# Patient Record
Sex: FEMALE | Race: WHITE | Employment: OTHER | URBAN - METROPOLITAN AREA
[De-identification: names, ages, dates, MRNs, and addresses within clinical notes are randomized per-mention and may not be internally consistent; named-entity substitution may affect disease eponyms.]

---

## 2017-01-06 ENCOUNTER — TELEPHONE (OUTPATIENT)
Dept: CARDIOTHORACIC SURGERY | Age: 82
End: 2017-01-06

## 2017-01-06 NOTE — TELEPHONE ENCOUNTER
[address] NP:  Spoke w/ daughter Felisha Malhotra this morning re: her mom's SOB. Discussed her current regimen and the changes we had recommended and my concerns that despite medication adjustments her BP/HR did not appear to respond and concerns that her mom was getting confused with the increasingly complex regimen and not actually taking the medications as prescribed and consequently not obtaining any sx relief. Felisha Malhotra confirmed regimen with pt and called my back with BP/HR readings. They are as follows:  HR 75 158/57  HR 82 162/65  HR 70 154/62    Confirmed current meds as follows:  Lx 80/40  Amlodipine 5 qday  Hydralazine 10 BID  Metoprolol 25 BID    Decision to increase Metop to 50 BID and call next Tuesday with HR/BP readings and sx. Discussed at great length with Felisha Malhotra as well as pt. All in agreement and expressed understanding.

## 2017-01-13 ENCOUNTER — TELEPHONE (OUTPATIENT)
Dept: CARDIOTHORACIC SURGERY | Age: 82
End: 2017-01-13

## 2017-01-13 NOTE — TELEPHONE ENCOUNTER
[address] NP:  Spoke w/ pt re: BP/HR o n the following regimen:  Lx 80mg/40mg  Amlodipine 5mg qday  Hydralazine 10 BID  Metoprolol 50 BID    150-140's / 56-63 with HR 64 - 70. Instructed to increase hydralazine to TID and call back in one week. Will call daughter Deonna Davey with updated plan as well.

## 2017-01-19 ENCOUNTER — TELEPHONE (OUTPATIENT)
Dept: INTERNAL MEDICINE CLINIC | Age: 82
End: 2017-01-19

## 2017-01-19 ENCOUNTER — APPOINTMENT (OUTPATIENT)
Dept: GENERAL RADIOLOGY | Age: 82
DRG: 286 | End: 2017-01-19
Attending: EMERGENCY MEDICINE
Payer: MEDICARE

## 2017-01-19 ENCOUNTER — HOSPITAL ENCOUNTER (INPATIENT)
Age: 82
LOS: 3 days | Discharge: HOME | DRG: 286 | End: 2017-01-23
Attending: EMERGENCY MEDICINE | Admitting: INTERNAL MEDICINE
Payer: MEDICARE

## 2017-01-19 DIAGNOSIS — I50.9 ACUTE CONGESTIVE HEART FAILURE, UNSPECIFIED CONGESTIVE HEART FAILURE TYPE: Primary | ICD-10-CM

## 2017-01-19 DIAGNOSIS — D64.9 ANEMIA, UNSPECIFIED TYPE: ICD-10-CM

## 2017-01-19 DIAGNOSIS — I05.0 MITRAL VALVE STENOSIS, UNSPECIFIED ETIOLOGY: ICD-10-CM

## 2017-01-19 DIAGNOSIS — N18.9 CKD (CHRONIC KIDNEY DISEASE), UNSPECIFIED STAGE: ICD-10-CM

## 2017-01-19 LAB
ALBUMIN SERPL BCP-MCNC: 3.4 G/DL (ref 3.5–5)
ALBUMIN/GLOB SERPL: 1 {RATIO} (ref 1.1–2.2)
ALP SERPL-CCNC: 84 U/L (ref 45–117)
ALT SERPL-CCNC: 19 U/L (ref 12–78)
ANION GAP BLD CALC-SCNC: 10 MMOL/L (ref 5–15)
AST SERPL W P-5'-P-CCNC: 14 U/L (ref 15–37)
ATRIAL RATE: 101 BPM
BASOPHILS # BLD AUTO: 0 K/UL (ref 0–0.1)
BASOPHILS # BLD: 0 % (ref 0–1)
BILIRUB SERPL-MCNC: 0.6 MG/DL (ref 0.2–1)
BNP SERPL-MCNC: 199 PG/ML (ref 0–100)
BUN SERPL-MCNC: 20 MG/DL (ref 6–20)
BUN/CREAT SERPL: 14 (ref 12–20)
CALCIUM SERPL-MCNC: 9.3 MG/DL (ref 8.5–10.1)
CALCULATED P AXIS, ECG09: 82 DEGREES
CALCULATED R AXIS, ECG10: -18 DEGREES
CALCULATED T AXIS, ECG11: 88 DEGREES
CHLORIDE SERPL-SCNC: 105 MMOL/L (ref 97–108)
CO2 SERPL-SCNC: 28 MMOL/L (ref 21–32)
CREAT SERPL-MCNC: 1.42 MG/DL (ref 0.55–1.02)
DIAGNOSIS, 93000: NORMAL
EOSINOPHIL # BLD: 0.1 K/UL (ref 0–0.4)
EOSINOPHIL NFR BLD: 1 % (ref 0–7)
ERYTHROCYTE [DISTWIDTH] IN BLOOD BY AUTOMATED COUNT: 15.6 % (ref 11.5–14.5)
GLOBULIN SER CALC-MCNC: 3.4 G/DL (ref 2–4)
GLUCOSE BLD STRIP.AUTO-MCNC: 182 MG/DL (ref 65–100)
GLUCOSE SERPL-MCNC: 222 MG/DL (ref 65–100)
HCT VFR BLD AUTO: 32.3 % (ref 35–47)
HGB BLD-MCNC: 9.7 G/DL (ref 11.5–16)
INR PPP: 2.5 (ref 0.9–1.1)
LYMPHOCYTES # BLD AUTO: 8 % (ref 12–49)
LYMPHOCYTES # BLD: 0.8 K/UL (ref 0.8–3.5)
MAGNESIUM SERPL-MCNC: 2 MG/DL (ref 1.6–2.4)
MCH RBC QN AUTO: 25.9 PG (ref 26–34)
MCHC RBC AUTO-ENTMCNC: 30 G/DL (ref 30–36.5)
MCV RBC AUTO: 86.4 FL (ref 80–99)
MONOCYTES # BLD: 0.5 K/UL (ref 0–1)
MONOCYTES NFR BLD AUTO: 5 % (ref 5–13)
NEUTS SEG # BLD: 8.6 K/UL (ref 1.8–8)
NEUTS SEG NFR BLD AUTO: 86 % (ref 32–75)
P-R INTERVAL, ECG05: 194 MS
PLATELET # BLD AUTO: 169 K/UL (ref 150–400)
POTASSIUM SERPL-SCNC: 3.4 MMOL/L (ref 3.5–5.1)
PROT SERPL-MCNC: 6.8 G/DL (ref 6.4–8.2)
PROTHROMBIN TIME: 25 SEC (ref 9–11.1)
Q-T INTERVAL, ECG07: 376 MS
QRS DURATION, ECG06: 92 MS
QTC CALCULATION (BEZET), ECG08: 487 MS
RBC # BLD AUTO: 3.74 M/UL (ref 3.8–5.2)
SERVICE CMNT-IMP: ABNORMAL
SODIUM SERPL-SCNC: 143 MMOL/L (ref 136–145)
TROPONIN I SERPL-MCNC: <0.04 NG/ML
TSH SERPL DL<=0.05 MIU/L-ACNC: 1.59 UIU/ML (ref 0.36–3.74)
VENTRICULAR RATE, ECG03: 101 BPM
WBC # BLD AUTO: 10 K/UL (ref 3.6–11)

## 2017-01-19 PROCEDURE — 80053 COMPREHEN METABOLIC PANEL: CPT | Performed by: EMERGENCY MEDICINE

## 2017-01-19 PROCEDURE — 99285 EMERGENCY DEPT VISIT HI MDM: CPT

## 2017-01-19 PROCEDURE — 83880 ASSAY OF NATRIURETIC PEPTIDE: CPT | Performed by: EMERGENCY MEDICINE

## 2017-01-19 PROCEDURE — 84443 ASSAY THYROID STIM HORMONE: CPT | Performed by: INTERNAL MEDICINE

## 2017-01-19 PROCEDURE — 71010 XR CHEST PORT: CPT

## 2017-01-19 PROCEDURE — 96374 THER/PROPH/DIAG INJ IV PUSH: CPT

## 2017-01-19 PROCEDURE — 84484 ASSAY OF TROPONIN QUANT: CPT | Performed by: EMERGENCY MEDICINE

## 2017-01-19 PROCEDURE — 74011636637 HC RX REV CODE- 636/637: Performed by: INTERNAL MEDICINE

## 2017-01-19 PROCEDURE — 85610 PROTHROMBIN TIME: CPT | Performed by: EMERGENCY MEDICINE

## 2017-01-19 PROCEDURE — A9270 NON-COVERED ITEM OR SERVICE: HCPCS | Performed by: INTERNAL MEDICINE

## 2017-01-19 PROCEDURE — 83735 ASSAY OF MAGNESIUM: CPT | Performed by: EMERGENCY MEDICINE

## 2017-01-19 PROCEDURE — 82962 GLUCOSE BLOOD TEST: CPT

## 2017-01-19 PROCEDURE — 99218 HC RM OBSERVATION: CPT

## 2017-01-19 PROCEDURE — 74011250636 HC RX REV CODE- 250/636: Performed by: INTERNAL MEDICINE

## 2017-01-19 PROCEDURE — 74011250637 HC RX REV CODE- 250/637: Performed by: INTERNAL MEDICINE

## 2017-01-19 PROCEDURE — 93005 ELECTROCARDIOGRAM TRACING: CPT

## 2017-01-19 PROCEDURE — 85025 COMPLETE CBC W/AUTO DIFF WBC: CPT | Performed by: EMERGENCY MEDICINE

## 2017-01-19 PROCEDURE — 36415 COLL VENOUS BLD VENIPUNCTURE: CPT | Performed by: EMERGENCY MEDICINE

## 2017-01-19 PROCEDURE — 74011250636 HC RX REV CODE- 250/636: Performed by: EMERGENCY MEDICINE

## 2017-01-19 RX ORDER — FUROSEMIDE 10 MG/ML
40 INJECTION INTRAMUSCULAR; INTRAVENOUS
Status: COMPLETED | OUTPATIENT
Start: 2017-01-19 | End: 2017-01-19

## 2017-01-19 RX ORDER — OMEPRAZOLE 20 MG/1
20 CAPSULE, DELAYED RELEASE ORAL DAILY
COMMUNITY
End: 2019-05-20 | Stop reason: ALTCHOICE

## 2017-01-19 RX ORDER — SODIUM CHLORIDE 0.9 % (FLUSH) 0.9 %
5-10 SYRINGE (ML) INJECTION AS NEEDED
Status: DISCONTINUED | OUTPATIENT
Start: 2017-01-19 | End: 2017-01-23 | Stop reason: HOSPADM

## 2017-01-19 RX ORDER — WARFARIN 3 MG/1
4.5 TABLET ORAL
Status: ON HOLD | COMMUNITY
End: 2017-01-23

## 2017-01-19 RX ORDER — POTASSIUM CHLORIDE 750 MG/1
20 TABLET, FILM COATED, EXTENDED RELEASE ORAL DAILY
Status: DISCONTINUED | OUTPATIENT
Start: 2017-01-19 | End: 2017-01-20

## 2017-01-19 RX ORDER — METFORMIN HYDROCHLORIDE 500 MG/1
1000 TABLET, EXTENDED RELEASE ORAL
Status: DISCONTINUED | OUTPATIENT
Start: 2017-01-19 | End: 2017-01-19

## 2017-01-19 RX ORDER — WARFARIN 3 MG/1
6 TABLET ORAL
COMMUNITY
End: 2017-01-23

## 2017-01-19 RX ORDER — HYDRALAZINE HYDROCHLORIDE 10 MG/1
20 TABLET, FILM COATED ORAL 3 TIMES DAILY
Status: DISCONTINUED | OUTPATIENT
Start: 2017-01-19 | End: 2017-01-23 | Stop reason: HOSPADM

## 2017-01-19 RX ORDER — ALLOPURINOL 100 MG/1
200 TABLET ORAL DAILY
Status: DISCONTINUED | OUTPATIENT
Start: 2017-01-19 | End: 2017-01-23 | Stop reason: HOSPADM

## 2017-01-19 RX ORDER — LEVOTHYROXINE SODIUM 125 UG/1
125 TABLET ORAL
COMMUNITY
End: 2017-05-27 | Stop reason: SDUPTHER

## 2017-01-19 RX ORDER — FAMOTIDINE 20 MG/1
20 TABLET, FILM COATED ORAL DAILY
Status: DISCONTINUED | OUTPATIENT
Start: 2017-01-19 | End: 2017-01-23

## 2017-01-19 RX ORDER — LISINOPRIL 10 MG/1
10 TABLET ORAL DAILY
COMMUNITY
End: 2017-01-19

## 2017-01-19 RX ORDER — AMLODIPINE BESYLATE 5 MG/1
5 TABLET ORAL DAILY
Status: DISCONTINUED | OUTPATIENT
Start: 2017-01-19 | End: 2017-01-21

## 2017-01-19 RX ORDER — ATORVASTATIN CALCIUM 40 MG/1
80 TABLET, FILM COATED ORAL
Status: DISCONTINUED | OUTPATIENT
Start: 2017-01-19 | End: 2017-01-23 | Stop reason: HOSPADM

## 2017-01-19 RX ORDER — LEVOTHYROXINE SODIUM 125 UG/1
125 TABLET ORAL
Status: DISCONTINUED | OUTPATIENT
Start: 2017-01-20 | End: 2017-01-23 | Stop reason: HOSPADM

## 2017-01-19 RX ORDER — DOFETILIDE 0.12 MG/1
125 CAPSULE ORAL 2 TIMES DAILY
Status: DISCONTINUED | OUTPATIENT
Start: 2017-01-19 | End: 2017-01-23 | Stop reason: HOSPADM

## 2017-01-19 RX ORDER — TRAMADOL HYDROCHLORIDE 50 MG/1
50 TABLET ORAL
Status: DISCONTINUED | OUTPATIENT
Start: 2017-01-19 | End: 2017-01-23 | Stop reason: HOSPADM

## 2017-01-19 RX ORDER — CARVEDILOL 12.5 MG/1
25 TABLET ORAL 2 TIMES DAILY WITH MEALS
Status: DISCONTINUED | OUTPATIENT
Start: 2017-01-19 | End: 2017-01-21

## 2017-01-19 RX ORDER — FUROSEMIDE 40 MG/1
80 TABLET ORAL
COMMUNITY
End: 2017-03-24 | Stop reason: SDUPTHER

## 2017-01-19 RX ORDER — ENOXAPARIN SODIUM 100 MG/ML
30 INJECTION SUBCUTANEOUS EVERY 24 HOURS
Status: DISCONTINUED | OUTPATIENT
Start: 2017-01-19 | End: 2017-01-23

## 2017-01-19 RX ORDER — FUROSEMIDE 10 MG/ML
40 INJECTION INTRAMUSCULAR; INTRAVENOUS 2 TIMES DAILY
Status: DISCONTINUED | OUTPATIENT
Start: 2017-01-19 | End: 2017-01-20

## 2017-01-19 RX ORDER — SODIUM CHLORIDE 0.9 % (FLUSH) 0.9 %
5-10 SYRINGE (ML) INJECTION EVERY 8 HOURS
Status: DISCONTINUED | OUTPATIENT
Start: 2017-01-19 | End: 2017-01-23 | Stop reason: HOSPADM

## 2017-01-19 RX ORDER — FUROSEMIDE 40 MG/1
40 TABLET ORAL EVERY EVENING
Status: ON HOLD | COMMUNITY
End: 2017-01-23

## 2017-01-19 RX ORDER — SPIRONOLACTONE 25 MG/1
25 TABLET ORAL DAILY
COMMUNITY
End: 2017-01-19

## 2017-01-19 RX ADMIN — DOFETILIDE 125 MCG: 0.12 CAPSULE ORAL at 11:09

## 2017-01-19 RX ADMIN — CARVEDILOL 25 MG: 12.5 TABLET, FILM COATED ORAL at 11:09

## 2017-01-19 RX ADMIN — HUMAN INSULIN 30 UNITS: 100 INJECTION, SUSPENSION SUBCUTANEOUS at 18:01

## 2017-01-19 RX ADMIN — Medication 10 ML: at 13:28

## 2017-01-19 RX ADMIN — ALLOPURINOL 200 MG: 100 TABLET ORAL at 11:09

## 2017-01-19 RX ADMIN — ENOXAPARIN SODIUM 30 MG: 30 INJECTION SUBCUTANEOUS at 11:09

## 2017-01-19 RX ADMIN — FUROSEMIDE 40 MG: 10 INJECTION, SOLUTION INTRAMUSCULAR; INTRAVENOUS at 08:44

## 2017-01-19 RX ADMIN — POTASSIUM CHLORIDE 20 MEQ: 750 TABLET, FILM COATED, EXTENDED RELEASE ORAL at 17:35

## 2017-01-19 RX ADMIN — AMLODIPINE BESYLATE 5 MG: 5 TABLET ORAL at 11:09

## 2017-01-19 RX ADMIN — HYDRALAZINE HYDROCHLORIDE 20 MG: 10 TABLET, FILM COATED ORAL at 17:36

## 2017-01-19 RX ADMIN — HYDRALAZINE HYDROCHLORIDE 20 MG: 10 TABLET, FILM COATED ORAL at 21:53

## 2017-01-19 RX ADMIN — ATORVASTATIN CALCIUM 80 MG: 40 TABLET, FILM COATED ORAL at 21:53

## 2017-01-19 RX ADMIN — Medication 10 ML: at 22:02

## 2017-01-19 RX ADMIN — DOFETILIDE 125 MCG: 0.12 CAPSULE ORAL at 21:53

## 2017-01-19 RX ADMIN — FUROSEMIDE 40 MG: 10 INJECTION, SOLUTION INTRAMUSCULAR; INTRAVENOUS at 17:35

## 2017-01-19 RX ADMIN — FAMOTIDINE 20 MG: 20 TABLET ORAL at 11:09

## 2017-01-19 RX ADMIN — CARVEDILOL 25 MG: 12.5 TABLET, FILM COATED ORAL at 17:36

## 2017-01-19 NOTE — ED NOTES
0845: Bedside commode at bedside. Patient instructed to call when need to urinate. Call bell within reach. 2004: Cardiology in ER to see patient.

## 2017-01-19 NOTE — ED PROVIDER NOTES
HPI Comments: 80 y.o. female with past medical history significant for diabetes, HTN, hypercholesterolemia, s/p ablation, a fib who presents from home via EMS with chief complaint of shortness of breath. Patient complains of shortness of breath and some weakness that began suddenly this morning around 0400. Patient states she noticed some SOB when she got up to use the restroom that worsened when she laid back down. Patient also complains of some increased leg swelling and states she had a 5 minute episode of some chest pressure this morning that has since subsided. Patient states she has not yet taken her regular medications. Patient's BG was 278 en route. Patient denies any new weight gain, fever, vomiting, or diaphoresis. Patient denies h/o CHF. There are no other acute medical concerns at this time. Social hx: former smoker, alcohol drinker  PCP: Naty Cabrales MD  Cardiologist: Jannie Coleman MD, [facility]    Note written by Alona Scott, as dictated by Matt Joiner MD 8:00 AM     The history is provided by the patient. No  was used. 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 I.A. 4/23/2009    TIA (transient ischemic attack)        Past Surgical History:   Procedure Laterality Date    Hx appendectomy      Hx cholecystectomy      Hx hysterectomy      Hx orthopaedic      Hx knee arthroscopy  2085,3973     right knee    Hx urological       RENAL STENT, tur-b    Vascular surgery procedure unlist  11/4     removed vein in right leg    Pr cardiac surg procedure unlist       ablation         Family History:   Problem Relation Age of Onset    Stroke Other     Arthritis-osteo Sister      spinal stenosis    Gout Son     Hypertension Son     Hypertension Mother     Heart Disease Mother      CAD    Heart Disease Father      CAD    Alcohol abuse Neg Hx     Asthma Neg Hx     Bleeding Prob Neg Hx     Cancer Neg Hx     Diabetes Neg Hx     Elevated Lipids Neg Hx     Headache Neg Hx     Lung Disease Neg Hx     Migraines Neg Hx     Psychiatric Disorder Neg Hx     Mental Retardation Neg Hx        Social History     Social History    Marital status:      Spouse name: N/A    Number of children: N/A    Years of education: N/A     Occupational History    Not on file. Social History Main Topics    Smoking status: Former Smoker     Packs/day: 0.50     Years: 10.00     Types: Cigarettes     Quit date: 1/1/1967    Smokeless tobacco: Never Used    Alcohol use 0.0 oz/week     0 Standard drinks or equivalent per week      Comment: 2-3qmo    Drug use: No    Sexual activity: Not on file     Other Topics Concern    Not on file     Social History Narrative         ALLERGIES: Actos [pioglitazone]; Codeine; Doxycycline; and Hydrocodone    Review of Systems   Constitutional: Negative for appetite change, diaphoresis, fever and unexpected weight change. HENT: Negative for congestion and rhinorrhea. Respiratory: Positive for shortness of breath. Negative for cough. Cardiovascular: Negative for chest pain and leg swelling. Gastrointestinal: Negative for abdominal pain, nausea and vomiting. Genitourinary: Negative for difficulty urinating and dysuria. Musculoskeletal: Negative for back pain and neck pain. Skin: Negative for rash and wound. Neurological: Positive for weakness. Negative for headaches. All other systems reviewed and are negative.       Vitals: 01/19/17 0718 01/19/17 0800   BP: 157/60 156/57   Pulse: (!) 103 94   Resp: 16 23   Temp: 98.1 °F (36.7 °C)    SpO2: 93% 97%   Weight: 101.2 kg (223 lb)    Height: 5' 9\" (1.753 m)             Physical Exam   Constitutional: She is oriented to person, place, and time. She appears well-developed and well-nourished. HENT:   Head: Normocephalic and atraumatic. Mouth/Throat: Oropharynx is clear and moist. No oropharyngeal exudate. Eyes: Conjunctivae are normal. Right eye exhibits no discharge. Left eye exhibits no discharge. No scleral icterus. Neck: Normal range of motion. Neck supple. Cardiovascular: Normal rate, regular rhythm and normal heart sounds. Exam reveals no gallop and no friction rub. No murmur heard. Pulmonary/Chest: Effort normal. No respiratory distress. She has no wheezes. She has rales (lung bases). Abdominal: Soft. Bowel sounds are normal. She exhibits no distension. There is no tenderness. There is no guarding. Musculoskeletal: Normal range of motion. She exhibits edema (2+). She exhibits no tenderness. Lymphadenopathy:     She has no cervical adenopathy. Neurological: She is alert and oriented to person, place, and time. No cranial nerve deficit. She exhibits normal muscle tone. Coordination normal.   Skin: Skin is warm and dry. No rash noted. No pallor. Nursing note and vitals reviewed. MDM  Number of Diagnoses or Management Options  Diagnosis management comments: 57-year-old female with a history of multiple medical problems including diabetes, hypertension, hypercholesterolemia, atrial flutter ablation, atrial fib and others here with acute shortness of breath onset this morning. He is to see with some dull chest pain which is currently not present. She has some swelling of the ankles worsen usual. Positive orthopnea. Crackles on lung exam at bases. Strong clinical suspicion for congestive heart failure.  Differential diagnosis includes the congestive heart failure, pneumonia, MI and others. We'll check labs, chest x-ray. ED Course       Procedures    ED EKG interpretation:  Rhythm: sinus tachycardia. Rate (approx.): 101; Axis: left axis deviation; ST/T wave: normal; Mildly prolonged QTC. Note written by Alona Campos, as dictated by Cornell Reagan MD 7:31 AM     8:42 AM  cxr with CHF - will give lasix. Consult cardiology. PROGRESS NOTE:  9:40 AM  Dr. Ai Rodriguez, cardiology, will admit the patient.

## 2017-01-19 NOTE — PROGRESS NOTES
CM went to discuss discharge planning with patient. Patient lives alone in a one story home with 4 steps to inside. She does have someone that comes in to clean her home weekly. She is able to cook for herself. Patient came to the ER by squad but, intends to go home with transportation from her daughter. Patient last saw her PCP Dr. Eber Garcia in June. I called the office and spoke with Leticia Neil just to make physician aware that patient was being admitted under Observation and what room number patient was going to. Prescriptions are filled at [facility] on [address]. Patient has an advanced directive but it is not on the chart. Her daughter has a copy and she is mom's medical POA. Kim Berkowitz [phone]. Patient would like to have a visit from pastoral care even though her  will probably come to see her. Garima Pineda RN CRM  Care Management Interventions  PCP Verified by CM: Yes (Dr. Eber Garcia)  Last Visit to PCP: 06/16/16  Mode of Transport at Discharge:  Other (see comment) (Car)  Transition of Care Consult (CM Consult): Discharge Planning  Kanet Signup: No  Discharge Durable Medical Equipment: No  Physical Therapy Consult: No  Occupational Therapy Consult: No  Speech Therapy Consult: No  Current Support Network: Lives Alone  Confirm Follow Up Transport: Family (Patients daughter)  Plan discussed with Pt/Family/Caregiver: Yes (Patient)  Discharge Location  Discharge Placement: Home

## 2017-01-19 NOTE — TELEPHONE ENCOUNTER
LEROY    Pt is being admitted to [facility] [place] [facility] today for heart failure

## 2017-01-19 NOTE — IP AVS SNAPSHOT
Current Discharge Medication List  
  
Take these medications at their scheduled times Dose & Instructions Dispensing Information Comments Morning Noon Evening Bedtime CENTRUM SILVER Tab tablet Generic drug:  multivitamins-minerals-lutein Your next dose is: Today, Tomorrow Other:  ____________ Dose:  1 Tab Take 1 Tab by mouth daily. Refills:  0  
     
   
   
   
  
 dofetilide 125 mcg capsule Commonly known as:  Olivet Pa Your next dose is: Today, Tomorrow Other:  ____________ Dose:  125 mcg Take 1 Cap by mouth two (2) times a day. Quantity:  60 Cap Refills:  3  
     
   
   
   
  
 * furosemide 40 mg tablet Commonly known as:  LASIX Your next dose is: Today, Tomorrow Other:  ____________ Dose:  80 mg Take 80 mg by mouth every morning. Refills:  0  
     
   
   
   
  
 * furosemide 40 mg tablet Commonly known as:  LASIX Your next dose is: Today, Tomorrow Other:  ____________ Dose:  80 mg Take 2 Tabs by mouth every evening. Quantity:  120 Tab Refills:  12  
     
   
   
   
  
 * HumuLIN N 100 unit/mL injection Generic drug:  insulin NPH Your next dose is: Today, Tomorrow Other:  ____________ Dose:  50 Units 50 Units by SubCUTAneous route every morning. Refills:  0  
     
   
   
   
  
 * HumuLIN N 100 unit/mL injection Generic drug:  insulin NPH Your next dose is: Today, Tomorrow Other:  ____________ Dose:  30 Units 30 Units by SubCUTAneous route daily (with dinner). Refills:  0  
     
   
   
   
  
 hydrALAZINE 10 mg tablet Commonly known as:  APRESOLINE Your next dose is: Today, Tomorrow Other:  ____________ Dose:  20 mg Take 2 Tabs by mouth three (3) times daily. Quantity:  180 Tab Refills:  12  
     
   
   
   
  
 metoprolol tartrate 50 mg tablet Commonly known as:  LOPRESSOR Your next dose is: Today, Tomorrow Other:  ____________ Dose:  50 mg Take 1 Tab by mouth two (2) times a day. Quantity:  60 Tab Refills:  12  
     
   
   
   
  
 potassium chloride SR 10 mEq tablet Commonly known as:  KLOR-CON 10 Your next dose is: Today, Tomorrow Other:  ____________ Dose:  10 mEq Take 1 Tab by mouth daily. Quantity:  30 Tab Refills:  12 PriLOSEC 20 mg capsule Generic drug:  omeprazole Your next dose is: Today, Tomorrow Other:  ____________ Dose:  20 mg Take 20 mg by mouth daily. Refills:  0  
     
   
   
   
  
 SYNTHROID 125 mcg tablet Generic drug:  levothyroxine Your next dose is: Today, Tomorrow Other:  ____________ Dose:  125 mcg Take 125 mcg by mouth Daily (before breakfast). Refills:  0  
     
   
   
   
  
 * warfarin 6 mg tablet Commonly known as:  COUMADIN Start taking on:  1/25/2017 Your next dose is: Today, Tomorrow Other:  ____________ Dose:  6 mg Take 1 Tab by mouth every Wednesday. Quantity:  30 Tab Refills:  12 * Notice: This list has 5 medication(s) that are the same as other medications prescribed for you. Read the directions carefully, and ask your doctor or other care provider to review them with you. Take these medications as needed Dose & Instructions Dispensing Information Comments Morning Noon Evening Bedtime TYLENOL EXTRA STRENGTH 500 mg tablet Generic drug:  acetaminophen Your next dose is: Today, Tomorrow Other:  ____________ Dose:  500 mg Take 500 mg by mouth every six (6) hours as needed for Pain. Refills:  0 Take these medications as directed Dose & Instructions Dispensing Information Comments Morning Noon Evening Bedtime allopurinol 100 mg tablet Commonly known as:  Nelson Mchugh Your next dose is: Today, Tomorrow Other:  ____________ TAKE TWO TABLETS BY MOUTH DAILY Quantity:  180 Tab Refills:  2  
     
   
   
   
  
 amLODIPine 5 mg tablet Commonly known as:  Wandra Sherrie Your next dose is: Today, Tomorrow Other:  ____________ TAKE ONE TABLET BY MOUTH DAILY Quantity:  30 Tab Refills:  2  
     
   
   
   
  
 atorvastatin 80 mg tablet Commonly known as:  LIPITOR Your next dose is: Today, Tomorrow Other:  ____________ TAKE ONE TABLET BY MOUTH EVERY EVENING Quantity:  90 Tab Refills:  3  
     
   
   
   
  
 * warfarin 3 mg tablet Commonly known as:  COUMADIN Your next dose is: Today, Tomorrow Other:  ____________ Take 7.5mg on Monday (2.5 tabs); Take 4.5mg on Tuesday: Take 6 mg on Wednesday; Take 4.5mg on Thursday Get INR checked on Thursday Quantity:  100 Tab Refills:  12 * Notice: This list has 1 medication(s) that are the same as other medications prescribed for you. Read the directions carefully, and ask your doctor or other care provider to review them with you. Where to Get Your Medications These medications were sent to [facility] [address] [place] [address], [address] [place] [address], [address] [facility] [address] Phone:  [phone]  
  warfarin 6 mg tablet Information about where to get these medications is not yet available ! Ask your nurse or doctor about these medications  
  furosemide 40 mg tablet  
 hydrALAZINE 10 mg tablet  
 metoprolol tartrate 50 mg tablet  
 potassium chloride SR 10 mEq tablet  
 warfarin 3 mg tablet

## 2017-01-19 NOTE — PROGRESS NOTES
Admission Medication Reconciliation:    Information obtained from: Patient (had list) and Rx Query    Significant PMH/Disease States:   Past Medical History   Diagnosis Date    Atrial [facility] ([facility] 10/29/2009    Bladder cancer ([facility]     Colon polyps     Diabetes ([facility]     GERD (gastroesophageal reflux disease)     Heart valve problem      leaking heart valve    Hypercholesterolemia     Hypertension     Hypothyroidism     Hypothyroidism 4/23/2009    Hypothyroidism, acquired, 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 I.A. 4/23/2009    TIA (transient ischemic attack)        Chief Complaint for this Admission:    Chief Complaint   Patient presents with    Shortness of Breath         Allergies:  Actos [pioglitazone]; Codeine; Doxycycline; and Hydrocodone    Prior to Admission Medications:   Prior to Admission Medications   Prescriptions Last Dose Informant Patient Reported? Taking?   acetaminophen (TYLENOL EXTRA STRENGTH) 500 mg tablet   Yes Yes   Sig: Take 500 mg by mouth every six (6) hours as needed for Pain. allopurinol (ZYLOPRIM) 100 mg tablet 1/18/2017 at Unknown time  No Yes   Sig: TAKE TWO TABLETS BY MOUTH DAILY   amLODIPine (NORVASC) 5 mg tablet 1/18/2017 at Unknown time  No Yes   Sig: TAKE ONE TABLET BY MOUTH DAILY   atorvastatin (LIPITOR) 80 mg tablet 1/18/2017 at Unknown time  No Yes   Sig: TAKE ONE TABLET BY MOUTH EVERY EVENING   dofetilide (TIKOSYN) 125 mcg capsule 1/18/2017 at Unknown time  No Yes   Sig: Take 1 Cap by mouth two (2) times a day. furosemide (LASIX) 40 mg tablet 1/18/2017 at Unknown time  Yes Yes   Sig: Take 80 mg by mouth every morning. furosemide (LASIX) 40 mg tablet 1/18/2017 at Unknown time  Yes Yes   Sig: Take 40 mg by mouth every evening.    hydrALAZINE (APRESOLINE) 10 mg tablet 1/18/2017 at Unknown time  No Yes   Sig: Take 1 Tab by mouth two (2) times a day. insulin NPH (HUMULIN N) 100 unit/mL injection 2017 at Unknown time  Yes Yes   Si Units by SubCUTAneous route every morning. insulin NPH (HUMULIN N) 100 unit/mL injection 2017 at Unknown time  Yes Yes   Si Units by SubCUTAneous route daily (with dinner). levothyroxine (SYNTHROID) 125 mcg tablet 2017 at Unknown time  Yes Yes   Sig: Take 125 mcg by mouth Daily (before breakfast). metoprolol tartrate (LOPRESSOR) 25 mg tablet 2017 at Unknown time  No Yes   Sig: Take 1 Tab by mouth two (2) times a day. multivitamins-minerals-lutein (CENTRUM SILVER) Tab   Yes Yes   Sig: Take 1 Tab by mouth daily. omeprazole (PRILOSEC) 20 mg capsule 2017 at Unknown time  Yes Yes   Sig: Take 20 mg by mouth daily. warfarin (COUMADIN) 3 mg tablet 2017  Yes Yes   Sig: Take 4.5 mg by mouth six (6) days a week. All days except Wednesday   warfarin (COUMADIN) 3 mg tablet 2017  Yes Yes   Sig: Take 6 mg by mouth every Wednesday. Facility-Administered Medications: None         Comments/Recommendations: Updated PTA meds and confirmed allergies. 1. Removed amoxicillin, vit D3, and Folgard  2. Updated instructions for furosemide, insulin NPH, and warfarin.

## 2017-01-19 NOTE — H&P
[address] [place] [facility] [address], [address]   HISTORY AND PHYSICAL       Name:  Bentley Kirkpatrick   MR#:  [id number]   :  1935   Account #:  [de-identified]        Date of Adm:  2017       HISTORY OF PRESENT ILLNESS: The patient is a 79-year-old   female who presented to the emergency room today after becoming   more acutely short of breath without resolution. She notes that for the   last few months, she has had increased dyspnea on exertion and   according to the notes and discussion with Dr. Brayden Wallace nurse practitioner,   there has been an attempt to reduce her blood pressure and control   her heart rate to see if this improves her shortness of breath. She had   undergone mitral clip back in 2016. Following that, she felt   improved for some time and then became more short of breath. Most   recently an echo demonstrated that the mean gradient across her   mitral valve was now up to 9 mmHg without significant regurgitation. Her last echo done 2016 also demonstrated normal left   ventricular function. PA pressure at the time of that echo was   estimated at 49 mmHg. The patient notes that this morning after   ambulating, she just became more acutely short of breath and was   unable to recover with her usual rest and she summoned 911. Upon   arrival, she was not in acute distress. She was treated with oxygen and   intravenous Lasix. She denies any recent chest discomfort or   palpitations. She does note that she has had chronic lower extremity   edema, which has resulted in ulcers on both lower extremities that are   being cared for through the wound clinic at home. She denies cough,   sputum production, fever or chills. She has had no other recent illness   and she also denies orthopnea, but she often sleeps in a recliner. PAST MEDICAL HISTORY: Notable for the mitral valve clip for severe   mitral regurgitation.  She is status post ablation of atrial flutter and she   has had atrial fibrillation documented subsequent to that with control   now on Tikosyn. She is also anticoagulated. She has had diastolic   heart failure. She has not had any significant coronary artery disease   by catheterization. She has hypertension, diabetes, dyslipidemia, a   previous stroke in . She is also treated for hypothyroidism and   reflux. ALLERGIES   1. ACTOS. 2. CODEINE. CURRENT MEDICATIONS PRIOR TO ADMISSION INCLUDE THE   FOLLOWIN. Prilosec 20 mg a day. 2. Coumadin 4.5 mg 6 days a week and 6 mg on Wednesday. 3. Humulin N 50 units in the morning and 30 units with dinner. 4. Lasix 80 mg in the morning, 40 in the evening. 5. Synthroid 125 mcg a day. 6. Norvasc 5 mg a day. 7. Metoprolol 25 mg twice daily. 8. Tylenol as needed. 9. Lipitor 80 mg a day. 10. Hydralazine 10 mg twice daily. 11. Allopurinol 200 mg/day. 12. Tikosyn 125 mcg b.i.d.   13. Multivitamin. FAMILY HISTORY: Notable for coronary artery disease in her father   and sister. SOCIAL HISTORY: She is retired and currently  and lives   alone. No alcohol. No tobacco.     REVIEW OF SYSTEMS   She notes no significant change in weight, fevers or chills. She denies   any GI complaints or dysuria. She has had no rashes and has not had   any significant bleeding or bruising. PHYSICAL EXAMINATION   GENERAL: She is in no acute distress, wearing oxygen. VITAL SIGNS: Blood pressure 186/64 with a pulse of 85. HEENT: Unremarkable. NECK: There is no thyromegaly. Jugular venous distention is not   appreciated. LUNGS: Reveal clear breath sounds without any significant wheezing   and just a few crackles at the left base. CARDIAC: Reveals a regular rate and rhythm, and I hear no murmur   or gallop  present. ABDOMEN: Soft and nontender with positive bowel sounds. Her lower   extremities are wrapped due to the venous stasis ulcers. NEUROLOGIC: She was alert and oriented x3. Nonfocal, able to   ambulate. EXTREMITIES:  Show 1-2+ edema. Her distal pulses are intact. LABORATORY DATA: CBC includes moderate anemia with a   hemoglobin of 9.7. Chemistry reveals a potassium of 3.4 and her   creatinine is 1.42. Troponin is negative and BNP is 199. Her chest x-  ray demonstrates interstitial edema. Heart size upper normal. Her EKG   documents normal sinus rhythm. There is  leftward axis, LVH with   secondary ST changes and mild QT prolongation noted. ASSESSMENT/PLAN:   Acute on chronic diastolic heart failure,   perhaps as a result of increased gradient across the mitral valve. After   discussion with the patient, it was felt reasonable to admit her   overnight to observation for further adjustment of her blood pressure   regimen and adequate diuresis. I can then discuss with Dr. Flores Luna,   the possibility of reassessing her pressures and mitral valve to see if this   seems to be the culprit in her worsening dyspnea over the last few   months. She appears to be maintaining sinus rhythm and does not   appear to have any primary respiratory problems to explain it. I will   admit her to observation bed on telemetry. We are going to switch her   metoprolol to carvedilol and increase the dose of hydralazine. Of note,   she was taken off lisinopril due to worsening of her renal function and I   am not sure whether there was any hyperkalemia involved either. I will   continue the Tikosyn. Her INR has not been yet   checked, so I will check that, but hold her Coumadin today with plans   for a possible right heart catheterization tomorrow. I will continue her   thyroid supplementation and diabetic management per her home   regimen.              Jarred Bartlett MD CB / DV   D:  01/19/2017   16:36   T:  01/19/2017   17:34   Job #:  [id number]

## 2017-01-19 NOTE — PROGRESS NOTES
TRANSFER - IN REPORT:    Verbal report received from RODGER Dowell(name) on Anastacia Jansen  being received from ED (unit) for routine progression of care      Report consisted of patients Situation, Background, Assessment and   Recommendations(SBAR). Information from the following report(s) SBAR, ED Summary, Procedure Summary, MAR, Recent Results and Med Rec Status was reviewed with the receiving nurse. Opportunity for questions and clarification was provided. Assessment completed upon patients arrival to unit and care assumed.      SKIN ASSESSMENT    Primary Nurse Junaid Melgar and Magy Gan RN performed a dual skin assessment on this patient Impairment noted- see wound doc flow sheet  Shane score is 18

## 2017-01-19 NOTE — IP AVS SNAPSHOT
[address] [facility] [address] [place] [address] 
[phone] Patient: Tushar Kumar MRN: [id number] WNW:[id number]/34/[id number] You are allergic to the following Allergen Reactions Actos (Pioglitazone) Swelling Swelling of feet and legs Codeine Itching Doxycycline Nausea Only Hydrocodone Rash Other (comments)  
 hallucinations Recent Documentation Height Weight BMI OB Status Smoking Status 1.753 m 99.9 kg 32.52 kg/m2 Hysterectomy Former Smoker Emergency Contacts Name Discharge Info Relation Home Work Mobile Vijay June CAREGIVER [3] Daughter [21] [phone] Benson Saver  Child [2] [phone] Jame Miller  Son [22] [phone] About your hospitalization You were admitted on:  January 19, 2017 You last received care in the:  [facility] 4 [facility] 2 You were discharged on:  January 23, 2017 Unit phone number:  [phone] Why you were hospitalized Your primary diagnosis was:  Not on File Your diagnoses also included:  Heart Failure (Hcc) Providers Seen During Your Hospitalizations Provider Role Specialty Primary office phone Pema Eason MD Attending Provider Emergency Medicine [phone] Myron Hutton MD Attending Provider Cardiology [phone] Your Primary Care Physician (PCP) Primary Care Physician Office Phone Office Fax Fabienne Romeo [phone] Follow-up Information Follow up With Details Comments Contact Info Bashir Barlow MD   [address] [place] [address] [address] [place] [address] 
[phone] Myron Hutton MD On 1/25/2017 Hospital discharge follow up appointment at 10:00 AM.  [address] [place] [address] 
[phone]  [address] will come to your home for physical therapy in addition to wound care. [facility], [address] [facility] [address] Current Discharge Medication List  
  
START taking these medications Dose & Instructions Dispensing Information Comments Morning Noon Evening Bedtime  
 potassium chloride SR 10 mEq tablet Commonly known as:  KLOR-CON 10 Your next dose is: Today, Tomorrow Other:  _________ Dose:  10 mEq Take 1 Tab by mouth daily. Quantity:  30 Tab Refills:  12 CONTINUE these medications which have CHANGED Dose & Instructions Dispensing Information Comments Morning Noon Evening Bedtime * furosemide 40 mg tablet Commonly known as:  LASIX What changed:  Another medication with the same name was changed. Make sure you understand how and when to take each. Your next dose is: Today, Tomorrow Other:  _________ Dose:  80 mg Take 80 mg by mouth every morning. Refills:  0  
     
   
   
   
  
 * furosemide 40 mg tablet Commonly known as:  LASIX What changed:  how much to take Your next dose is: Today, Tomorrow Other:  _________ Dose:  80 mg Take 2 Tabs by mouth every evening. Quantity:  120 Tab Refills:  12  
     
   
   
   
  
 hydrALAZINE 10 mg tablet Commonly known as:  APRESOLINE What changed:   
- how much to take - when to take this Your next dose is: Today, Tomorrow Other:  _________ Dose:  20 mg Take 2 Tabs by mouth three (3) times daily. Quantity:  180 Tab Refills:  12  
     
   
   
   
  
 metoprolol tartrate 50 mg tablet Commonly known as:  LOPRESSOR What changed:   
- medication strength 
- how much to take Your next dose is: Today, Tomorrow Other:  _________ Dose:  50 mg Take 1 Tab by mouth two (2) times a day. Quantity:  60 Tab Refills:  12 * warfarin 3 mg tablet Commonly known as:  COUMADIN What changed:   
- how much to take 
- how to take this - when to take this 
- additional instructions Your next dose is: Today, Tomorrow Other:  _________ Take 7.5mg on Monday (2.5 tabs); Take 4.5mg on Tuesday: Take 6 mg on Wednesday; Take 4.5mg on Thursday Get INR checked on Thursday Quantity:  100 Tab Refills:  12  
     
   
   
   
  
 * warfarin 6 mg tablet Commonly known as:  COUMADIN Start taking on:  1/25/2017 What changed:  medication strength Your next dose is: Today, Tomorrow Other:  _________ Dose:  6 mg Take 1 Tab by mouth every Wednesday. Quantity:  30 Tab Refills:  12 * Notice: This list has 4 medication(s) that are the same as other medications prescribed for you. Read the directions carefully, and ask your doctor or other care provider to review them with you. CONTINUE these medications which have NOT CHANGED Dose & Instructions Dispensing Information Comments Morning Noon Evening Bedtime  
 allopurinol 100 mg tablet Commonly known as:  Ade Saunders Your next dose is: Today, Tomorrow Other:  _________ TAKE TWO TABLETS BY MOUTH DAILY Quantity:  180 Tab Refills:  2  
     
   
   
   
  
 amLODIPine 5 mg tablet Commonly known as:  Angel Trinidad Your next dose is: Today, Tomorrow Other:  _________ TAKE ONE TABLET BY MOUTH DAILY Quantity:  30 Tab Refills:  2  
     
   
   
   
  
 atorvastatin 80 mg tablet Commonly known as:  LIPITOR Your next dose is: Today, Tomorrow Other:  _________ TAKE ONE TABLET BY MOUTH EVERY EVENING Quantity:  90 Tab Refills:  3 CENTRUM SILVER Tab tablet Generic drug:  multivitamins-minerals-lutein Your next dose is: Today, Tomorrow Other:  _________ Dose:  1 Tab Take 1 Tab by mouth daily. Refills:  0  
     
   
   
   
  
 dofetilide 125 mcg capsule Commonly known as:  Luke Olimpia Your next dose is: Today, Tomorrow Other:  _________ Dose:  125 mcg Take 1 Cap by mouth two (2) times a day. Quantity:  60 Cap Refills:  3  
     
   
   
   
  
 * HumuLIN N 100 unit/mL injection Generic drug:  insulin NPH Your next dose is: Today, Tomorrow Other:  _________ Dose:  50 Units 50 Units by SubCUTAneous route every morning. Refills:  0  
     
   
   
   
  
 * HumuLIN N 100 unit/mL injection Generic drug:  insulin NPH Your next dose is: Today, Tomorrow Other:  _________ Dose:  30 Units 30 Units by SubCUTAneous route daily (with dinner). Refills:  0 PriLOSEC 20 mg capsule Generic drug:  omeprazole Your next dose is: Today, Tomorrow Other:  _________ Dose:  20 mg Take 20 mg by mouth daily. Refills:  0  
     
   
   
   
  
 SYNTHROID 125 mcg tablet Generic drug:  levothyroxine Your next dose is: Today, Tomorrow Other:  _________ Dose:  125 mcg Take 125 mcg by mouth Daily (before breakfast). Refills:  0  
     
   
   
   
  
 TYLENOL EXTRA STRENGTH 500 mg tablet Generic drug:  acetaminophen Your next dose is: Today, Tomorrow Other:  _________ Dose:  500 mg Take 500 mg by mouth every six (6) hours as needed for Pain. Refills:  0  
     
   
   
   
  
 * Notice: This list has 2 medication(s) that are the same as other medications prescribed for you. Read the directions carefully, and ask your doctor or other care provider to review them with you. Where to Get Your Medications These medications were sent to [facility] [address] [place] [address], [address] [place] [address] 20, [address] [place] [address] Phone:  [phone]  
  warfarin 6 mg tablet Information on where to get these meds will be given to you by the nurse or doctor. ! Ask your nurse or doctor about these medications  
  furosemide 40 mg tablet  
 hydrALAZINE 10 mg tablet  
 metoprolol tartrate 50 mg tablet  
 potassium chloride SR 10 mEq tablet  
 warfarin 3 mg tablet Discharge Instructions None Discharge Instructions Attachments/References HEART FAILURE: AVOIDING TRIGGERS (ENGLISH) Discharge Orders Procedure Order Date Status Priority Quantity Spec Type Associated Dx PROTHROMBIN TIME + INR 01/23/17 1449 Future Routine 1 Blood [employer] Announcement We are excited to announce that we are making your provider's discharge notes available to you in [employer]. You will see these notes when they are completed and signed by the physician that discharged you from your recent hospital stay. If you have any questions or concerns about any information you see in [employer], please call the Health Information Department where you were seen or reach out to your Primary Care Provider for more information about your plan of care. Introducing [facility]! Terrance Berger introduces [employer] patient portal. Now you can access parts of your medical record, email your doctor's office, and request medication refills online. 1. In your internet browser, go to [email or web address] 2. Click on the First Time User? Click Here link in the Sign In box. You will see the New Member Sign Up page. 3. Enter your [employer] Access Code exactly as it appears below. You will not need to use this code after youve completed the sign-up process. If you do not sign up before the expiration date, you must request a new code. · [employer] Access Code: [email or web address] Expires: 2/6/2017  4:43 PM 
 
4. Enter the last four digits of your Social Security Number (xxxx) and Date of Birth (mm/dd/yyyy) as indicated and click Submit.  You will be taken to the next sign-up page. 5. Create a [employer] ID. This will be your [employer] login ID and cannot be changed, so think of one that is secure and easy to remember. 6. Create a [employer] password. You can change your password at any time. 7. Enter your Password Reset Question and Answer. This can be used at a later time if you forget your password. 8. Enter your e-mail address. You will receive e-mail notification when new information is available in [address] 9. Click Sign Up. You can now view and download portions of your medical record. 10. Click the Download Summary menu link to download a portable copy of your medical information. If you have questions, please visit the Frequently Asked Questions section of the [employer] website. Remember, [employer] is NOT to be used for urgent needs. For medical emergencies, dial 911. Now available from your iPhone and Android! General Information Please provide this summary of care documentation to your next provider. Patient Signature:  ____________________________________________________________ Date:  ____________________________________________________________  
  
Salena Spauldinger Provider Signature:  ____________________________________________________________ Date:  ____________________________________________________________ More Information Avoiding Triggers With Heart Failure: Care Instructions Your Care Instructions Triggers are anything that make your heart failure flare up. A flare-up is also called \"sudden heart failure\" or \"acute heart failure. \" When you have a flare-up, fluid builds up in your lungs, and you have problems breathing. You might need to go to the hospital. By watching for changes in your condition and avoiding triggers, you can prevent heart failure flare-ups. Follow-up care is a key part of your treatment and safety.  Be sure to make and go to all appointments, and call your doctor if you are having problems. It's also a good idea to know your test results and keep a list of the medicines you take. How can you care for yourself at home? Watch for changes in your weight and condition · Weigh yourself without clothing at the same time each day. Record your weight. Call your doctor if you gain 3 pounds or more in 2 to 3 days. A sudden weight gain may mean that your heart failure is getting worse. · Keep a daily record of your symptoms. Write down any changes in how you feel, such as new shortness of breath, cough, or problems eating. Also record if your ankles are more swollen than usual and if you have to urinate in the night more often. Note anything that you ate or did that could have triggered these changes. Limit sodium Sodium causes your body to hold on to water, making it harder for your heart to pump. People get most of their sodium from processed foods. Fast food and restaurant meals also tend to be very high in sodium. · Your doctor may suggest that you limit sodium to 2,000 milligrams (mg) a day or less. That is less than 1 teaspoon of salt a day, including all the salt you eat in cooking or in packaged foods. · Read food labels on cans and food packages. They tell you how much sodium you get in one serving. Check the serving size. If you eat more than one serving, you are getting more sodium. · Be aware that sodium can come in forms other than salt, including monosodium glutamate (MSG), sodium citrate, and sodium bicarbonate (baking soda). MSG is often added to Asian food. You can sometimes ask for food without MSG or salt. · Slowly reducing salt will help you adjust to the taste. Take the salt shaker off the table. · Flavor your food with garlic, lemon juice, onion, vinegar, herbs, and spices instead of salt.  Do not use soy sauce, steak sauce, onion salt, garlic salt, mustard, or ketchup on your food, unless it is labeled \"low-sodium\" or \"low-salt. \" 
· Make your own salad dressings, sauces, and ketchup without adding salt. · Use fresh or frozen ingredients, instead of canned ones, whenever you can. Choose low-sodium canned goods. · Eat less processed food and food from restaurants, including fast food. Exercise as directed Moderate, regular exercise is very good for your heart. It improves your blood flow and helps control your weight. But too much exercise can stress your heart and cause a heart failure flare-up. · Check with your doctor before you start an exercise program. 
· Walking is an easy way to get exercise. Start out slowly. Gradually increase the length and pace of your walk. Swimming, riding a bike, and using a treadmill are also good forms of exercise. · When you exercise, watch for signs that your heart is working too hard. You are pushing yourself too hard if you cannot talk while you are exercising. If you become short of breath or dizzy or have chest pain, stop, sit down, and rest. 
· Do not exercise when you do not feel well. Take medicines correctly · Take your medicines exactly as prescribed. Call your doctor if you think you are having a problem with your medicine. · Make a list of all the medicines you take. Include those prescribed to you by other doctors and any over-the-counter medicines, vitamins, or supplements you take. Take this list with you when you go to any doctor. · Take your medicines at the same time every day. It may help you to post a list of all the medicines you take every day and what time of day you take them. · Make taking your medicine as simple as you can. Plan times to take your medicines when you are doing other things, such as eating a meal or getting ready for bed. This will make it easier to remember to take your medicines. · Get organized. Use helpful tools, such as daily or weekly pill containers. When should you call for help? Call 911 if you have symptoms of sudden heart failure such as: 
· You have severe trouble breathing. · You cough up pink, foamy mucus. · You have a new irregular or rapid heartbeat. Call your doctor now or seek immediate medical care if: 
· You have new or increased shortness of breath. · You are dizzy or lightheaded, or you feel like you may faint. · You have sudden weight gain, such as 3 pounds or more in 2 to 3 days. · You have increased swelling in your legs, ankles, or feet. · You are suddenly so tired or weak that you cannot do your usual activities. Watch closely for changes in your health, and be sure to contact your doctor if you develop new symptoms. Where can you learn more? Go to http://gilson-michael.info/. Enter M182 in the search box to learn more about \"Avoiding Triggers With Heart Failure: Care Instructions. \" Current as of: April 27, 2016 Content Version: 11.1 © 4258-1318 [employer]. Care instructions adapted under license by [employer] (which disclaims liability or warranty for this information). If you have questions about a medical condition or this instruction, always ask your healthcare professional. [place] [address] any warranty or liability for your use of this information.

## 2017-01-19 NOTE — ROUTINE PROCESS
TRANSFER - OUT REPORT:    Verbal report given to [facility] RN(name) on Jose Antonio Baldwin  being transferred to 423(unit) for routine progression of care       Report consisted of patients Situation, Background, Assessment and   Recommendations(SBAR). Information from the following report(s) SBAR and ED Summary was reviewed with the receiving nurse. Lines:   Peripheral IV 01/19/17 Left Antecubital (Active)   Site Assessment Clean, dry, & intact 1/19/2017  7:22 AM   Phlebitis Assessment 0 1/19/2017  7:22 AM   Infiltration Assessment 0 1/19/2017  7:22 AM   Dressing Status Clean, dry, & intact 1/19/2017  7:22 AM   Dressing Type Transparent 1/19/2017  7:22 AM   Hub Color/Line Status Pink 1/19/2017  7:22 AM   Action Taken Catheter retaped 1/19/2017  7:22 AM        Opportunity for questions and clarification was provided.       Patient transported with:   Monitor  Tech

## 2017-01-19 NOTE — CONSULTS
Patient: Tillman Blizzard   Age: 80 y.o. Patient Care Team:  Atha Oppenheim, MD as PCP - General  Seth Soriano RN as Nurse Navigator (Internal Medicine)  Sherri Quiros MD as Physician (Urology)  Nishi Gallo MD as Physician (Cardiology)  Kofi Crump MD as Physician (General Surgery)  Doyle Ma MD as Surgeon (Cardiothoracic Surgery)      PCP: Atha Oppenheim, MD    Cardiologist: Don Brown    Diagnosis/Reason for Consultation: The primary encounter diagnosis was Acute congestive heart failure, unspecified congestive heart failure type [facility]). Diagnoses of Anemia, unspecified type and CKD (chronic kidney disease), unspecified stage were also pertinent to this visit. Problem List:   Patient Active Problem List   Diagnosis Code    CHUCKY Krishnamurthy PVD (peripheral vascular disease) ([facility]) I73.9    CHARITY (renal artery stenosis) ([facility]) I70.1    Reflux esophagitis K21.0    Benign neoplasm of colon D12.6    Iron deficiency anemia D50.9    Hypomagnesemia E83.42    Long term (current) use of anticoagulants Z79.01    Essential hypertension, benign I10    Bladder cancer (HCC) C67.9    Gout M10.9    Encounter for long-term (current) use of other medications Z79.899    Plantar fasciitis M72.2    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   female s/pTMVR (1 MitraClip(s) on the A2/P2 mitral leaflet scallops with reduction of the mitral regurgitation from severe to none) on 6/10/2016 for severe and symptomatic MR. She has been followed in our clinic since her clip and initially did very well but then developed significant SOB/LEIJA. We have been working aggressively to achieve GDMT for her MR/MS (HR 60 & -130) but have had some difficulty in doing so (concerns with pt understanding/medication compliance and have gotten her daughter involved with medication administration). We are consulted today by Dr. Kaylan Doe for MR/MS management s/p MitraClip. Ms. Nikole Son was apparently in her regular state of health, which was admittedly functionally poor with NYHA III (see December office note) when she experienced acute SOB with chest pressure when she awoke to use the bathroom early this morning. The SOB/Chest pressure did not quickly subside and Ms. Miller called 911 and was brought to the ED via EMS. She has been admitted to the floor for observation and CHF management by Dr. Kaylan Doe.    [facility]    Past Medical History   Diagnosis Date    Atrial fibrillation ([address] 10/29/2009    Bladder cancer ([address]     Colon polyps     Diabetes ([address]     GERD (gastroesophageal reflux disease)     Heart valve problem      leaking heart valve    Hypercholesterolemia     Hypertension     Hypothyroidism     Hypothyroidism 4/23/2009    Hypothyroidism, acquired, autoimmune 11/23/2015    Overweight and obesity     PUD (peptic ulcer disease)     S/P ablation of atrial flutter[V45.89HM] 2009     @ [facility] >> atrial fibrillation    T. I.A. 4/23/2009    TIA (transient ischemic attack)        Past Surgical History   Procedure Laterality Date    Hx appendectomy      Hx cholecystectomy      Hx hysterectomy      Hx orthopaedic      Hx knee arthroscopy  [id number]     right knee    Hx urological       RENAL STENT, tur-b    Vascular surgery procedure unlist  11/4     removed vein in right leg    Pr cardiac surg procedure unlist       ablation      Social History Substance Use Topics    Smoking status: Former Smoker     Packs/day: 0.50     Years: 10.00     Types: Cigarettes     Quit date: 1/1/1967    Smokeless tobacco: Never Used    Alcohol use 0.0 oz/week     0 Standard drinks or equivalent per week      Comment: 2-3qmo      Family History   Problem Relation Age of Onset    Stroke Other    [facility] Arthritis-osteo Sister      spinal stenosis    Gout Son     Hypertension Son     Hypertension Mother     Heart Disease Mother      CAD    Heart Disease Father      CAD    Alcohol abuse Neg Hx     Asthma Neg Hx     Bleeding Prob Neg Hx     Cancer Neg Hx     Diabetes Neg Hx     Elevated Lipids Neg Hx     Headache Neg Hx     Lung Disease Neg Hx     Migraines Neg Hx     Psychiatric Disorder Neg Hx     Mental Retardation Neg Hx      Prior to Admission medications    Medication Sig Start Date End Date Taking? Authorizing Provider   omeprazole (PRILOSEC) 20 mg capsule Take 20 mg by mouth daily. Yes Historical Provider   warfarin (COUMADIN) 3 mg tablet Take 4.5 mg by mouth six (6) days a week. All days except Wednesday   Yes Historical Provider   warfarin (COUMADIN) 3 mg tablet Take 6 mg by mouth every Wednesday. Yes Historical Provider   insulin NPH (HUMULIN N) 100 unit/mL injection 50 Units by SubCUTAneous route every morning. Yes Historical Provider   insulin NPH (HUMULIN N) 100 unit/mL injection 30 Units by SubCUTAneous route daily (with dinner). Yes Historical Provider   furosemide (LASIX) 40 mg tablet Take 80 mg by mouth every morning. Yes Historical Provider   furosemide (LASIX) 40 mg tablet Take 40 mg by mouth every evening. Yes Historical Provider   levothyroxine (SYNTHROID) 125 mcg tablet Take 125 mcg by mouth Daily (before breakfast). Yes Historical Provider   amLODIPine (NORVASC) 5 mg tablet TAKE ONE TABLET BY MOUTH DAILY 12/23/16  Yes Bashir Barlow MD   metoprolol tartrate (LOPRESSOR) 25 mg tablet Take 1 Tab by mouth two (2) times a day. 12/12/16  Yes Lyndon Monroe NP   acetaminophen (TYLENOL EXTRA STRENGTH) 500 mg tablet Take 500 mg by mouth every six (6) hours as needed for Pain. Yes Historical Provider   atorvastatin (LIPITOR) 80 mg tablet TAKE ONE TABLET BY MOUTH EVERY EVENING 10/6/16  Yes Stephanie Kong MD   hydrALAZINE (APRESOLINE) 10 mg tablet Take 1 Tab by mouth two (2) times a day. 8/22/16  Yes Stephanie Kong MD   allopurinol (ZYLOPRIM) 100 mg tablet TAKE TWO TABLETS BY MOUTH DAILY 5/2/16  Yes Stephanie Kong MD   dofetilide [facility]) 125 mcg capsule Take 1 Cap by mouth two (2) times a day. 4/29/11  Yes Stephanie Kong MD   multivitamins-minerals-lutein (CENTRUM SILVER) Tab Take 1 Tab by mouth daily.    Yes Historical Provider       Allergies   Allergen Reactions    Actos [Pioglitazone] Swelling     Swelling of feet and legs    Codeine Itching    Doxycycline Nausea Only    Hydrocodone Rash and Other (comments)     hallucinations       Current Medications:   Current Facility-Administered Medications   Medication Dose Route Frequency Provider Last Rate Last Dose    sodium chloride (NS) flush 5-10 mL  5-10 mL IntraVENous Merari Katz MD   10 mL at 01/19/17 1328    sodium chloride (NS) flush 5-10 mL  5-10 mL IntraVENous PRN Wily López MD        carvedilol (COREG) tablet 25 mg  25 mg Oral BID WITH MEALS Wily López MD   25 mg at 01/19/17 1109    enoxaparin (LOVENOX) injection 30 mg  30 mg SubCUTAneous Q24H Wily López MD   30 mg at 01/19/17 1109    allopurinol (ZYLOPRIM) tablet 200 mg  200 mg Oral DAILY Wily López MD   200 mg at 01/19/17 1109    amLODIPine (NORVASC) tablet 5 mg  5 mg Oral DAILY Wily López MD   5 mg at 01/19/17 1109    atorvastatin (LIPITOR) tablet 80 mg  80 mg Oral QHS Wily López MD        furosemide (LASIX) injection 40 mg  40 mg IntraVENous BID Wily López MD        hydrALAZINE (APRESOLINE) tablet 20 mg  20 mg Oral TID MD Darío Navarro ON 1/20/2017] levothyroxine (SYNTHROID) tablet 125 mcg  125 mcg Oral ACB Sonja Lemus MD        famotidine (PEPCID) tablet 20 mg  20 mg Oral DAILY Sonja Lemus MD   20 mg at 01/19/17 1109    dofetilide (TIKOSYN) capsule 125 mcg  125 mcg Oral BID Sonja Lemus MD   125 mcg at 01/19/17 1109    traMADol (ULTRAM) tablet 50 mg  50 mg Oral Q6H PRN Sonja Lemus MD        [START ON 1/20/2017] insulin NPH (NOVOLIN N, HUMULIN N) injection 50 Units  50 Units SubCUTAneous ACB Sonja Lemus MD        insulin NPH (NOVOLIN N, HUMULIN N) injection 30 Units  30 Units SubCUTAneous DAILY WITH DINNER Sonja Lemus MD        potassium chloride SR (KLOR-CON 10) tablet 20 mEq  20 mEq Oral DAILY Sonja Lemus MD           Vitals: Blood pressure 146/86, pulse 69, temperature 97.8 °F (36.6 °C), resp. rate 20, height 5' 9\" (1.753 m), weight 223 lb (101.2 kg), SpO2 99 %. Allergies: is allergic to actos [pioglitazone]; codeine; doxycycline; and hydrocodone. Review of Systems: Pertinent Positives per HPI   [x]Total of 13 systems reviewed as follows:  Constitutional: Negative fever, negative chills  Eyes:   Negative for amauroses fugax  ENT:   Negative sore throat,oral absecess  Endocrine Negative for thyroid replacement Rx; goiter  Respiratory:  Negative chronic cough,sputum production  Cards:   Negative for  varicosities, claudication  GI:   Negative for dysphagia, bleeding, nausea, vomiting, diarrhea, and abdominal pain  Genitourinary: Negative for frequency, dysuria  Integument:  Negative for rash and pruritus  Hematologic:  Negative for easy bruising; bleeding dyscarsia  Musculoskel: Negative for muscle weakness inhibiting ambulation  Neurological:  Negative for stroke, TIA, syncope, dizziness  Behavl/Psych: Negative for feelings of anxiety, depression     Cardiovascular Testing:  TTE 12/1/2016:  LEFT VENTRICLE: Size was normal. Systolic function was normal. Ejection fraction was estimated in the range of 55 % to 60 %. No obvious wall motion abnormalities identified in the views obtained. Wall thickness was  moderately increased. RIGHT VENTRICLE: The size was normal. Systolic function was normal. Wall thickness was normal.  LEFT ATRIUM: The atrium was dilated. RIGHT ATRIUM: Size was normal. MITRAL VALVE: There was  markedly reduced leaflet separation following clip procedure. Fannie Magaly DOPPLER: There was trivial regurgitation. AORTIC VALVE: Leaflets exhibited normal cuspal separation and sclerosis. TRICUSPID VALVE: Normal valve structure. There was normal leaflet separation. DOPPLER: The transtricuspid velocity was within the normal range. There was no evidence for tricuspid stenosis. There was mild regurgitation. Pulmonary artery systolic pressure: 40 mmHg. PULMONIC VALVE: Leaflets exhibited normal thickness, no calcification, and normal cuspal separation. DOPPLER:  The transpulmonic velocity was within the normal range. There was no regurgitation. AORTA: The root exhibited normal size. PERICARDIUM: There was no pericardial effusion. The pericardium was normal in appearance. Peak gradient   27 mmHg   (--)  Mean gradient   9.07 mmHg   (--)    PCXR today: Pulmonary vascular congestion with Kerley b signs. No effusion/PTX. Cardiomegaly.     BMP:   Lab Results   Component Value Date/Time     01/19/2017 07:29 AM    K 3.4 (L) 01/19/2017 07:29 AM     01/19/2017 07:29 AM    CO2 28 01/19/2017 07:29 AM    AGAP 10 01/19/2017 07:29 AM     (H) 01/19/2017 07:29 AM    BUN 20 01/19/2017 07:29 AM    CREA 1.42 (H) 01/19/2017 07:29 AM    GFRAA 43 (L) 01/19/2017 07:29 AM    GFRNA 36 (L) 01/19/2017 07:29 AM     All Cardiac Markers in the last 24 hours:   Lab Results   Component Value Date/Time    TROIQ <0.04 01/19/2017 07:29 AM    BNPP 199 (H) 01/19/2017 07:29 AM     CBC:   Lab Results   Component Value Date/Time    WBC 10.0 01/19/2017 07:29 AM    HGB 9.7 (L) 01/19/2017 07:29 AM    HCT 32.3 (L) 01/19/2017 07:29 AM     01/19/2017 07:29 AM     Physical Exam:  General: Well nourished well groomed elderly appearing stated age resting comfortably in hospital bed. No acute stress  Neuro: A&OX3. FRANKLIN. PERRL. Gait not assessed  Head:Normocephalic. Atraumatic. Symmetrical  Neck: Trachea Midline  Resp: CTA B. Diminished bases. No Adv BS/cough/sputum/tachypnea with seated conversation  CV: S1S2 RRR. NELSY III/VI. No JVD/carotid bruits. Pink/warm/dry extremities. +3 LE peripheral edema with wraps  GI:Benign ab. Soft. NT/ND. Active BS  : Voids  Integ: No obvious s/s of infection or breakdown  Musculo/Skeletal: FROM in all major joints. Modest muscle tone    Assessment/Plan:   1.  MR s/p TMVR - trivial MR and mod MS on last TTE. Cont BB/CCB/ Hydral for goal HR 60 and -130. Will discuss need/timing of R heart cath +/- transeptal crossing to eval MV tomorrow with Dr. Ollie Sutton. Have reserved cath lab time tomorrow just in case. 2. Afib - INR 2.5 day on warfarin. Warfarin to hold tonight. Re-check in am. BB/CCB/Tikosyn as previous  3. CHF - diuresis per cards  4. CKD - Cr below baseline of 1.5. Monitor with diuretics and avoid other nephrotoxics  5.  Further plan/care by Dr. Ollie Sutton

## 2017-01-19 NOTE — ED TRIAGE NOTES
Pt arrives via EMS from home after waking at 0400 this AM with dull chest pain and SOB. BG en route was 278, sinus tachy. Denies chest pain but continues to feel like \"I can't catch my breath\". Pt presents with swelling of ankles that is much worse than usual; denies PMH of CHF.

## 2017-01-20 PROBLEM — I50.9 HEART FAILURE (HCC): Status: ACTIVE | Noted: 2017-01-20

## 2017-01-20 LAB
ACT BLD: 214 SECS (ref 79–138)
ANION GAP BLD CALC-SCNC: 10 MMOL/L (ref 5–15)
BUN SERPL-MCNC: 20 MG/DL (ref 6–20)
BUN/CREAT SERPL: 14 (ref 12–20)
CALCIUM SERPL-MCNC: 8.7 MG/DL (ref 8.5–10.1)
CHLORIDE SERPL-SCNC: 107 MMOL/L (ref 97–108)
CO2 SERPL-SCNC: 27 MMOL/L (ref 21–32)
CREAT SERPL-MCNC: 1.39 MG/DL (ref 0.55–1.02)
GLUCOSE BLD STRIP.AUTO-MCNC: 101 MG/DL (ref 65–100)
GLUCOSE BLD STRIP.AUTO-MCNC: 115 MG/DL (ref 65–100)
GLUCOSE BLD STRIP.AUTO-MCNC: 144 MG/DL (ref 65–100)
GLUCOSE BLD STRIP.AUTO-MCNC: 92 MG/DL (ref 65–100)
GLUCOSE SERPL-MCNC: 101 MG/DL (ref 65–100)
INR PPP: 2.7 (ref 0.9–1.1)
POTASSIUM SERPL-SCNC: 3.3 MMOL/L (ref 3.5–5.1)
PROTHROMBIN TIME: 27 SEC (ref 9–11.1)
SERVICE CMNT-IMP: ABNORMAL
SERVICE CMNT-IMP: NORMAL
SODIUM SERPL-SCNC: 144 MMOL/L (ref 136–145)

## 2017-01-20 PROCEDURE — 74011250637 HC RX REV CODE- 250/637: Performed by: INTERNAL MEDICINE

## 2017-01-20 PROCEDURE — 74011250636 HC RX REV CODE- 250/636: Performed by: INTERNAL MEDICINE

## 2017-01-20 PROCEDURE — C1894 INTRO/SHEATH, NON-LASER: HCPCS

## 2017-01-20 PROCEDURE — 80048 BASIC METABOLIC PNL TOTAL CA: CPT | Performed by: INTERNAL MEDICINE

## 2017-01-20 PROCEDURE — 4A023N8 MEASUREMENT OF CARDIAC SAMPLING AND PRESSURE, BILATERAL, PERCUTANEOUS APPROACH: ICD-10-PCS | Performed by: INTERNAL MEDICINE

## 2017-01-20 PROCEDURE — 77030013406 HC CATH CTRL EDWD -B

## 2017-01-20 PROCEDURE — 74011250636 HC RX REV CODE- 250/636: Performed by: NURSE PRACTITIONER

## 2017-01-20 PROCEDURE — 99218 HC RM OBSERVATION: CPT

## 2017-01-20 PROCEDURE — C1892 INTRO/SHEATH,FIXED,PEEL-AWAY: HCPCS

## 2017-01-20 PROCEDURE — 85610 PROTHROMBIN TIME: CPT | Performed by: INTERNAL MEDICINE

## 2017-01-20 PROCEDURE — C1769 GUIDE WIRE: HCPCS

## 2017-01-20 PROCEDURE — 74011000250 HC RX REV CODE- 250

## 2017-01-20 PROCEDURE — 77030011256 HC DRSG MEPILEX <16IN NO BORD MOLN -A

## 2017-01-20 PROCEDURE — 65660000000 HC RM CCU STEPDOWN

## 2017-01-20 PROCEDURE — 36415 COLL VENOUS BLD VENIPUNCTURE: CPT | Performed by: INTERNAL MEDICINE

## 2017-01-20 PROCEDURE — 77030013744

## 2017-01-20 PROCEDURE — 82962 GLUCOSE BLOOD TEST: CPT

## 2017-01-20 PROCEDURE — 74011000250 HC RX REV CODE- 250: Performed by: INTERNAL MEDICINE

## 2017-01-20 PROCEDURE — 85347 COAGULATION TIME ACTIVATED: CPT

## 2017-01-20 PROCEDURE — 74011636637 HC RX REV CODE- 636/637: Performed by: INTERNAL MEDICINE

## 2017-01-20 PROCEDURE — C1887 CATHETER, GUIDING: HCPCS

## 2017-01-20 PROCEDURE — A9270 NON-COVERED ITEM OR SERVICE: HCPCS | Performed by: INTERNAL MEDICINE

## 2017-01-20 PROCEDURE — 77030013797 HC KT TRNSDUC PRSSR EDWD -A

## 2017-01-20 PROCEDURE — C1893 INTRO/SHEATH, FIXED,NON-PEEL: HCPCS

## 2017-01-20 PROCEDURE — C1751 CATH, INF, PER/CENT/MIDLINE: HCPCS

## 2017-01-20 PROCEDURE — 77030003698 HC NDL TSEPTL DIAP -C

## 2017-01-20 PROCEDURE — 99152 MOD SED SAME PHYS/QHP 5/>YRS: CPT

## 2017-01-20 PROCEDURE — 77010033678 HC OXYGEN DAILY

## 2017-01-20 RX ORDER — METOPROLOL TARTRATE 5 MG/5ML
INJECTION INTRAVENOUS
Status: COMPLETED
Start: 2017-01-20 | End: 2017-01-20

## 2017-01-20 RX ORDER — SODIUM CHLORIDE 0.9 % (FLUSH) 0.9 %
10 SYRINGE (ML) INJECTION AS NEEDED
Status: DISCONTINUED | OUTPATIENT
Start: 2017-01-20 | End: 2017-01-20

## 2017-01-20 RX ORDER — MIDAZOLAM HYDROCHLORIDE 1 MG/ML
1-10 INJECTION, SOLUTION INTRAMUSCULAR; INTRAVENOUS
Status: DISCONTINUED | OUTPATIENT
Start: 2017-01-20 | End: 2017-01-20

## 2017-01-20 RX ORDER — SODIUM CHLORIDE 9 MG/ML
3 INJECTION, SOLUTION INTRAVENOUS CONTINUOUS
Status: DISCONTINUED | OUTPATIENT
Start: 2017-01-20 | End: 2017-01-20

## 2017-01-20 RX ORDER — ATROPINE SULFATE 0.1 MG/ML
1 INJECTION INTRAVENOUS AS NEEDED
Status: DISCONTINUED | OUTPATIENT
Start: 2017-01-20 | End: 2017-01-20

## 2017-01-20 RX ORDER — POTASSIUM CHLORIDE 750 MG/1
20 TABLET, FILM COATED, EXTENDED RELEASE ORAL 2 TIMES DAILY
Status: DISCONTINUED | OUTPATIENT
Start: 2017-01-20 | End: 2017-01-23 | Stop reason: HOSPADM

## 2017-01-20 RX ORDER — LIDOCAINE HYDROCHLORIDE 10 MG/ML
4-30 INJECTION INFILTRATION; PERINEURAL
Status: DISCONTINUED | OUTPATIENT
Start: 2017-01-20 | End: 2017-01-20

## 2017-01-20 RX ORDER — METOPROLOL TARTRATE 5 MG/5ML
5 INJECTION INTRAVENOUS
Status: DISCONTINUED | OUTPATIENT
Start: 2017-01-20 | End: 2017-01-20

## 2017-01-20 RX ORDER — FUROSEMIDE 40 MG/1
80 TABLET ORAL DAILY
Status: DISCONTINUED | OUTPATIENT
Start: 2017-01-21 | End: 2017-01-23

## 2017-01-20 RX ORDER — HEPARIN SODIUM 1000 [USP'U]/ML
5000 INJECTION, SOLUTION INTRAVENOUS; SUBCUTANEOUS
Status: DISCONTINUED | OUTPATIENT
Start: 2017-01-20 | End: 2017-01-20

## 2017-01-20 RX ORDER — SODIUM CHLORIDE 9 MG/ML
25 INJECTION, SOLUTION INTRAVENOUS CONTINUOUS
Status: DISCONTINUED | OUTPATIENT
Start: 2017-01-20 | End: 2017-01-20 | Stop reason: HOSPADM

## 2017-01-20 RX ORDER — HEPARIN SODIUM 200 [USP'U]/100ML
1000 INJECTION, SOLUTION INTRAVENOUS
Status: DISCONTINUED | OUTPATIENT
Start: 2017-01-20 | End: 2017-01-20

## 2017-01-20 RX ORDER — SODIUM CHLORIDE 9 MG/ML
1.5 INJECTION, SOLUTION INTRAVENOUS CONTINUOUS
Status: DISCONTINUED | OUTPATIENT
Start: 2017-01-20 | End: 2017-01-20

## 2017-01-20 RX ORDER — FENTANYL CITRATE 50 UG/ML
25-200 INJECTION, SOLUTION INTRAMUSCULAR; INTRAVENOUS
Status: DISCONTINUED | OUTPATIENT
Start: 2017-01-20 | End: 2017-01-20

## 2017-01-20 RX ADMIN — HYDRALAZINE HYDROCHLORIDE 20 MG: 10 TABLET, FILM COATED ORAL at 09:55

## 2017-01-20 RX ADMIN — AMLODIPINE BESYLATE 5 MG: 5 TABLET ORAL at 09:54

## 2017-01-20 RX ADMIN — HYDRALAZINE HYDROCHLORIDE 20 MG: 10 TABLET, FILM COATED ORAL at 17:06

## 2017-01-20 RX ADMIN — CARVEDILOL 25 MG: 12.5 TABLET, FILM COATED ORAL at 17:06

## 2017-01-20 RX ADMIN — ALLOPURINOL 200 MG: 100 TABLET ORAL at 09:54

## 2017-01-20 RX ADMIN — SODIUM CHLORIDE 3 ML/KG/HR: 900 INJECTION, SOLUTION INTRAVENOUS at 11:25

## 2017-01-20 RX ADMIN — MIDAZOLAM HYDROCHLORIDE 1 MG: 1 INJECTION, SOLUTION INTRAMUSCULAR; INTRAVENOUS at 12:30

## 2017-01-20 RX ADMIN — TRAMADOL HYDROCHLORIDE 50 MG: 50 TABLET, FILM COATED ORAL at 14:51

## 2017-01-20 RX ADMIN — METOPROLOL TARTRATE 5 MG: 5 INJECTION INTRAVENOUS at 13:06

## 2017-01-20 RX ADMIN — FENTANYL CITRATE 25 MCG: 50 INJECTION, SOLUTION INTRAMUSCULAR; INTRAVENOUS at 12:37

## 2017-01-20 RX ADMIN — HYDRALAZINE HYDROCHLORIDE 20 MG: 10 TABLET, FILM COATED ORAL at 20:52

## 2017-01-20 RX ADMIN — DOFETILIDE 125 MCG: 0.12 CAPSULE ORAL at 09:54

## 2017-01-20 RX ADMIN — ATORVASTATIN CALCIUM 80 MG: 40 TABLET, FILM COATED ORAL at 20:51

## 2017-01-20 RX ADMIN — HEPARIN SODIUM 2000 UNITS: 200 INJECTION, SOLUTION INTRAVENOUS at 12:30

## 2017-01-20 RX ADMIN — METOPROLOL TARTRATE 5 MG: 5 INJECTION INTRAVENOUS at 13:15

## 2017-01-20 RX ADMIN — SODIUM CHLORIDE 25 ML/HR: 900 INJECTION, SOLUTION INTRAVENOUS at 12:41

## 2017-01-20 RX ADMIN — Medication 10 ML: at 20:55

## 2017-01-20 RX ADMIN — FUROSEMIDE 40 MG: 10 INJECTION, SOLUTION INTRAMUSCULAR; INTRAVENOUS at 18:18

## 2017-01-20 RX ADMIN — HUMAN INSULIN 30 UNITS: 100 INJECTION, SUSPENSION SUBCUTANEOUS at 18:18

## 2017-01-20 RX ADMIN — POTASSIUM CHLORIDE 20 MEQ: 750 TABLET, FILM COATED, EXTENDED RELEASE ORAL at 18:18

## 2017-01-20 RX ADMIN — Medication 10 ML: at 07:11

## 2017-01-20 RX ADMIN — MIDAZOLAM HYDROCHLORIDE 1 MG: 1 INJECTION, SOLUTION INTRAMUSCULAR; INTRAVENOUS at 12:37

## 2017-01-20 RX ADMIN — FAMOTIDINE 20 MG: 20 TABLET ORAL at 09:55

## 2017-01-20 RX ADMIN — FENTANYL CITRATE 25 MCG: 50 INJECTION, SOLUTION INTRAMUSCULAR; INTRAVENOUS at 12:30

## 2017-01-20 RX ADMIN — WARFARIN SODIUM 3 MG: 2 TABLET ORAL at 20:51

## 2017-01-20 RX ADMIN — DOFETILIDE 125 MCG: 0.12 CAPSULE ORAL at 18:18

## 2017-01-20 RX ADMIN — POTASSIUM CHLORIDE 20 MEQ: 750 TABLET, FILM COATED, EXTENDED RELEASE ORAL at 09:54

## 2017-01-20 RX ADMIN — HUMAN INSULIN 25 UNITS: 100 INJECTION, SUSPENSION SUBCUTANEOUS at 07:11

## 2017-01-20 RX ADMIN — LIDOCAINE HYDROCHLORIDE 10 ML: 10 INJECTION, SOLUTION INFILTRATION; PERINEURAL at 12:37

## 2017-01-20 RX ADMIN — HEPARIN SODIUM 4000 UNITS: 1000 INJECTION, SOLUTION INTRAVENOUS; SUBCUTANEOUS at 12:55

## 2017-01-20 RX ADMIN — CARVEDILOL 25 MG: 12.5 TABLET, FILM COATED ORAL at 09:54

## 2017-01-20 RX ADMIN — LEVOTHYROXINE SODIUM 125 MCG: 125 TABLET ORAL at 06:31

## 2017-01-20 RX ADMIN — Medication 10 ML: at 17:07

## 2017-01-20 NOTE — PROGRESS NOTES
Problem: Falls - Risk of  Goal: *Absence of falls  Outcome: Progressing Towards Goal  Pt A&Ox4, up ad cecilio, bed in low position with wheels locked. Call bell and personal belongings within reach. Bedside shift change report given to Melodie Colon RN (oncoming nurse) by Dora Sow RN (offgoing nurse). Report included the following information SBAR, ED Summary, Procedure Summary, Intake/Output, MAR, Recent Results and Med Rec Status. Opportunity for questions given. q1h rounds completed during shift.

## 2017-01-20 NOTE — PROGRESS NOTES
Cardiac Cath Lab Recovery Arrival Note:      Brad Rivera arrived to Cardiac Cath Lab, Recovery Area. Staff introduced to patient. Patient identifiers verified with NAME and DATE OF BIRTH. Procedure verified with patient. Consent forms reviewed and signed by patient or authorized representative and verified. Allergies verified. Patient and family oriented to department. Patient and family informed of procedure and plan of care. Questions answered with review. Patient prepped for procedure, per orders from physician, prior to arrival.    Patient on cardiac monitor, non-invasive blood pressure, SPO2 monitor. On RA. Patient is A&Ox 3. Patient reports no c/o's. Patient in stretcher, in low position, with side rails up, call bell within reach, patient instructed to call if assistance as needed. Patient prep in: [address], [place].    Patient family has pager # [phone]/a  Family in: hospital.   Prep by: Apolinar Miranda RN

## 2017-01-20 NOTE — PROCEDURES
Diagnostic Right and Transseptal Left Heart Catheterization Procedure  Indication:  Patient preivously underwent transcatheter mitral valve repair with a mitraclip as she was prohibitive risk for surgery, with improvement initially. More recently, she has had recurrence of her dyspnea on exertion and echo is concerning for some degree of mitral stenosis, so she was referred for a diagnostic hemodynamic evaluation. After informed consent was obtained from the patient, the patient was brought in the fasting state to the catheterization laboratory. The patient was prepped and draped in the usual sterile fashion. The patient was sedated with fentanyl and versed. Prophylactic antibiotic administration was not done. Access  A 8 Fr sheath was placed in the right femoral vein for diagnostic right heart catheterization, obtaining hemodynamic evaluation of the pulmonary artery, wedge pressures. A transseptal puncture was performed using fluoroscopic guidance, and then systemic heparinization was given. Procedure  Following the right heart catheterization and transseptal puncture, a 4F glide catheter was advanced across the mitral valve, obtaining left atrial and ventricular pressures simultaneously. Metoprolol was given to slow the patient's HR to 60's and repeat hemodynamic measurements performed. Hemodynamics    Baseline: RA 20/18/15, RV 60/20, PA 60/25/37, PW 25, LA 28/55/28, /25, MV mean pressure 4mmHg, HR 72  Post metoprolol: LA 28/55/28, /25; HR 64     Conclusion  1. Mitral valve clip  2. Mitral regurgitation, residual  3. Mitral stenosis, mild     At the completion of the case, catheters were removed. Hemostasis was obtained by manual pressure. Mitral repair was successful with no complications. Estimated blood loss: 10 mL. ?  I was present for the entire procedure. I have edited the procedure note as appropriate.

## 2017-01-20 NOTE — CONSULTS
Justification for change of Observation status to Inpatient status    This patient was originally admitted as observation status. This patient now meets for Inpatient Admission in accordance with CMS regulation Section 43 .3. Specifically, patient's stay will be over two midnights. This stay is medically necessary because acute on chronic heart failure as a result of increased gradient across mitral valve. On second hospital day remains on telemetry with continued treatment with diuretics and antihypertensives. Cardiac surgeon consult for mitral valve procedure. Dionne Burr, [facility].  Care Management

## 2017-01-20 NOTE — PROGRESS NOTES
CARDIOLOGY PROGRESS NOTE    Pt reports she is breathing easier. BP and HR are improved. UO net -500cc  Blood pressure (!) 136/34, pulse 73, temperature 98 °F (36.7 °C), resp. rate 17, height 5' 9\" (1.753 m), weight 99.8 kg (220 lb), SpO2 95 %. Lungs clear  Cor reg with soft NELSY at LSB  Abd soft  Legs wrapped    Cr 1.39  INR 2.7 (coumadin held)    Imp:  Acute on chronic diastolic heart failure  S/P karolyn clip with elevated gradient  Hx afib  HTN  DM  Hypothyroidism  Hx TIA    Plan:  Await review by Dr. Haylee Cruz with possible RHC today  Con't diuresis and current antihypertensive regimen-numbers coming down nicely. Wound care for her legs.      Sonja Lemus MD

## 2017-01-20 NOTE — PROGRESS NOTES
Verbal report given to Margarita(name) on Edwina Patel  being transferred to [facility]) for routine progression of care       Report consisted of patients Situation, Background, Assessment and   Recommendations(SBAR). Information from the following report(s) Procedure Summary, MAR and Recent Results was reviewed with the receiving nurse. Lines:       Opportunity for questions and clarification was provided.       Patient transported with:   Monitor  Registered Nurse

## 2017-01-20 NOTE — PROGRESS NOTES
Transfer to [facility] from Procedure Area    Verbal report given to Ashu Sahni being transferred to Cardiac Cath Lab  for routine progression of care   Patient is post right heart cath procedure. Patient stable upon transfer to . Report consisted of patients Situation, Background, Assessment and   Recommendations(SBAR). Information from the following report(s) SBAR, Kardex, Procedure Summary, Intake/Output, MAR and Recent Results was reviewed with the receiving nurse. Opportunity for questions and clarification was provided. Patient medicated during procedure with orders obtained and verified by Dr. Ling Beltran. Refer to patient PROCEDURE REPORT for vital signs, assessment, status, and response during procedure.

## 2017-01-20 NOTE — PROGRESS NOTES
Cardiac Cath Lab Procedure Area Arrival Note:    Jed Callahan arrived to Cardiac Cath Lab, Procedure Area. Patient identifiers verified with NAME and DATE OF BIRTH. Procedure verified with patient. Consent forms verified. Allergies verified. Patient informed of procedure and plan of care. Questions answered with review. Patient voiced understanding of procedure and plan of care. Patient on cardiac monitor, non-invasive blood pressure, SPO2 monitor. On room air or O2 @ 2 lpm via nasal cannula. IV of normal saline on pump at 25 ml/hr. Patient status doing well without problems. Patient is A&Ox 4. Patient reports no pain. Patient medicated during procedure with orders obtained and verified by Dr. Ollie Sutton. Refer to patients Cardiac Cath Lab PROCEDURE REPORT for vital signs, assessment, status, and response during procedure, printed at end of case. Printed report on chart or scanned into chart.

## 2017-01-20 NOTE — PROGRESS NOTES
TRANSFER - IN REPORT:    Verbal report received from Richardson Dover on [address]  being received from procedural area for routine progression of care. Report consisted of patients Situation, Background, Assessment and Recommendations(SBAR). Information from the following report(s) Procedure Summary, MAR and Recent Results was reviewed with the receiving clinician. Opportunity for questions and clarification was provided. Assessment completed upon patients arrival to [address] [place] [address] and care assumed. Cardiac Cath Lab Recovery Arrival Note:    [address] arrived to [facility] recovery area. Patient procedure= RHC. Patient on cardiac monitor, non-invasive blood pressure, SPO2 monitor. On RA . IV  of NS on pump at 25 ml/hr. Patient status doing well without problems. Patient is A&Ox 3. Patient reports no c/o's. PROCEDURE SITE CHECK:    Procedure site:without any bleeding and hematoma, no pain/discomfort reported at procedure site. No change in patient status. Continue to monitor patient and status.

## 2017-01-20 NOTE — PROGRESS NOTES
Problem: Falls - Risk of  Goal: *Absence of falls  Outcome: Progressing Towards Goal  Pt is absent of falls. Call bell within reach. Bed in lowest and locked position        Bedside shift change report given to 7 Nadine Goodman (oncoming nurse) by Julio Burris (offgoing nurse). Report included the following information SBAR, Kardex, Accordion and Recent Results.

## 2017-01-20 NOTE — WOUND CARE
WOCN Note:     New consult placed by RN for leg ulcers. Chart shows:  Admitted for heart failure and SOB; history of PVD, bladder cancer, benign neoplasm of colon, gout, DM, mitral valve clip 6/2016  Admitted from home and is followed at [facility] wound clinic. Assessment:   Patient is A&O x 4, continent and mobile. - sitting up in chair. Patient reports no pain. All areas are present on admission. Bilateral heel skin intact and without erythema. Slight generalized edema to lower legs with light hemosiderin staining. One small partial thickness area on right lower anterior leg = 0.5 x 0.5 x 0.1 cm 100% drying pink. Covered with Mepilex border. Wearing single layer tubigrip. Recommendations:    Elevate legs while in hospital.  Keep open areas on lower legs covered with Mepilex border. Discussed above plan with patient & RN.      Transition of Care: Plan to follow weekly and as needed while admitted to hospital.    BONNIE ChristineN, RN, [employer]  Certified Wound, Ostomy, Continence Nurse  office [phone]  pager [phone] or call  to page

## 2017-01-21 LAB
ANION GAP BLD CALC-SCNC: 7 MMOL/L (ref 5–15)
BUN SERPL-MCNC: 23 MG/DL (ref 6–20)
BUN/CREAT SERPL: 16 (ref 12–20)
CALCIUM SERPL-MCNC: 8.5 MG/DL (ref 8.5–10.1)
CHLORIDE SERPL-SCNC: 108 MMOL/L (ref 97–108)
CO2 SERPL-SCNC: 28 MMOL/L (ref 21–32)
CREAT SERPL-MCNC: 1.42 MG/DL (ref 0.55–1.02)
GLUCOSE BLD STRIP.AUTO-MCNC: 168 MG/DL (ref 65–100)
GLUCOSE BLD STRIP.AUTO-MCNC: 186 MG/DL (ref 65–100)
GLUCOSE BLD STRIP.AUTO-MCNC: 73 MG/DL (ref 65–100)
GLUCOSE BLD STRIP.AUTO-MCNC: 96 MG/DL (ref 65–100)
GLUCOSE SERPL-MCNC: 67 MG/DL (ref 65–100)
INR PPP: 2.1 (ref 0.9–1.1)
POTASSIUM SERPL-SCNC: 3.7 MMOL/L (ref 3.5–5.1)
PROTHROMBIN TIME: 21.2 SEC (ref 9–11.1)
SERVICE CMNT-IMP: ABNORMAL
SERVICE CMNT-IMP: ABNORMAL
SERVICE CMNT-IMP: NORMAL
SERVICE CMNT-IMP: NORMAL
SODIUM SERPL-SCNC: 143 MMOL/L (ref 136–145)

## 2017-01-21 PROCEDURE — 74011250637 HC RX REV CODE- 250/637: Performed by: INTERNAL MEDICINE

## 2017-01-21 PROCEDURE — 74011250636 HC RX REV CODE- 250/636: Performed by: INTERNAL MEDICINE

## 2017-01-21 PROCEDURE — 85610 PROTHROMBIN TIME: CPT | Performed by: INTERNAL MEDICINE

## 2017-01-21 PROCEDURE — 65660000000 HC RM CCU STEPDOWN

## 2017-01-21 PROCEDURE — 82962 GLUCOSE BLOOD TEST: CPT

## 2017-01-21 PROCEDURE — 36415 COLL VENOUS BLD VENIPUNCTURE: CPT | Performed by: INTERNAL MEDICINE

## 2017-01-21 PROCEDURE — 74011636637 HC RX REV CODE- 636/637: Performed by: INTERNAL MEDICINE

## 2017-01-21 PROCEDURE — 80048 BASIC METABOLIC PNL TOTAL CA: CPT | Performed by: INTERNAL MEDICINE

## 2017-01-21 RX ORDER — METOPROLOL TARTRATE 50 MG/1
50 TABLET ORAL EVERY 12 HOURS
Status: DISCONTINUED | OUTPATIENT
Start: 2017-01-21 | End: 2017-01-23 | Stop reason: HOSPADM

## 2017-01-21 RX ADMIN — POTASSIUM CHLORIDE 20 MEQ: 750 TABLET, FILM COATED, EXTENDED RELEASE ORAL at 18:23

## 2017-01-21 RX ADMIN — ALLOPURINOL 200 MG: 100 TABLET ORAL at 09:53

## 2017-01-21 RX ADMIN — Medication 10 ML: at 20:37

## 2017-01-21 RX ADMIN — HUMAN INSULIN 30 UNITS: 100 INJECTION, SUSPENSION SUBCUTANEOUS at 17:11

## 2017-01-21 RX ADMIN — HYDRALAZINE HYDROCHLORIDE 20 MG: 10 TABLET, FILM COATED ORAL at 16:57

## 2017-01-21 RX ADMIN — DOFETILIDE 125 MCG: 0.12 CAPSULE ORAL at 10:00

## 2017-01-21 RX ADMIN — FAMOTIDINE 20 MG: 20 TABLET ORAL at 09:53

## 2017-01-21 RX ADMIN — Medication 10 ML: at 06:13

## 2017-01-21 RX ADMIN — POTASSIUM CHLORIDE 20 MEQ: 750 TABLET, FILM COATED, EXTENDED RELEASE ORAL at 09:54

## 2017-01-21 RX ADMIN — HYDRALAZINE HYDROCHLORIDE 20 MG: 10 TABLET, FILM COATED ORAL at 20:35

## 2017-01-21 RX ADMIN — CARVEDILOL 25 MG: 12.5 TABLET, FILM COATED ORAL at 09:54

## 2017-01-21 RX ADMIN — Medication 10 ML: at 16:23

## 2017-01-21 RX ADMIN — LEVOTHYROXINE SODIUM 125 MCG: 125 TABLET ORAL at 06:14

## 2017-01-21 RX ADMIN — FUROSEMIDE 80 MG: 40 TABLET ORAL at 09:53

## 2017-01-21 RX ADMIN — ATORVASTATIN CALCIUM 80 MG: 40 TABLET, FILM COATED ORAL at 20:35

## 2017-01-21 RX ADMIN — AMLODIPINE BESYLATE 5 MG: 5 TABLET ORAL at 09:54

## 2017-01-21 RX ADMIN — DOFETILIDE 125 MCG: 0.12 CAPSULE ORAL at 18:25

## 2017-01-21 RX ADMIN — ENOXAPARIN SODIUM 30 MG: 30 INJECTION SUBCUTANEOUS at 09:53

## 2017-01-21 RX ADMIN — HYDRALAZINE HYDROCHLORIDE 20 MG: 10 TABLET, FILM COATED ORAL at 09:54

## 2017-01-21 RX ADMIN — METOPROLOL TARTRATE 50 MG: 50 TABLET ORAL at 20:33

## 2017-01-21 NOTE — PROGRESS NOTES
Bedside and Verbal shift change report given to  [facility] Louise   (oncoming nurse) by Maria C rice rn (offgoing nurse). Report included the following information SBAR, Kardex, Intake/Output, MAR, Accordion and Recent Results.

## 2017-01-21 NOTE — PROGRESS NOTES
01/21/17 1124 01/21/17 1125 01/21/17 1126   RT Walking Oximetry   Stage Resting (Room Air) During Walk (Room Air) After Walk   SpO2 99 % 91 % 99 %   HR 70 bpm 78 bpm 72 bpm   Rate of Dyspnea 0 2 1   Symptoms --  Fatigue; Shortness of Breath Shortness of Breath   O2 Device None (Room air) None (Room air) None (Room air)   Distance Walked in Feet (ft) --  236 ft --

## 2017-01-21 NOTE — PROGRESS NOTES
Problem: Falls - Risk of  Goal: *Absence of falls  Outcome: Progressing Towards Goal  Pt A&Ox4, up ad cecilio, bed in low position with wheels locked. Call bell and personal belongings within reach. Bedside shift change report given to Amalia Dallas (oncoming nurse) by Ashwin Andrew RN (offgoing nurse). Report included the following information SBAR, ED Summary, Procedure Summary, Intake/Output, MAR, Recent Results and Med Rec Status.      Opportunity for questions given. q1h rounds completed during shift.

## 2017-01-21 NOTE — PROGRESS NOTES
Cardiology Progress Note  2017     Admit Date: 2017  Admit Diagnosis: heart failure  Heart failure (HCC)  CC: none currently    Assessment:   Active Problems:    Heart failure ([address] (2017)      Plan:   Events noted and meds adjusted. Still feels SOB and HR in 70's. Keep in hospital and adjust regimen. Volume status:euvolemic  Renal function: stable    For other plans, see orders. Subjective: Irby Blizzard reports   Chest Pain:  [x]   none,  consistent with  []   non-cardiac   []   atypical   []   angina             [x]   none now    []      on-going  Dyspnea: [x]   none  []   at rest  []   with exertion     []   improved   []   unchanged   []   worsening  PND:       [x]   none  []   overnight    Orthopnea: [x]   none  []   improved  []   unchanged  []   worsening  Presyncope: [x]   none   []   improved    []   unchanged    []   worsening  Ambulated in hallway without symptoms  []   Yes  Ambulated in room without symptoms  []   Yes    Objective:    Physical Exam:  Overall VSSAF;    Visit Vitals    /43    Pulse 71    Temp 97.9 °F (36.6 °C)    Resp 16    Ht 5' 9\" (1.753 m)    Wt 102 kg (224 lb 13.9 oz)    SpO2 96%    BMI 33.21 kg/m2     Temp (24hrs), Av.6 °F (36.4 °C), Min:97 °F (36.1 °C), Max:98 °F (36.7 °C)    Patient Vitals for the past 8 hrs:   Pulse   17 0957 71   17 0823 69   17 0342 65    Patient Vitals for the past 8 hrs:   Resp   17 0823 16   17 0342 15    Patient Vitals for the past 8 hrs:   BP   17 0957 132/43   17 0823 115/59   17 0342 113/45        Intake/Output Summary (Last 24 hours) at 17 1013  Last data filed at 17 0024   Gross per 24 hour   Intake              150 ml   Output              350 ml   Net             -200 ml       General Appearance: Well developed, well nourished, no acute distress. Ears/Nose/Mouth/Throat:   Normal MM; anicteric.      JVP: WNL   Resp:   Lungs clear to auscultation bilaterally. Nl resp effort. Cardiovascular:  RRR, S1, S2 normal, no new murmur. No gallop or rub. Abdomen:   Soft, non-tender, bowel sounds are present. Extremities: No edema bilaterally. Skin:  Neuro: Warm and dry. A/O x3, grossly nonfocal    []      cath site intact w/o hematoma or bruit; distal pulse unchanged. Data Review:     Telemetry independently reviewed : [x]   sinus  []   chronic afib   []   par afib  []      NSVT    ECG independently reviewed:  []   NSR   []   no significant changes  []   no new ECG provided for review  Lab results reviewed as noted below. Current medications reviewed as noted below. No results for input(s): PH, PCO2, PO2 in the last 72 hours. Recent Labs      01/19/17   0729   TROIQ  <0.04     Recent Labs      01/21/17   0030  01/20/17   0440  01/19/17   0729   NA  143  144  143   K  3.7  3.3*  3.4*   CL  108  107  105   CO2  28  27  28   BUN  23*  20  20   CREA  1.42*  1.39*  1.42*   GLU  67  101*  222*   CA  8.5  8.7  9.3   ALB   --    --   3.4*   WBC   --    --   10.0   HGB   --    --   9.7*   HCT   --    --   32.3*   PLT   --    --   169     Recent Labs      01/19/17   0729   SGOT  14*   ALT  19   AP  84   TBILI  0.6   TP  6.8   ALB  3.4*   GLOB  3.4     Recent Labs      01/21/17   0030  01/20/17   0525  01/19/17   0729   INR  2.1*  2.7*  2.5*   PTP  21.2*  27.0*  25.0*      No results for input(s): FE, TIBC, PSAT, FERR in the last 72 hours.    Lab Results   Component Value Date/Time    Glucose (POC) 96 01/21/2017 07:49 AM    Glucose (POC) 73 01/21/2017 06:17 AM    Glucose (POC) 115 01/20/2017 04:57 PM    Glucose (POC) 92 01/20/2017 02:55 PM    Glucose (POC) 144 01/20/2017 09:46 AM    Glucose (POC) 192 07/06/2010 10:11 PM    Glucose POC 90 06/27/2011 10:46 AM       Current Facility-Administered Medications   Medication Dose Route Frequency    influenza vaccine 2016-17 (36mos+)(PF) (FLUZONE/FLUARIX/FLULAVAL QUAD) injection 0.5 mL  0.5 mL IntraMUSCular PRIOR TO DISCHARGE    potassium chloride SR (KLOR-CON 10) tablet 20 mEq  20 mEq Oral BID    furosemide (LASIX) tablet 80 mg  80 mg Oral DAILY    Warfarin - MD/NP to dose   Other Rx Dosing/Monitoring    sodium chloride (NS) flush 5-10 mL  5-10 mL IntraVENous Q8H    sodium chloride (NS) flush 5-10 mL  5-10 mL IntraVENous PRN    carvedilol (COREG) tablet 25 mg  25 mg Oral BID WITH MEALS    enoxaparin (LOVENOX) injection 30 mg  30 mg SubCUTAneous Q24H    allopurinol (ZYLOPRIM) tablet 200 mg  200 mg Oral DAILY    amLODIPine (NORVASC) tablet 5 mg  5 mg Oral DAILY    atorvastatin (LIPITOR) tablet 80 mg  80 mg Oral QHS    hydrALAZINE (APRESOLINE) tablet 20 mg  20 mg Oral TID    levothyroxine (SYNTHROID) tablet 125 mcg  125 mcg Oral ACB    famotidine (PEPCID) tablet 20 mg  20 mg Oral DAILY    dofetilide (TIKOSYN) capsule 125 mcg  125 mcg Oral BID    traMADol (ULTRAM) tablet 50 mg  50 mg Oral Q6H PRN    insulin NPH (NOVOLIN N, HUMULIN N) injection 50 Units  50 Units SubCUTAneous ACB    insulin NPH (NOVOLIN N, HUMULIN N) injection 30 Units  30 Units SubCUTAneous DAILY WITH DINNER        Emilee Alexis MD

## 2017-01-21 NOTE — PROGRESS NOTES
Cardiology Progress Note    Discussed results of RHC with Dr. Jon Montelongo. Filling pressure elevated with mod pulmonary hypertension. MV mean gradient was 4mmHg when HR in 60's and BP controlled. Plan is to treat medically at present. She is improved.   Hopefully home in am.    Crystal Jacobs MD

## 2017-01-22 LAB
ANION GAP BLD CALC-SCNC: 7 MMOL/L (ref 5–15)
ATRIAL RATE: 69 BPM
BASOPHILS # BLD AUTO: 0 K/UL (ref 0–0.1)
BASOPHILS # BLD: 0 % (ref 0–1)
BUN SERPL-MCNC: 22 MG/DL (ref 6–20)
BUN/CREAT SERPL: 15 (ref 12–20)
CALCIUM SERPL-MCNC: 9.1 MG/DL (ref 8.5–10.1)
CALCULATED P AXIS, ECG09: 60 DEGREES
CALCULATED R AXIS, ECG10: -20 DEGREES
CALCULATED T AXIS, ECG11: 54 DEGREES
CHLORIDE SERPL-SCNC: 106 MMOL/L (ref 97–108)
CO2 SERPL-SCNC: 29 MMOL/L (ref 21–32)
CREAT SERPL-MCNC: 1.47 MG/DL (ref 0.55–1.02)
DIAGNOSIS, 93000: NORMAL
EOSINOPHIL # BLD: 0.2 K/UL (ref 0–0.4)
EOSINOPHIL NFR BLD: 5 % (ref 0–7)
ERYTHROCYTE [DISTWIDTH] IN BLOOD BY AUTOMATED COUNT: 15.3 % (ref 11.5–14.5)
FOLATE SERPL-MCNC: 72.3 NG/ML (ref 5–21)
GLUCOSE BLD STRIP.AUTO-MCNC: 101 MG/DL (ref 65–100)
GLUCOSE BLD STRIP.AUTO-MCNC: 130 MG/DL (ref 65–100)
GLUCOSE BLD STRIP.AUTO-MCNC: 187 MG/DL (ref 65–100)
GLUCOSE BLD STRIP.AUTO-MCNC: 250 MG/DL (ref 65–100)
GLUCOSE BLD STRIP.AUTO-MCNC: 72 MG/DL (ref 65–100)
GLUCOSE BLD STRIP.AUTO-MCNC: 94 MG/DL (ref 65–100)
GLUCOSE SERPL-MCNC: 71 MG/DL (ref 65–100)
HCT VFR BLD AUTO: 31.1 % (ref 35–47)
HGB BLD-MCNC: 9.5 G/DL (ref 11.5–16)
INR PPP: 1.9 (ref 0.9–1.1)
IRON SATN MFR SERPL: 9 % (ref 20–50)
IRON SERPL-MCNC: 32 UG/DL (ref 35–150)
LYMPHOCYTES # BLD AUTO: 17 % (ref 12–49)
LYMPHOCYTES # BLD: 0.9 K/UL (ref 0.8–3.5)
MCH RBC QN AUTO: 26.2 PG (ref 26–34)
MCHC RBC AUTO-ENTMCNC: 30.5 G/DL (ref 30–36.5)
MCV RBC AUTO: 85.9 FL (ref 80–99)
MONOCYTES # BLD: 0.3 K/UL (ref 0–1)
MONOCYTES NFR BLD AUTO: 6 % (ref 5–13)
NEUTS SEG # BLD: 3.9 K/UL (ref 1.8–8)
NEUTS SEG NFR BLD AUTO: 72 % (ref 32–75)
P-R INTERVAL, ECG05: 198 MS
PLATELET # BLD AUTO: 162 K/UL (ref 150–400)
POTASSIUM SERPL-SCNC: 4 MMOL/L (ref 3.5–5.1)
PROTHROMBIN TIME: 19 SEC (ref 9–11.1)
Q-T INTERVAL, ECG07: 454 MS
QRS DURATION, ECG06: 96 MS
QTC CALCULATION (BEZET), ECG08: 486 MS
RBC # BLD AUTO: 3.62 M/UL (ref 3.8–5.2)
SERVICE CMNT-IMP: ABNORMAL
SERVICE CMNT-IMP: NORMAL
SERVICE CMNT-IMP: NORMAL
SODIUM SERPL-SCNC: 142 MMOL/L (ref 136–145)
TIBC SERPL-MCNC: 358 UG/DL (ref 250–450)
VENTRICULAR RATE, ECG03: 69 BPM
VIT B12 SERPL-MCNC: 958 PG/ML (ref 211–911)
WBC # BLD AUTO: 5.3 K/UL (ref 3.6–11)

## 2017-01-22 PROCEDURE — 85025 COMPLETE CBC W/AUTO DIFF WBC: CPT | Performed by: INTERNAL MEDICINE

## 2017-01-22 PROCEDURE — 82962 GLUCOSE BLOOD TEST: CPT

## 2017-01-22 PROCEDURE — 93005 ELECTROCARDIOGRAM TRACING: CPT

## 2017-01-22 PROCEDURE — 74011636637 HC RX REV CODE- 636/637: Performed by: INTERNAL MEDICINE

## 2017-01-22 PROCEDURE — 74011250636 HC RX REV CODE- 250/636

## 2017-01-22 PROCEDURE — 82607 VITAMIN B-12: CPT | Performed by: INTERNAL MEDICINE

## 2017-01-22 PROCEDURE — 65660000000 HC RM CCU STEPDOWN

## 2017-01-22 PROCEDURE — 83540 ASSAY OF IRON: CPT | Performed by: INTERNAL MEDICINE

## 2017-01-22 PROCEDURE — 74011250636 HC RX REV CODE- 250/636: Performed by: INTERNAL MEDICINE

## 2017-01-22 PROCEDURE — 36415 COLL VENOUS BLD VENIPUNCTURE: CPT | Performed by: INTERNAL MEDICINE

## 2017-01-22 PROCEDURE — 85610 PROTHROMBIN TIME: CPT | Performed by: INTERNAL MEDICINE

## 2017-01-22 PROCEDURE — 80048 BASIC METABOLIC PNL TOTAL CA: CPT | Performed by: INTERNAL MEDICINE

## 2017-01-22 PROCEDURE — 74011250637 HC RX REV CODE- 250/637: Performed by: INTERNAL MEDICINE

## 2017-01-22 PROCEDURE — 82746 ASSAY OF FOLIC ACID SERUM: CPT | Performed by: INTERNAL MEDICINE

## 2017-01-22 RX ORDER — FUROSEMIDE 10 MG/ML
80 INJECTION INTRAMUSCULAR; INTRAVENOUS ONCE
Status: COMPLETED | OUTPATIENT
Start: 2017-01-22 | End: 2017-01-22

## 2017-01-22 RX ORDER — FUROSEMIDE 10 MG/ML
INJECTION INTRAMUSCULAR; INTRAVENOUS
Status: COMPLETED
Start: 2017-01-22 | End: 2017-01-22

## 2017-01-22 RX ADMIN — FUROSEMIDE 40 MG: 10 INJECTION, SOLUTION INTRAMUSCULAR; INTRAVENOUS at 13:00

## 2017-01-22 RX ADMIN — POTASSIUM CHLORIDE 20 MEQ: 750 TABLET, FILM COATED, EXTENDED RELEASE ORAL at 09:00

## 2017-01-22 RX ADMIN — HYDRALAZINE HYDROCHLORIDE 20 MG: 10 TABLET, FILM COATED ORAL at 20:42

## 2017-01-22 RX ADMIN — FUROSEMIDE 40 MG: 10 INJECTION INTRAVENOUS at 15:00

## 2017-01-22 RX ADMIN — ENOXAPARIN SODIUM 30 MG: 30 INJECTION SUBCUTANEOUS at 09:09

## 2017-01-22 RX ADMIN — Medication 10 ML: at 20:49

## 2017-01-22 RX ADMIN — ALLOPURINOL 200 MG: 100 TABLET ORAL at 09:00

## 2017-01-22 RX ADMIN — METOPROLOL TARTRATE 50 MG: 50 TABLET ORAL at 20:41

## 2017-01-22 RX ADMIN — DOFETILIDE 125 MCG: 0.12 CAPSULE ORAL at 09:09

## 2017-01-22 RX ADMIN — WARFARIN SODIUM 4.5 MG: 2.5 TABLET ORAL at 18:00

## 2017-01-22 RX ADMIN — HUMAN INSULIN 50 UNITS: 100 INJECTION, SUSPENSION SUBCUTANEOUS at 09:09

## 2017-01-22 RX ADMIN — METOPROLOL TARTRATE 50 MG: 50 TABLET ORAL at 09:00

## 2017-01-22 RX ADMIN — HYDRALAZINE HYDROCHLORIDE 20 MG: 10 TABLET, FILM COATED ORAL at 17:00

## 2017-01-22 RX ADMIN — HYDRALAZINE HYDROCHLORIDE 20 MG: 10 TABLET, FILM COATED ORAL at 09:00

## 2017-01-22 RX ADMIN — FUROSEMIDE 80 MG: 40 TABLET ORAL at 09:00

## 2017-01-22 RX ADMIN — Medication 10 ML: at 13:16

## 2017-01-22 RX ADMIN — DOFETILIDE 125 MCG: 0.12 CAPSULE ORAL at 17:34

## 2017-01-22 RX ADMIN — ATORVASTATIN CALCIUM 80 MG: 40 TABLET, FILM COATED ORAL at 20:43

## 2017-01-22 RX ADMIN — FAMOTIDINE 20 MG: 20 TABLET ORAL at 09:00

## 2017-01-22 RX ADMIN — LEVOTHYROXINE SODIUM 125 MCG: 125 TABLET ORAL at 05:05

## 2017-01-22 RX ADMIN — Medication 10 ML: at 05:05

## 2017-01-22 RX ADMIN — POTASSIUM CHLORIDE 20 MEQ: 750 TABLET, FILM COATED, EXTENDED RELEASE ORAL at 17:34

## 2017-01-22 NOTE — PROGRESS NOTES
Bedside and Verbal shift change report given to Stephie Garland (oncoming nurse) by Roman Kline (offgoing nurse). Report included the following information SBAR, Kardex, Procedure Summary, Intake/Output, MAR, Recent Results, Med Rec Status and Cardiac Rhythm Sinus Rhythm.

## 2017-01-22 NOTE — PROGRESS NOTES
[facility] called to inquire about pt resuming coumadin. Dr. Gardenia Gandara paged to confirm. MARICEL Lucio 23 Dr. Gardenia Gandara informed, Lencho Argue to resume home regimen of coumadin. Pharmacist notified.      1702 BS= 72, 2 orange juices given to drink  1717 BS rechecked= 94

## 2017-01-22 NOTE — PROGRESS NOTES
Cardiology Progress Note  2017     Admit Date: 2017  Admit Diagnosis: heart failure  Heart failure (HCC)  CC: none currently    Assessment:   Active Problems:    Heart failure ([address] (2017)      Plan:   Still complains of SOB, but  breathing appears more comfortable than yesterday. Weights and I and O's not reliable. HR slower. Trial of IV lasix today. Volume status:euvolemic  Renal function: stable    For other plans, see orders. Subjective: Naida Blanca reports   Chest Pain:  [x]   none,  consistent with  []   non-cardiac   []   atypical   []   angina             [x]   none now    []      on-going  Dyspnea: []   none  []   at rest  []   with exertion     [x]   improved   []   unchanged   []   worsening  PND:       [x]   none  []   overnight    Orthopnea: [x]   none  []   improved  []   unchanged  []   worsening  Presyncope: [x]   none   []   improved    []   unchanged    []   worsening  Ambulated in hallway without symptoms  []   Yes  Ambulated in room without symptoms  []   Yes    Objective:    Physical Exam:  Overall VSSAF;    Visit Vitals    /84 (BP 1 Location: Right arm, BP Patient Position: Sitting)    Pulse 69    Temp 98 °F (36.7 °C)    Resp 16    Ht 5' 9\" (1.753 m)    Wt 103.9 kg (229 lb 0.9 oz)    SpO2 95%    BMI 33.83 kg/m2     Temp (24hrs), Av.9 °F (36.6 °C), Min:97.5 °F (36.4 °C), Max:98.3 °F (36.8 °C)    Patient Vitals for the past 8 hrs:   Pulse   17 1125 69   17 0731 69    Patient Vitals for the past 8 hrs:   Resp   17 0731 16    Patient Vitals for the past 8 hrs:   BP   17 1125 118/84   17 0731 (!) 153/37        Intake/Output Summary (Last 24 hours) at 17 1145  Last data filed at 17 0805   Gross per 24 hour   Intake              655 ml   Output              100 ml   Net              555 ml       General Appearance: Well developed, well nourished, no acute distress.    Ears/Nose/Mouth/Throat:   Normal MM; anicteric. JVP: WNL   Resp:   Lungs clear to auscultation bilaterally. Nl resp effort. Cardiovascular:  irreg, S1, S2 normal, no new murmur. No gallop or rub. Abdomen:   Soft, non-tender, bowel sounds are present. Extremities: 2+ edema bilaterally. Skin:  Neuro: Warm and dry. A/O x3, grossly nonfocal    []      cath site intact w/o hematoma or bruit; distal pulse unchanged. Data Review:     Telemetry independently reviewed : []   sinus  [x]   chronic afib   []   par afib  []      NSVT    ECG independently reviewed:  []   NSR   []   no significant changes  []   no new ECG provided for review  Lab results reviewed as noted below. Current medications reviewed as noted below. No results for input(s): PH, PCO2, PO2 in the last 72 hours. No results for input(s): CPK, CKMB, TROIQ in the last 72 hours. Recent Labs      01/22/17 0313 01/21/17   0030  01/20/17   0440   NA  142  143  144   K  4.0  3.7  3.3*   CL  106  108  107   CO2  29  28  27   BUN  22*  23*  20   CREA  1.47*  1.42*  1.39*   GLU  71  67  101*   CA  9.1  8.5  8.7   WBC  5.3   --    --    HGB  9.5*   --    --    HCT  31.1*   --    --    PLT  162   --    --      No results for input(s): SGOT, GPT, ALT, AP, TBIL, TBILI, TP, ALB, GLOB, GGT, AML, LPSE in the last 72 hours.     No lab exists for component: AMYP, HLPSE  Recent Labs      01/22/17   0519  01/21/17   0030  01/20/17   0525   INR  1.9*  2.1*  2.7*   PTP  19.0*  21.2*  27.0*      Recent Labs      01/22/17   0313   FE  32*   TIBC  358   PSAT  9*      Lab Results   Component Value Date/Time    Glucose (POC) 187 01/22/2017 08:59 AM    Glucose (POC) 101 01/22/2017 05:25 AM    Glucose (POC) 168 01/21/2017 04:34 PM    Glucose (POC) 186 01/21/2017 11:21 AM    Glucose (POC) 96 01/21/2017 07:49 AM    Glucose (POC) 192 07/06/2010 10:11 PM    Glucose POC 90 06/27/2011 10:46 AM       Current Facility-Administered Medications   Medication Dose Route Frequency    influenza vaccine 2016-17 (36mos+)(PF) (FLUZONE/FLUARIX/FLULAVAL QUAD) injection 0.5 mL  0.5 mL IntraMUSCular PRIOR TO DISCHARGE    metoprolol tartrate (LOPRESSOR) tablet 50 mg  50 mg Oral Q12H    potassium chloride SR (KLOR-CON 10) tablet 20 mEq  20 mEq Oral BID    furosemide (LASIX) tablet 80 mg  80 mg Oral DAILY    Warfarin - MD/NP to dose   Other Rx Dosing/Monitoring    sodium chloride (NS) flush 5-10 mL  5-10 mL IntraVENous Q8H    sodium chloride (NS) flush 5-10 mL  5-10 mL IntraVENous PRN    enoxaparin (LOVENOX) injection 30 mg  30 mg SubCUTAneous Q24H    allopurinol (ZYLOPRIM) tablet 200 mg  200 mg Oral DAILY    atorvastatin (LIPITOR) tablet 80 mg  80 mg Oral QHS    hydrALAZINE (APRESOLINE) tablet 20 mg  20 mg Oral TID    levothyroxine (SYNTHROID) tablet 125 mcg  125 mcg Oral ACB    famotidine (PEPCID) tablet 20 mg  20 mg Oral DAILY    dofetilide (TIKOSYN) capsule 125 mcg  125 mcg Oral BID    traMADol (ULTRAM) tablet 50 mg  50 mg Oral Q6H PRN    insulin NPH (NOVOLIN N, HUMULIN N) injection 50 Units  50 Units SubCUTAneous ACB    insulin NPH (NOVOLIN N, HUMULIN N) injection 30 Units  30 Units SubCUTAneous DAILY WITH DINNER        Will Peters MD

## 2017-01-22 NOTE — PROGRESS NOTES
Bedside and Verbal shift change report given to  Glen Elder   (oncoming nurse) by Liz Pro rn  (offgoing nurse). Report included the following information SBAR, Kardex, Intake/Output, MAR, Accordion, Recent Results and Med Rec Status.

## 2017-01-23 VITALS
SYSTOLIC BLOOD PRESSURE: 133 MMHG | RESPIRATION RATE: 16 BRPM | WEIGHT: 220.24 LBS | HEART RATE: 65 BPM | BODY MASS INDEX: 32.62 KG/M2 | OXYGEN SATURATION: 99 % | TEMPERATURE: 98.4 F | HEIGHT: 69 IN | DIASTOLIC BLOOD PRESSURE: 47 MMHG

## 2017-01-23 LAB
ANION GAP BLD CALC-SCNC: 9 MMOL/L (ref 5–15)
BUN SERPL-MCNC: 23 MG/DL (ref 6–20)
BUN/CREAT SERPL: 14 (ref 12–20)
CALCIUM SERPL-MCNC: 9.4 MG/DL (ref 8.5–10.1)
CHLORIDE SERPL-SCNC: 104 MMOL/L (ref 97–108)
CO2 SERPL-SCNC: 28 MMOL/L (ref 21–32)
CREAT SERPL-MCNC: 1.64 MG/DL (ref 0.55–1.02)
GLUCOSE BLD STRIP.AUTO-MCNC: 175 MG/DL (ref 65–100)
GLUCOSE BLD STRIP.AUTO-MCNC: 199 MG/DL (ref 65–100)
GLUCOSE BLD STRIP.AUTO-MCNC: 206 MG/DL (ref 65–100)
GLUCOSE SERPL-MCNC: 120 MG/DL (ref 65–100)
INR PPP: 1.4 (ref 0.9–1.1)
POTASSIUM SERPL-SCNC: 3.9 MMOL/L (ref 3.5–5.1)
PROTHROMBIN TIME: 14.1 SEC (ref 9–11.1)
SERVICE CMNT-IMP: ABNORMAL
SODIUM SERPL-SCNC: 141 MMOL/L (ref 136–145)

## 2017-01-23 PROCEDURE — 80048 BASIC METABOLIC PNL TOTAL CA: CPT | Performed by: INTERNAL MEDICINE

## 2017-01-23 PROCEDURE — 36415 COLL VENOUS BLD VENIPUNCTURE: CPT | Performed by: INTERNAL MEDICINE

## 2017-01-23 PROCEDURE — 74011250637 HC RX REV CODE- 250/637: Performed by: INTERNAL MEDICINE

## 2017-01-23 PROCEDURE — 74011250636 HC RX REV CODE- 250/636: Performed by: INTERNAL MEDICINE

## 2017-01-23 PROCEDURE — 74011636637 HC RX REV CODE- 636/637: Performed by: INTERNAL MEDICINE

## 2017-01-23 PROCEDURE — 85610 PROTHROMBIN TIME: CPT | Performed by: INTERNAL MEDICINE

## 2017-01-23 PROCEDURE — 82962 GLUCOSE BLOOD TEST: CPT

## 2017-01-23 RX ORDER — FUROSEMIDE 40 MG/1
80 TABLET ORAL 2 TIMES DAILY
Status: DISCONTINUED | OUTPATIENT
Start: 2017-01-23 | End: 2017-01-23 | Stop reason: HOSPADM

## 2017-01-23 RX ORDER — HYDRALAZINE HYDROCHLORIDE 10 MG/1
20 TABLET, FILM COATED ORAL 3 TIMES DAILY
Qty: 180 TAB | Refills: 12 | Status: SHIPPED | OUTPATIENT
Start: 2017-01-23 | End: 2019-04-27

## 2017-01-23 RX ORDER — METOPROLOL TARTRATE 50 MG/1
50 TABLET ORAL 2 TIMES DAILY
Qty: 60 TAB | Refills: 12 | Status: SHIPPED | OUTPATIENT
Start: 2017-01-23 | End: 2018-02-26 | Stop reason: SDUPTHER

## 2017-01-23 RX ORDER — POTASSIUM CHLORIDE 750 MG/1
10 TABLET, FILM COATED, EXTENDED RELEASE ORAL DAILY
Qty: 30 TAB | Refills: 12 | Status: SHIPPED | OUTPATIENT
Start: 2017-01-23 | End: 2020-01-01 | Stop reason: SDUPTHER

## 2017-01-23 RX ORDER — FUROSEMIDE 40 MG/1
80 TABLET ORAL EVERY EVENING
Qty: 120 TAB | Refills: 12 | Status: SHIPPED | OUTPATIENT
Start: 2017-01-23 | End: 2018-01-03 | Stop reason: SDUPTHER

## 2017-01-23 RX ORDER — WARFARIN 6 MG/1
6 TABLET ORAL
Qty: 30 TAB | Refills: 12 | Status: SHIPPED | OUTPATIENT
Start: 2017-01-25 | End: 2017-02-07

## 2017-01-23 RX ORDER — WARFARIN 3 MG/1
TABLET ORAL
Qty: 100 TAB | Refills: 12 | Status: SHIPPED | OUTPATIENT
Start: 2017-01-23 | End: 2017-11-27 | Stop reason: DRUGHIGH

## 2017-01-23 RX ORDER — METOPROLOL TARTRATE 50 MG/1
50 TABLET ORAL 2 TIMES DAILY
Qty: 60 TAB | Refills: 12 | Status: SHIPPED | OUTPATIENT
Start: 2017-01-23 | End: 2017-01-23

## 2017-01-23 RX ADMIN — Medication 10 ML: at 05:46

## 2017-01-23 RX ADMIN — FAMOTIDINE 20 MG: 20 TABLET ORAL at 08:37

## 2017-01-23 RX ADMIN — FUROSEMIDE 80 MG: 40 TABLET ORAL at 08:37

## 2017-01-23 RX ADMIN — HYDRALAZINE HYDROCHLORIDE 20 MG: 10 TABLET, FILM COATED ORAL at 17:08

## 2017-01-23 RX ADMIN — DOFETILIDE 125 MCG: 0.12 CAPSULE ORAL at 08:38

## 2017-01-23 RX ADMIN — LEVOTHYROXINE SODIUM 125 MCG: 125 TABLET ORAL at 05:46

## 2017-01-23 RX ADMIN — ENOXAPARIN SODIUM 30 MG: 30 INJECTION SUBCUTANEOUS at 10:24

## 2017-01-23 RX ADMIN — POTASSIUM CHLORIDE 20 MEQ: 750 TABLET, FILM COATED, EXTENDED RELEASE ORAL at 08:37

## 2017-01-23 RX ADMIN — HYDRALAZINE HYDROCHLORIDE 20 MG: 10 TABLET, FILM COATED ORAL at 08:37

## 2017-01-23 RX ADMIN — HUMAN INSULIN 50 UNITS: 100 INJECTION, SUSPENSION SUBCUTANEOUS at 08:38

## 2017-01-23 RX ADMIN — METOPROLOL TARTRATE 50 MG: 50 TABLET ORAL at 08:37

## 2017-01-23 RX ADMIN — ALLOPURINOL 200 MG: 100 TABLET ORAL at 08:37

## 2017-01-23 NOTE — CDMP QUERY
Patient is noted to have a BMI of  32.52 kg/m 2. Please clarify if this patient is: The [address] [place] [address] has issued a statement indicating that, \"Individuals who are overweight, obese, or morbidly obese are at an increased risk for certain medical conditions when compared to persons of normal weight.  Therefore, these conditions are always clinically significant and reportable when documented by the provider. \"    =>Obesity (BMI of 30-39.9)  => Morbid Obesity  (BMI 40 or greater)  =>Overweight (BMI 25-29.9)  => Other weight status (specify  status)  => Unable to determine        Presentation:   Ht: 5' 9\" (1.753 m)  Wt: 99.9 kg (220 lb 3.8 oz)      Please clarify and document your clinical opinion in the progress notes and discharge summary, including the definitive and or presumptive diagnosis, (suspected or probable), related to the above clinical findings. Please include clinical findings supporting your diagnosis.      Thank you,         Coco Lentz [facility]

## 2017-01-23 NOTE — PROGRESS NOTES
Bedside and Verbal shift change report given to [place] (oncoming nurse) by Francoise Paz RN (offgoing nurse). Report included the following information SBAR, Kardex, Intake/Output, MAR, Accordion, Recent Results and Med Rec Status.

## 2017-01-23 NOTE — PROGRESS NOTES
[id number] I have reviewed discharge instructions with the patient and daughter. The patient and daughter verbalized understanding. Follow up apt made for HF. Home health wound care and PT to see pt tmrw. Pt transported home by daughter.

## 2017-01-23 NOTE — CDMP QUERY
Please clarify if this patient is being treated/managed for:    =>Mild Hypokalemia in the setting of diuretic use requiring supplemental potassium   =>Other Explanation of clinical findings  =>Unable to Determine (no explanation of clinical findings)    The medical record reflects the following clinical findings, treatment, and risk factors:    Risk Factors: lasix  Clinical Indicators: KCl 3.4--3.3  Treatment: supplemental potassium     Please clarify and document your clinical opinion in the progress notes and discharge summary including the definitive and/or presumptive diagnosis, (suspected or probable), related to the above clinical findings. Please include clinical findings supporting your diagnosis.       Thank you,         Laura Sorenson [address] [place] [address]

## 2017-01-23 NOTE — CDMP QUERY
Please clarify if this patient is being treated/managed for:    =>Chronic Kidney Disease Stage 3 (GFR 30-59) in the setting of known history requiring monitoring   =>Other Explanation of clinical findings  =>Unable to Determine (no explanation of clinical findings)    The medical record reflects the following clinical findings, treatment, and risk factors:    Risk Factors: HX of CKD   Clinical Indicators: GFR 36   BUN: 20   Creatinine: 1.42 ----consistent with baseline   Treatment: lab monitoring     Please clarify and document your clinical opinion in the progress notes and discharge summary including the definitive and/or presumptive diagnosis, (suspected or probable), related to the above clinical findings. Please include clinical findings supporting your diagnosis.     Thank you,         Marbin Click [address] [place] [address]

## 2017-01-23 NOTE — DIABETES MGMT
DTC Progress Note    Recommendations/ Comments: Chart reviewed d/t fluctuating BG's, she's had some lows on 1/21 and 1/22, possibly d/t lower po intake, but then also some highs likely d/t NPH insulin being held or pt refusing. If appropriate, please consider  1. Changing pt to a \"Carb Controlled Cardiac Diet\"  2. PO intake seems to be improved, but could still give a reduced dose of NPH to reduce risk of lows; suggest 35 units AM and 20 units PM (~1/3 reduction in dose)    Chart reviewed on Lea Miller. Patient is a 80 y.o. female with known Type 2 Diabetes on insulin injections: NPH: 50 units AM and 30 units PM at home. A1c:   Lab Results   Component Value Date/Time    Hemoglobin A1c 7.0 11/10/2016 10:32 AM    Hemoglobin A1c 6.9 08/05/2016 07:08 AM       Recent Glucose Results: Lab Results   Component Value Date/Time     (H) 01/23/2017 04:10 AM    GLUCPOC 199 (H) 01/23/2017 11:32 AM    GLUCPOC 175 (H) 01/23/2017 06:50 AM    GLUCPOC 206 (H) 01/23/2017 05:44 AM        Lab Results   Component Value Date/Time    Creatinine 1.64 01/23/2017 04:10 AM       Active Orders   Diet    DIET CARDIAC Regular        PO intake: Patient Vitals for the past 72 hrs:   % Diet Eaten   01/23/17 1212 100 %   01/23/17 0838 100 %   01/22/17 1721 100 %   01/22/17 1315 50 %   01/22/17 0805 75 %   01/21/17 1331 50 %       Current hospital DM medication: NPH 50 units AM and 30 units PM    Will continue to follow as needed. Thank you  Jennifer Ford, RD, CDE

## 2017-01-23 NOTE — CARDIO/PULMONARY
Cardiac Wellness: Heart Failure education folder given to Iván Carmona, along with Maia Avoiding Hidden Sodium in Foods education. Her daughter is present and involved in the education. Educated using teach back method. Discussed diagnosis definition and assessed patient understanding. Reviewed importance of daily weight monitoring and low sodium diet (less than 1500 mg. Daily). Encouraged the use of a food log at home to track her daily sodium intake. Weights since admission were documented on her chf calender during the visit. Encouraged activity and rest periods within symptom limitations and as ordered by physician. Reviewed lasix and metoprolol, purpose of medication, potential side effects and what to do if dose is missed. Discussed importance of reporting signs and symptoms of exacerbation  and when to report them to the doctor to prevent re-hospitalization. Iván Carmona was encouraged to keep all appointments with doctor. Smoking history assessed. Pt is a non smoker. HF teachback questions not answered by patient. Discussed the Cardiac Wellness Program and format and encouraged enrollment, when eligible.  Patient was enrolled briefly after her mitral clip last year but according to her daughter it was too much for her  Mary Delong RN

## 2017-01-24 ENCOUNTER — PATIENT OUTREACH (OUTPATIENT)
Dept: INTERNAL MEDICINE CLINIC | Age: 82
End: 2017-01-24

## 2017-01-24 DIAGNOSIS — I48.20 CHRONIC ATRIAL FIBRILLATION (HCC): Primary | ICD-10-CM

## 2017-01-24 NOTE — PROGRESS NOTES
8 [facility] Follow Up for [facility] Admission from 1/19/17 - 1/24/17 for CHF. Pt d/c home with continued care from [facility]. She will need BMP in 1 week. RRAT score: 34 High    Advance Medical Directive on file in EMR? 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 [place] 4/23/2009    TIA (transient ischemic attack)      This represents Transitions of Care b/c NN spoke with patient and/or caregiver within 1 business days of discharge. Pt's TCM follow up appt is scheduled with Dr. Donovan Goodson on 1/31/17, which is within 8 days of discharge. Called patient on 1/24/17 and verified her with 2 identifiers. A nurse from [address] [place] [address] was currently there. Called patient. Busy. Called patient. No answer. Fall Risk Assessment:    Fall Risk Assessment, last 12 mths 11/8/2016   Able to walk? Yes   Fall in past 12 months? No     Patient presenting symptoms (referenced by Jasen December):   Pt arrives via EMS from home after waking at 0400 this AM with dull chest pain and SOB. BG en route was 278, sinus tachy. Denies chest pain but continues to feel like \"I can't catch my breath\". Pt presents with swelling of ankles that is much worse than usual.    Course of current Hospitalization (referenced by Maria De Jesus Valencia MD note dated 1/23/17):    The patient was initially put in under observation with an attempt at significant diuresis. It was felt that it may be prudent to attempt to control her blood pressure and heart rate while here, her   hydralazine was increased, metoprolol switched to carvedilol and then switched back. There was discussion with Dr. Fernando Contreras regarding her presentation and the decision was made to go ahead and proceed with a right heart catheterization and transseptal puncture to assess her pressures more fully. Dr. Fernando Contreras performed this on 01/20/2017 and it revealed RV pressure of 60/20, PA pressure of 60/25 with a mean of 37. Pulmonary capillary wedge pressure was 25. Direct measured LA pressure was 28. LV pressure was 135/25. The mean pressure gradient across the mitral valve was 4 mmHg. At the time this was done, her heart rate was 72. She was also given an additional dose of metoprolol and her LA pressure remained elevated at 28 with a mean gradient of 4. It was felt that she had no significant residual regurgitation notable across her mitral valve, but she did have functional mild mitral stenosis. It was felt that with tight control of her blood pressure and heart rate, we may reach a state where she is able to ambulate without significant symptoms. The remainder of her hospital stay was just to adjust her medications and get her ambulating. She did miss a couple of doses of Coumadin while she was hospitalized, thus, on the day of discharge, her INR was down to 1.4 with plans to boost her next 2 days' doses appropriately. She was anemic and remained stable throughout her hospitalization. Diagnostic Right and Transseptal Left Heart Catheterization Procedure   Buster Loco MD  Patient preivously underwent transcatheter mitral valve repair with a mitraclip as she was prohibitive risk for surgery, with improvement initially.  More recently, she has had recurrence of her dyspnea on exertion and echo is concerning for some degree of mitral stenosis, so she was referred for a diagnostic hemodynamic evaluation.     Conclusion  1. Mitral valve clip  2. Mitral regurgitation, residual  3. Mitral stenosis, mild      At the completion of the case, catheters were removed. Hemostasis was obtained by manual pressure. Mitral repair was successful with no complications. Wound Care (Bhavani Manrique RN):  Slight generalized edema to lower legs with light hemosiderin staining. One small partial thickness area on right lower anterior leg = 0.5 x 0.5 x 0.1 cm 100% drying pink. Covered with Mepilex border. Wearing single layer tubigrip.     Lab/Diagnostics at time of Discharge:   Lab Results  Component Value Date/Time   WBC 5.3 2017 03:13 AM   Hemoglobin (POC) 10.5 2010 10:11 PM   HGB 9.5 2017 03:13 AM   Hematocrit (POC) 31 2010 10:11 PM   HCT 31.1 2017 03:13 AM   PLATELET 092  03:13 AM   MCV 85.9 2017 03:13 AM       Lab Results   Component Value Date/Time     2017 07:29 AM     2009 05:00 AM     2009 06:30 PM     2009 08:56 AM      Lab Results   Component Value Date/Time    Sodium 141 2017 04:10 AM    Potassium 3.9 2017 04:10 AM    Chloride 104 2017 04:10 AM    CO2 28 2017 04:10 AM    Anion gap 9 2017 04:10 AM    Glucose 120 2017 04:10 AM    BUN 23 2017 04:10 AM    Creatinine 1.64 2017 04:10 AM    BUN/Creatinine ratio 14 2017 04:10 AM    GFR est AA 37 2017 04:10 AM    GFR est non-AA 30 2017 04:10 AM    Calcium 9.4 2017 04:10 AM      Medication Reconciliation completed:     DISCHARGE MEDICATIONS   Include the followin. Omeprazole 20 mg a day. 2. Coumadin 7.5 mg on the day of discharge, 4.5 mg on Tuesday, 6 mg on Wednesday, 4.5 mg on Thursday and INR to be done on Thursday. 3. Insulin  units/mL, 50 units in the morning, 30 units in the evening. 4. Lasix 80 mg twice a day. 5. Synthroid 125 mcg daily. 6. Amlodipine 5 mg a day.    7. Tylenol Extra Strength as needed. 8. Lipitor 80 mg at bedtime. 9. Allopurinol 200 mg/day. 10. Dofetilide 125 mcg twice a day. 11. Metoprolol tartrate 50 mg twice a day. 12. Hydralazine 20 mg 3 times a day. Prescription Medication total: 11 (pharmacy consult for polypharm needed?) no     If multiple admissions, ED visits, check and reviewed :  no    Barriers to care? Lives alone     Support System consists of: daughter    [address], if so what agency? Yes, [facility]    Future Plan for Patient to include communication plan:      Followup will be with Dr. Denise Shah in 1 week along with BMP  Dr. Galen Villareal in 2-3 months    INR on Thursday, 01/26/2017    Adherence to previous treatment and likelihood for f/u: good    Past Hospitalizations and/or ED visits last 12 months?  1

## 2017-01-24 NOTE — DISCHARGE SUMMARY
[address], [address] [place] [address] SUMMARY       Name:  Kiana Sam   MR#:  [id number]   :  1935   Account #:  [de-identified]        Date of Adm:  2017       DISCHARGE DIAGNOSES   Include:     1. Acute on chronic diastolic heart failure. 2. Pulmonary hypertension. 3. Mitral valve disease, status post mitral clip with increased   transvalvular gradients. 4. Hypertension. 5. Atrial fibrillation on anticoagulation. 6. Previous transient ischemic attack. 7. Hypothyroidism. 8. Dyslipidemia. 9. Gastroesophageal reflux disease. 10. Lower extremity ulcers. PROCEDURES DURING THE HOSPITALIZATION: Include a right   heart catheterization with transseptal puncture done by Dr. Karma Sanchez   2017. DISCHARGE MEDICATIONS   Include the followin. Omeprazole 20 mg a day. 2. Coumadin 7.5 mg on the day of discharge, 4.5 mg on Tuesday, 6   mg on Wednesday, 4.5 mg on Thursday and INR to be done on   Thursday. 3. Insulin  units/mL, 50 units in the morning, 30 units in the   evening. 4. Lasix 80 mg twice a day. 5. Synthroid 125 mcg daily. 6. Amlodipine 5 mg a day. 7. Tylenol Extra Strength as needed. 8. Lipitor 80 mg at bedtime. 9. Allopurinol 200 mg/day. 10. Dofetilide 125 mcg twice a day. 11. Metoprolol tartrate 50 mg twice a day. 12. Hydralazine 20 mg 3 times a day. 13. Potassium chloride 10 Meq daily    HISTORY OF PRESENT ILLNESS: The patient was admitted from the   emergency room on 2017. Please refer to the complete history   and physical from that time. Briefly, she had become increasingly short   of breath without resolution upon rest prior to admission. She had been   followed closely by Dr. Radha Kaplan nurse practitioner with an attempt to   control her blood pressure and heart rate, to see if this would improve   her breathing and reduced the transvalvular gradient across the mitral   valve.  She had an elevated gradient of 9 mmHg during her   echocardiogram 1 month prior. She did not have any other intervening   illnesses, just that she became progressively more dyspneic with   exertion. HOSPITAL COURSE: The patient was initially put in under observation   with an attempt at significant diuresis. It was felt that it may be prudent   to attempt to control her blood pressure and heart rate while here, thus her   hydralazine was increased, metoprolol switched to carvedilol and then   switched back and increased. There was discussion with Dr. Galen Villareal regarding her   presentation and the decision was made to go ahead and proceed with   a right heart catheterization and transseptal puncture to assess her   pressures more fully. Dr. Galen Villareal performed this on 01/20/2017 and it   revealed RV pressure of 60/20, PA pressure of 60/25 with a mean of   37. Pulmonary capillary wedge pressure was 25. Direct measured LA   pressure was 28. LV pressure was 135/25. The mean pressure   gradient across the mitral valve was 4 mmHg. At the time this was   done, her heart rate was 72. She was also given an additional dose of   metoprolol and her LA pressure remained elevated at 28 with a mean   gradient of 4. It was felt that she had no significant residual   regurgitation notable across her mitral valve, but she did have   functional mild mitral stenosis. It was felt that with tight control of her   blood pressure and heart rate, we may reach a state where she is able   to ambulate without significant symptoms. The remainder of her   hospital stay was just to adjust her medications and get her   ambulating. She did miss a couple of doses of Coumadin while she   was hospitalized, thus, on the day of discharge, her INR was down to   1.4 with plans to boost her next 2 days' doses  appropriately. She was   anemic and remained stable throughout her hospitalization. Chemistries demonstrated that her iron saturation was low.  Her   creatinine on admission was 1.42 and on discharge 1.64 and her   potassium was 3.9. She was ambulating in the halls prior to discharge,   though, would still get winded with extensive ambulation. TSH was   within normal limits upon admission. PHYSICAL EXAMINATION   VITAL SIGNS: On admission, blood pressure was 124/36,  pulse 64,   O2 saturation 99% on room air. Afebrile. LUNGS: Clear. CARDIAC: Regular rate and rhythm with no gallop and a soft systolic   ejection murmur at her left sternal border. Her legs are wrapped with   her wound care. FOLLOWUP: Followup will be with Dr. Denise Shah in 1 week, with Dr. Galen Villareal in 2-3 months and an INR on Thursday, 01/26/2017 and a BMP at   the time of her clinic visit with Dr. Denise Shah.         Pradeep Asif MD CB / PARKER   D:  01/23/2017   15:03   T:  01/24/2017   10:28   Job #:  [id number]

## 2017-01-25 ENCOUNTER — TELEPHONE (OUTPATIENT)
Dept: INTERNAL MEDICINE CLINIC | Age: 82
End: 2017-01-25

## 2017-01-25 NOTE — PROGRESS NOTES
Issac Muniz, pt's daughter. Pt and other daughter, Mili Dickerson were currently on way back from Dr Denise Shah (Cards) office. Octavio  indicated that her mom gained ~15 lbs of fluid. States she and her sister and going to start taking a more active role in caring for mom. Mili Dickerson will be the one going to most of her doctor's appts. Octavio Galindo mentioned she would like to discuss the NPH her mom is one. Pt occasionally wakes up in the middle of the night or morning with hypoglycemia. May need to decrease evening dose of 30 units. Will call Mili Dickerson back to schedule a f/u appt for Sd.

## 2017-01-25 NOTE — TELEPHONE ENCOUNTER
Ronny Rose, daughter [phone]     Calling to schedule a hospital f/u for her mother. Also needs to talk about her coumadin.

## 2017-01-26 LAB
INR, EXTERNAL: 1.5
PT, EXTERNAL: 15.3

## 2017-01-26 NOTE — PROGRESS NOTES
Reached Drea Diaz, daughter. She needs to be added to pt's HIPPA. Antoine Abebe will now be the main point of contact for patient. Sugey Mitchell went to see Dr Ananya Green yesterday. BMP was drawn. Unfortunately, order for INR was never called over to [employer], so this wasn't drawn. NN ordered a stat INR to be done today. Faxed lab slip over to the [employer].  Pt's Mini Garcia will be able to take her later today. Told Antoine Abebe that if she doesn't hear back from [facility] prior to tonight's dose, then give her 4.5mg, which is her normal dose for today. Will call tomorrow with results. Her last INR 1.9 on 1/22/17. HH should be coming out today. PT services were added on to current SN services. Pt has 4 children and duties have been divided up among them. Antoine Abebe lives about 10 min from patient in the same neighborhood. She is assisting with medications, grocery shopping and transportation to appts. Antoine Abebe will be emailing NN a spread sheet of pt's medications. A f/u appt was scheduled with Dr Jasen Baker.     Future Appointments  Date Time Provider Dafne Jenkins   1/31/2017 2:20 [place] [id number] [facility]

## 2017-01-27 ENCOUNTER — TELEPHONE (OUTPATIENT)
Dept: INTERNAL MEDICINE CLINIC | Age: 82
End: 2017-01-27

## 2017-01-27 NOTE — TELEPHONE ENCOUNTER
INR 1.5. Will take 6mg Fri, 4.5mg Sat, 3mg Sun, 4.5mg Mon. Pt will go to [facility] on 1/30/17 in afternoon. Will call Chino Galindo on 1/31/17. A full discussion of the nature of anticoagulants has been carried out. A benefit risk analysis has been presented to the patient, so that they understand the justification for choosing anticoagulation at this time. The need for frequent and regular monitoring, precise dosage adjustment and compliance is stressed. Side effects of potential bleeding are discussed. The patient should avoid any OTC items containing aspirin or ibuprofen, and should avoid great swings in general diet. Avoid alcohol consumption. Call if any signs of abnormal bleeding.      Future Appointments  Date Time Provider Dafne Jenkins   1/31/2017 2:20 PM Xochilt Timmons [place] [address] [facility]

## 2017-01-27 NOTE — TELEPHONE ENCOUNTER
Requesting a call back about her mother's coumadin, wants to know what they should do for today. She says that she has a meeting at 2:00 and 3:00, but will try to answer phone. You can also leave a mess.

## 2017-01-30 LAB — INR, EXTERNAL: 1.3

## 2017-01-30 NOTE — TELEPHONE ENCOUNTER
Advised pt's daughter Juvencio Isbell that MD advised - she may use voltaren gel sparingly but not oral voltaren or any nsaid due to her problems as well as being on warfarin.

## 2017-01-31 ENCOUNTER — OFFICE VISIT (OUTPATIENT)
Dept: INTERNAL MEDICINE CLINIC | Age: 82
End: 2017-01-31

## 2017-01-31 VITALS
BODY MASS INDEX: 32.76 KG/M2 | SYSTOLIC BLOOD PRESSURE: 140 MMHG | DIASTOLIC BLOOD PRESSURE: 60 MMHG | TEMPERATURE: 97.8 F | HEART RATE: 73 BPM | WEIGHT: 221.2 LBS | OXYGEN SATURATION: 96 % | RESPIRATION RATE: 16 BRPM | HEIGHT: 69 IN

## 2017-01-31 DIAGNOSIS — N18.30 CONTROLLED TYPE 2 DIABETES MELLITUS WITH STAGE 3 CHRONIC KIDNEY DISEASE, WITH LONG-TERM CURRENT USE OF INSULIN (HCC): ICD-10-CM

## 2017-01-31 DIAGNOSIS — I10 ESSENTIAL HYPERTENSION, BENIGN: ICD-10-CM

## 2017-01-31 DIAGNOSIS — I50.22 CHRONIC SYSTOLIC HEART FAILURE (HCC): Primary | ICD-10-CM

## 2017-01-31 DIAGNOSIS — E11.22 CONTROLLED TYPE 2 DIABETES MELLITUS WITH STAGE 3 CHRONIC KIDNEY DISEASE, WITH LONG-TERM CURRENT USE OF INSULIN (HCC): ICD-10-CM

## 2017-01-31 DIAGNOSIS — Z79.4 CONTROLLED TYPE 2 DIABETES MELLITUS WITH STAGE 3 CHRONIC KIDNEY DISEASE, WITH LONG-TERM CURRENT USE OF INSULIN (HCC): ICD-10-CM

## 2017-01-31 DIAGNOSIS — I48.20 CHRONIC ATRIAL FIBRILLATION (HCC): ICD-10-CM

## 2017-01-31 LAB
INR BLD: 1.4
PT POC: 16.3 SEC
VALID INTERNAL CONTROL?: YES

## 2017-01-31 NOTE — LETTER
January 31, 2017 Miladis Miller  
[address] [facility] [address] Dear Connie Diallo: 
Thank you for requesting access to [facility] [employer]. Please follow the instructions below to securely access and download your online medical record. [facility] allows you to send messages to your doctor, view your test results, renew your prescriptions, schedule appointments, and more. How Do I Sign Up? 1. In your internet browser, go to [email or web address]. 2. Click on the First Time User? Click Here link in the Sign In box. You will see the New Member Sign Up page. 3. Enter your [facility] Access Code exactly as it appears below. You will not need to use this code after youve completed the sign-up process. If you do not sign up before the expiration date, you must request a new code. [facility] [employer] Access Code: [email or web address] Expires: 2/6/2017  4:43 PM  
 
4. Enter the last four digits of your Social Security Number (xxxx) and Date of Birth (mm/dd/yyyy) as indicated and click Submit. You will be taken to the next sign-up page. 5. Create a [facility] / [facility] ID. This will be your [facility] [employer] login ID and cannot be changed, so think of one that is secure and easy to remember. 6. Create a [facility] password. You can change your password at any time. 7. Enter your Password Reset Question and Answer. This can be used at a later time if you forget your password. 8. Enter your e-mail address. You will receive e-mail notification when new information is available in [address] 9. Click Sign Up. You can now view and download portions of your medical record. 10. Click the Download Summary menu link to download a portable copy of your medical information. Additional Information If you have questions, please visit the Frequently Asked Questions section of the [facility] website at [email or web address] Remember, [facility] is NOT to be used for urgent needs. For medical emergencies, dial 911. Now available from your iPhone and Android! Sincerely, [employer]

## 2017-01-31 NOTE — PROGRESS NOTES
INR 1.4 today. Patient instructions: Discussed with Dr. Bhavesh Damon  Old dose: 4.5mg daily except 3mg Sun and Thurs. (patient did take 6mg once last week after low INR). New dose: Coumadin 6mg daily except 3mg on M/W/F (15.8% increase)  Recheck in 1 week   Patient has increased leafy greens (salads) since hospital discharge. Discussed keeping diet consistent with Vit K intake especially with dose increase. A full discussion of the nature of anticoagulants has been carried out. A benefit risk analysis has been presented to the patient, so that they understand the justification for choosing anticoagulation at this time. The need for frequent and regular monitoring, precise dosage adjustment and compliance is stressed. Side effects of potential bleeding are discussed. The patient should avoid any OTC items containing aspirin or ibuprofen, and should avoid great swings in general diet. Avoid alcohol consumption. Call if any signs of abnormal bleeding. Patient verbalized understanding.

## 2017-01-31 NOTE — LETTER
[address] Proxy Access Authorization Form Name: Erin Davila Patient Email: [email or web address] Patient YOB: 1935 Patient MRN: [id number] Name of Proxy: Carolina Morgan Proxy Email: [email or web address] Proxy Address: [address] [place], [address] [place] [address] Proxy : 10/18/1952 Proxy SSN:  By signing this [employer] Proxy Access Authorization Form (this Authorization), I understand that I am giving permission to the [address] [place] [address], and its controlled affiliates that operate one or more hospitals or physician practices located in [place], [place], [place], [place], [address] [place] [address] or [facility]) to disclose confidential health information contained about me through [employer] to the person whose name is designated above (my Proxy). I understand that [employer] is a web-based service through which some (but not all) of the information contained in my [employer] record [facility]) (to the extent that I have an EMR) may be accessed, and that [employer] sometimes shows a summary or description and not the actual entries in my EMR. I understand that by signing this Authorization, my Proxy will be given electronic access through [employer] to all confidential health information about me that is available through [facility], including confidential health information about me that under most circumstances my Proxy would not be able to access without my permission. I understand that I am not required to name a Proxy or sign this Authorization. I further understand that [facility] [employer] may not condition treatment or payment on my willingness to sign this Authorization unless the specific circumstances under which such conditioning is permitted by law are applicable and are set forth in this Authorization.  
 
I understand that this Authorization is valid unless and until I revoke it.  I understand that I have the right to revoke this Authorization at any time, but that my revocation will not be effective until delivered in writing to Bridget Neal at the following address:  
 
[address] If I choose to revoke this Authorization, I understand that my revocation will not be effective as to any MyChart information already disclosed to my Proxy pursuant to this Authorization. I understand that MyChart access is a privilege, not a right, and that my Proxy must agree to comply with the MyChart Terms and Conditions of Patient Use (the Terms and Conditions). Bridget Neal will provide my Proxy a special activation code and instructions for accessing confidential health information about me in [address] The first time my Proxy uses the special activation code, my Proxy must review and accept the Terms and Conditions and the Proxy Disclaimer. If my Proxy does not accept and at all times comply with the Terms and Conditions or does not accept the Proxy Disclaimer each time my Proxy accesses MyChart, I understand that Bridget Neal may deny my Proxy access or revoke my Proxys to access confidential health information about me in [address] I also understand that Bridget Neal may deny my Proxy access or revoke my Proxys access for any reason and at any time in Bridget Linger sole discretion. I understand that my Proxy must sign the Acknowledgement set forth below if my Proxy is in the office with me at the time I complete this request.  If my Proxy is not in the office with me, I understand that my Proxy will be mailed a Proxy Identification Verification for Access to [employer] form at the address I have designated above, and that my Proxy must complete and return the form to Bridget Neal before Bridget Neal will take any additional steps to give my Proxy access information about me in [address] A copy of this Authorization and a notation concerning my Proxy shall be included in my original health records. I understand that confidential health information about me disclosed in MyChart to my Proxy pursuant to this Authorization might be redisclosed by my Proxy and may, as a result of such disclosure, no longer be protected to the same extent as such confidential health information was protected by law while solely in the possession of Rufus [place]. Signature of Patient or Legal Guardian Date (MM/DD/YYYY) Printed Name of Patient or Legal Guardian Relationship (if not self) ACKNOWLEDGEMENT TO BE COMPLETED BY PROXY IF IN OFFICE: 
I acknowledge and agree that the above information, including my name, e-mail address, date of birth, Social Security Number, and mailing address are true and correct. I further agree to comply with the Terms and Conditions and Proxy Disclaimer. Proxy Signature Date (MM/DD/YYYY) Printed Name of Proxy Identification Document:   
 
__ s License/Government Issued ID   
__ Passport   
__ Picture ID & Social Security Card Identification Document Number _______________________________ Expiration Date ______________

## 2017-01-31 NOTE — PROGRESS NOTES
HISTORY OF PRESENT ILLNESS  Alec Sacks is a 80 y.o. female. HPI Daya Nephew is seen today accompanied by her daughter for follow up of recent hospitalization with congestive heart failure. She was admitted on 1/19/17 and discharged on 1/24/17. Nurse navigator contact was on 1/24/17. Her visit is today 1/31/17 making this a transitional care visit. 1. CHF systolic, chronic, with valvular heart disease. She is status post the MitraClip procedure. She was followed closely by cardiology. Labs have been checked on discharge by her cardiologist.  I have reviewed the available records from the hospitalization. She lost a significant amount of weight with diuresis. 2. Diabetes, type 2 controlled with renal complications, chronic kidney disease stage 3 with long term insulin usage. Fingerstick BG's have demonstrated being tentatively low at night. I have adjusted her insulin regimen and they will keep me posted on her sugars. 3. Essential hypertension. Stable on current regimen. Review of systems notable for some knee pain that sounds like arthritis. MedDATA/gwo     Current Outpatient Prescriptions   Medication Sig    insulin NPH (HUMULIN N) 100 unit/mL injection 24 Units by SubCUTAneous route daily (with dinner).  furosemide (LASIX) 40 mg tablet Take 2 Tabs by mouth every evening.  hydrALAZINE (APRESOLINE) 10 mg tablet Take 2 Tabs by mouth three (3) times daily.  warfarin (COUMADIN) 3 mg tablet Take 7.5mg on Monday (2.5 tabs); Take 4.5mg on Tuesday: Take 6 mg on Wednesday; Take 4.5mg on Thursday  Get INR checked on Thursday    metoprolol tartrate (LOPRESSOR) 50 mg tablet Take 1 Tab by mouth two (2) times a day.  potassium chloride SR (KLOR-CON 10) 10 mEq tablet Take 1 Tab by mouth daily.  warfarin (COUMADIN) 6 mg tablet Take 1 Tab by mouth every Wednesday.  omeprazole (PRILOSEC) 20 mg capsule Take 20 mg by mouth daily.     insulin NPH (HUMULIN N) 100 unit/mL injection 50 Units by SubCUTAneous route every morning.  furosemide (LASIX) 40 mg tablet Take 80 mg by mouth every morning.  levothyroxine (SYNTHROID) 125 mcg tablet Take 125 mcg by mouth Daily (before breakfast).  amLODIPine (NORVASC) 5 mg tablet TAKE ONE TABLET BY MOUTH DAILY    atorvastatin (LIPITOR) 80 mg tablet TAKE ONE TABLET BY MOUTH EVERY EVENING    allopurinol (ZYLOPRIM) 100 mg tablet TAKE TWO TABLETS BY MOUTH DAILY    dofetilide (TIKOSYN) 125 mcg capsule Take 1 Cap by mouth two (2) times a day.  multivitamins-minerals-lutein (CENTRUM SILVER) Tab Take 1 Tab by mouth daily. No current facility-administered medications for this visit. Review of Systems   Constitutional: Negative for diaphoresis. Respiratory: Positive for shortness of breath. Cardiovascular: Positive for leg swelling. Physical Exam   Constitutional: No distress. Cardiovascular: Normal rate and regular rhythm. Exam reveals no gallop and no friction rub. No murmur heard. Pulmonary/Chest: Effort normal and breath sounds normal.   Musculoskeletal: She exhibits edema. Moderate bilateral lower extremity edema -= improved   Nursing note and vitals reviewed. ASSESSMENT and PLAN  Kenna Hall was seen today for hospital follow up. Diagnoses and all orders for this visit:    Chronic systolic heart failure [facility])- See cardiologist as directed. Chronic atrial fibrillation [facility])- See cardiologist as directed. -     AMB POC PT/INR, Please follow inr protocol     Controlled type 2 diabetes mellitus with stage 3 chronic kidney disease, with long-term current use of insulin (HCC)  -     insulin NPH (HUMULIN N) 100 unit/mL injection; 24 Units by SubCUTAneous route daily (with dinner).     Essential hypertension, benign- Continue current regimen of prescription and / or OTC medications     Other orders  -     Cancel: CBC WITH AUTOMATED DIFF  -     Cancel: METABOLIC PANEL, BASIC    Plan was reviewed with patient and family, understanding expressed

## 2017-01-31 NOTE — PATIENT INSTRUCTIONS
New dose: Coumadin 6mg daily except 3mg on M/W/F  Recheck in 1 week in Coumadin clinic 2/7/17 at 2:00 pm  Keep diet consistent

## 2017-01-31 NOTE — MR AVS SNAPSHOT
Visit Information Date & Time Provider Department Dept. Phone Encounter #  
 1/31/2017  2:20 PM Rome Anaya, [employer] [facility] [phone] [id number] Follow-up Instructions Return in about 2 months (around 3/31/2017). Your Appointments 2/7/2017  2:00 PM  
ANTICOAG NURSE with [facility] Internal Medicine ([address]) Appt Note: [address] [facility] [address] [facility] [address]  
[address]  [facility] Upcoming Health Maintenance Date Due ZOSTER VACCINE AGE 60> 4/17/1997 Pneumococcal 65+ High/Highest Risk (2 of 2 - PPSV23) 10/1/2007 DTaP/Tdap/Td series (1 - Tdap) 12/9/2010 EYE EXAM RETINAL OR DILATED Q1 8/13/2015 GLAUCOMA SCREENING Q2Y 8/13/2016 MEDICARE YEARLY EXAM 11/23/2016 MICROALBUMIN Q1 2/22/2017 HEMOGLOBIN A1C Q6M 5/10/2017 FOOT EXAM Q1 8/4/2017 LIPID PANEL Q1 8/5/2017 Allergies as of 1/31/2017  Review Complete On: 1/31/2017 By: Rome Anaya MD  
  
 Severity Noted Reaction Type Reactions Actos [Pioglitazone]  04/23/2009    Swelling Swelling of feet and legs Codeine  04/23/2009    Itching Doxycycline  04/23/2009    Nausea Only Hydrocodone  04/17/2012    Rash, Other (comments)  
 hallucinations Current Immunizations  Reviewed on 7/11/2014 Name Date Influenza Vaccine 12/2/2013 Influenza Vaccine Split 10/17/2012 Pneumococcal Vaccine (Unspecified Type) 10/1/2002 TD Vaccine 12/8/2010 Not reviewed this visit You Were Diagnosed With   
  
 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 
ICD-9-CM: 401.1 Vitals BP Pulse Temp Resp Height(growth percentile) Weight(growth percentile) 140/60 (BP 1 Location: Right arm, BP Patient Position: Sitting) 73 97.8 °F (36.6 °C) (Oral) 16 5' 9\" (1.753 m) 221 lb 3.2 oz (100.3 kg) SpO2 BMI OB Status Smoking Status 96% 32.67 kg/m2 Hysterectomy Former Smoker BMI and BSA Data Body Mass Index Body Surface Area  
 32.67 kg/m 2 2.21 m 2 [facility], [address] [place] [address] [phone] Your Updated Medication List  
  
   
This list is accurate as of: 1/31/17  3:18 PM.  Always use your most recent med list.  
  
  
  
  
 allopurinol 100 mg tablet Commonly known as:  ZYLOPRIM  
TAKE TWO TABLETS BY MOUTH DAILY  
  
 amLODIPine 5 mg tablet Commonly known as:  Shani Gables TAKE ONE TABLET BY MOUTH DAILY  
  
 atorvastatin 80 mg tablet Commonly known as:  LIPITOR  
TAKE ONE TABLET BY MOUTH EVERY EVENING  
  
 CENTRUM SILVER Tab tablet Generic drug:  multivitamins-minerals-lutein Take 1 Tab by mouth daily. dofetilide 125 mcg capsule Commonly known as:  Maryhelen Dilling Take 1 Cap by mouth two (2) times a day. * furosemide 40 mg tablet Commonly known as:  LASIX Take 80 mg by mouth every morning. * furosemide 40 mg tablet Commonly known as:  LASIX Take 2 Tabs by mouth every evening. * HumuLIN N 100 unit/mL injection Generic drug:  insulin NPH  
50 Units by SubCUTAneous route every morning. * insulin  unit/mL injection Commonly known as:  HumuLIN N  
24 Units by SubCUTAneous route daily (with dinner). hydrALAZINE 10 mg tablet Commonly known as:  APRESOLINE Take 2 Tabs by mouth three (3) times daily. metoprolol tartrate 50 mg tablet Commonly known as:  LOPRESSOR Take 1 Tab by mouth two (2) times a day. potassium chloride SR 10 mEq tablet Commonly known as:  KLOR-CON 10 Take 1 Tab by mouth daily. PriLOSEC 20 mg capsule Generic drug:  omeprazole Take 20 mg by mouth daily. SYNTHROID 125 mcg tablet Generic drug:  levothyroxine Take 125 mcg by mouth Daily (before breakfast). * warfarin 3 mg tablet Commonly known as:  COUMADIN Take 7.5mg on Monday (2.5 tabs); Take 4.5mg on Tuesday: Take 6 mg on Wednesday; Take 4.5mg on Thursday Get INR checked on Thursday * warfarin 6 mg tablet Commonly known as:  COUMADIN Take 1 Tab by mouth every Wednesday. * Notice: This list has 6 medication(s) that are the same as other medications prescribed for you. Read the directions carefully, and ask your doctor or other care provider to review them with you. Description Old dose: 4.5mg daily except 3mg Sun and Thurs. (patient did take 6mg once last week after low INR). New dose: Coumadin 6mg daily except 3mg on M/W/F (15.8% increase) Recheck in 1 week January 2017 Details Sun Mon Tue Wed Thu Fri Sat  
  1  
  
  
  
   2  
  
  
  
   3  
  
  
  
   4  
  
  
  
   5  
  
  
  
   6  
  
  
  
   7  
  
  
  
  
  8  
  
  
  
   9  
  
  
  
   10  
  
  
  
   11  
  
  
  
   12  
  
  
  
   13  
  
  
  
   14  
  
  
  
  
  15  
  
  
  
   16  
  
  
  
   17  
  
  
  
   18  
  
  
  
   19  
  
  
  
   20  
  
  
  
   21  
  
  
  
  
  22  
  
  
  
   23  
  
  
  
   24  
  
  
  
   25  
  
  
  
   26  
  
  
  
   27  
  
  
  
   28  
  
  
  
  
  29  
  
  
  
   30  
  
  
  
   31 1.4  
6 mg See details Date Details 01/31 This INR check INR: 1.4 How to take your warfarin dose To take:  6 mg Take 2 of the 3 mg tablets. February 2017 Details [facility] Tue Wed Thu Fri Sat 1  
  
3 mg 2  
  
6 mg  
  
   3  
  
3 mg  
  
   4  
  
6 mg  
  
  
  5  
  
6 mg  
  
   6  
  
3 mg 7  
  
6 mg  
  
   8  
  
  
  
   9  
  
  
  
   10  
  
  
  
   11  
  
  
  
  
  12  
  
  
  
   13  
  
  
  
   14  
  
  
  
   15  
  
  
  
   16  
  
  
  
   17  
  
  
  
   18  
  
  
  
  
  19  
  
  
  
   20  
  
  
  
   21  
  
  
  
   22  
  
  
  
   23  
  
  
  
   24  
  
  
  
   25  
  
  
  
  
  26  
  
  
  
   27  
  
  
  
   28  
  
  
  
      
 Date Details No additional details Date of next INR:  2/7/2017 How to take your warfarin dose To take:  3 mg Take 1 of the 3 mg tablets. To take:  6 mg Take 2 of the 3 mg tablets. We Performed the Following AMB POC PT/INR [69379 CPT(R)] Follow-up Instructions Return in about 2 months (around 3/31/2017). Patient Instructions New dose: Coumadin 6mg daily except 3mg on M/W/F Recheck in 1 week in Coumadin clinic 2/7/17 at 2:00 pm 
Keep diet consistent Introducing hospitals & HEALTH SERVICES! [facility] introduces [employer] patient portal. Now you can access parts of your medical record, email your doctor's office, and request medication refills online. 1. In your internet browser, go to [email or web address] 2. Click on the First Time User? Click Here link in the Sign In box. You will see the New Member Sign Up page. 3. Enter your [employer] Access Code exactly as it appears below. You will not need to use this code after youve completed the sign-up process. If you do not sign up before the expiration date, you must request a new code. · [employer] Access Code: [email or web address] Expires: 2/6/2017  4:43 PM 
 
4. Enter the last four digits of your Social Security Number (xxxx) and Date of Birth (mm/dd/yyyy) as indicated and click Submit. You will be taken to the next sign-up page. 5. Create a [employer] ID. This will be your [employer] login ID and cannot be changed, so think of one that is secure and easy to remember. 6. Create a [employer] password. You can change your password at any time. 7. Enter your Password Reset Question and Answer. This can be used at a later time if you forget your password. 8. Enter your e-mail address. You will receive e-mail notification when new information is available in [address] 9. Click Sign Up. You can now view and download portions of your medical record. 10. Click the Download Summary menu link to download a portable copy of your medical information. If you have questions, please visit the Frequently Asked Questions section of the [employer] website. Remember, [employer] is NOT to be used for urgent needs. For medical emergencies, dial 911. Now available from your iPhone and Android! Please provide this summary of care documentation to your next provider. Your primary care clinician is listed as ANDRES BURDEN. If you have any questions after today's visit, please call [phone].

## 2017-02-07 ENCOUNTER — ANTI-COAG VISIT (OUTPATIENT)
Dept: INTERNAL MEDICINE CLINIC | Age: 82
End: 2017-02-07

## 2017-02-07 DIAGNOSIS — I48.20 CHRONIC ATRIAL FIBRILLATION (HCC): Primary | ICD-10-CM

## 2017-02-07 LAB
INR BLD: 1.5
PT POC: 17.5 SEC
VALID INTERNAL CONTROL?: YES

## 2017-02-07 NOTE — MR AVS SNAPSHOT
Visit Information Date & Time Provider Department Dept. Phone Encounter #  
 2/7/2017  2:00 PM Shane Mcconnell [facility] Internal Medicine [phone] [id number] Your Appointments 4/4/2017  2:20 PM  
ROUTINE CARE with Jose Alfredo Reynolds MD  
[facility] Internal Medicine Claudia Elaine) Appt Note: f/u 2m  
 [address] [facility] [address]  
[address] [facility] 57 Upcoming Health Maintenance Date Due ZOSTER VACCINE AGE 60> 4/17/1997 Pneumococcal 65+ High/Highest Risk (2 of 2 - PPSV23) 10/1/2007 DTaP/Tdap/Td series (1 - Tdap) 12/9/2010 EYE EXAM RETINAL OR DILATED Q1 8/13/2015 GLAUCOMA SCREENING Q2Y 8/13/2016 MEDICARE YEARLY EXAM 11/23/2016 MICROALBUMIN Q1 2/22/2017 HEMOGLOBIN A1C Q6M 5/10/2017 FOOT EXAM Q1 8/4/2017 LIPID PANEL Q1 8/5/2017 Allergies as of 2/7/2017  Review Complete On: 1/31/2017 By: Jose Alfredo Reynolds MD  
  
 Severity Noted Reaction Type Reactions Actos [Pioglitazone]  04/23/2009    Swelling Swelling of feet and legs Codeine  04/23/2009    Itching Doxycycline  04/23/2009    Nausea Only Hydrocodone  04/17/2012    Rash, Other (comments)  
 hallucinations Current Immunizations  Reviewed on 7/11/2014 Name Date Influenza Vaccine 12/2/2013 Influenza Vaccine Split 10/17/2012 Pneumococcal Vaccine (Unspecified Type) 10/1/2002 TD Vaccine 12/8/2010 Not reviewed this visit You Were Diagnosed With   
  
 Codes Comments Chronic atrial fibrillation (HCC)    -  Primary ICD-10-CM: E45.9 ICD-9-CM: 427.31 Vitals OB Status Smoking Status Hysterectomy Former Smoker Preferred Pharmacy Pharmacy Name Phone  [facility] [address] , [address] [place] [address] [phone] Your Updated Medication List  
  
   
This list is accurate as of: 2/7/17  2:30 PM.  Always use your most recent med list.  
  
  
  
  
 allopurinol 100 mg tablet Commonly known as:  ZYLOPRIM  
TAKE TWO TABLETS BY MOUTH DAILY  
  
 amLODIPine 5 mg tablet Commonly known as:  Rodriguez Juliocesar TAKE ONE TABLET BY MOUTH DAILY  
  
 atorvastatin 80 mg tablet Commonly known as:  LIPITOR  
TAKE ONE TABLET BY MOUTH EVERY EVENING  
  
 CENTRUM SILVER Tab tablet Generic drug:  multivitamins-minerals-lutein Take 1 Tab by mouth daily. dofetilide 125 mcg capsule Commonly known as:  Ericin Emma Take 1 Cap by mouth two (2) times a day. * furosemide 40 mg tablet Commonly known as:  LASIX Take 80 mg by mouth every morning. * furosemide 40 mg tablet Commonly known as:  LASIX Take 2 Tabs by mouth every evening. * HumuLIN N 100 unit/mL injection Generic drug:  insulin NPH  
50 Units by SubCUTAneous route every morning. * insulin  unit/mL injection Commonly known as:  HumuLIN N  
24 Units by SubCUTAneous route daily (with dinner). hydrALAZINE 10 mg tablet Commonly known as:  APRESOLINE Take 2 Tabs by mouth three (3) times daily. metoprolol tartrate 50 mg tablet Commonly known as:  LOPRESSOR Take 1 Tab by mouth two (2) times a day. potassium chloride SR 10 mEq tablet Commonly known as:  KLOR-CON 10 Take 1 Tab by mouth daily. PriLOSEC 20 mg capsule Generic drug:  omeprazole Take 20 mg by mouth daily. SYNTHROID 125 mcg tablet Generic drug:  levothyroxine Take 125 mcg by mouth Daily (before breakfast). * warfarin 3 mg tablet Commonly known as:  COUMADIN Take 7.5mg on Monday (2.5 tabs); Take 4.5mg on Tuesday: Take 6 mg on Wednesday; Take 4.5mg on Thursday Get INR checked on Thursday * warfarin 6 mg tablet Commonly known as:  COUMADIN Take 1 Tab by mouth every Wednesday. * Notice: This list has 6 medication(s) that are the same as other medications prescribed for you.  Read the directions carefully, and ask your doctor or other care provider to review them with you. Description New dose: Coumadin 6mg daily except 3mg M/F (9.1% increase) Old dose: Coumadin 6mg daily except 3mg on M/W/F Recheck in 1 week February 2017 Details Sun Mon Tue Wed Thu Fri Sat  
     1  
  
  
  
   2  
  
  
  
   3  
  
  
  
   4  
  
  
  
  
  5  
  
  
  
   6  
  
  
  
   7  
1.5  
6 mg See details 8  
  
6 mg  
  
   9  
  
6 mg  
  
   10  
  
3 mg  
  
   11  
  
6 mg  
  
  
  12  
  
6 mg  
  
   13  
  
3 mg  
  
   14  
  
6 mg  
  
   15  
  
  
  
   16  
  
  
  
   17  
  
  
  
   18  
  
  
  
  
  19  
  
  
  
   20  
  
  
  
   21  
  
  
  
   22  
  
  
  
   23  
  
  
  
   24  
  
  
  
   25  
  
  
  
  
  26  
  
  
  
   27  
  
  
  
   28  
  
  
  
      
 Date Details 02/07 This INR check INR: 1.5 Date of next INR:  2/14/2017 How to take your warfarin dose To take:  3 mg Take 1 of the 3 mg tablets. To take:  6 mg Take 2 of the 3 mg tablets. We Performed the Following AMB POC PT/INR [01523 CPT(R)] Introducing [facility] & HEALTH SERVICES! [facility] introduces [employer] patient portal. Now you can access parts of your medical record, email your doctor's office, and request medication refills online. 1. In your internet browser, go to [email or web address] 2. Click on the First Time User? Click Here link in the Sign In box. You will see the New Member Sign Up page. 3. Enter your [employer] Access Code exactly as it appears below. You will not need to use this code after youve completed the sign-up process. If you do not sign up before the expiration date, you must request a new code. · [employer] Access Code: [email or web address] Expires: 5/8/2017  2:28 PM 
 
4. Enter the last four digits of your Social Security Number (xxxx) and Date of Birth (mm/dd/yyyy) as indicated and click Submit.  You will be taken to the next sign-up page. 5. Create a [employer] ID. This will be your [employer] login ID and cannot be changed, so think of one that is secure and easy to remember. 6. Create a [employer] password. You can change your password at any time. 7. Enter your Password Reset Question and Answer. This can be used at a later time if you forget your password. 8. Enter your e-mail address. You will receive e-mail notification when new information is available in [address] 9. Click Sign Up. You can now view and download portions of your medical record. 10. Click the Download Summary menu link to download a portable copy of your medical information. If you have questions, please visit the Frequently Asked Questions section of the [employer] website. Remember, [employer] is NOT to be used for urgent needs. For medical emergencies, dial 911. Now available from your iPhone and Android! Please provide this summary of care documentation to your next provider. Your primary care clinician is listed as ANDRES BURDEN. If you have any questions after today's visit, please call [phone].

## 2017-02-07 NOTE — PROGRESS NOTES
INR 1.5 today. Patient instructions: Discussed with Dr. Chidi David dose: Coumadin 6mg daily except 3mg M/F (9.1% increase)  Old dose: Coumadin 6mg daily except 3mg on M/W/F   Recheck in 1 week     A full discussion of the nature of anticoagulants has been carried out. A benefit risk analysis has been presented to the patient, so that they understand the justification for choosing anticoagulation at this time. The need for frequent and regular monitoring, precise dosage adjustment and compliance is stressed. Side effects of potential bleeding are discussed. The patient should avoid any OTC items containing aspirin or ibuprofen, and should avoid great swings in general diet. Avoid alcohol consumption. Call if any signs of abnormal bleeding. Patient verbalized understanding.

## 2017-02-14 ENCOUNTER — ANTI-COAG VISIT (OUTPATIENT)
Dept: INTERNAL MEDICINE CLINIC | Age: 82
End: 2017-02-14

## 2017-02-14 DIAGNOSIS — I48.20 CHRONIC ATRIAL FIBRILLATION (HCC): Primary | ICD-10-CM

## 2017-02-14 DIAGNOSIS — Z23 ENCOUNTER FOR IMMUNIZATION: ICD-10-CM

## 2017-02-14 DIAGNOSIS — Z23 NEED FOR DIPHTHERIA-TETANUS-PERTUSSIS (TDAP) VACCINE: ICD-10-CM

## 2017-02-14 DIAGNOSIS — Z00.00 MEDICARE ANNUAL WELLNESS VISIT, SUBSEQUENT: ICD-10-CM

## 2017-02-14 DIAGNOSIS — Z13.5 SCREENING FOR GLAUCOMA: ICD-10-CM

## 2017-02-14 DIAGNOSIS — Z13.31 SCREENING FOR DEPRESSION: ICD-10-CM

## 2017-02-14 DIAGNOSIS — Z13.39 SCREENING FOR ALCOHOLISM: ICD-10-CM

## 2017-02-14 DIAGNOSIS — Z12.31 VISIT FOR SCREENING MAMMOGRAM: ICD-10-CM

## 2017-02-14 LAB
INR BLD: 1.7
PT POC: 20.1 SEC
VALID INTERNAL CONTROL?: YES

## 2017-02-14 NOTE — MR AVS SNAPSHOT
Visit Information Date & Time Provider Department Dept. Phone Encounter #  
 2/14/2017 10:15 AM Andres Hein [facility] Internal Medicine [phone] [id number] Your Appointments 3/22/2017 11:00 AM  
Follow Up with Mandi Wilde MD  
[facility] ([facility]) Appt Note: F/u [facility] TMVR-2 mos. medication f/u  
 [address] [facility] [address] [phone]  
[address] [place] [id number]  
  
    
 4/4/2017  2:20 PM  
ROUTINE CARE with Laurence Garcia MD  
[facility] Internal Medicine [facility]) Appt Note: f/u 2m  
 [address] [facility] [address] [facility] [address]  
[address] [address] [address] [place] [address] Upcoming Health Maintenance Date Due ZOSTER VACCINE AGE 60> 4/17/1997 Pneumococcal 65+ High/Highest Risk (2 of 2 - PPSV23) 10/1/2007 DTaP/Tdap/Td series (1 - Tdap) 12/9/2010 EYE EXAM RETINAL OR DILATED Q1 8/13/2015 GLAUCOMA SCREENING Q2Y 8/13/2016 MICROALBUMIN Q1 2/22/2017 HEMOGLOBIN A1C Q6M 5/10/2017 FOOT EXAM Q1 8/4/2017 LIPID PANEL Q1 8/5/2017 MEDICARE YEARLY EXAM 2/15/2018 Allergies as of 2/14/2017  Review Complete On: 2/14/2017 By: Cristino Andrea RN Severity Noted Reaction Type Reactions Actos [Pioglitazone]  04/23/2009    Swelling Swelling of feet and legs Codeine  04/23/2009    Itching Doxycycline  04/23/2009    Nausea Only Hydrocodone  04/17/2012    Rash, Other (comments)  
 hallucinations Current Immunizations  Reviewed on 7/11/2014 Name Date Influenza Vaccine 12/2/2013 Influenza Vaccine Split 10/17/2012 Pneumococcal Vaccine (Unspecified Type) 10/1/2002 TD Vaccine 12/8/2010 Not reviewed this visit You Were Diagnosed With   
  
 Codes Comments Chronic atrial fibrillation (HCC)    -  Primary ICD-10-CM: A31.4 ICD-9-CM: 427.31   
 Screening for glaucoma     ICD-10-CM: Z13.5 ICD-9-CM: V80.1 Visit for screening mammogram     ICD-10-CM: Z12.31 
ICD-9-CM: V76.12 Encounter for immunization     ICD-10-CM: N56 ICD-9-CM: V03.89 Medicare annual wellness visit, subsequent     ICD-10-CM: Z00.00 ICD-9-CM: V70.0 Screening for alcoholism     ICD-10-CM: Z13.89 ICD-9-CM: V79.1 Screening for depression     ICD-10-CM: Z13.89 ICD-9-CM: V79.0 Need for diphtheria-tetanus-pertussis (Tdap) vaccine     ICD-10-CM: A19 ICD-9-CM: V06.1 Vitals OB Status Smoking Status Hysterectomy Former Smoker Preferred Pharmacy Pharmacy Name Phone [facility] [phone] [place] [phone] Your Updated Medication List  
  
   
This list is accurate as of: 2/14/17 10:50 AM.  Always use your most recent med list.  
  
  
  
  
 allopurinol 100 mg tablet Commonly known as:  ZYLOPRIM  
TAKE TWO TABLETS BY MOUTH DAILY  
  
 amLODIPine 5 mg tablet Commonly known as:  Letty Fraction TAKE ONE TABLET BY MOUTH DAILY  
  
 atorvastatin 80 mg tablet Commonly known as:  LIPITOR  
TAKE ONE TABLET BY MOUTH EVERY EVENING  
  
 CENTRUM SILVER Tab tablet Generic drug:  multivitamins-minerals-lutein Take 1 Tab by mouth daily. Diphth, Pertus(Acell), Tetanus 2.5-8-5 Lf-mcg-Lf/0.5mL Susp susp Commonly known as:  BOOSTRIX TDAP  
0.5 mL by IntraMUSCular route once for 1 dose. dofetilide 125 mcg capsule Commonly known as:  Josiephine Kayser Take 1 Cap by mouth two (2) times a day. * furosemide 40 mg tablet Commonly known as:  LASIX Take 80 mg by mouth every morning. * furosemide 40 mg tablet Commonly known as:  LASIX Take 2 Tabs by mouth every evening. * HumuLIN N 100 unit/mL injection Generic drug:  insulin NPH  
50 Units by SubCUTAneous route every morning. * insulin  unit/mL injection Commonly known as:  HumuLIN N  
 24 Units by SubCUTAneous route daily (with dinner). hydrALAZINE 10 mg tablet Commonly known as:  APRESOLINE Take 2 Tabs by mouth three (3) times daily. metoprolol tartrate 50 mg tablet Commonly known as:  LOPRESSOR Take 1 Tab by mouth two (2) times a day. pneumococcal 13 jessica conj dip 0.5 mL Syrg injection Commonly known as:  PREVNAR-13  
0.5 mL by IntraMUSCular route once for 1 dose. potassium chloride SR 10 mEq tablet Commonly known as:  KLOR-CON 10 Take 1 Tab by mouth daily. PriLOSEC 20 mg capsule Generic drug:  omeprazole Take 20 mg by mouth daily. SYNTHROID 125 mcg tablet Generic drug:  levothyroxine Take 125 mcg by mouth Daily (before breakfast). warfarin 3 mg tablet Commonly known as:  COUMADIN Take 7.5mg on Monday (2.5 tabs); Take 4.5mg on Tuesday: Take 6 mg on Wednesday; Take 4.5mg on Thursday Get INR checked on Thursday * Notice: This list has 4 medication(s) that are the same as other medications prescribed for you. Read the directions carefully, and ask your doctor or other care provider to review them with you. Prescriptions Printed Refills  
 pneumococcal 13 jessica conj dip (PREVNAR-13) 0.5 mL syrg injection 0 Si.5 mL by IntraMUSCular route once for 1 dose. Class: Print Route: IntraMUSCular Denisse Ambrocio,, Tetanus (BOOSTRIX TDAP) 2.5-8-5 Lf-mcg-Lf/0.5mL susp susp 0 Si.5 mL by IntraMUSCular route once for 1 dose. Class: Print Route: IntraMUSCular Description Old dose: Coumadin 6mg daily except 3mg M/ New dose: Coumadin 6mg daily except 3mg on   (8.3% increase) Recheck in 1 week 2017 Details Sun  Fri Sat  
     1  
  
  
  
   2  
  
  
  
   3  
  
  
  
   4  
  
  
  
  
  5  
  
  
  
   6  
  
  
  
   7  
  
  
  
   8  
  
  
  
   9  
  
  
  
   10  
  
  
  
   11  
  
  
  
  
  12  
  
  
  
   13  
  
  
  
   14  
 1.7  
6 mg See details 15  
  
6 mg  
  
   16  
  
6 mg  
  
   17  
  
6 mg  
  
   18  
  
6 mg  
  
  
  19  
  
6 mg  
  
   20  
  
3 mg  
  
   21  
  
6 mg  
  
   22  
  
  
  
   23  
  
  
  
   24  
  
  
  
   25  
  
  
  
  
  26  
  
  
  
   27  
  
  
  
   28  
  
  
  
      
 Date Details 02/14 This INR check INR: 1.7 Date of next INR:  2/21/2017 How to take your warfarin dose To take:  3 mg Take 1 of the 3 mg tablets. To take:  6 mg Take 2 of the 3 mg tablets. We Performed the Following AMB POC PT/INR [60882 CPT(R)] REFERRAL TO OPHTHALMOLOGY [REF57 Custom] Comments:  
 Please evaluate patient for diabetic eye exam. To-Do List   
 02/20/2017 Imaging:  [facility] MAMMO BI SCREENING INCL CAD Referral Information Referral ID Referred By Referred To  
  
 [id number] [place], [address] [place], [place] [address] Phone: [phone] Fax: [phone] Visits Status Start Date End Date 1 New Request 2/14/17 2/14/18 If your referral has a status of pending review or denied, additional information will be sent to support the outcome of this decision. Patient Instructions Old dose: Coumadin 6mg daily except 3mg M/F New dose: Coumadin 6mg daily except 3mg on Mondays  (8.3% increase) Recheck in 1 week Today you had a Medicare Wellness Visit. During this visit, we developed and/or updated your personalized health plan to prevent disease and disability based on your current health and risk factors. Please schedule an appt around this time next year so we can continue to keep you on the right path to living a healthy lifestyle. Schedule of Personalized Health Plan The best way to stay healthy is to live a healthy lifestyle.  A healthy lifestyle includes regular exercise, eating a well-balanced diet, keeping a healthy weight and not smoking. Regular physical exams and screening tests are another important way to take care of yourself. Preventive exams provided by health care providers can find health problems early when treatment works best and can keep you from getting certain diseases or illnesses. Preventive services include exams, lab tests, screenings, shots, monitoring and information to help you take care of your own health. All people over 65 should have a pneumonia shot. Pneumonia shots are usually only needed once in a lifetime unless your doctor decides differently. All people over 65 should have a yearly flu shot. People over 65 are at medium to high risk for Hepatitis B. Three shots are needed for complete protection. For additional information, please discuss with physician. In addition to your physical exam, some screening tests are recommended: 
 
Bone mass measurement (dexa scan) is recommended every  two years. Diabetes Mellitus screening is recommended every year. Glaucoma is an eye disease caused by high pressure in the eye. An eye exam is recommended every year. Cardiovascular screening tests that check your cholesterol and other blood fat (lipid) levels are recommended every five years. Colorectal Cancer screening tests help to find pre-cancerous polyps (growths in the colon) so they can be removed before they turn into cancer. Tests ordered for screening depend on your personal and family history risk factors. Mammogram screening for Breast Cancer is recommended yearly. Screening for Cervical Cancer is recommended every two years (annually for certain risk factors, such as previous history of STD or abnormal PAP in past 7 years). Here is a list of your current Health Maintenance items with a due date: 
Health Maintenance Topic Date Due  
 ZOSTER VACCINE AGE 60>  04/17/1997  Pneumococcal 65+ High/Highest Risk (2 of 2 - PPSV23) 10/01/2007  DTaP/Tdap/Td series (1 - Tdap) 12/09/2010  
 EYE EXAM RETINAL OR DILATED Q1  08/13/2015  GLAUCOMA SCREENING Q2Y  08/13/2016  MICROALBUMIN Q1  02/22/2017  
 HEMOGLOBIN A1C Q6M  05/10/2017  
 FOOT EXAM Q1  08/04/2017  LIPID PANEL Q1  08/05/2017  MEDICARE YEARLY EXAM  02/15/2018  
 OSTEOPOROSIS SCREENING (DEXA)  Completed  INFLUENZA AGE 9 TO ADULT  Addressed ** The hospital will call you to set up the mammogram 
** Take prescription to pharmacy to obtain vaccines ** Schedule an appointment with your eye doctor Introducing [facility]! [employer] introduces [email or web address] patient portal. Now you can access parts of your medical record, email your doctor's office, and request medication refills online. 1. In your internet browser, go to [email or web address]. [employer] 2. Click on the First Time User? Click Here link in the Sign In box. You will see the New Member Sign Up page. 3. Enter your [email or web address] Access Code exactly as it appears below. You will not need to use this code after youve completed the sign-up process. If you do not sign up before the expiration date, you must request a new code. · [email or web address] Access Code: [email or web address] Expires: 5/8/2017  2:28 PM 
 
4. Enter the last four digits of your Social Security Number (xxxx) and Date of Birth (mm/dd/yyyy) as indicated and click Submit. You will be taken to the next sign-up page. 5. Create a [email or web address] ID. This will be your [email or web address] login ID and cannot be changed, so think of one that is secure and easy to remember. 6. Create a [email or web address] password. You can change your password at any time. 7. Enter your Password Reset Question and Answer. This can be used at a later time if you forget your password. 8. Enter your e-mail address. You will receive e-mail notification when new information is available in [address] 9. Click Sign Up. You can now view and download portions of your medical record. 10. Click the Download Summary menu link to download a portable copy of your medical information. If you have questions, please visit the Frequently Asked Questions section of the [employer] website. Remember, [employer] is NOT to be used for urgent needs. For medical emergencies, dial 911. Now available from your iPhone and Android! Please provide this summary of care documentation to your next provider. Your primary care clinician is listed as ANDRES BURDEN. If you have any questions after today's visit, please call [phone].

## 2017-02-14 NOTE — PROGRESS NOTES
jA De Oliveira is a 80 y.o. female and presents for Annual Medicare Wellness Visit. Assessment of cognitive impairment: Alert and oriented x 3. Abuse Screen:    Abuse Screening Questionnaire 2/14/2017   Do you ever feel afraid of your partner? N   Are you in a relationship with someone who physically or mentally threatens you? N   Is it safe for you to go home? Y       Depression Screen:   PHQ 2 / 9, over the last two weeks 2/14/2017   Little interest or pleasure in doing things Not at all   Feeling down, depressed or hopeless Not at all   Total Score PHQ 2 0       Fall Risk Assessment:    Fall Risk Assessment, last 12 mths 2/14/2017   Able to walk? Yes   Fall in past 12 months? No       Activities of Daily Living:    ADL Assessment 2/14/2017   Feeding yourself No Help Needed   Getting from bed to chair No Help Needed   Getting dressed No Help Needed   Bathing or showering No Help Needed   Walk across the room (includes cane/walker) No Help Needed   Using the telphone No Help Needed   Taking your medications No Help Needed   Preparing meals No Help Needed   Managing money (expenses/bills) No Help Needed   Moderately strenuous housework (laundry) Help Needed   Shopping for personal items (toiletries/medicines) No Help Needed   Shopping for groceries Help Needed   Driving No Help Needed   Climbing a flight of stairs No Help Needed   Getting to places beyond walking distances No Help Needed   Patient receives help from her children with grocery shopping. She has someone the cleans her home and helps with lawn/yard work. Health Maintenance:  Daily Low Dose Aspirin: no  Bone Density: up to date 2014  Glaucoma Screening: no, referral placed, discussed need for regular diabetic eye exams  Immunizations:    Tetanus: order placed. Influenza: patient declines. Shingles:  not up to date - will order in future, two vaccines ordered today. Pneumovax:  up to date - 10/1/02. Prevnar: order placed.   Cancer screening:    Cervical: NA.  Breast: unknown- order placed today. Colon: NA.  Prostate:  NA    Advance Care Planning:   End of Life Planning: has an advanced directive - a copy has been provided   Provided pt with \"Respecting Choices packet of Information\" no  Offered facilitator session with NN no     Medications/Allergies: Reviewed with patient  Prior to Admission medications    Medication Sig Start Date End Date Taking? Authorizing Provider   pneumococcal 13 jessica conj dip (PREVNAR-13) 0.5 mL syrg injection 0.5 mL by IntraMUSCular route once for 1 dose. 2/14/17 2/14/17 Yes Domi Toure MD   Diphth, Pertus,Acell,, Tetanus (BOOSTRIX TDAP) 2.5-8-5 Lf-mcg-Lf/0.5mL susp susp 0.5 mL by IntraMUSCular route once for 1 dose. 2/14/17 2/14/17 Yes Domi Toure MD   insulin NPH (HUMULIN N) 100 unit/mL injection 24 Units by SubCUTAneous route daily (with dinner). 1/31/17  Yes Domi Toure MD   furosemide (LASIX) 40 mg tablet Take 2 Tabs by mouth every evening. 1/23/17  Yes Lorraine Whitley MD   hydrALAZINE (APRESOLINE) 10 mg tablet Take 2 Tabs by mouth three (3) times daily. 1/23/17  Yes Lorraine Whitley MD   warfarin (COUMADIN) 3 mg tablet Take 7.5mg on Monday (2.5 tabs); Take 4.5mg on Tuesday: Take 6 mg on Wednesday; Take 4.5mg on Thursday  Get INR checked on Thursday 1/23/17  Yes Lorraine Whitley MD   metoprolol tartrate (LOPRESSOR) 50 mg tablet Take 1 Tab by mouth two (2) times a day. 1/23/17  Yes Lorraine Whitley MD   potassium chloride SR (KLOR-CON 10) 10 mEq tablet Take 1 Tab by mouth daily. 1/23/17  Yes Lorraine Whitley MD   omeprazole (PRILOSEC) 20 mg capsule Take 20 mg by mouth daily. Yes Historical Provider   insulin NPH (HUMULIN N) 100 unit/mL injection 50 Units by SubCUTAneous route every morning. Yes Historical Provider   furosemide (LASIX) 40 mg tablet Take 80 mg by mouth every morning.    Yes Historical Provider   levothyroxine (SYNTHROID) 125 mcg tablet Take 125 mcg by mouth Daily (before breakfast). Yes Historical Provider   amLODIPine (NORVASC) 5 mg tablet TAKE ONE TABLET BY MOUTH DAILY 12/23/16  Yes Jayesh De León MD   atorvastatin (LIPITOR) 80 mg tablet TAKE ONE TABLET BY MOUTH EVERY EVENING 10/6/16  Yes Jayesh De León MD   allopurinol (ZYLOPRIM) 100 mg tablet TAKE TWO TABLETS BY MOUTH DAILY 5/2/16  Yes Jayesh De León MD   dofetilide [facility]) 125 mcg capsule Take 1 Cap by mouth two (2) times a day. 4/29/11  Yes Jayesh De León MD   multivitamins-minerals-lutein (CENTRUM SILVER) Tab Take 1 Tab by mouth daily.    Yes Historical Provider     Allergies   Allergen Reactions    Actos [Pioglitazone] Swelling     Swelling of feet and legs    Codeine Itching    Doxycycline Nausea Only    Hydrocodone Rash and Other (comments)     hallucinations       PSH: Reviewed with patient  Past Surgical History   Procedure Laterality Date    Hx appendectomy      Hx cholecystectomy      Hx hysterectomy      Hx orthopaedic      Hx knee arthroscopy  4203,5103     right knee    Hx urological       RENAL STENT, tur-b    Vascular surgery procedure unlist  11/4     removed vein in right leg    Pr cardiac surg procedure unlist       ablation        SH: Reviewed with patient  Social History   Substance Use Topics    Smoking status: Former Smoker     Packs/day: 0.50     Years: 10.00     Types: Cigarettes     Quit date: 1/1/1967    Smokeless tobacco: Never Used    Alcohol use No       FH: Reviewed with patient  Family History   Problem Relation Age of Onset    Stroke Other     Arthritis-osteo Sister      spinal stenosis    Gout Son     Hypertension Son     Hypertension Mother     Heart Disease Mother      CAD    Heart Disease Father      CAD    Alcohol abuse Neg Hx     Asthma Neg Hx     Bleeding Prob Neg Hx     Cancer Neg Hx     Diabetes Neg Hx     Elevated Lipids Neg Hx     Headache Neg Hx     Lung Disease Neg Hx     Migraines Neg Hx     Psychiatric Disorder Neg Hx     Mental Retardation Neg Hx          Objective: There were no vitals taken for this visit. There is no height or weight on file to calculate BMI. Alcohol Risk Screen:   On any occasion during past 3 months, have you had more than 3 drinks (female) or 4 drinks (male) containing alcohol? No  Do you average more than 7 drinks (female) or 14 drinks (male) per week? No  Type and Amount: none    Tobacco Abuse:  No    Nutrition Screen:  eats a balanced diet    Hearing Loss:  denies any hearing loss    Vision Loss:   Wears glasses, contact lenses, or have any other visual impairment  Yes glasses for reading     Activities of Daily Living:  Self-care.    Requires assistance with: no ADLs  Patient handle his/her own medications  yes Use of pill box  yes    Current medical providers:    Patient Care Team:  Artie Cornelius MD as PCP - Kam Bright RN as Ambulatory Care Navigator (Internal Medicine)  Lori Lao MD as Physician (Urology)  Raymond Esposito MD as Physician (Cardiology)  Lydia Chase MD as Physician (General Surgery)  Destiny Soto RN as Ambulatory Care Navigator (Internal Medicine)  Genesis Schwartz MD (Ophthalmology)      Plan:      Orders Placed This Encounter    [facility] MAMMO BI SCREENING INCL CAD    REFERRAL TO OPHTHALMOLOGY    AMB POC PT/INR    pneumococcal 13 jessica conj dip (PREVNAR-13) 0.5 mL syrg injection    Diphth, Pertus,Acell,, Tetanus ([address] TDAP) 2.5-8-5 Lf-mcg-Lf/0.5mL susp susp       Health Maintenance   Topic Date Due    ZOSTER VACCINE AGE 60>  04/17/1997    Pneumococcal 65+ High/Highest Risk (2 of 2 - PPSV23) 10/01/2007    DTaP/Tdap/Td series (1 - Tdap) 12/09/2010    EYE EXAM RETINAL OR DILATED Q1  08/13/2015    GLAUCOMA SCREENING Q2Y  08/13/2016    MICROALBUMIN Q1  02/22/2017    HEMOGLOBIN A1C Q6M  05/10/2017    FOOT EXAM Q1  08/04/2017    LIPID PANEL Q1  08/05/2017    MEDICARE YEARLY EXAM  02/15/2018    OSTEOPOROSIS SCREENING (DEXA) Completed    INFLUENZA AGE 9 TO ADULT  Addressed       *Patient verbalized understanding and agreement with the plan. A copy of the After Visit Summary with personalized health plan was given to the patient today. Physical Exam will be performed by PCP and documented under a separate Progress Note. INR 1.7 today. Patient instructions: Old dose: Coumadin 6mg daily except 3mg M/F  New dose: Coumadin 6mg daily except 3mg on Mondays  (8.3% increase)  Recheck in 1 week      A full discussion of the nature of anticoagulants has been carried out. A benefit risk analysis has been presented to the patient, so that they understand the justification for choosing anticoagulation at this time. The need for frequent and regular monitoring, precise dosage adjustment and compliance is stressed. Side effects of potential bleeding are discussed. The patient should avoid any OTC items containing aspirin or ibuprofen, and should avoid great swings in general diet. Avoid alcohol consumption. Call if any signs of abnormal bleeding. Patient verbalized understanding.

## 2017-02-14 NOTE — PATIENT INSTRUCTIONS
Old dose: Coumadin 6mg daily except 3mg M/F  New dose: Coumadin 6mg daily except 3mg on Mondays  (8.3% increase)  Recheck in 1 week         Today you had a Medicare Wellness Visit. During this visit, we developed and/or updated your personalized health plan to prevent disease and disability based on your current health and risk factors. Please schedule an appt around this time next year so we can continue to keep you on the right path to living a healthy lifestyle. Schedule of Personalized Health Plan    The best way to stay healthy is to live a healthy lifestyle. A healthy lifestyle includes regular exercise, eating a well-balanced diet, keeping a healthy weight and not smoking. Regular physical exams and screening tests are another important way to take care of yourself. Preventive exams provided by health care providers can find health problems early when treatment works best and can keep you from getting certain diseases or illnesses. Preventive services include exams, lab tests, screenings, shots, monitoring and information to help you take care of your own health. All people over 65 should have a pneumonia shot. Pneumonia shots are usually only needed once in a lifetime unless your doctor decides differently. All people over 65 should have a yearly flu shot. People over 65 are at medium to high risk for Hepatitis B. Three shots are needed for complete protection. For additional information, please discuss with physician. In addition to your physical exam, some screening tests are recommended:    Bone mass measurement (dexa scan) is recommended every  two years. Diabetes Mellitus screening is recommended every year. Glaucoma is an eye disease caused by high pressure in the eye. An eye exam is recommended every year. Cardiovascular screening tests that check your cholesterol and other blood fat (lipid) levels are recommended every five years.      Colorectal Cancer screening tests help to find pre-cancerous polyps (growths in the colon) so they can be removed before they turn into cancer. Tests ordered for screening depend on your personal and family history risk factors. Mammogram screening for Breast Cancer is recommended yearly. Screening for Cervical Cancer is recommended every two years (annually for certain risk factors, such as previous history of STD or abnormal PAP in past 7 years).     Here is a list of your current Health Maintenance items with a due date:  Health Maintenance   Topic Date Due    ZOSTER VACCINE AGE 60>  04/17/1997    Pneumococcal 65+ High/Highest Risk (2 of 2 - PPSV23) 10/01/2007    DTaP/Tdap/Td series (1 - Tdap) 12/09/2010    EYE EXAM RETINAL OR DILATED Q1  08/13/2015    GLAUCOMA SCREENING Q2Y  08/13/2016    MICROALBUMIN Q1  02/22/2017    HEMOGLOBIN A1C Q6M  05/10/2017    FOOT EXAM Q1  08/04/2017    LIPID PANEL Q1  08/05/2017    MEDICARE YEARLY EXAM  02/15/2018    OSTEOPOROSIS SCREENING (DEXA)  Completed    INFLUENZA AGE 9 TO ADULT  Addressed     ** The hospital will call you to set up the mammogram  ** Take prescription to pharmacy to obtain vaccines  ** Schedule an appointment with your eye doctor

## 2017-02-15 RX ORDER — ALLOPURINOL 100 MG/1
TABLET ORAL
Qty: 180 TAB | Refills: 2 | Status: SHIPPED | OUTPATIENT
Start: 2017-02-15 | End: 2018-01-23 | Stop reason: SDUPTHER

## 2017-02-21 ENCOUNTER — ANTI-COAG VISIT (OUTPATIENT)
Dept: INTERNAL MEDICINE CLINIC | Age: 82
End: 2017-02-21

## 2017-02-21 DIAGNOSIS — I48.20 CHRONIC ATRIAL FIBRILLATION (HCC): Primary | ICD-10-CM

## 2017-02-21 LAB
INR BLD: 1.7
PT POC: 19.9 SEC
VALID INTERNAL CONTROL?: YES

## 2017-02-21 NOTE — PROGRESS NOTES
INR 1.7 today. Patient instructions:   New dose: Coumadin 6mg daily (7.7% increase)  Old dose: Coumadin 6mg daily except 3mg on Mondays    Recheck in 1 week       A full discussion of the nature of anticoagulants has been carried out. A benefit risk analysis has been presented to the patient, so that they understand the justification for choosing anticoagulation at this time. The need for frequent and regular monitoring, precise dosage adjustment and compliance is stressed. Side effects of potential bleeding are discussed. The patient should avoid any OTC items containing aspirin or ibuprofen, and should avoid great swings in general diet. Avoid alcohol consumption. Call if any signs of abnormal bleeding. Patient verbalized understanding.

## 2017-02-21 NOTE — MR AVS SNAPSHOT
Visit Information Date & Time Provider Department Dept. Phone Encounter #  
 2/21/2017  9:00 AM Gi Mock [facility] Internal Medicine [phone] [id number] Your Appointments 2/21/2017  9:00 AM  
ANTICOAG NURSE with Bryan Arroyo [facility] Internal Medicine ([facility]) Appt Note: inr; due/elig for [facility] - 2/15/18 or later [address] [place] [address]  
Michael [address] [address] [address]  
  
    
 3/22/2017 11:00 AM  
Follow Up with Ignacio Newby MD  
[facility] ([facility]) Appt Note: F/u [facility] TMVR-2 mos. medication f/u  
 [address] [id number]  
[address] [place] [address]  
  
    
 4/4/2017  2:20 PM  
ROUTINE CARE with Tavares Browne MD  
[address] [address] Internal Medicine [facility]) Appt Note: f/u 2m  
 [address] [facility] [address]  
[address] [address] [place] [address] Upcoming Health Maintenance Date Due ZOSTER VACCINE AGE 60> 4/17/1997 Pneumococcal 65+ High/Highest Risk (2 of 2 - PPSV23) 10/1/2007 DTaP/Tdap/Td series (1 - Tdap) 12/9/2010 EYE EXAM RETINAL OR DILATED Q1 8/13/2015 GLAUCOMA SCREENING Q2Y 8/13/2016 MICROALBUMIN Q1 2/22/2017 HEMOGLOBIN A1C Q6M 5/10/2017 FOOT EXAM Q1 8/4/2017 LIPID PANEL Q1 8/5/2017 MEDICARE YEARLY EXAM 2/15/2018 Allergies as of 2/21/2017  Review Complete On: 2/14/2017 By: Randall Hutchinson RN Severity Noted Reaction Type Reactions Actos [Pioglitazone]  04/23/2009    Swelling Swelling of feet and legs Codeine  04/23/2009    Itching Doxycycline  04/23/2009    Nausea Only Hydrocodone  04/17/2012    Rash, Other (comments)  
 hallucinations Current Immunizations  Reviewed on 7/11/2014 Name Date Influenza Vaccine 12/2/2013 Influenza Vaccine Split 10/17/2012 Pneumococcal Vaccine (Unspecified Type) 10/1/2002 TD Vaccine 12/8/2010 Not reviewed this visit You Were Diagnosed With   
  
 Codes Comments Chronic atrial fibrillation (HCC)    -  Primary ICD-10-CM: N94.8 ICD-9-CM: 427.31 Vitals OB Status Smoking Status Hysterectomy Former Smoker Preferred Pharmacy Pharmacy Name Phone Rick Pea [address] [facility], [address] [place], [place] [phone] Your Updated Medication List  
  
   
This list is accurate as of: 2/21/17  8:56 AM.  Always use your most recent med list.  
  
  
  
  
 allopurinol 100 mg tablet Commonly known as:  ZYLOPRIM  
TAKE TWO TABLETS BY MOUTH DAILY  
  
 amLODIPine 5 mg tablet Commonly known as:  Deep Presser TAKE ONE TABLET BY MOUTH DAILY  
  
 atorvastatin 80 mg tablet Commonly known as:  LIPITOR  
TAKE ONE TABLET BY MOUTH EVERY EVENING  
  
 CENTRUM SILVER Tab tablet Generic drug:  multivitamins-minerals-lutein Take 1 Tab by mouth daily. dofetilide 125 mcg capsule Commonly known as:  Sharl Jesica Take 1 Cap by mouth two (2) times a day. * furosemide 40 mg tablet Commonly known as:  LASIX Take 80 mg by mouth every morning. * furosemide 40 mg tablet Commonly known as:  LASIX Take 2 Tabs by mouth every evening. * HumuLIN N 100 unit/mL injection Generic drug:  insulin NPH  
50 Units by SubCUTAneous route every morning. * insulin  unit/mL injection Commonly known as:  HumuLIN N  
24 Units by SubCUTAneous route daily (with dinner). hydrALAZINE 10 mg tablet Commonly known as:  APRESOLINE Take 2 Tabs by mouth three (3) times daily. metoprolol tartrate 50 mg tablet Commonly known as:  LOPRESSOR Take 1 Tab by mouth two (2) times a day. potassium chloride SR 10 mEq tablet Commonly known as:  KLOR-CON 10 Take 1 Tab by mouth daily. PriLOSEC 20 mg capsule Generic drug:  omeprazole Take 20 mg by mouth daily. SYNTHROID 125 mcg tablet Generic drug:  levothyroxine Take 125 mcg by mouth Daily (before breakfast). warfarin 3 mg tablet Commonly known as:  COUMADIN Take 7.5mg on Monday (2.5 tabs); Take 4.5mg on Tuesday: Take 6 mg on Wednesday; Take 4.5mg on Thursday Get INR checked on Thursday * Notice: This list has 4 medication(s) that are the same as other medications prescribed for you. Read the directions carefully, and ask your doctor or other care provider to review them with you. Description New dose: Coumadin 6mg daily (7.7% increase) Old dose: Coumadin 6mg daily except 3mg on Mondays Recheck in 1 week February 2017 Details Sun Mon Tue Wed Thu Fri Sat  
     1  
  
  
  
   2  
  
  
  
   3  
  
  
  
   4  
  
  
  
  
  5  
  
  
  
   6  
  
  
  
   7  
  
  
  
   8  
  
  
  
   9  
  
  
  
   10  
  
  
  
   11  
  
  
  
  
  12  
  
  
  
   13  
  
  
  
   14  
  
  
  
   15  
  
  
  
   16  
  
  
  
   17  
  
  
  
   18  
  
  
  
  
  19  
  
  
  
   20  
  
  
  
   21 1.7  
6 mg See details 22  
  
6 mg  
  
   23  
  
6 mg  
  
   24  
  
6 mg  
  
   25  
  
6 mg  
  
  
  26  
  
6 mg  
  
   27  
  
6 mg  
  
   28  
  
6 mg Date Details 02/21 This INR check INR: 1.7 Date of next INR:  2/28/2017 How to take your warfarin dose To take:  6 mg Take 2 of the 3 mg tablets. We Performed the Following AMB POC PT/INR [80683 CPT(R)] Introducing [facility] & HEALTH SERVICES! Reena Kirkpatrick introduces [employer] patient portal. Now you can access parts of your medical record, email your doctor's office, and request medication refills online. 1. In your internet browser, go to [email or web address] 2. Click on the First Time User? Click Here link in the Sign In box. You will see the New Member Sign Up page. 3. Enter your [employer] Access Code exactly as it appears below. You will not need to use this code after youve completed the sign-up process. If you do not sign up before the expiration date, you must request a new code. · [employer] Access Code: [email or web address] Expires: 5/8/2017  2:28 PM 
 
4. Enter the last four digits of your Social Security Number (xxxx) and Date of Birth (mm/dd/yyyy) as indicated and click Submit. You will be taken to the next sign-up page. 5. Create a [employer] ID. This will be your [employer] login ID and cannot be changed, so think of one that is secure and easy to remember. 6. Create a [employer] password. You can change your password at any time. 7. Enter your Password Reset Question and Answer. This can be used at a later time if you forget your password. 8. Enter your e-mail address. You will receive e-mail notification when new information is available in [address] 9. Click Sign Up. You can now view and download portions of your medical record. 10. Click the Download Summary menu link to download a portable copy of your medical information. If you have questions, please visit the Frequently Asked Questions section of the [employer] website. Remember, [employer] is NOT to be used for urgent needs. For medical emergencies, dial 911. Now available from your iPhone and Android! Please provide this summary of care documentation to your next provider. Your primary care clinician is listed as ANDRES BURDEN. If you have any questions after today's visit, please call [phone].

## 2017-02-28 ENCOUNTER — ANTI-COAG VISIT (OUTPATIENT)
Dept: INTERNAL MEDICINE CLINIC | Age: 82
End: 2017-02-28

## 2017-02-28 DIAGNOSIS — I48.20 CHRONIC ATRIAL FIBRILLATION (HCC): Primary | ICD-10-CM

## 2017-02-28 LAB
INR BLD: 2.4
PT POC: 28.7 SEC
VALID INTERNAL CONTROL?: YES

## 2017-02-28 NOTE — PROGRESS NOTES
INR 2.4 today. Patient instructions:   Continue Coumadin 6mg daily   Recheck in 1 week    A full discussion of the nature of anticoagulants has been carried out. A benefit risk analysis has been presented to the patient, so that they understand the justification for choosing anticoagulation at this time. The need for frequent and regular monitoring, precise dosage adjustment and compliance is stressed. Side effects of potential bleeding are discussed. The patient should avoid any OTC items containing aspirin or ibuprofen, and should avoid great swings in general diet. Avoid alcohol consumption. Call if any signs of abnormal bleeding. Patient verbalized understanding.

## 2017-02-28 NOTE — MR AVS SNAPSHOT
Visit Information Date & Time Provider Department Dept. Phone Encounter #  
 2/28/2017  9:00 AM Michelle Jackson [facility] Internal Medicine [phone] [id number] Your Appointments 3/7/2017 10:00 AM  
ANTICOAG NURSE with Catarino Gomez [facility] Internal Medicine ([facility] Appt Note: [address]  
[address] [address] [address]  
  
    
 3/22/2017 11:00 AM  
Follow Up with Fátima Hedrick MD  
[facility] ([facility]) Appt Note: F/u [facility] TMVR-2 mos. medication f/u  
 [address] [facility] [address] [phone]  
[address] [place] [address] Road [address]  
  
    
 4/4/2017  2:20 PM  
ROUTINE CARE with Bashir Barlow MD  
[facility] Internal Medicine [facility]) Appt Note: f/u 2m  
 [address] [facility] [address] [facility] [address]  
[address] [address] [address] [facility] Upcoming Health Maintenance Date Due ZOSTER VACCINE AGE 60> 4/17/1997 Pneumococcal 65+ High/Highest Risk (2 of 2 - PPSV23) 10/1/2007 DTaP/Tdap/Td series (1 - Tdap) 12/9/2010 EYE EXAM RETINAL OR DILATED Q1 8/13/2015 GLAUCOMA SCREENING Q2Y 8/13/2016 MICROALBUMIN Q1 2/22/2017 HEMOGLOBIN A1C Q6M 5/10/2017 FOOT EXAM Q1 8/4/2017 LIPID PANEL Q1 8/5/2017 MEDICARE YEARLY EXAM 2/15/2018 Allergies as of 2/28/2017  Review Complete On: 2/14/2017 By: Pantera Cornell RN Severity Noted Reaction Type Reactions Actos [Pioglitazone]  04/23/2009    Swelling Swelling of feet and legs Codeine  04/23/2009    Itching Doxycycline  04/23/2009    Nausea Only Hydrocodone  04/17/2012    Rash, Other (comments)  
 hallucinations Current Immunizations  Reviewed on 7/11/2014 Name Date Influenza Vaccine 12/2/2013 Influenza Vaccine Split 10/17/2012 Pneumococcal Vaccine (Unspecified Type) 10/1/2002 TD Vaccine 12/8/2010 Not reviewed this visit You Were Diagnosed With   
  
 Codes Comments Chronic atrial fibrillation (HCC)    -  Primary ICD-10-CM: X18.1 ICD-9-CM: 427.31 Vitals OB Status Hysterectomy Preferred Pharmacy Pharmacy Name Phone Juliette Bolaños [address] [facility], [address] [place] [address] [phone] Your Updated Medication List  
  
   
This list is accurate as of: 2/28/17  9:09 AM.  Always use your most recent med list.  
  
  
  
  
 allopurinol 100 mg tablet Commonly known as:  ZYLOPRIM  
TAKE TWO TABLETS BY MOUTH DAILY  
  
 amLODIPine 5 mg tablet Commonly known as:  Delight Blade TAKE ONE TABLET BY MOUTH DAILY  
  
 atorvastatin 80 mg tablet Commonly known as:  LIPITOR  
TAKE ONE TABLET BY MOUTH EVERY EVENING  
  
 CENTRUM SILVER Tab tablet Generic drug:  multivitamins-minerals-lutein Take 1 Tab by mouth daily. dofetilide 125 mcg capsule Commonly known as:  [place] Take 1 Cap by mouth two (2) times a day. * furosemide 40 mg tablet Commonly known as:  LASIX Take 80 mg by mouth every morning. * furosemide 40 mg tablet Commonly known as:  LASIX Take 2 Tabs by mouth every evening. * HumuLIN N 100 unit/mL injection Generic drug:  insulin NPH  
50 Units by SubCUTAneous route every morning. * insulin  unit/mL injection Commonly known as:  HumuLIN N  
24 Units by SubCUTAneous route daily (with dinner). hydrALAZINE 10 mg tablet Commonly known as:  APRESOLINE Take 2 Tabs by mouth three (3) times daily. metoprolol tartrate 50 mg tablet Commonly known as:  LOPRESSOR Take 1 Tab by mouth two (2) times a day. potassium chloride SR 10 mEq tablet Commonly known as:  KLOR-CON 10 Take 1 Tab by mouth daily. PriLOSEC 20 mg capsule Generic drug:  omeprazole Take 20 mg by mouth daily. SYNTHROID 125 mcg tablet Generic drug:  levothyroxine Take 125 mcg by mouth Daily (before breakfast). warfarin 3 mg tablet Commonly known as:  COUMADIN Take 7.5mg on Monday (2.5 tabs); Take 4.5mg on Tuesday: Take 6 mg on Wednesday; Take 4.5mg on Thursday Get INR checked on Thursday * Notice: This list has 4 medication(s) that are the same as other medications prescribed for you. Read the directions carefully, and ask your doctor or other care provider to review them with you. Description Continue Coumadin 6mg daily Recheck in 1 week February 2017 Details Sun Mon Tue Wed Thu Fri Sat  
     1  
  
  
  
   2  
  
  
  
   3  
  
  
  
   4  
  
  
  
  
  5  
  
  
  
   6  
  
  
  
   7  
  
  
  
   8  
  
  
  
   9  
  
  
  
   10  
  
  
  
   11  
  
  
  
  
  12  
  
  
  
   13  
  
  
  
   14  
  
  
  
   15  
  
  
  
   16  
  
  
  
   17  
  
  
  
   18  
  
  
  
  
  19  
  
  
  
   20  
  
  
  
   21  
  
  
  
   22  
  
  
  
   23  
  
  
  
   24  
  
  
  
   25  
  
  
  
  
  26  
  
  
  
   27  
  
  
  
   28 2.4  
6 mg See details Date Details 02/28 This INR check INR: 2.4 How to take your warfarin dose To take:  6 mg Take 2 of the 3 mg tablets. March 2017 Details [facility] Tue Wed Thu Fri Sat 1  
  
6 mg 2  
  
6 mg  
  
   3  
  
6 mg  
  
   4  
  
6 mg  
  
  
  5  
  
6 mg  
  
   6  
  
6 mg  
  
   7  
  
6 mg  
  
   8  
  
  
  
   9  
  
  
  
   10  
  
  
  
   11  
  
  
  
  
  12  
  
  
  
   13  
  
  
  
   14  
  
  
  
   15  
  
  
  
   16  
  
  
  
   17  
  
  
  
   18  
  
  
  
  
  19  
  
  
  
   20  
  
  
  
   21  
  
  
  
   22  
  
  
  
   23  
  
  
  
   24  
  
  
  
   25  
  
  
  
  
  26  
  
  
  
   27  
  
  
  
   28  
  
  
  
   29  
  
  
  
   30  
  
  
  
   31  
  
  
  
   
 Date Details No additional details Date of next INR:  3/7/2017 How to take your warfarin dose To take:  6 mg Take 2 of the 3 mg tablets. We Performed the Following AMB POC PT/INR [31442 CPT(R)] Introducing [facility] SERVICES! [employer] introduces [employer] patient portal. Now you can access parts of your medical record, email your doctor's office, and request medication refills online. 1. In your internet browser, go to [email or web address] 2. Click on the First Time User? Click Here link in the Sign In box. You will see the New Member Sign Up page. 3. Enter your [employer] Access Code exactly as it appears below. You will not need to use this code after youve completed the sign-up process. If you do not sign up before the expiration date, you must request a new code. · [employer] Access Code: [email or web address] Expires: 5/8/2017  2:28 PM 
 
4. Enter the last four digits of your Social Security Number (xxxx) and Date of Birth (mm/dd/yyyy) as indicated and click Submit. You will be taken to the next sign-up page. 5. Create a [employer] ID. This will be your [employer] login ID and cannot be changed, so think of one that is secure and easy to remember. 6. Create a [employer] password. You can change your password at any time. 7. Enter your Password Reset Question and Answer. This can be used at a later time if you forget your password. 8. Enter your e-mail address. You will receive e-mail notification when new information is available in [address] 9. Click Sign Up. You can now view and download portions of your medical record. 10. Click the Download Summary menu link to download a portable copy of your medical information. If you have questions, please visit the Frequently Asked Questions section of the [employer] website. Remember, [employer] is NOT to be used for urgent needs. For medical emergencies, dial 911. Now available from your iPhone and Android! Please provide this summary of care documentation to your next provider. Your primary care clinician is listed as ANDRES BURDEN. If you have any questions after today's visit, please call [phone].

## 2017-03-07 ENCOUNTER — ANTI-COAG VISIT (OUTPATIENT)
Dept: INTERNAL MEDICINE CLINIC | Age: 82
End: 2017-03-07

## 2017-03-07 DIAGNOSIS — Z79.01 LONG TERM (CURRENT) USE OF ANTICOAGULANTS: Primary | ICD-10-CM

## 2017-03-07 DIAGNOSIS — I48.20 CHRONIC ATRIAL FIBRILLATION (HCC): ICD-10-CM

## 2017-03-07 LAB
INR BLD: 2.6
PT POC: 31.1 SEC
VALID INTERNAL CONTROL?: YES

## 2017-03-07 NOTE — PROGRESS NOTES
INR 2.6 today. Patient instructions: Continue Coumadin 6mg daily   Recheck in 3 weeks  Discussed keeping diet consistent. Patient verbalized understanding. A full discussion of the nature of anticoagulants has been carried out. A benefit risk analysis has been presented to the patient, so that they understand the justification for choosing anticoagulation at this time. The need for frequent and regular monitoring, precise dosage adjustment and compliance is stressed. Side effects of potential bleeding are discussed. The patient should avoid any OTC items containing aspirin or ibuprofen, and should avoid great swings in general diet. Avoid alcohol consumption. Call if any signs of abnormal bleeding. Patient verbalized understanding.

## 2017-03-07 NOTE — MR AVS SNAPSHOT
Visit Information Date & Time Provider Department Dept. Phone Encounter #  
 3/7/2017 10:00 AM Sangeetha [facility] Internal Medicine [phone] [id number] Your Appointments 3/7/2017 10:00 AM  
ANTICOAG NURSE with Denice Rodriguez [facility] Internal Medicine ([facility]) Appt Note: inr; due/elig for [address] [facility]  
[address] [id number] [facility] [address] [place]  
  
    
 3/22/2017 11:00 AM  
Follow Up with Ashtyn Mccarthy MD  
[facility] ([facility]) Appt Note: F/u [facility] TMVR-2 mos. medication f/u  
 [address] [facility] [address] [phone]  
[address] [place], [place] [id number]  
  
    
 4/4/2017  2:20 PM  
ROUTINE CARE with Artie Cornelius MD  
[facility] Internal Medicine [facility]) Appt Note: f/u 2m  
 [address] [facility] [address]  
[address] [address] [place] [address] [facility] Upcoming Health Maintenance Date Due ZOSTER VACCINE AGE 60> 4/17/1997 Pneumococcal 65+ High/Highest Risk (2 of 2 - PPSV23) 10/1/2007 DTaP/Tdap/Td series (1 - Tdap) 12/9/2010 EYE EXAM RETINAL OR DILATED Q1 8/13/2015 GLAUCOMA SCREENING Q2Y 8/13/2016 MICROALBUMIN Q1 2/22/2017 HEMOGLOBIN A1C Q6M 5/10/2017 FOOT EXAM Q1 8/4/2017 LIPID PANEL Q1 8/5/2017 MEDICARE YEARLY EXAM 2/15/2018 Allergies as of 3/7/2017  Review Complete On: 2/14/2017 By: Lesly Issa RN Severity Noted Reaction Type Reactions Actos [Pioglitazone]  04/23/2009    Swelling Swelling of feet and legs Codeine  04/23/2009    Itching Doxycycline  04/23/2009    Nausea Only Hydrocodone  04/17/2012    Rash, Other (comments)  
 hallucinations Current Immunizations  Reviewed on 7/11/2014 Name Date Influenza Vaccine 12/2/2013 Influenza Vaccine Split 10/17/2012 Pneumococcal Vaccine (Unspecified Type) 10/1/2002 TD Vaccine 12/8/2010 Not reviewed this visit You Were Diagnosed With   
  
 Codes Comments Long term (current) use of anticoagulants    -  Primary ICD-10-CM: Z79.01 
ICD-9-CM: V58.61 Chronic atrial fibrillation (HCC)     ICD-10-CM: J86.2 ICD-9-CM: 427.31 Vitals OB Status Smoking Status Hysterectomy Former Smoker Preferred Pharmacy Pharmacy Name Phone [facility] [address] [facility], [address] [place] [address], [address] [phone] Your Updated Medication List  
  
   
This list is accurate as of: 3/7/17  9:27 AM.  Always use your most recent med list.  
  
  
  
  
 allopurinol 100 mg tablet Commonly known as:  ZYLOPRIM  
TAKE TWO TABLETS BY MOUTH DAILY  
  
 amLODIPine 5 mg tablet Commonly known as:  Cynthia Colon TAKE ONE TABLET BY MOUTH DAILY  
  
 atorvastatin 80 mg tablet Commonly known as:  LIPITOR  
TAKE ONE TABLET BY MOUTH EVERY EVENING  
  
 CENTRUM SILVER Tab tablet Generic drug:  multivitamins-minerals-lutein Take 1 Tab by mouth daily. dofetilide 125 mcg capsule Commonly known as:  Vicente Mutters Take 1 Cap by mouth two (2) times a day. * furosemide 40 mg tablet Commonly known as:  LASIX Take 80 mg by mouth every morning. * furosemide 40 mg tablet Commonly known as:  LASIX Take 2 Tabs by mouth every evening. * HumuLIN N 100 unit/mL injection Generic drug:  insulin NPH  
50 Units by SubCUTAneous route every morning. * insulin  unit/mL injection Commonly known as:  HumuLIN N  
24 Units by SubCUTAneous route daily (with dinner). hydrALAZINE 10 mg tablet Commonly known as:  APRESOLINE Take 2 Tabs by mouth three (3) times daily. metoprolol tartrate 50 mg tablet Commonly known as:  LOPRESSOR Take 1 Tab by mouth two (2) times a day. potassium chloride SR 10 mEq tablet Commonly known as:  KLOR-CON 10 Take 1 Tab by mouth daily. PriLOSEC 20 mg capsule Generic drug:  omeprazole Take 20 mg by mouth daily. SYNTHROID 125 mcg tablet Generic drug:  levothyroxine Take 125 mcg by mouth Daily (before breakfast). warfarin 3 mg tablet Commonly known as:  COUMADIN Take 7.5mg on Monday (2.5 tabs); Take 4.5mg on Tuesday: Take 6 mg on Wednesday; Take 4.5mg on Thursday Get INR checked on Thursday * Notice: This list has 4 medication(s) that are the same as other medications prescribed for you. Read the directions carefully, and ask your doctor or other care provider to review them with you. Description Continue Coumadin 6mg daily Recheck in 3 weeks March 2017 Details Sun Mon Tue Wed Thu Fri Sat  
     1  
  
  
  
   2  
  
  
  
   3  
  
  
  
   4  
  
  
  
  
  5  
  
  
  
   6  
  
  
  
   7  
2.6  
6 mg See details 8  
  
6 mg  
  
   9  
  
6 mg  
  
   10  
  
6 mg  
  
   11  
  
6 mg  
  
  
  12  
  
6 mg  
  
   13  
  
6 mg  
  
   14  
  
6 mg  
  
   15  
  
6 mg  
  
   16  
  
6 mg  
  
   17  
  
6 mg  
  
   18  
  
6 mg  
  
  
  19  
  
6 mg  
  
   20  
  
6 mg  
  
   21  
  
6 mg  
  
   22  
  
6 mg  
  
   23  
  
6 mg  
  
   24  
  
6 mg  
  
   25  
  
6 mg  
  
  
  26  
  
6 mg  
  
   27  
  
6 mg  
  
   28  
  
  
  
   29  
  
  
  
   30  
  
  
  
   31  
  
  
  
   
 Date Details 03/07 This INR check INR: 2.6 Date of next INR:  3/27/2017 How to take your warfarin dose To take:  6 mg Take 2 of the 3 mg tablets. We Performed the Following AMB POC PT/INR [84824 CPT(R)] Introducing \[facility]\"" & HEALTH SERVICES! [employer] introduces [employer] patient portal. Now you can access parts of your medical record, email your doctor's office, and request medication refills online. 1. In your internet browser, go to [email or web address] 2. Click on the First Time User? Click Here link in the Sign In box. You will see the New Member Sign Up page. 3. Enter your [employer] Access Code exactly as it appears below. You will not need to use this code after youve completed the sign-up process. If you do not sign up before the expiration date, you must request a new code. · [employer] Access Code: [email or web address] Expires: 5/8/2017  2:28 PM 
 
4. Enter the last four digits of your Social Security Number (xxxx) and Date of Birth (mm/dd/yyyy) as indicated and click Submit. You will be taken to the next sign-up page. 5. Create a [employer] ID. This will be your [employer] login ID and cannot be changed, so think of one that is secure and easy to remember. 6. Create a [employer] password. You can change your password at any time. 7. Enter your Password Reset Question and Answer. This can be used at a later time if you forget your password. 8. Enter your e-mail address. You will receive e-mail notification when new information is available in [address] 9. Click Sign Up. You can now view and download portions of your medical record. 10. Click the Download Summary menu link to download a portable copy of your medical information. If you have questions, please visit the Frequently Asked Questions section of the [employer] website. Remember, [employer] is NOT to be used for urgent needs. For medical emergencies, dial 911. Now available from your iPhone and Android! Please provide this summary of care documentation to your next provider. Your primary care clinician is listed as ANDRES BURDEN. If you have any questions after today's visit, please call [phone].

## 2017-03-23 RX ORDER — AMLODIPINE BESYLATE 5 MG/1
TABLET ORAL
Qty: 30 TAB | Refills: 11 | Status: SHIPPED | OUTPATIENT
Start: 2017-03-23 | End: 2017-03-24 | Stop reason: SDUPTHER

## 2017-03-24 ENCOUNTER — OFFICE VISIT (OUTPATIENT)
Dept: CARDIOTHORACIC SURGERY | Age: 82
End: 2017-03-24

## 2017-03-24 VITALS
TEMPERATURE: 97.5 F | HEART RATE: 66 BPM | HEIGHT: 69 IN | WEIGHT: 223.5 LBS | RESPIRATION RATE: 16 BRPM | SYSTOLIC BLOOD PRESSURE: 164 MMHG | BODY MASS INDEX: 33.1 KG/M2 | DIASTOLIC BLOOD PRESSURE: 60 MMHG | OXYGEN SATURATION: 98 %

## 2017-03-24 DIAGNOSIS — Z79.4 CONTROLLED TYPE 2 DIABETES MELLITUS WITH STAGE 3 CHRONIC KIDNEY DISEASE, WITH LONG-TERM CURRENT USE OF INSULIN (HCC): ICD-10-CM

## 2017-03-24 DIAGNOSIS — I34.0 MITRAL VALVE INSUFFICIENCY, UNSPECIFIED ETIOLOGY: Primary | ICD-10-CM

## 2017-03-24 DIAGNOSIS — I10 ESSENTIAL HYPERTENSION, BENIGN: ICD-10-CM

## 2017-03-24 DIAGNOSIS — I48.20 CHRONIC ATRIAL FIBRILLATION (HCC): ICD-10-CM

## 2017-03-24 DIAGNOSIS — Z79.01 LONG TERM (CURRENT) USE OF ANTICOAGULANTS: ICD-10-CM

## 2017-03-24 DIAGNOSIS — I70.1 RAS (RENAL ARTERY STENOSIS) (HCC): ICD-10-CM

## 2017-03-24 DIAGNOSIS — E11.22 CONTROLLED TYPE 2 DIABETES MELLITUS WITH STAGE 3 CHRONIC KIDNEY DISEASE, WITH LONG-TERM CURRENT USE OF INSULIN (HCC): ICD-10-CM

## 2017-03-24 DIAGNOSIS — Z98.890 S/P MVR (MITRAL VALVE REPAIR): ICD-10-CM

## 2017-03-24 DIAGNOSIS — N18.30 CONTROLLED TYPE 2 DIABETES MELLITUS WITH STAGE 3 CHRONIC KIDNEY DISEASE, WITH LONG-TERM CURRENT USE OF INSULIN (HCC): ICD-10-CM

## 2017-03-24 DIAGNOSIS — I73.9 PVD (PERIPHERAL VASCULAR DISEASE) (HCC): ICD-10-CM

## 2017-03-24 RX ORDER — AMLODIPINE BESYLATE 5 MG/1
10 TABLET ORAL DAILY
Qty: 30 TAB | Refills: 11 | Status: SHIPPED | OUTPATIENT
Start: 2017-03-24 | End: 2017-08-08 | Stop reason: SDUPTHER

## 2017-03-24 NOTE — PROGRESS NOTES
I had the pleasure of seeing Ms. Miller in the valve clinic earlier today with Ms. Jann Scheuermann, NP - please see her note for details. 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 Will get a renal ultrasound and increase her calcium channel blocker today, and f/u by phone in a week, with plans to increase her BB next.

## 2017-03-24 NOTE — MR AVS SNAPSHOT
Visit Information Date & Time Provider Department Dept. Phone Encounter #  
 3/24/2017 12:30 PM Buster Loco MD [facility] [phone] [id number] Your Appointments 3/27/2017  9:00 AM  
ANTICOAG NURSE with [facility] Internal Medicine ([address]) Appt Note: [address] Critical access hospital [address]  
[address] [address] [facility] [address] [place] [address]  
  
    
 4/4/2017  2:20 PM  
ROUTINE CARE with Rome Anaya MD  
[address] [place] [address] Internal Medicine [address]) Appt Note: f/u 2m  
 [address] Critical access hospital [address]  
[address] [address] [address] Upcoming Health Maintenance Date Due ZOSTER VACCINE AGE 60> 4/17/1997 Pneumococcal 65+ High/Highest Risk (2 of 2 - PPSV23) 10/1/2007 DTaP/Tdap/Td series (1 - Tdap) 12/9/2010 EYE EXAM RETINAL OR DILATED Q1 8/13/2015 GLAUCOMA SCREENING Q2Y 8/13/2016 MICROALBUMIN Q1 2/22/2017 HEMOGLOBIN A1C Q6M 5/10/2017 FOOT EXAM Q1 8/4/2017 LIPID PANEL Q1 8/5/2017 MEDICARE YEARLY EXAM 2/15/2018 Allergies as of 3/24/2017  Review Complete On: 3/24/2017 By: Amelie Lobato NP Severity Noted Reaction Type Reactions Actos [Pioglitazone]  04/23/2009    Swelling Swelling of feet and legs Codeine  04/23/2009    Itching Doxycycline  04/23/2009    Nausea Only Hydrocodone  04/17/2012    Rash, Other (comments)  
 hallucinations Current Immunizations  Reviewed on 7/11/2014 Name Date Influenza Vaccine 12/2/2013 Influenza Vaccine Split 10/17/2012 Pneumococcal Vaccine (Unspecified Type) 10/1/2002 TD Vaccine 12/8/2010 Not reviewed this visit You Were Diagnosed With   
  
 Codes Comments Mitral valve insufficiency, unspecified etiology    -  Primary ICD-10-CM: I34.0 ICD-9-CM: 424.0 S/P MVR (mitral valve repair)     ICD-10-CM: F37.973 ICD-9-CM: V45.89 Essential hypertension, benign     ICD-10-CM: I10 
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 9 Chronic atrial fibrillation (HCC)     ICD-10-CM: A44.5 ICD-9-CM: 427.31 Long term (current) use of anticoagulants     ICD-10-CM: Z79.01 
ICD-9-CM: V58.61 Vitals BP Pulse Temp Resp Height(growth percentile) Weight(growth percentile) 164/60 (BP 1 Location: Left arm, BP Patient Position: Sitting) 66 97.5 °F (36.4 °C) (Oral) 16 5' 9\" (1.753 m) 223 lb 8 oz (101.4 kg) SpO2 BMI OB Status Smoking Status 98% 33.01 kg/m2 Hysterectomy Former Smoker BMI and BSA Data Body Mass Index Body Surface Area 33.01 kg/m 2 2.22 m 2 Preferred Pharmacy Pharmacy Name Phone [facility] [address] [place] [address], [address] [place] [address] [phone] Your Updated Medication List  
  
   
This list is accurate as of: 3/24/17  3:32 PM.  Always use your most recent med list.  
  
  
  
  
 allopurinol 100 mg tablet Commonly known as:  ZYLOPRIM  
TAKE TWO TABLETS BY MOUTH DAILY  
  
 amLODIPine 5 mg tablet Commonly known as:  Horris Bills Take 2 Tabs by mouth daily. atorvastatin 80 mg tablet Commonly known as:  LIPITOR  
TAKE ONE TABLET BY MOUTH EVERY EVENING  
  
 CENTRUM SILVER Tab tablet Generic drug:  multivitamins-minerals-lutein Take 1 Tab by mouth daily. dofetilide 125 mcg capsule Commonly known as:  Chryl Guy Take 1 Cap by mouth two (2) times a day. furosemide 40 mg tablet Commonly known as:  LASIX Take 2 Tabs by mouth every evening. HumuLIN N 100 unit/mL injection Generic drug:  insulin NPH  
50 Units by SubCUTAneous route every morning. 50 units q am and 24 units qpm  
  
 hydrALAZINE 10 mg tablet Commonly known as:  APRESOLINE Take 2 Tabs by mouth three (3) times daily. metoprolol tartrate 50 mg tablet Commonly known as:  LOPRESSOR Take 1 Tab by mouth two (2) times a day. potassium chloride SR 10 mEq tablet Commonly known as:  KLOR-CON 10 Take 1 Tab by mouth daily. PriLOSEC 20 mg capsule Generic drug:  omeprazole Take 20 mg by mouth daily. SYNTHROID 125 mcg tablet Generic drug:  levothyroxine Take 125 mcg by mouth Daily (before breakfast). warfarin 3 mg tablet Commonly known as:  COUMADIN Take 7.5mg on Monday (2.5 tabs); Take 4.5mg on Tuesday: Take 6 mg on Wednesday; Take 4.5mg on Thursday Get INR checked on Thursday  
  
  
  
  
Prescriptions Sent to Pharmacy Refills  
 amLODIPine (NORVASC) 5 mg tablet 11 Sig: Take 2 Tabs by mouth daily. Class: Normal  
 Pharmacy: [facility] [address] [place] [address] #: [phone] Route: Oral  
  
We Performed the Following METABOLIC PANEL, COMPREHENSIVE [46082 CPT(R)] To-Do List   
 03/27/2017 Imaging:  DUPLEX ABD VISC ART/ISA/ORGANS COMPLETE Introducing [facility] & HEALTH SERVICES! [facility] introduces [employer] patient portal. Now you can access parts of your medical record, email your doctor's office, and request medication refills online. 1. In your internet browser, go to [email or web address] 2. Click on the First Time User? Click Here link in the Sign In box. You will see the New Member Sign Up page. 3. Enter your [employer] Access Code exactly as it appears below. You will not need to use this code after youve completed the sign-up process. If you do not sign up before the expiration date, you must request a new code.  
 
· [employer] Access Code: [email or web address] 
 Expires: 5/8/2017  3:28 PM 
 
4. Enter the last four digits of your Social Security Number (xxxx) and Date of Birth (mm/dd/yyyy) as indicated and click Submit. You will be taken to the next sign-up page. 5. Create a [employer] ID. This will be your [employer] login ID and cannot be changed, so think of one that is secure and easy to remember. 6. Create a [employer] password. You can change your password at any time. 7. Enter your Password Reset Question and Answer. This can be used at a later time if you forget your password. 8. Enter your e-mail address. You will receive e-mail notification when new information is available in [address] 9. Click Sign Up. You can now view and download portions of your medical record. 10. Click the Download Summary menu link to download a portable copy of your medical information. If you have questions, please visit the Frequently Asked Questions section of the [employer] website. Remember, [employer] is NOT to be used for urgent needs. For medical emergencies, dial 911. Now available from your iPhone and Android! Please provide this summary of care documentation to your next provider. Your primary care clinician is listed as ANDRES BURDEN. If you have any questions after today's visit, please call [phone].

## 2017-03-24 NOTE — PROGRESS NOTES
Patient: Paxton Olivo   Age: 80 y.o. Patient Care Team:  Artie Cornelius MD as PCP - General  Melanie Lucio, RODGER as Ambulatory Care Navigator (Internal Medicine)  Lori Lao MD as Physician (Urology)  Raymond Esposito MD as Physician (Cardiology)  Lydia Chase MD as Physician (General Surgery)  Destiny Soto, RN as Ambulatory Care Navigator (Internal Medicine)  Genesis Schwartz MD (Ophthalmology)      PCP: Artie Cornelius MD    Cardiologist: Lyndel Hatchet    Diagnosis/Reason for Consultation: The primary encounter diagnosis was Mitral valve insufficiency, unspecified etiology. 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 Problem List:   Patient Active Problem List   Diagnosis Code    CHUCKY Figueroa PVD (peripheral vascular disease) ([facility]) I73.9    CHARITY (renal artery stenosis) ([facility]) I70.1    Reflux esophagitis K21.0    Benign neoplasm of colon D12.6    Iron deficiency anemia D50.9    Hypomagnesemia E83.42    Long term (current) use of anticoagulants Z79.01    Essential hypertension, benign I10    Bladder cancer (HCC) C67.9    Gout M10.9    Encounter for long-term (current) use of other medications Z79.899    Plantar fasciitis M72.2    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  female s/pTMVR (1 MitraClip(s) on the A2/P2 mitral leaflet scallops with reduction of the mitral regurgitation from severe to none) on 6/10/2016 for severe and symptomatic MR. She has been followed in our clinic since her clip and initially did very well but then developed significant SOB/LEIJA. We have been working aggressively to achieve GDMT for her MR/MS (HR 60 & -130) but have had some difficulty in doing so (concerns with pt understanding/medication compliance and have gotten her daughter involved with medication administration). She was hospitalized in January for acute SOB/CP/CHF and underwent R heart cath with transeptal crossing and BB administration that revealed mild MS with meanPG 4 mmHg. She is back today for further sx f/u and HTN management at the request of Dr. Don Brown.     Ms. oCle Howard reports some sx improvement since our last visit but far from resolution. She can now walk the length of her home before she develops SOB/LEIJA (one floor rancher) but cannot walk the length of her driveway (8 car lengths with slight slope) comfortably. She still has B LE edema and has lymphedema wraps applied by Umair Salazar RN thrice weekly. She also admits to occasional lightheadedness when standing from a seated or prone position but denies syncope or falls. She also denies CP, palpitations, orthopnea, fatigue or PND. She continues to weigh herself daily and her wt is essentially unchanged. She also takes her BP daily and reports it is typically 150's/60's. She denies having seen Dr. Don Brown or Dr. Camryn Barcenas since her January hospitalization. She can't recall the specifics or timing or location of her renal artery stent placement.      NYHA Classification: II   Class II (Mild): Slight limitation of physical activity. Comfortable at rest, but ordinary physical activity results in fatigue, palpitation, or dyspnea. Past Medical History:   Diagnosis Date    Atrial fibrillation ([facility] 10/29/2009    Bladder cancer ([facility]     Colon polyps     Diabetes ([facility]     GERD (gastroesophageal reflux disease)     Heart valve problem     leaking heart valve    Hypercholesterolemia     Hypertension     Hypothyroidism     Hypothyroidism 4/23/2009    Hypothyroidism, acquired, autoimmune 11/23/2015    Overweight and obesity     PUD (peptic ulcer disease)     S/P ablation of atrial flutter[V45.89HM] 2009    @ [facility] >> atrial fibrillation    T. I.A. 4/23/2009    TIA (transient ischemic attack)        Past Surgical History:   Procedure Laterality Date    CARDIAC SURG PROCEDURE UNLIST      ablation    HX APPENDECTOMY      HX CHOLECYSTECTOMY      HX HYSTERECTOMY      HX KNEE ARTHROSCOPY  0066,6531    right knee    HX ORTHOPAEDIC      HX UROLOGICAL      RENAL STENT, tur-b    VASCULAR SURGERY PROCEDURE UNLIST  11/4    removed vein in right leg      Social History   Substance Use Topics    Smoking status: Former Smoker     Packs/day: 0.50     Years: 10.00     Types: Cigarettes     Quit date: 1/1/1967    Smokeless tobacco: Never Used    Alcohol use No      Family History   Problem Relation Age of Onset    Stroke Other     Arthritis-osteo Sister      spinal stenosis    Gout Son     Hypertension Son     Hypertension Mother     Heart Disease Mother      CAD    Heart Disease Father      CAD    Alcohol abuse Neg Hx     Asthma Neg Hx     Bleeding Prob Neg Hx     Cancer Neg Hx     Diabetes Neg Hx     Elevated Lipids Neg Hx     Headache Neg Hx     Lung Disease Neg Hx     Migraines Neg Hx     Psychiatric Disorder Neg Hx     Mental Retardation Neg Hx      Prior to Admission medications    Medication Sig Start Date End Date Taking?  Authorizing Provider   amLODIPine (NORVASC) 5 mg tablet TAKE ONE TABLET BY MOUTH DAILY 3/23/17  Yes Mathew Yi MD   allopurinol (ZYLOPRIM) 100 mg tablet TAKE TWO TABLETS BY MOUTH DAILY 2/15/17  Yes Mathew Yi MD   furosemide (LASIX) 40 mg tablet Take 2 Tabs by mouth every evening. Patient taking differently: Take 80 mg by mouth daily. 2 tabs q am and 1 tab q PM 1/23/17  Yes Florecita Aburto MD   hydrALAZINE (APRESOLINE) 10 mg tablet Take 2 Tabs by mouth three (3) times daily. 1/23/17  Yes Florecita Aburto MD   warfarin (COUMADIN) 3 mg tablet Take 7.5mg on Monday (2.5 tabs); Take 4.5mg on Tuesday: Take 6 mg on Wednesday; Take 4.5mg on Thursday  Get INR checked on Thursday 1/23/17  Yes Florecita Aburto MD   metoprolol tartrate (LOPRESSOR) 50 mg tablet Take 1 Tab by mouth two (2) times a day. 1/23/17  Yes Florecita Aburto MD   potassium chloride SR (KLOR-CON 10) 10 mEq tablet Take 1 Tab by mouth daily. 1/23/17  Yes Florecita Aburto MD   omeprazole (PRILOSEC) 20 mg capsule Take 20 mg by mouth daily. Yes Historical Provider   insulin NPH (HUMULIN N) 100 unit/mL injection 50 Units by SubCUTAneous route every morning. 50 units q am and 24 units qpm   Yes Historical Provider   levothyroxine (SYNTHROID) 125 mcg tablet Take 125 mcg by mouth Daily (before breakfast). Yes Historical Provider   atorvastatin (LIPITOR) 80 mg tablet TAKE ONE TABLET BY MOUTH EVERY EVENING 10/6/16  Yes Mathew Yi MD   dofetilide [facility]) 125 mcg capsule Take 1 Cap by mouth two (2) times a day. 4/29/11  Yes Mathew Yi MD   multivitamins-minerals-lutein (CENTRUM SILVER) Tab Take 1 Tab by mouth daily.    Yes Historical Provider       Allergies   Allergen Reactions    Actos [Pioglitazone] Swelling     Swelling of feet and legs    Codeine Itching    Doxycycline Nausea Only    Hydrocodone Rash and Other (comments)     hallucinations       Current Medications:   Current Outpatient Prescriptions   Medication Sig Dispense Refill    amLODIPine (NORVASC) 5 mg tablet TAKE ONE TABLET BY MOUTH DAILY 30 Tab 11    allopurinol (ZYLOPRIM) 100 mg tablet TAKE TWO TABLETS BY MOUTH DAILY 180 Tab 2    furosemide (LASIX) 40 mg tablet Take 2 Tabs by mouth every evening. (Patient taking differently: Take 80 mg by mouth daily. 2 tabs q am and 1 tab q PM) 120 Tab 12    hydrALAZINE (APRESOLINE) 10 mg tablet Take 2 Tabs by mouth three (3) times daily. 180 Tab 12    warfarin (COUMADIN) 3 mg tablet Take 7.5mg on Monday (2.5 tabs); Take 4.5mg on Tuesday: Take 6 mg on Wednesday; Take 4.5mg on Thursday  Get INR checked on Thursday 100 Tab 12    metoprolol tartrate (LOPRESSOR) 50 mg tablet Take 1 Tab by mouth two (2) times a day. 60 Tab 12    potassium chloride SR (KLOR-CON 10) 10 mEq tablet Take 1 Tab by mouth daily. 30 Tab 12    omeprazole (PRILOSEC) 20 mg capsule Take 20 mg by mouth daily.  insulin NPH (HUMULIN N) 100 unit/mL injection 50 Units by SubCUTAneous route every morning. 50 units q am and 24 units qpm      levothyroxine (SYNTHROID) 125 mcg tablet Take 125 mcg by mouth Daily (before breakfast).  atorvastatin (LIPITOR) 80 mg tablet TAKE ONE TABLET BY MOUTH EVERY EVENING 90 Tab 3    dofetilide (TIKOSYN) 125 mcg capsule Take 1 Cap by mouth two (2) times a day. 60 Cap 3    multivitamins-minerals-lutein (CENTRUM SILVER) Tab Take 1 Tab by mouth daily. Vitals: Blood pressure 164/60, pulse 66, temperature 97.5 °F (36.4 °C), temperature source Oral, resp. rate 16, height 5' 9\" (1.753 m), weight 223 lb 8 oz (101.4 kg), SpO2 98 %. Allergies: is allergic to actos [pioglitazone]; codeine; doxycycline; and hydrocodone.     Review of Systems: Pertinent Positives per HPI   [x]Total of 13 systems reviewed as follows:   Constitutional: Negative fever, negative chills  Eyes:   Negative for amauroses fugax  ENT:   Negative sore throat,oral absecess  Endocrine Negative for goiter  Respiratory:  Negative chronic cough,sputum production  Cards:   Negative for Palpitations, varicosities, claudication  GI:   Negative for dysphagia, bleeding, nausea, vomiting, diarrhea, and abdominal pain  Genitourinary: Negative for frequency, dysuria  Integument:  Negative for rash and pruritus  Hematologic:  Negative for easy bruising; bleeding dyscarsia  Musculoskel: Negative for muscle weakness inhibiting ambulation  Neurological:  Negative for stroke, TIA, syncope, dizziness  Behavl/Psych: Negative for feelings of anxiety, depression     Cardiovascular Testing:   TTE 12/1/2016:  LEFT VENTRICLE: Size was normal. Systolic function was normal. Ejection fraction was estimated in the range of 55 % to 60 %. No obvious wall motion abnormalities identified in the views obtained. Wall thickness was moderately increased. RIGHT VENTRICLE: The size was normal. Systolic function was normal. Wall thickness was normal. LEFT ATRIUM: The atrium was dilated. RIGHT ATRIUM: Size was normal. MITRAL VALVE: There was markedly reduced leaflet separation following clip procedure. Ethelene Gauss DOPPLER: There was trivial regurgitation. AORTIC VALVE: Leaflets exhibited normal cuspal separation and sclerosis. TRICUSPID VALVE: Normal valve structure. There was normal leaflet separation. DOPPLER: The transtricuspid velocity was within the normal range. There was no evidence for tricuspid stenosis. There was mild regurgitation. Pulmonary artery systolic pressure: 40 mmHg. PULMONIC VALVE: Leaflets exhibited normal thickness, no calcification, and normal cuspal separation. DOPPLER: The transpulmonic velocity was within the normal range. There was no regurgitation. AORTA: The root exhibited normal size. PERICARDIUM: There was no pericardial effusion. The pericardium was normal in appearance. Mitral valve   (Reference normals)  Peak gradient   27 mmHg   (--)  Mean gradient   9.07 mmHg   (--)    R Cardiac catheterization with transeptal crossing 1/20/2017:  Following the right heart catheterization and transseptal puncture, a 4F glide catheter was advanced across the mitral valve, obtaining left atrial and ventricular pressures simultaneously. Metoprolol was given to slow the patient's HR to 60's and repeat hemodynamic measurements performed. Hemodynamics  Baseline: RA 20/18/15, RV 60/20, PA 60/25/37, PW 25, LA 28/55/28, /25, MV mean pressure 4mmHg, HR 72  Post metoprolol: LA 28/55/28, /25; HR 64    Physical Exam:  General: Well nourished well groomed elderly woman appearing stated age  Neuro: A&OX3. FRANKLIN. PERRL. Steady un-assisted gait  Head:Normocephalic. Atraumatic. Symmetrical  Neck: Trachea Midline  Resp: CTA B. No Adv BS/cough/sputum/tachypnea with seated conversation  CV: S1S2 RRR. NELSY III/VI. No JVD/carotid bruits. Pink/warm/dry extremities. 3+ LE peripheral edema  GI:Benign ab. Soft. NT/ND. Active BS  : Voids  Integ: No obvious s/s of infection or breakdown. B LE lymphedema wraps from ankles to knees  Musculo/Skeletal: FROM in all major joints. Good muscle tone    Assessment/Plan:   1. MR/MS - still symptomatic but still with uncontrolled HTN. Concerned that CHARITY maybe a compounding contributor. Pt can't recall when stent placed or how many (bilateral/unilateral?). Will check CMP today and renal artery US to eval for recurrence of CHARITY. In interim, to increase amlodipine to 10mg daily. Pt to check BP daily and call in one week. Goal . Next step to increase BB. If CHARITY found on renal ultrasound to work with PCP for nephrology referral and intervention. Per previous interactions with pt/daughter, I called daughter Reubin Call and L/M for her to call me for update on her mother's care/plan.      2. Further plan/care by Dr. Ling Beltran

## 2017-03-25 LAB
ALBUMIN SERPL-MCNC: 4.3 G/DL (ref 3.5–4.8)
ALBUMIN/GLOB SERPL: 1.8 {RATIO} (ref 1.2–2.2)
ALP SERPL-CCNC: 74 IU/L (ref 39–117)
ALT SERPL-CCNC: 19 IU/L (ref 0–32)
AST SERPL-CCNC: 20 IU/L (ref 0–40)
BILIRUB SERPL-MCNC: 0.3 MG/DL (ref 0–1.2)
BUN SERPL-MCNC: 26 MG/DL (ref 8–27)
BUN/CREAT SERPL: 20 (ref 11–26)
CALCIUM SERPL-MCNC: 9.1 MG/DL (ref 8.7–10.3)
CHLORIDE SERPL-SCNC: 102 MMOL/L (ref 96–106)
CO2 SERPL-SCNC: 24 MMOL/L (ref 18–29)
CREAT SERPL-MCNC: 1.31 MG/DL (ref 0.57–1)
GLOBULIN SER CALC-MCNC: 2.4 G/DL (ref 1.5–4.5)
GLUCOSE SERPL-MCNC: 107 MG/DL (ref 65–99)
POTASSIUM SERPL-SCNC: 4.1 MMOL/L (ref 3.5–5.2)
PROT SERPL-MCNC: 6.7 G/DL (ref 6–8.5)
SODIUM SERPL-SCNC: 145 MMOL/L (ref 134–144)

## 2017-03-27 ENCOUNTER — ANTI-COAG VISIT (OUTPATIENT)
Dept: INTERNAL MEDICINE CLINIC | Age: 82
End: 2017-03-27

## 2017-03-27 DIAGNOSIS — I48.20 CHRONIC ATRIAL FIBRILLATION (HCC): ICD-10-CM

## 2017-03-27 DIAGNOSIS — Z79.01 LONG TERM (CURRENT) USE OF ANTICOAGULANTS: Primary | ICD-10-CM

## 2017-03-27 LAB
INR BLD: 2
PT POC: 23.6 SEC
VALID INTERNAL CONTROL?: YES

## 2017-03-27 NOTE — PROGRESS NOTES
INR 2.0 today. Patient instructions: Continue Coumadin 6mg daily   Recheck in 3 weeks      A full discussion of the nature of anticoagulants has been carried out. A benefit risk analysis has been presented to the patient, so that they understand the justification for choosing anticoagulation at this time. The need for frequent and regular monitoring, precise dosage adjustment and compliance is stressed. Side effects of potential bleeding are discussed. The patient should avoid any OTC items containing aspirin or ibuprofen, and should avoid great swings in general diet. Avoid alcohol consumption. Call if any signs of abnormal bleeding. Patient verbalized understanding.

## 2017-03-27 NOTE — MR AVS SNAPSHOT
Visit Information Date & Time Provider Department Dept. Phone Encounter #  
 3/27/2017  9:00 AM Jeet Murphy [facility] Internal Medicine [phone] [id number] Your Appointments 3/27/2017  9:00 AM  
ANTICOAG NURSE with Chase Collum [facility] Internal Medicine ([facility]) Appt Note: [address] [facility] [address]  
[phone] [place] [address] [place]  
  
    
 4/4/2017  2:20 PM  
ROUTINE CARE with Breezy Richards MD  
[facility] Internal Medicine [facility]) Appt Note: f/u 2m  
 [address] [facility] [address]  
[phone] [address] Upcoming Health Maintenance Date Due ZOSTER VACCINE AGE 60> 4/17/1997 Pneumococcal 65+ High/Highest Risk (2 of 2 - PPSV23) 10/1/2007 DTaP/Tdap/Td series (1 - Tdap) 12/9/2010 EYE EXAM RETINAL OR DILATED Q1 8/13/2015 GLAUCOMA SCREENING Q2Y 8/13/2016 MICROALBUMIN Q1 2/22/2017 HEMOGLOBIN A1C Q6M 5/10/2017 FOOT EXAM Q1 8/4/2017 LIPID PANEL Q1 8/5/2017 MEDICARE YEARLY EXAM 2/15/2018 Allergies as of 3/27/2017  Review Complete On: 3/24/2017 By: Aly Mathew NP Severity Noted Reaction Type Reactions Actos [Pioglitazone]  04/23/2009    Swelling Swelling of feet and legs Codeine  04/23/2009    Itching Doxycycline  04/23/2009    Nausea Only Hydrocodone  04/17/2012    Rash, Other (comments)  
 hallucinations Current Immunizations  Reviewed on 7/11/2014 Name Date Influenza Vaccine 12/2/2013 Influenza Vaccine Split 10/17/2012 Pneumococcal Vaccine (Unspecified Type) 10/1/2002 TD Vaccine 12/8/2010 Not reviewed this visit You Were Diagnosed With   
  
 Codes Comments Long term (current) use of anticoagulants    -  Primary ICD-10-CM: Z79.01 
ICD-9-CM: V58.61 Chronic atrial fibrillation (HCC)     ICD-10-CM: M51.1 ICD-9-CM: 427.31 Vitals OB Status Smoking Status Hysterectomy Former Smoker Preferred Pharmacy Pharmacy Name Phone [facility] [phone] [facility], [address] [facility], [address] [phone] Your Updated Medication List  
  
   
This list is accurate as of: 3/27/17  8:58 AM.  Always use your most recent med list.  
  
  
  
  
 allopurinol 100 mg tablet Commonly known as:  ZYLOPRIM  
TAKE TWO TABLETS BY MOUTH DAILY  
  
 amLODIPine 5 mg tablet Commonly known as:  Horris Bills Take 2 Tabs by mouth daily. atorvastatin 80 mg tablet Commonly known as:  LIPITOR  
TAKE ONE TABLET BY MOUTH EVERY EVENING  
  
 CENTRUM SILVER Tab tablet Generic drug:  multivitamins-minerals-lutein Take 1 Tab by mouth daily. dofetilide 125 mcg capsule Commonly known as:  Chryl Lakesha Take 1 Cap by mouth two (2) times a day. furosemide 40 mg tablet Commonly known as:  LASIX Take 2 Tabs by mouth every evening. HumuLIN N 100 unit/mL injection Generic drug:  insulin NPH  
50 Units by SubCUTAneous route every morning. 50 units q am and 24 units qpm  
  
 hydrALAZINE 10 mg tablet Commonly known as:  APRESOLINE Take 2 Tabs by mouth three (3) times daily. metoprolol tartrate 50 mg tablet Commonly known as:  LOPRESSOR Take 1 Tab by mouth two (2) times a day. potassium chloride SR 10 mEq tablet Commonly known as:  KLOR-CON 10 Take 1 Tab by mouth daily. PriLOSEC 20 mg capsule Generic drug:  omeprazole Take 20 mg by mouth daily. SYNTHROID 125 mcg tablet Generic drug:  levothyroxine Take 125 mcg by mouth Daily (before breakfast). warfarin 3 mg tablet Commonly known as:  COUMADIN Take 7.5mg on Monday (2.5 tabs); Take 4.5mg on Tuesday: Take 6 mg on Wednesday; Take 4.5mg on Thursday Get INR checked on Thursday Description Continue Coumadin 6mg daily Recheck in 3 weeks March 2017 Details Sun Mon Tue Wed Thu Fri Sat  
     1  
  
  
  
   2  
  
  
  
   3  
  
  
  
   4  
  
  
  
  
  5  
  
  
  
   6  
  
  
  
   7  
  
  
  
   8  
  
  
  
   9  
  
  
  
   10  
  
  
  
   11  
  
  
  
  
  12  
  
  
  
   13  
  
  
  
   14  
  
  
  
   15  
  
  
  
   16  
  
  
  
   17  
  
  
  
   18  
  
  
  
  
  19  
  
  
  
   20  
  
  
  
   21  
  
  
  
   22  
  
  
  
   23  
  
  
  
   24  
  
  
  
   25  
  
  
  
  
  26  
  
  
  
   27 2.0  
6 mg See details 28  
  
6 mg  
  
   29  
  
6 mg  
  
   30  
  
6 mg  
  
   31  
  
6 mg Date Details 03/27 This INR check INR: 2.0 How to take your warfarin dose To take:  6 mg Take 2 of the 3 mg tablets. April 2017 Details Macario Perez Tue Wed Thu Fri Sat 1  
  
6 mg 2  
  
6 mg  
  
   3  
  
6 mg  
  
   4  
  
6 mg  
  
   5  
  
6 mg  
  
   6  
  
6 mg  
  
   7  
  
6 mg  
  
   8  
  
6 mg  
  
  
  9  
  
6 mg  
  
   10  
  
6 mg  
  
   11  
  
6 mg  
  
   12  
  
6 mg  
  
   13  
  
6 mg  
  
   14  
  
6 mg  
  
   15  
  
6 mg  
  
  
  16  
  
6 mg  
  
   17  
  
6 mg  
  
   18  
  
  
  
   19  
  
  
  
   20  
  
  
  
   21  
  
  
  
   22  
  
  
  
  
  23  
  
  
  
   24  
  
  
  
   25  
  
  
  
   26  
  
  
  
   27  
  
  
  
   28  
  
  
  
   29  
  
  
  
  
  30  
  
  
  
        
 Date Details No additional details Date of next INR:  4/17/2017 How to take your warfarin dose To take:  6 mg Take 2 of the 3 mg tablets. We Performed the Following AMB POC PT/INR [59546 CPT(R)] Introducing \[facility] [place] [facility]\"" & HEALTH SERVICES! Agnieszka Grimm introduces [employer] patient portal. Now you can access parts of your medical record, email your doctor's office, and request medication refills online. 1. In your internet browser, go to [email or web address] 2. Click on the First Time User? Click Here link in the Sign In box. You will see the New Member Sign Up page. 3. Enter your [employer] Access Code exactly as it appears below. You will not need to use this code after youve completed the sign-up process. If you do not sign up before the expiration date, you must request a new code. · [employer] Access Code: [email or web address] Expires: 5/8/2017  3:28 PM 
 
4. Enter the last four digits of your Social Security Number (xxxx) and Date of Birth (mm/dd/yyyy) as indicated and click Submit. You will be taken to the next sign-up page. 5. Create a [employer] ID. This will be your [employer] login ID and cannot be changed, so think of one that is secure and easy to remember. 6. Create a [employer] password. You can change your password at any time. 7. Enter your Password Reset Question and Answer. This can be used at a later time if you forget your password. 8. Enter your e-mail address. You will receive e-mail notification when new information is available in [address] 9. Click Sign Up. You can now view and download portions of your medical record. 10. Click the Download Summary menu link to download a portable copy of your medical information. If you have questions, please visit the Frequently Asked Questions section of the [employer] website. Remember, [employer] is NOT to be used for urgent needs. For medical emergencies, dial 911. Now available from your iPhone and Android! Please provide this summary of care documentation to your next provider. Your primary care clinician is listed as ANDRES BURDEN. If you have any questions after today's visit, please call [phone].

## 2017-03-28 ENCOUNTER — TELEPHONE (OUTPATIENT)
Dept: INTERNAL MEDICINE CLINIC | Age: 82
End: 2017-03-28

## 2017-03-28 DIAGNOSIS — I73.9 PVD (PERIPHERAL VASCULAR DISEASE) (HCC): Primary | ICD-10-CM

## 2017-03-28 DIAGNOSIS — J81.0 ACUTE PULMONARY EDEMA (HCC): ICD-10-CM

## 2017-03-28 DIAGNOSIS — I50.9 CHRONIC HEART FAILURE, UNSPECIFIED HEART FAILURE TYPE (HCC): ICD-10-CM

## 2017-03-28 DIAGNOSIS — I48.20 CHRONIC ATRIAL FIBRILLATION (HCC): ICD-10-CM

## 2017-03-28 NOTE — TELEPHONE ENCOUNTER
Called back [place] with Sadie Moctezuma - requesting fax number to send order she has requested. Faxed order to Lavinia Hamm [phone]. Confirmation received.

## 2017-03-28 NOTE — TELEPHONE ENCOUNTER
Spoke with Seth strauss MD has approved request. Need fax number to where she wants me to fax order to. She will call back - was driving.

## 2017-03-31 ENCOUNTER — TELEPHONE (OUTPATIENT)
Dept: CARDIOTHORACIC SURGERY | Age: 82
End: 2017-03-31

## 2017-03-31 NOTE — TELEPHONE ENCOUNTER
Spoke with Ms. Yeni Blackman to check on blood pressure results:  3/25- BP:154/62 HR: 66  3/26- BP: 158/57 HR: 64  3/27- BP: 157/62 HR:61  3/28- BP: 160/59 HR: 58  3/29- BP: 150/59 HR: 73  3/30- BP: 146/61 HR: 71  3/31- BP: 158/60 HR:66    Patient states she feel well. Patient stated her renal US is scheduled for 4/3 at 0800.

## 2017-04-03 ENCOUNTER — HOSPITAL ENCOUNTER (OUTPATIENT)
Dept: VASCULAR SURGERY | Age: 82
Discharge: HOME | End: 2017-04-03
Attending: NURSE PRACTITIONER
Payer: MEDICARE

## 2017-04-03 DIAGNOSIS — I10 ESSENTIAL HYPERTENSION, BENIGN: ICD-10-CM

## 2017-04-03 DIAGNOSIS — Z79.01 LONG TERM (CURRENT) USE OF ANTICOAGULANTS: ICD-10-CM

## 2017-04-03 DIAGNOSIS — N18.30 CONTROLLED TYPE 2 DIABETES MELLITUS WITH STAGE 3 CHRONIC KIDNEY DISEASE, WITH LONG-TERM CURRENT USE OF INSULIN (HCC): ICD-10-CM

## 2017-04-03 DIAGNOSIS — E11.22 CONTROLLED TYPE 2 DIABETES MELLITUS WITH STAGE 3 CHRONIC KIDNEY DISEASE, WITH LONG-TERM CURRENT USE OF INSULIN (HCC): ICD-10-CM

## 2017-04-03 DIAGNOSIS — I73.9 PVD (PERIPHERAL VASCULAR DISEASE) (HCC): ICD-10-CM

## 2017-04-03 DIAGNOSIS — Z98.890 S/P MVR (MITRAL VALVE REPAIR): ICD-10-CM

## 2017-04-03 DIAGNOSIS — I48.20 CHRONIC ATRIAL FIBRILLATION (HCC): ICD-10-CM

## 2017-04-03 DIAGNOSIS — I34.0 MITRAL VALVE INSUFFICIENCY, UNSPECIFIED ETIOLOGY: ICD-10-CM

## 2017-04-03 DIAGNOSIS — Z79.4 CONTROLLED TYPE 2 DIABETES MELLITUS WITH STAGE 3 CHRONIC KIDNEY DISEASE, WITH LONG-TERM CURRENT USE OF INSULIN (HCC): ICD-10-CM

## 2017-04-03 DIAGNOSIS — I70.1 RAS (RENAL ARTERY STENOSIS) (HCC): ICD-10-CM

## 2017-04-03 PROCEDURE — 93975 VASCULAR STUDY: CPT

## 2017-04-03 NOTE — PROCEDURES
[facility]  *** FINAL REPORT ***    Name: Ambrose Harding  MRN: [id number]    Outpatient  : 1935  HIS Order #: [id number]  [address] [facility] Visit #: [id number]  Date: 2017    TYPE OF TEST: Visceral Arterial Duplex    REASON FOR TEST  Eval for renal vascular HTN    Aortic PSV: 183.8 cm/s  Diameter AP: 1.6 cm   TV: 1.6 cm                   Right          Left  Renal Artery:- -------------  -------------  Proximal  PSV: 136.5          218.0  Mid       PSV: 156.5          234.1  Distal    PSV: 134.5          195.9  Aortic ratio :   0.9            1.3    Medullary PSV:            EDV:            EDR:            SDR:    Cortical  PSV:            EDV:            EDR:            SDR:  Stenosis:      Normal         Mild < 60 %  Kidney size:    8.5 cm         9.7 cm               x  4.5 cm      x  4.6 cm    Hilar:-        Right          Left  Acc. Time  AT:     secs           secs  Acc. Index AI:             RI: 0.82           0.84    Mesenteric:-                  Prox   Mid   Dist Ratio Stenosis          Aneurysm                  ----- ----- ----- ----- ----------------- ------------  SMA:            379.9              2.1 Severe > 70%  Celiac:         281.9               1.5 Severe > 70%  Hepatic:  Splenic:  VIDYA:  :    INTERPRETATION/FINDINGS  PROCEDURE: Evaluation of renal arteries with ultrasound (B-mode  imaging, pulsed Doppler, color Doppler). Includes the aorta, celiac  artery, superior mesenteric artery, renal arteries, segmental  arteries, and renal vein. IMPRESSION: Technially difficult study due to excess bowel gas and  patients body habitus. 1. Note: High aortic PSV invalidates the use of RAR as an indicator of   renal stenosis. RENAL:  1. No significant stenosis in the right renal artery. 2. Mild < 60 % stenosis in the left renal artery. 3. The right kidney measures 8.5 x 4.5 cm.   4. The left kidney measures 9.7 x 4.6 cm.  5. Bilateral intrinsic/medical renal disease identified. MESENTERIC: Based on velocity criteria,  1. 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 2. 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 ADDITIONAL COMMENTS    NOTE: Patient is fasting. I have personally reviewed the data relevant to the interpretation of  this  study. TECHNOLOGIST: Nilson Saavedra RDCS  Signed: 04/03/2017 10:25 AM    PHYSICIAN: Broderick Bowling.  Jd Benitez MD  Signed: 04/03/2017 01:44 PM

## 2017-04-04 ENCOUNTER — OFFICE VISIT (OUTPATIENT)
Dept: INTERNAL MEDICINE CLINIC | Age: 82
End: 2017-04-04

## 2017-04-04 VITALS
TEMPERATURE: 98 F | SYSTOLIC BLOOD PRESSURE: 140 MMHG | DIASTOLIC BLOOD PRESSURE: 60 MMHG | BODY MASS INDEX: 32.88 KG/M2 | HEIGHT: 69 IN | RESPIRATION RATE: 20 BRPM | WEIGHT: 222 LBS | HEART RATE: 86 BPM | OXYGEN SATURATION: 96 %

## 2017-04-04 DIAGNOSIS — E78.2 MIXED HYPERLIPIDEMIA: ICD-10-CM

## 2017-04-04 DIAGNOSIS — D50.9 IRON DEFICIENCY ANEMIA, UNSPECIFIED IRON DEFICIENCY ANEMIA TYPE: ICD-10-CM

## 2017-04-04 DIAGNOSIS — E06.3 HYPOTHYROIDISM, ACQUIRED, AUTOIMMUNE: ICD-10-CM

## 2017-04-04 DIAGNOSIS — N18.30 CONTROLLED TYPE 2 DIABETES MELLITUS WITH STAGE 3 CHRONIC KIDNEY DISEASE, WITH LONG-TERM CURRENT USE OF INSULIN (HCC): Primary | ICD-10-CM

## 2017-04-04 DIAGNOSIS — Z79.4 CONTROLLED TYPE 2 DIABETES MELLITUS WITH STAGE 3 CHRONIC KIDNEY DISEASE, WITH LONG-TERM CURRENT USE OF INSULIN (HCC): Primary | ICD-10-CM

## 2017-04-04 DIAGNOSIS — I48.20 CHRONIC ATRIAL FIBRILLATION (HCC): ICD-10-CM

## 2017-04-04 DIAGNOSIS — I10 ESSENTIAL HYPERTENSION, BENIGN: ICD-10-CM

## 2017-04-04 DIAGNOSIS — E11.22 CONTROLLED TYPE 2 DIABETES MELLITUS WITH STAGE 3 CHRONIC KIDNEY DISEASE, WITH LONG-TERM CURRENT USE OF INSULIN (HCC): Primary | ICD-10-CM

## 2017-04-04 LAB — HBA1C MFR BLD HPLC: 6.5 %

## 2017-04-04 NOTE — MR AVS SNAPSHOT
Visit Information Date & Time Provider Department Dept. Phone Encounter #  
 4/4/2017  2:20 PM Lubna Treviño, [address] [facility] Internal Medicine [phone] [id number] Follow-up Instructions Return in about 4 months (around 8/4/2017). Your Appointments 4/17/2017  9:00 AM  
ANTICOAG NURSE with Brooklyn Almendarez [facility] Internal Medicine ([address]) Appt Note: [address] [facility] [address] [facility] [address]  
[address]  [place] [address] Upcoming Health Maintenance Date Due ZOSTER VACCINE AGE 60> 4/17/1997 Pneumococcal 65+ High/Highest Risk (2 of 2 - PPSV23) 10/1/2007 DTaP/Tdap/Td series (1 - Tdap) 12/9/2010 EYE EXAM RETINAL OR DILATED Q1 8/13/2015 GLAUCOMA SCREENING Q2Y 8/13/2016 MICROALBUMIN Q1 2/22/2017 HEMOGLOBIN A1C Q6M 5/10/2017 FOOT EXAM Q1 8/4/2017 LIPID PANEL Q1 8/5/2017 MEDICARE YEARLY EXAM 2/15/2018 Allergies as of 4/4/2017  Review Complete On: 4/4/2017 By: Lubna Treviño MD  
  
 Severity Noted Reaction Type Reactions Actos [Pioglitazone]  04/23/2009    Swelling Swelling of feet and legs Codeine  04/23/2009    Itching Doxycycline  04/23/2009    Nausea Only Hydrocodone  04/17/2012    Rash, Other (comments)  
 hallucinations Current Immunizations  Reviewed on 7/11/2014 Name Date Influenza Vaccine 12/2/2013 Influenza Vaccine Split 10/17/2012 Pneumococcal Vaccine (Unspecified Type) 10/1/2002 TD Vaccine 12/8/2010 Not reviewed this visit You Were Diagnosed With   
  
 Codes Comments Controlled type 2 diabetes mellitus with stage 3 chronic kidney disease, with long-term current use of insulin (HCC)    -  Primary ICD-10-CM: E11.22, N18.3, Z79.4 ICD-9-CM: 250.40, 585.3, V58.67 Chronic atrial fibrillation (HCC)     ICD-10-CM: V81.4 ICD-9-CM: 427.31   
 Iron deficiency anemia, unspecified iron deficiency anemia type     ICD-10-CM: D50.9 ICD-9-CM: 280.9 Essential hypertension, benign     ICD-10-CM: I10 
ICD-9-CM: 401.1 Mixed hyperlipidemia     ICD-10-CM: E78.2 ICD-9-CM: 272.2 Hypothyroidism, acquired, autoimmune     ICD-10-CM: E03.8 ICD-9-CM: 244.8 Vitals BP Pulse Temp Resp Height(growth percentile) Weight(growth percentile) 140/60 (BP 1 Location: Right arm, BP Patient Position: Sitting) 86 98 °F (36.7 °C) (Oral) 20 5' 9\" (1.753 m) 222 lb (100.7 kg) SpO2 BMI OB Status Smoking Status 96% 32.78 kg/m2 Hysterectomy Former Smoker BMI and BSA Data Body Mass Index Body Surface Area 32.78 kg/m 2 2.21 m 2 Preferred Pharmacy Pharmacy Name Phone [facility] Ro[facility] [phone] [phone] Your Updated Medication List  
  
   
This list is accurate as of: 4/4/17  3:18 PM.  Always use your most recent med list.  
  
  
  
  
 allopurinol 100 mg tablet Commonly known as:  ZYLOPRIM  
TAKE TWO TABLETS BY MOUTH DAILY  
  
 amLODIPine 5 mg tablet Commonly known as:  [place] Arnt Take 2 Tabs by mouth daily. atorvastatin 80 mg tablet Commonly known as:  LIPITOR  
TAKE ONE TABLET BY MOUTH EVERY EVENING  
  
 CENTRUM SILVER Tab tablet Generic drug:  multivitamins-minerals-lutein Take 1 Tab by mouth daily. dofetilide 125 mcg capsule Commonly known as:  Azalee Net Take 1 Cap by mouth two (2) times a day. furosemide 40 mg tablet Commonly known as:  LASIX Take 2 Tabs by mouth every evening. HumuLIN N 100 unit/mL injection Generic drug:  insulin NPH  
50 Units by SubCUTAneous route every morning. 50 units q am and 24 units qpm  
  
 hydrALAZINE 10 mg tablet Commonly known as:  APRESOLINE Take 2 Tabs by mouth three (3) times daily. metoprolol tartrate 50 mg tablet Commonly known as:  LOPRESSOR Take 1 Tab by mouth two (2) times a day. potassium chloride SR 10 mEq tablet Commonly known as:  KLOR-CON 10 Take 1 Tab by mouth daily. PriLOSEC 20 mg capsule Generic drug:  omeprazole Take 20 mg by mouth daily. SYNTHROID 125 mcg tablet Generic drug:  levothyroxine Take 125 mcg by mouth Daily (before breakfast). warfarin 3 mg tablet Commonly known as:  COUMADIN Take 7.5mg on Monday (2.5 tabs); Take 4.5mg on Tuesday: Take 6 mg on Wednesday; Take 4.5mg on Thursday Get INR checked on Thursday We Performed the Following HEMOGLOBIN A1C WITH EAG [02128 CPT(R)] Follow-up Instructions Return in about 4 months (around 8/4/2017). Introducing [facility] & HEALTH SERVICES! Anil Jordan introduces [employer] patient portal. Now you can access parts of your medical record, email your doctor's office, and request medication refills online. 1. In your internet browser, go to [email or web address] 2. Click on the First Time User? Click Here link in the Sign In box. You will see the New Member Sign Up page. 3. Enter your [employer] Access Code exactly as it appears below. You will not need to use this code after youve completed the sign-up process. If you do not sign up before the expiration date, you must request a new code. · [employer] Access Code: [email or web address] Expires: 5/8/2017  3:28 PM 
 
4. Enter the last four digits of your Social Security Number (xxxx) and Date of Birth (mm/dd/yyyy) as indicated and click Submit. You will be taken to the next sign-up page. 5. Create a Gives[employer] ID. This will be your [employer] login ID and cannot be changed, so think of one that is secure and easy to remember. 6. Create a [employer] password. You can change your password at any time. 7. Enter your Password Reset Question and Answer. This can be used at a later time if you forget your password. 8. Enter your e-mail address. You will receive e-mail notification when new information is available in [address] 9. Click Sign Up. You can now view and download portions of your medical record. 10. Click the Download Summary menu link to download a portable copy of your medical information. If you have questions, please visit the Frequently Asked Questions section of the [employer] website. Remember, [employer] is NOT to be used for urgent needs. For medical emergencies, dial 911. Now available from your iPhone and Android! Please provide this summary of care documentation to your next provider. Your primary care clinician is listed as ANDRES BURDEN. If you have any questions after today's visit, please call [phone].

## 2017-04-04 NOTE — PROGRESS NOTES
HISTORY OF PRESENT ILLNESS  Shayla Barth is a 80 y.o. female. HPI Erendira Ly is seen today for evaluation of diabetes and other medical problems. 1. Diabetes, type 2, controlled, with renal manifestation, CKD 3 with long term insulin use. Fingerstick A1c is 6.5%. I am very pleased with her status and she is also. She will continue current regimen. She has need for diabetic shoes based on foot deformity and poor circulation. She has had a longstanding history of vascular disease. She has used diabetic shoes in the past.  I am prescribing diabetic shoes today. 2. Essential hypertension. Stable on current regimen. 3. CKD 3 status post valve surgery. She saw Dr. Sharon Tenorio of cardiac surgery. They ran dopplers on her kidneys. 4. Mixed hyperlipidemia, autoimmune acquired hypothyroidism. Recheck labs in two months. Social History:  Notable for her moving to [place]. She will be closer to her daughter there. MedDATA/gwo         Review of Systems   Constitutional: Negative for weight loss. Respiratory: Negative. Cardiovascular: Positive for leg swelling. Negative for chest pain, palpitations and PND. Musculoskeletal: Negative for myalgias. Neurological: Negative for focal weakness. Endo/Heme/Allergies: Does not bruise/bleed easily. Physical Exam   Constitutional: No distress. Cardiovascular: Normal rate and regular rhythm. Exam reveals no gallop and no friction rub. No murmur heard. Pulses:       Dorsalis pedis pulses are 1+ on the right side, and 1+ on the left side. Posterior tibial pulses are 1+ on the right side, and 1+ on the left side. Pulmonary/Chest: Effort normal and breath sounds normal.   Musculoskeletal: She exhibits edema. Feet:    Moderate bilateral lower extremity edema,   Nursing note and vitals reviewed. ASSESSMENT and PLAN  Erendira Ly was seen today for medication evaluation.     Diagnoses and all orders for this visit:    Controlled type 2 diabetes mellitus with stage 3 chronic kidney disease, with long-term current use of insulin (HCC)  -     HEMOGLOBIN A1C WITH EAG  -     AMB POC HEMOGLOBIN K3E  -     METABOLIC PANEL, COMPREHENSIVE    Chronic atrial fibrillation [facility])- See cardiologist as directed.  Please follow inr protocol     Iron deficiency anemia, unspecified iron deficiency anemia type- follow    Essential hypertension, benign- Continue current regimen of prescription and / or OTC medications     Mixed hyperlipidemia  -     LIPID PANEL  -     CBC WITH AUTOMATED DIFF    Hypothyroidism, acquired, autoimmune  -     TSH 3RD GENERATION    Other orders  -     Cancel: MICROALBUMIN, UR, RAND W/ MICROALBUMIN/CREA RATIO

## 2017-04-17 ENCOUNTER — ANTI-COAG VISIT (OUTPATIENT)
Dept: INTERNAL MEDICINE CLINIC | Age: 82
End: 2017-04-17

## 2017-04-17 DIAGNOSIS — I48.20 CHRONIC ATRIAL FIBRILLATION (HCC): Primary | ICD-10-CM

## 2017-04-17 LAB
INR BLD: 1.8
PT POC: 21.9 SEC
VALID INTERNAL CONTROL?: YES

## 2017-04-17 NOTE — PROGRESS NOTES
INR 1.8 today. Patient instructions: Continue Coumadin 6mg daily   Recheck in 2 weeks  If remains low at next check dose will be adjusted    A full discussion of the nature of anticoagulants has been carried out. A benefit risk analysis has been presented to the patient, so that they understand the justification for choosing anticoagulation at this time. The need for frequent and regular monitoring, precise dosage adjustment and compliance is stressed. Side effects of potential bleeding are discussed. The patient should avoid any OTC items containing aspirin or ibuprofen, and should avoid great swings in general diet. Avoid alcohol consumption. Call if any signs of abnormal bleeding. Patient verbalized understanding.

## 2017-04-17 NOTE — MR AVS SNAPSHOT
Visit Information Date & Time Provider Department Dept. Phone Encounter #  
 4/17/2017  9:00 AM Tenisha Nina [facility] Internal Medicine [phone] [id number] Your Appointments 4/17/2017  9:00 AM  
ANTICOAG NURSE with Sue Felty [facility] End Internal Medicine ([address]) Appt Note: inr; due/elig for [facility] - 2/15/18 or later [address] [place] [address]  
[address] [place] [address]  
  
    
 5/1/2017  9:30 AM  
ANTICOAG NURSE with Sue Felty [facility] End Internal Medicine ([address]) Appt Note: inr check [address] [place]  
[phone]  
  
    
 8/7/2017 10:20 AM  
ROUTINE CARE with Ishmael Bustamante MD  
[facility] Internal Medicine [address]) Appt Note: 4 mnth follow up  
 [address] [facility] [address] [facility] [address]  
[address] [place] [address] Upcoming Health Maintenance Date Due ZOSTER VACCINE AGE 60> 4/17/1997 Pneumococcal 65+ High/Highest Risk (2 of 2 - PPSV23) 10/1/2007 DTaP/Tdap/Td series (1 - Tdap) 12/9/2010 EYE EXAM RETINAL OR DILATED Q1 8/13/2015 GLAUCOMA SCREENING Q2Y 8/13/2016 MICROALBUMIN Q1 2/22/2017 FOOT EXAM Q1 8/4/2017 LIPID PANEL Q1 8/5/2017 HEMOGLOBIN A1C Q6M 10/4/2017 MEDICARE YEARLY EXAM 2/15/2018 Allergies as of 4/17/2017  Review Complete On: 4/4/2017 By: Ishmael Bustamante MD  
  
 Severity Noted Reaction Type Reactions Actos [Pioglitazone]  04/23/2009    Swelling Swelling of feet and legs Codeine  04/23/2009    Itching Doxycycline  04/23/2009    Nausea Only Hydrocodone  04/17/2012    Rash, Other (comments)  
 hallucinations Current Immunizations  Reviewed on 7/11/2014 Name Date Influenza Vaccine 12/2/2013 Influenza Vaccine Split 10/17/2012 Pneumococcal Vaccine (Unspecified Type) 10/1/2002 TD Vaccine 12/8/2010 Not reviewed this visit You Were Diagnosed With   
  
 Codes Comments Chronic atrial fibrillation (HCC)    -  Primary ICD-10-CM: M42.2 ICD-9-CM: 427.31 Vitals OB Status Smoking Status Hysterectomy Former Smoker Preferred Pharmacy Pharmacy Name Phone Jc King [address] [facility], [address] [place] [address], [address] [phone] Your Updated Medication List  
  
   
This list is accurate as of: 4/17/17  8:58 AM.  Always use your most recent med list.  
  
  
  
  
 allopurinol 100 mg tablet Commonly known as:  ZYLOPRIM  
TAKE TWO TABLETS BY MOUTH DAILY  
  
 amLODIPine 5 mg tablet Commonly known as:  [place] Half Take 2 Tabs by mouth daily. atorvastatin 80 mg tablet Commonly known as:  LIPITOR  
TAKE ONE TABLET BY MOUTH EVERY EVENING  
  
 CENTRUM SILVER Tab tablet Generic drug:  multivitamins-minerals-lutein Take 1 Tab by mouth daily. dofetilide 125 mcg capsule Commonly known as:  [place] Chaim Take 1 Cap by mouth two (2) times a day. furosemide 40 mg tablet Commonly known as:  LASIX Take 2 Tabs by mouth every evening. HumuLIN N 100 unit/mL injection Generic drug:  insulin NPH  
50 Units by SubCUTAneous route every morning. 50 units q am and 24 units qpm  
  
 hydrALAZINE 10 mg tablet Commonly known as:  APRESOLINE Take 2 Tabs by mouth three (3) times daily. metoprolol tartrate 50 mg tablet Commonly known as:  LOPRESSOR Take 1 Tab by mouth two (2) times a day. potassium chloride SR 10 mEq tablet Commonly known as:  KLOR-CON 10 Take 1 Tab by mouth daily. PriLOSEC 20 mg capsule Generic drug:  omeprazole Take 20 mg by mouth daily. SYNTHROID 125 mcg tablet Generic drug:  levothyroxine Take 125 mcg by mouth Daily (before breakfast). warfarin 3 mg tablet Commonly known as:  COUMADIN Take 7.5mg on Monday (2.5 tabs);   Take 4.5mg on Tuesday: Take 6 mg on Wednesday; Take 4.5mg on Thursday Get INR checked on Thursday Description Continue Coumadin 6mg daily Recheck in 2 weeks April 2017 Details Sun Mon Tue Wed Thu Fri Sat  
        1  
  
  
  
  
  2  
  
  
  
   3  
  
  
  
   4  
  
  
  
   5  
  
  
  
   6  
  
  
  
   7  
  
  
  
   8  
  
  
  
  
  9  
  
  
  
   10  
  
  
  
   11  
  
  
  
   12  
  
  
  
   13  
  
  
  
   14  
  
  
  
   15  
  
  
  
  
  16  
  
  
  
   17 1.8  
6 mg See details 18  
  
6 mg  
  
   19  
  
6 mg  
  
   20  
  
6 mg  
  
   21  
  
6 mg  
  
   22  
  
6 mg  
  
  
  23  
  
6 mg  
  
   24  
  
6 mg  
  
   25  
  
6 mg  
  
   26  
  
6 mg  
  
   27  
  
6 mg  
  
   28  
  
6 mg  
  
   29  
  
6 mg  
  
  
  30  
  
6 mg Date Details 04/17 This INR check INR: 1.8 How to take your warfarin dose To take:  6 mg Take 2 of the 3 mg tablets. May 2017 Details Hood Quezadater Tue Wed Thu Fri Sat 1  
  
6 mg  
  
   2  
  
  
  
   3  
  
  
  
   4  
  
  
  
   5  
  
  
  
   6  
  
  
  
  
  7  
  
  
  
   8  
  
  
  
   9  
  
  
  
   10  
  
  
  
   11  
  
  
  
   12  
  
  
  
   13  
  
  
  
  
  14  
  
  
  
   15  
  
  
  
   16  
  
  
  
   17  
  
  
  
   18  
  
  
  
   19  
  
  
  
   20  
  
  
  
  
  21  
  
  
  
   22  
  
  
  
   23  
  
  
  
   24  
  
  
  
   25  
  
  
  
   26  
  
  
  
   27  
  
  
  
  
  28  
  
  
  
   29  
  
  
  
   30  
  
  
  
   31  
  
  
  
     
 Date Details No additional details Date of next INR:  5/1/2017 How to take your warfarin dose To take:  6 mg Take 2 of the 3 mg tablets. We Performed the Following AMB POC PT/INR [68142 CPT(R)] Introducing [facility]!    
 [employer] introduces [employer] patient portal. Now you can access parts of your medical record, email your doctor's office, and request medication refills online. 1. In your internet browser, go to [email or web address] 2. Click on the First Time User? Click Here link in the Sign In box. You will see the New Member Sign Up page. 3. Enter your [employer] Access Code exactly as it appears below. You will not need to use this code after youve completed the sign-up process. If you do not sign up before the expiration date, you must request a new code. · [employer] Access Code: [email or web address] Expires: 5/8/2017  3:28 PM 
 
4. Enter the last four digits of your Social Security Number (xxxx) and Date of Birth (mm/dd/yyyy) as indicated and click Submit. You will be taken to the next sign-up page. 5. Create a [employer] ID. This will be your [employer] login ID and cannot be changed, so think of one that is secure and easy to remember. 6. Create a [employer] password. You can change your password at any time. 7. Enter your Password Reset Question and Answer. This can be used at a later time if you forget your password. 8. Enter your e-mail address. You will receive e-mail notification when new information is available in [address] 9. Click Sign Up. You can now view and download portions of your medical record. 10. Click the Download Summary menu link to download a portable copy of your medical information. If you have questions, please visit the Frequently Asked Questions section of the [employer] website. Remember, [employer] is NOT to be used for urgent needs. For medical emergencies, dial 911. Now available from your iPhone and Android! Please provide this summary of care documentation to your next provider. Your primary care clinician is listed as ANDRES BURDEN. If you have any questions after today's visit, please call [phone].

## 2017-05-01 ENCOUNTER — ANTI-COAG VISIT (OUTPATIENT)
Dept: INTERNAL MEDICINE CLINIC | Age: 82
End: 2017-05-01

## 2017-05-01 DIAGNOSIS — I48.91 ATRIAL FIBRILLATION, UNSPECIFIED TYPE (HCC): Primary | ICD-10-CM

## 2017-05-01 LAB
INR BLD: 3.3
PT POC: 39.9 SEC
VALID INTERNAL CONTROL?: YES

## 2017-05-01 NOTE — PROGRESS NOTES
INR 3.3 today. Patient has recently moved, she may have eaten less greens than normal last week. Patient instructions: Take 3mg today instead of 6mg then   Continue Coumadin 6mg daily   Recheck in 1 week      A full discussion of the nature of anticoagulants has been carried out. A benefit risk analysis has been presented to the patient, so that they understand the justification for choosing anticoagulation at this time. The need for frequent and regular monitoring, precise dosage adjustment and compliance is stressed. Side effects of potential bleeding are discussed. The patient should avoid any OTC items containing aspirin or ibuprofen, and should avoid great swings in general diet. Avoid alcohol consumption. Call if any signs of abnormal bleeding. Patient verbalized understanding.

## 2017-05-01 NOTE — MR AVS SNAPSHOT
Visit Information Date & Time Provider Department Dept. Phone Encounter #  
 5/1/2017  9:30 AM [facility] Internal Medicine [phone] [id number] Your Appointments 5/1/2017  9:30 AM  
ANTICOAG NURSE with Gena Valle [facility] Internal Medicine ([facility]) Appt Note: inr check [address] [facility] [address]  
Michael Leary[address]brad [address]74 [address] [address]  
  
    
 8/7/2017 10:20 AM  
ROUTINE CARE with Harper Bose MD  
[facility] Internal Medicine [facility]) Appt Note: 4 mnth follow up  
 [address] [facility] [address]  
Michael Leary[address]brad [address]74 [address] [address] Upcoming Health Maintenance Date Due ZOSTER VACCINE AGE 60> 4/17/1997 Pneumococcal 65+ High/Highest Risk (2 of 2 - PPSV23) 10/1/2007 DTaP/Tdap/Td series (1 - Tdap) 12/9/2010 EYE EXAM RETINAL OR DILATED Q1 8/13/2015 GLAUCOMA SCREENING Q2Y 8/13/2016 MICROALBUMIN Q1 2/22/2017 INFLUENZA AGE 9 TO ADULT 8/1/2017 FOOT EXAM Q1 8/4/2017 LIPID PANEL Q1 8/5/2017 HEMOGLOBIN A1C Q6M 10/4/2017 MEDICARE YEARLY EXAM 2/15/2018 Allergies as of 5/1/2017  Review Complete On: 4/4/2017 By: Harper Bose MD  
  
 Severity Noted Reaction Type Reactions Actos [Pioglitazone]  04/23/2009    Swelling Swelling of feet and legs Codeine  04/23/2009    Itching Doxycycline  04/23/2009    Nausea Only Hydrocodone  04/17/2012    Rash, Other (comments)  
 hallucinations Current Immunizations  Reviewed on 7/11/2014 Name Date Influenza Vaccine 12/2/2013 Influenza Vaccine Split 10/17/2012 Pneumococcal Vaccine (Unspecified Type) 10/1/2002 TD Vaccine 12/8/2010 Not reviewed this visit You Were Diagnosed With   
  
 Codes Comments  At[facility], unspecified type [facility]    -  Primary ICD-10-CM: I48.91 
ICD-9-CM: 427.31   
  
 Vitals OB Status Smoking Status Hysterectomy Former Smoker Preferred Pharmacy Pharmacy Name Phone Terrell Butts [address] [facility], [address] [facility], [address] [phone] Your Updated Medication List  
  
   
This list is accurate as of: 5/1/17  9:19 AM.  Always use your most recent med list.  
  
  
  
  
 allopurinol 100 mg tablet Commonly known as:  ZYLOPRIM  
TAKE TWO TABLETS BY MOUTH DAILY  
  
 amLODIPine 5 mg tablet Commonly known as:  Olivia Marsh Take 2 Tabs by mouth daily. atorvastatin 80 mg tablet Commonly known as:  LIPITOR  
TAKE ONE TABLET BY MOUTH EVERY EVENING  
  
 CENTRUM SILVER Tab tablet Generic drug:  multivitamins-minerals-lutein Take 1 Tab by mouth daily. dofetilide 125 mcg capsule Commonly known as:  Kevin Harpin Take 1 Cap by mouth two (2) times a day. furosemide 40 mg tablet Commonly known as:  LASIX Take 2 Tabs by mouth every evening. HumuLIN N 100 unit/mL injection Generic drug:  insulin NPH  
50 Units by SubCUTAneous route every morning. 50 units q am and 24 units qpm  
  
 hydrALAZINE 10 mg tablet Commonly known as:  APRESOLINE Take 2 Tabs by mouth three (3) times daily. metoprolol tartrate 50 mg tablet Commonly known as:  LOPRESSOR Take 1 Tab by mouth two (2) times a day. potassium chloride SR 10 mEq tablet Commonly known as:  KLOR-CON 10 Take 1 Tab by mouth daily. PriLOSEC 20 mg capsule Generic drug:  omeprazole Take 20 mg by mouth daily. SYNTHROID 125 mcg tablet Generic drug:  levothyroxine Take 125 mcg by mouth Daily (before breakfast). warfarin 3 mg tablet Commonly known as:  COUMADIN Take 7.5mg on Monday (2.5 tabs); Take 4.5mg on Tuesday: Take 6 mg on Wednesday; Take 4.5mg on Thursday Get INR checked on Thursday Description Continue Coumadin 6mg daily Recheck in  week May 2017 Details Mark Rodrigues Tuludin Wed Thu Fri Sat  
   1  
 6 mg  
See details 2  
  
6 mg  
  
   3  
  
6 mg  
  
   4  
  
6 mg  
  
   5  
  
6 mg  
  
   6  
  
6 mg  
  
  
  7  
  
6 mg  
  
   8  
  
6 mg  
  
   9  
  
6 mg  
  
   10  
  
  
  
   11  
  
  
  
   12  
  
  
  
   13  
  
  
  
  
  14  
  
  
  
   15  
  
  
  
   16  
  
  
  
   17  
  
  
  
   18  
  
  
  
   19  
  
  
  
   20  
  
  
  
  
  21  
  
  
  
   22  
  
  
  
   23  
  
  
  
   24  
  
  
  
   25  
  
  
  
   26  
  
  
  
   27  
  
  
  
  
  28  
  
  
  
   29  
  
  
  
   30  
  
  
  
   31  
  
  
  
     
 Date Details 05/01 This INR check INR: 3.3 Date of next INR:  5/9/2017 How to take your warfarin dose To take:  6 mg Take 2 of the 3 mg tablets. We Performed the Following AMB POC PT/INR [28595 CPT(R)] Introducing [facility] & HEALTH SERVICES! [facility] introduces Visual[employer]ising patient portal. Now you can access parts of your medical record, email your doctor's office, and request medication refills online. 1. In your internet browser, go to [email or web address] 2. Click on the First Time User? Click Here link in the Sign In box. You will see the New Member Sign Up page. 3. Enter your Visualtising Access Code exactly as it appears below. You will not need to use this code after youve completed the sign-up process. If you do not sign up before the expiration date, you must request a new code. · Visualtising Access Code: [email or web address] Expires: 5/8/2017  3:28 PM 
 
4. Enter the last four digits of your Social Security Number (xxxx) and Date of Birth (mm/dd/yyyy) as indicated and click Submit. You will be taken to the next sign-up page. 5. Create a Visualtising ID. This will be your Visualtising login ID and cannot be changed, so think of one that is secure and easy to remember. 6. Create a Zeelt password. You can change your password at any time. 7. Enter your Password Reset Question and Answer. This can be used at a later time if you forget your password. 8. Enter your e-mail address. You will receive e-mail notification when new information is available in [address] 9. Click Sign Up. You can now view and download portions of your medical record. 10. Click the Download Summary menu link to download a portable copy of your medical information. If you have questions, please visit the Frequently Asked Questions section of the HydroLogex website. Remember, HydroLogex is NOT to be used for urgent needs. For medical emergencies, dial 911. Now available from your iPhone and Android! Please provide this summary of care documentation to your next provider. Your primary care clinician is listed as ANDRES BURDEN. If you have any questions after today's visit, please call [phone].

## 2017-05-03 ENCOUNTER — TELEPHONE (OUTPATIENT)
Dept: INTERNAL MEDICINE CLINIC | Age: 82
End: 2017-05-03

## 2017-05-04 NOTE — TELEPHONE ENCOUNTER
Spoke with Verenice Hemphill with [facility] - she states she was calling Annalise to let her know pt's meter has been shipped. Has requested for Annalise to call her back on Monday - has update on insurance information. Will forward to Annalise.

## 2017-05-08 LAB — INR, EXTERNAL: 3

## 2017-05-09 ENCOUNTER — TELEPHONE ANTICOAG (OUTPATIENT)
Dept: INTERNAL MEDICINE CLINIC | Age: 82
End: 2017-05-09

## 2017-05-09 NOTE — PROGRESS NOTES
Verified patient identity with two identifiers. Spoke with patient regarding  INR 3.0 yesterday via home monitor. Patient instructions: Continue Coumadin 6mg daily   Recheck in 1 week      A full discussion of the nature of anticoagulants has been carried out. A benefit risk analysis has been presented to the patient, so that they understand the justification for choosing anticoagulation at this time. The need for frequent and regular monitoring, precise dosage adjustment and compliance is stressed. Side effects of potential bleeding are discussed. The patient should avoid any OTC items containing aspirin or ibuprofen, and should avoid great swings in general diet. Avoid alcohol consumption. Call if any signs of abnormal bleeding. Patient verbalized understanding.

## 2017-05-16 LAB — INR, EXTERNAL: 2.2

## 2017-05-17 ENCOUNTER — TELEPHONE ANTICOAG (OUTPATIENT)
Dept: INTERNAL MEDICINE CLINIC | Age: 82
End: 2017-05-17

## 2017-05-29 LAB — INR, EXTERNAL: 2.3

## 2017-05-30 ENCOUNTER — TELEPHONE (OUTPATIENT)
Dept: INTERNAL MEDICINE CLINIC | Age: 82
End: 2017-05-30

## 2017-05-30 ENCOUNTER — TELEPHONE ANTICOAG (OUTPATIENT)
Dept: INTERNAL MEDICINE CLINIC | Age: 82
End: 2017-05-30

## 2017-05-30 NOTE — PROGRESS NOTES
Verified patient identity with two identifiers. Spoke with patient by phone. INR 2.3 yesterday via home monitor. Patient instructions:   Continue Coumadin 6mg daily   Recheck in 2 weeks    A full discussion of the nature of anticoagulants has been carried out. A benefit risk analysis has been presented to the patient, so that they understand the justification for choosing anticoagulation at this time. The need for frequent and regular monitoring, precise dosage adjustment and compliance is stressed. Side effects of potential bleeding are discussed. The patient should avoid any OTC items containing aspirin or ibuprofen, and should avoid great swings in general diet. Avoid alcohol consumption. Call if any signs of abnormal bleeding. Patient verbalized understanding.

## 2017-06-05 RX ORDER — INSULIN HUMAN 100 [IU]/ML
INJECTION, SUSPENSION SUBCUTANEOUS
Refills: 97 | OUTPATIENT
Start: 2017-06-05

## 2017-06-05 NOTE — TELEPHONE ENCOUNTER
Called pt and confirmed correct dosage for humulin n kwikpen is 50 units in the am and 23 units at night. Pt is aware we will sent refill to the pharmacy.

## 2017-06-07 ENCOUNTER — TELEPHONE (OUTPATIENT)
Dept: INTERNAL MEDICINE CLINIC | Age: 82
End: 2017-06-07

## 2017-06-07 NOTE — TELEPHONE ENCOUNTER
Reid Bond [facility]) [phone]     Pt's daughter calling regarding some information she says they need for her mother to get diabetic shoes.

## 2017-06-07 NOTE — TELEPHONE ENCOUNTER
Spoke with pt - states she needs MD to fill out form for her to get diabetic shoes. Advised will need appt for MD to evaluate prior to completing form.  Scheduled tomorrow 6/8/17 at 2:20 pm. Will forward to MD.

## 2017-06-12 LAB — INR, EXTERNAL: 3.1

## 2017-06-13 ENCOUNTER — TELEPHONE (OUTPATIENT)
Dept: INTERNAL MEDICINE CLINIC | Age: 82
End: 2017-06-13

## 2017-06-13 ENCOUNTER — TELEPHONE ANTICOAG (OUTPATIENT)
Dept: INTERNAL MEDICINE CLINIC | Age: 82
End: 2017-06-13

## 2017-06-13 NOTE — PROGRESS NOTES
Verified patient identity with two identifiers. Spoke with patient by phone. INR 3.1 yesterday via home monitor. patient states she has eaten less greens recently. Discussed diet consistency  Patient instructions: Continue Coumadin 6mg daily   Recheck in 1 week    A full discussion of the nature of anticoagulants has been carried out. A benefit risk analysis has been presented to the patient, so that they understand the justification for choosing anticoagulation at this time. The need for frequent and regular monitoring, precise dosage adjustment and compliance is stressed. Side effects of potential bleeding are discussed. The patient should avoid any OTC items containing aspirin or ibuprofen, and should avoid great swings in general diet. Avoid alcohol consumption. Call if any signs of abnormal bleeding. Patient verbalized understanding.

## 2017-06-13 NOTE — TELEPHONE ENCOUNTER
Spoke with pt - advised she did not show for appt 6/8/17 to have forms filled out for diabetic shoes. She states she thought she could leave forms for MD. Ling Oleary her we spoke on 6/7/17 and I had told her she needed to see MD for evaluation prior to completing forms.  Rescheduled for 6/21/17 at 2:20 pm.

## 2017-06-14 ENCOUNTER — TELEPHONE (OUTPATIENT)
Dept: INTERNAL MEDICINE CLINIC | Age: 82
End: 2017-06-14

## 2017-06-14 NOTE — TELEPHONE ENCOUNTER
Spoke with pt's daughter Jasmina Holder - states she wants to know who Dr Sana Carolina recommends in [employer] in [facility]. Pt has moved there and wants MD closer to home. Also wants to know a name of wound doctor - has been going to one in [place]. Advised her there is a wound clinic near [facility].  Will forward to MD.

## 2017-06-14 NOTE — TELEPHONE ENCOUNTER
Pt  Daughter would like to see if you have a dr you could recommend  For her mother since she has moved  to [place]

## 2017-06-16 NOTE — TELEPHONE ENCOUNTER
Advised pt's daughter Cece Enid Keenan MD recommends both [facility] or [facility] are [employer] groups - they would have all of her records. Advise first available - not sure who of the doctors is taking new patients. Pt has appt next week 6/21/17 to have diabetic shoes form completed.

## 2017-06-19 ENCOUNTER — TELEPHONE ANTICOAG (OUTPATIENT)
Dept: INTERNAL MEDICINE CLINIC | Age: 82
End: 2017-06-19

## 2017-06-19 ENCOUNTER — TELEPHONE (OUTPATIENT)
Dept: INTERNAL MEDICINE CLINIC | Age: 82
End: 2017-06-19

## 2017-06-19 LAB — INR, EXTERNAL: 2.1

## 2017-06-19 NOTE — TELEPHONE ENCOUNTER
Pt called to report her INR taken this morning was 2.1, her current coumadin dosage is 6mg daily. i informed her i would send the msg to Annalise-nurse with coumadin clinic and she would give her a call back [address]. further directions with dosage.

## 2017-06-20 ENCOUNTER — TELEPHONE (OUTPATIENT)
Dept: INTERNAL MEDICINE CLINIC | Age: 82
End: 2017-06-20

## 2017-06-20 NOTE — TELEPHONE ENCOUNTER
Pt would like to discuss the letter she received  About  missing her appt .  She is not happy with the letter

## 2017-06-20 NOTE — TELEPHONE ENCOUNTER
Spoke with pt - advised we discussed this last week. She said she knows - just wanted MD to know that day she had a family issue. Advised her the letter she received is normal letter that is sent out when pt does not show up for appt and did not call to cancel. Reminded her she has appt tomorrow 6/21/17 at 2:20 pm to fill out her forms for diabetic shoes.  Will forward to MD.

## 2017-06-21 ENCOUNTER — TELEPHONE (OUTPATIENT)
Dept: INTERNAL MEDICINE CLINIC | Age: 82
End: 2017-06-21

## 2017-06-21 ENCOUNTER — OFFICE VISIT (OUTPATIENT)
Dept: INTERNAL MEDICINE CLINIC | Age: 82
End: 2017-06-21

## 2017-06-21 VITALS
SYSTOLIC BLOOD PRESSURE: 130 MMHG | DIASTOLIC BLOOD PRESSURE: 74 MMHG | BODY MASS INDEX: 32.19 KG/M2 | WEIGHT: 217.3 LBS | TEMPERATURE: 98.2 F | HEIGHT: 69 IN | OXYGEN SATURATION: 97 % | HEART RATE: 82 BPM | RESPIRATION RATE: 20 BRPM

## 2017-06-21 DIAGNOSIS — L97.529 TYPE 1 DIABETES MELLITUS WITH LEFT DIABETIC FOOT ULCER (HCC): Primary | ICD-10-CM

## 2017-06-21 DIAGNOSIS — E11.9 ENCOUNTER FOR DIABETIC FOOT EXAM (HCC): ICD-10-CM

## 2017-06-21 DIAGNOSIS — E10.621 TYPE 1 DIABETES MELLITUS WITH LEFT DIABETIC FOOT ULCER (HCC): Primary | ICD-10-CM

## 2017-06-21 NOTE — TELEPHONE ENCOUNTER
----- Message from Linh Bui MD sent at 6/21/2017  7:18 AM EDT -----  Regarding: acs  Call- make sure she brings forms or you get them from the supplier      Pt was reminded yesterday when we spoke.

## 2017-06-21 NOTE — MR AVS SNAPSHOT
Visit Information Date & Time Provider Department Dept. Phone Encounter #  
 6/21/2017  2:20 PM Elba Rodriguez, [address] [facility] Internal Medicine [phone] [id number] Follow-up Instructions Return if symptoms worsen or fail to improve. Your Appointments 8/7/2017 10:20 AM  
ROUTINE CARE with Elba Rodriguez MD  
[address] [facility] Internal Medicine [facility]) Appt Note: 4 mnth follow up  
 [address] [facility] [address]  
[address] Upcoming Health Maintenance Date Due ZOSTER VACCINE AGE 60> 4/17/1997 Pneumococcal 65+ High/Highest Risk (2 of 2 - PPSV23) 10/1/2007 DTaP/Tdap/Td series (1 - Tdap) 12/9/2010 MICROALBUMIN Q1 2/22/2017 INFLUENZA AGE 9 TO ADULT 8/1/2017 FOOT EXAM Q1 8/4/2017 LIPID PANEL Q1 8/5/2017 HEMOGLOBIN A1C Q6M 10/4/2017 MEDICARE YEARLY EXAM 2/15/2018 EYE EXAM RETINAL OR DILATED Q1 2/22/2018 GLAUCOMA SCREENING Q2Y 2/22/2019 Allergies as of 6/21/2017  Review Complete On: 6/21/2017 By: Elba Rodriguez MD  
  
 Severity Noted Reaction Type Reactions Actos [Pioglitazone]  04/23/2009    Swelling Swelling of feet and legs Codeine  04/23/2009    Itching Doxycycline  04/23/2009    Nausea Only Hydrocodone  04/17/2012    Rash, Other (comments)  
 hallucinations Current Immunizations  Reviewed on 7/11/2014 Name Date Influenza Vaccine 12/2/2013 Influenza Vaccine Split 10/17/2012 Pneumococcal Vaccine (Unspecified Type) 10/1/2002 TD Vaccine 12/8/2010 Not reviewed this visit You Were Diagnosed With   
  
 Codes Comments Type 1 diabetes mellitus with left diabetic foot ulcer ([facility]    -  Primary ICD-10-CM: R31.942, C02.936 ICD-9-CM: 250.81, 707.15 Encounter for diabetic foot exam ([facility]     ICD-10-CM: E11.9 ICD-9-CM: 250.00 Vitals BP Pulse Temp Resp Height(growth percentile) Weight(growth percentile) 130/74 82 98.2 °F (36.8 °C) 20 5' 9\" (1.753 m) 217 lb 4.8 oz (98.6 kg) SpO2 BMI OB Status Smoking Status 97% 32.09 kg/m2 Hysterectomy Former Smoker BMI and BSA Data Body Mass Index Body Surface Area 32.09 kg/m 2 2.19 m 2 Preferred Pharmacy Pharmacy Name Phone Israel Chavis [address] [address] [place], [address] [place] [address]  [address] [phone] Your Updated Medication List  
  
   
This list is accurate as of: 6/21/17  2:50 PM.  Always use your most recent med list.  
  
  
  
  
 allopurinol 100 mg tablet Commonly known as:  ZYLOPRIM  
TAKE TWO TABLETS BY MOUTH DAILY  
  
 amLODIPine 5 mg tablet Commonly known as:  Eward Darcie Take 2 Tabs by mouth daily. atorvastatin 80 mg tablet Commonly known as:  LIPITOR  
TAKE ONE TABLET BY MOUTH EVERY EVENING  
  
 CENTRUM SILVER Tab tablet Generic drug:  multivitamins-minerals-lutein Take 1 Tab by mouth daily. dofetilide 125 mcg capsule Commonly known as:  Ezella Running Take 1 Cap by mouth two (2) times a day. furosemide 40 mg tablet Commonly known as:  LASIX Take 2 Tabs by mouth every evening. hydrALAZINE 10 mg tablet Commonly known as:  APRESOLINE Take 2 Tabs by mouth three (3) times daily. insulin  unit/mL injection Commonly known as:  HumuLIN N  
50 Units by SubCUTAneous route every morning. 50 units q am and 23 units qpm  
  
 levothyroxine 125 mcg tablet Commonly known as:  SYNTHROID  
TAKE ONE TABLET BY MOUTH DAILY  
  
 metoprolol tartrate 50 mg tablet Commonly known as:  LOPRESSOR Take 1 Tab by mouth two (2) times a day. potassium chloride SR 10 mEq tablet Commonly known as:  KLOR-CON 10 Take 1 Tab by mouth daily. PriLOSEC 20 mg capsule Generic drug:  omeprazole Take 20 mg by mouth daily. warfarin 3 mg tablet Commonly known as:  COUMADIN  
 Take 7.5mg on Monday (2.5 tabs); Take 4.5mg on Tuesday: Take 6 mg on Wednesday; Take 4.5mg on Thursday Get INR checked on Thursday Follow-up Instructions Return if symptoms worsen or fail to improve. Introducing [facility] & HEALTH SERVICES! [facility] Ol[facility] introduces Iperia patient portal. Now you can access parts of your medical record, email your doctor's office, and request medication refills online. 1. In your internet browser, go to [email or web address] 2. Click on the First Time User? Click Here link in the Sign In box. You will see the New Member Sign Up page. 3. Enter your Iperia Access Code exactly as it appears below. You will not need to use this code after youve completed the sign-up process. If you do not sign up before the expiration date, you must request a new code. · Iperia Access Code: [email or web address] Expires: 8/7/2017 10:13 AM 
 
4. Enter the last four digits of your Social Security Number (xxxx) and Date of Birth (mm/dd/yyyy) as indicated and click Submit. You will be taken to the next sign-up page. 5. Create a Iperia ID. This will be your Iperia login ID and cannot be changed, so think of one that is secure and easy to remember. 6. Create a Iperia password. You can change your password at any time. 7. Enter your Password Reset Question and Answer. This can be used at a later time if you forget your password. 8. Enter your e-mail address. You will receive e-mail notification when new information is available in [address] 9. Click Sign Up. You can now view and download portions of your medical record. 10. Click the Download Summary menu link to download a portable copy of your medical information. If you have questions, please visit the Frequently Asked Questions section of the Iperia website. Remember, Iperia is NOT to be used for urgent needs. For medical emergencies, dial 911. Now available from your iPhone and Android! Please provide this summary of care documentation to your next provider. Your primary care clinician is listed as ANDRES BURDEN. If you have any questions after today's visit, please call [phone].

## 2017-06-22 NOTE — TELEPHONE ENCOUNTER
[phone] pt says that she thought that a rx for her insulin pen was going to be sent to the pharm for her, ivana.

## 2017-06-23 ENCOUNTER — TELEPHONE (OUTPATIENT)
Dept: INTERNAL MEDICINE CLINIC | Age: 82
End: 2017-06-23

## 2017-06-23 RX ORDER — PEN NEEDLE, DIABETIC 31 GX3/16"
NEEDLE, DISPOSABLE MISCELLANEOUS
Qty: 100 PEN NEEDLE | Refills: 11 | OUTPATIENT
Start: 2017-06-23 | End: 2018-04-17 | Stop reason: SDUPTHER

## 2017-06-23 NOTE — TELEPHONE ENCOUNTER
Patient is confused as to why the incorrect rx has been sent again for her insulin. States she has been using the pens for years. This is the second time a rx for \"vial\" has been sent. Could you please send the correct rx to her pharmacy today?

## 2017-06-23 NOTE — TELEPHONE ENCOUNTER
Spoke with pt - advised pens have been sent to her pharmacy as she requested.   Confirmed dosage - 50 Units by SubCUTAneous route every morning and 23 units q pm.

## 2017-06-23 NOTE — PROGRESS NOTES
HISTORY OF PRESENT ILLNESS  Virgie Crowe is a [de-identified] y.o. female. Toe Pain    The history is provided by the patient (has chronic neuropathy and circulatory difficulties. Recently had a callus removed and it had an underlying ulcer. ). This is a recurrent problem. The current episode started more than 1 week ago. The problem occurs constantly. The problem has been gradually worsening. The pain is present in the left toes. The quality of the pain is described as aching. The pain is moderate. Associated symptoms include numbness, limited range of motion, stiffness and tingling. The symptoms are aggravated by activity, standing and contact. Treatments tried: sees her podiatrist, procedures are planned. There has been a history of trauma.   /  I am prescribing diabetic shoes and inserts. I feel and pt agrees they will be beneficial in providing proper support and off loading pressure points. She is under my care for diabetes and I've reviewed overall care plan    Review of Systems   Musculoskeletal: Positive for joint pain and stiffness. Neurological: Positive for tingling, sensory change and numbness. Physical Exam   Constitutional: No distress. Cardiovascular:   Pulses:       Dorsalis pedis pulses are 1+ on the right side, and 1+ on the left side. Posterior tibial pulses are 1+ on the right side, and 1+ on the left side. Musculoskeletal: She exhibits edema. Feet:    Moderate bilateral lower extremity edema    Nursing note and vitals reviewed. ASSESSMENT and PLAN  Homero Nick was seen today for other.     Diagnoses and all orders for this visit:    Type 1 diabetes mellitus with left diabetic foot ulcer ([facility]- Continue current regimen of prescription and / or OTC medications , See surgeon as discussed/directed , orders completed for diabetic shoes    Encounter for diabetic foot exam ([address]

## 2017-06-26 ENCOUNTER — TELEPHONE ANTICOAG (OUTPATIENT)
Dept: INTERNAL MEDICINE CLINIC | Age: 82
End: 2017-06-26

## 2017-06-26 LAB — INR, EXTERNAL: 2.5

## 2017-06-26 NOTE — PROGRESS NOTES
Verified patient identity with two identifiers. Spoke with patient by phone. INR 2.5 today via home monitor. Patient instructions:   Continue Coumadin 6mg daily   Recheck in 2 weeks      A full discussion of the nature of anticoagulants has been carried out. A benefit risk analysis has been presented to the patient, so that they understand the justification for choosing anticoagulation at this time. The need for frequent and regular monitoring, precise dosage adjustment and compliance is stressed. Side effects of potential bleeding are discussed. The patient should avoid any OTC items containing aspirin or ibuprofen, and should avoid great swings in general diet. Avoid alcohol consumption. Call if any signs of abnormal bleeding. Patient verbalized understanding.

## 2017-06-27 ENCOUNTER — DOCUMENTATION ONLY (OUTPATIENT)
Dept: INTERNAL MEDICINE CLINIC | Age: 82
End: 2017-06-27

## 2017-06-27 NOTE — PROGRESS NOTES
Sent office note from 6/21/17 with diabetic foot form to [facility] [phone]. Confirmation received.

## 2017-06-28 ENCOUNTER — TELEPHONE (OUTPATIENT)
Dept: INTERNAL MEDICINE CLINIC | Age: 82
End: 2017-06-28

## 2017-06-28 NOTE — TELEPHONE ENCOUNTER
Spoke with Charlene with [facility] - requesting prior office note from April appt regarding her diabetes. She had received face to face note but needed more info. Faxed to [phone]. Confirmation received.

## 2017-06-28 NOTE — TELEPHONE ENCOUNTER
Charlene with [facility] [phone]     Requesting callback from the nurse concerning getting notes on patient's diabetes. This is for her diabetic shoes.

## 2017-06-30 ENCOUNTER — HOSPITAL ENCOUNTER (OUTPATIENT)
Dept: WOUND CARE | Age: 82
Discharge: HOME | End: 2017-06-30
Payer: MEDICARE

## 2017-06-30 PROCEDURE — 11055 PARING/CUTG B9 HYPRKER LES 1: CPT | Performed by: OTOLARYNGOLOGY

## 2017-06-30 NOTE — H&P
Osorio Metcalfu, [address] [facility]   WOUND CARE HISTORY AND PHYSICAL       Name:  Radha Beltran   MR#:  [id number]   :  1935   Account #:  [de-identified]        Date of Adm:  2017       The patient is an 82-year-old female with a longstanding history of   venous insufficiency. In addition, she had a callus on the plantar   surface of the left foot at the head of the first metatarsal which was   removed twice. She continues to have pain and intermittent bleeding   from this area. Her podiatrist is Dr. Marycruz Griffin. Dr. Ricardo Vanessa ordered diabetic shoes and inserts, but these have not   yet arrived. HABITS: The patient denies cigarettes, they were discontinued in   , and she denies alcohol. REVIEW OF SYSTEMS: Significant for atrial fibrillation, stage III   chronic kidney disease, long-term insulin use, hypertension,   hypothyroidism, mitral valve insufficiency, and status post TIA/CVA in   . OPERATIVE PROCEDURES INCLUDE: Mitral valve clipping and   ablation for atrial fibrillation and flutter. FAMILY HISTORY: Coronary artery heart disease in her father and her   sister. MEDICATIONS    1. Insulin NPH. 2. Levothyroxine. 3. Amlodipine. 4. Allopurinol. 5. Furosemide. 6. Hydralazine. 7. Warfarin. 8. Metoprolol. 9. Omeprazole. 10. Atorvastatin. 11. Dofetilide. PHYSICAL EXAMINATION   GENERAL: The patient is awake, alert and conversant. She ambulates   without assistance. VITAL SIGNS: Her temperature is 97.1, pulse 61, respirations 14,   blood pressure 137/68. HEAD AND NECK: Pupils equal, round, respond to light and   accommodation. Oral cavity, oropharynx and neck is normal.   LUNGS: Clear to auscultation and percussion. HEART: Regular in rate and rhythm without murmur. ABDOMEN: Soft and nontender. No organomegaly. BACK: Nontender to palpation.    UPPER EXTREMITIES: Equal tone and strength bilaterally. LOWER EXTREMITIES: She has bilateral pitting edema. She has   strong bounding dorsalis pedis pulses and palpable posterior tibialis   pulses. She has changes of both lower extremities consistent with   chronic venous insufficiency. She has small skin tears, but no   significant ulcerations. On the plantar surface of the left foot is a callus which is quite tender. I   recommend this be pared down to see if there is a wound. She   understands the risks and benefits and wishes to proceed; therefore,   with use of 15 blade, this was lightly shaved down until a very small   pinpoint ulcer is evident and this was gently freshened up with a 15   blade to get back to underlying healthy bleeding tissue. ASSESSMENT AND PLAN: The patient has a left diabetic foot ulcer,   E11.621. In addition, she has bilateral venous insufficiency and edema   with small skin tears, I87.313, L97.911. I have asked to call Dr. Caitie Hampton and find out the status of her   diabetic shoes and inserts which she is supposed to get at [facility]   and yet has not gotten fit for them. In addition, I would like her to talk to   him about her being on a calcium channel blocker. She understands that   some people do get peripheral edema and I would like to see if he can   switch her to different class of medications to help her with   Hypertension yet minimize the edema. I want her to keep her legs   elevated whenever sitting. I want her minimize weightbearing on the   plantar surface of the left foot and she is going to keep this area dry. For the dressings, we are going to apply [place] [facility]. We   are going to windowpane with foam around this area and protect it so it   does not get wet during the week. The patient will use a moisturizing   cream on both legs daily and will buy over-the-counter 10-15 mmHg   compression stockings to start trying to manage her edema. All questions were answered.  Her condition on discharge stable. She   will return to this office in one week.          MD TASHA Marie / Yumiko Arevalo   D:  06/30/2017   10:55   T:  06/30/2017   12:06   Job #:  [id number]

## 2017-07-03 ENCOUNTER — TELEPHONE ANTICOAG (OUTPATIENT)
Dept: INTERNAL MEDICINE CLINIC | Age: 82
End: 2017-07-03

## 2017-07-03 LAB — INR, EXTERNAL: 3

## 2017-07-03 NOTE — PROGRESS NOTES
Verified patient identity with two identifiers. Spoke with patient by phone. INR 3.0 today via home monitor. Patient instructions:   Continue Coumadin 6mg daily   Recheck in 1 week      A full discussion of the nature of anticoagulants has been carried out. A benefit risk analysis has been presented to the patient, so that they understand the justification for choosing anticoagulation at this time. The need for frequent and regular monitoring, precise dosage adjustment and compliance is stressed. Side effects of potential bleeding are discussed. The patient should avoid any OTC items containing aspirin or ibuprofen, and should avoid great swings in general diet. Avoid alcohol consumption. Call if any signs of abnormal bleeding. Patient verbalized understanding.

## 2017-07-07 ENCOUNTER — HOSPITAL ENCOUNTER (OUTPATIENT)
Dept: WOUND CARE | Age: 82
End: 2017-07-07
Payer: MEDICARE

## 2017-07-13 ENCOUNTER — TELEPHONE ANTICOAG (OUTPATIENT)
Dept: INTERNAL MEDICINE CLINIC | Age: 82
End: 2017-07-13

## 2017-07-13 LAB — INR, EXTERNAL: 2.1

## 2017-07-13 NOTE — PROGRESS NOTES
Verified patient identity with two identifiers. Spoke with patient by phone. INR 2.1 today via home monitor. Patient instructions:     Continue Coumadin 6mg daily   Recheck 7/24/17    A full discussion of the nature of anticoagulants has been carried out. A benefit risk analysis has been presented to the patient, so that they understand the justification for choosing anticoagulation at this time. The need for frequent and regular monitoring, precise dosage adjustment and compliance is stressed. Side effects of potential bleeding are discussed. The patient should avoid any OTC items containing aspirin or ibuprofen, and should avoid great swings in general diet. Avoid alcohol consumption. Call if any signs of abnormal bleeding. Patient verbalized understanding.

## 2017-07-14 ENCOUNTER — HOSPITAL ENCOUNTER (OUTPATIENT)
Dept: WOUND CARE | Age: 82
Discharge: HOME | End: 2017-07-14
Payer: MEDICARE

## 2017-07-14 PROCEDURE — 29581 APPL MULTLAYER CMPRN SYS LEG: CPT | Performed by: OTOLARYNGOLOGY

## 2017-07-14 PROCEDURE — 11055 PARING/CUTG B9 HYPRKER LES 1: CPT | Performed by: OTOLARYNGOLOGY

## 2017-07-17 ENCOUNTER — TELEPHONE ANTICOAG (OUTPATIENT)
Dept: INTERNAL MEDICINE CLINIC | Age: 82
End: 2017-07-17

## 2017-07-17 LAB — INR, EXTERNAL: 2.6

## 2017-07-17 NOTE — PROGRESS NOTES
Verified patient identity with two identifiers. Spoke with patient by phone. INR 2.6 today via home monitor. Patient instructions:   Continue Coumadin 6mg daily   Recheck 7/24/17    A full discussion of the nature of anticoagulants has been carried out. A benefit risk analysis has been presented to the patient, so that they understand the justification for choosing anticoagulation at this time. The need for frequent and regular monitoring, precise dosage adjustment and compliance is stressed. Side effects of potential bleeding are discussed. The patient should avoid any OTC items containing aspirin or ibuprofen, and should avoid great swings in general diet. Avoid alcohol consumption. Call if any signs of abnormal bleeding. Patient verbalized understanding.

## 2017-07-18 NOTE — PROGRESS NOTES
[address] [place]   WOUND CARE PROGRESS NOTE       Name:  Danish Bee   MR#:  [id number]   :  1935   Account #:  [de-identified]        Date of Adm:  2017       DATE OF SERVICE:   2017    The patient returns for the first time in 2 weeks. She was ill last week and   therefore we have only seen her on one occasion. She has a left   diabetic foot ulcer, E11.621 and bilateral venous leg ulcers and tears,   I87.313, L97.911. It was recommended last time that the patient speak to Dr. Elmira Dockery   about amlodipine. She has not yet done that. She spoke to the people   at [facility] and her shoes will arrive in about 3 weeks. She has not   been using compression stockings, since her legs are too swollen. There have been no other changes in her medications, allergies or   review of systems. PHYSICAL EXAMINATION   VITAL SIGNS:  Her temperature is 97.7, pulse 62, respirations 16,   blood pressure 147/71. EXTREMITIES:  Examination shows she has swollen legs. She has   lipodermatosclerosis and multiple skin tears. In addition, she has a callus on the plantar surface at the head of the left  first metatarsal.  It was quite tender, and it is recommended that this be   pared down. I explained to her that I am doing this not only for   symptomatic relief but also to see if she has an underlying wound. I explained the risks and benefits. She understands and wishes to   proceed, therefore we used a 15 blade. The callus was pared down in its   entirety. There is nice healthy epithelium and it appears that she has   healed her ulcer. ASSESSMENT/PLAN   Diabetic foot ulcer: We need to offload this area. We are going to   apply foam to the area, and I am ordering a Darco forefoot boot to be   worn on that side.   I cautioned her about being careful as her balance   will be off, and she will use either her walker or a cane when doing   that. I also explained to her how to walk so that she does not hear a   clicking sound from the front of her foot touching the ground, so that   she is able to keep her toes and forefoot up. She should be able to   keep pressure off of her callus. Bilateral venous leg ulcers. She has a very strong dorsalis pulse and   good bedside ABIs. We are going to apply Xeroform to the skin tears   and a bilateral 3-layer wrap which will only be on a 25% stretch for this   first week. I have asked her to keep her legs elevated as much as   possible. I have asked her to do gastrocnemius muscle exercises to   improve venous outflow and again to talk to Dr. Janell Osborn about   stopping amlodipine and switching to a different class of anti-hypertensive  medications to minimize the possible impact of a calcium channel blocker  causing peripheral edema. The patient understands that if she has significant pain with the 3-layer   wraps she should elevate her legs. If this does not stop the pain, she   has our permission to remove them and if she cannot do that, she or   her daughter will do that, and if not she will go to the emergency room   to have then cut off. Asa diabetic, she does need long-term maintenance of her feet, and I have   recommended that she see Dr. Bronwyn Juan in his office for   routine diabetic surveillance. All questions were answered. Her condition on discharge is stable. She will return to this office in one week.          Denver Copping, MD [place] / [facility]   D:  07/14/2017   11:32   T:  07/18/2017   17:31   Job #:  [id number]

## 2017-07-21 ENCOUNTER — HOSPITAL ENCOUNTER (OUTPATIENT)
Dept: WOUND CARE | Age: 82
Discharge: HOME | End: 2017-07-21
Payer: MEDICARE

## 2017-07-24 ENCOUNTER — TELEPHONE ANTICOAG (OUTPATIENT)
Dept: INTERNAL MEDICINE CLINIC | Age: 82
End: 2017-07-24

## 2017-07-24 LAB — INR, EXTERNAL: 3.1

## 2017-07-24 NOTE — PROGRESS NOTES
Verified patient identity with two identifiers. Spoke with patient by phone. INR 3.1 today. Patient instructions:   Continue Coumadin 6mg daily   Recheck 1 week on 7/31/17  Patient denies any unusual bleeding or bruising and was instructed to call office and seek medical care if any should occur. A full discussion of the nature of anticoagulants has been carried out. A benefit risk analysis has been presented to the patient, so that they understand the justification for choosing anticoagulation at this time. The need for frequent and regular monitoring, precise dosage adjustment and compliance is stressed. Side effects of potential bleeding are discussed. The patient should avoid any OTC items containing aspirin or ibuprofen, and should avoid great swings in general diet. Avoid alcohol consumption. Call if any signs of abnormal bleeding. Patient verbalized understanding.

## 2017-07-24 NOTE — PROGRESS NOTES
[address] [place]   WOUND CARE PROGRESS NOTE       Name:  Kaycee Melendez   MR#:  [id number]   :  1935   Account #:  [de-identified]        Date of Adm:  2017       DATE OF SERVICE:   2017. HISTORY OF PRESENT ILLNESS:  The patient returns for her bilateral   venous leg ulcers, I87.313, L97.911, with a left plantar foot diabetic   foot ulcer, E11.621. She still has tenderness over the wound at the first metatarsal head,   but also over a callus of the left lateral plantar foot. She has not yet spoken to Dr. Mitchel Hernandez, about the amlodipine. Her   diabetic shoes will be ready in about 3 weeks and she has an   appointment with Dr. Pebbles Garber DPM in his podiatry office   in about 3 weeks. There have been no other changes in her medications, allergies, or   review of systems. PHYSICAL EXAMINATION:    VITAL SIGNS:  Her temperature today is 97.8, pulse 60, respirations   24, blood pressure 123/58. GENERAL:  The patient presents walking with her left Darco forefoot   boot, but unfortunately she does make a clicking sound when the   anterior foot hits the ground. The wounds of both legs and skin tears have healed. The edema is   much improved, but she still has dermatitis and hemosiderin   deposition, as expected. I have recommended the calluses be pared down and the wound be   debrided. I explained the risks and benefits. She understands and   wishes to proceed. Therefore, with a 15 blade, the callus along the   lateral plantar foot was shaved down until normal epithelium was   encountered and the previous pain ceased. She also has another   callus more medially on the foot, and this was also pared down, and   then over the first metatarsal head, the callus was pared down.   There   was a small organized hematoma, which was evacuated and very   small wounds, approximately 0.1 x 0.1 x 0. 1 cm. This area was   freshened up with the sharp edge of the 15 blade. ASSESSMENT AND PLAN:  For the plantar wound, we are going to   switch to Aquacel AG. We are going to apply A and D ointment to both   lower extremities. We will apply a 3-layer wrap. We are ordering 20-30   mmHg compression stockings, which she will bring each week until we   are able to convert her over to that modality. She is going to keep her   legs elevated as much as possible and she is going to speak to Dr. Phuong Sanders about switching from amlodipine to a different class of   antihypertensives, in an attempt to minimize peripheral edema. All   questions were answered. Her condition on discharge is stable.            MD TASHA Blevins / Tavares.Pel   D:  07/21/2017   10:46   T:  07/24/2017   12:49   Job #:  [id number]

## 2017-07-28 ENCOUNTER — TELEPHONE (OUTPATIENT)
Dept: INTERNAL MEDICINE CLINIC | Age: 82
End: 2017-07-28

## 2017-07-28 ENCOUNTER — HOSPITAL ENCOUNTER (OUTPATIENT)
Dept: WOUND CARE | Age: 82
Discharge: HOME | End: 2017-07-28
Payer: MEDICARE

## 2017-07-28 DIAGNOSIS — E78.00 HYPERCHOLESTEREMIA: ICD-10-CM

## 2017-07-28 RX ORDER — ATORVASTATIN CALCIUM 80 MG/1
TABLET, FILM COATED ORAL
Qty: 90 TAB | Refills: 2 | Status: SHIPPED | COMMUNITY
Start: 2017-07-28 | End: 2018-05-09 | Stop reason: SDUPTHER

## 2017-07-28 NOTE — TELEPHONE ENCOUNTER
Dr Jonna Pickens calls to say patient wounds are healed and would like to suggest changing patient from Amlodipine to a different blood pressure medication in relation to patient's wounds and possibly causing her more issues as a calcium channel blocker.

## 2017-07-28 NOTE — PROGRESS NOTES
[address]   WOUND CARE PROGRESS NOTE       Name:  Kaycee Melendez   MR#:  [id number]   :  1935   Account #:  [de-identified]        Date of Adm:  2017       DATE OF SERVICE:  2017     SUBJECTIVE: The patient returns for her bilateral venous leg ulcers   and diabetic foot ulcer. The primary dressing has been Aquacel AG. She has been using A and D to both legs for many years. She did purchase   20-30 mmHg compression stockings. Yesterday, she removed her 3-layer   wrap and applied her compression stocking without any difficulty. She has not yet spoken to Dr. Mitchel Hernandez about taking the amlodipine. She is due to get her diabetic shoes in about a week, and she is due to   see Dr. Bossman Hutson, on about . There have been no changes in her allergies, medications or review of   systems. OBJECTIVE: Her temperature is 97.9, pulse 69, respirations 16, blood   pressure 125/57. Examination of both lower extremities shows that all the multiple   venous leg ulcers have completely healed. The edema is still   persistent, but possibly because she was using a 20-30 mmHg   stocking yesterday, instead of the 30-40 wrap that we applied. The plantar foot exam on the left shows that she has no open wound. She still has calluses, but they are nontender and doing well. ASSESSMENT AND PLAN: Since the patient has healed, I explained   the options of either wrapping her again or letting her use the   compression stockings. She would like to use her stockings, and   therefore, we will apply A and D ointment and apply her own 20-30   mmHg stockings. She will continue using the Darco forefoot offloading boot until she gets   her diabetic shoes, and until she sees Dr. Bossman Hutson.     She understands that she must continue to keep her legs elevated, she   must do her gastrocnemius muscle exercises, she must use compression and use moisturizing cream or A and D ointment every   day. I will see her again in followup only as needed. She will be   discharged from the wound center today. All questions were answered. Her condition on discharge is stable. After her visit I called Dr. Lyons Senior office and spoke to his nurse. They will consider whether or not to switch Ms. Miller's anti-hypertensive to   a different class of medicines with a lesser possibility of contributing to leg edema.         MD TASHA Sandoval / Omkar Girard   D:  07/28/2017   11:04   T:  07/28/2017   13:27   Job #:  [id number]

## 2017-07-31 ENCOUNTER — TELEPHONE (OUTPATIENT)
Dept: INTERNAL MEDICINE CLINIC | Age: 82
End: 2017-07-31

## 2017-07-31 ENCOUNTER — TELEPHONE ANTICOAG (OUTPATIENT)
Dept: INTERNAL MEDICINE CLINIC | Age: 82
End: 2017-07-31

## 2017-07-31 LAB — INR, EXTERNAL: 4.2

## 2017-07-31 NOTE — TELEPHONE ENCOUNTER
Advised pt MD recommends she hold today and tomorrow dose - then 3 mg Wed & Fri then 6 mg all other days. Kenny in 2 weeks which is 8/14/17. Pt verbalized understanding.

## 2017-07-31 NOTE — PATIENT INSTRUCTIONS

## 2017-07-31 NOTE — TELEPHONE ENCOUNTER
Called pt to remind her she needed to do her INR today. She will check and call back. Pt returned call - her INR for today is 4.2. She said no changes in diet or medications. She is taking 6 mg daily. Will forward to MD for further instructions.

## 2017-08-01 ENCOUNTER — TELEPHONE (OUTPATIENT)
Dept: INTERNAL MEDICINE CLINIC | Age: 82
End: 2017-08-01

## 2017-08-01 NOTE — TELEPHONE ENCOUNTER
Spoke with pt - wanted me to go over new dose change for her Coumadin. She was to hold yesterday 7/31/17 and today 8/1/17 (she remembered this) - then 3 mg Wed & Fri then 6 mg all other days. Kenny in 2 weeks which is 8/14/17. She states she understands.

## 2017-08-01 NOTE — TELEPHONE ENCOUNTER
[phone] pt is requesting a call back regarding her coumadin checks, she says they have changed and she wants to be sure to get it straight as to when she needs to have done.

## 2017-08-04 NOTE — TELEPHONE ENCOUNTER
Advised pt Dr Blaine Anton had suggested changing her BP med - she states he had discussed that with her also. Dr Shan Sifuentes said this may be related to the swelling side effect of the medication. She has an appt already on Monday 8/7/17 - he can review a possible change.

## 2017-08-07 ENCOUNTER — TELEPHONE ANTICOAG (OUTPATIENT)
Dept: INTERNAL MEDICINE CLINIC | Age: 82
End: 2017-08-07

## 2017-08-07 ENCOUNTER — TELEPHONE (OUTPATIENT)
Dept: INTERNAL MEDICINE CLINIC | Age: 82
End: 2017-08-07

## 2017-08-07 ENCOUNTER — OFFICE VISIT (OUTPATIENT)
Dept: INTERNAL MEDICINE CLINIC | Age: 82
End: 2017-08-07

## 2017-08-07 VITALS
DIASTOLIC BLOOD PRESSURE: 60 MMHG | WEIGHT: 215.6 LBS | RESPIRATION RATE: 18 BRPM | SYSTOLIC BLOOD PRESSURE: 130 MMHG | HEART RATE: 64 BPM | TEMPERATURE: 98.1 F | HEIGHT: 69 IN | BODY MASS INDEX: 31.93 KG/M2 | OXYGEN SATURATION: 96 %

## 2017-08-07 DIAGNOSIS — D50.9 IRON DEFICIENCY ANEMIA, UNSPECIFIED IRON DEFICIENCY ANEMIA TYPE: ICD-10-CM

## 2017-08-07 DIAGNOSIS — Z79.01 LONG TERM (CURRENT) USE OF ANTICOAGULANTS: ICD-10-CM

## 2017-08-07 DIAGNOSIS — E11.22 CONTROLLED TYPE 2 DIABETES MELLITUS WITH STAGE 3 CHRONIC KIDNEY DISEASE, WITH LONG-TERM CURRENT USE OF INSULIN (HCC): Primary | ICD-10-CM

## 2017-08-07 DIAGNOSIS — E78.2 MIXED HYPERLIPIDEMIA: ICD-10-CM

## 2017-08-07 DIAGNOSIS — N18.30 CONTROLLED TYPE 2 DIABETES MELLITUS WITH STAGE 3 CHRONIC KIDNEY DISEASE, WITH LONG-TERM CURRENT USE OF INSULIN (HCC): Primary | ICD-10-CM

## 2017-08-07 DIAGNOSIS — I48.20 CHRONIC ATRIAL FIBRILLATION (HCC): ICD-10-CM

## 2017-08-07 DIAGNOSIS — I10 ESSENTIAL HYPERTENSION, BENIGN: ICD-10-CM

## 2017-08-07 DIAGNOSIS — Z79.4 CONTROLLED TYPE 2 DIABETES MELLITUS WITH STAGE 3 CHRONIC KIDNEY DISEASE, WITH LONG-TERM CURRENT USE OF INSULIN (HCC): Primary | ICD-10-CM

## 2017-08-07 DIAGNOSIS — I73.9 PVD (PERIPHERAL VASCULAR DISEASE) (HCC): ICD-10-CM

## 2017-08-07 DIAGNOSIS — E06.3 HYPOTHYROIDISM, ACQUIRED, AUTOIMMUNE: ICD-10-CM

## 2017-08-07 LAB
INR BLD: 2
PT POC: 23.7 SECONDS
VALID INTERNAL CONTROL?: YES

## 2017-08-07 NOTE — PROGRESS NOTES
Verified patient identity with two identifiers. Spoke with patient by phone to confirm dosing. INR 2.0 today in office. Patient instructions:   Continue new dose Coumadin 6 mg daily, except 3 mg Wed & Fri . Recheck INR 8/14/17    A full discussion of the nature of anticoagulants has been carried out. A benefit risk analysis has been presented to the patient, so that they understand the justification for choosing anticoagulation at this time. The need for frequent and regular monitoring, precise dosage adjustment and compliance is stressed. Side effects of potential bleeding are discussed. The patient should avoid any OTC items containing aspirin or ibuprofen, and should avoid great swings in general diet. Avoid alcohol consumption. Call if any signs of abnormal bleeding. Patient verbalized understanding.

## 2017-08-07 NOTE — TELEPHONE ENCOUNTER
Pt called to advise she is taking:    Amlodipine - 5 mg   2 x daily prescribed by Mart Smith, @ [facility]

## 2017-08-07 NOTE — PROGRESS NOTES
HISTORY OF PRESENT ILLNESS  Duane Bourgeois is a [de-identified] y.o. female. HPI Magnolia Bach is seen today for follow up of chronic atrial fibrillation, diabetes and other concerns. 1. Atrial fibrillation. INR stable this morning. She requested a confirmatory reading and it is very similar to her home reading. We will follow INR protocol. Recheck in 2 weeks. 2. CHF, CAD. She is due for cardiology follow up next week. 3. Hypertension. Dr. Faina Elkins who managed her wound care sent a message that he advised a change from Amlodipine for blood pressure control. I believe he was worried about the side effect of leg swelling which may have impeded her wound healing due to swelling. She needs to confirm for me if she is really taking two Amlodipine. I will also clarify with Dr. Margaret Plasencia, her cardiologist, as to any adjustments made. 4. Hyperlipidemia, diabetes, hypothyroidism. Due for routine lab recheck. Review of systems notable for foot pain. She has had slow healing of the left foot callus with underlying infection. She has followed up with podiatry and apparently has a  2nd opinion upcoming. As noted above, her lower extremity wounds have improved significantly. MedDATA/gwo     Current Outpatient Prescriptions   Medication Sig    atorvastatin (LIPITOR) 80 mg tablet TAKE ONE TABLET BY MOUTH EVERY EVENING    insulin NPH (HUMULIN N KWIKPEN) 100 unit/mL (3 mL) inpn 50 Units by SubCUTAneous route every morning and 23 units qpm.    Insulin Needles, Disposable, (ADAN PEN NEEDLE) 32 gauge x 5/32\" ndle Use with insulin pen  50 Units by SubCUTAneous route every morning. 50 units q am and 23 units qpm,    levothyroxine (SYNTHROID) 125 mcg tablet TAKE ONE TABLET BY MOUTH DAILY    amLODIPine (NORVASC) 5 mg tablet Take 2 Tabs by mouth daily.  allopurinol (ZYLOPRIM) 100 mg tablet TAKE TWO TABLETS BY MOUTH DAILY    furosemide (LASIX) 40 mg tablet Take 2 Tabs by mouth every evening.  (Patient taking differently: Take 80 mg by mouth daily. 2 tabs q am and 1 tab q PM)    hydrALAZINE (APRESOLINE) 10 mg tablet Take 2 Tabs by mouth three (3) times daily.  warfarin (COUMADIN) 3 mg tablet Take 7.5mg on Monday (2.5 tabs); Take 4.5mg on Tuesday: Take 6 mg on Wednesday; Take 4.5mg on Thursday  Get INR checked on Thursday (Patient taking differently: Take 6 mg daily.)    metoprolol tartrate (LOPRESSOR) 50 mg tablet Take 1 Tab by mouth two (2) times a day.  potassium chloride SR (KLOR-CON 10) 10 mEq tablet Take 1 Tab by mouth daily.  omeprazole (PRILOSEC) 20 mg capsule Take 20 mg by mouth daily.  dofetilide (TIKOSYN) 125 mcg capsule Take 1 Cap by mouth two (2) times a day.  multivitamins-minerals-lutein (CENTRUM SILVER) Tab Take 1 Tab by mouth daily. No current facility-administered medications for this visit. Review of Systems   Constitutional: Negative for weight loss. Respiratory: Negative. Cardiovascular: Positive for leg swelling. Negative for chest pain, palpitations and PND. Musculoskeletal: Positive for joint pain. Negative for myalgias. Neurological: Negative for focal weakness. Physical Exam   Constitutional: She appears well-nourished. Neck: Carotid bruit is not present. Cardiovascular: Normal rate and regular rhythm. Exam reveals no gallop and no friction rub. No murmur heard. Pulmonary/Chest: Effort normal and breath sounds normal. No respiratory distress. Musculoskeletal: She exhibits edema. Moderate bilateral lower extremity edema    Nursing note and vitals reviewed. ASSESSMENT and PLAN  Diagnoses and all orders for this visit:    1. Controlled type 2 diabetes mellitus with stage 3 chronic kidney disease, with long-term current use of insulin (HCC)  -     METABOLIC PANEL, COMPREHENSIVE  -     HEMOGLOBIN A1C WITH EAG  -     MICROALBUMIN, UR, RAND W/ MICROALBUMIN/CREA RATIO    2. Mixed hyperlipidemia  -     LIPID PANEL  -     CBC WITH AUTOMATED DIFF    3. Hypothyroidism, acquired, autoimmune  -     TSH 3RD GENERATION    4. Chronic atrial fibrillation ([facility] [address]- Please follow inr protocol , See cardiologist as directed. 5. [facility] - See cardiologist as directed. 6. Essential hypertension, benign- see hpi    7. Long term (current) use of anticoagulants  -     AMB POC PT/INR    8.  Iron deficiency anemia, unspecified iron deficiency anemia type- Follow off treatment

## 2017-08-07 NOTE — MR AVS SNAPSHOT
Visit Information Date & Time Provider Department Dept. Phone Encounter #  
 8/7/2017 10:20 AM Daya Morse MD [facility] Internal Medicine [phone] [id number] Follow-up Instructions Return in about 3 weeks (around 8/28/2017). Upcoming Health Maintenance Date Due ZOSTER VACCINE AGE 60> 2/17/1997 Pneumococcal 65+ High/Highest Risk (2 of 2 - PPSV23) 10/1/2007 DTaP/Tdap/Td series (1 - Tdap) 12/9/2010 MICROALBUMIN Q1 2/22/2017 INFLUENZA AGE 9 TO ADULT 8/1/2017 FOOT EXAM Q1 8/4/2017 LIPID PANEL Q1 8/5/2017 HEMOGLOBIN A1C Q6M 10/4/2017 MEDICARE YEARLY EXAM 2/15/2018 EYE EXAM RETINAL OR DILATED Q1 2/22/2018 GLAUCOMA SCREENING Q2Y 2/22/2019 Allergies as of 8/7/2017  Review Complete On: 8/7/2017 By: Dee Dee Ryan Severity Noted Reaction Type Reactions Actos [Pioglitazone]  04/23/2009    Swelling Swelling of feet and legs Codeine  04/23/2009    Itching Doxycycline  04/23/2009    Nausea Only Hydrocodone  04/17/2012    Rash, Other (comments)  
 hallucinations Current Immunizations  Reviewed on 7/11/2014 Name Date Influenza Vaccine 12/2/2013 Influenza Vaccine Split 10/17/2012 TD Vaccine 12/8/2010 ZZZ-RETIRED (DO NOT USE) Pneumococcal Vaccine (Unspecified Type) 10/1/2002 Not reviewed this visit You Were Diagnosed With   
  
 Codes Comments Controlled type 2 diabetes mellitus with stage 3 chronic kidney disease, with long-term current use of insulin (HCC)    -  Primary ICD-10-CM: E11.22, N18.3, Z79.4 ICD-9-CM: 250.40, 585.3, V58.67 Mixed hyperlipidemia     ICD-10-CM: E78.2 ICD-9-CM: 272.2 Hypothyroidism, acquired, autoimmune     ICD-10-CM: E03.8 ICD-9-CM: 089. 8 Chronic atrial fibrillation (HCC)     ICD-10-CM: W00.1 ICD-9-CM: 427.31 PVD (peripheral vascular disease) ([facility]     ICD-10-CM: I73.9 ICD-9-CM: 443.9  Essential hypertension, benign     ICD-10-CM: I10 
 ICD-9-CM: 401.1 Long term (current) use of anticoagulants     ICD-10-CM: Z79.01 
ICD-9-CM: V58.61 Iron deficiency anemia, unspecified iron deficiency anemia type     ICD-10-CM: D50.9 ICD-9-CM: 280.9 Vitals BP Pulse Temp Resp Height(growth percentile) Weight(growth percentile) 130/60 (BP 1 Location: Right arm, BP Patient Position: Sitting) 64 98.1 °F (36.7 °C) (Oral) 18 5' 9\" (1.753 m) 215 lb 9.6 oz (97.8 kg) SpO2 BMI OB Status Smoking Status 96% 31.84 kg/m2 Hysterectomy Former Smoker BMI and BSA Data Body Mass Index Body Surface Area  
 31.84 kg/m 2 2.18 m 2 Preferred Pharmacy Pharmacy Name Phone Shameka Hernandez [address] [place] [address] Dr Escudero, 225 [facility] [phone] Your Updated Medication List  
  
   
This list is accurate as of: 8/7/17 11:19 AM.  Always use your most recent med list.  
  
  
  
  
 allopurinol 100 mg tablet Commonly known as:  ZYLOPRIM  
TAKE TWO TABLETS BY MOUTH DAILY  
  
 amLODIPine 5 mg tablet Commonly known as:  Orval Swati Take 2 Tabs by mouth daily. atorvastatin 80 mg tablet Commonly known as:  LIPITOR  
TAKE ONE TABLET BY MOUTH EVERY EVENING  
  
 CENTRUM SILVER Tab tablet Generic drug:  multivitamins-minerals-lutein Take 1 Tab by mouth daily. dofetilide 125 mcg capsule Commonly known as:  Dauna Ink Take 1 Cap by mouth two (2) times a day. furosemide 40 mg tablet Commonly known as:  LASIX Take 2 Tabs by mouth every evening. hydrALAZINE 10 mg tablet Commonly known as:  APRESOLINE Take 2 Tabs by mouth three (3) times daily. Insulin Needles (Disposable) 32 gauge x 5/32\" Ndle Commonly known as:  Little Pen Needle Use with insulin pen  50 Units by SubCUTAneous route every morning. 50 units q am and 23 units qpm,  
  
 insulin  unit/mL (3 mL) Inpn Commonly known as:  HumuLIN N KwikPen 50 Units by SubCUTAneous route every morning and 23 units qpm.  
 levothyroxine 125 mcg tablet Commonly known as:  SYNTHROID  
TAKE ONE TABLET BY MOUTH DAILY  
  
 metoprolol tartrate 50 mg tablet Commonly known as:  LOPRESSOR Take 1 Tab by mouth two (2) times a day. potassium chloride SR 10 mEq tablet Commonly known as:  KLOR-CON 10 Take 1 Tab by mouth daily. PriLOSEC 20 mg capsule Generic drug:  omeprazole Take 20 mg by mouth daily. warfarin 3 mg tablet Commonly known as:  COUMADIN Take 7.5mg on Monday (2.5 tabs); Take 4.5mg on Tuesday: Take 6 mg on Wednesday; Take 4.5mg on Thursday Get INR checked on Thursday We Performed the Following AMB POC PT/INR [43562 CPT(R)] CBC WITH AUTOMATED DIFF [70777 CPT(R)] HEMOGLOBIN A1C WITH EAG [02868 CPT(R)] LIPID PANEL [44760 CPT(R)] METABOLIC PANEL, COMPREHENSIVE [39950 CPT(R)] MICROALBUMIN, UR, RAND W/ MICROALBUMIN/CREA RATIO [id number] CPT(R)] TSH 3RD GENERATION [91518 CPT(R)] Follow-up Instructions Return in about 3 weeks (around 8/28/2017). Introducing [facility] & HE[facility] SERVICES! [facility] introduces [employer] patient portal. Now you can access parts of your medical record, email your doctor's office, and request medication refills online. 1. In your internet browser, go to [email or web address] 2. Click on the First Time User? Click Here link in the Sign In box. You will see the New Member Sign Up page. 3. Enter your [employer] Access Code exactly as it appears below. You will not need to use this code after youve completed the sign-up process. If you do not sign up before the expiration date, you must request a new code. · [employer] Access Code: [email or web address] Expires: 11/5/2017 11:19 AM 
 
4. Enter the last four digits of your Social Security Number (xxxx) and Date of Birth (mm/dd/yyyy) as indicated and click Submit. You will be taken to the next sign-up page. 5. Create a StackSocial ID. This will be your StackSocial login ID and cannot be changed, so think of one that is secure and easy to remember. 6. Create a StackSocial password. You can change your password at any time. 7. Enter your Password Reset Question and Answer. This can be used at a later time if you forget your password. 8. Enter your e-mail address. You will receive e-mail notification when new information is available in [address] 9. Click Sign Up. You can now view and download portions of your medical record. 10. Click the Download Summary menu link to download a portable copy of your medical information. If you have questions, please visit the Frequently Asked Questions section of the Stack[employer] website. Remember, [employer] is NOT to be used for urgent needs. For medical emergencies, dial 911. Now available from your iPhone and Android! Please provide this summary of care documentation to your next provider. Your primary care clinician is listed as ANDRES BURDEN. If you have any questions after today's visit, please call [phone].

## 2017-08-08 DIAGNOSIS — Z79.4 CONTROLLED TYPE 2 DIABETES MELLITUS WITH STAGE 3 CHRONIC KIDNEY DISEASE, WITH LONG-TERM CURRENT USE OF INSULIN (HCC): ICD-10-CM

## 2017-08-08 DIAGNOSIS — N18.30 CONTROLLED TYPE 2 DIABETES MELLITUS WITH STAGE 3 CHRONIC KIDNEY DISEASE, WITH LONG-TERM CURRENT USE OF INSULIN (HCC): ICD-10-CM

## 2017-08-08 DIAGNOSIS — Z98.890 S/P MVR (MITRAL VALVE REPAIR): ICD-10-CM

## 2017-08-08 DIAGNOSIS — I73.9 PVD (PERIPHERAL VASCULAR DISEASE) (HCC): ICD-10-CM

## 2017-08-08 DIAGNOSIS — E11.22 CONTROLLED TYPE 2 DIABETES MELLITUS WITH STAGE 3 CHRONIC KIDNEY DISEASE, WITH LONG-TERM CURRENT USE OF INSULIN (HCC): ICD-10-CM

## 2017-08-08 DIAGNOSIS — I70.1 RAS (RENAL ARTERY STENOSIS) (HCC): ICD-10-CM

## 2017-08-08 DIAGNOSIS — Z79.01 LONG TERM (CURRENT) USE OF ANTICOAGULANTS: ICD-10-CM

## 2017-08-08 DIAGNOSIS — I34.0 MITRAL VALVE INSUFFICIENCY, UNSPECIFIED ETIOLOGY: ICD-10-CM

## 2017-08-08 DIAGNOSIS — I10 ESSENTIAL HYPERTENSION, BENIGN: ICD-10-CM

## 2017-08-08 DIAGNOSIS — I48.20 CHRONIC ATRIAL FIBRILLATION (HCC): ICD-10-CM

## 2017-08-08 RX ORDER — AMLODIPINE BESYLATE 5 MG/1
5 TABLET ORAL DAILY
Qty: 30 TAB | Refills: 11 | Status: SHIPPED | OUTPATIENT
Start: 2017-08-08 | End: 2017-09-01 | Stop reason: SDUPTHER

## 2017-08-08 NOTE — TELEPHONE ENCOUNTER
Pepe Watt with Dr Leigh Ann Maria office returned call - states Dr Leigh Ann Maria was fine with decreasing pt's amlodipine to 5 mg.  Will forward to MD.

## 2017-08-08 NOTE — TELEPHONE ENCOUNTER
Nurse unavailable - seeing patients. Left detailed message for Dr Victor Manuel Shin - Dr White Proper want to see if Dr Doug Person would be ok with dropping pt's amlodipine from 10 mg to 5 mg due to side effect of swelling. Requested she call back today if possible.

## 2017-08-08 NOTE — TELEPHONE ENCOUNTER
Advised pt MD has checked with Dr Osvaldo Rick in regards to decreasing her amlodipine to 5 mg. He was fine with this. Sent in Rx to pharmacy. She verbalizes understanding. Med list updated.

## 2017-08-10 RX ORDER — BLOOD SUGAR DIAGNOSTIC
STRIP MISCELLANEOUS
Qty: 100 STRIP | Refills: 10 | Status: SHIPPED | OUTPATIENT
Start: 2017-08-10 | End: 2018-08-02 | Stop reason: SDUPTHER

## 2017-08-14 ENCOUNTER — TELEPHONE ANTICOAG (OUTPATIENT)
Dept: INTERNAL MEDICINE CLINIC | Age: 82
End: 2017-08-14

## 2017-08-14 LAB — INR, EXTERNAL: 2

## 2017-08-14 NOTE — PROGRESS NOTES
Verified patient identity with two identifiers. Spoke with patient by phone. INR 2.0 today via home monitor. Patient instructions:   Continue Coumadin 6 mg daily, except 3 mg Wed & Fri . Recheck INR 8/21/17    A full discussion of the nature of anticoagulants has been carried out. A benefit risk analysis has been presented to the patient, so that they understand the justification for choosing anticoagulation at this time. The need for frequent and regular monitoring, precise dosage adjustment and compliance is stressed. Side effects of potential bleeding are discussed. The patient should avoid any OTC items containing aspirin or ibuprofen, and should avoid great swings in general diet. Avoid alcohol consumption. Call if any signs of abnormal bleeding. Patient verbalized understanding.

## 2017-08-22 ENCOUNTER — HOSPITAL ENCOUNTER (OUTPATIENT)
Dept: LAB | Age: 82
Discharge: HOME | End: 2017-08-22
Payer: MEDICARE

## 2017-08-22 PROCEDURE — 85025 COMPLETE CBC W/AUTO DIFF WBC: CPT

## 2017-08-22 PROCEDURE — 80061 LIPID PANEL: CPT

## 2017-08-22 PROCEDURE — 80053 COMPREHEN METABOLIC PANEL: CPT

## 2017-08-22 PROCEDURE — 83036 HEMOGLOBIN GLYCOSYLATED A1C: CPT

## 2017-08-22 PROCEDURE — 82043 UR ALBUMIN QUANTITATIVE: CPT

## 2017-08-22 PROCEDURE — 36415 COLL VENOUS BLD VENIPUNCTURE: CPT

## 2017-08-22 PROCEDURE — 84443 ASSAY THYROID STIM HORMONE: CPT

## 2017-08-23 ENCOUNTER — TELEPHONE (OUTPATIENT)
Dept: INTERNAL MEDICINE CLINIC | Age: 82
End: 2017-08-23

## 2017-08-23 LAB
ALBUMIN SERPL-MCNC: 4 G/DL (ref 3.5–4.7)
ALBUMIN/CREAT UR: 11.3 MG/G CREAT (ref 0–30)
ALBUMIN/GLOB SERPL: 1.5 {RATIO} (ref 1.2–2.2)
ALP SERPL-CCNC: 77 IU/L (ref 39–117)
ALT SERPL-CCNC: 14 IU/L (ref 0–32)
AST SERPL-CCNC: 15 IU/L (ref 0–40)
BASOPHILS # BLD AUTO: 0 X10E3/UL (ref 0–0.2)
BASOPHILS NFR BLD AUTO: 0 %
BILIRUB SERPL-MCNC: 0.4 MG/DL (ref 0–1.2)
BUN SERPL-MCNC: 20 MG/DL (ref 8–27)
BUN/CREAT SERPL: 13 (ref 12–28)
CALCIUM SERPL-MCNC: 9.1 MG/DL (ref 8.7–10.3)
CHLORIDE SERPL-SCNC: 100 MMOL/L (ref 96–106)
CHOLEST SERPL-MCNC: 119 MG/DL (ref 100–199)
CO2 SERPL-SCNC: 24 MMOL/L (ref 18–29)
CREAT SERPL-MCNC: 1.57 MG/DL (ref 0.57–1)
CREAT UR-MCNC: 221.3 MG/DL
EOSINOPHIL # BLD AUTO: 0.2 X10E3/UL (ref 0–0.4)
EOSINOPHIL NFR BLD AUTO: 3 %
ERYTHROCYTE [DISTWIDTH] IN BLOOD BY AUTOMATED COUNT: 18 % (ref 12.3–15.4)
EST. AVERAGE GLUCOSE BLD GHB EST-MCNC: 123 MG/DL
GLOBULIN SER CALC-MCNC: 2.6 G/DL (ref 1.5–4.5)
GLUCOSE SERPL-MCNC: 146 MG/DL (ref 65–99)
HBA1C MFR BLD: 5.9 % (ref 4.8–5.6)
HCT VFR BLD AUTO: 31 % (ref 34–46.6)
HDLC SERPL-MCNC: 37 MG/DL
HGB BLD-MCNC: 9.2 G/DL (ref 11.1–15.9)
IMM GRANULOCYTES # BLD: 0 X10E3/UL (ref 0–0.1)
IMM GRANULOCYTES NFR BLD: 0 %
LDLC SERPL CALC-MCNC: 66 MG/DL (ref 0–99)
LYMPHOCYTES # BLD AUTO: 1.1 X10E3/UL (ref 0.7–3.1)
LYMPHOCYTES NFR BLD AUTO: 15 %
MCH RBC QN AUTO: 23.4 PG (ref 26.6–33)
MCHC RBC AUTO-ENTMCNC: 29.7 G/DL (ref 31.5–35.7)
MCV RBC AUTO: 79 FL (ref 79–97)
MICROALBUMIN UR-MCNC: 25 UG/ML
MONOCYTES # BLD AUTO: 0.5 X10E3/UL (ref 0.1–0.9)
MONOCYTES NFR BLD AUTO: 8 %
NEUTROPHILS # BLD AUTO: 5.2 X10E3/UL (ref 1.4–7)
NEUTROPHILS NFR BLD AUTO: 74 %
PLATELET # BLD AUTO: 224 X10E3/UL (ref 150–379)
POTASSIUM SERPL-SCNC: 4.4 MMOL/L (ref 3.5–5.2)
PROT SERPL-MCNC: 6.6 G/DL (ref 6–8.5)
RBC # BLD AUTO: 3.94 X10E6/UL (ref 3.77–5.28)
SODIUM SERPL-SCNC: 141 MMOL/L (ref 134–144)
TRIGL SERPL-MCNC: 80 MG/DL (ref 0–149)
TSH SERPL DL<=0.005 MIU/L-ACNC: 3.09 UIU/ML (ref 0.45–4.5)
VLDLC SERPL CALC-MCNC: 16 MG/DL (ref 5–40)
WBC # BLD AUTO: 7.1 X10E3/UL (ref 3.4–10.8)

## 2017-08-28 LAB — INR, EXTERNAL: 2

## 2017-08-29 ENCOUNTER — TELEPHONE ANTICOAG (OUTPATIENT)
Dept: INTERNAL MEDICINE CLINIC | Age: 82
End: 2017-08-29

## 2017-08-30 ENCOUNTER — TELEPHONE (OUTPATIENT)
Dept: INTERNAL MEDICINE CLINIC | Age: 82
End: 2017-08-30

## 2017-08-30 NOTE — TELEPHONE ENCOUNTER
Pt called with INR results: 2.0; current coumadin regimen: 3mg on W/F and 6mg the rest of the week. pls evaluate.

## 2017-09-01 ENCOUNTER — OFFICE VISIT (OUTPATIENT)
Dept: INTERNAL MEDICINE CLINIC | Age: 82
End: 2017-09-01

## 2017-09-01 VITALS
OXYGEN SATURATION: 97 % | SYSTOLIC BLOOD PRESSURE: 118 MMHG | HEIGHT: 69 IN | TEMPERATURE: 97.7 F | WEIGHT: 215.4 LBS | HEART RATE: 66 BPM | DIASTOLIC BLOOD PRESSURE: 45 MMHG | RESPIRATION RATE: 18 BRPM | BODY MASS INDEX: 31.9 KG/M2

## 2017-09-01 DIAGNOSIS — I34.0 MITRAL VALVE INSUFFICIENCY, UNSPECIFIED ETIOLOGY: ICD-10-CM

## 2017-09-01 DIAGNOSIS — N18.30 CONTROLLED TYPE 2 DIABETES MELLITUS WITH STAGE 3 CHRONIC KIDNEY DISEASE, WITH LONG-TERM CURRENT USE OF INSULIN (HCC): ICD-10-CM

## 2017-09-01 DIAGNOSIS — Z79.01 LONG TERM (CURRENT) USE OF ANTICOAGULANTS: ICD-10-CM

## 2017-09-01 DIAGNOSIS — Z79.4 CONTROLLED TYPE 2 DIABETES MELLITUS WITH STAGE 3 CHRONIC KIDNEY DISEASE, WITH LONG-TERM CURRENT USE OF INSULIN (HCC): ICD-10-CM

## 2017-09-01 DIAGNOSIS — Z23 ENCOUNTER FOR IMMUNIZATION: Primary | ICD-10-CM

## 2017-09-01 DIAGNOSIS — I70.1 RAS (RENAL ARTERY STENOSIS) (HCC): ICD-10-CM

## 2017-09-01 DIAGNOSIS — I73.9 PVD (PERIPHERAL VASCULAR DISEASE) (HCC): ICD-10-CM

## 2017-09-01 DIAGNOSIS — Z98.890 S/P MVR (MITRAL VALVE REPAIR): ICD-10-CM

## 2017-09-01 DIAGNOSIS — I48.20 CHRONIC ATRIAL FIBRILLATION (HCC): ICD-10-CM

## 2017-09-01 DIAGNOSIS — E11.22 CONTROLLED TYPE 2 DIABETES MELLITUS WITH STAGE 3 CHRONIC KIDNEY DISEASE, WITH LONG-TERM CURRENT USE OF INSULIN (HCC): ICD-10-CM

## 2017-09-01 DIAGNOSIS — I10 ESSENTIAL HYPERTENSION, BENIGN: ICD-10-CM

## 2017-09-01 RX ORDER — AMLODIPINE BESYLATE 2.5 MG/1
2.5 TABLET ORAL DAILY
Qty: 30 TAB | Refills: 11 | Status: SHIPPED | OUTPATIENT
Start: 2017-09-01 | End: 2017-11-10

## 2017-09-01 NOTE — MR AVS SNAPSHOT
Visit Information Date & Time Provider Department Dept. Phone Encounter #  
 9/1/2017 12:30 PM Jackie Henriquez [facility] Internal Medicine [phone] [id number] Follow-up Instructions Return in about 3 months (around 12/1/2017). Upcoming Health Maintenance Date Due ZOSTER VACCINE AGE 60> 2/17/1997 Pneumococcal 65+ High/Highest Risk (2 of 2 - PPSV23) 10/1/2007 DTaP/Tdap/Td series (1 - Tdap) 12/9/2010 INFLUENZA AGE 9 TO ADULT 8/1/2017 FOOT EXAM Q1 8/4/2017 MEDICARE YEARLY EXAM 2/15/2018 HEMOGLOBIN A1C Q6M 2/22/2018 EYE EXAM RETINAL OR DILATED Q1 2/22/2018 MICROALBUMIN Q1 8/22/2018 LIPID PANEL Q1 8/22/2018 GLAUCOMA SCREENING Q2Y 2/22/2019 Allergies as of 9/1/2017  Review Complete On: 9/1/2017 By: Fatmata Mckenzie LPN Severity Noted Reaction Type Reactions Actos [Pioglitazone]  04/23/2009    Swelling Swelling of feet and legs Codeine  04/23/2009    Itching Doxycycline  04/23/2009    Nausea Only Hydrocodone  04/17/2012    Rash, Other (comments)  
 hallucinations Current Immunizations  Reviewed on 7/11/2014 Name Date Influenza Vaccine 12/2/2013 Influenza Vaccine Split 10/17/2012 TD Vaccine 12/8/2010 ZZZ-RETIRED (DO NOT USE) Pneumococcal Vaccine (Unspecified Type) 10/1/2002 Not reviewed this visit You Were Diagnosed With   
  
 Codes Comments Encounter for immunization    -  Primary ICD-10-CM: C81 ICD-9-CM: V03.89 Mitral valve insufficiency, unspecified etiology     ICD-10-CM: I34.0 ICD-9-CM: 424.0 S/P MVR (mitral valve repair)     ICD-10-CM: F45.980 ICD-9-CM: V45.89 Essential hypertension, benign     ICD-10-CM: I10 
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 9 Chronic atrial fibrillation (HCC)     ICD-10-CM: Z03.8 ICD-9-CM: 427.31 Long term (current) use of anticoagulants     ICD-10-CM: Z79.01 
ICD-9-CM: V58.61 Vitals BP Pulse Temp Resp Height(growth percentile) Weight(growth percentile) 118/45 (BP 1 Location: Left arm, BP Patient Position: Sitting) 66 97.7 °F (36.5 °C) (Oral) 18 5' 9\" (1.753 m) 215 lb 6.4 oz (97.7 kg) SpO2 BMI OB Status Smoking Status 97% 31.81 kg/m2 Hysterectomy Former Smoker Vitals History BMI and BSA Data Body Mass Index Body Surface Area  
 31.81 kg/m 2 2.18 m 2 Preferred Pharmacy Pharmacy Name Phone Alonso Nance [facility] [address] [phone] Your Updated Medication List  
  
   
This list is accurate as of: 9/1/17  1:00 PM.  Always use your most recent med list.  
  
  
  
  
 ACCU-CHEK SHAHIDA PLUS TEST STRP strip Generic drug:  glucose blood VI test strips USE TO CHECK BLOOD SUGAR FOUR TIMES A DAY  
  
 allopurinol 100 mg tablet Commonly known as:  ZYLOPRIM  
TAKE TWO TABLETS BY MOUTH DAILY  
  
 amLODIPine 2.5 mg tablet Commonly known as:  Sarah Nguyen Take 1 Tab by mouth daily. NEW DIRECTIONS. atorvastatin 80 mg tablet Commonly known as:  LIPITOR  
TAKE ONE TABLET BY MOUTH EVERY EVENING  
  
 CENTRUM SILVER Tab tablet Generic drug:  multivitamins-minerals-lutein Take 1 Tab by mouth daily. Diphth, Pertus(Acell), Tetanus 2.5-8-5 Lf-mcg-Lf/0.5mL Susp susp Commonly known as:  BOOSTRIX TDAP  
0.5 mL by IntraMUSCular route once for 1 dose. dofetilide 125 mcg capsule Commonly known as:  Gin Hunting Take 1 Cap by mouth two (2) times a day. furosemide 40 mg tablet Commonly known as:  LASIX Take 2 Tabs by mouth every evening. hydrALAZINE 10 mg tablet Commonly known as:  APRESOLINE Take 2 Tabs by mouth three (3) times daily. Insulin Needles (Disposable) 32 gauge x \" Ndle Commonly known as:  Little Pen Needle Use with insulin pen  50 Units by SubCUTAneous route every morning. 50 units q am and 23 units qpm,  
  
 insulin  unit/mL (3 mL) Inpn Commonly known as:  HumuLIN N KwikPen 44 Units by SubCUTAneous route every morning and 23 units qpm. New directions  
  
 levothyroxine 125 mcg tablet Commonly known as:  SYNTHROID  
TAKE ONE TABLET BY MOUTH DAILY  
  
 metoprolol tartrate 50 mg tablet Commonly known as:  LOPRESSOR Take 1 Tab by mouth two (2) times a day. pneumococcal 13 jessica conj dip 0.5 mL Syrg injection Commonly known as:  PREVNAR-13  
0.5 mL by IntraMUSCular route once for 1 dose. potassium chloride SR 10 mEq tablet Commonly known as:  KLOR-CON 10 Take 1 Tab by mouth daily. PriLOSEC 20 mg capsule Generic drug:  omeprazole Take 20 mg by mouth daily. varicella zoster vacine live 19,400 unit/0.65 mL Susr injection Commonly known as:  ZOSTAVAX  
1 Vial by SubCUTAneous route once for 1 dose. warfarin 3 mg tablet Commonly known as:  COUMADIN Take 7.5mg on Monday (2.5 tabs); Take 4.5mg on Tuesday: Take 6 mg on Wednesday; Take 4.5mg on Thursday Get INR checked on Thursday  
  
  
  
  
Prescriptions Printed Refills  
 varicella zoster vacine live (ZOSTAVAX) 19,400 unit/0.65 mL susr injection 0 Si Vial by SubCUTAneous route once for 1 dose. Class: Print Route: SubCUTAneous Keyshawn Elsmore,, Tetanus (BOOSTRIX TDAP) 2.5-8-5 Lf-mcg-Lf/0.5mL susp susp 0 Si.5 mL by IntraMUSCular route once for 1 dose. Class: Print Route: IntraMUSCular  
 pneumococcal 13 jessica conj dip (PREVNAR-13) 0.5 mL syrg injection 0 Si.5 mL by IntraMUSCular route once for 1 dose. Class: Print Route: IntraMUSCular Prescriptions Sent to Pharmacy Refills insulin NPH (HUMULIN N KWIKPEN) 100 unit/mL (3 mL) inpn PRN Si Units by SubCUTAneous route every morning and 23 units qpm. New directions Class: Normal  
 Pharmacy: [facility] [place] [address], [address] [facility] [address] Ph #: [phone]  
 amLODIPine (NORVASC) 2.5 mg tablet 11 Sig: Take 1 Tab by mouth daily. NEW DIRECTIONS. Class: Normal  
 Pharmacy: [facility] [address], 225 [facility] [address] [facility]  [phone] Ph #: [phone] Route: Oral  
  
Follow-up Instructions Return in about 3 months (around 2017). Introducing [facility] & HEALTH SERVICES! Rom[facility] Lawrence introduces [employer] patient portal. Now you can access parts of your medical record, email your doctor's office, and request medication refills online. 1. In your internet browser, go to [email or web address] 2. Click on the First Time User? Click Here link in the Sign In box. You will see the New Member Sign Up page. 3. Enter your [employer] Access Code exactly as it appears below. You will not need to use this code after youve completed the sign-up process. If you do not sign up before the expiration date, you must request a new code. · [employer] Access Code: [email or web address] Expires: 2017 11:19 AM 
 
4. Enter the last four digits of your Social Security Number (xxxx) and Date of Birth (mm/dd/yyyy) as indicated and click Submit. You will be taken to the next sign-up page. 5. Create a ChallengePost ID. This will be your ChallengePost login ID and cannot be changed, so think of one that is secure and easy to remember. 6. Create a ChallengePost password. You can change your password at any time. 7. Enter your Password Reset Question and Answer. This can be used at a later time if you forget your password. 8. Enter your e-mail address. You will receive e-mail notification when new information is available in [address] 9. Click Sign Up. You can now view and download portions of your medical record. 10. Click the Download Summary menu link to download a portable copy of your medical information. If you have questions, please visit the Frequently Asked Questions section of the [employer] website. Remember, [employer] is NOT to be used for urgent needs. For medical emergencies, dial 911. Now available from your iPhone and Android! Please provide this summary of care documentation to your next provider. Your primary care clinician is listed as ANDRES BURDEN. If you have any questions after today's visit, please call [phone].

## 2017-09-01 NOTE — PROGRESS NOTES
HISTORY OF PRESENT ILLNESS  Freda Benjamin is a [de-identified] y.o. female. HPI Sofia Roland is seen today for follow up of blood pressure with a medication adjustment and other chronic problems. Hypertension. Amlodipine was lowered due to possibility of leg swelling side effect. Her blood pressure remains excellent and she has not noted a big difference in her leg edema. She does have some mild lightheadedness and nausea. We reviewed lowering the dose further but I think we can check her in three months. Diabetes. Reviewed labs. A1c is outstanding. Some of her symptoms may be related to low blood sugar so we will adjust her insulin. MedDATA/gwo         Review of Systems   Cardiovascular: Positive for leg swelling. Neurological: Positive for weakness. Physical Exam   Constitutional: No distress. Cardiovascular: Normal rate and regular rhythm. Exam reveals no gallop and no friction rub. Murmur heard. Systolic murmur is present with a grade of 1/6   Pulmonary/Chest: Effort normal and breath sounds normal.   Nursing note and vitals reviewed. ASSESSMENT and PLAN  Diagnoses and all orders for this visit:    1. Encounter for immunization  -     varicella zoster vacine live (ZOSTAVAX) 19,400 unit/0.65 mL susr injection; 1 Vial by SubCUTAneous route once for 1 dose. -     Diphth, Pertus,Acell,, Tetanus (BOOSTRIX TDAP) 2.5-8-5 Lf-mcg-Lf/0.5mL susp susp; 0.5 mL by IntraMUSCular route once for 1 dose. -     pneumococcal 13 jessica conj dip (PREVNAR-13) 0.5 mL syrg injection; 0.5 mL by IntraMUSCular route once for 1 dose. 2. Mitral valve insufficiency, unspecified etiology  -     amLODIPine (NORVASC) 2.5 mg tablet; Take 1 Tab by mouth daily. NEW DIRECTIONS. 3. S/P MVR (mitral valve repair)- See specialists  as directed. 4. Essential hypertension, benign  -     amLODIPine (NORVASC) 2.5 mg tablet; Take 1 Tab by mouth daily. NEW DIRECTIONS. Continue other medications in addition to current changes     5.  CHARITY (renal artery stenosis) (HCC)  -     insulin NPH (HUMULIN N KWIKPEN) 100 unit/mL (3 mL) inpn; 44 Units by SubCUTAneous route every morning and 23 units qpm. New directions    6. 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 PVD (peripheral vascular disease) ([facility] 75.)- See specialists  as directed. 8. Chronic atrial fibrillation [facility])- See cardiologist as directed.      9. Encounter for Long-Term (Current) Use of Anticoagulants- Please follow inr protocol

## 2017-09-01 NOTE — PROGRESS NOTES
Patient in office today, she will continue same dose Coumadin and home test next week and call office with results.

## 2017-09-05 ENCOUNTER — TELEPHONE ANTICOAG (OUTPATIENT)
Dept: INTERNAL MEDICINE CLINIC | Age: 82
End: 2017-09-05

## 2017-09-05 ENCOUNTER — TELEPHONE (OUTPATIENT)
Dept: INTERNAL MEDICINE CLINIC | Age: 82
End: 2017-09-05

## 2017-09-05 LAB — INR, EXTERNAL: 2.5

## 2017-09-05 NOTE — PROGRESS NOTES
Verified patient identity with two identifiers. Spoke with patient by phone. INR 2.5 today via home monitor. Patient instructions:   Continue Coumadin 6 mg daily, except 3 mg Wed & Fri . Recheck INR 2 weeks 9/18/17    A full discussion of the nature of anticoagulants has been carried out. A benefit risk analysis has been presented to the patient, so that they understand the justification for choosing anticoagulation at this time. The need for frequent and regular monitoring, precise dosage adjustment and compliance is stressed. Side effects of potential bleeding are discussed. The patient should avoid any OTC items containing aspirin or ibuprofen, and should avoid great swings in general diet. Avoid alcohol consumption. Call if any signs of abnormal bleeding. Patient verbalized understanding.

## 2017-09-12 ENCOUNTER — TELEPHONE ANTICOAG (OUTPATIENT)
Dept: INTERNAL MEDICINE CLINIC | Age: 82
End: 2017-09-12

## 2017-09-12 ENCOUNTER — TELEPHONE (OUTPATIENT)
Dept: INTERNAL MEDICINE CLINIC | Age: 82
End: 2017-09-12

## 2017-09-12 LAB — INR, EXTERNAL: 2.5

## 2017-09-12 NOTE — PROGRESS NOTES
INR 2.5 today. Patient instructions:     Continue Coumadin 6 mg daily, except 3 mg Wed & Fri .    Recheck INR  week 9/19/17    LM asking patient to return call

## 2017-09-19 ENCOUNTER — TELEPHONE ANTICOAG (OUTPATIENT)
Dept: INTERNAL MEDICINE CLINIC | Age: 82
End: 2017-09-19

## 2017-09-19 LAB — INR, EXTERNAL: 1.7

## 2017-09-19 NOTE — PROGRESS NOTES
Verified patient identity with two identifiers. Spoke with patient by phone. INR 1.7 today via home monitor  Patient instructions: Take 6mg today and 6mg tomorrow then continue  Coumadin 6 mg daily, except 3 mg Wed & Fri . Recheck INR  week 9/26/17    A full discussion of the nature of anticoagulants has been carried out. A benefit risk analysis has been presented to the patient, so that they understand the justification for choosing anticoagulation at this time. The need for frequent and regular monitoring, precise dosage adjustment and compliance is stressed. Side effects of potential bleeding are discussed. The patient should avoid any OTC items containing aspirin or ibuprofen, and should avoid great swings in general diet. Avoid alcohol consumption. Call if any signs of abnormal bleeding. Patient verbalized understanding.

## 2017-09-26 ENCOUNTER — TELEPHONE ANTICOAG (OUTPATIENT)
Dept: INTERNAL MEDICINE CLINIC | Age: 82
End: 2017-09-26

## 2017-09-26 LAB — INR, EXTERNAL: 1.8

## 2017-09-26 NOTE — PROGRESS NOTES
Verified patient identity with two identifiers. Spoke with patient by phone. INR 1.8 today via home monitor. INR was 1.7 last week. Patient instructions: Old dose: Coumadin 6 mg daily, except 3 mg Wed & Fri . New dose: Coumadin 6 mg daily, except 3 mg Fri . (8% increase)  Recheck INR week 10/3/17    A full discussion of the nature of anticoagulants has been carried out. A benefit risk analysis has been presented to the patient, so that they understand the justification for choosing anticoagulation at this time. The need for frequent and regular monitoring, precise dosage adjustment and compliance is stressed. Side effects of potential bleeding are discussed. The patient should avoid any OTC items containing aspirin or ibuprofen, and should avoid great swings in general diet. Avoid alcohol consumption. Call if any signs of abnormal bleeding. Patient verbalized understanding.

## 2017-10-03 ENCOUNTER — TELEPHONE ANTICOAG (OUTPATIENT)
Dept: INTERNAL MEDICINE CLINIC | Age: 82
End: 2017-10-03

## 2017-10-03 LAB — INR, EXTERNAL: 2.2

## 2017-10-03 NOTE — PROGRESS NOTES
Verified patient identity with two identifiers. Spoke with patient by phone. INR 2.2 today via home monitor. Patient instructions:   Continue Coumadin 6 mg daily, except 3 mg Fri . Recheck INR week 10/10/17    A full discussion of the nature of anticoagulants has been carried out. A benefit risk analysis has been presented to the patient, so that they understand the justification for choosing anticoagulation at this time. The need for frequent and regular monitoring, precise dosage adjustment and compliance is stressed. Side effects of potential bleeding are discussed. The patient should avoid any OTC items containing aspirin or ibuprofen, and should avoid great swings in general diet. Avoid alcohol consumption. Call if any signs of abnormal bleeding. Patient verbalized understanding.

## 2017-10-12 ENCOUNTER — TELEPHONE (OUTPATIENT)
Dept: INTERNAL MEDICINE CLINIC | Age: 82
End: 2017-10-12

## 2017-10-16 ENCOUNTER — TELEPHONE ANTICOAG (OUTPATIENT)
Dept: INTERNAL MEDICINE CLINIC | Age: 82
End: 2017-10-16

## 2017-10-16 LAB — INR, EXTERNAL: 1.9

## 2017-10-16 NOTE — PROGRESS NOTES
Verified patient identity with two identifiers. Spoke with patient by phone. INR 1.9 today. Patient instructions:   Continue Coumadin 6 mg daily, except 3 mg Fri . Recheck INR week 10/23/17    A full discussion of the nature of anticoagulants has been carried out. A benefit risk analysis has been presented to the patient, so that they understand the justification for choosing anticoagulation at this time. The need for frequent and regular monitoring, precise dosage adjustment and compliance is stressed. Side effects of potential bleeding are discussed. The patient should avoid any OTC items containing aspirin or ibuprofen, and should avoid great swings in general diet. Avoid alcohol consumption. Call if any signs of abnormal bleeding. Patient verbalized understanding.

## 2017-10-23 ENCOUNTER — TELEPHONE ANTICOAG (OUTPATIENT)
Dept: INTERNAL MEDICINE CLINIC | Age: 82
End: 2017-10-23

## 2017-10-23 LAB — INR, EXTERNAL: 2.3

## 2017-10-23 NOTE — PROGRESS NOTES
Verified patient identity with two identifiers. Spoke with patient by phone. INR 2.3 today via home monitor. Patient instructions:   Continue Coumadin 6 mg daily, except 3 mg Fri . Recheck INR week 10/30/17    A full discussion of the nature of anticoagulants has been carried out. A benefit risk analysis has been presented to the patient, so that they understand the justification for choosing anticoagulation at this time. The need for frequent and regular monitoring, precise dosage adjustment and compliance is stressed. Side effects of potential bleeding are discussed. The patient should avoid any OTC items containing aspirin or ibuprofen, and should avoid great swings in general diet. Avoid alcohol consumption. Call if any signs of abnormal bleeding. Patient verbalized understanding.

## 2017-10-30 ENCOUNTER — TELEPHONE ANTICOAG (OUTPATIENT)
Dept: INTERNAL MEDICINE CLINIC | Age: 82
End: 2017-10-30

## 2017-10-30 LAB — INR, EXTERNAL: 1.5

## 2017-10-30 NOTE — PROGRESS NOTES
Verified patient identity with two identifiers. Spoke with patient by phone. INR 1.5 today via home monitor. Patient denies dietary changes, denies missing doses  Patient instructions:   Discussed with Dr. Robert Langston  Take Coumadin 7.5mg today and tomorrow (instead of normal 6mg) then continue normal dosing. (Coumadin 6 mg daily, except 3 mg Fri)  Recheck INR Friday 11/3/17, call office with results. A full discussion of the nature of anticoagulants has been carried out. A benefit risk analysis has been presented to the patient, so that they understand the justification for choosing anticoagulation at this time. The need for frequent and regular monitoring, precise dosage adjustment and compliance is stressed. Side effects of potential bleeding are discussed. The patient should avoid any OTC items containing aspirin or ibuprofen, and should avoid great swings in general diet. Avoid alcohol consumption. Call if any signs of abnormal bleeding. Patient verbalized understanding.

## 2017-11-03 ENCOUNTER — TELEPHONE (OUTPATIENT)
Dept: INTERNAL MEDICINE CLINIC | Age: 82
End: 2017-11-03

## 2017-11-03 ENCOUNTER — HOSPITAL ENCOUNTER (OUTPATIENT)
Dept: WOUND CARE | Age: 82
Discharge: HOME | End: 2017-11-03
Payer: MEDICARE

## 2017-11-03 PROCEDURE — 99212 OFFICE O/P EST SF 10 MIN: CPT | Performed by: OTOLARYNGOLOGY

## 2017-11-03 RX ORDER — LIDOCAINE HYDROCHLORIDE 20 MG/ML
JELLY TOPICAL AS NEEDED
Status: DISCONTINUED | OUTPATIENT
Start: 2017-11-03 | End: 2017-11-07 | Stop reason: HOSPADM

## 2017-11-03 RX ADMIN — LIDOCAINE HYDROCHLORIDE: 20 JELLY TOPICAL at 09:56

## 2017-11-03 NOTE — PROGRESS NOTES
[address] [place] [address] Ole Garcia   WOUND CARE PROGRESS NOTE       Name:  Aki Ambrose   MR#:  [id number]   :  1935   Account #:  [de-identified]        Date of Adm:  2017       DATE OF SERVICE:  2017    SUBJECTIVE: The patient was last seen by me at the [address] [place] [address] in July of this year. She had bilateral venous leg ulcers which   had resolved. She was still on amlodipine. I asked her to discuss this   with Dr. Home Gould who decreased her dose to 2.5 mg and noted no change   in her edema. She does have 20-30 mmHg compression stockings, but complains   that she is unable to get them on especially with her shortness of   breath which she has had for a prolonged period of time. She now presents with bilateral leg edema. She gets small fissures and   skin tears and presents for that management. She is on Lasix 80 mg per morning and 40 mg per evening. OBJECTIVE: Her temperature is 97.0, pulse 57, respirations 16, blood   pressure 124/59. Examination to both lower extremities shows pitting edema. She has   small skin tears and she has dermatitis bilaterally more so on the left   than on the right. ASSESSMENT AND PLAN: I explained to the patient that she might   very well have heart failure which is contributing to her problems. I   listened to her lungs. I do not hear any crackles, nor rales, nor rhonchi. This was a welcome sign. I explained to her that amlodipine could   certainly cause the edema even in a low dose. We are ordering a   dressing today of just betamethasone over the legs followed by a   double layer of Tubi- as a very mild compression. I explained that I   cannot do a maximal compression because mobilization of significant edema  could tip her over into heart failure. I do want her to keep her legs elevated as   much as possible.  I want her to do gastrocnemius muscle exercises as much as possible. We have given her information on how to purchase a sock   slider at [facility] and to bring that in next week with her 20-30 mmHg   compression stockings to see if this will help her be able to apply them. I then called Dr. Cobb Parents office. He is out of the office today, but I spoke to his   nurse and explained that the ideal thing would be to stop the   amlodipine completely. If he feels that she must stay on it, then adding   an ARB can be quite beneficial in conjunction with the calcium channel   blocker. His nurse called me back after speaking to him, and he decided to stop the  calcium channel blocker, and they will see her in the office in the next week or   two just to make sure that she is not having any cardiac issues that are   compromising her and contributing to the leg edema. All questions were answered. Her condition on discharge is stable. She will return in 1 week.          Janie Dakin, MD Cathren Sprang / [facility] Salvador   D:  11/03/2017   11:00   T:  11/03/2017   13:07   Job #:  [id number]

## 2017-11-03 NOTE — TELEPHONE ENCOUNTER
Called Dr Myrna Abernathy - advised him MD has approved pt stopping her Amlodipine - he will have her follow up in 2 weeks to assess BP. LMTCB for pt.

## 2017-11-03 NOTE — TELEPHONE ENCOUNTER
Dr Leonela Patel with wound clinic called - states he saw pt this am for peripheral edema. He is recommending to stop pt's Amlodipine or adding ARB. She is complaining of sob and feels stopping medication would help with her edema. Her BP is fine - 124/59. He's order pt lite compression hose and recommends she follow up here next week. He will have pt call to schedule. Will forward to MD.     Will forward to [facility] to call pt to schedule appt.

## 2017-11-06 ENCOUNTER — TELEPHONE ANTICOAG (OUTPATIENT)
Dept: INTERNAL MEDICINE CLINIC | Age: 82
End: 2017-11-06

## 2017-11-06 ENCOUNTER — TELEPHONE (OUTPATIENT)
Dept: INTERNAL MEDICINE CLINIC | Age: 82
End: 2017-11-06

## 2017-11-06 LAB — INR, EXTERNAL: 1.6

## 2017-11-06 NOTE — TELEPHONE ENCOUNTER
[phone] pt says that she talked to Xiomara Woodard earlier today and that she needs to talk to her again about her bp.

## 2017-11-06 NOTE — TELEPHONE ENCOUNTER
Advised pt MD has ok'd her to stop amlodipine - she needs to follow up in 2 wks to assess BP - scheduled on 11/20/17 at 11:15 am.

## 2017-11-06 NOTE — TELEPHONE ENCOUNTER
Spoke with pt - states she wanted to let MD know what her BP's have been runnin/2 - 145/53  pulse 69  11/3 - 169/65  pulse 71   - 154/62  pulse 77   - 162/62  pulse 71   - 166/63  pulse 73  Will forward to MD.

## 2017-11-07 NOTE — PROGRESS NOTES
Verified patient identity with two identifiers. Spoke with patient by phone. INR 1.6 today via home monitor. Only slightly up from 1.5 last week despite additional doses last week. Patient states she was sick with nausea/vomiting/diarrhea x 1-2 days last week. This has resolved. Denies missing doses. Patient instructions:   New dose:  Coumadin 6 mg daily, except 7.5mg Mondays (11.5% increase)  Old dose: Coumadin 6 mg daily, except 3 mg Fri  Recheck INR 1 week 11/13/17    A full discussion of the nature of anticoagulants has been carried out. A benefit risk analysis has been presented to the patient, so that they understand the justification for choosing anticoagulation at this time. The need for frequent and regular monitoring, precise dosage adjustment and compliance is stressed. Side effects of potential bleeding are discussed. The patient should avoid any OTC items containing aspirin or ibuprofen, and should avoid great swings in general diet. Avoid alcohol consumption. Call if any signs of abnormal bleeding. Patient verbalized understanding.

## 2017-11-08 NOTE — TELEPHONE ENCOUNTER
Verified patient identity with two identifiers. Spoke with patient by phone. Instructed patient to bring home BP monitor to upcoming appt. Patient verbalized understanding.

## 2017-11-10 ENCOUNTER — HOSPITAL ENCOUNTER (OUTPATIENT)
Dept: WOUND CARE | Age: 82
Discharge: HOME | End: 2017-11-10
Payer: MEDICARE

## 2017-11-10 PROCEDURE — 99213 OFFICE O/P EST LOW 20 MIN: CPT | Performed by: OTOLARYNGOLOGY

## 2017-11-10 PROCEDURE — 99212 OFFICE O/P EST SF 10 MIN: CPT | Performed by: OTOLARYNGOLOGY

## 2017-11-10 NOTE — PROGRESS NOTES
[address] [place]   WOUND CARE PROGRESS NOTE       Name:  Kayla Contreras   MR#:  [id number]   :  1935   Account #:  [de-identified]        Date of Adm:  11/10/2017       DATE OF SERVICE:  11/10/2017     SUBJECTIVE: The patient returns for venous hypertension of both   lower extremities. Last week, she had bilateral pitting edema, with skin   tears and dermatitis. She was placed on betamethasone, double   Tubigrips, leg elevation and gastrocnemius muscle exercises. She   spoke to Dr. Antoni Tsai office, who discontinued her calcium   channel blocker, and she has been off it for 7 days now. She has had no other problems over the past week, nor changes in her   medicines, allergies or review of systems. OBJECTIVE: Her temperature is 97.4, pulse 60, respirations 16, blood   pressure 121/69. The right lower extremity shows much less edema. The dermatitis has   also significantly improved. The left lower extremity has 2 small skin   tears, which is a profound improvement over last week. The edema is   also lessened, as is the dermatitis. ASSESSMENT AND PLAN: The patient did quite well with simple   double Tubigrips, leg elevation and gastrocnemius muscle exercises,   and discontinue of the calcium channel blocker. I explained to her that   it can take months for any lower extremity edema to resolve after   discontinuation of amlodipine, so I will need her to keep her legs   Elevated, do the exercises, and to use compression. Our plan now is   for her to use A and D ointment every day. In 6 days, she will remove   the double Tubigrips and attempt to put on her 20-30 mmHg stockings. If she is unable to do so, then she will call and we will see her the next   day, and she will bring them with her. We also want her to get a sock   slider at [facility] to help her apply these stockings.  She is also going to   follow up with Dr. Morris Scot for her persistent shortness of breath. All   questions were answered. Her condition on discharge is stable.          MD TASHA Best / [facility]   D:  11/10/2017   10:10   T:  11/10/2017   12:24   Job #:  [id number]

## 2017-11-13 ENCOUNTER — TELEPHONE ANTICOAG (OUTPATIENT)
Dept: INTERNAL MEDICINE CLINIC | Age: 82
End: 2017-11-13

## 2017-11-13 LAB — INR, EXTERNAL: 2.3

## 2017-11-20 ENCOUNTER — OFFICE VISIT (OUTPATIENT)
Dept: INTERNAL MEDICINE CLINIC | Age: 82
End: 2017-11-20

## 2017-11-20 ENCOUNTER — TELEPHONE ANTICOAG (OUTPATIENT)
Dept: INTERNAL MEDICINE CLINIC | Age: 82
End: 2017-11-20

## 2017-11-20 VITALS
HEIGHT: 69 IN | HEART RATE: 61 BPM | TEMPERATURE: 98.1 F | SYSTOLIC BLOOD PRESSURE: 152 MMHG | RESPIRATION RATE: 16 BRPM | BODY MASS INDEX: 32.38 KG/M2 | DIASTOLIC BLOOD PRESSURE: 68 MMHG | OXYGEN SATURATION: 97 % | WEIGHT: 218.6 LBS

## 2017-11-20 DIAGNOSIS — Z79.01 LONG TERM CURRENT USE OF ANTICOAGULANT THERAPY: ICD-10-CM

## 2017-11-20 DIAGNOSIS — I34.0 MITRAL VALVE INSUFFICIENCY, UNSPECIFIED ETIOLOGY: ICD-10-CM

## 2017-11-20 DIAGNOSIS — I10 ESSENTIAL HYPERTENSION, BENIGN: Primary | ICD-10-CM

## 2017-11-20 DIAGNOSIS — I48.20 CHRONIC ATRIAL FIBRILLATION (HCC): ICD-10-CM

## 2017-11-20 LAB
INR BLD: 1.7
PT POC: 20.8 SECONDS
VALID INTERNAL CONTROL?: YES

## 2017-11-20 RX ORDER — LISINOPRIL 20 MG/1
20 TABLET ORAL DAILY
Qty: 30 TAB | Refills: 11 | Status: SHIPPED | OUTPATIENT
Start: 2017-11-20 | End: 2018-12-07 | Stop reason: SDUPTHER

## 2017-11-20 NOTE — PROGRESS NOTES
Verified patient identity with two identifiers. INR 1.7 in office yesterday. Spoke with patient by phone. Patient was instructed several weeks ago to increase dose however upon confirming dosage she was still taking her old dose of Coumadin 6mg daily except 3mg on Wednesdays. Patient instructions:   Increase to: Coumadin 6mg daily  Recheck in 1 week    A full discussion of the nature of anticoagulants has been carried out. A benefit risk analysis has been presented to the patient, so that they understand the justification for choosing anticoagulation at this time. The need for frequent and regular monitoring, precise dosage adjustment and compliance is stressed. Side effects of potential bleeding are discussed. The patient should avoid any OTC items containing aspirin or ibuprofen, and should avoid great swings in general diet. Avoid alcohol consumption. Call if any signs of abnormal bleeding. Patient verbalized understanding.

## 2017-11-20 NOTE — MR AVS SNAPSHOT
Visit Information Date & Time Provider Department Dept. Phone Encounter #  
 11/20/2017 11:15 AM Daniele Neil MD [employer] [facility] Internal Medicine [phone] Follow-up Instructions Return in about 7 weeks (around 1/8/2018). Your Appointments 1/3/2018 10:20 AM  
ROUTINE CARE with Daniele Neil MD  
[facility] [employer] [facility] Internal Medicine [facility] Appt Note: 3 month f/u  
 [address] [facility] [address] [facility] [address]  
[address] [facility] [address] [place] Upcoming Health Maintenance Date Due ZOSTER VACCINE AGE 60> 2/17/1997 Pneumococcal 65+ High/Highest Risk (2 of 2 - PPSV23) 10/1/2007 DTaP/Tdap/Td series (1 - Tdap) 12/9/2010 Influenza Age 5 to Adult 8/1/2017 FOOT EXAM Q1 8/4/2017 MEDICARE YEARLY EXAM 2/15/2018 HEMOGLOBIN A1C Q6M 2/22/2018 EYE EXAM RETINAL OR DILATED Q1 2/22/2018 MICROALBUMIN Q1 8/22/2018 LIPID PANEL Q1 8/22/2018 GLAUCOMA SCREENING Q2Y 2/22/2019 Allergies as of 11/20/2017  Review Complete On: 11/20/2017 By: Jo Schwab Severity Noted Reaction Type Reactions Actos [Pioglitazone]  04/23/2009    Swelling Swelling of feet and legs Codeine  04/23/2009    Itching Doxycycline  04/23/2009    Nausea Only Hydrocodone  04/17/2012    Rash, Other (comments)  
 hallucinations Current Immunizations  Reviewed on 7/11/2014 Name Date Influenza Vaccine 12/2/2013 Influenza Vaccine Split 10/17/2012 TD Vaccine 12/8/2010 ZZZ-RETIRED (DO NOT USE) Pneumococcal Vaccine (Unspecified Type) 10/1/2002 Not reviewed this visit You Were Diagnosed With   
  
 Codes Comments Essential hypertension, benign    -  Primary ICD-10-CM: I10 
ICD-9-CM: 401.1 Long term current use of anticoagulant therapy     ICD-10-CM: Z79.01 
ICD-9-CM: V58.61 Mitral valve insufficiency, unspecified etiology     ICD-10-CM: I34.0 ICD-9-CM: 424.0 Chronic atrial fibrillation (HCC)     ICD-10-CM: W61.1 ICD-9-CM: 427.31 Vitals BP Pulse Temp Resp Height(growth percentile) Weight(growth percentile) 152/68 (BP 1 Location: Right arm, BP Patient Position: Sitting) 61 98.1 °F (36.7 °C) (Oral) 16 5' 9\" (1.753 m) 218 lb 9.6 oz (99.2 kg) SpO2 BMI OB Status Smoking Status 97% 32.28 kg/m2 Hysterectomy Former Smoker Vitals History BMI and BSA Data Body Mass Index Body Surface Area  
 32.28 kg/m 2 2.2 m 2 Preferred Pharmacy Pharmacy Name Phone [facility] [address], [address] [place] [address] Carlito Melendez [phone] Your Updated Medication List  
  
   
This list is accurate as of: 11/20/17 12:11 PM.  Always use your most recent med list.  
  
  
  
  
 ACCU-CHEK SHAHIDA PLUS TEST STRP strip Generic drug:  glucose blood VI test strips USE TO CHECK BLOOD SUGAR FOUR TIMES A DAY  
  
 allopurinol 100 mg tablet Commonly known as:  ZYLOPRIM  
TAKE TWO TABLETS BY MOUTH DAILY  
  
 atorvastatin 80 mg tablet Commonly known as:  LIPITOR  
TAKE ONE TABLET BY MOUTH EVERY EVENING  
  
 CENTRUM SILVER Tab tablet Generic drug:  multivitamins-minerals-lutein Take 1 Tab by mouth daily. dofetilide 125 mcg capsule Commonly known as:  [place] Take 1 Cap by mouth two (2) times a day. furosemide 40 mg tablet Commonly known as:  LASIX Take 2 Tabs by mouth every evening. hydrALAZINE 10 mg tablet Commonly known as:  APRESOLINE Take 2 Tabs by mouth three (3) times daily. Insulin Needles (Disposable) 32 gauge x 5/32\" Ndle Commonly known as:  Little Pen Needle Use with insulin pen  50 Units by SubCUTAneous route every morning. 50 units q am and 23 units qpm,  
  
 insulin  unit/mL (3 mL) Inpn Commonly known as:  HumuLIN N KwikPen 44 Units by SubCUTAneous route every morning and 23 units qpm. New directions  
  
 levothyroxine 125 mcg tablet Commonly known as:  SYNTHROID  
TAKE ONE TABLET BY MOUTH DAILY  
  
 lisinopril 20 mg tablet Commonly known as:  Natalia Reasons Take 1 Tab by mouth daily. metoprolol tartrate 50 mg tablet Commonly known as:  LOPRESSOR Take 1 Tab by mouth two (2) times a day. potassium chloride SR 10 mEq tablet Commonly known as:  KLOR-CON 10 Take 1 Tab by mouth daily. PriLOSEC 20 mg capsule Generic drug:  omeprazole Take 20 mg by mouth daily. warfarin 3 mg tablet Commonly known as:  COUMADIN Take 7.5mg on Monday (2.5 tabs); Take 4.5mg on Tuesday: Take 6 mg on Wednesday; Take 4.5mg on Thursday Get INR checked on Thursday  
  
  
  
  
Prescriptions Sent to Pharmacy Refills  
 lisinopril (PRINIVIL, ZESTRIL) 20 mg tablet 11 Sig: Take 1 Tab by mouth daily. Class: Normal  
 Pharmacy: Mai Jimenez [address] [place] [address], [address] [place] [address] #: [phone] Route: Oral  
  
We Performed the Following AMB POC PT/INR [60444 CPT(R)] Follow-up Instructions Return in about 7 weeks (around 1/8/2018). Introducing [facility] HEALTH [facility]! Miri Moise introduces Smartvue patient portal. Now you can access parts of your medical record, email your doctor's office, and request medication refills online. 1. In your internet browser, go to [email or web address] 2. Click on the First Time User? Click Here link in the Sign In box. You will see the New Member Sign Up page. 3. Enter your Smartvue Access Code exactly as it appears below. You will not need to use this code after youve completed the sign-up process. If you do not sign up before the expiration date, you must request a new code. · Smartvue Access Code: [email or web address] Expires: 2/4/2018  4:44 PM 
 
4. Enter the last four digits of your Social Security Number (xxxx) and Date of Birth (mm/dd/yyyy) as indicated and click Submit. You will be taken to the next sign-up page. 5. Create a "[employer] ID. This will be your "[employer] login ID and cannot be changed, so think of one that is secure and easy to remember. 6. Create a "[employer] password. You can change your password at any time. 7. Enter your Password Reset Question and Answer. This can be used at a later time if you forget your password. 8. Enter your e-mail address. You will receive e-mail notification when new information is available in [address] 9. Click Sign Up. You can now view and download portions of your medical record. 10. Click the Download Summary menu link to download a portable copy of your medical information. If you have questions, please visit the Frequently Asked Questions section of the "[employer] website. Remember, "[employer] is NOT to be used for urgent needs. For medical emergencies, dial 911. Now available from your iPhone and Android! Please provide this summary of care documentation to your next provider. Your primary care clinician is listed as ANDRES BURDEN. If you have any questions after today's visit, please call [phone].

## 2017-11-20 NOTE — PROGRESS NOTES
Pt INR check done today in ov per Dr. Denise Pablo verbal order informed pt that Annalise would be calling her regarding updated coumadin directions. Pt states understanding.

## 2017-11-20 NOTE — PROGRESS NOTES
HISTORY OF PRESENT ILLNESS  Marc Styles is a [de-identified] y.o. female. HPI Italo Carnes is seen today for follow up of hypertension, atrial fibrillation and other concerns. Hypertension. Readings are elevated. Amlodipine was discontinued secondary to peripheral edema. This has improved off Amlodipine somewhat. Her weight is stable. Review of her medication list indicates she has been off Lisinopril for about one year. It is unclear as to why this was stopped. She would benefit from a kidney standpoint as well. Will restart a lower dose than previous and recheck her in six weeks. I have asked her to call if her readings aren't improving as well. Atrial fibrillation. She is due for INR assessment. She denies any inappropriate bleeding. See anticoag flow sheet for details. Mitral regurgitation. Up to date with cardiology follow up. 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 I.A. 4/23/2009    TIA (transient ischemic attack)          Review of Systems   Constitutional: Negative for weight loss. Respiratory: Negative. Cardiovascular: Positive for leg swelling. Negative for chest pain, palpitations and PND. Musculoskeletal: Negative for myalgias. Neurological: Negative for focal weakness. Physical Exam   Constitutional: No distress. Cardiovascular: Normal rate and regular rhythm. Exam reveals no gallop and no friction rub. No murmur heard.   Pulmonary/Chest: Effort normal and breath sounds normal. Musculoskeletal: She exhibits edema. Moderate bilateral lower extremity edema    Nursing note and vitals reviewed. ASSESSMENT and PLAN  Diagnoses and all orders for this visit:    1. Essential hypertension, benign  -     lisinopril (PRINIVIL, ZESTRIL) 20 mg tablet; Take 1 Tab by mouth daily. Continue other medications in addition to current changes     2. Long term current use of anticoagulant therapy  -     AMB POC PT/INR    3. Mitral valve insufficiency, unspecified etiology- See cardiologist as directed. 4. Chronic atrial fibrillation ([address]- Please follow inr protocol , Continue current regimen of prescription and / or OTC medications , See cardiologist as directed.

## 2017-11-27 ENCOUNTER — TELEPHONE ANTICOAG (OUTPATIENT)
Dept: INTERNAL MEDICINE CLINIC | Age: 82
End: 2017-11-27

## 2017-11-27 LAB — INR, EXTERNAL: 2.7

## 2017-11-27 RX ORDER — WARFARIN 3 MG/1
6 TABLET ORAL DAILY
COMMUNITY
End: 2018-03-16 | Stop reason: SDUPTHER

## 2017-12-04 LAB — INR, EXTERNAL: 2.5

## 2017-12-12 ENCOUNTER — TELEPHONE ANTICOAG (OUTPATIENT)
Dept: INTERNAL MEDICINE CLINIC | Age: 82
End: 2017-12-12

## 2017-12-12 ENCOUNTER — TELEPHONE (OUTPATIENT)
Dept: INTERNAL MEDICINE CLINIC | Age: 82
End: 2017-12-12

## 2017-12-12 NOTE — PROGRESS NOTES
Verified patient identity with two identifiers. Spoke with patient by phone to ask if she had tested INR this week, she has run out of test strips, has requested more from home monitoring company and will have them tomorrow afternoon, she will test Thursday morning of this week. Patient verbalized understanding. INR 2.5 last week. Patient instructions:   Continue Coumadin 6 mg daily  Retest 12/14/17    A full discussion of the nature of anticoagulants has been carried out. A benefit risk analysis has been presented to the patient, so that they understand the justification for choosing anticoagulation at this time. The need for frequent and regular monitoring, precise dosage adjustment and compliance is stressed. Side effects of potential bleeding are discussed. The patient should avoid any OTC items containing aspirin or ibuprofen, and should avoid great swings in general diet. Avoid alcohol consumption. Call if any signs of abnormal bleeding. Patient verbalized understanding.

## 2017-12-12 NOTE — TELEPHONE ENCOUNTER
Verified patient identity with two identifiers. Spoke with patient by phone to ask if she had tested INR this week, she has run out of test strips, has requested more from home monitoring company and will have them tomorrow afternoon, she will test Thursday morning of this week. Patient verbalized understanding.

## 2017-12-19 ENCOUNTER — TELEPHONE ANTICOAG (OUTPATIENT)
Dept: INTERNAL MEDICINE CLINIC | Age: 82
End: 2017-12-19

## 2017-12-19 ENCOUNTER — TELEPHONE (OUTPATIENT)
Dept: INTERNAL MEDICINE CLINIC | Age: 82
End: 2017-12-19

## 2017-12-19 LAB — INR, EXTERNAL: 5.2

## 2017-12-19 NOTE — PROGRESS NOTES
Verified patient identity with two identifiers. Spoke with patient by phone. INR 5.2 today. Patient denies medication and or dietary changes. Patient instructions:   Daysi Morris Scot - HOLD Coumadin today and tomorrow and retest Thursday morning. Call office with results. Patient denies any unusual bleeding or bruising and was instructed to call office and seek medical care if any should occur. Old dose Coumadin 6mg daily except 3mg on Fridays (patient confirmed this was dose she was taking our records indicated she was taking Coumadin 6mg daily). A full discussion of the nature of anticoagulants has been carried out. A benefit risk analysis has been presented to the patient, so that they understand the justification for choosing anticoagulation at this time. The need for frequent and regular monitoring, precise dosage adjustment and compliance is stressed. Side effects of potential bleeding are discussed. The patient should avoid any OTC items containing aspirin or ibuprofen, and should avoid great swings in general diet. Avoid alcohol consumption. Call if any signs of abnormal bleeding. Patient verbalized understanding.

## 2017-12-20 ENCOUNTER — TELEPHONE (OUTPATIENT)
Dept: INTERNAL MEDICINE CLINIC | Age: 82
End: 2017-12-20

## 2017-12-20 NOTE — TELEPHONE ENCOUNTER
Salvatore Calvert with [facility] called to make sure we had received pts INR result that was faxed over yesterday. she wanted to report a critical INR of 5.2 for the pt Informed her we had not she states she will refax-confirmed with her that she had the correct fax # , please advise.

## 2017-12-21 ENCOUNTER — TELEPHONE ANTICOAG (OUTPATIENT)
Dept: INTERNAL MEDICINE CLINIC | Age: 82
End: 2017-12-21

## 2017-12-21 ENCOUNTER — TELEPHONE (OUTPATIENT)
Dept: INTERNAL MEDICINE CLINIC | Age: 82
End: 2017-12-21

## 2017-12-21 LAB — INR, EXTERNAL: 2.7

## 2017-12-21 NOTE — PROGRESS NOTES
Verified patient identity with two identifiers. Spoke with patient by phone. INR 2.7 today. Down from 5.2 after holding 2 days. Policy states to decrease TWD dose 20-30 percent for INR over 5.0  Patient instructions: Discussed with Dr. Ele Rodriguez  Old dose: Coumadin 6mg daily except 3mg on Fridays  New dose: Coumadin 3mg daily except 6mg on M/W/F (23% decrease)  Recheck 1 week on 12/28/17    A full discussion of the nature of anticoagulants has been carried out. A benefit risk analysis has been presented to the patient, so that they understand the justification for choosing anticoagulation at this time. The need for frequent and regular monitoring, precise dosage adjustment and compliance is stressed. Side effects of potential bleeding are discussed. The patient should avoid any OTC items containing aspirin or ibuprofen, and should avoid great swings in general diet. Avoid alcohol consumption. Call if any signs of abnormal bleeding. Patient verbalized understanding.

## 2017-12-22 ENCOUNTER — HOSPITAL ENCOUNTER (OUTPATIENT)
Dept: WOUND CARE | Age: 82
Discharge: HOME | End: 2017-12-22
Payer: MEDICARE

## 2017-12-22 PROCEDURE — 99214 OFFICE O/P EST MOD 30 MIN: CPT

## 2017-12-22 PROCEDURE — 29581 APPL MULTLAYER CMPRN SYS LEG: CPT

## 2017-12-22 RX ORDER — LIDOCAINE HYDROCHLORIDE 20 MG/ML
JELLY TOPICAL ONCE
Status: COMPLETED | OUTPATIENT
Start: 2017-12-22 | End: 2017-12-22

## 2017-12-22 RX ADMIN — LIDOCAINE HYDROCHLORIDE: 20 JELLY TOPICAL at 09:50

## 2017-12-23 NOTE — PROGRESS NOTES
[address]  WOUND CARE PROGRESS NOTE    Stef Parmar  MR#: [id number]  : 1935  ACCOUNT #: [de-identified]   DATE OF SERVICE: 2017    DATE OF SERVICE:  2017     The patient returns to the wound care center after she had healed on 11/10/2017. She was discharged to using 20-30 mmHg compression stockings with a sock slider, and in spite of having great difficulty applying them, she has been using them all along. About a week and a half ago, she started noticing small blisters on both lower extremities and has since stopped using her compression stockings. There have been no changes in her medications, allergies, or review of systems, which includes T2D, HTN, and S/P TIA. She has remained on insulin for 13 years and a blood thinner. Her temperature today is 97.5, pulse 60, respirations 16, blood pressure 132/70. She has multiple superficial skin tears bilaterally with bilateral circumferential wounds with the left measuring 22 x 18.5 x 0.2 cm and the right 16.5 x 17 x 0.1 cm. She has dermatitis bilaterally, but of concern is that she is tender over the right calf muscle without any pain in the popliteal fossa. I explained to the patient that before wrapping her, I have to make sure she does not have deep vein thrombosis. Fortunately, we were able to get her into the vascular surgery office next door on an emergency basis and they just did a venous duplex that shows no evidence of a clot, but she is significantly pulsatile consistent with her cardiac problems. ASSESSMENT AND PLAN:  In this patient with bilateral venous leg ulcers, I87.313, L97.911, L 97.921 with type 2 diabetes, E11.622, we are going to apply Xeroform to all superficial wounds. We are then going to apply bilateral 3 layer wraps at 25% stretch, which she has tolerated previously. We are going to have her keep her legs elevated as much as possible.   She will do gastrocnemius muscle exercises throughout the day. We will see again in followup in 1 week. If she has trouble with pain, drainage, or if the wraps slip down due to effective compression, she will call us to be fit in for a nursing visit for a dressing change during the week, or she can simply remove the wraps at any time on her own. We need to come up with a better plan for the long-term. She might require bilateral Circaid wraps, but I would like to get the legs less edematous for proper measurement before we do that and that will be considered next week or the following week. All questions were answered. CONDITION ON DISCHARGE:  Stable.       MD MARCIO Pierre / MOE  D: 12/22/2017 11:52     T: 12/22/2017 14:32  JOB #: [id number]

## 2017-12-28 ENCOUNTER — TELEPHONE ANTICOAG (OUTPATIENT)
Dept: INTERNAL MEDICINE CLINIC | Age: 82
End: 2017-12-28

## 2017-12-28 LAB — INR, EXTERNAL: 1.3

## 2017-12-28 NOTE — PROGRESS NOTES
INR was 5.2 last week, Coumadin was held and dose adjusted last week. INR 1.3 today via home monitor. Patient denies missing any doses. Patient instructions:  Discussed with Dr. Ho Kuwaiti:  New dose: Coumadin 6mg daily except 3mg Sat/Sun/Tues (10% increase)  Old dose: Coumadin 3mg daily except 6mg on M/W/F  Recheck 1/2/18      A full discussion of the nature of anticoagulants has been carried out. A benefit risk analysis has been presented to the patient, so that they understand the justification for choosing anticoagulation at this time. The need for frequent and regular monitoring, precise dosage adjustment and compliance is stressed. Side effects of potential bleeding are discussed. The patient should avoid any OTC items containing aspirin or ibuprofen, and should avoid great swings in general diet. Avoid alcohol consumption. Call if any signs of abnormal bleeding. Patient verbalized understanding.

## 2017-12-29 ENCOUNTER — HOSPITAL ENCOUNTER (OUTPATIENT)
Dept: WOUND CARE | Age: 82
Discharge: HOME | End: 2017-12-29
Payer: MEDICARE

## 2017-12-29 PROCEDURE — 29581 APPL MULTLAYER CMPRN SYS LEG: CPT

## 2017-12-29 NOTE — PROGRESS NOTES
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 The patient had a fairly good week. She removed her 3-layer wraps last evening, but was able to keep them on for 6 days. She took them off because they were uncomfortable. To review, she was seen at the wound care center in  of this year with bilateral venous leg ulcers and edema. She responded well to therapy and healed quickly. She then returned here in November with new wounds, and in spite of using 20-30 mmHg stockings, she reformed wounds. She then healed with conservative therapy, and then returned here on 2017 with new bilateral venous leg ulcers, and bilateral stage III lymphedema. OBJECTIVE:  Today, she presents with her temperature 97.3, pulse 74, respirations 20, blood pressure 131/56. The wound to the left lower leg measure 14 x 15 x 0.1 cm. There are multiple punctate areas that are oozing clear thin serous fluid. The right lower leg is identical, and the size measures 13.5 x 13.5 x 0.1 cm. She has persistent pitting edema, in spite of having had the 30-40 mmHg 3-layer compression wraps in place this week. ASSESSMENT AND PLAN:  We are going to apply Aquacel AG to the wounds to try to help absorb the fluid. We are going to reapply 3-layer wraps. She is going to keep her legs elevated. She is again instructed on how to do, and how frequently to perform gastrocnemius muscle exercises to improve venous outflow. I have explained to the patient that she has failed multiple courses of therapy, and at her age of [de-identified], I am very concerned that she will not be able to apply 30-40 mmHg stockings. Therefore, I feel a Lympha Press device to be used bilaterally at 40 mmHg twice a day for 60 minutes at a time, will help her not only for the short term, but also for the long term to help keep her lower extremities managed in terms of the edema. If we can do that, then she should be able to use 20-30s again, and not require 30-40, which might be very difficult for her to apply. She understands the plan. All questions were answered. Her condition on discharge is stable. She will return to see me in 1 week.       MD MARCIO Villalobos / MOE  D: 12/29/2017 12:46     T: 12/29/2017 15:46  JOB #: [id number]

## 2018-01-02 ENCOUNTER — TELEPHONE ANTICOAG (OUTPATIENT)
Dept: INTERNAL MEDICINE CLINIC | Age: 83
End: 2018-01-02

## 2018-01-02 LAB — INR, EXTERNAL: 1.8

## 2018-01-02 NOTE — PROGRESS NOTES
Verified patient identity with two identifiers. Spoke with patient by phone. INR 1.8 today via home monitor. Up from 1.3 last Thursday after dose increase. Patient instructions:   Continue Coumadin 6mg daily except 3mg Sat/Sun/Tues   Recheck one week 1/9/18    A full discussion of the nature of anticoagulants has been carried out. A benefit risk analysis has been presented to the patient, so that they understand the justification for choosing anticoagulation at this time. The need for frequent and regular monitoring, precise dosage adjustment and compliance is stressed. Side effects of potential bleeding are discussed. The patient should avoid any OTC items containing aspirin or ibuprofen, and should avoid great swings in general diet. Avoid alcohol consumption. Call if any signs of abnormal bleeding. Patient verbalized understanding.

## 2018-01-03 ENCOUNTER — OFFICE VISIT (OUTPATIENT)
Dept: INTERNAL MEDICINE CLINIC | Age: 83
End: 2018-01-03

## 2018-01-03 VITALS
BODY MASS INDEX: 33.18 KG/M2 | DIASTOLIC BLOOD PRESSURE: 61 MMHG | OXYGEN SATURATION: 97 % | RESPIRATION RATE: 18 BRPM | WEIGHT: 224 LBS | SYSTOLIC BLOOD PRESSURE: 133 MMHG | HEIGHT: 69 IN | HEART RATE: 79 BPM | TEMPERATURE: 97.8 F

## 2018-01-03 DIAGNOSIS — R04.0 EPISTAXIS: ICD-10-CM

## 2018-01-03 DIAGNOSIS — Z23 NEED FOR TDAP VACCINATION: ICD-10-CM

## 2018-01-03 DIAGNOSIS — D50.9 IRON DEFICIENCY ANEMIA, UNSPECIFIED IRON DEFICIENCY ANEMIA TYPE: ICD-10-CM

## 2018-01-03 DIAGNOSIS — E78.2 MIXED HYPERLIPIDEMIA: ICD-10-CM

## 2018-01-03 DIAGNOSIS — I73.9 PVD (PERIPHERAL VASCULAR DISEASE) (HCC): ICD-10-CM

## 2018-01-03 DIAGNOSIS — E11.9 ENCOUNTER FOR DIABETIC FOOT EXAM (HCC): ICD-10-CM

## 2018-01-03 DIAGNOSIS — M25.561 CHRONIC PAIN OF BOTH KNEES: ICD-10-CM

## 2018-01-03 DIAGNOSIS — E11.22 CONTROLLED TYPE 2 DIABETES MELLITUS WITH STAGE 3 CHRONIC KIDNEY DISEASE, WITH LONG-TERM CURRENT USE OF INSULIN (HCC): Primary | ICD-10-CM

## 2018-01-03 DIAGNOSIS — N18.30 CONTROLLED TYPE 2 DIABETES MELLITUS WITH STAGE 3 CHRONIC KIDNEY DISEASE, WITH LONG-TERM CURRENT USE OF INSULIN (HCC): Primary | ICD-10-CM

## 2018-01-03 DIAGNOSIS — M25.562 CHRONIC PAIN OF BOTH KNEES: ICD-10-CM

## 2018-01-03 DIAGNOSIS — Z79.4 CONTROLLED TYPE 2 DIABETES MELLITUS WITH STAGE 3 CHRONIC KIDNEY DISEASE, WITH LONG-TERM CURRENT USE OF INSULIN (HCC): Primary | ICD-10-CM

## 2018-01-03 DIAGNOSIS — I25.10 ATHEROSCLEROSIS OF NATIVE CORONARY ARTERY OF NATIVE HEART WITHOUT ANGINA PECTORIS: ICD-10-CM

## 2018-01-03 DIAGNOSIS — I48.20 CHRONIC ATRIAL FIBRILLATION (HCC): ICD-10-CM

## 2018-01-03 DIAGNOSIS — Z23 ENCOUNTER FOR IMMUNIZATION: ICD-10-CM

## 2018-01-03 DIAGNOSIS — Z23 NEED FOR SHINGLES VACCINE: ICD-10-CM

## 2018-01-03 DIAGNOSIS — G89.29 CHRONIC PAIN OF BOTH KNEES: ICD-10-CM

## 2018-01-03 DIAGNOSIS — E06.3 HYPOTHYROIDISM, ACQUIRED, AUTOIMMUNE: ICD-10-CM

## 2018-01-03 RX ORDER — FUROSEMIDE 40 MG/1
TABLET ORAL
Qty: 1 TAB | Refills: 0
Start: 2018-01-03 | End: 2018-09-07 | Stop reason: SDUPTHER

## 2018-01-03 NOTE — PROGRESS NOTES
HISTORY OF PRESENT ILLNESS  Crista Rivero is a [de-identified] y.o. female. HPI Jean Keating is seen today for follow up of hypertension and other problems. 1. Hypertension. Stable on current regimen. 2. Diabetes, hypothyroidism, hyperlipidemia, CKD 3. Due for routine labs. 3. Chronic atrial fibrillation. INR is up to date. She is utilizing home testing at this time which is coordinated by our Coumadin nurse Annalise. 4. CAD, PVD. Clinically stable. She sees cardiology on 1/22/18. 5. New problem reviewed, nosebleeds. For the last 3 weeks she has had some blood tinged mucus when she blows her nose. She is having more congestion. Reviewed trial of nasal saline spray to reduce the dryness of the nose. 6. Meds are fully reviewed. We clarified her Lasix dosing. MedDATA/gwo     Current Outpatient Prescriptions   Medication Sig    furosemide (LASIX) 40 mg tablet 2 tabs q am and 1 tab q PM    sodium chloride (OCEAN) 0.65 % nasal spray 1 [place] by Both Nostrils route as needed for Congestion.  warfarin (COUMADIN) 3 mg tablet Take 6 mg by mouth daily. M-W-F 6 mg  T-TH-S-S 3 mg    lisinopril (PRINIVIL, ZESTRIL) 20 mg tablet Take 1 Tab by mouth daily.  insulin NPH (HUMULIN N KWIKPEN) 100 unit/mL (3 mL) inpn 44 Units by SubCUTAneous route every morning and 23 units qpm. New directions    ACCU-CHEK SHAHIDA PLUS TEST STRP strip USE TO CHECK BLOOD SUGAR FOUR TIMES A DAY    atorvastatin (LIPITOR) 80 mg tablet TAKE ONE TABLET BY MOUTH EVERY EVENING    Insulin Needles, Disposable, (ADAN PEN NEEDLE) 32 gauge x 5/32\" ndle Use with insulin pen  50 Units by SubCUTAneous route every morning. 50 units q am and 23 units qpm,    levothyroxine (SYNTHROID) 125 mcg tablet TAKE ONE TABLET BY MOUTH DAILY    allopurinol (ZYLOPRIM) 100 mg tablet TAKE TWO TABLETS BY MOUTH DAILY    hydrALAZINE (APRESOLINE) 10 mg tablet Take 2 Tabs by mouth three (3) times daily.  (Patient taking differently: Take 20 mg by mouth two (2) times a day.)    metoprolol tartrate (LOPRESSOR) 50 mg tablet Take 1 Tab by mouth two (2) times a day.  potassium chloride SR (KLOR-CON 10) 10 mEq tablet Take 1 Tab by mouth daily.  omeprazole (PRILOSEC) 20 mg capsule Take 20 mg by mouth daily.  dofetilide (TIKOSYN) 125 mcg capsule Take 1 Cap by mouth two (2) times a day.  multivitamins-minerals-lutein (CENTRUM SILVER) Tab Take 1 Tab by mouth daily. No current facility-administered medications for this visit. Review of Systems   Constitutional: Negative for weight loss. HENT: Positive for nosebleeds. Respiratory: Negative. Cardiovascular: Positive for leg swelling. Negative for chest pain, palpitations and PND. Musculoskeletal: Positive for joint pain. Negative for myalgias. Bilateral knee pain   Neurological: Negative for focal weakness. Physical Exam   Constitutional: She appears well-nourished. Neck: Carotid bruit is not present. Cardiovascular: Normal rate and regular rhythm. Exam reveals no gallop and no friction rub. No murmur heard. Pulmonary/Chest: Effort normal and breath sounds normal. No respiratory distress. Musculoskeletal: She exhibits edema. Wraps on per wound clinic  Feet without lesion or ulcer    Nursing note and vitals reviewed. ASSESSMENT and PLAN  Diagnoses and all orders for this visit:    1. Controlled type 2 diabetes mellitus with stage 3 chronic kidney disease, with long-term current use of insulin (HCC)  -     MICROALBUMIN, UR, RAND W/ MICROALBUMIN/CREA RATIO  -     HEMOGLOBIN A1C WITH EAG  -     METABOLIC PANEL, COMPREHENSIVE    2. Mixed hyperlipidemia  -     LIPID PANEL  -     CBC WITH AUTOMATED DIFF    3. Hypothyroidism, acquired, autoimmune  -     TSH 3RD GENERATION    4. Need for Tdap vaccination  -     diph,Pertuss,Acell,,Tet Vac-PF (ADACEL) 2 Lf-(2.5-5-3-5 mcg)-5Lf/0.5 mL susp; 0.5 mL by IntraMUSCular route once for 1 dose. 5. Need for shingles vaccine    6.  Encounter for immunization  - pneumococcal 13 jessica conj dip (PREVNAR-13) 0.5 mL syrg injection; 0.5 mL by IntraMUSCular route once for 1 dose. 7. PVD (peripheral vascular disease) [facility])- See cardiologist as directed. 8. Chronic atrial fibrillation [facility])- See cardiologist as directed. Please follow inr protocol     9. Iron deficiency anemia, unspecified iron deficiency anemia type- follow    10. Atherosclerosis of native coronary artery of native heart without angina pectoris  -     furosemide (LASIX) 40 mg tablet; 2 tabs q am and 1 tab q PM    11. Epistaxis  -     sodium chloride (OCEAN) 0.65 % nasal spray; 1 [place] by Both Nostrils route as needed for Congestion. 12. Encounter for diabetic foot exam ([facility] [address]  -      DIABETES FOOT EXAM    13.  Chronic pain of both knees  -     REFERRAL TO ORTHOPEDICS

## 2018-01-03 NOTE — PATIENT INSTRUCTIONS
Nosebleeds: Care Instructions  Your Care Instructions    Nosebleeds are common, especially if you have colds or allergies. Many things can cause a nosebleed. Some nosebleeds stop on their own with pressure. Others need packing. Some get cauterized (sealed). If you have gauze or other packing materials in your nose, you will need to follow up with your doctor to have the packing removed. You may need more treatment if you get nosebleeds a lot. The doctor has checked you carefully, but problems can develop later. If you notice any problems or new symptoms, get medical treatment right away. Follow-up care is a key part of your treatment and safety. Be sure to make and go to all appointments, and call your doctor if you are having problems. It's also a good idea to know your test results and keep a list of the medicines you take. How can you care for yourself at home? · If you get another nosebleed:  ¨ Sit up and tilt your head slightly forward. This keeps blood from going down your throat. ¨ Use your thumb and index finger to pinch your nose shut for 10 minutes. Use a clock. Do not check to see if the bleeding has stopped before the 10 minutes are up. If the bleeding has not stopped, pinch your nose shut for another 10 minutes. ¨ When the bleeding has stopped, try not to pick, rub, or blow your nose for 12 hours. Avoiding these things helps keep your nose from bleeding again. · If your doctor prescribed antibiotics, take them as directed. Do not stop taking them just because you feel better. You need to take the full course of antibiotics. To prevent nosebleeds  · Do not blow your nose too hard. · Try not to lift or strain after a nosebleed. · Raise your head on a pillow while you sleep. · Put a thin layer of a saline- or water-based nasal gel, such as NasoGel, inside your nose. Put it on the septum, which divides your nostrils. This will prevent dryness that can cause nosebleeds.   · Use a vaporizer or humidifier to add moisture to your bedroom. Follow the directions for cleaning the machine. · Do not use aspirin, ibuprofen (Advil, Motrin), or naproxen (Aleve) for 36 to 48 hours after a nosebleed unless your doctor tells you to. You can use acetaminophen (Tylenol) for pain relief. · Talk to your doctor about stopping any other medicines you are taking. Some medicines may make you more likely to get a nosebleed. · Do not use cold medicines or nasal sprays without first talking to your doctor. They can make your nose dry. When should you call for help? Call 911 anytime you think you may need emergency care. For example, call if:  ? · You passed out (lost consciousness). ?Call your doctor now or seek immediate medical care if:  ? · You get another nosebleed and your nose is still bleeding after you have applied pressure 3 times for 10 minutes each time (30 minutes total). ? · There is a lot of blood running down the back of your throat even after you pinch your nose and tilt your head forward. ? · You have a fever. ? · You have sinus pain. ? Watch closely for changes in your health, and be sure to contact your doctor if:  ? · You get nosebleeds often, even if they stop. ? · You do not get better as expected. Where can you learn more? Go to http://gilson-michael.info/. Enter S156 in the search box to learn more about \"Nosebleeds: Care Instructions. \"  Current as of: March 20, 2017  Content Version: 11.4  © 6633-1902 [employer]. Care instructions adapted under license by [employer] (which disclaims liability or warranty for this information). If you have questions about a medical condition or this instruction, always ask your healthcare professional. [address] any warranty or liability for your use of this information.

## 2018-01-03 NOTE — MR AVS SNAPSHOT
Visit Information Date & Time Provider Department Dept. Phone Encounter #  
 1/3/2018 10:20 AM Lilia Ryan MD [address] [address] Internal Medicine [phone] [id number] Follow-up Instructions Return in about 4 months (around 5/3/2018). Upcoming Health Maintenance Date Due ZOSTER VACCINE AGE 60> 2/17/1997 Pneumococcal 65+ High/Highest Risk (2 of 2 - PPSV23) 10/1/2007 DTaP/Tdap/Td series (1 - Tdap) 12/9/2010 MEDICARE YEARLY EXAM 2/15/2018 HEMOGLOBIN A1C Q6M 2/22/2018 EYE EXAM RETINAL OR DILATED Q1 2/22/2018 MICROALBUMIN Q1 8/22/2018 LIPID PANEL Q1 8/22/2018 FOOT EXAM Q1 1/3/2019 GLAUCOMA SCREENING Q2Y 2/22/2019 Allergies as of 1/3/2018  Review Complete On: 1/3/2018 By: Lilia Ryan MD  
  
 Severity Noted Reaction Type Reactions Actos [Pioglitazone]  04/23/2009    Swelling Swelling of feet and legs Codeine  04/23/2009    Itching Doxycycline  04/23/2009    Nausea Only Hydrocodone  04/17/2012    Rash, Other (comments)  
 hallucinations Current Immunizations  Reviewed on 7/11/2014 Name Date Influenza Vaccine 12/2/2013 Influenza Vaccine Split 10/17/2012 TD Vaccine 12/8/2010 ZZZ-RETIRED (DO NOT USE) Pneumococcal Vaccine (Unspecified Type) 10/1/2002 Not reviewed this visit You Were Diagnosed With   
  
 Codes Comments Controlled type 2 diabetes mellitus with stage 3 chronic kidney disease, with long-term current use of insulin (HCC)    -  Primary ICD-10-CM: E11.22, N18.3, Z79.4 ICD-9-CM: 250.40, 585.3, V58.67 Mixed hyperlipidemia     ICD-10-CM: E78.2 ICD-9-CM: 272.2 Hypothyroidism, acquired, autoimmune     ICD-10-CM: E03.8 ICD-9-CM: 244.8 Need for Tdap vaccination     ICD-10-CM: Q22 ICD-9-CM: V06.1 Need for shingles vaccine     ICD-10-CM: A69 ICD-9-CM: V04.89 Encounter for immunization     ICD-10-CM: U28 ICD-9-CM: V03.89   
 PVD (peripheral vascular disease) ([facility]     ICD-10-CM: I73.9 ICD-9-CM: 443. 9 Chronic atrial fibrillation (HCC)     ICD-10-CM: L22.3 ICD-9-CM: 427.31 Iron deficiency anemia, unspecified iron deficiency anemia type     ICD-10-CM: D50.9 ICD-9-CM: 280.9 Atherosclerosis of native coronary artery of native heart without angina pectoris     ICD-10-CM: I25.10 ICD-9-CM: 414.01 Epistaxis     ICD-10-CM: R04.0 ICD-9-CM: 784.7 Encounter for diabetic foot exam ([facility].)     ICD-10-CM: E11.9 ICD-9-CM: 250.00 Chronic pain of both knees     ICD-10-CM: M25.561, M25.562, G89.29 ICD-9-CM: 719.46, 338.29 Vitals BP Pulse Temp Resp Height(growth percentile) Weight(growth percentile) 133/61 (BP 1 Location: Left arm, BP Patient Position: Sitting) 79 97.8 °F (36.6 °C) (Oral) 18 5' 9\" (1.753 m) 224 lb (101.6 kg) SpO2 BMI OB Status Smoking Status 97% 33.08 kg/m2 Hysterectomy Former Smoker BMI and BSA Data Body Mass Index Body Surface Area 33.08 kg/m 2 2.22 m 2 Preferred Pharmacy Pharmacy Name Phone [place] [address] [address] Joao Whitmore [phone] Your Updated Medication List  
  
   
This list is accurate as of: 1/3/18 11:12 AM.  Always use your most recent med list.  
  
  
  
  
 ACCU-CHEK SHAHIDA PLUS TEST STRP strip Generic drug:  glucose blood VI test strips USE TO CHECK BLOOD SUGAR FOUR TIMES A DAY  
  
 allopurinol 100 mg tablet Commonly known as:  ZYLOPRIM  
TAKE TWO TABLETS BY MOUTH DAILY  
  
 atorvastatin 80 mg tablet Commonly known as:  LIPITOR  
TAKE ONE TABLET BY MOUTH EVERY EVENING  
  
 CENTRUM SILVER Tab tablet Generic drug:  multivitamins-minerals-lutein Take 1 Tab by mouth daily. COUMADIN 3 mg tablet Generic drug:  warfarin Take 6 mg by mouth daily. M-W-F 6 mg T-TH-S-S 3 mg  
  
 diph,Pertuss(Acell),Tet Vac-PF 2 Lf-(2.5-5-3-5 mcg)-5Lf/0.5 mL susp Commonly known as:  ADACEL  
 0.5 mL by IntraMUSCular route once for 1 dose. dofetilide 125 mcg capsule Commonly known as:  Hood Tidwell Take 1 Cap by mouth two (2) times a day. furosemide 40 mg tablet Commonly known as:  LASIX  
2 tabs q am and 1 tab q PM  
  
 hydrALAZINE 10 mg tablet Commonly known as:  APRESOLINE Take 2 Tabs by mouth three (3) times daily. Insulin Needles (Disposable) 32 gauge x \" Ndle Commonly known as:  Little Pen Needle Use with insulin pen  50 Units by SubCUTAneous route every morning. 50 units q am and 23 units qpm,  
  
 insulin  unit/mL (3 mL) Inpn Commonly known as:  HumuLIN N KwikPen 44 Units by SubCUTAneous route every morning and 23 units qpm. New directions  
  
 levothyroxine 125 mcg tablet Commonly known as:  SYNTHROID  
TAKE ONE TABLET BY MOUTH DAILY  
  
 lisinopril 20 mg tablet Commonly known as:  Hildy Lovings Take 1 Tab by mouth daily. metoprolol tartrate 50 mg tablet Commonly known as:  LOPRESSOR Take 1 Tab by mouth two (2) times a day. pneumococcal 13 jessica conj dip 0.5 mL Syrg injection Commonly known as:  PREVNAR-13  
0.5 mL by IntraMUSCular route once for 1 dose. potassium chloride SR 10 mEq tablet Commonly known as:  KLOR-CON 10 Take 1 Tab by mouth daily. PriLOSEC 20 mg capsule Generic drug:  omeprazole Take 20 mg by mouth daily. sodium chloride 0.65 % nasal spray Commonly known as:  OCEAN  
1 [place] by Both Nostrils route as needed for Congestion. Prescriptions Printed Refills  
 pneumococcal 13 jessica conj dip (PREVNAR-13) 0.5 mL syrg injection 0 Si.5 mL by IntraMUSCular route once for 1 dose. Class: Print Route: IntraMUSCular  
 diph,Pertuss,Acell,,Tet Vac-PF (ADACEL) 2 Lf-(2.5-5-3-5 mcg)-5Lf/0.5 mL susp 0 Si.5 mL by IntraMUSCular route once for 1 dose. Class: Print Route: IntraMUSCular We Performed the Following CBC WITH AUTOMATED DIFF [36882 CPT(R)] HEMOGLOBIN A1C WITH EAG [36032 CPT(R)]  DIABETES FOOT EXAM [HM7 Custom] LIPID PANEL [01968 CPT(R)] METABOLIC PANEL, COMPREHENSIVE [15018 CPT(R)] MICROALBUMIN, UR, RAND W/ MICROALBUMIN/CREA RATIO J2112533 CPT(R)] REFERRAL TO ORTHOPEDICS [NTX237 Custom] TSH 3RD GENERATION [48032 CPT(R)] Follow-up Instructions Return in about 4 months (around 5/3/2018). To-Do List   
 01/05/2018 11:00 AM  
  Appointment with Merari Gutierrez MD; [facility] WOUND CARE 3 at [address] [place] [address]. ([phone]) Referral Information Referral ID Referred By Referred To  
  
 [id number] Trini BURDEN MD   
   [address] [place] [address] [address] [place] [address], [facility] Phone: [phone] Fax: [phone] Visits Status Start Date End Date 1 New Request 1/3/18 1/3/19 If your referral has a status of pending review or denied, additional information will be sent to support the outcome of this decision. Patient Instructions Nosebleeds: Care Instructions Your Care Instructions Nosebleeds are common, especially if you have colds or allergies. Many things can cause a nosebleed. Some nosebleeds stop on their own with pressure. Others need packing. Some get cauterized (sealed). If you have gauze or other packing materials in your nose, you will need to follow up with your doctor to have the packing removed. You may need more treatment if you get nosebleeds a lot. The doctor has checked you carefully, but problems can develop later. If you notice any problems or new symptoms, get medical treatment right away. Follow-up care is a key part of your treatment and safety. Be sure to make and go to all appointments, and call your doctor if you are having problems. It's also a good idea to know your test results and keep a list of the medicines you take. How can you care for yourself at home? · If you get another nosebleed: ¨ Sit up and tilt your head slightly forward. This keeps blood from going down your throat. ¨ Use your thumb and index finger to pinch your nose shut for 10 minutes. Use a clock. Do not check to see if the bleeding has stopped before the 10 minutes are up. If the bleeding has not stopped, pinch your nose shut for another 10 minutes. ¨ When the bleeding has stopped, try not to pick, rub, or blow your nose for 12 hours. Avoiding these things helps keep your nose from bleeding again. · If your doctor prescribed antibiotics, take them as directed. Do not stop taking them just because you feel better. You need to take the full course of antibiotics. To prevent nosebleeds · Do not blow your nose too hard. · Try not to lift or strain after a nosebleed. · Raise your head on a pillow while you sleep. · Put a thin layer of a saline- or water-based nasal gel, such as NasoGel, inside your nose. Put it on the septum, which divides your nostrils. This will prevent dryness that can cause nosebleeds. · Use a vaporizer or humidifier to add moisture to your bedroom. Follow the directions for cleaning the machine. · Do not use aspirin, ibuprofen (Advil, Motrin), or naproxen (Aleve) for 36 to 48 hours after a nosebleed unless your doctor tells you to. You can use acetaminophen (Tylenol) for pain relief. · Talk to your doctor about stopping any other medicines you are taking. Some medicines may make you more likely to get a nosebleed. · Do not use cold medicines or nasal sprays without first talking to your doctor. They can make your nose dry. When should you call for help? Call 911 anytime you think you may need emergency care. For example, call if: 
? · You passed out (lost consciousness). ?Call your doctor now or seek immediate medical care if: 
? · You get another nosebleed and your nose is still bleeding after you have applied pressure 3 times for 10 minutes each time (30 minutes total). ? · There is a lot of blood running down the back of your throat even after you pinch your nose and tilt your head forward. ? · You have a fever. ? · You have sinus pain. ? Watch closely for changes in your health, and be sure to contact your doctor if: 
? · You get nosebleeds often, even if they stop. ? · You do not get better as expected. Where can you learn more? Go to http://gilson-michael.info/. Enter S156 in the search box to learn more about \"Nosebleeds: Care Instructions. \" Current as of: March 20, 2017 Content Version: 11.4 © 5026-1936 [employer]. Care instructions adapted under license by [employer] (which disclaims liability or warranty for this information). If you have questions about a medical condition or this instruction, always ask your healthcare professional. [place] [address] any warranty or liability for your use of this information. Introducing [facility]! [employer] introduces [email or web address] patient portal. Now you can access parts of your medical record, email your doctor's office, and request medication refills online. 1. In your internet browser, go to [email or web address] 2. Click on the First Time User? Click Here link in the Sign In box. You will see the New Member Sign Up page. 3. Enter your [email or web address] Access Code exactly as it appears below. You will not need to use this code after youve completed the sign-up process. If you do not sign up before the expiration date, you must request a new code. · [email or web address] Access Code: [email or web address] Expires: 2/4/2018  4:44 PM 
 
4. Enter the last four digits of your Social Security Number (xxxx) and Date of Birth (mm/dd/yyyy) as indicated and click Submit. You will be taken to the next sign-up page. 5. Create a Migo.met ID. This will be your Migo.met login ID and cannot be changed, so think of one that is secure and easy to remember. 6. Create a [employer] password. You can change your password at any time. 7. Enter your Password Reset Question and Answer. This can be used at a later time if you forget your password. 8. Enter your e-mail address. You will receive e-mail notification when new information is available in [address] 9. Click Sign Up. You can now view and download portions of your medical record. 10. Click the Download Summary menu link to download a portable copy of your medical information. If you have questions, please visit the Frequently Asked Questions section of the [employer] website. Remember, [employer] is NOT to be used for urgent needs. For medical emergencies, dial 911. Now available from your iPhone and Android! Please provide this summary of care documentation to your next provider. Your primary care clinician is listed as ANDRES BURDEN. If you have any questions after today's visit, please call [phone].

## 2018-01-05 ENCOUNTER — HOSPITAL ENCOUNTER (OUTPATIENT)
Dept: WOUND CARE | Age: 83
Discharge: HOME | End: 2018-01-05
Payer: MEDICARE

## 2018-01-05 PROCEDURE — 29581 APPL MULTLAYER CMPRN SYS LEG: CPT

## 2018-01-08 ENCOUNTER — TELEPHONE ANTICOAG (OUTPATIENT)
Dept: INTERNAL MEDICINE CLINIC | Age: 83
End: 2018-01-08

## 2018-01-08 LAB — INR, EXTERNAL: 1.5

## 2018-01-08 NOTE — PROGRESS NOTES
Verified patient identity with two identifiers. Spoke with patient by phone. INR 1.5 today. Patient instructions:   Cristy Tobar Sender:  New Dose: Coumadin 6mg daily except 3mg Sat/Sun - 9% increase  Old Dose: Coumadin 6mg daily except 3mg Sat/Sun/Tue  Recheck 2 weeks 1/22/18    A full discussion of the nature of anticoagulants has been carried out. A benefit risk analysis has been presented to the patient, so that they understand the justification for choosing anticoagulation at this time. The need for frequent and regular monitoring, precise dosage adjustment and compliance is stressed. Side effects of potential bleeding are discussed. The patient should avoid any OTC items containing aspirin or ibuprofen, and should avoid great swings in general diet. Avoid alcohol consumption. Call if any signs of abnormal bleeding. Patient verbalized understanding.

## 2018-01-12 ENCOUNTER — HOSPITAL ENCOUNTER (OUTPATIENT)
Dept: WOUND CARE | Age: 83
Discharge: HOME | End: 2018-01-12
Payer: MEDICARE

## 2018-01-12 PROCEDURE — 29581 APPL MULTLAYER CMPRN SYS LEG: CPT

## 2018-01-12 NOTE — PROGRESS NOTES
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 I last saw the patient on . We applied Aquacel AG and a 3-layer wrap. She returned last Friday, seven days ago, for a nursing visit at 4:00 p.m. and had her wraps removed and reapplied. She then removed them last night and the Lympha press device was delivered to her home this morning and she had a 10 minute trial.    Since last seen, she saw Dr. Penny Akhtar for her hypertension and to check her INR and there have been no other changes noted in her medications, allergies and or review of systems. PHYSICAL EXAMINATION:  VITAL SIGNS:  Temperature is 97.3, pulse 69, respirations 14, blood pressure 140/66. Examination reveals bilateral persistent circumferential lower leg venous leg ulcers. She still has periwound erythema, but it is much improved. She still has pitting edema and therefore, we are not going to change the dressings at this time since we are going to be adding the Lympha press device. ASSESSMENT AND PLAN:  Both legs will be scrubbed with Hibiclens and then irrigated. Betamethasone will be applied to the periwound and Aquacel AG will be reapplied to the wounds, followed by bilateral 3-layer wraps. The patient will keep her legs elevated. She will do gastrocnemius muscle exercises. She will use a Lympha press device twice a day at 40 mmHg. She will let us know if she is having any difficulty with that level of compression or the duration and if so, we will change the orders accordingly.   Even though she is anxious to go up to three times a day, I am reluctant to do that much compression so as not to mobilize too much fluid and possibly tip her over into heart failure, so for this week we will simply do it twice a day. She understands that the wraps might fall down due to the external compression device and she will let us know if she is having any issues and therefore she might need a nursing visit during the week. If all goes well, I will plan on seeing her again in followup in seven days.       MD MARCIO Maki / DEMARCUS  D: 01/12/2018 11:25     T: 01/12/2018 11:47  JOB #: [id number]

## 2018-01-19 ENCOUNTER — HOSPITAL ENCOUNTER (OUTPATIENT)
Dept: WOUND CARE | Age: 83
Discharge: HOME | End: 2018-01-19
Payer: MEDICARE

## 2018-01-19 PROCEDURE — 97597 DBRDMT OPN WND 1ST 20 CM/<: CPT

## 2018-01-19 PROCEDURE — 97598 DBRDMT OPN WND ADDL 20CM/<: CPT

## 2018-01-19 RX ORDER — LIDOCAINE HYDROCHLORIDE 20 MG/ML
JELLY TOPICAL ONCE
Status: COMPLETED | OUTPATIENT
Start: 2018-01-19 | End: 2018-01-19

## 2018-01-19 RX ADMIN — LIDOCAINE HYDROCHLORIDE: 20 JELLY TOPICAL at 11:00

## 2018-01-19 NOTE — PROGRESS NOTES
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 The patient has had a very good week. She has had no problems nor has she had any changes in her medications, allergies or review of systems. As per our instructions, last night, she removed her three layer wraps, and showered and scrub her legs. She then applied double Tubigrips and presented today with those in place. PHYSICAL EXAMINATION:  VITAL SIGNS:  Her temperature today is 97.8, pulse 59, respirations 14, blood pressure 108/59. EXTREMITIES:  Wound #2 which is circumferential wounds of the left leg last week with 14 x 15 x 0.1 cm. Today they were profoundly improved at 5.4 x 3 x 0.1 cm with slough covering the remaining open wounds. The wounds of the right lower leg last week were 13.5 x 13.5 x 0.1 cm. Today they are also smaller 7 x 4.5 x 0.1 cm. It is recommended that all wounds be debrided. I explained the risks and benefits of the patient. She understands and wishes to proceed. Therefore, topical Xylocaine 4% cream was applied for 15 minutes. With use of a 5 mm ring curette, the wounds were debrided of all slough down to underlying healthy bleeding granulation tissue. Hemostasis was achieved with gentle pressure until dry. The patient's legs are less edematous than previously. She still has dermatitis and we are therefore going to use betamethasone to the periwound. We are going to use Aquacel AG followed by bilateral 3 layer wraps.     We are going to do measurements of her legs today and order dual layer Carolon compression stockings at 30-40 mmHg compression since she has previously failed 20-30 mmHg stockings, which she was applying with her sock slider. She understands the plan. She will keep her legs elevated. She will do gastrocnemius muscle exercises. She will continue to use her Lympha Press device twice a day and she will return to see us in 1 week.       MD MARCIO Galloway / DEMARCUS  D: 01/19/2018 11:23     T: 01/19/2018 11:37  JOB #: [id number]

## 2018-01-22 LAB — EF %, EXTERNAL: NORMAL

## 2018-01-23 ENCOUNTER — TELEPHONE (OUTPATIENT)
Dept: INTERNAL MEDICINE CLINIC | Age: 83
End: 2018-01-23

## 2018-01-23 ENCOUNTER — TELEPHONE ANTICOAG (OUTPATIENT)
Dept: INTERNAL MEDICINE CLINIC | Age: 83
End: 2018-01-23

## 2018-01-23 LAB — INR, EXTERNAL: 2.3

## 2018-01-23 RX ORDER — ALLOPURINOL 100 MG/1
TABLET ORAL
Qty: 180 TAB | Refills: 3 | Status: SHIPPED | OUTPATIENT
Start: 2018-01-23 | End: 2019-02-04 | Stop reason: SDUPTHER

## 2018-01-23 NOTE — TELEPHONE ENCOUNTER
Pt called - her INR today is 2.5. Current dose is 3 mg  Saturday and Sunday then 6 mg all other days. Will forward to Rosey

## 2018-01-23 NOTE — PROGRESS NOTES
Verified patient identity with two identifiers. Spoke with patient by phone. INR 2.5 today via home monitor. Patient instructions:   Continue Coumadin 6mg daily except 3mg Sat/Sun  Recheck 1/30/18    A full discussion of the nature of anticoagulants has been carried out. A benefit risk analysis has been presented to the patient, so that they understand the justification for choosing anticoagulation at this time. The need for frequent and regular monitoring, precise dosage adjustment and compliance is stressed. Side effects of potential bleeding are discussed. The patient should avoid any OTC items containing aspirin or ibuprofen, and should avoid great swings in general diet. Avoid alcohol consumption. Call if any signs of abnormal bleeding. Patient verbalized understanding.

## 2018-01-26 ENCOUNTER — HOSPITAL ENCOUNTER (OUTPATIENT)
Dept: WOUND CARE | Age: 83
Discharge: HOME | End: 2018-01-26
Payer: MEDICARE

## 2018-01-26 PROCEDURE — 97597 DBRDMT OPN WND 1ST 20 CM/<: CPT

## 2018-01-26 PROCEDURE — 97598 DBRDMT OPN WND ADDL 20CM/<: CPT

## 2018-01-26 RX ORDER — LIDOCAINE HYDROCHLORIDE 40 MG/ML
SOLUTION TOPICAL AS NEEDED
Status: DISCONTINUED | OUTPATIENT
Start: 2018-01-26 | End: 2018-01-30 | Stop reason: HOSPADM

## 2018-01-26 RX ADMIN — LIDOCAINE HYDROCHLORIDE: 40 SOLUTION TOPICAL at 11:08

## 2018-01-26 NOTE — PROGRESS NOTES
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 The patient has had a good week. She has been using her [employer] twice a day. She removed her 3-layer wraps last evening, took a shower and used TubiGrips since then, but she did not use the [employer] device this morning. She has had no problems over the past week nor has she had changes in her medications, allergies, or review of systems. PHYSICAL EXAMINATION: Her temperature is 97.4, pulse 67, respirations 14, blood pressure 126/66. The wound of the left lower leg is 5.5 x 4.5 x 0.1 cm and the wound on the right lower leg is 7 x 4.5 x 0.1 cm. Both legs are still edematous. There is still erythema consistent with dermatitis. She still has very few wounds in these large clusters and 2 on the left leg and 1 on the right leg are in need of debridement. There are also nice areas of epithelial ingrowth and she appears to have done quite a lot of healing over the past week. I recommend that the wounds be debrided. I explained the risks and benefits. The patient understands and wishes to proceed. Therefore, topical Xylocaine was applied for 10 minutes. With the use of a 5 mm ring curette, the wounds were debrided down to underlying healthy bleeding granulation tissue. Hemostasis was achieved with gentle pressure until dry. ASSESSMENT AND PLAN:  For the dermatitis, we are going to apply betamethasone. We are going to use Aquacel again to the open areas and Adaptic to the areas that have recently healed.   She does have Carolon dual layer compression stockings and she will bring those next week and each week along with her Sock Slider for our use at one point when we convert over to that modality. She understands the plan. She will keep the legs elevated. She will do gastrocnemius muscle exercises. She will use the [employer] twice a day. All questions were answered. CONDITION AT DISCHARGE:  Stable.       MD MARCIO Posada / DEVONTE  D: 01/26/2018 12:01     T: 01/26/2018 12:34  JOB #: [id number]

## 2018-01-30 ENCOUNTER — TELEPHONE ANTICOAG (OUTPATIENT)
Dept: INTERNAL MEDICINE CLINIC | Age: 83
End: 2018-01-30

## 2018-01-30 LAB — INR, EXTERNAL: 1.7

## 2018-01-30 NOTE — PROGRESS NOTES
Verified patient identity with two identifiers. Spoke with patient by phone. INR 1.7 today. Patient denies missing doses, denies diet changes. Patient instructions:   New dose: Coumadin 6mg daily except 3mg Sun  Old dose: Coumadin 6mg daily except 3mg Sat/Sun  Recheck 2/6/18    A full discussion of the nature of anticoagulants has been carried out. A benefit risk analysis has been presented to the patient, so that they understand the justification for choosing anticoagulation at this time. The need for frequent and regular monitoring, precise dosage adjustment and compliance is stressed. Side effects of potential bleeding are discussed. The patient should avoid any OTC items containing aspirin or ibuprofen, and should avoid great swings in general diet. Avoid alcohol consumption. Call if any signs of abnormal bleeding. Patient verbalized understanding.

## 2018-02-02 ENCOUNTER — HOSPITAL ENCOUNTER (OUTPATIENT)
Dept: WOUND CARE | Age: 83
Discharge: HOME | End: 2018-02-02
Payer: MEDICARE

## 2018-02-02 PROCEDURE — 29581 APPL MULTLAYER CMPRN SYS LEG: CPT

## 2018-02-02 PROCEDURE — 97598 DBRDMT OPN WND ADDL 20CM/<: CPT

## 2018-02-02 PROCEDURE — 97597 DBRDMT OPN WND 1ST 20 CM/<: CPT

## 2018-02-02 NOTE — PROGRESS NOTES
[facility]  WOUND CARE PROGRESS NOTE    Riccardo Louis  MR#: [id number]  : 1935  ACCOUNT #: [de-identified]   DATE OF SERVICE: 2018    The patient returns for treatment of her bilateral venous leg ulcers, I87.2 and I87.3, L97.911, L97.921, with secondary lymphedema, I89.0, in a type 2 diabetic E11.622, on a prolonged course of insulin Z79.4. The patient  had a very good week. She has had no problems or complaints. No changes in her medications or allergies or review of systems. Her temperature today is 97.5, pulse 71, respirations 16, blood pressure 136/56. Examination of the right lower leg shows that all wounds have healed. She has nice epithelialization and small scabs where she had previous wounds. The edema is also very well managed so all looks quite good. On her left lower extremity, she still has 3 persistent wounds in a cluster of 5 x 4.2 x 0.1 cm. Each of the three wounds is filled with slough and it is recommended that they be debrided. I explained the risks and benefits. The patient understands and wishes to proceed. Therefore, under topical Xylocaine 4% cream was applied for 10 minutes with use of a compressed 5 mm ring curette. All wounds were debrided down to underlying healthy bleeding granulation tissue. Hemostasis was achieved with gentle pressure until dry. The leg and the wounds were then scrubbed with Hibiclens and irrigated clear with saline. ASSESSMENT AND PLAN:  We are going to continue Aquacel Ag to the three wounds and Adaptic to all the healed areas. We will reapply bilateral 3-layer wraps. The patient will keep her legs elevated. She will do gastrocnemius muscle exercises. She will use her [employer] device twice a day. When I see her in one week, if all remains healed on the right side, we will start using the 15-20 mmHg compression stockings.   She understands that this is not as firm as we usually use, but the reasoning is she had great difficulty using 20s to 30s previously and I am hoping that with the use of the [facility] keeping down the edema, she will get a good response to the 15-20s. She understands the plan. All questions were answered. CONDITION ON DISCHARGE:  Stable.       MD MARCIO Prieto / Agueda.Issa  D: 02/02/2018 11:28     T: 02/02/2018 11:49  JOB #: [id number]

## 2018-02-06 ENCOUNTER — TELEPHONE ANTICOAG (OUTPATIENT)
Dept: INTERNAL MEDICINE CLINIC | Age: 83
End: 2018-02-06

## 2018-02-06 LAB — INR, EXTERNAL: 2

## 2018-02-06 NOTE — PROGRESS NOTES
Verified patient identity with two identifiers. Spoke with patient by phone. INR 2.0 today via home monitor. Patient instructions:   Continue Coumadin 6mg daily (2 tabs)  except 3mg Sun (1 tab). Recheck 2/13/18    A full discussion of the nature of anticoagulants has been carried out. A benefit risk analysis has been presented to the patient, so that they understand the justification for choosing anticoagulation at this time. The need for frequent and regular monitoring, precise dosage adjustment and compliance is stressed. Side effects of potential bleeding are discussed. The patient should avoid any OTC items containing aspirin or ibuprofen, and should avoid great swings in general diet. Avoid alcohol consumption. Call if any signs of abnormal bleeding. Patient verbalized understanding.

## 2018-02-09 ENCOUNTER — HOSPITAL ENCOUNTER (OUTPATIENT)
Dept: WOUND CARE | Age: 83
Discharge: HOME | End: 2018-02-09
Payer: MEDICARE

## 2018-02-09 PROCEDURE — 29581 APPL MULTLAYER CMPRN SYS LEG: CPT

## 2018-02-09 NOTE — PROGRESS NOTES
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 Last week, the right leg had healed and therefore she only has a wound to the left leg, L97.921. She has had a good week. She has had no problems, nor changes in her medications, allergies or review of systems. PHYSICAL EXAMINATION:  VITAL SIGNS:  Her temperature is 97.2, pulse 60, respirations 16, blood pressure 141/56. EXTREMITIES:  Examination of the right leg shows that she has stayed healed over the week. Examination of the left leg shows only 1 small residual wound approximately 0.2 x 0.2 x 0.1 cm. ASSESSMENT AND PLAN:  We are going to apply a nonadherent Adaptic to the left anterior lower leg where she had the previous wounds and apply a 3-layer wrap. On the right side, we are going to explain to the patient and instruct her on how to apply her own Carolon dual layer 30-40 mmHg compressive device. She has her own device to assist in application and removal like a sock slider and she was able to demonstrate ability in doing that today. The patient will keep her legs elevated. She will continue doing gastrocnemius muscle exercises. She will continue using a [employer] device twice a day. I will see her again in followup in 1 week. If she is doing well with both legs, we will have use the Carolon on both legs and she will return on a p.r.n. basis. If she has difficulty applying the 30-40, I am giving her a prescription for 20-30 mmHg stockings to purchase over the counter, but hopefully she will not need those. All questions were answered. CONDITION ON DISCHARGE:  Stable.       MD MARCIO Salter /   D: 2018 12:40     T: 2018 13:12  JOB #: [id number]

## 2018-02-13 ENCOUNTER — TELEPHONE ANTICOAG (OUTPATIENT)
Dept: INTERNAL MEDICINE CLINIC | Age: 83
End: 2018-02-13

## 2018-02-13 LAB — INR, EXTERNAL: 2.4

## 2018-02-13 NOTE — PROGRESS NOTES
Verified patient identity with two identifiers. Spoke with patient by phone. INR 2.4 today via home monitor. Patient instructions:   Continue Coumadin 6mg daily except 3mg Sun  Recheck 2/20/18    A full discussion of the nature of anticoagulants has been carried out. A benefit risk analysis has been presented to the patient, so that they understand the justification for choosing anticoagulation at this time. The need for frequent and regular monitoring, precise dosage adjustment and compliance is stressed. Side effects of potential bleeding are discussed. The patient should avoid any OTC items containing aspirin or ibuprofen, and should avoid great swings in general diet. Avoid alcohol consumption. Call if any signs of abnormal bleeding. Patient verbalized understanding.

## 2018-02-16 ENCOUNTER — HOSPITAL ENCOUNTER (OUTPATIENT)
Dept: WOUND CARE | Age: 83
Discharge: HOME | End: 2018-02-16
Payer: MEDICARE

## 2018-02-16 DIAGNOSIS — I70.1 RAS (RENAL ARTERY STENOSIS) (HCC): ICD-10-CM

## 2018-02-16 PROCEDURE — 99212 OFFICE O/P EST SF 10 MIN: CPT

## 2018-02-16 RX ORDER — INSULIN HUMAN 100 [IU]/ML
INJECTION, SUSPENSION SUBCUTANEOUS
Qty: 15 PEN | Refills: 97 | Status: SHIPPED | OUTPATIENT
Start: 2018-02-16 | End: 2018-08-20 | Stop reason: SDUPTHER

## 2018-02-16 NOTE — PROGRESS NOTES
[facility]  WOUND CARE PROGRESS NOTE    Sakina Reyes  MR#: [id number]  : 1935  ACCOUNT #: [de-identified]   DATE OF SERVICE: 2018    The patient returns for treatment of bilateral venous leg ulcers, I87.313 with secondary lymphedema, I89.0 in a type 2 diabetic, E11.622 on a prolonged course of insulin, Z79.4. She now presents for week #8 of treatment. Last week, the right leg was healed. She has been using her Carolon 30-40 mmHg stocking without any difficulty on the right side. We rewrapped the left leg for a very small persistent wound. She has had a good week. She has had no problems, nor changes in her medications, allergies or review of systems. PHYSICAL EXAMINATION   VITAL SIGNS:  Temperature is 97, pulse 68, respirations 16, blood pressure 114/50. EXTREMITIES:  Examination shows complete healing of all wounds of both lower extremities. ASSESSMENT AND PLAN:  I have explained to the patient that there is a 90% chance of recurrence of venous leg ulcers and she has obviously failed in the past.  Things that are different now is the fact that she is off amlodipine, she is able to tolerate 30-40 mmHg compression stockings and she is now using a [employer] device twice a day. Hopefully, if she stays on this routine, along with daily skin moisturizers, she will stay healthy and well, but she knows that if she has any difficulty, questions or problems, she can simply return to see us. At this time, there is no indication for making a followup visit. We will see her again on a p.r.n. basis.       MD MARCIO Heard / DEMARCUS  D: 2018 11:29     T: 2018 11:47  JOB #: [id number]

## 2018-02-20 ENCOUNTER — TELEPHONE ANTICOAG (OUTPATIENT)
Dept: INTERNAL MEDICINE CLINIC | Age: 83
End: 2018-02-20

## 2018-02-20 LAB — INR, EXTERNAL: 3.3

## 2018-02-20 NOTE — PROGRESS NOTES
Verified patient identity with two identifiers. Spoke with patient by phone. INR 3.3 today via home monitor. Patient instructions: Take 1 tab (3mg) today instead of 2 tabs (6mg) then continue Coumadin 6mg daily except 3mg Sun  Recheck 2/27/18  Patient denies any unusual bleeding or bruising and was instructed to call office and seek medical care if any should occur. A full discussion of the nature of anticoagulants has been carried out. A benefit risk analysis has been presented to the patient, so that they understand the justification for choosing anticoagulation at this time. The need for frequent and regular monitoring, precise dosage adjustment and compliance is stressed. Side effects of potential bleeding are discussed. The patient should avoid any OTC items containing aspirin or ibuprofen, and should avoid great swings in general diet. Avoid alcohol consumption. Call if any signs of abnormal bleeding. Patient verbalized understanding.

## 2018-02-26 DIAGNOSIS — I05.0 MITRAL VALVE STENOSIS, UNSPECIFIED ETIOLOGY: ICD-10-CM

## 2018-02-27 ENCOUNTER — TELEPHONE ANTICOAG (OUTPATIENT)
Dept: INTERNAL MEDICINE CLINIC | Age: 83
End: 2018-02-27

## 2018-02-27 ENCOUNTER — TELEPHONE (OUTPATIENT)
Dept: INTERNAL MEDICINE CLINIC | Age: 83
End: 2018-02-27

## 2018-02-27 LAB — INR, EXTERNAL: 2.5

## 2018-02-27 RX ORDER — METOPROLOL TARTRATE 50 MG/1
50 TABLET ORAL 2 TIMES DAILY
Qty: 60 TAB | Refills: 11 | Status: SHIPPED | OUTPATIENT
Start: 2018-02-27 | End: 2018-12-07 | Stop reason: SDUPTHER

## 2018-03-06 ENCOUNTER — TELEPHONE ANTICOAG (OUTPATIENT)
Dept: INTERNAL MEDICINE CLINIC | Age: 83
End: 2018-03-06

## 2018-03-06 LAB — INR, EXTERNAL: 1.6

## 2018-03-13 ENCOUNTER — TELEPHONE ANTICOAG (OUTPATIENT)
Dept: INTERNAL MEDICINE CLINIC | Age: 83
End: 2018-03-13

## 2018-03-13 LAB — INR, EXTERNAL: 1.9

## 2018-03-13 NOTE — PROGRESS NOTES
Verified patient identity with two identifiers. Spoke with patient by phone. INR 1.9 today via home monitor. Patient instructions:   Continue Coumadin 6mg daily except 3mg Sun  Recheck 1 week    A full discussion of the nature of anticoagulants has been carried out. A benefit risk analysis has been presented to the patient, so that they understand the justification for choosing anticoagulation at this time. The need for frequent and regular monitoring, precise dosage adjustment and compliance is stressed. Side effects of potential bleeding are discussed. The patient should avoid any OTC items containing aspirin or ibuprofen, and should avoid great swings in general diet. Avoid alcohol consumption. Call if any signs of abnormal bleeding. Patient verbalized understanding.

## 2018-03-19 RX ORDER — WARFARIN 3 MG/1
TABLET ORAL
Qty: 132 TAB | Refills: 11 | Status: SHIPPED | OUTPATIENT
Start: 2018-03-19 | End: 2018-05-15 | Stop reason: DRUGHIGH

## 2018-03-20 ENCOUNTER — TELEPHONE ANTICOAG (OUTPATIENT)
Dept: INTERNAL MEDICINE CLINIC | Age: 83
End: 2018-03-20

## 2018-03-20 NOTE — PROGRESS NOTES
Verified patient identity with two identifiers. Spoke with patient by phone. INR 2.4 today via home monitor. Patient instructions:   Patient reports again taking an old dosage of Coumadin 6mg daily except 3mg Sat AND Sun. She should only be taking the 3mg dose on Sundays. As INR is therapeutic this week will have her continue dose of Coumadin 6mg daily except 3mg Sat & Sun and recheck 1 week    A full discussion of the nature of anticoagulants has been carried out. A benefit risk analysis has been presented to the patient, so that they understand the justification for choosing anticoagulation at this time. The need for frequent and regular monitoring, precise dosage adjustment and compliance is stressed. Side effects of potential bleeding are discussed. The patient should avoid any OTC items containing aspirin or ibuprofen, and should avoid great swings in general diet. Avoid alcohol consumption. Call if any signs of abnormal bleeding. Patient verbalized understanding.

## 2018-03-21 LAB — INR, EXTERNAL: 2.4

## 2018-03-23 ENCOUNTER — HOSPITAL ENCOUNTER (OUTPATIENT)
Dept: WOUND CARE | Age: 83
End: 2018-03-23

## 2018-03-27 ENCOUNTER — TELEPHONE ANTICOAG (OUTPATIENT)
Dept: INTERNAL MEDICINE CLINIC | Age: 83
End: 2018-03-27

## 2018-03-27 LAB — INR, EXTERNAL: 2.5

## 2018-03-27 NOTE — PROGRESS NOTES
Verified patient identity with two identifiers. Spoke with patient by phone. INR 2.5 today via home monitor. Patient instructions:   Continue Coumadin 6mg daily except 3mg Sat & Sun  Recheck 1 week    A full discussion of the nature of anticoagulants has been carried out. A benefit risk analysis has been presented to the patient, so that they understand the justification for choosing anticoagulation at this time. The need for frequent and regular monitoring, precise dosage adjustment and compliance is stressed. Side effects of potential bleeding are discussed. The patient should avoid any OTC items containing aspirin or ibuprofen, and should avoid great swings in general diet. Avoid alcohol consumption. Call if any signs of abnormal bleeding. Patient verbalized understanding.

## 2018-04-03 ENCOUNTER — TELEPHONE ANTICOAG (OUTPATIENT)
Dept: INTERNAL MEDICINE CLINIC | Age: 83
End: 2018-04-03

## 2018-04-03 ENCOUNTER — TELEPHONE (OUTPATIENT)
Dept: INTERNAL MEDICINE CLINIC | Age: 83
End: 2018-04-03

## 2018-04-03 LAB — INR, EXTERNAL: 3

## 2018-04-03 NOTE — PATIENT INSTRUCTIONS

## 2018-04-03 NOTE — TELEPHONE ENCOUNTER
Pt called to report her INR result of 3.0. Her current coumadin dosage is 6mg daily except 3mg Sat & Sun. Please advise.

## 2018-04-03 NOTE — TELEPHONE ENCOUNTER
Called pt and informed Continue current dose.  Keep diet consistent.  Check INR on Monday 4/ 9. Pt states understanding. anticoug note has been updated.

## 2018-04-10 ENCOUNTER — TELEPHONE ANTICOAG (OUTPATIENT)
Dept: INTERNAL MEDICINE CLINIC | Age: 83
End: 2018-04-10

## 2018-04-10 LAB — INR, EXTERNAL: 2.2

## 2018-04-10 NOTE — PROGRESS NOTES
Verified patient identity with two identifiers. Spoke with patient by phone. INR 2.2 today via home monitor. Patient instructions:   Continue Coumadin 6mg daily except 3mg Sat & Sun   Recheck on 4/17/18. Patient c/o bruising on left hand, 4 bruises in total each smaller than 1 inch in size inch. Denies redness and swelling. Patient states she may have knocked her hand on something. Patient instructed to let us know if bruising is not resolving. Red flags to warrant ER or earlier clinical evaluation reviewed. Patient verbalized understanding. A full discussion of the nature of anticoagulants has been carried out. A benefit risk analysis has been presented to the patient, so that they understand the justification for choosing anticoagulation at this time. The need for frequent and regular monitoring, precise dosage adjustment and compliance is stressed. Side effects of potential bleeding are discussed. The patient should avoid any OTC items containing aspirin or ibuprofen, and should avoid great swings in general diet. Avoid alcohol consumption. Call if any signs of abnormal bleeding. Patient verbalized understanding.

## 2018-04-18 ENCOUNTER — TELEPHONE ANTICOAG (OUTPATIENT)
Dept: INTERNAL MEDICINE CLINIC | Age: 83
End: 2018-04-18

## 2018-04-18 LAB — INR, EXTERNAL: 2.5

## 2018-04-18 RX ORDER — PEN NEEDLE, DIABETIC 32GX 5/32"
NEEDLE, DISPOSABLE MISCELLANEOUS
Qty: 100 PEN NEEDLE | Refills: 11 | Status: SHIPPED | OUTPATIENT
Start: 2018-04-18 | End: 2019-06-14

## 2018-04-18 NOTE — PROGRESS NOTES
Verified patient identity with two identifiers. Spoke with patient by phone. INR 2.5 today. Patient instructions:   Continue Coumadin 6mg daily except 3mg Sat & Sun Recheck on 4/24/18. A full discussion of the nature of anticoagulants has been carried out. A benefit risk analysis has been presented to the patient, so that they understand the justification for choosing anticoagulation at this time. The need for frequent and regular monitoring, precise dosage adjustment and compliance is stressed. Side effects of potential bleeding are discussed. The patient should avoid any OTC items containing aspirin or ibuprofen, and should avoid great swings in general diet. Avoid alcohol consumption. Call if any signs of abnormal bleeding. Patient verbalized understanding.

## 2018-04-24 ENCOUNTER — TELEPHONE ANTICOAG (OUTPATIENT)
Dept: INTERNAL MEDICINE CLINIC | Age: 83
End: 2018-04-24

## 2018-04-24 LAB — INR, EXTERNAL: 2.2

## 2018-04-24 NOTE — PROGRESS NOTES
Verified patient identity with two identifiers. Spoke with patient by phone. INR 2.2 today via home monitor. Patient instructions:   Continue Coumadin 6mg daily except 3mg Sat & Sun Recheck on 5/1/18. A full discussion of the nature of anticoagulants has been carried out. A benefit risk analysis has been presented to the patient, so that they understand the justification for choosing anticoagulation at this time. The need for frequent and regular monitoring, precise dosage adjustment and compliance is stressed. Side effects of potential bleeding are discussed. The patient should avoid any OTC items containing aspirin or ibuprofen, and should avoid great swings in general diet. Avoid alcohol consumption. Call if any signs of abnormal bleeding. Patient verbalized understanding.

## 2018-05-01 ENCOUNTER — HOSPITAL ENCOUNTER (OUTPATIENT)
Dept: LAB | Age: 83
Discharge: HOME | End: 2018-05-01
Payer: MEDICARE

## 2018-05-01 ENCOUNTER — OFFICE VISIT (OUTPATIENT)
Dept: INTERNAL MEDICINE CLINIC | Age: 83
End: 2018-05-01

## 2018-05-01 VITALS
SYSTOLIC BLOOD PRESSURE: 119 MMHG | DIASTOLIC BLOOD PRESSURE: 43 MMHG | BODY MASS INDEX: 33.33 KG/M2 | HEIGHT: 69 IN | RESPIRATION RATE: 16 BRPM | OXYGEN SATURATION: 96 % | TEMPERATURE: 97.8 F | HEART RATE: 68 BPM | WEIGHT: 225 LBS

## 2018-05-01 DIAGNOSIS — Z79.4 CONTROLLED TYPE 2 DIABETES MELLITUS WITH STAGE 3 CHRONIC KIDNEY DISEASE, WITH LONG-TERM CURRENT USE OF INSULIN (HCC): ICD-10-CM

## 2018-05-01 DIAGNOSIS — Z13.31 SCREENING FOR DEPRESSION: ICD-10-CM

## 2018-05-01 DIAGNOSIS — I25.10 ATHEROSCLEROSIS OF NATIVE CORONARY ARTERY OF NATIVE HEART WITHOUT ANGINA PECTORIS: ICD-10-CM

## 2018-05-01 DIAGNOSIS — M94.9 DISORDER OF BONE AND CARTILAGE: ICD-10-CM

## 2018-05-01 DIAGNOSIS — M85.89 OSTEOPENIA OF MULTIPLE SITES: ICD-10-CM

## 2018-05-01 DIAGNOSIS — Z71.89 ADVANCED CARE PLANNING/COUNSELING DISCUSSION: ICD-10-CM

## 2018-05-01 DIAGNOSIS — Z01.00 ENCOUNTER FOR DIABETES TYPE 2 EYE EXAM (HCC): ICD-10-CM

## 2018-05-01 DIAGNOSIS — Z00.00 MEDICARE ANNUAL WELLNESS VISIT, SUBSEQUENT: Primary | ICD-10-CM

## 2018-05-01 DIAGNOSIS — I50.32 CHRONIC DIASTOLIC HEART FAILURE (HCC): ICD-10-CM

## 2018-05-01 DIAGNOSIS — C67.9 MALIGNANT NEOPLASM OF URINARY BLADDER, UNSPECIFIED SITE (HCC): ICD-10-CM

## 2018-05-01 DIAGNOSIS — I10 ESSENTIAL HYPERTENSION, BENIGN: ICD-10-CM

## 2018-05-01 DIAGNOSIS — E11.22 CONTROLLED TYPE 2 DIABETES MELLITUS WITH STAGE 3 CHRONIC KIDNEY DISEASE, WITH LONG-TERM CURRENT USE OF INSULIN (HCC): ICD-10-CM

## 2018-05-01 DIAGNOSIS — I48.20 CHRONIC ATRIAL FIBRILLATION (HCC): ICD-10-CM

## 2018-05-01 DIAGNOSIS — E55.9 VITAMIN D DEFICIENCY: ICD-10-CM

## 2018-05-01 DIAGNOSIS — Z23 ENCOUNTER FOR IMMUNIZATION: ICD-10-CM

## 2018-05-01 DIAGNOSIS — Z13.39 SCREENING FOR ALCOHOLISM: ICD-10-CM

## 2018-05-01 DIAGNOSIS — N18.30 CONTROLLED TYPE 2 DIABETES MELLITUS WITH STAGE 3 CHRONIC KIDNEY DISEASE, WITH LONG-TERM CURRENT USE OF INSULIN (HCC): ICD-10-CM

## 2018-05-01 DIAGNOSIS — E06.3 HYPOTHYROIDISM, ACQUIRED, AUTOIMMUNE: ICD-10-CM

## 2018-05-01 DIAGNOSIS — E78.2 MIXED HYPERLIPIDEMIA: ICD-10-CM

## 2018-05-01 DIAGNOSIS — M89.9 DISORDER OF BONE AND CARTILAGE: ICD-10-CM

## 2018-05-01 DIAGNOSIS — E11.9 ENCOUNTER FOR DIABETES TYPE 2 EYE EXAM (HCC): ICD-10-CM

## 2018-05-01 DIAGNOSIS — I73.9 PVD (PERIPHERAL VASCULAR DISEASE) (HCC): ICD-10-CM

## 2018-05-01 PROCEDURE — 36415 COLL VENOUS BLD VENIPUNCTURE: CPT

## 2018-05-01 PROCEDURE — 80061 LIPID PANEL: CPT

## 2018-05-01 PROCEDURE — 80053 COMPREHEN METABOLIC PANEL: CPT

## 2018-05-01 PROCEDURE — 82306 VITAMIN D 25 HYDROXY: CPT

## 2018-05-01 PROCEDURE — 85025 COMPLETE CBC W/AUTO DIFF WBC: CPT

## 2018-05-01 PROCEDURE — 83036 HEMOGLOBIN GLYCOSYLATED A1C: CPT

## 2018-05-01 PROCEDURE — 84443 ASSAY THYROID STIM HORMONE: CPT

## 2018-05-01 PROCEDURE — 82043 UR ALBUMIN QUANTITATIVE: CPT

## 2018-05-01 NOTE — PROGRESS NOTES
Gerardo Chery is a 82 y.o. female and presents for Annual Medicare Wellness Visit. Assessment of cognitive impairment: Alert and oriented x 3. Abuse Screen:    Abuse Screening Questionnaire 5/1/2018   Do you ever feel afraid of your partner? N   Are you in a relationship with someone who physically or mentally threatens you? N   Is it safe for you to go home? Y       Depression Screen:   PHQ over the last two weeks 5/1/2018   Little interest or pleasure in doing things Not at all   Feeling down, depressed or hopeless Not at all   Total Score PHQ 2 0       Fall Risk Assessment:    Fall Risk Assessment, last 12 mths 5/1/2018   Able to walk? Yes   Fall in past 12 months? No   Fall with injury? -   Number of falls in past 12 months -   Fall Risk Score -       Activities of Daily Living:    ADL Assessment 5/1/2018   Feeding yourself No Help Needed   Getting from bed to chair No Help Needed   Getting dressed No Help Needed   Bathing or showering No Help Needed   Walk across the room (includes cane/walker) No Help Needed   Using the telphone No Help Needed   Taking your medications No Help Needed   Preparing meals No Help Needed   Managing money (expenses/bills) No Help Needed   Moderately strenuous housework (laundry) Help Needed   Shopping for personal items (toiletries/medicines) No Help Needed   Shopping for groceries No Help Needed   Driving No Help Needed   Climbing a flight of stairs No Help Needed   Getting to places beyond walking distances No Help Needed       Health Maintenance:  Daily Low Dose Aspirin: no, Coumadin  Bone Density: order placed, last scan 2014 osteopenic  Glaucoma Screening & DM eye exam: no, overdue, order placed to see Dr. Alok Owen, discussed importance of annual DM eye exam  Immunizations:    Tetanus: order placed. Influenza: patient declines. Shingles:  Zostavax: unknown. Shingrix:order placed   Pneumovax:  up to date 10/1/02. Prevnar: order placed.   Cancer screening:    Cervical: NA.  Breast: not up to date - patient to discuss with Dr. Shan Sifuentes whether to continue imaging. Encouraged self breast exams. Colon: NA.  Prostate:  NA    Advance Care Planning:   End of Life Planning: has an advanced directive - a copy has been provided. Provided pt with \"Respecting Choices packet of Information\" no  Offered facilitator session with NN no     Medications/Allergies: Reviewed with patient  Prior to Admission medications    Medication Sig Start Date End Date Taking? Authorizing Provider   ADAN PEN NEEDLE 32 gauge x 5/32\" ndle USE WITH INSULIN PEN TWO TIMES A DAY AS DIRECTED BY PHYSICIAN 4/18/18  Yes Darren Crystal MD   warfarin (COUMADIN) 3 mg tablet M-W-F 6 mg, T- TH- SAT- SUN 3 mg 3/19/18  Yes Darren Crystal MD   metoprolol tartrate (LOPRESSOR) 50 mg tablet Take 1 Tab by mouth two (2) times a day. 2/27/18  Yes Darren Crystal MD   HUMULIN N NPH INSULIN KWIKPEN 100 unit/mL (3 mL) inpn INJECT 60 UNITS UNDER THE SKIN EVERY MORNING, 40 UNITS UNDER THE SKIN WITH DINNER OR AS DIRECTED BY PHYSICIAN. **THIS REPLACED HUMULOG MIX**  Patient taking differently: INJECT 43 UNITS UNDER THE SKIN EVERY MORNING, 23 UNITS UNDER THE SKIN WITH DINNER OR AS DIRECTED BY PHYSICIAN. **THIS REPLACED HUMULOG MIX** 2/16/18  Yes Darren Crystal MD   allopurinol (ZYLOPRIM) 100 mg tablet TAKE TWO TABLETS BY MOUTH DAILY 1/23/18  Yes Darren Crystal MD   furosemide (LASIX) 40 mg tablet 2 tabs q am and 1 tab q PM 1/3/18  Yes Darren Crystal MD   lisinopril (PRINIVIL, ZESTRIL) 20 mg tablet Take 1 Tab by mouth daily.  11/20/17  Yes Darren Crystal MD   ACCU-CHEK SHAHIDA PLUS TEST STRP strip USE TO CHECK BLOOD SUGAR FOUR TIMES A DAY 8/10/17  Yes Darren Crystal MD   atorvastatin (LIPITOR) 80 mg tablet TAKE ONE TABLET BY MOUTH EVERY EVENING 7/28/17  Yes Darren Crystal MD   levothyroxine (SYNTHROID) 125 mcg tablet TAKE ONE TABLET BY MOUTH DAILY 5/28/17  Yes Darren Crystal MD hydrALAZINE (APRESOLINE) 10 mg tablet Take 2 Tabs by mouth three (3) times daily. Patient taking differently: Take 20 mg by mouth two (2) times a day. 1/23/17  Yes Veronica Reddy MD   potassium chloride SR (KLOR-CON 10) 10 mEq tablet Take 1 Tab by mouth daily. 1/23/17  Yes Veronica Reddy MD   omeprazole (PRILOSEC) 20 mg capsule Take 20 mg by mouth daily. Yes Historical Provider   dofetilide (TIKOSYN) 125 mcg capsule Take 1 Cap by mouth two (2) times a day. 4/29/11  Yes Harry Mccann MD   sodium chloride (OCEAN) 0.65 % nasal spray 1 [place] by Both Nostrils route as needed for Congestion. 1/3/18   Harry Mccann MD   multivitamins-minerals-lutein (CENTRUM SILVER) Tab Take 1 Tab by mouth daily.     Historical Provider     Allergies   Allergen Reactions    Actos [Pioglitazone] Swelling     Swelling of feet and legs    Codeine Itching    Doxycycline Nausea Only    Hydrocodone Rash and Other (comments)     hallucinations       PSH: Reviewed with patient  Past Surgical History:   Procedure Laterality Date    CARDIAC SURG PROCEDURE UNLIST      ablation    HX APPENDECTOMY      HX CHOLECYSTECTOMY      HX HYSTERECTOMY      HX KNEE ARTHROSCOPY  8163,0414    right knee    HX ORTHOPAEDIC      HX UROLOGICAL      RENAL STENT, tur-b    VASCULAR SURGERY PROCEDURE UNLIST  11/4    removed vein in right leg        SH: Reviewed with patient  Social History   Substance Use Topics    Smoking status: Former Smoker     Packs/day: 0.50     Years: 10.00     Types: Cigarettes     Quit date: 1/1/1967    Smokeless tobacco: Never Used    Alcohol use No       FH: Reviewed with patient  Family History   Problem Relation Age of Onset    Stroke Other     Arthritis-osteo Sister      spinal stenosis    Gout Son     Hypertension Son     Hypertension Mother     Heart Disease Mother      CAD    Heart Disease Father      CAD    Alcohol abuse Neg Hx     Asthma Neg Hx     Bleeding Prob Neg Hx     Cancer Neg Hx     Diabetes Neg Hx     Elevated Lipids Neg Hx     Headache Neg Hx     Lung Disease Neg Hx     Migraines Neg Hx     Psychiatric Disorder Neg Hx     Mental Retardation Neg Hx          Objective:  Visit Vitals    /43 (BP 1 Location: Left arm, BP Patient Position: Sitting)    Pulse 68    Temp 97.8 °F (36.6 °C) (Oral)    Resp 16    Ht 5' 9\" (1.753 m)    Wt 225 lb (102.1 kg)    SpO2 96%    BMI 33.23 kg/m2    Body mass index is 33.23 kg/(m^2). Alcohol Risk Screen:   On any occasion during past 3 months, have you had more than 3 drinks (female) or 4 drinks (male) containing alcohol? No  Do you average more than 7 drinks (female) or 14 drinks (male) per week? No  Type and Amount: none    Tobacco Abuse:  No    Nutrition Screen:  eats a balanced diet    Hearing Loss:  denies any hearing loss    Vision Loss:   Wears glasses, contact lenses, or have any other visual impairment  Reading glasses    Activities of Daily Living:  Self-care. Requires assistance with: no ADLs  Patient handle his/her own medications  yes Use of pill box  yes      Current medical providers:    Patient Care Team:  María El MD as PCP - [facility] Lidya Modi, RN as Ambulatory Care Navigator (Internal Medicine)  Adam Griffith MD as Physician (Urology)  Darlene Yadav MD as Physician (Cardiology)  Milo Vann MD as Physician (General Surgery)  Jerry Johnson, RN as Ambulatory Care Navigator (Internal Medicine)  Tyler Mayo MD (Ophthalmology)      Plan:      No orders of the defined types were placed in this encounter.       Health Maintenance   Topic Date Due    ZOSTER VACCINE AGE 60>  02/17/1997    Pneumococcal 65+ High/Highest Risk (2 of 2 - PPSV23) 10/01/2007    DTaP/Tdap/Td series (1 - Tdap) 12/09/2010    HEMOGLOBIN A1C Q6M  02/22/2018    EYE EXAM RETINAL OR DILATED Q1  02/22/2018    MEDICARE YEARLY EXAM  03/14/2018    Influenza Age 9 to Adult  08/01/2018    MICROALBUMIN Q1 08/22/2018    LIPID PANEL Q1  08/22/2018    FOOT EXAM Q1  01/03/2019    GLAUCOMA SCREENING Q2Y  02/22/2019    Bone Densitometry (Dexa) Screening  Completed       *Patient verbalized understanding and agreement with the plan. A copy of the After Visit Summary with personalized health plan was given to the patient today. Physical Exam will be performed by PCP and documented under a separate Progress Note.

## 2018-05-01 NOTE — MR AVS SNAPSHOT
[address] 
[phone] Patient: Glory Navarro MRN:  [id number] Visit Information Date & Time Provider Department Dept. Phone Encounter #  
 5/1/2018 10:35 AM Preston Avila MD [address] [address] Internal Medicine [phone] [id number] Follow-up Instructions Return in about 4 months (around 9/1/2018). Upcoming Health Maintenance Date Due ZOSTER VACCINE AGE 60> 2/17/1997 Pneumococcal 65+ High/Highest Risk (2 of 2 - PPSV23) 10/1/2007 DTaP/Tdap/Td series (1 - Tdap) 12/9/2010 HEMOGLOBIN A1C Q6M 2/22/2018 EYE EXAM RETINAL OR DILATED Q1 2/22/2018 MEDICARE YEARLY EXAM 3/14/2018 Influenza Age 5 to Adult 8/1/2018 MICROALBUMIN Q1 8/22/2018 LIPID PANEL Q1 8/22/2018 FOOT EXAM Q1 1/3/2019 GLAUCOMA SCREENING Q2Y 2/22/2019 Allergies as of 5/1/2018  Review Complete On: 5/1/2018 By: Preston Avila MD  
  
 Severity Noted Reaction Type Reactions Actos [Pioglitazone]  04/23/2009    Swelling Swelling of feet and legs Codeine  04/23/2009    Itching Doxycycline  04/23/2009    Nausea Only Hydrocodone  04/17/2012    Rash, Other (comments)  
 hallucinations Current Immunizations  Reviewed on 7/11/2014 Name Date Influenza Vaccine 12/2/2013 Influenza Vaccine Split 10/17/2012 TD Vaccine 12/8/2010 ZZZ-RETIRED (DO NOT USE) Pneumococcal Vaccine (Unspecified Type) 10/1/2002 Not reviewed this visit You Were Diagnosed With   
  
 Codes Comments Medicare annual wellness visit, subsequent    -  Primary ICD-10-CM: Z00.00 ICD-9-CM: V70.0 Advanced care planning/counseling discussion     ICD-10-CM: Z71.89 ICD-9-CM: V65.49 Screening for depression     ICD-10-CM: Z13.89 ICD-9-CM: V79.0 Screening for alcoholism     ICD-10-CM: Z13.89 ICD-9-CM: V79.1  Chronic diastolic heart failure (HCC)     ICD-10-CM: I50.32 
 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, with long-term current use of insulin (HCC)     ICD-10-CM: E11.22, N18.3, Z79.4 ICD-9-CM: 250.40, 585.3, V58.67 Chronic atrial fibrillation (HCC)     ICD-10-CM: T15.2 ICD-9-CM: 427.31 Malignant neoplasm of urinary bladder, unspecified site [facility])     ICD-10-CM: C67.9 ICD-9-CM: 188.9 PVD (peripheral vascular disease) ([facility]     ICD-10-CM: I73.9 ICD-9-CM: 443.9 Hypothyroidism, acquired, autoimmune     ICD-10-CM: E06.3 ICD-9-CM: 244.8 Mixed hyperlipidemia     ICD-10-CM: E78.2 ICD-9-CM: 272.2 Atherosclerosis of native coronary artery of native heart without angina pectoris     ICD-10-CM: I25.10 ICD-9-CM: 414.01 Essential hypertension, benign     ICD-10-CM: I10 
ICD-9-CM: 401.1 Encounter for diabetes type 2 eye exam ([facility]     ICD-10-CM: E11.9, Z01.00 ICD-9-CM: 250.00, V72.0 Encounter for immunization     ICD-10-CM: O14 ICD-9-CM: V03.89 Disorder of bone and cartilage     ICD-10-CM: M89.9, M94.9 ICD-9-CM: 733.90 Osteopenia of multiple sites     ICD-10-CM: M85.89 ICD-9-CM: 733.90 Vitamin D deficiency     ICD-10-CM: E55.9 ICD-9-CM: 268.9 Vitals BP Pulse Temp Resp Height(growth percentile) Weight(growth percentile) 119/43 (BP 1 Location: Left arm, BP Patient Position: Sitting) 68 97.8 °F (36.6 °C) (Oral) 16 5' 9\" (1.753 m) 225 lb (102.1 kg) SpO2 BMI OB Status Smoking Status 96% 33.23 kg/m2 Hysterectomy Former Smoker BMI and BSA Data Body Mass Index Body Surface Area  
 33.23 kg/m 2 2.23 m 2 Preferred Pharmacy Pharmacy Name Phone [facility] [address] [place] [address] Dr Escudero, [address] [facility] Inna Shepherd [phone] Your Updated Medication List  
  
   
This list is accurate as of 5/1/18 11:29 AM.  Always use your most recent med list.  
  
  
  
  
 ACCU-CHEK SHAHIDA PLUS TEST STRP strip Generic drug:  glucose blood VI test strips USE TO CHECK BLOOD SUGAR FOUR TIMES A DAY  
  
 allopurinol 100 mg tablet Commonly known as:  ZYLOPRIM  
TAKE TWO TABLETS BY MOUTH DAILY  
  
 atorvastatin 80 mg tablet Commonly known as:  LIPITOR  
TAKE ONE TABLET BY MOUTH EVERY EVENING  
  
 diphtheria-pertussis (acellular)-tetanus 2.5-8-5 Lf-mcg-Lf/0.5mL Susp susp Commonly known as:  BOOSTRIX TDAP  
0.5 mL by IntraMUSCular route once for 1 dose. dofetilide 125 mcg capsule Commonly known as:  [place] Radar Take 1 Cap by mouth two (2) times a day. furosemide 40 mg tablet Commonly known as:  LASIX  
2 tabs q am and 1 tab q PM  
  
 HumuLIN N NPH Insulin KwikPen 100 unit/mL (3 mL) Inpn Generic drug:  insulin NPH INJECT 60 UNITS UNDER THE SKIN EVERY MORNING, 40 UNITS UNDER THE SKIN WITH DINNER OR AS DIRECTED BY PHYSICIAN. **THIS REPLACED HUMULOG MIX**  
  
 hydrALAZINE 10 mg tablet Commonly known as:  APRESOLINE Take 2 Tabs by mouth three (3) times daily. levothyroxine 125 mcg tablet Commonly known as:  SYNTHROID  
TAKE ONE TABLET BY MOUTH DAILY  
  
 lisinopril 20 mg tablet Commonly known as:  Jonas Olman Take 1 Tab by mouth daily. metoprolol tartrate 50 mg tablet Commonly known as:  LOPRESSOR Take 1 Tab by mouth two (2) times a day. Little Pen Needle 32 gauge x 5/32\" Ndle Generic drug:  Insulin Needles (Disposable) USE WITH INSULIN PEN TWO TIMES A DAY AS DIRECTED BY PHYSICIAN  
  
 pneumococcal 13 jessica conj dip 0.5 mL Syrg injection Commonly known as:  PREVNAR-13  
0.5 mL by IntraMUSCular route once for 1 dose. potassium chloride SR 10 mEq tablet Commonly known as:  KLOR-CON 10 Take 1 Tab by mouth daily. PriLOSEC 20 mg capsule Generic drug:  omeprazole Take 20 mg by mouth daily. varicella-zoster recombinant (PF) 50 mcg/0.5 mL Susr injection Commonly known as:  SHINGRIX (PF)  
 0.5 mL by IntraMUSCular route once for 1 dose. 2nd dose to be given 2-6 months after first dose. warfarin 3 mg tablet Commonly known as:  COUMADIN  
-- 6 mg, - TH- SAT- SUN 3 mg Prescriptions Printed Refills  
 pneumococcal 13 jessica conj dip (PREVNAR-13) 0.5 mL syrg injection 0 Si.5 mL by IntraMUSCular route once for 1 dose. Class: Print Route: IntraMUSCular  
 diphtheria-pertussis, acellular,-tetanus (BOOSTRIX TDAP) 2.5-8-5 Lf-mcg-Lf/0.5mL susp susp 0 Si.5 mL by IntraMUSCular route once for 1 dose. Class: Print Route: IntraMUSCular  
 varicella-zoster recombinant, PF, (SHINGRIX, PF,) 50 mcg/0.5 mL susr injection 1 Si.5 mL by IntraMUSCular route once for 1 dose. 2nd dose to be given 2-6 months after first dose. Class: Print Route: IntraMUSCular We Performed the Following REFERRAL TO OPHTHALMOLOGY [REF57 Custom] Comments:  
 Please evaluate patient for glaucoma & DM eye exam.  
 VITAMIN D, 25 HYDROXY [24572 CPT(R)] Follow-up Instructions Return in about 4 months (around 2018). To-Do List   
 2018 Imaging:  DEXA BONE DENSITY STUDY AXIAL Referral Information Referral ID Referred By Referred To  
  
 [id number] Won BURDEN MD   
   [address] [facility], [address] Phone: [phone] Fax: [phone] Visits Status Start Date End Date 1 New Request 18 If your referral has a status of pending review or denied, additional information will be sent to support the outcome of this decision. Patient Instructions Today you had a Medicare Wellness Visit. During this visit, we developed and/or updated your personalized health plan to prevent disease and disability based on your current health and risk factors.   Please schedule an appt around this time next year so we can continue to keep you on the right path to living a healthy lifestyle. Schedule of Personalized Health Plan The best way to stay healthy is to live a healthy lifestyle. A healthy lifestyle includes regular exercise, eating a well-balanced diet, keeping a healthy weight and not smoking. Regular physical exams and screening tests are another important way to take care of yourself. Preventive exams provided by health care providers can find health problems early when treatment works best and can keep you from getting certain diseases or illnesses. Preventive services include exams, lab tests, screenings, shots, monitoring and information to help you take care of your own health. All people over 65 should have a pneumonia shot. Pneumonia shots are usually only needed once in a lifetime unless your doctor decides differently. All people over 65 should have a yearly flu shot. People over 65 are at medium to high risk for Hepatitis B. Three shots are needed for complete protection. For additional information, please discuss with physician. In addition to your physical exam, some screening tests are recommended: 
 
Bone mass measurement (dexa scan) is recommended every  two years. Diabetes Mellitus screening is recommended every year. Glaucoma is an eye disease caused by high pressure in the eye. An eye exam is recommended every year. Cardiovascular screening tests that check your cholesterol and other blood fat (lipid) levels are recommended every five years. Colorectal Cancer screening tests help to find pre-cancerous polyps (growths in the colon) so they can be removed before they turn into cancer. Tests ordered for screening depend on your personal and family history risk factors. Mammogram screening for Breast Cancer is recommended yearly.  
 
Screening for Cervical Cancer is recommended every two years (annually for certain risk factors, such as previous history of STD or abnormal PAP in past 7 years). Here is a list of your current Health Maintenance items with a due date: 
Health Maintenance Topic Date Due  
 ZOSTER VACCINE AGE 60>  02/17/1997  Pneumococcal 65+ High/Highest Risk (2 of 2 - PPSV23) 10/01/2007  DTaP/Tdap/Td series (1 - Tdap) 12/09/2010  
 HEMOGLOBIN A1C Q6M  02/22/2018  
 EYE EXAM RETINAL OR DILATED Q1  02/22/2018  MEDICARE YEARLY EXAM  03/14/2018  Influenza Age 5 to Adult  08/01/2018  MICROALBUMIN Q1  08/22/2018  LIPID PANEL Q1  08/22/2018  
 FOOT EXAM Q1  01/03/2019  GLAUCOMA SCREENING Q2Y  02/22/2019  Bone Densitometry (Dexa) Screening  Completed I recommend 1200 mg calcium daily, preferably from calcium-rich or calcium-supplemented foods, and 1000 international units of vitamin D daily. You may need to add an additional calcium supplements to reach these goals. The main dietary sources of calcium include milk, and other dairy products, such as cottage cheese, yogurt, or hard cheese, and green vegetables, such as kale and broccoli. It can also come from sardines with bones, almonds, fierro beans, calcium fortified OJ. A rough method of estimating dietary calcium intake is to multiply the number of dairy servings consumed each day by 300 mg. One serving is 8 oz of milk (236 mL) or yogurt (224 g), 1 oz (28 g) of hard cheese, or 16 oz (448 g) of cottage cheese. Vit D can be found in [facility] [place] [place], Vit D fortified OJ, Margarine, fortified breakfast cereal, egg. Introducing [facility]! [employer] introduces [employer] patient portal. Now you can access parts of your medical record, email your doctor's office, and request medication refills online. 1. In your internet browser, go to [email or web address] 2. Click on the First Time User? Click Here link in the Sign In box. You will see the New Member Sign Up page. 3. Enter your [employer] Access Code exactly as it appears below.  You will not need to use this code after youve completed the sign-up process. If you do not sign up before the expiration date, you must request a new code. · [employer] Access Code: [email or web address] Expires: 5/7/2018 11:11 AM 
 
4. Enter the last four digits of your Social Security Number (xxxx) and Date of Birth (mm/dd/yyyy) as indicated and click Submit. You will be taken to the next sign-up page. 5. Create a [employer] ID. This will be your [employer] login ID and cannot be changed, so think of one that is secure and easy to remember. 6. Create a [employer] password. You can change your password at any time. 7. Enter your Password Reset Question and Answer. This can be used at a later time if you forget your password. 8. Enter your e-mail address. You will receive e-mail notification when new information is available in [address] 9. Click Sign Up. You can now view and download portions of your medical record. 10. Click the Download Summary menu link to download a portable copy of your medical information. If you have questions, please visit the Frequently Asked Questions section of the [employer] website. Remember, [employer] is NOT to be used for urgent needs. For medical emergencies, dial 911. Now available from your iPhone and Android! Please provide this summary of care documentation to your next provider. Your primary care clinician is listed as ANDRES BURDEN. If you have any questions after today's visit, please call [phone].

## 2018-05-01 NOTE — PATIENT INSTRUCTIONS
Today you had a Medicare Wellness Visit. During this visit, we developed and/or updated your personalized health plan to prevent disease and disability based on your current health and risk factors. Please schedule an appt around this time next year so we can continue to keep you on the right path to living a healthy lifestyle. Schedule of Personalized Health Plan    The best way to stay healthy is to live a healthy lifestyle. A healthy lifestyle includes regular exercise, eating a well-balanced diet, keeping a healthy weight and not smoking. Regular physical exams and screening tests are another important way to take care of yourself. Preventive exams provided by health care providers can find health problems early when treatment works best and can keep you from getting certain diseases or illnesses. Preventive services include exams, lab tests, screenings, shots, monitoring and information to help you take care of your own health. All people over 65 should have a pneumonia shot. Pneumonia shots are usually only needed once in a lifetime unless your doctor decides differently. All people over 65 should have a yearly flu shot. People over 65 are at medium to high risk for Hepatitis B. Three shots are needed for complete protection. For additional information, please discuss with physician. In addition to your physical exam, some screening tests are recommended:    Bone mass measurement (dexa scan) is recommended every  two years. Diabetes Mellitus screening is recommended every year. Glaucoma is an eye disease caused by high pressure in the eye. An eye exam is recommended every year. Cardiovascular screening tests that check your cholesterol and other blood fat (lipid) levels are recommended every five years. Colorectal Cancer screening tests help to find pre-cancerous polyps (growths in the colon) so they can be removed before they turn into cancer.   Tests ordered for screening depend on your personal and family history risk factors. Mammogram screening for Breast Cancer is recommended yearly. Screening for Cervical Cancer is recommended every two years (annually for certain risk factors, such as previous history of STD or abnormal PAP in past 7 years). Here is a list of your current Health Maintenance items with a due date:  Health Maintenance   Topic Date Due    ZOSTER VACCINE AGE 60>  02/17/1997    Pneumococcal 65+ High/Highest Risk (2 of 2 - PPSV23) 10/01/2007    DTaP/Tdap/Td series (1 - Tdap) 12/09/2010    HEMOGLOBIN A1C Q6M  02/22/2018    EYE EXAM RETINAL OR DILATED Q1  02/22/2018    MEDICARE YEARLY EXAM  03/14/2018    Influenza Age 9 to Adult  08/01/2018    MICROALBUMIN Q1  08/22/2018    LIPID PANEL Q1  08/22/2018    FOOT EXAM Q1  01/03/2019    GLAUCOMA SCREENING Q2Y  02/22/2019    Bone Densitometry (Dexa) Screening  Completed     I recommend 1200 mg calcium daily, preferably from calcium-rich or calcium-supplemented foods, and 1000 international units of vitamin D daily. You may need to add an additional calcium supplements to reach these goals. The main dietary sources of calcium include milk, and other dairy products, such as cottage cheese, yogurt, or hard cheese, and green vegetables, such as kale and broccoli. It can also come from sardines with bones, almonds, fierro beans, calcium fortified OJ. A rough method of estimating dietary calcium intake is to multiply the number of dairy servings consumed each day by 300 mg. One serving is 8 oz of milk (236 mL) or yogurt (224 g), 1 oz (28 g) of hard cheese, or 16 oz (448 g) of cottage cheese. Vit D can be found in [place], [place] [facility] [place], Vit D fortified OJ, Margarine, fortified breakfast cereal, egg.

## 2018-05-01 NOTE — PROGRESS NOTES
HISTORY OF PRESENT ILLNESS  Devon Garcia is a 82 y.o. female. HPI Souleymane Stephanie is seen today for a Medicare Wellness Visit and follow up of chronic problems. 1.  Preventive medicine. Please see attached RN note for full details of Medicare Wellness Visit. This was fully reviewed and discussed with Annalise. She is due for labs, several vaccinations, eye exam, mammogram and a bone density study. 2.  Chronic medical problems are reviewed. -Diabetes, hyperlipidemia, hypothyroidism. She is overdue for routine labs. We will have these done today.  -Chronic atrial fibrillation, valvular heart disease. She is up to date with cardiology follow up and INR assessment. She is now doing home testing.   -Chronic lower extremity edema. She uses compression hose. She has had some recurrent left lower leg skin breakdown for which she will be seeing her wound care specialist, Dr. Petra Dawson.   -Shortness of breath, chronic, multifactorial. No evidence of heart failure. Current Outpatient Prescriptions   Medication Sig    ADAN PEN NEEDLE 32 gauge x 5/32\" ndle USE WITH INSULIN PEN TWO TIMES A DAY AS DIRECTED BY PHYSICIAN    warfarin (COUMADIN) 3 mg tablet M-W-F 6 mg, T- TH- SAT- SUN 3 mg    metoprolol tartrate (LOPRESSOR) 50 mg tablet Take 1 Tab by mouth two (2) times a day.  HUMULIN N NPH INSULIN KWIKPEN 100 unit/mL (3 mL) inpn INJECT 60 UNITS UNDER THE SKIN EVERY MORNING, 40 UNITS UNDER THE SKIN WITH DINNER OR AS DIRECTED BY PHYSICIAN. **THIS REPLACED HUMULOG MIX** (Patient taking differently: INJECT 43 UNITS UNDER THE SKIN EVERY MORNING, 23 UNITS UNDER THE SKIN WITH DINNER OR AS DIRECTED BY PHYSICIAN. **THIS REPLACED HUMULOG MIX**)    allopurinol (ZYLOPRIM) 100 mg tablet TAKE TWO TABLETS BY MOUTH DAILY    furosemide (LASIX) 40 mg tablet 2 tabs q am and 1 tab q PM    lisinopril (PRINIVIL, ZESTRIL) 20 mg tablet Take 1 Tab by mouth daily.     ACCU-CHEK SHAHIDA PLUS TEST STRP strip USE TO CHECK BLOOD SUGAR FOUR TIMES A DAY    atorvastatin (LIPITOR) 80 mg tablet TAKE ONE TABLET BY MOUTH EVERY EVENING    levothyroxine (SYNTHROID) 125 mcg tablet TAKE ONE TABLET BY MOUTH DAILY    hydrALAZINE (APRESOLINE) 10 mg tablet Take 2 Tabs by mouth three (3) times daily. (Patient taking differently: Take 20 mg by mouth two (2) times a day.)    potassium chloride SR (KLOR-CON 10) 10 mEq tablet Take 1 Tab by mouth daily.  omeprazole (PRILOSEC) 20 mg capsule Take 20 mg by mouth daily.  dofetilide (TIKOSYN) 125 mcg capsule Take 1 Cap by mouth two (2) times a day. No current facility-administered medications for this visit. Review of Systems   Constitutional: Negative for weight loss. Respiratory: Positive for shortness of breath. Chronic   Cardiovascular: Positive for leg swelling. Negative for chest pain, palpitations and PND. Chronic   Musculoskeletal: Negative for myalgias. Neurological: Negative for focal weakness. Endo/Heme/Allergies: Does not bruise/bleed easily. Physical Exam   Constitutional: She appears well-nourished. Neck: Carotid bruit is not present. Cardiovascular: Normal rate and regular rhythm. Exam reveals no gallop and no friction rub. No murmur heard. Pulmonary/Chest: Effort normal and breath sounds normal. No respiratory distress. Musculoskeletal: She exhibits edema. Mild bilateral lower extremity edema - chronic, stasis, wrapped   Nursing note and vitals reviewed. ASSESSMENT and PLAN  Diagnoses and all orders for this visit:    1. Medicare annual wellness visit, subsequent    2. Advanced care planning/counseling discussion    3. Screening for depression    4. Screening for alcoholism    5. Chronic diastolic heart failure ([address]- Continue current regimen of prescription and / or OTC medications , See cardiologist as directed. 6. 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  Chronic atrial fibrillation [facility])- See cardiologist as directed. 8. Malignant neoplasm of urinary bladder, unspecified site [facility])- See urologist as directed     9. PVD (peripheral vascular disease) ([facility]- See specialists  as directed. 10. Hypothyroidism, acquired, autoimmune- check labs    11. Mixed hyperlipidemia- Check lipid panel and appropriate studies to rule out med side effects now and in 6 months- high risk med management. 12. Atherosclerosis of native coronary artery of native heart without angina pectoris- See cardiologist as directed. 13. Essential hypertension, benign- Print and mail letter     14. Encounter for diabetes type 2 eye exam ([facility]  -     REFERRAL TO OPHTHALMOLOGY    15. Encounter for immunization  -     pneumococcal 13 jessica conj dip (PREVNAR-13) 0.5 mL syrg injection; 0.5 mL by IntraMUSCular route once for 1 dose.  -     diphtheria-pertussis, acellular,-tetanus (BOOSTRIX TDAP) 2.5-8-5 Lf-mcg-Lf/0.5mL susp susp; 0.5 mL by IntraMUSCular route once for 1 dose.  -     varicella-zoster recombinant, PF, (SHINGRIX, PF,) 50 mcg/0.5 mL susr injection; 0.5 mL by IntraMUSCular route once for 1 dose. 2nd dose to be given 2-6 months after first dose. 16. Disorder of bone and cartilage  -     DEXA BONE DENSITY STUDY AXIAL; Future    17. Osteopenia of multiple sites- follow    18.  Vitamin D deficiency  -     VITAMIN D, 25 HYDROXY

## 2018-05-02 LAB
25(OH)D3+25(OH)D2 SERPL-MCNC: 28.7 NG/ML (ref 30–100)
ALBUMIN SERPL-MCNC: 4.5 G/DL (ref 3.5–4.7)
ALBUMIN/CREAT UR: <8.7 MG/G CREAT (ref 0–30)
ALBUMIN/GLOB SERPL: 1.8 {RATIO} (ref 1.2–2.2)
ALP SERPL-CCNC: 91 IU/L (ref 39–117)
ALT SERPL-CCNC: 18 IU/L (ref 0–32)
AST SERPL-CCNC: 17 IU/L (ref 0–40)
BASOPHILS # BLD AUTO: 0 X10E3/UL (ref 0–0.2)
BASOPHILS NFR BLD AUTO: 0 %
BILIRUB SERPL-MCNC: 0.3 MG/DL (ref 0–1.2)
BUN SERPL-MCNC: 32 MG/DL (ref 8–27)
BUN/CREAT SERPL: 22 (ref 12–28)
CALCIUM SERPL-MCNC: 9.5 MG/DL (ref 8.7–10.3)
CHLORIDE SERPL-SCNC: 101 MMOL/L (ref 96–106)
CHOLEST SERPL-MCNC: 113 MG/DL (ref 100–199)
CO2 SERPL-SCNC: 24 MMOL/L (ref 18–29)
CREAT SERPL-MCNC: 1.46 MG/DL (ref 0.57–1)
CREAT UR-MCNC: 34.5 MG/DL
EOSINOPHIL # BLD AUTO: 0.2 X10E3/UL (ref 0–0.4)
EOSINOPHIL NFR BLD AUTO: 3 %
ERYTHROCYTE [DISTWIDTH] IN BLOOD BY AUTOMATED COUNT: 18.2 % (ref 12.3–15.4)
EST. AVERAGE GLUCOSE BLD GHB EST-MCNC: 146 MG/DL
GFR SERPLBLD CREATININE-BSD FMLA CKD-EPI: 34 ML/MIN/1.73
GFR SERPLBLD CREATININE-BSD FMLA CKD-EPI: 39 ML/MIN/1.73
GLOBULIN SER CALC-MCNC: 2.5 G/DL (ref 1.5–4.5)
GLUCOSE SERPL-MCNC: 76 MG/DL (ref 65–99)
HBA1C MFR BLD: 6.7 % (ref 4.8–5.6)
HCT VFR BLD AUTO: 33.3 % (ref 34–46.6)
HDLC SERPL-MCNC: 40 MG/DL
HGB BLD-MCNC: 10.1 G/DL (ref 11.1–15.9)
IMM GRANULOCYTES # BLD: 0 X10E3/UL (ref 0–0.1)
IMM GRANULOCYTES NFR BLD: 0 %
LDLC SERPL CALC-MCNC: 57 MG/DL (ref 0–99)
LYMPHOCYTES # BLD AUTO: 1.3 X10E3/UL (ref 0.7–3.1)
LYMPHOCYTES NFR BLD AUTO: 18 %
MCH RBC QN AUTO: 24.1 PG (ref 26.6–33)
MCHC RBC AUTO-ENTMCNC: 30.3 G/DL (ref 31.5–35.7)
MCV RBC AUTO: 80 FL (ref 79–97)
MICROALBUMIN UR-MCNC: <3 UG/ML
MONOCYTES # BLD AUTO: 0.4 X10E3/UL (ref 0.1–0.9)
MONOCYTES NFR BLD AUTO: 5 %
NEUTROPHILS # BLD AUTO: 5.6 X10E3/UL (ref 1.4–7)
NEUTROPHILS NFR BLD AUTO: 74 %
PLATELET # BLD AUTO: 212 X10E3/UL (ref 150–379)
POTASSIUM SERPL-SCNC: 4.4 MMOL/L (ref 3.5–5.2)
PROT SERPL-MCNC: 7 G/DL (ref 6–8.5)
RBC # BLD AUTO: 4.19 X10E6/UL (ref 3.77–5.28)
SODIUM SERPL-SCNC: 144 MMOL/L (ref 134–144)
TRIGL SERPL-MCNC: 79 MG/DL (ref 0–149)
TSH SERPL DL<=0.005 MIU/L-ACNC: 2.17 UIU/ML (ref 0.45–4.5)
VLDLC SERPL CALC-MCNC: 16 MG/DL (ref 5–40)
WBC # BLD AUTO: 7.6 X10E3/UL (ref 3.4–10.8)

## 2018-05-03 ENCOUNTER — TELEPHONE ANTICOAG (OUTPATIENT)
Dept: INTERNAL MEDICINE CLINIC | Age: 83
End: 2018-05-03

## 2018-05-03 ENCOUNTER — TELEPHONE (OUTPATIENT)
Dept: INTERNAL MEDICINE CLINIC | Age: 83
End: 2018-05-03

## 2018-05-03 LAB — INR, EXTERNAL: 2.9

## 2018-05-03 NOTE — PROGRESS NOTES
Verified patient identity with two identifiers. Spoke with patient by phone. INR 2.9 today via home monitor. Patient instructions:   Continue Coumadin 6mg daily except 3mg Sat & Sun   Recheck on 5/8/18. A full discussion of the nature of anticoagulants has been carried out. A benefit risk analysis has been presented to the patient, so that they understand the justification for choosing anticoagulation at this time. The need for frequent and regular monitoring, precise dosage adjustment and compliance is stressed. Side effects of potential bleeding are discussed. The patient should avoid any OTC items containing aspirin or ibuprofen, and should avoid great swings in general diet. Avoid alcohol consumption. Call if any signs of abnormal bleeding. Patient verbalized understanding.

## 2018-05-03 NOTE — TELEPHONE ENCOUNTER
Pt called to report her home INR to CAROLYN Steve. Message taken. Pt states INR is 2.9 she is currently taking 6mg M-F and 3mg on Sat Sun. Advised that Annalise will call her back today.

## 2018-05-08 ENCOUNTER — TELEPHONE ANTICOAG (OUTPATIENT)
Dept: INTERNAL MEDICINE CLINIC | Age: 83
End: 2018-05-08

## 2018-05-08 LAB — INR, EXTERNAL: 2.3

## 2018-05-08 NOTE — PROGRESS NOTES
Verified patient identity with two identifiers. Spoke with patient by phone. INR 2.3 today via home monitor. Patient instructions:   Continue Coumadin 6mg daily except 3mg Sat & Sun Recheck on 5/15/18. A full discussion of the nature of anticoagulants has been carried out. A benefit risk analysis has been presented to the patient, so that they understand the justification for choosing anticoagulation at this time. The need for frequent and regular monitoring, precise dosage adjustment and compliance is stressed. Side effects of potential bleeding are discussed. The patient should avoid any OTC items containing aspirin or ibuprofen, and should avoid great swings in general diet. Avoid alcohol consumption. Call if any signs of abnormal bleeding. Patient verbalized understanding.

## 2018-05-09 DIAGNOSIS — E78.00 HYPERCHOLESTEREMIA: ICD-10-CM

## 2018-05-09 RX ORDER — ATORVASTATIN CALCIUM 80 MG/1
TABLET, FILM COATED ORAL
Qty: 90 TAB | Refills: 1 | Status: SHIPPED | OUTPATIENT
Start: 2018-05-09 | End: 2018-08-21 | Stop reason: SDUPTHER

## 2018-05-15 ENCOUNTER — TELEPHONE ANTICOAG (OUTPATIENT)
Dept: INTERNAL MEDICINE CLINIC | Age: 83
End: 2018-05-15

## 2018-05-15 LAB — INR, EXTERNAL: 3.1

## 2018-05-15 RX ORDER — WARFARIN 3 MG/1
3 TABLET ORAL DAILY
COMMUNITY
End: 2018-09-20 | Stop reason: ALTCHOICE

## 2018-05-15 NOTE — PROGRESS NOTES
Verified patient identity with two identifiers. Spoke with patient by phone. INR 3.1 today via home monitor. Patient instructions: Take 3mg today instead of 6mg then continue Coumadin 6mg daily except 3mg Sat & Sun. Recheck on 5/22/18. A full discussion of the nature of anticoagulants has been carried out. A benefit risk analysis has been presented to the patient, so that they understand the justification for choosing anticoagulation at this time. The need for frequent and regular monitoring, precise dosage adjustment and compliance is stressed. Side effects of potential bleeding are discussed. The patient should avoid any OTC items containing aspirin or ibuprofen, and should avoid great swings in general diet. Avoid alcohol consumption. Call if any signs of abnormal bleeding. Patient verbalized understanding.

## 2018-05-23 RX ORDER — LEVOTHYROXINE SODIUM 125 UG/1
TABLET ORAL
Qty: 30 TAB | Refills: 8 | Status: SHIPPED | OUTPATIENT
Start: 2018-05-23 | End: 2019-03-13 | Stop reason: SDUPTHER

## 2018-05-29 ENCOUNTER — TELEPHONE ANTICOAG (OUTPATIENT)
Dept: INTERNAL MEDICINE CLINIC | Age: 83
End: 2018-05-29

## 2018-05-29 LAB — INR, EXTERNAL: 2.7

## 2018-05-29 NOTE — PROGRESS NOTES
Verified patient identity with two identifiers. Spoke with patient by phone. INR 2.7 today via home monitor. Patient instructions:   Continue Coumadin 6mg daily except 3mg Sat & Sun Recheck on 6/5/18. A full discussion of the nature of anticoagulants has been carried out. A benefit risk analysis has been presented to the patient, so that they understand the justification for choosing anticoagulation at this time. The need for frequent and regular monitoring, precise dosage adjustment and compliance is stressed. Side effects of potential bleeding are discussed. The patient should avoid any OTC items containing aspirin or ibuprofen, and should avoid great swings in general diet. Avoid alcohol consumption. Call if any signs of abnormal bleeding. Patient verbalized understanding.

## 2018-05-31 NOTE — PROGRESS NOTES
Verified patient identity with two identifiers. Spoke with patient by phone. INR 2.1 today via home monitor. Patient instructions:   Continue Coumadin 6mg daily   Recheck in 1 week    A full discussion of the nature of anticoagulants has been carried out. A benefit risk analysis has been presented to the patient, so that they understand the justification for choosing anticoagulation at this time. The need for frequent and regular monitoring, precise dosage adjustment and compliance is stressed. Side effects of potential bleeding are discussed. The patient should avoid any OTC items containing aspirin or ibuprofen, and should avoid great swings in general diet. Avoid alcohol consumption. Call if any signs of abnormal bleeding. Patient verbalized understanding. negative Regular rate & rhythm, normal S1, S2; no murmurs, gallops or rubs; no S3, S4

## 2018-06-13 ENCOUNTER — TELEPHONE (OUTPATIENT)
Dept: INTERNAL MEDICINE CLINIC | Age: 83
End: 2018-06-13

## 2018-06-13 ENCOUNTER — TELEPHONE ANTICOAG (OUTPATIENT)
Dept: INTERNAL MEDICINE CLINIC | Age: 83
End: 2018-06-13

## 2018-06-13 LAB — INR, EXTERNAL: 1.5

## 2018-06-13 NOTE — PROGRESS NOTES
Verified patient identity with two identifiers. Spoke with patient by phone. INR 1.5 today. Patient denies missing doses, denies dietary or medication changes. Patient instructions discussed with Dr. Ricardo Vanessa: Old dose: Coumadin 6mg daily except 3mg Sat & Sun   New dose: Coumadin 6mg daily except 3mg Sat  (8.3% increase)  Recheck on 6/19/18. A full discussion of the nature of anticoagulants has been carried out. A benefit risk analysis has been presented to the patient, so that they understand the justification for choosing anticoagulation at this time. The need for frequent and regular monitoring, precise dosage adjustment and compliance is stressed. Side effects of potential bleeding are discussed. The patient should avoid any OTC items containing aspirin or ibuprofen, and should avoid great swings in general diet. Avoid alcohol consumption. Call if any signs of abnormal bleeding. Patient verbalized understanding.

## 2018-06-20 ENCOUNTER — TELEPHONE (OUTPATIENT)
Dept: INTERNAL MEDICINE CLINIC | Age: 83
End: 2018-06-20

## 2018-06-21 ENCOUNTER — TELEPHONE (OUTPATIENT)
Dept: INTERNAL MEDICINE CLINIC | Age: 83
End: 2018-06-21

## 2018-06-21 ENCOUNTER — TELEPHONE ANTICOAG (OUTPATIENT)
Dept: INTERNAL MEDICINE CLINIC | Age: 83
End: 2018-06-21

## 2018-06-21 LAB — INR, EXTERNAL: 2.6

## 2018-06-21 NOTE — PROGRESS NOTES
Verified patient identity with two identifiers. Spoke with patient by phone. INR 2.6 today. Patient instructions:   Coumadin 6mg daily except 3mg Sat   Recheck on 6/26/18. A full discussion of the nature of anticoagulants has been carried out. A benefit risk analysis has been presented to the patient, so that they understand the justification for choosing anticoagulation at this time. The need for frequent and regular monitoring, precise dosage adjustment and compliance is stressed. Side effects of potential bleeding are discussed. The patient should avoid any OTC items containing aspirin or ibuprofen, and should avoid great swings in general diet. Avoid alcohol consumption. Call if any signs of abnormal bleeding. Patient verbalized understanding.

## 2018-06-21 NOTE — TELEPHONE ENCOUNTER
Patient reports INR reading of 2.6 today , current dosage of Warfarin of 3 mg on Sat , and 6 mg on all the other days.

## 2018-06-22 ENCOUNTER — HOSPITAL ENCOUNTER (OUTPATIENT)
Dept: WOUND CARE | Age: 83
Discharge: HOME | End: 2018-06-22
Payer: MEDICARE

## 2018-06-22 VITALS
RESPIRATION RATE: 16 BRPM | TEMPERATURE: 98 F | DIASTOLIC BLOOD PRESSURE: 71 MMHG | HEIGHT: 69 IN | WEIGHT: 210 LBS | SYSTOLIC BLOOD PRESSURE: 170 MMHG | HEART RATE: 71 BPM | BODY MASS INDEX: 31.1 KG/M2

## 2018-06-22 PROCEDURE — 99215 OFFICE O/P EST HI 40 MIN: CPT

## 2018-06-22 PROCEDURE — 97597 DBRDMT OPN WND 1ST 20 CM/<: CPT

## 2018-06-22 RX ORDER — ACETAMINOPHEN 325 MG/1
325 TABLET ORAL
COMMUNITY

## 2018-06-22 NOTE — PROGRESS NOTES
[address]  WOUND CARE PROGRESS NOTE    Makaylalina Pena  MR#: [id number]  : 1935  ACCOUNT #: [de-identified]   DATE OF SERVICE: 2018    HISTORY OF PRESENT ILLNESS:  The patient is a former patient of this wound center and was last seen on 2018. She was being treated for venous leg ulcers. She had healed and she was discharged on a regimen of bilateral 30-40 mmHg Carolon compression stockings. She was maintained off of amlodipine. She was using a daily moisturizer. She was keeping her legs elevated. She was doing gastrocnemius muscle exercises and using a Lympha Press device twice a day. She is on furosemide 40 mg per morning and 20 mg per evening. She now presents with bilateral venous leg ulcers. She continues to have edema of both lower extremities. PHYSICAL EXAMINATION:  Temperature is 98, pulse 71, respirations 16, blood pressure 170/71. Examination of both lower extremities reveals small superficial wounds bilaterally. The wound of the left medial lower leg is 1 x 1 x 0.1 cm and is filled with slough. The wound to the left mid lateral leg measures 0.8 x 0.8 x 0.1 cm and wound of the right anterior lower leg is 8 x 14 x 0.1 cm, which is a cluster of small wounds. These wounds are also filled with slough. I have recommended that the wounds be debrided. I explained the risks and benefits. The patient understands and wishes to proceed. Therefore, topical Xylocaine was applied for 10 minutes. With the use of a 5 mm curet, all wounds are debrided of slough, down to nice healthy bleeding granulation tissue. 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  We are going to apply 3-layer wraps. Instead of seeing me next week, I am asking her to follow up with Dr. Angela Thomas here at the wound center for his evaluation and recommendations. The patient has already had a venous duplex with reflux showing no evidence of reflux. She is already on a diuretic. She is already on adequate compression and a [employer] device and if he has no other recommendations, then we might consider asking Dr. Benson Figueroa to switch from Lasix to Bumex to see if this would provide any benefit. I will obviously defer to Dr. Angela Thomas in this regard. I will see the patient again in followup when he and the patient wish. All questions were answered. CONDITION ON DISCHARGE:  Stable.       MD MARCIO Louise / DEMARCUS  D: 06/22/2018 09:46     T: 06/22/2018 10:09  JOB #: [id number]

## 2018-06-22 NOTE — WOUND CARE
Patient returns to wound clinic for evaluation of recurrance of BLE stasis ulcers. BLE with blanchable redness to anterior mid lower legs with scattered open areas ranging from pin point to 1x1x0.1cm. Patient reports open areas started approximately 2 weeks ago. She has been keeping legs clean, applying A&D ointment and covering draining areas with composite dressing. She has been wearing her prescription compression stockings on in AM, off in PM and washing stockings daily.   MAXI not performed per MD as patient has had in past.    Visit Vitals    /71 (BP 1 Location: Right arm, BP Patient Position: At rest;Sitting)    Pulse 71    Temp 98 °F (36.7 °C)    Resp 16    Ht 5' 9\" (1.753 m)    Wt 95.3 kg (210 lb)    BMI 31.01 kg/m2     LLE Peripheral Vascular   Capillary Refill: Less than/equal to 3 seconds (06/22/18 0854)  Color: Appropriate for race (hemosiderin staining) (06/22/18 0854)  Temperature: Warm (06/22/18 0854)  Sensation: Present (06/22/18 0854)  Pedal Pulse: Present;Palpable (06/22/18 0854)  Varicose Veins Present: Yes (comment) (06/22/18 0854)  Circumference of Calf (cm): 44 cm (06/22/18 0854)  Location of Measurement (Calf): Mid  (06/22/18 0854)  Circumference of Ankle (cm): 26 cm (06/22/18 0854)  Location of Measurement (Ankle): Mid (06/22/18 0854)  RLE Peripheral Vascular   Capillary Refill: Less than/equal to 3 seconds (06/22/18 0854)  Color: Appropriate for race (hemosiderin staining) (06/22/18 0854)  Temperature: Warm (06/22/18 0854)  Sensation: Present (06/22/18 0854)  Pedal Pulse: Present;Palpable (06/22/18 0854)  Varicose Veins Present: Yes (comment) (06/22/18 0854)  Circumference of Calf (cm): 43.5 cm (06/22/18 0854)  Location of Measurement (Calf): Mid  (06/22/18 0854)  Circumference of Ankle (cm): 25 cm (06/22/18 0854)  Location of Measurement (Ankle): Mid (06/22/18 0854)      06/22/18 0848   Wound Leg Lower Left;Mid;Medial   Date First Assessed: 06/22/18   POA: Yes  Wound Type: Venous  Location: Leg Lower  Orientation: Left;Mid;Medial   Wound Length (cm) 1 cm   Wound Width (cm) 1 cm   Wound Depth (cm) 0.1   Wound Surface area (cm^2) 1 cm^2   Condition of Base Pink;Slough   Condition of Edges Open   Tissue Type Pink;Yellow   Tissue Type Percent Pink 80   Tissue Type Percent Yellow 20   Drainage Amount  Moderate   Drainage Color Serosanguinous   Wound Odor None   Periwound Skin Condition Erythema, blanchable  (scattered pin point open areas)   Cleansing and Cleansing Agents  Normal saline   Wound Leg Lower Left;Mid;Lateral   Date First Assessed: 06/22/18   POA: Yes  Wound Type: Venous  Location: Leg Lower  Orientation: Left;Mid;Lateral   Wound Length (cm) 0.8 cm   Wound Width (cm) 0.8 cm   Wound Depth (cm) 0.1   Wound Surface area (cm^2) 0.64 cm^2   Condition of Base Pink;Slough   Condition of Edges Open   Tissue Type Pink;Yellow   Tissue Type Percent Pink 80   Tissue Type Percent Yellow 20   Drainage Amount  Moderate   Drainage Color Serosanguinous   Wound Odor None   Periwound Skin Condition Intact; Erythema, blanchable   Cleansing and Cleansing Agents  Normal saline   Wound Leg Lower Right; Anterior   Date First Assessed: 06/22/18   POA: Yes  Wound Type: Venous  Location: Leg Lower  Wound Description (Optional): Cluster  Orientation: Right; Anterior   Wound Length (cm) 8 cm   Wound Width (cm) 14 cm   Wound Depth (cm) 0.1   Wound Surface area (cm^2) 112 cm^2   Condition of Base Pink;Slough  (cluster of 12 open areas largest is 1x1cm)   Condition of Edges Open   Tissue Type Pink;Yellow   Tissue Type Percent Pink 80   Tissue Type Percent Yellow 20   Drainage Amount  Moderate   Drainage Color Serosanguinous   Wound Odor None   Periwound Skin Condition Erythema, blanchable   Cleansing and Cleansing Agents  Normal saline

## 2018-06-26 ENCOUNTER — TELEPHONE ANTICOAG (OUTPATIENT)
Dept: INTERNAL MEDICINE CLINIC | Age: 83
End: 2018-06-26

## 2018-06-26 LAB — INR, EXTERNAL: 3.6

## 2018-06-26 NOTE — PROGRESS NOTES
Verified patient identity with two identifiers. Spoke with patient by phone. INR 3.6 today via home monitor. Two weeks ago INR was 1.5. Discussed diet consistency. Confirmed dosage. Patient instructions:   HOLD today's dose. Resume previous Coumadin 6mg daily except 3mg Sat & Sun  Recheck on 7/3/18. Patient denies any unusual bleeding or bruising and was instructed to call office and seek medical care if any should occur. A full discussion of the nature of anticoagulants has been carried out. A benefit risk analysis has been presented to the patient, so that they understand the justification for choosing anticoagulation at this time. The need for frequent and regular monitoring, precise dosage adjustment and compliance is stressed. Side effects of potential bleeding are discussed. The patient should avoid any OTC items containing aspirin or ibuprofen, and should avoid great swings in general diet. Avoid alcohol consumption. Call if any signs of abnormal bleeding. Patient verbalized understanding.

## 2018-06-28 ENCOUNTER — HOSPITAL ENCOUNTER (OUTPATIENT)
Dept: WOUND CARE | Age: 83
Discharge: HOME | End: 2018-06-28
Payer: MEDICARE

## 2018-06-28 VITALS
DIASTOLIC BLOOD PRESSURE: 69 MMHG | TEMPERATURE: 96.7 F | SYSTOLIC BLOOD PRESSURE: 156 MMHG | RESPIRATION RATE: 16 BRPM | HEART RATE: 67 BPM

## 2018-06-28 PROCEDURE — 29581 APPL MULTLAYER CMPRN SYS LEG: CPT

## 2018-06-28 RX ORDER — SULFAMETHOXAZOLE AND TRIMETHOPRIM 800; 160 MG/1; MG/1
1 TABLET ORAL 2 TIMES DAILY
COMMUNITY
End: 2018-08-20 | Stop reason: ALTCHOICE

## 2018-06-28 NOTE — PROGRESS NOTES
[address]  WOUND CARE PROGRESS NOTE    Michael Becker  MR#: [id number]  : 1935  ACCOUNT #: [de-identified]   DATE OF SERVICE: 2018    HISTORY: The patient is an 82-year-old woman who was seen here at the [address] [place] [address] on 2018 by Dr. Erendira Molina, related to recurrence of ulceration in both lower legs. The patient had been followed previously at the [address] [place] [address] for lower extremity ulcers felt related to venous stasis. She had previously healed and had continued afterwards with use of compression stockings and Lympha Press b.i.d. The patient does take chronic diuretics, furosemide 40 mg each morning and 20 mg each evening. The patient does report that the diuretics do produce increased urine output. The patient does not limit fluid intake and in fact reports that she drinks a good deal of fluid over the course of the day. Brook Ramachandran PHYSICAL EXAMINATION:   VITAL SIGNS:  Blood pressure 156/69, pulse 67, respirations 16, temperature 96.7. GENERAL:  Patient is an alert woman in no distress. She is articulate. EXTREMITIES:  On examination, the patient has edema in the lower legs bilaterally, about 1+. The lower halves of the lower legs in the gaiter zone have induration circumferentially and have erythema. There are scattered small ulcerations. There is relative sparing of the feet in terms of edema. There is no erythema of the feet. She has excellent dorsalis pedis pulses bilaterally. I reviewed the patient's chart and she did in 2017 have a venous duplex scan at Dr. Donna Seth office and this study did report no evidence of incompetence of the vein valves in the deep vein system or the superficial vein system in either lower extremity.     The patient certainly has edema in both lower extremities and does report that over the last week or so she has had more stinging in her legs, although the erythema and induration may be response to chronic edema in the tissues and does raise the possibility there could be a superimposed bacterial cellulitis, so I have written for Bactrim-DS 1 p.o. b.i.d. for 10 days. The patient will start that today. I also instructed the patient to reduce fluid intake in particular between meals. I also instructed her to avoid adding salt to food and to avoid food which already contains salt such as canned foods. The clinical appearance of the patient's leg certainly is suggestive of venous insufficiency, even though this has not been proven at her last ultrasound. I might consider repeating the ultrasound depending upon her clinical response to treatment. Ulcers today were not debrided. She did have a triple-layer compression dressing on when she arrived today. A and D ointment will be applied to the legs today and she will have triple-layer compression dressing applied. Aquacel AG will be applied over the ulcers. RECOMMENDATIONS:  I would like to see the patient next week to follow up in response to the antibiotics. FINAL DIAGNOSIS:  Leg edema with ulceration, clinically suggestive of venous insufficiency.                                               R60.0, L97.911, L97.921, L03.116      MD TOYA Ramirez / HN  D: 06/28/2018 10:12     T: 06/28/2018 12:58  JOB #: [id number]

## 2018-06-28 NOTE — WOUND CARE
06/28/18 0853   Wound Leg Lower Left;Mid;Medial   Date First Assessed: 06/22/18   POA: Yes  Wound Type: Venous  Location: Leg Lower  Orientation: Left;Mid;Medial   DRESSING STATUS Clean, dry, and intact   DRESSING TYPE Compression dressing   Wound Length (cm) 1 cm   Wound Width (cm) 0.7 cm   Wound Depth (cm) 0.1   Wound Surface area (cm^2) 0.7 cm^2   Change in Wound Size % 30   Condition of Base Pink   Condition of Edges Open   Drainage Amount  Small    Drainage Color Serosanguinous   Wound Odor None   Periwound Skin Condition Erythema, blanchable   Cleansing and Cleansing Agents  Normal saline   Wound Leg Lower Left;Mid;Lateral   Date First Assessed: 06/22/18   POA: Yes  Wound Type: Venous  Location: Leg Lower  Orientation: Left;Mid;Lateral   DRESSING STATUS Clean, dry, and intact   DRESSING TYPE Compression dressing   Wound Length (cm) 0.5 cm   Wound Width (cm) 0.4 cm   Wound Depth (cm) 0.1   Wound Surface area (cm^2) 0.2 cm^2   Change in Wound Size % 68.75   Condition of Base Pink   Condition of Edges Open   Tissue Type Pink   Drainage Amount  Small    Drainage Color Serosanguinous   Wound Odor None   Periwound Skin Condition Erythema, blanchable   Cleansing and Cleansing Agents  Normal saline   Wound Leg Lower Right; Anterior   Date First Assessed: 06/22/18   POA: Yes  Wound Type: Venous  Location: Leg Lower  Wound Description (Optional): Cluster  Orientation: Right; Anterior   DRESSING STATUS Clean, dry, and intact   DRESSING TYPE Compression dressing   Wound Length (cm) 7 cm   Wound Width (cm) 9 cm   Wound Depth (cm) 0.1   Wound Surface area (cm^2) 63 cm^2   Change in Wound Size % 43.75   Condition of Base Pink;Slough   Condition of Edges Open   Drainage Amount  Small    Drainage Color Serosanguinous   Wound Odor None   Periwound Skin Condition Erythema, blanchable   Cleansing and Cleansing Agents  Normal saline

## 2018-07-03 ENCOUNTER — TELEPHONE ANTICOAG (OUTPATIENT)
Dept: INTERNAL MEDICINE CLINIC | Age: 83
End: 2018-07-03

## 2018-07-03 LAB — INR, EXTERNAL: 6.4

## 2018-07-03 NOTE — PROGRESS NOTES
Verified patient identity with two identifiers. Spoke with patient by phone. INR 6.4 today via home monitor. Asked patient to retest but she is out of test strips. Patient was placed on Bactrim for leg ulcers by wound clinic, likely cause of INR increase. She has taken 4 days of the 10 day course of Bactrim. Patient instructions Howard Hylton on call for Dr. Royal Ervin:   [address] today's dose and tomorrow's dose, retest on Thursday 7/5/18 in office at 10:30. Patient denies any unusual bleeding or bruising and was instructed to call office and seek medical care if any should occur. A full discussion of the nature of anticoagulants has been carried out. A benefit risk analysis has been presented to the patient, so that they understand the justification for choosing anticoagulation at this time. The need for frequent and regular monitoring, precise dosage adjustment and compliance is stressed. Side effects of potential bleeding are discussed. The patient should avoid any OTC items containing aspirin or ibuprofen, and should avoid great swings in general diet. Avoid alcohol consumption. Call if any signs of abnormal bleeding. Patient verbalized understanding.

## 2018-07-05 ENCOUNTER — HOSPITAL ENCOUNTER (OUTPATIENT)
Dept: WOUND CARE | Age: 83
Discharge: HOME | End: 2018-07-05
Payer: MEDICARE

## 2018-07-05 ENCOUNTER — CLINICAL SUPPORT (OUTPATIENT)
Dept: INTERNAL MEDICINE CLINIC | Age: 83
End: 2018-07-05

## 2018-07-05 VITALS
HEART RATE: 66 BPM | DIASTOLIC BLOOD PRESSURE: 66 MMHG | RESPIRATION RATE: 16 BRPM | SYSTOLIC BLOOD PRESSURE: 127 MMHG | TEMPERATURE: 98 F

## 2018-07-05 DIAGNOSIS — Z79.01 LONG TERM CURRENT USE OF ANTICOAGULANT THERAPY: ICD-10-CM

## 2018-07-05 DIAGNOSIS — I48.20 CHRONIC ATRIAL FIBRILLATION (HCC): Primary | ICD-10-CM

## 2018-07-05 PROBLEM — L97.911 ULCER OF RIGHT LOWER EXTREMITY, LIMITED TO BREAKDOWN OF SKIN (HCC): Status: ACTIVE | Noted: 2018-07-05

## 2018-07-05 PROBLEM — R60.0 BILATERAL LEG EDEMA: Status: ACTIVE | Noted: 2018-07-05

## 2018-07-05 PROBLEM — L03.116 BILATERAL LOWER LEG CELLULITIS: Status: ACTIVE | Noted: 2018-07-05

## 2018-07-05 PROBLEM — L97.921 ULCER OF LEFT LOWER EXTREMITY, LIMITED TO BREAKDOWN OF SKIN (HCC): Status: ACTIVE | Noted: 2018-07-05

## 2018-07-05 PROBLEM — L03.115 BILATERAL LOWER LEG CELLULITIS: Status: ACTIVE | Noted: 2018-07-05

## 2018-07-05 LAB
INR BLD: 3
PT POC: 36.4 SECONDS
VALID INTERNAL CONTROL?: YES

## 2018-07-05 PROCEDURE — 29581 APPL MULTLAYER CMPRN SYS LEG: CPT

## 2018-07-05 NOTE — WOUND CARE
.     07/05/18 0856   Wound Leg Lower Left;Mid;Medial   Date First Assessed: 06/22/18   POA: Yes  Wound Type: Venous  Location: Leg Lower  Orientation: Left;Mid;Medial   DRESSING STATUS Discontinued   Wound Length (cm) 0 cm   Wound Width (cm) 0 cm   Wound Depth (cm) 0   Wound Surface area (cm^2) 0 cm^2   Change in Wound Size % 100   Wound Leg Lower Left;Mid;Lateral   Date First Assessed: 06/22/18   POA: Yes  Wound Type: Venous  Location: Leg Lower  Orientation: Left;Mid;Lateral   DRESSING STATUS Discontinued   Wound Length (cm) 0 cm   Wound Width (cm) 0 cm   Wound Depth (cm) 0   Wound Surface area (cm^2) 0 cm^2   Change in Wound Size % 100   Wound Leg Lower Right; Anterior   Date First Assessed: 06/22/18   POA: Yes  Wound Type: Venous  Location: Leg Lower  Wound Description (Optional): Cluster  Orientation: Right; Anterior   DRESSING STATUS Discontinued   Wound Length (cm) 7 cm   Wound Width (cm) 9 cm   Wound Depth (cm) 0.1   Wound Surface area (cm^2) 63 cm^2   Change in Wound Size % 43.75   Tissue Type Red   Drainage Amount  Scant   Drainage Color Serous   Periwound Skin Condition Erythema, blanchable

## 2018-07-05 NOTE — PROGRESS NOTES
HISTORY: The patient is an 79-year-old woman who was seen here at the [address] [place] [address] last week, related to recurrence of ulceration in both lower legs. The patient had been followed previously at the [address] [place] [address] for lower extremity ulcers felt related to venous stasis. She had previously healed and had continued afterwards with use of compression stockings and Lympha Press b.i.d. The patient does take chronic diuretics, furosemide 40 mg each morning and 20 mg each evening. The patient does report that the diuretics do produce increased urine output.       The patient has complied with recommendation to decrease total oral intake of fluids. .    She has been taking the Bactrim po.     PHYSICAL EXAMINATION:     GENERAL:  Patient is an alert woman in no distress. EXTREMITIES:  On examination, the patient has trace edema in legs. .  The lower halves of the lower legs in the gaiter zone have much reduced induration and only mild erythema. There are scattered small ulcerations. There is relative sparing of the feet in terms of edema. There is no erythema of the feet. She has excellent dorsalis pedis pulses bilaterally.     Prior venous study 12/2017 did report no evidence of incompetence of the vein valves in the deep vein system or the superficial vein system in either lower extremity.     The patient shows good response in terms of reduced edema and reduced inflammation in the legs.     I also instructed the patient to continue to reduce fluid intake in particular between meals. I also instructed her to avoid adding salt to food and to avoid food which already contains salt such as canned foods.      A and D ointment will be applied to the legs today and she will have triple-layer compression dressing applied bilaterally.   Aquacel AG will be applied over the ulcers.     RECOMMENDATIONS:  I would like to see the patient next week.     FINAL DIAGNOSIS:  Leg edema with ulceration, clinically suggestive of venous insufficiency. [email or web address], improved.   R60.0, L97.911, L97.921, X14.131        Mitra Jacobs MD

## 2018-07-05 NOTE — PROGRESS NOTES
INR 3.0 today. Down from 6.4 on Tuesday after holding for 2 days. She will complete the course of antibiotics (bactrim) on Saturday. Patient instructions discussed with on call Dr. Marya Shelley:   HOLD Coumadin again today, then take 3mg on Friday, 3mg Saturday and 3mg Sunday. Retest INR on Monday, call office with results. Patient was instructed to come to office for INR test Monday if she does not receive more test strips from home monitoring company by Monday. She has already ordered these. A calendar with daily dosage instructions has been given to patient at the end of today's visit. Patient denies any unusual bleeding or bruising and was instructed to call office and seek medical care if any should occur. A full discussion of the nature of anticoagulants has been carried out. A benefit risk analysis has been presented to the patient, so that they understand the justification for choosing anticoagulation at this time. The need for frequent and regular monitoring, precise dosage adjustment and compliance is stressed. Side effects of potential bleeding are discussed. The patient should avoid any OTC items containing aspirin or ibuprofen, and should avoid great swings in general diet. Avoid alcohol consumption. Call if any signs of abnormal bleeding. Patient verbalized understanding.

## 2018-07-05 NOTE — WOUND CARE
07/05/18 0931   Wound Leg Lower Right; Anterior   Date First Assessed: 06/22/18   POA: Yes  Wound Type: Venous  Location: Leg Lower  Wound Description (Optional): Cluster  Orientation: Right; Anterior   Wound Length (cm) 0.2 cm   Wound Width (cm) 0.1 cm   Wound Depth (cm) 0.1   Wound Surface area (cm^2) 0.02 cm^2   Tissue Type Red   Remeasured after MD examined.

## 2018-07-05 NOTE — MR AVS SNAPSHOT
[address] 
[phone] Patient: Radha Chang MRN:  [id number] Visit Information Date & Time Provider Department Dept. Phone Encounter #  
 7/5/2018 10:30 AM Earle Phipps MD [address] [id number] Internal Medicine [phone] [id number] Upcoming Health Maintenance Date Due ZOSTER VACCINE AGE 60> 2/17/1997 Pneumococcal 65+ High/Highest Risk (2 of 2 - PPSV23) 10/1/2007 DTaP/Tdap/Td series (1 - Tdap) 12/9/2010 EYE EXAM RETINAL OR DILATED Q1 2/22/2018 Influenza Age 5 to Adult 8/1/2018 HEMOGLOBIN A1C Q6M 11/1/2018 FOOT EXAM Q1 1/3/2019 GLAUCOMA SCREENING Q2Y 2/22/2019 MICROALBUMIN Q1 5/1/2019 LIPID PANEL Q1 5/1/2019 MEDICARE YEARLY EXAM 5/2/2019 Allergies as of 7/5/2018  Review Complete On: 7/5/2018 By: Nick Coello LPN Severity Noted Reaction Type Reactions Actos [Pioglitazone]  04/23/2009    Swelling Swelling of feet and legs Codeine  04/23/2009    Itching Doxycycline  04/23/2009    Nausea Only Hydrocodone  04/17/2012    Rash, Other (comments)  
 hallucinations Current Immunizations  Reviewed on 7/11/2014 Name Date Influenza Vaccine 12/2/2013 Influenza Vaccine Split 10/17/2012 TD Vaccine 12/8/2010 ZZZ-RETIRED (DO NOT USE) Pneumococcal Vaccine (Unspecified Type) 10/1/2002 Not reviewed this visit You Were Diagnosed With   
  
 Codes Comments Chronic atrial fibrillation (HCC)    -  Primary ICD-10-CM: W27.0 ICD-9-CM: 427.31 Long term current use of anticoagulant therapy     ICD-10-CM: Z79.01 
ICD-9-CM: V58.61 Vitals OB Status Smoking Status Hysterectomy Former Smoker Preferred Pharmacy Pharmacy Name Phone [facility] [address], [address] [place] [address] [phone] Your Updated Medication List  
  
   
 This list is accurate as of 7/5/18 11:02 AM.  Always use your most recent med list.  
  
  
  
  
 ACCU-CHEK SHAHIDA PLUS TEST STRP strip Generic drug:  glucose blood VI test strips USE TO CHECK BLOOD SUGAR FOUR TIMES A DAY  
  
 acetaminophen 325 mg tablet Commonly known as:  TYLENOL Take 325 mg by mouth every four (4) hours as needed for Pain. allopurinol 100 mg tablet Commonly known as:  ZYLOPRIM  
TAKE TWO TABLETS BY MOUTH DAILY  
  
 atorvastatin 80 mg tablet Commonly known as:  LIPITOR  
TAKE ONE TABLET BY MOUTH EVERY EVENING  
  
 BACTRIM -800 mg per tablet Generic drug:  trimethoprim-sulfamethoxazole Take 1 Tab by mouth two (2) times a day. COUMADIN 3 mg tablet Generic drug:  warfarin Take 3 mg by mouth daily. 6mg daily except 3mg Sat  
  
 dofetilide 125 mcg capsule Commonly known as:  Mandy Criselda Take 1 Cap by mouth two (2) times a day. furosemide 40 mg tablet Commonly known as:  LASIX  
2 tabs q am and 1 tab q PM  
  
 HumuLIN N NPH Insulin KwikPen 100 unit/mL (3 mL) Inpn Generic drug:  insulin NPH INJECT 60 UNITS UNDER THE SKIN EVERY MORNING, 40 UNITS UNDER THE SKIN WITH DINNER OR AS DIRECTED BY PHYSICIAN. **THIS REPLACED HUMULOG MIX**  
  
 hydrALAZINE 10 mg tablet Commonly known as:  APRESOLINE Take 2 Tabs by mouth three (3) times daily. levothyroxine 125 mcg tablet Commonly known as:  SYNTHROID  
TAKE ONE TABLET BY MOUTH DAILY  
  
 lisinopril 20 mg tablet Commonly known as:  Aundra Heidi Take 1 Tab by mouth daily. metoprolol tartrate 50 mg tablet Commonly known as:  LOPRESSOR Take 1 Tab by mouth two (2) times a day. Little Pen Needle 32 gauge x 5/32\" Ndle Generic drug:  Insulin Needles (Disposable) USE WITH INSULIN PEN TWO TIMES A DAY AS DIRECTED BY PHYSICIAN  
  
 potassium chloride SR 10 mEq tablet Commonly known as:  KLOR-CON 10 Take 1 Tab by mouth daily. PriLOSEC 20 mg capsule Generic drug:  omeprazole Take 20 mg by mouth daily. Description See progress note July 2018 Details Opal Mccollum Tue Wed Thu Fri Sat  
  1  
  
  
  
   2  
  
  
  
   3  
  
  
  
   4  
  
  
  
   5  
  
Hold See details 6  
  
3 mg  
  
   7  
  
3 mg 8  
  
3 mg  
  
   9  
  
6 mg  
  
   10  
  
  
  
   11  
  
  
  
   12  
  
  
  
   13  
  
  
  
   14  
  
  
  
  
  15  
  
  
  
   16  
  
  
  
   17  
  
  
  
   18  
  
  
  
   19  
  
  
  
   20  
  
  
  
   21  
  
  
  
  
  22  
  
  
  
   23  
  
  
  
   24  
  
  
  
   25  
  
  
  
   26  
  
  
  
   27  
  
  
  
   28  
  
  
  
  
  29  
  
  
  
   30  
  
  
  
   31  
  
  
  
      
 Date Details 07/05 This INR check INR: 3.0 Date of next INR:  7/9/2018 How to take your warfarin dose To take:  3 mg Take one of the 3 mg tablets. To take:  6 mg Take two of the 3 mg tablets. Hold Do not take your warfarin dose. See the Details table to the right for additional instructions. We Performed the Following AMB POC PT/INR [91323 CPT(R)] To-Do List   
 07/12/2018 9:00 AM  
  Appointment with Jose Elias Camara MD; [facility] [address] at [address] [place] [address]. ([phone]) Introducing [facility]! [facility] introduces SeeMe patient portal. Now you can access parts of your medical record, email your doctor's office, and request medication refills online. 1. In your internet browser, go to [email or web address] 2. Click on the First Time User? Click Here link in the Sign In box. You will see the New Member Sign Up page. 3. Enter your SeeMe Access Code exactly as it appears below. You will not need to use this code after youve completed the sign-up process. If you do not sign up before the expiration date, you must request a new code. · SeeMe Access Code: [email or web address] Expires: 8/6/2018  8:40 AM 
 
 4. Enter the last four digits of your Social Security Number (xxxx) and Date of Birth (mm/dd/yyyy) as indicated and click Submit. You will be taken to the next sign-up page. 5. Create a GigaPan ID. This will be your GigaPan login ID and cannot be changed, so think of one that is secure and easy to remember. 6. Create a GigaPan password. You can change your password at any time. 7. Enter your Password Reset Question and Answer. This can be used at a later time if you forget your password. 8. Enter your e-mail address. You will receive e-mail notification when new information is available in [address] 9. Click Sign Up. You can now view and download portions of your medical record. 10. Click the Download Summary menu link to download a portable copy of your medical information. If you have questions, please visit the Frequently Asked Questions section of the GigaPan website. Remember, GigaPan is NOT to be used for urgent needs. For medical emergencies, dial 911. Now available from your iPhone and Android! Please provide this summary of care documentation to your next provider. Your primary care clinician is listed as ANDRES BURDEN. If you have any questions after today's visit, please call [phone].

## 2018-07-09 ENCOUNTER — TELEPHONE ANTICOAG (OUTPATIENT)
Dept: INTERNAL MEDICINE CLINIC | Age: 83
End: 2018-07-09

## 2018-07-09 LAB — INR, EXTERNAL: 1.4

## 2018-07-10 ENCOUNTER — TELEPHONE ANTICOAG (OUTPATIENT)
Dept: INTERNAL MEDICINE CLINIC | Age: 83
End: 2018-07-10

## 2018-07-12 ENCOUNTER — TELEPHONE (OUTPATIENT)
Dept: INTERNAL MEDICINE CLINIC | Age: 83
End: 2018-07-12

## 2018-07-12 ENCOUNTER — HOSPITAL ENCOUNTER (OUTPATIENT)
Dept: WOUND CARE | Age: 83
Discharge: HOME | End: 2018-07-12
Payer: MEDICARE

## 2018-07-12 ENCOUNTER — TELEPHONE ANTICOAG (OUTPATIENT)
Dept: INTERNAL MEDICINE CLINIC | Age: 83
End: 2018-07-12

## 2018-07-12 VITALS
HEART RATE: 67 BPM | SYSTOLIC BLOOD PRESSURE: 140 MMHG | TEMPERATURE: 96.7 F | RESPIRATION RATE: 16 BRPM | DIASTOLIC BLOOD PRESSURE: 57 MMHG

## 2018-07-12 LAB — INR, EXTERNAL: 1.2

## 2018-07-12 PROCEDURE — 29581 APPL MULTLAYER CMPRN SYS LEG: CPT

## 2018-07-12 NOTE — PROGRESS NOTES
Left message for patient to return call.   Daysi Morris Scot:  New dose:  Coumadin 6mg daily except 3mg Sun  Old dose: Coumadin 6mg daily except 3mg Sat & Sun  Retest Thursday 7/19/18

## 2018-07-12 NOTE — TELEPHONE ENCOUNTER
Pt returned with INR of 1.2. Pt's current dosage is - Coumadin 6mg daily except 3mg Sat & Sun. Discussed with Annalise. She was calling pt.

## 2018-07-12 NOTE — PROGRESS NOTES
Advised pt MD wants her to change her dosage - Coumadin 6mg daily except 3mg Sunday. She needs to recheck on Thursday 7/19/18. Pt verbalizes understanding.

## 2018-07-12 NOTE — PATIENT INSTRUCTIONS

## 2018-07-12 NOTE — PROGRESS NOTES
HISTORY: The patient is an 82-year-old woman who was seen here at the [address] [place] [address] last week, related to recurrence of ulceration in both lower legs.  The patient had been followed previously at the [address] [place] [address] for lower extremity ulcers felt related to venous stasis.  She had previously healed and had continued afterwards with use of compression stockings and Lympha Press b.i.d.  The patient does take chronic diuretics, furosemide 40 mg each morning and 20 mg each evening.  The patient does report that the diuretics do produce increased urine output.        The patient has complied with recommendation to decrease total oral intake of fluids.  .     She has finished taking the Bactrim po. She had transient elevation of INR felt related to the Bactrim.      PHYSICAL EXAMINATION:     GENERAL:  Patient is an alert woman in no distress.    EXTREMITIES:  On examination, the patient has trace edema in legs. [place] lower halves of the left  lower leg in the gaiter zone have much reduced induration and only mild erythema.  On the right there is some increase in induration and a few small bullae with small ulcers. There are scattered small ulcerations. N[place]land Bia is relative sparing of the feet in terms of edema.  There is no erythema of the feet.  She has excellent dorsalis pedis pulses bilaterally.      Prior venous study 12/2017 did report no evidence of incompetence of the vein valves in the deep vein system or the superficial vein system in either lower extremity.      The patient shows good response in terms of reduced edema and reduced inflammation in the left leg, but tuan erecurrence on the right. Deonte Williamson  I also instructed the patient to continue to reduce fluid intake in particular between meals.  I also instructed her to avoid adding salt to food and to avoid food which already contains salt such as canned foods.       Beta methasone will be applied to the inflamed areas right leg, then same prior treatment. A and D ointment will be applied to the legs today and she will have triple-layer compression dressing applied bilaterally. Hortencia Poster will be applied over the ulcers.      RECOMMENDATIONS:  I would like to see the patient next week.      FINAL DIAGNOSIS:  Leg edema with ulceration, clinically suggestive of venous insufficiency.   Katelin.berttis, right leg.  R60.0, L97.911, L97.921, I48.225          Kaur Moss MD

## 2018-07-12 NOTE — PROGRESS NOTES
Spoke with pt - advised her Annalise had called her in regards to checking her INR today. She did not get message. She will check it now and call back. Will forward to Annalise.

## 2018-07-12 NOTE — WOUND CARE
07/12/18 0156 Wound Leg Lower Left;Mid;Medial  
Date First Assessed: 06/22/18   POA: Yes  Wound Type: Venous  Location: Leg Lower  Orientation: Left;Mid;Medial  
DRESSING STATUS Discontinued Wound Length (cm) 3 cm Wound Width (cm) 5 cm Wound Depth (cm) 0.1 Wound Surface area (cm^2) 15 cm^2 Change in Wound Size % -1400 Condition of Base Pink Condition of Edges Open Tissue Type Pink Drainage Amount  Scant Drainage Color Serous Wound Odor None Periwound Skin Condition Intact; Erythema, blanchable Cleansing and Cleansing Agents  Normal saline Wound Leg Lower Left;Mid;Lateral  
Date First Assessed: 06/22/18   POA: Yes  Wound Type: Venous  Location: Leg Lower  Orientation: Left;Mid;Lateral  
DRESSING STATUS Discontinued Wound Length (cm) 0 cm Wound Width (cm) 0 cm Wound Depth (cm) 0 Wound Surface area (cm^2) 0 cm^2 Change in Wound Size % 100

## 2018-07-18 ENCOUNTER — TELEPHONE ANTICOAG (OUTPATIENT)
Dept: INTERNAL MEDICINE CLINIC | Age: 83
End: 2018-07-18

## 2018-07-18 LAB — INR, EXTERNAL: 1.4

## 2018-07-19 ENCOUNTER — HOSPITAL ENCOUNTER (OUTPATIENT)
Dept: WOUND CARE | Age: 83
Discharge: HOME | End: 2018-07-19
Payer: MEDICARE

## 2018-07-19 VITALS
HEART RATE: 71 BPM | RESPIRATION RATE: 16 BRPM | DIASTOLIC BLOOD PRESSURE: 82 MMHG | SYSTOLIC BLOOD PRESSURE: 151 MMHG | TEMPERATURE: 97.7 F

## 2018-07-19 PROCEDURE — 99213 OFFICE O/P EST LOW 20 MIN: CPT

## 2018-07-19 NOTE — PROGRESS NOTES
HISTORY: The patient is an 27-year-old woman who was seen here at the [address] [place] [address] last week, related to recurrence of ulceration in both lower legs.  The patient had been followed previously at the [address] [place] [address] for lower extremity ulcers felt related to venous stasis.  She had previously healed and had continued afterwards with use of compression stockings and Lympha Press b.i.d.  The patient does take chronic diuretics, furosemide 40 mg each morning and 20 mg each evening.  The patient does report that the diuretics do produce increased urine output.        The patient has complied with recommendation to decrease total oral intake of fluids.  .She removed the triple layer compression dressing last evening.      PHYSICAL EXAMINATION:     GENERAL:  Patient is an alert woman in no distress.    EXTREMITIES:  On examination, the patient has trace edema in legs. Blake Johnny lower halves of the left and right lower legs have no ulcers, no erythema, no induration. Bilateral excellent DP pulses. Prior venous study 12/2017 did report no evidence of incompetence of the vein valves in the deep vein system or the superficial vein system in either lower extremity.     She will use elastic compression stockings daily but not at night. I also instructed the patient to continue to limit fluid intake in particular between meals.  I also instructed her to avoid adding salt to food and to avoid food which already contains salt such as canned foods. Stephanie  Ulcers are healed.      FINAL DIAGNOSIS:  Bilateral leg edema with ulceration, clinically suggestive of venous insufficiency, ulcers healed.   R60.0  Renny Tomlinson MD

## 2018-07-19 NOTE — WOUND CARE
07/19/18 6219 Wound Leg Lower Left;Mid;Medial  
Date First Assessed: 06/22/18   POA: Yes  Wound Type: Venous  Location: Leg Lower  Orientation: Left;Mid;Medial  
DRESSING STATUS Discontinued Wound Length (cm) 0 cm Wound Width (cm) 0 cm Wound Depth (cm) 0 Wound Surface area (cm^2) 0 cm^2 Change in Wound Size % 100 Drainage Amount  None

## 2018-07-19 NOTE — PROGRESS NOTES
Patient was discharged with Self to home and was ambulatory. Pt instructed to wear compression stocking everyday and to moisturize daily.    Stable on discharge

## 2018-07-24 LAB — INR, EXTERNAL: 2.6

## 2018-07-25 ENCOUNTER — TELEPHONE (OUTPATIENT)
Dept: INTERNAL MEDICINE CLINIC | Age: 83
End: 2018-07-25

## 2018-07-25 ENCOUNTER — TELEPHONE ANTICOAG (OUTPATIENT)
Dept: INTERNAL MEDICINE CLINIC | Age: 83
End: 2018-07-25

## 2018-07-25 NOTE — PROGRESS NOTES
Verified patient identity with two identifiers. Spoke with patient by phone. INR 2.6 yesterday via home monitor. Inquired if patient phoned value into home monitoring company, patient states she did not, explained that she is to call in every reading she takes as this is how we get the results. Supplied number to patient. Patient instructions:   Continue Coumadin 6mg daily except 3mg Sun  Retest 1 week     A full discussion of the nature of anticoagulants has been carried out. A benefit risk analysis has been presented to the patient, so that they understand the justification for choosing anticoagulation at this time. The need for frequent and regular monitoring, precise dosage adjustment and compliance is stressed. Side effects of potential bleeding are discussed. The patient should avoid any OTC items containing aspirin or ibuprofen, and should avoid great swings in general diet. Avoid alcohol consumption. Call if any signs of abnormal bleeding. Patient verbalized understanding.

## 2018-07-30 ENCOUNTER — TELEPHONE ANTICOAG (OUTPATIENT)
Dept: INTERNAL MEDICINE CLINIC | Age: 83
End: 2018-07-30

## 2018-07-30 LAB — INR, EXTERNAL: 3.3

## 2018-07-30 NOTE — PROGRESS NOTES
Verified patient identity with two identifiers. Spoke with patient by phone. INR 3.3 today via home monitor. Patient instructions: Take 3mg today instead of 6mg then continue Coumadin 6mg daily except 3mg Sun  Retest 1 week     A full discussion of the nature of anticoagulants has been carried out. A benefit risk analysis has been presented to the patient, so that they understand the justification for choosing anticoagulation at this time. The need for frequent and regular monitoring, precise dosage adjustment and compliance is stressed. Side effects of potential bleeding are discussed. The patient should avoid any OTC items containing aspirin or ibuprofen, and should avoid great swings in general diet. Avoid alcohol consumption. Call if any signs of abnormal bleeding. Patient verbalized understanding.

## 2018-08-06 LAB — INR, EXTERNAL: 4.4

## 2018-08-07 ENCOUNTER — TELEPHONE ANTICOAG (OUTPATIENT)
Dept: INTERNAL MEDICINE CLINIC | Age: 83
End: 2018-08-07

## 2018-08-07 ENCOUNTER — TELEPHONE (OUTPATIENT)
Dept: INTERNAL MEDICINE CLINIC | Age: 83
End: 2018-08-07

## 2018-08-07 NOTE — PATIENT INSTRUCTIONS

## 2018-08-07 NOTE — TELEPHONE ENCOUNTER
Abbey Colindres, since Annalise is not here this week, patient asked to speak with you about her coumadin.

## 2018-08-09 ENCOUNTER — TELEPHONE (OUTPATIENT)
Dept: INTERNAL MEDICINE CLINIC | Age: 83
End: 2018-08-09

## 2018-08-09 ENCOUNTER — TELEPHONE ANTICOAG (OUTPATIENT)
Dept: INTERNAL MEDICINE CLINIC | Age: 83
End: 2018-08-09

## 2018-08-09 LAB — INR, EXTERNAL: 2.9

## 2018-08-09 NOTE — TELEPHONE ENCOUNTER
Pt called for me to go over new dosage for her Coumadin. Advised her she is to take 3 mg every Friday & Sunday then 6 mg all other days. Recheck Thursday 8/16/18 and call office. She verbalizes understanding.

## 2018-08-09 NOTE — PATIENT INSTRUCTIONS

## 2018-08-09 NOTE — TELEPHONE ENCOUNTER
On 8/7/81 pt's INR was 4.4. She was told to hold Coumadin for 2 days (8/7-8/8). Recheck on 8/9/18 and call office with results. Her prior dose Coumadin 6mg daily except 3mg Sunday. Pt's INR is 2.9.  Will forward to MD.

## 2018-08-13 LAB — INR, EXTERNAL: 1.4

## 2018-08-14 ENCOUNTER — TELEPHONE ANTICOAG (OUTPATIENT)
Dept: INTERNAL MEDICINE CLINIC | Age: 83
End: 2018-08-14

## 2018-08-14 NOTE — PROGRESS NOTES
INR 1.4 today. Patient denies missing doses, states she took Coumadin 6mg daily except 3mg on Sunday ( was instructed to take two days of 3mg last week not just one as INR was high last week). Patient instructions discussed with Dr. Purnell Dakins: Take Coumadin 6mg daily and come to appt on Monday with Dr. Purnell Dakins to discuss labile INRs. Asked patient to bring her daughter to appointment as this is a high risk medication that she may need a team approach to manage moving forward. Patient verbalized understanding. A full discussion of the nature of anticoagulants has been carried out. A benefit risk analysis has been presented to the patient, so that they understand the justification for choosing anticoagulation at this time. The need for frequent and regular monitoring, precise dosage adjustment and compliance is stressed. Side effects of potential bleeding are discussed. The patient should avoid any OTC items containing aspirin or ibuprofen, and should avoid great swings in general diet. Avoid alcohol consumption. Call if any signs of abnormal bleeding. Patient verbalized understanding.

## 2018-08-20 ENCOUNTER — OFFICE VISIT (OUTPATIENT)
Dept: INTERNAL MEDICINE CLINIC | Age: 83
End: 2018-08-20

## 2018-08-20 VITALS
OXYGEN SATURATION: 97 % | BODY MASS INDEX: 32.73 KG/M2 | TEMPERATURE: 98.4 F | SYSTOLIC BLOOD PRESSURE: 144 MMHG | WEIGHT: 221 LBS | HEART RATE: 65 BPM | HEIGHT: 69 IN | RESPIRATION RATE: 16 BRPM | DIASTOLIC BLOOD PRESSURE: 70 MMHG

## 2018-08-20 DIAGNOSIS — Z79.01 LONG TERM CURRENT USE OF ANTICOAGULANT THERAPY: ICD-10-CM

## 2018-08-20 DIAGNOSIS — Z79.4 CONTROLLED TYPE 2 DIABETES MELLITUS WITH STAGE 3 CHRONIC KIDNEY DISEASE, WITH LONG-TERM CURRENT USE OF INSULIN (HCC): Primary | ICD-10-CM

## 2018-08-20 DIAGNOSIS — N18.30 CONTROLLED TYPE 2 DIABETES MELLITUS WITH STAGE 3 CHRONIC KIDNEY DISEASE, WITH LONG-TERM CURRENT USE OF INSULIN (HCC): Primary | ICD-10-CM

## 2018-08-20 DIAGNOSIS — E11.22 CONTROLLED TYPE 2 DIABETES MELLITUS WITH STAGE 3 CHRONIC KIDNEY DISEASE, WITH LONG-TERM CURRENT USE OF INSULIN (HCC): Primary | ICD-10-CM

## 2018-08-20 DIAGNOSIS — I48.20 CHRONIC ATRIAL FIBRILLATION (HCC): ICD-10-CM

## 2018-08-20 LAB
INR BLD: 3.1
PT POC: 36.6 SECONDS
VALID INTERNAL CONTROL?: YES

## 2018-08-20 NOTE — MR AVS SNAPSHOT
[address] [facility] [address] [facility] [place] 
[phone] Patient: Marc Styles MRN:  [id number] Visit Information Date & Time Provider Department Dept. Phone Encounter #  
 8/20/2018 11:30 AM Sherice Elizondo MD [address] Internal Medicine [phone] [id number] Follow-up Instructions Return in about 2 weeks (around 9/4/2018). Your Appointments 9/4/2018 10:35 AM  
ROUTINE CARE with Sherice Elizondo MD  
[address] Internal Medicine [facility] Appt Note: 4 month fu  
 [address] [facility] [address] [facility] [address]  
[address] [address] [facility] [address] 350 [facility] Upcoming Health Maintenance Date Due ZOSTER VACCINE AGE 60> 2/17/1997 Pneumococcal 65+ High/Highest Risk (2 of 2 - PPSV23) 10/1/2007 DTaP/Tdap/Td series (1 - Tdap) 12/9/2010 EYE EXAM RETINAL OR DILATED Q1 2/22/2018 Influenza Age 5 to Adult 8/1/2018 HEMOGLOBIN A1C Q6M 11/1/2018 FOOT EXAM Q1 1/3/2019 GLAUCOMA SCREENING Q2Y 2/22/2019 MICROALBUMIN Q1 5/1/2019 LIPID PANEL Q1 5/1/2019 MEDICARE YEARLY EXAM 5/2/2019 Allergies as of 8/20/2018  Review Complete On: 8/20/2018 By: Archie Moya LPN Severity Noted Reaction Type Reactions Actos [Pioglitazone]  04/23/2009    Swelling Swelling of feet and legs Codeine  04/23/2009    Itching Doxycycline  04/23/2009    Nausea Only Hydrocodone  04/17/2012    Rash, Other (comments)  
 hallucinations Current Immunizations  Reviewed on 7/11/2014 Name Date Influenza Vaccine 12/2/2013 Influenza Vaccine Split 10/17/2012 TD Vaccine 12/8/2010 ZZZ-RETIRED (DO NOT USE) Pneumococcal Vaccine (Unspecified Type) 10/1/2002 Not reviewed this visit You Were Diagnosed With   
  
 Codes Comments  Controlled type 2 diabetes mellitus with stage 3 chronic kidney disease, with long-term current use of insulin (HCC)    -  Primary ICD-10-CM: E11.22, N18.3, Z79.4 ICD-9-CM: 250.40, 585.3, V58.67 Chronic atrial fibrillation (HCC)     ICD-10-CM: C80.0 ICD-9-CM: 427.31 Long term current use of anticoagulant therapy     ICD-10-CM: Z79.01 
ICD-9-CM: V58.61 Vitals BP Pulse Temp Resp Height(growth percentile) Weight(growth percentile) 144/70 65 98.4 °F (36.9 °C) (Oral) 16 5' 9\" (1.753 m) 221 lb (100.2 kg) SpO2 BMI OB Status Smoking Status 97% 32.64 kg/m2 Hysterectomy Former Smoker BMI and BSA Data Body Mass Index Body Surface Area  
 32.64 kg/m 2 2.21 m 2 Preferred Pharmacy Pharmacy Name Phone [phone], [address] AT [place] [address] [phone] Your Updated Medication List  
  
   
This list is accurate as of 8/20/18  1:04 PM.  Always use your most recent med list.  
  
  
  
  
 acetaminophen 325 mg tablet Commonly known as:  TYLENOL Take 325 mg by mouth every four (4) hours as needed for Pain. allopurinol 100 mg tablet Commonly known as:  ZYLOPRIM  
TAKE TWO TABLETS BY MOUTH DAILY  
  
 atorvastatin 80 mg tablet Commonly known as:  LIPITOR  
TAKE ONE TABLET BY MOUTH EVERY EVENING  
  
 COUMADIN 3 mg tablet Generic drug:  warfarin Take 3 mg by mouth daily. 6mg daily except 3mg Sun  
  
 dofetilide 125 mcg capsule Commonly known as:  Gene Peat Take 1 Cap by mouth two (2) times a day. furosemide 40 mg tablet Commonly known as:  LASIX  
2 tabs q am and 1 tab q PM  
  
 glucose blood VI test strips strip Commonly known as:  ACCU-CHEK SHAHIDA PLUS TEST STRP Check Blood sugar 4 x daily as directed  
  
 hydrALAZINE 10 mg tablet Commonly known as:  APRESOLINE Take 2 Tabs by mouth three (3) times daily. insulin  unit/mL (3 mL) Inpn Commonly known as:  HumuLIN N NPH Insulin KwikPen INJECT 43 UNITS UNDER THE SKIN EVERY MORNING, 23 UNITS UNDER THE SKIN WITH DINNER OR AS DIRECTED BY PHYSICIAN. **THIS REPLACED HUMULOG MIX**  
  
 levothyroxine 125 mcg tablet Commonly known as:  SYNTHROID  
TAKE ONE TABLET BY MOUTH DAILY  
  
 lisinopril 20 mg tablet Commonly known as:  Yamini Loss Take 1 Tab by mouth daily. metoprolol tartrate 50 mg tablet Commonly known as:  LOPRESSOR Take 1 Tab by mouth two (2) times a day. Little Pen Needle 32 gauge x 5/32\" Ndle Generic drug:  Insulin Needles (Disposable) USE WITH INSULIN PEN TWO TIMES A DAY AS DIRECTED BY PHYSICIAN  
  
 potassium chloride SR 10 mEq tablet Commonly known as:  KLOR-CON 10 Take 1 Tab by mouth daily. PriLOSEC 20 mg capsule Generic drug:  omeprazole Take 20 mg by mouth daily. Prescriptions Printed Refills  
 insulin NPH (HUMULIN N NPH INSULIN KWIKPEN) 100 unit/mL (3 mL) inpn 97 Sig: INJECT 43 UNITS UNDER THE SKIN EVERY MORNING, 23 UNITS UNDER THE SKIN WITH DINNER OR AS DIRECTED BY PHYSICIAN. **THIS REPLACED HUMULOG MIX** Class: Print Description See progress note August 2018 Details Sun Mon Tue Wed Thu Fri Sat  
     1  
  
  
  
   2  
  
  
  
   3  
  
  
  
   4  
  
  
  
  
  5  
  
  
  
   6  
  
  
  
   7  
  
  
  
   8  
  
  
  
   9  
  
  
  
   10  
  
  
  
   11  
  
  
  
  
  12  
  
  
  
   13  
  
  
  
   14  
  
  
  
   15  
  
  
  
   16  
  
  
  
   17  
  
  
  
   18  
  
  
  
  
  19  
  
  
  
   20  
  
3 mg See details 21  
  
6 mg  
  
   22  
  
6 mg  
  
   23  
  
6 mg  
  
   24  
  
6 mg  
  
   25  
  
6 mg  
  
  
  26  
  
3 mg  
  
   27  
  
6 mg  
  
   28  
  
  
  
   29  
  
  
  
   30  
  
  
  
   31  
  
  
  
   
 Date Details 08/20 This INR check INR: 3.1! Date of next INR:  8/27/2018 How to take your warfarin dose To take:  3 mg Take one of the 3 mg tablets. To take:  6 mg Take two of the 3 mg tablets. We Performed the Following AMB POC PT/INR [62866 CPT(R)] Follow-up Instructions Return in about 2 weeks (around 9/4/2018). Introducing [facility] HEALTH SERVICES! [facility] introduces Ahead patient portal. Now you can access parts of your medical record, email your doctor's office, and request medication refills online. 1. In your internet browser, go to [email or web address] 2. Click on the First Time User? Click Here link in the Sign In box. You will see the New Member Sign Up page. 3. Enter your Ahead Access Code exactly as it appears below. You will not need to use this code after youve completed the sign-up process. If you do not sign up before the expiration date, you must request a new code. · Ahead Access Code: [email or web address] Expires: 11/5/2018 10:10 AM 
 
4. Enter the last four digits of your Social Security Number (xxxx) and Date of Birth (mm/dd/yyyy) as indicated and click Submit. You will be taken to the next sign-up page. 5. Create a Ahead ID. This will be your Ahead login ID and cannot be changed, so think of one that is secure and easy to remember. 6. Create a Ahead password. You can change your password at any time. 7. Enter your Password Reset Question and Answer. This can be used at a later time if you forget your password. 8. Enter your e-mail address. You will receive e-mail notification when new information is available in [address] 9. Click Sign Up. You can now view and download portions of your medical record. 10. Click the Download Summary menu link to download a portable copy of your medical information. If you have questions, please visit the Frequently Asked Questions section of the Ahead website. Remember, Ahead is NOT to be used for urgent needs. For medical emergencies, dial 911. Now available from your iPhone and Android! Please provide this summary of care documentation to your next provider. Your primary care clinician is listed as ANDRES BURDEN. If you have any questions after today's visit, please call [phone].

## 2018-08-20 NOTE — PROGRESS NOTES
INR 3.1 today. Patient instructions: Take 3mg today instead of 6mg then continue normal dosing of Coumadin 6mg except 3mg on Sundays. Diet consistency discussed at length with patient and daughter. Vit K food list supplied. Daughter, Jarrett, will assist with signing up on [employer] to facilitate home INR monitoring communication due to concerns over recent labile INRs and mother needing some assistance with medication directions/dosage. Pill box is in use and daughter will assist.    A full discussion of the nature of anticoagulants has been carried out. A benefit risk analysis has been presented to the patient, so that they understand the justification for choosing anticoagulation at this time. The need for frequent and regular monitoring, precise dosage adjustment and compliance is stressed. Side effects of potential bleeding are discussed. The patient should avoid any OTC items containing aspirin or ibuprofen, and should avoid great swings in general diet. Avoid alcohol consumption. Call if any signs of abnormal bleeding. Patient verbalized understanding.

## 2018-08-21 DIAGNOSIS — E78.00 HYPERCHOLESTEREMIA: ICD-10-CM

## 2018-08-21 RX ORDER — ATORVASTATIN CALCIUM 80 MG/1
TABLET, FILM COATED ORAL
Qty: 90 TAB | Refills: 0 | Status: SHIPPED | OUTPATIENT
Start: 2018-08-21 | End: 2018-12-07 | Stop reason: SDUPTHER

## 2018-08-25 NOTE — PROGRESS NOTES
HISTORY OF PRESENT ILLNESS  Gustavo Guadarrama is a 82 y.o. female. HPI Connie Diallo is seen today accompanied by her daughter for a review of current anticoagulation therapy. She is on chronic anticoagulation for atrial fibrillation. There has been some recent concern on the part of myself and our Coumadin nurse Annalise that directions were not being followed properly. There proved to be a fair amount of variability in the INR. Fortunately, there have been no complications. She apparently notes the direction changes but then does not always follow these. After discussion with her daughter, it appears that Connie Diallo will lapse into following the directions on the bottle as opposed to the updated directions from Annalise. This has been a problem with other medications at times as well. In the end we decided to have her daughter sign up for My Chart and corrections will be sent directly to her so she can help with the pill box and monitor ongoing therapy. Connie Diallo is very comfortable with this as is her daughter Alaina Louis. I also had Annalise come in to discuss a plan of action with them. We counseled for 15 minutes regarding Warfarin management. Reviewed adjustments and our plan as noted above, reviewed potential side effects of taking Coumadin inaccurately. Also,  reviewed the need for routine follow up. This was a 15 minute visit, greater than 50% of the visit was spent in counseling. Also reviewed her diabetic regimen and clarified the directions for this as well. Review of Systems   Constitutional: Negative for diaphoresis. Endo/Heme/Allergies: Does not bruise/bleed easily. Physical Exam   Neurological: She is alert. Psychiatric: She has a normal mood and affect. Her behavior is normal.   Nursing note and vitals reviewed. ASSESSMENT and PLAN  Diagnoses and all orders for this visit:    1.  Controlled type 2 diabetes mellitus with stage 3 chronic kidney disease, with long-term current use of insulin ([facility]  - insulin NPH (HUMULIN N NPH INSULIN KWIKPEN) 100 unit/mL (3 mL) inpn; INJECT 43 UNITS UNDER THE SKIN EVERY MORNING, 23 UNITS UNDER THE SKIN WITH DINNER OR AS DIRECTED BY PHYSICIAN. **THIS REPLACED HUMULOG MIX**    2. Chronic atrial fibrillation (HCC)  -     AMB POC PT/INR, see hpi    3.  Long term current use of anticoagulant therapy  -     AMB POC PT/INR, Please follow inr protocol

## 2018-08-27 ENCOUNTER — TELEPHONE ANTICOAG (OUTPATIENT)
Dept: INTERNAL MEDICINE CLINIC | Age: 83
End: 2018-08-27

## 2018-08-27 LAB — INR, EXTERNAL: 3.6

## 2018-08-27 NOTE — PROGRESS NOTES
Verified patient identity with two identifiers. Spoke with patient by phone. INR 3.6 today via home monitor. Patient instructions:   HOLD today's dose, take NO Coumadin today. Old dose: Coumadin 6mg except 3mg on Sundays. New dose: Coumadin 6mg except 3mg on Saturdays & Sundays (7.7% decrease)  Recheck 9/4/18  [employer] message sent so daughter can review and assist with new dosing. A full discussion of the nature of anticoagulants has been carried out. A benefit risk analysis has been presented to the patient, so that they understand the justification for choosing anticoagulation at this time. The need for frequent and regular monitoring, precise dosage adjustment and compliance is stressed. Side effects of potential bleeding are discussed. The patient should avoid any OTC items containing aspirin or ibuprofen, and should avoid great swings in general diet. Avoid alcohol consumption. Call if any signs of abnormal bleeding. Patient verbalized understanding.

## 2018-08-28 RX ORDER — BLOOD SUGAR DIAGNOSTIC
STRIP MISCELLANEOUS
Qty: 100 STRIP | Refills: 9 | Status: SHIPPED | OUTPATIENT
Start: 2018-08-28 | End: 2019-06-14

## 2018-09-04 ENCOUNTER — HOSPITAL ENCOUNTER (OUTPATIENT)
Dept: LAB | Age: 83
Discharge: HOME | End: 2018-09-04
Payer: MEDICARE

## 2018-09-04 ENCOUNTER — OFFICE VISIT (OUTPATIENT)
Dept: INTERNAL MEDICINE CLINIC | Age: 83
End: 2018-09-04

## 2018-09-04 ENCOUNTER — TELEPHONE (OUTPATIENT)
Dept: INTERNAL MEDICINE CLINIC | Age: 83
End: 2018-09-04

## 2018-09-04 VITALS
RESPIRATION RATE: 16 BRPM | SYSTOLIC BLOOD PRESSURE: 128 MMHG | OXYGEN SATURATION: 98 % | HEART RATE: 62 BPM | BODY MASS INDEX: 32.64 KG/M2 | DIASTOLIC BLOOD PRESSURE: 72 MMHG | WEIGHT: 220.4 LBS | TEMPERATURE: 97.6 F | HEIGHT: 69 IN

## 2018-09-04 DIAGNOSIS — I25.10 ATHEROSCLEROSIS OF NATIVE CORONARY ARTERY OF NATIVE HEART WITHOUT ANGINA PECTORIS: ICD-10-CM

## 2018-09-04 DIAGNOSIS — R60.0 BILATERAL LEG EDEMA: ICD-10-CM

## 2018-09-04 DIAGNOSIS — C67.9 MALIGNANT NEOPLASM OF URINARY BLADDER, UNSPECIFIED SITE (HCC): ICD-10-CM

## 2018-09-04 DIAGNOSIS — I50.32 CHRONIC DIASTOLIC HEART FAILURE (HCC): ICD-10-CM

## 2018-09-04 DIAGNOSIS — N18.30 CONTROLLED TYPE 2 DIABETES MELLITUS WITH STAGE 3 CHRONIC KIDNEY DISEASE, WITH LONG-TERM CURRENT USE OF INSULIN (HCC): Primary | ICD-10-CM

## 2018-09-04 DIAGNOSIS — I48.20 CHRONIC ATRIAL FIBRILLATION (HCC): ICD-10-CM

## 2018-09-04 DIAGNOSIS — Z79.4 CONTROLLED TYPE 2 DIABETES MELLITUS WITH STAGE 3 CHRONIC KIDNEY DISEASE, WITH LONG-TERM CURRENT USE OF INSULIN (HCC): Primary | ICD-10-CM

## 2018-09-04 DIAGNOSIS — E11.22 CONTROLLED TYPE 2 DIABETES MELLITUS WITH STAGE 3 CHRONIC KIDNEY DISEASE, WITH LONG-TERM CURRENT USE OF INSULIN (HCC): Primary | ICD-10-CM

## 2018-09-04 DIAGNOSIS — I34.0 NON-RHEUMATIC MITRAL REGURGITATION: ICD-10-CM

## 2018-09-04 DIAGNOSIS — E06.3 HYPOTHYROIDISM, ACQUIRED, AUTOIMMUNE: ICD-10-CM

## 2018-09-04 DIAGNOSIS — E78.2 MIXED HYPERLIPIDEMIA: ICD-10-CM

## 2018-09-04 DIAGNOSIS — I73.9 PVD (PERIPHERAL VASCULAR DISEASE) (HCC): ICD-10-CM

## 2018-09-04 DIAGNOSIS — I10 ESSENTIAL HYPERTENSION, BENIGN: ICD-10-CM

## 2018-09-04 PROBLEM — L03.116 BILATERAL LOWER LEG CELLULITIS: Status: RESOLVED | Noted: 2018-07-05 | Resolved: 2018-09-04

## 2018-09-04 PROBLEM — L97.921 ULCER OF LEFT LOWER EXTREMITY, LIMITED TO BREAKDOWN OF SKIN (HCC): Status: RESOLVED | Noted: 2018-07-05 | Resolved: 2018-09-04

## 2018-09-04 PROBLEM — L97.911 ULCER OF RIGHT LOWER EXTREMITY, LIMITED TO BREAKDOWN OF SKIN (HCC): Status: RESOLVED | Noted: 2018-07-05 | Resolved: 2018-09-04

## 2018-09-04 PROBLEM — L03.115 BILATERAL LOWER LEG CELLULITIS: Status: RESOLVED | Noted: 2018-07-05 | Resolved: 2018-09-04

## 2018-09-04 LAB — INR, EXTERNAL: 4.7

## 2018-09-04 PROCEDURE — 83036 HEMOGLOBIN GLYCOSYLATED A1C: CPT

## 2018-09-04 PROCEDURE — 85025 COMPLETE CBC W/AUTO DIFF WBC: CPT

## 2018-09-04 PROCEDURE — 80053 COMPREHEN METABOLIC PANEL: CPT

## 2018-09-04 PROCEDURE — 84443 ASSAY THYROID STIM HORMONE: CPT

## 2018-09-04 PROCEDURE — 36415 COLL VENOUS BLD VENIPUNCTURE: CPT

## 2018-09-04 PROCEDURE — 80061 LIPID PANEL: CPT

## 2018-09-04 NOTE — PROGRESS NOTES
Nelson Lozada is a 82 y.o. female    Chief Complaint   Patient presents with    Diabetes     f/u     1. Have you been to the ER, urgent care clinic since your last visit? Hospitalized since your last visit? No    2. Have you seen or consulted any other health care providers outside of the [address] [place] [address] since your last visit? Include any pap smears or colon screening.  No     Visit Vitals    /72 (BP 1 Location: Right arm, BP Patient Position: Sitting)    Pulse 62    Temp 97.6 °F (36.4 °C) (Oral)    Resp 16    Ht 5' 9\" (1.753 m)    Wt 220 lb 6.4 oz (100 kg)    SpO2 98%    BMI 32.55 kg/m2     Health Maintenance Due   Topic Date Due    ZOSTER VACCINE AGE 60>  02/17/1997    Pneumococcal 65+ High/Highest Risk (2 of 2 - PPSV23) 10/01/2007    DTaP/Tdap/Td series (1 - Tdap) 12/09/2010    EYE EXAM RETINAL OR DILATED Q1  02/22/2018    Influenza Age 5 to Adult  08/01/2018     Marlen Rick LPN  '

## 2018-09-04 NOTE — TELEPHONE ENCOUNTER
Pt called in her INR for today - 4.7. States no changes in meds or diet. She currently takes Coumadin 6 mg daily except on Sunday 3 mg. Will forward to MD and Annalise.

## 2018-09-04 NOTE — PROGRESS NOTES
HISTORY OF PRESENT ILLNESS  Shaneka Izquierdo is a 82 y.o. female. HPI Patrick Monge is seen today for follow up of diabetes and other multiple problems. She is doing fine. 1. Diabetes, hypothyroidism, hyperlipidemia. Due for routine labs for these conditions. I reviewed her fingerstick readings that look pretty good. 2. Hypertension, stable on current regimen. 3. CAD, valvular heart disease. Up to date with cardiology follow up. She is changing to a cardiologist in [place]. 4. Lower extremity edema, improved. She has no open sores. She is wearing compression hose which seems to be helping the edema a great deal.   5. Atrial fibrillation, see note from last week's visit regarding management of her Warfarin. She is up to date. She will do her home testing per protocol. Review of systems notable for 4/10 bilateral knee pain. She gets fair relief with injections. She will follow up with ortho as directed. Past Medical History:   Diagnosis Date    Atrial fibrillation ([address] 10/29/2009    Bladder cancer ([address]     Coagulation disorder (HCC)     Chronic prophylactic anticoagulation med    Colon polyps     Diabetes ([address]     GERD (gastroesophageal reflux disease)     Heart valve problem     leaking heart valve    Hypercholesterolemia     Hypertension     Hypothyroidism     Hypothyroidism 4/23/2009    Hypothyroidism, acquired, autoimmune 11/23/2015    Overweight and obesity     PUD (peptic ulcer disease)     S/P ablation of atrial flutter[V45.89HM] 2009    @ [facility] >> atrial fibrillation    T. I.A. 4/23/2009    TIA (transient ischemic attack)     Ulcer of right lower extremity, limited to breakdown of skin ([address] 7/5/2018         Review of Systems   Constitutional: Negative for weight loss. Respiratory: Negative. Cardiovascular: Positive for leg swelling. Negative for chest pain, palpitations and PND. Musculoskeletal: Negative for myalgias. Neurological: Negative for focal weakness. Endo/Heme/Allergies: Does not bruise/bleed easily. Physical Exam   Constitutional: She appears well-nourished. Neck: Carotid bruit is not present. Cardiovascular: Normal rate and regular rhythm. Exam reveals no gallop and no friction rub. No murmur heard. Pulmonary/Chest: Effort normal and breath sounds normal. No respiratory distress. Musculoskeletal: She exhibits edema. Mild bilateral lower extremity edema with TEDS   Neurological: She is alert. Nursing note and vitals reviewed. ASSESSMENT and PLAN  Diagnoses and all orders for this visit:    1. 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 Chronic diastolic heart failure ([facility]- Continue current regimen of prescription and / or OTC medications , See cardiologist as directed. 3. Chronic atrial fibrillation ([facility]- Please follow inr protocol - high risk med mgmt    4. Malignant neoplasm of urinary bladder, unspecified site [facility])- See urologist as directed     5. PVD (peripheral vascular disease) [facility])- See cardiologist as directed. 6. Bilateral leg edema- Proceed with plan as discussed     7. Non-rheumatic mitral regurgitation- Continue current regimen of prescription and / or OTC medications , See cardiologist as directed. 8. Hypothyroidism, acquired, autoimmune  -     TSH    9. Mixed hyperlipidemia  -     COMP METABOLIC PANEL  -     CBC W/ AUTOMATED DIFF  -     LIPID PANEL    10. Atherosclerosis of native coronary artery of native heart without angina pectoris- See cardiologist as directed.      11. Essential hypertension, benign- Continue current regimen of prescription and / or OTC medications

## 2018-09-04 NOTE — TELEPHONE ENCOUNTER
Advised pt she is not to take her warfarin tonight. Annalise will call her tomorrow re further dosing. I have re enforced importance of her not taking dose tonight - she will take out of pill box. Attempted to call pt's daughter to make sure it gets pulled from pill box - she was not available. Will forward to Annalise.

## 2018-09-04 NOTE — MR AVS SNAPSHOT
[address] 
[phone] Patient: Елена Sanchez MRN:  [id number] Visit Information Date & Time Provider Department Dept. Phone Encounter #  
 9/4/2018 10:35 AM Domi Toure MD [facility] Internal Medicine [phone] [id number] Follow-up Instructions Return in about 4 months (around 1/4/2019), or if symptoms worsen or fail to improve. Upcoming Health Maintenance Date Due ZOSTER VACCINE AGE 60> 2/17/1997 Pneumococcal 65+ High/Highest Risk (2 of 2 - PPSV23) 10/1/2007 DTaP/Tdap/Td series (1 - Tdap) 12/9/2010 EYE EXAM RETINAL OR DILATED Q1 2/22/2018 Influenza Age 5 to Adult 8/1/2018 HEMOGLOBIN A1C Q6M 11/1/2018 FOOT EXAM Q1 1/3/2019 GLAUCOMA SCREENING Q2Y 2/22/2019 MICROALBUMIN Q1 5/1/2019 LIPID PANEL Q1 5/1/2019 MEDICARE YEARLY EXAM 5/2/2019 Allergies as of 9/4/2018  Review Complete On: 9/4/2018 By: Domi Toure MD  
  
 Severity Noted Reaction Type Reactions Actos [Pioglitazone]  04/23/2009    Swelling Swelling of feet and legs Codeine  04/23/2009    Itching Doxycycline  04/23/2009    Nausea Only Hydrocodone  04/17/2012    Rash, Other (comments)  
 hallucinations Current Immunizations  Reviewed on 7/11/2014 Name Date Influenza Vaccine 12/2/2013 Influenza Vaccine Split 10/17/2012 TD Vaccine 12/8/2010 ZZZ-RETIRED (DO NOT USE) Pneumococcal Vaccine (Unspecified Type) 10/1/2002 Not reviewed this visit You Were Diagnosed With   
  
 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 
ICD-9-CM: 428.32 Chronic atrial fibrillation (HCC)     ICD-10-CM: C13.2 ICD-9-CM: 427.31   
 Malignant neoplasm of urinary bladder, unspecified site [facility])     ICD-10-CM: C67.9 ICD-9-CM: 188.9 PVD (peripheral vascular disease) ([facility]     ICD-10-CM: I73.9 ICD-9-CM: 443.9 Bilateral leg edema     ICD-10-CM: R60.0 ICD-9-CM: 782.3 Non-rheumatic mitral regurgitation     ICD-10-CM: I34.0 ICD-9-CM: 424.0 Hypothyroidism, acquired, autoimmune     ICD-10-CM: E06.3 ICD-9-CM: 244.8 Mixed hyperlipidemia     ICD-10-CM: E78.2 ICD-9-CM: 272.2 Atherosclerosis of native coronary artery of native heart without angina pectoris     ICD-10-CM: I25.10 ICD-9-CM: 414.01 Essential hypertension, benign     ICD-10-CM: I10 
ICD-9-CM: 401.1 Vitals BP Pulse Temp Resp Height(growth percentile) Weight(growth percentile) 128/72 (BP 1 Location: Right arm, BP Patient Position: Sitting) 62 97.6 °F (36.4 °C) (Oral) 16 5' 9\" (1.753 m) 220 lb 6.4 oz (100 kg) SpO2 BMI OB Status Smoking Status 98% 32.55 kg/m2 Hysterectomy Former Smoker BMI and BSA Data Body Mass Index Body Surface Area 32.55 kg/m 2 2.21 m 2 Preferred Pharmacy Pharmacy Name Phone [facility] [address] Carlito Melendez [phone] Your Updated Medication List  
  
   
This list is accurate as of 9/4/18 11:10 AM.  Always use your most recent med list.  
  
  
  
  
 ACCU-CHEK SHAHIDA PLUS TEST STRP strip Generic drug:  glucose blood VI test strips USE TO CHECK BLOOD SUGAR FOUR TIMES A DAY  
  
 acetaminophen 325 mg tablet Commonly known as:  TYLENOL Take 325 mg by mouth every four (4) hours as needed for Pain. allopurinol 100 mg tablet Commonly known as:  ZYLOPRIM  
TAKE TWO TABLETS BY MOUTH DAILY  
  
 atorvastatin 80 mg tablet Commonly known as:  LIPITOR  
TAKE ONE TABLET BY MOUTH EVERY EVENING  
  
 COUMADIN 3 mg tablet Generic drug:  warfarin Take 3 mg by mouth daily. 6mg daily except 3mg Sun  
  
 dofetilide 125 mcg capsule Commonly known as:  Josem Mather Take 1 Cap by mouth two (2) times a day. furosemide 40 mg tablet Commonly known as:  LASIX  
2 tabs q am and 1 tab q PM  
  
 hydrALAZINE 10 mg tablet Commonly known as:  APRESOLINE Take 2 Tabs by mouth three (3) times daily. insulin  unit/mL (3 mL) Inpn Commonly known as:  HumuLIN N NPH Insulin KwikPen INJECT 43 UNITS UNDER THE SKIN EVERY MORNING, 23 UNITS UNDER THE SKIN WITH DINNER OR AS DIRECTED BY PHYSICIAN. **THIS REPLACED HUMULOG MIX**  
  
 levothyroxine 125 mcg tablet Commonly known as:  SYNTHROID  
TAKE ONE TABLET BY MOUTH DAILY  
  
 lisinopril 20 mg tablet Commonly known as:  Derek She Take 1 Tab by mouth daily. metoprolol tartrate 50 mg tablet Commonly known as:  LOPRESSOR Take 1 Tab by mouth two (2) times a day. Little Pen Needle 32 gauge x 5/32\" Ndle Generic drug:  Insulin Needles (Disposable) USE WITH INSULIN PEN TWO TIMES A DAY AS DIRECTED BY PHYSICIAN  
  
 potassium chloride SR 10 mEq tablet Commonly known as:  KLOR-CON 10 Take 1 Tab by mouth daily. PriLOSEC 20 mg capsule Generic drug:  omeprazole Take 20 mg by mouth daily. We Performed the Following CBC WITH AUTOMATED DIFF [02760 CPT(R)] HEMOGLOBIN A1C WITH EAG [70836 CPT(R)] LIPID PANEL [78397 CPT(R)] METABOLIC PANEL, COMPREHENSIVE [86121 CPT(R)] TSH 3RD GENERATION [19561 CPT(R)] Follow-up Instructions Return in about 4 months (around 1/4/2019), or if symptoms worsen or fail to improve. Introducing hospitals & HEALTH SERVICES! Dear Ya Irwin: 
Thank you for requesting a [employer] account. Our records indicate that you already have an active [employer] account. You can access your account anytime at [email or web address] Did you know that you can access your hospital and ER discharge instructions at any time in [employer]?   You can also review all of your test results from your hospital stay or ER visit. Additional Information If you have questions, please visit the Frequently Asked Questions section of the [employer] website at [email or web address] Remember, [employer] is NOT to be used for urgent needs. For medical emergencies, dial 911. Now available from your iPhone and Android! Please provide this summary of care documentation to your next provider. Your primary care clinician is listed as ANDRES BURDEN. If you have any questions after today's visit, please call [phone].

## 2018-09-05 ENCOUNTER — TELEPHONE ANTICOAG (OUTPATIENT)
Dept: INTERNAL MEDICINE CLINIC | Age: 83
End: 2018-09-05

## 2018-09-05 DIAGNOSIS — Z51.81 MEDICATION MONITORING ENCOUNTER: Primary | ICD-10-CM

## 2018-09-05 LAB
ALBUMIN SERPL-MCNC: 4.1 G/DL (ref 3.5–4.7)
ALBUMIN/GLOB SERPL: 1.8 {RATIO} (ref 1.2–2.2)
ALP SERPL-CCNC: 70 IU/L (ref 39–117)
ALT SERPL-CCNC: 21 IU/L (ref 0–32)
AST SERPL-CCNC: 19 IU/L (ref 0–40)
BASOPHILS # BLD AUTO: 0 X10E3/UL (ref 0–0.2)
BASOPHILS NFR BLD AUTO: 0 %
BILIRUB SERPL-MCNC: 0.2 MG/DL (ref 0–1.2)
BUN SERPL-MCNC: 55 MG/DL (ref 8–27)
BUN/CREAT SERPL: 26 (ref 12–28)
CALCIUM SERPL-MCNC: 9.6 MG/DL (ref 8.7–10.3)
CHLORIDE SERPL-SCNC: 102 MMOL/L (ref 96–106)
CHOLEST SERPL-MCNC: 119 MG/DL (ref 100–199)
CO2 SERPL-SCNC: 23 MMOL/L (ref 20–29)
CREAT SERPL-MCNC: 2.09 MG/DL (ref 0.57–1)
EOSINOPHIL # BLD AUTO: 0.2 X10E3/UL (ref 0–0.4)
EOSINOPHIL NFR BLD AUTO: 3 %
ERYTHROCYTE [DISTWIDTH] IN BLOOD BY AUTOMATED COUNT: 18.8 % (ref 12.3–15.4)
EST. AVERAGE GLUCOSE BLD GHB EST-MCNC: 146 MG/DL
GLOBULIN SER CALC-MCNC: 2.3 G/DL (ref 1.5–4.5)
GLUCOSE SERPL-MCNC: 115 MG/DL (ref 65–99)
HBA1C MFR BLD: 6.7 % (ref 4.8–5.6)
HCT VFR BLD AUTO: 34.5 % (ref 34–46.6)
HDLC SERPL-MCNC: 47 MG/DL
HGB BLD-MCNC: 11 G/DL (ref 11.1–15.9)
IMM GRANULOCYTES # BLD: 0 X10E3/UL (ref 0–0.1)
IMM GRANULOCYTES NFR BLD: 0 %
LDLC SERPL CALC-MCNC: 58 MG/DL (ref 0–99)
LYMPHOCYTES # BLD AUTO: 1.3 X10E3/UL (ref 0.7–3.1)
LYMPHOCYTES NFR BLD AUTO: 19 %
MCH RBC QN AUTO: 27 PG (ref 26.6–33)
MCHC RBC AUTO-ENTMCNC: 31.9 G/DL (ref 31.5–35.7)
MCV RBC AUTO: 85 FL (ref 79–97)
MONOCYTES # BLD AUTO: 0.4 X10E3/UL (ref 0.1–0.9)
MONOCYTES NFR BLD AUTO: 6 %
NEUTROPHILS # BLD AUTO: 4.6 X10E3/UL (ref 1.4–7)
NEUTROPHILS NFR BLD AUTO: 72 %
PLATELET # BLD AUTO: 145 X10E3/UL (ref 150–379)
POTASSIUM SERPL-SCNC: 4.5 MMOL/L (ref 3.5–5.2)
PROT SERPL-MCNC: 6.4 G/DL (ref 6–8.5)
RBC # BLD AUTO: 4.07 X10E6/UL (ref 3.77–5.28)
SODIUM SERPL-SCNC: 142 MMOL/L (ref 134–144)
TRIGL SERPL-MCNC: 68 MG/DL (ref 0–149)
TSH SERPL DL<=0.005 MIU/L-ACNC: 0.81 UIU/ML (ref 0.45–4.5)
VLDLC SERPL CALC-MCNC: 14 MG/DL (ref 5–40)
WBC # BLD AUTO: 6.5 X10E3/UL (ref 3.4–10.8)

## 2018-09-05 NOTE — PROGRESS NOTES
Verified patient identity with two identifiers. Spoke with patient daughter Juan Francois by phone.    :abs show dehydration, change furosemide to once daily in morning, see Dr. Robert Langston in one wk with bmp/labs and weight check. other Labs look great overall. See anticoag call from today as well. Patient daughter verbalized understanding.

## 2018-09-05 NOTE — PROGRESS NOTES
Call Kristian Domingo and daughter  1- labs show dehydration, change furosemide to one q am only, ov one wk with bmp and weight check.  Also inr  2- other Labs look great overall

## 2018-09-05 NOTE — TELEPHONE ENCOUNTER
Left message for patient's daughter Bea Garcia to return call. My chart message sent to daughter as well.

## 2018-09-05 NOTE — PROGRESS NOTES
Verified patient identity with two identifiers. Spoke with patient by phone. Spoke with patient by phone, patient could not remember that INR was elevated (4.7) yesterday. After further discussing patient states she did not take Coumadin yesterday per instructions. Instructed patient to HOLD Coumadin again today and to retest tomorrow/Thurs morning. On 8/27 dose was decreased- Old dose: Coumadin 6mg except 3mg on Sundays. New dose: Coumadin 6mg except 3mg on Saturdays & Sundays as INR was 3.6. Unclear as to whether patient is taking the new decreased dose. Verified patient identity with two identifiers. Spoke with patient's daughter Chidi Albarran by phone. Shared concern over increased memory loss of mother and taking high risk medication, coumadin. Daughter states she will go over tonight to make sure Coumadin is not in pill box for tonight and will remind her to test in the morning. See result note- daughter will also make sure lasix is changed to once daily in morning. Once we know value of new INR tomorrow further dosing instructions will be done.

## 2018-09-06 ENCOUNTER — TELEPHONE ANTICOAG (OUTPATIENT)
Dept: INTERNAL MEDICINE CLINIC | Age: 83
End: 2018-09-06

## 2018-09-06 LAB — INR, EXTERNAL: 3.6

## 2018-09-06 NOTE — PROGRESS NOTES
Verified patient identity with two identifiers. Spoke with patient by phone. INR 3.6 today. Down from 4.7 after holding x2 days. Patient instructions d/w Dr. Rossana Vizcarra:   Hold Coumadin again today, then take Coumadin 3mg on Friday, Saturday and Sunday - Recheck Monday 9/10/18 morning. Interwise message sent to daughter. A full discussion of the nature of anticoagulants has been carried out. A benefit risk analysis has been presented to the patient, so that they understand the justification for choosing anticoagulation at this time. The need for frequent and regular monitoring, precise dosage adjustment and compliance is stressed. Side effects of potential bleeding are discussed. The patient should avoid any OTC items containing aspirin or ibuprofen, and should avoid great swings in general diet. Avoid alcohol consumption. Call if any signs of abnormal bleeding. Patient verbalized understanding.

## 2018-09-07 RX ORDER — FUROSEMIDE 40 MG/1
TABLET ORAL
Qty: 1 TAB | Refills: 0 | Status: ON HOLD
Start: 2018-09-07 | End: 2019-03-15 | Stop reason: SDUPTHER

## 2018-09-10 ENCOUNTER — TELEPHONE ANTICOAG (OUTPATIENT)
Dept: INTERNAL MEDICINE CLINIC | Age: 83
End: 2018-09-10

## 2018-09-10 ENCOUNTER — TELEPHONE (OUTPATIENT)
Dept: INTERNAL MEDICINE CLINIC | Age: 83
End: 2018-09-10

## 2018-09-10 LAB — INR, EXTERNAL: 1.7

## 2018-09-10 NOTE — PROGRESS NOTES
Verified patient identity with two identifiers. Spoke with patient by phone. INR 1.7 today via home monitor. Coumadin was held x3 day last week due to elevated INR. Patient instructions:   Resume Coumadin 6mg daily except 3mg on Saturdays & Sundays (unclear what dose patient was taking prior to elevated INR- now daughter is assisting with pill box)  Retest on Thursday 9/13/18  A full discussion of the nature of anticoagulants has been carried out. A benefit risk analysis has been presented to the patient, so that they understand the justification for choosing anticoagulation at this time. The need for frequent and regular monitoring, precise dosage adjustment and compliance is stressed. Side effects of potential bleeding are discussed. The patient should avoid any OTC items containing aspirin or ibuprofen, and should avoid great swings in general diet. Avoid alcohol consumption. Call if any signs of abnormal bleeding. Patient verbalized understanding.

## 2018-09-13 ENCOUNTER — TELEPHONE ANTICOAG (OUTPATIENT)
Dept: INTERNAL MEDICINE CLINIC | Age: 83
End: 2018-09-13

## 2018-09-13 LAB — INR, EXTERNAL: 2.6

## 2018-09-13 NOTE — PROGRESS NOTES
Verified patient identity with two identifiers. Spoke with patient by phone. INR 2.6 today via home monitor. Patient instructions:   Continue Coumadin 6mg daily except 3mg on Saturdays & Sundays  Retest at appt with Dr. Mundo Aguiar on 9/18/18    A full discussion of the nature of anticoagulants has been carried out. A benefit risk analysis has been presented to the patient, so that they understand the justification for choosing anticoagulation at this time. The need for frequent and regular monitoring, precise dosage adjustment and compliance is stressed. Side effects of potential bleeding are discussed. The patient should avoid any OTC items containing aspirin or ibuprofen, and should avoid great swings in general diet. Avoid alcohol consumption. Call if any signs of abnormal bleeding. Patient verbalized understanding.

## 2018-09-18 ENCOUNTER — TELEPHONE ANTICOAG (OUTPATIENT)
Dept: INTERNAL MEDICINE CLINIC | Age: 83
End: 2018-09-18

## 2018-09-18 ENCOUNTER — TELEPHONE (OUTPATIENT)
Dept: INTERNAL MEDICINE CLINIC | Age: 83
End: 2018-09-18

## 2018-09-18 LAB — INR, EXTERNAL: 3.9

## 2018-09-18 NOTE — PROGRESS NOTES
Verified patient identity with two identifiers. Spoke with patient by phone. INR 3.9 today. Patient instructions:   HOLD Coumadin today & tomorrow. Retest at appt with Dr. Juvencio Spencer on 9/20/18  [place] message sent for daughter to review and assist with dosing. Patient denies any unusual bleeding or bruising and was instructed to call office and seek medical care if any should occur. A full discussion of the nature of anticoagulants has been carried out. A benefit risk analysis has been presented to the patient, so that they understand the justification for choosing anticoagulation at this time. The need for frequent and regular monitoring, precise dosage adjustment and compliance is stressed. Side effects of potential bleeding are discussed. The patient should avoid any OTC items containing aspirin or ibuprofen, and should avoid great swings in general diet. Avoid alcohol consumption. Call if any signs of abnormal bleeding. Patient verbalized understanding.

## 2018-09-19 ENCOUNTER — TELEPHONE (OUTPATIENT)
Dept: INTERNAL MEDICINE CLINIC | Age: 83
End: 2018-09-19

## 2018-09-19 NOTE — TELEPHONE ENCOUNTER
Left detailed message on patient's daughter's Nash Carmonahenriqueque) VM that Dr. Lauren Roca recommends she be present at appt tomorrow and to call with any questions.

## 2018-09-20 ENCOUNTER — HOSPITAL ENCOUNTER (OUTPATIENT)
Dept: LAB | Age: 83
Discharge: HOME | End: 2018-09-20
Payer: MEDICARE

## 2018-09-20 ENCOUNTER — OFFICE VISIT (OUTPATIENT)
Dept: INTERNAL MEDICINE CLINIC | Age: 83
End: 2018-09-20

## 2018-09-20 VITALS
HEART RATE: 68 BPM | RESPIRATION RATE: 16 BRPM | HEIGHT: 69 IN | WEIGHT: 227 LBS | SYSTOLIC BLOOD PRESSURE: 132 MMHG | DIASTOLIC BLOOD PRESSURE: 72 MMHG | OXYGEN SATURATION: 97 % | BODY MASS INDEX: 33.62 KG/M2 | TEMPERATURE: 98 F

## 2018-09-20 DIAGNOSIS — N17.9 AKI (ACUTE KIDNEY INJURY) (HCC): ICD-10-CM

## 2018-09-20 DIAGNOSIS — I48.20 CHRONIC ATRIAL FIBRILLATION (HCC): Primary | ICD-10-CM

## 2018-09-20 DIAGNOSIS — Z79.01 LONG TERM CURRENT USE OF ANTICOAGULANT THERAPY: ICD-10-CM

## 2018-09-20 DIAGNOSIS — J31.0 CHRONIC RHINITIS: ICD-10-CM

## 2018-09-20 LAB
INR BLD: 2.6
PT POC: 31.6 SECONDS
VALID INTERNAL CONTROL?: YES

## 2018-09-20 PROCEDURE — 80048 BASIC METABOLIC PNL TOTAL CA: CPT

## 2018-09-20 PROCEDURE — 36415 COLL VENOUS BLD VENIPUNCTURE: CPT

## 2018-09-20 RX ORDER — IPRATROPIUM BROMIDE 42 UG/1
2 SPRAY, METERED NASAL 4 TIMES DAILY
Qty: 15 ML | Refills: 11 | Status: SHIPPED | OUTPATIENT
Start: 2018-09-20 | End: 2019-05-20 | Stop reason: ALTCHOICE

## 2018-09-20 NOTE — PROGRESS NOTES
HISTORY OF PRESENT ILLNESS Duglas Kay is a 82 y.o. female. HPI Molly Mcgowan is seen today for follow up of atrial fibrillation on chronic anticoagulation as well as for elevated BUN and creatinine. 1. Chronic atrial fibrillation on anticoagulation. As previously documented in detail, there has been progressive difficulty with her Coumadin management. There has been a great deal of variability and concern about her following directions clearly as well as reporting her home INR check in a timely manner. Her daughter was made aware of this and we had a meeting at the last office visit. I consulted our Doctor of [place] [address] to research whether one of the NOACs would be appropriate based on her current drug profile. Xarelto and Eliquis were both appropriate with some dosage adjustment. We reviewed the benefits and downsides to starting this therapy. Both Molly Mcgowan and her daughter are enthusiastic about starting the new medication. The main factor is the discontinuation of Warfarin and INR testing. 2.   Elevated BUN and creatinine. This is a new problem. This was noted on lab work of 9/5/18. We adjusted her Lasix dosage to 40 mg qam (down from 80 in the morning, 40 at night). She has gained about 7 pounds but has noted no dramatic increase of edema and she has no CHF symptoms. 3.  Chronic rhinitis. She has had a drippy nose for months. It is not congestion. Reviewed with them the use of Atrovent nasal spray. 4.   Knee pain, 5/10. Past Medical History:  
Diagnosis Date  Atrial fibrillation ([address] 10/29/2009  Bladder cancer ([address]  Coagulation disorder ([address] Chronic prophylactic anticoagulation med  Colon polyps  Diabetes ([address]  GERD (gastroesophageal reflux disease)  Heart valve problem   
 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  
 @ [facility] >> atrial fibrillation  T. I.A. 4/23/2009  TIA (transient ischemic attack)  Ulcer of right lower extremity, limited to breakdown of skin ([facility] 7/5/2018 Review of Systems Respiratory: Negative for shortness of breath. Cardiovascular: Positive for leg swelling. Musculoskeletal: Positive for joint pain. Endo/Heme/Allergies: Does not bruise/bleed easily. Physical Exam  
Constitutional: No distress. Neurological: She is alert. Skin: Skin is warm and dry. Psychiatric: She has a normal mood and affect. Her behavior is normal.  
Nursing note and vitals reviewed. ASSESSMENT and PLAN Diagnoses and all orders for this visit: 
 
1. Chronic atrial fibrillation (HCC) -     AMB POC PT/INR 
-     rivaroxaban (XARELTO) 15 mg tab tablet; Take 1 Tab by mouth daily (with dinner). Reviewed side effects, goal of treatment and need for follow up 2. Long term current use of anticoagulant therapy -     AMB POC PT/INR 3. JULIANA (acute kidney injury) ([facility]- check BMP, reviewed need to monitor for fluid overload 4. Chronic rhinitis- trial of Atrovent nasal spray Plan was reviewed with patient and family, understanding expressed

## 2018-09-20 NOTE — PROGRESS NOTES
INR 2.6 today. Down from 3.9 after x2 days Patient instructions:  
New dose: Coumadin 3mg daily except 6mg on M/W/F (16.7% decrease) Old dose: Coumadin 6mg daily except 3mg on Saturdays & Sundays Retest on Wednesday 9/26/18 A full discussion of the nature of anticoagulants has been carried out. A benefit risk analysis has been presented to the patient, so that they understand the justification for choosing anticoagulation at this time. The need for frequent and regular monitoring, precise dosage adjustment and compliance is stressed. Side effects of potential bleeding are discussed. The patient should avoid any OTC items containing aspirin or ibuprofen, and should avoid great swings in general diet. Avoid alcohol consumption. Call if any signs of abnormal bleeding. Patient verbalized understanding.

## 2018-09-20 NOTE — MR AVS SNAPSHOT
[address] [facility] [address] [facility] 
[phone] Patient: Iván Carmona MRN:  [id number] Visit Information Date & Time Provider Department Dept. Phone Encounter #  
 9/20/2018  3:25 PM Mathew Yi, [address] [place] [address] Internal Medicine [phone] [id number] Follow-up Instructions Return in about 1 month (around 10/20/2018). Upcoming Health Maintenance Date Due ZOSTER VACCINE AGE 60> 2/17/1997 Pneumococcal 65+ High/Highest Risk (2 of 2 - PPSV23) 10/1/2007 DTaP/Tdap/Td series (1 - Tdap) 12/9/2010 EYE EXAM RETINAL OR DILATED Q1 2/22/2018 Influenza Age 5 to Adult 8/1/2018 FOOT EXAM Q1 1/3/2019 GLAUCOMA SCREENING Q2Y 2/22/2019 HEMOGLOBIN A1C Q6M 3/4/2019 MICROALBUMIN Q1 5/1/2019 MEDICARE YEARLY EXAM 5/2/2019 LIPID PANEL Q1 9/4/2019 Allergies as of 9/20/2018  Review Complete On: 9/20/2018 By: Mathew Yi MD  
  
 Severity Noted Reaction Type Reactions Actos [Pioglitazone]  04/23/2009    Swelling Swelling of feet and legs Codeine  04/23/2009    Itching Doxycycline  04/23/2009    Nausea Only Hydrocodone  04/17/2012    Rash, Other (comments)  
 hallucinations Current Immunizations  Reviewed on 7/11/2014 Name Date Influenza Vaccine 12/2/2013 Influenza Vaccine Split 10/17/2012 TD Vaccine 12/8/2010 ZZZ-RETIRED (DO NOT USE) Pneumococcal Vaccine (Unspecified Type) 10/1/2002 Not reviewed this visit You Were Diagnosed With   
  
 Codes Comments Chronic atrial fibrillation (HCC)    -  Primary ICD-10-CM: S59.5 ICD-9-CM: 427.31 Long term current use of anticoagulant therapy     ICD-10-CM: Z79.01 
ICD-9-CM: V58.61 JULIANA (acute kidney injury) ([facility]     ICD-10-CM: N17.9 ICD-9-CM: 827. 9 Vitals BP Pulse Temp Resp Height(growth percentile) Weight(growth percentile) 132/72 68 98 °F (36.7 °C) (Oral) 16 5' 9\" (1.753 m) 227 lb (103 kg) SpO2 BMI OB Status Smoking Status 97% 33.52 kg/m2 Hysterectomy Former Smoker BMI and BSA Data Body Mass Index Body Surface Area  
 33.52 kg/m 2 2.24 m 2 Preferred Pharmacy Pharmacy Name Phone Mis Alvarado [address] [place] [address] Dr Escudero, [address] [facility] [phone] Your Updated Medication List  
  
   
This list is accurate as of 9/20/18  4:39 PM.  Always use your most recent med list.  
  
  
  
  
 ACCU-CHEK SHAHIDA PLUS TEST STRP strip Generic drug:  glucose blood VI test strips USE TO CHECK BLOOD SUGAR FOUR TIMES A DAY  
  
 acetaminophen 325 mg tablet Commonly known as:  TYLENOL Take 325 mg by mouth every four (4) hours as needed for Pain. allopurinol 100 mg tablet Commonly known as:  ZYLOPRIM  
TAKE TWO TABLETS BY MOUTH DAILY  
  
 atorvastatin 80 mg tablet Commonly known as:  LIPITOR  
TAKE ONE TABLET BY MOUTH EVERY EVENING  
  
 dofetilide 125 mcg capsule Commonly known as:  Sheryle Amabile Take 1 Cap by mouth two (2) times a day. furosemide 40 mg tablet Commonly known as:  LASIX  
1 qam- changed upon lab review 9/5/18  
  
 hydrALAZINE 10 mg tablet Commonly known as:  APRESOLINE Take 2 Tabs by mouth three (3) times daily. insulin  unit/mL (3 mL) Inpn Commonly known as:  HumuLIN N NPH Insulin KwikPen INJECT 43 UNITS UNDER THE SKIN EVERY MORNING, 23 UNITS UNDER THE SKIN WITH DINNER OR AS DIRECTED BY PHYSICIAN. **THIS REPLACED HUMULOG MIX**  
  
 levothyroxine 125 mcg tablet Commonly known as:  SYNTHROID  
TAKE ONE TABLET BY MOUTH DAILY  
  
 lisinopril 20 mg tablet Commonly known as:  Luma Iveth Take 1 Tab by mouth daily. metoprolol tartrate 50 mg tablet Commonly known as:  LOPRESSOR Take 1 Tab by mouth two (2) times a day. Little Pen Needle 32 gauge x 5/32\" Ndle Generic drug:  Insulin Needles (Disposable) USE WITH INSULIN PEN TWO TIMES A DAY AS DIRECTED BY PHYSICIAN  
  
 potassium chloride SR 10 mEq tablet Commonly known as:  KLOR-CON 10 Take 1 Tab by mouth daily. PriLOSEC 20 mg capsule Generic drug:  omeprazole Take 20 mg by mouth daily. rivaroxaban 15 mg Tab tablet Commonly known as:  Alayne Cristal Take 1 Tab by mouth daily (with dinner). Prescriptions Sent to Pharmacy Refills  
 rivaroxaban (XARELTO) 15 mg tab tablet 11 Sig: Take 1 Tab by mouth daily (with dinner). Class: Normal  
 Pharmacy: [facility] Duty [address] [place] [address], [address] [place] [address]  [phone] Ph #: [phone] Route: Oral  
  
Description New dose: Coumadin 3mg daily except 6mg on M/W/F (16.7% decrease) Old dose: Coumadin 6mg daily except 3mg on Saturdays & Sundays Retest on Wednesday 9/26/18 September 2018 Details Sun Mon Tue Wed Thu Fri Sat  
        1  
  
  
  
  
  2  
  
  
  
   3  
  
  
  
   4  
  
  
  
   5  
  
  
  
   6  
  
  
  
   7  
  
  
  
   8  
  
  
  
  
  9  
  
  
  
   10  
  
  
  
   11  
  
  
  
   12  
  
  
  
   13  
  
  
  
   14  
  
  
  
   15  
  
  
  
  
  16  
  
  
  
   17  
  
  
  
   18  
  
  
  
   19  
  
  
  
   20  
  
3 mg See details 21  
  
6 mg  
  
   22  
  
3 mg  
  
  
  23  
  
3 mg  
  
   24  
  
6 mg  
  
   25  
  
3 mg  
  
   26  
  
6 mg  
  
   27  
  
  
  
   28  
  
  
  
   29  
  
  
  
  
  30  
  
  
  
        
 Date Details 09/20 This INR check INR: 2.6 Date of next INR:  9/26/2018 How to take your warfarin dose To take:  3 mg Take one of the 3 mg tablets. To take:  6 mg Take two of the 3 mg tablets. We Performed the Following AMB POC PT/INR [85288 CPT(R)] Follow-up Instructions Return in about 1 month (around 10/20/2018). Patient Instructions START NEW of dose of Coumadin: 3mg daily except 6mg on Mondays, Wednesday and Fridays- resume Coumdadin TODAY - see calendar Retest INR on Wednesday 9/26/18 Xarelto (new blood thinner) prescription has been sent to your pharmacy. If you decide to start taking this medication call office for instructions BEFORE taking Xarelto. Introducing hospitals & [facility] SERVICES! Dear Daya Melo: 
Thank you for requesting a [employer] account. Our records indicate that you already have an active Buzzero account. You can access your account anytime at [email or web address] Did you know that you can access your hospital and ER discharge instructions at any time in Buzzero? You can also review all of your test results from your hospital stay or ER visit. Additional Information If you have questions, please visit the Frequently Asked Questions section of the Buzzero website at [email or web address] Remember, Buzzero is NOT to be used for urgent needs. For medical emergencies, dial 911. Now available from your iPhone and Android! Please provide this summary of care documentation to your next provider. Your primary care clinician is listed as ANDRES BURDEN. If you have any questions after today's visit, please call [phone].

## 2018-09-20 NOTE — PATIENT INSTRUCTIONS
START NEW of dose of Coumadin: 3mg daily except 6mg on Mondays, Wednesday and Fridays- resume Coumdadin TODAY - see calendar Retest INR on Wednesday 9/26/18 Xarelto (new blood thinner) prescription has been sent to your pharmacy. If you decide to start taking this medication call office for instructions BEFORE taking Xarelto.

## 2018-09-21 LAB
BUN SERPL-MCNC: 27 MG/DL (ref 8–27)
BUN/CREAT SERPL: 18 (ref 12–28)
CALCIUM SERPL-MCNC: 8.9 MG/DL (ref 8.7–10.3)
CHLORIDE SERPL-SCNC: 105 MMOL/L (ref 96–106)
CO2 SERPL-SCNC: 24 MMOL/L (ref 20–29)
CREAT SERPL-MCNC: 1.52 MG/DL (ref 0.57–1)
GLUCOSE SERPL-MCNC: 67 MG/DL (ref 65–99)
POTASSIUM SERPL-SCNC: 4.5 MMOL/L (ref 3.5–5.2)
SODIUM SERPL-SCNC: 143 MMOL/L (ref 134–144)

## 2018-09-25 ENCOUNTER — TELEPHONE (OUTPATIENT)
Dept: INTERNAL MEDICINE CLINIC | Age: 83
End: 2018-09-25

## 2018-09-25 NOTE — TELEPHONE ENCOUNTER
Verified patient identity with two identifiers. Spoke with patient by phone to confirm she is NOT taking Coumadin, only Xarelto 15mg at dinner daily. Patient states Coumadin is out of her pill bottle and bottles are up in cabinet with big X marked on them. Reviewed to still monitor for signs of bleeding and to call if any occur. Reviewed no need for weekly INR testing any further. Will alert home monitoring company to discontinue. But no changes to testing of blood sugars. Patient verbalized understanding. Also placed call to patient's daughter Mable Diego to verify all of the above as well. Left message for her to read [employer] message.

## 2018-10-04 ENCOUNTER — HOSPITAL ENCOUNTER (OUTPATIENT)
Dept: WOUND CARE | Age: 83
Discharge: HOME | End: 2018-10-04
Payer: MEDICARE

## 2018-10-04 VITALS — DIASTOLIC BLOOD PRESSURE: 53 MMHG | TEMPERATURE: 97.7 F | SYSTOLIC BLOOD PRESSURE: 144 MMHG | RESPIRATION RATE: 18 BRPM

## 2018-10-04 PROBLEM — L97.922 NON-PRESSURE CHRONIC ULCER OF LEFT LOWER LEG WITH FAT LAYER EXPOSED (HCC): Status: ACTIVE | Noted: 2018-10-04

## 2018-10-04 PROBLEM — L97.912 NON-PRESSURE CHRONIC ULCER OF LOWER LEG, RIGHT, WITH FAT LAYER EXPOSED (HCC): Status: ACTIVE | Noted: 2018-10-04

## 2018-10-04 PROCEDURE — 29581 APPL MULTLAYER CMPRN SYS LEG: CPT

## 2018-10-04 NOTE — PROGRESS NOTES
[address] 
WOUND CARE PROGRESS NOTE Vikki Tellez 
MR#: [id number] : 1935 ACCOUNT #: [de-identified] DATE OF SERVICE: 10/04/2018 The patient previously was seen in the [address] [place] [address] regarding bilateral leg ulcers and edema. She was last seen with resolution of the ulcers in July of this year. Patient reports that she has had increased swelling recently in the legs and she developed blisters in both lower legs and ulceration. Her legs feel swollen to her. The patient has a history of diabetes and AFib. She is on Xarelto now. She is followed by Dr. Dee Gross as her cardiologist. 
 
The patient reports some shortness of breath with walking, which is unchanged. The patient does take oral diuretics. She was taking Lasix 80 mg each morning and 40 mg each evening, but because of an increased BUN and creatinine, dosing was reduced to 40 mg once per day by Dr. Ulysses Monroy on 2018. PHYSICAL EXAMINATION: 
GENERAL:  The patient is alert and in no distress. LUNGS:  Clear bilaterally. EXTREMITIES:  The patient has palpable dorsalis pedis pulses bilaterally, which are 2+. She has 1-2+ edema in the lower legs bilaterally. I do not feel any edema in the thighs. She has clustered shallow ulceration in the right leg with a total dimension of 14.5 x 12.5 x 0.1 cm. This includes a total of 13 clustered ulcers. On the left side, there are 9 clustered ulcers with a total dimension of 6.5 x 11 x 0.1 cm. The patient had Aquacel Ag dressings applied to the ulcers and triple-layer compression wraps applied on both legs in the [address] [place] [address]. I will try to communicate with her primary physician regarding her diuretic dosing. The patient does sleep lying down flat at night. She was advised to elevate the legs periodically over the course of the day. The patient will follow up in the [address] [place] [address] in 1 week. FINAL DIAGNOSES:  Bilateral leg edema, bilateral non-pressure lower leg ulcerations with subcutaneous layer exposed. ADDENDUM:  The patient was noted to have erythema and warmth in the left lower leg more than right. Because she appeared to have some cellulitis in the lower leg, prescription was written for Keflex 500 mg p.o. t.i.d. for 10 days. MD TOYA Almeida / MOE 
D: 10/04/2018 12:26    
T: 10/04/2018 12:54 JOB #: [id number]

## 2018-10-04 NOTE — WOUND CARE
10/04/18 1200 Wound Leg Lower Left Date First Assessed/Time First Assessed: 10/04/18 1155   POA: Yes  Wound Type: (c) Venous; Blister/bullae  Location: Leg Lower  Orientation: Left DRESSING TYPE Open to air Non-Pressure Injury Partial thickness (epider/derm) Wound Length (cm) 6.5 cm 
(9 clustered wounds/erupted blisters.) Wound Width (cm) 11 cm 
(9 clustered wounds/erupted blisters.) Wound Depth (cm) 0 
(9 clustered wounds/erupted blisters.) Wound Surface area (cm^2) 71.5 cm^2 Condition of Base Other (comment) 
(Red) Tissue Type (Red) Drainage Amount  Small Drainage Color Serous Wound Odor None Periwound Skin Condition Edematous Cleansing and Cleansing Agents  Normal saline Wound Leg Lower Right Date First Assessed/Time First Assessed: 10/04/18 1200   POA: Yes  Wound Type: (c) Blister/bullae;Venous  Location: Leg Lower  Wound Description (Optional): (c) r  Orientation: Right DRESSING TYPE Open to air Non-Pressure Injury Partial thickness (epider/derm) Wound Length (cm) 14.5 cm Wound Width (cm) 12.5 cm Wound Depth (cm) 0.1 Wound Surface area (cm^2) 181.25 cm^2 Condition of Base Other (comment) (Red-Total of 13 small erupted blisters.) Condition of Edges Open Drainage Color Serous Wound Odor None Periwound Skin Condition Edematous

## 2018-10-11 ENCOUNTER — HOSPITAL ENCOUNTER (OUTPATIENT)
Dept: WOUND CARE | Age: 83
Discharge: HOME | End: 2018-10-11
Payer: MEDICARE

## 2018-10-11 VITALS — DIASTOLIC BLOOD PRESSURE: 63 MMHG | RESPIRATION RATE: 18 BRPM | SYSTOLIC BLOOD PRESSURE: 136 MMHG | TEMPERATURE: 97.1 F

## 2018-10-11 PROCEDURE — 29581 APPL MULTLAYER CMPRN SYS LEG: CPT

## 2018-10-11 RX ORDER — CEPHALEXIN 500 MG/1
500 CAPSULE ORAL 4 TIMES DAILY
COMMUNITY
End: 2018-10-29 | Stop reason: ALTCHOICE

## 2018-10-11 NOTE — WOUND CARE
OUTPATIENT WOUND CARE DISCHARGE NOTE 
 
 
 10/11/18 3953 Wound Leg Lower Left Date First Assessed/Time First Assessed: 10/04/18 1155   POA: Yes  Wound Type: (c) Venous; Blister/bullae  Location: Leg Lower  Orientation: Left Cleansing and Cleansing Agents  Soap and water;Normal saline Dressing Type Applied Alginate;Silver products;4 x 4;Compression Wrap/Venous Stasis (Aquacel Ag,4x4s,A&D to dry, intact skin & 3 layer wrap) Procedure Tolerated Well Wound Leg Lower Right Date First Assessed/Time First Assessed: 10/04/18 1200   POA: Yes  Wound Type: (c) Blister/bullae;Venous  Location: Leg Lower  Wound Description (Optional): (c) r  Orientation: Right Cleansing and Cleansing Agents  Soap and water;Normal saline Dressing Type Applied Xeroform;4 x 4;Compression Wrap/Venous Stasis 
(Xeroform, 4x4s, A&D to dry, intact skin & 3 layer wrap) Procedure Tolerated Well Wound Dressing Applied - Per order, as noted above. Pain -  Denies pain Ambulatory Status - Independent Accompanied by - Self Follow Up Appointments - 1 week Discharge Instructions Reviewed. Instructed to call The [address] [place] [address] with any questions or concerns. Patient Verbalizes understanding.

## 2018-10-11 NOTE — WOUND CARE
10/11/18 0810 Wound Leg Lower Left Date First Assessed/Time First Assessed: 10/04/18 1155   POA: Yes  Wound Type: (c) Venous; Blister/bullae  Location: Leg Lower  Orientation: Left DRESSING TYPE Open to air (Pt reports removing dressings last night.) Non-Pressure Injury Partial thickness (epider/derm) Wound Length (cm) 0.2 cm Wound Width (cm) 0.2 cm Wound Depth (cm) 0.1 Wound Surface area (cm^2) 0.04 cm^2 Change in Wound Size % 99.94 Condition of Base Other (comment) 
(Red) Tissue Type Red Drainage Amount  Small Drainage Color Serous Wound Odor None Periwound Skin Condition Edematous Cleansing and Cleansing Agents  Normal saline Wound Leg Lower Right Date First Assessed/Time First Assessed: 10/04/18 1200   POA: Yes  Wound Type: (c) Blister/bullae;Venous  Location: Leg Lower  Wound Description (Optional): (c) r  Orientation: Right DRESSING TYPE Open to air (Pt removed dressings/wraps, last night.) Wound Length (cm) 0 cm Wound Width (cm) 0 cm Wound Depth (cm) 0 Wound Surface area (cm^2) 0 cm^2 Change in Wound Size % 100 Epithelialization (%) 100 Tissue Type Pink;Red; Other (comment) (Epithealized) Drainage Amount  None Wound Odor None Periwound Skin Condition Edematous Cleansing and Cleansing Agents  Normal saline

## 2018-10-11 NOTE — PROGRESS NOTES
The patient previously was seen in the [address] [place] [address] regarding bilateral leg ulcers and edema. She was last seen with resolution of the ulcers in July of this year. 
  
Patient reported on 10/4/2018 that she has had increased swelling recently in the legs and she developed blisters in both lower legs and ulceration. Her legs feel swollen to her. 
  
The patient has a history of diabetes and AFib. She is on Xarelto now. She is followed by Dr. Micki Robertson as her cardiologist. 
  
The patient reports some shortness of breath with walking, which is unchanged. The patient does take oral diuretics. She was taking Lasix 80 mg each morning and 40 mg each evening, but because of an increased BUN and creatinine, dosing was reduced to 40 mg once per day by Dr. Comfort Dubois on 09/20/2018. Her diuretic dose has again been increased to her prior BID dosing after I communicated with DR Murray. She is completing her course of oral antibiotics. 
  
PHYSICAL EXAMINATION: 
GENERAL:  The patient is alert and in no distress. EXTREMITIES:  The patient has palpable dorsalis pedis pulses bilaterally, which are 2+. She has minimal edema in the lower legs bilaterally. I do not feel any edema in the thighs. She has one 0.2 cm diameter ulcer left lower leg, areas of thin skin on right where ulcers had been. 
  
The patient had Aquacel Ag dressings applied to the left ulcer and Xeroform to thin areas on the right and triple-layer compression wraps applied on both legs in the [address] [place] [address]. 
  
The patient does sleep lying down flat at night.   She was advised to elevate the legs periodically over the course of the day. 
  
The patient will follow up in the [address] [place] [address] in 1 week. 
  
FINAL DIAGNOSES:  Bilateral leg edema,  non-pressure left lower leg ulceration with subcutaneous layer exposed. 
  
ICD 10   L97.922, R60.0 
  
  
Edgardo Brown MD

## 2018-10-18 ENCOUNTER — HOSPITAL ENCOUNTER (OUTPATIENT)
Dept: WOUND CARE | Age: 83
Discharge: HOME | End: 2018-10-18
Payer: MEDICARE

## 2018-10-18 VITALS — TEMPERATURE: 97.6 F | RESPIRATION RATE: 16 BRPM

## 2018-10-18 PROCEDURE — 99213 OFFICE O/P EST LOW 20 MIN: CPT

## 2018-10-18 NOTE — PROGRESS NOTES
The patient previously was seen in the [address] [place] [address] regarding bilateral leg ulcers and edema. Gracie Carbone was last seen with resolution of the ulcers in July of this year. 
  
Patient reported on 10/4/2018 that she has had increased swelling recently in the legs and she developed blisters in both lower legs and ulceration.  Her legs feel swollen to her. 
  
The patient has a history of diabetes and AFib. Gracie Carbone is on Xarelto now. Gracie Carbone is followed by Dr. Gabrielle Elaine as her cardiologist. 
  
The patient reports some shortness of breath with walking, which is unchanged.  The patient does take oral diuretics.  She was taking Lasix 80 mg each morning and 40 mg each evening, but because of an increased BUN and creatinine, dosing was reduced to 40 mg once per day by Dr. Sangeetha Agudelo on 09/20/2018. Her diuretic dose has again been increased to her prior BID dosing after I communicated with DR Murray. She is taking now this dose. 
  
PHYSICAL EXAMINATION: 
GENERAL:  The patient is alert and in no distress. EXTREMITIES:  The patient has palpable dorsalis pedis pulses bilaterally, which are 2+.  She has minimal edema in the lower legs bilaterally.  I do not feel any edema in the thighs. Sundra Rummage is healed. 
  
She will continue to use 30-40 mm Hg stockings when out of bed. 
  
The patient does sleep lying down flat at night.  She was advised to elevate the legs periodically over the course of the day. 
  
FINAL DIAGNOSES:  Bilateral leg edema.   non-pressure left lower leg ulceration is healed. 
  
ICD 10    R60.0

## 2018-10-18 NOTE — WOUND CARE
10/18/18 0810 Wound Leg Lower Left Date First Assessed/Time First Assessed: 10/04/18 1155   POA: Yes  Wound Type: (c) Venous; Blister/bullae  Location: Leg Lower  Orientation: Left DRESSING TYPE Open to air; Other (Comment) 
(Compression stockings) Wound Length (cm) 0 cm Wound Width (cm) 0 cm Wound Depth (cm) 0 Wound Surface area (cm^2) 0 cm^2 Change in Wound Size % 100 Condition of Base Epithelializing (Red area, size of pinpoint) Tissue Type Red (Red pinpoint sized spot. No drainage.) Drainage Amount  None Wound Odor None Periwound Skin Condition Edematous; Erythema, blanchable Cleansing and Cleansing Agents  Normal saline Wound Leg Lower Right Date First Assessed/Time First Assessed: 10/04/18 1200   POA: Yes  Wound Type: (c) Blister/bullae;Venous  Location: Leg Lower  Wound Description (Optional): (c) r  Orientation: Right DRESSING TYPE Other (Comment); Open to air 
(Compression stocking) Wound Length (cm) 0 cm Wound Width (cm) 0 cm Wound Depth (cm) 0 Wound Surface area (cm^2) 0 cm^2 Change in Wound Size % 100 Condition of Base Epithelializing Tissue Type Pink;Red Drainage Amount  None Wound Odor None Periwound Skin Condition Edematous; Erythema, blanchable Cleansing and Cleansing Agents  Normal saline

## 2018-10-29 ENCOUNTER — OFFICE VISIT (OUTPATIENT)
Dept: INTERNAL MEDICINE CLINIC | Age: 83
End: 2018-10-29

## 2018-10-29 ENCOUNTER — HOSPITAL ENCOUNTER (OUTPATIENT)
Dept: LAB | Age: 83
Discharge: HOME | End: 2018-10-29
Payer: MEDICARE

## 2018-10-29 VITALS
SYSTOLIC BLOOD PRESSURE: 134 MMHG | OXYGEN SATURATION: 95 % | DIASTOLIC BLOOD PRESSURE: 77 MMHG | HEIGHT: 69 IN | TEMPERATURE: 97.7 F | HEART RATE: 76 BPM | WEIGHT: 224 LBS | BODY MASS INDEX: 33.18 KG/M2 | RESPIRATION RATE: 16 BRPM

## 2018-10-29 DIAGNOSIS — I48.20 CHRONIC ATRIAL FIBRILLATION (HCC): ICD-10-CM

## 2018-10-29 DIAGNOSIS — I50.32 CHRONIC DIASTOLIC HEART FAILURE (HCC): ICD-10-CM

## 2018-10-29 DIAGNOSIS — R60.0 BILATERAL LEG EDEMA: ICD-10-CM

## 2018-10-29 DIAGNOSIS — N17.9 AKI (ACUTE KIDNEY INJURY) (HCC): Primary | ICD-10-CM

## 2018-10-29 DIAGNOSIS — E06.3 HYPOTHYROIDISM, ACQUIRED, AUTOIMMUNE: ICD-10-CM

## 2018-10-29 DIAGNOSIS — Z79.4 CONTROLLED TYPE 2 DIABETES MELLITUS WITH STAGE 3 CHRONIC KIDNEY DISEASE, WITH LONG-TERM CURRENT USE OF INSULIN (HCC): ICD-10-CM

## 2018-10-29 DIAGNOSIS — I10 ESSENTIAL HYPERTENSION, BENIGN: ICD-10-CM

## 2018-10-29 DIAGNOSIS — E11.22 CONTROLLED TYPE 2 DIABETES MELLITUS WITH STAGE 3 CHRONIC KIDNEY DISEASE, WITH LONG-TERM CURRENT USE OF INSULIN (HCC): ICD-10-CM

## 2018-10-29 DIAGNOSIS — N18.30 CONTROLLED TYPE 2 DIABETES MELLITUS WITH STAGE 3 CHRONIC KIDNEY DISEASE, WITH LONG-TERM CURRENT USE OF INSULIN (HCC): ICD-10-CM

## 2018-10-29 DIAGNOSIS — E78.2 MIXED HYPERLIPIDEMIA: ICD-10-CM

## 2018-10-29 PROCEDURE — 80048 BASIC METABOLIC PNL TOTAL CA: CPT

## 2018-10-29 PROCEDURE — 36415 COLL VENOUS BLD VENIPUNCTURE: CPT

## 2018-10-29 NOTE — PROGRESS NOTES
HISTORY OF PRESENT ILLNESS  Rogelio Cuellar is a 82 y.o. female. HPI Jennifer Nelson is seen today for follow-up of atrial fibrillation with a new anticoagulant, leg swelling and other problems. 1. Leg wound. She has had a reopening of a leg wound two days ago. She is following up this week with the wound clinic. Of note, I see she has had several visits with them already through October. In general, she feels that her edema is controlled. She is back on her usual regimen of Lasix, 2 in the morning and 1 in the evening. She wears compression hose with good results. 2. CHF, no respiratory symptoms. 3. Chronic atrial fibrillation. She is moved to [address] [place] [address]. Only side effects has been some oozing but no significant bleeding noted. 4. Other chronic problems. We will check full labs in 2 months. Current Outpatient Medications   Medication Sig    rivaroxaban (XARELTO) 15 mg tab tablet Take 1 Tab by mouth daily (with dinner).  ipratropium (ATROVENT) 42 mcg (0.06 %) nasal spray 2 Sprays by Both Nostrils route four (4) times daily. As needed runny nose    furosemide (LASIX) 40 mg tablet 1 qam- changed upon lab review 9/5/18 (Patient taking differently: 40 mg. 2 in am and 1 in pm)    ACCU-CHEK SHAHIDA PLUS TEST STRP strip USE TO CHECK BLOOD SUGAR FOUR TIMES A DAY    atorvastatin (LIPITOR) 80 mg tablet TAKE ONE TABLET BY MOUTH EVERY EVENING    insulin NPH (HUMULIN N NPH INSULIN KWIKPEN) 100 unit/mL (3 mL) inpn INJECT 43 UNITS UNDER THE SKIN EVERY MORNING, 23 UNITS UNDER THE SKIN WITH DINNER OR AS DIRECTED BY PHYSICIAN. **THIS REPLACED HUMULOG MIX**    acetaminophen (TYLENOL) 325 mg tablet Take 325 mg by mouth every four (4) hours as needed for Pain.     levothyroxine (SYNTHROID) 125 mcg tablet TAKE ONE TABLET BY MOUTH DAILY    ADAN PEN NEEDLE 32 gauge x 5/32\" ndle USE WITH INSULIN PEN TWO TIMES A DAY AS DIRECTED BY PHYSICIAN    metoprolol tartrate (LOPRESSOR) 50 mg tablet Take 1 Tab by mouth two (2) times a day.  allopurinol (ZYLOPRIM) 100 mg tablet TAKE TWO TABLETS BY MOUTH DAILY    lisinopril (PRINIVIL, ZESTRIL) 20 mg tablet Take 1 Tab by mouth daily.  hydrALAZINE (APRESOLINE) 10 mg tablet Take 2 Tabs by mouth three (3) times daily. (Patient taking differently: Take 20 mg by mouth two (2) times a day.)    potassium chloride SR (KLOR-CON 10) 10 mEq tablet Take 1 Tab by mouth daily.  omeprazole (PRILOSEC) 20 mg capsule Take 20 mg by mouth daily.  dofetilide (TIKOSYN) 125 mcg capsule Take 1 Cap by mouth two (2) times a day. No current facility-administered medications for this visit. Review of Systems   Constitutional: Negative for weight loss. Respiratory: Negative. Cardiovascular: Positive for leg swelling. Negative for chest pain, palpitations and PND. Musculoskeletal: Negative for myalgias. Neurological: Negative for focal weakness. Endo/Heme/Allergies: Bruises/bleeds easily. Physical Exam   Constitutional: No distress. Cardiovascular: Normal rate and regular rhythm. Exam reveals no gallop and no friction rub. No murmur heard. Pulmonary/Chest: Effort normal and breath sounds normal.   Musculoskeletal: She exhibits edema. Moderate bilateral lower extremity edema - chronic, with TEDS   Nursing note and vitals reviewed. ASSESSMENT and PLAN  Diagnoses and all orders for this visit:    1. JULIANA (acute kidney injury) ([facility] [address]  -     METABOLIC PANEL, BASIC- check now    2. Chronic atrial fibrillation (HCC)  -     CBC WITH AUTOMATED DIFF    3. Chronic diastolic heart failure (HCC)  -     METABOLIC PANEL, COMPREHENSIVE    4. Controlled type 2 diabetes mellitus with stage 3 chronic kidney disease, with long-term current use of insulin (HCC)  -     HEMOGLOBIN A1C WITH EAG    5. Bilateral leg edema  -     METABOLIC PANEL, BASIC  - See specialists  as directed. 6. Hypothyroidism, acquired, autoimmune  -     TSH 3RD GENERATION    7.  Mixed hyperlipidemia  - LIPID PANEL    8.  Essential hypertension, benign- Continue current regimen of prescription and / or OTC medications

## 2018-10-30 LAB
BUN SERPL-MCNC: 30 MG/DL (ref 8–27)
BUN/CREAT SERPL: 21 (ref 12–28)
CALCIUM SERPL-MCNC: 9.6 MG/DL (ref 8.7–10.3)
CHLORIDE SERPL-SCNC: 103 MMOL/L (ref 96–106)
CO2 SERPL-SCNC: 27 MMOL/L (ref 20–29)
CREAT SERPL-MCNC: 1.46 MG/DL (ref 0.57–1)
GLUCOSE SERPL-MCNC: 55 MG/DL (ref 65–99)
POTASSIUM SERPL-SCNC: 3.9 MMOL/L (ref 3.5–5.2)
SODIUM SERPL-SCNC: 145 MMOL/L (ref 134–144)

## 2018-11-05 ENCOUNTER — HOSPITAL ENCOUNTER (OUTPATIENT)
Dept: WOUND CARE | Age: 83
Discharge: HOME | End: 2018-11-05
Payer: MEDICARE

## 2018-11-05 VITALS
RESPIRATION RATE: 16 BRPM | TEMPERATURE: 97.8 F | DIASTOLIC BLOOD PRESSURE: 64 MMHG | SYSTOLIC BLOOD PRESSURE: 156 MMHG | HEART RATE: 82 BPM

## 2018-11-05 PROBLEM — L03.115 CELLULITIS OF LEG, RIGHT: Status: ACTIVE | Noted: 2018-11-05

## 2018-11-05 PROCEDURE — 29581 APPL MULTLAYER CMPRN SYS LEG: CPT

## 2018-11-05 NOTE — WOUND CARE
11/05/18 6473 Wound Leg Lower Right Date First Assessed/Time First Assessed: 11/05/18 0830   POA: Yes  Wound Type: (c) Blister/bullae;Venous  Location: Leg Lower  Wound Description (Optional): (c) r  Orientation: Right Dressing Type Applied Xeroform;Gauze; Compression Wrap/Venous Stasis 
(3 layers) Wound Leg Lower Left Date First Assessed/Time First Assessed: 11/05/18 0831   POA: Yes  Wound Type: Blister/bullae;Venous  Location: Leg Lower  Wound Description (Optional): (c)   Orientation: Left Dressing Type Applied Xeroform;Gauze; Compression Wrap/Venous Stasis 
(3 layer) Patient was discharged with Self to home and was ambulatory. In stable condition with c/o pain:4_/10_

## 2018-11-05 NOTE — PROGRESS NOTES
Problem: Lower Extremity Wound Care Goal: *Infected Wound: Prevention of further infection and promotion of healing Infection control procedures (eg: clean dressings, clean gloves, hand washing, precautions to isolate wound from contamination, sterile instruments used for wound debridement) should be implemented. Outcome: Progressing Towards Goal 
Cipro 250mg  BID for 7 days given

## 2018-11-05 NOTE — WOUND CARE
11/05/18 1638 Wound Leg Lower Right Date First Assessed/Time First Assessed: 11/05/18 0830   POA: Yes  Wound Type: (c) Blister/bullae;Venous  Location: Leg Lower  Wound Description (Optional): (c) r  Orientation: Right DRESSING TYPE Open to air Wound Length (cm) 6 cm (Clustered wounds. Total of 8 smaller wounds.) Wound Width (cm) 9.5 cm Wound Depth (cm) 0.1 Wound Surface area (cm^2) 57 cm^2 Change in Wound Size % 68.55 Drainage Amount  Small Drainage Color Serous Wound Odor None Periwound Skin Condition Erythema, blanchable Cleansing and Cleansing Agents  Normal saline Wound Leg Lower Left Date First Assessed/Time First Assessed: 11/05/18 0831   POA: Yes  Wound Type: Blister/bullae;Venous  Location: Leg Lower  Wound Description (Optional): (c)   Orientation: Left DRESSING TYPE Open to air Wound Length (cm) 26 cm (Clustered wounds. Total of 13 smaller wounds.) Wound Width (cm) 9 cm Wound Depth (cm) 0.1 Wound Surface area (cm^2) 234 cm^2 Condition of Base Pink Condition of Edges Open Drainage Amount  Small Drainage Color Serous Wound Odor None Periwound Skin Condition Erythema, blanchable Cleansing and Cleansing Agents  Normal saline Visit Vitals /64 Pulse 82 Temp 97.8 °F (36.6 °C) Resp 16 LLE Peripheral Vascular Capillary Refill: Less than/equal to 3 seconds (11/05/18 0817) Color: Appropriate for race (11/05/18 0817) Temperature: Warm (11/05/18 0817) Sensation: Present (11/05/18 0817) Pedal Pulse: Palpable (11/05/18 0817) Circumference of Calf (cm): 43.5 cm (11/05/18 0817) Location of Measurement (Calf): Mid  (11/05/18 0817) Circumference of Ankle (cm): 25.5 cm (11/05/18 0817) Location of Measurement (Ankle): Upper  (11/05/18 0817) RLE Peripheral Vascular Capillary Refill: Less than/equal to 3 seconds (11/05/18 0817) Color: Appropriate for race (11/05/18 0817) Temperature: Warm (11/05/18 0817) Sensation: Present (11/05/18 0817) Pedal Pulse: Palpable (11/05/18 0817) Circumference of Calf (cm): 42.5 cm (11/05/18 0817) Location of Measurement (Calf): Mid  (11/05/18 0817) Circumference of Ankle (cm): 25 cm (11/05/18 0817) Location of Measurement (Ankle): Upper  (11/05/18 0817)

## 2018-11-05 NOTE — PROGRESS NOTES
The patient previously was seen in the [address] [place] [address] on 10/18/2018 with resolution of ulceration on her left leg. She had had previous  treatment for bilateral leg ulcers and edema in July 2018. 
  
 Her legs feel swollen to her. 
  
The patient has a history of diabetes and AFib. Yumiko Bolden is on Xarelto now. Yumiko Bolden is followed by Dr. Blaire Ruffin as her cardiologist. 
  
The patient reports some shortness of breath with walking, which is unchanged.  The patient does take oral diuretics.  She was taking Lasix 80 mg each morning and 40 mg each evening.  She has been using elastic stockings double layer, eight months old. 
  
PHYSICAL EXAMINATION: 
GENERAL:  The patient is alert and in no distress. EXTREMITIES:  The patient has palpable dorsalis pedis pulses bilaterally, which are 2+.  She has 2 + edema of both lower legs with moderate erythema of lower legs with heat. Multiple very shallow ulcers in lower legs. I have written Rx for Cipro 250 mg po bid for 1 week. Xeroform and triple layer compression dressings place right and left. 
  
The patient does sleep lying down flat at night.  She was advised to elevate the legs periodically over the course of the day. 
  
FINAL DIAGNOSES:  Bilateral leg edema. Non pressure ulcers right an dleft.   Cellulitis lower legs bilateral. 
  
ICD 10    R60.0  L97.912, L97.922, L03.116, L03.115

## 2018-11-12 ENCOUNTER — HOSPITAL ENCOUNTER (OUTPATIENT)
Dept: WOUND CARE | Age: 83
Discharge: HOME | End: 2018-11-12
Payer: MEDICARE

## 2018-11-12 VITALS
RESPIRATION RATE: 16 BRPM | TEMPERATURE: 97.4 F | DIASTOLIC BLOOD PRESSURE: 61 MMHG | HEART RATE: 69 BPM | SYSTOLIC BLOOD PRESSURE: 144 MMHG

## 2018-11-12 PROCEDURE — 29581 APPL MULTLAYER CMPRN SYS LEG: CPT

## 2018-11-12 NOTE — WOUND CARE
11/12/18 2991 Wound Leg Lower Left Date First Assessed/Time First Assessed: 11/05/18 0831   POA: Yes  Wound Type: Blister/bullae;Venous  Location: Leg Lower  Wound Description (Optional): (c)   Orientation: Left DRESSING TYPE Open to air (Stockings) Wound Length (cm) 0.9 cm Wound Width (cm) 0.6 cm Wound Depth (cm) 0.1 Wound Surface area (cm^2) 0.54 cm^2 Change in Wound Size % 99.77 Condition of [facility] Condition of Edges Open Drainage Amount  Small Drainage Color Serous Wound Odor None Periwound Skin Condition Erythema, blanchable Cleansing and Cleansing Agents  Normal saline Visit Vitals /61 Pulse 69 Temp 97.4 °F (36.3 °C) Resp 16 LLE Peripheral Vascular Capillary Refill: Less than/equal to 3 seconds (11/12/18 0927) Color: Appropriate for race (11/12/18 5312) Temperature: Warm (11/12/18 0927) Sensation: Present (11/12/18 0079) Pedal Pulse: Palpable (11/12/18 0927) Circumference of Calf (cm): 42.5 cm (11/12/18 0927) Location of Measurement (Calf): Mid  (11/12/18 2132) Circumference of Ankle (cm): 25.5 cm (11/12/18 0927) Location of Measurement (Ankle): Upper  (11/12/18 0927) RLE Peripheral Vascular Capillary Refill: Less than/equal to 3 seconds (11/12/18 0927) Color: Appropriate for race (11/12/18 2138) Temperature: Warm (11/12/18 0927) Sensation: Present (11/12/18 5634) Pedal Pulse: Palpable (11/12/18 0927) Circumference of Calf (cm): 41.5 cm (11/12/18 0927) Location of Measurement (Calf): Mid  (11/12/18 4382) Circumference of Ankle (cm): 24.5 cm (11/12/18 0927) Location of Measurement (Ankle): Upper  (11/12/18 0927)

## 2018-11-12 NOTE — PROGRESS NOTES
The patient previously was seen in the [facility] on 10/18/2018 with resolution of ulceration on her left leg. She had had previous  treatment for bilateral leg ulcers and edema in July 2018. 
  
 Her legs feel less swollen since last visit. She had Xeroform and triple layer compression on both legs an sahs been taking th eoral antibiotics. 
  
The patient has a history of diabetes and AFib. Addie Sheppard is on Xarelto now. Addie Sheppard is followed by Dr. Maria Luisa Blancas as her cardiologist. 
  
The patient reports some shortness of breath with walking, which is unchanged.  The patient does take oral diuretics.  She was taking Lasix 80 mg each morning and 40 mg each evening.  
 
  
PHYSICAL EXAMINATION: 
GENERAL:  The patient is alert and in no distress. EXTREMITIES:  The patient has palpable dorsalis pedis pulses bilaterally, which are 2+.  She has 1 + edema of both lower legs with minimal erythema of right lower leg. Small shallow ulcer right lower leg, crusted site left leg. 
 
  
Xeroform and triple layer compression dressings place right and left. 
  
The patient does sleep lying down flat at night.  She was advised to elevate the legs periodically over the course of the day. Continue diuretic, limiting oral intake. Complete Cipro course. 
  
FINAL DIAGNOSES:  Bilateral leg edema. Non pressure ulcers right an dleft.   Cellulitis lower legs bilateral.  All improved. 
  
ICD 10    R60.0  L97.912, L97.922, L03.116, L03.115

## 2018-11-12 NOTE — WOUND CARE
11/12/18 8379 Wound Leg Lower Left Date First Assessed/Time First Assessed: 11/05/18 0831   POA: Yes  Wound Type: Blister/bullae;Venous  Location: Leg Lower  Wound Description (Optional): (c)   Orientation: Left Dressing Type Applied Xeroform; Compression Wrap/Venous Stasis 
(A&D to intact skin) Right and left lower legs: Applied A&D ointment to both legs, applied  Xeroform to wound ,Gauze and 3 layer wraps. Patient was discharged with Self to home and was ambulatory. In stable condition with c/o pain:_5_/10_

## 2018-11-19 ENCOUNTER — HOSPITAL ENCOUNTER (OUTPATIENT)
Dept: WOUND CARE | Age: 83
Discharge: HOME | End: 2018-11-19
Payer: MEDICARE

## 2018-11-19 VITALS
DIASTOLIC BLOOD PRESSURE: 72 MMHG | RESPIRATION RATE: 16 BRPM | TEMPERATURE: 97 F | HEART RATE: 67 BPM | SYSTOLIC BLOOD PRESSURE: 116 MMHG

## 2018-11-19 PROBLEM — L03.115 CELLULITIS OF LEG, RIGHT: Status: RESOLVED | Noted: 2018-11-05 | Resolved: 2018-11-19

## 2018-11-19 PROCEDURE — 29581 APPL MULTLAYER CMPRN SYS LEG: CPT

## 2018-11-19 NOTE — PROGRESS NOTES
[facility]2018 with resolution of ulceration on her left leg.  She had had previous  treatment for bilateral leg ulcers and edema in July 2018. 
  
 Her legs feel less swollen since last visit. She had Xeroform and triple layer compression on both legs. She completed oral antibiotics for cellulitis. 
  
The patient has a history of diabetes and AFib. Venkata Tirado is on Xarelto now. Venkata Tirado is followed by Dr. John Webb as her cardiologist.  The patient reports some shortness of breath with walking, which is unchanged.  The patient does take oral diuretics.  She was taking Lasix 80 mg each morning and 40 mg each evening.  
  
  
PHYSICAL EXAMINATION: 
GENERAL:  The patient is alert and in no distress. EXTREMITIES:  The patient has palpable dorsalis pedis pulses bilaterally, which are 2+.  She has 1 + edema of both lower legs with no erythema of either lower leg. Small shallow ulcer right lower leg, no active ulcer left leg. 
  
  
Xeroform and triple layer compression dressings placed on the right, triple layer compression on the left. 
  
The patient does sleep lying down flat at night.  She was advised to elevate the legs periodically over the course of the day.   Continue diuretic, limiting oral intake.   
  
FINAL DIAGNOSES:  Bilateral leg edema. Non pressure ulcers right.   
  
ICD 10    R60.0  L97.912

## 2018-11-19 NOTE — WOUND CARE
11/19/18 1027 Wound Leg Lower Left Date First Assessed/Time First Assessed: 11/05/18 0831   POA: Yes  Wound Type: Blister/bullae;Venous  Location: Leg Lower  Wound Description (Optional): (c)   Orientation: Left DRESSING TYPE Open to air Wound Length (cm) 0 cm Wound Width (cm) 0 cm Wound Depth (cm) 0 Wound Surface area (cm^2) 0 cm^2 Change in Wound Size % 100 Wound Leg Lower Right Date First Assessed/Time First Assessed: 11/05/18 0830   POA: Yes  Wound Type: (c) Blister/bullae;Venous  Location: Leg Lower  Wound Description (Optional): (c) r  Orientation: Right DRESSING TYPE Open to air Wound Length (cm) 0.8 cm Wound Width (cm) 0.5 cm Wound Depth (cm) 0.1 Wound Surface area (cm^2) 0.4 cm^2 Change in Wound Size % 99.78 Condition of Base Pink Condition of Edges Open Tissue Type Pink Drainage Amount  Small Drainage Color Serous Wound Odor None Periwound Skin Condition Intact Cleansing and Cleansing Agents  Normal saline

## 2018-11-21 ENCOUNTER — HOSPITAL ENCOUNTER (INPATIENT)
Age: 83
LOS: 3 days | Discharge: HOME | DRG: 392 | End: 2018-11-24
Attending: EMERGENCY MEDICINE | Admitting: INTERNAL MEDICINE
Payer: MEDICARE

## 2018-11-21 DIAGNOSIS — I48.20 CHRONIC ATRIAL FIBRILLATION (HCC): ICD-10-CM

## 2018-11-21 DIAGNOSIS — R13.14 PHARYNGOESOPHAGEAL DYSPHAGIA: Primary | ICD-10-CM

## 2018-11-21 DIAGNOSIS — K22.2 ESOPHAGEAL STRICTURE: ICD-10-CM

## 2018-11-21 PROBLEM — R13.10 DYSPHAGIA: Status: ACTIVE | Noted: 2018-11-21

## 2018-11-21 LAB
ALBUMIN SERPL-MCNC: 3.9 G/DL (ref 3.5–5)
ALBUMIN/GLOB SERPL: 1.2 {RATIO} (ref 1.1–2.2)
ALP SERPL-CCNC: 77 U/L (ref 45–117)
ALT SERPL-CCNC: 22 U/L (ref 12–78)
ANION GAP SERPL CALC-SCNC: 5 MMOL/L (ref 5–15)
AST SERPL-CCNC: 18 U/L (ref 15–37)
BASOPHILS # BLD: 0 K/UL (ref 0–0.1)
BASOPHILS NFR BLD: 1 % (ref 0–1)
BILIRUB SERPL-MCNC: 0.5 MG/DL (ref 0.2–1)
BUN SERPL-MCNC: 34 MG/DL (ref 6–20)
BUN/CREAT SERPL: 20 (ref 12–20)
CALCIUM SERPL-MCNC: 9.1 MG/DL (ref 8.5–10.1)
CHLORIDE SERPL-SCNC: 108 MMOL/L (ref 97–108)
CO2 SERPL-SCNC: 29 MMOL/L (ref 21–32)
COMMENT, HOLDF: NORMAL
CREAT SERPL-MCNC: 1.67 MG/DL (ref 0.55–1.02)
DIFFERENTIAL METHOD BLD: ABNORMAL
EOSINOPHIL # BLD: 0.2 K/UL (ref 0–0.4)
EOSINOPHIL NFR BLD: 2 % (ref 0–7)
ERYTHROCYTE [DISTWIDTH] IN BLOOD BY AUTOMATED COUNT: 15 % (ref 11.5–14.5)
GLOBULIN SER CALC-MCNC: 3.3 G/DL (ref 2–4)
GLUCOSE SERPL-MCNC: 75 MG/DL (ref 65–100)
HCT VFR BLD AUTO: 34.9 % (ref 35–47)
HGB BLD-MCNC: 10.6 G/DL (ref 11.5–16)
IMM GRANULOCYTES # BLD: 0 K/UL (ref 0–0.04)
IMM GRANULOCYTES NFR BLD AUTO: 0 % (ref 0–0.5)
INR PPP: 1.2 (ref 0.9–1.1)
LYMPHOCYTES # BLD: 1 K/UL (ref 0.8–3.5)
LYMPHOCYTES NFR BLD: 12 % (ref 12–49)
MCH RBC QN AUTO: 27.9 PG (ref 26–34)
MCHC RBC AUTO-ENTMCNC: 30.4 G/DL (ref 30–36.5)
MCV RBC AUTO: 91.8 FL (ref 80–99)
MONOCYTES # BLD: 0.4 K/UL (ref 0–1)
MONOCYTES NFR BLD: 5 % (ref 5–13)
NEUTS SEG # BLD: 6.7 K/UL (ref 1.8–8)
NEUTS SEG NFR BLD: 80 % (ref 32–75)
NRBC # BLD: 0 K/UL (ref 0–0.01)
NRBC BLD-RTO: 0 PER 100 WBC
PLATELET # BLD AUTO: 122 K/UL (ref 150–400)
POTASSIUM SERPL-SCNC: 4 MMOL/L (ref 3.5–5.1)
PROT SERPL-MCNC: 7.2 G/DL (ref 6.4–8.2)
PROTHROMBIN TIME: 11.9 SEC (ref 9–11.1)
RBC # BLD AUTO: 3.8 M/UL (ref 3.8–5.2)
SAMPLES BEING HELD,HOLD: NORMAL
SODIUM SERPL-SCNC: 142 MMOL/L (ref 136–145)
WBC # BLD AUTO: 8.4 K/UL (ref 3.6–11)

## 2018-11-21 PROCEDURE — 36415 COLL VENOUS BLD VENIPUNCTURE: CPT

## 2018-11-21 PROCEDURE — 65270000029 HC RM PRIVATE

## 2018-11-21 PROCEDURE — 85025 COMPLETE CBC W/AUTO DIFF WBC: CPT

## 2018-11-21 PROCEDURE — 83735 ASSAY OF MAGNESIUM: CPT

## 2018-11-21 PROCEDURE — 85610 PROTHROMBIN TIME: CPT

## 2018-11-21 PROCEDURE — 99283 EMERGENCY DEPT VISIT LOW MDM: CPT

## 2018-11-21 PROCEDURE — 80053 COMPREHEN METABOLIC PANEL: CPT

## 2018-11-21 RX ORDER — ENOXAPARIN SODIUM 100 MG/ML
1 INJECTION SUBCUTANEOUS EVERY 24 HOURS
Status: DISCONTINUED | OUTPATIENT
Start: 2018-11-22 | End: 2018-11-24

## 2018-11-21 RX ORDER — HYDRALAZINE HYDROCHLORIDE 10 MG/1
20 TABLET, FILM COATED ORAL 2 TIMES DAILY
Status: DISCONTINUED | OUTPATIENT
Start: 2018-11-22 | End: 2018-11-22

## 2018-11-21 RX ORDER — ONDANSETRON 2 MG/ML
4 INJECTION INTRAMUSCULAR; INTRAVENOUS
Status: DISCONTINUED | OUTPATIENT
Start: 2018-11-21 | End: 2018-11-24 | Stop reason: HOSPADM

## 2018-11-21 RX ORDER — MAGNESIUM SULFATE 100 %
4 CRYSTALS MISCELLANEOUS AS NEEDED
Status: DISCONTINUED | OUTPATIENT
Start: 2018-11-21 | End: 2018-11-24 | Stop reason: HOSPADM

## 2018-11-21 RX ORDER — DOFETILIDE 0.12 MG/1
125 CAPSULE ORAL 2 TIMES DAILY
Status: DISCONTINUED | OUTPATIENT
Start: 2018-11-22 | End: 2018-11-24 | Stop reason: HOSPADM

## 2018-11-21 RX ORDER — SODIUM CHLORIDE 0.9 % (FLUSH) 0.9 %
5-10 SYRINGE (ML) INJECTION AS NEEDED
Status: DISCONTINUED | OUTPATIENT
Start: 2018-11-21 | End: 2018-11-23 | Stop reason: SDUPTHER

## 2018-11-21 RX ORDER — SODIUM CHLORIDE 0.9 % (FLUSH) 0.9 %
5-10 SYRINGE (ML) INJECTION EVERY 8 HOURS
Status: DISCONTINUED | OUTPATIENT
Start: 2018-11-21 | End: 2018-11-24 | Stop reason: HOSPADM

## 2018-11-21 RX ORDER — METOPROLOL TARTRATE 50 MG/1
50 TABLET ORAL 2 TIMES DAILY
Status: DISCONTINUED | OUTPATIENT
Start: 2018-11-22 | End: 2018-11-22

## 2018-11-21 RX ORDER — DEXTROSE 50 % IN WATER (D50W) INTRAVENOUS SYRINGE
12.5-25 AS NEEDED
Status: DISCONTINUED | OUTPATIENT
Start: 2018-11-21 | End: 2018-11-24 | Stop reason: HOSPADM

## 2018-11-21 RX ORDER — SODIUM CHLORIDE 9 MG/ML
75 INJECTION, SOLUTION INTRAVENOUS CONTINUOUS
Status: DISCONTINUED | OUTPATIENT
Start: 2018-11-21 | End: 2018-11-22

## 2018-11-21 RX ORDER — INSULIN LISPRO 100 [IU]/ML
INJECTION, SOLUTION INTRAVENOUS; SUBCUTANEOUS EVERY 6 HOURS
Status: DISCONTINUED | OUTPATIENT
Start: 2018-11-22 | End: 2018-11-23

## 2018-11-22 LAB
COMMENT, HOLDF: NORMAL
GLUCOSE BLD STRIP.AUTO-MCNC: 121 MG/DL (ref 65–100)
GLUCOSE BLD STRIP.AUTO-MCNC: 144 MG/DL (ref 65–100)
GLUCOSE BLD STRIP.AUTO-MCNC: 185 MG/DL (ref 65–100)
GLUCOSE BLD STRIP.AUTO-MCNC: 185 MG/DL (ref 65–100)
GLUCOSE BLD STRIP.AUTO-MCNC: 71 MG/DL (ref 65–100)
GLUCOSE BLD STRIP.AUTO-MCNC: 77 MG/DL (ref 65–100)
MAGNESIUM SERPL-MCNC: 2.2 MG/DL (ref 1.6–2.4)
MAGNESIUM SERPL-MCNC: 2.4 MG/DL (ref 1.6–2.4)
SAMPLES BEING HELD,HOLD: NORMAL
SERVICE CMNT-IMP: ABNORMAL
SERVICE CMNT-IMP: NORMAL
SERVICE CMNT-IMP: NORMAL

## 2018-11-22 PROCEDURE — 83735 ASSAY OF MAGNESIUM: CPT

## 2018-11-22 PROCEDURE — 74011000250 HC RX REV CODE- 250: Performed by: INTERNAL MEDICINE

## 2018-11-22 PROCEDURE — 74011250636 HC RX REV CODE- 250/636: Performed by: INTERNAL MEDICINE

## 2018-11-22 PROCEDURE — 74011000258 HC RX REV CODE- 258: Performed by: INTERNAL MEDICINE

## 2018-11-22 PROCEDURE — 36415 COLL VENOUS BLD VENIPUNCTURE: CPT

## 2018-11-22 PROCEDURE — 65270000029 HC RM PRIVATE

## 2018-11-22 PROCEDURE — 74011250637 HC RX REV CODE- 250/637: Performed by: INTERNAL MEDICINE

## 2018-11-22 PROCEDURE — 82962 GLUCOSE BLOOD TEST: CPT

## 2018-11-22 RX ORDER — DEXTROSE MONOHYDRATE AND SODIUM CHLORIDE 5; .9 G/100ML; G/100ML
75 INJECTION, SOLUTION INTRAVENOUS CONTINUOUS
Status: DISCONTINUED | OUTPATIENT
Start: 2018-11-22 | End: 2018-11-23

## 2018-11-22 RX ORDER — ACETAMINOPHEN 650 MG/1
650 SUPPOSITORY RECTAL
Status: DISCONTINUED | OUTPATIENT
Start: 2018-11-22 | End: 2018-11-24

## 2018-11-22 RX ORDER — METOPROLOL TARTRATE 5 MG/5ML
5 INJECTION INTRAVENOUS EVERY 6 HOURS
Status: DISCONTINUED | OUTPATIENT
Start: 2018-11-22 | End: 2018-11-23

## 2018-11-22 RX ORDER — HYDRALAZINE HYDROCHLORIDE 20 MG/ML
10 INJECTION INTRAMUSCULAR; INTRAVENOUS EVERY 6 HOURS
Status: DISCONTINUED | OUTPATIENT
Start: 2018-11-22 | End: 2018-11-23

## 2018-11-22 RX ADMIN — DOFETILIDE 125 MCG: 0.12 CAPSULE ORAL at 08:42

## 2018-11-22 RX ADMIN — FAMOTIDINE 20 MG: 10 INJECTION, SOLUTION INTRAVENOUS at 08:41

## 2018-11-22 RX ADMIN — SODIUM CHLORIDE 75 ML/HR: 900 INJECTION, SOLUTION INTRAVENOUS at 00:32

## 2018-11-22 RX ADMIN — DEXTROSE MONOHYDRATE AND SODIUM CHLORIDE 75 ML/HR: 5; .9 INJECTION, SOLUTION INTRAVENOUS at 06:55

## 2018-11-22 RX ADMIN — METOPROLOL TARTRATE 5 MG: 1 INJECTION, SOLUTION INTRAVENOUS at 21:02

## 2018-11-22 RX ADMIN — DOFETILIDE 125 MCG: 0.12 CAPSULE ORAL at 18:32

## 2018-11-22 RX ADMIN — ENOXAPARIN SODIUM 100 MG: 100 INJECTION, SOLUTION INTRAVENOUS; SUBCUTANEOUS at 01:16

## 2018-11-22 RX ADMIN — DEXTROSE MONOHYDRATE 25 G: 25 INJECTION, SOLUTION INTRAVENOUS at 06:14

## 2018-11-22 RX ADMIN — Medication 10 ML: at 00:13

## 2018-11-22 RX ADMIN — HYDRALAZINE HYDROCHLORIDE 10 MG: 20 INJECTION INTRAMUSCULAR; INTRAVENOUS at 21:02

## 2018-11-22 RX ADMIN — FAMOTIDINE 20 MG: 10 INJECTION, SOLUTION INTRAVENOUS at 00:34

## 2018-11-22 RX ADMIN — DEXTROSE MONOHYDRATE 25 G: 25 INJECTION, SOLUTION INTRAVENOUS at 00:30

## 2018-11-22 RX ADMIN — Medication 10 ML: at 21:02

## 2018-11-22 RX ADMIN — HYDRALAZINE HYDROCHLORIDE 10 MG: 20 INJECTION INTRAMUSCULAR; INTRAVENOUS at 08:42

## 2018-11-22 RX ADMIN — METOPROLOL TARTRATE 5 MG: 1 INJECTION, SOLUTION INTRAVENOUS at 08:41

## 2018-11-22 NOTE — PROGRESS NOTES
Hospitalist Progress Note NAME: Aron Bejarano :  1935 MRN:  [id number] Assessment / Plan: 
Progressive dysphagia with h/o remote esophageal stricture requiring EGD and dilation ~10 yrs ago with Dr. Maddi Rivera: unable to swallow solids or liquids at this time. Has a sensation of food being stuck in her mid-esophagus area 
- no imaging done in ER 
- GI consulted, ?repeat EGD vs barium esophagram 
- IV pepcid for now - IV fluids; con't NPO Chronic afib, chronic diastolic CHF and benign HTN: 
- con't tikosyn with sips if able 
- holding xarelto and lipitor for now. Holding lasix due to increased Cr. 
- metoprolol and hydralazine changed to IV until able to take po 
- prn nitrobid JULIANA in setting of CKD3 due to dehydration:  Cr ~ 1.4 at baseline - UA not sent by ER, have requested 
- con't IV fluids as above 
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 
- dextrose added to IV fluids overnight due to low glucose 
- con't glucose checks and lispro if become severely elevated 
- holding NPH until glucose more stable Hypothyroidism: holding synthroid Gout: holding allopurinol Obesity (Body mass index is 33.42 kg/m²) 
  
Code Status: DNR Surrogate Decision Maker: Daughter Myriam Ojeda # [phone] DVT Prophylaxis: lovenox 
  
Baseline: Pt is functional with ADLs at home Subjective: Chief Complaint / Reason for Physician Visit Denies pain at this time. Endorses progressive difficulty with swallowing. Discussed with RN events overnight. Review of Systems: 
Symptom Y/N Comments  Symptom Y/N Comments Fever/Chills n   Chest Pain y Poor Appetite n   Edema n   
Cough n   Abdominal Pain n   
Sputum n   Joint Pain SOB/LEIJA n   Pruritis/Rash Nausea/vomit    Tolerating PT/OT Diarrhea    Tolerating Diet Constipation    Other Could NOT obtain due to:   
 
Objective: VITALS:  
 Last 24hrs VS reviewed since prior progress note. Most recent are: 
Patient Vitals for the past 24 hrs: 
 Temp Pulse Resp BP SpO2  
11/22/18 0732 97.9 °F (36.6 °C) 68 18 143/53 96 % 11/22/18 0407 97.5 °F (36.4 °C) 74 20 147/63 95 % 11/22/18 0007 97.7 °F (36.5 °C) 71 17 138/60 97 % 11/21/18 2330 97.8 °F (36.6 °C) 69 18 135/58 98 % 11/21/18 2027 98.1 °F (36.7 °C) 75 16 165/81 96 % Intake/Output Summary (Last 24 hours) at 11/22/2018 5244 Last data filed at 11/22/2018 9412 Gross per 24 hour Intake 480 ml Output  Net 480 ml PHYSICAL EXAM: 
General: WD, WN. Alert, cooperative, no acute distress   
EENT:  EOMI. Anicteric sclerae. MMM Resp:  CTA bilaterally, no wheezing or rales. No accessory muscle use CV:  Regular  rhythm,  No edema GI:  Soft, Non distended, Non tender.  +Bowel sounds Neurologic:  Alert and oriented X 3, normal speech, Psych:   Fair insight. Not anxious nor agitated Skin:  Pale, No rashes. No jaundice Reviewed most current lab test results and cultures  YES Reviewed most current radiology test results   YES Review and summation of old records today    NO Reviewed patient's current orders and MAR    YES 
PMH/SH reviewed - no change compared to H&P 
________________________________________________________________________ Care Plan discussed with: 
  Comments Patient x Family RN x Care Manager Consultant Multidiciplinary team rounds were held today with , nursing, pharmacist and clinical coordinator. Patient's plan of care was discussed; medications were reviewed and discharge planning was addressed. ________________________________________________________________________ Total NON critical care TIME:  35 Minutes Total CRITICAL CARE TIME Spent:   Minutes non procedure based Comments >50% of visit spent in counseling and coordination of care x ________________________________________________________________________ Sanya Su MD  
 
Procedures: see electronic medical records for all procedures/Xrays and details which were not copied into this note but were reviewed prior to creation of Plan. LABS: 
I reviewed today's most current labs and imaging studies. Pertinent labs include: 
Recent Labs  
  11/21/18 2147 WBC 8.4 HGB 10.6* HCT 34.9*  
* Recent Labs  
  11/22/18 
0409 11/21/18 2147 NA  --  142 K  --  4.0  
CL  --  108 CO2  --  29  
GLU  --  75 BUN  --  34* CREA  --  1.67* CA  --  9.1 MG 2.4 2.2 ALB  --  3.9 TBILI  --  0.5 SGOT  --  18 ALT  --  22 INR  --  1.2* Signed: Sanya Su MD

## 2018-11-22 NOTE — PROGRESS NOTES
General Surgery End of Shift Nursing Note Bedside shift change report given to Nadine Conklin (oncoming nurse) by Ivan Coreas RN (offgoing nurse). Report included the following information SBAR, Kardex, Intake/Output and MAR. Shift worked:   [id number] Summary of shift:    New admission from ER for progressive dysphagia. Pt NPO. Midnight a/c 71. Pt covered with D5, a/c increased to 180. Issues for physician to address:   
 
Number times ambulated in hallway past shift: 0 Number of times OOB to chair past shift: 0 Pain Management: 
Current medication: none Patient states pain is manageable on current pain medication: YES 
 
GI: 
 
Current diet:  DIET NPO Tolerating current diet: YES Passing flatus: YES Last Bowel Movement: today Appearance: diarrhea Respiratory: 
 
Incentive Spirometer at bedside: NO 
Patient instructed on use: NO 
 
Patient Safety: 
 
Falls Score: 1 Bed Alarm On? Not applicable Sitter? Not applicable She Clemons RN

## 2018-11-22 NOTE — ROUTINE PROCESS
General Surgery End of Shift Nursing Note Shift worked:   7A-7P Summary of shift:    Patient had uneventful shift. Patient was able to tolerate clear liquid diet with no nausea or vomitting. Patient had x2 episodes of diarrhea. Still need Urine specimen from patient, patient has been taking hat out of the toilet with previous voids. Plan for EGD tomorrow, NPO after midnight. Issues for physician to address:   N/A Number times ambulated in hallway past shift: 1 Number of times OOB to chair past shift: 2 Pain Management: 
Current medication: N/A 
 
 
GI: 
 
Current diet:  DIET NPO Except Meds DIET DIABETIC CLEAR LIQUID Tolerating current diet: YES Passing flatus: YES Last Bowel Movement: today Appearance: reported diarrhea Respiratory: 
 
Incentive Spirometer at bedside: NO 
Patient Safety: 
 
Falls Score: 1 Bed Alarm On? Not applicable Sitter? Not applicable Marrie Boast, RN

## 2018-11-22 NOTE — ED NOTES
Assumed care of pt from [facility], RN at bedside with verbal report consisting of Situation, Background, Assessment, and Recommendations (SBAR). Pt is A&O x 4. Pt resting comfortably on the stretcher in a position of comfort. Call bell within reach. Side rails x 2. Cardiac monitor x 3. Stretcher locked in the lowest position. Concerns and questions addressed at this time. Pt in no acute distress at this the time. Will continue to monitor.

## 2018-11-22 NOTE — PROGRESS NOTES
Paged o/c hospitalist via telelink re: pt's continued a/c 71. Pt asymptomatic both times. NPO d/t dysphagia. 1 amp D50 given. Awaiting further orders.

## 2018-11-22 NOTE — ED NOTES
TRANSFER - OUT REPORT: 
 
Verbal report given to Rosa Price RN on Candace Koyanagi  being transferred to Room #4105, General Surgery for routine progression of care Report consisted of patients Situation, Background, Assessment and  
Recommendations(SBAR). Information from the following report(s) SBAR, Kardex, ED Summary, [facility] and Recent Results was reviewed with the receiving nurse. Lines:  
Peripheral IV 11/21/18 Left Antecubital (Active) Site Assessment Clean, dry, & intact 11/21/2018  9:54 PM  
Phlebitis Assessment 0 11/21/2018  9:54 PM  
Infiltration Assessment 0 11/21/2018  9:54 PM  
Dressing Status Clean, dry, & intact 11/21/2018  9:54 PM  
Dressing Type Transparent 11/21/2018  9:54 PM  
Hub Color/Line Status Pink 11/21/2018  9:54 PM  
  
 
Opportunity for questions and clarification was provided. Patient transported with: 
 [employer]

## 2018-11-22 NOTE — H&P
Hospitalist Admission NoteNAME: Usama Saldana :  1935 MRN:  [id number] Date/Time:  2018 11:28 PM 
 
Patient PCP: Kaity Zheng MD  GI= use to see Dr Atif Polanco (now retired), Cardio= Dr Tammy Young ([address] [place] [address]) 
______________________________________________________________________ Given the patient's current clinical presentation, I have a high level of concern for decompensation if discharged from the emergency department. Complex decision making was performed, which includes reviewing the patient's available past medical records, laboratory results, and x-ray films. My assessment of this patient's clinical condition and my plan of care is as follows. Assessment / Plan: 
Progressive Dysphagia POA- now unable to swallow even liquids along with solids H/o Esophageal Stricture s/p EGD/dilatation ~ 10 yrs ago with Dr Wood Santos JULIANA/dehydration POA- mild CKD stage 3 Cr ~ 1.4 at baseline NPO for now IVF overnight IP GI consult for likely EGD/Dilatation in AM 
Pt is on cardiac meds for Afib- Tikosyn, metoprolol, lasix & Xarelto which pt will need to get back on soon. BMP in AM 
Holding Lasix for now till pt eating/drinking PO Chronic AFib Chronic Diastolic CHF - compensated at this time Chronic B/L LE edema POA HTN Resume Tikosyn, metoprolol, lasix/K+ PO soon Cont compression stockings Holding Xarelto for EGD, SQ Lovenox for now- hold prior to EGD once scheduled by GI Holding Lisinopril, Hydralazine for now due to borderline BP 
 
DM type 2- controlled Holding NPH till pt able to eat PO 
SSI lispro here as needed Hypothyroidism 
resume synthroid soon Code Status: DNR as per pt's wishes in ER Surrogate Decision Maker: Daughter Nimisha Martin # [phone] DVT Prophylaxis: Xarelto/SQ lovenox GI Prophylaxis: IV pepcid Baseline: Pt is functional with ADLs at home Subjective: CHIEF COMPLAINT: Unable to swallow anything x 1 day HISTORY OF PRESENT ILLNESS:    
[place] is a 82 y.o.  female who presents with above complains from home ambulatory with daughter. Pt presents with CC difficulty swallowing which has progressed to a point that she is not able to take down liquids x 1 day H/o on & Off Food getting stuck since her last EGD/dilatation ~ 10 yrs ago with Dr Tiera Enamorado Denies any weight loss (unintentional) Pt has been taking Lasix (80mg/40mg) BID for chronic LE edema - last dose today AM- has not been eating or drinking today at all since AM 
 
Pt was found to have mild dehydration in ER with Cr up to 1.6 with BUN 34. We were asked to admit for work up and evaluation of the above problems. Past Medical History:  
Diagnosis Date  Atrial fibrillation ([address] 10/29/2009  Bladder cancer ([address]  Coagulation disorder ([address] Chronic prophylactic anticoagulation med  Colon polyps  Diabetes ([address]  GERD (gastroesophageal reflux disease)  Heart valve problem   
 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  
 @ [facility] >> atrial fibrillation  T. I.A. 4/23/2009  TIA (transient ischemic attack)  Ulcer of right lower extremity, limited to breakdown of skin ([address] 7/5/2018 Past Surgical History:  
Procedure Laterality Date  CARDIAC SURG PROCEDURE UNLIST    
 ablation  HX APPENDECTOMY  HX CHOLECYSTECTOMY  HX HYSTERECTOMY  HX KNEE ARTHROSCOPY  [id number]  
 right knee  HX ORTHOPAEDIC    
 HX UROLOGICAL RENAL STENT, tur-b  VASCULAR SURGERY PROCEDURE UNLIST  11/4  
 removed vein in right leg Social History Tobacco Use  Smoking status: Former Smoker Packs/day: 0.50 Years: 10.00 Pack years: 5.00 Types: Cigarettes Last attempt to quit: 1967 Years since quittin.9  Smokeless tobacco: Never Used Substance Use Topics  Alcohol use: No  
  Alcohol/week: 0.0 oz Family History Problem Relation Age of Onset  Stroke Other  Arthritis-osteo Sister   
     spinal stenosis  Gout Son   
 Hypertension Son   
Maryana.Tonja Hypertension Mother  Heart Disease Mother CAD  Heart Disease Father CAD  Alcohol abuse Neg Hx  Asthma Neg Hx  Bleeding Prob Neg Hx  Cancer Neg Hx  Diabetes Neg Hx  Elevated Lipids Neg Hx   
 Headache Neg Hx  Lung Disease Neg Hx  Migraines Neg Hx  Psychiatric Disorder Neg Hx  Mental Retardation Neg Hx Allergies Allergen Reactions  Actos [Pioglitazone] Swelling Swelling of feet and legs  Codeine Itching  Doxycycline Nausea Only  Hydrocodone Rash and Other (comments)  
  hallucinations Prior to Admission medications Medication Sig Start Date End Date Taking? Authorizing Provider  
rivaroxaban (XARELTO) 15 mg tab tablet Take 1 Tab by mouth daily (with dinner). 18   Vera Murray MD  
ipratropium (ATROVENT) 42 mcg (0.06 %) nasal spray 2 Sprays by Both Nostrils route four (4) times daily. As needed runny nose 18   Vera Murray MD  
furosemide (LASIX) 40 mg tablet 1 qam- changed upon lab review 18 Patient taking differently: 40 mg. 2 in am and 1 in pm 18   Vera Murray MD  
ACCU-CHEK SHAHIDA PLUS TEST STRP strip USE TO CHECK BLOOD SUGAR FOUR TIMES A DAY 18   Vera Murray MD  
atorvastatin (LIPITOR) 80 mg tablet TAKE ONE TABLET BY MOUTH EVERY EVENING 18   Vera Murray MD  
insulin NPH (HUMULIN N NPH INSULIN KWIKPEN) 100 unit/mL (3 mL) inpn INJECT 43 UNITS UNDER THE SKIN EVERY MORNING, 23 UNITS UNDER THE SKIN WITH DINNER OR AS DIRECTED BY PHYSICIAN.  **THIS REPLACED HUMULOG MIX** 18   Kaylan Matias MD  
 acetaminophen (TYLENOL) 325 mg tablet Take 325 mg by mouth every four (4) hours as needed for Pain. Provider, Historical  
levothyroxine (SYNTHROID) 125 mcg tablet TAKE ONE TABLET BY MOUTH DAILY 5/23/18   Inna Chung MD  
ADAN PEN NEEDLE 32 gauge x 5/32\" ndle USE WITH INSULIN PEN TWO TIMES A DAY AS DIRECTED BY PHYSICIAN 4/18/18   Andrew Murray MD  
metoprolol tartrate (LOPRESSOR) 50 mg tablet Take 1 Tab by mouth two (2) times a day. 2/27/18   Inna Chung MD  
allopurinol (ZYLOPRIM) 100 mg tablet TAKE TWO TABLETS BY MOUTH DAILY 1/23/18   Andrew Murray MD  
lisinopril (PRINIVIL, ZESTRIL) 20 mg tablet Take 1 Tab by mouth daily. 11/20/17   Andrew Murray MD  
hydrALAZINE (APRESOLINE) 10 mg tablet Take 2 Tabs by mouth three (3) times daily. Patient taking differently: Take 20 mg by mouth two (2) times a day. 1/23/17   Pam Martinez MD  
potassium chloride SR (KLOR-CON 10) 10 mEq tablet Take 1 Tab by mouth daily. 1/23/17   Pam Martinez MD  
omeprazole (PRILOSEC) 20 mg capsule Take 20 mg by mouth daily. Provider, Historical  
dofetilide (TIKOSYN) 125 mcg capsule Take 1 Cap by mouth two (2) times a day. 4/29/11   Andrew Murray MD  
 
 
REVIEW OF SYSTEMS:    
 
 
 
Total of 12 systems reviewed as follows:   
   POSITIVE= underlined text  Negative = text not underlined General:  fever, chills, sweats, generalized weakness, weight loss/gain,  
   loss of appetite Eyes:    blurred vision, eye pain, loss of vision, double vision ENT:    rhinorrhea, pharyngitis , difficulty Swallowing Respiratory:   cough, sputum production, SOB, LEIJA, wheezing, pleuritic pain  
Cardiology:   chest pain, palpitations, orthopnea, PND, edema, syncope Gastrointestinal:  abdominal pain , N/V, diarrhea, dysphagia, constipation, bleeding Genitourinary:  frequency, urgency, dysuria, hematuria, incontinence Muskuloskeletal :  arthralgia, myalgia, back pain Hematology:  easy bruising, nose or gum bleeding, lymphadenopathy Dermatological: rash, ulceration, pruritis, color change / jaundice Endocrine:   hot flashes or polydipsia Neurological:  headache, dizziness, confusion, focal weakness, paresthesia, Speech difficulties, memory loss, gait difficulty Psychological: Feelings of anxiety, depression, agitation Objective: VITALS:   
Visit Vitals /81 (BP 1 Location: Right arm, BP Patient Position: At rest) Pulse 75 Temp 98.1 °F (36.7 °C) Resp 16 Ht 5' 8\" (1.727 m) Wt 99.7 kg (219 lb 12.8 oz) SpO2 96% BMI 33.42 kg/m² PHYSICAL EXAM: 
 
General:    Alert, cooperative, no distress, appears stated age. HEENT: Atraumatic, anicteric sclerae, pink conjunctivae No oral ulcers, mucosa dry +, throat clear, dentition fair Neck:  Supple, symmetrical,  thyroid: non tender Lungs:   Clear to auscultation bilaterally. No Wheezing or Rhonchi. No rales. Chest wall:  No tenderness  No Accessory muscle use. Heart:   Regular  rhythm,  No  murmur   No edema Abdomen:   Soft, non-tender. Not distended. Bowel sounds normal 
Extremities: No cyanosis. No clubbing,   
  Skin turgor normal, Capillary refill normal, Radial dial pulse 2+ Skin:     Not pale. Not Jaundiced  No rashes Psych:  Good insight. Not depressed. Not anxious or agitated. Neurologic: EOMs intact. No facial asymmetry. No aphasia or slurred speech. Symmetrical strength, Sensation grossly intact. Alert and oriented X 4.  
 
_______________________________________________________________________ Care Plan discussed with: 
  Comments Patient x Family  x Daughter at bedside in ER  
RN x Care Manager Consultant:  lennox Galeano  
_______________________________________________________________________ Expected  Disposition:  
Home with Family x HH/PT/OT/RN   
SNF/LTC   
[facility]   
 ________________________________________________________________________ TOTAL TIME:  51 Minutes Critical Care Provided     Minutes non procedure based Comments  
 x Reviewed previous records  
>50% of visit spent in counseling and coordination of care x Discussion with patient and family and questions answered 
  
 
________________________________________________________________________ Signed: Aicha Cruz MD 
 
Procedures: see electronic medical records for all procedures/Xrays and details which were not copied into this note but were reviewed prior to creation of Plan. LAB DATA REVIEWED:   
Recent Results (from the past 24 hour(s)) CBC WITH AUTOMATED DIFF Collection Time: 11/21/18  9:47 PM  
Result Value Ref Range WBC 8.4 3.6 - 11.0 K/uL  
 RBC 3.80 3.80 - 5.20 M/uL  
 HGB 10.6 (L) 11.5 - 16.0 g/dL HCT 34.9 (L) 35.0 - 47.0 % MCV 91.8 80.0 - 99.0 FL  
 MCH 27.9 26.0 - 34.0 PG  
 MCHC 30.4 30.0 - 36.5 g/dL  
 RDW 15.0 (H) 11.5 - 14.5 % PLATELET 774 (L) 366 - 400 K/uL NRBC 0.0 0  WBC ABSOLUTE NRBC 0.00 0.00 - 0.01 K/uL NEUTROPHILS 80 (H) 32 - 75 % LYMPHOCYTES 12 12 - 49 % MONOCYTES 5 5 - 13 % EOSINOPHILS 2 0 - 7 % BASOPHILS 1 0 - 1 % IMMATURE GRANULOCYTES 0 0.0 - 0.5 % ABS. NEUTROPHILS 6.7 1.8 - 8.0 K/UL  
 ABS. LYMPHOCYTES 1.0 0.8 - 3.5 K/UL  
 ABS. MONOCYTES 0.4 0.0 - 1.0 K/UL  
 ABS. EOSINOPHILS 0.2 0.0 - 0.4 K/UL  
 ABS. BASOPHILS 0.0 0.0 - 0.1 K/UL  
 ABS. IMM. GRANS. 0.0 0.00 - 0.04 K/UL  
 DF AUTOMATED METABOLIC PANEL, COMPREHENSIVE Collection Time: 11/21/18  9:47 PM  
Result Value Ref Range Sodium 142 136 - 145 mmol/L Potassium 4.0 3.5 - 5.1 mmol/L Chloride 108 97 - 108 mmol/L  
 CO2 29 21 - 32 mmol/L Anion gap 5 5 - 15 mmol/L Glucose 75 65 - 100 mg/dL BUN 34 (H) 6 - 20 MG/DL Creatinine 1.67 (H) 0.55 - 1.02 MG/DL  
 BUN/Creatinine ratio 20 12 - 20 GFR est AA 36 (L) >60 ml/min/1.73m2 GFR est non-AA 29 (L) >60 ml/min/1.73m2 Calcium 9.1 8.5 - 10.1 MG/DL Bilirubin, total 0.5 0.2 - 1.0 MG/DL  
 ALT (SGPT) 22 12 - 78 U/L  
 AST (SGOT) 18 15 - 37 U/L Alk. phosphatase 77 45 - 117 U/L Protein, total 7.2 6.4 - 8.2 g/dL Albumin 3.9 3.5 - 5.0 g/dL Globulin 3.3 2.0 - 4.0 g/dL A-G Ratio 1.2 1.1 - 2.2 SAMPLES BEING HELD Collection Time: 11/21/18  9:47 PM  
Result Value Ref Range SAMPLES BEING HELD RD BL   
 COMMENT Add-on orders for these samples will be processed based on acceptable specimen integrity and analyte stability, which may vary by analyte. PROTHROMBIN TIME + INR Collection Time: 11/21/18  9:47 PM  
Result Value Ref Range INR 1.2 (H) 0.9 - 1.1 Prothrombin time 11.9 (H) 9.0 - 11.1 sec

## 2018-11-22 NOTE — ED NOTES
Bedside and Verbal shift change report given to Rebecca Ellis RN (oncoming nurse) by Tiara Moreland RN (offgoing nurse). Report included the following information SBAR, ED Summary and MAR.

## 2018-11-22 NOTE — ED PROVIDER NOTES
EMERGENCY DEPARTMENT HISTORY AND PHYSICAL EXAM 
 
 
Date: 11/21/2018 Patient Name: Asher Hernandes History of Presenting Illness Chief Complaint Patient presents with  Aphagia Pt ambulatory to triage, states difficulty swallowing x 3 hours; pt denies feeling any object lodged into throat, however states \"pain when swallowing and nausea\" History Provided By: Patient and Patient's Daughter HPI: Asher Hernandes, 82 y.o. female with PMHx significant for esophageal strictures, presents ambulatory to the ED with cc of persistent difficulty swallowing since yesterday. Patient reports yesterday she had difficulty swallowing food from small pieces of meat to soft food. She states today she was unable to swallow liquids and had difficulty keeping water or her own secretions down. She also reports associated 1/10 throat pain, mild SOB, and that her daughter reports patient has had diarrhea so was concerned for possible dehydration. Patient denies NV or CP. There are no other complaints, changes, or physical findings at this time. PCP: Ludwin Guillory MD 
 
Current Facility-Administered Medications Medication Dose Route Frequency Provider Last Rate Last Dose  dofetilide (TIKOSYN) capsule 125 mcg  125 mcg Oral BID Molly Meckel, MD      
 hydrALAZINE (APRESOLINE) tablet 20 mg  20 mg Oral BID Ana POTTER MD      
 metoprolol tartrate (LOPRESSOR) tablet 50 mg  50 mg Oral BID Ana POTTER MD      
 0.9% sodium chloride infusion  75 mL/hr IntraVENous CONTINUOUS Ana POTTER MD 75 mL/hr at 11/22/18 0032 75 mL/hr at 11/22/18 0032  sodium chloride (NS) flush 5-10 mL  5-10 mL IntraVENous Q8H Soha Conroy MD   10 mL at 11/22/18 0013  sodium chloride (NS) flush 5-10 mL  5-10 mL IntraVENous PRN Molly Meckel, MD      
 famotidine (PF) (PEPCID) 20 mg in sodium chloride 0.9% 10 mL injection 20 mg IntraVENous Q12H Reinier POTTER MD   20 mg at 11/22/18 9022  ondansetron (ZOFRAN) injection 4 mg  4 mg IntraVENous Q4H PRN Reinier POTTER MD      
 enoxaparin (LOVENOX) injection 100 mg  1 mg/kg SubCUTAneous Q24H Reinier POTTER MD   100 mg at 11/22/18 0116  
 insulin lispro (HUMALOG) injection   SubCUTAneous Q6H Reinier POTTER MD   Stopped at 11/22/18 0000  
 glucose chewable tablet 16 g  4 Tab Oral PRN Salena Gutierrez MD      
 dextrose (D50W) injection syrg 12.5-25 g  12.5-25 g IntraVENous PRN Reinier POTTER MD   25 g at 11/22/18 0030  
 glucagon (GLUCAGEN) injection 1 mg  1 mg IntraMUSCular PRN Salena Gutierrez MD      
 
 
Past History Past Medical History: 
Past Medical History:  
Diagnosis Date  Atrial fibrillation ([address] 10/29/2009  Bladder cancer ([address]  Coagulation disorder ([address] Chronic prophylactic anticoagulation med  Colon polyps  Diabetes ([address]  GERD (gastroesophageal reflux disease)  Heart valve problem   
 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  
 @ [facility] >> atrial fibrillation  T. I.A. 4/23/2009  TIA (transient ischemic attack)  Ulcer of right lower extremity, limited to breakdown of skin ([address] 7/5/2018 Past Surgical History: 
Past Surgical History:  
Procedure Laterality Date  CARDIAC SURG PROCEDURE UNLIST    
 ablation  HX APPENDECTOMY  HX CHOLECYSTECTOMY  HX HYSTERECTOMY  HX KNEE ARTHROSCOPY  [id number]  
 right knee  HX ORTHOPAEDIC    
 HX UROLOGICAL RENAL STENT, tur-b  VASCULAR SURGERY PROCEDURE UNLIST  11/4  
 removed vein in right leg Family History: 
Family History Problem Relation Age of Onset  Stroke Other  Arthritis-osteo Sister   
     spinal stenosis  Gout Son   
 Hypertension Son   
Baez Hypertension Mother  Heart Disease Mother CAD  Heart Disease Father CAD  Alcohol abuse Neg Hx  Asthma Neg Hx  Bleeding Prob Neg Hx  Cancer Neg Hx  Diabetes Neg Hx  Elevated Lipids Neg Hx   
 Headache Neg Hx  Lung Disease Neg Hx  Migraines Neg Hx  Psychiatric Disorder Neg Hx  Mental Retardation Neg Hx Social History: 
Social History Tobacco Use  Smoking status: Former Smoker Packs/day: 0.50 Years: 10.00 Pack years: 5.00 Types: Cigarettes Last attempt to quit: 1967 Years since quittin.9  Smokeless tobacco: Never Used Substance Use Topics  Alcohol use: No  
  Alcohol/week: 0.0 oz  Drug use: No  
 
 
Allergies: Allergies Allergen Reactions  Actos [Pioglitazone] Swelling Swelling of feet and legs  Codeine Itching  Doxycycline Nausea Only  Hydrocodone Rash and Other (comments)  
  hallucinations Review of Systems Review of Systems Constitutional: Negative for chills and fever. HENT: Positive for sore throat and trouble swallowing. Respiratory: Negative for cough and shortness of breath. Cardiovascular: Negative for chest pain. Gastrointestinal: Positive for diarrhea. Negative for constipation, nausea and vomiting. Neurological: Negative for weakness and numbness. All other systems reviewed and are negative. Physical Exam  
Physical Exam  
Constitutional: She is oriented to person, place, and time. She appears well-developed and well-nourished. HENT:  
Head: Normocephalic and atraumatic. After patient drank 15 cc of water she regurgitated water back up Eyes: Conjunctivae and EOM are normal.  
Neck: Normal range of motion. Neck supple. Cardiovascular: Normal rate and regular rhythm.   
Pulmonary/Chest: Effort normal and breath sounds normal. No respiratory distress. Abdominal: Soft. She exhibits no distension. There is no tenderness. Musculoskeletal: Normal range of motion. Neurological: She is alert and oriented to person, place, and time. Skin: Skin is warm and dry. Psychiatric: She has a normal mood and affect. Nursing note and vitals reviewed. Diagnostic Study Results Labs - Recent Results (from the past 12 hour(s)) CBC WITH AUTOMATED DIFF Collection Time: 11/21/18  9:47 PM  
Result Value Ref Range WBC 8.4 3.6 - 11.0 K/uL  
 RBC 3.80 3.80 - 5.20 M/uL  
 HGB 10.6 (L) 11.5 - 16.0 g/dL HCT 34.9 (L) 35.0 - 47.0 % MCV 91.8 80.0 - 99.0 FL  
 MCH 27.9 26.0 - 34.0 PG  
 MCHC 30.4 30.0 - 36.5 g/dL  
 RDW 15.0 (H) 11.5 - 14.5 % PLATELET 836 (L) 664 - 400 K/uL NRBC 0.0 0  WBC ABSOLUTE NRBC 0.00 0.00 - 0.01 K/uL NEUTROPHILS 80 (H) 32 - 75 % LYMPHOCYTES 12 12 - 49 % MONOCYTES 5 5 - 13 % EOSINOPHILS 2 0 - 7 % BASOPHILS 1 0 - 1 % IMMATURE GRANULOCYTES 0 0.0 - 0.5 % ABS. NEUTROPHILS 6.7 1.8 - 8.0 K/UL  
 ABS. LYMPHOCYTES 1.0 0.8 - 3.5 K/UL  
 ABS. MONOCYTES 0.4 0.0 - 1.0 K/UL  
 ABS. EOSINOPHILS 0.2 0.0 - 0.4 K/UL  
 ABS. BASOPHILS 0.0 0.0 - 0.1 K/UL  
 ABS. IMM. GRANS. 0.0 0.00 - 0.04 K/UL  
 DF AUTOMATED METABOLIC PANEL, COMPREHENSIVE Collection Time: 11/21/18  9:47 PM  
Result Value Ref Range Sodium 142 136 - 145 mmol/L Potassium 4.0 3.5 - 5.1 mmol/L Chloride 108 97 - 108 mmol/L  
 CO2 29 21 - 32 mmol/L Anion gap 5 5 - 15 mmol/L Glucose 75 65 - 100 mg/dL BUN 34 (H) 6 - 20 MG/DL Creatinine 1.67 (H) 0.55 - 1.02 MG/DL  
 BUN/Creatinine ratio 20 12 - 20 GFR est AA 36 (L) >60 ml/min/1.73m2 GFR est non-AA 29 (L) >60 ml/min/1.73m2 Calcium 9.1 8.5 - 10.1 MG/DL Bilirubin, total 0.5 0.2 - 1.0 MG/DL  
 ALT (SGPT) 22 12 - 78 U/L  
 AST (SGOT) 18 15 - 37 U/L Alk. phosphatase 77 45 - 117 U/L Protein, total 7.2 6.4 - 8.2 g/dL Albumin 3.9 3.5 - 5.0 g/dL Globulin 3.3 2.0 - 4.0 g/dL A-G Ratio 1.2 1.1 - 2.2 SAMPLES BEING HELD Collection Time: 11/21/18  9:47 PM  
Result Value Ref Range SAMPLES BEING HELD RD BL   
 COMMENT Add-on orders for these samples will be processed based on acceptable specimen integrity and analyte stability, which may vary by analyte. PROTHROMBIN TIME + INR Collection Time: 11/21/18  9:47 PM  
Result Value Ref Range INR 1.2 (H) 0.9 - 1.1 Prothrombin time 11.9 (H) 9.0 - 11.1 sec MAGNESIUM Collection Time: 11/21/18  9:47 PM  
Result Value Ref Range Magnesium 2.2 1.6 - 2.4 mg/dL GLUCOSE, POC Collection Time: 11/22/18 12:22 AM  
Result Value Ref Range Glucose (POC) 71 65 - 100 mg/dL Performed by [facility] (traveler) GLUCOSE, POC Collection Time: 11/22/18 12:56 AM  
Result Value Ref Range Glucose (POC) 185 (H) 65 - 100 mg/dL Performed by [facility] (traveler) Medical Decision Making I am the first provider for this patient. I reviewed the vital signs, available nursing notes, past medical history, past surgical history, family history and social history. Vital Signs-Reviewed the patient's vital signs. Patient Vitals for the past 12 hrs: 
 Temp Pulse Resp BP SpO2  
11/22/18 0007 97.7 °F (36.5 °C) 71 17 138/60 97 % 11/21/18 2330 97.8 °F (36.6 °C) 69 18 135/58 98 % 11/21/18 2027 98.1 °F (36.7 °C) 75 16 165/81 96 % Pulse Oximetry Analysis - 96% on RA Cardiac Monitor:  
Rate: 75 bpm 
Rhythm: Normal Sinus Rhythm Records Reviewed: Nursing Notes and Old Medical Records Provider Notes (Medical Decision Making):  
Patient presents with dysphagia. Will get labs and likely admit due to patients inability to keep liquids down. DDx: esophageal stricture, esophageal spasm, esophagitis ED Course:  
Initial assessment performed.  The patients presenting problems have been discussed, and they are in agreement with the care plan formulated and outlined with them. I have encouraged them to ask questions as they arise throughout their visit. CONSULT NOTE:  
10:37 PM 
Anuj Farris MD spoke with Dr. Louis Luther, Specialty: Hospitalist 
Discussed pt's hx, disposition, and available diagnostic and imaging results. Reviewed care plans. Consultant will evaluate pt for admission. Written by ABIGAIL Tranibe, as dictated by Anuj Farris MD. Critical Care Time: 0 Disposition: 1. Admit to Hospitalist 
 
Admission Note: 
11:19 PM 
Patient is being admitted to the hospital by Dr. Louis Luther. The results of their tests and reasons for their admission have been discussed with them and available family. They convey agreement and understanding for the need to be admitted and for their admission diagnosis. Anuj Farris M.D. PLAN: 
Admit to hospitalist  
 
Diagnosis Clinical Impression: 1. Pharyngoesophageal dysphagia 2. Esophageal stricture Attestations: 
 
Attestations: This note is prepared by Alex Merida, acting as Scribe for Anuj Farris MD 
 
 
The scribe's documentation has been prepared under my direction and personally reviewed by me in its entirety. I confirm that the note above accurately reflects all work, treatment, procedures, and medical decision making performed by me.

## 2018-11-22 NOTE — CONSULTS
[address] [facility]. 
[address] [facility] [address] [place], [address] 
[phone] GI CONSULTATION NOTE 
 
 
NAME:  Abilio Guardado :   1935 MRN:   [id number] Referring Physician: Dr. Wendy Ibrahim Consult Date: 2018 Chief Complaint: dysphagia History of Present Illness:  Patient is a 82 y.o. who is seen in consultation at the request of Dr. Wendy Ibrahim for dysphagia. Ms. Bhavna Coon is a 28-year-old  patient female who has been dysphagia for a few weeks. She states that does not remember the exact duration of her symptom. her symptoms have lately been getting worse. Symptoms are mainly related to solids. Liquids go down okay. She has had upper endoscopy with dilation of esophagus about 10 years ago. She has been on Xarelto and did not take it yesterday. PMH: 
Past Medical History:  
Diagnosis Date  Atrial fibrillation ([address] 10/29/2009  Bladder cancer ([address]  Coagulation disorder ([address] Chronic prophylactic anticoagulation med  Colon polyps  Diabetes ([address]  GERD (gastroesophageal reflux disease)  Heart valve problem   
 leaking heart valve  Hypercholesterolemia  Hypertension  Hypothyroidism  Hypothyroidism 2009  Hypothyroidism, acquired, autoimmune 2015  Overweight and obesity  PUD (peptic ulcer disease)  S/P ablation of atrial flutter[V45.89HM]   
 @ [facility] >> atrial fibrillation  T. I.A. 2009  TIA (transient ischemic attack)  Ulcer of right lower extremity, limited to breakdown of skin ([address] 2018 PSH: 
Past Surgical History:  
Procedure Laterality Date  CARDIAC SURG PROCEDURE UNLIST    
 ablation  HX APPENDECTOMY  HX CHOLECYSTECTOMY  HX HYSTERECTOMY  HX KNEE ARTHROSCOPY  [id number]  
 right knee  HX ORTHOPAEDIC    
 HX UROLOGICAL RENAL STENT, tur-b  VASCULAR SURGERY PROCEDURE UNLIST    
 removed vein in right leg Allergies: Allergies Allergen Reactions  Actos [Pioglitazone] Swelling Swelling of feet and legs  Codeine Itching  Doxycycline Nausea Only  Hydrocodone Rash and Other (comments)  
  hallucinations Home Medications: 
Prior to Admission Medications Prescriptions Last Dose Informant Patient Reported? Taking? ACCU-CHEK SHAHIDA PLUS TEST STRP strip 2018 at 0700  No No  
Sig: USE TO CHECK BLOOD SUGAR FOUR TIMES A DAY  
ADAN PEN NEEDLE 32 gauge x 32\" ndle   No No  
Sig: USE WITH INSULIN PEN TWO TIMES A DAY AS DIRECTED BY PHYSICIAN  
acetaminophen (TYLENOL) 325 mg tablet   Yes No  
Sig: Take 325 mg by mouth every four (4) hours as needed for Pain. allopurinol (ZYLOPRIM) 100 mg tablet   No No  
Sig: TAKE TWO TABLETS BY MOUTH DAILY  
atorvastatin (LIPITOR) 80 mg tablet   No No  
Sig: TAKE ONE TABLET BY MOUTH EVERY EVENING  
dofetilide (TIKOSYN) 125 mcg capsule   No No  
Sig: Take 1 Cap by mouth two (2) times a day. furosemide (LASIX) 40 mg tablet   No No  
Si qam- changed upon lab review 18 Patient taking differently: 40 mg. 2 in am and 1 in pm  
hydrALAZINE (APRESOLINE) 10 mg tablet   No No  
Sig: Take 2 Tabs by mouth three (3) times daily. Patient taking differently: Take 20 mg by mouth two (2) times a day. insulin NPH (HUMULIN N NPH INSULIN KWIKPEN) 100 unit/mL (3 mL) inpn   No No  
Sig: INJECT 43 UNITS UNDER THE SKIN EVERY MORNING, 23 UNITS UNDER THE SKIN WITH DINNER OR AS DIRECTED BY PHYSICIAN. **THIS REPLACED HUMULOG MIX**  
ipratropium (ATROVENT) 42 mcg (0.06 %) nasal spray   No No  
Si Sprays by Both Nostrils route four (4) times daily. As needed runny nose  
levothyroxine (SYNTHROID) 125 mcg tablet   No No  
Sig: TAKE ONE TABLET BY MOUTH DAILY  
lisinopril (PRINIVIL, ZESTRIL) 20 mg tablet   No No  
Sig: Take 1 Tab by mouth daily. metoprolol tartrate (LOPRESSOR) 50 mg tablet   No No  
Sig: Take 1 Tab by mouth two (2) times a day. omeprazole (PRILOSEC) 20 mg capsule   Yes No  
Sig: Take 20 mg by mouth daily. potassium chloride SR (KLOR-CON 10) 10 mEq tablet   No No  
Sig: Take 1 Tab by mouth daily. rivaroxaban (XARELTO) 15 mg tab tablet 2018 at 2100  No No  
Sig: Take 1 Tab by mouth daily (with dinner). Facility-Administered Medications: None Hospital Medications: 
Current Facility-Administered Medications Medication Dose Route Frequency  dextrose 5% and 0.9% NaCl infusion  75 mL/hr IntraVENous CONTINUOUS  
 metoprolol (LOPRESSOR) injection 5 mg  5 mg IntraVENous Q6H  
 hydrALAZINE (APRESOLINE) 20 mg/mL injection 10 mg  10 mg IntraVENous Q6H  
 acetaminophen (TYLENOL) suppository 650 mg  650 mg Rectal Q4H PRN  
 nitroglycerin (NITROBID) 2 % ointment 1 Inch  1 Inch Topical Q6H PRN  
 [START ON 2018] famotidine (PF) (PEPCID) 20 mg in sodium chloride 0.9% 10 mL injection  20 mg IntraVENous DAILY  dofetilide (TIKOSYN) capsule 125 mcg  125 mcg Oral BID  sodium chloride (NS) flush 5-10 mL  5-10 mL IntraVENous Q8H  
 sodium chloride (NS) flush 5-10 mL  5-10 mL IntraVENous PRN  
 ondansetron (ZOFRAN) injection 4 mg  4 mg IntraVENous Q4H PRN  
 enoxaparin (LOVENOX) injection 100 mg  1 mg/kg SubCUTAneous Q24H  
 insulin lispro (HUMALOG) injection   SubCUTAneous Q6H  
 glucose chewable tablet 16 g  4 Tab Oral PRN  
 dextrose (D50W) injection syrg 12.5-25 g  12.5-25 g IntraVENous PRN  
 glucagon (GLUCAGEN) injection 1 mg  1 mg IntraMUSCular PRN Social History: 
Social History Tobacco Use  Smoking status: Former Smoker Packs/day: 0.50 Years: 10.00 Pack years: 5.00 Types: Cigarettes Last attempt to quit: 1967 Years since quittin.9  Smokeless tobacco: Never Used Substance Use Topics  Alcohol use: No  
  Alcohol/week: 0.0 oz Family History: 
Family History Problem Relation Age of Onset  Stroke Other  Arthritis-osteo Sister spinal stenosis  Gout Son   
 Hypertension Son   
[facility] Hypertension Mother  Heart Disease Mother CAD  Heart Disease Father CAD  Alcohol abuse Neg Hx  Asthma Neg Hx  Bleeding Prob Neg Hx  Cancer Neg Hx  Diabetes Neg Hx  Elevated Lipids Neg Hx   
 Headache Neg Hx  Lung Disease Neg Hx  Migraines Neg Hx  Psychiatric Disorder Neg Hx  Mental Retardation Neg Hx Review of Systems: 
 
Constitutional: negative fever, negative chills, negative weight loss Eyes:   negative visual changes ENT:   negative sore throat, tongue or lip swelling Respiratory:  negative cough, negative dyspnea Cards:  negative for chest pain, palpitations, lower extremity edema GI:   See HPI 
:  negative for frequency, dysuria Integument:  negative for rash and pruritus Heme:  negative for easy bruising and gum/nose bleeding Musculoskel: negative for myalgias,  back pain and muscle weakness Neuro: negative for headaches, dizziness, vertigo Psych:  negative for feelings of anxiety, depression Objective:  
 
Patient Vitals for the past 8 hrs: 
 BP Temp Pulse Resp SpO2  
11/22/18 1440 139/54 97.8 °F (36.6 °C) 75 18 97 % 11/22/18 1129 146/51 98 °F (36.7 °C) 75 18 97 % 11/22 0701 - 11/22 1900 In: 639.5 [I.V.:639.5] Out: -  
11/20 1901 - 11/22 0700 In: 480 [I.V.:480] Out: - PHYSICAL EXAM: 
General: WD, WN. Alert, cooperative, no acute distress   
HEENT: NC, Atraumatic. PERRLA, EOMI. Anicteric sclerae. Lungs:  CTA Bilaterally. No Wheezing/Rhonchi/Rales. Heart:  Regular  rhythm,  No murmur (), No Rubs, No Gallops Abdomen: Soft, Non distended, Non tender.  +Bowel sounds, no HSM Extremities: No c/c/e Neurologic:  CN 2-12 gi, Alert and oriented X 3. No acute neurological distress Psych:   Good insight. Not anxious nor agitated. Data Review Recent Labs  
  11/21/18 
2147 WBC 8.4 HGB 10.6* HCT 34.9*  
* Recent Labs 11/21/18 2147   
K 4.0  
 CO2 29 BUN 34* CREA 1.67* GLU 75  
CA 9.1 Recent Labs  
  11/21/18 2147 SGOT 18  
AP 77  
TP 7.2 ALB 3.9 GLOB 3.3 Recent Labs  
  11/21/18 2147 INR 1.2* PTP 11.9* Imaging studies reviewed Assessment:  
Patient Active Problem List  
Diagnosis Code  T. I.A.  PVD (peripheral vascular disease) ([facility]) I73.9  Reflux esophagitis K21.0  Benign neoplasm of colon D12.6  Iron deficiency anemia D50.9  Hypomagnesemia E83.42  Long term current use of anticoagulant therapy Z79.01  
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  
 Advance directive in chart Z78.9  Type 2 diabetes, controlled, with renal manifestation ([facility]) E11.29  
 Chronic atrial fibrillation ([facility]) I48.2  Mixed hyperlipidemia E78.2  Atherosclerosis of native coronary artery without angina pectoris I25.10  Hypothyroidism, acquired, autoimmune E06.3  Heart valve problem I38  
 Mitral regurgitation I34.0  
 S/P MVR (mitral valve repair) [id number]  Active advance directive on file Z78.9  Non-rheumatic mitral regurgitation I34.0  Heart failure ([facility]) I50.9  Bilateral leg edema R60.0  Cellulitis of leg, left L03. 116  
 Non-pressure chronic ulcer of lower leg, right, with fat layer exposed ([facility] L97.912  
 Non-pressure chronic ulcer of left lower leg with fat layer exposed ([facility] L97.922  
 Esophageal stricture K22.2  Dysphagia R13.10 Plan: Ms. Thierno Orellana has dysphagia with a past history of esophageal stricture. She would need an upper endoscopy and dilation, however she has been on Xarelto. Would need to hold Xarelto for 2 days before doing an endoscopy. She may have clear liquids today and would plan EGD for tomorrow. Nothing by mouth after midnight Thanks for the kind referra

## 2018-11-22 NOTE — PROGRESS NOTES
Problem: Patient Education: Go to Patient Education Activity Goal: Patient/Family Education Outcome: Progressing Towards Goal 
Patient is currently NPO for difficulty swallowing. Pt had esophagus dilated in past. GI consult will be called in later morning hours.

## 2018-11-23 ENCOUNTER — ANESTHESIA EVENT (OUTPATIENT)
Dept: ENDOSCOPY | Age: 83
DRG: 392 | End: 2018-11-23
Payer: MEDICARE

## 2018-11-23 ENCOUNTER — ANESTHESIA (OUTPATIENT)
Dept: ENDOSCOPY | Age: 83
DRG: 392 | End: 2018-11-23
Payer: MEDICARE

## 2018-11-23 LAB
ANION GAP SERPL CALC-SCNC: 8 MMOL/L (ref 5–15)
APPEARANCE UR: CLEAR
BACTERIA URNS QL MICRO: NEGATIVE /HPF
BILIRUB UR QL: NEGATIVE
BUN SERPL-MCNC: 19 MG/DL (ref 6–20)
BUN/CREAT SERPL: 14 (ref 12–20)
CALCIUM SERPL-MCNC: 8.5 MG/DL (ref 8.5–10.1)
CHLORIDE SERPL-SCNC: 113 MMOL/L (ref 97–108)
CO2 SERPL-SCNC: 23 MMOL/L (ref 21–32)
COLOR UR: ABNORMAL
CREAT SERPL-MCNC: 1.32 MG/DL (ref 0.55–1.02)
EPITH CASTS URNS QL MICRO: ABNORMAL /LPF
GLUCOSE BLD STRIP.AUTO-MCNC: 134 MG/DL (ref 65–100)
GLUCOSE BLD STRIP.AUTO-MCNC: 164 MG/DL (ref 65–100)
GLUCOSE BLD STRIP.AUTO-MCNC: 177 MG/DL (ref 65–100)
GLUCOSE BLD STRIP.AUTO-MCNC: 184 MG/DL (ref 65–100)
GLUCOSE BLD STRIP.AUTO-MCNC: 210 MG/DL (ref 65–100)
GLUCOSE SERPL-MCNC: 174 MG/DL (ref 65–100)
GLUCOSE UR STRIP.AUTO-MCNC: NEGATIVE MG/DL
HGB UR QL STRIP: NEGATIVE
KETONES UR QL STRIP.AUTO: NEGATIVE MG/DL
LEUKOCYTE ESTERASE UR QL STRIP.AUTO: ABNORMAL
NITRITE UR QL STRIP.AUTO: NEGATIVE
PH UR STRIP: 5.5 [PH] (ref 5–8)
POTASSIUM SERPL-SCNC: 3.6 MMOL/L (ref 3.5–5.1)
PROT UR STRIP-MCNC: NEGATIVE MG/DL
RBC #/AREA URNS HPF: ABNORMAL /HPF (ref 0–5)
SERVICE CMNT-IMP: ABNORMAL
SODIUM SERPL-SCNC: 144 MMOL/L (ref 136–145)
SP GR UR REFRACTOMETRY: 1.01 (ref 1–1.03)
UA: UC IF INDICATED,UAUC: ABNORMAL
UROBILINOGEN UR QL STRIP.AUTO: 0.2 EU/DL (ref 0.2–1)
WBC URNS QL MICRO: ABNORMAL /HPF (ref 0–4)

## 2018-11-23 PROCEDURE — 74011250637 HC RX REV CODE- 250/637: Performed by: INTERNAL MEDICINE

## 2018-11-23 PROCEDURE — 74011250636 HC RX REV CODE- 250/636: Performed by: HOSPITALIST

## 2018-11-23 PROCEDURE — 77030018712 HC DEV BLLN INFL BSC -B: Performed by: INTERNAL MEDICINE

## 2018-11-23 PROCEDURE — C1726 CATH, BAL DIL, NON-VASCULAR: HCPCS | Performed by: INTERNAL MEDICINE

## 2018-11-23 PROCEDURE — 0D748ZZ DILATION OF ESOPHAGOGASTRIC JUNCTION, VIA NATURAL OR ARTIFICIAL OPENING ENDOSCOPIC: ICD-10-PCS | Performed by: INTERNAL MEDICINE

## 2018-11-23 PROCEDURE — 81001 URINALYSIS AUTO W/SCOPE: CPT

## 2018-11-23 PROCEDURE — 76040000008: Performed by: INTERNAL MEDICINE

## 2018-11-23 PROCEDURE — 65270000029 HC RM PRIVATE

## 2018-11-23 PROCEDURE — 74011250636 HC RX REV CODE- 250/636

## 2018-11-23 PROCEDURE — 74011250636 HC RX REV CODE- 250/636: Performed by: INTERNAL MEDICINE

## 2018-11-23 PROCEDURE — 74011000258 HC RX REV CODE- 258: Performed by: HOSPITALIST

## 2018-11-23 PROCEDURE — 87086 URINE CULTURE/COLONY COUNT: CPT

## 2018-11-23 PROCEDURE — 82962 GLUCOSE BLOOD TEST: CPT

## 2018-11-23 PROCEDURE — 74011000250 HC RX REV CODE- 250

## 2018-11-23 PROCEDURE — 36415 COLL VENOUS BLD VENIPUNCTURE: CPT

## 2018-11-23 PROCEDURE — 76060000033 HC ANESTHESIA 1 TO 1.5 HR: Performed by: INTERNAL MEDICINE

## 2018-11-23 PROCEDURE — 80048 BASIC METABOLIC PNL TOTAL CA: CPT

## 2018-11-23 PROCEDURE — 74011000250 HC RX REV CODE- 250: Performed by: INTERNAL MEDICINE

## 2018-11-23 RX ORDER — FLUMAZENIL 0.1 MG/ML
0.2 INJECTION INTRAVENOUS
Status: DISCONTINUED | OUTPATIENT
Start: 2018-11-23 | End: 2018-11-23 | Stop reason: HOSPADM

## 2018-11-23 RX ORDER — FUROSEMIDE 40 MG/1
40 TABLET ORAL
Status: DISCONTINUED | OUTPATIENT
Start: 2018-11-24 | End: 2018-11-24 | Stop reason: HOSPADM

## 2018-11-23 RX ORDER — NALOXONE HYDROCHLORIDE 0.4 MG/ML
0.4 INJECTION, SOLUTION INTRAMUSCULAR; INTRAVENOUS; SUBCUTANEOUS
Status: DISCONTINUED | OUTPATIENT
Start: 2018-11-23 | End: 2018-11-23 | Stop reason: SDUPTHER

## 2018-11-23 RX ORDER — POTASSIUM CHLORIDE 750 MG/1
10 TABLET, FILM COATED, EXTENDED RELEASE ORAL DAILY
Status: DISCONTINUED | OUTPATIENT
Start: 2018-11-24 | End: 2018-11-24 | Stop reason: HOSPADM

## 2018-11-23 RX ORDER — ATROPINE SULFATE 0.1 MG/ML
0.5 INJECTION INTRAVENOUS
Status: DISCONTINUED | OUTPATIENT
Start: 2018-11-23 | End: 2018-11-23 | Stop reason: HOSPADM

## 2018-11-23 RX ORDER — NALOXONE HYDROCHLORIDE 0.4 MG/ML
0.4 INJECTION, SOLUTION INTRAMUSCULAR; INTRAVENOUS; SUBCUTANEOUS
Status: DISCONTINUED | OUTPATIENT
Start: 2018-11-23 | End: 2018-11-23 | Stop reason: HOSPADM

## 2018-11-23 RX ORDER — ALLOPURINOL 100 MG/1
200 TABLET ORAL DAILY
Status: DISCONTINUED | OUTPATIENT
Start: 2018-11-24 | End: 2018-11-24 | Stop reason: HOSPADM

## 2018-11-23 RX ORDER — METOPROLOL TARTRATE 50 MG/1
50 TABLET ORAL EVERY 12 HOURS
Status: DISCONTINUED | OUTPATIENT
Start: 2018-11-23 | End: 2018-11-24 | Stop reason: HOSPADM

## 2018-11-23 RX ORDER — ATROPINE SULFATE 0.1 MG/ML
0.5 INJECTION INTRAVENOUS
Status: DISCONTINUED | OUTPATIENT
Start: 2018-11-23 | End: 2018-11-23 | Stop reason: SDUPTHER

## 2018-11-23 RX ORDER — HYDRALAZINE HYDROCHLORIDE 10 MG/1
20 TABLET, FILM COATED ORAL EVERY 12 HOURS
Status: DISCONTINUED | OUTPATIENT
Start: 2018-11-23 | End: 2018-11-24 | Stop reason: HOSPADM

## 2018-11-23 RX ORDER — SODIUM CHLORIDE 0.9 % (FLUSH) 0.9 %
5-10 SYRINGE (ML) INJECTION AS NEEDED
Status: ACTIVE | OUTPATIENT
Start: 2018-11-23 | End: 2018-11-23

## 2018-11-23 RX ORDER — SODIUM CHLORIDE 0.9 % (FLUSH) 0.9 %
5-10 SYRINGE (ML) INJECTION EVERY 8 HOURS
Status: DISCONTINUED | OUTPATIENT
Start: 2018-11-23 | End: 2018-11-23 | Stop reason: SDUPTHER

## 2018-11-23 RX ORDER — SODIUM CHLORIDE 9 MG/ML
50 INJECTION, SOLUTION INTRAVENOUS CONTINUOUS
Status: DISPENSED | OUTPATIENT
Start: 2018-11-23 | End: 2018-11-23

## 2018-11-23 RX ORDER — INSULIN LISPRO 100 [IU]/ML
INJECTION, SOLUTION INTRAVENOUS; SUBCUTANEOUS
Status: DISCONTINUED | OUTPATIENT
Start: 2018-11-23 | End: 2018-11-24 | Stop reason: HOSPADM

## 2018-11-23 RX ORDER — MIDAZOLAM HYDROCHLORIDE 1 MG/ML
.25-1 INJECTION, SOLUTION INTRAMUSCULAR; INTRAVENOUS
Status: DISCONTINUED | OUTPATIENT
Start: 2018-11-23 | End: 2018-11-23 | Stop reason: HOSPADM

## 2018-11-23 RX ORDER — LISINOPRIL 20 MG/1
20 TABLET ORAL DAILY
Status: DISCONTINUED | OUTPATIENT
Start: 2018-11-24 | End: 2018-11-24 | Stop reason: HOSPADM

## 2018-11-23 RX ORDER — FLUMAZENIL 0.1 MG/ML
0.2 INJECTION INTRAVENOUS
Status: DISCONTINUED | OUTPATIENT
Start: 2018-11-23 | End: 2018-11-23 | Stop reason: SDUPTHER

## 2018-11-23 RX ORDER — EPINEPHRINE 0.1 MG/ML
1 INJECTION INTRACARDIAC; INTRAVENOUS
Status: DISCONTINUED | OUTPATIENT
Start: 2018-11-23 | End: 2018-11-23 | Stop reason: SDUPTHER

## 2018-11-23 RX ORDER — EPINEPHRINE 0.1 MG/ML
1 INJECTION INTRACARDIAC; INTRAVENOUS
Status: DISCONTINUED | OUTPATIENT
Start: 2018-11-23 | End: 2018-11-23 | Stop reason: HOSPADM

## 2018-11-23 RX ORDER — FUROSEMIDE 40 MG/1
80 TABLET ORAL
Status: DISCONTINUED | OUTPATIENT
Start: 2018-11-24 | End: 2018-11-24 | Stop reason: HOSPADM

## 2018-11-23 RX ORDER — METOPROLOL TARTRATE 5 MG/5ML
INJECTION INTRAVENOUS AS NEEDED
Status: DISCONTINUED | OUTPATIENT
Start: 2018-11-23 | End: 2018-11-23 | Stop reason: HOSPADM

## 2018-11-23 RX ORDER — SODIUM CHLORIDE 0.9 % (FLUSH) 0.9 %
5-10 SYRINGE (ML) INJECTION EVERY 8 HOURS
Status: COMPLETED | OUTPATIENT
Start: 2018-11-23 | End: 2018-11-23

## 2018-11-23 RX ORDER — LIDOCAINE HYDROCHLORIDE 20 MG/ML
INJECTION, SOLUTION EPIDURAL; INFILTRATION; INTRACAUDAL; PERINEURAL AS NEEDED
Status: DISCONTINUED | OUTPATIENT
Start: 2018-11-23 | End: 2018-11-23 | Stop reason: HOSPADM

## 2018-11-23 RX ORDER — MIDAZOLAM HYDROCHLORIDE 1 MG/ML
1-3 INJECTION, SOLUTION INTRAMUSCULAR; INTRAVENOUS
Status: DISCONTINUED | OUTPATIENT
Start: 2018-11-23 | End: 2018-11-23 | Stop reason: HOSPADM

## 2018-11-23 RX ORDER — LEVOTHYROXINE SODIUM 125 UG/1
125 TABLET ORAL
Status: DISCONTINUED | OUTPATIENT
Start: 2018-11-24 | End: 2018-11-24 | Stop reason: HOSPADM

## 2018-11-23 RX ORDER — ATORVASTATIN CALCIUM 40 MG/1
80 TABLET, FILM COATED ORAL
Status: DISCONTINUED | OUTPATIENT
Start: 2018-11-23 | End: 2018-11-24 | Stop reason: HOSPADM

## 2018-11-23 RX ORDER — SODIUM CHLORIDE 9 MG/ML
INJECTION, SOLUTION INTRAVENOUS
Status: DISCONTINUED | OUTPATIENT
Start: 2018-11-23 | End: 2018-11-23 | Stop reason: HOSPADM

## 2018-11-23 RX ORDER — PANTOPRAZOLE SODIUM 40 MG/1
40 TABLET, DELAYED RELEASE ORAL
Status: DISCONTINUED | OUTPATIENT
Start: 2018-11-23 | End: 2018-11-24 | Stop reason: HOSPADM

## 2018-11-23 RX ORDER — FENTANYL CITRATE 50 UG/ML
100 INJECTION, SOLUTION INTRAMUSCULAR; INTRAVENOUS
Status: DISCONTINUED | OUTPATIENT
Start: 2018-11-23 | End: 2018-11-23 | Stop reason: HOSPADM

## 2018-11-23 RX ORDER — PROPOFOL 10 MG/ML
INJECTION, EMULSION INTRAVENOUS AS NEEDED
Status: DISCONTINUED | OUTPATIENT
Start: 2018-11-23 | End: 2018-11-23 | Stop reason: HOSPADM

## 2018-11-23 RX ORDER — DEXTROMETHORPHAN/PSEUDOEPHED 2.5-7.5/.8
1.2 DROPS ORAL
Status: DISCONTINUED | OUTPATIENT
Start: 2018-11-23 | End: 2018-11-23 | Stop reason: SDUPTHER

## 2018-11-23 RX ORDER — DEXTROMETHORPHAN/PSEUDOEPHED 2.5-7.5/.8
1.2 DROPS ORAL
Status: DISCONTINUED | OUTPATIENT
Start: 2018-11-23 | End: 2018-11-23 | Stop reason: HOSPADM

## 2018-11-23 RX ORDER — POTASSIUM CHLORIDE 1.5 G/1.77G
40 POWDER, FOR SOLUTION ORAL
Status: COMPLETED | OUTPATIENT
Start: 2018-11-23 | End: 2018-11-23

## 2018-11-23 RX ADMIN — SODIUM CHLORIDE: 9 INJECTION, SOLUTION INTRAVENOUS at 09:30

## 2018-11-23 RX ADMIN — Medication 10 ML: at 17:46

## 2018-11-23 RX ADMIN — METOPROLOL TARTRATE 50 MG: 50 TABLET ORAL at 21:26

## 2018-11-23 RX ADMIN — METOPROLOL TARTRATE 5 MG: 5 INJECTION INTRAVENOUS at 10:15

## 2018-11-23 RX ADMIN — PROPOFOL 20 MG: 10 INJECTION, EMULSION INTRAVENOUS at 10:10

## 2018-11-23 RX ADMIN — PROPOFOL 20 MG: 10 INJECTION, EMULSION INTRAVENOUS at 10:07

## 2018-11-23 RX ADMIN — PROPOFOL 20 MG: 10 INJECTION, EMULSION INTRAVENOUS at 10:08

## 2018-11-23 RX ADMIN — HYDRALAZINE HYDROCHLORIDE 10 MG: 20 INJECTION INTRAMUSCULAR; INTRAVENOUS at 01:50

## 2018-11-23 RX ADMIN — CEFTRIAXONE 1 G: 1 INJECTION, POWDER, FOR SOLUTION INTRAMUSCULAR; INTRAVENOUS at 20:10

## 2018-11-23 RX ADMIN — PROPOFOL 80 MG: 10 INJECTION, EMULSION INTRAVENOUS at 10:06

## 2018-11-23 RX ADMIN — METOPROLOL TARTRATE 5 MG: 1 INJECTION, SOLUTION INTRAVENOUS at 01:50

## 2018-11-23 RX ADMIN — PROPOFOL 20 MG: 10 INJECTION, EMULSION INTRAVENOUS at 10:09

## 2018-11-23 RX ADMIN — FAMOTIDINE 20 MG: 10 INJECTION INTRAVENOUS at 11:17

## 2018-11-23 RX ADMIN — POTASSIUM CHLORIDE 40 MEQ: 1.5 POWDER, FOR SOLUTION ORAL at 15:47

## 2018-11-23 RX ADMIN — ENOXAPARIN SODIUM 100 MG: 100 INJECTION, SOLUTION INTRAVENOUS; SUBCUTANEOUS at 01:50

## 2018-11-23 RX ADMIN — ATORVASTATIN CALCIUM 80 MG: 40 TABLET, FILM COATED ORAL at 21:26

## 2018-11-23 RX ADMIN — LIDOCAINE HYDROCHLORIDE 100 MG: 20 INJECTION, SOLUTION EPIDURAL; INFILTRATION; INTRACAUDAL; PERINEURAL at 10:06

## 2018-11-23 RX ADMIN — ONDANSETRON 4 MG: 2 INJECTION INTRAMUSCULAR; INTRAVENOUS at 14:19

## 2018-11-23 RX ADMIN — PANTOPRAZOLE SODIUM 40 MG: 40 TABLET, DELAYED RELEASE ORAL at 15:47

## 2018-11-23 RX ADMIN — HYDRALAZINE HYDROCHLORIDE 20 MG: 10 TABLET, FILM COATED ORAL at 21:25

## 2018-11-23 RX ADMIN — Medication 10 ML: at 21:25

## 2018-11-23 NOTE — PROGRESS NOTES
Spiritual Care Assessment/Progress Note [address] 
 
 
NAME: Sridevi Romero      MRN: [id number] AGE: 82 y.o. SEX: female Druze Affiliation: Holiness  
Language: English  
 
11/23/2018     Total Time (in minutes): 52  
 
Spiritual Assessment begun in [facility] through conversation with: 
  
    [x]Patient        [x] Family    [] Friend(s) Reason for Consult: Advance medical directive consult Spiritual beliefs: (Please include comment if needed) 
   [] Identifies with a jerica tradition:     
   [] Supported by a jerica community:        
   [] Claims no spiritual orientation:       
   [] Seeking spiritual identity:            
   [x] Adheres to an individual form of spirituality:       
   [] Not able to assess:                   
 
    
Identified resources for coping:  
   [x] Prayer                           
   [] Music                  [] Guided Imagery [x] Family/friends                 [] Pet visits [] Devotional reading                         [] Unknown 
   [] Other:                                       
 
 
Interventions offered during this visit: (See comments for more details) Patient Interventions: Advance medical directive completed, Advance medical directive consult, End of life issues discussed, Initial/Spiritual assessment, patient floor, Affirmation of jerica, Iconic (affirming the presence of God/Higher Power), Prayer (assurance of) Plan of Care: 
 
 [] Support spiritual and/or cultural needs  
 [] Support AMD and/or advance care planning process    
 [] Support grieving process 
 [] Coordinate Rites and/or Rituals  
 [] Coordination with community clergy 
 [x] No spiritual needs identified at this time 
 [] Detailed Plan of Care below (See Comments)  [] Make referral to Music Therapy 
[] Make referral to Pet Therapy    
[] Make referral to Addiction services 
[] Make referral to [facility] [] Make referral to Spiritual Care Partner 
[] No future visits requested       
[x] Follow up visits as needed Comments: Responded to patient request received through  for assistance in updating existing Advance Medical Directive. Reviewed old document signed in November 2007 with patient. Explained Advance Medical Directive document and assisted in completing it. Primary reason for updating advance directive was to change son's address. Primary decision maker: Daughter Slime Maria  [phone]. Secondary decision maker: Son LENORA Richey  [phone] [place] Babin, [address] [facility],  [place]  Paging Service  [phone])

## 2018-11-23 NOTE — PROGRESS NOTES
TRANSFER - OUT REPORT: 
 
Verbal report given to RODGER Shaw(name) on Verdie Distance  being transferred to 2136(unit) for routine progression of care Report consisted of patients Situation, Background, Assessment and  
Recommendations(SBAR). Information from the following report(s) SBAR, Procedure Summary, Intake/Output, MAR, Recent Results and Med Rec Status was reviewed with the receiving nurse. Lines:  
Peripheral IV 11/22/18 Anterior; Inferior;Right; Inner Forearm (Active) Site Assessment Clean, dry, & intact 11/23/2018  8:14 AM  
Phlebitis Assessment 0 11/23/2018  8:14 AM  
Infiltration Assessment 0 11/23/2018  8:14 AM  
Dressing Status Clean, dry, & intact 11/23/2018  8:14 AM  
Dressing Type Tape;Transparent 11/23/2018  4:07 AM  
Hub Color/Line Status Infusing;Patent 11/23/2018  8:14 AM  
  
 
Opportunity for questions and clarification was provided.    
 
 
 
 
 
\

## 2018-11-23 NOTE — PROGRESS NOTES
EGD and dilation done today. Hold Lovenox for today Start Xarelto after 2 days May have clear liquid diet today and advance as tolerated

## 2018-11-23 NOTE — PROGRESS NOTES
Reason for Admission:  Dysphagia Esophageal Stricture RRAT Score: 30 Resources/supports as identified by patient/family: Daughter lives two minutes away from pt Top Challenges facing patient (as identified by patient/family and CM): Understanding baseline for PT/OT and reviewing/correcting Advanced Directive that was done two years ago. Finances/Medication cost?   Pt has no problem affording medication with help of insurance Transportation? Pt will have daughter transport her home upon d/c Support system or lack thereof? Pt has daughter that lives two minutes away and is very supportive of pt Living arrangements? Pt lives alone Self-care/ADLs/Cognition? Before admission pt was independent with ADLs and was alert and oriented during room visit with CM Current Advanced Directive/Advance Care Plan:  Pt has Advanced Care Plan on file Plan for utilizing home health:    Pt would like to work with PT/OT to determine base line before agreeing to [facility] Likelihood of readmission: Moderate; pt goes to PCP regularly and takes medication as prescribed however she does live alone and has core measure diagnosis Transition of Care Plan:  
 
CM made room visit with pt who was alert and oriented with daughter at bedside. Pt confirmed demographics, insurance, emergency contact, and PCP (last seen about two weeks ago for well visit). Pt stated she uses [address] [place] [address] in [place] and is able to afford her medications with the help of insurance and takes RX as prescribed. Pt stated she lives alone in a single story home with no entry stairs. Pt stated she was independent with all ADLs and driving prior to admission. Pt disclosed she has the following DME: Wheelchair, walker, cane, shower jonanicole. Pt requested to obtian a shower chair and CM let her know she can get that through her PCP. Pt has a history of HH through [facility] about two years ago. Pt does have an advanced directive on file however she would like to go over it with someone because it has been over two years since it has been completed and she is unsure what it says anymore. CM called pastoral care to set up meeting with cassandra and pt. CM discussed possible needs at d/c. Pt disclosed she did not know if she would need HH again or not because she is unaware of her baseline. CM talked to MD to consult PT/OT to determine pt baseline. Pt would like to work with PT/OT before deciding if she has any needs upon d/c. CM will continue to follow. Care Management Interventions PCP Verified by CM: Yes Mode of Transport at Discharge: Other (see comment)(family to transport home) Transition of Care Consult (CM Consult): Discharge Planning Discharge Durable Medical Equipment: No 
Physical Therapy Consult: Yes Occupational Therapy Consult: Yes Speech Therapy Consult: No 
Current Support Network: Own Home, Lives Alone Confirm Follow Up Transport: Family(family to transport if needed) Plan discussed with Pt/Family/Caregiver: Yes Mikel [address] [place] [address]

## 2018-11-23 NOTE — ANESTHESIA POSTPROCEDURE EVALUATION
Procedure(s): ESOPHAGOGASTRODUODENOSCOPY (EGD) ESOPHAGEAL DILATION. Anesthesia Post Evaluation Patient location during evaluation: PACU Note status: Adequate. Level of consciousness: responsive to verbal stimuli and sleepy but conscious Pain management: satisfactory to patient Airway patency: patent Anesthetic complications: no 
Cardiovascular status: acceptable Respiratory status: acceptable Hydration status: acceptable Comments: +Post-Anesthesia Evaluation and Assessment Patient: Delisa Ramirez MRN: [id number]  SSN: [id number] YOB: 1935  Age: 82 y.o. Sex: female Cardiovascular Function/Vital Signs /64   Pulse 80   Temp 36.8 °C (98.3 °F)   Resp 20   Ht 5' 8\" (1.727 m)   Wt 99.7 kg (219 lb 12.8 oz)   SpO2 95%   Breastfeeding? No   BMI 33.42 kg/m² Patient is status post Procedure(s): ESOPHAGOGASTRODUODENOSCOPY (EGD) ESOPHAGEAL DILATION. Nausea/Vomiting: Controlled. Postoperative hydration reviewed and adequate. Pain: 
Pain Scale 1: Numeric (0 - 10) (11/23/18 0956) Pain Intensity 1: 0 (11/23/18 0956) Managed. Neurological Status: At baseline. Mental Status and Level of Consciousness: Arousable. Pulmonary Status:  
O2 Device: Room air (11/23/18 1018) Adequate oxygenation and airway patent. Complications related to anesthesia: None Post-anesthesia assessment completed. No concerns. Signed By: Wendi Smith MD  
 11/23/2018 Post anesthesia nausea and vomiting:  controlled Visit Vitals /64 Pulse 80 Temp 36.8 °C (98.3 °F) Resp 20 Ht 5' 8\" (1.727 m) Wt 99.7 kg (219 lb 12.8 oz) SpO2 95% Breastfeeding? No  
BMI 33.42 kg/m²

## 2018-11-23 NOTE — PROGRESS NOTES
CRE balloon dilatation of the esophagus 12 mm Balloon inflated to 3 ATMs and held for 10 seconds. 13.5 mm Balloon inflated to 4.5 ATMs and held for 10 seconds. 15 mm Balloon inflated to 8 ATMs and held for 60 seconds. No subcutaneous crepitus of the chest or cervical region was noted post dilatation.

## 2018-11-23 NOTE — ROUTINE PROCESS
General Surgery End of Shift Nursing Note Shift worked:   7a-7p Summary of shift:   Patient had EGD today, MD reviewed results with patient. Patient had x1 emesis after clear liquid diet started. No further complaints noted Issues for physician to address:   N/A Number times ambulated in hallway past shift: 1 Number of times OOB to chair past shift: 2 Pain Management: 
Current medication: n/a  
 
 
GI: 
 
Current diet:  DIET DIABETIC CLEAR LIQUID Tolerating current diet: YES Passing flatus: YES Last Bowel Movement: yesterday Appearance:  
 
Respiratory: 
 
 
 
Patient Safety: 
 
Falls Score: 2 Bed Alarm On? Not applicable Sitter? Not applicable Kwame Saldana RN

## 2018-11-23 NOTE — PROGRESS NOTES
Anesthesia reports 160 mg Propofol, 100 mg Lidocaine and 250 ml of Normal Saline were given during procedure. Received report from anesthesia staff on vital signs and status of patient.

## 2018-11-23 NOTE — PROGRESS NOTES
Hospitalist Progress Note NAME: Kim Chowdary :  1935 MRN:  [id number] Assessment / Plan: 
Progressive dysphagia due to Schatzki's ring with h/o remote esophageal stricture requiring EGD and dilation ~10 yrs ago with Dr. Sueanne Cabot: 
- GI consult appreciated, s/p EGD with Dr. No Monterroso today with finding of partially obstructing Schatzki's ring. Medium sliding hiatal hernia was noted. Mild patchy erythema noted in stomach and duodenum. Pt underwent esophageal dilation with 12-15 mm CRE balloon successful. 
- change pepcid to oral PPI 
- advancing diet as able Chronic afib, chronic diastolic CHF and benign HTN: 
- con't tikosyn. Restart lasix and lipitor 
- will need to hold xarelto for 2 days per GI recommendations due to dilation 
- change metoprolol and hydralazine back to po 
- prn nitrobid JULIANA (resolved) in setting of CKD3 due to dehydration:  Cr ~ 1.4 at baseline - UA reviewed and is negative for bacteria 
- restart lasix tomorrow AM 
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 
- advancing diet as above 
- rsstart NPH at slightly lower dose 
- lispro sliding scale Hypothyroidism: restart home synthroid Gout: restart home allopurinol Obesity (Body mass index is 33.42 kg/m²) 
  
Code Status: DNR Surrogate Decision Maker: Daughter Zane Contreras [phone] DVT Prophylaxis: lovenox 
  
Baseline: Pt is functional with ADLs at home Subjective: Chief Complaint / Reason for Physician Visit \"Doing fine\". No complaints at this time. Discussed with RN events overnight. Review of Systems: 
Symptom Y/N Comments  Symptom Y/N Comments Fever/Chills n   Chest Pain n   
Poor Appetite n   Edema n   
Cough n   Abdominal Pain n   
Sputum n   Joint Pain SOB/LEIJA n   Pruritis/Rash Nausea/vomit    Tolerating PT/OT Diarrhea    Tolerating Diet Constipation    Other Could NOT obtain due to:   
 
Objective: VITALS:  
 Last 24hrs VS reviewed since prior progress note. Most recent are: 
Patient Vitals for the past 24 hrs: 
 Temp Pulse Resp BP SpO2  
11/23/18 1117 97.6 °F (36.4 °C) 82 18 148/59 95 % 11/23/18 1043  83 18 155/73   
11/23/18 1039  83 20 141/75 96 % 11/23/18 1035  82 20 158/70 95 % 11/23/18 1030 98.5 °F (36.9 °C) 85 20 150/74 95 % 11/23/18 1018  80 20 139/64 95 % 11/23/18 0923 98.3 °F (36.8 °C) 95 18 139/84 95 % 11/23/18 0803 97.5 °F (36.4 °C) 100 19 178/75 91 % 11/23/18 0407 97.6 °F (36.4 °C) 80 15 138/43 94 % 11/23/18 0030 97.9 °F (36.6 °C) 77 17 129/52 98 % 11/22/18 1954 98 °F (36.7 °C) 76 19 133/53 97 % 11/22/18 1440 97.8 °F (36.6 °C) 75 18 139/54 97 % Intake/Output Summary (Last 24 hours) at 11/23/2018 1240 Last data filed at 11/23/2018 1222 Gross per 24 hour Intake 2243.26 ml Output  Net 2243.26 ml PHYSICAL EXAM: 
General: WD, WN. Alert, cooperative, no acute distress   
EENT:  EOMI. Anicteric sclerae. MMM Resp:  CTA bilaterally, no wheezing or rales. No accessory muscle use CV:  Regular  rhythm,  No edema GI:  Soft, Non distended, Non tender.  +Bowel sounds Neurologic:  Alert and oriented X 3, normal speech, Psych:   Good insight. Not anxious nor agitated Skin:  Pale, No rashes. No jaundice Reviewed most current lab test results and cultures  YES Reviewed most current radiology test results   YES Review and summation of old records today    NO Reviewed patient's current orders and MAR    YES 
PMH/SH reviewed - no change compared to H&P 
________________________________________________________________________ Care Plan discussed with: 
  Comments Patient x Family  x dtr at bedside RN x Care Manager Consultant Multidiciplinary team rounds were held today with , nursing, pharmacist and clinical coordinator.   Patient's plan of care was discussed; medications were reviewed and discharge planning was addressed. ________________________________________________________________________ Total NON critical care TIME:  25 Minutes Total CRITICAL CARE TIME Spent:   Minutes non procedure based Comments >50% of visit spent in counseling and coordination of care x   
________________________________________________________________________ Elsa Sands MD  
 
Procedures: see electronic medical records for all procedures/Xrays and details which were not copied into this note but were reviewed prior to creation of Plan. LABS: 
I reviewed today's most current labs and imaging studies. Pertinent labs include: 
Recent Labs  
  11/21/18 2147 WBC 8.4 HGB 10.6* HCT 34.9*  
* Recent Labs  
  11/23/18 
0410 11/22/18 
0409 11/21/18 2147   --  142  
K 3.6  --  4.0  
*  --  108 CO2 23  --  29 *  --  75 BUN 19  --  34* CREA 1.32*  --  1.67* CA 8.5  --  9.1 MG  --  2.4 2.2 ALB  --   --  3.9 TBILI  --   --  0.5 SGOT  --   --  18 ALT  --   --  22 INR  --   --  1.2* Signed: Elsa Sands MD

## 2018-11-23 NOTE — PROCEDURES
[address] [facility]. 
[address] [facility] [address] [place], [address] 
[phone] NAME:  Kim Chowdary :   1935 MRN:   [id number] Date/Time:  2018 10:13 AMEsophagogastroduodenoscopy (EGD) Procedure Note :  Alessia Pineda MD 
 
Referring Provider:  Dario Gross MD 
 
Anethesia/Sedation:  MAC anesthesia Procedure Details After infom consent was obtained for the procedure, with all risks and benefits of procedure explained the patient was taken to the endoscopy suite and placed in the left lateral decubitus position. Following sequential administration of sedation as per above, the [id number] gastroscope was inserted into the mouth and advanced under direct vision to second portion of the duodenum. A careful inspection was made as the gastroscope was withdrawn, including a retroflexed view of the proximal stomach; findings and interventions are described below. Findings: 
Esophagus: Partially obstructing Schatzki's ring was noted at the GE junction. Medium sliding hiatal hernia was noted. Stomach:Mild patchy erythema was noted in  antrum Duodenum/jejunum: Mild patchy erythema was noted in  duodenal bulb Therapies:  esophageal dilation with 12-15 mm CRE balloon successful Specimens: none EBL: None Complications:   None; patient tolerated the procedure well. Impression:   
See Postoperative diagnosis above Recommendations: 
-Acid suppression with a proton pump inhibitor. , -Follow symptoms. , -Clear liquid diet and advance as tolerated. , -No NSAIDS 
-Hold Lovernox for today 
-Start Xarelto after 2 days Discharge disposition:  May be discharged home today Alessia Pineda MD

## 2018-11-23 NOTE — ANESTHESIA PREPROCEDURE EVALUATION
Anesthetic History No history of anesthetic complications Review of Systems / Medical History Patient summary reviewed, nursing notes reviewed and pertinent labs reviewed Pulmonary Within defined limits Comments: Former smoker - [address] - 5 pack years Neuro/Psych CVA TIA Cardiovascular Hypertension Valvular problems/murmurs: mitral stenosis and mitral insufficiency CHF Dysrhythmias : atrial fibrillation and atrial flutter CAD and hyperlipidemia Exercise tolerance: <4 METS Comments: S/P Mitral Clip 
 
TTE (1/22/18): Mild MR, mild-to-moderate MS, EF=65% GI/Hepatic/Renal 
  
GERD 
 
 
PUD Comments: Dysphagia Esophageal Stricture Reflux Esophagitis Endo/Other Diabetes: well controlled, type 2 Hypothyroidism: well controlled Obesity, cancer (bladder) and anemia Pertinent negatives: No morbid obesity Comments: Thrombocytopenia Other Findings Physical Exam 
 
Airway Mallampati: II 
TM Distance: 4 - 6 cm Neck ROM: normal range of motion Mouth opening: Normal 
 
 Cardiovascular Rhythm: irregular Rate: normal 
 
Murmur, Mitral area Dental 
 
Dentition: Lower partial plate, Caps/crowns and Loose teeth Pulmonary Breath sounds clear to auscultation Abdominal 
GI exam deferred Other Findings Anesthetic Plan ASA: 3 Anesthesia type: MAC and total IV anesthesia Induction: Intravenous Anesthetic plan and risks discussed with: Patient

## 2018-11-23 NOTE — PROGRESS NOTES
Patient tolerated clear liquid diet for lunch. Placed an order to advance diet to solid food. Will continue to monitor

## 2018-11-23 NOTE — PROGRESS NOTES
General Surgery End of Shift Nursing Note Bedside shift change report given to Nadine Conklin (oncoming nurse) by Bernice Jane RN (offgoing nurse). Report included the following information SBAR, Kardex, Intake/Output and MAR. Shift worked:   [id number] Summary of shift:    Currently NPO for EGD today. Pt c/o on-going diarrhea. New onset productive mucousy cough. Issues for physician to address:   EGD consent order. Liquid guaifenesin? Number times ambulated in hallway past shift: 0 Number of times OOB to chair past shift: 0 Pain Management: 
Current medication: none this shift Patient states pain is manageable on current pain medication: NO 
 
GI: 
 
Current diet:  DIET NPO Except Meds Tolerating current diet: YES Passing flatus: YES Last Bowel Movement: today Appearance: watery Respiratory: 
 
Incentive Spirometer at bedside: NO, not available. Patient instructed on use: NO 
 
Patient Safety: 
 
Falls Score: 2 Bed Alarm On? No 
Sitter?  No 
 
Macey Ovalle RN

## 2018-11-24 ENCOUNTER — HOME HEALTH ADMISSION (OUTPATIENT)
Dept: HOME HEALTH SERVICES | Facility: HOME HEALTH | Age: 83
End: 2018-11-24

## 2018-11-24 VITALS
TEMPERATURE: 97.8 F | SYSTOLIC BLOOD PRESSURE: 140 MMHG | OXYGEN SATURATION: 93 % | DIASTOLIC BLOOD PRESSURE: 60 MMHG | RESPIRATION RATE: 18 BRPM | BODY MASS INDEX: 33.31 KG/M2 | HEIGHT: 68 IN | HEART RATE: 75 BPM | WEIGHT: 219.8 LBS

## 2018-11-24 LAB
ANION GAP SERPL CALC-SCNC: 8 MMOL/L (ref 5–15)
BUN SERPL-MCNC: 14 MG/DL (ref 6–20)
BUN/CREAT SERPL: 11 (ref 12–20)
CALCIUM SERPL-MCNC: 8.6 MG/DL (ref 8.5–10.1)
CHLORIDE SERPL-SCNC: 114 MMOL/L (ref 97–108)
CO2 SERPL-SCNC: 21 MMOL/L (ref 21–32)
CREAT SERPL-MCNC: 1.32 MG/DL (ref 0.55–1.02)
ERYTHROCYTE [DISTWIDTH] IN BLOOD BY AUTOMATED COUNT: 15.2 % (ref 11.5–14.5)
GLUCOSE BLD STRIP.AUTO-MCNC: 162 MG/DL (ref 65–100)
GLUCOSE BLD STRIP.AUTO-MCNC: 176 MG/DL (ref 65–100)
GLUCOSE SERPL-MCNC: 167 MG/DL (ref 65–100)
HCT VFR BLD AUTO: 31.2 % (ref 35–47)
HGB BLD-MCNC: 9.6 G/DL (ref 11.5–16)
MAGNESIUM SERPL-MCNC: 2.1 MG/DL (ref 1.6–2.4)
MCH RBC QN AUTO: 28.1 PG (ref 26–34)
MCHC RBC AUTO-ENTMCNC: 30.8 G/DL (ref 30–36.5)
MCV RBC AUTO: 91.2 FL (ref 80–99)
NRBC # BLD: 0 K/UL (ref 0–0.01)
NRBC BLD-RTO: 0 PER 100 WBC
PLATELET # BLD AUTO: 129 K/UL (ref 150–400)
POTASSIUM SERPL-SCNC: 4.4 MMOL/L (ref 3.5–5.1)
RBC # BLD AUTO: 3.42 M/UL (ref 3.8–5.2)
SERVICE CMNT-IMP: ABNORMAL
SERVICE CMNT-IMP: ABNORMAL
SODIUM SERPL-SCNC: 143 MMOL/L (ref 136–145)
WBC # BLD AUTO: 7.7 K/UL (ref 3.6–11)

## 2018-11-24 PROCEDURE — 82962 GLUCOSE BLOOD TEST: CPT

## 2018-11-24 PROCEDURE — 80048 BASIC METABOLIC PNL TOTAL CA: CPT

## 2018-11-24 PROCEDURE — 74011250637 HC RX REV CODE- 250/637: Performed by: INTERNAL MEDICINE

## 2018-11-24 PROCEDURE — 74011250636 HC RX REV CODE- 250/636: Performed by: INTERNAL MEDICINE

## 2018-11-24 PROCEDURE — 83735 ASSAY OF MAGNESIUM: CPT

## 2018-11-24 PROCEDURE — 36415 COLL VENOUS BLD VENIPUNCTURE: CPT

## 2018-11-24 PROCEDURE — 85027 COMPLETE CBC AUTOMATED: CPT

## 2018-11-24 PROCEDURE — 74011636637 HC RX REV CODE- 636/637: Performed by: INTERNAL MEDICINE

## 2018-11-24 RX ORDER — ACETAMINOPHEN 325 MG/1
650 TABLET ORAL
Status: DISCONTINUED | OUTPATIENT
Start: 2018-11-24 | End: 2018-11-24 | Stop reason: HOSPADM

## 2018-11-24 RX ADMIN — INSULIN LISPRO 2 UNITS: 100 INJECTION, SOLUTION INTRAVENOUS; SUBCUTANEOUS at 10:01

## 2018-11-24 RX ADMIN — DOFETILIDE 125 MCG: 0.12 CAPSULE ORAL at 09:59

## 2018-11-24 RX ADMIN — ACETAMINOPHEN 650 MG: 325 TABLET ORAL at 12:50

## 2018-11-24 RX ADMIN — LISINOPRIL 20 MG: 20 TABLET ORAL at 09:58

## 2018-11-24 RX ADMIN — METOPROLOL TARTRATE 50 MG: 50 TABLET ORAL at 09:58

## 2018-11-24 RX ADMIN — ENOXAPARIN SODIUM 100 MG: 100 INJECTION, SOLUTION INTRAVENOUS; SUBCUTANEOUS at 01:26

## 2018-11-24 RX ADMIN — ALLOPURINOL 200 MG: 100 TABLET ORAL at 09:58

## 2018-11-24 RX ADMIN — LEVOTHYROXINE SODIUM 125 MCG: 125 TABLET ORAL at 06:30

## 2018-11-24 RX ADMIN — Medication 10 ML: at 05:07

## 2018-11-24 RX ADMIN — FUROSEMIDE 80 MG: 40 TABLET ORAL at 09:58

## 2018-11-24 RX ADMIN — INSULIN LISPRO 2 UNITS: 100 INJECTION, SOLUTION INTRAVENOUS; SUBCUTANEOUS at 12:50

## 2018-11-24 RX ADMIN — PANTOPRAZOLE SODIUM 40 MG: 40 TABLET, DELAYED RELEASE ORAL at 06:30

## 2018-11-24 RX ADMIN — INSULIN HUMAN 40 UNITS: 100 INJECTION, SUSPENSION SUBCUTANEOUS at 10:00

## 2018-11-24 RX ADMIN — POTASSIUM CHLORIDE 10 MEQ: 750 TABLET, EXTENDED RELEASE ORAL at 09:58

## 2018-11-24 RX ADMIN — HYDRALAZINE HYDROCHLORIDE 20 MG: 10 TABLET, FILM COATED ORAL at 09:58

## 2018-11-24 NOTE — HOME CARE
Home Health Care Discharge Planning: [facility] Face to Face Encounter NAME: Linda Cantu :  1935 MRN:  [id number] Primary Diagnosis:  
Progressive dysphagia due to Schatzki's ring with h/o remote esophageal stricture requiring EGD and dilation ~10 yrs ago with Dr. Malloy Chronic afib, chronic diastolic CHF and benign HTN JULIANA (resolved) in setting of CKD3 due to dehydration Insulin depdendent DM2 uncontrolled with hypoglycemia and nephropathy Hypothyroidism Gout Obesity (Body mass index is 33.42 kg/m²) Date of Face to Face:  2018 10:46 AM        
                        
Face to Face Encounter findings are related to primary reason for home care:   YES 
 
1. I certify that the patient needs intermittent skilled nursing care, physical therapy and/or speech therapy. I will not be following this patient in the Community and Dr. Mark Johnson MD will be responsible for signing the [address] [facility] [address] 2. Initial Orders for Care: Skilled Nursing 3. I certify that this patient is homebound because of illness or injury, need the aid of supportive devices such as crutches, canes, wheelchairs, and walkers; the use of special transportation; or the assistance of another person in order to leave their place of residence. There exists a normal inability to leave home and leaving home requires a considerable and taxing effort. 4. I certify that this patient is under my care and that I had a Face-to-Face Encounter that meets the physician Face-to-Face Encounter requirements. Document the physical findings from the Face-to-Face Encounter that support the need for skilled services: heart failure, hospital-to-home. Nyasia Mireles MD 
Discharging Physician Office: [phone] Fax:   [phone]

## 2018-11-24 NOTE — PROGRESS NOTES
Discharge instructions reviewed with pt and son and daughter at bedside, including to hold Xarelto until tomorrow evening. No questions/concerns verbalized.

## 2018-11-24 NOTE — PROGRESS NOTES
Pt to discharge home by private vehicle with DTR today. CM offered to arrange [facility] visit with pt and DTR. Pt agreeable to [facility] Visit for CHF at this time. Referral sent via CC to JEROD AMA[facility] for [facility]. Request sent to CM specialist to assist with scheduling PCP f/u appointment. Pt and family to schedule surgery f/u appointment, as offices are closed at this time. All information entered into pt AVS.  
 
Pt has no additional CM needs at this time. Care Management Interventions PCP Verified by CM: Yes 
Palliative Care Criteria Met (RRAT>21 & CHF Dx)?: No 
Mode of Transport at Discharge: Other (see comment)(family to transport home) Transition of Care Consult (CM Consult): Home Health [facility] - INPATIENT: Yes Discharge Durable Medical Equipment: No 
Health Maintenance Reviewed: Yes Physical Therapy Consult: Yes Occupational Therapy Consult: Yes Speech Therapy Consult: No 
Current Support Network: Own Home, Lives Alone Confirm Follow Up Transport: Family(family to transport if needed) Plan discussed with Pt/Family/Caregiver: Yes Freedom of Choice Offered: Yes Discharge Location Discharge Placement: Home with home health([facility] CHF) RAHEEM Christine Supervisee in Social Work, [employer] [facility] [phone]

## 2018-11-24 NOTE — PROGRESS NOTES
Patient exhibited some forgetfullness earlier tonight at handoff. As night progressed patient had some confused behavior and did not know where she was or why she was here. \"Nothing is wrong with me, why am I here./\" We reoriented her and placed her telemetry back on her. Later patient was up and starting to undress, telemetry was off. She was planning on taking a shower. She said she was at a hotel at [facility]. She was planing on taking off her leg wraps next. And getting into the shower. We explained that she was in the hospital had a procedure done earlier today, she did not remember that. We also told her she had a urinary tract infection. We placed a bed alarm under her to prevent a fall or to alert us if she is undressing. She asked me to call her daughter to tell her why she was here. I spoke with her daughter as did she. Daughter said she was coming in the a.m.

## 2018-11-24 NOTE — PROGRESS NOTES
General Surgery End of Shift Nursing Note Bedside shift change report given to Cheri Ovalle (oncoming nurse) by Emilia Youssef RN (offgoing nurse). Report included the following information SBAR, Kardex, Procedure Summary, Intake/Output, MAR, Accordion, Recent Results, Med Rec Status and Cardiac Rhythm NSR, First degree HB. Shift worked:   7p-7a Summary of shift:    Patient received all prescribed meds from 7p-7a. Vitals wnl. Patient became more forgetful as night progressed, she seems alert and oriented at this time, sitting in bed and some lights are on. NO VOMITING LAST NIGHT and swallowing liquids O.K. Issues for physician to address:   Progressing Number times ambulated in hallway past shift: 0 Number of times OOB to chair past shift: 4 Pain Management: 
Current medication: none given on my shift Patient states pain is manageable on current pain medication: YES 
 
GI: 
 
Current diet:  DIET DIABETIC CLEAR LIQUID Tolerating current diet: YES Passing flatus: YES Last Bowel Movement: 11/23 Appearance: unknown Respiratory: 
 
Incentive Spirometer at bedside: YES Patient instructed on use: YES Patient Safety: 
 
Falls Score: 3 Bed Alarm On? Yes Sitter?  No 
 
Tommy Pearce RN

## 2018-11-24 NOTE — DISCHARGE INSTRUCTIONS
HOSPITALIST DISCHARGE INSTRUCTIONS    NAME: Ashish Hedrick   :  1935   MRN:  [id number]     Date/Time:  2018 8:38 AM    ADMIT DATE: 2018   DISCHARGE DATE: 2018     Attending Physician: Qasmi Barbour MD    DISCHARGE DIAGNOSIS:  Progressive dysphagia due to Schatzki's ring  Chronic afib and benign hypertension  Chronic kidney disease  Insulin depdendent diabetes    MEDICATIONS:  See above    · It is important that you take the medication exactly as they are prescribed. · Keep your medication in the bottles provided by the pharmacist and keep a list of the medication names, dosages, and times to be taken in your wallet. · Do not take other medications without consulting your doctor. Pain Management: per above medications    What to do at [address]:  Resume previous diet    Recommended activity: Activity as tolerated    If you have questions regarding the hospital related prescriptions or hospital related issues please call [facility] at . You can always direct your questions to your primary care doctor if you are unable to reach your hospital physician; your PCP works as an extension of your hospital doctor just like your hospital doctor is an extension of your PCP for your time at [facility]. If you experience any of the following symptoms then please call your primary care physician or return to the emergency room if you cannot get hold of your doctor:  Fever, chills, nausea, vomiting, diarrhea, change in mentation, falling, bleeding, shortness of breath    Additional Instructions:    HOLD your Simona Dory today and restart tomorrow (on , 18). Bring these papers with you to your follow up appointments.  The papers will help your doctors be sure to continue the care plan from the hospital.              Information obtained by :  I understand that if any problems occur once I am at home I am to contact my physician. I understand and acknowledge receipt of the instructions indicated above. Physician's or R.N.'s Signature                                                                  Date/Time                                                                                                                                              Patient or Representative Signature                                                          Date/Time       Learning About Schatzki's Ring  What is Schatzki's ring? A Schatzki's ring is a ring of tissue that forms inside the esophagus, the tube that carries food and liquid to your stomach. This ring makes the esophagus narrow in one area, close to where it meets the stomach. It can make it hard to swallow. You may feel like food gets stuck in your esophagus. Doctors aren't sure exactly what causes these rings. The ring is also something you can be born with. How is Schatzki's ring diagnosed? Your doctor may check your esophagus if you are having trouble swallowing or if you feel like food is getting stuck. The doctor will use a tool called an endoscope, or scope. It's a thin, flexible, lighted viewing tool. It goes into the mouth and down the throat. Your doctor can use it to check for any problems. The scope can also be used to take a sample of tissue to test (biopsy). You might need an X-ray. For the X-ray, you may need to swallow a substance, such as barium, that makes it easier to see what happens in your esophagus. How is Schatzki's ring treated? A Schatzki's ring is usually treated with a procedure called esophageal dilation. Dilation can open up narrow areas of the esophagus. Before the procedure, you will get medicines through a needle in your vein (IV) in your arm or hand.  These medicines reduce pain and will make you feel relaxed and drowsy. Your throat will also be numbed. You may not remember much about the treatment. The doctor will guide a balloon or a plastic tool for widening (dilator) down your throat and into your esophagus. The dilator is used to widen any narrow area. To guide the dilator, the doctor may use a scope. Or he or she may use a thin wire as a guide. After the procedure, you will be observed for 1 to 2 hours until the medicines wear off. If your throat was numbed before the test, you should not eat or drink until your throat is no longer numb. When you are fully recovered, you can go home. You will not be able to drive or operate machinery for 12 hours after the test. Your doctor will tell you when you can go back to your usual diet and activities. Do not drink alcohol for 12 to 24 hours after the test.  You may still need to treat some symptoms of GERD. Your doctor may give you information about that. Follow-up care is a key part of your treatment and safety. Be sure to make and go to all appointments, and call your doctor if you are having problems. It's also a good idea to know your test results and keep a list of the medicines you take. Where can you learn more? Go to http://gilson-michael.info/. Enter [id number] in the search box to learn more about \"Learning About Schatzki's Ring. \"  Current as of: September 28, 2017  Content Version: 11.8  © 0073-7967 [employer]. Care instructions adapted under license by [employer] (which disclaims liability or warranty for this information). If you have questions about a medical condition or this instruction, always ask your healthcare professional. [place] [address] any warranty or liability for your use of this information.

## 2018-11-24 NOTE — DISCHARGE SUMMARY
Hospitalist Discharge Summary Patient ID: Shani Freeman [id number] 
78 y.o. 
1935 PCP on record: Genna Hyatt MD 
 
Admit date: 11/21/2018 Discharge date and time: 11/24/2018 DISCHARGE DIAGNOSIS: 
Progressive dysphagia due to Schatzki's ring with h/o remote esophageal stricture requiring EGD and dilation ~10 yrs ago with Dr. 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 
IP CONSULT TO GASTROENTEROLOGY Excerpted HPI from H&P of Misha Luciano MD: 
[place] Post is a 82 y.o.  female who presents with above complains from home ambulatory with daughter. Pt presents with CC difficulty swallowing which has progressed to a point that she is not able to take down liquids x 1 day H/o on & Off Food getting stuck since her last EGD/dilatation ~ 10 yrs ago with Dr Maxime Paul Denies any weight loss (unintentional) Pt has been taking Lasix (80mg/40mg) BID for chronic LE edema - last dose today AM- has not been eating or drinking today at all since AM 
  
Pt was found to have mild dehydration in ER with Cr up to 1.6 with BUN 34. 
 
______________________________________________________________________ DISCHARGE SUMMARY/HOSPITAL COURSE:  for full details see H&P, daily progress notes, labs, consult notes. Hospital course: 
Progressive dysphagia due to Schatzki's ring with h/o remote esophageal stricture requiring EGD and dilation ~10 yrs ago with Dr. Malloy: Pt seen by GI while here. She underwent EGD with Dr. Jade Desouza 11/23 with finding of partially obstructing Schatzki's ring. Medium sliding hiatal hernia was noted. Mild patchy erythema noted in stomach and duodenum.   Pt underwent esophageal dilation with 12-15 mm CRE balloon successful. Pt should con't home PPI. She was tolerating po again at the time of discharge. She should f/u with Dr. Katja Sawyer in clinic in 3 months or sooner if needed. Chronic afib, chronic diastolic CHF and benign HTN: con't tikosyn, lasix and lipitor. Per GI, Pt will need to hold xarelto for 2 days due to dilation - she has been given written instructions for restarting tomorrow (11/25/18). Pt can resume home metoprolol and hydralazine as well. JULIANA (resolved) in setting of CKD3 due to dehydration:  Cr ~ 1.4 at baseline. UA reviewed and is negative for bacteria. She received single-dose rocephin for possible simple UTI. 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 
 
_______________________________________________________________________ Patient seen and examined by me on discharge day. Pertinent Findings: 
Gen: awake, appropriate, NAD HEENT: cl ema, no lesions Chest: CTA bilaterally, no crackles or wheezes Cv: RRR, no murmur, chrohic 2+ edema Abd: soft, NT, ND, BS+, no mass Neuro: CN intact 
_______________________________________________________________________ DISCHARGE MEDICATIONS:  
Current Discharge Medication List  
  
CONTINUE these medications which have NOT CHANGED Details  
rivaroxaban (XARELTO) 15 mg tab tablet Take 1 Tab by mouth daily (with dinner). Qty: 30 Tab, Refills: 11  
 Associated Diagnoses: Chronic atrial fibrillation (HCC)  
  
ipratropium (ATROVENT) 42 mcg (0.06 %) nasal spray 2 Sprays by Both Nostrils route four (4) times daily. As needed runny nose Qty: 15 mL, Refills: 11  
 Associated Diagnoses: Chronic rhinitis  
  
allopurinol (ZYLOPRIM) 100 mg tablet TAKE TWO TABLETS BY MOUTH DAILY Qty: 180 Tab, Refills: 3  
  
furosemide (LASIX) 40 mg tablet 1 qam- changed upon lab review 9/5/18 Qty: 1 Tab, Refills: 0 Associated Diagnoses: Atherosclerosis of native coronary artery of native heart without angina pectoris ACCU-CHEK SHAHIDA PLUS TEST STRP strip USE TO CHECK BLOOD SUGAR FOUR TIMES A DAY Qty: 100 Strip, Refills: 9  
  
atorvastatin (LIPITOR) 80 mg tablet TAKE ONE TABLET BY MOUTH EVERY EVENING Qty: 90 Tab, Refills: 0 Associated Diagnoses: Hypercholesteremia  
  
insulin NPH (HUMULIN N NPH INSULIN KWIKPEN) 100 unit/mL (3 mL) inpn INJECT 43 UNITS UNDER THE SKIN EVERY MORNING, 23 UNITS UNDER THE SKIN WITH DINNER OR AS DIRECTED BY PHYSICIAN. **THIS REPLACED HUMULOG MIX** Qty: 15 Pen, Refills: 97  
 Associated Diagnoses: Controlled type 2 diabetes mellitus with stage 3 chronic kidney disease, with long-term current use of insulin (HCC)  
  
acetaminophen (TYLENOL) 325 mg tablet Take 325 mg by mouth every four (4) hours as needed for Pain.  
  
levothyroxine (SYNTHROID) 125 mcg tablet TAKE ONE TABLET BY MOUTH DAILY Qty: 30 Tab, Refills: 8  
  
ADAN PEN NEEDLE 32 gauge x 5/32\" ndle USE WITH INSULIN PEN TWO TIMES A DAY AS DIRECTED BY PHYSICIAN Qty: 100 Pen Needle, Refills: 11  
  
metoprolol tartrate (LOPRESSOR) 50 mg tablet Take 1 Tab by mouth two (2) times a day. Qty: 60 Tab, Refills: 11  
 Associated Diagnoses: Mitral valve stenosis, unspecified etiology  
  
lisinopril (PRINIVIL, ZESTRIL) 20 mg tablet Take 1 Tab by mouth daily. Qty: 30 Tab, Refills: 11  
 Associated Diagnoses: Essential hypertension, benign  
  
hydrALAZINE (APRESOLINE) 10 mg tablet Take 2 Tabs by mouth three (3) times daily. Qty: 180 Tab, Refills: 12  
  
potassium chloride SR (KLOR-CON 10) 10 mEq tablet Take 1 Tab by mouth daily. Qty: 30 Tab, Refills: 12  
  
omeprazole (PRILOSEC) 20 mg capsule Take 20 mg by mouth daily. dofetilide (TIKOSYN) 125 mcg capsule Take 1 Cap by mouth two (2) times a day. Qty: 60 Cap, Refills: 3 Associated Diagnoses: Atrial fibrillation ([address] My Recommended Diet, Activity, Wound Care, and follow-up labs are listed in the patient's Discharge Insturctions which I have personally completed and reviewed. ______________________________________________________________________ Risk of deterioration: Low 
 
Condition at Discharge:  Stable 
______________________________________________________________________ Disposition Home with family, no needs 
______________________________________________________________________ Care Plan discussed with:  
Patient, RN, Care Manager 
 
______________________________________________________________________ Code Status: DNR/DNI 
______________________________________________________________________ Follow up with: PCP : Arjun Russell MD 
Follow-up Information Follow up With Specialties Details Why Contact Info Arjun Russell MD Internal Medicine In 2 weeks routine hospital follow up if needed [address] [place] [address] 
[phone] Horace Allen MD Gastroenterology, Gastroenterology In 3 months or sooner if needed for your esophagus / Schatzki's ring [address] [facility] [address] 
[phone] Total time in minutes spent coordinating this discharge (includes going over instructions, follow-up, prescriptions, and preparing report for sign off to her PCP) :  30 minutes Signed: Caterina Orosco MD

## 2018-11-24 NOTE — PROGRESS NOTES
**Consult Information** 
Member Facility: [address] [place] [address],[address] Facility MRN: [id number] Consult ID: [id number] Facility Time Zone: ET 
Date and Time of Consult: 11/23/2018 07:28:40 PM 
Requesting Clinician: Pernell Bone Patient Name: Anibal Maloney Date of Birth: [id number] Gender: Female **Clinical Note** Clinical Note: Please review UA results; Looks like patient has UTI and is not on antibiotics. Will start patient on Rocephin empirically;  follow urine culture

## 2018-11-25 LAB
BACTERIA SPEC CULT: NORMAL
CC UR VC: NORMAL
SERVICE CMNT-IMP: NORMAL

## 2018-11-26 ENCOUNTER — TELEPHONE (OUTPATIENT)
Dept: INTERNAL MEDICINE CLINIC | Age: 83
End: 2018-11-26

## 2018-11-26 ENCOUNTER — HOME CARE VISIT (OUTPATIENT)
Dept: HOME HEALTH SERVICES | Facility: HOME HEALTH | Age: 83
End: 2018-11-26

## 2018-11-26 ENCOUNTER — PATIENT OUTREACH (OUTPATIENT)
Dept: INTERNAL MEDICINE CLINIC | Age: 83
End: 2018-11-26

## 2018-11-26 NOTE — PROGRESS NOTES
Hospital Discharge Follow-Up Date/Time:  2018 9:58 AM 
 
Patient was admitted to [facility] on 18 and discharged on 18 for dysphagia. The physician discharge summary was available at the time of outreach. Patient was contacted within 1 business days of discharge. Top Challenges reviewed with the provider 1. Safe Home Environment - d/c w/ H2H for HF education only. Wasn't eval by therapy. If PT needed, please have NN place referral for [facility] or consider [address] (OP therapy in home setting). NN can assist w/ referral.   
 
Method of communication with provider :staff message Inpatient RRAT score: 30 Was this a readmission? no  
Patient stated reason for the readmission: NA 
 
Nurse Navigator (NN) contacted the patient by telephone to perform post hospital discharge assessment. Verified name and  with patient as identifiers. Provided introduction to self, and explanation of the Nurse Navigator role. Reviewed discharge instructions and red flags with patient who verbalized understanding. Patient given an opportunity to ask questions and does not have any further questions or concerns at this time. The patient agrees to contact the PCP office for questions related to their healthcare. NN provided contact information for future reference. Disease Specific:   N/A Summary of patient's top problems: 1. Dysphagia - couple of weeks. Has trouble swallowing solids. Dilation of esophagus about 10 years ago. EGD - Esophagus: Partially obstructing Schatzki's ring was noted at the GE junction. Medium sliding hiatal hernia was noted. Stomach:Mild patchy erythema was noted in antrum. Duodenum/jejunum: Mild patchy erythema was noted in  duodenal bulb.    
Therapies:  esophageal dilation with 12-15 mm CRE balloon successful Home Health orders at discharge: [facility] Health company: [facility] Date of initial visit: TBD 
 
 Durable Medical Equipment ordered/company: No 
Durable Medical Equipment received: NA Barriers to care? None Advance Care Planning:  
Does patient have an Advance Directive:  reviewed and current Medication(s):  
New Medications at Discharge: No 
Changed Medications at Discharge: No 
Discontinued Medications at Discharge: No 
 
Medication reconciliation was performed with patient, who verbalizes understanding of administration of home medications. There were no barriers to obtaining medications identified at this time. Referral to Pharm D needed: no  
 
Current Outpatient Medications Medication Sig  
 rivaroxaban (XARELTO) 15 mg tab tablet Take 1 Tab by mouth daily (with dinner).  ipratropium (ATROVENT) 42 mcg (0.06 %) nasal spray 2 Sprays by Both Nostrils route four (4) times daily. As needed runny nose  furosemide (LASIX) 40 mg tablet 1 qam- changed upon lab review 9/5/18 (Patient taking differently: 40 mg. 2 in am and 1 in pm)  ACCU-CHEK SHAHIDA PLUS TEST STRP strip USE TO CHECK BLOOD SUGAR FOUR TIMES A DAY  atorvastatin (LIPITOR) 80 mg tablet TAKE ONE TABLET BY MOUTH EVERY EVENING  insulin NPH (HUMULIN N NPH INSULIN KWIKPEN) 100 unit/mL (3 mL) inpn INJECT 43 UNITS UNDER THE SKIN EVERY MORNING, 23 UNITS UNDER THE SKIN WITH DINNER OR AS DIRECTED BY PHYSICIAN. **THIS REPLACED HUMULOG MIX**  
 levothyroxine (SYNTHROID) 125 mcg tablet TAKE ONE TABLET BY MOUTH DAILY  ADAN PEN NEEDLE 32 gauge x 5/32\" ndle USE WITH INSULIN PEN TWO TIMES A DAY AS DIRECTED BY PHYSICIAN  
 metoprolol tartrate (LOPRESSOR) 50 mg tablet Take 1 Tab by mouth two (2) times a day.  allopurinol (ZYLOPRIM) 100 mg tablet TAKE TWO TABLETS BY MOUTH DAILY  lisinopril (PRINIVIL, ZESTRIL) 20 mg tablet Take 1 Tab by mouth daily.  hydrALAZINE (APRESOLINE) 10 mg tablet Take 2 Tabs by mouth three (3) times daily. (Patient taking differently: Take 20 mg by mouth two (2) times a day.)  potassium chloride SR (KLOR-CON 10) 10 mEq tablet Take 1 Tab by mouth daily.  omeprazole (PRILOSEC) 20 mg capsule Take 20 mg by mouth daily.  dofetilide (TIKOSYN) 125 mcg capsule Take 1 Cap by mouth two (2) times a day.  acetaminophen (TYLENOL) 325 mg tablet Take 325 mg by mouth every four (4) hours as needed for Pain. No current facility-administered medications for this visit. There are no discontinued medications. [facility] follow up appointment(s):  
Future Appointments Date Time Provider Dafne Jenkins 11/27/2018  3:00 PM hospitals WOUND CARE 1 [facility]  
11/29/2018 11:30 AM [facility]  
12/3/2018  8:45 AM hospitals WOUND CARE 4 [facility]  
12/31/2018 10:35 AM Calvin Murray MD [id number] [facility] follow up appointment(s): F/U with GI in 3 months [facility]:  n/a

## 2018-11-28 ENCOUNTER — HOME CARE VISIT (OUTPATIENT)
Dept: SCHEDULING | Facility: HOME HEALTH | Age: 83
End: 2018-11-28

## 2018-11-28 PROCEDURE — G0299 HHS/HOSPICE OF RN EA 15 MIN: HCPCS

## 2018-11-29 ENCOUNTER — OFFICE VISIT (OUTPATIENT)
Dept: INTERNAL MEDICINE CLINIC | Age: 83
End: 2018-11-29

## 2018-11-29 VITALS
SYSTOLIC BLOOD PRESSURE: 136 MMHG | HEART RATE: 76 BPM | BODY MASS INDEX: 33.16 KG/M2 | DIASTOLIC BLOOD PRESSURE: 58 MMHG | OXYGEN SATURATION: 98 % | HEIGHT: 68 IN | RESPIRATION RATE: 20 BRPM | WEIGHT: 218.8 LBS | TEMPERATURE: 97.1 F

## 2018-11-29 DIAGNOSIS — I50.32 CHRONIC DIASTOLIC HEART FAILURE (HCC): ICD-10-CM

## 2018-11-29 DIAGNOSIS — K22.2 ESOPHAGEAL STRICTURE: ICD-10-CM

## 2018-11-29 DIAGNOSIS — I48.20 CHRONIC ATRIAL FIBRILLATION (HCC): Primary | ICD-10-CM

## 2018-11-29 DIAGNOSIS — N18.30 CONTROLLED TYPE 2 DIABETES MELLITUS WITH STAGE 3 CHRONIC KIDNEY DISEASE, WITH LONG-TERM CURRENT USE OF INSULIN (HCC): ICD-10-CM

## 2018-11-29 DIAGNOSIS — E11.22 CONTROLLED TYPE 2 DIABETES MELLITUS WITH STAGE 3 CHRONIC KIDNEY DISEASE, WITH LONG-TERM CURRENT USE OF INSULIN (HCC): ICD-10-CM

## 2018-11-29 DIAGNOSIS — Z79.4 CONTROLLED TYPE 2 DIABETES MELLITUS WITH STAGE 3 CHRONIC KIDNEY DISEASE, WITH LONG-TERM CURRENT USE OF INSULIN (HCC): ICD-10-CM

## 2018-11-29 NOTE — PROGRESS NOTES
HISTORY OF PRESENT ILLNESS Cindy Guzman is a 82 y.o. female. TOMY Vale is seen today for hospital follow up for recent admission with dysphagia. She was admitted to [facility] on 11/21/18. She was discharged on 11/24/18. Nurse navigator contact was on 11/26/18 and her visit is today, 11/29/18. This represents a transitional care visit. 1. Dysphagia. She underwent dilation of the esophagus with good results. She has had no recurrent symptoms. She needs to follow up with GI in three months. 2. Chronic atrial fibrillation, CHF diastolic. Up to date with cardiology follow up. She is clinically stable and remains on Xarelto. 3. Right lower extremity wound. Follow up at wound clinic is pending. It is very superficial at this time. 4. ? UTI. Reviewed urine culture that was pending at discharge and was negative. 5. Chronic medical problems, diabetes, etc. Otherwise, up to date. Full medication reconciliation undertaken, see below. This was an extended visit of high complexity. Current Outpatient Medications Medication Sig  
 rivaroxaban (XARELTO) 15 mg tab tablet Take 1 Tab by mouth daily (with dinner).  ipratropium (ATROVENT) 42 mcg (0.06 %) nasal spray 2 Sprays by Both Nostrils route four (4) times daily. As needed runny nose  furosemide (LASIX) 40 mg tablet 1 qam- changed upon lab review 9/5/18 (Patient taking differently: Take 40 mg by mouth two (2) times a day. 2 in am and 1 in pm)  ACCU-CHEK SHAHIDA PLUS TEST STRP strip USE TO CHECK BLOOD SUGAR FOUR TIMES A DAY  atorvastatin (LIPITOR) 80 mg tablet TAKE ONE TABLET BY MOUTH EVERY EVENING  insulin NPH (HUMULIN N NPH INSULIN KWIKPEN) 100 unit/mL (3 mL) inpn INJECT 43 UNITS UNDER THE SKIN EVERY MORNING, 23 UNITS UNDER THE SKIN WITH DINNER OR AS DIRECTED BY PHYSICIAN. **THIS REPLACED HUMULOG MIX**  
 acetaminophen (TYLENOL) 325 mg tablet Take 325 mg by mouth every four (4) hours as needed for Pain.  levothyroxine (SYNTHROID) 125 mcg tablet TAKE ONE TABLET BY MOUTH DAILY  ADAN PEN NEEDLE 32 gauge x 5/32\" ndle USE WITH INSULIN PEN TWO TIMES A DAY AS DIRECTED BY PHYSICIAN  
 metoprolol tartrate (LOPRESSOR) 50 mg tablet Take 1 Tab by mouth two (2) times a day.  allopurinol (ZYLOPRIM) 100 mg tablet TAKE TWO TABLETS BY MOUTH DAILY  lisinopril (PRINIVIL, ZESTRIL) 20 mg tablet Take 1 Tab by mouth daily.  potassium chloride SR (KLOR-CON 10) 10 mEq tablet Take 1 Tab by mouth daily.  omeprazole (PRILOSEC) 20 mg capsule Take 20 mg by mouth daily.  dofetilide (TIKOSYN) 125 mcg capsule Take 1 Cap by mouth two (2) times a day.  hydrALAZINE (APRESOLINE) 10 mg tablet Take 2 Tabs by mouth three (3) times daily. (Patient taking differently: Take 20 mg by mouth two (2) times a day.) No current facility-administered medications for this visit. Past Medical History:  
Diagnosis Date  Atrial fibrillation ([facility] 10/29/2009  Bladder cancer ([facility]  Coagulation disorder ([address] Chronic prophylactic anticoagulation med  Colon polyps  Diabetes ([facility]  GERD (gastroesophageal reflux disease)  Heart valve problem   
 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  
 @ [facility] >> atrial fibrillation  T. I.A. 4/23/2009  TIA (transient ischemic attack)  Ulcer of right lower extremity, limited to breakdown of skin ([facility] 7/5/2018 Review of Systems Constitutional: Negative for weight loss. Respiratory: Negative. Cardiovascular: Positive for leg swelling. Negative for chest pain, palpitations and PND. Gastrointestinal: Positive for heartburn. Genitourinary: Negative for dysuria. Musculoskeletal: Negative for myalgias. Neurological: Negative for focal weakness. Endo/Heme/Allergies: Does not bruise/bleed easily. All other systems reviewed and are negative. Physical Exam  
Constitutional: No distress. Cardiovascular: Normal rate and regular rhythm. Exam reveals no gallop and no friction rub. Murmur heard. Systolic murmur is present with a grade of 1/6. Pulmonary/Chest: Effort normal and breath sounds normal.  
Musculoskeletal: She exhibits edema. Moderate bilateral lower extremity edema - baseline Skin:  
 
  
Nursing note and vitals reviewed. ASSESSMENT and PLAN Diagnoses and all orders for this visit: 
 
1. Chronic atrial fibrillation ([address]- Continue current regimen of prescription and / or OTC medications , See cardiologist as directed. 2. Chronic diastolic heart failure ([address]- Continue current regimen of prescription and / or OTC medications , See cardiologist as directed. 3. Controlled type 2 diabetes mellitus with stage 3 chronic kidney disease, with long-term current use of insulin ([address]- Continue current regimen of prescription and / or OTC medications 4. Esophageal stricture- See gastroenterologist as directed 5. Lower extremity wound- See specialists  as directed.

## 2018-12-06 ENCOUNTER — PATIENT OUTREACH (OUTPATIENT)
Dept: INTERNAL MEDICINE CLINIC | Age: 83
End: 2018-12-06

## 2018-12-06 NOTE — PROGRESS NOTES
NN Follow Up Called pt. Verified her with 2 identifiers. States she hasn't made f/u appt with GI to date. Encouraged her to get this done. Says her wound on leg is completing healed. Instructed her to call office if she needs anything.

## 2018-12-07 DIAGNOSIS — I10 ESSENTIAL HYPERTENSION, BENIGN: ICD-10-CM

## 2018-12-07 DIAGNOSIS — I05.0 MITRAL VALVE STENOSIS, UNSPECIFIED ETIOLOGY: ICD-10-CM

## 2018-12-07 DIAGNOSIS — E78.00 HYPERCHOLESTEREMIA: ICD-10-CM

## 2018-12-10 RX ORDER — LISINOPRIL 20 MG/1
TABLET ORAL
Qty: 30 TAB | Refills: 10 | Status: SHIPPED | OUTPATIENT
Start: 2018-12-10 | End: 2019-04-27

## 2018-12-10 RX ORDER — METOPROLOL TARTRATE 50 MG/1
TABLET ORAL
Qty: 60 TAB | Refills: 10 | Status: SHIPPED | OUTPATIENT
Start: 2018-12-10 | End: 2019-03-15

## 2018-12-10 RX ORDER — ATORVASTATIN CALCIUM 80 MG/1
TABLET, FILM COATED ORAL
Qty: 90 TAB | Refills: 0 | Status: SHIPPED | OUTPATIENT
Start: 2018-12-10 | End: 2019-02-11 | Stop reason: SDUPTHER

## 2018-12-23 ENCOUNTER — HOSPITAL ENCOUNTER (EMERGENCY)
Age: 83
Discharge: HOME | End: 2018-12-23
Attending: EMERGENCY MEDICINE
Payer: MEDICARE

## 2018-12-23 VITALS
TEMPERATURE: 98 F | WEIGHT: 219.8 LBS | SYSTOLIC BLOOD PRESSURE: 149 MMHG | HEIGHT: 68 IN | OXYGEN SATURATION: 99 % | RESPIRATION RATE: 12 BRPM | HEART RATE: 76 BPM | BODY MASS INDEX: 33.31 KG/M2 | DIASTOLIC BLOOD PRESSURE: 50 MMHG

## 2018-12-23 DIAGNOSIS — T18.128A ESOPHAGEAL OBSTRUCTION DUE TO FOOD IMPACTION: ICD-10-CM

## 2018-12-23 DIAGNOSIS — K22.2 ESOPHAGEAL STRICTURE: Primary | ICD-10-CM

## 2018-12-23 DIAGNOSIS — K22.2 ESOPHAGEAL OBSTRUCTION DUE TO FOOD IMPACTION: ICD-10-CM

## 2018-12-23 PROCEDURE — 96374 THER/PROPH/DIAG INJ IV PUSH: CPT

## 2018-12-23 PROCEDURE — 74011250636 HC RX REV CODE- 250/636: Performed by: EMERGENCY MEDICINE

## 2018-12-23 PROCEDURE — 99284 EMERGENCY DEPT VISIT MOD MDM: CPT

## 2018-12-23 PROCEDURE — 99283 EMERGENCY DEPT VISIT LOW MDM: CPT

## 2018-12-23 RX ADMIN — GLUCAGON HYDROCHLORIDE 1 MG: 1 INJECTION, POWDER, FOR SOLUTION INTRAMUSCULAR; INTRAVENOUS; SUBCUTANEOUS at 14:34

## 2018-12-23 NOTE — ED PROVIDER NOTES
EMERGENCY DEPARTMENT HISTORY AND PHYSICAL EXAM      Date: 12/23/2018  Patient Name: Petrona Cote    History of Presenting Illness     Chief Complaint   Patient presents with    Dysphagia     Patient states that she began having difficulty swallowing for the last couple of days. Patient states she \"had her throat strethced four weeks ago\" No drooling noted        History Provided By: Patient    HPI: Petrona Cote, 82 y.o. female with PMHx significant for esophageal stricture, HTN, DM, hypercholesteremia, PUD, GERD, CA, and hypothyroidism, presents ambulatory to the ED with cc of difficulty swallowing since this morning. Pt denies any associated sxs, nor any modifying factors. Pt notes that she has a Hx of similar sxs in the past which were the result of an esophageal stricture. Pt explains that she was attempting to eat breakfast earlier this morning when she regurgitated the food. Pt reports that she has been unable to swallow foods or liquids since the incident, and it feels as if her saliva is pooling in her throat. Pt notes that she had a recent esophageal dilation by her GI specialist, Dr. Lore Soares, 3 weeks ago. Pt states that it does not feel like she has a food bolus stuck in her throat. She specifically denies any fevers, chills, nausea, vomiting, chest pain, shortness of breath, headache, rash, diarrhea, sweating or weight loss. There are no other complaints, changes, or physical findings at this time.     PSHx: esophageal dilation  Social Hx: +(former) tobacco, - EtOH, - illicit drug use    PCP: Nata Hoyos MD   GI: Lore Soares MD    Current Facility-Administered Medications   Medication Dose Route Frequency Provider Last Rate Last Dose    glucagon (GLUCAGEN) injection 1 mg  1 mg IntraVENous NOW Jennie Goncalves MD         Current Outpatient Medications   Medication Sig Dispense Refill    lisinopril (PRINIVIL, ZESTRIL) 20 mg tablet TAKE ONE TABLET BY MOUTH DAILY 30 Tab 10  atorvastatin (LIPITOR) 80 mg tablet TAKE ONE TABLET BY MOUTH EVERY EVENING 90 Tab 0    metoprolol tartrate (LOPRESSOR) 50 mg tablet TAKE ONE TABLET BY MOUTH TWO TIMES A DAY 60 Tab 10    rivaroxaban (XARELTO) 15 mg tab tablet Take 1 Tab by mouth daily (with dinner). 30 Tab 11    ipratropium (ATROVENT) 42 mcg (0.06 %) nasal spray 2 Sprays by Both Nostrils route four (4) times daily. As needed runny nose 15 mL 11    furosemide (LASIX) 40 mg tablet 1 qam- changed upon lab review 9/5/18 (Patient taking differently: Take 40 mg by mouth two (2) times a day. 2 in am and 1 in pm) 1 Tab 0    ACCU-CHEK SHAHIDA PLUS TEST STRP strip USE TO CHECK BLOOD SUGAR FOUR TIMES A  Strip 9    insulin NPH (HUMULIN N NPH INSULIN KWIKPEN) 100 unit/mL (3 mL) inpn INJECT 43 UNITS UNDER THE SKIN EVERY MORNING, 23 UNITS UNDER THE SKIN WITH DINNER OR AS DIRECTED BY PHYSICIAN. **THIS REPLACED HUMULOG MIX** 15 Pen 97    acetaminophen (TYLENOL) 325 mg tablet Take 325 mg by mouth every four (4) hours as needed for Pain.  levothyroxine (SYNTHROID) 125 mcg tablet TAKE ONE TABLET BY MOUTH DAILY 30 Tab 8    ADAN PEN NEEDLE 32 gauge x 5/32\" ndle USE WITH INSULIN PEN TWO TIMES A DAY AS DIRECTED BY PHYSICIAN 100 Pen Needle 11    allopurinol (ZYLOPRIM) 100 mg tablet TAKE TWO TABLETS BY MOUTH DAILY 180 Tab 3    hydrALAZINE (APRESOLINE) 10 mg tablet Take 2 Tabs by mouth three (3) times daily. (Patient taking differently: Take 20 mg by mouth two (2) times a day.) 180 Tab 12    potassium chloride SR (KLOR-CON 10) 10 mEq tablet Take 1 Tab by mouth daily. 30 Tab 12    omeprazole (PRILOSEC) 20 mg capsule Take 20 mg by mouth daily.  dofetilide (TIKOSYN) 125 mcg capsule Take 1 Cap by mouth two (2) times a day.  60 Cap 3       Past History     Past Medical History:  Past Medical History:   Diagnosis Date    Atrial fibrillation ([facility] 10/29/2009    Bladder cancer (HCC)     Coagulation disorder (HCC)     Chronic prophylactic anticoagulation med    Colon polyps     Diabetes ([facility] [address] [address]     GERD (gastroesophageal reflux disease)     Heart valve problem     leaking heart valve    Hypercholesterolemia     Hypertension     Hypothyroidism     Hypothyroidism 2009    Hypothyroidism, acquired, autoimmune 2015    Overweight and obesity     PUD (peptic ulcer disease)     S/P ablation of atrial flutter[V45.89HM]     @ [facility] >> atrial fibrillation    T. I.A. 2009    TIA (transient ischemic attack)     Ulcer of right lower extremity, limited to breakdown of skin ([email or web address] 2018       Past Surgical History:  Past Surgical History:   Procedure Laterality Date    CARDIAC SURG PROCEDURE UNLIST      ablation    HX APPENDECTOMY      HX CHOLECYSTECTOMY      HX HYSTERECTOMY      HX KNEE ARTHROSCOPY  [id number]    right knee    HX ORTHOPAEDIC      HX UROLOGICAL      RENAL STENT, tur-b    VASCULAR SURGERY PROCEDURE UNLIST      removed vein in right leg       Family History:  Family History   Problem Relation Age of Onset    Stroke Other     Arthritis-osteo Sister         spinal stenosis    Gout Son     Hypertension Son     Hypertension Mother     Heart Disease Mother         CAD    Heart Disease Father         CAD    Alcohol abuse Neg Hx     Asthma Neg Hx     Bleeding Prob Neg Hx     Cancer Neg Hx     Diabetes Neg Hx     Elevated Lipids Neg Hx     Headache Neg Hx     Lung Disease Neg Hx     Migraines Neg Hx     Psychiatric Disorder Neg Hx     Mental Retardation Neg Hx        Social History:  Social History     Tobacco Use    Smoking status: Former Smoker     Packs/day: 0.50     Years: 10.00     Pack years: 5.00     Types: Cigarettes     Last attempt to quit: 1967     Years since quittin.0    Smokeless tobacco: Never Used   Substance Use Topics    Alcohol use: No     Alcohol/week: 0.0 oz    Drug use: No       Allergies:   Allergies   Allergen Reactions    Actos [Pioglitazone] Swelling Swelling of feet and legs    Codeine Itching    Doxycycline Nausea Only    Hydrocodone Rash and Other (comments)     hallucinations         Review of Systems   Review of Systems   Constitutional: Negative. Negative for activity change, appetite change, chills, fatigue, fever and unexpected weight change. HENT: Positive for trouble swallowing. Negative for congestion, hearing loss, rhinorrhea, sneezing and voice change. Eyes: Negative. Negative for pain and visual disturbance. Respiratory: Negative. Negative for apnea, cough, choking, chest tightness and shortness of breath. Cardiovascular: Negative. Negative for chest pain and palpitations. Gastrointestinal: Negative. Negative for abdominal distention, abdominal pain, blood in stool, diarrhea, nausea and vomiting. Genitourinary: Negative. Negative for difficulty urinating, flank pain, frequency and urgency. No discharge   Musculoskeletal: Negative. Negative for arthralgias, back pain, myalgias and neck stiffness. Skin: Negative. Negative for color change and rash. Neurological: Negative. Negative for dizziness, seizures, syncope, speech difficulty, weakness, numbness and headaches. Hematological: Negative for adenopathy. Psychiatric/Behavioral: Negative. Negative for agitation, behavioral problems, dysphoric mood and suicidal ideas. The patient is not nervous/anxious. Physical Exam   Physical Exam   Constitutional: She is oriented to person, place, and time. She appears well-developed and well-nourished. No distress. HENT:   Head: Normocephalic and atraumatic. Mouth/Throat: Oropharynx is clear and moist. No oropharyngeal exudate. Eyes: Conjunctivae and EOM are normal. Pupils are equal, round, and reactive to light. Right eye exhibits no discharge. Left eye exhibits no discharge. Neck: Normal range of motion. Neck supple. Cardiovascular: Normal rate, regular rhythm and intact distal pulses.  Exam reveals no gallop and no friction rub. Murmur heard. Systolic (Ejection) murmur is present with a grade of 3/6. Pulmonary/Chest: Effort normal and breath sounds normal. No respiratory distress. She has no wheezes. She has no rales. She exhibits no tenderness. Abdominal: Soft. Bowel sounds are normal. She exhibits no distension and no mass. There is no tenderness. There is no rebound and no guarding. Musculoskeletal: Normal range of motion. She exhibits no edema. Lymphadenopathy:     She has no cervical adenopathy. Neurological: She is alert and oriented to person, place, and time. No cranial nerve deficit. Coordination normal.   Skin: Skin is warm and dry. No rash noted. No erythema. Psychiatric: She has a normal mood and affect. Nursing note and vitals reviewed. Medical Decision Making   I am the first provider for this patient. I reviewed the vital signs, available nursing notes, past medical history, past surgical history, family history and social history. Vital Signs-Reviewed the patient's vital signs. Patient Vitals for the past 12 hrs:   Temp Pulse Resp BP SpO2   12/23/18 1557  76 12 149/50 99 %   12/23/18 1350  73 16 152/58 98 %   12/23/18 1325 98 °F (36.7 °C) 97 16 (!) 115/99 97 %       Pulse Oximetry Analysis - 97% on RA    Cardiac Monitor:   Rate: 96 bpm  Rhythm: Normal Sinus Rhythm      Records Reviewed: Nursing Notes, Old Medical Records and Previous Laboratory Studies    Provider Notes (Medical Decision Making):   DDx: esophageal reaction, esophageal narrowing    ED Course:   Initial assessment performed. The patients presenting problems have been discussed, and they are in agreement with the care plan formulated and outlined with them. I have encouraged them to ask questions as they arise throughout their visit.    4:01 PM  The patient states that their symptoms have resolved and they feel much better. There are no other new complaints at this time.   Her questions have been answered. We are awaiting final results and those will be reviewed with them when they become available. Critical Care Time:   0    Disposition:  4:02 PM  All Miller's  results have been reviewed with her. She has been counseled regarding her diagnosis. She verbally conveys understanding and agreement of the signs, symptoms, diagnosis, treatment and prognosis and additionally agrees to follow up as recommended with Dr. Genna Hyatt MD in 24 - 48 hours. She also agrees with the care-plan and conveys that all of her questions have been answered. I have also put together some discharge instructions for her that include: 1) educational information regarding their diagnosis, 2) how to care for their diagnosis at home, as well a 3) list of reasons why they would want to return to the ED prior to their follow-up appointment, should their condition change. PLAN:  1. Discharge home. Current Discharge Medication List        2. Follow-up Information     Follow up With Specialties Details Why Contact Info    Genna Hyatt MD Internal Medicine   [address] [facility]  [address] [place] [address]      Ada Robertson MD Gastroenterology, Gastroenterology Call in 2 days  [address]  [address]  [phone]          Return to ED if worse     Diagnosis     Clinical Impression:   1. Esophageal stricture    2. Esophageal obstruction due to food impaction        Attestations: This note is prepared by Jon Seymour and Yung Vargas, acting as Scribe for [employer] Vita Ward, [address] [facility] Vita Ward MD: The scribe's documentation has been prepared under my direction and personally reviewed by me in its entirety. I confirm that the note above accurately reflects all work, treatment, procedures, and medical decision making performed by me.

## 2018-12-23 NOTE — DISCHARGE INSTRUCTIONS
Esophageal Dilation: Before Your Procedure  What is esophageal dilation? Esophageal dilation is a procedure that can open up narrow areas of the esophagus. The esophagus is the tube that carries food to your stomach. When this tube is too narrow, it is hard for food and liquids to pass through. This makes it hard to swallow. During the procedure, the doctor guides a balloon or plastic dilator down your throat and into your esophagus. Then the device expands, like a balloon filling with air. It widens any narrow parts of your esophagus. To guide the balloon or plastic dilator, the doctor may use a thin, lighted tube that bends. (It is called an endoscope, or a scope.) Or he or she may use a thin wire as a guide. You may get medicine to numb the back of your throat and help you relax during the procedure. You will not feel pain. You may go home after your doctor or nurse checks to make sure that you are not having any problems. Follow-up care is a key part of your treatment and safety. Be sure to make and go to all appointments, and call your doctor if you are having problems. It's also a good idea to know your test results and keep a list of the medicines you take. What happens before the procedure?   Preparing for the procedure    · Understand exactly what procedure is planned, along with the risks, benefits, and other options. · Tell your doctors ALL the medicines, vitamins, supplements, and herbal remedies you take. Some of these can increase the risk of bleeding or interact with anesthesia.     · If you take blood thinners, such as warfarin (Coumadin), clopidogrel (Plavix), or aspirin, be sure to talk to your doctor. He or she will tell you if you should stop taking these medicines before your procedure. Make sure that you understand exactly what your doctor wants you to do.     · Your doctor will tell you which medicines to take or stop before your procedure.  You may need to stop taking certain medicines a week or more before the procedure. So talk to your doctor as soon as you can.     · If you have an advance directive, let your doctor know. It may include a living will and a durable power of  for health care. Bring a copy to the hospital. If you don't have one, you may want to prepare one. It lets your doctor and loved ones know your health care wishes. Doctors advise that everyone prepare these papers before any type of surgery or procedure. Procedures can be stressful. This information will help you understand what you can expect. And it will help you safely prepare for your procedure. What happens on the day of the procedure? · Follow the instructions exactly about when to stop eating and drinking. If you don't, your procedure may be canceled. If your doctor told you to take your medicines on the day of the procedure, take them with only a sip of water.     · Take a bath or shower before you come in for your procedure. Do not apply lotions, perfumes, deodorants, or nail polish.     · Take off all jewelry and piercings. And take out contact lenses, if you wear them.    At the hospital or surgery center   · Bring a picture ID.     · The procedure will take about 15 to 30 minutes.     · The doctor may spray medicine on the back of your throat to numb it. You also will get medicine to prevent pain and to relax you.     · You will stay at the hospital or surgery center for 1 to 2 hours until the medicine you were given wears off. Going home   · Be sure you have someone to drive you home. Anesthesia and pain medicine make it unsafe for you to drive.     · You will be given more specific instructions about recovering from your procedure. They will cover things like diet, wound care, follow-up care, driving, and getting back to your normal routine. When should you call your doctor?    · You have questions or concerns.     · You don't understand how to prepare for your procedure.     · You become ill before the procedure (such as fever, flu, or a cold).     · You need to reschedule or have changed your mind about having the procedure. Where can you learn more? Go to http://gilson-michael.info/. Enter E469 in the search box to learn more about \"Esophageal Dilation: Before Your Procedure. \"  Current as of: March 28, 2018  Content Version: 11.8  © 1812-6921 [employer]. Care instructions adapted under license by [employer] (which disclaims liability or warranty for this information). If you have questions about a medical condition or this instruction, always ask your healthcare professional. [place] [address] any warranty or liability for your use of this information. Learning About Esophageal Stricture  What is an esophageal stricture? A stricture is a narrowing in one area of the esophagus, the tube that carries food and liquid to your stomach. It most often happens close to where the esophagus meets the stomach. A stricture can make it hard to swallow. You may feel like food gets stuck in your esophagus. If you have gastroesophageal reflux disease (GERD), stomach acid can flow backward into the esophagus. This can damage the lining of your esophagus and cause a stricture. Other things that can cause a stricture include:  · Surgery, radiation, or other treatments on the esophagus. · Some diseases and infections. · Reactions to some chemicals or medicines. How are strictures diagnosed? Your doctor may check your esophagus if you are having trouble swallowing or if you feel like food is getting stuck. The doctor will use a tool called an endoscope, or scope. It's a thin, flexible, lighted viewing tool. It goes into the mouth and down the throat. Your doctor can use it to check for any problems. The scope can also be used to take a sample of tissue to test (biopsy). You might need an X-ray.  For the X-ray, you may need to swallow a substance, such as barium, that makes it easier to see what happens in your esophagus. How are strictures treated? Strictures are usually treated with a procedure called esophageal dilation. Dilation can open up narrow areas of the esophagus. Before the procedure, you will get medicines through a needle in your vein (IV) in your arm or hand. These medicines reduce pain and will make you feel relaxed and drowsy. Your throat will also be numbed. You may not remember much about the treatment. The doctor will guide a balloon or a plastic tool for widening (dilator) down your throat and into your esophagus. The dilator is used to widen any narrow area. To guide the dilator, the doctor may use a scope. Or he or she may use a thin wire as a guide. After the procedure, you will be observed for 1 to 2 hours until the medicines wear off. If your throat was numbed before the test, you should not eat or drink until your throat is no longer numb. When you are fully recovered, you can go home. You will not be able to drive or operate machinery for 12 hours after the test. Your doctor will tell you when you can go back to your usual diet and activities. Do not drink alcohol for 12 to 24 hours after the test.  You may still need to treat some symptoms of GERD. Your doctor may give you information about that. Follow-up care is a key part of your treatment and safety. Be sure to make and go to all appointments, and call your doctor if you are having problems. It's also a good idea to know your test results and keep a list of the medicines you take. Where can you learn more? Go to http://gilson-michael.info/. Enter P226 in the search box to learn more about \"Learning About Esophageal Stricture. \"  Current as of: September 28, 2017  Content Version: 11.8  © 8251-6665 [employer], [employer].  Care instructions adapted under license by [employer] (which disclaims liability or warranty for this information). If you have questions about a medical condition or this instruction, always ask your healthcare professional. [place] [address] any warranty or liability for your use of this information.

## 2019-01-01 ENCOUNTER — HOSPITAL ENCOUNTER (OUTPATIENT)
Dept: CARDIAC REHAB | Age: 84
Discharge: HOME | End: 2019-12-23
Payer: MEDICARE

## 2019-01-01 ENCOUNTER — TELEPHONE (OUTPATIENT)
Dept: INTERNAL MEDICINE CLINIC | Age: 84
End: 2019-01-01

## 2019-01-01 ENCOUNTER — HOSPITAL ENCOUNTER (OUTPATIENT)
Dept: CARDIAC REHAB | Age: 84
Discharge: HOME | End: 2019-12-02
Payer: MEDICARE

## 2019-01-01 ENCOUNTER — PATIENT OUTREACH (OUTPATIENT)
Dept: INTERNAL MEDICINE CLINIC | Age: 84
End: 2019-01-01

## 2019-01-01 ENCOUNTER — HOSPITAL ENCOUNTER (OUTPATIENT)
Dept: CARDIAC REHAB | Age: 84
Discharge: HOME | End: 2019-12-04
Payer: MEDICARE

## 2019-01-01 ENCOUNTER — HOSPITAL ENCOUNTER (OUTPATIENT)
Dept: CARDIAC REHAB | Age: 84
Discharge: HOME | End: 2019-12-09
Payer: MEDICARE

## 2019-01-01 ENCOUNTER — HOSPITAL ENCOUNTER (OUTPATIENT)
Dept: CARDIAC REHAB | Age: 84
Discharge: HOME | End: 2019-12-30
Payer: MEDICARE

## 2019-01-01 ENCOUNTER — HOSPITAL ENCOUNTER (OUTPATIENT)
Dept: CARDIAC REHAB | Age: 84
Discharge: HOME | End: 2019-12-18
Payer: MEDICARE

## 2019-01-01 ENCOUNTER — HOSPITAL ENCOUNTER (OUTPATIENT)
Dept: CARDIAC REHAB | Age: 84
Discharge: HOME | End: 2019-12-16
Payer: MEDICARE

## 2019-01-01 ENCOUNTER — HOSPITAL ENCOUNTER (OUTPATIENT)
Dept: NON INVASIVE DIAGNOSTICS | Age: 84
Discharge: HOME | End: 2019-12-17

## 2019-01-01 VITALS — WEIGHT: 200.4 LBS | BODY MASS INDEX: 30.03 KG/M2

## 2019-01-01 VITALS — BODY MASS INDEX: 29.55 KG/M2 | WEIGHT: 197.2 LBS

## 2019-01-01 VITALS — WEIGHT: 198 LBS | WEIGHT: 199.4 LBS | BODY MASS INDEX: 29.88 KG/M2 | BODY MASS INDEX: 29.67 KG/M2

## 2019-01-01 VITALS — WEIGHT: 197 LBS | BODY MASS INDEX: 29.52 KG/M2

## 2019-01-01 VITALS — BODY MASS INDEX: 29.52 KG/M2 | WEIGHT: 197 LBS

## 2019-01-01 VITALS — BODY MASS INDEX: 29.7 KG/M2 | WEIGHT: 198.2 LBS

## 2019-01-01 PROCEDURE — 93798 PHYS/QHP OP CAR RHAB W/ECG: CPT

## 2019-01-01 RX ORDER — AMIODARONE HYDROCHLORIDE 200 MG/1
TABLET ORAL
Qty: 180 TAB | Refills: 0 | Status: SHIPPED | OUTPATIENT
Start: 2019-01-01 | End: 2020-01-01

## 2019-01-02 ENCOUNTER — PATIENT OUTREACH (OUTPATIENT)
Dept: INTERNAL MEDICINE CLINIC | Age: 84
End: 2019-01-02

## 2019-01-02 NOTE — PROGRESS NOTES
NN Follow Up Called pt. Verified her with 2 identifiers. States her RLE wound had gotten worse. Redness from mid calf to ankle. Pt is applying lotion per Dr Shirlee Osgood (wound clinic) and keeps them elevated when seated. Leg does ache and occasionally burn, stings. She has an appt with Dr Shirlee Osgood on Monday. Says her swallowing is improved. She is trying to chew food up well before swallowing. Grace Portillo to call our office or wound clinic if leg seems to be getting worse. Future Appointments Date Time Provider Dafne Jenkins 1/7/2019  8:30 AM [facility] WOUND CARE 3 [facility]  
1/9/2019 11:00 AM Gloria Murray MD [id number] [facility]

## 2019-01-07 ENCOUNTER — HOSPITAL ENCOUNTER (OUTPATIENT)
Dept: WOUND CARE | Age: 84
Discharge: HOME | End: 2019-01-07
Payer: MEDICARE

## 2019-01-07 VITALS
DIASTOLIC BLOOD PRESSURE: 67 MMHG | TEMPERATURE: 97.7 F | SYSTOLIC BLOOD PRESSURE: 150 MMHG | HEART RATE: 81 BPM | RESPIRATION RATE: 16 BRPM

## 2019-01-07 PROBLEM — L03.115 CELLULITIS OF LEG, RIGHT: Status: ACTIVE | Noted: 2019-01-07

## 2019-01-07 PROBLEM — L97.922 NON-PRESSURE CHRONIC ULCER OF LEFT LOWER LEG WITH FAT LAYER EXPOSED (HCC): Status: RESOLVED | Noted: 2018-10-04 | Resolved: 2019-01-07

## 2019-01-07 PROBLEM — L03.116 CELLULITIS OF LEG, LEFT: Status: RESOLVED | Noted: 2018-07-05 | Resolved: 2019-01-07

## 2019-01-07 PROCEDURE — 29581 APPL MULTLAYER CMPRN SYS LEG: CPT

## 2019-01-07 NOTE — WOUND CARE
Visit Vitals /67 Pulse 81 Temp 97.7 °F (36.5 °C) Resp 16 LLE Peripheral Vascular Capillary Refill: Less than/equal to 3 seconds (01/07/19 0826) Color: Appropriate for race;Pink (01/07/19 0826) Temperature: Warm (01/07/19 0826) Sensation: Present (01/07/19 6031) Post-tibial Pulse: Palpable (01/07/19 0826) Pedal Pulse: Palpable (01/07/19 0826) Circumference of Calf (cm): 43 cm (01/07/19 0826) Location of Measurement (Calf): Mid  (01/07/19 0826) Circumference of Ankle (cm): 25 cm (01/07/19 0826) Location of Measurement (Ankle): Upper  (01/07/19 0826)

## 2019-01-07 NOTE — WOUND CARE
01/07/19 0845 Wound Leg Lower Right Date First Assessed/Time First Assessed: 01/07/19 0845   POA: Yes  Wound Type: Diabetic;Venous  Location: Leg Lower  Orientation: Right DRESSING TYPE Open to air Wound Length (cm) 0.1 cm Wound Width (cm) 0.1 cm Wound Depth (cm) 0.1 Wound Surface area (cm^2) 0.01 cm^2 Condition of Base Pink Condition of Edges Open Drainage Amount  None Wound Odor None Periwound Skin Condition Intact Cleansing and Cleansing Agents  Normal saline Dressing Type Applied Xeroform;Gauze; Compression Wrap/Venous Stasis 
(A&D intact skin,3 layer) Patient was discharged with Self to home and was ambulatory. In stable condition with c/o pain:_5_/10_

## 2019-01-07 NOTE — PROGRESS NOTES
Wound Care Center Note Addendum to progress not [id number]: 
 
I reviewed the patient's chart and she did in December 2017 have a venous duplex scan at Dr. Rupert Deras office and this study did report no evidence of incompetence of the vein valves in the deep vein system or the superficial vein system in either lower extremity.  
 
Lokesh Castorena MD

## 2019-01-07 NOTE — PROGRESS NOTES
[address] 
WOUND CARE PROGRESS NOTE Naty Contreras 
MR#: [id number] : 1935 ACCOUNT #: [de-identified] DATE OF SERVICE: 2019 HISTORY OF PRESENT ILLNESS:  The patient is an 82-year-old woman who was previously seen in the [address] [place] [address] in October of last year and prior to that in 2018. She had had in July bilateral lower leg ulcers. In October she had had ulcer in some left leg, which eventually resolved. She had been treated once for cellulitis in the legs with oral Cipro. Patient has a history of diabetes mellitus and atrial fibrillation. The patient's medications include Xarelto. The patient takes oral diuretics consisting of Lasix 80 mg each morning, 40 mg each evening. She does use elastic compression stockings, double layer on both the legs when she is out of bed. The patient noted stinging and redness in the lower portion of the right lower leg and development of some small ulcers. PHYSICAL EXAMINATION: 
GENERAL:  Patient is alert and in no distress. EXTREMITIES:  On the left showed no erythema or ulceration. The patient had double layer compression stocking in place which was lowered for inspection of the leg and then we applied in normal position. On the right side, there is 2+ dorsalis pedis pulse. There is erythema and warmth of the lower third of the right lower leg. There are 2 ulcers 2 mm across on the anterolateral aspect of the lower leg on the right. They are shallow and have clean base. The patient appears to have small ulcers and development of cellulitis in the distal portion of the right lower leg. They wrote a prescription for Cipro 250 mg p.o. b.i.d. for 1 week. Xeroform was applied over the ulcer site and triple layer compression dressing was applied on the right. The patient will continue to use her elastic compression stockings at all times when she is out of bed on the left. The patient will follow up in 1 week. FINAL DIAGNOSIS:  Non-pressure ulcer, right leg with cellulitis. MD TOYA Yoder / REESE 
D: 01/07/2019 09:15    
T: 01/07/2019 09:36 JOB #: [id number]

## 2019-01-09 ENCOUNTER — TELEPHONE (OUTPATIENT)
Dept: WOUND CARE | Age: 84
End: 2019-01-09

## 2019-01-09 NOTE — TELEPHONE ENCOUNTER
Surgery    I spoke with pharmacist.  Because of interaction of Cipro with her anti-arrhythmic, Rx for Cipro cancelled and Augmentin 500 po bid for 7 days ordered.     Darlene Means MD

## 2019-01-14 ENCOUNTER — APPOINTMENT (OUTPATIENT)
Dept: WOUND CARE | Age: 84
End: 2019-01-14
Payer: MEDICARE

## 2019-01-14 ENCOUNTER — PATIENT OUTREACH (OUTPATIENT)
Dept: INTERNAL MEDICINE CLINIC | Age: 84
End: 2019-01-14

## 2019-01-14 NOTE — PROGRESS NOTES
Patient has graduated from the Transitions of Care Coordination  program on 1/14/19. Patient's symptoms are stable at this time. Patient/family has the ability to self-manage. Care management goals have been completed at this time. No further nurse navigator follow up scheduled. Pt has nurse navigator's contact information for any further questions, concerns, or needs.   Patients upcoming visits:    Future Appointments   Date Time Provider Dafne Jenkins   1/17/2019  8:30 AM [facility] WOUND CARE 4 [facility]

## 2019-01-17 ENCOUNTER — HOSPITAL ENCOUNTER (OUTPATIENT)
Dept: WOUND CARE | Age: 84
Discharge: HOME | End: 2019-01-17
Payer: MEDICARE

## 2019-01-17 VITALS
HEART RATE: 72 BPM | SYSTOLIC BLOOD PRESSURE: 151 MMHG | DIASTOLIC BLOOD PRESSURE: 94 MMHG | RESPIRATION RATE: 16 BRPM | TEMPERATURE: 98.6 F

## 2019-01-17 PROBLEM — L03.115 CELLULITIS OF LEG, RIGHT: Status: RESOLVED | Noted: 2019-01-07 | Resolved: 2019-01-17

## 2019-01-17 PROBLEM — L97.912 NON-PRESSURE CHRONIC ULCER OF LOWER LEG, RIGHT, WITH FAT LAYER EXPOSED (HCC): Status: RESOLVED | Noted: 2018-10-04 | Resolved: 2019-01-17

## 2019-01-17 PROCEDURE — 99213 OFFICE O/P EST LOW 20 MIN: CPT

## 2019-01-17 NOTE — WOUND CARE
01/17/19 0815 Wound Leg Lower Right Date First Assessed/Time First Assessed: 01/07/19 0845   POA: Yes  Wound Type: Diabetic;Venous  Location: Leg Lower  Orientation: Right DRESSING TYPE Open to air (Pt stocking on both legs) Wound Length (cm) 0 cm Wound Width (cm) 0 cm Wound Depth (cm) 0 Wound Surface area (cm^2) 0 cm^2 Change in Wound Size % 100 Drainage Amount  None Wound Odor None Periwound Skin Condition Intact Visit Vitals BP (!) 151/94 Pulse 72 Temp 98.6 °F (37 °C) Resp 16 LLE Peripheral Vascular Capillary Refill: Less than/equal to 3 seconds (01/17/19 0814) Color: Appropriate for race (01/17/19 8221) Temperature: Warm (01/17/19 2630) Sensation: Present (01/17/19 0643) Pedal Pulse: Palpable (01/17/19 0814) Circumference of Calf (cm): 41.5 cm (01/17/19 0814) Location of Measurement (Calf): Mid  (01/17/19 0496) Circumference of Ankle (cm): 25 cm (01/17/19 0814) RLE Peripheral Vascular Capillary Refill: Less than/equal to 3 seconds (01/17/19 0814) Color: Appropriate for race (01/17/19 3287) Temperature: Warm (01/17/19 8596) Sensation: Present (01/17/19 8870) Pedal Pulse: Palpable (01/17/19 0814) Circumference of Calf (cm): 41.5 cm (01/17/19 0814) Location of Measurement (Calf): Mid  (01/17/19 4251) Circumference of Ankle (cm): 25 cm (01/17/19 0814) Location of Measurement (Ankle): Upper  (01/17/19 0376)

## 2019-01-17 NOTE — PROGRESS NOTES
HISTORY OF PRESENT ILLNESS:  The patient is an 51-year-old woman, last seen 1/7/2019. She was previously seen in the [address] [place] [address] in October of last year and prior to that in July of 2018. She had had in July bilateral lower leg ulcers. In October she had had ulcer in some left leg, which eventually resolved. She had been treated once for cellulitis in the legs with oral Cipro. 
  
Patient has a history of diabetes mellitus and atrial fibrillation. The patient's medications include Xarelto. 
  
The patient takes oral diuretics consisting of Lasix 80 mg each morning, 40 mg each evening. She had previously used elastic compression stockings, double layer on both the legs when she is out of bed. 
  
The patient reports legs feel much better. No drainage. She took her course of Augmentin, started after last visit. (Augmentin was used rather than Cipro because of pharmacist concern about drug interaction with other meds). 
  
PHYSICAL EXAMINATION: 
GENERAL:  Patient is alert and in no distress. EXTREMITIES:  On the left showed no erythema or ulceration. On the right side, there is no redness or ulceration. Ulcers are healed. 
  
The patient will continue to use her elastic compression stockings at all times when she is out of bed on the left and right lower legs.   She will continue her diuretics and continue to avoid excess fluid intake. 
  
No follow up appointment needed. 
  
FINAL DIAGNOSIS:  Non-pressure ulcer, right leg with cellulitis. 
  
  
Jodi Staley MD

## 2019-01-31 ENCOUNTER — HOSPITAL ENCOUNTER (OUTPATIENT)
Dept: WOUND CARE | Age: 84
Discharge: HOME | End: 2019-01-31
Payer: MEDICARE

## 2019-01-31 VITALS
SYSTOLIC BLOOD PRESSURE: 162 MMHG | DIASTOLIC BLOOD PRESSURE: 66 MMHG | HEART RATE: 65 BPM | RESPIRATION RATE: 18 BRPM | TEMPERATURE: 97.5 F

## 2019-01-31 PROBLEM — L03.115 CELLULITIS OF RIGHT LOWER LEG: Status: ACTIVE | Noted: 2019-01-31

## 2019-01-31 PROCEDURE — 99212 OFFICE O/P EST SF 10 MIN: CPT

## 2019-01-31 NOTE — WOUND CARE
01/31/19 6027 LLE Peripheral Vascular Capillary Refill Less than/equal to 3 seconds Color Appropriate for race Temperature Warm Sensation Present Post-tibial Pulse Palpable Pedal Pulse Palpable Circumference of Calf (cm) 43 cm Location of Measurement (Calf) Mid   
Circumference of Ankle (cm) 26 cm Location of Measurement (Ankle) Upper RLE Peripheral Vascular Capillary Refill Less than/equal to 3 seconds Color Appropriate for race Temperature Warm Sensation Present Pedal Pulse Palpable Circumference of Calf (cm) 43 cm Location of Measurement (Calf) Mid   
Circumference of Ankle (cm) 25 cm Location of Measurement (Ankle) Upper Visit Vitals /66 Pulse 65 Temp 97.5 °F (36.4 °C) Resp 18 Erythema, no open wounds

## 2019-01-31 NOTE — WOUND CARE
01/31/19 8031 Vital Signs Temp 97.5 °F (36.4 °C) Temp Source Oral  
Pulse (Heart Rate) 65 Resp Rate 18 Level of Consciousness Alert /66 MAP (Calculated) 98 MEWS Score 1 Pain 1 Pain Scale 1 Numeric (0 - 10) Pain Intensity 1 0 To schedule bilat venous duplex LE, dx leg edema, assess for venous insufficiency. . Referred to lymphedema clinic, [phone]. Ren Borjas She will make appointment herself. . Rx for Augmentin 500 mg po bid x 7 days. . To return to center in 1 week. Ren Borjas

## 2019-01-31 NOTE — PROGRESS NOTES
[address] [facility] - for 1/31/2019 visit Total Evaluation and Management time utilized (excluding any procedure time)  was 26 minutes, with 75 % in counseling and/or coordination of care.  
 
Anh Juárez MD

## 2019-01-31 NOTE — PROGRESS NOTES
[address] 
WOUND CARE PROGRESS NOTE [place] [facility] 
MR#: [id number] : 1935 ACCOUNT #: [de-identified] DATE OF SERVICE: 2019 The patient is an 72-year-old woman who has been seen for a number of episodes of ulceration on the lower legs. She had most recently been followed in January of this year and as of 2019 had full healing of ulcerations on both legs. She has been treated with triple-layer compression wraps. The patient since being discharged from the wound care center has used her elastic compression stockings on both legs at all times when she is not in bed or bathing. She takes furosemide 80 mg by mouth each morning and 40 mg by mouth each afternoon. She reports that she does get a good response from the furosemide and reports that she is limiting fluid intake. The patient has been treated several times with oral antibiotics for episodes of cellulitis in her lower legs. The patient reports that her right leg has become uncomfortable and she has returned to the wound care center. She does not actually have any open wounds on the leg. She notices swelling in both legs. On examination, the patient has 1+ pitting edema in the left lower leg with no erythema and no ulcerations. She has 1+ edema, which is pitting in the right lower leg with moderate erythema in the lower half of the lower leg. No ulcers are present. Of note, the patient in 2017 had venous study which did not show evidence of incompetence of the deep veins or superficial veins in either lower extremity. The patient does not have full control of her leg swelling. I have recommended that she continue with her oral diuretic and continue limitation of fluid intake. She should also resume use of her LymphaPress for at least 15 minutes twice per day. She will continue use of the elastic compression stockings to be worn at all times when she is out of bed. I have written a prescription for Augmentin 500 p.o. b.i.d. for 1 week. I have written an order for repeat venous duplex scan to confirm again there is no element of venous valvular insufficiency causing her swelling. The patient will see me in followup in the wound care center in 1 week. The patient will also be referred for longer term assistance in management of her leg edema to one of the [employer] lymphedema clinics. I explained to the patient that, in general, she would see Dr. Tonya Odonnell, her primary physician, for issues of possible cellulitis or leg edema rather than following up in [address] [place] [address] in the absence of lower extremity wounds. FINAL DIAGNOSES:   
1.  Bilateral leg edema. 2.  Cellulitis, right lower leg. Lashonda Bland MD 
  
 
[facility] / [place] 
D: 01/31/2019 08:40 T: 01/31/2019 09:14 
JOB #: [id number]

## 2019-02-04 ENCOUNTER — OFFICE VISIT (OUTPATIENT)
Dept: INTERNAL MEDICINE CLINIC | Age: 84
End: 2019-02-04

## 2019-02-04 VITALS
OXYGEN SATURATION: 97 % | DIASTOLIC BLOOD PRESSURE: 76 MMHG | WEIGHT: 226.13 LBS | SYSTOLIC BLOOD PRESSURE: 132 MMHG | RESPIRATION RATE: 18 BRPM | TEMPERATURE: 98.1 F | HEART RATE: 61 BPM | HEIGHT: 68 IN | BODY MASS INDEX: 34.27 KG/M2

## 2019-02-04 DIAGNOSIS — L03.115 CELLULITIS OF RIGHT LOWER EXTREMITY: Primary | ICD-10-CM

## 2019-02-04 RX ORDER — AMOXICILLIN AND CLAVULANATE POTASSIUM 875; 125 MG/1; MG/1
1 TABLET, FILM COATED ORAL EVERY 12 HOURS
Qty: 20 TAB | Refills: 0 | Status: SHIPPED | OUTPATIENT
Start: 2019-02-04 | End: 2019-02-14

## 2019-02-05 RX ORDER — ALLOPURINOL 100 MG/1
TABLET ORAL
Qty: 180 TAB | Refills: 2 | Status: SHIPPED | OUTPATIENT
Start: 2019-02-05 | End: 2019-09-18 | Stop reason: SDUPTHER

## 2019-02-05 NOTE — PROGRESS NOTES
HISTORY OF PRESENT ILLNESS Karma Malagon is a 82 y.o. female. Leg Pain The history is provided by the patient and relative (in with daughter). This is a recurrent problem. Episode onset: 5 d- noted when wraps removed by wound care MD, Dr Maria Isabel Quiros. The problem occurs constantly. The problem has been gradually improving (pain is better but rash no change). The pain is present in the right lower leg. Quality: stinging. The pain is mild. Associated symptoms comments: Chronic stasis edema. The symptoms are aggravated by standing and contact. She has tried nothing (given augmentin but didn't fill) for the symptoms. There has been no history of extremity trauma. personal hx of stasis edema, recurrent leg wounds Review of Systems Constitutional: Negative for chills and fever. Cardiovascular: Positive for leg swelling. Negative for claudication. Skin: Positive for rash. Physical Exam  
Neurological: She is alert. Skin: Skin is warm and dry. Rash noted. There is erythema. Psychiatric: She has a normal mood and affect. Her behavior is normal.  
Nursing note and vitals reviewed. ASSESSMENT and PLAN Diagnoses and all orders for this visit: 1. Cellulitis of right lower extremity 
-     amoxicillin-clavulanate (AUGMENTIN) 875-125 mg per tablet; Take 1 Tab by mouth every twelve (12) hours for 10 days. -  Continue other medications in addition to current changes , elevate, See specialists  as directed. Plan was reviewed with patient and family, understanding expressed

## 2019-02-07 ENCOUNTER — HOSPITAL ENCOUNTER (OUTPATIENT)
Dept: WOUND CARE | Age: 84
Discharge: HOME | End: 2019-02-07
Payer: MEDICARE

## 2019-02-07 VITALS
HEART RATE: 74 BPM | SYSTOLIC BLOOD PRESSURE: 154 MMHG | RESPIRATION RATE: 18 BRPM | TEMPERATURE: 97.8 F | DIASTOLIC BLOOD PRESSURE: 75 MMHG

## 2019-02-07 PROCEDURE — 99213 OFFICE O/P EST LOW 20 MIN: CPT

## 2019-02-07 NOTE — PROGRESS NOTES
The patient is an 59-year-old woman who has been seen for a number of episodes of ulceration on the lower legs. She had most recently been followed in January of this year and as of 01/17/2019 had full healing of ulcerations on both legs. She has been treated with triple-layer compression wraps. 
  
The patient since being discharged from the wound care center has used her elastic compression stockings on both legs at all times when she is not in bed or bathing. 
  
She takes furosemide 80 mg by mouth each morning and 40 mg by mouth each afternoon. She reports that she does get a good response from the furosemide and reports that she is limiting fluid intake.   
  
The patient has been treated several times with oral antibiotics for episodes of cellulitis in her lower legs. 
  
The patient saw Dr Roddy Rodriguez and started Augmentin. She reports her legs feel better. On examination, the patient has 1+ pitting edema in the left lower leg with no erythema and no ulcerations. She has 1+ edema, which is pitting in the right lower leg with no erythema in the lower leg. No ulcers are present. 
  
Of note, the patient in 12/2017 had venous study which did not show evidence of incompetence of the deep veins or superficial veins in either lower extremity. 
  
The patient does not have full control of her leg swelling. 
  
I have recommended that she continue with her oral diuretic and continue limitation of fluid intake. She should also resume use of her LymphaPress for at least 15 minutes twice per day. She will continue use of the elastic compression stockings to be worn at all times when she is out of bed. 
  
I have written an order for repeat venous duplex scan to confirm again there is no element of venous valvular insufficiency causing her swelling. 
  
I can call the patient's daughter Marry Doyle Free  [phone]) with US results. The patient does not need follow up in the [address] [place] [address] at this point. . 
  
 I explained to the patient that, in general, she would see Dr. Caitie Hampton, her primary physician, for issues of possible cellulitis or leg edema rather than following up in [address] [place] [address] in the absence of lower extremity wounds. 
  
FINAL DIAGNOSES:   
 Bilateral leg edema.    R 60.0 
 
  
  
Bay Smith MD

## 2019-02-07 NOTE — WOUND CARE
Patient seen for follow-up, all wounds resolved. No further clinic follow-up required.  Patient is to have follow-up venous doppler to rule out venous insufficiency and [facility] on 02/18/19 at 1:00PM

## 2019-02-11 DIAGNOSIS — E78.00 HYPERCHOLESTEREMIA: ICD-10-CM

## 2019-02-11 RX ORDER — ATORVASTATIN CALCIUM 80 MG/1
TABLET, FILM COATED ORAL
Qty: 90 TAB | Refills: 0 | Status: SHIPPED | OUTPATIENT
Start: 2019-02-11 | End: 2020-01-01 | Stop reason: SDUPTHER

## 2019-02-13 ENCOUNTER — OFFICE VISIT (OUTPATIENT)
Dept: INTERNAL MEDICINE CLINIC | Age: 84
End: 2019-02-13

## 2019-02-13 VITALS
SYSTOLIC BLOOD PRESSURE: 138 MMHG | WEIGHT: 222.13 LBS | BODY MASS INDEX: 33.67 KG/M2 | TEMPERATURE: 97.8 F | DIASTOLIC BLOOD PRESSURE: 84 MMHG | RESPIRATION RATE: 18 BRPM | OXYGEN SATURATION: 95 % | HEART RATE: 77 BPM | HEIGHT: 68 IN

## 2019-02-13 DIAGNOSIS — L03.115 CELLULITIS OF RIGHT LOWER EXTREMITY: Primary | ICD-10-CM

## 2019-02-13 DIAGNOSIS — I87.2 VENOUS STASIS DERMATITIS OF RIGHT LOWER EXTREMITY: ICD-10-CM

## 2019-02-13 RX ORDER — FLUOCINONIDE 0.5 MG/G
CREAM TOPICAL 2 TIMES DAILY
Qty: 30 G | Refills: 5 | Status: ON HOLD | OUTPATIENT
Start: 2019-02-13 | End: 2019-02-20

## 2019-02-13 NOTE — PROGRESS NOTES
HISTORY OF PRESENT ILLNESS  Brandt Sarmiento is a 82 y.o. female. Rash    The history is provided by the patient (here for follow up of cellulitis). This is a recurrent problem. Episode onset: 2 wks + The problem has been gradually improving. Associated with: chronic stasis dermatitis  There has been no fever. The rash is present on the right lower leg. The patient is experiencing no pain. Associated symptoms include itching. Pertinent negatives include no pain. Treatments tried: completing augmentin. The treatment provided significant relief. Review of Systems   Skin: Positive for itching and rash. Physical Exam   Musculoskeletal: She exhibits edema. Chronic- stasis, cellulitis decreasing, now mainly stasis dermatitis   Neurological: She is alert. Skin: Skin is warm and dry. Psychiatric: She has a normal mood and affect. Her behavior is normal.   Nursing note and vitals reviewed. ASSESSMENT and PLAN  Diagnoses and all orders for this visit:    1. Cellulitis of right lower extremity- Continue current regimen of prescription and / or OTC medications     2. Venous stasis dermatitis of right lower extremity  -     fluocinoNIDE (LIDEX) 0.05 % topical cream; Apply  to affected area two (2) times a day for 14 days.  Then as needed for rash

## 2019-02-19 ENCOUNTER — TELEPHONE (OUTPATIENT)
Dept: INTERNAL MEDICINE CLINIC | Age: 84
End: 2019-02-19

## 2019-02-19 ENCOUNTER — HOSPITAL ENCOUNTER (INPATIENT)
Age: 84
LOS: 2 days | Discharge: HOME HEALTH | DRG: 394 | End: 2019-02-21
Attending: EMERGENCY MEDICINE | Admitting: HOSPITALIST
Payer: MEDICARE

## 2019-02-19 ENCOUNTER — APPOINTMENT (OUTPATIENT)
Dept: CT IMAGING | Age: 84
DRG: 394 | End: 2019-02-19
Attending: EMERGENCY MEDICINE
Payer: MEDICARE

## 2019-02-19 DIAGNOSIS — E11.22 CONTROLLED TYPE 2 DIABETES MELLITUS WITH STAGE 3 CHRONIC KIDNEY DISEASE, WITH LONG-TERM CURRENT USE OF INSULIN (HCC): ICD-10-CM

## 2019-02-19 DIAGNOSIS — D64.9 ANEMIA, UNSPECIFIED TYPE: ICD-10-CM

## 2019-02-19 DIAGNOSIS — N18.30 CONTROLLED TYPE 2 DIABETES MELLITUS WITH STAGE 3 CHRONIC KIDNEY DISEASE, WITH LONG-TERM CURRENT USE OF INSULIN (HCC): ICD-10-CM

## 2019-02-19 DIAGNOSIS — K92.2 ACUTE LOWER GI BLEEDING: Primary | ICD-10-CM

## 2019-02-19 DIAGNOSIS — Z79.4 CONTROLLED TYPE 2 DIABETES MELLITUS WITH STAGE 3 CHRONIC KIDNEY DISEASE, WITH LONG-TERM CURRENT USE OF INSULIN (HCC): ICD-10-CM

## 2019-02-19 LAB
ABO + RH BLD: NORMAL
ALBUMIN SERPL-MCNC: 3.5 G/DL (ref 3.5–5)
ALBUMIN/GLOB SERPL: 1.1 {RATIO} (ref 1.1–2.2)
ALP SERPL-CCNC: 77 U/L (ref 45–117)
ALT SERPL-CCNC: 18 U/L (ref 12–78)
ANION GAP SERPL CALC-SCNC: 6 MMOL/L (ref 5–15)
AST SERPL-CCNC: 17 U/L (ref 15–37)
BASOPHILS # BLD: 0.1 K/UL (ref 0–0.1)
BASOPHILS NFR BLD: 1 % (ref 0–1)
BILIRUB SERPL-MCNC: 0.4 MG/DL (ref 0.2–1)
BLOOD GROUP ANTIBODIES SERPL: NORMAL
BUN SERPL-MCNC: 37 MG/DL (ref 6–20)
BUN/CREAT SERPL: 22 (ref 12–20)
CALCIUM SERPL-MCNC: 9 MG/DL (ref 8.5–10.1)
CHLORIDE SERPL-SCNC: 106 MMOL/L (ref 97–108)
CO2 SERPL-SCNC: 28 MMOL/L (ref 21–32)
CREAT SERPL-MCNC: 1.69 MG/DL (ref 0.55–1.02)
DIFFERENTIAL METHOD BLD: ABNORMAL
EOSINOPHIL # BLD: 0.3 K/UL (ref 0–0.4)
EOSINOPHIL NFR BLD: 5 % (ref 0–7)
ERYTHROCYTE [DISTWIDTH] IN BLOOD BY AUTOMATED COUNT: 14.9 % (ref 11.5–14.5)
GLOBULIN SER CALC-MCNC: 3.2 G/DL (ref 2–4)
GLUCOSE BLD STRIP.AUTO-MCNC: 147 MG/DL (ref 65–100)
GLUCOSE BLD STRIP.AUTO-MCNC: 196 MG/DL (ref 65–100)
GLUCOSE BLD STRIP.AUTO-MCNC: 55 MG/DL (ref 65–100)
GLUCOSE BLD STRIP.AUTO-MCNC: 57 MG/DL (ref 65–100)
GLUCOSE BLD STRIP.AUTO-MCNC: 72 MG/DL (ref 65–100)
GLUCOSE BLD STRIP.AUTO-MCNC: 88 MG/DL (ref 65–100)
GLUCOSE SERPL-MCNC: 123 MG/DL (ref 65–100)
HCT VFR BLD AUTO: 31.3 % (ref 35–47)
HCT VFR BLD AUTO: 32.6 % (ref 35–47)
HEMOCCULT STL QL: POSITIVE
HGB BLD-MCNC: 10 G/DL (ref 11.5–16)
HGB BLD-MCNC: 10.5 G/DL (ref 11.5–16)
IMM GRANULOCYTES # BLD AUTO: 0 K/UL (ref 0–0.04)
IMM GRANULOCYTES NFR BLD AUTO: 0 % (ref 0–0.5)
INR PPP: 1.3 (ref 0.9–1.1)
LYMPHOCYTES # BLD: 1 K/UL (ref 0.8–3.5)
LYMPHOCYTES NFR BLD: 18 % (ref 12–49)
MCH RBC QN AUTO: 29.6 PG (ref 26–34)
MCHC RBC AUTO-ENTMCNC: 32.2 G/DL (ref 30–36.5)
MCV RBC AUTO: 91.8 FL (ref 80–99)
MONOCYTES # BLD: 0.5 K/UL (ref 0–1)
MONOCYTES NFR BLD: 8 % (ref 5–13)
NEUTS SEG # BLD: 3.9 K/UL (ref 1.8–8)
NEUTS SEG NFR BLD: 68 % (ref 32–75)
NRBC # BLD: 0 K/UL (ref 0–0.01)
NRBC BLD-RTO: 0 PER 100 WBC
PLATELET # BLD AUTO: 139 K/UL (ref 150–400)
PLATELET COMMENTS,PCOM: ABNORMAL
POTASSIUM SERPL-SCNC: 4.4 MMOL/L (ref 3.5–5.1)
PROT SERPL-MCNC: 6.7 G/DL (ref 6.4–8.2)
PROTHROMBIN TIME: 12.7 SEC (ref 9–11.1)
RBC # BLD AUTO: 3.55 M/UL (ref 3.8–5.2)
RBC MORPH BLD: ABNORMAL
SERVICE CMNT-IMP: ABNORMAL
SERVICE CMNT-IMP: NORMAL
SERVICE CMNT-IMP: NORMAL
SODIUM SERPL-SCNC: 140 MMOL/L (ref 136–145)
SPECIMEN EXP DATE BLD: NORMAL
WBC # BLD AUTO: 5.8 K/UL (ref 3.6–11)

## 2019-02-19 PROCEDURE — 65660000000 HC RM CCU STEPDOWN

## 2019-02-19 PROCEDURE — 94760 N-INVAS EAR/PLS OXIMETRY 1: CPT

## 2019-02-19 PROCEDURE — 94761 N-INVAS EAR/PLS OXIMETRY MLT: CPT

## 2019-02-19 PROCEDURE — 74011000250 HC RX REV CODE- 250: Performed by: PHYSICIAN ASSISTANT

## 2019-02-19 PROCEDURE — 82962 GLUCOSE BLOOD TEST: CPT

## 2019-02-19 PROCEDURE — 85610 PROTHROMBIN TIME: CPT

## 2019-02-19 PROCEDURE — 74176 CT ABD & PELVIS W/O CONTRAST: CPT

## 2019-02-19 PROCEDURE — 96361 HYDRATE IV INFUSION ADD-ON: CPT

## 2019-02-19 PROCEDURE — 86900 BLOOD TYPING SEROLOGIC ABO: CPT

## 2019-02-19 PROCEDURE — 82272 OCCULT BLD FECES 1-3 TESTS: CPT

## 2019-02-19 PROCEDURE — 36415 COLL VENOUS BLD VENIPUNCTURE: CPT

## 2019-02-19 PROCEDURE — 74011000258 HC RX REV CODE- 258: Performed by: HOSPITALIST

## 2019-02-19 PROCEDURE — 85025 COMPLETE CBC W/AUTO DIFF WBC: CPT

## 2019-02-19 PROCEDURE — 85018 HEMOGLOBIN: CPT

## 2019-02-19 PROCEDURE — 99284 EMERGENCY DEPT VISIT MOD MDM: CPT

## 2019-02-19 PROCEDURE — 80053 COMPREHEN METABOLIC PANEL: CPT

## 2019-02-19 PROCEDURE — 74011250637 HC RX REV CODE- 250/637: Performed by: HOSPITALIST

## 2019-02-19 PROCEDURE — 96360 HYDRATION IV INFUSION INIT: CPT

## 2019-02-19 PROCEDURE — 74011250636 HC RX REV CODE- 250/636: Performed by: EMERGENCY MEDICINE

## 2019-02-19 RX ORDER — ACETAMINOPHEN 500 MG
1000 TABLET ORAL
Status: DISCONTINUED | OUTPATIENT
Start: 2019-02-19 | End: 2019-02-19

## 2019-02-19 RX ORDER — DOFETILIDE 0.12 MG/1
125 CAPSULE ORAL 2 TIMES DAILY
Status: DISCONTINUED | OUTPATIENT
Start: 2019-02-19 | End: 2019-02-21 | Stop reason: HOSPADM

## 2019-02-19 RX ORDER — ALLOPURINOL 100 MG/1
200 TABLET ORAL DAILY
Status: DISCONTINUED | OUTPATIENT
Start: 2019-02-20 | End: 2019-02-21 | Stop reason: HOSPADM

## 2019-02-19 RX ORDER — POTASSIUM CHLORIDE 750 MG/1
10 TABLET, FILM COATED, EXTENDED RELEASE ORAL DAILY
Status: DISCONTINUED | OUTPATIENT
Start: 2019-02-20 | End: 2019-02-21 | Stop reason: HOSPADM

## 2019-02-19 RX ORDER — LEVOTHYROXINE SODIUM 125 UG/1
125 TABLET ORAL
Status: DISCONTINUED | OUTPATIENT
Start: 2019-02-20 | End: 2019-02-21 | Stop reason: HOSPADM

## 2019-02-19 RX ORDER — ACETAMINOPHEN 325 MG/1
650 TABLET ORAL
Status: DISCONTINUED | OUTPATIENT
Start: 2019-02-19 | End: 2019-02-21 | Stop reason: HOSPADM

## 2019-02-19 RX ORDER — DEXTROSE MONOHYDRATE AND SODIUM CHLORIDE 5; .9 G/100ML; G/100ML
50 INJECTION, SOLUTION INTRAVENOUS CONTINUOUS
Status: DISCONTINUED | OUTPATIENT
Start: 2019-02-19 | End: 2019-02-21

## 2019-02-19 RX ORDER — SODIUM CHLORIDE 0.9 % (FLUSH) 0.9 %
5-40 SYRINGE (ML) INJECTION AS NEEDED
Status: DISCONTINUED | OUTPATIENT
Start: 2019-02-19 | End: 2019-02-21 | Stop reason: HOSPADM

## 2019-02-19 RX ORDER — SODIUM CHLORIDE 9 MG/ML
150 INJECTION, SOLUTION INTRAVENOUS ONCE
Status: COMPLETED | OUTPATIENT
Start: 2019-02-19 | End: 2019-02-19

## 2019-02-19 RX ORDER — LISINOPRIL 20 MG/1
20 TABLET ORAL DAILY
Status: DISCONTINUED | OUTPATIENT
Start: 2019-02-20 | End: 2019-02-21 | Stop reason: HOSPADM

## 2019-02-19 RX ORDER — INSULIN LISPRO 100 [IU]/ML
INJECTION, SOLUTION INTRAVENOUS; SUBCUTANEOUS
Status: DISCONTINUED | OUTPATIENT
Start: 2019-02-19 | End: 2019-02-21 | Stop reason: HOSPADM

## 2019-02-19 RX ORDER — MAGNESIUM SULFATE 100 %
4 CRYSTALS MISCELLANEOUS AS NEEDED
Status: DISCONTINUED | OUTPATIENT
Start: 2019-02-19 | End: 2019-02-21 | Stop reason: HOSPADM

## 2019-02-19 RX ORDER — POLYETHYLENE GLYCOL 3350, SODIUM SULFATE ANHYDROUS, SODIUM BICARBONATE, SODIUM CHLORIDE, POTASSIUM CHLORIDE 236; 22.74; 6.74; 5.86; 2.97 G/4L; G/4L; G/4L; G/4L; G/4L
4000 POWDER, FOR SOLUTION ORAL ONCE
Status: COMPLETED | OUTPATIENT
Start: 2019-02-19 | End: 2019-02-19

## 2019-02-19 RX ORDER — SODIUM CHLORIDE 0.9 % (FLUSH) 0.9 %
5-40 SYRINGE (ML) INJECTION EVERY 8 HOURS
Status: DISCONTINUED | OUTPATIENT
Start: 2019-02-19 | End: 2019-02-21 | Stop reason: HOSPADM

## 2019-02-19 RX ORDER — ONDANSETRON 2 MG/ML
4 INJECTION INTRAMUSCULAR; INTRAVENOUS
Status: DISCONTINUED | OUTPATIENT
Start: 2019-02-19 | End: 2019-02-21 | Stop reason: HOSPADM

## 2019-02-19 RX ORDER — HYDRALAZINE HYDROCHLORIDE 10 MG/1
20 TABLET, FILM COATED ORAL 2 TIMES DAILY
Status: DISCONTINUED | OUTPATIENT
Start: 2019-02-19 | End: 2019-02-21 | Stop reason: HOSPADM

## 2019-02-19 RX ORDER — METOPROLOL TARTRATE 50 MG/1
50 TABLET ORAL 2 TIMES DAILY
Status: DISCONTINUED | OUTPATIENT
Start: 2019-02-19 | End: 2019-02-21 | Stop reason: HOSPADM

## 2019-02-19 RX ORDER — SODIUM CHLORIDE 0.9 % (FLUSH) 0.9 %
10 SYRINGE (ML) INJECTION
Status: DISCONTINUED | OUTPATIENT
Start: 2019-02-19 | End: 2019-02-19

## 2019-02-19 RX ORDER — DEXTROSE 50 % IN WATER (D50W) INTRAVENOUS SYRINGE
12.5-25 AS NEEDED
Status: DISCONTINUED | OUTPATIENT
Start: 2019-02-19 | End: 2019-02-21 | Stop reason: HOSPADM

## 2019-02-19 RX ORDER — ATORVASTATIN CALCIUM 40 MG/1
80 TABLET, FILM COATED ORAL
Status: DISCONTINUED | OUTPATIENT
Start: 2019-02-19 | End: 2019-02-21 | Stop reason: HOSPADM

## 2019-02-19 RX ORDER — FUROSEMIDE 40 MG/1
40 TABLET ORAL 2 TIMES DAILY
Status: DISCONTINUED | OUTPATIENT
Start: 2019-02-19 | End: 2019-02-21 | Stop reason: HOSPADM

## 2019-02-19 RX ORDER — PANTOPRAZOLE SODIUM 40 MG/1
40 TABLET, DELAYED RELEASE ORAL
Status: DISCONTINUED | OUTPATIENT
Start: 2019-02-20 | End: 2019-02-21 | Stop reason: HOSPADM

## 2019-02-19 RX ADMIN — Medication 10 ML: at 23:18

## 2019-02-19 RX ADMIN — Medication 5 ML: at 16:27

## 2019-02-19 RX ADMIN — HYDRALAZINE HYDROCHLORIDE 20 MG: 10 TABLET, FILM COATED ORAL at 18:20

## 2019-02-19 RX ADMIN — POLYETHYLENE GLYCOL 3350, SODIUM SULFATE ANHYDROUS, SODIUM BICARBONATE, SODIUM CHLORIDE, POTASSIUM CHLORIDE 4000 ML: 236; 22.74; 6.74; 5.86; 2.97 POWDER, FOR SOLUTION ORAL at 20:12

## 2019-02-19 RX ADMIN — FUROSEMIDE 40 MG: 40 TABLET ORAL at 18:20

## 2019-02-19 RX ADMIN — SODIUM CHLORIDE 150 ML/HR: 900 INJECTION, SOLUTION INTRAVENOUS at 11:16

## 2019-02-19 RX ADMIN — METOPROLOL TARTRATE 50 MG: 50 TABLET ORAL at 18:20

## 2019-02-19 RX ADMIN — DEXTROSE MONOHYDRATE AND SODIUM CHLORIDE 50 ML/HR: 5; .9 INJECTION, SOLUTION INTRAVENOUS at 20:00

## 2019-02-19 RX ADMIN — Medication 10 ML: at 18:24

## 2019-02-19 RX ADMIN — ATORVASTATIN CALCIUM 80 MG: 40 TABLET, FILM COATED ORAL at 23:17

## 2019-02-19 NOTE — H&P
Hospitalist Admission NoteNAME: Carolina Jordan :  1935 MRN:  [id number] Date/Time:  2019 2:15 PM 
 
Patient PCP: Liliam Hanna MD 
______________________________________________________________________ Given the patient's current clinical presentation, I have a high level of concern for decompensation if discharged from the emergency department. Complex decision making was performed, which includes reviewing the patient's available past medical records, laboratory results, and x-ray films. My assessment of this patient's clinical condition and my plan of care is as follows. Assessment / Plan: 
Rectal bleeding  
-baseline Hb ~ 10, admission 10.5 
-admit to tele for close monitoring  
-h/h every 8 hr  
-GI will see later on today: colonoscopy in am 
-diet: CLD now , npo post MN 
-holding xarelto  
-CT: No acute abnormality. Borderline splenomegaly. 
-Blood consent: on   I d/w pt/ family possibility of blood transfusion if his Hb continue to drop. She had never had blood transfusion previously. Risk of allergic reaction and possibility of infection were discussed in details. She verbalized understanding by repeating everything back to me in her own words. She agreed to be transfused if needed. IDDM type II with hypoglycemia  
-holding home NPH Adding d5w ( slow rate) for hypoglycemia -c/w SS as needed Chronic afib, chronic diastolic CHF and benign HTN 
-BP/HR stable/ no signs of fluid overload  
-con't tikosyn, lasix, metoprolol, hydralazine CKD stage III 
-Cr at baseline, monitor closely.  
-Avoid nephrotoxic drugs, adjust all meds to GFR. H/o dysphagia due to Schatzki's ring with h/o esophageal stricture Hypothyroidism: home synthroid Gout: home allopurinol Hyperlipidemia: cont statin Obesity. Body mass index is 32.59 kg/m². Code Status: Full code d/w pt and dtr in ED Surrogate Decision Maker: dtr and son DVT Prophylaxis: SCD  
GI Prophylaxis: not indicated Baseline: lives alone; she has 2 children twins ( dtr and son), son lives out of state. Pt has walker/cane at home but generally ambulates without it. Subjective: CHIEF COMPLAINT: rectal bleeding HISTORY OF PRESENT ILLNESS:    
Ruthy Bernheim is a 82 y.o.  female who presents with above complaint. Pt is very poor historian due to h/o \" short memory problems\". History was obtained from dtr at bedside. Per dtr, pt was c/o lower abdominal discomfort since yesterday. No nausea/vomiting. This morning around 6 am, pt called her dtr saying that she had a large amount of BRB coming out. Pt c/o feeling generally weak. No h/o bleeding in the past. No more BM with blood since this morning. Pt si on xarelto for h/o Afib. Pt has h/o colonoscopy many years ago with polyps removal. ED provider discussed pt case with GI, who recommended admission for colonoscopy in am.  
 
We were asked to admit for work up and evaluation of the above problems. Past Medical History:  
Diagnosis Date  Atrial fibrillation ([address] 10/29/2009  Bladder cancer ([address]  Coagulation disorder ([address] Chronic prophylactic anticoagulation med  Colon polyps  Diabetes ([address]  GERD (gastroesophageal reflux disease)  Heart valve problem   
 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  
 @ [facility] >> atrial fibrillation  T. I.A. 4/23/2009  TIA (transient ischemic attack)  Ulcer of right lower extremity, limited to breakdown of skin ([address] 7/5/2018 Past Surgical History:  
Procedure Laterality Date  CARDIAC SURG PROCEDURE UNLIST    
 ablation  HX APPENDECTOMY  HX CHOLECYSTECTOMY  HX HYSTERECTOMY  HX KNEE ARTHROSCOPY  [id number]  
 right knee  HX ORTHOPAEDIC    
 HX UROLOGICAL RENAL STENT, tur-b  VASCULAR SURGERY PROCEDURE UNLIST    
 removed vein in right leg Social History Tobacco Use  Smoking status: Former Smoker Packs/day: 0.50 Years: 10.00 Pack years: 5.00 Types: Cigarettes Last attempt to quit: 1967 Years since quittin.1  Smokeless tobacco: Never Used Substance Use Topics  Alcohol use: No  
  Alcohol/week: 0.0 oz Family History Problem Relation Age of Onset  Stroke Other  Arthritis-osteo Sister   
     spinal stenosis  Gout Son   
 Hypertension Son   
[facility] Hypertension Mother  Heart Disease Mother CAD  Heart Disease Father CAD  Alcohol abuse Neg Hx  Asthma Neg Hx  Bleeding Prob Neg Hx  Cancer Neg Hx  Diabetes Neg Hx  Elevated Lipids Neg Hx   
 Headache Neg Hx  Lung Disease Neg Hx  Migraines Neg Hx  Psychiatric Disorder Neg Hx  Mental Retardation Neg Hx Allergies Allergen Reactions  Actos [Pioglitazone] Swelling Swelling of feet and legs  Codeine Itching  Doxycycline Nausea Only  Hydrocodone Rash and Other (comments)  
  hallucinations Prior to Admission medications Medication Sig Start Date End Date Taking? Authorizing Provider  
fluocinoNIDE (LIDEX) 0.05 % topical cream Apply  to affected area two (2) times a day for 14 days.  Then as needed for rash 19  Shawna Adams MD  
atorvastatin (LIPITOR) 80 mg tablet TAKE ONE TABLET BY MOUTH EVERY EVENING 19   Shawna Adams MD  
allopurinol (ZYLOPRIM) 100 mg tablet TAKE TWO TABLETS BY MOUTH DAILY 19   Gloria Murray MD  
lisinopril (PRINIVIL, ZESTRIL) 20 mg tablet TAKE ONE TABLET BY MOUTH DAILY 12/10/18   Gloria Murray MD  
metoprolol tartrate (LOPRESSOR) 50 mg tablet TAKE ONE TABLET BY MOUTH TWO TIMES A DAY 12/10/18   Mariza Murray MD  
rivaroxaban (XARELTO) 15 mg tab tablet Take 1 Tab by mouth daily (with dinner). 9/20/18   Mariza Murray MD  
ipratropium (ATROVENT) 42 mcg (0.06 %) nasal spray 2 Sprays by Both Nostrils route four (4) times daily. As needed runny nose 9/20/18   Mariza Murray MD  
furosemide (LASIX) 40 mg tablet 1 qam- changed upon lab review 9/5/18 Patient taking differently: Take 40 mg by mouth two (2) times a day. 2 in am and 1 in pm 9/7/18   Mariza Murray MD  
ACCU-CHEK SHAHIDA PLUS TEST STRP strip USE TO CHECK BLOOD SUGAR FOUR TIMES A DAY 8/28/18   Mariza Murray MD  
insulin NPH (HUMULIN N NPH INSULIN KWIKPEN) 100 unit/mL (3 mL) inpn INJECT 43 UNITS UNDER THE SKIN EVERY MORNING, 23 UNITS UNDER THE SKIN WITH DINNER OR AS DIRECTED BY PHYSICIAN. **THIS REPLACED HUMULOG MIX** 8/20/18   Mariza Murray MD  
acetaminophen (TYLENOL) 325 mg tablet Take 325 mg by mouth every four (4) hours as needed for Pain. Provider, Historical  
levothyroxine (SYNTHROID) 125 mcg tablet TAKE ONE TABLET BY MOUTH DAILY 5/23/18   Nithya Burrell MD  
ADAN PEN NEEDLE 32 gauge x 5/32\" ndle USE WITH INSULIN PEN TWO TIMES A DAY AS DIRECTED BY PHYSICIAN 4/18/18   Mariza Murray MD  
hydrALAZINE (APRESOLINE) 10 mg tablet Take 2 Tabs by mouth three (3) times daily. Patient taking differently: Take 20 mg by mouth two (2) times a day. 1/23/17   Adonay Jimenez MD  
potassium chloride SR (KLOR-CON 10) 10 mEq tablet Take 1 Tab by mouth daily. 1/23/17   Adonay Jimenez MD  
omeprazole (PRILOSEC) 20 mg capsule Take 20 mg by mouth daily. Provider, Historical  
dofetilide (TIKOSYN) 125 mcg capsule Take 1 Cap by mouth two (2) times a day. 4/29/11   Mariza Murray MD  
 
 
REVIEW OF SYSTEMS:    
I am not able to complete the review of systems because: The patient is intubated and sedated The patient has altered mental status due to his acute medical problems The patient has baseline aphasia from prior stroke(s) The patient has baseline dementia and is not reliable historian The patient is in acute medical distress and unable to provide information Total of 12 systems reviewed as follows:   
   POSITIVE= underlined text  Negative = text not underlined General:  fever, chills, sweats, generalized weakness, weight loss/gain,  
   loss of appetite Eyes:    blurred vision, eye pain, loss of vision, double vision ENT:    rhinorrhea, pharyngitis Respiratory:   cough, sputum production, SOB, LEIJA, wheezing, pleuritic pain  
Cardiology:   chest pain, palpitations, orthopnea, PND, edema, syncope Gastrointestinal:  abdominal pain , N/V, diarrhea, dysphagia, constipation, bleeding Genitourinary:  frequency, urgency, dysuria, hematuria, incontinence Muskuloskeletal :  arthralgia, myalgia, back pain Hematology:  easy bruising, nose or gum bleeding, lymphadenopathy Dermatological: rash, ulceration, pruritis, color change / jaundice Endocrine:   hot flashes or polydipsia Neurological:  headache, dizziness, confusion, focal weakness, paresthesia, Speech difficulties, memory loss, gait difficulty Psychological: Feelings of anxiety, depression, agitation Objective: VITALS:   
Visit Vitals /58 Pulse 65 Temp 97.8 °F (36.6 °C) Resp 16 Ht 5' 9\" (1.753 m) Wt 100.1 kg (220 lb 10.9 oz) SpO2 98% BMI 32.59 kg/m² PHYSICAL EXAM: 
 
General:    Alert, cooperative, no distress, appears stated age. HEENT: Atraumatic, anicteric sclerae, pink conjunctivae No oral ulcers, mucosa moist, throat clear, dentition fair Neck:  Supple, symmetrical,  thyroid: non tender Lungs:   Clear to auscultation bilaterally. No Wheezing or Rhonchi. No rales. Chest wall:  No tenderness  No Accessory muscle use. Heart:   Regular  rhythm,  No  murmur   No edema Abdomen:   Soft, non-tender. Not distended. Bowel sounds normal 
Extremities: No cyanosis. No clubbing,   
  Skin turgor normal, Capillary refill normal, Radial dial pulse 2+ Skin:     Not pale. Not Jaundiced  No rashes Psych:  fair insight. Not depressed. Not anxious or agitated. Neurologic: EOMs intact. No facial asymmetry. No aphasia or slurred speech. Symmetrical strength, Sensation grossly intact. Alert and oriented X 4.  
 
_______________________________________________________________________ Care Plan discussed with: 
  Comments Patient y Family  y dtr bedside RN y   
Care Manager Consultant:  duarte HAYES provider   
_______________________________________________________________________ Expected  Disposition:  
Home with Family y HH/PT/OT/RN y  
SNF/LTC   
[place]   
________________________________________________________________________ TOTAL TIME:  65 Minutes Critical Care Provided     Minutes non procedure based Comments Reviewed previous records  
>50% of visit spent in counseling and coordination of care  Discussion with patient and/or family and questions answered 
  
 
________________________________________________________________________ Signed: Zaki Welch MD 
 
Procedures: see electronic medical records for all procedures/Xrays and details which were not copied into this note but were reviewed prior to creation of Plan. LAB DATA REVIEWED:   
Recent Results (from the past 24 hour(s)) CBC WITH AUTOMATED DIFF Collection Time: 02/19/19 10:23 AM  
Result Value Ref Range WBC 5.8 3.6 - 11.0 K/uL  
 RBC 3.55 (L) 3.80 - 5.20 M/uL  
 HGB 10.5 (L) 11.5 - 16.0 g/dL HCT 32.6 (L) 35.0 - 47.0 % MCV 91.8 80.0 - 99.0 FL  
 MCH 29.6 26.0 - 34.0 PG  
 MCHC 32.2 30.0 - 36.5 g/dL  
 RDW 14.9 (H) 11.5 - 14.5 % PLATELET 577 (L) 525 - 400 K/uL NRBC 0.0 0  WBC ABSOLUTE NRBC 0.00 0.00 - 0.01 K/uL NEUTROPHILS 68 32 - 75 % LYMPHOCYTES 18 12 - 49 % MONOCYTES 8 5 - 13 % EOSINOPHILS 5 0 - 7 % BASOPHILS 1 0 - 1 % IMMATURE GRANULOCYTES 0 0.0 - 0.5 % ABS. NEUTROPHILS 3.9 1.8 - 8.0 K/UL  
 ABS. LYMPHOCYTES 1.0 0.8 - 3.5 K/UL  
 ABS. MONOCYTES 0.5 0.0 - 1.0 K/UL  
 ABS. EOSINOPHILS 0.3 0.0 - 0.4 K/UL  
 ABS. BASOPHILS 0.1 0.0 - 0.1 K/UL  
 ABS. IMM. GRANS. 0.0 0.00 - 0.04 K/UL  
 DF AUTOMATED PLATELET COMMENTS Large Platelets RBC COMMENTS NORMOCYTIC, NORMOCHROMIC PROTHROMBIN TIME + INR Collection Time: 02/19/19 10:23 AM  
Result Value Ref Range INR 1.3 (H) 0.9 - 1.1 Prothrombin time 12.7 (H) 9.0 - 11.1 sec METABOLIC PANEL, COMPREHENSIVE Collection Time: 02/19/19 10:23 AM  
Result Value Ref Range Sodium 140 136 - 145 mmol/L Potassium 4.4 3.5 - 5.1 mmol/L Chloride 106 97 - 108 mmol/L  
 CO2 28 21 - 32 mmol/L Anion gap 6 5 - 15 mmol/L Glucose 123 (H) 65 - 100 mg/dL BUN 37 (H) 6 - 20 MG/DL Creatinine 1.69 (H) 0.55 - 1.02 MG/DL  
 BUN/Creatinine ratio 22 (H) 12 - 20 GFR est AA 35 (L) >60 ml/min/1.73m2 GFR est non-AA 29 (L) >60 ml/min/1.73m2 Calcium 9.0 8.5 - 10.1 MG/DL Bilirubin, total 0.4 0.2 - 1.0 MG/DL  
 ALT (SGPT) 18 12 - 78 U/L  
 AST (SGOT) 17 15 - 37 U/L Alk. phosphatase 77 45 - 117 U/L Protein, total 6.7 6.4 - 8.2 g/dL Albumin 3.5 3.5 - 5.0 g/dL Globulin 3.2 2.0 - 4.0 g/dL A-G Ratio 1.1 1.1 - 2.2 OCCULT BLOOD, STOOL Collection Time: 02/19/19 10:35 AM  
Result Value Ref Range  Occult blood, stool POSITIVE (A) NEG

## 2019-02-19 NOTE — PROGRESS NOTES
Hospitalist Admission NoteNAME: Ahmet Ramirez :  1935 MRN:  [id number] Date/Time:  2019 2:15 PM 
 
Patient PCP: Hector Cummings MD 
______________________________________________________________________ Given the patient's current clinical presentation, I have a high level of concern for decompensation if discharged from the emergency department. Complex decision making was performed, which includes reviewing the patient's available past medical records, laboratory results, and x-ray films. My assessment of this patient's clinical condition and my plan of care is as follows. Assessment / Plan: 
Rectal bleeding  
-baseline Hb ~ 10, admission 10.5 
-admit to tele for close monitoring  
-h/h every 8 hr  
-GI will see later on today: colonoscopy in am 
-diet: CLD now , npo post MN 
-holding xarelto  
-CT: No acute abnormality. Borderline splenomegaly. 
-Blood consent: on   I d/w pt/ family possibility of blood transfusion if his Hb continue to drop. She had never had blood transfusion previously. Risk of allergic reaction and possibility of infection were discussed in details. She verbalized understanding by repeating everything back to me in her own words. She agreed to be transfused if needed. IDDM type II with hypoglycemia  
-holding home NPH Adding d5w ( slow rate) for hypoglycemia -c/w SS as needed Chronic afib, chronic diastolic CHF and benign HTN 
-BP/HR stable/ no signs of fluid overload  
-con't tikosyn, lasix, metoprolol, hydralazine CKD stage III 
-Cr at baseline, monitor closely.  
-Avoid nephrotoxic drugs, adjust all meds to GFR. H/o dysphagia due to Schatzki's ring with h/o esophageal stricture Hypothyroidism: home synthroid Gout: home allopurinol Hyperlipidemia: cont statin Obesity. Body mass index is 32.59 kg/m². Code Status: Full code d/w pt and dtr in ED Surrogate Decision Maker: dtr and son DVT Prophylaxis: SCD  
GI Prophylaxis: not indicated Baseline: lives alone; she has 2 children twins ( dtr and son), son [place] of state. Pt has walker/cane at home but generally ambulates without it. Subjective: CHIEF COMPLAINT: rectal bleeding HISTORY OF PRESENT ILLNESS:    
Adrien Pina is a 82 y.o.  female who presents with above complaint. Pt is very poor historian due to h/o \" short memory problems\". History was obtained from dtr at bedside. Per dtr, pt was c/o lower abdominal discomfort since yesterday. No nausea/vomiting. This morning around 6 am, pt called her dtr saying that she had a large amount of BRB coming out. Pt c/o feeling generally weak. No h/o bleeding in the past. No more BM with blood since this morning. Pt si on xarelto for h/o Afib. Pt has h/o colonoscopy many years ago with polyps removal. ED provider discussed pt case with GI, who recommended admission for colonoscopy in am.  
 
We were asked to admit for work up and evaluation of the above problems. Past Medical History:  
Diagnosis Date  Atrial fibrillation ([address] 10/29/2009  Bladder cancer ([address]  Coagulation disorder ([address] Chronic prophylactic anticoagulation med  Colon polyps  Diabetes ([address]  GERD (gastroesophageal reflux disease)  Heart valve problem   
 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  
 @ [facility] >> atrial fibrillation  T. I.A. 4/23/2009  TIA (transient ischemic attack)  Ulcer of right lower extremity, limited to breakdown of skin ([address] 7/5/2018 Past Surgical History:  
Procedure Laterality Date  CARDIAC SURG PROCEDURE UNLIST    
 ablation  HX APPENDECTOMY  HX CHOLECYSTECTOMY  HX HYSTERECTOMY  HX KNEE ARTHROSCOPY  [id number]  
 right knee  HX ORTHOPAEDIC    
 HX UROLOGICAL RENAL STENT, tur-b  VASCULAR SURGERY PROCEDURE UNLIST    
 removed vein in right leg Social History Tobacco Use  Smoking status: Former Smoker Packs/day: 0.50 Years: 10.00 Pack years: 5.00 Types: Cigarettes Last attempt to quit: 1967 Years since quittin.1  Smokeless tobacco: Never Used Substance Use Topics  Alcohol use: No  
  Alcohol/week: 0.0 oz Family History Problem Relation Age of Onset  Stroke Other  Arthritis-osteo Sister   
     spinal stenosis  Gout Son   
 Hypertension Son   
Ceil Giancarlo Hypertension Mother  Heart Disease Mother CAD  Heart Disease Father CAD  Alcohol abuse Neg Hx  Asthma Neg Hx  Bleeding Prob Neg Hx  Cancer Neg Hx  Diabetes Neg Hx  Elevated Lipids Neg Hx   
 Headache Neg Hx  Lung Disease Neg Hx  Migraines Neg Hx  Psychiatric Disorder Neg Hx  Mental Retardation Neg Hx Allergies Allergen Reactions  Actos [Pioglitazone] Swelling Swelling of feet and legs  Codeine Itching  Doxycycline Nausea Only  Hydrocodone Rash and Other (comments)  
  hallucinations Prior to Admission medications Medication Sig Start Date End Date Taking? Authorizing Provider  
fluocinoNIDE (LIDEX) 0.05 % topical cream Apply  to affected area two (2) times a day for 14 days.  Then as needed for rash 19  Kemi Ace MD  
atorvastatin (LIPITOR) 80 mg tablet TAKE ONE TABLET BY MOUTH EVERY EVENING 19   Kemi Ace MD  
allopurinol (ZYLOPRIM) 100 mg tablet TAKE TWO TABLETS BY MOUTH DAILY 19   Lito Murray MD  
lisinopril (PRINIVIL, ZESTRIL) 20 mg tablet TAKE ONE TABLET BY MOUTH DAILY 12/10/18   Lito Murray MD  
metoprolol tartrate (LOPRESSOR) 50 mg tablet TAKE ONE TABLET BY MOUTH TWO TIMES A DAY 12/10/18   Nara Murray MD  
rivaroxaban (XARELTO) 15 mg tab tablet Take 1 Tab by mouth daily (with dinner). 9/20/18   Nara Murray MD  
ipratropium (ATROVENT) 42 mcg (0.06 %) nasal spray 2 Sprays by Both Nostrils route four (4) times daily. As needed runny nose 9/20/18   Nara Murray MD  
furosemide (LASIX) 40 mg tablet 1 qam- changed upon lab review 9/5/18 Patient taking differently: Take 40 mg by mouth two (2) times a day. 2 in am and 1 in pm 9/7/18   Nara Murray MD  
ACCU-CHEK SHAHIDA PLUS TEST STRP strip USE TO CHECK BLOOD SUGAR FOUR TIMES A DAY 8/28/18   Nara Murray MD  
insulin NPH (HUMULIN N NPH INSULIN KWIKPEN) 100 unit/mL (3 mL) inpn INJECT 43 UNITS UNDER THE SKIN EVERY MORNING, 23 UNITS UNDER THE SKIN WITH DINNER OR AS DIRECTED BY PHYSICIAN. **THIS REPLACED HUMULOG MIX** 8/20/18   Nara Murray MD  
acetaminophen (TYLENOL) 325 mg tablet Take 325 mg by mouth every four (4) hours as needed for Pain. Provider, Historical  
levothyroxine (SYNTHROID) 125 mcg tablet TAKE ONE TABLET BY MOUTH DAILY 5/23/18   Hazel Guerrier MD  
ADAN PEN NEEDLE 32 gauge x 5/32\" ndle USE WITH INSULIN PEN TWO TIMES A DAY AS DIRECTED BY PHYSICIAN 4/18/18   Nara Murray MD  
hydrALAZINE (APRESOLINE) 10 mg tablet Take 2 Tabs by mouth three (3) times daily. Patient taking differently: Take 20 mg by mouth two (2) times a day. 1/23/17   Carina Penn MD  
potassium chloride SR (KLOR-CON 10) 10 mEq tablet Take 1 Tab by mouth daily. 1/23/17   Carina Penn MD  
omeprazole (PRILOSEC) 20 mg capsule Take 20 mg by mouth daily. Provider, Historical  
dofetilide (TIKOSYN) 125 mcg capsule Take 1 Cap by mouth two (2) times a day. 4/29/11   Nara Murray MD  
 
 
REVIEW OF SYSTEMS:    
I am not able to complete the review of systems because: The patient is intubated and sedated The patient has altered mental status due to his acute medical problems The patient has baseline aphasia from prior stroke(s) The patient has baseline dementia and is not reliable historian The patient is in acute medical distress and unable to provide information Total of 12 systems reviewed as follows:   
   POSITIVE= underlined text  Negative = text not underlined General:  fever, chills, sweats, generalized weakness, weight loss/gain,  
   loss of appetite Eyes:    blurred vision, eye pain, loss of vision, double vision ENT:    rhinorrhea, pharyngitis Respiratory:   cough, sputum production, SOB, LEIJA, wheezing, pleuritic pain  
Cardiology:   chest pain, palpitations, orthopnea, PND, edema, syncope Gastrointestinal:  abdominal pain , N/V, diarrhea, dysphagia, constipation, bleeding Genitourinary:  frequency, urgency, dysuria, hematuria, incontinence Muskuloskeletal :  arthralgia, myalgia, back pain Hematology:  easy bruising, nose or gum bleeding, lymphadenopathy Dermatological: rash, ulceration, pruritis, color change / jaundice Endocrine:   hot flashes or polydipsia Neurological:  headache, dizziness, confusion, focal weakness, paresthesia, Speech difficulties, memory loss, gait difficulty Psychological: Feelings of anxiety, depression, agitation Objective: VITALS:   
Visit Vitals /58 Pulse 65 Temp 97.8 °F (36.6 °C) Resp 16 Ht 5' 9\" (1.753 m) Wt 100.1 kg (220 lb 10.9 oz) SpO2 98% BMI 32.59 kg/m² PHYSICAL EXAM: 
 
General:    Alert, cooperative, no distress, appears stated age. HEENT: Atraumatic, anicteric sclerae, pink conjunctivae No oral ulcers, mucosa moist, throat clear, dentition fair Neck:  Supple, symmetrical,  thyroid: non tender Lungs:   Clear to auscultation bilaterally. No Wheezing or Rhonchi. No rales. Chest wall:  No tenderness  No Accessory muscle use. Heart:   Regular  rhythm,  No  murmur   No edema Abdomen:   Soft, non-tender. Not distended. Bowel sounds normal 
Extremities: No cyanosis. No clubbing,   
  Skin turgor normal, Capillary refill normal, Radial dial pulse 2+ Skin:     Not pale. Not Jaundiced  No rashes Psych:  fair insight. Not depressed. Not anxious or agitated. Neurologic: EOMs intact. No facial asymmetry. No aphasia or slurred speech. Symmetrical strength, Sensation grossly intact. Alert and oriented X 4.  
 
_______________________________________________________________________ Care Plan discussed with: 
  Comments Patient y Family  y dtr bedside RN y   
Care Manager Consultant:  duarte HAYES provider   
_______________________________________________________________________ Expected  Disposition:  
Home with Family y HH/PT/OT/RN y  
SNF/LTC   
[place]   
________________________________________________________________________ TOTAL TIME:  65 Minutes Critical Care Provided     Minutes non procedure based Comments Reviewed previous records  
>50% of visit spent in counseling and coordination of care  Discussion with patient and/or family and questions answered 
  
 
________________________________________________________________________ Signed: Jacinto Kirby MD 
 
Procedures: see electronic medical records for all procedures/Xrays and details which were not copied into this note but were reviewed prior to creation of Plan. LAB DATA REVIEWED:   
Recent Results (from the past 24 hour(s)) CBC WITH AUTOMATED DIFF Collection Time: 02/19/19 10:23 AM  
Result Value Ref Range WBC 5.8 3.6 - 11.0 K/uL  
 RBC 3.55 (L) 3.80 - 5.20 M/uL  
 HGB 10.5 (L) 11.5 - 16.0 g/dL HCT 32.6 (L) 35.0 - 47.0 % MCV 91.8 80.0 - 99.0 FL  
 MCH 29.6 26.0 - 34.0 PG  
 MCHC 32.2 30.0 - 36.5 g/dL  
 RDW 14.9 (H) 11.5 - 14.5 % PLATELET 321 (L) 742 - 400 K/uL NRBC 0.0 0  WBC ABSOLUTE NRBC 0.00 0.00 - 0.01 K/uL NEUTROPHILS 68 32 - 75 % LYMPHOCYTES 18 12 - 49 % MONOCYTES 8 5 - 13 % EOSINOPHILS 5 0 - 7 % BASOPHILS 1 0 - 1 % IMMATURE GRANULOCYTES 0 0.0 - 0.5 % ABS. NEUTROPHILS 3.9 1.8 - 8.0 K/UL  
 ABS. LYMPHOCYTES 1.0 0.8 - 3.5 K/UL  
 ABS. MONOCYTES 0.5 0.0 - 1.0 K/UL  
 ABS. EOSINOPHILS 0.3 0.0 - 0.4 K/UL  
 ABS. BASOPHILS 0.1 0.0 - 0.1 K/UL  
 ABS. IMM. GRANS. 0.0 0.00 - 0.04 K/UL  
 DF AUTOMATED PLATELET COMMENTS Large Platelets RBC COMMENTS NORMOCYTIC, NORMOCHROMIC PROTHROMBIN TIME + INR Collection Time: 02/19/19 10:23 AM  
Result Value Ref Range INR 1.3 (H) 0.9 - 1.1 Prothrombin time 12.7 (H) 9.0 - 11.1 sec METABOLIC PANEL, COMPREHENSIVE Collection Time: 02/19/19 10:23 AM  
Result Value Ref Range Sodium 140 136 - 145 mmol/L Potassium 4.4 3.5 - 5.1 mmol/L Chloride 106 97 - 108 mmol/L  
 CO2 28 21 - 32 mmol/L Anion gap 6 5 - 15 mmol/L Glucose 123 (H) 65 - 100 mg/dL BUN 37 (H) 6 - 20 MG/DL Creatinine 1.69 (H) 0.55 - 1.02 MG/DL  
 BUN/Creatinine ratio 22 (H) 12 - 20 GFR est AA 35 (L) >60 ml/min/1.73m2 GFR est non-AA 29 (L) >60 ml/min/1.73m2 Calcium 9.0 8.5 - 10.1 MG/DL Bilirubin, total 0.4 0.2 - 1.0 MG/DL  
 ALT (SGPT) 18 12 - 78 U/L  
 AST (SGOT) 17 15 - 37 U/L Alk. phosphatase 77 45 - 117 U/L Protein, total 6.7 6.4 - 8.2 g/dL Albumin 3.5 3.5 - 5.0 g/dL Globulin 3.2 2.0 - 4.0 g/dL A-G Ratio 1.1 1.1 - 2.2 OCCULT BLOOD, STOOL Collection Time: 02/19/19 10:35 AM  
Result Value Ref Range  Occult blood, stool POSITIVE (A) NEG

## 2019-02-19 NOTE — ED PROVIDER NOTES
EMERGENCY DEPARTMENT HISTORY AND PHYSICAL EXAM 
 
 
Date: 2/19/2019 Patient Name: Brooke Torres History of Presenting Illness Chief Complaint Patient presents with  Rectal Bleeding Pt reports noticing a large amount of bright red blood while having a BM this morning. History Provided By: Patient and Patient's Daughter HPI: Brooke Torres, 82 y.o. female with PMHx significant for afib, hypothyroidism, TIA, colon polyps, bladder cancer, acid reflux, presents via private vehicle to the ED with cc of bright red rectal bleeding at 6:30 AM. She reports an associated soreness in her lower abdomen and weakness. She states she was sitting on her chair when she had felt something wet on her pants. She had then noticed a large amount of blood in her pants, but denies any passed stools. Pt reports she is not experiencing any bleeding now and states it had not lasted long, nor did she experience any repeat symptoms. Daughter informs of a h/o bladder cancer, but pt denies the bleeding coming from her bladder. Pt denies the passing of any clots. She also confirms she is SOB but daughter states this is baseline. Pt is currently on Xarelto due to a h/o afib. She denies any recent travels or consuming any raw/unpasturized foods. Pt denies any recent sick contacts or recent falls. She denies any fever, diarrhea, dizziness, lightheadedness, CP. Surgical Hx: appendectomy, hysterectomy, polyp removal 
 
There are no other complaints, changes, or physical findings at this time. PCP: Arthur Garibay MD 
 
No current facility-administered medications on file prior to encounter. Current Outpatient Medications on File Prior to Encounter Medication Sig Dispense Refill  fluocinoNIDE (LIDEX) 0.05 % topical cream Apply  to affected area two (2) times a day for 14 days.  Then as needed for rash 30 g 5  
 atorvastatin (LIPITOR) 80 mg tablet TAKE ONE TABLET BY MOUTH EVERY EVENING 90 Tab 0  
  allopurinol (ZYLOPRIM) 100 mg tablet TAKE TWO TABLETS BY MOUTH DAILY 180 Tab 2  
 lisinopril (PRINIVIL, ZESTRIL) 20 mg tablet TAKE ONE TABLET BY MOUTH DAILY 30 Tab 10  
 metoprolol tartrate (LOPRESSOR) 50 mg tablet TAKE ONE TABLET BY MOUTH TWO TIMES A DAY 60 Tab 10  
 rivaroxaban (XARELTO) 15 mg tab tablet Take 1 Tab by mouth daily (with dinner). 30 Tab 11  ipratropium (ATROVENT) 42 mcg (0.06 %) nasal spray 2 Sprays by Both Nostrils route four (4) times daily. As needed runny nose 15 mL 11  
 furosemide (LASIX) 40 mg tablet 1 qam- changed upon lab review 9/5/18 (Patient taking differently: Take 40 mg by mouth two (2) times a day. 2 in am and 1 in pm) 1 Tab 0  ACCU-CHEK SHAHIDA PLUS TEST STRP strip USE TO CHECK BLOOD SUGAR FOUR TIMES A  Strip 9  
 insulin NPH (HUMULIN N NPH INSULIN KWIKPEN) 100 unit/mL (3 mL) inpn INJECT 43 UNITS UNDER THE SKIN EVERY MORNING, 23 UNITS UNDER THE SKIN WITH DINNER OR AS DIRECTED BY PHYSICIAN. **THIS REPLACED HUMULOG MIX** 15 Pen 97  
 acetaminophen (TYLENOL) 325 mg tablet Take 325 mg by mouth every four (4) hours as needed for Pain.  levothyroxine (SYNTHROID) 125 mcg tablet TAKE ONE TABLET BY MOUTH DAILY 30 Tab 8  
 ADAN PEN NEEDLE 32 gauge x 5/32\" ndle USE WITH INSULIN PEN TWO TIMES A DAY AS DIRECTED BY PHYSICIAN 100 Pen Needle 11  
 hydrALAZINE (APRESOLINE) 10 mg tablet Take 2 Tabs by mouth three (3) times daily. (Patient taking differently: Take 20 mg by mouth two (2) times a day.) 180 Tab 12  
 potassium chloride SR (KLOR-CON 10) 10 mEq tablet Take 1 Tab by mouth daily. 30 Tab 12  
 omeprazole (PRILOSEC) 20 mg capsule Take 20 mg by mouth daily.  dofetilide (TIKOSYN) 125 mcg capsule Take 1 Cap by mouth two (2) times a day. 60 Cap 3 Past History Past Medical History: 
Past Medical History:  
Diagnosis Date  Atrial fibrillation ([facility] [address] 10/29/2009  Bladder cancer ([facility] [address]  Coagulation disorder ([facility] [address] Chronic prophylactic anticoagulation med  Colon polyps  Diabetes ([facility] [address]  GERD (gastroesophageal reflux disease)  Heart valve problem   
 leaking heart valve  Hypercholesterolemia  Hypertension  Hypothyroidism  Hypothyroidism 2009  Hypothyroidism, acquired, autoimmune 2015  Overweight and obesity  PUD (peptic ulcer disease)  S/P ablation of atrial flutter[V45.89HM]   
 @ [facility] >> atrial fibrillation  T. [place]. 2009  TIA (transient ischemic attack)  Ulcer of right lower extremity, limited to breakdown of skin ([facility] [address] 2018 Past Surgical History: 
Past Surgical History:  
Procedure Laterality Date  CARDIAC SURG PROCEDURE UNLIST    
 ablation  HX APPENDECTOMY  HX CHOLECYSTECTOMY  HX HYSTERECTOMY  HX KNEE ARTHROSCOPY  [id number]  
 right knee  HX ORTHOPAEDIC    
 HX UROLOGICAL RENAL STENT, tur-b  VASCULAR SURGERY PROCEDURE UNLIST    
 removed vein in right leg Family History: 
Family History Problem Relation Age of Onset  Stroke Other  Arthritis-osteo Sister   
     spinal stenosis  Gout Son   
 Hypertension Son   
[facility] Hypertension Mother  Heart Disease Mother CAD  Heart Disease Father CAD  Alcohol abuse Neg Hx  Asthma Neg Hx  Bleeding Prob Neg Hx  Cancer Neg Hx  Diabetes Neg Hx  Elevated Lipids Neg Hx   
 Headache Neg Hx  Lung Disease Neg Hx  Migraines Neg Hx  Psychiatric Disorder Neg Hx  Mental Retardation Neg Hx Social History: 
Social History Tobacco Use  Smoking status: Former Smoker Packs/day: 0.50 Years: 10.00 Pack years: 5.00 Types: Cigarettes Last attempt to quit: 1967 Years since quittin.1  Smokeless tobacco: Never Used Substance Use Topics  Alcohol use: No  
  Alcohol/week: 0.0 oz  Drug use: No  
 
 
Allergies: Allergies Allergen Reactions  Actos [Pioglitazone] Swelling Swelling of feet and legs  Codeine Itching  Doxycycline Nausea Only  Hydrocodone Rash and Other (comments)  
  hallucinations Review of Systems Review of Systems Constitutional: Negative for fever. Respiratory: Positive for shortness of breath. Cardiovascular: Negative for chest pain. Gastrointestinal: Positive for abdominal pain and anal bleeding. Negative for diarrhea. Neurological: Positive for weakness. Negative for dizziness and light-headedness. All other systems reviewed and are negative. Physical Exam  
Physical Exam  
Vital signs and nursing notes reviewed CONSTITUTIONAL: Alert, in mild distress; well-developed; well-nourished. HEAD:  Normocephalic, atraumatic EYES: PERRL; EOM's intact. Non-icteric sclera. ENTM: Nose: no rhinorrhea; Throat: no erythema or exudate, mucous membranes moist 
Neck:  Supple. trachea is midline. RESP: Chest clear, equal breath sounds. - W/R/R 
CV: S1 and S2 WNL; No murmurs, gallops or rubs. 2+ radial and DP pulses bilaterally. GI: non-distended, normal bowel sounds, abdomen soft. Tenderness in the lower abdominal area. No rebound or guarding. No masses or organomegaly. : No costo-vertebral angle tenderness. BACK:  Non-tender, normal appearance UPPER EXT:  Normal inspection. no joint or soft tissue swelling LOWER EXT: No edema, no calf tenderness. NEURO: Alert and oriented x3, 5/5 strength and light touch sensation intact in bilateral upper and lower extremities. SKIN: No rashes; Warm and dry. Non-jaundiced. PSYCH: Normal mood, normal affect Diagnostic Study Results Labs - Recent Results (from the past 12 hour(s)) CBC WITH AUTOMATED DIFF Collection Time: 02/19/19 10:23 AM  
Result Value Ref Range WBC 5.8 3.6 - 11.0 K/uL  
 RBC 3.55 (L) 3.80 - 5.20 M/uL  
 HGB 10.5 (L) 11.5 - 16.0 g/dL HCT 32.6 (L) 35.0 - 47.0 %  MCV 91.8 80.0 - 99.0 FL  
 MCH 29.6 26.0 - 34.0 PG  
 MCHC 32.2 30.0 - 36.5 g/dL  
 RDW 14.9 (H) 11.5 - 14.5 % PLATELET 547 (L) 690 - 400 K/uL NRBC 0.0 0  WBC ABSOLUTE NRBC 0.00 0.00 - 0.01 K/uL NEUTROPHILS 68 32 - 75 % LYMPHOCYTES 18 12 - 49 % MONOCYTES 8 5 - 13 % EOSINOPHILS 5 0 - 7 % BASOPHILS 1 0 - 1 % IMMATURE GRANULOCYTES 0 0.0 - 0.5 % ABS. NEUTROPHILS 3.9 1.8 - 8.0 K/UL  
 ABS. LYMPHOCYTES 1.0 0.8 - 3.5 K/UL  
 ABS. MONOCYTES 0.5 0.0 - 1.0 K/UL  
 ABS. EOSINOPHILS 0.3 0.0 - 0.4 K/UL  
 ABS. BASOPHILS 0.1 0.0 - 0.1 K/UL  
 ABS. IMM. GRANS. 0.0 0.00 - 0.04 K/UL  
 DF AUTOMATED PLATELET COMMENTS Large Platelets RBC COMMENTS NORMOCYTIC, NORMOCHROMIC PROTHROMBIN TIME + INR Collection Time: 02/19/19 10:23 AM  
Result Value Ref Range INR 1.3 (H) 0.9 - 1.1 Prothrombin time 12.7 (H) 9.0 - 11.1 sec METABOLIC PANEL, COMPREHENSIVE Collection Time: 02/19/19 10:23 AM  
Result Value Ref Range Sodium 140 136 - 145 mmol/L Potassium 4.4 3.5 - 5.1 mmol/L Chloride 106 97 - 108 mmol/L  
 CO2 28 21 - 32 mmol/L Anion gap 6 5 - 15 mmol/L Glucose 123 (H) 65 - 100 mg/dL BUN 37 (H) 6 - 20 MG/DL Creatinine 1.69 (H) 0.55 - 1.02 MG/DL  
 BUN/Creatinine ratio 22 (H) 12 - 20 GFR est AA 35 (L) >60 ml/min/1.73m2 GFR est non-AA 29 (L) >60 ml/min/1.73m2 Calcium 9.0 8.5 - 10.1 MG/DL Bilirubin, total 0.4 0.2 - 1.0 MG/DL  
 ALT (SGPT) 18 12 - 78 U/L  
 AST (SGOT) 17 15 - 37 U/L Alk. phosphatase 77 45 - 117 U/L Protein, total 6.7 6.4 - 8.2 g/dL Albumin 3.5 3.5 - 5.0 g/dL Globulin 3.2 2.0 - 4.0 g/dL A-G Ratio 1.1 1.1 - 2.2 OCCULT BLOOD, STOOL Collection Time: 02/19/19 10:35 AM  
Result Value Ref Range Occult blood, stool POSITIVE (A) NEG Radiologic Studies -  
CT Results  (Last 48 hours) 02/19/19 1139  CT ABD PELV WO CONT Final result Impression:  IMPRESSION:  
1.   No acute abnormality is seen in the abdomen or pelvis within limits of  
 unenhanced exam  
2. Borderline splenomegaly. Morgan Chavez Narrative:  EXAM:  CT ABD PELV WO CONT INDICATION:  Diverticulitis COMPARISON: 6/18/2012. CONTRAST:  None. TECHNIQUE:   
Unenhanced multislice helical CT was performed from the diaphragm to the  
symphysis pubis without oral or intravenous contrast administration. Contiguous 5 mm axial images were reconstructed and lung and soft tissue windows were  
generated. Coronal and sagittal reformations were generated. CT dose reduction  
was achieved through use of a standardized protocol tailored for this  
examination and automatic exposure control for dose modulation. FINDINGS:  
LUNG: There is a rounded well-defined 5.2 mm nodule in the right lower lobe  
posterior medially (4-1). Lung bases are otherwise clear. Morgan Chavez The absence of intravenous contrast material reduces the sensitivity for  
evaluation of the solid parenchymal organs of the abdomen. LIVER: No focal lesion. GALLBLADDER: Absent SPLEEN: No focal lesion. Spleen is at the upper limits of normal in length  
measuring 14.6 cm. This is minimally longer than on the previous examination. PANCREAS: No focal lesion. ADRENALS: No focal lesion. KIDNEYS: No calculus. No hydronephrosis. GI: There is no evidence of bowel obstruction. . No inflammatory process is seen. Specifically there is no evidence of diverticulitis. There are a few isolated  
diverticuli of the distal colon. APPENDIX: Absent AORTA: The aorta tapers without aneurysm. RETROPERITONEUM:  There is no retroperitoneal adenopathy or mass. PERITONEUM: There is no ascites or free intraperitoneal air. BLADDER: Unremarkable PELVIS: There is no pelvic mass or adenopathy. BONES: No sclerotic or lytic lesions noted. Medical Decision Making I am the first provider for this patient. I reviewed the vital signs, available nursing notes, past medical history, past surgical history, family history and social history. Vital Signs-Reviewed the patient's vital signs. Patient Vitals for the past 12 hrs: 
 Temp Pulse Resp BP SpO2  
02/19/19 1115    140/58 98 % 02/19/19 1109    148/61 97 % 02/19/19 0925 97.8 °F (36.6 °C) 65 16 140/62 97 % Records Reviewed: Nursing Notes, Old Medical Records, Previous Radiology Studies and Previous Laboratory Studies Provider Notes (Medical Decision Making):  
83 y/o F on anticoagulant with rectal bleeding this AM. Some associated lower pelvic pain raising concern for possible diverticulitis which the pt has had in the past. Pt is hemodynamically stable. No active bleeding. Blood in the rectal vault. Check basic labs, continue to monitor vital signs, CT with IV contrast.  
 
ED Course:  
Initial assessment performed. The patients presenting problems have been discussed, and they are in agreement with the care plan formulated and outlined with them. I have encouraged them to ask questions as they arise throughout their visit. Procedure Note - Rectal Exam:  
9:58 AM 
Performed by: Jamison Villanueva MD 
Chaperoned by: Anthony Barragan RN Rectal exam performed. Small clotted bloody material in rectal vault. Blood was collected and sent to the lab for Hemoccult testing. Non-tender exam. 
The procedure took 1-15 minutes, and pt tolerated well. CONSULT NOTE: 
2:00 PM 
Jamison Villanueva MD spoke with Dr. Rona Petty, Specialty: GI 
Discussed patient's hx, disposition, and available diagnostic and imaging results. Reviewed care plans. Consultant agrees with plans as outlined. Consultant recommends she come in for admission. Likely scope in the morning. CONSULT NOTE:  
2:09 PM 
Jamison Villanueva MD spoke with Dr. Katey King, Specialty: Hospitalist 
Discussed pt's hx, disposition, and available diagnostic and imaging results. Reviewed care plans. Consultant will evaluate pt for admission. Written by Waldemar Fitch, ED Scribe, as dictated by Maria Elena Collins MD. Critical Care Time:  
none Disposition: PLAN: 
1. Admit ADMIT NOTE: 
2:09 PM 
Patient is being admitted to the hospital by Dr. Christa Kuo. The results of their tests and reasons for their admission have been discussed with them and/or available family. They convey agreement and understanding for the need to be admitted and for their admission diagnosis. Consultation has been made with the inpatient physician specialist for hospitalization. Diagnosis Clinical Impression: 1. Acute lower GI bleeding 2. Anemia, unspecified type Attestations: This note is prepared by Waldemar Fitch, acting as Scribe for Maria Elena Collins MD. Maria Elena Collins MD: The scribe's documentation has been prepared under my direction and personally reviewed by me in its entirety. I confirm that the note above accurately reflects all work, treatment, procedures, and medical decision making performed by me. This note will not be viewable in [address]

## 2019-02-19 NOTE — ED NOTES
TRANSFER - OUT REPORT: 
 
Verbal report given to Joyce Bustamante RN on Anusha White  being transferred to 211 for routine progression of care Report consisted of patients Situation, Background, Assessment and  
Recommendations(SBAR). Information from the following report(s) SBAR was reviewed with the receiving nurse. Lines:  
Peripheral IV 02/19/19 Right Antecubital (Active) Site Assessment Clean, dry, & intact 2/19/2019 11:10 AM  
Phlebitis Assessment 0 2/19/2019 11:10 AM  
Infiltration Assessment 0 2/19/2019 11:10 AM  
Dressing Status Clean, dry, & intact 2/19/2019 11:10 AM  
  
 
Opportunity for questions and clarification was provided. Patient transported with: 
[facility]

## 2019-02-19 NOTE — TELEPHONE ENCOUNTER
Spoke with patient, she states that she had a BM this morning with a significant amount of blood and she has been having abdominal pain since then. I advised patient per Dr. Jeronimo Blake to go to the ER.  Patient verbalized understanding and had no further questions

## 2019-02-19 NOTE — CONSULTS
Gastroenterology Consult Referring Physician: Dr. Sandra Leger Consult Date: 2/19/2019 Subjective: Chief Complaint: Rectal bleeding History of Present Illness: Ovidio Manrique is a 82 y.o. female who is seen in consultation for rectal bleeding. Pt reports rectal bleeding started around 7 AM this morning, she was sitting in her recliner and noticed something wet in her pants. Blood was BRB in nature. It alarmed her and thinks it was maybe a tablespoon in quantity. Was not gushing. Reports some lower abdominal pain yesterday that was moderate in nature that persisted this morning. No rectal pain. Nothing made it better or worse. No BM associated with bleeding. Pt tolerated Jello and Tea in ED w/o any N/V. She was feeling fine otherwise, had some lightheadedness, no syncope. Colonoscopy 4/14/09 by Dr. Ethan Lr showed grade 2 internal hemorrhoids as the probable cause of bleeding. On Xarelto for Afib, last dose last night. Reports hx of CVA. Has a mitral valve clip in place. Past Medical History:  
Diagnosis Date  Atrial fibrillation ([address] 10/29/2009  Bladder cancer ([address]  Coagulation disorder ([address] Chronic prophylactic anticoagulation med  Colon polyps  Diabetes ([address]  GERD (gastroesophageal reflux disease)  Heart valve problem   
 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  
 @ [facility] >> atrial fibrillation  T. I.A. 4/23/2009  TIA (transient ischemic attack)  Ulcer of right lower extremity, limited to breakdown of skin ([address] 7/5/2018 Past Surgical History:  
Procedure Laterality Date  CARDIAC SURG PROCEDURE UNLIST    
 ablation  HX APPENDECTOMY  HX CHOLECYSTECTOMY  HX HYSTERECTOMY  HX KNEE ARTHROSCOPY  [id number]  
 right knee  HX ORTHOPAEDIC    
 HX UROLOGICAL RENAL STENT, tur-b  VASCULAR SURGERY PROCEDURE UNLIST    
 removed vein in right leg Family History Problem Relation Age of Onset  Stroke Other  Arthritis-osteo Sister   
     spinal stenosis  Gout Son   
 Hypertension Son   
[facility] Hypertension Mother  Heart Disease Mother CAD  Heart Disease Father CAD  Alcohol abuse Neg Hx  Asthma Neg Hx  Bleeding Prob Neg Hx  Cancer Neg Hx  Diabetes Neg Hx  Elevated Lipids Neg Hx   
 Headache Neg Hx  Lung Disease Neg Hx  Migraines Neg Hx  Psychiatric Disorder Neg Hx  Mental Retardation Neg Hx Social History Tobacco Use  Smoking status: Former Smoker Packs/day: 0.50 Years: 10.00 Pack years: 5.00 Types: Cigarettes Last attempt to quit: 1967 Years since quittin.1  Smokeless tobacco: Never Used Substance Use Topics  Alcohol use: No  
  Alcohol/week: 0.0 oz Allergies Allergen Reactions  Actos [Pioglitazone] Swelling Swelling of feet and legs  Codeine Itching  Doxycycline Nausea Only  Hydrocodone Rash and Other (comments)  
  hallucinations Current Facility-Administered Medications Medication Dose Route Frequency  acetaminophen (TYLENOL) tablet 1,000 mg  1,000 mg Oral Q6H PRN  
 insulin lispro (HUMALOG) injection   SubCUTAneous AC&HS  
 glucose chewable tablet 16 g  4 Tab Oral PRN  
 dextrose (D50W) injection syrg 12.5-25 g  12.5-25 g IntraVENous PRN  
 glucagon (GLUCAGEN) injection 1 mg  1 mg IntraMUSCular PRN Current Outpatient Medications Medication Sig  
 fluocinoNIDE (LIDEX) 0.05 % topical cream Apply  to affected area two (2) times a day for 14 days. Then as needed for rash  atorvastatin (LIPITOR) 80 mg tablet TAKE ONE TABLET BY MOUTH EVERY EVENING  
  allopurinol (ZYLOPRIM) 100 mg tablet TAKE TWO TABLETS BY MOUTH DAILY  lisinopril (PRINIVIL, ZESTRIL) 20 mg tablet TAKE ONE TABLET BY MOUTH DAILY  metoprolol tartrate (LOPRESSOR) 50 mg tablet TAKE ONE TABLET BY MOUTH TWO TIMES A DAY  rivaroxaban (XARELTO) 15 mg tab tablet Take 1 Tab by mouth daily (with dinner).  ipratropium (ATROVENT) 42 mcg (0.06 %) nasal spray 2 Sprays by Both Nostrils route four (4) times daily. As needed runny nose  furosemide (LASIX) 40 mg tablet 1 qam- changed upon lab review 9/5/18 (Patient taking differently: Take 40 mg by mouth two (2) times a day. 2 in am and 1 in pm)  ACCU-CHEK SHAHIDA PLUS TEST STRP strip USE TO CHECK BLOOD SUGAR FOUR TIMES A DAY  insulin NPH (HUMULIN N NPH INSULIN KWIKPEN) 100 unit/mL (3 mL) inpn INJECT 43 UNITS UNDER THE SKIN EVERY MORNING, 23 UNITS UNDER THE SKIN WITH DINNER OR AS DIRECTED BY PHYSICIAN. **THIS REPLACED HUMULOG MIX**  
 acetaminophen (TYLENOL) 325 mg tablet Take 325 mg by mouth every four (4) hours as needed for Pain.  levothyroxine (SYNTHROID) 125 mcg tablet TAKE ONE TABLET BY MOUTH DAILY  ADAN PEN NEEDLE 32 gauge x 5/32\" ndle USE WITH INSULIN PEN TWO TIMES A DAY AS DIRECTED BY PHYSICIAN  
 hydrALAZINE (APRESOLINE) 10 mg tablet Take 2 Tabs by mouth three (3) times daily. (Patient taking differently: Take 20 mg by mouth two (2) times a day.)  potassium chloride SR (KLOR-CON 10) 10 mEq tablet Take 1 Tab by mouth daily.  omeprazole (PRILOSEC) 20 mg capsule Take 20 mg by mouth daily.  dofetilide (TIKOSYN) 125 mcg capsule Take 1 Cap by mouth two (2) times a day. Review of Systems: A detailed 10 organ review of systems is obtained with pertinent positives as listed in the History of Present Illness and Past Medical History. A detailed review of systems was performed as follows: 
Constitutional:  +lighheaded Eyes:  No ocular sensitivity to the sun, blurred vision or double vision. ENMT:  No nose or sinus problems. Respiratory: +SOB, No coughing, wheezing Cardiac:  + hx of Afib Gastrointestinal:  See history of the present illness :   +hx of bladder cancer Musculoskeletal:  No arthritis or hot swollen joints. Endocrine:  No thyroid disease or diabetes Psychiatric: No depression or feeling blue Integumentary:  No skin rash or sensitivity to the sun. Neurologic:  Hx of CVA Heme-Lymphatic:  +anemia Objective:  
 
Physical Exam: 
Visit Vitals /58 Pulse 65 Temp 97.8 °F (36.6 °C) Resp 16 Ht 5' 9\" (1.753 m) Wt 100.1 kg (220 lb 10.9 oz) SpO2 98% BMI 32.59 kg/m² GEN: Elderly WF, NAD Skin:  Extremities and face reveal no rashes. HEENT: Sclerae anicteric. Cardiovascular: Regular rate and rhythm. No murmurs, gallops, or rubs. Respiratory:  Comfortable breathing with no accessory muscle use. Clear breath sounds with no wheezes, rales, or rhonchi. GI:  Abdomen nondistended, soft, and nontender. Normal active bowel sounds. No enlargement of the liver or spleen. No masses palpable. Rectal:  Deferred. See ER MD's note: \"Small clotted bloody material in rectal vault. Blood was collected and sent to the lab for Hemoccult testing. Non-tender exam.\" Musculoskeletal:  No pitting edema of the lower legs. Neurological:  Gross memory appears intact. Patient is alert and oriented. Psychiatric:  Mood appears appropriate with judgement intact. Lab/Data Review: 
Lab Results Component Value Date/Time WBC 5.8 02/19/2019 10:23 AM  
 Hemoglobin (POC) 10.5 (L) 07/06/2010 10:11 PM  
 HGB 10.0 (L) 02/19/2019 02:26 PM  
 Hematocrit (POC) 31 (L) 07/06/2010 10:11 PM  
 HCT 31.3 (L) 02/19/2019 02:26 PM  
 PLATELET 866 (L) 19/82/8772 10:23 AM  
 MCV 91.8 02/19/2019 10:23 AM  
 
Lab Results Component Value Date/Time  Sodium 140 02/19/2019 10:23 AM  
 Potassium 4.4 02/19/2019 10:23 AM  
 Chloride 106 02/19/2019 10:23 AM  
 CO2 28 02/19/2019 10:23 AM  
 Anion gap 6 02/19/2019 10:23 AM  
 Glucose 123 (H) 02/19/2019 10:23 AM  
 BUN 37 (H) 02/19/2019 10:23 AM  
 Creatinine 1.69 (H) 02/19/2019 10:23 AM  
 BUN/Creatinine ratio 22 (H) 02/19/2019 10:23 AM  
 GFR est AA 35 (L) 02/19/2019 10:23 AM  
 GFR est non-AA 29 (L) 02/19/2019 10:23 AM  
 Calcium 9.0 02/19/2019 10:23 AM  
 Bilirubin, total 0.4 02/19/2019 10:23 AM  
 AST (SGOT) 17 02/19/2019 10:23 AM  
 Alk. phosphatase 77 02/19/2019 10:23 AM  
 Protein, total 6.7 02/19/2019 10:23 AM  
 Albumin 3.5 02/19/2019 10:23 AM  
 Globulin 3.2 02/19/2019 10:23 AM  
 A-G Ratio 1.1 02/19/2019 10:23 AM  
 ALT (SGPT) 18 02/19/2019 10:23 AM  
 
Lab Results Component Value Date/Time INR 1.3 (H) 02/19/2019 10:23 AM  
 INR 1.2 (H) 11/21/2018 09:47 PM  
 INR 1.4 (H) 01/23/2017 04:10 AM  
 INR, External 3.9 09/18/2018 INR, External 2.6 09/13/2018 INR, External 1.7 09/10/2018 INR POC 2.6 09/20/2018 03:45 PM  
 Prothrombin time 12.7 (H) 02/19/2019 10:23 AM  
 Prothrombin time 11.9 (H) 11/21/2018 09:47 PM  
 Prothrombin time 14.1 (H) 01/23/2017 04:10 AM  
 
CT Results (most recent): 
Results from Hospital Encounter encounter on 02/19/19 CT ABD PELV WO CONT Narrative EXAM:  CT ABD PELV WO CONT INDICATION:  Diverticulitis COMPARISON: 6/18/2012. CONTRAST:  None. TECHNIQUE:  
Unenhanced multislice helical CT was performed from the diaphragm to the 
symphysis pubis without oral or intravenous contrast administration. Contiguous 5 mm axial images were reconstructed and lung and soft tissue windows were 
generated. Coronal and sagittal reformations were generated. CT dose reduction 
was achieved through use of a standardized protocol tailored for this 
examination and automatic exposure control for dose modulation. FINDINGS: 
LUNG: There is a rounded well-defined 5.2 mm nodule in the right lower lobe 
posterior medially (4-1). Lung bases are otherwise clear. Melodie Kenyon The absence of intravenous contrast material reduces the sensitivity for 
evaluation of the solid parenchymal organs of the abdomen. LIVER: No focal lesion. GALLBLADDER: Absent SPLEEN: No focal lesion. Spleen is at the upper limits of normal in length 
measuring 14.6 cm. This is minimally longer than on the previous examination. PANCREAS: No focal lesion. ADRENALS: No focal lesion. KIDNEYS: No calculus. No hydronephrosis. GI: There is no evidence of bowel obstruction. . No inflammatory process is seen. Specifically there is no evidence of diverticulitis. There are a few isolated 
diverticuli of the distal colon. APPENDIX: Absent AORTA: The aorta tapers without aneurysm. RETROPERITONEUM:  There is no retroperitoneal adenopathy or mass. PERITONEUM: There is no ascites or free intraperitoneal air. BLADDER: Unremarkable PELVIS: There is no pelvic mass or adenopathy. BONES: No sclerotic or lytic lesions noted. Impression IMPRESSION: 
1. No acute abnormality is seen in the abdomen or pelvis within limits of 
unenhanced exam 
2. Borderline splenomegaly. Rusty Guzman Assessment/Plan: Active Problems: 
  GI bleeding (2/19/2019) Overall pt reports an isolated incident or BRB per rectum with associated lower abdominal pain. She has an underlying anemia that is present however hgb appears stable. Considerations include ischemis colitis, bleeding diverticula or hemorrhoids. We will plan for a colonoscopy tomorrow w/o bx or polyp removal given recent Xarelto use. NEVAEH Escoto 
02/19/19

## 2019-02-20 ENCOUNTER — ANESTHESIA EVENT (OUTPATIENT)
Dept: ENDOSCOPY | Age: 84
DRG: 394 | End: 2019-02-20
Payer: MEDICARE

## 2019-02-20 ENCOUNTER — ANESTHESIA (OUTPATIENT)
Dept: ENDOSCOPY | Age: 84
DRG: 394 | End: 2019-02-20
Payer: MEDICARE

## 2019-02-20 LAB
ANION GAP SERPL CALC-SCNC: 9 MMOL/L (ref 5–15)
BUN SERPL-MCNC: 27 MG/DL (ref 6–20)
BUN/CREAT SERPL: 18 (ref 12–20)
CALCIUM SERPL-MCNC: 8.8 MG/DL (ref 8.5–10.1)
CHLORIDE SERPL-SCNC: 107 MMOL/L (ref 97–108)
CO2 SERPL-SCNC: 27 MMOL/L (ref 21–32)
CREAT SERPL-MCNC: 1.48 MG/DL (ref 0.55–1.02)
ERYTHROCYTE [DISTWIDTH] IN BLOOD BY AUTOMATED COUNT: 14.9 % (ref 11.5–14.5)
GLUCOSE BLD STRIP.AUTO-MCNC: 120 MG/DL (ref 65–100)
GLUCOSE BLD STRIP.AUTO-MCNC: 141 MG/DL (ref 65–100)
GLUCOSE BLD STRIP.AUTO-MCNC: 143 MG/DL (ref 65–100)
GLUCOSE BLD STRIP.AUTO-MCNC: 148 MG/DL (ref 65–100)
GLUCOSE SERPL-MCNC: 110 MG/DL (ref 65–100)
HCT VFR BLD AUTO: 32 % (ref 35–47)
HCT VFR BLD AUTO: 33.2 % (ref 35–47)
HGB BLD-MCNC: 10.1 G/DL (ref 11.5–16)
HGB BLD-MCNC: 10.4 G/DL (ref 11.5–16)
MAGNESIUM SERPL-MCNC: 2.3 MG/DL (ref 1.6–2.4)
MCH RBC QN AUTO: 29 PG (ref 26–34)
MCHC RBC AUTO-ENTMCNC: 31.6 G/DL (ref 30–36.5)
MCV RBC AUTO: 92 FL (ref 80–99)
NRBC # BLD: 0 K/UL (ref 0–0.01)
NRBC BLD-RTO: 0 PER 100 WBC
PHOSPHATE SERPL-MCNC: 3.4 MG/DL (ref 2.6–4.7)
PLATELET # BLD AUTO: 79 K/UL (ref 150–400)
POTASSIUM SERPL-SCNC: 3.6 MMOL/L (ref 3.5–5.1)
RBC # BLD AUTO: 3.48 M/UL (ref 3.8–5.2)
SERVICE CMNT-IMP: ABNORMAL
SODIUM SERPL-SCNC: 143 MMOL/L (ref 136–145)
WBC # BLD AUTO: 5 K/UL (ref 3.6–11)

## 2019-02-20 PROCEDURE — 83735 ASSAY OF MAGNESIUM: CPT

## 2019-02-20 PROCEDURE — 74011250637 HC RX REV CODE- 250/637: Performed by: SPECIALIST

## 2019-02-20 PROCEDURE — 0DJD8ZZ INSPECTION OF LOWER INTESTINAL TRACT, VIA NATURAL OR ARTIFICIAL OPENING ENDOSCOPIC: ICD-10-PCS | Performed by: SPECIALIST

## 2019-02-20 PROCEDURE — 76060000031 HC ANESTHESIA FIRST 0.5 HR: Performed by: SPECIALIST

## 2019-02-20 PROCEDURE — 74011250636 HC RX REV CODE- 250/636: Performed by: SPECIALIST

## 2019-02-20 PROCEDURE — 97165 OT EVAL LOW COMPLEX 30 MIN: CPT

## 2019-02-20 PROCEDURE — 74011250636 HC RX REV CODE- 250/636

## 2019-02-20 PROCEDURE — 80048 BASIC METABOLIC PNL TOTAL CA: CPT

## 2019-02-20 PROCEDURE — 74011250637 HC RX REV CODE- 250/637: Performed by: INTERNAL MEDICINE

## 2019-02-20 PROCEDURE — 74011636637 HC RX REV CODE- 636/637: Performed by: HOSPITALIST

## 2019-02-20 PROCEDURE — 84100 ASSAY OF PHOSPHORUS: CPT

## 2019-02-20 PROCEDURE — 76040000019: Performed by: SPECIALIST

## 2019-02-20 PROCEDURE — 85018 HEMOGLOBIN: CPT

## 2019-02-20 PROCEDURE — 85027 COMPLETE CBC AUTOMATED: CPT

## 2019-02-20 PROCEDURE — 82962 GLUCOSE BLOOD TEST: CPT

## 2019-02-20 PROCEDURE — 97161 PT EVAL LOW COMPLEX 20 MIN: CPT | Performed by: PHYSICAL THERAPIST

## 2019-02-20 PROCEDURE — 36415 COLL VENOUS BLD VENIPUNCTURE: CPT

## 2019-02-20 PROCEDURE — 65660000000 HC RM CCU STEPDOWN

## 2019-02-20 PROCEDURE — 74011250637 HC RX REV CODE- 250/637: Performed by: HOSPITALIST

## 2019-02-20 RX ORDER — SODIUM CHLORIDE 9 MG/ML
75 INJECTION, SOLUTION INTRAVENOUS CONTINUOUS
Status: DISPENSED | OUTPATIENT
Start: 2019-02-20 | End: 2019-02-20

## 2019-02-20 RX ORDER — NALOXONE HYDROCHLORIDE 0.4 MG/ML
0.4 INJECTION, SOLUTION INTRAMUSCULAR; INTRAVENOUS; SUBCUTANEOUS
Status: DISCONTINUED | OUTPATIENT
Start: 2019-02-20 | End: 2019-02-20 | Stop reason: HOSPADM

## 2019-02-20 RX ORDER — FENTANYL CITRATE 50 UG/ML
50 INJECTION, SOLUTION INTRAMUSCULAR; INTRAVENOUS
Status: DISCONTINUED | OUTPATIENT
Start: 2019-02-20 | End: 2019-02-20 | Stop reason: HOSPADM

## 2019-02-20 RX ORDER — ATROPINE SULFATE 0.1 MG/ML
0.5 INJECTION INTRAVENOUS
Status: DISCONTINUED | OUTPATIENT
Start: 2019-02-20 | End: 2019-02-20 | Stop reason: HOSPADM

## 2019-02-20 RX ORDER — MIDAZOLAM HYDROCHLORIDE 1 MG/ML
.25-5 INJECTION, SOLUTION INTRAMUSCULAR; INTRAVENOUS
Status: DISCONTINUED | OUTPATIENT
Start: 2019-02-20 | End: 2019-02-20 | Stop reason: HOSPADM

## 2019-02-20 RX ORDER — FLUMAZENIL 0.1 MG/ML
0.2 INJECTION INTRAVENOUS
Status: DISCONTINUED | OUTPATIENT
Start: 2019-02-20 | End: 2019-02-20 | Stop reason: HOSPADM

## 2019-02-20 RX ORDER — SODIUM CHLORIDE 0.9 % (FLUSH) 0.9 %
5-40 SYRINGE (ML) INJECTION EVERY 8 HOURS
Status: DISCONTINUED | OUTPATIENT
Start: 2019-02-20 | End: 2019-02-21 | Stop reason: HOSPADM

## 2019-02-20 RX ORDER — DEXTROMETHORPHAN/PSEUDOEPHED 2.5-7.5/.8
1.2 DROPS ORAL
Status: DISCONTINUED | OUTPATIENT
Start: 2019-02-20 | End: 2019-02-20 | Stop reason: HOSPADM

## 2019-02-20 RX ORDER — LIDOCAINE HYDROCHLORIDE 20 MG/ML
INJECTION, SOLUTION EPIDURAL; INFILTRATION; INTRACAUDAL; PERINEURAL AS NEEDED
Status: DISCONTINUED | OUTPATIENT
Start: 2019-02-20 | End: 2019-02-20 | Stop reason: HOSPADM

## 2019-02-20 RX ORDER — SODIUM CHLORIDE 0.9 % (FLUSH) 0.9 %
5-40 SYRINGE (ML) INJECTION AS NEEDED
Status: DISCONTINUED | OUTPATIENT
Start: 2019-02-20 | End: 2019-02-21 | Stop reason: HOSPADM

## 2019-02-20 RX ORDER — PROPOFOL 10 MG/ML
INJECTION, EMULSION INTRAVENOUS AS NEEDED
Status: DISCONTINUED | OUTPATIENT
Start: 2019-02-20 | End: 2019-02-20 | Stop reason: HOSPADM

## 2019-02-20 RX ORDER — POLYETHYLENE GLYCOL 3350 17 G/17G
17 POWDER, FOR SOLUTION ORAL DAILY
Status: DISCONTINUED | OUTPATIENT
Start: 2019-02-21 | End: 2019-02-21 | Stop reason: HOSPADM

## 2019-02-20 RX ADMIN — PROPOFOL 20 MG: 10 INJECTION, EMULSION INTRAVENOUS at 15:38

## 2019-02-20 RX ADMIN — PROPOFOL 20 MG: 10 INJECTION, EMULSION INTRAVENOUS at 15:35

## 2019-02-20 RX ADMIN — PROPOFOL 20 MG: 10 INJECTION, EMULSION INTRAVENOUS at 15:42

## 2019-02-20 RX ADMIN — ATORVASTATIN CALCIUM 80 MG: 40 TABLET, FILM COATED ORAL at 21:13

## 2019-02-20 RX ADMIN — Medication 10 ML: at 21:14

## 2019-02-20 RX ADMIN — LEVOTHYROXINE SODIUM 125 MCG: 125 TABLET ORAL at 08:41

## 2019-02-20 RX ADMIN — FUROSEMIDE 40 MG: 40 TABLET ORAL at 18:05

## 2019-02-20 RX ADMIN — DOFETILIDE 125 MCG: 0.12 CAPSULE ORAL at 00:20

## 2019-02-20 RX ADMIN — PROPOFOL 20 MG: 10 INJECTION, EMULSION INTRAVENOUS at 15:47

## 2019-02-20 RX ADMIN — PROPOFOL 20 MG: 10 INJECTION, EMULSION INTRAVENOUS at 15:44

## 2019-02-20 RX ADMIN — METOPROLOL TARTRATE 50 MG: 50 TABLET ORAL at 18:05

## 2019-02-20 RX ADMIN — DOFETILIDE 125 MCG: 0.12 CAPSULE ORAL at 08:45

## 2019-02-20 RX ADMIN — HYDRALAZINE HYDROCHLORIDE 20 MG: 10 TABLET, FILM COATED ORAL at 18:05

## 2019-02-20 RX ADMIN — DOFETILIDE 125 MCG: 0.12 CAPSULE ORAL at 21:13

## 2019-02-20 RX ADMIN — PROPOFOL 20 MG: 10 INJECTION, EMULSION INTRAVENOUS at 15:40

## 2019-02-20 RX ADMIN — FUROSEMIDE 40 MG: 40 TABLET ORAL at 08:40

## 2019-02-20 RX ADMIN — ALLOPURINOL 200 MG: 100 TABLET ORAL at 08:41

## 2019-02-20 RX ADMIN — SODIUM CHLORIDE 75 ML/HR: 900 INJECTION, SOLUTION INTRAVENOUS at 14:25

## 2019-02-20 RX ADMIN — LISINOPRIL 20 MG: 20 TABLET ORAL at 08:40

## 2019-02-20 RX ADMIN — PROPOFOL 20 MG: 10 INJECTION, EMULSION INTRAVENOUS at 15:51

## 2019-02-20 RX ADMIN — INSULIN LISPRO 2 UNITS: 100 INJECTION, SOLUTION INTRAVENOUS; SUBCUTANEOUS at 08:46

## 2019-02-20 RX ADMIN — LIDOCAINE HYDROCHLORIDE 40 MG: 20 INJECTION, SOLUTION EPIDURAL; INFILTRATION; INTRACAUDAL; PERINEURAL at 15:33

## 2019-02-20 RX ADMIN — PANTOPRAZOLE SODIUM 40 MG: 40 TABLET, DELAYED RELEASE ORAL at 08:40

## 2019-02-20 RX ADMIN — Medication 10 ML: at 21:13

## 2019-02-20 RX ADMIN — PROPOFOL 20 MG: 10 INJECTION, EMULSION INTRAVENOUS at 15:49

## 2019-02-20 RX ADMIN — HYDRALAZINE HYDROCHLORIDE 20 MG: 10 TABLET, FILM COATED ORAL at 08:40

## 2019-02-20 RX ADMIN — POTASSIUM CHLORIDE 10 MEQ: 750 TABLET, EXTENDED RELEASE ORAL at 08:40

## 2019-02-20 RX ADMIN — Medication 10 ML: at 05:00

## 2019-02-20 RX ADMIN — INSULIN LISPRO 2 UNITS: 100 INJECTION, SOLUTION INTRAVENOUS; SUBCUTANEOUS at 12:53

## 2019-02-20 RX ADMIN — SIMETHICONE 80 MG: 20 SUSPENSION/ DROPS ORAL at 15:48

## 2019-02-20 RX ADMIN — METOPROLOL TARTRATE 50 MG: 50 TABLET ORAL at 08:40

## 2019-02-20 RX ADMIN — PROPOFOL 30 MG: 10 INJECTION, EMULSION INTRAVENOUS at 15:33

## 2019-02-20 RX ADMIN — PROPOFOL 10 MG: 10 INJECTION, EMULSION INTRAVENOUS at 15:53

## 2019-02-20 NOTE — PROGRESS NOTES
F/U for rectal bleeding S: Ms. Lore Chou was seen by me today during rounds. At this time, she is resting + comfortably. The patient has no new complaints today. Please see admission consult for details of ROS; there are no changes today. No bleeding. Tolerated prep. No abdominal pain. O: Blood pressure 120/52, pulse 64, temperature 97.6 °F (36.4 °C), resp. rate 13, height 5' 9\" (1.753 m), weight 100.1 kg (220 lb 10.9 oz), SpO2 100 %. Gen: Patient is in no acute distress. There is no jaundice. Lungs: Clear to auscultation bilaterally . Heart:+RRR. Abd: Soft, non tender, non-distended, bowel sounds present. Extremities: Warm. Cross sectional imaging:  None Lab Results Component Value Date/Time WBC 5.0 02/20/2019 03:50 AM  
 Hemoglobin (POC) 10.5 (L) 07/06/2010 10:11 PM  
 HGB 10.1 (L) 02/20/2019 03:50 AM  
 Hematocrit (POC) 31 (L) 07/06/2010 10:11 PM  
 HCT 32.0 (L) 02/20/2019 03:50 AM  
 PLATELET 79 (L) 41/66/8236 03:50 AM  
 MCV 92.0 02/20/2019 03:50 AM  
 
Lab Results Component Value Date/Time Sodium 143 02/20/2019 03:50 AM  
 Potassium 3.6 02/20/2019 03:50 AM  
 Chloride 107 02/20/2019 03:50 AM  
 CO2 27 02/20/2019 03:50 AM  
 Anion gap 9 02/20/2019 03:50 AM  
 Glucose 110 (H) 02/20/2019 03:50 AM  
 BUN 27 (H) 02/20/2019 03:50 AM  
 Creatinine 1.48 (H) 02/20/2019 03:50 AM  
 BUN/Creatinine ratio 18 02/20/2019 03:50 AM  
 GFR est AA 41 (L) 02/20/2019 03:50 AM  
 GFR est non-AA 34 (L) 02/20/2019 03:50 AM  
 Calcium 8.8 02/20/2019 03:50 AM  
 Bilirubin, total 0.4 02/19/2019 10:23 AM  
 AST (SGOT) 17 02/19/2019 10:23 AM  
 Alk. phosphatase 77 02/19/2019 10:23 AM  
 Protein, total 6.7 02/19/2019 10:23 AM  
 Albumin 3.5 02/19/2019 10:23 AM  
 Globulin 3.2 02/19/2019 10:23 AM  
 A-G Ratio 1.1 02/19/2019 10:23 AM  
 ALT (SGPT) 18 02/19/2019 10:23 AM  
 
 
 
A: Active Problems: 
  GI bleeding (2/19/2019) Comment:  I reviewed colonosocpy with her and with her daughter. I think this is anal bleeding from rectal mucosal prolapse P:  miralax Dc plans per hospital medicine Cornell Tirado MD 
8:29 PM 
2/20/2019

## 2019-02-20 NOTE — DIABETES MGMT
DTC Progress Note Recommendations/ Comments: If appropriate, please consider: 
1) decreasing her correctional insulin scale to high sensitivity. 2) starting Lantus once BG fasting >180 mg/dl and consider starting 70% of home dose 40 units. Current hospital DM medication: Lispro normal sensitivity ac and hs. D5% at 50 ml/hour. Chart reviewed on Lea Miller. Due to hypoglycemia on admission - pt is currently using 66 units/24 hours of NPH which given her renal status (CrCl 37.6 ml/min)  Increasing the peak and action of the insulin. Peakless basal insulin would be the preferred medication during her admission. Patient is a 82 y.o. female with known Type 2 DM on NPH 43 units am and 23 units pm at home. A1c:  
Lab Results Component Value Date/Time Hemoglobin A1c 6.7 (H) 09/04/2018 11:35 AM  
 Hemoglobin A1c 6.7 (H) 05/01/2018 11:59 AM  
 
 
Recent Glucose Results:  
Lab Results Component Value Date/Time  (H) 02/20/2019 03:50 AM  
 GLUCPOC 143 (H) 02/20/2019 07:47 AM  
 GLUCPOC 147 (H) 02/19/2019 09:09 PM  
 GLUCPOC 196 (H) 02/19/2019 06:28 PM  
  
 
Lab Results Component Value Date/Time Creatinine 1.48 (H) 02/20/2019 03:50 AM  
 
Estimated Creatinine Clearance: 37.6 mL/min (A) (based on SCr of 1.48 mg/dL (H)). Active Orders Diet DIET NPO  
  
PO intake: No data found. Will continue to follow as needed. Thank you Merrill Gauthier RD CDE [facility] Time spent: 5 minutes

## 2019-02-20 NOTE — PROGRESS NOTES
TRANSFER - OUT REPORT: 
 
Verbal report given to [facility] RN (name) on Huy Benavides  being transferred to [facility] room 2118(unit) for routine post - op Report consisted of patients Situation, Background, Assessment and  
Recommendations(SBAR). Information from the following report(s) SBAR, Kardex, Intake/Output, Recent Results and Cardiac Rhythm NSR was reviewed with the receiving nurse. Lines:  
Peripheral IV 02/19/19 Right Antecubital (Active) Site Assessment Clean, dry, & intact 2/20/2019  1:46 AM  
Phlebitis Assessment 0 2/20/2019  8:41 AM  
Infiltration Assessment 0 2/20/2019  8:41 AM  
Dressing Status Clean, dry, & intact 2/20/2019  8:41 AM  
Dressing Type Transparent;Tape 2/20/2019  8:41 AM  
Hub Color/Line Status Pink; Infusing 2/20/2019  8:41 AM  
  
 
Opportunity for questions and clarification was provided.

## 2019-02-20 NOTE — PROGRESS NOTES
Orders received, chart reviewed and patient evaluated by occupational therapy. Recommend patient to discharge to home. Uses a cane prn baseline secondary to her knees. Does not have any further acute OT needs. Recommend with nursing patient to complete as able in order to maintain strength, endurance and independence: OOB to chair 3x/day, ADLs with supervision/setup and mobilizing to the bathroom for toileting with stand by assist. Thank you for your assistance. Full evaluation to follow.

## 2019-02-20 NOTE — ROUTINE PROCESS
Huy Benavides 1935 
[id number] Situation: 
Verbal report received from: PEEWEE Khan RN Procedure: Procedure(s): 
COLONOSCOPY Background: 
 
Preoperative diagnosis: Hematochezia [K92.1] Postoperative diagnosis: Colon: external hemorrhoids, rectal muscosal prolapse, Distal rectal erythremia :  Dr. Mirza Has Assistant(s): Endoscopy Technician-1: Litzy Pike Endoscopy RN-1: Louis Khan RN Specimens: * No specimens in log * H. Pylori  no Assessment: 
Intra-procedure medications Anesthesia gave intra-procedure sedation and medications, see anesthesia flow sheet Intravenous fluids: NS@ Brissa Toledo Vital signs stable Abdominal assessment: round and soft Recommendation: 
 
Return to floor 2118 Family or Friend Permission to share finding with family or friend

## 2019-02-20 NOTE — PROGRESS NOTES
Hospitalist Progress Note NAME: Karma Malagon :  1935 MRN:  [id number] Assessment / Plan: 
Rectal bleeding : Bleeding had subsided, due for Colonoscopy today, so far no need for transfusion, HH stable. GI help appreciated. IDDM type II with hypoglycemia : c/w SSI, check HbA1C. Continue to hold NPH, will monitor BG once she starts eating again after colonoscopy Chronic afib, chronic diastolic CHF and benign HTN 
-BP/HR stable/ no signs of fluid overload  
-con't tikosyn, lasix, metoprolol, hydralazine CKD stage III: seems around baseline, monitor H/o dysphagia due to Schatzki's ring with h/o esophageal stricture Hypothyroidism: home synthroid Gout: home allopurinol Hyperlipidemia: cont statin Obesity. Body mass index is 32.59 kg/m². Surrogate Decision Maker: dtr and son Baseline: lives alone; she has 2 children twins ( dtr and son), son lives out of state. Pt has walker/cane at home but generally ambulates without it. Code status: Full Prophylaxis: SCD's Recommended Disposition: Home w/Family D/c plan for tomorrow if stable Subjective: Chief Complaint / Reason for Physician Visit \"I feel better\". Discussed with RN events overnight. Review of Systems: 
Symptom Y/N Comments  Symptom Y/N Comments Fever/Chills    Chest Pain Poor Appetite    Edema Cough    Abdominal Pain Sputum    Joint Pain SOB/LEIJA    Pruritis/Rash Nausea/vomit    Tolerating PT/OT Diarrhea    Tolerating Diet Constipation    Other y Rectal bleeding Could NOT obtain due to:   
 
Objective: VITALS:  
Last 24hrs VS reviewed since prior progress note. Most recent are: 
Patient Vitals for the past 24 hrs: 
 Temp Pulse Resp BP SpO2  
19 0418 97.5 °F (36.4 °C) 60 18 126/46 96 % 19 2334 97.7 °F (36.5 °C) 68 17 115/78 100 % 19 1900 97.7 °F (36.5 °C) 62 18 154/75 99 % 19 1745    134/43 98 % 19 1700    133/51 98 % 02/19/19 1615    134/58 99 % 02/19/19 1530    128/49 97 % 02/19/19 1445    139/73 99 % 02/19/19 1400    132/50 100 % 02/19/19 1315    122/52 97 % 02/19/19 1115    140/58 98 % 02/19/19 1109    148/61 97 % 02/19/19 0925 97.8 °F (36.6 °C) 65 16 140/62 97 % Intake/Output Summary (Last 24 hours) at 2/20/2019 9242 Last data filed at 2/20/2019 6000 Gross per 24 hour Intake 417.5 ml Output 0 ml Net 417.5 ml PHYSICAL EXAM: 
General: WD, WN. Alert, cooperative, no acute distress   
EENT:  EOMI. Anicteric sclerae. MMM Resp:  CTA bilaterally, no wheezing or rales. No accessory muscle use CV:  Regular  rhythm,  No edema GI:  Soft, Non distended, Non tender.  +Bowel sounds Neurologic:  Alert and oriented X 3, normal speech, Psych:   Good insight. Not anxious nor agitated Skin:  No rashes. No jaundice Reviewed most current lab test results and cultures  YES Reviewed most current radiology test results   YES Review and summation of old records today    NO Reviewed patient's current orders and MAR    YES 
PMH/SH reviewed - no change compared to H&P 
________________________________________________________________________ Care Plan discussed with: 
  Comments Patient y Family RN y   
Care Manager Consultant Multidiciplinary team rounds were held today with , nursing, pharmacist and clinical coordinator. Patient's plan of care was discussed; medications were reviewed and discharge planning was addressed. ________________________________________________________________________ Total NON critical care TIME:  35   Minutes Total CRITICAL CARE TIME Spent:   Minutes non procedure based Comments >50% of visit spent in counseling and coordination of care y   
________________________________________________________________________ Carmine Graham MD  
 
 Procedures: see electronic medical records for all procedures/Xrays and details which were not copied into this note but were reviewed prior to creation of Plan. LABS: 
I reviewed today's most current labs and imaging studies. Pertinent labs include: 
Recent Labs  
  02/20/19 
0350 02/20/19 
0028 02/19/19 
1426 02/19/19 
1023 WBC 5.0  --   --  5.8 HGB 10.1* 10.4* 10.0* 10.5* HCT 32.0* 33.2* 31.3* 32.6* PLT 79*  --   --  139* Recent Labs  
  02/20/19 
0350 02/19/19 
1023  140  
K 3.6 4.4  
 106 CO2 27 28 * 123* BUN 27* 37* CREA 1.48* 1.69* CA 8.8 9.0 MG 2.3  --   
PHOS 3.4  --   
ALB  --  3.5 TBILI  --  0.4 SGOT  --  17 ALT  --  18 INR  --  1.3* Signed: Dann Parish MD

## 2019-02-20 NOTE — PROCEDURES
Colonoscopy Indications: rectal bleeding Pre-operative Diagnosis: rectal bleeding Medications:  See anesthesia form Post-operative Diagnosis:  Colon: external hemorrhoids, rectal muscosal prolapse, Distal rectal erythremia Procedure Details Prior to the procedure its objectives, risks, consequences and alternatives were discussed with the patient who then elected to proceed. All questions were answered. Digital Rectal Exam:  was normal  
 
The Olympus videocolonoscope was inserted in the rectum and advanced to the cecum. The cecum was identified by typical landmarks. The colonoscope was slowly and carefully withdrawn as the mucosa was inspected. No colon abnormalities were noted. Retroflexion in the rectum was remarkable for distal rectal erythema c/w prolapse. Melodie Alleghany Photos to document the ileocecal valve, appendiceal orifice and retroflexion exam were obtained. The preparation was adequate Estimated Blood Loss:  none Specimens:  none Findings:  Distal rectal erythema c/w prolapse. No definite bleeding site seen. Complications:  none Repeat colonoscopy is recommended in:  As symptoms warrant. Eufemia Strange MD 
3:58 PM 
2/20/2019

## 2019-02-20 NOTE — PROGRESS NOTES
General Surgery End of Shift Nursing Note Bedside shift change report given to Annalise SOARES (oncoming nurse) by Rhea Wolff (offgoing nurse). Report included the following information SBAR, Kardex, Intake/Output and MAR. Shift worked:   7pm to [employer] of shift:    Pt arrived to unit from ED. Bowel prep started last night. NPO since midnight. hgb of 10.4 and hct of 33. Colonoscopy today. Issues for physician to address:   none Number times ambulated in hallway past shift: 0 Number of times OOB to chair past shift: 0 
 
 
 
GI: 
 
Current diet:  DIET NPO Tolerating current diet: YES Passing flatus: YES Last Bowel Movement: today Appearance: watery Patient Safety: 
 
Falls Score: 1 Bed Alarm On? No 
Sitter?  No 
 
Urban Wood RN

## 2019-02-20 NOTE — PROGRESS NOTES
Gi  
See op nte Rec:  miralax daily Ok to eat No routine need for future colonoscopy Raoul Petersen MD 
3:58 PM  
2/20/2019

## 2019-02-20 NOTE — H&P
Pre-endoscopy H and P The patient was seen and examined in the endoscopy suite. The airway was assessed and docuemented. The problem list, past medical history, and medications were reviewed. Patient Active Problem List  
Diagnosis Code  T. I.A.  PVD (peripheral vascular disease) ([facility]) I73.9  Reflux esophagitis K21.0  Benign neoplasm of colon D12.6  Iron deficiency anemia D50.9  Hypomagnesemia E83.42  Long term current use of anticoagulant therapy Z79.01  
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  
 Advance directive in chart Z78.9  Type 2 diabetes, controlled, with renal manifestation ([facility]) E11.29  
 Chronic atrial fibrillation ([facility]) I48.2  Mixed hyperlipidemia E78.2  Atherosclerosis of native coronary artery without angina pectoris I25.10  Hypothyroidism, acquired, autoimmune E06.3  Heart valve problem I38  
 Mitral regurgitation I34.0  
 S/P MVR (mitral valve repair) [id number]  Active advance directive on file Z78.9  Non-rheumatic mitral regurgitation I34.0  Heart failure ([facility]) I50.9  Bilateral leg edema R60.0  Esophageal stricture K22.2  Dysphagia R13.10  Cellulitis of right lower leg L03.115  
 GI bleeding K92.2 Social History Socioeconomic History  Marital status:  Spouse name: Not on file  Number of children: Not on file  Years of education: Not on file  Highest education level: Not on file Social Needs  Financial resource strain: Not on file  Food insecurity - worry: Not on file  Food insecurity - inability: Not on file  Transportation needs - medical: Not on file  Transportation needs - non-medical: Not on file Occupational History  Not on file Tobacco Use  
  Smoking status: Former Smoker Packs/day: 0.50 Years: 10.00 Pack years: 5.00 Types: Cigarettes Last attempt to quit: 1967 Years since quittin.1  Smokeless tobacco: Never Used Substance and Sexual Activity  Alcohol use: No  
  Alcohol/week: 0.0 oz  Drug use: No  
 Sexual activity: Not on file Other Topics Concern  Not on file Social History Narrative  Not on file Past Medical History:  
Diagnosis Date  Atrial fibrillation ([facility] 10/29/2009  Bladder cancer ([facility]  Coagulation disorder ([facility] Chronic prophylactic anticoagulation med  Colon polyps  Diabetes ([facility]  GERD (gastroesophageal reflux disease)  Heart valve problem   
 leaking heart valve  Hypercholesterolemia  Hypertension  Hypothyroidism  Hypothyroidism 2009  Hypothyroidism, acquired, autoimmune 2015  Overweight and obesity  PUD (peptic ulcer disease)  S/P ablation of atrial flutter[V45.89HM]   
 @ [facility] >> atrial fibrillation  T. I.A. 2009  TIA (transient ischemic attack)  Ulcer of right lower extremity, limited to breakdown of skin ([facility] 2018 The patient has a family history of na Prior to Admission Medications Prescriptions Last Dose Informant Patient Reported? Taking? ACCU-CHEK SHAHIDA PLUS TEST STRP strip   No No  
Sig: USE TO CHECK BLOOD SUGAR FOUR TIMES A DAY  
ADAN PEN NEEDLE 32 gauge x \" ndle   No No  
Sig: USE WITH INSULIN PEN TWO TIMES A DAY AS DIRECTED BY PHYSICIAN  
acetaminophen (TYLENOL) 325 mg tablet   Yes No  
Sig: Take 325 mg by mouth every four (4) hours as needed for Pain. allopurinol (ZYLOPRIM) 100 mg tablet   No No  
Sig: TAKE TWO TABLETS BY MOUTH DAILY  
atorvastatin (LIPITOR) 80 mg tablet   No No  
Sig: TAKE ONE TABLET BY MOUTH EVERY EVENING  
dofetilide (TIKOSYN) 125 mcg capsule   No No  
Sig: Take 1 Cap by mouth two (2) times a day. fluocinoNIDE (LIDEX) 0.05 % topical cream   No No  
Sig: Apply  to affected area two (2) times a day for 14 days. Then as needed for rash  
furosemide (LASIX) 40 mg tablet   No No  
Si qam- changed upon lab review 18 Patient taking differently: Take 40 mg by mouth two (2) times a day. 2 in am and 1 in pm  
hydrALAZINE (APRESOLINE) 10 mg tablet   No No  
Sig: Take 2 Tabs by mouth three (3) times daily. Patient taking differently: Take 20 mg by mouth two (2) times a day. insulin NPH (HUMULIN N NPH INSULIN KWIKPEN) 100 unit/mL (3 mL) inpn   No No  
Sig: INJECT 43 UNITS UNDER THE SKIN EVERY MORNING, 23 UNITS UNDER THE SKIN WITH DINNER OR AS DIRECTED BY PHYSICIAN. **THIS REPLACED HUMULOG MIX**  
ipratropium (ATROVENT) 42 mcg (0.06 %) nasal spray   No No  
Si Sprays by Both Nostrils route four (4) times daily. As needed runny nose  
levothyroxine (SYNTHROID) 125 mcg tablet   No No  
Sig: TAKE ONE TABLET BY MOUTH DAILY  
lisinopril (PRINIVIL, ZESTRIL) 20 mg tablet   No No  
Sig: TAKE ONE TABLET BY MOUTH DAILY  
metoprolol tartrate (LOPRESSOR) 50 mg tablet   No No  
Sig: TAKE ONE TABLET BY MOUTH TWO TIMES A DAY  
omeprazole (PRILOSEC) 20 mg capsule   Yes No  
Sig: Take 20 mg by mouth daily. potassium chloride SR (KLOR-CON 10) 10 mEq tablet   No No  
Sig: Take 1 Tab by mouth daily. rivaroxaban (XARELTO) 15 mg tab tablet   No No  
Sig: Take 1 Tab by mouth daily (with dinner). Facility-Administered Medications: None The review of systems is:  + for shortness of breath and negtive for chest pain The heart, lungs, and mental status were satisfactory for the administration of anesthesia sedation and for the procedure. I discussed with the patient the objectives, risks, consequences and alternatives to the procedure.    
 
Drew Kilgore MD 
2019 
3:30 PM

## 2019-02-20 NOTE — PROGRESS NOTES
Occupational Therapy EVALUATION/discharge Patient: Bonny Comer (53 y.o. female) Date: 2/20/2019 Primary Diagnosis: GI bleeding [K92.2] Procedure(s) (LRB): 
COLONOSCOPY (N/A) Precautions:  Fall ASSESSMENT:  
Based on the objective data described below, the patient presents with ADL at supervision to SBA level. Reports uses SPC due to bilateral knee pain so provided CGA/HHA for mobility. [place]oes ability to perform ADLS and transfer on and off toilet without physical assistance. Presentation would improve with use of cane or RW which she has at home. Discussed with patient who has no OT related concerns. Nurse also present. Further skilled acute occupational therapy is not indicated at this time. Discharge Recommendations: None Further Equipment Recommendations for Discharge: has DME at home SUBJECTIVE:  
Patient stated I' have a little bit of a hard time gettign around due to my knees so I use a cane when needed.  OBJECTIVE DATA SUMMARY:  
HISTORY:  
Past Medical History:  
Diagnosis Date  Atrial fibrillation ([address] 10/29/2009  Bladder cancer ([address]  Coagulation disorder ([address] Chronic prophylactic anticoagulation med  Colon polyps  Diabetes ([address]  GERD (gastroesophageal reflux disease)  Heart valve problem   
 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  
 @ [facility] >> atrial fibrillation  T. I.A. 4/23/2009  TIA (transient ischemic attack)  Ulcer of right lower extremity, limited to breakdown of skin ([address] 7/5/2018 Past Surgical History:  
Procedure Laterality Date  CARDIAC SURG PROCEDURE UNLIST    
 ablation  HX APPENDECTOMY  HX CHOLECYSTECTOMY  HX HYSTERECTOMY  HX KNEE ARTHROSCOPY  [id number]  
 right knee  HX ORTHOPAEDIC    
 HX UROLOGICAL RENAL STENT, tur-b  VASCULAR SURGERY PROCEDURE UNLIST  11/4  
 removed vein in right leg Prior Level of Function/Environment/Context: Reports lives alone independent and drives and does her ADLS/IADLS Expanded or extensive additional review of patient history:  
 
Home Situation Home Environment: Private residence # Steps to Enter: 0 One/Two Story Residence: One story Living Alone: Yes Support Systems: Family member(s) Current DME Used/Available at Home: Cane, straight, Raised toilet seat Hand dominance: Right EXAMINATION OF PERFORMANCE DEFICITS: 
Cognitive/Behavioral Status: 
Neurologic State: Alert Skin: intact Edema: none Hearing: Auditory Auditory Impairment: None Vision/Perceptual:   
 
Acuity: Within Defined Limits Range of Motion: 
 
AROM: Generally decreased, functional(Knee pain/arthritis) PROM: Within functional limits Strength: 
 
Strength: Generally decreased, functional 
  
  
  
  
Coordination: 
Coordination: Within functional limits Fine Motor Skills-Upper: Left Intact; Right Intact Gross Motor Skills-Upper: Left Intact; Right Intact Tone & Sensation: 
 
Tone: Normal 
Sensation: Intact Balance: 
Sitting: Intact Standing: Impaired; Without support Standing - Static: Good Standing - Dynamic : Fair Functional Mobility and Transfers for ADLs:Bed Mobility: 
Rolling: Modified independent Supine to Sit: Modified independent Scooting: Modified independent Transfers: 
Sit to Stand: Supervision Stand to Sit: Supervision Bed to Chair: Stand-by assistance Bathroom Mobility: Contact guard assistance Toilet Transfer : Stand-by assistance ADL Assessment: 
Feeding: Independent Oral Facial Hygiene/Grooming: Independent Bathing: Stand-by assistance Upper Body Dressing: Independent Lower Body Dressing: Stand-by assistance Toileting: Stand by assistance ADL Intervention and task modifications: 
 
Functional Measure: 
Barthel Index: 
 
Bathin Bladder: 10 Bowels: 10 
Groomin Dressing: 10 Feeding: 10 Mobility: 0 Stairs: 0 Toilet Use: 10 Transfer (Bed to Chair and Back): 10 Total: 65 The Barthel ADL Index: Guidelines 1. The index should be used as a record of what a patient does, not as a record of what a patient could do. 2. The main aim is to establish degree of independence from any help, physical or verbal, however minor and for whatever reason. 3. The need for supervision renders the patient not independent. 4. A patient's performance should be established using the best available evidence. Asking the patient, friends/relatives and nurses are the usual sources, but direct observation and common sense are also important. However direct testing is not needed. 5. Usually the patient's performance over the preceding 24-48 hours is important, but occasionally longer periods will be relevant. 6. Middle categories imply that the patient supplies over 50 per cent of the effort. 7. Use of aids to be independent is allowed. rEin Snyder., Barthel, D.W. (0574). Functional evaluation: the Barthel Index. [facility] (14)2. Cristiano Gomez osei LENORA NixonJSHYF, Juan Francisco Santiago., Deandre Aldrich., [place], [address] [place] [address] Ave (). Measuring the change indisability after inpatient rehabilitation; comparison of the responsiveness of the Barthel Index and Functional [place] Measure. Journal of Neurology, Neurosurgery, and Psychiatry, 66(4), 655-519. Ava Matthews, N.J.PEEWEE, RASHAD Wright, & Charleen Crump MJULIET. (2004.) Assessment of post-stroke quality of life in cost-effectiveness studies: The usefulness of the Barthel Index and the EuroQoL-5D. [facility], 13, 905-04 Occupational Therapy Evaluation Charge Determination History Examination Decision-Making LOW Complexity : Brief history review  LOW Complexity : 1-3 performance deficits relating to physical, cognitive , or psychosocial skils that result in activity limitations and / or participation restrictions  LOW Complexity : No comorbidities that affect functional and no verbal or physical assistance needed to complete eval tasks Based on the above components, the patient evaluation is determined to be of the following complexity level: LOW Pain: 
Knee pain when walking Activity Tolerance:  
good Please refer to the flowsheet for vital signs taken during this treatment. After treatment:  
[x]  Patient left in no apparent distress sitting up in chair 
[]  Patient left in no apparent distress in bed 
[x]  Call bell left within reach [x]  Nursing notified 
[]  Caregiver present 
[]  Bed alarm activated COMMUNICATION/EDUCATION:  
Communication/Collaboration: 
[x]      Home safety education was provided and the patient/caregiver indicated understanding. [x]      Patient/family have participated as able and agree with findings and recommendations. []      Patient is unable to participate in plan of care at this time. Findings and recommendations were discussed with: Registered Nurse Tony Plater Time Calculation: 16 mins

## 2019-02-20 NOTE — PROGRESS NOTES
Problem: Mobility Impaired (Adult and Pediatric) Goal: *Acute Goals and Plan of Care (Insert Text) physical Therapy EVALUATION/DISCHARGE Patient: Pawan Saez (48 y.o. female) Date: 2/20/2019 Primary Diagnosis: GI bleeding [K92.2] Procedure(s) (LRB): 
COLONOSCOPY (N/A) Precautions:  Fall ASSESSMENT : 
Based on the objective data described below, the patient presents with slightly decreased strength, activity tolerance, primarily due to arthritis in knees. Patient up in chair upon arrival and agreeable to PT. Sit to stand with supervision. Patient ambulated 120' with gait belt with pushing IV pole. Patient with slightly decreased pace but is steady with no LOB. Patient has some knee pain while ambulating but declines assistive device. Patient returned to chair after ambulating. Prior to admission patient lived alone in one story home and was independent with all mobility. No steps to enter home. Patient's daughter lives close by and would be able to assist patient as needed. No further PT recommended at this time as patient is independent and at baseline. Further skilled acute physical therapy is not indicated at this time. PLAN : 
Discharge Recommendations: None Further Equipment Recommendations for Discharge: none SUBJECTIVE:  
Patient stated I can walk.  OBJECTIVE DATA SUMMARY:  
HISTORY:   
Past Medical History:  
Diagnosis Date  Atrial fibrillation ([facility] 10/29/2009  Bladder cancer ([facility]  Coagulation disorder ([facility] Chronic prophylactic anticoagulation med  Colon polyps  Diabetes ([facility]  GERD (gastroesophageal reflux disease)  Heart valve problem   
 leaking heart valve  Hypercholesterolemia  Hypertension  Hypothyroidism  Hypothyroidism 4/23/2009  Hypothyroidism, acquired, autoimmune 11/23/2015  Overweight and obesity  PUD (peptic ulcer disease)  S/P ablation of atrial flutter[V45.89HM] 2009 @ [facility] >> atrial fibrillation  T. I.A. 4/23/2009  TIA (transient ischemic attack)  Ulcer of right lower extremity, limited to breakdown of skin ([address] 7/5/2018 Past Surgical History:  
Procedure Laterality Date  CARDIAC SURG PROCEDURE UNLIST    
 ablation  HX APPENDECTOMY  HX CHOLECYSTECTOMY  HX HYSTERECTOMY  HX KNEE ARTHROSCOPY  [id number]  
 right knee  HX ORTHOPAEDIC    
 HX UROLOGICAL RENAL STENT, tur-b  VASCULAR SURGERY PROCEDURE UNLIST  11/4  
 removed vein in right leg Prior Level of Function/Home Situation: Lives alone and independent with all mobility. Personal factors and/or comorbidities impacting plan of care: None Home Situation Home Environment: Private residence # Steps to Enter: 0 One/Two Story Residence: One story Living Alone: Yes Support Systems: Family member(s) Patient Expects to be Discharged to[de-identified] Private residence Current DME Used/Available at Home: Cane, straight, Raised toilet seat EXAMINATION/PRESENTATION/DECISION MAKING:  
Critical Behavior: 
Neurologic State: Alert Hearing: Auditory Auditory Impairment: NoneSkin:  intact Edema: none noted Range Of Motion: 
AROM: Generally decreased, functional 
  
  
  
PROM: Within functional limits Strength:   
Strength: Generally decreased, functional 
  
  
  
  
  
  
Tone & Sensation:  
Tone: Normal 
  
  
  
  
Sensation: Intact Coordination: 
Coordination: Within functional limits Vision:  
Acuity: Within Defined Limits Functional Mobility: 
Bed Mobility: 
Rolling: Modified independent Supine to Sit: Modified independent Scooting: Modified independent Transfers: 
Sit to Stand: Supervision Stand to Sit: Supervision Bed to Chair: Stand-by assistance Balance:  
Sitting: Intact Standing: Intact; With support Standing - Static: Good;Constant support Standing - Dynamic : GoodAmbulation/Gait Training: Distance (ft): 120 Feet (ft) Assistive Device: Gait belt Ambulation - Level of Assistance: Stand-by assistance Gait Abnormalities: Decreased step clearance Speed/Melany: Pace decreased (<100 feet/min) Step Length: Left shortened;Right shortened Functional Measure: 
Barthel Index: 
 
Bathin Bladder: 10 Bowels: 10 
Groomin Dressing: 10 Feeding: 10 Mobility: 0 Stairs: 0 Toilet Use: 10 Transfer (Bed to Chair and Back): 10 Total: 65 The Barthel ADL Index: Guidelines 1. The index should be used as a record of what a patient does, not as a record of what a patient could do. 2. The main aim is to establish degree of independence from any help, physical or verbal, however minor and for whatever reason. 3. The need for supervision renders the patient not independent. 4. A patient's performance should be established using the best available evidence. Asking the patient, friends/relatives and nurses are the usual sources, but direct observation and common sense are also important. However direct testing is not needed. 5. Usually the patient's performance over the preceding 24-48 hours is important, but occasionally longer periods will be relevant. 6. Middle categories imply that the patient supplies over 50 per cent of the effort. 7. Use of aids to be independent is allowed. Tiera Schwarz., Barthel, D.W. ([id number]). Functional evaluation: the Barthel Index. [facility] (14)2. [facility] SENTHIL NixonF, Marifer Monson., Bhaskar Maria., [place], [address] [place] [address] (). Measuring the change indisability after inpatient rehabilitation; comparison of the responsiveness of the Barthel Index and Functional [place] Measure. Journal of Neurology, Neurosurgery, and Psychiatry, 66(4), 367-616.  
Jey Cohn, N.J.A, Lexi Winchester,  W.J.M, & Janae Shoemaker M.A. (2004.) Assessment of post-stroke quality of life in cost-effectiveness studies: The usefulness of the Barthel Index and the EuroQoL-5D. [facility], 13, 197-58 Physical Therapy Evaluation Charge Determination History Examination Presentation Decision-Making MEDIUM  Complexity : 1-2 comorbidities / personal factors will impact the outcome/ POC  LOW Complexity : 1-2 Standardized tests and measures addressing body structure, function, activity limitation and / or participation in recreation  LOW Complexity : Stable, uncomplicated  LOW Complexity : FOTO score of  Based on the above components, the patient evaluation is determined to be of the following complexity level: LOW Pain: 
  
  
  
Activity Tolerance:  
good Please refer to the flowsheet for vital signs taken during this treatment. After treatment:  
[x]   Patient left in no apparent distress sitting up in chair 
[]   Patient left in no apparent distress in bed 
[x]   Call bell left within reach [x]   Nursing notified 
[]   Caregiver present 
[]   Bed alarm activated COMMUNICATION/EDUCATION:  
Communication/Collaboration: 
[x]   Fall prevention education was provided and the patient/caregiver indicated understanding. [x]   Patient/family have participated as able and agree with findings and recommendations. []   Patient is unable to participate in plan of care at this time. Findings and recommendations were discussed with: Occupational Therapist and Registered Nurse Thank you for this referral. 
Fatou Longoria, PT Time Calculation: 8 mins

## 2019-02-20 NOTE — ROUTINE PROCESS
Patient transported to floor by stretcher in stable condition by transport staff Patient had gingerale and has no symptoms of low blood sugar per patient

## 2019-02-20 NOTE — PROGRESS NOTES
Anesthesia reports 200mg Propofol, 40mg Lidocaine and 300mL NS given during procedure. Received report from anesthesia staff on vital signs and status of patient. Endoscope was pre-cleaned at the bedside immediately following procedure by Callum

## 2019-02-20 NOTE — PERIOP NOTES
TRANSFER - IN REPORT: 
 
Verbal report received from Annalise SOARES(name) on [place]  being received from Rm 2118(unit) for ordered procedure Report consisted of patients Situation, Background, Assessment and  
Recommendations(SBAR). Information from the following report(s) SBAR was reviewed with the receiving nurse. Opportunity for questions and clarification was provided. Assessment completed upon patients arrival to unit and care assumed.

## 2019-02-21 ENCOUNTER — HOME HEALTH ADMISSION (OUTPATIENT)
Dept: HOME HEALTH SERVICES | Facility: HOME HEALTH | Age: 84
End: 2019-02-21
Payer: MEDICARE

## 2019-02-21 VITALS
BODY MASS INDEX: 32.69 KG/M2 | TEMPERATURE: 98.5 F | OXYGEN SATURATION: 97 % | RESPIRATION RATE: 17 BRPM | WEIGHT: 220.68 LBS | HEART RATE: 66 BPM | DIASTOLIC BLOOD PRESSURE: 62 MMHG | HEIGHT: 69 IN | SYSTOLIC BLOOD PRESSURE: 126 MMHG

## 2019-02-21 LAB
ALBUMIN SERPL-MCNC: 3.1 G/DL (ref 3.5–5)
ALBUMIN/GLOB SERPL: 1.1 {RATIO} (ref 1.1–2.2)
ALP SERPL-CCNC: 68 U/L (ref 45–117)
ALT SERPL-CCNC: 18 U/L (ref 12–78)
ANION GAP SERPL CALC-SCNC: 6 MMOL/L (ref 5–15)
AST SERPL-CCNC: 17 U/L (ref 15–37)
BASOPHILS # BLD: 0 K/UL (ref 0–0.1)
BASOPHILS NFR BLD: 1 % (ref 0–1)
BILIRUB SERPL-MCNC: 0.5 MG/DL (ref 0.2–1)
BUN SERPL-MCNC: 24 MG/DL (ref 6–20)
BUN/CREAT SERPL: 16 (ref 12–20)
CALCIUM SERPL-MCNC: 8.8 MG/DL (ref 8.5–10.1)
CHLORIDE SERPL-SCNC: 109 MMOL/L (ref 97–108)
CO2 SERPL-SCNC: 28 MMOL/L (ref 21–32)
CREAT SERPL-MCNC: 1.48 MG/DL (ref 0.55–1.02)
DIFFERENTIAL METHOD BLD: ABNORMAL
EOSINOPHIL # BLD: 0.2 K/UL (ref 0–0.4)
EOSINOPHIL NFR BLD: 5 % (ref 0–7)
ERYTHROCYTE [DISTWIDTH] IN BLOOD BY AUTOMATED COUNT: 14.7 % (ref 11.5–14.5)
EST. AVERAGE GLUCOSE BLD GHB EST-MCNC: 154 MG/DL
GLOBULIN SER CALC-MCNC: 2.8 G/DL (ref 2–4)
GLUCOSE BLD STRIP.AUTO-MCNC: 154 MG/DL (ref 65–100)
GLUCOSE SERPL-MCNC: 136 MG/DL (ref 65–100)
HBA1C MFR BLD: 7 % (ref 4.2–6.3)
HCT VFR BLD AUTO: 31 % (ref 35–47)
HGB BLD-MCNC: 9.6 G/DL (ref 11.5–16)
IMM GRANULOCYTES # BLD AUTO: 0 K/UL (ref 0–0.04)
IMM GRANULOCYTES NFR BLD AUTO: 1 % (ref 0–0.5)
LYMPHOCYTES # BLD: 0.8 K/UL (ref 0.8–3.5)
LYMPHOCYTES NFR BLD: 22 % (ref 12–49)
MAGNESIUM SERPL-MCNC: 2.3 MG/DL (ref 1.6–2.4)
MCH RBC QN AUTO: 28.4 PG (ref 26–34)
MCHC RBC AUTO-ENTMCNC: 31 G/DL (ref 30–36.5)
MCV RBC AUTO: 91.7 FL (ref 80–99)
MONOCYTES # BLD: 0.3 K/UL (ref 0–1)
MONOCYTES NFR BLD: 9 % (ref 5–13)
NEUTS SEG # BLD: 2.3 K/UL (ref 1.8–8)
NEUTS SEG NFR BLD: 62 % (ref 32–75)
NRBC # BLD: 0 K/UL (ref 0–0.01)
NRBC BLD-RTO: 0 PER 100 WBC
PLATELET # BLD AUTO: ABNORMAL K/UL (ref 150–400)
PLATELET COMMENTS,PCOM: ABNORMAL
POTASSIUM SERPL-SCNC: 3.8 MMOL/L (ref 3.5–5.1)
PROT SERPL-MCNC: 5.9 G/DL (ref 6.4–8.2)
RBC # BLD AUTO: 3.38 M/UL (ref 3.8–5.2)
RBC MORPH BLD: ABNORMAL
SERVICE CMNT-IMP: ABNORMAL
SODIUM SERPL-SCNC: 143 MMOL/L (ref 136–145)
WBC # BLD AUTO: 3.6 K/UL (ref 3.6–11)

## 2019-02-21 PROCEDURE — 85025 COMPLETE CBC W/AUTO DIFF WBC: CPT

## 2019-02-21 PROCEDURE — 83036 HEMOGLOBIN GLYCOSYLATED A1C: CPT

## 2019-02-21 PROCEDURE — 94760 N-INVAS EAR/PLS OXIMETRY 1: CPT

## 2019-02-21 PROCEDURE — 74011250637 HC RX REV CODE- 250/637: Performed by: SPECIALIST

## 2019-02-21 PROCEDURE — 36415 COLL VENOUS BLD VENIPUNCTURE: CPT

## 2019-02-21 PROCEDURE — 80053 COMPREHEN METABOLIC PANEL: CPT

## 2019-02-21 PROCEDURE — 74011250637 HC RX REV CODE- 250/637: Performed by: HOSPITALIST

## 2019-02-21 PROCEDURE — 74011636637 HC RX REV CODE- 636/637: Performed by: HOSPITALIST

## 2019-02-21 PROCEDURE — 74011000258 HC RX REV CODE- 258: Performed by: HOSPITALIST

## 2019-02-21 PROCEDURE — 74011250637 HC RX REV CODE- 250/637: Performed by: INTERNAL MEDICINE

## 2019-02-21 PROCEDURE — 82962 GLUCOSE BLOOD TEST: CPT

## 2019-02-21 PROCEDURE — 83735 ASSAY OF MAGNESIUM: CPT

## 2019-02-21 RX ORDER — POLYETHYLENE GLYCOL 3350 17 G/17G
17 POWDER, FOR SOLUTION ORAL DAILY
Qty: 30 PACKET | Refills: 0 | Status: SHIPPED | OUTPATIENT
Start: 2019-02-21 | End: 2019-02-26 | Stop reason: ALTCHOICE

## 2019-02-21 RX ORDER — POTASSIUM CHLORIDE 1.5 G/1.77G
40 POWDER, FOR SOLUTION ORAL ONCE
Status: DISCONTINUED | OUTPATIENT
Start: 2019-02-21 | End: 2019-02-21 | Stop reason: HOSPADM

## 2019-02-21 RX ADMIN — POTASSIUM CHLORIDE 10 MEQ: 750 TABLET, EXTENDED RELEASE ORAL at 08:56

## 2019-02-21 RX ADMIN — FUROSEMIDE 40 MG: 40 TABLET ORAL at 08:56

## 2019-02-21 RX ADMIN — METOPROLOL TARTRATE 50 MG: 50 TABLET ORAL at 08:56

## 2019-02-21 RX ADMIN — DOFETILIDE 125 MCG: 0.12 CAPSULE ORAL at 09:04

## 2019-02-21 RX ADMIN — INSULIN LISPRO 2 UNITS: 100 INJECTION, SOLUTION INTRAVENOUS; SUBCUTANEOUS at 07:54

## 2019-02-21 RX ADMIN — DEXTROSE MONOHYDRATE AND SODIUM CHLORIDE 50 ML/HR: 5; .9 INJECTION, SOLUTION INTRAVENOUS at 00:14

## 2019-02-21 RX ADMIN — LEVOTHYROXINE SODIUM 125 MCG: 125 TABLET ORAL at 06:45

## 2019-02-21 RX ADMIN — HYDRALAZINE HYDROCHLORIDE 20 MG: 10 TABLET, FILM COATED ORAL at 08:56

## 2019-02-21 RX ADMIN — ALLOPURINOL 200 MG: 100 TABLET ORAL at 08:56

## 2019-02-21 RX ADMIN — POLYETHYLENE GLYCOL 3350 17 G: 17 POWDER, FOR SOLUTION ORAL at 08:55

## 2019-02-21 RX ADMIN — PANTOPRAZOLE SODIUM 40 MG: 40 TABLET, DELAYED RELEASE ORAL at 07:54

## 2019-02-21 RX ADMIN — LISINOPRIL 20 MG: 20 TABLET ORAL at 08:56

## 2019-02-21 NOTE — PROGRESS NOTES
Reason for Admission:   GI Bleed RRAT Score:  39 Resources/supports as identified by patient/family:   Daughter, step son, niece and friends Top Challenges facing patient (as identified by patient/family and CM): Finances/Medication cost?   No issues Transportation? No issues Support system or lack thereof? Living arrangements? Lives alone in a one story home with no steps to enter the home Self-care/ADLs/Cognition? Independent Current Advanced Directive/Advance Care Plan: On file Plan for utilizing home health: Yes Likelihood of readmission:  High 
              
Transition of Care Plan:   Patient is independent with ADL/IADL and drives. Patient admits to past [facility] but unable to remember provider, and DME use (cane as needed). Patient denies past Rehab. Patient is discharged home today with [facility]. Patients daughter will transport pt home. CM informed pt of  recommendation and pt requested CM send a referral to [facility]. FOC was provided, signed and placed on patients chart. Patient does not have any concerns at this time. PCP- Dr Roddy Rodriguez [facility]- [facility] on [address] [place] [address] Care Management Interventions PCP Verified by CM: Yes(Dr Murray) Mode of Transport at Discharge: Other (see comment)(Daughter) Transition of Care Consult (CM Consult): Discharge Planning, Home Health [address]: Yes Discharge Durable Medical Equipment: No(cane as needed) Physical Therapy Consult: Yes Occupational Therapy Consult: Yes Speech Therapy Consult: No 
Current Support Network: Lives Alone, Own Home(Lives in a one story home with no steps to enter the home) Confirm Follow Up Transport: Self Plan discussed with Pt/Family/Caregiver: Yes Freedom of Choice Offered: Yes Discharge Location Discharge Placement: Home with home health Yessy Magana Ext [id number]

## 2019-02-21 NOTE — DISCHARGE INSTRUCTIONS
HOSPITALIST DISCHARGE INSTRUCTIONS    NAME: Anusha White   :  1935   MRN:  [id number]     Date/Time:  2019 8:06 AM    ADMIT DATE: 2019   DISCHARGE DATE: 2019         · It is important that you take the medication exactly as they are prescribed. · Keep your medication in the bottles provided by the pharmacist and keep a list of the medication names, dosages, and times to be taken in your wallet. · Do not take other medications without consulting your doctor. What to do at [address]:  Cardiac Diet    Recommended activity: Activity as tolerated      If you have questions regarding the hospital related prescriptions or hospital related issues please call [facility] at [phone]. You can always direct your questions to your primary care doctor if you are unable to reach your hospital physician; your PCP works as an extension of your hospital doctor just like your hospital doctor is an extension of your PCP for your time at the hospital [facility], [facility])    If you experience any of the following symptoms then please call your primary care physician or return to the emergency room if you cannot get hold of your doctor:    Fever, chills, nausea, vomiting, or persistent diarrhea  Worsening weakness or new problems with your speech or balance  Dark stools or visible blood in your stools  New Leg swelling or shortness of breath as these could be signs of a clot    Additional Instructions:    Call dm or return to the ER if recurrent rectal bleeding. Monitor BG closely and call MD if recurrent low blood sugars. Home health to check labs on Monday to assure platelets and blood counts stable and to follow diabetes. F/u with pcp in 3-5 days and gi in 2 -3 weeks      Bring these papers with you to your follow up appointments. The papers will help your doctors be sure to continue the care plan from the hospital.              Information obtained by :   I understand that if any problems occur once I am at home I am to contact my physician. I understand and acknowledge receipt of the instructions indicated above.                                                                                                                                            Physician's or R.N.'s Signature                                                                  Date/Time                                                                                                                                              Patient or Representative Signature

## 2019-02-21 NOTE — PROGRESS NOTES
Electrolyte repletion for patients receiving sotalol or dofetilide Class III AAD: Dofetilide New start: (Yes/No) No.  If no, PTA dose: 125 mcg BID Labs: 
Recent Labs  
  02/21/19 
0425 02/20/19 
0350  02/19/19 
1023 K 3.8 3.6  --  4.4 MG 2.3 2.3   < >  --   
CREA 1.48* 1.48*  --  1.69*  
 < > = values in this interval not displayed. Magnesium sulfate dose ordered: no 
Potassium chloride dose ordered: yes Estimated Creatinine Clearance: 37.6 mL/min (A) (based on SCr of 1.48 mg/dL (H)). Estimated Creatinine Clearance (using IBW):31.2 mL/min Plan:  K 10mEq PO daily presently ordered;  added K 40 po x1 dose today. Mag wnl.  
 
Thank you, 
Ai Val[place], [facility] [place]

## 2019-02-21 NOTE — DISCHARGE SUMMARY
Hospitalist Discharge Summary Patient ID: Rosalina Claudio [id number] 
89 y.o. 
1935 PCP on record: Alida Landers MD 
 
Admit date: 2/19/2019 Discharge date and time: 2/21/2019 DISCHARGE DIAGNOSIS: 
 
Rectal bleeding secondary to rectal prolapse Thrombocytopenia acute on chronic, needs follow-up Type 2 diabetes with hypoglycemia, present on admission Chronic A. fib on antiarrhythmic and chronic long-term oral anticoagulation with Xarelto, now restarted History of chronic diastolic CHF, without exacerbation History of hypertension History of chronic kidney disease stage III History of Schatzki's ring with esophageal stricture History of hypothyroidism History of gout History hyperlipidemia Obesity with a BMI of 32 Mild memory issues reported, no definitive diagnosis CONSULTATIONS: 
IP CONSULT TO GASTROENTEROLOGY Excerpted HPI from H&P of Jennifer Mason MD: 
CHIEF COMPLAINT: rectal bleeding  
  
HISTORY OF PRESENT ILLNESS:    
Aaron De Anda is a 82 y.o.  female who presents with above complaint. Pt is very poor historian due to h/o \" short memory problems\". History was obtained from dtr at bedside. Per dtr, pt was c/o lower abdominal discomfort since yesterday. No nausea/vomiting. This morning around 6 am, pt called her dtr saying that she had a large amount of BRB coming out. Pt c/o feeling generally weak. No h/o bleeding in the past. No more BM with blood since this morning. Pt si on xarelto for h/o Afib. Pt has h/o colonoscopy many years ago with polyps removal. ED provider discussed pt case with GI, who recommended admission for colonoscopy in am.  
  
We were asked to admit for work up and evaluation of the above problems.  
  
 
 
 
______________________________________________________________________ DISCHARGE SUMMARY/HOSPITAL COURSE:  for full details see H&P, daily progress notes, labs, consult notes. Rectal bleeding : Bleeding had subsided,Colonoscopy 2/20:Colon: external hemorrhoids, rectal muscosal prolapse, Distal rectal erythremia  
-so far no need for transfusion, HH stable. GI help appreciated. miralax daily Ok to resume xarelto per Janna IDDM type II with hypoglycemia : c/w SSI, 7 HbA1C. resume lower dose NPH, will monitor BG with h/h at dc Chronic afib, chronic diastolic CHF and benign HTN 
-BP/HR stable/ no signs of fluid overload  
-con't tikosyn, lasix, metoprolol, hydralazine CKD stage III: seems around baseline, monitor H/o dysphagia due to [employer] ring with h/o esophageal stricture Hypothyroidism: home synthroid Gout: home allopurinol Hyperlipidemia: cont statin Obesity. Body mass index is 32.59 kg/m². Recommended Disposition:  
Patient had rectal bleeding bright blood per rectum up to be secondary to rectal prolapse seen on colonoscopy. She has not required a transfusion H&H is stable. GI reports that she can be restarted on her anticoagulants with Xarelto and followed up as an outpatient. She will be continued on daily MiraLAX. Of note, patient's platelet count has been a little on the low side in the low 100 range in the past and it was low yesterday at 79, today on her smear the platelet count is unable to be reported due to clumping. I have discussed this finding with the daughter and certainly her platelet count will need to be monitored closely and consider referral to hematology if remains low. Since her H&H is stable and her bleeding has resolved at this point patient is okay for discharge as long as there is close monitoring of her blood counts. I have asked home health to check a CBC and BMP on Monday and sent to her primary to assure her hemoglobin and platelets remained stable. Patient had some hypoglycemia on admission this is not recurred but she has not been on her home doses of NPH her A1c is 7.   I have decreased her doses of NPH significantly and have asked home health to monitor her diabetes at home for further adjustments in her insulin as needed. _______________________________________________________________________ Patient seen and examined by me on discharge day. Pertinent Findings: 
Patient feels well. She is eating breakfast.  Denies any nausea vomiting or abdominal pain. Had a normal bowel movement yesterday without blood. She is awake and alert she is on room air well-appearing oriented x3 had a little trouble with the year but ultimately got correctly. Conjunctiva pink mucous membranes moist cardiovascular regular rate on exam slight murmur lungs are clear abdomen is obese but benign extremities no clubbing cyanosis of chronic skin and some edema present 
 
_______________________________________________________________________ DISCHARGE MEDICATIONS:  
Current Discharge Medication List  
  
START taking these medications Details  
polyethylene glycol (MIRALAX) 17 gram packet Take 1 Packet by mouth daily for 30 days. Qty: 30 Packet, Refills: 0 CONTINUE these medications which have CHANGED Details  
insulin NPH (HUMULIN N NPH INSULIN KWIKPEN) 100 unit/mL (3 mL) inpn INJECT 30 UNITS UNDER THE SKIN EVERY MORNING, 10 UNITS UNDER THE SKIN WITH DINNER OR AS DIRECTED BY PHYSICIAN. **THIS REPLACED HUMULOG MIX** Qty: 15 Pen, Refills: 97  
 Associated Diagnoses: Controlled type 2 diabetes mellitus with stage 3 chronic kidney disease, with long-term current use of insulin (HCC) CONTINUE these medications which have NOT CHANGED Details  
atorvastatin (LIPITOR) 80 mg tablet TAKE ONE TABLET BY MOUTH EVERY EVENING Qty: 90 Tab, Refills: 0 Associated Diagnoses: Hypercholesteremia  
  
allopurinol (ZYLOPRIM) 100 mg tablet TAKE TWO TABLETS BY MOUTH DAILY Qty: 180 Tab, Refills: 2  
  
lisinopril (PRINIVIL, ZESTRIL) 20 mg tablet TAKE ONE TABLET BY MOUTH DAILY Qty: 30 Tab, Refills: 10  
 Associated Diagnoses: Essential hypertension, benign  
  
metoprolol tartrate (LOPRESSOR) 50 mg tablet TAKE ONE TABLET BY MOUTH TWO TIMES A DAY Qty: 60 Tab, Refills: 10  
 Associated Diagnoses: Mitral valve stenosis, unspecified etiology  
  
rivaroxaban (XARELTO) 15 mg tab tablet Take 1 Tab by mouth daily (with dinner). Qty: 30 Tab, Refills: 11  
 Associated Diagnoses: Chronic atrial fibrillation (HCC)  
  
ipratropium (ATROVENT) 42 mcg (0.06 %) nasal spray 2 Sprays by Both Nostrils route four (4) times daily. As needed runny nose Qty: 15 mL, Refills: 11  
 Associated Diagnoses: Chronic rhinitis  
  
furosemide (LASIX) 40 mg tablet 1 qam- changed upon lab review 9/5/18 Qty: 1 Tab, Refills: 0 Associated Diagnoses: Atherosclerosis of native coronary artery of native heart without angina pectoris ACCU-CHEK SHAHIDA PLUS TEST STRP strip USE TO CHECK BLOOD SUGAR FOUR TIMES A DAY Qty: 100 Strip, Refills: 9  
  
acetaminophen (TYLENOL) 325 mg tablet Take 325 mg by mouth every four (4) hours as needed for Pain.  
  
levothyroxine (SYNTHROID) 125 mcg tablet TAKE ONE TABLET BY MOUTH DAILY Qty: 30 Tab, Refills: 8  
  
ADAN PEN NEEDLE 32 gauge x 5/32\" ndle USE WITH INSULIN PEN TWO TIMES A DAY AS DIRECTED BY PHYSICIAN Qty: 100 Pen Needle, Refills: 11  
  
hydrALAZINE (APRESOLINE) 10 mg tablet Take 2 Tabs by mouth three (3) times daily. Qty: 180 Tab, Refills: 12  
  
potassium chloride SR (KLOR-CON 10) 10 mEq tablet Take 1 Tab by mouth daily. Qty: 30 Tab, Refills: 12  
  
omeprazole (PRILOSEC) 20 mg capsule Take 20 mg by mouth daily. dofetilide (TIKOSYN) 125 mcg capsule Take 1 Cap by mouth two (2) times a day. Qty: 60 Cap, Refills: 3 Associated Diagnoses: Atrial fibrillation ([address] My Recommended Diet, Activity, Wound Care, and follow-up labs are listed in the patient's Discharge Insturctions which I have personally completed and reviewed. ______________________________________________________________________ Risk of deterioration: Moderate Condition at Discharge:  Stable 
______________________________________________________________________ Disposition Home with family and home health services 
______________________________________________________________________ Care Plan discussed with:  
Patient, Family, RN, Care Manager, Consultant 
 
______________________________________________________________________ Code Status: Full Code 
______________________________________________________________________ Follow up with: PCP : Johanny Guzman MD 
Follow-up Information Follow up With Specialties Details Why Contact Info Johanny Guzman MD Internal Medicine   [address] [facility] [address] 
[phone] Johanny Guzman MD Internal Medicine In 5 days  [address] [facility] [address] 
[phone] Levon Darilng MD Gastroenterology In 2 weeks  [address] [facility] 
[phone] Total time in minutes spent coordinating this discharge (includes going over instructions, follow-up, prescriptions, and preparing report for sign off to her PCP) :  40 minutes Signed: 
Radha Harper MD

## 2019-02-21 NOTE — PROGRESS NOTES
General Surgery End of Shift Nursing Note Bedside shift change report given to [address] (oncoming nurse) by Catherine Rubalcava RN (offgoing nurse). Report included the following information SBAR, Kardex, OR Summary, Intake/Output, MAR, Recent Results and Cardiac Rhythm SR. Shift worked:   7p-7a Summary of shift:    S/p colonoscopy, denied pain on rounds, VSS. Positional IV site D/C'd, new site started to promote sleep Issues for physician to address:   none Number times ambulated in hallway past shift: 0 Number of times OOB to chair past shift: 0 Pain Management: 
Current medication: none requested Patient states pain is manageable on current pain medication: YES 
 
GI: 
 
Current diet:  DIET DIABETIC CONSISTENT CARB Regular; 2 GM NA (House Low NA) Tolerating current diet: YES Passing flatus: YES Last Bowel Movement: yesterday Appearance:  
 
Respiratory: 
 
Incentive Spirometer at bedside: NO 
Patient instructed on use: NO 
 
Patient Safety: 
 
Falls Score: 2  Bed Alarm On? No 
Sitter?  No 
 
Jony Mcginnis RN

## 2019-02-21 NOTE — ROUTINE PROCESS
I have reviewed discharge instructions with the patient. The patient verbalized understanding.  
 
Sent home with RX

## 2019-02-22 ENCOUNTER — PATIENT OUTREACH (OUTPATIENT)
Dept: INTERNAL MEDICINE CLINIC | Age: 84
End: 2019-02-22

## 2019-02-22 ENCOUNTER — HOME CARE VISIT (OUTPATIENT)
Dept: SCHEDULING | Facility: HOME HEALTH | Age: 84
End: 2019-02-22
Payer: MEDICARE

## 2019-02-22 VITALS
TEMPERATURE: 98.1 F | RESPIRATION RATE: 20 BRPM | WEIGHT: 221 LBS | HEART RATE: 62 BPM | HEIGHT: 69 IN | SYSTOLIC BLOOD PRESSURE: 156 MMHG | BODY MASS INDEX: 32.73 KG/M2 | OXYGEN SATURATION: 98 % | DIASTOLIC BLOOD PRESSURE: 70 MMHG

## 2019-02-22 PROCEDURE — 3331090001 HH PPS REVENUE CREDIT

## 2019-02-22 PROCEDURE — G0299 HHS/HOSPICE OF RN EA 15 MIN: HCPCS

## 2019-02-22 PROCEDURE — 3331090002 HH PPS REVENUE DEBIT

## 2019-02-22 PROCEDURE — 400013 HH SOC

## 2019-02-22 NOTE — PROGRESS NOTES
Hospital Discharge Follow-Up Date/Time:  2019 12:45 PM 
 
Patient was admitted to [facility] on 19 and discharged on 19 for GIB. The physician discharge summary was available at the time of outreach. Patient was contacted within one business days of discharge. Top Challenges reviewed with the provider Insulin dose changed from 43 units QAM, 23 units QHS to 30 units QAM, 10 units QHS. Patient admits to taking 43/23 units-please review; FBS 103mg/dl Consider consult to hemology for thrombocytopenia Component Latest Ref Rng & Units 2019  
 
      4:25 AM  
PLATELET 
    250 - 903 K/uL UNABLE TO REPORT ACCURATE COUNT DUE TO PLATELET AGGREGATION, HOWEVER, PLATELETS APPEAR DECREASED IN NUMBER ON SMEAR. PLEASE RESUBMIT SODIUM CITRATE (BLUE) AND EDTA (LAVENDAR) TUBES FOR HEMATOLOGICAL TESTING. Component Latest Ref Rng & Units 2019  
 
      3:50 AM 10:23 AM  
PLATELET 
    873 - 020 K/uL 79 (L) 139 (L) Component Latest Ref Rng & Units 2019  
 
      4:25 AM  3:50 AM 12:28 AM 10:23 AM  
HGB 
    11.5 - 16.0 g/dL 9.6 (L) 10.1 (L) 10.4 (L) 10.5 (L) Method of communication with provider :chart routing Inpatient RRAT score: 39 Was this a readmission? no  
Patient stated reason for the readmission: NA 
 
Nurse Navigator (NN) contacted the patient by telephone to perform post hospital discharge assessment. Verified name and  with patient as identifiers. Provided introduction to self, and explanation of the Nurse Navigator role. Reviewed discharge instructions and red flags with patient who verbalized understanding. Patient given an opportunity to ask questions and does not have any further questions or concerns at this time. The patient agrees to contact the PCP office for questions related to their healthcare. NN provided contact information for future reference. Disease Specific:   N/A Summary of patient's top problems: 
1. GI bleed- 2/2 rectal prolapse; Colonoscopy 2/20 revealed Colon: external hemorrhoids, rectal muscosal prolapse, Distal rectal erythremia Home Health orders at discharge:  Home Health company: [employer] Date of initial visit: 2/22/19 Durable Medical Equipment ordered/company: NA 
Durable Medical Equipment received: NA Barriers to care? None identifief Advance Care Planning:  
Does patient have an Advance Directive:  reviewed and current Medication(s):  
New Medications at Discharge: polyethylene glycol 17 gram packet (MIRALAX) Changed Medications at Discharge: insulin  unit/mL (3 mL) Inpn (HumuLIN N NPH Insulin KwikPen) Discontinued Medications at Discharge: NA Medication reconciliation was performed with patient, who verbalizes understanding of administration of home medications. There N/A- patient reports her daughter, Eliot De León, plans to  prescription today barriers to obtaining medications identified at this time. Referral to Pharm D needed: no  
 
Current Outpatient Medications Medication Sig  
 insulin NPH (HUMULIN N NPH INSULIN KWIKPEN) 100 unit/mL (3 mL) inpn INJECT 30 UNITS UNDER THE SKIN EVERY MORNING, 10 UNITS UNDER THE SKIN WITH DINNER OR AS DIRECTED BY PHYSICIAN. **THIS REPLACED HUMULOG MIX**  polyethylene glycol (MIRALAX) 17 gram packet Take 1 Packet by mouth daily for 30 days.  atorvastatin (LIPITOR) 80 mg tablet TAKE ONE TABLET BY MOUTH EVERY EVENING  
 allopurinol (ZYLOPRIM) 100 mg tablet TAKE TWO TABLETS BY MOUTH DAILY  lisinopril (PRINIVIL, ZESTRIL) 20 mg tablet TAKE ONE TABLET BY MOUTH DAILY  metoprolol tartrate (LOPRESSOR) 50 mg tablet TAKE ONE TABLET BY MOUTH TWO TIMES A DAY  rivaroxaban (XARELTO) 15 mg tab tablet Take 1 Tab by mouth daily (with dinner).   
 ipratropium (ATROVENT) 42 mcg (0.06 %) nasal spray 2 Sprays by Both Nostrils route four (4) times daily. As needed runny nose  furosemide (LASIX) 40 mg tablet 1 qam- changed upon lab review 9/5/18 (Patient taking differently: Take 40 mg by mouth two (2) times a day. 2 in am and 1 in pm)  ACCU-CHEK SHAHIDA PLUS TEST STRP strip USE TO CHECK BLOOD SUGAR FOUR TIMES A DAY  acetaminophen (TYLENOL) 325 mg tablet Take 325 mg by mouth every four (4) hours as needed for Pain.  levothyroxine (SYNTHROID) 125 mcg tablet TAKE ONE TABLET BY MOUTH DAILY  ADAN PEN NEEDLE 32 gauge x 5/32\" ndle USE WITH INSULIN PEN TWO TIMES A DAY AS DIRECTED BY PHYSICIAN  
 hydrALAZINE (APRESOLINE) 10 mg tablet Take 2 Tabs by mouth three (3) times daily. (Patient taking differently: Take 20 mg by mouth two (2) times a day.)  potassium chloride SR (KLOR-CON 10) 10 mEq tablet Take 1 Tab by mouth daily.  omeprazole (PRILOSEC) 20 mg capsule Take 20 mg by mouth daily.  dofetilide (TIKOSYN) 125 mcg capsule Take 1 Cap by mouth two (2) times a day. No current facility-administered medications for this visit. There are no discontinued medications. [facility] follow up appointment(s):  
Future Appointments Date Time Provider Dafne Jenkins 2/26/2019 11:30 AM Anna [place] [id number] [facility] follow up appointment(s): GI-patient plans to schedule appointment [facility]:  information provided as a resource Goals  Prevent complications of GI bleed 02/22/19 Patient will not have any GIB x 30 days · Reviewed red flags, ie fatigue, weakness, abd pain, · Reports no blood in bowel/bladder · Reports mild SOB, but recovers in minutes (just walked from laundry room on other side of hourse) · Confirmed pcp appt 2/26 Aníbal Hines RN

## 2019-02-23 PROCEDURE — 3331090001 HH PPS REVENUE CREDIT

## 2019-02-23 PROCEDURE — 3331090002 HH PPS REVENUE DEBIT

## 2019-02-24 PROCEDURE — 3331090001 HH PPS REVENUE CREDIT

## 2019-02-24 PROCEDURE — 3331090002 HH PPS REVENUE DEBIT

## 2019-02-24 NOTE — ANESTHESIA POSTPROCEDURE EVALUATION
Procedure(s): 
COLONOSCOPY. Anesthesia Post Evaluation Patient location during evaluation: PACU Note status: Adequate. Level of consciousness: responsive to verbal stimuli and sleepy but conscious Pain management: satisfactory to patient Airway patency: patent Anesthetic complications: no 
Cardiovascular status: acceptable Respiratory status: acceptable Hydration status: acceptable Comments: +Post-Anesthesia Evaluation and Assessment Patient: Marcie De Jesus MRN: [id number]  SSN: [id number] YOB: 1935  Age: 82 y.o. Sex: female Cardiovascular Function/Vital Signs /62   Pulse 66   Temp 36.9 °C (98.5 °F)   Resp 17   Ht 5' 9\" (1.753 m)   Wt 100.1 kg (220 lb 10.9 oz)   SpO2 97%   BMI 32.59 kg/m² Patient is status post Procedure(s): 
COLONOSCOPY. Nausea/Vomiting: Controlled. Postoperative hydration reviewed and adequate. Pain: 
Pain Scale 1: Numeric (0 - 10) (02/21/19 0757) Pain Intensity 1: 0 (02/21/19 0757) Managed. Neurological Status: At baseline. Mental Status and Level of Consciousness: Arousable. Pulmonary Status:  
O2 Device: Room air (02/21/19 0756) Adequate oxygenation and airway patent. Complications related to anesthesia: None Post-anesthesia assessment completed. No concerns. Signed By: Clement Knight MD  
 2/24/2019 Post anesthesia nausea and vomiting:  controlled Visit Vitals /62 Pulse 66 Temp 36.9 °C (98.5 °F) Resp 17 Ht 5' 9\" (1.753 m) Wt 100.1 kg (220 lb 10.9 oz) SpO2 97% BMI 32.59 kg/m²

## 2019-02-25 ENCOUNTER — HOME CARE VISIT (OUTPATIENT)
Dept: SCHEDULING | Facility: HOME HEALTH | Age: 84
End: 2019-02-25
Payer: MEDICARE

## 2019-02-25 VITALS
HEART RATE: 74 BPM | TEMPERATURE: 98.2 F | RESPIRATION RATE: 18 BRPM | DIASTOLIC BLOOD PRESSURE: 76 MMHG | SYSTOLIC BLOOD PRESSURE: 132 MMHG | OXYGEN SATURATION: 98 %

## 2019-02-25 PROCEDURE — G0299 HHS/HOSPICE OF RN EA 15 MIN: HCPCS

## 2019-02-25 PROCEDURE — 3331090002 HH PPS REVENUE DEBIT

## 2019-02-25 PROCEDURE — 3331090001 HH PPS REVENUE CREDIT

## 2019-02-26 ENCOUNTER — PATIENT OUTREACH (OUTPATIENT)
Dept: INTERNAL MEDICINE CLINIC | Age: 84
End: 2019-02-26

## 2019-02-26 ENCOUNTER — OFFICE VISIT (OUTPATIENT)
Dept: INTERNAL MEDICINE CLINIC | Age: 84
End: 2019-02-26

## 2019-02-26 ENCOUNTER — HOSPITAL ENCOUNTER (OUTPATIENT)
Dept: LAB | Age: 84
Discharge: HOME | End: 2019-02-26
Payer: MEDICARE

## 2019-02-26 VITALS
HEIGHT: 69 IN | WEIGHT: 222 LBS | TEMPERATURE: 98.3 F | OXYGEN SATURATION: 98 % | HEART RATE: 69 BPM | BODY MASS INDEX: 32.88 KG/M2 | RESPIRATION RATE: 18 BRPM | DIASTOLIC BLOOD PRESSURE: 56 MMHG | SYSTOLIC BLOOD PRESSURE: 138 MMHG

## 2019-02-26 DIAGNOSIS — G31.84 MILD COGNITIVE IMPAIRMENT: Primary | ICD-10-CM

## 2019-02-26 DIAGNOSIS — K62.5 RECTAL BLEED: Primary | ICD-10-CM

## 2019-02-26 DIAGNOSIS — N18.30 CONTROLLED TYPE 2 DIABETES MELLITUS WITH STAGE 3 CHRONIC KIDNEY DISEASE, WITH LONG-TERM CURRENT USE OF INSULIN (HCC): ICD-10-CM

## 2019-02-26 DIAGNOSIS — I10 ESSENTIAL HYPERTENSION, BENIGN: ICD-10-CM

## 2019-02-26 DIAGNOSIS — Z79.4 CONTROLLED TYPE 2 DIABETES MELLITUS WITH STAGE 3 CHRONIC KIDNEY DISEASE, WITH LONG-TERM CURRENT USE OF INSULIN (HCC): ICD-10-CM

## 2019-02-26 DIAGNOSIS — G31.84 MCI (MILD COGNITIVE IMPAIRMENT): ICD-10-CM

## 2019-02-26 DIAGNOSIS — D69.6 THROMBOCYTOPENIA (HCC): ICD-10-CM

## 2019-02-26 DIAGNOSIS — I48.20 CHRONIC ATRIAL FIBRILLATION (HCC): ICD-10-CM

## 2019-02-26 DIAGNOSIS — E11.22 CONTROLLED TYPE 2 DIABETES MELLITUS WITH STAGE 3 CHRONIC KIDNEY DISEASE, WITH LONG-TERM CURRENT USE OF INSULIN (HCC): ICD-10-CM

## 2019-02-26 PROCEDURE — 3331090001 HH PPS REVENUE CREDIT

## 2019-02-26 PROCEDURE — 36415 COLL VENOUS BLD VENIPUNCTURE: CPT

## 2019-02-26 PROCEDURE — 3331090002 HH PPS REVENUE DEBIT

## 2019-02-26 PROCEDURE — 85025 COMPLETE CBC W/AUTO DIFF WBC: CPT

## 2019-02-26 RX ORDER — FLUOCINONIDE 0.5 MG/G
CREAM TOPICAL
Refills: 5 | COMMUNITY
Start: 2019-02-13 | End: 2019-05-20 | Stop reason: ALTCHOICE

## 2019-02-26 NOTE — PROGRESS NOTES
NN met with patient and daughter, Robyn Alicea, in office today to introduce self and explain role NN received referral from Dr Shayy Kramer for outpatient speech therapy for mild cognitive impairment concerns. PCP wanted to know if [facility] offers service NN spoke with Quinn Hdz nurse to confirm [facility] offers speech therapy to assist with memory Referral for speech-language pathologist at [address] [place] [address] placed for eval and treat Goals  Prevent complications of GI bleed 02/22/19 Patient will not have any GIB x 30 days · Reviewed red flags, ie fatigue, weakness, abd pain, · Reports no blood in bowel/bladder · Reports mild SOB, but recovers in minutes (just walked from laundry room on other side of [facility] [place] [facility]) · Confirmed pcp appt 2/26 Merly Radford RN

## 2019-02-26 NOTE — PROGRESS NOTES
HISTORY OF PRESENT ILLNESS Rosy Sanchez is a 82 y.o. female. HPI Subjective:  Carolyn Zavaleta is seen today for hospital follow up of admission with rectal bleeding. She is accompanied by her daughter. She was admitted to the hospital on 02/19/19, discharged on 02/21. Nurse navigator contact was on 02/22 and her visit is today, the 26th. This represents a transitional care visit. 1. Rectal bleeding. Prior to presentation at the hospital she had a large bloody bowel movement. This occurred once without recurrence. It was apparently a large volume of blood. She called our office and we referred her to the emergency room. I reviewed the available records. I also reviewed the nurse navigator note. Any met with Carolyn Zavaleta and her daughter today. She was diagnosed with a rectal prolapse. She continues with some mild lower abdominal discomfort, but nothing severe. She has seen no further blood. Miralax was ordered on discharge, but is not required as she has chronically loose stools. 2. Hypertension, stable on current regimen. 3. Diabetes. She had some lower sugar readings since she was not able to eat much, so they dropped her insulin dosage. They have kept her on the same regimen with acceptable readings upon return home. I think they can continue with regimen. 4. New problem reviewed, low platelets. Will refer to hematology. We have scheduled an appointment. 5. Memory loss. Daughter wonders if there is any therapy available. We will research speech therapy as an option for getting her on a program to improve her memory. This is an extended visit of high complexity. Past Medical History:  
Diagnosis Date  Atrial fibrillation ([facility] 10/29/2009  Bladder cancer ([facility]  Coagulation disorder ([address] Chronic prophylactic anticoagulation med  Colon polyps  Diabetes ([facility] [facility]  GERD (gastroesophageal reflux disease)  Heart valve problem   
 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  
 @ [facility] >> atrial fibrillation  T. I.A. 4/23/2009  TIA (transient ischemic attack)  Ulcer of right lower extremity, limited to breakdown of skin ([address] 7/5/2018 Current Outpatient Medications Medication Sig  
 fluocinoNIDE (LIDEX) 0.05 % topical cream ALONSO EXT AA BID FOR 14 DAYS THEN PRF RASH  insulin NPH (HUMULIN N NPH INSULIN KWIKPEN) 100 unit/mL (3 mL) inpn INJECT 43 UNITS UNDER THE SKIN EVERY MORNING, 23 UNITS UNDER THE SKIN WITH DINNER OR AS DIRECTED BY PHYSICIAN. **THIS REPLACED HUMULOG MIX** reported as changed on 2/22/19  atorvastatin (LIPITOR) 80 mg tablet TAKE ONE TABLET BY MOUTH EVERY EVENING  
 allopurinol (ZYLOPRIM) 100 mg tablet TAKE TWO TABLETS BY MOUTH DAILY  lisinopril (PRINIVIL, ZESTRIL) 20 mg tablet TAKE ONE TABLET BY MOUTH DAILY  metoprolol tartrate (LOPRESSOR) 50 mg tablet TAKE ONE TABLET BY MOUTH TWO TIMES A DAY  rivaroxaban (XARELTO) 15 mg tab tablet Take 1 Tab by mouth daily (with dinner).  ipratropium (ATROVENT) 42 mcg (0.06 %) nasal spray 2 Sprays by Both Nostrils route four (4) times daily. As needed runny nose  furosemide (LASIX) 40 mg tablet 1 qam- changed upon lab review 9/5/18 (Patient taking differently: Take 40 mg by mouth two (2) times a day. 2 in am and 1 in pm)  ACCU-CHEK SHAHIDA PLUS TEST STRP strip USE TO CHECK BLOOD SUGAR FOUR TIMES A DAY  acetaminophen (TYLENOL) 325 mg tablet Take 325 mg by mouth every four (4) hours as needed for Pain.  levothyroxine (SYNTHROID) 125 mcg tablet TAKE ONE TABLET BY MOUTH DAILY  ADAN PEN NEEDLE 32 gauge x 5/32\" ndle USE WITH INSULIN PEN TWO TIMES A DAY AS DIRECTED BY PHYSICIAN  
 hydrALAZINE (APRESOLINE) 10 mg tablet Take 2 Tabs by mouth three (3) times daily.  potassium chloride SR (KLOR-CON 10) 10 mEq tablet Take 1 Tab by mouth daily.  omeprazole (PRILOSEC) 20 mg capsule Take 20 mg by mouth daily.  dofetilide (TIKOSYN) 125 mcg capsule Take 1 Cap by mouth two (2) times a day. No current facility-administered medications for this visit. Review of Systems Constitutional: Negative for chills, fever and weight loss. Respiratory: Negative. Cardiovascular: Positive for leg swelling. Negative for chest pain, palpitations and PND. Chronic Gastrointestinal: Positive for abdominal pain, blood in stool and diarrhea. Negative for constipation, nausea and vomiting. Musculoskeletal: Negative for myalgias. Neurological: Negative for dizziness, focal weakness and loss of consciousness. All other systems reviewed and are negative. Physical Exam  
Constitutional: No distress. Cardiovascular: Normal rate and regular rhythm. Exam reveals no gallop and no friction rub. Murmur heard. Systolic murmur is present with a grade of 1/6. Pulmonary/Chest: Effort normal and breath sounds normal.  
Abdominal: Soft. She exhibits no distension and no mass. There is no tenderness. There is no rebound and no guarding. Nursing note and vitals reviewed. ASSESSMENT and PLAN Diagnoses and all orders for this visit: 1. Rectal bleed 
-     CBC WITH AUTOMATED DIFF 2. Essential hypertension, benign- Continue current regimen of prescription and / or OTC medications 3. Chronic atrial fibrillation ([address]- Continue current regimen of prescription and / or OTC medications , See cardiologist as directed.   
 
4. Controlled type 2 diabetes mellitus with stage 3 chronic kidney disease, with long-term current use of insulin (HCC) 
-     insulin NPH (HUMULIN N NPH INSULIN KWIKPEN) 100 unit/mL (3 mL) inpn; INJECT 43 UNITS UNDER THE SKIN EVERY MORNING, 23 UNITS UNDER THE SKIN WITH DINNER OR AS DIRECTED BY PHYSICIAN. **THIS REPLACED HUMULOG MIX** reported as changed on 2/22/19 5. Thrombocytopenia (HCC) 
-     CBC WITH AUTOMATED DIFF 
-     REFERRAL TO HEMATOLOGY Plan was reviewed with patient and family, understanding expressed

## 2019-02-27 LAB
BASOPHILS # BLD AUTO: 0 X10E3/UL (ref 0–0.2)
BASOPHILS NFR BLD AUTO: 0 %
EOSINOPHIL # BLD AUTO: 0.3 X10E3/UL (ref 0–0.4)
EOSINOPHIL NFR BLD AUTO: 5 %
ERYTHROCYTE [DISTWIDTH] IN BLOOD BY AUTOMATED COUNT: 16.3 % (ref 12.3–15.4)
HCT VFR BLD AUTO: 31.8 % (ref 34–46.6)
HGB BLD-MCNC: 10 G/DL (ref 11.1–15.9)
IMM GRANULOCYTES # BLD AUTO: 0 X10E3/UL (ref 0–0.1)
IMM GRANULOCYTES NFR BLD AUTO: 0 %
LYMPHOCYTES # BLD AUTO: 1.1 X10E3/UL (ref 0.7–3.1)
LYMPHOCYTES NFR BLD AUTO: 22 %
MCH RBC QN AUTO: 28.8 PG (ref 26.6–33)
MCHC RBC AUTO-ENTMCNC: 31.4 G/DL (ref 31.5–35.7)
MCV RBC AUTO: 92 FL (ref 79–97)
MONOCYTES # BLD AUTO: 0.4 X10E3/UL (ref 0.1–0.9)
MONOCYTES NFR BLD AUTO: 7 %
NEUTROPHILS # BLD AUTO: 3.2 X10E3/UL (ref 1.4–7)
NEUTROPHILS NFR BLD AUTO: 66 %
PLATELET # BLD AUTO: 125 X10E3/UL (ref 150–379)
RBC # BLD AUTO: 3.47 X10E6/UL (ref 3.77–5.28)
WBC # BLD AUTO: 4.9 X10E3/UL (ref 3.4–10.8)

## 2019-02-27 PROCEDURE — 3331090002 HH PPS REVENUE DEBIT

## 2019-02-27 PROCEDURE — 3331090001 HH PPS REVENUE CREDIT

## 2019-02-28 PROCEDURE — 3331090001 HH PPS REVENUE CREDIT

## 2019-02-28 PROCEDURE — 3331090002 HH PPS REVENUE DEBIT

## 2019-03-01 ENCOUNTER — HOME CARE VISIT (OUTPATIENT)
Dept: SCHEDULING | Facility: HOME HEALTH | Age: 84
End: 2019-03-01
Payer: MEDICARE

## 2019-03-01 VITALS
TEMPERATURE: 97.8 F | RESPIRATION RATE: 18 BRPM | OXYGEN SATURATION: 98 % | DIASTOLIC BLOOD PRESSURE: 60 MMHG | HEART RATE: 71 BPM | SYSTOLIC BLOOD PRESSURE: 150 MMHG

## 2019-03-01 PROCEDURE — 3331090001 HH PPS REVENUE CREDIT

## 2019-03-01 PROCEDURE — 3331090002 HH PPS REVENUE DEBIT

## 2019-03-01 PROCEDURE — G0300 HHS/HOSPICE OF LPN EA 15 MIN: HCPCS

## 2019-03-02 PROCEDURE — 3331090002 HH PPS REVENUE DEBIT

## 2019-03-02 PROCEDURE — 3331090001 HH PPS REVENUE CREDIT

## 2019-03-03 PROCEDURE — 3331090001 HH PPS REVENUE CREDIT

## 2019-03-03 PROCEDURE — 3331090002 HH PPS REVENUE DEBIT

## 2019-03-04 ENCOUNTER — HOME CARE VISIT (OUTPATIENT)
Dept: SCHEDULING | Facility: HOME HEALTH | Age: 84
End: 2019-03-04
Payer: MEDICARE

## 2019-03-04 VITALS
SYSTOLIC BLOOD PRESSURE: 140 MMHG | BODY MASS INDEX: 32.19 KG/M2 | OXYGEN SATURATION: 98 % | TEMPERATURE: 96.9 F | HEART RATE: 64 BPM | DIASTOLIC BLOOD PRESSURE: 62 MMHG | WEIGHT: 218 LBS | RESPIRATION RATE: 16 BRPM

## 2019-03-04 PROCEDURE — 3331090001 HH PPS REVENUE CREDIT

## 2019-03-04 PROCEDURE — G0299 HHS/HOSPICE OF RN EA 15 MIN: HCPCS

## 2019-03-04 PROCEDURE — 3331090002 HH PPS REVENUE DEBIT

## 2019-03-05 ENCOUNTER — PATIENT OUTREACH (OUTPATIENT)
Dept: INTERNAL MEDICINE CLINIC | Age: 84
End: 2019-03-05

## 2019-03-05 PROCEDURE — 3331090001 HH PPS REVENUE CREDIT

## 2019-03-05 PROCEDURE — 3331090002 HH PPS REVENUE DEBIT

## 2019-03-05 NOTE — PROGRESS NOTES
Goals      Prevent complications of GI bleed      02/22/19  Patient will not have any GIB x 30 days  · Reviewed red flags, ie fatigue, weakness, abd pain,  · Reports no blood in bowel/bladder  · Reports mild SOB, but recovers in minutes (just walked from laundry room on other side of hourse)  · Confirmed pcp appt 2/26  Dereck Gaines RN    03/05/19  · Reports things are going very well  · Denies blood in bowel/bladder  · Discussed fall prevention/safety precaution  · Ambulates with cane at night  · Spoke to Pau, daughter, reports they have not received call from GI  · NN contacted Dr Taylor Gregory office to schedule follow up visit; NN spoke to [facility]  · Appt scheduled with Katja Cuenca PA-C GI on 3/12/19 at the [facility] office  · Confirmation will be mailed to patient per Alyse Schneider  · Pau and pt are aware  · NN will continue to follow  Dereck Gaines RN

## 2019-03-06 PROCEDURE — 3331090002 HH PPS REVENUE DEBIT

## 2019-03-06 PROCEDURE — 3331090001 HH PPS REVENUE CREDIT

## 2019-03-07 ENCOUNTER — HOME CARE VISIT (OUTPATIENT)
Dept: SCHEDULING | Facility: HOME HEALTH | Age: 84
End: 2019-03-07
Payer: MEDICARE

## 2019-03-07 VITALS — HEART RATE: 85 BPM | OXYGEN SATURATION: 95 % | TEMPERATURE: 98.5 F

## 2019-03-07 PROCEDURE — 3331090001 HH PPS REVENUE CREDIT

## 2019-03-07 PROCEDURE — G0300 HHS/HOSPICE OF LPN EA 15 MIN: HCPCS

## 2019-03-07 PROCEDURE — 3331090002 HH PPS REVENUE DEBIT

## 2019-03-08 PROCEDURE — 3331090001 HH PPS REVENUE CREDIT

## 2019-03-08 PROCEDURE — 3331090002 HH PPS REVENUE DEBIT

## 2019-03-09 PROCEDURE — 3331090002 HH PPS REVENUE DEBIT

## 2019-03-09 PROCEDURE — 3331090001 HH PPS REVENUE CREDIT

## 2019-03-10 PROCEDURE — 3331090002 HH PPS REVENUE DEBIT

## 2019-03-10 PROCEDURE — 3331090001 HH PPS REVENUE CREDIT

## 2019-03-11 PROCEDURE — 3331090001 HH PPS REVENUE CREDIT

## 2019-03-11 PROCEDURE — 3331090002 HH PPS REVENUE DEBIT

## 2019-03-12 PROCEDURE — 3331090002 HH PPS REVENUE DEBIT

## 2019-03-12 PROCEDURE — 3331090001 HH PPS REVENUE CREDIT

## 2019-03-13 ENCOUNTER — HOME CARE VISIT (OUTPATIENT)
Dept: SCHEDULING | Facility: HOME HEALTH | Age: 84
End: 2019-03-13
Payer: MEDICARE

## 2019-03-13 VITALS — SYSTOLIC BLOOD PRESSURE: 118 MMHG | HEART RATE: 64 BPM | OXYGEN SATURATION: 97 % | DIASTOLIC BLOOD PRESSURE: 58 MMHG

## 2019-03-13 PROCEDURE — G0300 HHS/HOSPICE OF LPN EA 15 MIN: HCPCS

## 2019-03-13 PROCEDURE — 3331090002 HH PPS REVENUE DEBIT

## 2019-03-13 PROCEDURE — 3331090001 HH PPS REVENUE CREDIT

## 2019-03-13 RX ORDER — LEVOTHYROXINE SODIUM 125 UG/1
TABLET ORAL
Qty: 90 TAB | Refills: 7 | Status: SHIPPED | OUTPATIENT
Start: 2019-03-13 | End: 2020-01-01

## 2019-03-14 ENCOUNTER — PATIENT OUTREACH (OUTPATIENT)
Dept: INTERNAL MEDICINE CLINIC | Age: 84
End: 2019-03-14

## 2019-03-14 ENCOUNTER — HOSPITAL ENCOUNTER (OUTPATIENT)
Dept: LAB | Age: 84
Discharge: HOME | End: 2019-03-14
Payer: MEDICARE

## 2019-03-14 ENCOUNTER — OFFICE VISIT (OUTPATIENT)
Dept: ONCOLOGY | Age: 84
End: 2019-03-14

## 2019-03-14 VITALS
RESPIRATION RATE: 18 BRPM | OXYGEN SATURATION: 96 % | HEIGHT: 69 IN | TEMPERATURE: 98.2 F | HEART RATE: 66 BPM | DIASTOLIC BLOOD PRESSURE: 51 MMHG | WEIGHT: 219 LBS | SYSTOLIC BLOOD PRESSURE: 122 MMHG | BODY MASS INDEX: 32.44 KG/M2

## 2019-03-14 DIAGNOSIS — D69.6 THROMBOCYTOPENIA (HCC): Primary | ICD-10-CM

## 2019-03-14 DIAGNOSIS — D50.9 IRON DEFICIENCY ANEMIA, UNSPECIFIED IRON DEFICIENCY ANEMIA TYPE: ICD-10-CM

## 2019-03-14 PROCEDURE — 82728 ASSAY OF FERRITIN: CPT

## 2019-03-14 PROCEDURE — 83550 IRON BINDING TEST: CPT

## 2019-03-14 PROCEDURE — 3331090001 HH PPS REVENUE CREDIT

## 2019-03-14 PROCEDURE — 84165 PROTEIN E-PHORESIS SERUM: CPT

## 2019-03-14 PROCEDURE — 36415 COLL VENOUS BLD VENIPUNCTURE: CPT

## 2019-03-14 PROCEDURE — 3331090002 HH PPS REVENUE DEBIT

## 2019-03-14 PROCEDURE — 85025 COMPLETE CBC W/AUTO DIFF WBC: CPT

## 2019-03-14 NOTE — PROGRESS NOTES
Goals      Prevent complications of GI bleed      02/22/19  Patient will not have any GIB x 30 days  · Reviewed red flags, ie fatigue, weakness, abd pain,  · Reports no blood in bowel/bladder  · Reports mild SOB, but recovers in minutes (just walked from laundry room on other side of hourse)  · Confirmed pcp appt 2/26  Alain Sandifer, RN    03/05/19  · Reports things are going very well  · Denies blood in bowel/bladder  · Discussed fall prevention/safety precaution  · Ambulates with cane at night  · Spoke to Cedric Arias, daughter, reports they have not received call from GI  · NN contacted Dr Declan Juarez office to schedule follow up visit; NN spoke to [place]  · Appt scheduled with Gabrielle Mccarthy PA-C GI on 3/12/19 at the [facility] office  · Confirmation will be mailed to patient per Dina Mckeon  · Cedric Arias and pt are aware  · NN will continue to follow  Alain Sandifer, RN    03/14/19  · Patient reports she was heading to the hospital now and asked that I contact Cedric Arias, daughter  · Cedric Arias reports patient has an Oncology appointment today at [address] and heart cath tomorrow  · Overall, patient doing well  · Reports she attended GI appt last Tuesday and things went well  · Cedric Arias reports GI is signing off  · Denies blood in bowel/bladder, pain  · NN will continue to follow  Alain Sandifer, RN

## 2019-03-14 NOTE — PROGRESS NOTES
Alberto Olivera is a 82 y.o. female new patient referred by Dr. Juan Garza to provider for thrombocytopenia. Patient hospitalized on 2/19 for rectal bleeding. Patient taking Sherlene Lisbon for Afib and stroke. VS stable. Patient report b/l knee pain. Patient denies active bleeding. Visit Vitals  /51 (BP 1 Location: Left arm, BP Patient Position: Sitting)   Pulse 66   Temp 98.2 °F (36.8 °C) (Oral)   Resp 18   Ht 5' 9\" (1.753 m)   Wt 219 lb (99.3 kg)   SpO2 96%   BMI 32.34 kg/m²       Pain Scale: 4/10  Pain Location: Knee (c/o b/l knee pain)    Health Maintenance Review: Patient reminded of \"due or due soon\" health maintenance. I have asked the patient to contact his/her primary care provider (PCP) for follow-up on his/her health maintenance.

## 2019-03-15 ENCOUNTER — TELEPHONE (OUTPATIENT)
Dept: WOUND CARE | Age: 84
End: 2019-03-15

## 2019-03-15 ENCOUNTER — HOSPITAL ENCOUNTER (OUTPATIENT)
Age: 84
Discharge: HOME | End: 2019-03-15
Attending: INTERNAL MEDICINE | Admitting: INTERNAL MEDICINE
Payer: MEDICARE

## 2019-03-15 ENCOUNTER — TELEPHONE (OUTPATIENT)
Dept: SURGERY | Age: 84
End: 2019-03-15

## 2019-03-15 ENCOUNTER — DOCUMENTATION ONLY (OUTPATIENT)
Dept: CARDIOLOGY | Age: 84
End: 2019-03-15

## 2019-03-15 VITALS
SYSTOLIC BLOOD PRESSURE: 100 MMHG | HEIGHT: 69 IN | DIASTOLIC BLOOD PRESSURE: 41 MMHG | RESPIRATION RATE: 15 BRPM | TEMPERATURE: 97.3 F | OXYGEN SATURATION: 99 % | BODY MASS INDEX: 31.84 KG/M2 | HEART RATE: 62 BPM | WEIGHT: 215 LBS

## 2019-03-15 DIAGNOSIS — I05.0 MITRAL VALVE STENOSIS, UNSPECIFIED ETIOLOGY: ICD-10-CM

## 2019-03-15 DIAGNOSIS — I24.9 ACS (ACUTE CORONARY SYNDROME) (HCC): ICD-10-CM

## 2019-03-15 DIAGNOSIS — I25.10 ATHEROSCLEROSIS OF NATIVE CORONARY ARTERY OF NATIVE HEART WITHOUT ANGINA PECTORIS: ICD-10-CM

## 2019-03-15 PROCEDURE — 77030028837 HC SYR ANGI PWR INJ COEU -A: Performed by: INTERNAL MEDICINE

## 2019-03-15 PROCEDURE — 74011000250 HC RX REV CODE- 250: Performed by: INTERNAL MEDICINE

## 2019-03-15 PROCEDURE — 99153 MOD SED SAME PHYS/QHP EA: CPT | Performed by: INTERNAL MEDICINE

## 2019-03-15 PROCEDURE — 77030030195 HC CATH ANGI DX PRF4 MRTM -A: Performed by: INTERNAL MEDICINE

## 2019-03-15 PROCEDURE — C1769 GUIDE WIRE: HCPCS | Performed by: INTERNAL MEDICINE

## 2019-03-15 PROCEDURE — C1894 INTRO/SHEATH, NON-LASER: HCPCS | Performed by: INTERNAL MEDICINE

## 2019-03-15 PROCEDURE — 74011250636 HC RX REV CODE- 250/636

## 2019-03-15 PROCEDURE — C1751 CATH, INF, PER/CENT/MIDLINE: HCPCS | Performed by: INTERNAL MEDICINE

## 2019-03-15 PROCEDURE — 3331090002 HH PPS REVENUE DEBIT

## 2019-03-15 PROCEDURE — 77030008543 HC TBNG MON PRSS MRTM -A: Performed by: INTERNAL MEDICINE

## 2019-03-15 PROCEDURE — 74011250636 HC RX REV CODE- 250/636: Performed by: INTERNAL MEDICINE

## 2019-03-15 PROCEDURE — 77030019569 HC BND COMPR RAD TERU -B: Performed by: INTERNAL MEDICINE

## 2019-03-15 PROCEDURE — 77030010221 HC SPLNT WR POS TELE -B: Performed by: INTERNAL MEDICINE

## 2019-03-15 PROCEDURE — 74011636320 HC RX REV CODE- 636/320: Performed by: INTERNAL MEDICINE

## 2019-03-15 PROCEDURE — 77030019698 HC SYR ANGI MDLON MRTM -A: Performed by: INTERNAL MEDICINE

## 2019-03-15 PROCEDURE — 99152 MOD SED SAME PHYS/QHP 5/>YRS: CPT | Performed by: INTERNAL MEDICINE

## 2019-03-15 PROCEDURE — 77030004549 HC CATH ANGI DX PRF MRTM -A: Performed by: INTERNAL MEDICINE

## 2019-03-15 PROCEDURE — 3331090001 HH PPS REVENUE CREDIT

## 2019-03-15 PROCEDURE — 93460 R&L HRT ART/VENTRICLE ANGIO: CPT | Performed by: INTERNAL MEDICINE

## 2019-03-15 RX ORDER — MIDAZOLAM HYDROCHLORIDE 1 MG/ML
INJECTION, SOLUTION INTRAMUSCULAR; INTRAVENOUS AS NEEDED
Status: DISCONTINUED | OUTPATIENT
Start: 2019-03-15 | End: 2019-03-15 | Stop reason: HOSPADM

## 2019-03-15 RX ORDER — FUROSEMIDE 80 MG/1
TABLET ORAL
Qty: 60 TAB | Refills: 11 | Status: ON HOLD | OUTPATIENT
Start: 2019-03-15 | End: 2019-04-27 | Stop reason: SDUPTHER

## 2019-03-15 RX ORDER — HEPARIN SODIUM 1000 [USP'U]/ML
INJECTION, SOLUTION INTRAVENOUS; SUBCUTANEOUS AS NEEDED
Status: DISCONTINUED | OUTPATIENT
Start: 2019-03-15 | End: 2019-03-15 | Stop reason: HOSPADM

## 2019-03-15 RX ORDER — METOPROLOL TARTRATE 50 MG/1
TABLET ORAL
Qty: 90 TAB | Refills: 11 | Status: SHIPPED | OUTPATIENT
Start: 2019-03-15 | End: 2019-06-14 | Stop reason: DRUGHIGH

## 2019-03-15 RX ORDER — VERAPAMIL HYDROCHLORIDE 2.5 MG/ML
INJECTION, SOLUTION INTRAVENOUS AS NEEDED
Status: DISCONTINUED | OUTPATIENT
Start: 2019-03-15 | End: 2019-03-15 | Stop reason: HOSPADM

## 2019-03-15 RX ORDER — AMLODIPINE BESYLATE 5 MG/1
5 TABLET ORAL DAILY
COMMUNITY
End: 2019-04-27

## 2019-03-15 RX ORDER — SODIUM CHLORIDE 0.9 % (FLUSH) 0.9 %
5-40 SYRINGE (ML) INJECTION EVERY 8 HOURS
Status: DISCONTINUED | OUTPATIENT
Start: 2019-03-15 | End: 2019-03-15 | Stop reason: HOSPADM

## 2019-03-15 RX ORDER — HEPARIN SODIUM 200 [USP'U]/100ML
INJECTION, SOLUTION INTRAVENOUS
Status: COMPLETED | OUTPATIENT
Start: 2019-03-15 | End: 2019-03-15

## 2019-03-15 RX ORDER — FENTANYL CITRATE 50 UG/ML
INJECTION, SOLUTION INTRAMUSCULAR; INTRAVENOUS AS NEEDED
Status: DISCONTINUED | OUTPATIENT
Start: 2019-03-15 | End: 2019-03-15 | Stop reason: HOSPADM

## 2019-03-15 RX ORDER — SODIUM CHLORIDE 0.9 % (FLUSH) 0.9 %
5-40 SYRINGE (ML) INJECTION AS NEEDED
Status: DISCONTINUED | OUTPATIENT
Start: 2019-03-15 | End: 2019-03-15 | Stop reason: HOSPADM

## 2019-03-15 RX ORDER — LIDOCAINE HYDROCHLORIDE 10 MG/ML
INJECTION, SOLUTION EPIDURAL; INFILTRATION; INTRACAUDAL; PERINEURAL AS NEEDED
Status: DISCONTINUED | OUTPATIENT
Start: 2019-03-15 | End: 2019-03-15 | Stop reason: HOSPADM

## 2019-03-15 RX ORDER — IODIXANOL 320 MG/ML
INJECTION, SOLUTION INTRAVASCULAR AS NEEDED
Status: DISCONTINUED | OUTPATIENT
Start: 2019-03-15 | End: 2019-03-15 | Stop reason: HOSPADM

## 2019-03-15 NOTE — PROGRESS NOTES
Hematology Consultation        Patient: Karma Malagon MRN: [id number]  SSN: [id number]    YOB: 1935  Age: 82 y.o. Sex: female        Subjective:      Karma Malagon is a 82 y.o. female who I am seeing in consultation for mild thrombocytopenia. It has been present for over 6 months. She denies bleeding. It was observed in routine laboratory studies. She does not experience fatigue either. Review of Systems:    Constitutional: negative  Eyes: negative  Ears, Nose, Mouth, Throat, and Face: negative  Respiratory: negative  Cardiovascular: negative  Gastrointestinal: negative  Genitourinary:negative  Integument/Breast: negative  Hematologic/Lymphatic: negative  Musculoskeletal:negative  Neurological: negative      Past Medical History:   Diagnosis Date    Atrial fibrillation ([address] 10/29/2009    Bladder cancer [facility]     Coagulation disorder (HCC)     Chronic prophylactic anticoagulation med    Colon polyps     Diabetes ([address]     GERD (gastroesophageal reflux disease)     Heart valve problem     leaking heart valve    Hypercholesterolemia     Hypertension     Hypothyroidism     Hypothyroidism 4/23/2009    Hypothyroidism, acquired, 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 >> atrial fibrillation    T. I.A. 4/23/2009    TIA (transient ischemic attack)     Ulcer of right lower extremity, limited to breakdown of skin ([facility] 7/5/2018     Past Surgical History:   Procedure Laterality Date    CARDIAC SURG PROCEDURE UNLIST      ablation    COLONOSCOPY N/A 2/20/2019    COLONOSCOPY performed by Missy Ibrahim MD at [facility] ENDOSCOPY    COLONOSCOPY,DIAGNOSTIC  2/20/2019         HX APPENDECTOMY      HX CHOLECYSTECTOMY      HX HYSTERECTOMY      HX KNEE ARTHROSCOPY  1049,1473    right knee    HX ORTHOPAEDIC      HX UROLOGICAL      RENAL STENT, tur-b    VASCULAR SURGERY PROCEDURE UNLIST  11/4 removed vein in right leg      Family History   Problem Relation Age of Onset    Stroke Other     Arthritis-osteo Sister         spinal stenosis    Gout Son     Hypertension Son     Hypertension Mother     Heart Disease Mother         CAD    Heart Disease Father         CAD    Alcohol abuse Neg Hx     Asthma Neg Hx     Bleeding Prob Neg Hx     Cancer Neg Hx     Diabetes Neg Hx     Elevated Lipids Neg Hx     Headache Neg Hx     Lung Disease Neg Hx     Migraines Neg Hx     Psychiatric Disorder Neg Hx     Mental Retardation Neg Hx      Social History     Tobacco Use    Smoking status: Former Smoker     Packs/day: 0.50     Years: 10.00     Pack years: 5.00     Types: Cigarettes     Last attempt to quit: 1967     Years since quittin.2    Smokeless tobacco: Never Used   Substance Use Topics    Alcohol use: No     Alcohol/week: 0.0 oz      Prior to Admission medications    Medication Sig Start Date End Date Taking? Authorizing Provider   insulin NPH (HUMULIN N NPH INSULIN KWIKPEN) 100 unit/mL (3 mL) inpn Inject 43 units sq in morning, 23 units with dinner- new directions 3/14/19  Yes Polo Murray MD   levothyroxine (SYNTHROID) 125 mcg tablet TAKE ONE TABLET BY MOUTH DAILY 3/13/19  Yes Polo Murray MD   fluocinoNIDE (LIDEX) 0.05 % topical cream ALONSO EXT AA BID FOR 14 DAYS THEN PRF RASH 19  Yes Provider, Historical   insulin NPH (HUMULIN N NPH INSULIN KWIKPEN) 100 unit/mL (3 mL) inpn INJECT 43 UNITS UNDER THE SKIN EVERY MORNING, 23 UNITS UNDER THE SKIN WITH DINNER OR AS DIRECTED BY PHYSICIAN.  **THIS REPLACED HUMULOG MIX** reported as changed on 19  Yes Polo Murray MD   atorvastatin (LIPITOR) 80 mg tablet TAKE ONE TABLET BY MOUTH EVERY EVENING 19  Yes Polo Murray MD   allopurinol (ZYLOPRIM) 100 mg tablet TAKE TWO TABLETS BY MOUTH DAILY 19  Yes Polo Murray MD   lisinopril (PRINIVIL, ZESTRIL) 20 mg tablet TAKE ONE TABLET BY MOUTH DAILY 12/10/18  Yes Uri Murray MD   metoprolol tartrate (LOPRESSOR) 50 mg tablet TAKE ONE TABLET BY MOUTH TWO TIMES A DAY 12/10/18  Yes Uri Murray MD   rivaroxaban (XARELTO) 15 mg tab tablet Take 1 Tab by mouth daily (with dinner). 9/20/18  Yes Uri Murray MD   ipratropium (ATROVENT) 42 mcg (0.06 %) nasal spray 2 Sprays by Both Nostrils route four (4) times daily. As needed runny nose 9/20/18  Yes Uri Murray MD   furosemide (LASIX) 40 mg tablet 1 qam- changed upon lab review 9/5/18  Patient taking differently: Take 40 mg by mouth two (2) times a day. 2 in am and 1 in pm 9/7/18  Yes Uri Murray MD   ACCU-CHEK SHAHIDA PLUS TEST STRP strip USE TO CHECK BLOOD SUGAR FOUR TIMES A DAY 8/28/18  Yes Uri Murray MD   acetaminophen (TYLENOL) 325 mg tablet Take 325 mg by mouth every four (4) hours as needed for Pain. Yes Provider, Historical   ADAN PEN NEEDLE 32 gauge x 5/32\" ndle USE WITH INSULIN PEN TWO TIMES A DAY AS DIRECTED BY PHYSICIAN 4/18/18  Yes Uri Murray MD   hydrALAZINE (APRESOLINE) 10 mg tablet Take 2 Tabs by mouth three (3) times daily. 1/23/17  Yes Raymond Singh MD   potassium chloride SR (KLOR-CON 10) 10 mEq tablet Take 1 Tab by mouth daily. 1/23/17  Yes Raymond Singh MD   omeprazole (PRILOSEC) 20 mg capsule Take 20 mg by mouth daily. Yes Provider, Historical   dofetilide (TIKOSYN) 125 mcg capsule Take 1 Cap by mouth two (2) times a day.  4/29/11  Yes Uri Murray MD              Allergies   Allergen Reactions    Actos [Pioglitazone] Swelling     Swelling of feet and legs    Codeine Itching    Doxycycline Nausea Only    Hydrocodone Rash and Other (comments)     hallucinations           Objective:     Vitals:    03/14/19 1428   BP: 122/51   Pulse: 66   Resp: 18   Temp: 98.2 °F (36.8 °C)   TempSrc: Oral   SpO2: 96%   Weight: 219 lb (99.3 kg)   Height: 5' 9\" (1.753 m)            Physical Exam:    GENERAL: alert, cooperative, no distress, appears stated age  EYE: conjunctivae/corneas clear. PERRL, EOM's intact. Fundi benign  LYMPHATIC: Cervical, supraclavicular, and axillary nodes normal.   THROAT & NECK: normal and no erythema or exudates noted. LUNG: clear to auscultation bilaterally  HEART: regular rate and rhythm, S1, S2 normal, no murmur, click, rub or gallop  ABDOMEN: soft, non-tender. Bowel sounds normal. No masses,  no organomegaly  EXTREMITIES:  extremities normal, atraumatic, no cyanosis or edema  SKIN: Normal.  NEUROLOGIC: AOx3. Gait normal. Reflexes and motor strength normal and symmetric. Cranial nerves 2-12 and sensation grossly intact. Lab Results   Component Value Date/Time    WBC 4.9 02/26/2019 01:23 PM    Hemoglobin (POC) 10.5 (L) 07/06/2010 10:11 PM    HGB 10.0 (L) 02/26/2019 01:23 PM    Hematocrit (POC) 31 (L) 07/06/2010 10:11 PM    HCT 31.8 (L) 02/26/2019 01:23 PM    PLATELET 612 (L) 65/38/4034 01:23 PM    MCV 92 02/26/2019 01:23 PM                Assessment:     1. Mild thrombocytopenia:    Asymptomatic  Unclear cause  ? Iron deficiency    I reassured her  Recommend observation      2. Anemia    From renal insufficiency  Obtain iron levels  SPEP/Immunofix      Plan:       1. Observation  2. Labs  3. Return in 6 months      Signed by: Magalie Sullivan MD                     March 15, 2019         CC.  Bin Harper MD

## 2019-03-15 NOTE — DISCHARGE INSTRUCTIONS
[address] [facility], [address]   [phone]  [address]    [email or web address]    Patient Discharge Instructions    Stef Contreras / [id number] : 1935    Admitted 3/15/2019 Discharged: 3/15/2019       · It is important that you take the medication exactly as they are prescribed. · Keep your medication in the bottles provided by the pharmacist and keep a list of the medication names, dosages, and times to be taken in your wallet. · Do not take other medications without consulting your doctor. BRING ALL OF YOUR MEDICINES TO YOUR OFFICE VISIT with Hue Tracy DO or my nurse practitioner, Neris Maria. .    Follow-up with Hue Tracy DO or my nurse practitioner, Neris Maria in 3 weeks. Cardiac Catheterization  Discharge Instructions    Transradial Catheterization Discharge Instructions (WRIST)    Discharge instructions: Your radial artery in your wrist was used for your cardiac catheterization. This site may be slightly bruised and sore following your procedure. Expect mild tingling or the hand and tenderness at the puncture site for up to 3 days. Excess movement of the wrist used should be avoided for the next 24-48 hours. 1. No lifting over 2 pounds (approximately a ½ gallon of milk) with this arm for 24 hours. 2. Keep the site of the procedure covered with a bandage for 24 hours. 3. You may shower the day after your procedure. Do not take a tub bath or submerge the puncture site in water for 48 hours. 4. No heavy impact activity/lifting > 30 pounds for 1 week. If bleeding of the wrist occurs at home:   If the site on your wrist where you had the catheterization procedure begins to bleed, do not panic. 1. Place 1 or 2 fingers over the puncture site and hold pressure to stop the bleeding. You may be able to feel your pulse as you hold pressure. 2. Lift your fingers after 5 minutes to see if the bleeding has stopped.    3. Once the bleeding has stopped, gently wipe the wrist area clean and cover with a bandage. If the bleeding from your wrist does not stop after 15 minutes, or if there is a large amount of bleeding or spurting, call 911 immediately (do not drive yourself to the hospital). Other concerns: The site may be slightly bruised and sore following your procedure. Should any of the following occur, contact your physician immediately:   1. Any cool or coldness of the arm, discoloration over a large area, ongoing numbness or any abnormal sensations , moderate to severe pain or swelling in the arm. 2. Redness, soreness, swelling, chills or fever, or colored drainage at the procedure site within 3-7 days after your procedure. If you have any further questions or concerns regarding your procedure please call the Cardiac Cath Lab office at [phone]. During regular business hours ask to speak to Dr. Nilda Sandra. During non-business hours the answering service will answer. Ask to speak to the physician on call for [facility].     Transfemoral Catheterization Discharge Instructions Kimberli Rankin)     Do not drive, operate any machinery, or sign any legal documents for 24 hours after your procedure. You must have someone to drive you home.  You may take a shower 24 hours after your cardiac catheterization. Be sure to get the dressing wet and then remove it; gently wash the area with warm soapy water. Pat dry and leave open to air. To help prevent infections, be sure to keep the cath site clean and dry. No lotions, creams, powders, ointments, etc. in the cath site for approximately 1 week.  Do not take a tub bath, get in a hot tub or swimming pool for approximately 5 days or until the cath site is completely healed.  No strenuous activity or heavy lifting over 10 lbs. for 7 days.  Drink plenty of fluids for 24-48 hours after your cath to flush the contrast dye from your kidneys.  No alcoholic beverages for 24 hours. You may resume your previous diet (low fat, low cholesterol) after your cath.  After your cath, some bruising or discomfort is common during the healing process. Tylenol, 1-2 tablets every 6 hours as needed, is recommended if you experience any discomfort. If you experience any signs or symptoms of infection such as fever, chills, or poorly healing incision, persistent tenderness or swelling in the groin, redness and/or warmth to the touch, numbness, significant tingling or pain at the groin site or affected extremity, rash, drainage from the cath site, or if the leg feels tight or swollen, call your physician right away.  If bleeding at the cath site occurs, take a clean gauze pad and apply direct pressure to the groin just above the puncture site. Call 911 immediately, and continue to apply direct pressure until an ambulance gets to your location.  You may return to work  2  days after your cardiac cath if no groin bleeding. Information obtained by :  I understand that if any problems occur once I am at home I am to contact my physician. I understand and acknowledge receipt of the instructions indicated above. R.N.'s Signature                                                                  Date/Time                                                                                                                                              Patient or Representative Signature                                                          Date/Time      Ellyn Turpin III, DO             [address] [facility], [address] [facility]    [phone]  [place], [address] [place]    [email or web address]

## 2019-03-15 NOTE — PROGRESS NOTES
Bedrest complete. Assisted patient with walking in the patel. No signs or symptoms of chest pain, shortness of breath, or dizziness. Tolerated well. VSS.

## 2019-03-15 NOTE — Clinical Note
Sheath #1: Sheath: inserted. Sheath inserted/placed in the right innominate artery. Hemostasis achieved.

## 2019-03-15 NOTE — PROGRESS NOTES
Brief Procedure Note    Patient: Huy Benavides MRN: [id number]  SSN: [id number]    YOB: 1935  Age: 82 y.o.   Sex: female      Date of Procedure: 3/15/2019     Pre-procedure Diagnosis: MS    Post-procedure Diagnosis: No cor dz, sev MS, elevated R/L heart pressures, PHTN of post-cap etiology, normal CO/CI    Procedure: LHC, RHC, MV study    Performed By: Pawan Thompson III, DO     Anesthesia: Moderate Sedation    Estimated Blood Loss: Less than 10 mL      Specimens:  None    Findings: as above    Complications: None    Implants: None    Recommendations: See Valve [facility] to discuss MVR    Signed By: Glenn Laguerre DO     March 15, 2019

## 2019-03-15 NOTE — Clinical Note
Sheath #2: Sheath: inserted. Sheath inserted/placed in the right radial artery. Hemostasis achieved.

## 2019-03-15 NOTE — TELEPHONE ENCOUNTER
Ms Miller's daughter, Yariel Noble called for results of lower extremity venous duplex. Dr Judy Marin in surgery. Received duplex report from [facility]. Dr Judy Marin spoke with Ms Ramirez.

## 2019-03-15 NOTE — TELEPHONE ENCOUNTER
Surgery    I spoke with Magdalen Heimlich, the patient's daughter about results of the venous duplex scan of her legs. This showed no evidence of venous obstruction or venous valve abnormality in the left lower extremity. I explained that the test indicated that none of her leg swelling is related to a vein problem. Ms Ramirez reported that her mother is having consideration for heart valve replacement. She has no active leg ulcers at present.     Shante Shelby MD

## 2019-03-15 NOTE — Clinical Note
Right radial and right brachial clipped, and draped. Wet prep solution applied at: 1016. Wet prep solution dried at: 1018. Wet prep elapsed drying time: 2 mins.

## 2019-03-15 NOTE — Clinical Note
TRANSFER - OUT REPORT:  
 
Verbal report given to: Dariana Tran. Report consisted of patient's Situation, Background, Assessment and  
Recommendations(SBAR). Opportunity for questions and clarification was provided. Patient transported with a Registered Nurse. Patient transported to: [facility].

## 2019-03-15 NOTE — PROGRESS NOTES
Discussed with Dr. Hortensia Diaz that she has developed severe MS after Mitraclip and symptomatic. Will set up to see and discuss open MVR.

## 2019-03-15 NOTE — Clinical Note
Aspirin Registry Question:  
Aspirin was discussed with physician prior to the procedure. Aspirin was not given prior to the procedure.

## 2019-03-16 PROCEDURE — 3331090001 HH PPS REVENUE CREDIT

## 2019-03-16 PROCEDURE — 3331090002 HH PPS REVENUE DEBIT

## 2019-03-17 PROCEDURE — 3331090001 HH PPS REVENUE CREDIT

## 2019-03-17 PROCEDURE — 3331090002 HH PPS REVENUE DEBIT

## 2019-03-18 LAB
ALBUMIN SERPL ELPH-MCNC: 3.4 G/DL (ref 2.9–4.4)
ALBUMIN/GLOB SERPL: 1.1 {RATIO} (ref 0.7–1.7)
ALPHA1 GLOB SERPL ELPH-MCNC: 0.3 G/DL (ref 0–0.4)
ALPHA2 GLOB SERPL ELPH-MCNC: 0.9 G/DL (ref 0.4–1)
B-GLOBULIN SERPL ELPH-MCNC: 1.1 G/DL (ref 0.7–1.3)
BASOPHILS # BLD AUTO: 0 X10E3/UL (ref 0–0.2)
BASOPHILS NFR BLD AUTO: 0 %
EOSINOPHIL # BLD AUTO: 0.1 X10E3/UL (ref 0–0.4)
EOSINOPHIL NFR BLD AUTO: 2 %
ERYTHROCYTE [DISTWIDTH] IN BLOOD BY AUTOMATED COUNT: 15.7 % (ref 12.3–15.4)
FERRITIN SERPL-MCNC: 18 NG/ML (ref 15–150)
GAMMA GLOB SERPL ELPH-MCNC: 0.8 G/DL (ref 0.4–1.8)
GLOBULIN SER CALC-MCNC: 3.1 G/DL (ref 2.2–3.9)
HCT VFR BLD AUTO: 32.2 % (ref 34–46.6)
HGB BLD-MCNC: 10.3 G/DL (ref 11.1–15.9)
IMM GRANULOCYTES # BLD AUTO: 0 X10E3/UL (ref 0–0.1)
IMM GRANULOCYTES NFR BLD AUTO: 0 %
IRON SATN MFR SERPL: 12 % (ref 15–55)
IRON SERPL-MCNC: 42 UG/DL (ref 27–139)
LYMPHOCYTES # BLD AUTO: 1.4 X10E3/UL (ref 0.7–3.1)
LYMPHOCYTES NFR BLD AUTO: 16 %
M PROTEIN SERPL ELPH-MCNC: NORMAL G/DL
MCH RBC QN AUTO: 28.7 PG (ref 26.6–33)
MCHC RBC AUTO-ENTMCNC: 32 G/DL (ref 31.5–35.7)
MCV RBC AUTO: 90 FL (ref 79–97)
MONOCYTES # BLD AUTO: 0.5 X10E3/UL (ref 0.1–0.9)
MONOCYTES NFR BLD AUTO: 6 %
NEUTROPHILS # BLD AUTO: 6.6 X10E3/UL (ref 1.4–7)
NEUTROPHILS NFR BLD AUTO: 76 %
PLATELET # BLD AUTO: 144 X10E3/UL (ref 150–379)
PLEASE NOTE, 011150: NORMAL
PROT SERPL-MCNC: 6.5 G/DL (ref 6–8.5)
RBC # BLD AUTO: 3.59 X10E6/UL (ref 3.77–5.28)
TIBC SERPL-MCNC: 354 UG/DL (ref 250–450)
UIBC SERPL-MCNC: 312 UG/DL (ref 118–369)
WBC # BLD AUTO: 8.7 X10E3/UL (ref 3.4–10.8)

## 2019-03-18 PROCEDURE — 3331090001 HH PPS REVENUE CREDIT

## 2019-03-18 PROCEDURE — 3331090002 HH PPS REVENUE DEBIT

## 2019-03-19 PROCEDURE — 3331090001 HH PPS REVENUE CREDIT

## 2019-03-19 PROCEDURE — 3331090002 HH PPS REVENUE DEBIT

## 2019-03-19 RX ORDER — SODIUM CHLORIDE 9 MG/ML
10 INJECTION INTRAMUSCULAR; INTRAVENOUS; SUBCUTANEOUS AS NEEDED
Status: CANCELLED | OUTPATIENT
Start: 2019-04-02

## 2019-03-19 RX ORDER — HEPARIN 100 UNIT/ML
300-500 SYRINGE INTRAVENOUS AS NEEDED
Status: CANCELLED
Start: 2019-04-02

## 2019-03-19 RX ORDER — ONDANSETRON 2 MG/ML
8 INJECTION INTRAMUSCULAR; INTRAVENOUS AS NEEDED
Status: CANCELLED | OUTPATIENT
Start: 2019-04-02

## 2019-03-19 RX ORDER — EPINEPHRINE 1 MG/ML
0.3 INJECTION, SOLUTION, CONCENTRATE INTRAVENOUS AS NEEDED
Status: CANCELLED | OUTPATIENT
Start: 2019-04-09

## 2019-03-19 RX ORDER — HYDROCORTISONE SODIUM SUCCINATE 100 MG/2ML
100 INJECTION, POWDER, FOR SOLUTION INTRAMUSCULAR; INTRAVENOUS AS NEEDED
Status: CANCELLED | OUTPATIENT
Start: 2019-04-09

## 2019-03-19 RX ORDER — ALBUTEROL SULFATE 0.83 MG/ML
2.5 SOLUTION RESPIRATORY (INHALATION) AS NEEDED
Status: CANCELLED
Start: 2019-04-02

## 2019-03-19 RX ORDER — HYDROCORTISONE SODIUM SUCCINATE 100 MG/2ML
100 INJECTION, POWDER, FOR SOLUTION INTRAMUSCULAR; INTRAVENOUS AS NEEDED
Status: CANCELLED | OUTPATIENT
Start: 2019-04-02

## 2019-03-19 RX ORDER — ONDANSETRON 2 MG/ML
8 INJECTION INTRAMUSCULAR; INTRAVENOUS AS NEEDED
Status: CANCELLED | OUTPATIENT
Start: 2019-04-09

## 2019-03-19 RX ORDER — ACETAMINOPHEN 325 MG/1
650 TABLET ORAL AS NEEDED
Status: CANCELLED
Start: 2019-04-09

## 2019-03-19 RX ORDER — SODIUM CHLORIDE 9 MG/ML
10 INJECTION INTRAMUSCULAR; INTRAVENOUS; SUBCUTANEOUS AS NEEDED
Status: CANCELLED | OUTPATIENT
Start: 2019-04-09

## 2019-03-19 RX ORDER — ACETAMINOPHEN 325 MG/1
650 TABLET ORAL AS NEEDED
Status: CANCELLED
Start: 2019-04-02

## 2019-03-19 RX ORDER — HEPARIN 100 UNIT/ML
300-500 SYRINGE INTRAVENOUS AS NEEDED
Status: CANCELLED
Start: 2019-04-09

## 2019-03-19 RX ORDER — DIPHENHYDRAMINE HYDROCHLORIDE 50 MG/ML
50 INJECTION, SOLUTION INTRAMUSCULAR; INTRAVENOUS AS NEEDED
Status: CANCELLED
Start: 2019-04-09

## 2019-03-19 RX ORDER — SODIUM CHLORIDE 0.9 % (FLUSH) 0.9 %
10 SYRINGE (ML) INJECTION AS NEEDED
Status: CANCELLED
Start: 2019-04-02

## 2019-03-19 RX ORDER — SODIUM CHLORIDE 0.9 % (FLUSH) 0.9 %
10 SYRINGE (ML) INJECTION AS NEEDED
Status: CANCELLED
Start: 2019-04-09

## 2019-03-19 RX ORDER — DIPHENHYDRAMINE HYDROCHLORIDE 50 MG/ML
50 INJECTION, SOLUTION INTRAMUSCULAR; INTRAVENOUS AS NEEDED
Status: CANCELLED
Start: 2019-04-02

## 2019-03-19 RX ORDER — ALBUTEROL SULFATE 0.83 MG/ML
2.5 SOLUTION RESPIRATORY (INHALATION) AS NEEDED
Status: CANCELLED
Start: 2019-04-09

## 2019-03-19 RX ORDER — EPINEPHRINE 1 MG/ML
0.3 INJECTION, SOLUTION, CONCENTRATE INTRAVENOUS AS NEEDED
Status: CANCELLED | OUTPATIENT
Start: 2019-04-02

## 2019-03-19 NOTE — PROGRESS NOTES
Call placed to pt. JASON verified. Patient made aware of lab results and that she needs IV iron d/t her iron levels being low. Patient alos informed she needs to schedule and 6 month f/y appt; pt transferred to [facility] to schedule 6 month f/u.

## 2019-03-20 ENCOUNTER — HOME CARE VISIT (OUTPATIENT)
Dept: SCHEDULING | Facility: HOME HEALTH | Age: 84
End: 2019-03-20
Payer: MEDICARE

## 2019-03-20 PROCEDURE — 3331090001 HH PPS REVENUE CREDIT

## 2019-03-20 PROCEDURE — 3331090002 HH PPS REVENUE DEBIT

## 2019-03-20 PROCEDURE — G0300 HHS/HOSPICE OF LPN EA 15 MIN: HCPCS

## 2019-03-21 PROCEDURE — 3331090001 HH PPS REVENUE CREDIT

## 2019-03-21 PROCEDURE — 3331090002 HH PPS REVENUE DEBIT

## 2019-03-22 PROCEDURE — 3331090001 HH PPS REVENUE CREDIT

## 2019-03-22 PROCEDURE — 3331090002 HH PPS REVENUE DEBIT

## 2019-03-23 PROCEDURE — 3331090002 HH PPS REVENUE DEBIT

## 2019-03-23 PROCEDURE — 3331090001 HH PPS REVENUE CREDIT

## 2019-03-24 PROCEDURE — 3331090001 HH PPS REVENUE CREDIT

## 2019-03-24 PROCEDURE — 3331090002 HH PPS REVENUE DEBIT

## 2019-03-25 PROCEDURE — 3331090002 HH PPS REVENUE DEBIT

## 2019-03-25 PROCEDURE — 3331090001 HH PPS REVENUE CREDIT

## 2019-03-26 ENCOUNTER — HOME CARE VISIT (OUTPATIENT)
Dept: SCHEDULING | Facility: HOME HEALTH | Age: 84
End: 2019-03-26
Payer: MEDICARE

## 2019-03-26 PROCEDURE — G0299 HHS/HOSPICE OF RN EA 15 MIN: HCPCS

## 2019-03-26 PROCEDURE — 3331090002 HH PPS REVENUE DEBIT

## 2019-03-26 PROCEDURE — 3331090001 HH PPS REVENUE CREDIT

## 2019-03-26 PROCEDURE — 3331090003 HH PPS REVENUE ADJ

## 2019-03-27 ENCOUNTER — OFFICE VISIT (OUTPATIENT)
Dept: CARDIOLOGY CLINIC | Age: 84
End: 2019-03-27

## 2019-03-27 ENCOUNTER — TELEPHONE (OUTPATIENT)
Dept: CARDIOLOGY CLINIC | Age: 84
End: 2019-03-27

## 2019-03-27 VITALS
HEART RATE: 70 BPM | SYSTOLIC BLOOD PRESSURE: 126 MMHG | TEMPERATURE: 98.1 F | DIASTOLIC BLOOD PRESSURE: 78 MMHG | OXYGEN SATURATION: 98 % | RESPIRATION RATE: 18 BRPM

## 2019-03-27 VITALS
DIASTOLIC BLOOD PRESSURE: 54 MMHG | OXYGEN SATURATION: 98 % | SYSTOLIC BLOOD PRESSURE: 156 MMHG | BODY MASS INDEX: 31.99 KG/M2 | RESPIRATION RATE: 24 BRPM | WEIGHT: 216 LBS | HEIGHT: 69 IN

## 2019-03-27 DIAGNOSIS — I10 ESSENTIAL HYPERTENSION, BENIGN: ICD-10-CM

## 2019-03-27 DIAGNOSIS — Z79.01 LONG TERM CURRENT USE OF ANTICOAGULANT THERAPY: Chronic | ICD-10-CM

## 2019-03-27 DIAGNOSIS — R06.09 DOE (DYSPNEA ON EXERTION): ICD-10-CM

## 2019-03-27 DIAGNOSIS — Z98.890 S/P MVR (MITRAL VALVE REPAIR): ICD-10-CM

## 2019-03-27 DIAGNOSIS — I73.9 PVD (PERIPHERAL VASCULAR DISEASE) (HCC): ICD-10-CM

## 2019-03-27 DIAGNOSIS — Z79.4 CONTROLLED TYPE 2 DIABETES MELLITUS WITH STAGE 3 CHRONIC KIDNEY DISEASE, WITH LONG-TERM CURRENT USE OF INSULIN (HCC): ICD-10-CM

## 2019-03-27 DIAGNOSIS — I34.0 NON-RHEUMATIC MITRAL REGURGITATION: ICD-10-CM

## 2019-03-27 DIAGNOSIS — I05.0 MITRAL VALVE STENOSIS, UNSPECIFIED ETIOLOGY: ICD-10-CM

## 2019-03-27 DIAGNOSIS — I50.32 CHRONIC DIASTOLIC HEART FAILURE (HCC): ICD-10-CM

## 2019-03-27 DIAGNOSIS — K21.00 REFLUX ESOPHAGITIS: ICD-10-CM

## 2019-03-27 DIAGNOSIS — K22.2 SCHATZKI'S RING OF DISTAL ESOPHAGUS: ICD-10-CM

## 2019-03-27 DIAGNOSIS — E11.22 CONTROLLED TYPE 2 DIABETES MELLITUS WITH STAGE 3 CHRONIC KIDNEY DISEASE, WITH LONG-TERM CURRENT USE OF INSULIN (HCC): ICD-10-CM

## 2019-03-27 DIAGNOSIS — I34.0 NON-RHEUMATIC MITRAL REGURGITATION: Primary | ICD-10-CM

## 2019-03-27 DIAGNOSIS — I48.20 CHRONIC ATRIAL FIBRILLATION (HCC): ICD-10-CM

## 2019-03-27 DIAGNOSIS — Z98.890 S/P MVR (MITRAL VALVE REPAIR): Primary | ICD-10-CM

## 2019-03-27 DIAGNOSIS — N18.30 CONTROLLED TYPE 2 DIABETES MELLITUS WITH STAGE 3 CHRONIC KIDNEY DISEASE, WITH LONG-TERM CURRENT USE OF INSULIN (HCC): ICD-10-CM

## 2019-03-27 PROCEDURE — 3331090001 HH PPS REVENUE CREDIT

## 2019-03-27 PROCEDURE — 3331090002 HH PPS REVENUE DEBIT

## 2019-03-27 RX ORDER — ENOXAPARIN SODIUM 100 MG/ML
1 INJECTION SUBCUTANEOUS EVERY 12 HOURS
Qty: 10 SYRINGE | Refills: 0 | Status: SHIPPED | OUTPATIENT
Start: 2019-04-11 | End: 2019-04-27

## 2019-03-27 NOTE — PROGRESS NOTES
Patient: Max Hebert   Age: 82 y.o. Patient Care Team:  Benjie Blood MD as PCP - General  Linh Berger MD as Physician (Urology)  Alexandr Cazares MD as Physician (Cardiology)  Nancy Claros MD as Physician (General Surgery)  Barney Nina MD (Ophthalmology)  Joan Rodriguez MD (General Surgery)    PCP: Benjie Blood MD    Cardiologist: Marcelo Greco    Diagnosis/Reason for Consultation: The primary encounter diagnosis was Non-rheumatic mitral regurgitation. 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    Advance directive in chart Z78.9    Type 2 diabetes, controlled, with renal manifestation (HCC) E11.29    Chronic atrial fibrillation (HCC) I48.2    Mixed hyperlipidemia E78.2    Atherosclerosis of native coronary artery without angina pectoris I25.10    Hypothyroidism, acquired, autoimmune E06.3    Heart valve problem I38    Mitral regurgitation I34.0    S/P MVR (mitral valve repair) A98.714    Active advance directive on file Z78.9    Non-rheumatic mitral regurgitation I34.0    Heart failure (HCC) I50.9    Bilateral leg edema R60.0    Esophageal stricture K22.2    Dysphagia R13.10    Cellulitis of right lower leg L03.115    GI bleeding K92.2      HPI: 82 y.o.  female s/pTMVR (1 MitraClip on the A2/P2 mitral leaflet scallops with reduction of the mitral regurgitation from severe to none) on 6/10/2016 for severe and symptomatic MR. PMHx of PVD with venous stasis and ulcers, HTN, CHARITY s/p renal artery stenting, HLD, CAD, DMII (insulin dependent), CVA (2007), OA (receives B knee cortisone injections), Hypothyroid, Afib/aflutter s/p ablation (2009 on rivoroxaban/dofetilide), & Schatzki's ring s/p dilation X 2 (most recent 11/2018). She initially did very well after her TMVr but then developed significant SOB/LEIJA about 6 months later. We worked aggressively to achieve GDMT for her MR/MS (HR 60 & -130) but had some difficulty in doing so d/t pt understanding and short term memory issues and her daughter had to get involved with her medication management. Since our last visit in March 2017, Ms. Chin Olsen has moved to a 55+ community. She remains independent. She is back today as a consult from Dr. Ashley Ontiveros to the [address] [place] [address] for her worsening MS.      Ms. Chin Olsen reports considerable SOB/LEIJA with minimal activity to include ADLs and it is often associated w/ dizziness. She denies any CP, chest tightness, palpitations, syncope, or falls. She also denies any orthopnea or PND and no longer is seen in the wound clinic for lymphedema wraps as her LE ulcers have healed and her LE edema is now well controlled w/ diuretics and compression stockings. She reports a good appetite and routine BMs w/o any blood/tarriness/blackness since her Feb 2019 hospitalization for rectal bleeding d/t rectal prolapse.        She underwent a R/L cath and RUDDY that revealed clean coronaries, pulm HTN and significant MS. Her metoprolol was increased from 50mg to 75 mg BID at that time and her furosemide was decreased to once daily. Ms. Bandar Llanos lives at [facility] at [facility]. She has a  q 3 weeks but does her own cooking, routine cleaning and medication administration. She weighs herself about once a week and denies any wt changes in the past 2 months despite a reduction in her diuretics after her cath. She also reports taking her BP at home as well but is unsure of the typical readings and does not have a log with her today. She is accompanied by her daughter today. NYHA Classification: III   Class III (Moderate): Marked limitation of physical activity. Comfortable at rest, but less than ordinary activity causes fatigue, palpitation, or dyspnea     Angina Classification: 0   Class 0: No symptoms     Past Medical History:   Diagnosis Date    Atrial fibrillation ([address] 10/29/2009    Bladder cancer ([address]     Coagulation disorder (HCC)     Chronic prophylactic anticoagulation med    Colon polyps     Diabetes ([address]     GERD (gastroesophageal reflux disease)     Heart valve problem     leaking heart valve    Hypercholesterolemia     Hypertension     Hypothyroidism     Hypothyroidism 4/23/2009    Hypothyroidism, acquired, autoimmune 11/23/2015    Overweight and obesity     PUD (peptic ulcer disease)     S/P ablation of atrial flutter[V45.89HM] 2009    @ [facility] >> atrial fibrillation    T. I.A. 4/23/2009    TIA (transient ischemic attack)     Ulcer of right lower extremity, limited to breakdown of skin ([address] 7/5/2018       Past Surgical History:   Procedure Laterality Date    CARDIAC SURG PROCEDURE UNLIST      ablation    COLONOSCOPY N/A 2/20/2019    COLONOSCOPY performed [facility] J[facility]oti [facility]essler [facility] ENDOSCOPY    COLONOSCOPY,DIAGNOSTIC  2/20/2019         HX APPENDECTOMY      HX CHOLECYSTECTOMY      HX HYSTERECTOMY      HX KNEE ARTHROSCOPY  [id number]    right knee    HX ORTHOPAEDIC      HX UROLOGICAL      RENAL STENT, tur-b    VASCULAR SURGERY PROCEDURE UNLIST      removed vein in right leg      Social History     Tobacco Use    Smoking status: Former Smoker     Packs/day: 0.50     Years: 10.00     Pack years: 5.00     Types: Cigarettes     Last attempt to quit: 1967     Years since quittin.2    Smokeless tobacco: Never Used   Substance Use Topics    Alcohol use: No     Alcohol/week: 0.0 oz      Family History   Problem Relation Age of Onset    Stroke Other     Arthritis-osteo Sister         spinal stenosis    Gout Son     Hypertension Son     Hypertension Mother     Heart Disease Mother         CAD    Heart Disease Father         CAD    Alcohol abuse Neg Hx     Asthma Neg Hx     Bleeding Prob Neg Hx     Cancer Neg Hx     Diabetes Neg Hx     Elevated Lipids Neg Hx     Headache Neg Hx     Lung Disease Neg Hx     Migraines Neg Hx     Psychiatric Disorder Neg Hx     Mental Retardation Neg Hx      Prior to Admission medications    Medication Sig Start Date End Date Taking? Authorizing Provider   amLODIPine (NORVASC) 5 mg tablet Take 5 mg by mouth daily. Yes Provider, Historical   furosemide (LASIX) 80 mg tablet Take one tablet PO twice daily 3/15/19  Yes Rik Schaffer III, DO   metoprolol tartrate (LOPRESSOR) 50 mg tablet Take one and one half pill twice daily 3/15/19  Yes Milton Schaffer H III, DO   levothyroxine (SYNTHROID) 125 mcg tablet TAKE ONE TABLET BY MOUTH DAILY 3/13/19  Yes Remberto Murray MD   fluocinoNIDE (LIDEX) 0.05 % topical cream ALONSO EXT AA BID FOR 14 DAYS THEN PRF RASH 19  Yes Provider, Historical   insulin NPH (HUMULIN N NPH INSULIN KWIKPEN) 100 unit/mL (3 mL) inpn INJECT 43 UNITS UNDER THE SKIN EVERY MORNING, 23 UNITS UNDER THE SKIN WITH DINNER OR AS DIRECTED BY PHYSICIAN.  **THIS REPLACED HUMULOG MIX** reported as changed on 19  Yes Remberto Murray MD   atorvastatin (LIPITOR) 80 mg tablet TAKE ONE TABLET BY MOUTH EVERY EVENING 2/11/19  Yes Garfield Murray MD   allopurinol (ZYLOPRIM) 100 mg tablet TAKE TWO TABLETS BY MOUTH DAILY 2/5/19  Yes Garfield Murray MD   lisinopril (PRINIVIL, ZESTRIL) 20 mg tablet TAKE ONE TABLET BY MOUTH DAILY 12/10/18  Yes Garfield Murray MD   rivaroxaban (XARELTO) 15 mg tab tablet Take 1 Tab by mouth daily (with dinner). 9/20/18  Yes Garfield Murray MD   ipratropium (ATROVENT) 42 mcg (0.06 %) nasal spray 2 Sprays by Both Nostrils route four (4) times daily. As needed runny nose 9/20/18  Yes Garfield Murray MD   ACCU-CHEK SHAHIDA PLUS TEST STRP strip USE TO CHECK BLOOD SUGAR FOUR TIMES A DAY 8/28/18  Yes Redell Check, MD   acetaminophen (TYLENOL) 325 mg tablet Take 325 mg by mouth every four (4) hours as needed for Pain. Yes Provider, Historical   ADAN PEN NEEDLE 32 gauge x 5/32\" ndle USE WITH INSULIN PEN TWO TIMES A DAY AS DIRECTED BY PHYSICIAN 4/18/18  Yes Garfield Murray MD   hydrALAZINE (APRESOLINE) 10 mg tablet Take 2 Tabs by mouth three (3) times daily. 1/23/17  Yes Dom Loving MD   potassium chloride SR (KLOR-CON 10) 10 mEq tablet Take 1 Tab by mouth daily. 1/23/17  Yes Dom Loving MD   omeprazole (PRILOSEC) 20 mg capsule Take 20 mg by mouth daily. Yes Provider, Historical   dofetilide (TIKOSYN) 125 mcg capsule Take 1 Cap by mouth two (2) times a day. 4/29/11  Yes Garfield Murray MD       Allergies   Allergen Reactions    Actos [Pioglitazone] Swelling     Swelling of feet and legs    Codeine Itching    Doxycycline Nausea Only    Hydrocodone Rash and Other (comments)     hallucinations       Current Medications:   Current Outpatient Medications   Medication Sig Dispense Refill    amLODIPine (NORVASC) 5 mg tablet Take 5 mg by mouth daily.       furosemide (LASIX) 80 mg tablet Take one tablet PO twice daily 60 Tab 11    metoprolol tartrate (LOPRESSOR) 50 mg tablet Take one and one half pill twice daily 90 Tab 11    levothyroxine (SYNTHROID) 125 mcg tablet TAKE ONE TABLET BY MOUTH DAILY 90 Tab 7    fluocinoNIDE (LIDEX) 0.05 % topical cream ALONSO EXT AA BID FOR 14 DAYS THEN PRF RASH  5    insulin NPH (HUMULIN N NPH INSULIN KWIKPEN) 100 unit/mL (3 mL) inpn INJECT 43 UNITS UNDER THE SKIN EVERY MORNING, 23 UNITS UNDER THE SKIN WITH DINNER OR AS DIRECTED BY PHYSICIAN. **THIS REPLACED HUMULOG MIX** reported as changed on 2/22/19 15 Pen 97    atorvastatin (LIPITOR) 80 mg tablet TAKE ONE TABLET BY MOUTH EVERY EVENING 90 Tab 0    allopurinol (ZYLOPRIM) 100 mg tablet TAKE TWO TABLETS BY MOUTH DAILY 180 Tab 2    lisinopril (PRINIVIL, ZESTRIL) 20 mg tablet TAKE ONE TABLET BY MOUTH DAILY 30 Tab 10    rivaroxaban (XARELTO) 15 mg tab tablet Take 1 Tab by mouth daily (with dinner). 30 Tab 11    ipratropium (ATROVENT) 42 mcg (0.06 %) nasal spray 2 Sprays by Both Nostrils route four (4) times daily. As needed runny nose 15 mL 11    ACCU-CHEK SHAHIDA PLUS TEST STRP strip USE TO CHECK BLOOD SUGAR FOUR TIMES A  Strip 9    acetaminophen (TYLENOL) 325 mg tablet Take 325 mg by mouth every four (4) hours as needed for Pain.  ADAN PEN NEEDLE 32 gauge x 5/32\" ndle USE WITH INSULIN PEN TWO TIMES A DAY AS DIRECTED BY PHYSICIAN 100 Pen Needle 11    hydrALAZINE (APRESOLINE) 10 mg tablet Take 2 Tabs by mouth three (3) times daily. 180 Tab 12    potassium chloride SR (KLOR-CON 10) 10 mEq tablet Take 1 Tab by mouth daily. 30 Tab 12    omeprazole (PRILOSEC) 20 mg capsule Take 20 mg by mouth daily.  dofetilide (TIKOSYN) 125 mcg capsule Take 1 Cap by mouth two (2) times a day. 60 Cap 3       Vitals: Blood pressure 156/54, pulse (P) 66, temperature (P) 97.5 °F (36.4 °C), temperature source (P) Oral, resp. rate 24, height 5' 9\" (1.753 m), weight 216 lb (98 kg), SpO2 98 %.      Pre 6 min walk VS: HR 66, RR 20, 143/54, 99% sat on RA    Allergies: is allergic to actos [pioglitazone]; codeine; doxycycline; and hydrocodone. Review of Systems: Pertinent Positives per HPI   [x]Total of 13 systems reviewed as follows:   Constitutional: Negative fever, negative chills  Eyes:               Negative for amauroses fugax  ENT:                Negative sore throat,oral absecess  Endocrine        Negative for thyroid goiter  Respiratory:     Negative chronic cough,sputum production  Cards:              Negative for palpitations, varicosities, claudication  GI:                   Negative for dysphagia, bleeding, nausea, vomiting, diarrhea, and abdominal pain  Genitourinary: Negative for frequency, dysuria  Integument:     Negative for rash and pruritus  Hematologic:   Negative for easy bruising; bleeding dyscarsia  Musculoskel:   Negative for muscle weakness inhibiting ambulation  Neurological:   Negative for stroke, TIA, syncope, dizziness  Behavl/Psych: Negative for feelings of anxiety, depression      Cardiovascular Testing:   TTE 12/1/2016:  LEFT VENTRICLE: Size was normal. Systolic function was normal. Ejection fraction was estimated in the range of 55 % to 60 %. No obvious wall motion abnormalities identified in the views obtained. Wall thickness was moderately increased. RIGHT VENTRICLE: The size was normal. Systolic function was normal. Wall thickness was normal. LEFT ATRIUM: The atrium was dilated. RIGHT ATRIUM: Size was normal. MITRAL VALVE: There was markedly reduced leaflet separation following clip procedure. [facility] DOPPLER: There was trivial regurgitation. AORTIC VALVE: Leaflets exhibited normal cuspal separation and sclerosis. TRICUSPID VALVE: Normal valve structure. There was normal leaflet separation. DOPPLER: The transtricuspid velocity was within the normal range. There was no evidence for tricuspid stenosis. There was mild regurgitation. Pulmonary artery systolic pressure: 40 mmHg. PULMONIC VALVE: Leaflets exhibited normal thickness, no calcification, and normal cuspal separation.  DOPPLER: The transpulmonic velocity was within the normal range. There was no regurgitation. AORTA: The root exhibited normal size. PERICARDIUM: There was no pericardial effusion. The pericardium was normal in appearance. Mitral valve   (Reference normals)  Peak gradient   27 mmHg   (--)  Mean gradient   9.07 mmHg   (--)    TTE 3/4/2019: 65-70% LVEF, RVSP 60 mmHg, mild TR, mild MR & severe MS w/ meanPG 13 mmHg, RV wnl, normal diastolic fxn     R Cardiac catheterization with transeptal crossing 1/20/2017: Following the right heart catheterization and transseptal puncture, a 4F glide catheter was advanced across the mitral valve, obtaining left atrial and ventricular pressures simultaneously. Metoprolol was given to slow the patient's HR to 60's and repeat hemodynamic measurements performed. Hemodynamics  Baseline: RA 20/18/15, RV 60/20, PA 60/25/37, PW 25, LA 28/55/28, /25, MV mean pressure 4mmHg, HR 72  Post metoprolol: LA 28/55/28, /25; HR 64    L/R cath 3/15/2019:    1. No angiographic epicardial coronary disease   2. Elevated right and left heart pressures   3. Pulmonary hypertension of post-capillary etiology   4. Normal cardiac outputs and indices   5. Severe mitral stenosis    Hemodynamics: Ao: 154/57/89    LV: 154/25    RA: 20 w/ 'v' to 28    RV: 89/20    PA: 75/30/45    PCWP: 30 w/ 'v' to 37    PVR: 2.7    CO: 5.6 (f)    CI: 2.6 (td)    Ao Sat: 98%    PA Sat: 73%   Mitral Valve Study:   Mean gradient 12.73 mmHg   MVA 1.85 cm2   Cors:    Dominance: [] Right  [] Left  [x] Mixed   LM: Large caliber vessel without significant stenosis   RI: Moderate caliber vessel without significant stenosis. LAD: Large caliber vessel that wraps around the apex without significant stenosis. D1: Moderate caliber vessel without significant stenosis. D2: Small caliber vessel without significant stenosis. D3: Small caliber vessel without significant stenosis. LCX: Moderate caliber vessel without significant stenosis.      LPDA: Small caliber vessel without significant stenosis. RCA: Large caliber dominant vessel without significant stenosis. PDA: Moderate caliber vessel without significant stenosis. PLB: Moderate caliber vessel without significant stenosis. Physical Exam:  General: Well nourished well groomed elderly woman appearing stated age accompanied by her daughter  Neuro: A&OX3. FRANKLIN. PERRL. Steady un-assisted gait  Head:Normocephalic. Atraumatic. Symmetrical  Neck: Trachea Midline  Resp: CTA B. No Adv BS/cough/sputum/tachypnea with seated conversation  CV: S1S2 RRR. NELSY III/VI. No JVD/carotid bruits. Pink/warm/dry extremities. 3+ LE peripheral edema  GI:Benign ab. Soft. NT/ND. Active BS  : Voids  Integ: No obvious s/s of infection or breakdown. Compression stockings on  Musculo/Skeletal: FROM in all major joints. Good muscle tone     Clinic Evaluation:   KCCQ-12: scanned into EMR    6 minute walk test: PreTest HR/02 sat:  See VS this visit - unable to complete 6 min walk w/o sitting/resting. Post Test HR/02 sat: 89 / 98% sat on RA             Distance Walked: 260 ft 9 inches    Frality Survey:  Cherilynn Boom Index ADL - 6/6  scanned into EMR     STS 2.81 Risk Score / Predicted 30 day mortality: - calculations scanned into [facility] Adult Cardiac Surgery Database Version 2.9    RISK SCORES Procedure: Isolated MVR CALCULATE   Risk of Mortality:  6.218%    Renal Failure:  8.524%    Permanent Stroke:  2.829%    Prolonged Ventilation:  16.929%    DSW Infection:  0.199%    Reoperation:  4.631%    Morbidity or Mortality:  23.633%    Short Length of Stay:  6.953%    Long Length of Stay:  17.948%     Assessment/Plan:   1. MR/MS s/p TMVr 2016- severe MS and symptomatic. Needs open MVR. At least intermediate risk candidate. Risks/benefits reviewed w/ pt and daughter and they wish to proceed. Per Dr. Beckie Gramajo, to admit 24-48 hrs prior to surgery for PA cath placement and pharmaceutical optimization.   2. Further plan/care by Jonathan Baum [place]

## 2019-03-27 NOTE — TELEPHONE ENCOUNTER
Spoke to pt's Fam Sosa. Pt's scheduled for April 18th, pt to be preadmitted on 4/16 for PA line. Pt to take last dose of Xarelto on 4/10. Start Lovenox syringes 100 mg SQ BID on 4/11/19. Rx called to [facility]. DIscussed how to give injection, but also recommended asking pharmacist for demo. Pt will also need to hold lasix, kcl, lisinopril 48 hrs preop (4/16). Daughter stated understanding.

## 2019-03-27 NOTE — PROGRESS NOTES
Pt seen and examined  Previous studies and hospital course reviewed and discussed with Dr Mojgan Palmer  She now has severe MS and PA hypertension with increasing symx  Discussed risk and indications for MVR   She wants to proceed will admit for PA cath   Hold eliquis for 6 days

## 2019-04-02 ENCOUNTER — HOSPITAL ENCOUNTER (OUTPATIENT)
Dept: INFUSION THERAPY | Age: 84
Discharge: HOME | End: 2019-04-02
Payer: MEDICARE

## 2019-04-02 VITALS
SYSTOLIC BLOOD PRESSURE: 113 MMHG | RESPIRATION RATE: 18 BRPM | DIASTOLIC BLOOD PRESSURE: 55 MMHG | HEART RATE: 59 BPM | TEMPERATURE: 97.7 F

## 2019-04-02 DIAGNOSIS — D50.9 IRON DEFICIENCY ANEMIA, UNSPECIFIED IRON DEFICIENCY ANEMIA TYPE: Primary | ICD-10-CM

## 2019-04-02 PROCEDURE — 96365 THER/PROPH/DIAG IV INF INIT: CPT

## 2019-04-02 PROCEDURE — 74011250636 HC RX REV CODE- 250/636: Performed by: INTERNAL MEDICINE

## 2019-04-02 RX ADMIN — FERRIC CARBOXYMALTOSE INJECTION 750 MG: 50 INJECTION, SOLUTION INTRAVENOUS at 13:45

## 2019-04-02 NOTE — PROGRESS NOTES
1310 Pt arrived at [facility] ambulatory and in no distress for Injectafer #1 of 2. Assessment completed, no new complaints voiced. Pt has SOB with activity, fatigued and intermittent light headedness. IV established. Injectafer discussed. Labs reviewed from 3/14/19. Patient Vitals for the past 12 hrs:   Temp Pulse Resp BP   04/02/19 1436 -- (!) 59 -- 113/55   04/02/19 1311 97.7 °F (36.5 °C) 68 18 117/54       Medications received: Injectafer    1440 Pt monitored 30 minutes post infusion. Tolerated treatment well, no adverse reaction noted. Discharge instructions given. IV d/c'd. D/Cd from [facility] ambulatory and in no distress accompanied by son. Next appt 4/9.

## 2019-04-02 NOTE — DISCHARGE INSTRUCTIONS
OUTPATIENT INFUSION CENTER    DISCHARGE INSTRUCTIONS FOR:    IRON INFUSIONS - INCLUDING VENOFER, FERRLECIT, AND INFED    You should continue to take your usual home medications unless otherwise instructed by your physician. Drink plenty of fluids and eat your usual diet. All medications have the potential to cause side effects. Your physician can instruct you regarding any necessary treatment for side effects. Some possible side effects of iron infusions may include the following:     - Urinary changes;   - Mild muscle cramping;   - Mild nausea, stomach pain, diarrhea or constipation;   - Mild skin itching, mild pain at IV site. Signs/Symptoms of an allergic reaction may require immediate medical attention. These may include one or more of the following:       Skin redness, itching, swelling, blistering, weeping, crusting, rash or hives. Wheezing, chest tightness, cough, or shortness of breath;   Swelling of the face, eyelids, lips, tongue, or throat;  Severe headache, seizures or tremor;  Stuffy nose, runny nose, sneezing;   Red (bloodshot), itchy, swollen, or watery eyes;  Stomach  pain, nausea, vomiting, diarrhea or bloody diarrhea; Chest pain or tightness, increased heart rate, palpitations, changes in blood pressure which can cause dizziness, unusual feelings of weakness or fatigue;  Back ache or pain around waist;  Painful urination, increase or decrease in amount of urine, blood in urine. Contact your physicians office with any questions or concerns regarding your treatment.     Renuka Patel, Signature: _____________________________________ 4/2/2019  Carole Guzman RN

## 2019-04-04 ENCOUNTER — TELEPHONE (OUTPATIENT)
Dept: INTERNAL MEDICINE CLINIC | Age: 84
End: 2019-04-04

## 2019-04-08 NOTE — TELEPHONE ENCOUNTER
Advised pt Dr Jasen Baker and staff wanted to wish her the best of luck. She said thank you - that the call means so much to her. Advised her I will keep her in my prayers.

## 2019-04-09 ENCOUNTER — HOSPITAL ENCOUNTER (OUTPATIENT)
Dept: INFUSION THERAPY | Age: 84
Discharge: HOME | End: 2019-04-09
Payer: MEDICARE

## 2019-04-09 VITALS
HEART RATE: 65 BPM | SYSTOLIC BLOOD PRESSURE: 120 MMHG | DIASTOLIC BLOOD PRESSURE: 63 MMHG | OXYGEN SATURATION: 97 % | RESPIRATION RATE: 18 BRPM | TEMPERATURE: 97.8 F

## 2019-04-09 DIAGNOSIS — D50.9 IRON DEFICIENCY ANEMIA, UNSPECIFIED IRON DEFICIENCY ANEMIA TYPE: Primary | ICD-10-CM

## 2019-04-09 PROCEDURE — 74011250636 HC RX REV CODE- 250/636: Performed by: INTERNAL MEDICINE

## 2019-04-09 PROCEDURE — 96365 THER/PROPH/DIAG IV INF INIT: CPT

## 2019-04-09 RX ORDER — SODIUM CHLORIDE 0.9 % (FLUSH) 0.9 %
10 SYRINGE (ML) INJECTION AS NEEDED
Status: DISCONTINUED | OUTPATIENT
Start: 2019-04-09 | End: 2019-04-10 | Stop reason: HOSPADM

## 2019-04-09 RX ORDER — SODIUM CHLORIDE 9 MG/ML
10 INJECTION INTRAMUSCULAR; INTRAVENOUS; SUBCUTANEOUS AS NEEDED
Status: DISCONTINUED | OUTPATIENT
Start: 2019-04-09 | End: 2019-04-10 | Stop reason: HOSPADM

## 2019-04-09 RX ADMIN — FERRIC CARBOXYMALTOSE INJECTION 750 MG: 50 INJECTION, SOLUTION INTRAVENOUS at 14:22

## 2019-04-09 NOTE — PROGRESS NOTES
1400 Pt arrived at [facility] and in no distress for injectafer dose 2 of 2. Assessment completed, no new complaints voiced. IV started right AC. Medications received: Injectafer 750 mg IV over 20 min    Pt observed x 30 min post infusion. Right IV flushed and removed. Patient Vitals for the past 8 hrs:   Temp Pulse Resp BP SpO2   04/09/19 1522 -- 65 18 120/63 --   04/09/19 1427 97.8 °F (36.6 °C) 67 18 116/58 97 %         1515 Tolerated treatment well, no adverse reaction noted. D/Cd from [facility] and in no distress accompanied by family member.  No more appts at [facility] pt will follow up with physician

## 2019-04-16 ENCOUNTER — APPOINTMENT (OUTPATIENT)
Dept: GENERAL RADIOLOGY | Age: 84
DRG: 220 | End: 2019-04-16
Attending: PHYSICIAN ASSISTANT
Payer: MEDICARE

## 2019-04-16 ENCOUNTER — APPOINTMENT (OUTPATIENT)
Dept: GENERAL RADIOLOGY | Age: 84
DRG: 220 | End: 2019-04-16
Attending: THORACIC SURGERY (CARDIOTHORACIC VASCULAR SURGERY)
Payer: MEDICARE

## 2019-04-16 ENCOUNTER — HOSPITAL ENCOUNTER (INPATIENT)
Age: 84
LOS: 11 days | Discharge: NURSING, REHAB OR LONG-TERM CARE | DRG: 220 | End: 2019-04-27
Attending: THORACIC SURGERY (CARDIOTHORACIC VASCULAR SURGERY) | Admitting: THORACIC SURGERY (CARDIOTHORACIC VASCULAR SURGERY)
Payer: MEDICARE

## 2019-04-16 ENCOUNTER — APPOINTMENT (OUTPATIENT)
Dept: VASCULAR SURGERY | Age: 84
DRG: 220 | End: 2019-04-16
Attending: PHYSICIAN ASSISTANT
Payer: MEDICARE

## 2019-04-16 DIAGNOSIS — Z79.4 CONTROLLED TYPE 2 DIABETES MELLITUS WITH STAGE 3 CHRONIC KIDNEY DISEASE, WITH LONG-TERM CURRENT USE OF INSULIN (HCC): ICD-10-CM

## 2019-04-16 DIAGNOSIS — I25.10 ATHEROSCLEROSIS OF NATIVE CORONARY ARTERY OF NATIVE HEART WITHOUT ANGINA PECTORIS: ICD-10-CM

## 2019-04-16 DIAGNOSIS — N18.30 CONTROLLED TYPE 2 DIABETES MELLITUS WITH STAGE 3 CHRONIC KIDNEY DISEASE, WITH LONG-TERM CURRENT USE OF INSULIN (HCC): ICD-10-CM

## 2019-04-16 DIAGNOSIS — E11.22 CONTROLLED TYPE 2 DIABETES MELLITUS WITH STAGE 3 CHRONIC KIDNEY DISEASE, WITH LONG-TERM CURRENT USE OF INSULIN (HCC): ICD-10-CM

## 2019-04-16 PROBLEM — I05.0 MITRAL STENOSIS: Status: ACTIVE | Noted: 2019-04-16

## 2019-04-16 LAB
ALBUMIN SERPL-MCNC: 3.3 G/DL (ref 3.5–5)
ALBUMIN/GLOB SERPL: 1.3 {RATIO} (ref 1.1–2.2)
ALP SERPL-CCNC: 76 U/L (ref 45–117)
ALT SERPL-CCNC: 59 U/L (ref 12–78)
ANION GAP SERPL CALC-SCNC: 3 MMOL/L (ref 5–15)
APTT PPP: 42.1 SEC (ref 22.1–32)
ARTERIAL PATENCY WRIST A: ABNORMAL
ARTERIAL PATENCY WRIST A: YES
AST SERPL-CCNC: 35 U/L (ref 15–37)
BASE EXCESS BLD CALC-SCNC: 0 MMOL/L
BASE EXCESS BLDV CALC-SCNC: 3 MMOL/L
BASOPHILS # BLD: 0 K/UL (ref 0–0.1)
BASOPHILS NFR BLD: 1 % (ref 0–1)
BDY SITE: ABNORMAL
BDY SITE: ABNORMAL
BILIRUB SERPL-MCNC: 0.3 MG/DL (ref 0.2–1)
BNP SERPL-MCNC: 477 PG/ML
BUN SERPL-MCNC: 28 MG/DL (ref 6–20)
BUN/CREAT SERPL: 15 (ref 12–20)
CALCIUM SERPL-MCNC: 8.3 MG/DL (ref 8.5–10.1)
CHLORIDE SERPL-SCNC: 111 MMOL/L (ref 97–108)
CO2 SERPL-SCNC: 29 MMOL/L (ref 21–32)
CREAT SERPL-MCNC: 1.93 MG/DL (ref 0.55–1.02)
DIFFERENTIAL METHOD BLD: ABNORMAL
EOSINOPHIL # BLD: 0.1 K/UL (ref 0–0.4)
EOSINOPHIL NFR BLD: 3 % (ref 0–7)
ERYTHROCYTE [DISTWIDTH] IN BLOOD BY AUTOMATED COUNT: 16.4 % (ref 11.5–14.5)
EST. AVERAGE GLUCOSE BLD GHB EST-MCNC: 154 MG/DL
GAS FLOW.O2 O2 DELIVERY SYS: ABNORMAL L/MIN
GAS FLOW.O2 O2 DELIVERY SYS: ABNORMAL L/MIN
GAS FLOW.O2 SETTING OXYMISER: 3 L/M
GLOBULIN SER CALC-MCNC: 2.5 G/DL (ref 2–4)
GLUCOSE BLD STRIP.AUTO-MCNC: 149 MG/DL (ref 65–100)
GLUCOSE BLD STRIP.AUTO-MCNC: 152 MG/DL (ref 65–100)
GLUCOSE BLD STRIP.AUTO-MCNC: 66 MG/DL (ref 65–100)
GLUCOSE BLD STRIP.AUTO-MCNC: 97 MG/DL (ref 65–100)
GLUCOSE SERPL-MCNC: 109 MG/DL (ref 65–100)
HBA1C MFR BLD: 7 % (ref 4.2–6.3)
HCO3 BLD-SCNC: 24.5 MMOL/L (ref 22–26)
HCO3 BLDV-SCNC: 28 MMOL/L (ref 23–28)
HCT VFR BLD AUTO: 29.5 % (ref 35–47)
HGB BLD-MCNC: 8.9 G/DL (ref 11.5–16)
IMM GRANULOCYTES # BLD AUTO: 0 K/UL (ref 0–0.04)
IMM GRANULOCYTES NFR BLD AUTO: 0 % (ref 0–0.5)
INR PPP: 1.1 (ref 0.9–1.1)
IRON SATN MFR SERPL: 27 % (ref 20–50)
IRON SERPL-MCNC: 66 UG/DL (ref 35–150)
LYMPHOCYTES # BLD: 1 K/UL (ref 0.8–3.5)
LYMPHOCYTES NFR BLD: 24 % (ref 12–49)
MAGNESIUM SERPL-MCNC: 2.3 MG/DL (ref 1.6–2.4)
MCH RBC QN AUTO: 29.6 PG (ref 26–34)
MCHC RBC AUTO-ENTMCNC: 30.2 G/DL (ref 30–36.5)
MCV RBC AUTO: 98 FL (ref 80–99)
MONOCYTES # BLD: 0.3 K/UL (ref 0–1)
MONOCYTES NFR BLD: 8 % (ref 5–13)
NEUTS SEG # BLD: 2.6 K/UL (ref 1.8–8)
NEUTS SEG NFR BLD: 64 % (ref 32–75)
NRBC # BLD: 0 K/UL (ref 0–0.01)
NRBC BLD-RTO: 0 PER 100 WBC
O2/TOTAL GAS SETTING VFR VENT: 0.21 %
O2/TOTAL GAS SETTING VFR VENT: 0.32 %
PCO2 BLD: 39.7 MMHG (ref 35–45)
PCO2 BLDV: 48.7 MMHG (ref 41–51)
PH BLD: 7.4 [PH] (ref 7.35–7.45)
PH BLDV: 7.37 [PH] (ref 7.32–7.42)
PHOSPHATE SERPL-MCNC: 2.3 MG/DL (ref 2.6–4.7)
PLATELET # BLD AUTO: 96 K/UL (ref 150–400)
PO2 BLD: 74 MMHG (ref 80–100)
PO2 BLDV: 28 MMHG (ref 25–40)
POTASSIUM SERPL-SCNC: 4.1 MMOL/L (ref 3.5–5.1)
PROT SERPL-MCNC: 5.8 G/DL (ref 6.4–8.2)
PROTHROMBIN TIME: 11.4 SEC (ref 9–11.1)
RBC # BLD AUTO: 3.01 M/UL (ref 3.8–5.2)
SAO2 % BLD: 95 % (ref 92–97)
SAO2 % BLDV: 49 % (ref 65–88)
SERVICE CMNT-IMP: ABNORMAL
SERVICE CMNT-IMP: ABNORMAL
SERVICE CMNT-IMP: NORMAL
SERVICE CMNT-IMP: NORMAL
SODIUM SERPL-SCNC: 143 MMOL/L (ref 136–145)
SPECIMEN TYPE: ABNORMAL
SPECIMEN TYPE: ABNORMAL
THERAPEUTIC RANGE,PTTT: ABNORMAL SECS (ref 58–77)
TIBC SERPL-MCNC: 245 UG/DL (ref 250–450)
TSH SERPL DL<=0.05 MIU/L-ACNC: 0.76 UIU/ML (ref 0.36–3.74)
WBC # BLD AUTO: 4 K/UL (ref 3.6–11)

## 2019-04-16 PROCEDURE — 74011636637 HC RX REV CODE- 636/637: Performed by: PHYSICIAN ASSISTANT

## 2019-04-16 PROCEDURE — 84443 ASSAY THYROID STIM HORMONE: CPT

## 2019-04-16 PROCEDURE — 71045 X-RAY EXAM CHEST 1 VIEW: CPT

## 2019-04-16 PROCEDURE — 74011250637 HC RX REV CODE- 250/637: Performed by: THORACIC SURGERY (CARDIOTHORACIC VASCULAR SURGERY)

## 2019-04-16 PROCEDURE — 74011250637 HC RX REV CODE- 250/637: Performed by: PHYSICIAN ASSISTANT

## 2019-04-16 PROCEDURE — 74011250636 HC RX REV CODE- 250/636: Performed by: PHYSICIAN ASSISTANT

## 2019-04-16 PROCEDURE — 77030013798 HC KT TRNSDUC PRSSR EDWD -B

## 2019-04-16 PROCEDURE — 65610000003 HC RM ICU SURGICAL

## 2019-04-16 PROCEDURE — 74011250636 HC RX REV CODE- 250/636: Performed by: NURSE PRACTITIONER

## 2019-04-16 PROCEDURE — 85025 COMPLETE CBC W/AUTO DIFF WBC: CPT

## 2019-04-16 PROCEDURE — P9045 ALBUMIN (HUMAN), 5%, 250 ML: HCPCS | Performed by: THORACIC SURGERY (CARDIOTHORACIC VASCULAR SURGERY)

## 2019-04-16 PROCEDURE — 93880 EXTRACRANIAL BILAT STUDY: CPT

## 2019-04-16 PROCEDURE — 86923 COMPATIBILITY TEST ELECTRIC: CPT

## 2019-04-16 PROCEDURE — 82803 BLOOD GASES ANY COMBINATION: CPT

## 2019-04-16 PROCEDURE — 74011636637 HC RX REV CODE- 636/637: Performed by: NURSE PRACTITIONER

## 2019-04-16 PROCEDURE — 74011250636 HC RX REV CODE- 250/636: Performed by: THORACIC SURGERY (CARDIOTHORACIC VASCULAR SURGERY)

## 2019-04-16 PROCEDURE — 77030027138 HC INCENT SPIROMETER -A

## 2019-04-16 PROCEDURE — 85730 THROMBOPLASTIN TIME PARTIAL: CPT

## 2019-04-16 PROCEDURE — 83880 ASSAY OF NATRIURETIC PEPTIDE: CPT

## 2019-04-16 PROCEDURE — 85610 PROTHROMBIN TIME: CPT

## 2019-04-16 PROCEDURE — 77030014008 HC SPNG HEMSTAT J&J -C

## 2019-04-16 PROCEDURE — 80053 COMPREHEN METABOLIC PANEL: CPT

## 2019-04-16 PROCEDURE — 36415 COLL VENOUS BLD VENIPUNCTURE: CPT

## 2019-04-16 PROCEDURE — 86900 BLOOD TYPING SEROLOGIC ABO: CPT

## 2019-04-16 PROCEDURE — 82962 GLUCOSE BLOOD TEST: CPT

## 2019-04-16 PROCEDURE — 83735 ASSAY OF MAGNESIUM: CPT

## 2019-04-16 PROCEDURE — 03HY32Z INSERTION OF MONITORING DEVICE INTO UPPER ARTERY, PERCUTANEOUS APPROACH: ICD-10-PCS | Performed by: ANESTHESIOLOGY

## 2019-04-16 PROCEDURE — 83036 HEMOGLOBIN GLYCOSYLATED A1C: CPT

## 2019-04-16 PROCEDURE — 71046 X-RAY EXAM CHEST 2 VIEWS: CPT

## 2019-04-16 PROCEDURE — 83540 ASSAY OF IRON: CPT

## 2019-04-16 PROCEDURE — 94010 BREATHING CAPACITY TEST: CPT

## 2019-04-16 PROCEDURE — 02HV33Z INSERTION OF INFUSION DEVICE INTO SUPERIOR VENA CAVA, PERCUTANEOUS APPROACH: ICD-10-PCS | Performed by: ANESTHESIOLOGY

## 2019-04-16 PROCEDURE — 84100 ASSAY OF PHOSPHORUS: CPT

## 2019-04-16 PROCEDURE — 74011000250 HC RX REV CODE- 250: Performed by: NURSE PRACTITIONER

## 2019-04-16 RX ORDER — PANTOPRAZOLE SODIUM 40 MG/1
40 TABLET, DELAYED RELEASE ORAL
Status: DISCONTINUED | OUTPATIENT
Start: 2019-04-16 | End: 2019-04-22

## 2019-04-16 RX ORDER — AMOXICILLIN 250 MG
1 CAPSULE ORAL DAILY
Status: DISCONTINUED | OUTPATIENT
Start: 2019-04-16 | End: 2019-04-22

## 2019-04-16 RX ORDER — DOFETILIDE 0.12 MG/1
125 CAPSULE ORAL 2 TIMES DAILY
Status: DISCONTINUED | OUTPATIENT
Start: 2019-04-16 | End: 2019-04-22

## 2019-04-16 RX ORDER — SODIUM CHLORIDE 0.9 % (FLUSH) 0.9 %
5-40 SYRINGE (ML) INJECTION AS NEEDED
Status: DISCONTINUED | OUTPATIENT
Start: 2019-04-16 | End: 2019-04-22

## 2019-04-16 RX ORDER — DEXTROSE 50 % IN WATER (D50W) INTRAVENOUS SYRINGE
12.5-25 AS NEEDED
Status: DISCONTINUED | OUTPATIENT
Start: 2019-04-16 | End: 2019-04-22

## 2019-04-16 RX ORDER — SODIUM CHLORIDE 0.9 % (FLUSH) 0.9 %
5-40 SYRINGE (ML) INJECTION EVERY 8 HOURS
Status: DISCONTINUED | OUTPATIENT
Start: 2019-04-16 | End: 2019-04-18

## 2019-04-16 RX ORDER — AMLODIPINE BESYLATE 5 MG/1
5 TABLET ORAL DAILY
Status: DISCONTINUED | OUTPATIENT
Start: 2019-04-17 | End: 2019-04-22

## 2019-04-16 RX ORDER — MAGNESIUM SULFATE 100 %
4 CRYSTALS MISCELLANEOUS AS NEEDED
Status: DISCONTINUED | OUTPATIENT
Start: 2019-04-16 | End: 2019-04-22

## 2019-04-16 RX ORDER — SODIUM CHLORIDE 9 MG/ML
9 INJECTION, SOLUTION INTRAVENOUS CONTINUOUS
Status: DISCONTINUED | OUTPATIENT
Start: 2019-04-16 | End: 2019-04-18

## 2019-04-16 RX ORDER — ENOXAPARIN SODIUM 100 MG/ML
1 INJECTION SUBCUTANEOUS 2 TIMES DAILY
Status: COMPLETED | OUTPATIENT
Start: 2019-04-16 | End: 2019-04-16

## 2019-04-16 RX ORDER — ATORVASTATIN CALCIUM 40 MG/1
80 TABLET, FILM COATED ORAL
Status: DISCONTINUED | OUTPATIENT
Start: 2019-04-16 | End: 2019-04-27 | Stop reason: HOSPADM

## 2019-04-16 RX ORDER — ALBUMIN HUMAN 50 G/1000ML
12.5 SOLUTION INTRAVENOUS ONCE
Status: COMPLETED | OUTPATIENT
Start: 2019-04-17 | End: 2019-04-16

## 2019-04-16 RX ORDER — INSULIN LISPRO 100 [IU]/ML
INJECTION, SOLUTION INTRAVENOUS; SUBCUTANEOUS
Status: DISCONTINUED | OUTPATIENT
Start: 2019-04-16 | End: 2019-04-22

## 2019-04-16 RX ORDER — ALLOPURINOL 100 MG/1
200 TABLET ORAL DAILY
Status: DISCONTINUED | OUTPATIENT
Start: 2019-04-17 | End: 2019-04-22

## 2019-04-16 RX ORDER — LEVOTHYROXINE SODIUM 125 UG/1
125 TABLET ORAL
Status: DISCONTINUED | OUTPATIENT
Start: 2019-04-17 | End: 2019-04-22

## 2019-04-16 RX ORDER — CEFAZOLIN SODIUM/WATER 2 G/20 ML
2 SYRINGE (ML) INTRAVENOUS
Status: DISCONTINUED | OUTPATIENT
Start: 2019-04-18 | End: 2019-04-17

## 2019-04-16 RX ORDER — ACETAMINOPHEN 325 MG/1
650 TABLET ORAL
Status: DISCONTINUED | OUTPATIENT
Start: 2019-04-16 | End: 2019-04-22

## 2019-04-16 RX ADMIN — ENOXAPARIN SODIUM 100 MG: 100 INJECTION SUBCUTANEOUS at 17:54

## 2019-04-16 RX ADMIN — ATORVASTATIN CALCIUM 80 MG: 40 TABLET, FILM COATED ORAL at 21:57

## 2019-04-16 RX ADMIN — DOFETILIDE 125 MCG: 0.12 CAPSULE ORAL at 17:54

## 2019-04-16 RX ADMIN — SODIUM CHLORIDE 9 ML/HR: 900 INJECTION, SOLUTION INTRAVENOUS at 20:00

## 2019-04-16 RX ADMIN — SENNOSIDES AND DOCUSATE SODIUM 1 TABLET: 8.6; 5 TABLET ORAL at 10:38

## 2019-04-16 RX ADMIN — ALBUMIN (HUMAN) 12.5 G: 12.5 INJECTION, SOLUTION INTRAVENOUS at 23:37

## 2019-04-16 RX ADMIN — PHENYLEPHRINE HYDROCHLORIDE 30 MCG/MIN: 10 INJECTION INTRAVENOUS at 23:43

## 2019-04-16 RX ADMIN — HUMAN INSULIN 14 UNITS: 100 INJECTION, SUSPENSION SUBCUTANEOUS at 19:00

## 2019-04-16 RX ADMIN — INSULIN LISPRO 2 UNITS: 100 INJECTION, SOLUTION INTRAVENOUS; SUBCUTANEOUS at 17:53

## 2019-04-16 RX ADMIN — Medication 10 ML: at 10:38

## 2019-04-16 RX ADMIN — PANTOPRAZOLE SODIUM 40 MG: 40 TABLET, DELAYED RELEASE ORAL at 10:38

## 2019-04-16 RX ADMIN — ACETAMINOPHEN 650 MG: 325 TABLET ORAL at 20:56

## 2019-04-16 RX ADMIN — ENOXAPARIN SODIUM 100 MG: 100 INJECTION SUBCUTANEOUS at 10:37

## 2019-04-16 RX ADMIN — SODIUM PHOSPHATE, MONOBASIC, MONOHYDRATE: 276; 142 INJECTION, SOLUTION INTRAVENOUS at 17:50

## 2019-04-16 RX ADMIN — Medication 10 ML: at 14:36

## 2019-04-16 NOTE — H&P
CSS 
 History and Physical 
 
Subjective: This is an updated H&P performed on 03/27. She has been doing well since she was last seen in clinic. Denies any new ulcers, chest pain, worsening LEIJA, or dizziness. \"82 y.o.  female s/pTMVR (1 MitraClip on the A2/P2 mitral leaflet scallops with reduction of the mitral regurgitation from severe to none) on 6/10/2016 for severe and symptomatic MR. PMHx of PVD with venous stasis and ulcers, HTN, CHARITY s/p renal artery stenting, HLD, CAD, DMII (insulin dependent), CVA (2007), OA (receives B knee cortisone injections), Hypothyroid, Afib/aflutter s/p ablation (2009 on rivoroxaban/dofetilide), & Schatzki's ring s/p dilation X 2 (most recent 11/2018). She initially did very well after her TMVr but then developed significant SOB/LEIJA about 6 months later. We worked aggressively to achieve GDMT for her MR/MS (HR 61 & -130) but had some difficulty in doing so d/t pt understanding and short term memory issues and her daughter had to get involved with her medication management. Since our last visit in March 2017, Ms. Yobani Hernandez has moved to a 55+ community. She remains independent. She is back today as a consult from Dr. Asif Diego to the [address] [place] [address] for her worsening MS.  
  
Ms. Yobani Hernandez reports considerable SOB/LEIJA with minimal activity to include ADLs and it is often associated w/ dizziness. She denies any CP, chest tightness, palpitations, syncope, or falls. She also denies any orthopnea or PND and no longer is seen in the wound clinic for lymphedema wraps as her LE ulcers have healed and her LE edema is now well controlled w/ diuretics and compression stockings.   She reports a good appetite and routine BMs w/o any blood/tarriness/blackness since her Feb 2019 hospitalization for rectal bleeding d/t rectal prolapse.    
  
She underwent a R/L cath and RUDDY that revealed clean coronaries, pulm HTN and significant MS. Her metoprolol was increased from 50mg to 75 mg BID at that time and her furosemide was decreased to once daily.  
  
Ms. Miller lives at [facility]. She has a  q 3 weeks but does her own cooking, routine cleaning and medication administration. She weighs herself about once a week and denies any wt changes in the past 2 months despite a reduction in her diuretics after her cath. She also reports taking her BP at home as well but is unsure of the typical readings and does not have a log with her today. She is accompanied by her daughter today. \" 
 
 
Cardiac Testing L/R cath 3/15/2019:  
            1. No angiographic epicardial coronary disease 2. Elevated right and left heart pressures 3. Pulmonary hypertension of post-capillary etiology 4. Normal cardiac outputs and indices 5. Severe mitral stenosis 
             Hemodynamics: Ao: 154/57/89 LV: 154/25 RA: 20 w/ 'v' to 28 RV: 89/20 PA: 75/30/45 PCWP: 30 w/ 'v' to 37 PVR: 2.7 CO: 5.6 (f) 
                        CI: 2.6 (td) Ao Sat: 98% PA Sat: 73% Mitral Valve Study: 
            Mean gradient 12.73 mmHg MVA 1.85 cm2 Cors: No CAD 
  
 
TTE 3/4/2019: 65-70% LVEF, RVSP 60 mmHg, mild TR, mild MR & severe MS w/ meanPG 13 mmHg, RV wnl, normal diastolic fxn Past Medical History:  
Diagnosis Date  Arthritis KNEES  Atrial fibrillation ([address] 10/29/2009  Bladder cancer ([address]  CAD (coronary artery disease) STENT PER PATIENT  Chronic pain KNEES  
 Coagulation disorder ([address] Chronic prophylactic anticoagulation med  Colon polyps  Diabetes ([address] IDDM  
 GERD (gastroesophageal reflux disease)  Gout  Heart valve problem   
 leaking heart valve  Hypercholesterolemia  Hypertension  Hypothyroidism  Hypothyroidism 2009  Hypothyroidism, acquired, autoimmune 2015  Overweight and obesity  PUD (peptic ulcer disease)   S/P ablation of atrial flutter[V45.89HM]   
 @ [facility] >> atrial fibrillation  SOB (shortness of breath) on exertion 2019  
 T. I.A. 2009  TIA (transient ischemic attack)  Ulcer of right lower extremity, limited to breakdown of skin ([facility] 2018 Past Surgical History:  
Procedure Laterality Date  CARDIAC SURG PROCEDURE UNLIST    
 ablation  COLONOSCOPY N/A 2019 COLONOSCOPY performed by Theodore Waldron [facility] at [facility] ENDOSCOPY  COLONOSCOPY,DIAGNOSTIC  2019  HX APPENDECTOMY  HX CHOLECYSTECTOMY  HX HYSTERECTOMY  HX KNEE ARTHROSCOPY  [id number]  
 right knee  HX ORTHOPAEDIC    
 HX UROLOGICAL RENAL STENT, tur-b  VASCULAR SURGERY PROCEDURE UNLIST    
 removed vein in right leg Social History Tobacco Use  Smoking status: Former Smoker Packs/day: 0.50 Years: 10.00 Pack years: 5.00 Types: Cigarettes Last attempt to quit: 1967 Years since quittin.3  Smokeless tobacco: Never Used Substance Use Topics  Alcohol use: No  
  Alcohol/week: 0.0 oz Family History Problem Relation Age of Onset  Stroke Other  Arthritis-osteo Sister   
     spinal stenosis  Gout Son   
 Hypertension Son   
[address] Hypertension Mother  Heart Disease Mother CAD  Heart Disease Father CAD  Alcohol abuse Neg Hx  Asthma Neg Hx  Bleeding Prob Neg Hx  Cancer Neg Hx  Diabetes Neg Hx  Elevated Lipids Neg Hx   
 Headache Neg Hx  Lung Disease Neg Hx  Migraines Neg Hx  Psychiatric Disorder Neg Hx  Mental Retardation Neg Hx  Anesth Problems Neg Hx Prior to Admission medications Medication Sig Start Date End Date Taking? Authorizing Provider  
enoxaparin (LOVENOX) 100 mg/mL 100 mg by SubCUTAneous route every twelve (12) hours for 5 days. 4/11/19 4/16/19 Yes Verner Carbine D, NP  
amLODIPine (NORVASC) 5 mg tablet Take 5 mg by mouth daily. Yes Provider, Historical  
furosemide (LASIX) 80 mg tablet Take one tablet PO twice daily 3/15/19  Yes Lena Schaffer III, DO  
metoprolol tartrate (LOPRESSOR) 50 mg tablet Take one and one half pill twice daily 3/15/19  Yes Lena Schaffer III, DO  
levothyroxine (SYNTHROID) 125 mcg tablet TAKE ONE TABLET BY MOUTH DAILY Patient taking differently: TAKE ONE TABLET BY MOUTH DAILY BEFORE BREAKFAST 3/13/19  Yes Luke Murray MD  
insulin NPH (HUMULIN N NPH INSULIN KWIKPEN) 100 unit/mL (3 mL) inpn INJECT 43 UNITS UNDER THE SKIN EVERY MORNING, 23 UNITS UNDER THE SKIN WITH DINNER OR AS DIRECTED BY PHYSICIAN. **THIS REPLACED HUMULOG MIX** reported as changed on 2/22/19 2/26/19  Yes Luke Murray MD  
atorvastatin (LIPITOR) 80 mg tablet TAKE ONE TABLET BY MOUTH EVERY EVENING 2/11/19  Yes Luke Murray MD  
allopurinol (ZYLOPRIM) 100 mg tablet TAKE TWO TABLETS BY MOUTH DAILY 2/5/19  Yes Luke Murray MD  
lisinopril (PRINIVIL, ZESTRIL) 20 mg tablet TAKE ONE TABLET BY MOUTH DAILY 12/10/18  Yes Luke Murray MD  
rivaroxaban (XARELTO) 15 mg tab tablet Take 1 Tab by mouth daily (with dinner). 9/20/18  Yes Luke Murray MD  
ACCU-CHEK SHAHIDA PLUS TEST STRP strip USE TO CHECK BLOOD SUGAR FOUR TIMES A DAY 8/28/18  Yes Alyssa Palomares MD  
acetaminophen (TYLENOL) 325 mg tablet Take 325 mg by mouth every four (4) hours as needed for Pain.    Yes Provider, Historical  
ADAN PEN NEEDLE 32 gauge x 5/32\" ndle USE WITH INSULIN PEN TWO TIMES A DAY AS DIRECTED BY PHYSICIAN 4/18/18  Yes Alyssa Palomares MD  
 hydrALAZINE (APRESOLINE) 10 mg tablet Take 2 Tabs by mouth three (3) times daily. Patient taking differently: Take 20 mg by mouth three (3) times daily. PT. STATES ON 4-9-19 TAKES 20 MG TWICE DAILY 1/23/17  Yes Nate Felix MD  
potassium chloride SR (KLOR-CON 10) 10 mEq tablet Take 1 Tab by mouth daily. 1/23/17  Yes Nate Felix MD  
omeprazole (PRILOSEC) 20 mg capsule Take 20 mg by mouth daily. Yes Provider, Historical  
dofetilide (TIKOSYN) 125 mcg capsule Take 1 Cap by mouth two (2) times a day. 4/29/11  Yes Vitor Murray MD  
fluocinoNIDE (LIDEX) 0.05 % topical cream ALONSO EXT AA BID FOR 14 DAYS THEN PRF RASH 2/13/19   Provider, Historical  
ipratropium (ATROVENT) 42 mcg (0.06 %) nasal spray 2 Sprays by Both Nostrils route four (4) times daily. As needed runny nose 9/20/18   Vitor Murray MD  
   
Allergies Allergen Reactions  Actos [Pioglitazone] Swelling Swelling of feet and legs  Codeine Itching  Hydrocodone Rash and Other (comments)  
  hallucinations Review of Systems:  
Consititutional: Denies fever or chills. Eyes:  Denies use of glasses or vision problems(cataracts). ENT:  Denies hearing or swallowing difficulty. CV: Denies CP, claudication, HTN. Resp: Denies dyspnea, productive cough. : Denies dialysis or kidney problems. GI: Denies ulcers, esophageal strictures, liver problems. M/S: Denies joint or bone problems, or implanted artificial hardware. Skin: Denies varicose veins, edema. Neuro: Denies strokes, or TIAs. Psych: Denies anxiety or depression. Endocrine: Denies thyroid problems or diabetes. Heme/Lymphatic: Denies easy bruising or lymphedema. Objective:  
 
VS:  
Visit Vitals /55 Pulse 71 Temp 97.7 °F (36.5 °C) Resp 17 Ht 5' 9\" (1.753 m) Wt 215 lb 11.2 oz (97.8 kg) SpO2 95% BMI 31.85 kg/m² Physical Exam:   
General appearance: alert, cooperative, no distress Head: normocephalic, without obvious abnormality; atraumatic Eyes: conjunctivae/corneas clear; EOM's intact. Nose: nares normal; no drainage. Neck: no carotid bruit and no JVD Lungs: clear to auscultation bilaterally Heart: regular rate and rhythm; III/VI diastolic murmur at apex Abdomen: soft, non-tender Extremities: moves all extremities; no weakness. Skin: Skin color normal; Trace bilateral LE edema Neurologic: Grossly normal   
 
Labs:  
Recent Labs 04/16/19 
1431  04/16/19 
1009 NA  --   --  143 K  --   --  4.1 BUN  --   --  28* CREA  --   --  1.93* GLU  --   --  109* GLUCPOC 97   < >  --   
INR  --   --  1.1  
 < > = values in this interval not displayed. Diagnostics:  
PA and lateral: CXR Results  (Last 48 hours) 04/16/19 1351  XR CHEST PORT Final result Impression:  IMPRESSION: Central pulmonary vascular congestion. No pneumothorax Narrative:  EXAM: XR CHEST PORT INDICATION: new PA LINE  
   
COMPARISON: 4/16/2019 FINDINGS: A portable AP radiograph of the chest was obtained at 1345 hours. The  
patient is on a cardiac monitor. [place] catheter extends to the main  
pulmonary artery. The cardiac and mediastinal contours are normal. There is  
central pulmonary vascular congestion. The bones and soft tissues are grossly  
within normal limits. 04/16/19 0929  XR CHEST PA LAT Final result Impression:  IMPRESSION:  
   
No evidence of pneumonia, pulmonary edema, or pleural effusion. Narrative:  EXAM:  XR CHEST PA LAT INDICATION:  Preop heart COMPARISON: January 19, 2017 TECHNIQUE: PA and lateral chest views FINDINGS: The cardiomediastinal and hilar contours are within normal limits. The  
pulmonary vasculature is within normal limits. Lungs are clear. The visualized bones and upper abdomen are age-appropriate. Carotid doppler: Moderate, 50 to 69 percent, stenosis of the right internal carotid artery. Minimal stenosis of the left internal carotid artery. PFTS-FEV1:  1.70--72% predicted EKG: Sinus bradycardia (58) Assessment:  
 
Active Problems: 
  Mitral stenosis (4/16/2019) Plan: The risk and benefit of surgery were reviewed with patient and family and all questions answered and the patient wishes to proceed. Risk include infection, bleeding, stroke, heart attack, irregular heart rhythm, kidney failure and death. Surgery is scheduled for 04/18/19. STS Risk MVR Risk of Mortality: 7.115% Renal Failure: 12.202% Permanent Stroke: 2.325% Prolonged Ventilation: 19.558% DSW Infection: 0.151% Reoperation: 5.390% Morbidity or Mortality: 26.792% Short Length of Stay: 6.587% Long Length of Stay: 20.101% Treatment Plan: 1. Severe MS s/p TMVr (Mahad, 2016). Plan for MVR on 04/18. Obtain pre-operative testing/workup, complete education/consents. 2. Chronic Afib: Continue home tikosyn. Bridge lovenox for today. Last dose of Xarelto 04/10. 
3. Iron deficiency anemia s/p iron transfusions with hx GIB 02/2019: Iron profile pending. Tolerated Xarelto as outpatient. 4. HTN: Metoprolol, norvasc. Holding ACE and hydralazine. 5. T2DM: Will check A1c. Continue home NPH (42 AM, 23 PM). SSI and BS ACHS. DTC consult 6. HLD: Statin. 7. Hx Bladder CA: No issues voiding. 8. Hypothyroid: Synthroid. 9. GERD/Schatzki's ring s/p dilatation 11/2018: No issues swallowing since. Protonix. 10. Pulmonary HTN (75/30/45): Place [place] today. 11. CKD stage III s/p renal artery stenting: Monitor. 12. Chronic diastolic HF (NYHA class III on admission): Continue BB. Will order diuretic based on PA pressures. Holding ACE due to planned surgery. 13. PVD with history of venous ulcers: No current open wounds. Monitor. Activity as tolerated. 14. CVA (2007) with no deficits: PT/OT. ASA. 15. Dispo: Medical optimization for MVR 04/18. Signed By: Moriah Stubbs NP April 16, 2019

## 2019-04-16 NOTE — PROGRESS NOTES
Cardiac Surgery Care Coordinator-  Met with Iván Carmona and her daughter Iliana Pollock role of the Cardiac Surgery Care Coordinator. Reviewed plan of care and began pre-op education. Discussed day of surgery expectations for the pt and family. Reviewed material in the Valve educational folder including Cardiac Surgery Pathway. Reinforced sternal precautions and 5 lb weight restrictions. Encouraged Iván Carmona and her family to verbalize and offered emotional support.  Markell Norris RN

## 2019-04-16 NOTE — PROGRESS NOTES
0830:  Patient arrived to unit via direct admit. 0930:  Transported to X-ray for PA and lat. 1130:  Carotids completed. 1300: Anesthesia at bedside for line placement. 1400:  Labs drawn. 1700:  Bedside PFTs. 1800:  Up to chair for dinner 
 
2000; Bedside and Verbal shift change report given to LENORA RING RN (oncoming nurse) by Mathew Acosta RN (offgoing nurse). Report included the following information SBAR, Kardex, Intake/Output, MAR, Recent Results and Cardiac Rhythm NSR.

## 2019-04-16 NOTE — DIABETES MGMT
DTC Consult Note Recommendations/ Comments: Chart reviewed on Lea Miller due to pre/post hospital glucose management and pt hypoglycemic at 1228pm today (66 mg/dL). Noted patient is scheduled for an MVR on 4/18/19. If appropriate, please consider: 
 - reducing NPH dose to 22 units am and 14 units pm 
Message sent to NP regarding above Current hospital DM medication: Lispro normal sensitivity ac and hs and NPH ordered Patient is a 82 y.o. female with known Type 2 DM on NPH 43 units am and 23 units pm at home. A1c:  
Lab Results Component Value Date/Time Hemoglobin A1c 7.0 (H) 04/16/2019 10:09 AM  
 Hemoglobin A1c 7.0 (H) 02/21/2019 04:25 AM  
 
 
Recent Glucose Results:  
Lab Results Component Value Date/Time  (H) 04/16/2019 10:09 AM  
 GLUCPOC 97 04/16/2019 02:31 PM  
 GLUCPOC 66 04/16/2019 12:28 PM  
  
 
Lab Results Component Value Date/Time Creatinine 1.93 (H) 04/16/2019 10:09 AM  
 
Estimated Creatinine Clearance: 28.4 mL/min (A) (based on SCr of 1.93 mg/dL (H)). Active Orders Diet DIET DIABETIC CONSISTENT CARB Regular DIET NPO Past Midnight PO intake:  
Patient Vitals for the past 72 hrs: 
 % Diet Eaten 04/16/19 1335 100 % Will continue to follow as needed. Thank you Cal Lopez MS, RN, CDE Time spent: 8 minutes

## 2019-04-16 NOTE — PROGRESS NOTES
Care Management Interventions PCP Verified by CM: Yes Mode of Transport at Discharge: Other (see comment)(family private car) Transition of Care Consult (CM Consult): Assisted Living([facility]) MyChart Signup: No 
Discharge Durable Medical Equipment: No 
Health Maintenance Reviewed: Yes Physical Therapy Consult: No 
Occupational Therapy Consult: No 
Speech Therapy Consult: No 
Current Support Network: Assisted Living([facility]) Confirm Follow Up Transport: Family Plan discussed with Pt/Family/Caregiver: Yes Discharge Location Discharge Placement: Home with home health Reason for Admission:   S/p TMVR - MVR scheduled for 4/18 RRAT Score:          3 Plan for utilizing home health: [address] referral sent Current Advanced Directive/Advance Care Plan: On File Likelihood of Readmission:  low Transition of Care Plan:              
     CM met with patient and patient's daughter, Fanta Jewell [phone], at bedside to discuss discharge planning. Patient is agreeable to [address] after discharge. Discharge date is TBD. Patient is scheduled for MVR on 4/18/19 with Dr. Robert Zimmerman. Patient is a current resident at [facility] assisted living in [place], [place]. Patient is able to drive and get her prescriptions at [facility] [address] [facility]. Patient's daughter, Bria Marie, is concerned about medication management post discharge while at home. Patient has a \"difficult time\" when she is prescribed new prescriptions and managing dosages and frequency.   CM advised patient and daughter that nursing staff does an excellent job at teaching/educating regarding prescription medicine and new management of meds, and that home health skilled nursing visits with [facility] will help with the medicine management. Patient and patient's daughter would like new prescriptions to be filled at [facility] pharmacy at time of discharge. CM will continue to follow patient after MVR procedure.  
 
Chon Mcmanus MPH

## 2019-04-16 NOTE — PROCEDURES
Central Line Placement Performed by: Dennis Rivera DO Authorized by: Dennis Rivera DO Indications: vascular access and central pressure monitoring Preanesthetic Checklist: patient identified, risks and benefits discussed, anesthesia consent, site marked, patient being monitored and timeout performed Pre-procedure: All elements of maximal sterile barrier technique followed? Yes   
2% Chlorhexidine for cutaneous antisepsis, Hand hygiene performed prior to catheter insertion and Ultrasound guidance Sterile Ultrasound Technique followed?: Yes Procedure:  
Prep:  Chlorhexidine Location:  Internal jugular Orientation:  Right Patient position:  Trendelenburg Number of attempts:  1 Successful placement: Yes Assessment:  
Post-procedure:  Catheter secured, sterile dressing applied and sterile dressing with CHG applied Assessment:  Blood return through all ports, free fluid flow and guidewire removal verified Insertion:  Uncomplicated Patient tolerance:  Patient tolerated the procedure well with no immediate complications Pulmonary Artery Catheter Advancement With sterile sheath in place, PAC was advanced using pressure monitoring to approximately 51 cm. No complications. CXR:  Pending.

## 2019-04-17 ENCOUNTER — HOME HEALTH ADMISSION (OUTPATIENT)
Dept: HOME HEALTH SERVICES | Facility: HOME HEALTH | Age: 84
End: 2019-04-17

## 2019-04-17 LAB
ANION GAP SERPL CALC-SCNC: 6 MMOL/L (ref 5–15)
APPEARANCE UR: CLEAR
ARTERIAL PATENCY WRIST A: ABNORMAL
ATRIAL RATE: 64 BPM
BACTERIA URNS QL MICRO: NEGATIVE /HPF
BASE EXCESS BLDV CALC-SCNC: 1 MMOL/L
BASOPHILS # BLD: 0 K/UL (ref 0–0.1)
BASOPHILS NFR BLD: 0 % (ref 0–1)
BDY SITE: ABNORMAL
BILIRUB UR QL: NEGATIVE
BUN SERPL-MCNC: 27 MG/DL (ref 6–20)
BUN/CREAT SERPL: 14 (ref 12–20)
CALCIUM SERPL-MCNC: 8.3 MG/DL (ref 8.5–10.1)
CALCULATED P AXIS, ECG09: 86 DEGREES
CALCULATED R AXIS, ECG10: -34 DEGREES
CALCULATED T AXIS, ECG11: 52 DEGREES
CHLORIDE SERPL-SCNC: 108 MMOL/L (ref 97–108)
CO2 SERPL-SCNC: 27 MMOL/L (ref 21–32)
COLOR UR: ABNORMAL
CREAT SERPL-MCNC: 1.9 MG/DL (ref 0.55–1.02)
DIAGNOSIS, 93000: NORMAL
DIFFERENTIAL METHOD BLD: ABNORMAL
EOSINOPHIL # BLD: 0.2 K/UL (ref 0–0.4)
EOSINOPHIL NFR BLD: 3 % (ref 0–7)
EPITH CASTS URNS QL MICRO: ABNORMAL /LPF
ERYTHROCYTE [DISTWIDTH] IN BLOOD BY AUTOMATED COUNT: 17.8 % (ref 11.5–14.5)
ERYTHROCYTE [DISTWIDTH] IN BLOOD BY AUTOMATED COUNT: 17.9 % (ref 11.5–14.5)
GAS FLOW.O2 O2 DELIVERY SYS: ABNORMAL L/MIN
GLUCOSE BLD STRIP.AUTO-MCNC: 105 MG/DL (ref 65–100)
GLUCOSE BLD STRIP.AUTO-MCNC: 125 MG/DL (ref 65–100)
GLUCOSE BLD STRIP.AUTO-MCNC: 142 MG/DL (ref 65–100)
GLUCOSE BLD STRIP.AUTO-MCNC: 179 MG/DL (ref 65–100)
GLUCOSE SERPL-MCNC: 102 MG/DL (ref 65–100)
GLUCOSE UR STRIP.AUTO-MCNC: NEGATIVE MG/DL
HAPTOGLOB SERPL-MCNC: 90 MG/DL (ref 30–200)
HCO3 BLDV-SCNC: 26 MMOL/L (ref 23–28)
HCT VFR BLD AUTO: 30 % (ref 35–47)
HCT VFR BLD AUTO: 35.8 % (ref 35–47)
HGB BLD-MCNC: 10.9 G/DL (ref 11.5–16)
HGB BLD-MCNC: 9.3 G/DL (ref 11.5–16)
HGB UR QL STRIP: NEGATIVE
HYALINE CASTS URNS QL MICRO: ABNORMAL /LPF (ref 0–5)
IMM GRANULOCYTES # BLD AUTO: 0 K/UL (ref 0–0.04)
IMM GRANULOCYTES NFR BLD AUTO: 0 % (ref 0–0.5)
KETONES UR QL STRIP.AUTO: NEGATIVE MG/DL
LDH SERPL L TO P-CCNC: 258 U/L (ref 81–246)
LEFT CCA DIST DIAS: 13.2 CM/S
LEFT CCA DIST SYS: 66.8 CM/S
LEFT CCA PROX DIAS: 10.9 CM/S
LEFT CCA PROX SYS: 80.8 CM/S
LEFT ECA DIAS: 0 CM/S
LEFT ECA SYS: 115.1 CM/S
LEFT ICA DIST DIAS: 13 CM/S
LEFT ICA DIST SYS: 89.9 CM/S
LEFT ICA MID DIAS: 22.9 CM/S
LEFT ICA MID SYS: 105.7 CM/S
LEFT ICA PROX DIAS: 20.9 CM/S
LEFT ICA PROX SYS: 101.7 CM/S
LEFT ICA/CCA SYS: 1.58
LEFT VERTEBRAL DIAS: 11.86 CM/S
LEFT VERTEBRAL SYS: 54.6 CM/S
LEUKOCYTE ESTERASE UR QL STRIP.AUTO: ABNORMAL
LYMPHOCYTES # BLD: 0.8 K/UL (ref 0.8–3.5)
LYMPHOCYTES NFR BLD: 18 % (ref 12–49)
MAGNESIUM SERPL-MCNC: 2.5 MG/DL (ref 1.6–2.4)
MCH RBC QN AUTO: 29.5 PG (ref 26–34)
MCH RBC QN AUTO: 29.8 PG (ref 26–34)
MCHC RBC AUTO-ENTMCNC: 30.4 G/DL (ref 30–36.5)
MCHC RBC AUTO-ENTMCNC: 31 G/DL (ref 30–36.5)
MCV RBC AUTO: 96.2 FL (ref 80–99)
MCV RBC AUTO: 97 FL (ref 80–99)
MONOCYTES # BLD: 0.4 K/UL (ref 0–1)
MONOCYTES NFR BLD: 9 % (ref 5–13)
NEUTS SEG # BLD: 3.3 K/UL (ref 1.8–8)
NEUTS SEG NFR BLD: 70 % (ref 32–75)
NITRITE UR QL STRIP.AUTO: NEGATIVE
NRBC # BLD: 0 K/UL (ref 0–0.01)
NRBC # BLD: 0 K/UL (ref 0–0.01)
NRBC BLD-RTO: 0 PER 100 WBC
NRBC BLD-RTO: 0 PER 100 WBC
P-R INTERVAL, ECG05: 224 MS
PCO2 BLDV: 45.4 MMHG (ref 41–51)
PH BLDV: 7.37 [PH] (ref 7.32–7.42)
PH UR STRIP: 6 [PH] (ref 5–8)
PHOSPHATE SERPL-MCNC: 3.1 MG/DL (ref 2.6–4.7)
PLATELET # BLD AUTO: 56 K/UL (ref 150–400)
PLATELET # BLD AUTO: 67 K/UL (ref 150–400)
PMV BLD AUTO: 12.4 FL (ref 8.9–12.9)
PMV BLD AUTO: 13.3 FL (ref 8.9–12.9)
PO2 BLDV: 30 MMHG (ref 25–40)
POTASSIUM SERPL-SCNC: 3.5 MMOL/L (ref 3.5–5.1)
PROT UR STRIP-MCNC: NEGATIVE MG/DL
Q-T INTERVAL, ECG07: 454 MS
QRS DURATION, ECG06: 84 MS
QTC CALCULATION (BEZET), ECG08: 468 MS
RBC # BLD AUTO: 3.12 M/UL (ref 3.8–5.2)
RBC # BLD AUTO: 3.69 M/UL (ref 3.8–5.2)
RBC #/AREA URNS HPF: ABNORMAL /HPF (ref 0–5)
RIGHT CCA DIST DIAS: 14.3 CM/S
RIGHT CCA DIST SYS: 90.5 CM/S
RIGHT CCA PROX DIAS: 8.6 CM/S
RIGHT CCA PROX SYS: 83.7 CM/S
RIGHT ECA DIAS: 0 CM/S
RIGHT ECA SYS: 103 CM/S
RIGHT ICA DIST DIAS: 22.9 CM/S
RIGHT ICA DIST SYS: 93.8 CM/S
RIGHT ICA MID DIAS: 22.9 CM/S
RIGHT ICA MID SYS: 97.7 CM/S
RIGHT ICA PROX DIAS: 32.7 CM/S
RIGHT ICA PROX SYS: 151 CM/S
RIGHT ICA/CCA SYS: 1.7
RIGHT VERTEBRAL DIAS: 10.55 CM/S
RIGHT VERTEBRAL SYS: 56.7 CM/S
SAO2 % BLDV: 54 % (ref 65–88)
SERVICE CMNT-IMP: ABNORMAL
SODIUM SERPL-SCNC: 141 MMOL/L (ref 136–145)
SP GR UR REFRACTOMETRY: 1.02 (ref 1–1.03)
SPECIMEN TYPE: ABNORMAL
UA: UC IF INDICATED,UAUC: ABNORMAL
UROBILINOGEN UR QL STRIP.AUTO: 1 EU/DL (ref 0.2–1)
VENTRICULAR RATE, ECG03: 64 BPM
WBC # BLD AUTO: 4.1 K/UL (ref 3.6–11)
WBC # BLD AUTO: 4.8 K/UL (ref 3.6–11)
WBC URNS QL MICRO: ABNORMAL /HPF (ref 0–4)

## 2019-04-17 PROCEDURE — 83010 ASSAY OF HAPTOGLOBIN QUANT: CPT

## 2019-04-17 PROCEDURE — 82542 COL CHROMOTOGRAPHY QUAL/QUAN: CPT

## 2019-04-17 PROCEDURE — 82962 GLUCOSE BLOOD TEST: CPT

## 2019-04-17 PROCEDURE — 87077 CULTURE AEROBIC IDENTIFY: CPT

## 2019-04-17 PROCEDURE — 74011636637 HC RX REV CODE- 636/637: Performed by: PHYSICIAN ASSISTANT

## 2019-04-17 PROCEDURE — 74011636637 HC RX REV CODE- 636/637: Performed by: NURSE PRACTITIONER

## 2019-04-17 PROCEDURE — P9016 RBC LEUKOCYTES REDUCED: HCPCS

## 2019-04-17 PROCEDURE — 36415 COLL VENOUS BLD VENIPUNCTURE: CPT

## 2019-04-17 PROCEDURE — 93005 ELECTROCARDIOGRAM TRACING: CPT

## 2019-04-17 PROCEDURE — 84100 ASSAY OF PHOSPHORUS: CPT

## 2019-04-17 PROCEDURE — 83735 ASSAY OF MAGNESIUM: CPT

## 2019-04-17 PROCEDURE — 87186 SC STD MICRODIL/AGAR DIL: CPT

## 2019-04-17 PROCEDURE — 87086 URINE CULTURE/COLONY COUNT: CPT

## 2019-04-17 PROCEDURE — 83615 LACTATE (LD) (LDH) ENZYME: CPT

## 2019-04-17 PROCEDURE — 82803 BLOOD GASES ANY COMBINATION: CPT

## 2019-04-17 PROCEDURE — 30233N1 TRANSFUSION OF NONAUTOLOGOUS RED BLOOD CELLS INTO PERIPHERAL VEIN, PERCUTANEOUS APPROACH: ICD-10-PCS | Performed by: THORACIC SURGERY (CARDIOTHORACIC VASCULAR SURGERY)

## 2019-04-17 PROCEDURE — 85025 COMPLETE CBC W/AUTO DIFF WBC: CPT

## 2019-04-17 PROCEDURE — 80048 BASIC METABOLIC PNL TOTAL CA: CPT

## 2019-04-17 PROCEDURE — 85027 COMPLETE CBC AUTOMATED: CPT

## 2019-04-17 PROCEDURE — 81001 URINALYSIS AUTO W/SCOPE: CPT

## 2019-04-17 PROCEDURE — 36430 TRANSFUSION BLD/BLD COMPNT: CPT

## 2019-04-17 PROCEDURE — 65610000003 HC RM ICU SURGICAL

## 2019-04-17 PROCEDURE — 86022 PLATELET ANTIBODIES: CPT

## 2019-04-17 PROCEDURE — 74011250637 HC RX REV CODE- 250/637: Performed by: PHYSICIAN ASSISTANT

## 2019-04-17 RX ORDER — DOPAMINE HYDROCHLORIDE 320 MG/100ML
5-20 INJECTION, SOLUTION INTRAVENOUS
Status: DISCONTINUED | OUTPATIENT
Start: 2019-04-18 | End: 2019-04-17

## 2019-04-17 RX ORDER — HEPARIN SOD,PORCINE/0.9 % NACL 30K/1000ML
50-1000 INTRAVENOUS SOLUTION INTRAVENOUS AS NEEDED
Status: DISCONTINUED | OUTPATIENT
Start: 2019-04-18 | End: 2019-04-18

## 2019-04-17 RX ORDER — POTASSIUM CHLORIDE 29.8 MG/ML
20 INJECTION INTRAVENOUS ONCE
Status: DISCONTINUED | OUTPATIENT
Start: 2019-04-18 | End: 2019-04-17

## 2019-04-17 RX ORDER — POTASSIUM CHLORIDE 750 MG/1
20 TABLET, FILM COATED, EXTENDED RELEASE ORAL
Status: COMPLETED | OUTPATIENT
Start: 2019-04-17 | End: 2019-04-17

## 2019-04-17 RX ORDER — DOBUTAMINE HYDROCHLORIDE 200 MG/100ML
0-10 INJECTION INTRAVENOUS
Status: DISCONTINUED | OUTPATIENT
Start: 2019-04-18 | End: 2019-04-17

## 2019-04-17 RX ORDER — ALBUMIN HUMAN 50 G/1000ML
25 SOLUTION INTRAVENOUS ONCE
Status: DISCONTINUED | OUTPATIENT
Start: 2019-04-18 | End: 2019-04-17

## 2019-04-17 RX ORDER — BACITRACIN 500 UNIT/G
PACKET (EA) TOPICAL
Status: DISPENSED
Start: 2019-04-17 | End: 2019-04-18

## 2019-04-17 RX ORDER — NITROGLYCERIN 20 MG/100ML
16.5 INJECTION INTRAVENOUS CONTINUOUS
Status: DISCONTINUED | OUTPATIENT
Start: 2019-04-18 | End: 2019-04-17

## 2019-04-17 RX ORDER — MAGNESIUM SULFATE HEPTAHYDRATE 40 MG/ML
2 INJECTION, SOLUTION INTRAVENOUS ONCE
Status: DISCONTINUED | OUTPATIENT
Start: 2019-04-18 | End: 2019-04-17

## 2019-04-17 RX ORDER — SODIUM CHLORIDE 9 MG/ML
50 INJECTION, SOLUTION INTRAVENOUS CONTINUOUS
Status: DISCONTINUED | OUTPATIENT
Start: 2019-04-17 | End: 2019-04-18

## 2019-04-17 RX ORDER — PROTAMINE SULFATE 10 MG/ML
500 INJECTION, SOLUTION INTRAVENOUS ONCE
Status: DISCONTINUED | OUTPATIENT
Start: 2019-04-18 | End: 2019-04-17

## 2019-04-17 RX ORDER — PHENYLEPHRINE 10 MG/250 ML(40 MCG/ML)IN 0.9 % SOD.CHLORIDE INTRAVENOUS
10-100
Status: DISCONTINUED | OUTPATIENT
Start: 2019-04-18 | End: 2019-04-17

## 2019-04-17 RX ADMIN — HUMAN INSULIN 22 UNITS: 100 INJECTION, SUSPENSION SUBCUTANEOUS at 08:45

## 2019-04-17 RX ADMIN — METOPROLOL TARTRATE 75 MG: 50 TABLET ORAL at 08:45

## 2019-04-17 RX ADMIN — INSULIN LISPRO 2 UNITS: 100 INJECTION, SOLUTION INTRAVENOUS; SUBCUTANEOUS at 21:08

## 2019-04-17 RX ADMIN — HUMAN INSULIN 14 UNITS: 100 INJECTION, SUSPENSION SUBCUTANEOUS at 16:57

## 2019-04-17 RX ADMIN — ATORVASTATIN CALCIUM 80 MG: 40 TABLET, FILM COATED ORAL at 21:08

## 2019-04-17 RX ADMIN — METOPROLOL TARTRATE 75 MG: 50 TABLET ORAL at 20:34

## 2019-04-17 RX ADMIN — Medication 10 ML: at 21:08

## 2019-04-17 RX ADMIN — LEVOTHYROXINE SODIUM 125 MCG: 125 TABLET ORAL at 08:44

## 2019-04-17 RX ADMIN — ALLOPURINOL 200 MG: 100 TABLET ORAL at 08:45

## 2019-04-17 RX ADMIN — POTASSIUM CHLORIDE 20 MEQ: 750 TABLET, EXTENDED RELEASE ORAL at 12:28

## 2019-04-17 RX ADMIN — INSULIN LISPRO 2 UNITS: 100 INJECTION, SOLUTION INTRAVENOUS; SUBCUTANEOUS at 12:31

## 2019-04-17 RX ADMIN — DOFETILIDE 125 MCG: 0.12 CAPSULE ORAL at 20:37

## 2019-04-17 RX ADMIN — DOFETILIDE 125 MCG: 0.12 CAPSULE ORAL at 08:47

## 2019-04-17 RX ADMIN — AMLODIPINE BESYLATE 5 MG: 5 TABLET ORAL at 08:45

## 2019-04-17 RX ADMIN — PANTOPRAZOLE SODIUM 40 MG: 40 TABLET, DELAYED RELEASE ORAL at 08:44

## 2019-04-17 NOTE — PROGRESS NOTES
2000 Assumed care of patient from [address] RN provided preop teaching- sat with patient x20 minutes and explain initial post op period of being intubated, goal to extubate within 4 hours as well as barriers to early extubation. Educated on pain management initial night post op, advancing diet post bedside dysphagia screening, pulmonary toileting, tight glucose control first 48 hours post op, and sternal precautions and restrictions. Patient verbalized understanding of teaching but will require continued reinforcment throughout pre op and post op period. [address] to make aware of MAPS <55 via arterial line, CVP 2-6 
 
2312 Dr. Mya Baron made aware of MAPS, CVP , and other hemodynamics- CI >2.5, sv02 51-55%, PA pressures 50-55/17-20. Gave telephone to start neosynephrine to maintain MAP >60 and give albumin 5% IV x1 now. 6745 Bedside and Verbal shift change report given to [facility] (oncoming nurse) by [facility] RN (offgoing nurse). Report included the following information SBAR, Kardex, MAR, Recent Results and Cardiac Rhythm NSR.

## 2019-04-17 NOTE — PROGRESS NOTES
Problem: Falls - Risk of 
Goal: *Absence of Falls Description Document Raymon Kelley Fall Risk and appropriate interventions in the flowsheet. Outcome: Progressing Towards Goal 
Note:  
Fall Risk Interventions: 
Mobility Interventions: Communicate number of staff needed for ambulation/transfer Mentation Interventions: Door open when patient unattended, Increase mobility, Reorient patient, More frequent rounding Medication Interventions: Evaluate medications/consider consulting pharmacy Elimination Interventions: Patient to call for help with toileting needs Problem: Infection - Risk of, Central Venous Catheter-Associated Bloodstream Infection Goal: *Absence of infection signs and symptoms Outcome: Progressing Towards Goal 
  
Problem: Activity Intolerance Goal: *Able to remain out of bed as prescribed Outcome: Progressing Towards Goal

## 2019-04-17 NOTE — PROGRESS NOTES
[facility] ICU Progress Note Admit Date: 2019 POD:  Planned for  Procedure: MVR 19 Subjective:  
Pt seen with Dr. Neli Hill. No complaints overnight, did have nosebleed this morning. Did require etta for MAP < 55. Resolved. PA 50s/upper 10s. On room air, sitting comfortably in chair Objective:  
Vitals: 
Blood pressure 114/48, pulse 67, temperature 97.9 °F (36.6 °C), resp. rate 19, height 5' 9\" (1.753 m), weight 218 lb 14.7 oz (99.3 kg), SpO2 93 %. Temp (24hrs), Av °F (36.7 °C), Min:97.6 °F (36.4 °C), Max:98.6 °F (37 °C) Hemodynamics: 
 CO: CO (l/min): 6.9 l/min CI: CI (l/min/m2): 3.3 l/min/m2 CVP: CVP (mmHg): 7 mmHg (19 08) SVR: SVR (dyne*sec)/cm5: 956 (dyne*sec)/cm5 (19 08) PAP Systolic: PAP Systolic: 48 (81/59/84 5994) PAP Diastolic: PAP Diastolic: 19 (/ 9608) PVR:   
 SV02: SVO2 (%): 40 %(eating) (19 08) SCV02: SCVO2 (%): 52 % (19 0400) EKG/Rhythm: NSR 76 Oxygen Therapy: 
Oxygen Therapy O2 Sat (%): 93 % (19 0800) Pulse via Oximetry: 65 beats per minute (19 0800) O2 Device: Room air (19 0800) O2 Flow Rate (L/min): 3 l/min (19 1600) FIO2 (%): 32 % (19 1423) CXR:  
CXR Results  (Last 48 hours) 19 1351  XR CHEST PORT Final result Impression:  IMPRESSION: Central pulmonary vascular congestion. No pneumothorax Narrative:  EXAM: XR CHEST PORT INDICATION: new PA LINE  
   
COMPARISON: 2019 FINDINGS: A portable AP radiograph of the chest was obtained at 1345 hours. The  
patient is on a cardiac monitor. [place] catheter extends to the main  
pulmonary artery. The cardiac and mediastinal contours are normal. There is  
central pulmonary vascular congestion. The bones and soft tissues are grossly  
within normal limits. 19 0929  XR CHEST PA LAT Final result  Impression:  IMPRESSION:  
   
 No evidence of pneumonia, pulmonary edema, or pleural effusion. Narrative:  EXAM:  XR CHEST PA LAT INDICATION:  Preop heart COMPARISON: 2017 TECHNIQUE: PA and lateral chest views FINDINGS: The cardiomediastinal and hilar contours are within normal limits. The  
pulmonary vasculature is within normal limits. Lungs are clear. The visualized bones and upper abdomen are age-appropriate. Admission Weight: Last Weight Weight: 215 lb 11.2 oz (97.8 kg) Weight: 218 lb 14.7 oz (99.3 kg) Intake / Output / Drain: 
Current Shift: No intake/output data recorded. Last 24 hrs.:  
 
Intake/Output Summary (Last 24 hours) at 2019 0901 Last data filed at 2019 0000 Gross per 24 hour Intake 769.43 ml Output 400 ml Net 369.43 ml EXAM: 
General:   Sitting in chair, NAD Lungs:   Clear to auscultation bilaterally. Incision:  No erythema, drainage or swelling. Heart:  Regular rate and rhythm, S1, S2 normal, no murmur, click, rub or gallop. Abdomen:   Soft, non-tender. Bowel sounds normal. No masses,  No organomegaly. Extremities:  No edema. PPP. Neurologic:  Gross motor and sensory apparatus intact. Labs:  
Recent Labs 19 
0802 19 
0359  19 
1009 WBC  --   --   --  4.0 HGB  --   --   --  8.9* HCT  --   --   --  29.5* PLT  --   --   --  96* NA  --  141  --  143 K  --  3.5  --  4.1 BUN  --  27*  --  28* CREA  --  1.90*  --  1.93* GLU  --  102*  --  109* GLUCPOC 105*  --    < >  --   
INR  --   --   --  1.1  
 < > = values in this interval not displayed. Assessment:  
 
Active Problems: 
  Mitral stenosis (2019) Plan/Recommendations/Medical Decision Makin. Severe MS s/p TMVr (Mahad, 2016). Plan for MVR on . Complete pre-operative testing/workup (EKG/UA), complete education/consents. 2. Chronic Afib: Continue home tikosyn. Last dose of Xarelto 04/10. 
3. Iron deficiency anemia s/p iron transfusions with hx GIB 02/2019: Per Dr. Jeniffer Roberts, want to transfuse 1u RBC today in anticipation of surgery tomorrow. 4. HTN: Metoprolol, norvasc. Holding ACE and hydralazine. 5. T2DM: Will check A1c. NPH at 14 AM, 22 PM per DTC. SSI and BS ACHS. 6. HLD: Statin. 7. Hx Bladder CA: No issues voiding. 8. Hypothyroid: Synthroid. 9. GERD/Schatzki's ring s/p dilatation 11/2018: No issues swallowing since. Protonix. 10. Pulmonary HTN: Much improved since outside cath. Monitor 11. CKD stage III s/p renal artery stenting: Creatinine up from baseline. K borderline low, will replace today. Will give fluids tonight per Dr. Jeniffer Roberts. Monitor. 12. Chronic diastolic HF (NYHA class III on admission): Continue BB. Holding ACE due to planned surgery. No diuretic 2/2 worsening Cr (1.9 from baseline 1.5) 13. PVD with history of venous ulcers: No current open wounds. Monitor. Activity as tolerated. 14. CVA (2007) with no deficits: PT/OT. ASA. 15. Dispo: Medical optimization for MVR 04/18. Complete testing above. Blood transfusion/fluids in evening per Dr. Jeniffer Roberts. Will recheck CBC after transfusion. NPO after midnight.   
 
Signed By: NEVAEH Ramos

## 2019-04-17 NOTE — CARDIO/PULMONARY
Cardiac Rehab:  Valve educational folder and IS is at the bedside of Jed Callahan. Pathway Through Surgery Updated. Daughter at the bedside. Educated using teach back method. Assessed patient's understanding of diagnosis and upcoming surgery. She has a good understanding of the upcoming surgery and is in good spirits. Reviewed general pre-op instructions including the need for thermometer and scale post-op, use of incentive spirometer, understanding of pain scale, and answered general post-surgery questions. Jed Callahna was able to demonstrate proper use of incentive spirometer, achieving 2000 ml. Discussed instructions for family members during and immediately after surgery including, waiting areas, use of beeper and visiting hours in CVICU. Encouraged patient's consideration of participation in a Cardiac Rehab Program, after discharge, to assist with their risk modification and management. She lives closer to [facility] and would like to participate there. [facility] contact information placed on the AVS.  Jed London verbalized understanding with questions answered. Will continue to follow.  Layman Daysi, RN

## 2019-04-17 NOTE — PROGRESS NOTES
0800 Bedside shift change report given to Gabino Hernandez (oncoming nurse) by Joanie Higuera (offgoing nurse). Report included the following information SBAR, MAR and Cardiac Rhythm NSR.  
 
0930 1 Unit PRBC. Started. 0945 VSS at 15min. No signs or symptoms of transfusion reaction. 1304 PRBC finished. VSS. No signs or symptoms of transfusion reaction. 1530 Platelet 56. Dr. Isabel Toribio at bedside. HIT panel ordered, central lines discontinued, and repeat CBC ordered. 1600 Arterial, SWAN, and MAC discontinued. Pt tolerated well. Hematology consult paged. 1630 Hematology consult returned page. Updates given. Will assess pt 4/18.   
 
2000 Bedside shift change report given to [id number] Airline Gallo (oncoming nurse) by Gabino Hernandez (offgoing nurse). Report included the following information SBAR, MAR and Cardiac Rhythm NSR.

## 2019-04-17 NOTE — PROGRESS NOTES
Platelets have declined precipitously today Will cancel surgery, send HIT panel Heme consult ordered Discussed with pt and daughter

## 2019-04-17 NOTE — PROGRESS NOTES
Repeat CBC this afternoon with platelet count down to 56K, possibly due to lovenox given 4/16/19? Will send HIT panel and consult heme. Surgery cancelled for tomorrow, Dr. Arlene Monahan to discuss with pt. Discussed UA results, mod leukocytes, with Dr. Arlene Monahan. She has had several UA's with leukocytes which were negative for UTI when culture run. Dr. Arlene Monahan would still like to start antibiotics today, instructed to begin her preop Ancef today, will order.

## 2019-04-17 NOTE — PROGRESS NOTES
Pharmacy Renal dosing:  
Day #1 of cefazolin Indication:  empiric for possible UTI Current regimen:  2g IV q6h x3days Recent Labs 19 
0359 19 
1009 WBC  --  4.0  
CREA 1.90* 1.93* BUN 27* 28* Est CrCl: 29 ml/min; Temp (24hrs), Av.2 °F (36.8 °C), Min:97.6 °F (36.4 °C), Max:98.8 °F (37.1 °C) Cultures:  
 urine: NGTD - pend Plan: Change to 1g IV q12h x3 days for CrCl 11-34 ml/min

## 2019-04-18 LAB
ABO + RH BLD: NORMAL
ANION GAP SERPL CALC-SCNC: 6 MMOL/L (ref 5–15)
BLD PROD TYP BPU: NORMAL
BLD PROD TYP BPU: NORMAL
BLOOD GROUP ANTIBODIES SERPL: NORMAL
BPU ID: NORMAL
BPU ID: NORMAL
BUN SERPL-MCNC: 25 MG/DL (ref 6–20)
BUN/CREAT SERPL: 15 (ref 12–20)
CALCIUM SERPL-MCNC: 8.6 MG/DL (ref 8.5–10.1)
CHLORIDE SERPL-SCNC: 114 MMOL/L (ref 97–108)
CO2 SERPL-SCNC: 25 MMOL/L (ref 21–32)
CREAT SERPL-MCNC: 1.7 MG/DL (ref 0.55–1.02)
CROSSMATCH RESULT,%XM: NORMAL
CROSSMATCH RESULT,%XM: NORMAL
ERYTHROCYTE [DISTWIDTH] IN BLOOD BY AUTOMATED COUNT: 17.7 % (ref 11.5–14.5)
GLUCOSE BLD STRIP.AUTO-MCNC: 109 MG/DL (ref 65–100)
GLUCOSE BLD STRIP.AUTO-MCNC: 131 MG/DL (ref 65–100)
GLUCOSE BLD STRIP.AUTO-MCNC: 192 MG/DL (ref 65–100)
GLUCOSE BLD STRIP.AUTO-MCNC: 95 MG/DL (ref 65–100)
GLUCOSE SERPL-MCNC: 67 MG/DL (ref 65–100)
HCT VFR BLD AUTO: 34.5 % (ref 35–47)
HGB BLD-MCNC: 10.4 G/DL (ref 11.5–16)
MAGNESIUM SERPL-MCNC: 2.4 MG/DL (ref 1.6–2.4)
MCH RBC QN AUTO: 29.3 PG (ref 26–34)
MCHC RBC AUTO-ENTMCNC: 30.1 G/DL (ref 30–36.5)
MCV RBC AUTO: 97.2 FL (ref 80–99)
NRBC # BLD: 0 K/UL (ref 0–0.01)
NRBC BLD-RTO: 0 PER 100 WBC
PLATELET # BLD AUTO: 76 K/UL (ref 150–400)
POTASSIUM SERPL-SCNC: 4.1 MMOL/L (ref 3.5–5.1)
RBC # BLD AUTO: 3.55 M/UL (ref 3.8–5.2)
RETICS # AUTO: 0.15 M/UL (ref 0.02–0.08)
RETICS/RBC NFR AUTO: 4.2 % (ref 0.7–2.1)
SERVICE CMNT-IMP: ABNORMAL
SERVICE CMNT-IMP: NORMAL
SODIUM SERPL-SCNC: 145 MMOL/L (ref 136–145)
SPECIMEN EXP DATE BLD: NORMAL
STATUS OF UNIT,%ST: NORMAL
STATUS OF UNIT,%ST: NORMAL
UNIT DIVISION, %UDIV: 0
UNIT DIVISION, %UDIV: 0
WBC # BLD AUTO: 4.8 K/UL (ref 3.6–11)

## 2019-04-18 PROCEDURE — 83735 ASSAY OF MAGNESIUM: CPT

## 2019-04-18 PROCEDURE — 74011250636 HC RX REV CODE- 250/636: Performed by: PHYSICIAN ASSISTANT

## 2019-04-18 PROCEDURE — 74011250637 HC RX REV CODE- 250/637: Performed by: PHYSICIAN ASSISTANT

## 2019-04-18 PROCEDURE — 80048 BASIC METABOLIC PNL TOTAL CA: CPT

## 2019-04-18 PROCEDURE — 85027 COMPLETE CBC AUTOMATED: CPT

## 2019-04-18 PROCEDURE — 74011636637 HC RX REV CODE- 636/637: Performed by: NURSE PRACTITIONER

## 2019-04-18 PROCEDURE — 74011000258 HC RX REV CODE- 258: Performed by: PHYSICIAN ASSISTANT

## 2019-04-18 PROCEDURE — 74011636637 HC RX REV CODE- 636/637: Performed by: PHYSICIAN ASSISTANT

## 2019-04-18 PROCEDURE — 36415 COLL VENOUS BLD VENIPUNCTURE: CPT

## 2019-04-18 PROCEDURE — 65660000001 HC RM ICU INTERMED STEPDOWN

## 2019-04-18 PROCEDURE — 82962 GLUCOSE BLOOD TEST: CPT

## 2019-04-18 PROCEDURE — 85045 AUTOMATED RETICULOCYTE COUNT: CPT

## 2019-04-18 RX ORDER — SODIUM CHLORIDE 0.9 % (FLUSH) 0.9 %
5-40 SYRINGE (ML) INJECTION AS NEEDED
Status: DISCONTINUED | OUTPATIENT
Start: 2019-04-18 | End: 2019-04-18

## 2019-04-18 RX ORDER — CEPHALEXIN 500 MG/1
500 CAPSULE ORAL EVERY 12 HOURS
Status: DISCONTINUED | OUTPATIENT
Start: 2019-04-18 | End: 2019-04-22

## 2019-04-18 RX ORDER — SULFAMETHOXAZOLE AND TRIMETHOPRIM 800; 160 MG/1; MG/1
1 TABLET ORAL EVERY 12 HOURS
Status: CANCELLED | OUTPATIENT
Start: 2019-04-18

## 2019-04-18 RX ORDER — SULFAMETHOXAZOLE AND TRIMETHOPRIM 800; 160 MG/1; MG/1
1 TABLET ORAL EVERY 12 HOURS
Status: DISCONTINUED | OUTPATIENT
Start: 2019-04-18 | End: 2019-04-18

## 2019-04-18 RX ORDER — SODIUM CHLORIDE 0.9 % (FLUSH) 0.9 %
5-40 SYRINGE (ML) INJECTION EVERY 8 HOURS
Status: DISCONTINUED | OUTPATIENT
Start: 2019-04-18 | End: 2019-04-22

## 2019-04-18 RX ORDER — SODIUM CHLORIDE 0.9 % (FLUSH) 0.9 %
5-40 SYRINGE (ML) INJECTION AS NEEDED
Status: DISCONTINUED | OUTPATIENT
Start: 2019-04-18 | End: 2019-04-22

## 2019-04-18 RX ADMIN — PANTOPRAZOLE SODIUM 40 MG: 40 TABLET, DELAYED RELEASE ORAL at 07:25

## 2019-04-18 RX ADMIN — Medication 10 ML: at 07:25

## 2019-04-18 RX ADMIN — HUMAN INSULIN 14 UNITS: 100 INJECTION, SUSPENSION SUBCUTANEOUS at 16:27

## 2019-04-18 RX ADMIN — CEPHALEXIN 500 MG: 500 CAPSULE ORAL at 10:57

## 2019-04-18 RX ADMIN — METOPROLOL TARTRATE 75 MG: 50 TABLET ORAL at 21:10

## 2019-04-18 RX ADMIN — DOFETILIDE 125 MCG: 0.12 CAPSULE ORAL at 21:09

## 2019-04-18 RX ADMIN — DOFETILIDE 125 MCG: 0.12 CAPSULE ORAL at 08:10

## 2019-04-18 RX ADMIN — LEVOTHYROXINE SODIUM 125 MCG: 125 TABLET ORAL at 07:25

## 2019-04-18 RX ADMIN — HUMAN INSULIN 22 UNITS: 100 INJECTION, SUSPENSION SUBCUTANEOUS at 07:31

## 2019-04-18 RX ADMIN — Medication 10 ML: at 21:10

## 2019-04-18 RX ADMIN — Medication 10 ML: at 15:01

## 2019-04-18 RX ADMIN — AMLODIPINE BESYLATE 5 MG: 5 TABLET ORAL at 08:09

## 2019-04-18 RX ADMIN — ALLOPURINOL 200 MG: 100 TABLET ORAL at 08:09

## 2019-04-18 RX ADMIN — METOPROLOL TARTRATE 75 MG: 50 TABLET ORAL at 08:09

## 2019-04-18 RX ADMIN — ATORVASTATIN CALCIUM 80 MG: 40 TABLET, FILM COATED ORAL at 21:10

## 2019-04-18 RX ADMIN — CEFAZOLIN SODIUM 1 G: 1 INJECTION, POWDER, FOR SOLUTION INTRAMUSCULAR; INTRAVENOUS at 03:07

## 2019-04-18 RX ADMIN — CEPHALEXIN 500 MG: 500 CAPSULE ORAL at 22:33

## 2019-04-18 RX ADMIN — Medication 10 ML: at 15:02

## 2019-04-18 NOTE — PROGRESS NOTES
2000: Received report from [facility]. Pt in chair visiting with son. 2030: Pt ambulated to bedside commode, voided, then back to bed with 1 assist without difficulty. 0300: Pt ambulated to bedside commode, voided, then back to bed with 1 assist without difficulty. 0400: Pt c/o of burning at PIV site while ancef was infusing, attempted to place 2nd PIV x2 by Aguila Ryan RN. Unsuccessful attempts- will suggest PICC line if pt stays in hospital until surgery. Line flushes and is patent, no blood return, no c/o of burning or pain when flushed. Site is not red or warm to touch. Labs sent via butterfly needle. 0800: Bedside and Verbal shift change report given to RODGER KHAN (oncoming nurse) by [employer] (offgoing nurse). Report included the following information SBAR, Kardex, Procedure Summary, MAR, Med Rec Status and Cardiac Rhythm NSR. Problem: Falls - Risk of 
Goal: *Absence of Falls Description Document [facility] President Fall Risk and appropriate interventions in the flowsheet. Outcome: Progressing Towards Goal 
  
Problem: Patient Education: Go to Patient Education Activity Goal: Patient/Family Education Outcome: Progressing Towards Goal 
  
Problem: Diabetes Self-Management Goal: *Disease process and treatment process Description Define diabetes and identify own type of diabetes; list 3 options for treating diabetes. Outcome: Progressing Towards Goal 
Goal: *Using medications safely Description State effect of diabetes medications on diabetes; name diabetes medication taking, action and side effects. Outcome: Progressing Towards Goal 
Goal: *Monitoring blood glucose, interpreting and using results Description Identify recommended blood glucose targets  and personal targets. Outcome: Progressing Towards Goal 
  
Problem: Infection - Risk of, Central Venous Catheter-Associated Bloodstream Infection Goal: *Absence of infection signs and symptoms Outcome: Progressing Towards Goal 
 Note:  
Central line removed today without difficulty; site c/d/i Problem: Patient Education: Go to Patient Education Activity Goal: Patient/Family Education Outcome: Progressing Towards Goal 
  
Problem: Activity Intolerance Goal: *Able to remain out of bed as prescribed Outcome: Progressing Towards Goal

## 2019-04-18 NOTE — PROGRESS NOTES
0730: Bedside and Verbal shift change report given to RODGER KHAN (oncoming nurse) by Felisha Callahan, [address] [place] [address] (offgoing nurse). Report included the following information SBAR, Kardex, Intake/Output, MAR, Recent Results, Med Rec Status and Cardiac Rhythm NSR.  
0815: Cindy Hoffmann MD at bedside. Orders received to switch IV abx to PO Bactrim d/t peripheral IV burning. Will continue to monitor. 0900: Pt's son at bedside. Update given. 0930: Marciano Heath NP at bedside. No new orders received. 1930: Bedside shift change report given to Dave Vaca RN (oncoming nurse) by RODGER KHAN (offgoing nurse). Report included the following information SBAR, Kardex, Intake/Output, MAR, Recent Results, Med Rec Status and Cardiac Rhythm SB/NSR.

## 2019-04-18 NOTE — CARDIO/PULMONARY
Cardiac Rehab: Met with Marc Styles. Son is at the bedside. Discussed recent events. Patient has no additional questions at this time. Patient reports \"I should be going to a room today. \". Will continue to follow.  Wyona Siemens, RN

## 2019-04-18 NOTE — PROGRESS NOTES
[facility] ICU Progress Note Admit Date: 2019 POD:  Planned for  Procedure: MVR 19 Subjective:  
Pt seen with Dr. Kaitlin Aiken. No complaints overnight, understanding about why operation was cancelled. Had central line removed yesterday. On room air, sitting comfortably in chair Objective:  
Vitals: 
Blood pressure (!) 127/38, pulse 64, temperature 98 °F (36.7 °C), resp. rate 16, height 5' 9\" (1.753 m), weight 219 lb 2.2 oz (99.4 kg), SpO2 98 %. Temp (24hrs), Av.3 °F (36.8 °C), Min:97.8 °F (36.6 °C), Max:98.8 °F (37.1 °C) EKG/Rhythm: NSR 64 Oxygen Therapy: RA 
 
CXR:  
CXR Results  (Last 48 hours) 19 1351  XR CHEST PORT Final result Impression:  IMPRESSION: Central pulmonary vascular congestion. No pneumothorax Narrative:  EXAM: XR CHEST PORT INDICATION: new PA LINE  
   
COMPARISON: 2019 FINDINGS: A portable AP radiograph of the chest was obtained at 1345 hours. The  
patient is on a cardiac monitor. [place]-Chiki catheter extends to the main  
pulmonary artery. The cardiac and mediastinal contours are normal. There is  
central pulmonary vascular congestion. The bones and soft tissues are grossly  
within normal limits. Admission Weight: Last Weight Weight: 215 lb 11.2 oz (97.8 kg) Weight: 219 lb 2.2 oz (99.4 kg) Intake / Output / Drain: 
Current Shift:  0701 -  1900 In: 120 [P.O.:120] Out: - Last 24 hrs.:  
 
Intake/Output Summary (Last 24 hours) at 2019 7376 Last data filed at 2019 0800 Gross per 24 hour Intake 995 ml Output 1400 ml Net -405 ml EXAM: 
General:   Sitting in chair, NAD Lungs:   Clear to auscultation bilaterally. Heart:  Regular rate and rhythm, S1, S2 normal, no murmur, click, rub or gallop. Abdomen:   Soft, non-tender. Bowel sounds normal. No masses,  No organomegaly. Extremities:  No edema. PPP. Neurologic: 
Skin:  Gross motor and sensory apparatus intact. Mild petechiae on right side of neck surrounding former placement of central line. No other new bruising noted. Labs:  
Recent Labs 19 
0728 19 
0403  19 
1009 WBC  --  4.8   < > 4.0 HGB  --  10.4*   < > 8.9* HCT  --  34.5*   < > 29.5* PLT  --  76*   < > 96* NA  --  145   < > 143 K  --  4.1   < > 4.1 BUN  --  25*   < > 28* CREA  --  1.70*   < > 1.93* GLU  --  67   < > 109* GLUCPOC 95  --    < >  --   
INR  --   --   --  1.1  
 < > = values in this interval not displayed. Assessment:  
 
Active Problems: 
  Mitral stenosis (2019) Plan/Recommendations/Medical Decision Makin. Severe MS s/p TMVr (Mitjonaclip, 2016). Plan for MVR date TBD 2/2 thrombocytopenia. HIT panel pending, lovenox stopped, monitor. 2. Chronic Afib: Continue home tikosyn. Last dose of Xarelto 04/10. 
3. Iron deficiency anemia s/p iron transfusions with hx GIB 2019: Got blood transfusion yesterday. H/H low but stable. Monitor H/H 
4. HTN: Metoprolol, norvasc. Holding ACE and hydralazine. 5. T2DM: A1c 7. NPH at 14 AM, 22 PM per DTC. SSI and BS ACHS. 6. HLD: Statin. 7. Hx Bladder CA: No issues voiding. 8. Hypothyroid: Synthroid. 9. GERD/Schatzki's ring s/p dilatation 2018: No issues swallowing since. Protonix. 10. Pulmonary HTN: Much improved since outside cath on 03/15. Monitor 11. CKD stage III s/p renal artery stenting: Creatinine improved from yesterday. Good UOP. Continue to monitor. 12. Chronic diastolic HF (NYHA class III on admission): Continue BB. Holding ACE due to planned surgery. No diuretic 2/2 elevated Cr 
13. PVD with history of venous ulcers: No current open wounds. Monitor. Activity as tolerated. 14. CVA () with no deficits: PT/OT. ASA. 15. Urine leukocytosis with no bacterial growth: Empiric treatment with Keflex, hold ancef 2/2 burning at PIV site. 16. Thrombocytopenia: Plt 56 after transfusion yesterday, 67 later in evening, 76 this AM. Did have small nosebleed yesterday, but no other blood loss/bruising. HIT panel pending, heme consult confirm likely medication induced rather than bleeding. 15. Dispo: Medical optimization for MVR TBD. Operative plan delayed 2/2 thrombocytopenia. HIT panel pending. Transfer to [facility]. Hopeful surgery next week pending lab results.  
 
Signed By: NEVAEH Castaneda

## 2019-04-18 NOTE — CONSULTS
[address] Name:  Selina May 
MR#:  [id number] :  1935 ACCOUNT #:  [de-identified] DATE OF SERVICE:  2019 HISTORY OF PRESENT ILLNESS:  The patient is an 56-year-old woman with mitral valve disease who was seen after a significant drop in her platelet count was discovered on 2019. This patient has multiple medical problems but most recently has been dealing with a severe mitral stenosis and was symptomatic from this. She underwent a TMVR MitraClip procedure in  but has had worsening valvular symptoms and has developed shortness of breath. She was planning to have mitral valve replacement tomorrow, , however, CBC performed at 03:30 on  revealed a platelet count of 78,122. This was a drop from her platelet count drawn on  when it was measured at 96,000. This patient has had a drop in her hemoglobin since March when she had received IV iron. Hemoglobin was 9.3 on day of consultation. She denies any bleeding or bruising. She did have vascular catheters in place in preparation for her surgery tomorrow. These were removed this evening and she had delayed hemostasis but eventually has had adequate clotting. She denies a past knowledge of hematologic dyscrasias or thrombocytopenia. She has had problems with anemia and in fact had been evaluated by Dr. Pedrito Mcfadden at [facility] and was given two doses of IV iron in February. PAST MEDICAL HISTORY:  Coronary artery disease, mitral valve disease, diabetes, GERD, gout, hypercholesterolemia, hypertension, hypothyroidism, peptic ulcer disease. She has undergone ablation for atrial fibrillation. She has had a TIA. She has had bladder cancer. MEDICATIONS PRIOR TO ADMISSION:  Lovenox, amlodipine, Lasix, Lopressor, NPH insulin, Lipitor, Zyloprim, Prinivil, Xarelto, potassium. ALLERGIES:  ACTOS, CODEINE, AND HYDROCODONE. PAST SURGICAL HISTORY:  Multiple colonoscopies, appendectomy, cholecystectomy, hysterectomy, right knee arthroscopy, transurethral biopsy of the bladder. SOCIAL HISTORY:  She is a former smoker and quit in 1967. She is a nondrinker. FAMILY HISTORY:  Negative for hematologic dyscrasias. Cardiovascular disease is prominent in her family. REVIEW OF SYSTEMS:  As noted above, otherwise, noncontributory. PHYSICAL EXAMINATION: 
GENERAL:  She is a pleasant overweight woman, in no apparent distress. VITAL SIGNS:  Temperature 98, pulse 59, /43, respirations 17, O2 sat 95% on room air. HEENT:  EOMI. Nonicteric sclerae. NECK:  Supple. No thyromegaly noted. LUNGS:  Clear bilaterally. CARDIAC:  Irregular rate, 1/6 systolic murmur. ABDOMEN:  Soft, nontender. EXTREMITIES:  No edema. NEUROLOGIC:  AO x3, nonfocal. 
 
LABORATORY DATA:  Examination of the peripheral blood smear this evening reveals normocytic, normochromic RBC morphology without schistocytes or fragmentation. White cells appear to be normal in number and differentiation. Platelets appear to be reduced in number without large forms without clumping seen. IMPRESSION:  Thrombocytopenia. This patient had a CT scan of the abdomen performed in February. It is uncertain why this was done but she had borderline splenomegaly at that time. complete blood counts back through 2016 does identify borderline thrombocytopenia dating that far back. She probably has mild splenomegaly contributing to baseline mild thrombocytopenia. The drop from 96,000 yesterday to 56,000 today does not appear to be due to dic/consumpsion. There is no evidence for disseminated intravascular coagulation on laboratories or on examination of the peripheral blood smear. She does not appear to have platelet clumping which can also cause a drop in the platelet count. It is highly unlikely that she has a bone marrow process contributing here.   She does not appear septic. In all likelihood, we are dealing with the medication-induced thrombocytopenia with acute on chronic thrombocytopenia at this time. DrTyrone Allen has already sent  for the heparin-induced thrombocytopenia panel and that seems to be likely given her recent Lovenox exposure. She does not appear to have any clotting at present. I would recommend waiting for the heparin-induced thrombocytopenia panel to come back before considering argatroban, however, if the YONI study is abnormal, I would have no problems initiating argatroban as soon as that becomes available. The serotonin release array is the gold standard for confirmation of a positive heparin-induced thrombocytopenia panel but I would not wait for that result to come back if the heparin-induced thrombocytopenia YONI study is abnormal. 
 
 Finally, this patient had been seen by Dr. Carlos Manuel Irving at [facility]. He is a [facility] employed hematologist.  We came in this evening to take care of this patient because we were asked to but I would like to transfer this patient's care back to [facility]emat[facility] and would recommend that Dr. Paulo Cheema be contacted and pick this patient up tomorrow morning. On behalf of [employer], this is Dr. Natasha Curiel. Marie Underwood MD 
 
 
[id number] 
D:  04/17/2019 19:13 
T:  04/17/2019 19:18 
JOB #:  [id number]

## 2019-04-19 LAB
ANION GAP SERPL CALC-SCNC: 6 MMOL/L (ref 5–15)
BACTERIA SPEC CULT: ABNORMAL
BUN SERPL-MCNC: 21 MG/DL (ref 6–20)
BUN/CREAT SERPL: 14 (ref 12–20)
CALCIUM SERPL-MCNC: 9 MG/DL (ref 8.5–10.1)
CC UR VC: ABNORMAL
CHLORIDE SERPL-SCNC: 109 MMOL/L (ref 97–108)
CO2 SERPL-SCNC: 26 MMOL/L (ref 21–32)
CREAT SERPL-MCNC: 1.55 MG/DL (ref 0.55–1.02)
ERYTHROCYTE [DISTWIDTH] IN BLOOD BY AUTOMATED COUNT: 17.2 % (ref 11.5–14.5)
GLUCOSE BLD STRIP.AUTO-MCNC: 101 MG/DL (ref 65–100)
GLUCOSE BLD STRIP.AUTO-MCNC: 115 MG/DL (ref 65–100)
GLUCOSE BLD STRIP.AUTO-MCNC: 117 MG/DL (ref 65–100)
GLUCOSE BLD STRIP.AUTO-MCNC: 139 MG/DL (ref 65–100)
GLUCOSE BLD STRIP.AUTO-MCNC: 217 MG/DL (ref 65–100)
GLUCOSE SERPL-MCNC: 117 MG/DL (ref 65–100)
HCT VFR BLD AUTO: 35.1 % (ref 35–47)
HGB BLD-MCNC: 10.7 G/DL (ref 11.5–16)
MAGNESIUM SERPL-MCNC: 2.4 MG/DL (ref 1.6–2.4)
MCH RBC QN AUTO: 29.4 PG (ref 26–34)
MCHC RBC AUTO-ENTMCNC: 30.5 G/DL (ref 30–36.5)
MCV RBC AUTO: 96.4 FL (ref 80–99)
NRBC # BLD: 0 K/UL (ref 0–0.01)
NRBC BLD-RTO: 0 PER 100 WBC
PF4 HEPARIN CMPLX AB SER-ACNC: 0.1 OD (ref 0–0.4)
PLATELET # BLD AUTO: 82 K/UL (ref 150–400)
POTASSIUM SERPL-SCNC: 4 MMOL/L (ref 3.5–5.1)
RBC # BLD AUTO: 3.64 M/UL (ref 3.8–5.2)
SERVICE CMNT-IMP: ABNORMAL
SODIUM SERPL-SCNC: 141 MMOL/L (ref 136–145)
WBC # BLD AUTO: 4.7 K/UL (ref 3.6–11)

## 2019-04-19 PROCEDURE — 97161 PT EVAL LOW COMPLEX 20 MIN: CPT

## 2019-04-19 PROCEDURE — 97535 SELF CARE MNGMENT TRAINING: CPT

## 2019-04-19 PROCEDURE — 74011250637 HC RX REV CODE- 250/637: Performed by: PHYSICIAN ASSISTANT

## 2019-04-19 PROCEDURE — 74011636637 HC RX REV CODE- 636/637: Performed by: PHYSICIAN ASSISTANT

## 2019-04-19 PROCEDURE — 97110 THERAPEUTIC EXERCISES: CPT

## 2019-04-19 PROCEDURE — 97116 GAIT TRAINING THERAPY: CPT

## 2019-04-19 PROCEDURE — 97165 OT EVAL LOW COMPLEX 30 MIN: CPT

## 2019-04-19 PROCEDURE — 85027 COMPLETE CBC AUTOMATED: CPT

## 2019-04-19 PROCEDURE — 36415 COLL VENOUS BLD VENIPUNCTURE: CPT

## 2019-04-19 PROCEDURE — 65660000001 HC RM ICU INTERMED STEPDOWN

## 2019-04-19 PROCEDURE — 97530 THERAPEUTIC ACTIVITIES: CPT

## 2019-04-19 PROCEDURE — 83735 ASSAY OF MAGNESIUM: CPT

## 2019-04-19 PROCEDURE — 82962 GLUCOSE BLOOD TEST: CPT

## 2019-04-19 PROCEDURE — 80048 BASIC METABOLIC PNL TOTAL CA: CPT

## 2019-04-19 RX ORDER — SODIUM CHLORIDE, SODIUM LACTATE, POTASSIUM CHLORIDE, CALCIUM CHLORIDE 600; 310; 30; 20 MG/100ML; MG/100ML; MG/100ML; MG/100ML
25 INJECTION, SOLUTION INTRAVENOUS CONTINUOUS
Status: CANCELLED | OUTPATIENT
Start: 2019-04-19 | End: 2019-04-20

## 2019-04-19 RX ORDER — MORPHINE SULFATE 10 MG/ML
2 INJECTION, SOLUTION INTRAMUSCULAR; INTRAVENOUS
Status: CANCELLED | OUTPATIENT
Start: 2019-04-19

## 2019-04-19 RX ORDER — SODIUM CHLORIDE 0.9 % (FLUSH) 0.9 %
5-40 SYRINGE (ML) INJECTION EVERY 8 HOURS
Status: CANCELLED | OUTPATIENT
Start: 2019-04-19

## 2019-04-19 RX ORDER — MIDAZOLAM HYDROCHLORIDE 1 MG/ML
1 INJECTION, SOLUTION INTRAMUSCULAR; INTRAVENOUS AS NEEDED
Status: CANCELLED | OUTPATIENT
Start: 2019-04-19

## 2019-04-19 RX ORDER — DIPHENHYDRAMINE HYDROCHLORIDE 50 MG/ML
12.5 INJECTION, SOLUTION INTRAMUSCULAR; INTRAVENOUS AS NEEDED
Status: CANCELLED | OUTPATIENT
Start: 2019-04-19 | End: 2019-04-19

## 2019-04-19 RX ORDER — HYDROMORPHONE HYDROCHLORIDE 1 MG/ML
0.2 INJECTION, SOLUTION INTRAMUSCULAR; INTRAVENOUS; SUBCUTANEOUS
Status: CANCELLED | OUTPATIENT
Start: 2019-04-19

## 2019-04-19 RX ORDER — LIDOCAINE HYDROCHLORIDE 10 MG/ML
0.1 INJECTION, SOLUTION EPIDURAL; INFILTRATION; INTRACAUDAL; PERINEURAL AS NEEDED
Status: CANCELLED | OUTPATIENT
Start: 2019-04-19

## 2019-04-19 RX ORDER — ONDANSETRON 2 MG/ML
4 INJECTION INTRAMUSCULAR; INTRAVENOUS AS NEEDED
Status: CANCELLED | OUTPATIENT
Start: 2019-04-19

## 2019-04-19 RX ORDER — FENTANYL CITRATE 50 UG/ML
25 INJECTION, SOLUTION INTRAMUSCULAR; INTRAVENOUS
Status: CANCELLED | OUTPATIENT
Start: 2019-04-19

## 2019-04-19 RX ORDER — MIDAZOLAM HYDROCHLORIDE 1 MG/ML
0.5 INJECTION, SOLUTION INTRAMUSCULAR; INTRAVENOUS
Status: CANCELLED | OUTPATIENT
Start: 2019-04-19

## 2019-04-19 RX ORDER — SODIUM CHLORIDE 9 MG/ML
25 INJECTION, SOLUTION INTRAVENOUS CONTINUOUS
Status: CANCELLED | OUTPATIENT
Start: 2019-04-19 | End: 2019-04-20

## 2019-04-19 RX ORDER — FENTANYL CITRATE 50 UG/ML
50 INJECTION, SOLUTION INTRAMUSCULAR; INTRAVENOUS AS NEEDED
Status: CANCELLED | OUTPATIENT
Start: 2019-04-19

## 2019-04-19 RX ORDER — SODIUM CHLORIDE, SODIUM LACTATE, POTASSIUM CHLORIDE, CALCIUM CHLORIDE 600; 310; 30; 20 MG/100ML; MG/100ML; MG/100ML; MG/100ML
125 INJECTION, SOLUTION INTRAVENOUS CONTINUOUS
Status: CANCELLED | OUTPATIENT
Start: 2019-04-19

## 2019-04-19 RX ORDER — SODIUM CHLORIDE 0.9 % (FLUSH) 0.9 %
5-40 SYRINGE (ML) INJECTION AS NEEDED
Status: CANCELLED | OUTPATIENT
Start: 2019-04-19

## 2019-04-19 RX ORDER — OXYCODONE HYDROCHLORIDE 5 MG/1
5 TABLET ORAL AS NEEDED
Status: CANCELLED | OUTPATIENT
Start: 2019-04-19

## 2019-04-19 RX ADMIN — METOPROLOL TARTRATE 75 MG: 50 TABLET ORAL at 08:46

## 2019-04-19 RX ADMIN — INSULIN LISPRO 3 UNITS: 100 INJECTION, SOLUTION INTRAVENOUS; SUBCUTANEOUS at 11:16

## 2019-04-19 RX ADMIN — ATORVASTATIN CALCIUM 80 MG: 40 TABLET, FILM COATED ORAL at 21:01

## 2019-04-19 RX ADMIN — METOPROLOL TARTRATE 75 MG: 50 TABLET ORAL at 21:01

## 2019-04-19 RX ADMIN — Medication 10 ML: at 05:33

## 2019-04-19 RX ADMIN — DOFETILIDE 125 MCG: 0.12 CAPSULE ORAL at 21:07

## 2019-04-19 RX ADMIN — ACETAMINOPHEN 650 MG: 325 TABLET ORAL at 21:07

## 2019-04-19 RX ADMIN — Medication 10 ML: at 21:03

## 2019-04-19 RX ADMIN — ALLOPURINOL 200 MG: 100 TABLET ORAL at 08:46

## 2019-04-19 RX ADMIN — LEVOTHYROXINE SODIUM 125 MCG: 125 TABLET ORAL at 06:55

## 2019-04-19 RX ADMIN — AMLODIPINE BESYLATE 5 MG: 5 TABLET ORAL at 08:46

## 2019-04-19 RX ADMIN — CEPHALEXIN 500 MG: 500 CAPSULE ORAL at 22:47

## 2019-04-19 RX ADMIN — DOFETILIDE 125 MCG: 0.12 CAPSULE ORAL at 08:46

## 2019-04-19 RX ADMIN — PANTOPRAZOLE SODIUM 40 MG: 40 TABLET, DELAYED RELEASE ORAL at 06:55

## 2019-04-19 RX ADMIN — HUMAN INSULIN 22 UNITS: 100 INJECTION, SUSPENSION SUBCUTANEOUS at 06:55

## 2019-04-19 RX ADMIN — HUMAN INSULIN 14 UNITS: 100 INJECTION, SUSPENSION SUBCUTANEOUS at 17:26

## 2019-04-19 RX ADMIN — CEPHALEXIN 500 MG: 500 CAPSULE ORAL at 11:09

## 2019-04-19 RX ADMIN — Medication 10 ML: at 15:16

## 2019-04-19 NOTE — PROCEDURES
[address]  PULMONARY FUNCTION TEST Name:  Anand Tracey 
MR#:  [id number] :  1935 ACCOUNT #:  [de-identified] DATE OF SERVICE:  2019 REQUESTING PHYSICIAN:  Thuy Everett MD 
 
DIAGNOSIS:  Pulmonary evaluation. ATS criteria for reproducibility and acceptability was met. Spirometry, FEV1/FVC ratio was 74, which is normal.  FEV1 is 1.72 L, which is 70% predicted, which is reduced. FVC is 2.31 L, which is 70% predicted, which is also reduced. Flow volume loop is normal. 
 
INTERPRETATION:  Restrictive spirometry. Obstruction not ruled out. Based on flow volume loop likely extraparenchymal restriction from obesity. Clinical correlation advised. Ryan Hutchinson MD [id number] 
D:  2019 16:40 
T:  2019 0:35 JOB #:  [id number] CC:  Thuy Everett MD

## 2019-04-19 NOTE — PROGRESS NOTES
0800: Bedside report received from [address] [place] [address], [facility] care of pt. 
 
1900: Uneventful shift. Still awaiting transfer bed. 2000: Bedside and Verbal shift change report given to [place] (oncoming nurse) by Gena Greenwood (offgoing nurse). Report included the following information SBAR, Kardex, MAR, Recent Results and Cardiac Rhythm NSR.

## 2019-04-19 NOTE — PROGRESS NOTES
Problem: Mobility Impaired (Adult and Pediatric) Goal: *Acute Goals and Plan of Care (Insert Text) Description Physical Therapy Goals Initiated 4/19/2019 1. Patient will move from supine to sit and sit to supine, scoot up and down and roll side to side in bed with minimal assistance/contact guard assist with log roll technique within 7 days. 2.  Patient will perform sit to/from stand with supervision and adherence to sternal precautions within 7 days. 3.  Patient will ambulate 150 feet with least restrictive assistive device and supervision within 7 days. 4.  Patient will ascend/descend 4 stairs with single handrail, for balance only, with supervision within 7 days. 5.  Patient will perform cardiac exercises per protocol with supervision within 7 days. 6.  Patient will verbally and functionally recall 3/3 sternal precautions within 7 days. Outcome: Progressing Towards Goal 
 
PHYSICAL THERAPY EVALUATION Patient: Tillman Blizzard (87 y.o. female) Date: 4/19/2019 Primary Diagnosis: Mitral stenosis [I05.0] Procedure(s) (LRB): MITRAL VALVE REPLACEMENT WITH DELNIDO ( BIVALIRUDIN / Contreras Blackman )  ( NITRIC OXIDE ) (N/A) Precautions: MVR with median sternotomy planned for next week ASSESSMENT : 
Based on the objective data described below, the patient presents with slightly impaired functional independence compared to baseline following admission for mitral stenosis + regurgitation, tentatively planned for MVR next week. With exertion, patient reports mild LEIJA, however, O2 sats stable on room air (96-98%). Overall, patient required upmost light minimal assist during gait training for x 1 staggering episode.  In preparation for MVR surgery, reviewed 5-lb weight lifting restrictions, use of bear for \"splinting\" while coughing/sneezing, continued use of incentive spirometer 10 x per hour, role of acute PT and typical mobility progression, cardiac exercises and frequency of performance, signs/symptoms of DVTs/pneumonia/sternal wound dehiscence, and importance of frequent mobilization outside of therapy sessions with nursing staff. Prior to admission, patient reports living alone in a 55+ community, driving during the day, doing own laundry, intermittently using a SPC for ambulation at night to/from bathroom, and son/daughter assisting with larger grocery shopping. Patient denies falls, but endorses shortness of breath with ambulation > 50 ft. Patient will benefit from skilled intervention to address the above impairments. Patient?s rehabilitation potential is considered to be Fair Factors which may influence rehabilitation potential include:  
? None noted ? Mental ability/status ? Medical condition ? Home/family situation and support systems ? Safety awareness 
? Pain tolerance/management 
? Other: PLAN : 
Recommendations and Planned Interventions: 
?           Bed Mobility Training             ? Neuromuscular Re-Education ? Transfer Training                   ? Orthotic/Prosthetic Training 
? Gait Training                         ? Modalities ? Therapeutic Exercises           ? Edema Management/Control ? Therapeutic Activities            ? Patient and Family Training/Education ? Other (comment): Frequency/Duration: Patient will be followed by physical therapy 2 times a week to address goals. Discharge Recommendations: TBD pending functional mobility post-MVR Further Equipment Recommendations for Discharge: TBD SUBJECTIVE:  
Patient stated ? Did you just hear my knees crack - they are terribly arthritic.? OBJECTIVE DATA SUMMARY:  
HISTORY:   
Past Medical History:  
Diagnosis Date Arthritis KNEES Atrial fibrillation ([facility] [address] 10/29/2009 Bladder cancer ([facility] [address] CAD (coronary artery disease) STENT PER PATIENT Chronic pain KNEES Coagulation disorder ([facility] [address] Chronic prophylactic anticoagulation med Colon polyps Diabetes ([facility] [address] IDDM  
 GERD (gastroesophageal reflux disease) Gout Heart valve problem   
 leaking heart valve Hypercholesterolemia Hypertension Hypothyroidism Hypothyroidism 4/23/2009 Hypothyroidism, acquired, autoimmune 11/23/2015 Overweight and obesity PUD (peptic ulcer disease) 1990'S S/P ablation of atrial flutter[V45.89HM] 2009  
 @ [facility] >> atrial fibrillation SOB (shortness of breath) on exertion 04/2019 [facility] 4/23/2009 TIA (transient ischemic attack) Ulcer of right lower extremity, limited to breakdown of skin ([facility] [address] 7/5/2018 Past Surgical History:  
Procedure Laterality Date CARDIAC SURG PROCEDURE UNLIST    
 ablation COLONOSCOPY N/A 2/20/2019 COLONOSCOPY performed by Mehreen Araujo MD at [facility] ENDOSCOPY  
 COLONOSCOPY,DIAGNOSTIC  2/20/2019 HX APPENDECTOMY HX CHOLECYSTECTOMY HX HYSTERECTOMY HX KNEE ARTHROSCOPY  [id number]  
 right knee HX ORTHOPAEDIC    
 HX UROLOGICAL RENAL STENT, tur-b  
 VASCULAR SURGERY PROCEDURE UNLIST  11/4  
 removed vein in right leg Personal factors and/or comorbidities impacting plan of care: Extensive PMH Home Situation Home Environment: Private residence One/Two Story Residence: One story Living Alone: Yes Support Systems: Child(veronica), [employer] [place] community, Family member(s), Friends \ neighbors Patient Expects to be Discharged to[de-identified] Private residence Current DME Used/Available at Home: Cane, straight, Walker, rolling, Shower chair, Adaptive dressing aides(built in shower seat, reacher) Tub or Shower Type: Shower EXAMINATION/PRESENTATION/DECISION MAKING:  
Critical Behavior: 
Neurologic State: Alert Orientation Level: Oriented X4 Cognition: Appropriate for age attention/concentration, Follows commands Safety/Judgement: Awareness of environment, Fall prevention Hearing: Auditory Auditory Impairment: None Skin:  Intact Edema: Trace noted in LEs Range Of Motion: 
AROM: Within functional limits Strength:   
Strength: Generally decreased, functional 
Tone & Sensation:  
Tone: Normal 
Sensation: Intact Coordination: 
Coordination: Within functional limits Vision:  
Tracking: Able to track stimulus in all quadrants w/o difficulty Functional Mobility: 
Bed Mobility: 
Supine to Sit: (up in chair) Sit to Supine: (left in chair) Scooting: Supervision(cues for post-op technique with good carryover) Transfers: 
Sit to Stand: Contact guard assistance(With sternal precaution implementation) Stand to Sit: Minimum assistance(With implementation of sternal precautions) Balance:  
Sitting: Intact Standing: Impaired; Without support Standing - Static: Good Standing - Dynamic : Fair Ambulation/Gait Training: 
Distance (ft): 175 Feet (ft) Assistive Device: Gait belt Ambulation - Level of Assistance: Contact guard assistance;Minimal assistance(x 1 LOB episode needing assist to correct) Gait Description (WDL): Exceptions to [facility] Gait Abnormalities: Decreased step clearance; Path deviations(+ General unsteadiness/staggering episodes) Base of Support: Narrowed Speed/Melany: Slow Functional Measure: 
Timed up and go: 
 
Timed Get Up And Go Test: 14.5  
 
 
< than 10 seconds=Normal  Greater then 13.5 seconds (in elderly)=Increased fall risk Dedrick RING Predicting the probability for falls in community dwelling older adults using the Timed Up and Go Test. Phys Ther. 2000;80:896-903. Based on the above components, the patient evaluation is determined to be of the following complexity level: LOW Pain: 
Pain Scale 1: Numeric (0 - 10) Pain Intensity 1: 0 Activity Tolerance: 
Please refer to the flowsheet for vital signs taken during this treatment. After treatment: ?         Patient left in no apparent distress sitting up in chair ? Patient left in no apparent distress in bed 
? Call bell left within reach ? Nursing notified ? Daughter present ? Bed alarm activated COMMUNICATION/EDUCATION:  
The patient?s plan of care was discussed with: Occupational Therapist and Registered Nurse. ?         Fall prevention education was provided and the patient/caregiver indicated understanding. ? Patient/family have participated as able in goal setting and plan of care. ?         Patient/family agree to work toward stated goals and plan of care. ?         Patient understands intent and goals of therapy, but is neutral about his/her participation. ? Patient is unable to participate in goal setting and plan of care. Thank you for this referral. 
Sim Carbone, PT, DPT Time Calculation: 26 mins

## 2019-04-19 NOTE — PROGRESS NOTES
Problem: Self Care Deficits Care Plan (Adult) Goal: *Acute Goals and Plan of Care (Insert Text) Description Occupational Therapy Goals Initiated 4/19/2019 1. Patient will perform grooming at the sink with independence within 7 day(s). 2.  Patient will perform medication management activity with 100% accuracy and SPV within 7 day(s). 3.  Patient will perform toilet transfers with modified independence within 7 day(s). 4.  Patient will perform all aspects of toileting with modified independence within 7 day(s). 5.  Patient will participate in cardiac upper extremity therapeutic exercise/activities with independence for 10 minutes within 7 day(s). 6.  Patient will utilize energy conservation techniques during functional activities with verbal cues within 7 day(s). Outcome: Progressing Towards Goal 
 
OCCUPATIONAL THERAPY EVALUATION Patient: Silva Sahni (46 y.o. female) Date: 4/19/2019 Primary Diagnosis: Mitral stenosis [I05.0] Precautions: Pre-op cardiac sx (planned for 4/22 pending platelet count) ASSESSMENT : 
Based on the objective data described below, patient presents with Stand-by assistance upper body ADLs, Stand-by assistance lower body ADLs, and Stand-by-Min assistance assist functional mobility. Educated on post-op sternal precautions in relation to functional mobility & ADLs, patient requiring up to Min A and moderate verbal cues for transfers for controlled descent. Educated and completed BUE cardiac exercises in standing to maximize functional strength & activity tolerance, encouraged to complete 2-3x/day. The following are barriers to ADL independence while in acute care:  
- Cognitive and/or behavioral: safety awareness - Medical condition: strength and functional endurance   
- Other:    
 
Patient will benefit from skilled acute intervention to address the above impairments. Patient?s rehabilitation potential is considered to be Good Discharge recommendations: To Be Determined (pending surgical course) Barriers to discharging home, in addition to above listed impairments: lives alone. Equipment recommendations for successful discharge (if) home: TBD pending progress (none at this time) PLAN : 
Recommendations and Planned Interventions: self care training, functional mobility training, therapeutic exercise, balance training, therapeutic activities, endurance activities, patient education, home safety training and family training/education Frequency/Duration: Patient will be followed by occupational therapy 2 times a week to address goals. SUBJECTIVE:  
Patient stated ? Well I certainly have a lot to do now.? OBJECTIVE DATA SUMMARY:  
HISTORY:  
Past Medical History:  
Diagnosis Date Arthritis KNEES Atrial fibrillation ([address] 10/29/2009 Bladder cancer ([facility] [address] CAD (coronary artery disease) STENT PER PATIENT Chronic pain KNEES Coagulation disorder ([address] Chronic prophylactic anticoagulation med Colon polyps Diabetes ([facility] [address] IDDM  
 GERD (gastroesophageal reflux disease) Gout Heart valve problem   
 leaking heart valve Hypercholesterolemia Hypertension Hypothyroidism Hypothyroidism 4/23/2009 Hypothyroidism, acquired, autoimmune 11/23/2015 Overweight and obesity PUD (peptic ulcer disease) 1990'S S/P ablation of atrial flutter[V45.89HM] 2009  
 @ [facility] >> atrial fibrillation SOB (shortness of breath) on exertion 04/2019 T. I.A. 4/23/2009 TIA (transient ischemic attack) Ulcer of right lower extremity, limited to breakdown of skin ([facility] [address] 7/5/2018 Past Surgical History:  
Procedure Laterality Date CARDIAC SURG PROCEDURE UNLIST    
 ablation COLONOSCOPY N/A 2/20/2019 COLONOSCOPY performed by Caterina Baxter MD at [facility] ENDOSCOPY  
 COLONOSCOPY,DIAGNOSTIC  2/20/2019 HX APPENDECTOMY HX CHOLECYSTECTOMY HX HYSTERECTOMY HX KNEE ARTHROSCOPY  [id number]  
 right knee HX ORTHOPAEDIC    
 HX UROLOGICAL RENAL STENT, tur-b  
 VASCULAR SURGERY PROCEDURE UNLIST  11/4  
 removed vein in right leg Prior Level of Function/Environment/Context: largely IND, lives alone in a 55+ community; has daughter and son who assist with large grocery shopping trips (deliver to her), however patient continues to drive (during the day). Has a  that comes weekly to deep clean bathrooms/floor, but patient cooks, cleans, does her own laundry Home Situation Home Environment: Private residence One/Two Story Residence: One story Living Alone: Yes Support Systems: Child(veronica), [employer] / [place] community, Family member(s), Friends \ neighbors Patient Expects to be Discharged to[de-identified] Private residence Current DME Used/Available at Home: Cane, straight, Walker, rolling, Shower chair, Adaptive dressing aides(built in shower seat, reacher) Tub or Shower Type: Shower Hand dominance: Right EXAMINATION OF PERFORMANCE DEFICITS: 
Cognitive/Behavioral Status: 
Neurologic State: Alert Orientation Level: Oriented X4 Cognition: Appropriate for age attention/concentration; Follows commands Perception: Appears intact Perseveration: No perseveration noted Safety/Judgement: Awareness of environment; Fall prevention Skin: Appears intact Edema: None noted Hearing: Auditory Auditory Impairment: None Vision/Perceptual:   
Tracking: Able to track stimulus in all quadrants w/o difficulty Range of Motion: [place] AROM: Within functional limits Strength: [place] Strength: Generally decreased, functional 
  
  
  
  
Coordination: 
Coordination: Within functional limits Fine Motor Skills-Upper: Left Intact; Right Intact Gross Motor Skills-Upper: Left Intact; Right Intact Tone & Sensation: [place] Tone: Normal 
Sensation: Intact Balance: 
Sitting: Intact Standing: Impaired; Without support(slight anteriorly flexed posture) Standing - Static: Good Standing - Dynamic : Good Functional Mobility and Transfers for ADLs: 
Bed Mobility: 
Supine to Sit: (up in chair) Sit to Supine: (left in chair) Scooting: Supervision(cues for post-op technique with good carryover) Transfers: 
Sit to Stand: Supervision;Stand-by assistance(cues for post-op technique with good carryover) Stand to Sit: Minimum assistance(with sternal precaution simulation, A for controlled descent) Bathroom Mobility: Stand-by assistance(simulated to/from the sink) Toilet Transfer : Stand-by assistance(infer per mobility & line management) ADL Assessment: 
Feeding: Independent Oral Facial Hygiene/Grooming: Stand-by assistance(standing at the sink) Bathing: Stand-by assistance(for safety & balance) Upper Body Dressing: Independent Lower Body Dressing: Stand-by assistance(infer for standing balance) Toileting: Stand by assistance(for balance & safety) ADL Intervention and task modifications: 
  
 
Grooming Grooming Assistance: Stand-by assistance(simulated at sink) Washing Face: Stand-by assistance Washing Hands: Stand-by assistance Cues: Verbal cues provided Lower Body Dressing Assistance Dressing Assistance: Stand-by assistance(for standing balance) Underpants: Stand-by assistance; Compensatory technique training(simulated; educated on post-op technique) Pants With Elastic Waist: Compensatory technique training(educated on post-op technique) Pants With Button/Zipper: Compensatory technique training(educated on post-op technique) Socks: Supervision/set-up; Compensatory technique training(educated on post-op technique) Leg Crossed Method Used: Yes(with cues) Position Performed: Seated in chair Cues: Verbal cues provided;Visual cues provided; Tactile cues provided Cognitive Retraining Safety/Judgement: Awareness of environment; Fall prevention Therapeutic Exercise: - Ambulation in room to/from sink with SBA & line management  
- Educated and completed 1-3 reps of BUE cardiac exercises in standing with SBA and min cues for technique, pointed out in cardiac booklet, encouraged to complete to maximize functional strength and endurance Functional Measure: 
Barthel Index: 
Bathin Bladder: 10 Bowels: 10 
Groomin Dressin Feeding: 10 Mobility: 10 Stairs: 0 Toilet Use: 5 Transfer (Bed to Chair and Back): 10 Total: 65/100 Percentage of impairment  
0% 1-19% 20-39% 40-59% 60-79% 80-99% 100% Barthel Score 0-100 100 99-80 79-60 59-40 20-39 1-19 
 0 The Barthel ADL Index: Guidelines 1. The index should be used as a record of what a patient does, not as a record of what a patient could do. 2. The main aim is to establish degree of independence from any help, physical or verbal, however minor and for whatever reason. 3. The need for supervision renders the patient not independent. 4. A patient's performance should be established using the best available evidence. Asking the patient, friends/relatives and nurses are the usual sources, but direct observation and common sense are also important. However direct testing is not needed. 5. Usually the patient's performance over the preceding 24-48 hours is important, but occasionally longer periods will be relevant. 6. Middle categories imply that the patient supplies over 50 per cent of the effort. 7. Use of aids to be independent is allowed. Reuben Pollard., Barthel, D.W. ([id number]). Functional evaluation: the Barthel Index. [facility] (14)2. Talia Parks osei ALEJANDRO Nixon, Liliana Rivero., Osbaldo Bolden., [place], [address] [facility] [place] ().  Measuring the change indisability after inpatient rehabilitation; comparison of the responsiveness of the Barthel Index and Functional Powell Measure. Journal of Neurology, Neurosurgery, and Psychiatry, 66(4), 548-482. Estrada Arms, N.[place].A, RASHAD Wright, & Riley Adams M.A. (2004.) Assessment of post-stroke quality of life in cost-effectiveness studies: The usefulness of the Barthel Index and the EuroQoL-5D. [facility], 13, 619-40 Occupational Therapy Evaluation Charge Determination History Examination Decision-Making LOW Complexity : Brief history review  LOW Complexity : 1-3 performance deficits relating to physical, cognitive , or psychosocial skils that result in activity limitations and / or participation restrictions  LOW Complexity : No comorbidities that affect functional and no verbal or physical assistance needed to complete eval tasks Based on the above components, the patient evaluation is determined to be of the following complexity level: LOW Activity Tolerance:  
Good Please refer to the flowsheet for vital signs taken during this treatment. After treatment patient left:  
Up in chair Call light within reach RN notified COMMUNICATION/EDUCATION:  
The patient?s plan of care was discussed with: Physical Therapist and Registered Nurse. Home safety education was provided and the patient/caregiver indicated understanding., Patient/family have participated as able in goal setting and plan of care. and Patient/family agree to work toward stated goals and plan of care. This patient?s plan of care is appropriate for delegation to [facility]. Thank you for this referral. 
ANNE Kim, OTR/L Time Calculation: 25 mins

## 2019-04-19 NOTE — PROGRESS NOTES
2000: Report received from NOE TAVERAS RN. No drips to verify. Pt sitting up in chair, sating >93% on RA, calm and cooperative, and denies pain. Plan to transfer to [facility] until platelets stabilize (HIT panel set yesterday), awaiting bed assignment. [id number]: Pt awoke, startled, and calling out for nurse. Pt reports dizziness, but denies pain and vision changes. Pt requested that I check her blood sugar, bg 101. Checked BP bilaterally, SBP 160s. Pt stated \"my blood pressure may be high because I was startled awake by the pt next door coughing\". 0345: Labs sent 
 
0400: Rechecked BP, SBP 140s. 
 
0745: Bedside and Verbal shift change report given to Yoselyn Hinds RN. Report included the following information SBAR, Intake/Output, MAR, Recent Results, Cardiac Rhythm NSR w/ 1st AV block and Alarm Parameters . Problem: Falls - Risk of 
Goal: *Absence of Falls Description Document [place] Fall Risk and appropriate interventions in the flowsheet. Outcome: Progressing Towards Goal 
  
Problem: Diabetes Self-Management Goal: *Disease process and treatment process Description Define diabetes and identify own type of diabetes; list 3 options for treating diabetes. Outcome: Progressing Towards Goal 
Goal: *Monitoring blood glucose, interpreting and using results Description Identify recommended blood glucose targets  and personal targets. Outcome: Progressing Towards Goal 
  
Problem: Activity Intolerance Goal: *Able to remain out of bed as prescribed Outcome: Progressing Towards Goal 
Pt tolerates sitting up in chair and getting OOB. OOB w/ minimal assistance. Problem: Infection - Risk of, Central Venous Catheter-Associated Bloodstream Infection Goal: *Absence of infection signs and symptoms Outcome: Resolved/Met

## 2019-04-19 NOTE — PROGRESS NOTES
[facility] ICU Progress Note Admit Date: 2019 POD:  MVR TBD Procedure: MVR TBD Subjective:  
Pt seen with Dr. Jose Carlos Lim. Hypertensive overnight, states she was startled. On room air, sitting comfortably in chair Objective:  
Vitals: 
Blood pressure 139/49, pulse 82, temperature 97.9 °F (36.6 °C), resp. rate 18, height 5' 9\" (1.753 m), weight 219 lb 2.2 oz (99.4 kg), SpO2 96 %. Temp (24hrs), Av °F (36.7 °C), Min:97.7 °F (36.5 °C), Max:98.2 °F (36.8 °C) EKG/Rhythm: NSR 63 Oxygen Therapy: RA 
 
CXR:  
CXR Results  (Last 48 hours) None Admission Weight: Last Weight Weight: 215 lb 11.2 oz (97.8 kg) Weight: 219 lb 2.2 oz (99.4 kg) Intake / Output / Drain: 
Current Shift: No intake/output data recorded. Last 24 hrs.:  
 
Intake/Output Summary (Last 24 hours) at 2019 0750 Last data filed at 2019 7138 Gross per 24 hour Intake 840 ml Output 1300 ml Net -460 ml EXAM: 
General:   Sitting in chair, NAD Lungs:   Clear to auscultation bilaterally. Heart:  Regular rate and rhythm, S1, S2 normal, no murmur, click, rub or gallop. Abdomen:   Soft, non-tender. Bowel sounds normal. No masses,  No organomegaly. Extremities:  2+ edema. PPP. Neurologic: 
Skin:  Gross motor and sensory apparatus intact. Mild petechiae on right side of neck surrounding former placement of central line. No other new bruising noted. Labs:  
Recent Labs 19 
0705 19 
0345  19 
1009 WBC  --  4.7   < > 4.0 HGB  --  10.7*   < > 8.9* HCT  --  35.1   < > 29.5* PLT  --  82*   < > 96* NA  --  141   < > 143 K  --  4.0   < > 4.1 BUN  --  21*   < > 28* CREA  --  1.55*   < > 1.93* GLU  --  117*   < > 109* GLUCPOC 117*  --    < >  --   
INR  --   --   --  1.1  
 < > = values in this interval not displayed. Assessment:  
 
Active Problems: 
  Mitral stenosis (2019) Plan/Recommendations/Medical Decision Makin. Severe MS s/p TMVr ([facility]ad, 2016). Plan for MVR date TBD 2/2 thrombocytopenia. HIT panel pending, lovenox stopped, monitor. 2. Chronic Afib: Continue home tikosyn. Last dose of Xarelto 04/10. 
3. Iron deficiency anemia s/p iron transfusions with hx GIB 2019: Blood transfusion . H/H low but stable. Monitor H/H 
4. HTN: Metoprolol, norvasc. Holding ACE and hydralazine. 5. T2DM: A1c 7. NPH at 14 AM, 22 PM per DTC. SSI and BS ACHS. 6. HLD: Statin. 7. Hx Bladder CA: No issues voiding. 8. Hypothyroid: Synthroid. 9. GERD/Schatzki's ring s/p dilatation 2018: No issues swallowing since. Protonix. 10. Pulmonary HTN: Much improved since outside cath on 03/15. [place] d/c when surgery cancelled. 11. CKD stage III s/p renal artery stenting: Creatinine improved from yesterday. Good UOP. Continue to monitor. 12. Chronic diastolic HF (NYHA class III on admission): Continue BB. Holding ACE due to planned surgery. No diuretic 2/2 elevated Cr 
13. PVD with history of venous ulcers: No current open wounds. Monitor. Activity as tolerated. 14. CVA () with no deficits: PT/OT. ASA. 15. Urine leukocytosis with Gram neg rods: Keflex, hold ancef 2/2 burning at PIV site. 16. Thrombocytopenia: Improving. HIT panel pending, heme consult confirm likely medication induced rather than bleeding. 15. Dispo: Medical optimization for MVR TBD. PT/OT. Operative plan delayed 2/2 thrombocytopenia. HIT panel pending. Transfer to [facility]. Hopeful surgery  (with bival, no heparin) pending lab results.  
 
Signed By: NEVAEH Santiago

## 2019-04-19 NOTE — PROGRESS NOTES
[facility] accepted patient. Patient's MVR surgery has been postponed to next week pending lab results.  
 
Ramonita Beyer MPH

## 2019-04-19 NOTE — PROGRESS NOTES
Cardiac Surgery Care Coordinator- Met with Tom Dyer and her family, reinforced pre-op education and reviewed day of surgery expectations for Ms Joanie Cornejo and her family. Reinforced sternal precautions post op and the use of the incentive spirometer. Encourage ambulation after PT evaluation and offered emotional support.  Bola Esposito RN

## 2019-04-20 LAB
COMMENT, HOLDF: NORMAL
ERYTHROCYTE [DISTWIDTH] IN BLOOD BY AUTOMATED COUNT: 17.2 % (ref 11.5–14.5)
GLUCOSE BLD STRIP.AUTO-MCNC: 113 MG/DL (ref 65–100)
GLUCOSE BLD STRIP.AUTO-MCNC: 152 MG/DL (ref 65–100)
GLUCOSE BLD STRIP.AUTO-MCNC: 161 MG/DL (ref 65–100)
GLUCOSE BLD STRIP.AUTO-MCNC: 94 MG/DL (ref 65–100)
HCT VFR BLD AUTO: 36.7 % (ref 35–47)
HGB BLD-MCNC: 11.1 G/DL (ref 11.5–16)
MAGNESIUM SERPL-MCNC: 2.5 MG/DL (ref 1.6–2.4)
MCH RBC QN AUTO: 30.1 PG (ref 26–34)
MCHC RBC AUTO-ENTMCNC: 30.2 G/DL (ref 30–36.5)
MCV RBC AUTO: 99.5 FL (ref 80–99)
NRBC # BLD: 0 K/UL (ref 0–0.01)
NRBC BLD-RTO: 0 PER 100 WBC
PLATELET # BLD AUTO: 80 K/UL (ref 150–400)
PMV BLD AUTO: 12.4 FL (ref 8.9–12.9)
RBC # BLD AUTO: 3.69 M/UL (ref 3.8–5.2)
SAMPLES BEING HELD,HOLD: NORMAL
SERVICE CMNT-IMP: ABNORMAL
SERVICE CMNT-IMP: NORMAL
SRA 100IU/ML UFH SER-ACNC: 1 % (ref 0–20)
SRA UFH SER-IMP: NORMAL
UNFRAC HEPARIN LOW DOSE: <1 % (ref 0–20)
WBC # BLD AUTO: 5.4 K/UL (ref 3.6–11)

## 2019-04-20 PROCEDURE — 83735 ASSAY OF MAGNESIUM: CPT

## 2019-04-20 PROCEDURE — 74011636637 HC RX REV CODE- 636/637: Performed by: PHYSICIAN ASSISTANT

## 2019-04-20 PROCEDURE — 82962 GLUCOSE BLOOD TEST: CPT

## 2019-04-20 PROCEDURE — 65660000001 HC RM ICU INTERMED STEPDOWN

## 2019-04-20 PROCEDURE — 74011250637 HC RX REV CODE- 250/637: Performed by: PHYSICIAN ASSISTANT

## 2019-04-20 PROCEDURE — 85027 COMPLETE CBC AUTOMATED: CPT

## 2019-04-20 PROCEDURE — 77010033678 HC OXYGEN DAILY

## 2019-04-20 PROCEDURE — 36415 COLL VENOUS BLD VENIPUNCTURE: CPT

## 2019-04-20 RX ADMIN — Medication 10 ML: at 15:28

## 2019-04-20 RX ADMIN — METOPROLOL TARTRATE 75 MG: 50 TABLET ORAL at 21:00

## 2019-04-20 RX ADMIN — CEPHALEXIN 500 MG: 500 CAPSULE ORAL at 11:03

## 2019-04-20 RX ADMIN — PANTOPRAZOLE SODIUM 40 MG: 40 TABLET, DELAYED RELEASE ORAL at 07:15

## 2019-04-20 RX ADMIN — DOFETILIDE 125 MCG: 0.12 CAPSULE ORAL at 08:12

## 2019-04-20 RX ADMIN — INSULIN LISPRO 2 UNITS: 100 INJECTION, SOLUTION INTRAVENOUS; SUBCUTANEOUS at 12:29

## 2019-04-20 RX ADMIN — DOFETILIDE 125 MCG: 0.12 CAPSULE ORAL at 21:11

## 2019-04-20 RX ADMIN — CEPHALEXIN 500 MG: 500 CAPSULE ORAL at 21:02

## 2019-04-20 RX ADMIN — ATORVASTATIN CALCIUM 80 MG: 40 TABLET, FILM COATED ORAL at 21:00

## 2019-04-20 RX ADMIN — HUMAN INSULIN 22 UNITS: 100 INJECTION, SUSPENSION SUBCUTANEOUS at 07:19

## 2019-04-20 RX ADMIN — Medication 10 ML: at 06:17

## 2019-04-20 RX ADMIN — ALLOPURINOL 200 MG: 100 TABLET ORAL at 08:12

## 2019-04-20 RX ADMIN — AMLODIPINE BESYLATE 5 MG: 5 TABLET ORAL at 08:12

## 2019-04-20 RX ADMIN — HUMAN INSULIN 14 UNITS: 100 INJECTION, SUSPENSION SUBCUTANEOUS at 17:33

## 2019-04-20 RX ADMIN — Medication 10 ML: at 22:00

## 2019-04-20 RX ADMIN — METOPROLOL TARTRATE 75 MG: 50 TABLET ORAL at 08:12

## 2019-04-20 RX ADMIN — LEVOTHYROXINE SODIUM 125 MCG: 125 TABLET ORAL at 07:15

## 2019-04-20 NOTE — PROGRESS NOTES
Problem: Falls - Risk of 
Goal: *Absence of Falls Description Document Ran Padilla Fall Risk and appropriate interventions in the flowsheet. Outcome: Progressing Towards Goal 
Note:  
Fall Risk Interventions: 
Mobility Interventions: Assess mobility with egress test, Bed/chair exit alarm, Communicate number of staff needed for ambulation/transfer Mentation Interventions: Adequate sleep, hydration, pain control, Bed/chair exit alarm, Door open when patient unattended Medication Interventions: Patient to call before getting OOB, Teach patient to arise slowly Elimination Interventions: Call light in reach, Patient to call for help with toileting needs Problem: Patient Education: Go to Patient Education Activity Goal: Patient/Family Education Outcome: Progressing Towards Goal 
  
Problem: Diabetes Self-Management Goal: *Disease process and treatment process Description Define diabetes and identify own type of diabetes; list 3 options for treating diabetes. Outcome: Progressing Towards Goal 
Goal: *Incorporating nutritional management into lifestyle Description Describe effect of type, amount and timing of food on blood glucose; list 3 methods for planning meals. Outcome: Progressing Towards Goal 
Goal: *Incorporating physical activity into lifestyle Description State effect of exercise on blood glucose levels. Outcome: Progressing Towards Goal 
Goal: *Developing strategies to promote health/change behavior Description Define the ABC's of diabetes; identify appropriate screenings, schedule and personal plan for screenings. Outcome: Progressing Towards Goal 
Goal: *Using medications safely Description State effect of diabetes medications on diabetes; name diabetes medication taking, action and side effects. Outcome: Progressing Towards Goal 
Goal: *Monitoring blood glucose, interpreting and using results Description Identify recommended blood glucose targets  and personal targets. Outcome: Progressing Towards Goal 
Goal: *Prevention, detection, treatment of acute complications Description List symptoms of hyper- and hypoglycemia; describe how to treat low blood sugar and actions for lowering  high blood glucose level. Outcome: Progressing Towards Goal 
Goal: *Prevention, detection and treatment of chronic complications Description Define the natural course of diabetes and describe the relationship of blood glucose levels to long term complications of diabetes. Outcome: Progressing Towards Goal 
Goal: *Developing strategies to address psychosocial issues Description Describe feelings about living with diabetes; identify support needed and support network Outcome: Progressing Towards Goal 
Goal: *Insulin pump training Outcome: Progressing Towards Goal 
Goal: *Sick day guidelines Outcome: Progressing Towards Goal 
Goal: *Patient Specific Goal (EDIT GOAL, INSERT TEXT) Outcome: Progressing Towards Goal 
  
Problem: Patient Education: Go to Patient Education Activity Goal: Patient/Family Education Outcome: Progressing Towards Goal 
  
Problem: Patient Education: Go to Patient Education Activity Goal: Patient/Family Education Outcome: Progressing Towards Goal

## 2019-04-20 NOTE — PROGRESS NOTES
[facility] ICU Progress Note Admit Date: 2019 Procedure: MVR -- Preop  2nd case Subjective:  
Pt seen with Dr. Miguelina Patel. On room air, sitting comfortably in chair Objective:  
Vitals: 
Blood pressure 141/42, pulse (!) 57, temperature 97.5 °F (36.4 °C), resp. rate 13, height 5' 9\" (1.753 m), weight 221 lb 12.5 oz (100.6 kg), SpO2 98 %. Temp (24hrs), Av.7 °F (36.5 °C), Min:97.5 °F (36.4 °C), Max:97.9 °F (36.6 °C) EKG/Rhythm: NSR 60s Oxygen Therapy: RA 
 
CXR:  
CXR Results  (Last 48 hours) None Admission Weight: Last Weight Weight: 215 lb 11.2 oz (97.8 kg) Weight: 221 lb 12.5 oz (100.6 kg) Intake / Output / Drain: 
Current Shift:  0701 -  1900 In: 120 [P.O.:120] Out: - Last 24 hrs.:  
 
Intake/Output Summary (Last 24 hours) at 2019 3465 Last data filed at 2019 0800 Gross per 24 hour Intake 1040 ml Output 1200 ml Net -160 ml EXAM: 
General:   Sitting in chair, NAD Lungs:   Clear to auscultation bilaterally. Heart:  Regular rate and rhythm, S1, S2 normal, no murmur, click, rub or gallop. Abdomen:   Soft, non-tender. Bowel sounds normal. No masses,  No organomegaly. Extremities:  2+ edema. PPP. Neurologic: 
Skin:  Gross motor and sensory apparatus intact. Mild petechiae on right side of neck surrounding former placement of central line. No other new bruising noted. Labs:  
Recent Labs  
  19 
0757 19 
0714  19 
0345 WBC 5.4  --   --  4.7 HGB 11.1*  --   --  10.7* HCT 36.7  --   --  35.1 PLT 80*  --   --  82* NA  --   --   --  141 K  --   --   --  4.0  
BUN  --   --   --  21* CREA  --   --   --  1.55* GLU  --   --   --  117* GLUCPOC  --  113*   < >  --   
 < > = values in this interval not displayed. Assessment:  
 
Active Problems: 
  Mitral stenosis (2019) Plan/Recommendations/Medical Decision Making: 1. Severe [facility] Plan for MVR tentatively Monday 4/22 2nd case. HIT negative, DINA pending, holding all anticoagulation. monitor. 2. Chronic Afib: Continue home tikosyn. Last dose of Xarelto 04/10. 
3. Iron deficiency anemia s/p iron transfusions with hx GIB 02/2019: Blood transfusion 04/17. H/H stable. Monitor 4. HTN: Cont Metoprolol, norvasc. Holding ACE and hydralazine. 5. T2DM: A1c 7. NPH at 14 AM, 22 PM per DTC. SSI and BS ACHS. 6. HLD: Statin. 7. Hx Bladder CA: No issues voiding. 8. Hypothyroid: Synthroid. 9. GERD/Schatzki's ring s/p dilatation 11/2018: No issues swallowing since. Protonix. 10. Pulmonary HTN: Much improved since outside cath on 03/15. [place] d/c when surgery cancelled. 11. CKD stage III s/p renal artery stenting: Creatinine improving. Good UOP. monitor. 12. Chronic diastolic HF (NYHA class III on admission): Continue BB. Holding ACE due to planned surgery. No diuretic 2/2 elevated Cr 
13. PVD with history of venous ulcers: No current open wounds. Monitor. Activity as tolerated. 15. CVA (2007) with no deficits: PT/OT. ASA/statin 15. Urine leukocytosis with Gram neg rods(POA): Urine cx + klebsiella, cont Keflex, hold ancef 2/2 burning at PIV site. 16. Thrombocytopenia: stable, 80K. HIT negative, DINA pending, heme consult confirm likely medication induced rather than bleeding. Plans to do surgery w/ Bival.  
15. Dispo: Medical optimization for MVR TBD. PT/OT. Transfer to CVSU. Hopeful surgery 04/22 (with bival, no heparin).  
 
Signed By: Zacarias Orr NP

## 2019-04-20 NOTE — PROGRESS NOTES
1945: Bedside and Verbal shift change report given to Kirstie Sharif RN (oncoming nurse) by Cindy Hansen RN (offgoing nurse). Report included the following information SBAR, Procedure Summary, Intake/Output, MAR, Recent Results, Cardiac Rhythm SR with 1st Degree AVB and Alarm Parameters . 2245: Pt noted to be desatting down into 70s while asleep, will recover quickly back to 90s, placed on 2L NC overnight while sleeping. 0740: Bedside and Verbal shift change report given to ERIN TAVERAS RN (oncoming nurse) by Kirstie Sharif RN (offgoing nurse). Report included the following information SBAR, Procedure Summary, Intake/Output, MAR, Recent Results, Cardiac Rhythm SR and Alarm Parameters .

## 2019-04-20 NOTE — PROGRESS NOTES
1305 TRANSFER - IN REPORT: 
 
Verbal report received from ERIN RN (name) on Iván Carmona  being received from CVICU (unit) for routine progression of care Report consisted of patients Situation, Background, Assessment and  
Recommendations(SBAR). Information from the following report(s) SBAR, Kardex, Intake/Output, MAR and Recent Results was reviewed with the receiving nurse. Opportunity for questions and clarification was provided. Assessment completed upon patients arrival to unit and care assumed. 46 Pt arrived to unit and tele confirmed with monitor tech. 1930 Bedside shift change report given to Abel Montes RN (oncoming nurse) by Carlos Hanks RN and Nina Morales RN (offgoing nurse). Report included the following information SBAR, Kardex, Intake/Output, MAR and Recent Results.

## 2019-04-20 NOTE — PROGRESS NOTES
1955: Report received from Libby Culver RN/ Marianne Nur RN 
 
8011: AM labs drawn and forwarded to lab 
 
[id number]: Bedside and Verbal shift change report given to Libby Culver RN/ Marianne Nru RN (oncoming nurse) by Eliz Singh RN (offgoing nurse). Report included the following information SBAR, Kardex, ED Summary, Intake/Output, MAR, Recent Results, Med Rec Status and Cardiac Rhythm Sinus Bronson vs NSR.

## 2019-04-20 NOTE — PROGRESS NOTES
0730: Bedside and Verbal shift change report given to ERIN RN (oncoming nurse) by Alverto Fuentes RN (offgoing nurse). Report included the following information SBAR, Kardex, Intake/Output, MAR, Recent Results, Med Rec Status and Cardiac Rhythm NSR.  
0815: Ana Arriaga NP and Junior Vela MD at bedside. No new orders received at this time. 1015: Pt ambulated to CVSU and back with RNs assistance. O2 saturation remained above 94% throughout entirety. 1205: Pt's son at bedside. Update given. 1320: TRANSFER - OUT REPORT: 
 
Verbal report given to [place], RODGER and Manuel Archer RN (name) on Kashif Jacobs  being transferred to [facility] (unit) for routine progression of care Report consisted of patients Situation, Background, Assessment and  
Recommendations(SBAR). Information from the following report(s) SBAR, Kardex, Intake/Output, MAR, Recent Results, Med Rec Status and Cardiac Rhythm NSR was reviewed with the receiving nurse. Lines:  
Peripheral IV 04/16/19 Right Antecubital (Active) Site Assessment Clean, dry, & intact 4/20/2019  8:00 AM  
Phlebitis Assessment 0 4/20/2019  8:00 AM  
Infiltration Assessment 0 4/20/2019  8:00 AM  
Dressing Status Clean, dry, & intact 4/20/2019  8:00 AM  
Dressing Type Transparent;Tape 4/20/2019  8:00 AM  
Hub Color/Line Status Pink;Capped;Flushed;Patent 4/20/2019  8:00 AM  
Action Taken Open ports on tubing capped 4/20/2019  8:00 AM  
Alcohol Cap Used Yes 4/20/2019  8:00 AM  
  
 
Opportunity for questions and clarification was provided. Patient transported with: 
 Monitor Registered Nurse

## 2019-04-20 NOTE — PROGRESS NOTES
Problem: Falls - Risk of 
Goal: *Absence of Falls Description Document Clide Failing Fall Risk and appropriate interventions in the flowsheet. Outcome: Progressing Towards Goal 
  
Problem: Patient Education: Go to Patient Education Activity Goal: Patient/Family Education Outcome: Progressing Towards Goal 
  
Problem: Diabetes Self-Management Goal: *Disease process and treatment process Description Define diabetes and identify own type of diabetes; list 3 options for treating diabetes. Outcome: Progressing Towards Goal 
Goal: *Incorporating nutritional management into lifestyle Description Describe effect of type, amount and timing of food on blood glucose; list 3 methods for planning meals. Outcome: Progressing Towards Goal 
Goal: *Incorporating physical activity into lifestyle Description State effect of exercise on blood glucose levels. Outcome: Progressing Towards Goal 
Goal: *Developing strategies to promote health/change behavior Description Define the ABC's of diabetes; identify appropriate screenings, schedule and personal plan for screenings. Outcome: Progressing Towards Goal 
Goal: *Using medications safely Description State effect of diabetes medications on diabetes; name diabetes medication taking, action and side effects. Outcome: Progressing Towards Goal 
Goal: *Monitoring blood glucose, interpreting and using results Description Identify recommended blood glucose targets  and personal targets. Outcome: Progressing Towards Goal 
Goal: *Prevention, detection, treatment of acute complications Description List symptoms of hyper- and hypoglycemia; describe how to treat low blood sugar and actions for lowering  high blood glucose level. Outcome: Progressing Towards Goal 
Goal: *Prevention, detection and treatment of chronic complications Description Define the natural course of diabetes and describe the relationship of blood glucose levels to long term complications of diabetes. Outcome: Progressing Towards Goal 
Goal: *Developing strategies to address psychosocial issues Description Describe feelings about living with diabetes; identify support needed and support network Outcome: Progressing Towards Goal 
Goal: *Insulin pump training Outcome: Progressing Towards Goal 
Goal: *Sick day guidelines Outcome: Progressing Towards Goal 
Goal: *Patient Specific Goal (EDIT GOAL, INSERT TEXT) Outcome: Progressing Towards Goal 
  
Problem: Patient Education: Go to Patient Education Activity Goal: Patient/Family Education Outcome: Progressing Towards Goal

## 2019-04-21 LAB
ANION GAP SERPL CALC-SCNC: 5 MMOL/L (ref 5–15)
BUN SERPL-MCNC: 23 MG/DL (ref 6–20)
BUN/CREAT SERPL: 16 (ref 12–20)
CALCIUM SERPL-MCNC: 8.5 MG/DL (ref 8.5–10.1)
CHLORIDE SERPL-SCNC: 112 MMOL/L (ref 97–108)
CO2 SERPL-SCNC: 25 MMOL/L (ref 21–32)
CREAT SERPL-MCNC: 1.44 MG/DL (ref 0.55–1.02)
ERYTHROCYTE [DISTWIDTH] IN BLOOD BY AUTOMATED COUNT: 17.2 % (ref 11.5–14.5)
GLUCOSE BLD STRIP.AUTO-MCNC: 102 MG/DL (ref 65–100)
GLUCOSE BLD STRIP.AUTO-MCNC: 108 MG/DL (ref 65–100)
GLUCOSE BLD STRIP.AUTO-MCNC: 109 MG/DL (ref 65–100)
GLUCOSE BLD STRIP.AUTO-MCNC: 112 MG/DL (ref 65–100)
GLUCOSE BLD STRIP.AUTO-MCNC: 124 MG/DL (ref 65–100)
GLUCOSE SERPL-MCNC: 123 MG/DL (ref 65–100)
HCT VFR BLD AUTO: 30.9 % (ref 35–47)
HGB BLD-MCNC: 9.4 G/DL (ref 11.5–16)
MAGNESIUM SERPL-MCNC: 2.3 MG/DL (ref 1.6–2.4)
MCH RBC QN AUTO: 29.6 PG (ref 26–34)
MCHC RBC AUTO-ENTMCNC: 30.4 G/DL (ref 30–36.5)
MCV RBC AUTO: 97.2 FL (ref 80–99)
NRBC # BLD: 0 K/UL (ref 0–0.01)
NRBC BLD-RTO: 0 PER 100 WBC
PLATELET # BLD AUTO: 97 K/UL (ref 150–400)
PMV BLD AUTO: 13.4 FL (ref 8.9–12.9)
POTASSIUM SERPL-SCNC: 4.2 MMOL/L (ref 3.5–5.1)
RBC # BLD AUTO: 3.18 M/UL (ref 3.8–5.2)
SERVICE CMNT-IMP: ABNORMAL
SODIUM SERPL-SCNC: 142 MMOL/L (ref 136–145)
WBC # BLD AUTO: 5.3 K/UL (ref 3.6–11)

## 2019-04-21 PROCEDURE — 82962 GLUCOSE BLOOD TEST: CPT

## 2019-04-21 PROCEDURE — 74011250637 HC RX REV CODE- 250/637: Performed by: PHYSICIAN ASSISTANT

## 2019-04-21 PROCEDURE — 36415 COLL VENOUS BLD VENIPUNCTURE: CPT

## 2019-04-21 PROCEDURE — 83735 ASSAY OF MAGNESIUM: CPT

## 2019-04-21 PROCEDURE — 74011636637 HC RX REV CODE- 636/637: Performed by: PHYSICIAN ASSISTANT

## 2019-04-21 PROCEDURE — 65660000001 HC RM ICU INTERMED STEPDOWN

## 2019-04-21 PROCEDURE — 85027 COMPLETE CBC AUTOMATED: CPT

## 2019-04-21 PROCEDURE — 80048 BASIC METABOLIC PNL TOTAL CA: CPT

## 2019-04-21 RX ORDER — DOBUTAMINE HYDROCHLORIDE 200 MG/100ML
0-10 INJECTION INTRAVENOUS
Status: DISCONTINUED | OUTPATIENT
Start: 2019-04-22 | End: 2019-04-23

## 2019-04-21 RX ORDER — NITROGLYCERIN 20 MG/100ML
16.5 INJECTION INTRAVENOUS CONTINUOUS
Status: DISCONTINUED | OUTPATIENT
Start: 2019-04-22 | End: 2019-04-22

## 2019-04-21 RX ORDER — SODIUM CHLORIDE 0.9 % (FLUSH) 0.9 %
5-40 SYRINGE (ML) INJECTION EVERY 8 HOURS
Status: DISCONTINUED | OUTPATIENT
Start: 2019-04-21 | End: 2019-04-22 | Stop reason: HOSPADM

## 2019-04-21 RX ORDER — PHENYLEPHRINE 10 MG/250 ML(40 MCG/ML)IN 0.9 % SOD.CHLORIDE INTRAVENOUS
10-100
Status: DISCONTINUED | OUTPATIENT
Start: 2019-04-22 | End: 2019-04-22 | Stop reason: HOSPADM

## 2019-04-21 RX ORDER — SODIUM CHLORIDE 0.9 % (FLUSH) 0.9 %
5-40 SYRINGE (ML) INJECTION AS NEEDED
Status: DISCONTINUED | OUTPATIENT
Start: 2019-04-21 | End: 2019-04-22 | Stop reason: HOSPADM

## 2019-04-21 RX ORDER — CEFAZOLIN SODIUM/WATER 2 G/20 ML
2 SYRINGE (ML) INTRAVENOUS
Status: DISCONTINUED | OUTPATIENT
Start: 2019-04-22 | End: 2019-04-22 | Stop reason: HOSPADM

## 2019-04-21 RX ORDER — DOPAMINE HYDROCHLORIDE 320 MG/100ML
5-20 INJECTION, SOLUTION INTRAVENOUS
Status: DISCONTINUED | OUTPATIENT
Start: 2019-04-22 | End: 2019-04-22

## 2019-04-21 RX ORDER — POTASSIUM CHLORIDE 29.8 MG/ML
20 INJECTION INTRAVENOUS ONCE
Status: DISCONTINUED | OUTPATIENT
Start: 2019-04-22 | End: 2019-04-22 | Stop reason: HOSPADM

## 2019-04-21 RX ORDER — ALBUMIN HUMAN 50 G/1000ML
25 SOLUTION INTRAVENOUS ONCE
Status: DISCONTINUED | OUTPATIENT
Start: 2019-04-22 | End: 2019-04-22 | Stop reason: HOSPADM

## 2019-04-21 RX ORDER — MAGNESIUM SULFATE HEPTAHYDRATE 40 MG/ML
2 INJECTION, SOLUTION INTRAVENOUS ONCE
Status: DISCONTINUED | OUTPATIENT
Start: 2019-04-22 | End: 2019-04-22 | Stop reason: HOSPADM

## 2019-04-21 RX ORDER — PROTAMINE SULFATE 10 MG/ML
500 INJECTION, SOLUTION INTRAVENOUS ONCE
Status: DISCONTINUED | OUTPATIENT
Start: 2019-04-22 | End: 2019-04-22 | Stop reason: HOSPADM

## 2019-04-21 RX ADMIN — LEVOTHYROXINE SODIUM 125 MCG: 125 TABLET ORAL at 07:07

## 2019-04-21 RX ADMIN — Medication 10 ML: at 14:00

## 2019-04-21 RX ADMIN — HUMAN INSULIN 14 UNITS: 100 INJECTION, SUSPENSION SUBCUTANEOUS at 17:43

## 2019-04-21 RX ADMIN — Medication 10 ML: at 07:08

## 2019-04-21 RX ADMIN — Medication 10 ML: at 08:42

## 2019-04-21 RX ADMIN — HUMAN INSULIN 22 UNITS: 100 INJECTION, SUSPENSION SUBCUTANEOUS at 07:07

## 2019-04-21 RX ADMIN — AMLODIPINE BESYLATE 5 MG: 5 TABLET ORAL at 08:41

## 2019-04-21 RX ADMIN — SENNOSIDES AND DOCUSATE SODIUM 1 TABLET: 8.6; 5 TABLET ORAL at 08:41

## 2019-04-21 RX ADMIN — ALLOPURINOL 200 MG: 100 TABLET ORAL at 08:41

## 2019-04-21 RX ADMIN — ATORVASTATIN CALCIUM 80 MG: 40 TABLET, FILM COATED ORAL at 23:54

## 2019-04-21 RX ADMIN — CEPHALEXIN 500 MG: 500 CAPSULE ORAL at 12:10

## 2019-04-21 RX ADMIN — METOPROLOL TARTRATE 75 MG: 50 TABLET ORAL at 08:41

## 2019-04-21 RX ADMIN — DOFETILIDE 125 MCG: 0.12 CAPSULE ORAL at 00:00

## 2019-04-21 RX ADMIN — DOFETILIDE 125 MCG: 0.12 CAPSULE ORAL at 08:41

## 2019-04-21 RX ADMIN — CEPHALEXIN 500 MG: 500 CAPSULE ORAL at 23:54

## 2019-04-21 RX ADMIN — PANTOPRAZOLE SODIUM 40 MG: 40 TABLET, DELAYED RELEASE ORAL at 07:07

## 2019-04-21 RX ADMIN — Medication 10 ML: at 22:00

## 2019-04-21 RX ADMIN — METOPROLOL TARTRATE 75 MG: 50 TABLET ORAL at 23:54

## 2019-04-21 NOTE — PROGRESS NOTES
1940: Report received from Dena Reyes RN/ Jaren Reed RN 
 
0600: requesting to take bath later this AM prior to going to pre-op 2527: Bedside and Verbal shift change report given to Fabiana Ponce 1313 (oncoming nurse) by Amelia Adam RN (offgoing nurse). Report included the following information SBAR, Kardex, Intake/Output, MAR, Recent Results, Med Rec Status and Cardiac Rhythm NSR w PAC.

## 2019-04-21 NOTE — PROGRESS NOTES
Problem: Falls - Risk of 
Goal: *Absence of Falls Description Document Sandra Fuentes Fall Risk and appropriate interventions in the flowsheet. Outcome: Progressing Towards Goal 
  
Problem: Patient Education: Go to Patient Education Activity Goal: Patient/Family Education Outcome: Progressing Towards Goal 
  
Problem: Diabetes Self-Management Goal: *Disease process and treatment process Description Define diabetes and identify own type of diabetes; list 3 options for treating diabetes. Outcome: Progressing Towards Goal 
Goal: *Incorporating nutritional management into lifestyle Description Describe effect of type, amount and timing of food on blood glucose; list 3 methods for planning meals. Outcome: Progressing Towards Goal 
Goal: *Incorporating physical activity into lifestyle Description State effect of exercise on blood glucose levels. Outcome: Progressing Towards Goal 
Goal: *Developing strategies to promote health/change behavior Description Define the ABC's of diabetes; identify appropriate screenings, schedule and personal plan for screenings. Outcome: Progressing Towards Goal 
Goal: *Using medications safely Description State effect of diabetes medications on diabetes; name diabetes medication taking, action and side effects. Outcome: Progressing Towards Goal 
Goal: *Monitoring blood glucose, interpreting and using results Description Identify recommended blood glucose targets  and personal targets. Outcome: Progressing Towards Goal 
Goal: *Prevention, detection, treatment of acute complications Description List symptoms of hyper- and hypoglycemia; describe how to treat low blood sugar and actions for lowering  high blood glucose level. Outcome: Progressing Towards Goal 
Goal: *Prevention, detection and treatment of chronic complications Description Define the natural course of diabetes and describe the relationship of blood glucose levels to long term complications of diabetes. Outcome: Progressing Towards Goal 
Goal: *Developing strategies to address psychosocial issues Description Describe feelings about living with diabetes; identify support needed and support network Outcome: Progressing Towards Goal 
Goal: *Insulin pump training Outcome: Progressing Towards Goal 
Goal: *Patient Specific Goal (EDIT GOAL, INSERT TEXT) Outcome: Progressing Towards Goal 
  
Problem: Patient Education: Go to Patient Education Activity Goal: Patient/Family Education Outcome: Progressing Towards Goal

## 2019-04-21 NOTE — PROGRESS NOTES
0730 Bedside shift change report given to Francie Welsh, RODGER and Nigel Tobin RN (oncoming nurse) by Lupillo Salcido RN (offgoing nurse). Report included the following information SBAR, Kardex, Intake/Output, MAR and Recent Results. 1930 Bedside shift change report given to Lupillo Salcido RN (oncoming nurse) by Francie Welsh RN and Nigel Tobin RN (offgoing nurse). Report included the following information SBAR, Kardex, Intake/Output, MAR and Recent Results.

## 2019-04-21 NOTE — PROGRESS NOTES
[facility] Progress Note Admit Date: 2019 Procedure: MVR -- Preop  2nd case Subjective:  
Pt seen with Dr. Urbano Gramajo. On room air, sitting comfortably in chair Objective:  
Vitals: 
Blood pressure 154/71, pulse 71, temperature 97.8 °F (36.6 °C), resp. rate 16, height 5' 9\" (1.753 m), weight 220 lb 14.4 oz (100.2 kg), SpO2 98 %. Temp (24hrs), Av.8 °F (36.6 °C), Min:97.5 °F (36.4 °C), Max:98.1 °F (36.7 °C) EKG/Rhythm: NSR 60s Oxygen Therapy: RA 
 
CXR:  
CXR Results  (Last 48 hours) None Admission Weight: Last Weight Weight: 215 lb 11.2 oz (97.8 kg) Weight: 220 lb 14.4 oz (100.2 kg) Intake / Output / Drain: 
Current Shift:  0701 -  1900 In: 480 [P.O.:480] Out: - Last 24 hrs.:  
 
Intake/Output Summary (Last 24 hours) at 2019 2492 Last data filed at 2019 0474 Gross per 24 hour Intake 720 ml Output 600 ml Net 120 ml EXAM: 
General:   Sitting in chair, NAD Lungs:   Clear to auscultation bilaterally. Heart:  Regular rate and rhythm, S1, S2 normal, no murmur, click, rub or gallop. Abdomen:   Soft, non-tender. Bowel sounds normal. No masses,  No organomegaly. Extremities:  2+ edema. PPP. Neurologic: 
Skin:  Gross motor and sensory apparatus intact. Mild petechiae on right side of neck surrounding former placement of central line. No other new bruising noted. Labs:  
Recent Labs  
  19 
0697  19 
0242 WBC  --   --  5.3 HGB  --   --  9.4* HCT  --   --  30.9* PLT  --   --  97* NA  --   --  142 K  --   --  4.2 BUN  --   --  23* CREA  --   --  1.44* GLU  --   --  123* GLUCPOC 108*   < >  --   
 < > = values in this interval not displayed. Assessment:  
 
Active Problems: 
  Mitral stenosis (2019) Plan/Recommendations/Medical Decision Makin. Severe MS s/p TMVr ([place], 2016).  Plan for MVR tentatively Monday 4/22 2nd case. HIT panel negative-- holding all anticoagulation. monitor. 2. Chronic Afib: Continue home tikosyn. Last dose of Xarelto 04/10. 
3. Iron deficiency anemia s/p iron transfusions with hx GIB 02/2019: Blood transfusion 04/17. H/H stable. Monitor 4. HTN: Cont Metoprolol, norvasc. Holding ACE and hydralazine. 5. T2DM: A1c 7. NPH at 14 AM, 22 PM per DTC. SSI and BS ACHS. 6. HLD: Statin. 7. Hx Bladder CA: No issues voiding. 8. Hypothyroid: Synthroid. 9. GERD/Schatzki's ring s/p dilatation 11/2018: No issues swallowing since. Protonix. 10. Pulmonary HTN: Much improved since outside cath on 03/15. [place] d/c when surgery cancelled. 11. CKD stage III s/p renal artery stenting: Creatinine improving. Good UOP. monitor. 12. Chronic diastolic HF (NYHA class III on admission): Continue BB. Holding ACE due to planned surgery. No diuretic 2/2 elevated Cr 
13. PVD with history of venous ulcers: No current open wounds. Monitor. Activity as tolerated. 15. CVA (2007) with no deficits: PT/OT. ASA/statin 15. Urine leukocytosis with Gram neg rods(POA): Urine cx + klebsiella, cont Keflex, hold ancef 2/2 burning at PIV site. 16. Thrombocytopenia: stable, 97K. HIT/DINA negative, heme consult confirm likely medication induced rather than bleeding. Plans to do surgery w/ Bival.  
15. Dispo: Medical optimization for MVR TBD. PT/OT. Transfer to [facility]. Hopeful surgery 04/22 (with bival, no heparin).  
 
Signed By: Moriah Stubbs NP

## 2019-04-22 ENCOUNTER — ANESTHESIA EVENT (OUTPATIENT)
Dept: CARDIOTHORACIC SURGERY | Age: 84
DRG: 220 | End: 2019-04-22
Payer: MEDICARE

## 2019-04-22 ENCOUNTER — APPOINTMENT (OUTPATIENT)
Dept: GENERAL RADIOLOGY | Age: 84
DRG: 220 | End: 2019-04-22
Attending: PHYSICIAN ASSISTANT
Payer: MEDICARE

## 2019-04-22 ENCOUNTER — ANESTHESIA (OUTPATIENT)
Dept: CARDIOTHORACIC SURGERY | Age: 84
DRG: 220 | End: 2019-04-22
Payer: MEDICARE

## 2019-04-22 PROBLEM — Z95.2 S/P MVR (MITRAL VALVE REPLACEMENT): Status: ACTIVE | Noted: 2019-04-22

## 2019-04-22 LAB
ABO + RH BLD: NORMAL
ADMINISTERED INITIALS, ADMINIT: NORMAL
ALBUMIN SERPL-MCNC: 2.9 G/DL (ref 3.5–5)
ALBUMIN SERPL-MCNC: 3.2 G/DL (ref 3.5–5)
ALBUMIN/GLOB SERPL: 1 {RATIO} (ref 1.1–2.2)
ALBUMIN/GLOB SERPL: 1.1 {RATIO} (ref 1.1–2.2)
ALP SERPL-CCNC: 58 U/L (ref 45–117)
ALP SERPL-CCNC: 66 U/L (ref 45–117)
ALT SERPL-CCNC: 27 U/L (ref 12–78)
ALT SERPL-CCNC: 29 U/L (ref 12–78)
ANION GAP SERPL CALC-SCNC: 2 MMOL/L (ref 5–15)
ANION GAP SERPL CALC-SCNC: 3 MMOL/L (ref 5–15)
ANION GAP SERPL CALC-SCNC: 3 MMOL/L (ref 5–15)
APTT PPP: 27 SEC (ref 22.1–32)
APTT PPP: 36.2 SEC (ref 22.1–32)
APTT PPP: 71.7 SEC (ref 22.1–32)
ARTERIAL PATENCY WRIST A: ABNORMAL
ARTERIAL PATENCY WRIST A: ABNORMAL
ARTERIAL PATENCY WRIST A: NORMAL
AST SERPL-CCNC: 36 U/L (ref 15–37)
AST SERPL-CCNC: 47 U/L (ref 15–37)
BASE EXCESS BLD CALC-SCNC: 0 MMOL/L
BASE EXCESS BLD CALC-SCNC: 4 MMOL/L
BASE EXCESS BLDV CALC-SCNC: 0 MMOL/L
BASOPHILS # BLD: 0 K/UL (ref 0–0.1)
BASOPHILS NFR BLD: 0 % (ref 0–1)
BDY SITE: ABNORMAL
BDY SITE: ABNORMAL
BDY SITE: NORMAL
BILIRUB SERPL-MCNC: 0.4 MG/DL (ref 0.2–1)
BILIRUB SERPL-MCNC: 0.5 MG/DL (ref 0.2–1)
BLD PROD TYP BPU: NORMAL
BLD PROD TYP BPU: NORMAL
BLOOD GROUP ANTIBODIES SERPL: NORMAL
BPU ID: NORMAL
BPU ID: NORMAL
BUN SERPL-MCNC: 19 MG/DL (ref 6–20)
BUN SERPL-MCNC: 20 MG/DL (ref 6–20)
BUN SERPL-MCNC: 21 MG/DL (ref 6–20)
BUN/CREAT SERPL: 13 (ref 12–20)
CALCIUM SERPL-MCNC: 8.9 MG/DL (ref 8.5–10.1)
CALCIUM SERPL-MCNC: 9 MG/DL (ref 8.5–10.1)
CALCIUM SERPL-MCNC: 9 MG/DL (ref 8.5–10.1)
CHLORIDE SERPL-SCNC: 111 MMOL/L (ref 97–108)
CHLORIDE SERPL-SCNC: 112 MMOL/L (ref 97–108)
CHLORIDE SERPL-SCNC: 114 MMOL/L (ref 97–108)
CO2 SERPL-SCNC: 25 MMOL/L (ref 21–32)
CO2 SERPL-SCNC: 26 MMOL/L (ref 21–32)
CO2 SERPL-SCNC: 29 MMOL/L (ref 21–32)
CREAT SERPL-MCNC: 1.46 MG/DL (ref 0.55–1.02)
CREAT SERPL-MCNC: 1.56 MG/DL (ref 0.55–1.02)
CREAT SERPL-MCNC: 1.6 MG/DL (ref 0.55–1.02)
CROSSMATCH RESULT,%XM: NORMAL
CROSSMATCH RESULT,%XM: NORMAL
D50 ADMINISTERED, D50ADM: 0 ML
D50 ORDER, D50ORD: 0 ML
DIFFERENTIAL METHOD BLD: ABNORMAL
EOSINOPHIL # BLD: 0.1 K/UL (ref 0–0.4)
EOSINOPHIL NFR BLD: 1 % (ref 0–7)
ERYTHROCYTE [DISTWIDTH] IN BLOOD BY AUTOMATED COUNT: 16.9 % (ref 11.5–14.5)
ERYTHROCYTE [DISTWIDTH] IN BLOOD BY AUTOMATED COUNT: 17 % (ref 11.5–14.5)
GAS FLOW.O2 O2 DELIVERY SYS: ABNORMAL L/MIN
GAS FLOW.O2 O2 DELIVERY SYS: ABNORMAL L/MIN
GAS FLOW.O2 O2 DELIVERY SYS: NORMAL L/MIN
GAS FLOW.O2 SETTING OXYMISER: 12 BPM
GAS FLOW.O2 SETTING OXYMISER: 12 BPM
GLOBULIN SER CALC-MCNC: 2.8 G/DL (ref 2–4)
GLOBULIN SER CALC-MCNC: 3 G/DL (ref 2–4)
GLSCOM COMMENTS: NORMAL
GLUCOSE BLD STRIP.AUTO-MCNC: 104 MG/DL (ref 65–100)
GLUCOSE BLD STRIP.AUTO-MCNC: 117 MG/DL (ref 65–100)
GLUCOSE BLD STRIP.AUTO-MCNC: 122 MG/DL (ref 65–100)
GLUCOSE BLD STRIP.AUTO-MCNC: 123 MG/DL (ref 65–100)
GLUCOSE BLD STRIP.AUTO-MCNC: 125 MG/DL (ref 65–100)
GLUCOSE BLD STRIP.AUTO-MCNC: 131 MG/DL (ref 65–100)
GLUCOSE BLD STRIP.AUTO-MCNC: 133 MG/DL (ref 65–100)
GLUCOSE BLD STRIP.AUTO-MCNC: 96 MG/DL (ref 65–100)
GLUCOSE BLD STRIP.AUTO-MCNC: 96 MG/DL (ref 65–100)
GLUCOSE SERPL-MCNC: 110 MG/DL (ref 65–100)
GLUCOSE SERPL-MCNC: 114 MG/DL (ref 65–100)
GLUCOSE SERPL-MCNC: 133 MG/DL (ref 65–100)
GLUCOSE, GLC: 104 MG/DL
GLUCOSE, GLC: 117 MG/DL
GLUCOSE, GLC: 122 MG/DL
GLUCOSE, GLC: 123 MG/DL
GLUCOSE, GLC: 125 MG/DL
GLUCOSE, GLC: 131 MG/DL
GLUCOSE, GLC: 133 MG/DL
HCO3 BLD-SCNC: 24.8 MMOL/L (ref 22–26)
HCO3 BLD-SCNC: 28.8 MMOL/L (ref 22–26)
HCO3 BLDV-SCNC: 25.5 MMOL/L (ref 23–28)
HCT VFR BLD AUTO: 28.5 % (ref 35–47)
HCT VFR BLD AUTO: 28.6 % (ref 35–47)
HCT VFR BLD AUTO: 32.2 % (ref 35–47)
HGB BLD-MCNC: 8.8 G/DL (ref 11.5–16)
HGB BLD-MCNC: 8.9 G/DL (ref 11.5–16)
HGB BLD-MCNC: 9.8 G/DL (ref 11.5–16)
HIGH TARGET, HITG: 130 MG/DL
HIGH TARGET, HITG: 140 MG/DL
HIGH TARGET, HITG: 140 MG/DL
IMM GRANULOCYTES # BLD AUTO: 0 K/UL (ref 0–0.04)
IMM GRANULOCYTES NFR BLD AUTO: 0 % (ref 0–0.5)
INR PPP: 1 (ref 0.9–1.1)
INR PPP: 3.1 (ref 0.9–1.1)
INSULIN ADMINSTERED, INSADM: 2.2 UNITS/HOUR
INSULIN ADMINSTERED, INSADM: 2.9 UNITS/HOUR
INSULIN ADMINSTERED, INSADM: 2.9 UNITS/HOUR
INSULIN ADMINSTERED, INSADM: 3.1 UNITS/HOUR
INSULIN ADMINSTERED, INSADM: 3.2 UNITS/HOUR
INSULIN ADMINSTERED, INSADM: 3.3 UNITS/HOUR
INSULIN ADMINSTERED, INSADM: 3.6 UNITS/HOUR
INSULIN ORDER, INSORD: 2.2 UNITS/HOUR
INSULIN ORDER, INSORD: 2.9 UNITS/HOUR
INSULIN ORDER, INSORD: 2.9 UNITS/HOUR
INSULIN ORDER, INSORD: 3.1 UNITS/HOUR
INSULIN ORDER, INSORD: 3.2 UNITS/HOUR
INSULIN ORDER, INSORD: 3.3 UNITS/HOUR
INSULIN ORDER, INSORD: 3.6 UNITS/HOUR
LOW TARGET, LOT: 100 MG/DL
LOW TARGET, LOT: 100 MG/DL
LOW TARGET, LOT: 95 MG/DL
LYMPHOCYTES # BLD: 0.4 K/UL (ref 0.8–3.5)
LYMPHOCYTES NFR BLD: 6 % (ref 12–49)
MAGNESIUM SERPL-MCNC: 2.2 MG/DL (ref 1.6–2.4)
MAGNESIUM SERPL-MCNC: 2.7 MG/DL (ref 1.6–2.4)
MAGNESIUM SERPL-MCNC: 2.8 MG/DL (ref 1.6–2.4)
MCH RBC QN AUTO: 29.4 PG (ref 26–34)
MCH RBC QN AUTO: 29.6 PG (ref 26–34)
MCHC RBC AUTO-ENTMCNC: 30.4 G/DL (ref 30–36.5)
MCHC RBC AUTO-ENTMCNC: 31.1 G/DL (ref 30–36.5)
MCV RBC AUTO: 95 FL (ref 80–99)
MCV RBC AUTO: 96.7 FL (ref 80–99)
MINUTES UNTIL NEXT BG, NBG: 60 MIN
MONOCYTES # BLD: 0.4 K/UL (ref 0–1)
MONOCYTES NFR BLD: 7 % (ref 5–13)
MULTIPLIER, MUL: 0.03
MULTIPLIER, MUL: 0.03
MULTIPLIER, MUL: 0.04
MULTIPLIER, MUL: 0.05
NEUTS SEG # BLD: 5.1 K/UL (ref 1.8–8)
NEUTS SEG NFR BLD: 86 % (ref 32–75)
NRBC # BLD: 0 K/UL (ref 0–0.01)
NRBC # BLD: 0 K/UL (ref 0–0.01)
NRBC BLD-RTO: 0 PER 100 WBC
NRBC BLD-RTO: 0 PER 100 WBC
O2/TOTAL GAS SETTING VFR VENT: 0.7 %
O2/TOTAL GAS SETTING VFR VENT: 0.8 %
O2/TOTAL GAS SETTING VFR VENT: 50 %
ORDER INITIALS, ORDINIT: NORMAL
PCO2 BLD: 40.4 MMHG (ref 35–45)
PCO2 BLD: 49.8 MMHG (ref 35–45)
PCO2 BLDV: 42.9 MMHG (ref 41–51)
PEEP RESPIRATORY: 5 CMH2O
PEEP RESPIRATORY: 5 CMH2O
PEEP RESPIRATORY: 8 CMH2O
PH BLD: 7.37 [PH] (ref 7.35–7.45)
PH BLD: 7.4 [PH] (ref 7.35–7.45)
PH BLDV: 7.38 [PH] (ref 7.32–7.42)
PLATELET # BLD AUTO: 94 K/UL (ref 150–400)
PLATELET # BLD AUTO: 94 K/UL (ref 150–400)
PMV BLD AUTO: 12.2 FL (ref 8.9–12.9)
PO2 BLD: 121 MMHG (ref 80–100)
PO2 BLD: 136 MMHG (ref 80–100)
PO2 BLDV: 36 MMHG (ref 25–40)
POTASSIUM SERPL-SCNC: 3.9 MMOL/L (ref 3.5–5.1)
POTASSIUM SERPL-SCNC: 4.1 MMOL/L (ref 3.5–5.1)
POTASSIUM SERPL-SCNC: 4.4 MMOL/L (ref 3.5–5.1)
PRESSURE SUPPORT SETTING VENT: 5 CMH2O
PROT SERPL-MCNC: 5.7 G/DL (ref 6.4–8.2)
PROT SERPL-MCNC: 6.2 G/DL (ref 6.4–8.2)
PROTHROMBIN TIME: 10.2 SEC (ref 9–11.1)
PROTHROMBIN TIME: 29.8 SEC (ref 9–11.1)
RBC # BLD AUTO: 3.01 M/UL (ref 3.8–5.2)
RBC # BLD AUTO: 3.33 M/UL (ref 3.8–5.2)
RBC MORPH BLD: ABNORMAL
RBC MORPH BLD: ABNORMAL
SAO2 % BLD: 99 % (ref 92–97)
SAO2 % BLD: 99 % (ref 92–97)
SAO2 % BLDV: 67 % (ref 65–88)
SERVICE CMNT-IMP: ABNORMAL
SERVICE CMNT-IMP: NORMAL
SERVICE CMNT-IMP: NORMAL
SODIUM SERPL-SCNC: 140 MMOL/L (ref 136–145)
SODIUM SERPL-SCNC: 142 MMOL/L (ref 136–145)
SODIUM SERPL-SCNC: 143 MMOL/L (ref 136–145)
SPECIMEN EXP DATE BLD: NORMAL
SPECIMEN TYPE: ABNORMAL
SPECIMEN TYPE: ABNORMAL
SPECIMEN TYPE: NORMAL
STATUS OF UNIT,%ST: NORMAL
STATUS OF UNIT,%ST: NORMAL
THERAPEUTIC RANGE,PTTT: ABNORMAL SECS (ref 58–77)
THERAPEUTIC RANGE,PTTT: ABNORMAL SECS (ref 58–77)
THERAPEUTIC RANGE,PTTT: NORMAL SECS (ref 58–77)
TOTAL RESP. RATE, ITRR: 12
TOTAL RESP. RATE, ITRR: 13
TOTAL RESP. RATE, ITRR: 17
UNIT DIVISION, %UDIV: 0
UNIT DIVISION, %UDIV: 0
VENTILATION MODE VENT: ABNORMAL
VENTILATION MODE VENT: ABNORMAL
VENTILATION MODE VENT: NORMAL
VT SETTING VENT: 500 ML
VT SETTING VENT: 500 ML
WBC # BLD AUTO: 4.6 K/UL (ref 3.6–11)
WBC # BLD AUTO: 6 K/UL (ref 3.6–11)

## 2019-04-22 PROCEDURE — 73610000005 HC INO THERAPY INITIAL

## 2019-04-22 PROCEDURE — 82803 BLOOD GASES ANY COMBINATION: CPT

## 2019-04-22 PROCEDURE — 77030008684 HC TU ET CUF COVD -B: Performed by: ANESTHESIOLOGY

## 2019-04-22 PROCEDURE — 85025 COMPLETE CBC W/AUTO DIFF WBC: CPT

## 2019-04-22 PROCEDURE — 77030026438 HC STYL ET INTUB CARD -A: Performed by: ANESTHESIOLOGY

## 2019-04-22 PROCEDURE — 94002 VENT MGMT INPAT INIT DAY: CPT

## 2019-04-22 PROCEDURE — 85390 FIBRINOLYSINS SCREEN I&R: CPT

## 2019-04-22 PROCEDURE — 77030011640 HC PAD GRND REM COVD -A: Performed by: THORACIC SURGERY (CARDIOTHORACIC VASCULAR SURGERY)

## 2019-04-22 PROCEDURE — 36620 INSERTION CATHETER ARTERY: CPT

## 2019-04-22 PROCEDURE — 77030019579 HC CBL PACE DISP REMG -B: Performed by: THORACIC SURGERY (CARDIOTHORACIC VASCULAR SURGERY)

## 2019-04-22 PROCEDURE — 77030002912 HC SUT ETHBND J&J -A: Performed by: THORACIC SURGERY (CARDIOTHORACIC VASCULAR SURGERY)

## 2019-04-22 PROCEDURE — 77030002986 HC SUT PROL J&J -A: Performed by: THORACIC SURGERY (CARDIOTHORACIC VASCULAR SURGERY)

## 2019-04-22 PROCEDURE — 74011250636 HC RX REV CODE- 250/636: Performed by: THORACIC SURGERY (CARDIOTHORACIC VASCULAR SURGERY)

## 2019-04-22 PROCEDURE — 77030020263 HC SOL INJ SOD CL0.9% LFCR 1000ML: Performed by: THORACIC SURGERY (CARDIOTHORACIC VASCULAR SURGERY)

## 2019-04-22 PROCEDURE — 77030011255 HC DSG AQUACEL AG BMS -A: Performed by: THORACIC SURGERY (CARDIOTHORACIC VASCULAR SURGERY)

## 2019-04-22 PROCEDURE — 83735 ASSAY OF MAGNESIUM: CPT

## 2019-04-22 PROCEDURE — 88305 TISSUE EXAM BY PATHOLOGIST: CPT

## 2019-04-22 PROCEDURE — 77030034936 HC DEV MIN COR-KNOT KT LSIS -F: Performed by: THORACIC SURGERY (CARDIOTHORACIC VASCULAR SURGERY)

## 2019-04-22 PROCEDURE — 74011000258 HC RX REV CODE- 258

## 2019-04-22 PROCEDURE — 85576 BLOOD PLATELET AGGREGATION: CPT

## 2019-04-22 PROCEDURE — 77030031126 HC SUT CUST KT J&J -B: Performed by: THORACIC SURGERY (CARDIOTHORACIC VASCULAR SURGERY)

## 2019-04-22 PROCEDURE — 85384 FIBRINOGEN ACTIVITY: CPT

## 2019-04-22 PROCEDURE — 77030018836 HC SOL IRR NACL ICUM -A: Performed by: THORACIC SURGERY (CARDIOTHORACIC VASCULAR SURGERY)

## 2019-04-22 PROCEDURE — 02RG08Z REPLACEMENT OF MITRAL VALVE WITH ZOOPLASTIC TISSUE, OPEN APPROACH: ICD-10-PCS | Performed by: THORACIC SURGERY (CARDIOTHORACIC VASCULAR SURGERY)

## 2019-04-22 PROCEDURE — 77030010797: Performed by: THORACIC SURGERY (CARDIOTHORACIC VASCULAR SURGERY)

## 2019-04-22 PROCEDURE — 36600 WITHDRAWAL OF ARTERIAL BLOOD: CPT

## 2019-04-22 PROCEDURE — 77030018729 HC ELECTRD DEFIB PAD CARD -B: Performed by: THORACIC SURGERY (CARDIOTHORACIC VASCULAR SURGERY)

## 2019-04-22 PROCEDURE — 77030002996 HC SUT SLK J&J -A: Performed by: THORACIC SURGERY (CARDIOTHORACIC VASCULAR SURGERY)

## 2019-04-22 PROCEDURE — 77030033069 HC RLD QLC SGL COR-KNOT LSIS -B: Performed by: THORACIC SURGERY (CARDIOTHORACIC VASCULAR SURGERY)

## 2019-04-22 PROCEDURE — 36430 TRANSFUSION BLD/BLD COMPNT: CPT

## 2019-04-22 PROCEDURE — B24BZZ4 ULTRASONOGRAPHY OF HEART WITH AORTA, TRANSESOPHAGEAL: ICD-10-PCS | Performed by: ANESTHESIOLOGY

## 2019-04-22 PROCEDURE — 77030018843 HC SOL IRR SOD CL 9% SLSH BAXT -B: Performed by: THORACIC SURGERY (CARDIOTHORACIC VASCULAR SURGERY)

## 2019-04-22 PROCEDURE — 82962 GLUCOSE BLOOD TEST: CPT

## 2019-04-22 PROCEDURE — 74011000250 HC RX REV CODE- 250

## 2019-04-22 PROCEDURE — 30233R1 TRANSFUSION OF NONAUTOLOGOUS PLATELETS INTO PERIPHERAL VEIN, PERCUTANEOUS APPROACH: ICD-10-PCS | Performed by: THORACIC SURGERY (CARDIOTHORACIC VASCULAR SURGERY)

## 2019-04-22 PROCEDURE — 77030010821: Performed by: THORACIC SURGERY (CARDIOTHORACIC VASCULAR SURGERY)

## 2019-04-22 PROCEDURE — 74011000250 HC RX REV CODE- 250: Performed by: THORACIC SURGERY (CARDIOTHORACIC VASCULAR SURGERY)

## 2019-04-22 PROCEDURE — 77030006247 HC LD PCMKR MYOCRD BPLR TEMP MEDT -B: Performed by: THORACIC SURGERY (CARDIOTHORACIC VASCULAR SURGERY)

## 2019-04-22 PROCEDURE — 77030008771 HC TU NG SALEM SUMP -A: Performed by: ANESTHESIOLOGY

## 2019-04-22 PROCEDURE — 85730 THROMBOPLASTIN TIME PARTIAL: CPT

## 2019-04-22 PROCEDURE — 02HV33Z INSERTION OF INFUSION DEVICE INTO SUPERIOR VENA CAVA, PERCUTANEOUS APPROACH: ICD-10-PCS | Performed by: ANESTHESIOLOGY

## 2019-04-22 PROCEDURE — 77030002973 HC SUT PLEDG CV SFT OVL TELE -B: Performed by: THORACIC SURGERY (CARDIOTHORACIC VASCULAR SURGERY)

## 2019-04-22 PROCEDURE — 77030034848: Performed by: THORACIC SURGERY (CARDIOTHORACIC VASCULAR SURGERY)

## 2019-04-22 PROCEDURE — 85018 HEMOGLOBIN: CPT

## 2019-04-22 PROCEDURE — 86923 COMPATIBILITY TEST ELECTRIC: CPT

## 2019-04-22 PROCEDURE — 77030022521 HC CATH PERF VENT EDWD -B: Performed by: THORACIC SURGERY (CARDIOTHORACIC VASCULAR SURGERY)

## 2019-04-22 PROCEDURE — C1713 ANCHOR/SCREW BN/BN,TIS/BN: HCPCS | Performed by: THORACIC SURGERY (CARDIOTHORACIC VASCULAR SURGERY)

## 2019-04-22 PROCEDURE — 74011250636 HC RX REV CODE- 250/636: Performed by: NURSE PRACTITIONER

## 2019-04-22 PROCEDURE — 74011636637 HC RX REV CODE- 636/637

## 2019-04-22 PROCEDURE — 74011250636 HC RX REV CODE- 250/636: Performed by: PHYSICIAN ASSISTANT

## 2019-04-22 PROCEDURE — 77030012407 HC DRN WND BARD -B: Performed by: THORACIC SURGERY (CARDIOTHORACIC VASCULAR SURGERY)

## 2019-04-22 PROCEDURE — 77030006994: Performed by: THORACIC SURGERY (CARDIOTHORACIC VASCULAR SURGERY)

## 2019-04-22 PROCEDURE — 86900 BLOOD TYPING SEROLOGIC ABO: CPT

## 2019-04-22 PROCEDURE — 77030010389 HC WRE ATR PACE AEMC -B: Performed by: THORACIC SURGERY (CARDIOTHORACIC VASCULAR SURGERY)

## 2019-04-22 PROCEDURE — 73610000026 HC INO THERAPY EACH HOUR

## 2019-04-22 PROCEDURE — 71045 X-RAY EXAM CHEST 1 VIEW: CPT

## 2019-04-22 PROCEDURE — 77030005537 HC CATH URETH BARD -A: Performed by: THORACIC SURGERY (CARDIOTHORACIC VASCULAR SURGERY)

## 2019-04-22 PROCEDURE — P9059 PLASMA, FRZ BETWEEN 8-24HOUR: HCPCS

## 2019-04-22 PROCEDURE — 88304 TISSUE EXAM BY PATHOLOGIST: CPT

## 2019-04-22 PROCEDURE — 77030003020 HC SUT TICRN COVD -A: Performed by: THORACIC SURGERY (CARDIOTHORACIC VASCULAR SURGERY)

## 2019-04-22 PROCEDURE — 77030014491 HC PLEDG PTFE BARD -A: Performed by: THORACIC SURGERY (CARDIOTHORACIC VASCULAR SURGERY)

## 2019-04-22 PROCEDURE — 77030019702 HC WRP THER MENM -C: Performed by: THORACIC SURGERY (CARDIOTHORACIC VASCULAR SURGERY)

## 2019-04-22 PROCEDURE — 30233K1 TRANSFUSION OF NONAUTOLOGOUS FROZEN PLASMA INTO PERIPHERAL VEIN, PERCUTANEOUS APPROACH: ICD-10-PCS | Performed by: THORACIC SURGERY (CARDIOTHORACIC VASCULAR SURGERY)

## 2019-04-22 PROCEDURE — 85027 COMPLETE CBC AUTOMATED: CPT

## 2019-04-22 PROCEDURE — 77030011825 HC SUPP SURG PSTOP S2SG -B: Performed by: THORACIC SURGERY (CARDIOTHORACIC VASCULAR SURGERY)

## 2019-04-22 PROCEDURE — 85610 PROTHROMBIN TIME: CPT

## 2019-04-22 PROCEDURE — 77030018846 HC SOL IRR STRL H20 ICUM -A: Performed by: THORACIC SURGERY (CARDIOTHORACIC VASCULAR SURGERY)

## 2019-04-22 PROCEDURE — 77030018835 HC SOL IRR LR ICUM -A: Performed by: THORACIC SURGERY (CARDIOTHORACIC VASCULAR SURGERY)

## 2019-04-22 PROCEDURE — 36556 INSERT NON-TUNNEL CV CATH: CPT

## 2019-04-22 PROCEDURE — P9035 PLATELET PHERES LEUKOREDUCED: HCPCS

## 2019-04-22 PROCEDURE — 76010000114 HC CV SURG 4 TO 4.5 HR: Performed by: THORACIC SURGERY (CARDIOTHORACIC VASCULAR SURGERY)

## 2019-04-22 PROCEDURE — 74011250636 HC RX REV CODE- 250/636

## 2019-04-22 PROCEDURE — 74011000250 HC RX REV CODE- 250: Performed by: PHYSICIAN ASSISTANT

## 2019-04-22 PROCEDURE — C1751 CATH, INF, PER/CENT/MIDLINE: HCPCS

## 2019-04-22 PROCEDURE — 74011250637 HC RX REV CODE- 250/637: Performed by: PHYSICIAN ASSISTANT

## 2019-04-22 PROCEDURE — 74011000272 HC RX REV CODE- 272: Performed by: THORACIC SURGERY (CARDIOTHORACIC VASCULAR SURGERY)

## 2019-04-22 PROCEDURE — 77030013798 HC KT TRNSDUC PRSSR EDWD -B: Performed by: THORACIC SURGERY (CARDIOTHORACIC VASCULAR SURGERY)

## 2019-04-22 PROCEDURE — 77030002933 HC SUT MCRYL J&J -A: Performed by: THORACIC SURGERY (CARDIOTHORACIC VASCULAR SURGERY)

## 2019-04-22 PROCEDURE — 77030003029 HC SUT VCRL J&J -B: Performed by: THORACIC SURGERY (CARDIOTHORACIC VASCULAR SURGERY)

## 2019-04-22 PROCEDURE — 85347 COAGULATION TIME ACTIVATED: CPT

## 2019-04-22 PROCEDURE — 5A1221Z PERFORMANCE OF CARDIAC OUTPUT, CONTINUOUS: ICD-10-PCS | Performed by: ANESTHESIOLOGY

## 2019-04-22 PROCEDURE — 74011000258 HC RX REV CODE- 258: Performed by: THORACIC SURGERY (CARDIOTHORACIC VASCULAR SURGERY)

## 2019-04-22 PROCEDURE — 80053 COMPREHEN METABOLIC PANEL: CPT

## 2019-04-22 PROCEDURE — 36415 COLL VENOUS BLD VENIPUNCTURE: CPT

## 2019-04-22 PROCEDURE — 77030014008 HC SPNG HEMSTAT J&J -C: Performed by: THORACIC SURGERY (CARDIOTHORACIC VASCULAR SURGERY)

## 2019-04-22 PROCEDURE — 02PA0YZ REMOVAL OF OTHER DEVICE FROM HEART, OPEN APPROACH: ICD-10-PCS | Performed by: THORACIC SURGERY (CARDIOTHORACIC VASCULAR SURGERY)

## 2019-04-22 PROCEDURE — 77030027340 HC VLV MTRL TISS MOSAIC MEDT -I3: Performed by: THORACIC SURGERY (CARDIOTHORACIC VASCULAR SURGERY)

## 2019-04-22 PROCEDURE — 74011000258 HC RX REV CODE- 258: Performed by: PHYSICIAN ASSISTANT

## 2019-04-22 PROCEDURE — 77030020256 HC SOL INJ NACL 0.9%  500ML: Performed by: THORACIC SURGERY (CARDIOTHORACIC VASCULAR SURGERY)

## 2019-04-22 PROCEDURE — 77030011235 HC DRN PERCRD SUMP MEDT -B: Performed by: THORACIC SURGERY (CARDIOTHORACIC VASCULAR SURGERY)

## 2019-04-22 PROCEDURE — 77030032490 HC SLV COMPR SCD KNE COVD -B

## 2019-04-22 PROCEDURE — 77030012390 HC DRN CHST BTL GTNG -B: Performed by: THORACIC SURGERY (CARDIOTHORACIC VASCULAR SURGERY)

## 2019-04-22 PROCEDURE — 93355 ECHO TRANSESOPHAGEAL (TEE): CPT | Performed by: THORACIC SURGERY (CARDIOTHORACIC VASCULAR SURGERY)

## 2019-04-22 PROCEDURE — 65610000003 HC RM ICU SURGICAL

## 2019-04-22 PROCEDURE — 76060000039 HC ANESTHESIA 4 TO 4.5 HR: Performed by: THORACIC SURGERY (CARDIOTHORACIC VASCULAR SURGERY)

## 2019-04-22 PROCEDURE — P9045 ALBUMIN (HUMAN), 5%, 250 ML: HCPCS | Performed by: PHYSICIAN ASSISTANT

## 2019-04-22 DEVICE — VALVE MI H18MM DIA25MM ORIFICE DIA22.5MM SUT RNG DIA33MM: Type: IMPLANTABLE DEVICE | Site: MITRAL VALVE | Status: FUNCTIONAL

## 2019-04-22 RX ORDER — SODIUM CHLORIDE 9 MG/ML
INJECTION, SOLUTION INTRAVENOUS
Status: DISCONTINUED | OUTPATIENT
Start: 2019-04-22 | End: 2019-04-22 | Stop reason: HOSPADM

## 2019-04-22 RX ORDER — AMIODARONE HYDROCHLORIDE 200 MG/1
400 TABLET ORAL EVERY 12 HOURS
Status: DISCONTINUED | OUTPATIENT
Start: 2019-04-23 | End: 2019-04-23

## 2019-04-22 RX ORDER — SUFENTANIL CITRATE 50 UG/ML
INJECTION EPIDURAL; INTRAVENOUS AS NEEDED
Status: DISCONTINUED | OUTPATIENT
Start: 2019-04-22 | End: 2019-04-22 | Stop reason: HOSPADM

## 2019-04-22 RX ORDER — FAMOTIDINE 20 MG/1
20 TABLET, FILM COATED ORAL EVERY 24 HOURS
Status: DISCONTINUED | OUTPATIENT
Start: 2019-04-23 | End: 2019-04-27 | Stop reason: HOSPADM

## 2019-04-22 RX ORDER — SODIUM CHLORIDE 0.9 % (FLUSH) 0.9 %
10 SYRINGE (ML) INJECTION EVERY 24 HOURS
Status: DISCONTINUED | OUTPATIENT
Start: 2019-04-22 | End: 2019-04-26

## 2019-04-22 RX ORDER — ALBUMIN HUMAN 50 G/1000ML
12.5 SOLUTION INTRAVENOUS
Status: DISCONTINUED | OUTPATIENT
Start: 2019-04-22 | End: 2019-04-26

## 2019-04-22 RX ORDER — FACIAL-BODY WIPES
10 EACH TOPICAL DAILY PRN
Status: DISCONTINUED | OUTPATIENT
Start: 2019-04-22 | End: 2019-04-27 | Stop reason: HOSPADM

## 2019-04-22 RX ORDER — LIDOCAINE HYDROCHLORIDE 20 MG/ML
INJECTION, SOLUTION EPIDURAL; INFILTRATION; INTRACAUDAL; PERINEURAL AS NEEDED
Status: DISCONTINUED | OUTPATIENT
Start: 2019-04-22 | End: 2019-04-22 | Stop reason: HOSPADM

## 2019-04-22 RX ORDER — SODIUM CHLORIDE 9 MG/ML
25 INJECTION, SOLUTION INTRAVENOUS CONTINUOUS
Status: DISCONTINUED | OUTPATIENT
Start: 2019-04-22 | End: 2019-04-22 | Stop reason: HOSPADM

## 2019-04-22 RX ORDER — MIDAZOLAM HYDROCHLORIDE 1 MG/ML
1 INJECTION, SOLUTION INTRAMUSCULAR; INTRAVENOUS
Status: DISCONTINUED | OUTPATIENT
Start: 2019-04-22 | End: 2019-04-23

## 2019-04-22 RX ORDER — SODIUM CHLORIDE 0.9 % (FLUSH) 0.9 %
10 SYRINGE (ML) INJECTION EVERY 8 HOURS
Status: DISCONTINUED | OUTPATIENT
Start: 2019-04-22 | End: 2019-04-26

## 2019-04-22 RX ORDER — INSULIN GLARGINE 100 [IU]/ML
1-50 INJECTION, SOLUTION SUBCUTANEOUS
Status: ACTIVE | OUTPATIENT
Start: 2019-04-22 | End: 2019-04-23

## 2019-04-22 RX ORDER — SODIUM CHLORIDE 0.9 % (FLUSH) 0.9 %
20 SYRINGE (ML) INJECTION AS NEEDED
Status: DISCONTINUED | OUTPATIENT
Start: 2019-04-22 | End: 2019-04-26

## 2019-04-22 RX ORDER — BACITRACIN 500 UNIT/G
1 PACKET (EA) TOPICAL AS NEEDED
Status: DISCONTINUED | OUTPATIENT
Start: 2019-04-22 | End: 2019-04-27 | Stop reason: HOSPADM

## 2019-04-22 RX ORDER — BIVALIRUDIN 250 MG/5ML
100 INJECTION, POWDER, LYOPHILIZED, FOR SOLUTION INTRAVENOUS ONCE
Status: COMPLETED | OUTPATIENT
Start: 2019-04-22 | End: 2019-04-22

## 2019-04-22 RX ORDER — SUCCINYLCHOLINE CHLORIDE 20 MG/ML
INJECTION INTRAMUSCULAR; INTRAVENOUS AS NEEDED
Status: DISCONTINUED | OUTPATIENT
Start: 2019-04-22 | End: 2019-04-22

## 2019-04-22 RX ORDER — SODIUM CHLORIDE 0.9 % (FLUSH) 0.9 %
5-40 SYRINGE (ML) INJECTION AS NEEDED
Status: CANCELLED | OUTPATIENT
Start: 2019-04-22

## 2019-04-22 RX ORDER — SODIUM CHLORIDE 0.9 % (FLUSH) 0.9 %
5-40 SYRINGE (ML) INJECTION EVERY 8 HOURS
Status: DISCONTINUED | OUTPATIENT
Start: 2019-04-22 | End: 2019-04-26

## 2019-04-22 RX ORDER — SODIUM CHLORIDE 9 MG/ML
9 INJECTION, SOLUTION INTRAVENOUS CONTINUOUS
Status: DISCONTINUED | OUTPATIENT
Start: 2019-04-22 | End: 2019-04-26

## 2019-04-22 RX ORDER — SODIUM CHLORIDE 0.9 % (FLUSH) 0.9 %
5-40 SYRINGE (ML) INJECTION AS NEEDED
Status: DISCONTINUED | OUTPATIENT
Start: 2019-04-22 | End: 2019-04-26

## 2019-04-22 RX ORDER — MIDAZOLAM HYDROCHLORIDE 1 MG/ML
1 INJECTION, SOLUTION INTRAMUSCULAR; INTRAVENOUS AS NEEDED
Status: DISCONTINUED | OUTPATIENT
Start: 2019-04-22 | End: 2019-04-22 | Stop reason: HOSPADM

## 2019-04-22 RX ORDER — CEFAZOLIN SODIUM 1 G/3ML
INJECTION, POWDER, FOR SOLUTION INTRAMUSCULAR; INTRAVENOUS AS NEEDED
Status: DISCONTINUED | OUTPATIENT
Start: 2019-04-22 | End: 2019-04-22 | Stop reason: HOSPADM

## 2019-04-22 RX ORDER — FENTANYL CITRATE 50 UG/ML
50 INJECTION, SOLUTION INTRAMUSCULAR; INTRAVENOUS AS NEEDED
Status: DISCONTINUED | OUTPATIENT
Start: 2019-04-22 | End: 2019-04-22 | Stop reason: HOSPADM

## 2019-04-22 RX ORDER — DEXTROSE 50 % IN WATER (D50W) INTRAVENOUS SYRINGE
12.5-25 AS NEEDED
Status: DISCONTINUED | OUTPATIENT
Start: 2019-04-22 | End: 2019-04-27 | Stop reason: HOSPADM

## 2019-04-22 RX ORDER — SODIUM CHLORIDE 0.9 % (FLUSH) 0.9 %
5-40 SYRINGE (ML) INJECTION AS NEEDED
Status: DISCONTINUED | OUTPATIENT
Start: 2019-04-22 | End: 2019-04-22 | Stop reason: HOSPADM

## 2019-04-22 RX ORDER — SODIUM CHLORIDE 0.9 % (FLUSH) 0.9 %
10 SYRINGE (ML) INJECTION AS NEEDED
Status: DISCONTINUED | OUTPATIENT
Start: 2019-04-22 | End: 2019-04-26

## 2019-04-22 RX ORDER — MUPIROCIN 20 MG/G
OINTMENT TOPICAL 2 TIMES DAILY
Status: COMPLETED | OUTPATIENT
Start: 2019-04-22 | End: 2019-04-27

## 2019-04-22 RX ORDER — BIVALIRUDIN 250 MG/5ML
50 INJECTION, POWDER, LYOPHILIZED, FOR SOLUTION INTRAVENOUS AS NEEDED
Status: DISCONTINUED | OUTPATIENT
Start: 2019-04-22 | End: 2019-04-23

## 2019-04-22 RX ORDER — AMOXICILLIN 250 MG
1 CAPSULE ORAL 2 TIMES DAILY
Status: DISCONTINUED | OUTPATIENT
Start: 2019-04-23 | End: 2019-04-27 | Stop reason: HOSPADM

## 2019-04-22 RX ORDER — LANOLIN ALCOHOL/MO/W.PET/CERES
400 CREAM (GRAM) TOPICAL 2 TIMES DAILY
Status: DISCONTINUED | OUTPATIENT
Start: 2019-04-23 | End: 2019-04-26

## 2019-04-22 RX ORDER — SODIUM CHLORIDE, SODIUM LACTATE, POTASSIUM CHLORIDE, CALCIUM CHLORIDE 600; 310; 30; 20 MG/100ML; MG/100ML; MG/100ML; MG/100ML
100 INJECTION, SOLUTION INTRAVENOUS CONTINUOUS
Status: CANCELLED | OUTPATIENT
Start: 2019-04-22

## 2019-04-22 RX ORDER — SODIUM CHLORIDE, SODIUM LACTATE, POTASSIUM CHLORIDE, CALCIUM CHLORIDE 600; 310; 30; 20 MG/100ML; MG/100ML; MG/100ML; MG/100ML
100 INJECTION, SOLUTION INTRAVENOUS CONTINUOUS
Status: DISCONTINUED | OUTPATIENT
Start: 2019-04-22 | End: 2019-04-22 | Stop reason: HOSPADM

## 2019-04-22 RX ORDER — ACETAMINOPHEN 325 MG/1
650 TABLET ORAL EVERY 4 HOURS
Status: DISPENSED | OUTPATIENT
Start: 2019-04-22 | End: 2019-04-24

## 2019-04-22 RX ORDER — FENTANYL CITRATE 50 UG/ML
25 INJECTION, SOLUTION INTRAMUSCULAR; INTRAVENOUS
Status: CANCELLED | OUTPATIENT
Start: 2019-04-22

## 2019-04-22 RX ORDER — SODIUM CHLORIDE 450 MG/100ML
10 INJECTION, SOLUTION INTRAVENOUS CONTINUOUS
Status: DISCONTINUED | OUTPATIENT
Start: 2019-04-22 | End: 2019-04-26

## 2019-04-22 RX ORDER — CHLORHEXIDINE GLUCONATE 1.2 MG/ML
10 RINSE ORAL 2 TIMES DAILY
Status: DISCONTINUED | OUTPATIENT
Start: 2019-04-22 | End: 2019-04-27 | Stop reason: HOSPADM

## 2019-04-22 RX ORDER — ACETAMINOPHEN 325 MG/1
650 TABLET ORAL ONCE
Status: DISCONTINUED | OUTPATIENT
Start: 2019-04-22 | End: 2019-04-22 | Stop reason: HOSPADM

## 2019-04-22 RX ORDER — INSULIN LISPRO 100 [IU]/ML
INJECTION, SOLUTION INTRAVENOUS; SUBCUTANEOUS
Status: DISCONTINUED | OUTPATIENT
Start: 2019-04-22 | End: 2019-04-27

## 2019-04-22 RX ORDER — FUROSEMIDE 10 MG/ML
INJECTION INTRAMUSCULAR; INTRAVENOUS AS NEEDED
Status: DISCONTINUED | OUTPATIENT
Start: 2019-04-22 | End: 2019-04-22 | Stop reason: HOSPADM

## 2019-04-22 RX ORDER — SODIUM CHLORIDE, SODIUM LACTATE, POTASSIUM CHLORIDE, CALCIUM CHLORIDE 600; 310; 30; 20 MG/100ML; MG/100ML; MG/100ML; MG/100ML
INJECTION, SOLUTION INTRAVENOUS
Status: DISCONTINUED | OUTPATIENT
Start: 2019-04-22 | End: 2019-04-22 | Stop reason: HOSPADM

## 2019-04-22 RX ORDER — SODIUM CHLORIDE 9 MG/ML
250 INJECTION, SOLUTION INTRAVENOUS AS NEEDED
Status: DISCONTINUED | OUTPATIENT
Start: 2019-04-22 | End: 2019-04-22 | Stop reason: HOSPADM

## 2019-04-22 RX ORDER — MAGNESIUM SULFATE 1 G/100ML
1 INJECTION INTRAVENOUS AS NEEDED
Status: DISCONTINUED | OUTPATIENT
Start: 2019-04-22 | End: 2019-04-26

## 2019-04-22 RX ORDER — ROCURONIUM BROMIDE 10 MG/ML
INJECTION, SOLUTION INTRAVENOUS AS NEEDED
Status: DISCONTINUED | OUTPATIENT
Start: 2019-04-22 | End: 2019-04-22 | Stop reason: HOSPADM

## 2019-04-22 RX ORDER — LIDOCAINE HYDROCHLORIDE 10 MG/ML
0.1 INJECTION, SOLUTION EPIDURAL; INFILTRATION; INTRACAUDAL; PERINEURAL AS NEEDED
Status: DISCONTINUED | OUTPATIENT
Start: 2019-04-22 | End: 2019-04-22 | Stop reason: HOSPADM

## 2019-04-22 RX ORDER — ALBUTEROL SULFATE 0.83 MG/ML
2.5 SOLUTION RESPIRATORY (INHALATION)
Status: DISCONTINUED | OUTPATIENT
Start: 2019-04-22 | End: 2019-04-27 | Stop reason: HOSPADM

## 2019-04-22 RX ORDER — OXYCODONE AND ACETAMINOPHEN 5; 325 MG/1; MG/1
2 TABLET ORAL
Status: DISCONTINUED | OUTPATIENT
Start: 2019-04-22 | End: 2019-04-27 | Stop reason: HOSPADM

## 2019-04-22 RX ORDER — MAGNESIUM SULFATE 100 %
4 CRYSTALS MISCELLANEOUS AS NEEDED
Status: DISCONTINUED | OUTPATIENT
Start: 2019-04-22 | End: 2019-04-27 | Stop reason: HOSPADM

## 2019-04-22 RX ORDER — DIPHENHYDRAMINE HYDROCHLORIDE 50 MG/ML
12.5 INJECTION, SOLUTION INTRAMUSCULAR; INTRAVENOUS AS NEEDED
Status: CANCELLED | OUTPATIENT
Start: 2019-04-22 | End: 2019-04-22

## 2019-04-22 RX ORDER — PROPOFOL 10 MG/ML
INJECTION, EMULSION INTRAVENOUS AS NEEDED
Status: DISCONTINUED | OUTPATIENT
Start: 2019-04-22 | End: 2019-04-22 | Stop reason: HOSPADM

## 2019-04-22 RX ORDER — INSULIN LISPRO 100 [IU]/ML
INJECTION, SOLUTION INTRAVENOUS; SUBCUTANEOUS
Status: DISCONTINUED | OUTPATIENT
Start: 2019-04-22 | End: 2019-04-24

## 2019-04-22 RX ORDER — ONDANSETRON 2 MG/ML
4 INJECTION INTRAMUSCULAR; INTRAVENOUS AS NEEDED
Status: CANCELLED | OUTPATIENT
Start: 2019-04-22

## 2019-04-22 RX ORDER — MORPHINE SULFATE 10 MG/ML
2 INJECTION, SOLUTION INTRAMUSCULAR; INTRAVENOUS
Status: CANCELLED | OUTPATIENT
Start: 2019-04-22

## 2019-04-22 RX ORDER — SODIUM CHLORIDE 0.9 % (FLUSH) 0.9 %
5-40 SYRINGE (ML) INJECTION EVERY 8 HOURS
Status: CANCELLED | OUTPATIENT
Start: 2019-04-22

## 2019-04-22 RX ORDER — DEXMEDETOMIDINE HYDROCHLORIDE 4 UG/ML
INJECTION, SOLUTION INTRAVENOUS
Status: DISCONTINUED | OUTPATIENT
Start: 2019-04-22 | End: 2019-04-22 | Stop reason: HOSPADM

## 2019-04-22 RX ORDER — SODIUM CHLORIDE 9 MG/ML
250 INJECTION, SOLUTION INTRAVENOUS AS NEEDED
Status: DISCONTINUED | OUTPATIENT
Start: 2019-04-22 | End: 2019-04-23

## 2019-04-22 RX ORDER — POTASSIUM CHLORIDE 29.8 MG/ML
20 INJECTION INTRAVENOUS
Status: ACTIVE | OUTPATIENT
Start: 2019-04-22 | End: 2019-04-23

## 2019-04-22 RX ORDER — SUFENTANIL CITRATE 50 UG/ML
INJECTION EPIDURAL; INTRAVENOUS
Status: DISCONTINUED | OUTPATIENT
Start: 2019-04-22 | End: 2019-04-22 | Stop reason: HOSPADM

## 2019-04-22 RX ORDER — SODIUM CHLORIDE 0.9 % (FLUSH) 0.9 %
5-40 SYRINGE (ML) INJECTION EVERY 8 HOURS
Status: DISCONTINUED | OUTPATIENT
Start: 2019-04-22 | End: 2019-04-22 | Stop reason: HOSPADM

## 2019-04-22 RX ORDER — GUAIFENESIN 100 MG/5ML
81 LIQUID (ML) ORAL DAILY
Status: DISCONTINUED | OUTPATIENT
Start: 2019-04-23 | End: 2019-04-23

## 2019-04-22 RX ORDER — POTASSIUM CHLORIDE 29.8 MG/ML
20 INJECTION INTRAVENOUS
Status: DISPENSED | OUTPATIENT
Start: 2019-04-22 | End: 2019-04-23

## 2019-04-22 RX ORDER — NALOXONE HYDROCHLORIDE 0.4 MG/ML
0.4 INJECTION, SOLUTION INTRAMUSCULAR; INTRAVENOUS; SUBCUTANEOUS AS NEEDED
Status: DISCONTINUED | OUTPATIENT
Start: 2019-04-22 | End: 2019-04-27 | Stop reason: HOSPADM

## 2019-04-22 RX ORDER — ONDANSETRON 2 MG/ML
4 INJECTION INTRAMUSCULAR; INTRAVENOUS
Status: DISCONTINUED | OUTPATIENT
Start: 2019-04-22 | End: 2019-04-27 | Stop reason: HOSPADM

## 2019-04-22 RX ORDER — POLYETHYLENE GLYCOL 3350 17 G/17G
17 POWDER, FOR SOLUTION ORAL DAILY
Status: DISCONTINUED | OUTPATIENT
Start: 2019-04-23 | End: 2019-04-27 | Stop reason: HOSPADM

## 2019-04-22 RX ORDER — OXYCODONE AND ACETAMINOPHEN 5; 325 MG/1; MG/1
1 TABLET ORAL
Status: DISCONTINUED | OUTPATIENT
Start: 2019-04-22 | End: 2019-04-27 | Stop reason: HOSPADM

## 2019-04-22 RX ORDER — ROPIVACAINE HYDROCHLORIDE 5 MG/ML
30 INJECTION, SOLUTION EPIDURAL; INFILTRATION; PERINEURAL AS NEEDED
Status: DISCONTINUED | OUTPATIENT
Start: 2019-04-22 | End: 2019-04-22 | Stop reason: HOSPADM

## 2019-04-22 RX ORDER — HEPARIN SODIUM 1000 [USP'U]/ML
INJECTION, SOLUTION INTRAVENOUS; SUBCUTANEOUS AS NEEDED
Status: DISCONTINUED | OUTPATIENT
Start: 2019-04-22 | End: 2019-04-22 | Stop reason: HOSPADM

## 2019-04-22 RX ORDER — MORPHINE SULFATE 4 MG/ML
4 INJECTION INTRAVENOUS
Status: DISCONTINUED | OUTPATIENT
Start: 2019-04-22 | End: 2019-04-26

## 2019-04-22 RX ORDER — HYDROMORPHONE HYDROCHLORIDE 1 MG/ML
0.5 INJECTION, SOLUTION INTRAMUSCULAR; INTRAVENOUS; SUBCUTANEOUS
Status: CANCELLED | OUTPATIENT
Start: 2019-04-22

## 2019-04-22 RX ORDER — CALCIUM CHLORIDE INJECTION 100 MG/ML
INJECTION, SOLUTION INTRAVENOUS AS NEEDED
Status: DISCONTINUED | OUTPATIENT
Start: 2019-04-22 | End: 2019-04-22 | Stop reason: HOSPADM

## 2019-04-22 RX ORDER — MIDAZOLAM HYDROCHLORIDE 1 MG/ML
0.5 INJECTION, SOLUTION INTRAMUSCULAR; INTRAVENOUS
Status: CANCELLED | OUTPATIENT
Start: 2019-04-22

## 2019-04-22 RX ORDER — MORPHINE SULFATE 2 MG/ML
2 INJECTION, SOLUTION INTRAMUSCULAR; INTRAVENOUS
Status: DISCONTINUED | OUTPATIENT
Start: 2019-04-22 | End: 2019-04-26

## 2019-04-22 RX ORDER — SUCCINYLCHOLINE CHLORIDE 20 MG/ML
INJECTION INTRAMUSCULAR; INTRAVENOUS AS NEEDED
Status: DISCONTINUED | OUTPATIENT
Start: 2019-04-22 | End: 2019-04-22 | Stop reason: HOSPADM

## 2019-04-22 RX ORDER — ANTICOAGULANT CITRATE DEXTROSE SOLUTION FORMULA A 12.25; 11; 3.65 G/500ML; G/500ML; G/500ML
SOLUTION INTRAVENOUS CONTINUOUS
Status: DISCONTINUED | OUTPATIENT
Start: 2019-04-22 | End: 2019-04-23

## 2019-04-22 RX ADMIN — BIVALIRUDIN 2.5 MG/KG/HR: 250 INJECTION, POWDER, LYOPHILIZED, FOR SOLUTION INTRAVENOUS at 13:34

## 2019-04-22 RX ADMIN — SUFENTANIL CITRATE 0.3 MCG/KG/HR: 50 INJECTION EPIDURAL; INTRAVENOUS at 12:53

## 2019-04-22 RX ADMIN — LIDOCAINE HYDROCHLORIDE 60 MG: 20 INJECTION, SOLUTION EPIDURAL; INFILTRATION; INTRACAUDAL; PERINEURAL at 12:43

## 2019-04-22 RX ADMIN — FAMOTIDINE 20 MG: 10 INJECTION, SOLUTION INTRAVENOUS at 20:38

## 2019-04-22 RX ADMIN — SUFENTANIL CITRATE 15 MCG: 50 INJECTION EPIDURAL; INTRAVENOUS at 13:11

## 2019-04-22 RX ADMIN — Medication 10 ML: at 18:37

## 2019-04-22 RX ADMIN — FENTANYL CITRATE 50 MCG: 50 INJECTION, SOLUTION INTRAMUSCULAR; INTRAVENOUS at 11:55

## 2019-04-22 RX ADMIN — SODIUM CHLORIDE: 9 INJECTION, SOLUTION INTRAVENOUS at 12:43

## 2019-04-22 RX ADMIN — PROPOFOL 50 MG: 10 INJECTION, EMULSION INTRAVENOUS at 12:46

## 2019-04-22 RX ADMIN — ALBUMIN (HUMAN) 12.5 G: 12.5 INJECTION, SOLUTION INTRAVENOUS at 22:39

## 2019-04-22 RX ADMIN — BIVALIRUDIN: 250 INJECTION, POWDER, LYOPHILIZED, FOR SOLUTION INTRAVENOUS at 14:25

## 2019-04-22 RX ADMIN — PROPOFOL 100 MG: 10 INJECTION, EMULSION INTRAVENOUS at 12:43

## 2019-04-22 RX ADMIN — CALCIUM CHLORIDE INJECTION 500 MG: 100 INJECTION, SOLUTION INTRAVENOUS at 15:51

## 2019-04-22 RX ADMIN — FUROSEMIDE 40 MG: 10 INJECTION INTRAMUSCULAR; INTRAVENOUS at 15:30

## 2019-04-22 RX ADMIN — PROPOFOL 50 MG: 10 INJECTION, EMULSION INTRAVENOUS at 12:52

## 2019-04-22 RX ADMIN — MORPHINE SULFATE 2 MG: 2 INJECTION, SOLUTION INTRAMUSCULAR; INTRAVENOUS at 19:12

## 2019-04-22 RX ADMIN — ROCURONIUM BROMIDE 50 MG: 10 INJECTION, SOLUTION INTRAVENOUS at 12:43

## 2019-04-22 RX ADMIN — SUFENTANIL CITRATE 15 MCG: 50 INJECTION EPIDURAL; INTRAVENOUS at 12:43

## 2019-04-22 RX ADMIN — DEXMEDETOMIDINE HYDROCHLORIDE 0.3 MCG/KG/HR: 4 INJECTION, SOLUTION INTRAVENOUS at 16:07

## 2019-04-22 RX ADMIN — ONDANSETRON 4 MG: 2 INJECTION INTRAMUSCULAR; INTRAVENOUS at 22:58

## 2019-04-22 RX ADMIN — SUCCINYLCHOLINE CHLORIDE 140 MG: 20 INJECTION INTRAMUSCULAR; INTRAVENOUS at 12:43

## 2019-04-22 RX ADMIN — CEFAZOLIN SODIUM 2 G: 1 INJECTION, POWDER, FOR SOLUTION INTRAMUSCULAR; INTRAVENOUS at 13:05

## 2019-04-22 RX ADMIN — CEFAZOLIN SODIUM 2 G: 1 INJECTION, POWDER, FOR SOLUTION INTRAMUSCULAR; INTRAVENOUS at 16:04

## 2019-04-22 RX ADMIN — CHLORHEXIDINE GLUCONATE 10 ML: 1.2 RINSE ORAL at 18:33

## 2019-04-22 RX ADMIN — SODIUM CHLORIDE 9 ML/HR: 900 INJECTION, SOLUTION INTRAVENOUS at 23:16

## 2019-04-22 RX ADMIN — Medication 10 ML: at 06:52

## 2019-04-22 RX ADMIN — SODIUM CHLORIDE 10 ML/HR: 450 INJECTION, SOLUTION INTRAVENOUS at 17:00

## 2019-04-22 RX ADMIN — MUPIROCIN: 20 OINTMENT TOPICAL at 18:33

## 2019-04-22 RX ADMIN — Medication 10 ML: at 22:09

## 2019-04-22 RX ADMIN — POTASSIUM CHLORIDE 20 MEQ: 400 INJECTION, SOLUTION INTRAVENOUS at 19:12

## 2019-04-22 RX ADMIN — SODIUM CHLORIDE, SODIUM LACTATE, POTASSIUM CHLORIDE, CALCIUM CHLORIDE: 600; 310; 30; 20 INJECTION, SOLUTION INTRAVENOUS at 12:28

## 2019-04-22 RX ADMIN — ROCURONIUM BROMIDE 20 MG: 10 INJECTION, SOLUTION INTRAVENOUS at 14:10

## 2019-04-22 RX ADMIN — MORPHINE SULFATE 2 MG: 2 INJECTION, SOLUTION INTRAMUSCULAR; INTRAVENOUS at 20:47

## 2019-04-22 RX ADMIN — BIVALIRUDIN 100 MG: 250 INJECTION, POWDER, LYOPHILIZED, FOR SOLUTION INTRAVENOUS at 13:35

## 2019-04-22 RX ADMIN — MORPHINE SULFATE 2 MG: 2 INJECTION, SOLUTION INTRAMUSCULAR; INTRAVENOUS at 17:34

## 2019-04-22 RX ADMIN — MIDAZOLAM HYDROCHLORIDE 4 MG: 1 INJECTION, SOLUTION INTRAMUSCULAR; INTRAVENOUS at 11:54

## 2019-04-22 NOTE — PERIOP NOTES
Patient interviewed in pre-op. Patient verified her identity by name and date of birth consistent with her arm band. Patient verified surgical procedure consistent with surgical consent. Time given to patient to address any question or concerns . Patient had none at this time.

## 2019-04-22 NOTE — PROGRESS NOTES
1930 - Bedside and Verbal shift change report given to Ritesh Fox RN (oncoming nurse) by Quynh Hays RN (offgoing nurse). Report included the following information SBAR, Kardex, Intake/Output, MAR, Recent Results, Cardiac Rhythm Sinus Rhythm and Alarm Parameters . Medications verified and pt assessed. Weaning pt ventilator, pt following commands and nodding appropriately to questions. 1945 - RT at bedside - placed pt ventilator on CPAP mode. Will continue to monitor. 2015 - RT at bedside to run ABG. Results as follows: 
PH 7.370 
co2 = 49.8 po2  = 121 Base excess  = 4 
HCO3 = 28.8 SO2 = 99% Pt extubated to 4L NC without issue. 2047 - 2mg IV morphine given for 6/10 post op anterior aching chest pain. 200 - Pt son, Trell, on telephone. Updated on pt condition. Opportunities for questions and concerns provided. 2239 - 1x 12.5g albumin given for volume repletion 2258 - 4mg IV zofran given for pt nausea 0137 - Pt passed dysphagia screen without issue. 0400 - Radiology at bedside for CXR. Pt EKG performed. RT at [facility] to draw mixed venous blood gas. 0600 - Pt CHG bath performed. 1819 - Pt delined per protocol. 
0700 - Scanlon dc'd 
0730 - Bedside and Verbal shift change report given to Ty Carpenter RN (oncoming nurse) by Ritesh Fox RN (offgoing nurse). Report included the following information SBAR, Kardex, Intake/Output, MAR, Recent Results, Cardiac Rhythm Sinus Rhythm and Alarm Parameters .

## 2019-04-22 NOTE — PROGRESS NOTES
Physical Therapy 4/22/2019 Chart reviewed. Patient currently off the floor in OR for MVR. Will follow-up tomorrow for formal PT re-evaluation. Thank you. Patricia Marcus

## 2019-04-22 NOTE — PROGRESS NOTES
1700 - Patient arrived into CVICU from OR with OR team, [facility] Deshawn Alexis , [facility] [place]. Patient intubated and sedated with Insulin, Precedex gtts infusing. Report received from CRNA at the bedside. Chest tube drainage so far 165mL. Labs, chest x-ray, and assessment completed. 1710 - Noel decreased in half to 15 ppm by RT. PAP 42/21 (31) 
1745 - RT at bedside for ABG and mixed. FiO2 decreased to 70%. Noel decreased to 7 ppm. PAP 40/18 (27) 
1845 - RT called to decrease Noel. PAP 42/20 (29) 
1920 - RT at bedside. Noel turned off. Pt awake, nodding appropriately, breathing over the vent. RR decreased to 6. 
1950 - RT at bedside. Pt placed on CPAP. Bedside shift change report given to Adebayo Peoples (oncoming nurse) by Anuj Bolivar RN (offgoing nurse). Report included the following information SBAR, Kardex, OR Summary, Procedure Summary, Intake/Output, MAR, Recent Results and Cardiac Rhythm NSR.

## 2019-04-22 NOTE — BRIEF OP NOTE
BRIEF OP NOTE Pre-Op Diagnosis: MITRAL Stenosis Post-Op Diagnosis: MITRAL Stenosis Procedure: MITRAL VALVE REPLACEMENT/WITH 25MM BIOPROSTHESIS Surgeon: Edgardo Guerrero MD 
 
Assistant(s): Alivia Wade PA-C Anesthesia: General  
 
Infusions: Amiodarone, precedex, insulin, etta, Noel Estimated Blood Loss: 1575ml Cell Saver: 675ml Specimens:  
ID Type Source Tests Collected by Time Destination 1 : P2 & A2 LEAFLETS OF MITRAL VALVE WITH MITRACLIP. Fresh Mitral Valve  Linda Goetz MD 4/22/2019 1407 Pathology Drains and pacing wires: 2 atrial wires, 1 bipolar ventricular wire, 3 rolly drains Complications: none Findings: MS Implants:  
Implant Name Type Inv. Item Serial No.  Lot No. LRB No. Used Action VALVE TISS MITRL CINCH 25MM -- MOSAIC MODEL 310 - [id number]  VALVE TISS MITRL CINCH 25MM -- MOSAIC MODEL 310 [id number] [employer] CARDIAC SURGERY N/A N/A 1 Implanted

## 2019-04-22 NOTE — DIABETES MGMT
DTC Cardiac Surgery Progress Note Recommendations/ Comments:  Pt arrived to CVICU at 1457 on 4/22/19. Consider continuing insulin gtt for at least 48hrs post-op and eating 50% solid foods then, 
1) transition off gtt per [employer] Protocol 2) continue accu-checks and humalog correctional insulin ac & hs  
3) ADA/AHA diet as diet advanced 4) resume NPH - will need adjust dose based on insulin needs closer to transition Insulin gtt should not be stopped until after 1457 on 4/24/19 to complete 48hr post-op time frame. Currently on insulin gtt running @ 2.2 units?hr. Chart reviewed on Lea Miller. Patient is 82 y.o. female s/p Cardiac surgery  POD 0. Known HX Type 2 DM on NPH 43 units am and 23 units pm at home. A1c:  
Lab Results Component Value Date/Time Hemoglobin A1c 7.0 (H) 04/16/2019 10:09 AM  
 
 
 
Recent Glucose Results:  
Lab Results Component Value Date/Time  (H) 04/22/2019 05:19 AM  
 GLUCPOC 96 04/22/2019 12:19 PM  
 GLUCPOC 96 04/22/2019 06:50 AM  
 GLUCPOC 124 (H) 04/21/2019 09:07 PM  
  
 
Lab Results Component Value Date/Time Creatinine 1.60 (H) 04/22/2019 05:19 AM  
 
Estimated Creatinine Clearance: 34.1 mL/min (A) (based on SCr of 1.6 mg/dL (H)). Active Orders Diet DIET NPO  
  
 
PO intake:  
Patient Vitals for the past 72 hrs: 
 % Diet Eaten 04/21/19 1804 100 % 04/21/19 1346 90 % 04/21/19 0748 100 % 04/20/19 0800 100 % 04/19/19 1900 95 % Will continue to follow as needed. Thank you. Joanie Keller, MS, RN, CDE Time spent: 6 minutes

## 2019-04-22 NOTE — ANESTHESIA PROCEDURE NOTES
RUDDY 
 
 
 
Procedure Details: probe placement, image aquisition & interpretation Site marked Risks and benefits discussed with the patient and plans are to proceed Procedure Note Performed by: Tucker Messina MD 
Authorized by: Serafin Farias MD  
 
 
Indications: assessment of ascending aorta and assessment of surgical repair Modalities: 2D, CF, CWD, contrast, PWD Probe Type: epiaortic and multiplane Insertion: atraumatic Patient Status: intubated Echocardiographic and Doppler Measurements Aorta  Size  Diam(cm)  Dissection PlaqueThick(mm)  Plaque Mobile Ascending normal  No 0-3 No  
 Arch normal  No  No  
 Descending normal  No >3 No  
 
 
 
 Valves  Annulus  Stenosis  Area/Grad  Regurg  Leaflet Morph  Leaflet Motion Aortic normal none  0 normal normal  
 Mitral dilated moderate 13/7 1+ normal restricted Tricuspid normal   2+ normal normal  
 
 
 
 Atria  Size  SEC (smoke)  Thrombus  Tumor  Device Rt Atrium dilated No No No No  
 Lt Atrium dilated No No No No  
 
Interatrial Septum Morphology: normal 
 
Interventricular Septum Morphology: normal 
 
Ventricle  Cavity Size  Cavity Dimension Hypertrophy  Thrombus  Gloal FXN  EF  
 RV normal  No no normal   
 LV normal  Yes No normal 60 Regional Function 
(1 = normal, 2 = mildly hypokinetic, 3 = severely hypokinetic, 4 = akinetic, 5 = dyskinetic) LAV - Long [place] ME LAV = 0  ME LAV = 90  ME LAV = 130 Basal Sept:1 Basal Ant:1 Basal Post:1 Mid Sept:1 Mid Ant:1 Mid Post:1 Apical Sept:1 Apical Ant:1 Basal Ant Sept:1 Basal Lat:1 Basal Inf:1 Mid Lat:1 Mid Inf:1 Apical Lat:1 Apical Inf:1   
 
 
Pericardium: normal 
 
Post Intervention Follow-up Study Valve  Function  Regurgitation  Area Aortic Mitral     
 Tricuspid Prosthetic Complications: None Comments: Pre RUDDY, 
 normal Lv function, Rv normal function ef 65%. LA dilated 5*5.5 cms. No clot in TRAN. 1 mitral clip on mitral valve and leaflets are restricted as a result. Mod severe MS with 13mmHg/7 mm HG pk/mean. Mild MR.

## 2019-04-22 NOTE — ANESTHESIA PREPROCEDURE EVALUATION
Relevant Problems No relevant active problems Anesthetic History No history of anesthetic complications Review of Systems / Medical History Patient summary reviewed, nursing notes reviewed and pertinent labs reviewed Pulmonary Within defined limits Shortness of breath Neuro/Psych Within defined limits CVA TIA Cardiovascular Within defined limits Hypertension Valvular problems/murmurs: mitral stenosis Dysrhythmias CAD Exercise tolerance: <4 METS 
  
GI/Hepatic/Renal 
Within defined limits GERD 
 
 
PUD Endo/Other Within defined limits Diabetes Hypothyroidism Morbid obesity and arthritis Other Findings Physical Exam 
 
Airway Mallampati: III 
TM Distance: > 6 cm Neck ROM: normal range of motion, short neck Mouth opening: Normal 
 
 Cardiovascular Regular rate and rhythm,  S1 and S2 normal,  no murmur, click, rub, or gallop Dental 
 
Dentition: Caps/crowns Pulmonary Breath sounds clear to auscultation Abdominal 
GI exam deferred Other Findings Anesthetic Plan ASA: 4 Anesthesia type: general 
 
Monitoring Plan: Arterial line, BIS, CVP, [place] and RUDDY Post procedure ventilation Induction: Intravenous Anesthetic plan and risks discussed with: Patient

## 2019-04-22 NOTE — PROGRESS NOTES
Day #1 of Ancef Indication:  surgical ppx Current regimen:  2G IV q6h Abx regimen: Ancef Recent Labs  
  19 
0519 19 
0242 19 
0757 WBC 4.6 5.3 5.4 CREA 1.60* 1.44*  --   
BUN 21* 23*  --   
 
Est CrCl: 34 ml/min; UO: -- ml/kg/hr Temp (24hrs), Av.6 °F (36.4 °C), Min:97.4 °F (36.3 °C), Max:97.7 °F (36.5 °C) Plan: Change to 1G q12h x 3 doses for CrCl 11- 34 ml/min

## 2019-04-22 NOTE — PERIOP NOTES
Patient arrived to the operating room via stretcher. All wheels locked and patient transferred to the OR table with assistance of four staff members. [facility] [place] warming machine on bed. Temperature set at 37 C and adjusted as needed. Safety strap across upper body until time of positioning and after drapes removed prior to transfer. Sacral Mepilex dressing in place and intact. After the induction of anesthesia and intubation 16fr temperature tamayo catheter was inserted with no difficulties. Urine output and temperature monitored during case by all providers. Patient positioned with assistance of all providers. Head resting in proper alignment on gel doughnut. Gel Shoulder roll placed. Arms tucked as sides. Each arm was wrapped in one step arm protectors. All pressure points/IV lines/stopcocks padded with foam. Thumbs in proper alignment and ulnar padding in place. Gel roll placed under ankles to float heels.

## 2019-04-22 NOTE — ANESTHESIA PROCEDURE NOTES
Arterial Line Placement Start time: 4/22/2019 11:43 AM 
End time: 4/22/2019 11:53 AM 
Performed by: Shira Deng MD 
Authorized by: Shira Deng MD  
 
Pre-Procedure Indications:  Arterial pressure monitoring and blood sampling Preanesthetic Checklist: patient identified, risks and benefits discussed, anesthesia consent, site marked, patient being monitored, timeout performed and patient being monitored Procedure:  
Prep:  Chlorhexidine Seldinger Technique?: Yes Orientation:  Left Location:  Radial artery Catheter size:  20 G Number of attempts:  1 Assessment:  
Post-procedure:  Line secured and sterile dressing applied Patient Tolerance:  Patient tolerated the procedure well with no immediate complications

## 2019-04-22 NOTE — PROGRESS NOTES
TRANSFER - OUT REPORT: 
 
Verbal report given to Erin(name) on Елена Sanchez  being transferred to OR(unit) for ordered procedure Report consisted of patients Situation, Background, Assessment and  
Recommendations(SBAR). Information from the following report(s) SBAR, Kardex and Recent Results was reviewed with the receiving nurse. Opportunity for questions and clarification was provided. Patient transported with: 
 OR nurse Assumed care of pt this am. Pt is alert and oriented resting in bed at this time. Pt now taking a shower, to have MVR done today.

## 2019-04-22 NOTE — PERIOP NOTES
TRANSFER - OUT REPORT: 
 
Verbal report given to BROCK(name) on [address]  being transferred to CVICU(unit) for routine post - op Report consisted of patients Situation, Background, Assessment and  
Recommendations(SBAR). Information from the following report(s) OR Summary was reviewed with the receiving nurse. Opportunity for questions and clarification was provided. Patient transported by unit bed with EKG monitor and defibrillator, O2 by ambu bag, ET tube, with oximeter.

## 2019-04-22 NOTE — PROGRESS NOTES
Cardiac Surgery Care Coordinator- Met with Erin Davila and her family,  Reviewed  role of the Cardiac Surgery Co- Nurse. Reviewed plan of care and day of surgery expectations. Will continue to update throughout the day. Kirstie Burnette RN  
 
4375- Met with Mrs Fady Shah in pre-op holding, she is without questions or concerns at this time. 1330- Met with the family and provided update, will continue to follow. [phone] 90- Met with family of Erin Davila, provided family with update. Family without questions or concerns at this time. Will continue to follow for educational and emotional needs. 1615-Met with Keenan chang and Dr. Lázaro Maria. Update given, Encouraged family to verbalize and offered emotional support. Reinforced Surgical waiting room instructions, family to wait in the main surgical waiting room until contacted by the nursing staff. 1730-Family escorted to the fourth floor waiting room, bedside nurse aware of their location.   Will continue to follow Kirstie Burnette RN

## 2019-04-22 NOTE — PROGRESS NOTES
Occupational Therapy Chart reviewed and patient currently off the floor in OR, will follow up tomorrow. Thank you.   
Hilary Simons, OTR/L

## 2019-04-23 ENCOUNTER — APPOINTMENT (OUTPATIENT)
Dept: GENERAL RADIOLOGY | Age: 84
DRG: 220 | End: 2019-04-23
Attending: PHYSICIAN ASSISTANT
Payer: MEDICARE

## 2019-04-23 LAB
ADMINISTERED INITIALS, ADMINIT: NORMAL
ALBUMIN SERPL-MCNC: 3.3 G/DL (ref 3.5–5)
ALBUMIN/GLOB SERPL: 1.2 {RATIO} (ref 1.1–2.2)
ALP SERPL-CCNC: 60 U/L (ref 45–117)
ALT SERPL-CCNC: 22 U/L (ref 12–78)
ANION GAP SERPL CALC-SCNC: 6 MMOL/L (ref 5–15)
AST SERPL-CCNC: 50 U/L (ref 15–37)
ATRIAL RATE: 76 BPM
BILIRUB SERPL-MCNC: 0.4 MG/DL (ref 0.2–1)
BLD PROD TYP BPU: NORMAL
BPU ID: NORMAL
BUN SERPL-MCNC: 22 MG/DL (ref 6–20)
BUN/CREAT SERPL: 13 (ref 12–20)
CALCIUM SERPL-MCNC: 8.8 MG/DL (ref 8.5–10.1)
CALCULATED P AXIS, ECG09: -9 DEGREES
CALCULATED R AXIS, ECG10: -26 DEGREES
CALCULATED T AXIS, ECG11: 59 DEGREES
CARB RATIO, CHOR: 15
CARBOHYDRATE EATEN, CHO: 33 G
CARBOHYDRATE EATEN, CHO: 64 G
CARBOHYDRATE EATEN, CHO: 64 G
CHLORIDE SERPL-SCNC: 113 MMOL/L (ref 97–108)
CO2 SERPL-SCNC: 27 MMOL/L (ref 21–32)
CREAT SERPL-MCNC: 1.7 MG/DL (ref 0.55–1.02)
D50 ADMINISTERED, D50ADM: 0 ML
D50 ORDER, D50ORD: 0 ML
DIAGNOSIS, 93000: NORMAL
ERYTHROCYTE [DISTWIDTH] IN BLOOD BY AUTOMATED COUNT: 17 % (ref 11.5–14.5)
GLOBULIN SER CALC-MCNC: 2.7 G/DL (ref 2–4)
GLSCOM COMMENTS: NORMAL
GLUCOSE BLD STRIP.AUTO-MCNC: 111 MG/DL (ref 65–100)
GLUCOSE BLD STRIP.AUTO-MCNC: 114 MG/DL (ref 65–100)
GLUCOSE BLD STRIP.AUTO-MCNC: 115 MG/DL (ref 65–100)
GLUCOSE BLD STRIP.AUTO-MCNC: 116 MG/DL (ref 65–100)
GLUCOSE BLD STRIP.AUTO-MCNC: 118 MG/DL (ref 65–100)
GLUCOSE BLD STRIP.AUTO-MCNC: 120 MG/DL (ref 65–100)
GLUCOSE BLD STRIP.AUTO-MCNC: 123 MG/DL (ref 65–100)
GLUCOSE BLD STRIP.AUTO-MCNC: 137 MG/DL (ref 65–100)
GLUCOSE BLD STRIP.AUTO-MCNC: 140 MG/DL (ref 65–100)
GLUCOSE BLD STRIP.AUTO-MCNC: 151 MG/DL (ref 65–100)
GLUCOSE BLD STRIP.AUTO-MCNC: 187 MG/DL (ref 65–100)
GLUCOSE BLD STRIP.AUTO-MCNC: 81 MG/DL (ref 65–100)
GLUCOSE BLD STRIP.AUTO-MCNC: 83 MG/DL (ref 65–100)
GLUCOSE BLD STRIP.AUTO-MCNC: 99 MG/DL (ref 65–100)
GLUCOSE SERPL-MCNC: 117 MG/DL (ref 65–100)
GLUCOSE, GLC: 111 MG/DL
GLUCOSE, GLC: 114 MG/DL
GLUCOSE, GLC: 115 MG/DL
GLUCOSE, GLC: 116 MG/DL
GLUCOSE, GLC: 118 MG/DL
GLUCOSE, GLC: 120 MG/DL
GLUCOSE, GLC: 123 MG/DL
GLUCOSE, GLC: 137 MG/DL
GLUCOSE, GLC: 140 MG/DL
GLUCOSE, GLC: 151 MG/DL
GLUCOSE, GLC: 187 MG/DL
GLUCOSE, GLC: 81 MG/DL
GLUCOSE, GLC: 83 MG/DL
GLUCOSE, GLC: 99 MG/DL
HCT VFR BLD AUTO: 27.3 % (ref 35–47)
HGB BLD-MCNC: 8.4 G/DL (ref 11.5–16)
HIGH TARGET, HITG: 130 MG/DL
INSULIN ADMINSTERED, INSADM: 0.9 UNITS/HOUR
INSULIN ADMINSTERED, INSADM: 1 UNITS/HOUR
INSULIN ADMINSTERED, INSADM: 2 UNITS/HOUR
INSULIN ADMINSTERED, INSADM: 2 UNITS/HOUR
INSULIN ADMINSTERED, INSADM: 2.6 UNITS/HOUR
INSULIN ADMINSTERED, INSADM: 2.6 UNITS/HOUR
INSULIN ADMINSTERED, INSADM: 2.7 UNITS/HOUR
INSULIN ADMINSTERED, INSADM: 2.8 UNITS/HOUR
INSULIN ADMINSTERED, INSADM: 2.8 UNITS/HOUR
INSULIN ADMINSTERED, INSADM: 2.9 UNITS/HOUR
INSULIN ADMINSTERED, INSADM: 3 UNITS/HOUR
INSULIN ADMINSTERED, INSADM: 3 UNITS/HOUR
INSULIN ADMINSTERED, INSADM: 3.2 UNITS/HOUR
INSULIN ADMINSTERED, INSADM: 3.2 UNITS/HOUR
INSULIN ADMINSTERED, INSADM: 3.6 UNITS/HOUR
INSULIN ADMINSTERED, INSADM: 5.5 UNITS/HOUR
INSULIN BOLUS ADMINISTERED, INSBOLADM: 2.2 UNITS/HOUR
INSULIN BOLUS ADMINISTERED, INSBOLADM: 4.3 UNITS/HOUR
INSULIN BOLUS ADMINISTERED, INSBOLADM: 4.3 UNITS/HOUR
INSULIN BOLUS ORDERED, INSBOLORD: 2.2 UNITS/HOUR
INSULIN BOLUS ORDERED, INSBOLORD: 4.3 UNITS/HOUR
INSULIN BOLUS ORDERED, INSBOLORD: 4.3 UNITS/HOUR
INSULIN ORDER, INSORD: 0.9 UNITS/HOUR
INSULIN ORDER, INSORD: 1 UNITS/HOUR
INSULIN ORDER, INSORD: 2 UNITS/HOUR
INSULIN ORDER, INSORD: 2 UNITS/HOUR
INSULIN ORDER, INSORD: 2.6 UNITS/HOUR
INSULIN ORDER, INSORD: 2.6 UNITS/HOUR
INSULIN ORDER, INSORD: 2.7 UNITS/HOUR
INSULIN ORDER, INSORD: 2.8 UNITS/HOUR
INSULIN ORDER, INSORD: 2.8 UNITS/HOUR
INSULIN ORDER, INSORD: 2.9 UNITS/HOUR
INSULIN ORDER, INSORD: 3 UNITS/HOUR
INSULIN ORDER, INSORD: 3 UNITS/HOUR
INSULIN ORDER, INSORD: 3.2 UNITS/HOUR
INSULIN ORDER, INSORD: 3.2 UNITS/HOUR
INSULIN ORDER, INSORD: 3.6 UNITS/HOUR
INSULIN ORDER, INSORD: 5.5 UNITS/HOUR
LOW TARGET, LOT: 95 MG/DL
MAGNESIUM SERPL-MCNC: 2.5 MG/DL (ref 1.6–2.4)
MCH RBC QN AUTO: 29.9 PG (ref 26–34)
MCHC RBC AUTO-ENTMCNC: 30.8 G/DL (ref 30–36.5)
MCV RBC AUTO: 97.2 FL (ref 80–99)
MINUTES UNTIL NEXT BG, NBG: 120 MIN
MINUTES UNTIL NEXT BG, NBG: 60 MIN
MULTIPLIER, MUL: 0.04
MULTIPLIER, MUL: 0.05
MULTIPLIER, MUL: 0.06
MULTIPLIER, MUL: 0.06
NRBC # BLD: 0 K/UL (ref 0–0.01)
NRBC BLD-RTO: 0 PER 100 WBC
ORDER INITIALS, ORDINIT: NORMAL
P-R INTERVAL, ECG05: 186 MS
PLATELET # BLD AUTO: 90 K/UL (ref 150–400)
PMV BLD AUTO: 12.9 FL (ref 8.9–12.9)
POTASSIUM SERPL-SCNC: 4.7 MMOL/L (ref 3.5–5.1)
PROT SERPL-MCNC: 6 G/DL (ref 6.4–8.2)
Q-T INTERVAL, ECG07: 416 MS
QRS DURATION, ECG06: 88 MS
QTC CALCULATION (BEZET), ECG08: 468 MS
RBC # BLD AUTO: 2.81 M/UL (ref 3.8–5.2)
SERVICE CMNT-IMP: ABNORMAL
SERVICE CMNT-IMP: NORMAL
SODIUM SERPL-SCNC: 146 MMOL/L (ref 136–145)
STATUS OF UNIT,%ST: NORMAL
UNIT DIVISION, %UDIV: 0
VENTRICULAR RATE, ECG03: 76 BPM
WBC # BLD AUTO: 8.2 K/UL (ref 3.6–11)

## 2019-04-23 PROCEDURE — 97164 PT RE-EVAL EST PLAN CARE: CPT

## 2019-04-23 PROCEDURE — 74011000258 HC RX REV CODE- 258: Performed by: PHYSICIAN ASSISTANT

## 2019-04-23 PROCEDURE — 77010033678 HC OXYGEN DAILY

## 2019-04-23 PROCEDURE — 93005 ELECTROCARDIOGRAM TRACING: CPT

## 2019-04-23 PROCEDURE — 71045 X-RAY EXAM CHEST 1 VIEW: CPT

## 2019-04-23 PROCEDURE — 36415 COLL VENOUS BLD VENIPUNCTURE: CPT

## 2019-04-23 PROCEDURE — 82962 GLUCOSE BLOOD TEST: CPT

## 2019-04-23 PROCEDURE — 83735 ASSAY OF MAGNESIUM: CPT

## 2019-04-23 PROCEDURE — 74011250636 HC RX REV CODE- 250/636: Performed by: PHYSICIAN ASSISTANT

## 2019-04-23 PROCEDURE — 97168 OT RE-EVAL EST PLAN CARE: CPT

## 2019-04-23 PROCEDURE — 77030027138 HC INCENT SPIROMETER -A

## 2019-04-23 PROCEDURE — 74011636637 HC RX REV CODE- 636/637: Performed by: PHYSICIAN ASSISTANT

## 2019-04-23 PROCEDURE — 97535 SELF CARE MNGMENT TRAINING: CPT

## 2019-04-23 PROCEDURE — 85027 COMPLETE CBC AUTOMATED: CPT

## 2019-04-23 PROCEDURE — 65660000001 HC RM ICU INTERMED STEPDOWN

## 2019-04-23 PROCEDURE — 74011250637 HC RX REV CODE- 250/637: Performed by: PHYSICIAN ASSISTANT

## 2019-04-23 PROCEDURE — 97530 THERAPEUTIC ACTIVITIES: CPT

## 2019-04-23 PROCEDURE — 80053 COMPREHEN METABOLIC PANEL: CPT

## 2019-04-23 RX ORDER — ALLOPURINOL 100 MG/1
200 TABLET ORAL DAILY
Status: DISCONTINUED | OUTPATIENT
Start: 2019-04-24 | End: 2019-04-27 | Stop reason: HOSPADM

## 2019-04-23 RX ORDER — LEVOTHYROXINE SODIUM 125 UG/1
125 TABLET ORAL
Status: DISCONTINUED | OUTPATIENT
Start: 2019-04-24 | End: 2019-04-27 | Stop reason: HOSPADM

## 2019-04-23 RX ADMIN — INSULIN LISPRO 4 UNITS: 100 INJECTION, SOLUTION INTRAVENOUS; SUBCUTANEOUS at 17:58

## 2019-04-23 RX ADMIN — Medication 10 ML: at 05:32

## 2019-04-23 RX ADMIN — OXYCODONE AND ACETAMINOPHEN 1 TABLET: 5; 325 TABLET ORAL at 14:12

## 2019-04-23 RX ADMIN — FAMOTIDINE 20 MG: 20 TABLET ORAL at 21:43

## 2019-04-23 RX ADMIN — MUPIROCIN: 20 OINTMENT TOPICAL at 08:12

## 2019-04-23 RX ADMIN — CHLORHEXIDINE GLUCONATE 10 ML: 1.2 RINSE ORAL at 08:12

## 2019-04-23 RX ADMIN — POLYETHYLENE GLYCOL 3350 17 G: 17 POWDER, FOR SOLUTION ORAL at 08:11

## 2019-04-23 RX ADMIN — CHLORHEXIDINE GLUCONATE 10 ML: 1.2 RINSE ORAL at 17:59

## 2019-04-23 RX ADMIN — SENNOSIDES AND DOCUSATE SODIUM 1 TABLET: 8.6; 5 TABLET ORAL at 17:58

## 2019-04-23 RX ADMIN — ONDANSETRON 4 MG: 2 INJECTION INTRAMUSCULAR; INTRAVENOUS at 17:10

## 2019-04-23 RX ADMIN — SODIUM CHLORIDE 3 UNITS/HR: 900 INJECTION, SOLUTION INTRAVENOUS at 01:13

## 2019-04-23 RX ADMIN — Medication 10 ML: at 21:43

## 2019-04-23 RX ADMIN — Medication 10 ML: at 19:51

## 2019-04-23 RX ADMIN — ACETAMINOPHEN 650 MG: 325 TABLET ORAL at 21:43

## 2019-04-23 RX ADMIN — SENNOSIDES AND DOCUSATE SODIUM 1 TABLET: 8.6; 5 TABLET ORAL at 08:11

## 2019-04-23 RX ADMIN — ACETAMINOPHEN 650 MG: 325 TABLET ORAL at 13:17

## 2019-04-23 RX ADMIN — ACETAMINOPHEN 650 MG: 325 TABLET ORAL at 08:11

## 2019-04-23 RX ADMIN — CEFAZOLIN SODIUM 1 G: 1 INJECTION, POWDER, FOR SOLUTION INTRAMUSCULAR; INTRAVENOUS at 17:58

## 2019-04-23 RX ADMIN — ASPIRIN 81 MG 81 MG: 81 TABLET ORAL at 08:11

## 2019-04-23 RX ADMIN — ONDANSETRON 4 MG: 2 INJECTION INTRAMUSCULAR; INTRAVENOUS at 05:17

## 2019-04-23 RX ADMIN — ATORVASTATIN CALCIUM 80 MG: 40 TABLET, FILM COATED ORAL at 21:43

## 2019-04-23 RX ADMIN — INSULIN LISPRO 4 UNITS: 100 INJECTION, SOLUTION INTRAVENOUS; SUBCUTANEOUS at 13:17

## 2019-04-23 RX ADMIN — MAGNESIUM OXIDE TAB 400 MG (241.3 MG ELEMENTAL MG) 400 MG: 400 (241.3 MG) TAB at 18:00

## 2019-04-23 RX ADMIN — INSULIN LISPRO 2 UNITS: 100 INJECTION, SOLUTION INTRAVENOUS; SUBCUTANEOUS at 08:50

## 2019-04-23 RX ADMIN — CEFAZOLIN SODIUM 1 G: 1 INJECTION, POWDER, FOR SOLUTION INTRAMUSCULAR; INTRAVENOUS at 03:26

## 2019-04-23 RX ADMIN — MUPIROCIN: 20 OINTMENT TOPICAL at 17:59

## 2019-04-23 RX ADMIN — ACETAMINOPHEN 650 MG: 325 TABLET ORAL at 17:58

## 2019-04-23 RX ADMIN — OXYCODONE AND ACETAMINOPHEN 1 TABLET: 5; 325 TABLET ORAL at 08:11

## 2019-04-23 NOTE — PROGRESS NOTES
0800 Bedside shift change report given to Margie Orellana (oncoming nurse) by Kimber Lan (offgoing nurse). Report included the following information SBAR, MAR and Cardiac Rhythm NSR. 
 
1000 Pt working with physical therapy. 2000 Bedside shift change report given to Keely (oncoming nurse) by Margie Orellana (offgoing nurse). Report included the following information SBAR, MAR and Cardiac Rhythm NSR.

## 2019-04-23 NOTE — PROGRESS NOTES
CM met with patient after MVR procedure. Patient alert and oriented, sitting up in chair. CM advised patient she was approved for [facility]. Patient is unsure at this time if she will be able to return home after discharge. CM reassured patient that multi-disciplinaries of PT/OT/Dr./RN will follow patient's progress during her inpatient stay and if it is determined she can go home, home health will come to her house and if it is determined she should go to rehab to get stronger we will update her at that time. Patient is agreeable to continue to work with PT/OT while inpatient and we will work together to determine her placement after discharge (home health, inpatient rehab, SNF). Correction to initial CM note - [facility] is not assisted living. It is an over 54year old community of single family single-story homes located in [place], [place]. CM will continue to follow patient as she progresses.  
 
Diann Roberson, MPH

## 2019-04-23 NOTE — PROGRESS NOTES
Cardiac Surgery Care Coordinator-  Met with Naida Rios. Reviewed plan of care and discussed goals for the day. Naida Rios has a good understanding of her plan for the day. Reinforced sternal precautions and encouraged continued use of the incentive spirometer. Will continue to follow for educational and emotional needs. 1000- Met with Ms Warden Etienne and her son, reviewed plan of care. Mr Warden Etienne stated he will be going to work today and his sister will be in later today. Will continue to follow.  Alma Galeano RN

## 2019-04-23 NOTE — PROGRESS NOTES
NUTRITION Pt seen for:    
[x]          LOS       
RECOMMENDATIONS:  
1. Diet advancement as tolerated back to cardiac diet 2. Continue daily weights SUBJECTIVE/OBJECTIVE: Information obtained from:  Discussion with RN, chart review. Pt admitted for mitral stenosis. S/p MVR yesterday. Unable to interview pt, working with therapy. Appears well nourished with negative malnutrition screen on admit. Eating well prior to surgery with stable weight prior to admit. Remains on clear liquids after surgery. Per RN did well with clear liquids at breakfast. Diet order resume with consistent CHO added since hx of DM. DTC following for insulin mgmt. Diet:  Clear liquids, NCS (cardiac prior) Supplements: none Intake: [x]           Good Patient Vitals for the past 100 hrs: 
 % Diet Eaten 04/21/19 1804 100 % 04/21/19 1346 90 % 04/21/19 0748 100 % 04/20/19 0800 100 % 04/19/19 1900 95 % 04/19/19 1346 90 % 04/19/19 0906 100 % Weight Changes:  
[x]           Fluctuating along with edema - trace to 1-2+ BLLE. Stable between 215-22-# PTA. Last 3 Recorded Weights in this Encounter 04/22/19 0000 04/22/19 1058 04/23/19 0739 Weight: 100 kg (220 lb 7.4 oz) 99.8 kg (220 lb) 101.3 kg (223 lb 5.2 oz) Nutrition Problems Identified: 
[x]      None PLAN:  
[]           Obtained/adjusted food preferences/tolerances and/or snacks options [x]   Resume pre-op diet as tolerated 
[]           Educated patient 
[]           Add Supplements Rescreen:  []            At Nutrition Risk [x]            Not at Nutrition Risk, rescreen per screening protocol Olivia Bolanos, [address]  Pager #[phone] or [phone]

## 2019-04-23 NOTE — PROGRESS NOTES
Spiritual Care Assessment/Progress Note [address] 
 
 
NAME: Tom Dyer      MRN: [id number] AGE: 82 y.o. SEX: female Protestant Affiliation: Pentecostalism  
Language: [place] 4/23/2019 Spiritual Assessment begun in [facility] through conversation with: 
  
    []Patient        [] Family    [] Friend(s) Reason for Consult: Initial/Spiritual assessment, critical care Spiritual beliefs: (Please include comment if needed) 
   [] Identifies with a jerica tradition:     
   [] Supported by a jerica community:        
   [] Claims no spiritual orientation:       
   [] Seeking spiritual identity:            
   [] Adheres to an individual form of spirituality:       
   [x] Not able to assess:                   
 
    
Identified resources for coping:  
   [] Prayer                           
   [] Music                  [] Guided Imagery 
   [] Family/friends                 [] Pet visits [] Devotional reading                         [] Unknown 
   [] Other:                                         
 
 
Interventions offered during this visit: (See comments for more details) Patient Interventions: Other (comment)(Left note at bedside) Family/Friend(s): Other (comment)(Left note at bedside) Plan of Care: 
 
 [] Support spiritual and/or cultural needs  
 [] Support AMD and/or advance care planning process    
 [] Support grieving process 
 [] Coordinate Rites and/or Rituals  
 [] Coordination with community clergy [] No spiritual needs identified at this time 
 [] Detailed Plan of Care below (See Comments)  [] Make referral to Music Therapy 
[] Make referral to Pet Therapy    
[] Make referral to Addiction services 
[] Make referral to [facility] 
[] Make referral to Spiritual Care Partner 
[] No future visits requested       
[] Follow up visits as needed Comments: Visited ms Miller in CVICU-34 for initial spiritual assessment. Patient appeared to be sleeping and no family was present; unable to do assessment at that time. Left note at bedside assuring patient and family of  availability for support. : Rev. Valentina Arguello. Royal Day; [facility], to contact [phone] call: [phone]

## 2019-04-23 NOTE — PROGRESS NOTES
[facility] ICU Progress Note Admit Date: 2019 POD:  1 Day Post-Op Procedure:  Procedure(s): MITRAL VALVE REPLACEMENT/WITH 25MM BIOPROSTHESIS CARDIOPULMONARY BYPASS/TRANSESOPHAGEAL ECHOCARDIOGRAHM AND EPI AORTIC ULTRASOUND BY DR. Rory Daly. Subjective:  
Pt seen with Dr. Maty Pichardo. No acute overnight events. No complaints this morning. On insulin ggt. O2 96% 2L NC. Objective:  
Vitals: 
Blood pressure 103/75, pulse 76, temperature 98.6 °F (37 °C), resp. rate 17, height 5' 9\" (1.753 m), weight 223 lb 5.2 oz (101.3 kg), SpO2 94 %. Temp (24hrs), Av.1 °F (36.7 °C), Min:97.3 °F (36.3 °C), Max:99.1 °F (37.3 °C) EKG/Rhythm: NSR 76 Extubation Date / Time: 19 CT Output: 530 (120) Oxygen Therapy: 
Oxygen Therapy O2 Sat (%): 94 % (19 1000) Pulse via Oximetry: 73 beats per minute (19 1000) O2 Device: Nasal cannula (19 0800) O2 Flow Rate (L/min): 2 l/min (19 0800) FIO2 (%): 50 % (19) CXR:  
CXR Results  (Last 48 hours) 19 1715  XR CHEST PORT Final result Impression:  IMPRESSION:   
1. Tubes and lines as above. Note that the PA catheter terminates in the left  
main pulmonary artery. No pneumothorax. 2. Mild pulmonary interstitial edema. Bibasilar atelectasis. Narrative:  EXAM:  XR CHEST PORT INDICATION:   postop heart COMPARISON: Chest radiograph 2019. FINDINGS: AP radiograph of the chest was obtained. Satisfactory position of endotracheal tube. Gastric decompression tube seen  
coursing inferiorly into the stomach. Right IJ approach PA catheter with tip  
terminating in the left main pulmonary artery. Right chest tube and mediastinal  
drains are noted. No pneumothorax is identified. There is mild pulmonary  
interstitial edema. Bibasilar subsegmental atelectasis. Unchanged mild  
cardiomegaly with calcifications of the thoracic aorta. Admission Weight: Last Weight Weight: 215 lb 11.2 oz (97.8 kg) Weight: 223 lb 5.2 oz (101.3 kg) Intake / Output / Drain: 
Current Shift:  0701 - 1900 In: 519.4 [P.O.:480; I.V.:39.4] Out: 70 [Drains:70] Last 24 hrs.:  
 
Intake/Output Summary (Last 24 hours) at 2019 1030 Last data filed at 2019 1000 Gross per 24 hour Intake 1905.56 ml Output 3280 ml Net -1374.44 ml EXAM: 
General:  Sitting in chair, NAD Lungs:   Clear to auscultation bilaterally. Incision:  No erythema, drainage or swelling. Heart:  Regular rate and rhythm, S1, S2 normal, no murmur, click, rub or gallop. Abdomen:   Soft, non-tender. Bowel sounds normal. No masses,  No organomegaly. Extremities:  No edema. PPP. Neurologic:  Gross motor and sensory apparatus intact. Labs:  
Recent Labs  
  19 
1000  19 
0310  19 
1711 WBC  --   --  8.2  --  6.0 HGB  --   --  8.4*   < > 8.9* HCT  --   --  27.3*   < > 28.6* PLT  --   --  90*  --  94* NA  --   --  146*   < > 140 K  --   --  4.7   < > 3.9 BUN  --   --  22*   < > 19 CREA  --   --  1.70*   < > 1.46* GLU  --   --  117*   < > 133* GLUCPOC 140*   < >  --    < >  --   
INR  --   --   --   --  3.1*  
 < > = values in this interval not displayed. Assessment:  
 
Active Problems: 
  Mitral stenosis (2019) S/P MVR (mitral valve replacement) (2019) Overview: MITRAL VALVE REPLACEMENT/WITH 25MM BIOPROSTHESIS Plan/Recommendations/Medical Decision Makin. Severe MS s/p tissue MVR (19). Already got ASA today, hold 2/2 recent thrombocytopenia. Anticoagulation with coumadin likely start tomorrow pending labs. 2. Chronic Afib: Continue home tikosyn when appropriate (QTc today 466). Start BB when appropriate. On Xarelto prior to surgery, will consider coumadin tomorrow. 3. Iron deficiency anemia s/p iron transfusions with hx GIB 02/2019: Monitor H/H, CT output 4.  HTN: Hold Metoprolol, norvasc, ACE and hydralazine.  
5. T2DM: A1c 7. Continue insulin ggt per protocol. Will need to resume insulin tomorrow. DTC following. 6. HLD: Statin.  
7. Hx Bladder CA: No issues voiding.  
8. Hypothyroid: Synthroid. 9. GERD/Schatzki's ring s/p dilatation 11/2018: No issues swallowing since. Pepcid. 10. Pulmonary HTN: Much improved since outside cath on 03/15. Monitor 11. CKD stage III s/p renal artery stenting: Creatinine 1.7. Good UOP. Monitor. 12. Chronic diastolic HF (NYHA class III on admission): Holding BB/ACE until appropriate. No diuretic 2/2 elevated Cr 
13. PVD with history of venous ulcers: No current open wounds. Monitor. Activity as tolerated. 15. CVA (2007) with no deficits: PT/OT. Statin 15. Recent urine leukocytosis with Gram neg rods(POA): Completed abx. Monitor 16. Thrombocytopenia: stable, 90K. HIT/DINA negative, heme consult confirm likely medication induced rather than bleeding. Surgery performed w/ Bival. ASA given today, will hold. 17. Elevated AST: 50 today (47 yesterday). Monitor 15. Dispo: PT/OT. Transfer to CVSU.   
 
Signed By: NEVAEH Tabares

## 2019-04-23 NOTE — PROGRESS NOTES
Problem: Mobility Impaired (Adult and Pediatric) Goal: *Acute Goals and Plan of Care (Insert Text) Description Physical Therapy Goals Re-evaluation completed 4/23/2019 and most goals downgraded following MVR 4/22/19. 1.  Patient will move from supine to sit and sit to supine, scoot up and down and roll side to side in bed with minimal assistance/contact guard assist within 5 days. 2.  Patient will perform sit to/from stand with minimal assistance/contact guard assist within 5 days. 3.  Patient will ambulate 100 feet with least restrictive assistive device and minimal assistance/contact guard assist within 5 days. 4.  Patient will ascend/descend 4 stairs with single handrail with minimal assistance/contact guard assist within 5 days. 5.  Patient will perform cardiac exercises per protocol with supervision/set-up within 5 days. 6.  Patient will verbally and functionally recall 3/3 sternal precautions within 5 days. Initiated 4/19/2019 1. Patient will move from supine to sit and sit to supine, scoot up and down and roll side to side in bed with minimal assistance/contact guard assist with log roll technique within 7 days. 2.  Patient will perform sit to/from stand with supervision and adherence to sternal precautions within 7 days. 3.  Patient will ambulate 150 feet with least restrictive assistive device and supervision within 7 days. 4.  Patient will ascend/descend 4 stairs with single handrail, for balance only, with supervision within 7 days. 5.  Patient will perform cardiac exercises per protocol with supervision within 7 days. 6.  Patient will verbally and functionally recall 3/3 sternal precautions within 7 days. Outcome: Progressing Towards Goal 
 
PHYSICAL THERAPY REEVALUATION Patient: Brad Rivera (33 y.o. female) Date: 4/23/2019 Primary Diagnosis: Mitral stenosis [I05.0] Procedure(s) (LRB): MITRAL VALVE REPLACEMENT/WITH 25MM BIOPROSTHESIS CARDIOPULMONARY BYPASS/TRANSESOPHAGEAL ECHOCARDIOGRAHM AND EPI AORTIC ULTRASOUND BY DR. Sarahann Merlin. (N/A) 1 Day Post-Op Precautions:  Fall, Sternal 
Chart, physical therapy assessment, plan of care and goals were reviewed. ASSESSMENT : 
Based on the objective data described below, the patient presents with impaired functional mobility compared to previous skilled PT evaluation following MVR with median sternotomy, POD #1. Currently, patient is limited by the following: newly enforced sternal precautions, poor trunk spatial awareness, poor retention of sternotomy education/precautions throughout session despite written and demonstration aids, impaired cardiopulmonary tolerance (MAPs 80s-90s; supplemental oxygen, report of brief dizziness with initial activity), and global weakness. Overall, patient required upmost moderate assist x 1-2 for functional tasks (See below). Therefore, anticipate rehab (Inpatient) needs at discharge; working towards tolerating 3 hours of therapy per day; highly motivated to regain her functional independence. Prior to admission, patient reports living alone in a [place]+ community, driving during the day, doing own laundry, intermittently using a SPC for ambulation at night to/from bathroom, and son/daughter assisting with larger grocery shopping. Patient denies falls, but endorses shortness of breath with ambulation > 50 ft. Recommend with nursing patient to complete as able in order to maintain strength, endurance and independence: ADLs with supervision/setup, OOB to chair 3x/day and mobilizing to the [facility] with A x 2, gait belt, sternal precautions. Thank you for your assistance. Patient will benefit from skilled intervention to address the above impairments. Patient?s rehabilitation potential is considered to be Fair Factors which may influence rehabilitation potential include:  
? None noted ? Mental ability/status ? Medical condition ?           Home/family situation and support systems ? Safety awareness 
? Pain tolerance/management 
? Other: PLAN : 
Recommendations and Planned Interventions: ?             Bed Mobility Training             ? Neuromuscular Re-Education ? Transfer Training                   ? Orthotic/Prosthetic Training ? Gait Training                         ? Modalities ? Therapeutic Exercises           ? Edema Management/Control ? Therapeutic Activities            ? Patient and Family Training/Education ? Other (comment): Frequency/Duration: Patient will be followed by physical therapy daily to address goals. Discharge Recommendations: Anticipate rehab needs at this time Further Equipment Recommendations for Discharge: TBD SUBJECTIVE:  
Patient stated ? So how did I do?? OBJECTIVE DATA SUMMARY:  
 
Past Medical History:  
Diagnosis Date Arthritis KNEES Atrial fibrillation ([facility] [address] 10/29/2009 Bladder cancer ([facility] [address] CAD (coronary artery disease) STENT PER PATIENT Chronic pain KNEES Coagulation disorder ([facility] [address] Chronic prophylactic anticoagulation med Colon polyps Diabetes ([facility] [address] IDDM  
 GERD (gastroesophageal reflux disease) Gout Heart valve problem   
 leaking heart valve Hypercholesterolemia Hypertension Hypothyroidism Hypothyroidism 4/23/2009 Hypothyroidism, acquired, autoimmune 11/23/2015 Overweight and obesity PUD (peptic ulcer disease) 1990'S S/P ablation of atrial flutter[V45.89HM] 2009  
 @ [facility] >> atrial fibrillation SOB (shortness of breath) on exertion 04/2019 [facility] 4/23/2009 TIA (transient ischemic attack) Ulcer of right lower extremity, limited to breakdown of skin ([facility] [address] 7/5/2018 Past Surgical History:  
Procedure Laterality Date CARDIAC SURG PROCEDURE UNLIST    
 ablation COLONOSCOPY N/A 2/20/2019 COLONOSCOPY performed by Beverley Malcolm MD at [facility] ENDOSCOPY  
 COLONOSCOPY,DIAGNOSTIC  2/20/2019 HX APPENDECTOMY HX CHOLECYSTECTOMY HX HYSTERECTOMY HX KNEE ARTHROSCOPY  [id number]  
 right knee HX ORTHOPAEDIC    
 HX UROLOGICAL RENAL STENT, tur-b  
 VASCULAR SURGERY PROCEDURE UNLIST  11/4  
 removed vein in right leg Hospital course since last seen and reason for reevaluation: MVR, POD #1 Critical Behavior: 
Neurologic State: Alert Orientation Level: Oriented X4(intermittent confusion, able to correct) Cognition: Appropriate for age attention/concentration, Follows commands, Memory loss, Poor safety awareness Safety/Judgement: Awareness of environment, Decreased awareness of need for assistance, Decreased awareness of need for safety, Decreased insight into deficits Skin:  Sternal dressing c/d/i Strength:   
Strength: Generally decreased, functional 
Tone & Sensation:  
Tone: Normal 
Sensation: Intact Range Of Motion: 
AROM: Generally decreased, functional 
Coordination: 
Coordination: Generally decreased, functional 
 
Functional Mobility: 
Bed Mobility: 
Supine to Sit: Assist x1; Moderate assistance(Log roll; Exit to right-side) Scooting: Minimum assistance; Additional time(Seated scooting; VCs for sternal precautions) Transfers: 
Sit to Stand: Minimum assistance;Assist x2 Stand to Sit: Minimum assistance;Assist x2(for controlled descent) Balance:  
Sitting: Intact Standing: Impaired; With support Standing - Static: Fair Standing - Dynamic : Fair Ambulation/Gait Training: 
Distance (ft): 7 Feet (ft) Assistive Device: Gait belt(+ patient's hands lightly supported on therapist's forearms) Ambulation - Level of Assistance: Minimal assistance; Moderate assistance(Significant posterior trunk lean/heel WBing) Gait Abnormalities: Antalgic;Decreased step clearance;Shuffling gait Base of Support: Narrowed Speed/Melany: Shuffled;Pace decreased (<100 feet/min) Functional Measure: 
Timed up and go: 
 
Timed Get Up And Go Test: (Unable to complete this date) < than 10 seconds=Normal  Greater then 13.5 seconds (in elderly)=Increased fall risk Karen TOWNSEND. Predicting the probability for falls in community dwelling older adults using the Timed Up and Go Test. Phys Ther. 2000;80:896-903. Pain: 
Pain Scale 1: Numeric (0 - 10) Pain Intensity 1: 5 Pain Location 1: Chest 
Pain Orientation 1: Anterior Pain Description 1: Aching Pain Intervention(s) 1: Medication (see MAR) Activity Tolerance:  
Fair Please refer to the flowsheet for vital signs taken during this treatment. After treatment:  
?  Patient left in no apparent distress sitting up in chair ? Patient left in no apparent distress in bed 
? Call bell left within reach ? Nursing notified ? Caregiver present ? Bed alarm activated COMMUNICATION/EDUCATION:  
The patient?s plan of care was discussed with: Occupational Therapist, Registered Nurse and Rehabilitation Attendant. ?  Fall prevention education was provided and the patient/caregiver indicated understanding. ? Patient/family have participated as able in goal setting and plan of care. ?  Patient/family agree to work toward stated goals and plan of care. ?  Patient understands intent and goals of therapy, but is neutral about his/her participation. ? Patient is unable to participate in goal setting and plan of care. Thank you for this referral. 
Ludwin Winter, PT, DPT Time Calculation: 15 mins

## 2019-04-23 NOTE — DIABETES MGMT
DTC Cardiac Surgery Progress Note Recommendations/ Comments:  Pt arrived to CVICU at 1457 on 4/22/19. Consider continuing insulin gtt for at least 48hrs post-op and eating 50% solid foods then, 
1) transition off gtt per [employer] Protocol 2) continue accu-checks and humalog correctional insulin ac & hs  
3) ADA/AHA diet as diet advanced 4) resume NPH - will need adjust dose based on insulin needs closer to transition Insulin gtt should not be stopped until after 1457 on 4/24/19 to complete 48hr post-op time frame. Currently on insulin gtt running @ 3.2 units/hr. Pt has received 18.4 units of insulin over the last 6 hours. Chart reviewed on [facility]. Patient is 82 y.o. female s/p Cardiac surgery  POD 1. Known HX Type 2 DM on NPH 43 units am and 23 units pm at home. A1c:  
Lab Results Component Value Date/Time Hemoglobin A1c 7.0 (H) 04/16/2019 10:09 AM  
 
 
 
Recent Glucose Results:  
Lab Results Component Value Date/Time  (H) 04/23/2019 03:10 AM  
  (H) 04/22/2019 09:21 PM  
  (H) 04/22/2019 05:11 PM  
 GLUCPOC 140 (H) 04/23/2019 10:00 AM  
 GLUCPOC 123 (H) 04/23/2019 07:28 AM  
 GLUCPOC 116 (H) 04/23/2019 05:20 AM  
  
 
Lab Results Component Value Date/Time Creatinine 1.70 (H) 04/23/2019 03:10 AM  
 
Estimated Creatinine Clearance: 32.3 mL/min (A) (based on SCr of 1.7 mg/dL (H)). Active Orders Diet DIET DIABETIC WITH OPTIONS Consistent Carb 1800kcal; Regular; AHA-LOW-CHOL FAT  
  
 
PO intake:  
Patient Vitals for the past 72 hrs: 
 % Diet Eaten 04/23/19 0800 100 % 04/21/19 1804 100 % 04/21/19 1346 90 % 04/21/19 0748 100 % Will continue to follow as needed. Thank you. Kermit Ambriz RD, CDE [facility] Pager: [phone] Time spent: 6 minutes

## 2019-04-23 NOTE — PROGRESS NOTES
Problem: Falls - Risk of 
Goal: *Absence of Falls Description Document Ran Padilla Fall Risk and appropriate interventions in the flowsheet. 4/23/2019 0145 by Juli Sommers RN Outcome: Progressing Towards Goal 
 
Problem: Cardiac Valve Surgery: Day of Surgery/Post-Op (Initiate SCIP measures for post-op care) Goal: Activity/Safety 4/23/2019 0145 by Juli Sommers RN Outcome: Progressing Towards Goal 
Goal: Nutrition/Diet 4/23/2019 0145 by Juli Sommers RN Outcome: Progressing Towards Goal 
Goal: Medications 4/23/2019 0145 by Juli Sommers RN Outcome: Progressing Towards Goal 
Goal: Respiratory 4/23/2019 0145 by Juli Sommers RN Outcome: Progressing Towards Goal 
Goal: *Hemodynamically stable (eg: Cardiac output/index; pulmonary arterial pressures; mixed venous oxygen saturation within set parameters) 4/23/2019 0145 by Juli Sommers RN Outcome: Progressing Towards Goal 
Goal: *Stable cardiac rhythm 4/23/2019 0145 by Juli Sommers RN Outcome: Progressing Towards Goal 
Goal: *Follows simple commands post anesthesia 4/23/2019 0145 by Juli Sommers RN Outcome: Progressing Towards Goal 
Goal: *Orients easily following extubation 4/23/2019 0145 by Juli Sommers RN Outcome: Progressing Towards Goal 
Goal: *Optimal pain control at patient's stated goal 
4/23/2019 0145 by Juli Sommers RN Outcome: Progressing Towards Goal 
 
Problem: Pressure Injury - Risk of 
Goal: *Prevention of pressure injury Description Document Shane Scale and appropriate interventions in the flowsheet. Outcome: Progressing Towards Goal 
  
Problem: Infection - Risk of, Central Venous Catheter-Associated Bloodstream Infection Goal: *Absence of infection signs and symptoms Outcome: Progressing Towards Goal

## 2019-04-23 NOTE — PROGRESS NOTES
OCCUPATIONAL THERAPY REEVALUATION Patient: Paxton Olivo (68 y.o. female) Date: 4/23/2019 Diagnosis: Mitral stenosis [I05.0] <principal problem not specified> Procedure(s) (LRB): MITRAL VALVE REPLACEMENT/WITH 25MM BIOPROSTHESIS CARDIOPULMONARY BYPASS/TRANSESOPHAGEAL ECHOCARDIOGRAHM AND EPI AORTIC ULTRASOUND BY DR. Eduardo Cronin. (N/A) 1 Day Post-Op Precautions: Fall, Sternal 
 
ASSESSMENT : 
Based on the objective data described below, the patient presents with setup-Mod A for upper body ADLs, up to Max A for lower body ADLs, and Min-Mod A x2 for functional mobility s/p MVR POD #1. Received in the chair, agreeable to therapy with son present. No recall of sternal precautions throughout session despite multiple teachings, visual aid provided for improved recall. Completed grooming at the sink with Min-Mod A x2, noted anteriorly flexed posture throughout functional activities, limited by global weakness, newly enforced sternal precautions, post-op pain, and brief dizziness with activity (BP stable, 110/75). Anticipate patient will require rehab upon d/c, working towards tolerating 3 hours/day of therapy considering patient was IND, living alone prior to admission. Recommend with nursing patient to complete as able in order to maintain strength, endurance and independence: ADLs with supervision/setup, OOB to chair 3x/day and mobilizing to the bathroom for toileting with 2 assist. Thank you for your assistance. Patient will benefit from skilled intervention to address the above impairments. Patient?s rehabilitation potential is considered to be Good Factors which may influence rehabilitation potential include:  
? None noted ? Mental ability/status ? Medical condition ? Home/family situation and support systems ? Safety awareness ? Pain tolerance/management ? Other: PLAN : 
 Recommendations and Planned Interventions: 
?                  Self Care Training                  ? Therapeutic Activities ? Functional Mobility Training    ? Cognitive Retraining 
? Therapeutic Exercises           ? Endurance Activities ? Balance Training                   ? Neuromuscular Re-Education ? Visual/Perceptual Training     ? Home Safety Training 
? Patient Education                 ? Family Training/Education ? Other (comment): Frequency/Duration: Patient will be followed by occupational therapy 5 times a week to address goals. Discharge Recommendations: Inpatient Rehab Further Equipment Recommendations for Discharge: TBD by rehab SUBJECTIVE:  
Patient stated ? I just feel so weak. ? OBJECTIVE DATA SUMMARY:  
Hospital course since last seen and reason for reevaluation: POD #1 MVR Cognitive/Behavioral Status: 
Neurologic State: Alert Orientation Level: Oriented X4(intermittent confusion, able to correct) Cognition: Appropriate for age attention/concentration; Follows commands;Memory loss;Poor safety awareness Perception: Appears intact Perseveration: No perseveration noted Safety/Judgement: Awareness of environment;Decreased awareness of need for assistance;Decreased awareness of need for safety;Decreased insight into deficits Skin: Appears intact Edema: BUEs Vision/Perceptual:   
Tracking: Able to track stimulus in all quadrants w/o difficulty Range of Motion: Rushie Doyne AROM: Generally decreased, functional 
  
  
  
  
  
  
  
Strength: Rushie Doyne Strength: Generally decreased, functional 
  
  
  
  
Coordination: 
Coordination: Generally decreased, functional 
Fine Motor Skills-Upper: Left Intact; Right Intact Gross Motor Skills-Upper: Left Intact; Right Intact Tone & Sensation: Bobe Oneyda Tone: Normal 
  
  
  
  
  
  
  
 
Balance: 
Sitting: Intact Standing: Impaired; Without support(anteriorly flexed posture with MAX cues to correct) Standing - Static: Fair Standing - Dynamic : Fair Functional Mobility and Transfers for ADLs: 
Bed Mobility: 
Scooting: Minimum assistance(seated scooting, cues for technique) Transfers: 
Sit to Stand: Minimum assistance;Assist x2 Functional Transfers Toilet Transfer : Minimum assistance; Moderate assistance;Assist x2(infer for balance & posture with MAX cues for balance) ADL Assessment: 
Feeding: Setup Oral Facial Hygiene/Grooming: Minimum assistance(for standing balance at the sink & anteriorly flexed posture) Bathing: Moderate assistance(infer for reach to BLEs & standing balance) Upper Body Dressing: Minimum assistance(infer for sternal prxn recall & reach around the back) Lower Body Dressing: Moderate assistance(infer for distal reach to BLEs & standing balance to pull up) Toileting: Maximum assistance(infer for standing balance & reach for cleaning) ADL Intervention: 
  
 
Grooming Grooming Assistance: Minimum assistance; Moderate assistance(standing at the sink; A for posterior lean & posture) Brushing Teeth: Minimum assistance; Moderate assistance; Compensatory technique training(A for balance; brief confusion, able to self-correct) Cues: Verbal cues provided;Physical assistance Cognitive Retraining Safety/Judgement: Awareness of environment;Decreased awareness of need for assistance;Decreased awareness of need for safety;Decreased insight into deficits Therapeutic Exercise: 
Ambulation to/from sink with Min-Mod A x2 and posterior lean with anteriorly flexed posture; standing at the sink x2-2 min for grooming Functional Measure: 
Barthel Index: 
Bathin Bladder: 5 Bowels: 5 Groomin Dressin Feedin Mobility: 0 Stairs: 0 Toilet Use: 5 Transfer (Bed to Chair and Back): 5 Total: 30/100 Percentage of impairment  
0% 1-19% 20-39% 40-59% 60-79% 80-99% 100% Barthel Score 0-100 100 99-80 79-60 59-40 20-39 1-19 
 0 The Barthel ADL Index: Guidelines 1. The index should be used as a record of what a patient does, not as a record of what a patient could do. 2. The main aim is to establish degree of independence from any help, physical or verbal, however minor and for whatever reason. 3. The need for supervision renders the patient not independent. 4. A patient's performance should be established using the best available evidence. Asking the patient, friends/relatives and nurses are the usual sources, but direct observation and common sense are also important. However direct testing is not needed. 5. Usually the patient's performance over the preceding 24-48 hours is important, but occasionally longer periods will be relevant. 6. Middle categories imply that the patient supplies over 50 per cent of the effort. 7. Use of aids to be independent is allowed. Davis Morris., Barthel, D.W. (1105). Functional evaluation: the Barthel Index. [address] [facility] (14)2. Magan Ledbetter osei ALEJANDRO Nixon, Drea Sin.Katey., [place], [address] [facility] (1999). Measuring the change indisability after inpatient rehabilitation; comparison of the responsiveness of the Barthel Index and Functional [place]illa Measure. Journal of Neurology, Neurosurgery, and Psychiatry, 66(4), 241-015. Tanesha Johnson, [place].PEEWEE, RASHAD Wright, & Raven Mccrary MJULIET. (2004.) Assessment of post-stroke quality of life in cost-effectiveness studies: The usefulness of the Barthel Index and the EuroQoL-5D. [facility], 13, 871-79 Occupational Therapy Evaluation Charge Determination History Examination Decision-Making LOW Complexity : Brief history review  MEDIUM Complexity : 3-5 performance deficits relating to physical, cognitive , or psychosocial skils that result in activity limitations and / or participation restrictions MEDIUM Complexity : Patient may present with comorbidities that affect occupational performnce. Miniml to moderate modification of tasks or assistance (eg, physical or verbal ) with assesment(s) is necessary to enable patient to complete evaluation Based on the above components, the patient evaluation is determined to be of the following complexity level: LOW Pain: 
Pain Scale 1: Numeric (0 - 10) Pain Intensity 1: 5 Pain Location 1: Chest 
Pain Orientation 1: Anterior Pain Description 1: Aching Pain Intervention(s) 1: Medication (see MAR) Activity Tolerance:  
Fair, limited by weakness & fatigue Please refer to the flowsheet for vital signs taken during this treatment. After treatment:  
? Patient left in no apparent distress sitting up in chair ? Patient left in no apparent distress in bed 
? Call bell left within reach ? Nursing notified ? Caregiver present ? Bed alarm activated COMMUNICATION/EDUCATION:  
The patient?s plan of care was discussed with: Physical Therapist and Registered Nurse. 
? Home safety education was provided and the patient/caregiver indicated understanding. ? Patient/family have participated as able in goal setting and plan of care. ?    Patient/family agree to work toward stated goals and plan of care. ?    Patient understands intent and goals of therapy, but is neutral about his/her participation. ? Patient is unable to participate in goal setting and plan of care. This patient?s plan of care is appropriate for delegation to [facility]. Thank you for this referral. 
ANNE Burr, OTR/L Time Calculation: 21 mins

## 2019-04-23 NOTE — ANESTHESIA POSTPROCEDURE EVALUATION
Post-Anesthesia Evaluation and Assessment Patient: Naida Rios MRN: [id number]  SSN: [id number] YOB: 1935  Age: 82 y.o. Sex: female Cardiovascular Function/Vital Signs Visit Vitals /75 (BP 1 Location: Left arm, BP Patient Position: At rest) Pulse 76 Temp 37 °C (98.6 °F) Resp 17 Ht 5' 9\" (1.753 m) Wt 101.3 kg (223 lb 5.2 oz) SpO2 94% BMI 32.98 kg/m² Patient is status post General anesthesia for Procedure(s): MITRAL VALVE REPLACEMENT/WITH 25MM BIOPROSTHESIS CARDIOPULMONARY BYPASS/TRANSESOPHAGEAL ECHOCARDIOGRAHM AND EPI AORTIC ULTRASOUND BY DR. Sherry Sharp. Keny Peralta Nausea/Vomiting: None Postoperative hydration reviewed and adequate. Pain: 
Pain Scale 1: Numeric (0 - 10) (04/23/19 0800) Pain Intensity 1: 5 (04/23/19 0800) Managed Neurological Status:  
Neuro Neurologic State: Alert (04/23/19 1100) Orientation Level: Oriented X4(intermittent confusion, able to correct) (04/23/19 1100) Cognition: Appropriate for age attention/concentration; Follows commands;Memory loss;Poor safety awareness (04/23/19 1100) Speech: Clear (04/23/19 0800) Assessment L Pupil: Round;Brisk (04/23/19 0800) Size L Pupil (mm): 2 (04/23/19 0800) Assessment R Pupil: Round;Brisk (04/23/19 0800) Size R Pupil (mm): 2 (04/23/19 0800) LUE Motor Response: Purposeful (04/23/19 0800) LLE Motor Response: Purposeful (04/23/19 0800) RUE Motor Response: Purposeful (04/23/19 0800) RLE Motor Response: Purposeful (04/23/19 0800) At baseline Mental Status and Level of Consciousness: Alert and oriented to person, place, and time Pulmonary Status:  
O2 Device: Nasal cannula (04/23/19 0800) Adequate oxygenation and airway patent Complications related to anesthesia: None Post-anesthesia assessment completed. No concerns Signed By: Chris Hartley MD   
 April 23, 2019 Procedure(s): MITRAL VALVE REPLACEMENT/WITH 25MM BIOPROSTHESIS CARDIOPULMONARY BYPASS/TRANSESOPHAGEAL ECHOCARDIOGRAHM AND EPI AORTIC ULTRASOUND BY DR. Lady Medeiros. Tenisha Penn general 
 
<BSHSIANPOST> Vitals Value Taken Time BP Temp Pulse 77 4/23/2019 11:46 AM  
Resp 13 4/23/2019 11:46 AM  
SpO2 Vitals shown include unvalidated device data.

## 2019-04-24 ENCOUNTER — APPOINTMENT (OUTPATIENT)
Dept: GENERAL RADIOLOGY | Age: 84
DRG: 220 | End: 2019-04-24
Attending: PHYSICIAN ASSISTANT
Payer: MEDICARE

## 2019-04-24 LAB
ADMINISTERED INITIALS, ADMINIT: NORMAL
ALBUMIN SERPL-MCNC: 3 G/DL (ref 3.5–5)
ALBUMIN/GLOB SERPL: 1.1 {RATIO} (ref 1.1–2.2)
ALP SERPL-CCNC: 66 U/L (ref 45–117)
ALT SERPL-CCNC: 12 U/L (ref 12–78)
ANION GAP SERPL CALC-SCNC: 5 MMOL/L (ref 5–15)
AST SERPL-CCNC: 39 U/L (ref 15–37)
ATRIAL RATE: 81 BPM
ATRIAL RATE: 86 BPM
BILIRUB SERPL-MCNC: 0.5 MG/DL (ref 0.2–1)
BUN SERPL-MCNC: 30 MG/DL (ref 6–20)
BUN/CREAT SERPL: 15 (ref 12–20)
CALCIUM SERPL-MCNC: 8.8 MG/DL (ref 8.5–10.1)
CALCULATED P AXIS, ECG09: 63 DEGREES
CALCULATED P AXIS, ECG09: 67 DEGREES
CALCULATED R AXIS, ECG10: -13 DEGREES
CALCULATED R AXIS, ECG10: -18 DEGREES
CALCULATED T AXIS, ECG11: 102 DEGREES
CALCULATED T AXIS, ECG11: 86 DEGREES
CARB RATIO, CHOR: 15
CARBOHYDRATE EATEN, CHO: 30 G
CHLORIDE SERPL-SCNC: 110 MMOL/L (ref 97–108)
CO2 SERPL-SCNC: 27 MMOL/L (ref 21–32)
CREAT SERPL-MCNC: 2.02 MG/DL (ref 0.55–1.02)
D50 ADMINISTERED, D50ADM: 0 ML
D50 ORDER, D50ORD: 0 ML
DIAGNOSIS, 93000: NORMAL
DIAGNOSIS, 93000: NORMAL
ERYTHROCYTE [DISTWIDTH] IN BLOOD BY AUTOMATED COUNT: 17.6 % (ref 11.5–14.5)
GLOBULIN SER CALC-MCNC: 2.8 G/DL (ref 2–4)
GLSCOM COMMENTS: NORMAL
GLUCOSE BLD STRIP.AUTO-MCNC: 100 MG/DL (ref 65–100)
GLUCOSE BLD STRIP.AUTO-MCNC: 100 MG/DL (ref 65–100)
GLUCOSE BLD STRIP.AUTO-MCNC: 103 MG/DL (ref 65–100)
GLUCOSE BLD STRIP.AUTO-MCNC: 146 MG/DL (ref 65–100)
GLUCOSE BLD STRIP.AUTO-MCNC: 153 MG/DL (ref 65–100)
GLUCOSE BLD STRIP.AUTO-MCNC: 162 MG/DL (ref 65–100)
GLUCOSE BLD STRIP.AUTO-MCNC: 171 MG/DL (ref 65–100)
GLUCOSE BLD STRIP.AUTO-MCNC: 172 MG/DL (ref 65–100)
GLUCOSE BLD STRIP.AUTO-MCNC: 187 MG/DL (ref 65–100)
GLUCOSE BLD STRIP.AUTO-MCNC: 88 MG/DL (ref 65–100)
GLUCOSE BLD STRIP.AUTO-MCNC: 94 MG/DL (ref 65–100)
GLUCOSE BLD STRIP.AUTO-MCNC: 95 MG/DL (ref 65–100)
GLUCOSE BLD STRIP.AUTO-MCNC: 99 MG/DL (ref 65–100)
GLUCOSE SERPL-MCNC: 95 MG/DL (ref 65–100)
GLUCOSE, GLC: 100 MG/DL
GLUCOSE, GLC: 100 MG/DL
GLUCOSE, GLC: 103 MG/DL
GLUCOSE, GLC: 153 MG/DL
GLUCOSE, GLC: 162 MG/DL
GLUCOSE, GLC: 172 MG/DL
GLUCOSE, GLC: 88 MG/DL
GLUCOSE, GLC: 94 MG/DL
GLUCOSE, GLC: 95 MG/DL
GLUCOSE, GLC: 99 MG/DL
HCT VFR BLD AUTO: 26.4 % (ref 35–47)
HGB BLD-MCNC: 8 G/DL (ref 11.5–16)
HIGH TARGET, HITG: 130 MG/DL
INSULIN ADMINSTERED, INSADM: 0.5 UNITS/HOUR
INSULIN ADMINSTERED, INSADM: 0.8 UNITS/HOUR
INSULIN ADMINSTERED, INSADM: 0.8 UNITS/HOUR
INSULIN ADMINSTERED, INSADM: 1 UNITS/HOUR
INSULIN ADMINSTERED, INSADM: 1 UNITS/HOUR
INSULIN ADMINSTERED, INSADM: 1.1 UNITS/HOUR
INSULIN ADMINSTERED, INSADM: 1.5 UNITS/HOUR
INSULIN ADMINSTERED, INSADM: 1.5 UNITS/HOUR
INSULIN ADMINSTERED, INSADM: 3.1 UNITS/HOUR
INSULIN ADMINSTERED, INSADM: 3.5 UNITS/HOUR
INSULIN BOLUS ADMINISTERED, INSBOLADM: 2 UNITS/HOUR
INSULIN BOLUS ORDERED, INSBOLORD: 2 UNITS/HOUR
INSULIN ORDER, INSORD: 0.5 UNITS/HOUR
INSULIN ORDER, INSORD: 0.8 UNITS/HOUR
INSULIN ORDER, INSORD: 0.8 UNITS/HOUR
INSULIN ORDER, INSORD: 1 UNITS/HOUR
INSULIN ORDER, INSORD: 1 UNITS/HOUR
INSULIN ORDER, INSORD: 1.1 UNITS/HOUR
INSULIN ORDER, INSORD: 1.5 UNITS/HOUR
INSULIN ORDER, INSORD: 1.5 UNITS/HOUR
INSULIN ORDER, INSORD: 3.1 UNITS/HOUR
INSULIN ORDER, INSORD: 3.5 UNITS/HOUR
LOW TARGET, LOT: 95 MG/DL
MAGNESIUM SERPL-MCNC: 2.6 MG/DL (ref 1.6–2.4)
MCH RBC QN AUTO: 30.1 PG (ref 26–34)
MCHC RBC AUTO-ENTMCNC: 30.3 G/DL (ref 30–36.5)
MCV RBC AUTO: 99.2 FL (ref 80–99)
MINUTES UNTIL NEXT BG, NBG: 60 MIN
MULTIPLIER, MUL: 0.02
MULTIPLIER, MUL: 0.03
MULTIPLIER, MUL: 0.04
NRBC # BLD: 0 K/UL (ref 0–0.01)
NRBC BLD-RTO: 0 PER 100 WBC
ORDER INITIALS, ORDINIT: NORMAL
P-R INTERVAL, ECG05: 198 MS
P-R INTERVAL, ECG05: 200 MS
PLATELET # BLD AUTO: 69 K/UL (ref 150–400)
POTASSIUM SERPL-SCNC: 4.2 MMOL/L (ref 3.5–5.1)
PROT SERPL-MCNC: 5.8 G/DL (ref 6.4–8.2)
Q-T INTERVAL, ECG07: 398 MS
Q-T INTERVAL, ECG07: 408 MS
QRS DURATION, ECG06: 90 MS
QRS DURATION, ECG06: 94 MS
QTC CALCULATION (BEZET), ECG08: 473 MS
QTC CALCULATION (BEZET), ECG08: 476 MS
RBC # BLD AUTO: 2.66 M/UL (ref 3.8–5.2)
SERVICE CMNT-IMP: ABNORMAL
SERVICE CMNT-IMP: NORMAL
SODIUM SERPL-SCNC: 142 MMOL/L (ref 136–145)
VENTRICULAR RATE, ECG03: 81 BPM
VENTRICULAR RATE, ECG03: 86 BPM
WBC # BLD AUTO: 7.4 K/UL (ref 3.6–11)

## 2019-04-24 PROCEDURE — 65660000001 HC RM ICU INTERMED STEPDOWN

## 2019-04-24 PROCEDURE — 36415 COLL VENOUS BLD VENIPUNCTURE: CPT

## 2019-04-24 PROCEDURE — 97110 THERAPEUTIC EXERCISES: CPT

## 2019-04-24 PROCEDURE — 83735 ASSAY OF MAGNESIUM: CPT

## 2019-04-24 PROCEDURE — 74011000258 HC RX REV CODE- 258: Performed by: PHYSICIAN ASSISTANT

## 2019-04-24 PROCEDURE — 80053 COMPREHEN METABOLIC PANEL: CPT

## 2019-04-24 PROCEDURE — 97116 GAIT TRAINING THERAPY: CPT

## 2019-04-24 PROCEDURE — 74011250636 HC RX REV CODE- 250/636: Performed by: PHYSICIAN ASSISTANT

## 2019-04-24 PROCEDURE — 82962 GLUCOSE BLOOD TEST: CPT

## 2019-04-24 PROCEDURE — 85027 COMPLETE CBC AUTOMATED: CPT

## 2019-04-24 PROCEDURE — 93005 ELECTROCARDIOGRAM TRACING: CPT

## 2019-04-24 PROCEDURE — 74011250637 HC RX REV CODE- 250/637: Performed by: INTERNAL MEDICINE

## 2019-04-24 PROCEDURE — 51798 US URINE CAPACITY MEASURE: CPT

## 2019-04-24 PROCEDURE — 71045 X-RAY EXAM CHEST 1 VIEW: CPT

## 2019-04-24 PROCEDURE — 74011636637 HC RX REV CODE- 636/637: Performed by: PHYSICIAN ASSISTANT

## 2019-04-24 PROCEDURE — 74011250637 HC RX REV CODE- 250/637: Performed by: PHYSICIAN ASSISTANT

## 2019-04-24 PROCEDURE — 74011636637 HC RX REV CODE- 636/637: Performed by: THORACIC SURGERY (CARDIOTHORACIC VASCULAR SURGERY)

## 2019-04-24 RX ORDER — AMIODARONE HYDROCHLORIDE 200 MG/1
200 TABLET ORAL 2 TIMES DAILY
Status: DISCONTINUED | OUTPATIENT
Start: 2019-04-24 | End: 2019-04-27 | Stop reason: HOSPADM

## 2019-04-24 RX ORDER — METOPROLOL TARTRATE 50 MG/1
50 TABLET ORAL 2 TIMES DAILY
Status: DISCONTINUED | OUTPATIENT
Start: 2019-04-24 | End: 2019-04-27 | Stop reason: HOSPADM

## 2019-04-24 RX ORDER — INSULIN GLARGINE 100 [IU]/ML
5 INJECTION, SOLUTION SUBCUTANEOUS ONCE
Status: COMPLETED | OUTPATIENT
Start: 2019-04-24 | End: 2019-04-24

## 2019-04-24 RX ORDER — GUAIFENESIN 100 MG/5ML
81 LIQUID (ML) ORAL DAILY
Status: DISCONTINUED | OUTPATIENT
Start: 2019-04-24 | End: 2019-04-27 | Stop reason: HOSPADM

## 2019-04-24 RX ADMIN — SODIUM CHLORIDE 10 ML/HR: 450 INJECTION, SOLUTION INTRAVENOUS at 06:51

## 2019-04-24 RX ADMIN — SENNOSIDES AND DOCUSATE SODIUM 1 TABLET: 8.6; 5 TABLET ORAL at 17:37

## 2019-04-24 RX ADMIN — INSULIN LISPRO 2 UNITS: 100 INJECTION, SOLUTION INTRAVENOUS; SUBCUTANEOUS at 17:37

## 2019-04-24 RX ADMIN — METOPROLOL TARTRATE 50 MG: 50 TABLET ORAL at 10:33

## 2019-04-24 RX ADMIN — INSULIN LISPRO 2 UNITS: 100 INJECTION, SOLUTION INTRAVENOUS; SUBCUTANEOUS at 12:47

## 2019-04-24 RX ADMIN — Medication 10 ML: at 17:39

## 2019-04-24 RX ADMIN — Medication 10 ML: at 05:04

## 2019-04-24 RX ADMIN — ATORVASTATIN CALCIUM 80 MG: 40 TABLET, FILM COATED ORAL at 21:36

## 2019-04-24 RX ADMIN — Medication 10 ML: at 21:37

## 2019-04-24 RX ADMIN — POLYETHYLENE GLYCOL 3350 17 G: 17 POWDER, FOR SOLUTION ORAL at 08:23

## 2019-04-24 RX ADMIN — RIVAROXABAN 15 MG: 15 TABLET, FILM COATED ORAL at 17:41

## 2019-04-24 RX ADMIN — CHLORHEXIDINE GLUCONATE 10 ML: 1.2 RINSE ORAL at 17:38

## 2019-04-24 RX ADMIN — MUPIROCIN: 20 OINTMENT TOPICAL at 09:56

## 2019-04-24 RX ADMIN — FAMOTIDINE 20 MG: 20 TABLET ORAL at 21:37

## 2019-04-24 RX ADMIN — CEFAZOLIN SODIUM 1 G: 1 INJECTION, POWDER, FOR SOLUTION INTRAMUSCULAR; INTRAVENOUS at 05:03

## 2019-04-24 RX ADMIN — METOPROLOL TARTRATE 50 MG: 50 TABLET ORAL at 21:37

## 2019-04-24 RX ADMIN — ASPIRIN 81 MG 81 MG: 81 TABLET ORAL at 10:33

## 2019-04-24 RX ADMIN — ALLOPURINOL 200 MG: 100 TABLET ORAL at 08:23

## 2019-04-24 RX ADMIN — INSULIN GLARGINE 5 UNITS: 100 INJECTION, SOLUTION SUBCUTANEOUS at 09:54

## 2019-04-24 RX ADMIN — Medication 10 ML: at 05:05

## 2019-04-24 RX ADMIN — OXYCODONE AND ACETAMINOPHEN 1 TABLET: 5; 325 TABLET ORAL at 08:23

## 2019-04-24 RX ADMIN — AMIODARONE HYDROCHLORIDE 200 MG: 200 TABLET ORAL at 21:36

## 2019-04-24 RX ADMIN — MAGNESIUM OXIDE TAB 400 MG (241.3 MG ELEMENTAL MG) 400 MG: 400 (241.3 MG) TAB at 08:24

## 2019-04-24 RX ADMIN — SENNOSIDES AND DOCUSATE SODIUM 1 TABLET: 8.6; 5 TABLET ORAL at 08:23

## 2019-04-24 RX ADMIN — ACETAMINOPHEN 650 MG: 325 TABLET ORAL at 00:06

## 2019-04-24 RX ADMIN — LEVOTHYROXINE SODIUM 125 MCG: 125 TABLET ORAL at 07:23

## 2019-04-24 RX ADMIN — INSULIN LISPRO 2 UNITS: 100 INJECTION, SOLUTION INTRAVENOUS; SUBCUTANEOUS at 08:33

## 2019-04-24 RX ADMIN — MAGNESIUM OXIDE TAB 400 MG (241.3 MG ELEMENTAL MG) 400 MG: 400 (241.3 MG) TAB at 17:38

## 2019-04-24 RX ADMIN — AMIODARONE HYDROCHLORIDE 200 MG: 200 TABLET ORAL at 13:18

## 2019-04-24 RX ADMIN — CHLORHEXIDINE GLUCONATE 10 ML: 1.2 RINSE ORAL at 09:56

## 2019-04-24 RX ADMIN — HUMAN INSULIN 23 UNITS: 100 INJECTION, SUSPENSION SUBCUTANEOUS at 17:37

## 2019-04-24 RX ADMIN — MUPIROCIN: 20 OINTMENT TOPICAL at 17:38

## 2019-04-24 RX ADMIN — Medication 10 ML: at 21:38

## 2019-04-24 NOTE — CONSULTS
[facility] CARDIAC ELECTROPHYSIOLOGY CONSULT Date of  Admission: 4/16/2019  7:41 AM  
 
Assessment and Plan:  
Rosario Benavidez is admitted for MV replacement noted to have PAF. #AF - In SR now. She was on Tikosyn before but QTC have been in the 470-490 msec range over the past few years. Creat is around 2 and age 82. Would recommend stopping this and changing to amiodarone. - start 200 mg amidoarone bid and then can change to 200 mg daily after 2 weeks. - if she has PAF here can use IV amiodarone. # MS - s/p replacement. - coumadin for anticoag per surgery. REASON FOR CONSULT: AF 
Primary Cardiologist:Karime HPI:  82 yof with history of MS prior karolyn clip admitted for MV replacement. She is noted to have paf. She has history of AF with prior ablations by dr. Ron Cuevas and Christiana Lucio. Has been maitnained on tikosyn 125 for many years. She now sees Dr. Duncan Cortez. Here QTC have been in the 470 range. She is unaware of AF. Has had some dizziness before but no syncope. Patient Active Problem List  
 Diagnosis Date Noted  S/P MVR (mitral valve replacement) 04/22/2019  Mitral stenosis 04/16/2019  GI bleeding 02/19/2019  Cellulitis of right lower leg 01/31/2019  Esophageal stricture 11/21/2018  Dysphagia 11/21/2018  Bilateral leg edema 07/05/2018  Heart failure ([facility] [address] 01/20/2017  Active advance directive on file 06/21/2016  Non-rheumatic mitral regurgitation 06/21/2016  S/P MVR (mitral valve repair) 06/10/2016  Mitral regurgitation 06/03/2016  Heart valve problem  Hypothyroidism, acquired, autoimmune 11/23/2015  Type 2 diabetes, controlled, with renal manifestation ([address] 08/20/2015  Chronic atrial fibrillation ([address] 08/20/2015  Mixed hyperlipidemia 08/20/2015  Atherosclerosis of native coronary artery without angina pectoris 08/20/2015  Advance directive in chart 03/24/2015  Unspecified late effects of cerebrovascular disease 12/18/2014  Plantar fasciitis 11/21/2012  Encounter for long-term (current) use of other medications 10/05/2011  Gout 04/26/2011  Bladder cancer ([address] 12/14/2010  Essential hypertension, benign 05/18/2010  Long term current use of anticoagulant therapy 02/08/2010  Reflux esophagitis 10/29/2009  Benign neoplasm of colon 10/29/2009  Iron deficiency anemia 10/29/2009  Hypomagnesemia 10/29/2009  
 T. I.A. 04/23/2009  PVD (peripheral vascular disease) ([address] 04/23/2009 Paloma Whitlock MD 
Past Medical History:  
Diagnosis Date  Arthritis KNEES  Atrial fibrillation ([address] 10/29/2009  Bladder cancer ([address]  CAD (coronary artery disease) STENT PER PATIENT  Chronic pain KNEES  
 Coagulation disorder ([address] Chronic prophylactic anticoagulation med  Colon polyps  Diabetes ([address] IDDM  GERD (gastroesophageal reflux disease)  Gout  Heart valve problem   
 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  
 @ [facility] >> atrial fibrillation  SOB (shortness of breath) on exertion 04/2019  
 T. I.A. 4/23/2009  TIA (transient ischemic attack)  Ulcer of right lower extremity, limited to breakdown of skin ([address] 7/5/2018 Past Surgical History:  
Procedure Laterality Date  CARDIAC SURG PROCEDURE UNLIST    
 ablation  COLONOSCOPY N/A 2/20/2019 COLONOSCOPY performed by Beth Mayen [facility] at [facility] ENDOSCOPY  COLONOSCOPY,DIAGNOSTIC  2/20/2019  HX APPENDECTOMY  HX CHOLECYSTECTOMY  HX HYSTERECTOMY  HX KNEE ARTHROSCOPY  [id number]  
 right knee  HX ORTHOPAEDIC    
 HX UROLOGICAL  RENAL STENT, tur-b  
  VASCULAR SURGERY PROCEDURE UNLIST    
 removed vein in right leg Allergies Allergen Reactions  Actos [Pioglitazone] Swelling Swelling of feet and legs  Codeine Itching  Hydrocodone Rash and Other (comments)  
  hallucinations Family History Problem Relation Age of Onset  Stroke Other  Arthritis-osteo Sister   
     spinal stenosis  Gout Son   
 Hypertension Son   
Brissa Silva Hypertension Mother  Heart Disease Mother CAD  Heart Disease Father CAD  Alcohol abuse Neg Hx  Asthma Neg Hx  Bleeding Prob Neg Hx  Cancer Neg Hx  Diabetes Neg Hx  Elevated Lipids Neg Hx   
 Headache Neg Hx  Lung Disease Neg Hx  Migraines Neg Hx  Psychiatric Disorder Neg Hx  Mental Retardation Neg Hx  Anesth Problems Neg Hx Social History Socioeconomic History  Marital status:  Spouse name: Not on file  Number of children: Not on file  Years of education: Not on file  Highest education level: Not on file Occupational History  Not on file Social Needs  Financial resource strain: Not on file  Food insecurity:  
  Worry: Not on file Inability: Not on file  Transportation needs:  
  Medical: Not on file Non-medical: Not on file Tobacco Use  Smoking status: Former Smoker Packs/day: 0.50 Years: 10.00 Pack years: 5.00 Types: Cigarettes Last attempt to quit: 1967 Years since quittin.3  Smokeless tobacco: Never Used Substance and Sexual Activity  Alcohol use: No  
  Alcohol/week: 0.0 oz  Drug use: No  
 Sexual activity: Not on file Lifestyle  Physical activity:  
  Days per week: Not on file Minutes per session: Not on file  Stress: Not on file Relationships  Social connections:  
  Talks on phone: Not on file Gets together: Not on file Attends Rastafarian service: Not on file Active member of club or organization: Not on file Attends meetings of clubs or organizations: Not on file Relationship status: Not on file  Intimate partner violence:  
  Fear of current or ex partner: Not on file Emotionally abused: Not on file Physically abused: Not on file Forced sexual activity: Not on file Other Topics Concern  Not on file Social History Narrative  Not on file Current Facility-Administered Medications Medication Dose Route Frequency  metoprolol tartrate (LOPRESSOR) tablet 50 mg  50 mg Oral BID  aspirin chewable tablet 81 mg  81 mg Oral DAILY  insulin NPH (NOVOLIN N, HUMULIN N) injection 23 Units  23 Units SubCUTAneous ACD  [START ON 4/25/2019] insulin NPH (NOVOLIN N, HUMULIN N) injection 43 Units  43 Units SubCUTAneous ACB  allopurinol (ZYLOPRIM) tablet 200 mg  200 mg Oral DAILY  levothyroxine (SYNTHROID) tablet 125 mcg  125 mcg Oral ACB  sodium chloride (NS) flush 5-40 mL  5-40 mL IntraVENous Q8H  
 sodium chloride (NS) flush 5-40 mL  5-40 mL IntraVENous PRN  
 albumin human 5% (BUMINATE) solution 12.5 g  12.5 g IntraVENous Q2H PRN  
 0.45% sodium chloride infusion  10 mL/hr IntraVENous CONTINUOUS  
 0.9% sodium chloride infusion  9 mL/hr IntraVENous CONTINUOUS  
 oxyCODONE-acetaminophen (PERCOCET) 5-325 mg per tablet 1 Tab  1 Tab Oral Q4H PRN  
 oxyCODONE-acetaminophen (PERCOCET) 5-325 mg per tablet 2 Tab  2 Tab Oral Q4H PRN  
 morphine injection 4 mg  4 mg IntraVENous Q2H PRN  
 naloxone (NARCAN) injection 0.4 mg  0.4 mg IntraVENous PRN  
 mupirocin (BACTROBAN) 2 % ointment   Both Nostrils BID  ondansetron (ZOFRAN) injection 4 mg  4 mg IntraVENous Q4H PRN  
 albuterol (PROVENTIL VENTOLIN) nebulizer solution 2.5 mg  2.5 mg Nebulization Q4H PRN  chlorhexidine (PERIDEX) 0.12 % mouthwash 10 mL  10 mL Oral BID  famotidine (PEPCID) tablet 20 mg  20 mg Oral Q24H  magnesium oxide (MAG-OX) tablet 400 mg  400 mg Oral BID  calcium chloride 1 g in 0.9% sodium chloride 100 mL IVPB  1 g IntraVENous PRN  
 bisacodyl (DULCOLAX) suppository 10 mg  10 mg Rectal DAILY PRN  
 senna-docusate (PERICOLACE) 8.6-50 mg per tablet 1 Tab  1 Tab Oral BID  polyethylene glycol (MIRALAX) packet 17 g  17 g Oral DAILY  ELECTROLYTE REPLACEMENT PROTOCOL  1 Each Other PRN  
 magnesium sulfate 1 g/100 ml IVPB (premix or compounded)  1 g IntraVENous PRN  
 glucose chewable tablet 16 g  4 Tab Oral PRN  
 dextrose (D50W) injection syrg 12.5-25 g  12.5-25 g IntraVENous PRN  
 glucagon (GLUCAGEN) injection 1 mg  1 mg IntraMUSCular PRN  
 insulin lispro (HUMALOG) injection   SubCUTAneous AC&HS  sodium chloride (NS) flush 20 mL  20 mL InterCATHeter PRN  
 sodium chloride (NS) flush 10 mL  10 mL InterCATHeter Q24H  
 sodium chloride (NS) flush 10 mL  10 mL InterCATHeter PRN  
 sodium chloride (NS) flush 10 mL  10 mL InterCATHeter Q8H  
 alteplase (CATHFLO) 1 mg in sterile water (preservative free) 1 mL injection  1 mg InterCATHeter PRN  
 bacitracin 500 unit/gram packet 1 Packet  1 Packet Topical PRN  
 morphine injection 2 mg  2 mg IntraVENous Q2H PRN  
 insulin regular (NOVOLIN R, HUMULIN R) 100 Units in 0.9% sodium chloride 100 mL infusion  1-50 Units/hr IntraVENous TITRATE  atorvastatin (LIPITOR) tablet 80 mg  80 mg Oral QHS Review of Symptoms: A comprehensive review of systems was negative in 11 points other than stated above in HPI. Physical Exam 
 
Visit Vitals /57 (BP 1 Location: Left arm, BP Patient Position: At rest) Pulse 87 Temp 97.7 °F (36.5 °C) Resp 20 Ht 5' 9\" (1.753 m) Wt 222 lb 14.2 oz (101.1 kg) SpO2 96% BMI 32.91 kg/m² Skin warm and dry HEENT: WNL Oropharynx without exudate. Neck supple Lungs clear Heart - RRR, Normal S1/ S2 No Mummurs, click or Rubs No S3 or S4 Abdomen soft and non tender,  
 Pulses 2+ throughout Neuro:  Normal facial grimace,  Moves all extremities. AAAO Psych: unanxious Labs:  
Recent Results (from the past 24 hour(s)) Lamont Lambert Collection Time: 04/23/19  1:14 PM  
Result Value Ref Range Carbohydrate eaten 64 g Insulin bolus ordered 4.3 units/hour Insulin bolus administered 4.3 units/hour Carb ratio 15 Minutes until next BG 60 min Order initials vf   
 Administered initials vf   
 GLSCOM Comments EKG, 12 LEAD, INITIAL Collection Time: 04/23/19  1:35 PM  
Result Value Ref Range Ventricular Rate 86 BPM  
 Atrial Rate 86 BPM  
 P-R Interval 200 ms QRS Duration 94 ms Q-T Interval 398 ms QTC Calculation (Bezet) 476 ms Calculated P Axis 63 degrees Calculated R Axis -18 degrees Calculated T Axis 102 degrees Diagnosis Normal sinus rhythm Minimal voltage criteria for LVH, may be normal variant T wave abnormality, consider lateral ischemia Prolonged QT When compared with ECG of 23-APR-2019 03:23, 
ST no longer depressed in Inferior leads Confirmed by Dmitriy Seay MD, Anabella Barroso ([id number]) on 4/24/2019 12:51:49 PM 
  
GLUCOSE, POC Collection Time: 04/23/19  2:21 PM  
Result Value Ref Range Glucose (POC) 137 (H) 65 - 100 mg/dL Performed by Manas Hutchinson Collection Time: 04/23/19  2:21 PM  
Result Value Ref Range Glucose 137 mg/dL Insulin order 2.6 units/hour Insulin adminstered 2.6 units/hour Multiplier 0.050 Low target 95 mg/dL High target 130 mg/dL D50 order 0.0 ml  
 D50 administered 0.00 ml Minutes until next BG 60 min Order initials arturo Administered initials sasha GLSCOM Comments GLUCOSE, POC Collection Time: 04/23/19  3:26 PM  
Result Value Ref Range Glucose (POC) 120 (H) 65 - 100 mg/dL Performed by Jorge Mills Collection Time: 04/23/19  3:26 PM  
Result Value Ref Range Glucose 120 mg/dL Insulin order 3.0 units/hour Insulin adminstered 3.0 units/hour Multiplier 0.050 Low target 95 mg/dL High target 130 mg/dL D50 order 0.0 ml  
 D50 administered 0.00 ml Minutes until next BG 60 min Order initials vf   
 Administered initials vf   
 GLSCOM Comments GLUCOSE, POC Collection Time: 04/23/19  4:29 PM  
Result Value Ref Range Glucose (POC) 99 65 - 100 mg/dL Performed by Marina Hernandez Collection Time: 04/23/19  4:29 PM  
Result Value Ref Range Glucose 99 mg/dL Insulin order 2.0 units/hour Insulin adminstered 2.0 units/hour Multiplier 0.050 Low target 95 mg/dL High target 130 mg/dL D50 order 0.0 ml  
 D50 administered 0.00 ml Minutes until next BG 60 min Order initials MP Administered initials MP   
 GLSCPRINCE Valenzuela Yahir Fossa Collection Time: 04/23/19  5:57 PM  
Result Value Ref Range Carbohydrate eaten 64 g Insulin bolus ordered 4.3 units/hour Insulin bolus administered 4.3 units/hour Carb ratio 15 Minutes until next BG 60 min Order initials vf   
 Administered initials vf   
 GLSCOM Comments GLUCOSE, POC Collection Time: 04/23/19  7:20 PM  
Result Value Ref Range Glucose (POC) 187 (H) 65 - 100 mg/dL Performed by Marci Kim Collection Time: 04/23/19  7:20 PM  
Result Value Ref Range Glucose 187 mg/dL Insulin order 2.0 units/hour Insulin adminstered 2.0 units/hour Multiplier 0.050 Low target 95 mg/dL High target 130 mg/dL D50 order 0.0 ml  
 D50 administered 0.00 ml Minutes until next BG 60 min Order initials MP Administered initials MP   
 GLSCOM Comments GLUCOSE, POC Collection Time: 04/23/19  8:30 PM  
Result Value Ref Range Glucose (POC) 151 (H) 65 - 100 mg/dL Performed by Julian Hazel Yahir Fossa Collection Time: 04/23/19  8:31 PM  
Result Value Ref Range Glucose 151 mg/dL Insulin order 5.5 units/hour Insulin adminstered 5.5 units/hour Multiplier 0.060 Low target 95 mg/dL High target 130 mg/dL D50 order 0.0 ml  
 D50 administered 0.00 ml Minutes until next BG 60 min Order initials kli Administered initials kli GLSCOM Comments GLUCOSE, POC Collection Time: 04/23/19  9:38 PM  
Result Value Ref Range Glucose (POC) 120 (H) 65 - 100 mg/dL Performed by CoCollage Collection Time: 04/23/19  9:38 PM  
Result Value Ref Range Glucose 120 mg/dL Insulin order 3.6 units/hour Insulin adminstered 3.6 units/hour Multiplier 0.060 Low target 95 mg/dL High target 130 mg/dL D50 order 0.0 ml  
 D50 administered 0.00 ml Minutes until next BG 60 min Order initials ss Administered initials ss GLSCOM Comments GLUCOSE, POC Collection Time: 04/23/19 10:48 PM  
Result Value Ref Range Glucose (POC) 81 65 - 100 mg/dL Performed by Lovenia Crebryan Collection Time: 04/23/19 10:48 PM  
Result Value Ref Range Glucose 81 mg/dL Insulin order 1.0 units/hour Insulin adminstered 1.0 units/hour Multiplier 0.048 Low target 95 mg/dL High target 130 mg/dL D50 order 0.0 ml  
 D50 administered 0.00 ml Minutes until next BG 60 min Order initials ss Administered initials ss GLSCOM Comments GLUCOSE, POC Collection Time: 04/23/19 11:56 PM  
Result Value Ref Range Glucose (POC) 83 65 - 100 mg/dL Performed by Lovenia Crebryan Collection Time: 04/23/19 11:57 PM  
Result Value Ref Range Glucose 83 mg/dL Insulin order 0.9 units/hour Insulin adminstered 0.9 units/hour Multiplier 0.038 Low target 95 mg/dL High target 130 mg/dL D50 order 0.0 ml  
 D50 administered 0.00 ml Minutes until next BG 60 min Order initials ss Administered initials ss GLSCOM Comments GLUCOSE, POC  
 Collection Time: 04/24/19  1:06 AM  
Result Value Ref Range Glucose (POC) 100 65 - 100 mg/dL Performed by Jessica Barajas Collection Time: 04/24/19  1:06 AM  
Result Value Ref Range Glucose 100 mg/dL Insulin order 1.5 units/hour Insulin adminstered 1.5 units/hour Multiplier 0.038 Low target 95 mg/dL High target 130 mg/dL D50 order 0.0 ml  
 D50 administered 0.00 ml Minutes until next BG 60 min Order initials hm Administered initials hm GLSCOM Comments GLUCOSE, POC Collection Time: 04/24/19  2:14 AM  
Result Value Ref Range Glucose (POC) 99 65 - 100 mg/dL Performed by Grace Stuart Collection Time: 04/24/19  2:15 AM  
Result Value Ref Range Glucose 99 mg/dL Insulin order 1.5 units/hour Insulin adminstered 1.5 units/hour Multiplier 0.038 Low target 95 mg/dL High target 130 mg/dL D50 order 0.0 ml  
 D50 administered 0.00 ml Minutes until next BG 60 min Order initials ss Administered initials ss GLSCOM Comments EKG, 12 LEAD, INITIAL Collection Time: 04/24/19  3:10 AM  
Result Value Ref Range Ventricular Rate 81 BPM  
 Atrial Rate 81 BPM  
 P-R Interval 198 ms QRS Duration 90 ms Q-T Interval 408 ms QTC Calculation (Bezet) 473 ms Calculated P Axis 67 degrees Calculated R Axis -13 degrees Calculated T Axis 86 degrees Diagnosis Normal sinus rhythm Prolonged QT When compared with ECG of 23-APR-2019 13:36, No significant change was found GLUCOSE, POC Collection Time: 04/24/19  3:21 AM  
Result Value Ref Range Glucose (POC) 94 65 - 100 mg/dL Performed by Levin Bosworth Daiva Solon Collection Time: 04/24/19  3:22 AM  
Result Value Ref Range Glucose 94 mg/dL Insulin order 1.0 units/hour Insulin adminstered 1.0 units/hour Multiplier 0.028 Low target 95 mg/dL High target 130 mg/dL  D50 order 0.0 ml  
 D50 administered 0.00 ml Minutes until next BG 60 min Order initials sdq Administered initials sdq GLSCOM Comments METABOLIC PANEL, COMPREHENSIVE Collection Time: 04/24/19  4:05 AM  
Result Value Ref Range Sodium 142 136 - 145 mmol/L Potassium 4.2 3.5 - 5.1 mmol/L Chloride 110 (H) 97 - 108 mmol/L  
 CO2 27 21 - 32 mmol/L Anion gap 5 5 - 15 mmol/L Glucose 95 65 - 100 mg/dL BUN 30 (H) 6 - 20 MG/DL Creatinine 2.02 (H) 0.55 - 1.02 MG/DL  
 BUN/Creatinine ratio 15 12 - 20 GFR est AA 29 (L) >60 ml/min/1.73m2 GFR est non-AA 24 (L) >60 ml/min/1.73m2 Calcium 8.8 8.5 - 10.1 MG/DL Bilirubin, total 0.5 0.2 - 1.0 MG/DL  
 ALT (SGPT) 12 12 - 78 U/L  
 AST (SGOT) 39 (H) 15 - 37 U/L Alk. phosphatase 66 45 - 117 U/L Protein, total 5.8 (L) 6.4 - 8.2 g/dL Albumin 3.0 (L) 3.5 - 5.0 g/dL Globulin 2.8 2.0 - 4.0 g/dL A-G Ratio 1.1 1.1 - 2.2 MAGNESIUM Collection Time: 04/24/19  4:05 AM  
Result Value Ref Range Magnesium 2.6 (H) 1.6 - 2.4 mg/dL CBC W/O DIFF Collection Time: 04/24/19  4:05 AM  
Result Value Ref Range WBC 7.4 3.6 - 11.0 K/uL  
 RBC 2.66 (L) 3.80 - 5.20 M/uL HGB 8.0 (L) 11.5 - 16.0 g/dL HCT 26.4 (L) 35.0 - 47.0 % MCV 99.2 (H) 80.0 - 99.0 FL  
 MCH 30.1 26.0 - 34.0 PG  
 MCHC 30.3 30.0 - 36.5 g/dL  
 RDW 17.6 (H) 11.5 - 14.5 % PLATELET 69 (L) 249 - 400 K/uL NRBC 0.0 0  WBC ABSOLUTE NRBC 0.00 0.00 - 0.01 K/uL GLUCOSE, POC Collection Time: 04/24/19  4:23 AM  
Result Value Ref Range Glucose (POC) 100 65 - 100 mg/dL Performed by Chapito Green Collection Time: 04/24/19  4:23 AM  
Result Value Ref Range Glucose 100 mg/dL Insulin order 1.1 units/hour Insulin adminstered 1.1 units/hour Multiplier 0.028 Low target 95 mg/dL High target 130 mg/dL D50 order 0.0 ml  
 D50 administered 0.00 ml Minutes until next BG 60 min Order initials ss Administered initials ss GLSCOM Comments GLUCOSE, POC Collection Time: 04/24/19  5:31 AM  
Result Value Ref Range Glucose (POC) 95 65 - 100 mg/dL Performed by Andrew Garcia Collection Time: 04/24/19  5:32 AM  
Result Value Ref Range Glucose 95 mg/dL Insulin order 1.0 units/hour Insulin adminstered 1.0 units/hour Multiplier 0.028 Low target 95 mg/dL High target 130 mg/dL D50 order 0.0 ml  
 D50 administered 0.00 ml Minutes until next BG 60 min Order initials kli Administered initials kli GLSCOM Comments GLUCOSE, POC Collection Time: 04/24/19  6:49 AM  
Result Value Ref Range Glucose (POC) 88 65 - 100 mg/dL Performed by Janis Saldaña Collection Time: 04/24/19  6:50 AM  
Result Value Ref Range Glucose 88 mg/dL Insulin order 0.5 units/hour Insulin adminstered 0.5 units/hour Multiplier 0.018 Low target 95 mg/dL High target 130 mg/dL D50 order 0.0 ml  
 D50 administered 0.00 ml Minutes until next BG 60 min Order initials ss Administered initials ss GLSCOM Comments GLUCOSE, POC Collection Time: 04/24/19  8:05 AM  
Result Value Ref Range Glucose (POC) 103 (H) 65 - 100 mg/dL Performed by Obie Garcia Collection Time: 04/24/19  8:05 AM  
Result Value Ref Range Glucose 103 mg/dL Insulin order 0.8 units/hour Insulin adminstered 0.8 units/hour Multiplier 0.018 Low target 95 mg/dL High target 130 mg/dL D50 order 0.0 ml  
 D50 administered 0.00 ml Minutes until next BG 60 min Order initials dn   
 Administered initials dn   
 GLSCOM Comments Peggy Garcia Collection Time: 04/24/19  8:34 AM  
Result Value Ref Range Carbohydrate eaten 30 g Insulin bolus ordered 2.0 units/hour Insulin bolus administered 2.0 units/hour Carb ratio 15 Minutes until next BG 60 min  Order initials ha   
 Administered initials ha GLSCOM Comments GLUCOSE, POC Collection Time: 04/24/19  9:38 AM  
Result Value Ref Range Glucose (POC) 162 (H) 65 - 100 mg/dL Performed by [employer] Meggan Strickland  Collection Time: 04/24/19  9:39 AM  
Result Value Ref Range Glucose 162 mg/dL Insulin order 0.8 units/hour Insulin adminstered 0.8 units/hour Multiplier 0.018 Low target 95 mg/dL High target 130 mg/dL D50 order 0.0 ml  
 D50 administered 0.00 ml Minutes until next BG 60 min Order initials ha Administered initials ha GLSCOM Comments GLUCOSE, POC Collection Time: 04/24/19 10:41 AM  
Result Value Ref Range Glucose (POC) 172 (H) 65 - 100 mg/dL Performed by Penny Strickland  Collection Time: 04/24/19 10:41 AM  
Result Value Ref Range Glucose 172 mg/dL Insulin order 3.1 units/hour Insulin adminstered 3.1 units/hour Multiplier 0.028 Low target 95 mg/dL High target 130 mg/dL D50 order 0.0 ml  
 D50 administered 0.00 ml Minutes until next BG 60 min Order initials ha Administered initials ha GLSCOM Comments GLUCOSE, POC Collection Time: 04/24/19 11:54 AM  
Result Value Ref Range Glucose (POC) 153 (H) 65 - 100 mg/dL Performed by Luz Strickland  Collection Time: 04/24/19 11:55 AM  
Result Value Ref Range Glucose 153 mg/dL Insulin order 3.5 units/hour Insulin adminstered 3.5 units/hour Multiplier 0.038 Low target 95 mg/dL High target 130 mg/dL D50 order 0.0 ml  
 D50 administered 0.00 ml Minutes until next BG 60 min Order initials dn   
 Administered initials dn   
 GLSCOM Comments GLUCOSE, POC Collection Time: 04/24/19 12:38 PM  
Result Value Ref Range Glucose (POC) 146 (H) 65 - 100 mg/dL Performed by [employer] Cardiographics Telemetry: PAF 
ECG: SR,  Echocardiogram: normal ef

## 2019-04-24 NOTE — PROGRESS NOTES
1940 Bedside report from Silva Emerson RN. Dual verification of drips: Insulin, MIV. Assumed care; patient resting comfortably in bed at this time. No needs at this time. 0000 Patient resting/sleeping. No needs at this time. 0400 Labs drawn; EKG completed; CXR completed. Patient bathed; linens changed. 3958 Patient bladder scanned for 440cc. Will encourage voiding upon ambulating to chair. 0700 Patient voided 450cc carey urine. 9940 Bedside shift change report given to Patel Calle RN (oncoming nurse) by Diana Daly RN (offgoing nurse). Report included the following information SBAR and Cardiac Rhythm NSR. Problem: Falls - Risk of 
Goal: *Absence of Falls Description Document Mellychance Stevens Fall Risk and appropriate interventions in the flowsheet. Outcome: Progressing Towards Goal 
  
Problem: Diabetes Self-Management Goal: *Disease process and treatment process Description Define diabetes and identify own type of diabetes; list 3 options for treating diabetes. Outcome: Progressing Towards Goal 
Goal: *Using medications safely Description State effect of diabetes medications on diabetes; name diabetes medication taking, action and side effects. Outcome: Progressing Towards Goal 
Goal: *Monitoring blood glucose, interpreting and using results Description Identify recommended blood glucose targets  and personal targets. Outcome: Progressing Towards Goal 
  
Problem: Cardiac Valve Surgery: Post-Op Day 1 Goal: *Hemodynamically stable without vasoactive medications Outcome: Progressing Towards Goal 
Goal: *Stable cardiac rhythm Outcome: Progressing Towards Goal 
Goal: *Lungs clear or at baseline Outcome: Progressing Towards Goal 
Goal: *Mechanical ventilation discontinued Outcome: Progressing Towards Goal 
  
Problem: Pressure Injury - Risk of 
Goal: *Prevention of pressure injury Description Document Shane Scale and appropriate interventions in the flowsheet. Outcome: Progressing Towards Goal 
  
Problem: Ventilator Management Goal: *Absence of infection signs and symptoms Outcome: Resolved/Met Goal: *Normal spontaneous ventilation Outcome: Resolved/Met

## 2019-04-24 NOTE — PROGRESS NOTES
Cardiac Surgery Care Coordinator- Met with Radha Chang, reviewed plan of care and discussed potential discharge date. Reinforced sternal precautions and encouraged continued use of the incentive spirometer. Reviewed goals for the day and emphasized the importance of increased activity to meet discharge goals. Will begin Coumadin education as soon as anticoagulation is started. Will continue to follow for educational and emotional needs.  Renan Estrada RN

## 2019-04-24 NOTE — PROGRESS NOTES
[facility] ICU Progress Note Admit Date: 2019 POD:  2 Day Post-Op Procedure:  Procedure(s): MITRAL VALVE REPLACEMENT/WITH 25MM BIOPROSTHESIS CARDIOPULMONARY BYPASS/TRANSESOPHAGEAL ECHOCARDIOGRAHM AND EPI AORTIC ULTRASOUND BY DR. Daljit De Jesus. Subjective:  
Pt seen with Dr. Lázaro Maria. No acute overnight events. No complaints this morning. On insulin ggt. NSR, prolonged QTc. O2 97% 2L NC. Objective:  
Vitals: 
Blood pressure 124/61, pulse 85, temperature 97.7 °F (36.5 °C), resp. rate 17, height 5' 9\" (1.753 m), weight 222 lb 14.2 oz (101.1 kg), SpO2 97 %. Temp (24hrs), Av.2 °F (36.8 °C), Min:97.7 °F (36.5 °C), Max:98.7 °F (37.1 °C) EKG/Rhythm: NSR 81, QTc 473 Extubation Date / Time: 19 CT Output: 340 (110) Oxygen Therapy: 
Oxygen Therapy O2 Sat (%): 97 % (19 0800) Pulse via Oximetry: 84 beats per minute (19 0800) O2 Device: Nasal cannula (19) O2 Flow Rate (L/min): 2 l/min (190) FIO2 (%): 50 % (19) CXR:  
CXR Results  (Last 48 hours) 19 0449  XR CHEST PORT Final result Impression:   impression: Removal of Gunlock-Chiki catheter. No change. Narrative:  Clinical indication: Postop heart. Portable AP semiupright view of the chest is obtained. Interval removal of  
Gunlock-Chiki catheter. Small left pleural effusion. 19 0501  XR CHEST PORT Final result Impression:  IMPRESSION: No significant change following extubation and removal of  
nasogastric tube. Narrative:  EXAM:  XR CHEST PORT. INDICATION: Postop heart. COMPARISON: 2019. FINDINGS:   
A portable AP radiograph of the chest was obtained at 0412 hours. There are  
sternal sutures and a valve replacement. Lines and tubes: The patient is on a cardiac monitor. The endotracheal tube and  
nasogastric tube have been removed. The right jugular Gunlock-Chiki catheter and  
chest tubes are unchanged. Lungs: There is interstitial edema throughout the lungs. Pleura: There is no pneumothorax or pleural effusion. Mediastinum: The cardiac and mediastinal contours and pulmonary vascularity are  
normal. The aorta is atherosclerotic. Bones and soft tissues: The bones and soft tissues are grossly within normal  
limits. 04/22/19 1715  XR CHEST PORT Final result Impression:  IMPRESSION:   
1. Tubes and lines as above. Note that the PA catheter terminates in the left  
main pulmonary artery. No pneumothorax. 2. Mild pulmonary interstitial edema. Bibasilar atelectasis. Narrative:  EXAM:  XR CHEST PORT INDICATION:   postop heart COMPARISON: Chest radiograph 4/16/2019. FINDINGS: AP radiograph of the chest was obtained. Satisfactory position of endotracheal tube. Gastric decompression tube seen  
coursing inferiorly into the stomach. Right IJ approach PA catheter with tip  
terminating in the left main pulmonary artery. Right chest tube and mediastinal  
drains are noted. No pneumothorax is identified. There is mild pulmonary  
interstitial edema. Bibasilar subsegmental atelectasis. Unchanged mild  
cardiomegaly with calcifications of the thoracic aorta. Admission Weight: Last Weight Weight: 215 lb 11.2 oz (97.8 kg) Weight: 222 lb 14.2 oz (101.1 kg) Intake / Output / Drain: 
Current Shift: 04/24 0701 - 04/24 1900 In: -  
Out: 90 [Drains:90] Last 24 hrs.:  
 
Intake/Output Summary (Last 24 hours) at 4/24/2019 4816 Last data filed at 4/24/2019 0800 Gross per 24 hour Intake 1269.24 ml Output 1210 ml Net 59.24 ml EXAM: 
General:  Sitting in chair, NAD Lungs:   Clear to auscultation bilaterally. Incision:  No erythema, drainage or swelling. Heart:  Regular rate and rhythm, S1, S2 normal, no murmur, click, rub or gallop. Abdomen:   Soft, non-tender. Bowel sounds normal. No masses,  No organomegaly. Extremities:  No edema. PPP. Neurologic:  Gross motor and sensory apparatus intact. Labs:  
Recent Labs  
  19 
0805  19 
0405  19 
1711 WBC  --   --  7.4   < > 6.0 HGB  --   --  8.0*   < > 8.9* HCT  --   --  26.4*   < > 28.6* PLT  --   --  69*   < > 94* NA  --   --  142   < > 140 K  --   --  4.2   < > 3.9 BUN  --   --  30*   < > 19 CREA  --   --  2.02*   < > 1.46* GLU  --   --  95   < > 133* GLUCPOC 103*   < >  --    < >  --   
INR  --   --   --   --  3.1*  
 < > = values in this interval not displayed. Assessment:  
 
Active Problems: 
  Mitral stenosis (2019) S/P MVR (mitral valve replacement) (2019) Overview: MITRAL VALVE REPLACEMENT/WITH 25MM BIOPROSTHESIS Plan/Recommendations/Medical Decision Makin. Severe MS s/p tissue MVR (19). Will give 81 mg ASA today per Dr. Lázaro Maria. Consider coumadin tomorrow pending labs. 2. Chronic Afib: Will discuss resuming home tikosyn with cardiology (QTc today 473, 468 on admission). Start BB today. On Xarelto prior to surgery, will consider coumadin tomorrow pending labs 3. Iron deficiency anemia s/p iron transfusions with hx GIB 2019: Monitor H/H, CT output 4.  HTN: Start BB today. Consider norvasc, ACE and hydralazine when appropriate. 5. T2DM: A1c 7. Continue insulin ggt per protocol. Will resume insulin today per DTC (43 units in AM, 23 units in PM) 6. HLD: Statin.  
7. Hx Bladder CA: No issues voiding.  
8. Hypothyroid: Synthroid. 9. GERD/Schatzki's ring s/p dilatation 2018: No issues swallowing since. Pepcid. 10. Pulmonary HTN: Much improved since outside cath on 03/15. Monitor 11. CKD stage III s/p renal artery stenting: Creatinine 2.02. UOP . Encouraged PO fluids. Monitor. 12. Chronic diastolic HF (NYHA class III on admission): BB today.  Holding ACE until appropriate. No diuretic 2/2 elevated Cr 
13. PVD with history of venous ulcers: No current open wounds. Monitor. Activity as tolerated. 15. CVA (2007) with no deficits: PT/OT. Statin 15. Recent urine leukocytosis with Gram neg rods(POA): Completed abx. Monitor 16. Thrombocytopenia: stable, 69K. HIT/DINA negative, heme consult confirm likely medication induced rather than bleeding. Surgery performed w/ Bival. ASA given today per Dr. Tara Wyatt. 17. Elevated AST: Improving. 44 today (48 yesterday). Monitor 15. Dispo: PT/OT. D/c chest tubes. Transfer to [facility]. Signed By: NEVAEH Bautista Update Pt developed asymptomatic pAF this morning. Has been on tikosyn for many years, but QTC has been ~470. PO Amio started per EP. Home Xarelto resumed per Dr. Tara Wyatt (does not want bivalrudin this close to surgery). Will monitor platelets in the morning.

## 2019-04-24 NOTE — DIABETES MGMT
[facility] Cardiac Surgery Progress Note Recommendations/ Comments:  Pt arrived to CVICU at 1457 on 4/22/19. Consider continuing insulin gtt for at least 48hrs post-op and eating 50% solid foods then, 
1) transition off gtt per [employer] Protocol 2) continue accu-checks and humalog correctional insulin ac & hs  
3) ADA/AHA diet as diet advanced 4) resume NPH - pt takes 43 units in the morning and 23 units hs at home. Insulin gtt should not be stopped until after 1457 on 4/24/19 to complete 48hr post-op time frame. Currently on insulin gtt running @ 0.8 units/hr. Pt has received 5.9 units of insulin over the last 6 hours. Blood sugar at 0805 was 103 mg/dl Chart reviewed on Lea Miller. Patient is 82 y.o. female s/p Cardiac surgery  POD 2. Known HX Type 2 DM on NPH 43 units am and 23 units pm at home. A1c:  
Lab Results Component Value Date/Time Hemoglobin A1c 7.0 (H) 04/16/2019 10:09 AM  
 
 
 
Recent Glucose Results:  
Lab Results Component Value Date/Time GLU 95 04/24/2019 04:05 AM  
 GLUCPOC 103 (H) 04/24/2019 08:05 AM  
 GLUCPOC 88 04/24/2019 06:49 AM  
 GLUCPOC 95 04/24/2019 05:31 AM  
  
 
Lab Results Component Value Date/Time Creatinine 2.02 (H) 04/24/2019 04:05 AM  
 
Estimated Creatinine Clearance: 27.2 mL/min (A) (based on SCr of 2.02 mg/dL (H)). Active Orders Diet DIET DIABETIC WITH OPTIONS Consistent Carb 1800kcal; Regular; AHA-LOW-CHOL FAT  
  
 
PO intake:  
Patient Vitals for the past 72 hrs: 
 % Diet Eaten 04/24/19 0833 75 % 04/23/19 1800 70 % 04/23/19 1200 100 % 04/23/19 0800 100 % 04/21/19 1804 100 % 04/21/19 1346 90 % Will continue to follow as needed. Thank you. Carina Mae RD, CDE Time spent: 4 minutes

## 2019-04-24 NOTE — PROGRESS NOTES
Problem: Self Care Deficits Care Plan (Adult) Goal: *Acute Goals and Plan of Care (Insert Text) Description Occupational Therapy Goals Medical Re-Evaluation s/p MVR POD #1 Initiated 4/23/2019 1. Patient will perform ADLs standing 5 mins without fatigue or LOB with SBA within 7 day(s). 2.  Patient will perform lower body ADLs with Min A & AE PRN within 7 day(s). 3.  Patient will perform gathering ADL items high and low 2/2 with Mod A & AE PRN within 7 day(s). 4.  Patient will perform toilet transfers with Min A within 7 day(s). 5.  Patient will perform all aspects of toileting with Min A within 7 day(s). 6.  Patient will participate in cardiac/sternal upper extremity therapeutic exercise/activities to increase independence with ADLs with supervision/set-up for 5 minutes within 7 day(s). 7.  Patient will perform medication management activity with 100% accuracy and SPV within 7 day(s). Initiated 4/19/2019 1. Patient will perform grooming at the sink with independence within 7 day(s). 2.  Patient will perform medication management activity with 100% accuracy and SPV within 7 day(s). 3.  Patient will perform toilet transfers with modified independence within 7 day(s). 4.  Patient will perform all aspects of toileting with modified independence within 7 day(s). 5.  Patient will participate in cardiac upper extremity therapeutic exercise/activities with independence for 10 minutes within 7 day(s). 6.  Patient will utilize energy conservation techniques during functional activities with verbal cues within 7 day(s). Outcome: Progressing Towards Goal 
  
OCCUPATIONAL THERAPY TREATMENT Patient: Radha Chang (44 y.o. female) Date: 4/24/2019 Diagnosis: Mitral stenosis [I05.0] <principal problem not specified> Procedure(s) (LRB): MITRAL VALVE REPLACEMENT/WITH 25MM BIOPROSTHESIS CARDIOPULMONARY BYPASS/TRANSESOPHAGEAL ECHOCARDIOGRAHM AND EPI AORTIC ULTRASOUND BY  WILVER. (N/A) 2 Days Post-Op Precautions: Fall, Sternal 
Chart, occupational therapy assessment, plan of care, and goals were reviewed. ASSESSMENT: 
Patient received in the chair, agreeable to therapy. Completed BUE cardiac exercises standing with Min-Mod A and cues for technique, 2 seated rest breaks required 2* \"leg weakness. \" Patient with improved BLE ROM, able to tailor sit with BLEs, however requiring Mod A for distal sock donning, educated on hip ER stretch to maximize functional reach to BLEs. Continue to recommend IPR upon d/c, patient is working towards tolerating 3 hours/day, far from her IND baseline (previously lived alone, IND, occasional driving). Recommend with nursing patient to complete as able in order to maintain strength, endurance and independence: ADLs with supervision/setup, OOB to chair 3x/day and mobilizing to the bathroom for toileting with 2 assist. Thank you for your assistance. Progression toward goals: 
?       Improving appropriately and progressing toward goals ? Improving slowly and progressing toward goals ? Not making progress toward goals and plan of care will be adjusted PLAN: 
Patient continues to benefit from skilled intervention to address the above impairments. Continue treatment per established plan of care. Discharge Recommendations:  Inpatient Rehab (SNF rehab if unable to accept) Further Equipment Recommendations for Discharge:  TBD by rehab (anticipate shower chair, reacher, further TBD) SUBJECTIVE:  
Patient stated ? My legs just feel so weak, and my knees are bad, too.? The patient stated 2/3 sternal precautions. Reviewed all 3 with patient. OBJECTIVE DATA SUMMARY:  
Cognitive/Behavioral Status: 
Neurologic State: Alert Orientation Level: Oriented X4 Cognition: Appropriate for age attention/concentration; Follows commands;Memory loss;Poor safety awareness Perception: Appears intact Perseveration: No perseveration noted Safety/Judgement: Awareness of environment;Decreased awareness of need for assistance;Decreased awareness of need for safety;Decreased insight into deficits Functional Mobility and Transfers for ADLs: 
Bed Mobility: 
Supine to Sit: Assist x1;Minimum assistance; Moderate assistance Scooting: Contact guard assistance Transfers: 
Sit to Stand: Minimum assistance;Assist x2 Balance: 
Sitting: Intact Standing: Impaired; Without support Standing - Static: Fair Standing - Dynamic : Fair ADL Intervention: Lower Body Dressing Assistance Dressing Assistance: Moderate assistance(for distal reach to BLEs) Underpants: Compensatory technique training Pants With Elastic Waist: Compensatory technique training Pants With Button/Zipper: Compensatory technique training Socks: Moderate assistance; Compensatory technique training(A for distal reach to toes, cues for hip ER stretch) Leg Crossed Method Used: Yes(decreased hip ER) Position Performed: Seated in chair Cues: Verbal cues provided;Visual cues provided;Physical assistance Cognitive Retraining Safety/Judgement: Awareness of environment;Decreased awareness of need for assistance;Decreased awareness of need for safety;Decreased insight into deficits Patient instructed no asymmetrical reaching over head to ensure B UEs when shoulders >90* i.e. reaching in cabinets and dressing. Instruction on upper body dressing techniques of over head, then arms through to decrease pain and unilateral shoulder flexion >90*. Instruction on the benefits of utilizing B UEs during functional tasks i.e. opening the fridge, stepping into the tub. Instruction if continued pain at home with shoulder IR for BM hygiene can use wet wipes and toilet tongs PRN. Avoid valsalva maneuvers.  
May have to adjust home setup to increase ease with items closer to waist height to prevent deep bending and the automatic  of asymmetrical UE WB/pushing for stabilization during bending. Benefit to don clothing tailor sitting and don all clothing while sitting prior to standing. Patient demonstrated lower body dressing seated in chair with Mod A for distal reach to feet (and inferred for standing balance to simulate pulling up). Increase activity tolerance for home, work, and sexual intercourse by pacing self with increasing the arm exercises, sitting duration, frequency OOB, walking, standing, and ADLs. Instructed and indicated understanding of s/s of too much activity, how to respond to s/s safely. Therapeutic Exercises:  
Patient instructed on the benefits and demonstrated cardiac exercises while standing with Minimum assistance and Moderate Assistance. Instructed and indicated understanding on how to progress reps, sets against gravity, working up to 5 lbs, standing and so on based on surgeon clearance for more weight in prep for basic and instrumental ADLs. Instruction on the use of household items in place of weights as needed. CARDIAC EXERCISE Sets Reps Active  Active Assist   
Passive  Self ROM Comments Shoulder flexion  1  5   ?                            ?                             ?                             ?                             Cues for technique Shoulder abduction  1  5  ?                             ?                             ?                             ?                             Seated rest break needed in between exercises Scapular elevation  1  5  ?                             ?                              ?                             ?                               
Scapular retraction  1  5  ?                             ?                             ?                             ?                               
 
Pain: 
Pain Scale 1: Numeric (0 - 10) Pain Intensity 1: 5 Pain Location 1: Chest;Abdomen Pain Orientation 1: Anterior; Lateral;Right Pain Description 1: Aching Pain Intervention(s) 1: Medication (see MAR) Activity Tolerance:  
Good Please refer to the flowsheet for vital signs taken during this treatment. After treatment:  
? Patient left in no apparent distress sitting up in chair ? Patient left in no apparent distress in bed 
? Call bell left within reach ? Nursing notified ? Caregiver present ? Bed alarm activated COMMUNICATION/COLLABORATION:  
The patient?s plan of care was discussed with: Physical Therapist and Registered Nurse Bowen Awan OTJOSE, OTR/L Time Calculation: 16 mins

## 2019-04-24 NOTE — PROGRESS NOTES
The CM spoke with Cardiac Surgery PA- PT/OT recommending inpatient rehab and agreeable for CM to begin inpatient rehab referral processes. The CM met with the patient at bedside in order to discuss- CM provided education on inpatient rehab and presented the patient with a list of acute rehab facilities. The patient stated her son and daughter will be at [address] [place] [address] later today-would like to review list. CM will come back later today when family is available to receive [facility] [employer] for acute rehab. Prior to hospitalization patient was living independently in [facility] in [facility]. RAHEEM Gonsalves 
 
13:52 p.m.- CM met with patient and niece at bedside to discuss [place] of Choice for acute rehab providers- patient would like referral sent to [facility]. Patient gave permission for CM to also call and speak with her daughter. CM called the patient's daughter Priscilla  and left a voicemail message requesting a call back. Referral sent to [facility] in [employer]. RAHEEM Gonsalves CM spoke with Priscilla , the patient's daughter- and feels like patient needs rehab, agreeable with above plan for [facility]. Referral has been sent in [employer] for review.  RAHEEM Gonsalves

## 2019-04-24 NOTE — PROGRESS NOTES
Occupational Therapy 04/24/19 Chart reviewed. NP in room removing CTs upon arrival, will follow up later today with OT treatment as able & appropriate. Thank you Sarai Jean, OTD, OTR/L

## 2019-04-24 NOTE — PROGRESS NOTES
46 - Bedside report received from Taylor Cruz RN. Pt up in chair. C/o 5/10 pain at present 
0900 - Pt returned to bed for chest tube removal by NEVAEH Arvizu.  
1250 - Dr. Gabrielle Eddy at bedside 56 - Pt and daughter chose [facility] in [place] for rehab.  
1630 - Pt up, walked to unit door and back to room. Did have to stop and sit for a break in between on the way back. Remained off oxygen, O2 sat stayed 91% or greater. 1945 - Bedside shift change report given to Sahara Pineda RN (oncoming nurse) by Swapnil Naidu RN (offgoing nurse). Report included the following information SBAR, Kardex, OR Summary, Procedure Summary, Intake/Output, MAR, Recent Results and Cardiac Rhythm NSR.

## 2019-04-24 NOTE — OP NOTES
[address] [facility] 
OPERATIVE REPORT Name:  Manjeet Marina 
MR#:  [id number] :  1935 ACCOUNT #:  [de-identified] DATE OF SERVICE:  2019 PREOPERATIVE DIAGNOSES: 
1. Severe mitral stenosis secondary to previously placed mitral clip procedure. 2.  Class III chronic systolic and diastolic congestive heart failure. 3.  Pulmonary hypertension. 4.  Chronic atrial fibrillation. POSTOPERATIVE DIAGNOSES: 
1. Severe mitral stenosis secondary to previously placed mitral clip procedure. 2.  Class III chronic systolic and diastolic congestive heart failure. 3.  Pulmonary hypertension. 4.  Chronic atrial fibrillation. PROCEDURE PERFORMED:  Mitral valve replacement with 25 [employer] Fregoso bioprosthesis. SURGEON:  Glory Mcgill MD 
 
ASSISTANT:  NEVAEH Belcher 
 
ANESTHESIA:  General. 
 
COMPLICATIONS:  None. SPECIMENS REMOVED:  Mitral valve with mitral valve clip. IMPLANTS:  Bioprosthesis. ESTIMATED BLOOD LOSS:  See perfusion records. PROCEDURE:  The patient was taken to the operating room and following induction of endotracheal anesthesia, the anterior chest, abdomen, and both lower extremities were prepped and draped in a sterile manner. A surgical time-out was completed and the transesophageal echocardiographic findings reviewed. A median sternotomy was made. The pericardium incised and suspended. An epiaortic ultrasound revealed some posterior plaque but otherwise was insignificant. The patient received bivalirudin anticoagulation during the procedure because of a concern for heparin allergy. The infusion was begun of bivalirudin and the aorta cannulated, the superior and inferior vena cannulated, and a retrograde coronary sinus cannula was placed. The patient was placed on bypass and cooled to 33 degrees. The aorta crossclamped. Cardioplegia infused. A left atriotomy was made. The mitral valve exposed.   There was fusion of the middle part of the valve in A2, P2, and bilateral scarring secondary to clip scar reaction and limited leaflet mobility. The anterior leaflet was excised. Remnants of the posterior leaflet left in place. The clip was excised from the anterior and posterior leaflets. Interrupted pledgeted reinforced sutures were placed. The valve was sized to a 25 bioprosthesis. This was the largest valve that could be inserted. The valve was inserted without difficulty and secured in place with Cor-Knots. The left atriotomy closed. A vent having been previously placed in the anterior wall of the ascending aorta for de-airing purposes. A warm dose of cardioplegia was administered. The aortic crossclamp released. De-airing maneuvers carried out under echocardiographic guidance. The patient was weaned from bypass. The bivalirudin infusion stopped. Fresh frozen plasma and platelets administered. The patient decannulated. Hemostasis assured. A right pleural and two mediastinal drainage tubes placed. Temporary atrial and ventricular pacemaking leads placed and the sternum reapproximated with interrupted stainless steel wires and running Vicryl suture. Joselin De León MD 
 
 
[id number] 
D:  04/24/2019 7:23 
T:  04/24/2019 7:36 JOB #:  [id number] CC:  Neela Calixto DO

## 2019-04-24 NOTE — PROGRESS NOTES
Chest tubes x 3 cleaned and removed without difficulty. Patient tolerated well. Denies any chest pain, SOB, dizziness, or pre-syncope. Replaced dressing. CXR PA/lat ordered for tomorrow. Nurse aware.

## 2019-04-24 NOTE — PROGRESS NOTES
Problem: Mobility Impaired (Adult and Pediatric) Goal: *Acute Goals and Plan of Care (Insert Text) Description Physical Therapy Goals Re-evaluation completed 4/23/2019 and most goals downgraded following MVR 4/22/19. 1.  Patient will move from supine to sit and sit to supine, scoot up and down and roll side to side in bed with minimal assistance/contact guard assist within 5 days. 2.  Patient will perform sit to/from stand with minimal assistance/contact guard assist within 5 days. 3.  Patient will ambulate 100 feet with least restrictive assistive device and minimal assistance/contact guard assist within 5 days. 4.  Patient will ascend/descend 4 stairs with single handrail with minimal assistance/contact guard assist within 5 days. 5.  Patient will perform cardiac exercises per protocol with supervision/set-up within 5 days. 6.  Patient will verbally and functionally recall 3/3 sternal precautions within 5 days. Initiated 4/19/2019 1. Patient will move from supine to sit and sit to supine, scoot up and down and roll side to side in bed with minimal assistance/contact guard assist with log roll technique within 7 days. 2.  Patient will perform sit to/from stand with supervision and adherence to sternal precautions within 7 days. 3.  Patient will ambulate 150 feet with least restrictive assistive device and supervision within 7 days. 4.  Patient will ascend/descend 4 stairs with single handrail, for balance only, with supervision within 7 days. 5.  Patient will perform cardiac exercises per protocol with supervision within 7 days. 6.  Patient will verbally and functionally recall 3/3 sternal precautions within 7 days. Outcome: Progressing Towards Goal 
 
 
PHYSICAL THERAPY TREATMENT Patient: Duglas Kay (52 y.o. female) Date: 4/24/2019 Diagnosis: Mitral stenosis [I05.0] <principal problem not specified> Procedure(s) (LRB): 
 MITRAL VALVE REPLACEMENT/WITH 25MM BIOPROSTHESIS CARDIOPULMONARY BYPASS/TRANSESOPHAGEAL ECHOCARDIOGRAHM AND EPI AORTIC ULTRASOUND BY DR. Sarahann Merlin. (N/A) 2 Days Post-Op Precautions: Fall, Sternal 
Chart, physical therapy assessment, plan of care and goals were reviewed. ASSESSMENT: 
Pt chest tubes removed today, pt states that she feels much better with them removed. Pt performing gait with improved distance and increased independence though continues to have mildly increased festinating gait. Pt demos improvement with posterior lean. Titrated to 1.5 LPM O2 due to mild decreased O2 Reviewed 5-lb weight lifting restrictions, use of bear for \"splinting\" while coughing/sneezing, continued use of incentive spirometer 10 x per hour, role of acute PT and typical mobility progression, cardiac exercises and frequency of performance, signs/symptoms of DVTs/pneumonia/sternal wound dehiscence, and importance of frequent mobilization outside of therapy sessions with nursing staff. Pt demos well with mobility overall, will continue to follow. Progression toward goals: 
?    Improving appropriately and progressing toward goals ? Improving slowly and progressing toward goals ? Not making progress toward goals and plan of care will be adjusted PLAN: 
Patient continues to benefit from skilled intervention to address the above impairments. Continue treatment per established plan of care. Discharge Recommendations:  Rehab Further Equipment Recommendations for Discharge:  TBD SUBJECTIVE:  
Patient stated ? I am feeling better with those tubes removed. ? OBJECTIVE DATA SUMMARY:  
Critical Behavior: 
Neurologic State: Alert Orientation Level: Oriented X4 Cognition: Appropriate decision making, Appropriate for age attention/concentration, Appropriate safety awareness Safety/Judgement: Awareness of environment, Decreased awareness of need for assistance, Decreased awareness of need for safety, Decreased insight into deficits Functional Mobility Training: 
Bed Mobility: 
  
Supine to Sit: Assist x1;Minimum assistance; Moderate assistance Scooting: Minimum assistance; Additional time Transfers: 
Sit to Stand: Minimum assistance; Additional time Stand to Sit: Minimum assistance; Additional time Balance: 
Sitting: Intact Standing: Intact; With support Standing - Static: Fair Standing - Dynamic : Fair Ambulation/Gait Training: 
Distance (ft): 80 Feet (ft) Assistive Device: Gait belt Ambulation - Level of Assistance: Minimal assistance Gait Abnormalities: Antalgic;Decreased step clearance;Shuffling gait Base of Support: Narrowed Speed/Melany: Pace decreased (<100 feet/min); Fluctuations; Shuffled Minimal LOB Stairs: Therapeutic Exercises:  
Discussed/reviewed LAQ and AP Pain: 
Pain Scale 1: Numeric (0 - 10) Pain Intensity 1: 5 Pain Location 1: Chest;Abdomen Pain Orientation 1: Anterior; Lateral;Right Pain Description 1: Aching Pain Intervention(s) 1: Medication (see MAR) Activity Tolerance:  
Good-fair, titrated to 1.5% O2 Please refer to the flowsheet for vital signs taken during this treatment. After treatment:  
?    Patient left in no apparent distress sitting up in chair ? Patient left in no apparent distress in bed 
? Call bell left within reach ? Nursing notified ? Caregiver present ? Bed alarm activated COMMUNICATION/COLLABORATION:  
The patient?s plan of care was discussed with: Registered Nurse Bradford Walker, PT Time Calculation: 15 mins

## 2019-04-25 ENCOUNTER — APPOINTMENT (OUTPATIENT)
Dept: GENERAL RADIOLOGY | Age: 84
DRG: 220 | End: 2019-04-25
Attending: PHYSICIAN ASSISTANT
Payer: MEDICARE

## 2019-04-25 LAB
ALBUMIN SERPL-MCNC: 2.6 G/DL (ref 3.5–5)
ALBUMIN/GLOB SERPL: 0.8 {RATIO} (ref 1.1–2.2)
ALP SERPL-CCNC: 69 U/L (ref 45–117)
ALT SERPL-CCNC: 8 U/L (ref 12–78)
ANION GAP SERPL CALC-SCNC: 4 MMOL/L (ref 5–15)
AST SERPL-CCNC: 23 U/L (ref 15–37)
BILIRUB SERPL-MCNC: 0.4 MG/DL (ref 0.2–1)
BUN SERPL-MCNC: 34 MG/DL (ref 6–20)
BUN/CREAT SERPL: 18 (ref 12–20)
CALCIUM SERPL-MCNC: 8.7 MG/DL (ref 8.5–10.1)
CHLORIDE SERPL-SCNC: 106 MMOL/L (ref 97–108)
CO2 SERPL-SCNC: 27 MMOL/L (ref 21–32)
CREAT SERPL-MCNC: 1.84 MG/DL (ref 0.55–1.02)
ERYTHROCYTE [DISTWIDTH] IN BLOOD BY AUTOMATED COUNT: 17.4 % (ref 11.5–14.5)
GLOBULIN SER CALC-MCNC: 3.3 G/DL (ref 2–4)
GLUCOSE BLD STRIP.AUTO-MCNC: 108 MG/DL (ref 65–100)
GLUCOSE BLD STRIP.AUTO-MCNC: 114 MG/DL (ref 65–100)
GLUCOSE BLD STRIP.AUTO-MCNC: 176 MG/DL (ref 65–100)
GLUCOSE BLD STRIP.AUTO-MCNC: 191 MG/DL (ref 65–100)
GLUCOSE SERPL-MCNC: 105 MG/DL (ref 65–100)
HCT VFR BLD AUTO: 25.9 % (ref 35–47)
HGB BLD-MCNC: 7.8 G/DL (ref 11.5–16)
MAGNESIUM SERPL-MCNC: 2.7 MG/DL (ref 1.6–2.4)
MCH RBC QN AUTO: 29.5 PG (ref 26–34)
MCHC RBC AUTO-ENTMCNC: 30.1 G/DL (ref 30–36.5)
MCV RBC AUTO: 98.1 FL (ref 80–99)
NRBC # BLD: 0 K/UL (ref 0–0.01)
NRBC BLD-RTO: 0 PER 100 WBC
PLATELET # BLD AUTO: 71 K/UL (ref 150–400)
POTASSIUM SERPL-SCNC: 4.1 MMOL/L (ref 3.5–5.1)
PROT SERPL-MCNC: 5.9 G/DL (ref 6.4–8.2)
RBC # BLD AUTO: 2.64 M/UL (ref 3.8–5.2)
SERVICE CMNT-IMP: ABNORMAL
SODIUM SERPL-SCNC: 137 MMOL/L (ref 136–145)
WBC # BLD AUTO: 5.9 K/UL (ref 3.6–11)

## 2019-04-25 PROCEDURE — 97116 GAIT TRAINING THERAPY: CPT

## 2019-04-25 PROCEDURE — 36415 COLL VENOUS BLD VENIPUNCTURE: CPT

## 2019-04-25 PROCEDURE — P9045 ALBUMIN (HUMAN), 5%, 250 ML: HCPCS | Performed by: NURSE PRACTITIONER

## 2019-04-25 PROCEDURE — 85027 COMPLETE CBC AUTOMATED: CPT

## 2019-04-25 PROCEDURE — 80053 COMPREHEN METABOLIC PANEL: CPT

## 2019-04-25 PROCEDURE — 74011250637 HC RX REV CODE- 250/637: Performed by: PHYSICIAN ASSISTANT

## 2019-04-25 PROCEDURE — 82962 GLUCOSE BLOOD TEST: CPT

## 2019-04-25 PROCEDURE — 74011250637 HC RX REV CODE- 250/637: Performed by: INTERNAL MEDICINE

## 2019-04-25 PROCEDURE — 65660000001 HC RM ICU INTERMED STEPDOWN

## 2019-04-25 PROCEDURE — 74011250636 HC RX REV CODE- 250/636: Performed by: NURSE PRACTITIONER

## 2019-04-25 PROCEDURE — 74011636637 HC RX REV CODE- 636/637: Performed by: PHYSICIAN ASSISTANT

## 2019-04-25 PROCEDURE — 83735 ASSAY OF MAGNESIUM: CPT

## 2019-04-25 PROCEDURE — 71046 X-RAY EXAM CHEST 2 VIEWS: CPT

## 2019-04-25 RX ORDER — FUROSEMIDE 10 MG/ML
40 INJECTION INTRAMUSCULAR; INTRAVENOUS ONCE
Status: COMPLETED | OUTPATIENT
Start: 2019-04-25 | End: 2019-04-25

## 2019-04-25 RX ORDER — LANOLIN ALCOHOL/MO/W.PET/CERES
1 CREAM (GRAM) TOPICAL
Status: DISCONTINUED | OUTPATIENT
Start: 2019-04-25 | End: 2019-04-26 | Stop reason: SDUPTHER

## 2019-04-25 RX ORDER — ALBUMIN HUMAN 50 G/1000ML
12.5 SOLUTION INTRAVENOUS ONCE
Status: COMPLETED | OUTPATIENT
Start: 2019-04-25 | End: 2019-04-25

## 2019-04-25 RX ADMIN — METOPROLOL TARTRATE 50 MG: 50 TABLET ORAL at 21:59

## 2019-04-25 RX ADMIN — POLYETHYLENE GLYCOL 3350 17 G: 17 POWDER, FOR SOLUTION ORAL at 09:04

## 2019-04-25 RX ADMIN — MUPIROCIN: 20 OINTMENT TOPICAL at 09:15

## 2019-04-25 RX ADMIN — CHLORHEXIDINE GLUCONATE 10 ML: 1.2 RINSE ORAL at 17:33

## 2019-04-25 RX ADMIN — HUMAN INSULIN 43 UNITS: 100 INJECTION, SUSPENSION SUBCUTANEOUS at 07:41

## 2019-04-25 RX ADMIN — Medication 10 ML: at 14:52

## 2019-04-25 RX ADMIN — Medication 10 ML: at 22:00

## 2019-04-25 RX ADMIN — MAGNESIUM OXIDE TAB 400 MG (241.3 MG ELEMENTAL MG) 400 MG: 400 (241.3 MG) TAB at 09:03

## 2019-04-25 RX ADMIN — Medication 10 ML: at 06:05

## 2019-04-25 RX ADMIN — ASPIRIN 81 MG 81 MG: 81 TABLET ORAL at 09:03

## 2019-04-25 RX ADMIN — FERROUS SULFATE TAB 325 MG (65 MG ELEMENTAL FE) 325 MG: 325 (65 FE) TAB at 09:03

## 2019-04-25 RX ADMIN — LEVOTHYROXINE SODIUM 125 MCG: 125 TABLET ORAL at 07:07

## 2019-04-25 RX ADMIN — ALBUMIN (HUMAN) 12.5 G: 12.5 INJECTION, SOLUTION INTRAVENOUS at 09:04

## 2019-04-25 RX ADMIN — Medication 10 ML: at 06:04

## 2019-04-25 RX ADMIN — RIVAROXABAN 15 MG: 15 TABLET, FILM COATED ORAL at 17:31

## 2019-04-25 RX ADMIN — METOPROLOL TARTRATE 50 MG: 50 TABLET ORAL at 09:03

## 2019-04-25 RX ADMIN — AMIODARONE HYDROCHLORIDE 200 MG: 200 TABLET ORAL at 21:59

## 2019-04-25 RX ADMIN — AMIODARONE HYDROCHLORIDE 200 MG: 200 TABLET ORAL at 09:03

## 2019-04-25 RX ADMIN — SENNOSIDES AND DOCUSATE SODIUM 1 TABLET: 8.6; 5 TABLET ORAL at 09:03

## 2019-04-25 RX ADMIN — ATORVASTATIN CALCIUM 80 MG: 40 TABLET, FILM COATED ORAL at 21:59

## 2019-04-25 RX ADMIN — SENNOSIDES AND DOCUSATE SODIUM 1 TABLET: 8.6; 5 TABLET ORAL at 17:31

## 2019-04-25 RX ADMIN — FAMOTIDINE 20 MG: 20 TABLET ORAL at 21:59

## 2019-04-25 RX ADMIN — HUMAN INSULIN 23 UNITS: 100 INJECTION, SUSPENSION SUBCUTANEOUS at 17:31

## 2019-04-25 RX ADMIN — MUPIROCIN: 20 OINTMENT TOPICAL at 17:33

## 2019-04-25 RX ADMIN — FUROSEMIDE 40 MG: 10 INJECTION, SOLUTION INTRAMUSCULAR; INTRAVENOUS at 09:03

## 2019-04-25 RX ADMIN — ALLOPURINOL 200 MG: 100 TABLET ORAL at 09:03

## 2019-04-25 RX ADMIN — MAGNESIUM OXIDE TAB 400 MG (241.3 MG ELEMENTAL MG) 400 MG: 400 (241.3 MG) TAB at 17:31

## 2019-04-25 NOTE — PROGRESS NOTES
2000 Received report from [facility] 72.. Pt sitting in chair. Encouraged to do IS. (750 cc) 2200 Assisted up to [facility] to void then back to bed. Reported that nasal cannula off and on throughout the day. NC in place should she decompensate while sleeping. Remains on room air for now with nasal cannula In place 2300 Pt asleep. Sats 96% 
 
0600 Pt awake. Reassessed at this time. Slept approx 8 hrs 
 
0800 Bedside, Verbal and Written shift change report given to Madie Bland RN      (oncoming nurse) Report included the following information SBAR, Kardex, OR Summary, Intake/Output, MAR and Recent Results.

## 2019-04-25 NOTE — PROGRESS NOTES
1405: TRANSFER - IN REPORT: 
 
Verbal report received from Benita Miller (name) on Елена Sanchez  being received from [facility](unit) for routine progression of care Report consisted of patients Situation, Background, Assessment and  
Recommendations(SBAR). Information from the following report(s) SBAR, Kardex, [facility] and Recent Results was reviewed with the receiving nurse. Opportunity for questions and clarification was provided. Assessment completed upon patients arrival to unit and care assumed. 1930:  Bedside shift change report given to Domi Toure (oncoming nurse) by Zane Barragan (offgoing nurse). Report included the following information SBAR, Kardex, MAR and Recent Results.

## 2019-04-25 NOTE — PROGRESS NOTES
[facility] Progress Note                                        Admit Date: 4/16/2019 Assessment/Plan:  
Radha Chang is admitted for MV replacement noted to have PAF. #AF - In SR now.  
-She was on Tikosyn before but QTC have been in the 470-490 msec range over the past few years. Creat is around 2 and age 82. Stopped now and would not restargt. - Amidoarone 200 bid for now. Consider stopping in 4-6 weeks depending on rhyhtm. - Xarelto  Per Dr. Girard Citizen. 
  
# MS - s/p replacement with bioprostheis. Will s/o please call back with questions. She will f/u with Dr. Mitchel Armando. 
 
  
 
Subjective:    
Patient denies any complaints. Visit Vitals /47 (BP 1 Location: Right arm, BP Patient Position: At rest;Sitting) Pulse 77 Temp 98.3 °F (36.8 °C) Resp 20 Ht 5' 9\" (1.753 m) Wt 224 lb 13.9 oz (102 kg) SpO2 94% BMI 33.21 kg/m² Intake/Output Summary (Last 24 hours) at 4/25/2019 1707 Last data filed at 4/25/2019 1503 Gross per 24 hour Intake 533.5 ml Output 550 ml Net -16.5 ml Objective:  
  
Physical Exam: 
HEENT: EOMI Neck: supple Resp:  CTA bilaterally; No wheezes or rales CV:  RRR s1s2 No murmur no s3 Abd:Soft, Nontender Ext: No edema Neuro: Alert and oriented; Nonfocal 
Skin: Warm, Dry, Intact Telemetry:  SR 
 
Current Facility-Administered Medications Medication Dose Route Frequency  ferrous sulfate tablet 325 mg  1 Tab Oral DAILY WITH BREAKFAST  metoprolol tartrate (LOPRESSOR) tablet 50 mg  50 mg Oral BID  aspirin chewable tablet 81 mg  81 mg Oral DAILY  insulin NPH (NOVOLIN N, HUMULIN N) injection 23 Units  23 Units SubCUTAneous ACD  insulin NPH (NOVOLIN N, HUMULIN N) injection 43 Units  43 Units SubCUTAneous ACB  amiodarone (CORDARONE) tablet 200 mg  200 mg Oral BID  rivaroxaban (XARELTO) tablet 15 mg  15 mg Oral DAILY WITH DINNER  
 allopurinol (ZYLOPRIM) tablet 200 mg  200 mg Oral DAILY  levothyroxine (SYNTHROID) tablet 125 mcg  125 mcg Oral ACB  sodium chloride (NS) flush 5-40 mL  5-40 mL IntraVENous Q8H  
 sodium chloride (NS) flush 5-40 mL  5-40 mL IntraVENous PRN  
 albumin human 5% (BUMINATE) solution 12.5 g  12.5 g IntraVENous Q2H PRN  
 0.45% sodium chloride infusion  10 mL/hr IntraVENous CONTINUOUS  
 0.9% sodium chloride infusion  9 mL/hr IntraVENous CONTINUOUS  
 oxyCODONE-acetaminophen (PERCOCET) 5-325 mg per tablet 1 Tab  1 Tab Oral Q4H PRN  
 oxyCODONE-acetaminophen (PERCOCET) 5-325 mg per tablet 2 Tab  2 Tab Oral Q4H PRN  
 morphine injection 4 mg  4 mg IntraVENous Q2H PRN  
 naloxone (NARCAN) injection 0.4 mg  0.4 mg IntraVENous PRN  
 mupirocin (BACTROBAN) 2 % ointment   Both Nostrils BID  ondansetron (ZOFRAN) injection 4 mg  4 mg IntraVENous Q4H PRN  
 albuterol (PROVENTIL VENTOLIN) nebulizer solution 2.5 mg  2.5 mg Nebulization Q4H PRN  chlorhexidine (PERIDEX) 0.12 % mouthwash 10 mL  10 mL Oral BID  famotidine (PEPCID) tablet 20 mg  20 mg Oral Q24H  
 magnesium oxide (MAG-OX) tablet 400 mg  400 mg Oral BID  calcium chloride 1 g in 0.9% sodium chloride 100 mL IVPB  1 g IntraVENous PRN  
 bisacodyl (DULCOLAX) suppository 10 mg  10 mg Rectal DAILY PRN  
 senna-docusate (PERICOLACE) 8.6-50 mg per tablet 1 Tab  1 Tab Oral BID  polyethylene glycol (MIRALAX) packet 17 g  17 g Oral DAILY  ELECTROLYTE REPLACEMENT PROTOCOL  1 Each Other PRN  
 magnesium sulfate 1 g/100 ml IVPB (premix or compounded)  1 g IntraVENous PRN  
 glucose chewable tablet 16 g  4 Tab Oral PRN  
 dextrose (D50W) injection syrg 12.5-25 g  12.5-25 g IntraVENous PRN  
 glucagon (GLUCAGEN) injection 1 mg  1 mg IntraMUSCular PRN  
 insulin lispro (HUMALOG) injection   SubCUTAneous AC&HS  sodium chloride (NS) flush 20 mL  20 mL InterCATHeter PRN  
 sodium chloride (NS) flush 10 mL  10 mL InterCATHeter Q24H  
 sodium chloride (NS) flush 10 mL  10 mL InterCATHeter PRN  
  sodium chloride (NS) flush 10 mL  10 mL InterCATHeter Q8H  
 alteplase (CATHFLO) 1 mg in sterile water (preservative free) 1 mL injection  1 mg InterCATHeter PRN  
 bacitracin 500 unit/gram packet 1 Packet  1 Packet Topical PRN  
 morphine injection 2 mg  2 mg IntraVENous Q2H PRN  
 insulin regular (NOVOLIN R, HUMULIN R) 100 Units in 0.9% sodium chloride 100 mL infusion  1-50 Units/hr IntraVENous TITRATE  atorvastatin (LIPITOR) tablet 80 mg  80 mg Oral QHS Data Review:  
Labs:   
Recent Results (from the past 24 hour(s)) GLUCOSE, POC Collection Time: 04/24/19  5:32 PM  
Result Value Ref Range Glucose (POC) 187 (H) 65 - 100 mg/dL Performed by Nelia Kimball GLUCOSE, POC Collection Time: 04/24/19 10:03 PM  
Result Value Ref Range Glucose (POC) 171 (H) 65 - 100 mg/dL Performed by Piero Martinez Collection Time: 04/25/19  6:15 AM  
Result Value Ref Range Magnesium 2.7 (H) 1.6 - 2.4 mg/dL METABOLIC PANEL, COMPREHENSIVE Collection Time: 04/25/19  6:15 AM  
Result Value Ref Range Sodium 137 136 - 145 mmol/L Potassium 4.1 3.5 - 5.1 mmol/L Chloride 106 97 - 108 mmol/L  
 CO2 27 21 - 32 mmol/L Anion gap 4 (L) 5 - 15 mmol/L Glucose 105 (H) 65 - 100 mg/dL BUN 34 (H) 6 - 20 MG/DL Creatinine 1.84 (H) 0.55 - 1.02 MG/DL  
 BUN/Creatinine ratio 18 12 - 20 GFR est AA 32 (L) >60 ml/min/1.73m2 GFR est non-AA 26 (L) >60 ml/min/1.73m2 Calcium 8.7 8.5 - 10.1 MG/DL Bilirubin, total 0.4 0.2 - 1.0 MG/DL  
 ALT (SGPT) 8 (L) 12 - 78 U/L  
 AST (SGOT) 23 15 - 37 U/L Alk. phosphatase 69 45 - 117 U/L Protein, total 5.9 (L) 6.4 - 8.2 g/dL Albumin 2.6 (L) 3.5 - 5.0 g/dL Globulin 3.3 2.0 - 4.0 g/dL A-G Ratio 0.8 (L) 1.1 - 2.2    
CBC W/O DIFF Collection Time: 04/25/19  6:15 AM  
Result Value Ref Range WBC 5.9 3.6 - 11.0 K/uL  
 RBC 2.64 (L) 3.80 - 5.20 M/uL HGB 7.8 (L) 11.5 - 16.0 g/dL HCT 25.9 (L) 35.0 - 47.0 % MCV 98.1 80.0 - 99.0 FL  
 MCH 29.5 26.0 - 34.0 PG  
 MCHC 30.1 30.0 - 36.5 g/dL  
 RDW 17.4 (H) 11.5 - 14.5 % PLATELET 71 (L) 424 - 400 K/uL NRBC 0.0 0  WBC ABSOLUTE NRBC 0.00 0.00 - 0.01 K/uL GLUCOSE, POC Collection Time: 04/25/19  7:08 AM  
Result Value Ref Range Glucose (POC) 114 (H) 65 - 100 mg/dL Performed by Karen Gutierrez, POC Collection Time: 04/25/19 11:42 AM  
Result Value Ref Range Glucose (POC) 191 (H) 65 - 100 mg/dL Performed by Logan Skinner, POC Collection Time: 04/25/19  4:57 PM  
Result Value Ref Range Glucose (POC) 108 (H) 65 - 100 mg/dL Performed by Kane Kelley

## 2019-04-25 NOTE — PROGRESS NOTES
Problem: Falls - Risk of 
Goal: *Absence of Falls Description Document Yifanjohnangela Hernandez Fall Risk and appropriate interventions in the flowsheet. Outcome: Progressing Towards Goal 
  
Problem: Cardiac Valve Surgery: Post-Op Day 2 Goal: *Hemodynamically stable without vasoactive medications Outcome: Progressing Towards Goal 
Goal: *Stable cardiac rhythm Outcome: Progressing Towards Goal 
Goal: *Lungs clear or at baseline Outcome: Progressing Towards Goal 
Goal: *Mechanical ventilation discontinued Outcome: Resolved/Met Goal: *Optimal pain control at patient's stated goal 
Outcome: Progressing Towards Goal 
Goal: *Demonstrates progressive activity Outcome: Progressing Towards Goal 
Goal: *Tolerating diet Outcome: Progressing Towards Goal 
Goal: *Incisions without signs and symptoms of wound complication Outcome: Progressing Towards Goal

## 2019-04-25 NOTE — PROGRESS NOTES
0800- bedside report and drip verified. Patient in chair without complaints. TRANSFER - OUT REPORT: 
 
Verbal report given to JOSE DE JESUS Hunter(name) on Nelson Lozada  being transferred to [facility](unit) for routine progression of care Report consisted of patients Situation, Background, Assessment and  
Recommendations(SBAR). Information from the following report(s) SBAR, MAR, Recent Results and Cardiac Rhythm NSR was reviewed with the receiving nurse. Lines:  
Peripheral IV 04/23/19 Left Antecubital (Active) Site Assessment Clean, dry, & intact 4/25/2019  8:00 AM  
Phlebitis Assessment 0 4/25/2019  8:00 AM  
Infiltration Assessment 0 4/25/2019  8:00 AM  
Dressing Status Clean, dry, & intact 4/25/2019  8:00 AM  
Dressing Type Transparent 4/25/2019  8:00 AM  
Hub Color/Line Status Blue;Capped;Flushed 4/25/2019  8:00 AM  
Action Taken Open ports on tubing capped 4/25/2019  8:00 AM  
Alcohol Cap Used Yes 4/25/2019  8:00 AM  
   
Peripheral IV 04/24/19 Right Wrist (Active) Site Assessment Clean, dry, & intact 4/25/2019  8:00 AM  
Phlebitis Assessment 0 4/25/2019  8:00 AM  
Infiltration Assessment 0 4/25/2019  8:00 AM  
Dressing Status Clean, dry, & intact 4/25/2019  8:00 AM  
Dressing Type Transparent 4/25/2019  8:00 AM  
Hub Color/Line Status Pink; Infusing 4/25/2019  8:00 AM  
Action Taken Open ports on tubing capped 4/25/2019  8:00 AM  
Alcohol Cap Used Yes 4/25/2019  8:00 AM  
  
 
Opportunity for questions and clarification was provided. Patient transported with: 
 Monitor Registered Nurse Tech

## 2019-04-25 NOTE — PROGRESS NOTES
[facility] ICU Progress Note Admit Date: 2019 POD: 3 Day Post-Op Procedure:  Procedure(s): MITRAL VALVE REPLACEMENT/WITH 25MM BIOPROSTHESIS CARDIOPULMONARY BYPASS/TRANSESOPHAGEAL ECHOCARDIOGRAHM AND EPI AORTIC ULTRASOUND BY DR. Pablo Jonas. Subjective:  
Pt seen with Dr. Fabian Rodrigues. No acute overnight events. No complaints this morning. Had some Afib yesterday, none overnight. On room air. Objective:  
Vitals: 
Blood pressure 112/66, pulse 76, temperature 98.4 °F (36.9 °C), resp. rate 27, height 5' 9\" (1.753 m), weight 224 lb 13.9 oz (102 kg), SpO2 95 %. Temp (24hrs), Av.3 °F (36.8 °C), Min:98.1 °F (36.7 °C), Max:98.4 °F (36.9 °C) EKG/Rhythm: NSR 81, QTc 473 Oxygen Therapy: Room air CXR:  
CXR Results  (Last 48 hours) 19 0449  XR CHEST PORT Final result Impression:   impression: Removal of Greer-Chiki catheter. No change. Narrative:  Clinical indication: Postop heart. Portable AP semiupright view of the chest is obtained. Interval removal of  
Greer-Chiki catheter. Small left pleural effusion. Admission Weight: Last Weight Weight: 215 lb 11.2 oz (97.8 kg) Weight: 224 lb 13.9 oz (102 kg) Intake / Output / Drain: 
Current Shift: No intake/output data recorded. Last 24 hrs.:  
 
Intake/Output Summary (Last 24 hours) at 2019 1208 Last data filed at 2019 0700 Gross per 24 hour Intake 1255.68 ml Output 600 ml Net 655.68 ml EXAM: 
General:  Sitting in chair, NAD Lungs:   Clear to auscultation bilaterally. Incision:  No erythema, drainage or swelling. Heart:  Regular rate and rhythm, S1, S2 normal, no murmur, click, rub or gallop. Abdomen:   Soft, non-tender. Bowel sounds normal. No masses,  No organomegaly. Extremities:  No edema. PPP. Neurologic:  Gross motor and sensory apparatus intact. Labs:  
Recent Labs  
  19 
0708 19 5135  19 
1711 WBC  --  5.9   < > 6.0 HGB  --  7.8*   < > 8.9* HCT  --  25.9*   < > 28.6* PLT  --  71*   < > 94* NA  --  137   < > 140 K  --  4.1   < > 3.9 BUN  --  34*   < > 19 CREA  --  1.84*   < > 1.46* GLU  --  105*   < > 133* GLUCPOC 114*  --    < >  --   
INR  --   --   --  3.1*  
 < > = values in this interval not displayed. Assessment:  
 
Active Problems: 
  Mitral stenosis (2019) S/P MVR (mitral valve replacement) (2019) Overview: MITRAL VALVE REPLACEMENT/WITH 25MM BIOPROSTHESIS Plan/Recommendations/Medical Decision Makin. Severe MS s/p tissue MVR (19). ASA and Xarelto per Dr. Lázaro Maria. 2. Chronic Afib: Started PO amio yesterday per EP. Was on tikosyn PTA, but unlikely to resume 2/2 QTC, age, and kidney function. Continue BB. On Xarelto per Dr. Lázaro Maria. 3. Iron deficiency anemia s/p iron transfusions with hx GIB 2019: H/H trending down. PO Fe 4.  HTN: Continue BB. Consider norvasc, ACE and hydralazine when appropriate. 5. T2DM: A1c 7. Blood sugars ACHS, SSI. Will continue insulin today per DTC (43 units in AM, 23 units in PM) 6. HLD: Statin.  
7. Hx Bladder CA: No issues voiding.  
8. Hypothyroid: Synthroid. 9. GERD/Schatzki's ring s/p dilatation 2018: No issues swallowing since. Pepcid. 10. Pulmonary HTN: Much improved since outside cath on 03/15. Monitor 11. CKD stage III s/p renal artery stenting: Creatinine 1.84 (2.02 yesterday). Encouraged PO fluids. Gave albumin/Lasix. Hold on renal consult per Dr. Lázaro Maria. Monitor. 12. Chronic diastolic HF (NYHA class III on admission): BB today. Holding ACE until appropriate. Lasix today. 13. PVD with history of venous ulcers: No current open wounds. Monitor. Activity as tolerated. 15. CVA () with no deficits: PT/OT. Statin 15. Recent urine leukocytosis with Gram neg rods(POA): Completed abx. Monitor 16. Thrombocytopenia: stable, 71K. Pre-op HIT/DINA negative, heme consult confirm likely medication induced rather than bleeding. Surgery performed w/ Bival. ASA/Xarelto given per Dr. Cesilia Monet. 17. Elevated AST: Improving. 23 today (39 yesterday). Monitor 15. Dispo: PT/OT. Transfer to [facility]. Hopeful D/c to [facility] over weekend.  
 
Signed By: NEVAEH Romero

## 2019-04-25 NOTE — PROGRESS NOTES
Problem: Pressure Injury - Risk of 
Goal: *Prevention of pressure injury Description Document Shane Scale and appropriate interventions in the flowsheet.  
Outcome: Progressing Towards Goal

## 2019-04-25 NOTE — PROGRESS NOTES
CM spoke with Laina Angeles at [facility] to inquire about status for inpatient rehab. [place] Karthik is currently reviewing file and will call CM with a response. 1435 - CM received call from Laina Angeles with [facility]. Laina Angeles has a call out to patient's son and daughter re disposition. [place] Karthik advised CM she will call CM with update once she has spoken with son or daughter. 1510 - CM received call from Laina Angeles with [facility]. Candidate has been accepted for inpatient rehab at [address] [place] [address]. Laina Angeles will check for a bed availability for this weekend in case patient is discharged over the weekend. CM will follow up with [place] Karthik from [facility] tomorrow, Friday (4/26/19). CM updated patient that [facility] has accepted her for inpatient rehab. CM will continue to follow patient for placement.  
 
Lisa Simpson, MPH

## 2019-04-25 NOTE — PROGRESS NOTES
PHYSICAL THERAPY TREATMENT: Cardiac with sternotomy Patient: Brad Rivera (15 y.o. female) Date: 4/25/2019 Diagnosis: Mitral stenosis [I05.0] <principal problem not specified> Procedure(s) (LRB): MITRAL VALVE REPLACEMENT/WITH 25MM BIOPROSTHESIS CARDIOPULMONARY BYPASS/TRANSESOPHAGEAL ECHOCARDIOGRAHM AND EPI AORTIC ULTRASOUND BY DR. Genevieve Alegre. (N/A) 3 Days Post-Op Precautions: Fall, Sternal 
Chart, physical therapy assessment, plan of care and goals were reviewed. ASSESSMENT: 
Based on the objective data described below, the patient presents with  min Ax2 for sit to stand and mod A x2 for sit to supine. Pt required v.c for sternal precautions adherence. Gait training completed at Minimum assistance and Assist x2, 80 feet and using a gait belt. Pt has an anterior weightshift with decrease LE stability The following are barriers to independence while in acute care:  
-Cognitive and/or behavioral: processing, sequencing, safety awareness, insight into deficits and insight into abilities -Medical condition: strength, functional endurance, standing balance, cardiopulmonary tolerance, precautions, medical history and motor planning   
-Other:    
 
Prior level of function: lives alone independent no A.D. PLAN: 
Patient continues to benefit from skilled intervention to address the above impairments. Continue treatment per established plan of care. Recommend with staff: OOB 3x Recommend next PT session: gait tolerance, balance. Plan is for inpt rehab. Discharge recommendations: Rehab at inpatient facility: patient can tolerate 3 hours of therapy (to regain functional baseline patient requires rehab) If above is not an option then recommend: Rehab at skilled nursing facility (SNF) (to regain functional baseline patient requires rehab) Patient's barriers to discharging home, in addition to above impairments: lives alone 
family availability to assist 
 total assist driving to follow up medical appointment(s)/groceries/obtain medication 
entry and exit into the home 
family availability for education/training to then follow up at home 
level of physical assist required to maintain patient safety. Equipment recommendations for successful discharge (if) home: TBD SUBJECTIVE:  
Patient stated ? I have been up for a long time? OBJECTIVE DATA SUMMARY:  
Critical Behavior: 
Neurologic State: Alert Orientation Level: Oriented X4 Cognition: Appropriate decision making, Appropriate for age attention/concentration, Appropriate safety awareness, Follows commands Safety/Judgement: Awareness of environment, Decreased awareness of need for assistance, Decreased awareness of need for safety, Decreased insight into deficits The patient recalled 0 /3 sternal precautions. Reviewed all 3 with patient. Functional Mobility Training: 
Bed Mobility: 
  
  
Sit to Supine: Minimum assistance; Moderate assistance;Assist x2 Transfers: 
Sit to Stand: Minimum assistance;Assist x2 Stand to Sit: Minimum assistance;Assist x2 Balance: 
Sitting: Intact Standing: Impaired Standing - Static: Fair Standing - Dynamic : Fair Ambulation/Gait Training: 
Distance (ft): 80 Feet (ft) Assistive Device: Gait belt Ambulation - Level of Assistance: Minimal assistance;Assist x2 Gait Abnormalities: Altered arm swing;Decreased step clearance; Path deviations(anterior weight shift) Base of Support: Center of gravity altered Speed/Melany: Pace decreased (<100 feet/min) Stairs: Therapeutic Exercises:  
 
  
CARDIAC EXERCISE Sets Reps Active Active Assist  
Passive Self ROM Comments Shoulder flexion 1 5 ?                                            ?                                            ?                                            ?                                              
 Shoulder abduction 1     5 ?                                            ?                                            ?                                            ?                                              
Scapular elevation 1 5 ?                                            ?                                            ?                                            ?                                              
Scapular retraction 1 5 ?                                            ?                                            ?                                            ?                                              
Trunk rotation 1 5 ?                                            ?                                            ?                                            ?                                              
Trunk sidebending 1 5 ?                                            ?                                            ?                                            ?                                              
   ?                                            ?                                            ?                                            ?                                              
 
Pain: No complaints Activity Tolerance:  
observed SOB with actity Please refer to the flowsheet for vital signs taken during this treatment. After treatment patient left:  
Supine in bed Call light within reach RN notified COMMUNICATION/COLLABORATION:  
The patient?s plan of care was discussed with: Occupational Therapist and Registered Nurse Guillermo Mei PTA Time Calculation: 17 mins

## 2019-04-26 ENCOUNTER — APPOINTMENT (OUTPATIENT)
Dept: GENERAL RADIOLOGY | Age: 84
DRG: 220 | End: 2019-04-26
Attending: PHYSICIAN ASSISTANT
Payer: MEDICARE

## 2019-04-26 LAB
ALBUMIN SERPL-MCNC: 3 G/DL (ref 3.5–5)
ALBUMIN/GLOB SERPL: 0.9 {RATIO} (ref 1.1–2.2)
ALP SERPL-CCNC: 77 U/L (ref 45–117)
ALT SERPL-CCNC: 8 U/L (ref 12–78)
ANION GAP SERPL CALC-SCNC: 5 MMOL/L (ref 5–15)
APTT PPP: 38.2 SEC (ref 22.1–32)
AST SERPL-CCNC: 19 U/L (ref 15–37)
BILIRUB SERPL-MCNC: 0.5 MG/DL (ref 0.2–1)
BUN SERPL-MCNC: 40 MG/DL (ref 6–20)
BUN/CREAT SERPL: 21 (ref 12–20)
CALCIUM SERPL-MCNC: 9 MG/DL (ref 8.5–10.1)
CHLORIDE SERPL-SCNC: 104 MMOL/L (ref 97–108)
CO2 SERPL-SCNC: 25 MMOL/L (ref 21–32)
CREAT SERPL-MCNC: 1.88 MG/DL (ref 0.55–1.02)
ERYTHROCYTE [DISTWIDTH] IN BLOOD BY AUTOMATED COUNT: 17.2 % (ref 11.5–14.5)
GLOBULIN SER CALC-MCNC: 3.5 G/DL (ref 2–4)
GLUCOSE BLD STRIP.AUTO-MCNC: 108 MG/DL (ref 65–100)
GLUCOSE BLD STRIP.AUTO-MCNC: 132 MG/DL (ref 65–100)
GLUCOSE BLD STRIP.AUTO-MCNC: 169 MG/DL (ref 65–100)
GLUCOSE BLD STRIP.AUTO-MCNC: 57 MG/DL (ref 65–100)
GLUCOSE BLD STRIP.AUTO-MCNC: 64 MG/DL (ref 65–100)
GLUCOSE BLD STRIP.AUTO-MCNC: 70 MG/DL (ref 65–100)
GLUCOSE BLD STRIP.AUTO-MCNC: 74 MG/DL (ref 65–100)
GLUCOSE BLD STRIP.AUTO-MCNC: 99 MG/DL (ref 65–100)
GLUCOSE SERPL-MCNC: 96 MG/DL (ref 65–100)
HCT VFR BLD AUTO: 27 % (ref 35–47)
HGB BLD-MCNC: 8.3 G/DL (ref 11.5–16)
INR PPP: 1.2 (ref 0.9–1.1)
MCH RBC QN AUTO: 30.5 PG (ref 26–34)
MCHC RBC AUTO-ENTMCNC: 30.7 G/DL (ref 30–36.5)
MCV RBC AUTO: 99.3 FL (ref 80–99)
NRBC # BLD: 0 K/UL (ref 0–0.01)
NRBC BLD-RTO: 0 PER 100 WBC
PLATELET # BLD AUTO: 110 K/UL (ref 150–400)
PMV BLD AUTO: 13.2 FL (ref 8.9–12.9)
POTASSIUM SERPL-SCNC: 3.9 MMOL/L (ref 3.5–5.1)
PROT SERPL-MCNC: 6.5 G/DL (ref 6.4–8.2)
PROTHROMBIN TIME: 12.4 SEC (ref 9–11.1)
RBC # BLD AUTO: 2.72 M/UL (ref 3.8–5.2)
SERVICE CMNT-IMP: ABNORMAL
SERVICE CMNT-IMP: NORMAL
SODIUM SERPL-SCNC: 134 MMOL/L (ref 136–145)
THERAPEUTIC RANGE,PTTT: ABNORMAL SECS (ref 58–77)
WBC # BLD AUTO: 8.4 K/UL (ref 3.6–11)

## 2019-04-26 PROCEDURE — 97110 THERAPEUTIC EXERCISES: CPT

## 2019-04-26 PROCEDURE — 97116 GAIT TRAINING THERAPY: CPT

## 2019-04-26 PROCEDURE — 80053 COMPREHEN METABOLIC PANEL: CPT

## 2019-04-26 PROCEDURE — 85027 COMPLETE CBC AUTOMATED: CPT

## 2019-04-26 PROCEDURE — 74011636637 HC RX REV CODE- 636/637: Performed by: PHYSICIAN ASSISTANT

## 2019-04-26 PROCEDURE — 94664 DEMO&/EVAL PT USE INHALER: CPT

## 2019-04-26 PROCEDURE — 74011250637 HC RX REV CODE- 250/637: Performed by: PHYSICIAN ASSISTANT

## 2019-04-26 PROCEDURE — 71045 X-RAY EXAM CHEST 1 VIEW: CPT

## 2019-04-26 PROCEDURE — 85610 PROTHROMBIN TIME: CPT

## 2019-04-26 PROCEDURE — 85730 THROMBOPLASTIN TIME PARTIAL: CPT

## 2019-04-26 PROCEDURE — P9045 ALBUMIN (HUMAN), 5%, 250 ML: HCPCS | Performed by: NURSE PRACTITIONER

## 2019-04-26 PROCEDURE — 74011250637 HC RX REV CODE- 250/637: Performed by: NURSE PRACTITIONER

## 2019-04-26 PROCEDURE — 36415 COLL VENOUS BLD VENIPUNCTURE: CPT

## 2019-04-26 PROCEDURE — 94640 AIRWAY INHALATION TREATMENT: CPT

## 2019-04-26 PROCEDURE — 74011250636 HC RX REV CODE- 250/636: Performed by: NURSE PRACTITIONER

## 2019-04-26 PROCEDURE — 82962 GLUCOSE BLOOD TEST: CPT

## 2019-04-26 PROCEDURE — 65660000001 HC RM ICU INTERMED STEPDOWN

## 2019-04-26 PROCEDURE — 74011250637 HC RX REV CODE- 250/637: Performed by: INTERNAL MEDICINE

## 2019-04-26 PROCEDURE — 74011000250 HC RX REV CODE- 250: Performed by: PHYSICIAN ASSISTANT

## 2019-04-26 PROCEDURE — 97535 SELF CARE MNGMENT TRAINING: CPT

## 2019-04-26 RX ORDER — SODIUM CHLORIDE 0.9 % (FLUSH) 0.9 %
5-40 SYRINGE (ML) INJECTION EVERY 8 HOURS
Status: DISCONTINUED | OUTPATIENT
Start: 2019-04-26 | End: 2019-04-27 | Stop reason: HOSPADM

## 2019-04-26 RX ORDER — SODIUM CHLORIDE 0.9 % (FLUSH) 0.9 %
5-40 SYRINGE (ML) INJECTION AS NEEDED
Status: DISCONTINUED | OUTPATIENT
Start: 2019-04-26 | End: 2019-04-27 | Stop reason: HOSPADM

## 2019-04-26 RX ORDER — FUROSEMIDE 10 MG/ML
80 INJECTION INTRAMUSCULAR; INTRAVENOUS ONCE
Status: COMPLETED | OUTPATIENT
Start: 2019-04-26 | End: 2019-04-26

## 2019-04-26 RX ORDER — ALBUMIN HUMAN 50 G/1000ML
12.5 SOLUTION INTRAVENOUS ONCE
Status: COMPLETED | OUTPATIENT
Start: 2019-04-26 | End: 2019-04-26

## 2019-04-26 RX ORDER — AMOXICILLIN 250 MG
1 CAPSULE ORAL 2 TIMES DAILY
Qty: 30 TAB | Refills: 0 | Status: SHIPPED
Start: 2019-04-26 | End: 2019-05-20

## 2019-04-26 RX ORDER — POTASSIUM CHLORIDE 750 MG/1
20 TABLET, FILM COATED, EXTENDED RELEASE ORAL DAILY
Status: DISCONTINUED | OUTPATIENT
Start: 2019-04-26 | End: 2019-04-27 | Stop reason: HOSPADM

## 2019-04-26 RX ORDER — LANOLIN ALCOHOL/MO/W.PET/CERES
325 CREAM (GRAM) TOPICAL
Qty: 30 TAB | Refills: 0 | Status: SHIPPED
Start: 2019-04-27 | End: 2019-09-09

## 2019-04-26 RX ORDER — FUROSEMIDE 40 MG/1
40 TABLET ORAL DAILY
Status: DISCONTINUED | OUTPATIENT
Start: 2019-04-26 | End: 2019-04-26

## 2019-04-26 RX ORDER — LANOLIN ALCOHOL/MO/W.PET/CERES
1 CREAM (GRAM) TOPICAL
Status: DISCONTINUED | OUTPATIENT
Start: 2019-04-26 | End: 2019-04-27 | Stop reason: HOSPADM

## 2019-04-26 RX ORDER — GUAIFENESIN 100 MG/5ML
81 LIQUID (ML) ORAL DAILY
Qty: 90 TAB | Refills: 0 | Status: SHIPPED
Start: 2019-04-27 | End: 2019-06-20

## 2019-04-26 RX ORDER — AMIODARONE HYDROCHLORIDE 200 MG/1
200 TABLET ORAL 2 TIMES DAILY
Qty: 60 TAB | Refills: 0 | Status: SHIPPED
Start: 2019-04-26 | End: 2019-06-13 | Stop reason: SDUPTHER

## 2019-04-26 RX ORDER — POLYETHYLENE GLYCOL 3350 17 G/17G
17 POWDER, FOR SOLUTION ORAL DAILY
Qty: 7 PACKET | Refills: 0 | Status: SHIPPED
Start: 2019-04-27 | End: 2019-05-20 | Stop reason: ALTCHOICE

## 2019-04-26 RX ADMIN — Medication 10 ML: at 20:59

## 2019-04-26 RX ADMIN — Medication 10 ML: at 07:26

## 2019-04-26 RX ADMIN — ASPIRIN 81 MG 81 MG: 81 TABLET ORAL at 08:45

## 2019-04-26 RX ADMIN — ATORVASTATIN CALCIUM 80 MG: 40 TABLET, FILM COATED ORAL at 20:53

## 2019-04-26 RX ADMIN — CHLORHEXIDINE GLUCONATE 10 ML: 1.2 RINSE ORAL at 17:29

## 2019-04-26 RX ADMIN — FERROUS SULFATE TAB 325 MG (65 MG ELEMENTAL FE) 325 MG: 325 (65 FE) TAB at 08:45

## 2019-04-26 RX ADMIN — SENNOSIDES AND DOCUSATE SODIUM 1 TABLET: 8.6; 5 TABLET ORAL at 17:27

## 2019-04-26 RX ADMIN — FUROSEMIDE 80 MG: 10 INJECTION, SOLUTION INTRAMUSCULAR; INTRAVENOUS at 08:45

## 2019-04-26 RX ADMIN — SENNOSIDES AND DOCUSATE SODIUM 1 TABLET: 8.6; 5 TABLET ORAL at 08:45

## 2019-04-26 RX ADMIN — DEXTROSE MONOHYDRATE 12.5 G: 500 INJECTION PARENTERAL at 17:22

## 2019-04-26 RX ADMIN — METOPROLOL TARTRATE 50 MG: 50 TABLET ORAL at 08:45

## 2019-04-26 RX ADMIN — POTASSIUM CHLORIDE 20 MEQ: 750 TABLET, EXTENDED RELEASE ORAL at 08:45

## 2019-04-26 RX ADMIN — BISACODYL 10 MG: 10 SUPPOSITORY RECTAL at 12:47

## 2019-04-26 RX ADMIN — ALBUTEROL SULFATE 2.5 MG: 2.5 SOLUTION RESPIRATORY (INHALATION) at 03:26

## 2019-04-26 RX ADMIN — MUPIROCIN: 20 OINTMENT TOPICAL at 09:00

## 2019-04-26 RX ADMIN — INSULIN LISPRO 2 UNITS: 100 INJECTION, SOLUTION INTRAVENOUS; SUBCUTANEOUS at 12:05

## 2019-04-26 RX ADMIN — HUMAN INSULIN 43 UNITS: 100 INJECTION, SUSPENSION SUBCUTANEOUS at 08:46

## 2019-04-26 RX ADMIN — POLYETHYLENE GLYCOL 3350 17 G: 17 POWDER, FOR SOLUTION ORAL at 08:44

## 2019-04-26 RX ADMIN — MUPIROCIN: 20 OINTMENT TOPICAL at 17:29

## 2019-04-26 RX ADMIN — AMIODARONE HYDROCHLORIDE 200 MG: 200 TABLET ORAL at 20:53

## 2019-04-26 RX ADMIN — AMIODARONE HYDROCHLORIDE 200 MG: 200 TABLET ORAL at 08:45

## 2019-04-26 RX ADMIN — ALBUMIN (HUMAN) 12.5 G: 12.5 INJECTION, SOLUTION INTRAVENOUS at 08:46

## 2019-04-26 RX ADMIN — METOPROLOL TARTRATE 50 MG: 50 TABLET ORAL at 20:53

## 2019-04-26 RX ADMIN — CHLORHEXIDINE GLUCONATE 10 ML: 1.2 RINSE ORAL at 08:49

## 2019-04-26 RX ADMIN — OXYCODONE AND ACETAMINOPHEN 1 TABLET: 5; 325 TABLET ORAL at 20:59

## 2019-04-26 RX ADMIN — RIVAROXABAN 15 MG: 15 TABLET, FILM COATED ORAL at 17:27

## 2019-04-26 RX ADMIN — LEVOTHYROXINE SODIUM 125 MCG: 125 TABLET ORAL at 07:26

## 2019-04-26 RX ADMIN — Medication 10 ML: at 12:06

## 2019-04-26 RX ADMIN — ALLOPURINOL 200 MG: 100 TABLET ORAL at 08:45

## 2019-04-26 RX ADMIN — FAMOTIDINE 20 MG: 20 TABLET ORAL at 20:53

## 2019-04-26 NOTE — PROGRESS NOTES
A and V temporary pacing wires cleaned and removed without difficulty. Patient tolerated it well. NSR 78. Bed rest x 1 h. Pt and RN aware.

## 2019-04-26 NOTE — PROGRESS NOTES
Problem: Falls - Risk of 
Goal: *Absence of Falls Description Document Nieves Glasgow Fall Risk and appropriate interventions in the flowsheet. Outcome: Progressing Towards Goal 
  
Problem: Diabetes Self-Management Goal: *Disease process and treatment process Description Define diabetes and identify own type of diabetes; list 3 options for treating diabetes. Outcome: Progressing Towards Goal 
Goal: *Incorporating nutritional management into lifestyle Description Describe effect of type, amount and timing of food on blood glucose; list 3 methods for planning meals. Outcome: Progressing Towards Goal 
Goal: *Incorporating physical activity into lifestyle Description State effect of exercise on blood glucose levels. Outcome: Progressing Towards Goal 
Goal: *Monitoring blood glucose, interpreting and using results Description Identify recommended blood glucose targets  and personal targets. Outcome: Progressing Towards Goal 
Goal: *Prevention, detection, treatment of acute complications Description List symptoms of hyper- and hypoglycemia; describe how to treat low blood sugar and actions for lowering  high blood glucose level. Outcome: Progressing Towards Goal 
  
Problem: Cardiac Valve Surgery: Post-Op Day 4 Goal: Diagnostic Test/Procedures Outcome: Progressing Towards Goal 
Goal: Nutrition/Diet Outcome: Progressing Towards Goal 
Goal: Medications Outcome: Progressing Towards Goal 
Goal: Respiratory Outcome: Progressing Towards Goal 
Goal: *Hemodynamically stable Outcome: Progressing Towards Goal 
Goal: *Stable cardiac rhythm Outcome: Progressing Towards Goal 
Goal: *Lungs clear or at baseline Outcome: Progressing Towards Goal 
Goal: *Optimal pain control at patient's stated goal 
Outcome: Progressing Towards Goal 
Goal: *Incisions without signs and symptoms of wound complication Outcome: Progressing Towards Goal 
Goal: *Ambulating and performing exercises with assistance Outcome: Progressing Towards Goal 
Goal: *Demonstrates progressive activity Outcome: Progressing Towards Goal 
Goal: *Tolerating diet Outcome: Progressing Towards Goal 
  
Problem: Pressure Injury - Risk of 
Goal: *Prevention of pressure injury Description Document Shane Scale and appropriate interventions in the flowsheet.  
Outcome: Progressing Towards Goal

## 2019-04-26 NOTE — PROGRESS NOTES
Bedside and Verbal shift change report given to [facility] (oncoming nurse) by Thanh Bland RN (offgoing nurse). Report included the following information SBAR, Kardex, Procedure Summary, Intake/Output, MAR, Recent Results and Cardiac Rhythm NSR.  
 
 
1245: PRN suppository given. 1635: 
[address] Patient with hypoglycemic episode(s) at 1635(time) on 4/26/19(date). BG value(s) pre-treatment 64 Was patient symptomatic? [] yes, [x] no Patient was treated with the following rescue medications/treatments: [x] D50 [] Glucose tablets 
              [] Glucagon 
              [x] 4oz juice 
              [] 6oz reg soda 
              [] 8oz low fat milk BG value post-treatment: 108 Once BG treated and value greater than 80mg/dl, pt was provided with the following: 
[] snack [x] meal 
Name of MD notified:-- The following orders were received: -- 
 
1900: Pt had BM. Bedside and Verbal shift change report given to Zenia Tsang (oncoming nurse) by Deb Rosenthal RN (offgoing nurse). Report included the following information SBAR, Kardex, Procedure Summary, Intake/Output, MAR, Recent Results and Cardiac Rhythm NSR 1st degree.

## 2019-04-26 NOTE — CARDIO/PULMONARY
Cardiac Rehab: Valve surgery education folder at the bedside. Met with Brad Rivera to review cardiac surgery post discharge instructions and to discuss participation in Cardiac Rehab Program. 
 
Educated using teach back method. Reviewed use of bear for sternal support, daily weights and temperatures, showering restrictions, signs and symptoms of infection at surgery sites, daily walking and arm exercises, and use of incentive spirometer. Encouraged Heart Healthy, Low Sodium (2000 mg) diet. Gave red reminder bracelet. Discussed purpose of bracelet, duration of wear, and when to call surgeons office. Discussed Cardiac Rehab Program benefits, format, and encouraged participation. Pt is to discharge to [facility] over the weekend, will follow up by phone for Cardiac Rehab Program once home. Brad Rivera verbalized understanding and had no questions.

## 2019-04-26 NOTE — DISCHARGE SUMMARY
[facility] Discharge Summary Patient ID: Radha Chang [id number] 
18 y.o. 
1935 Admit date: 4/16/2019 Discharge date: 4/27/2019 Admitting Physician: Lilo Camacho MD  
 
Referring Cardiologist:  Khoa Hilton PCP:  Kary Sharp Admitting Diagnoses: MR, MS s/p Mitraclip 2016 Discharge Diagnoses:  
 
Hospital Problems  Date Reviewed: 4/17/2019 Codes Class Noted POA  
 S/P MVR (mitral valve replacement) ICD-10-CM: U61.7 ICD-9-CM: V43.3  4/22/2019 Unknown Overview Signed 4/22/2019  4:21 PM by NEVAEH Long  
  MITRAL VALVE REPLACEMENT/WITH 25MM BIOPROSTHESIS Mitral stenosis ICD-10-CM: I05.0 ICD-9-CM: 394.0  4/16/2019 Unknown Discharged Condition: improved Disposition: [facility] Procedures for this admission:  Procedure(s): MITRAL VALVE REPLACEMENT/WITH 25MM BIOPROSTHESIS CARDIOPULMONARY BYPASS/TRANSESOPHAGEAL ECHOCARDIOGRAHM AND EPI AORTIC ULTRASOUND BY DR. Sarahann Merlin. Discharge Medications: My Medications START taking these medications Instructions Each Dose to Equal Morning Noon Evening Bedtime  
amiodarone 200 mg tablet Commonly known as:  CORDARONE Your last dose was: Your next dose is: Take 1 Tab by mouth two (2) times a day. 200 mg 
  
  
  
  
  
aspirin 81 mg chewable tablet Your last dose was: Your next dose is: Take 1 Tab by mouth daily. 81 mg 
  
  
  
  
  
ferrous sulfate 325 mg (65 mg iron) tablet Your last dose was: Your next dose is: Take 1 Tab by mouth daily (with breakfast). 325 mg 
  
  
  
  
  
polyethylene glycol 17 gram packet Commonly known as:  Sheria Bud Your last dose was: Your next dose is: Take 1 Packet by mouth daily. Take until having regular BM 
 17 g 
  
  
  
  
  
senna-docusate 8.6-50 mg per tablet Commonly known as:  Ruba Mckenna Your last dose was: Your next dose is: Take 1 Tab by mouth two (2) times a day. 1 Tab CHANGE how you take these medications Instructions Each Dose to Equal Morning Noon Evening Bedtime  
furosemide 80 mg tablet Commonly known as:  LASIX What changed:   
· how much to take · how to take this · when to take this 
· additional instructions Your last dose was: Your next dose is: Take 1 Tab by mouth daily. 80 mg 
  
  
  
  
  
insulin  unit/mL (3 mL) Inpn Commonly known as:  HumuLIN N NPH Insulin KwikPen What changed:  additional instructions Your last dose was: Your next dose is: INJECT 40 UNITS UNDER THE SKIN EVERY MORNING, 23 UNITS UNDER THE SKIN WITH DINNER 
   
  
  
  
  
levothyroxine 125 mcg tablet Commonly known as:  SYNTHROID What changed:  See the new instructions. Your last dose was: Your next dose is: TAKE ONE TABLET BY MOUTH DAILY CONTINUE taking these medications Instructions Each Dose to Equal Morning Noon Evening Bedtime ACCU-CHEK SHAHIDA PLUS TEST STRP strip Generic drug:  glucose blood VI test strips Your last dose was: Your next dose is:   
 
  
 USE TO CHECK BLOOD SUGAR FOUR TIMES A DAY 
   
  
  
  
  
acetaminophen 325 mg tablet Commonly known as:  TYLENOL Your last dose was: Your next dose is: Take 325 mg by mouth every four (4) hours as needed for Pain. 325 mg 
  
  
  
  
  
allopurinol 100 mg tablet Commonly known as:  Acquanetta Bunk Your last dose was: Your next dose is: TAKE TWO TABLETS BY MOUTH DAILY 
   
  
  
  
  
atorvastatin 80 mg tablet Commonly known as:  LIPITOR Your last dose was: Your next dose is: TAKE ONE TABLET BY MOUTH EVERY EVENING 
   
  
  
  
  
fluocinoNIDE 0.05 % topical cream 
Commonly known as:  LIDEX Your last dose was:    
 
Your next dose is:   
 
  
 ALONSO EXT AA BID FOR 14 DAYS THEN PRF RASH 
   
  
  
  
  
 ipratropium 42 mcg (0.06 %) nasal spray Commonly known as:  ATROVENT Your last dose was: Your next dose is: 2 Sprays by Both Nostrils route four (4) times daily. As needed runny nose 2 Spray 
  
  
  
  
  
metoprolol tartrate 50 mg tablet Commonly known as:  LOPRESSOR Your last dose was: Your next dose is: Take one and one half pill twice daily Little Pen Needle 32 gauge x 5/32\" Ndle Generic drug:  Insulin Needles (Disposable) Your last dose was: Your next dose is:   
 
  
 USE WITH INSULIN PEN TWO TIMES A DAY AS DIRECTED BY PHYSICIAN 
   
  
  
  
  
potassium chloride SR 10 mEq tablet Commonly known as:  KLOR-CON 10 Your last dose was: Your next dose is: Take 1 Tab by mouth daily. 10 mEq PriLOSEC 20 mg capsule Generic drug:  omeprazole Your last dose was: Your next dose is: Take 20 mg by mouth daily. 20 mg 
  
  
  
  
  
rivaroxaban 15 mg Tab tablet Commonly known as:  Graydon Primus Your last dose was: Your next dose is: Take 1 Tab by mouth daily (with dinner). 15 mg 
  
  
  
  
  
  
STOP taking these medications   
amLODIPine 5 mg tablet Commonly known as:  NORVASC 
  
  
dofetilide 125 mcg capsule Commonly known as:  TIKOSYN 
  
  
enoxaparin 100 mg/mL Commonly known as:  LOVENOX 
  
  
hydrALAZINE 10 mg tablet Commonly known as:  APRESOLINE 
  
  
lisinopril 20 mg tablet Commonly known as:  Toan Polo Where to Get Your Medications Information on where to get these meds will be given to you by the nurse or doctor. Ask your nurse or doctor about these medications · amiodarone 200 mg tablet · aspirin 81 mg chewable tablet · ferrous sulfate 325 mg (65 mg iron) tablet · furosemide 80 mg tablet · insulin  unit/mL (3 mL) Inpn · polyethylene glycol 17 gram packet · senna-docusate 8.6-50 mg per tablet HPI: 82 y.o.  female s/pTMVR (1 MitraClip on the A2/P2 mitral leaflet scallops with reduction of the mitral regurgitation from severe to none) on 6/10/2016 for severe and symptomatic MR. PMHx of PVD with venous stasis and ulcers, HTN, CHARITY s/p renal artery stenting, HLD, CAD, DMII (insulin dependent), CVA (2007), OA (receives B knee cortisone injections), Hypothyroid, Afib/aflutter s/p ablation (2009 on rivoroxaban/dofetilide), & Schatzki's ring s/p dilation X 2 (most recent 11/2018).  She initially did very well after her TMVr but then developed significant SOB/LEIJA about 6 months later. We worked aggressively to achieve GDMT for her MR/MS (HR 60 & -130) but had some difficulty in doing so d/t pt understanding and short term memory issues and her daughter had to get involved with her medication management. Since our last visit in March 2017, Ms. Nikole Son has moved to a 55+ community. Loki Dc remains independent. Loki Dc is back today as a consult from Dr. Conner Simpson to the [address] [place] [address] for her worsening MS.  
  
Ms. Miller reports considerable SOB/LEIJA with minimal activity to include ADLs and it is often associated w/ dizziness.  She denies any CP, chest tightness, palpitations, syncope, or falls.  She also denies any orthopnea or PND and no longer is seen in the wound clinic for lymphedema wraps as her LE ulcers have healed and her LE edema is now well controlled w/ diuretics and compression stockings.  She reports a good appetite and routine BMs w/o any blood/tarriness/blackness since her Feb 2019 hospitalization for rectal bleeding d/t rectal prolapse.    
  
She underwent a R/L cath and RUDDY that revealed clean coronaries, pulm HTN and significant MS.  Her metoprolol was increased from 50mg to 75 mg BID at that time and her furosemide was decreased to once daily.  
  
Ms. Miller lives at [facility] at [facility]. Loki Dc has a  q 3 weeks but does her own cooking, routine cleaning and medication administration. She weighs herself about once a week and denies any wt changes in the past 2 months despite a reduction in her diuretics after her cath. Fabricio Liang also reports taking her BP at home as well but is unsure of the typical readings and does not have a log with her today. She is accompanied by her daughter today. Hospital Course: Pt was admitted on 04/16/19 for pre-operative optimization. Her surgery was initially delayed due to thrombocytopenia (HIT negative). She underwent MVR (tissue valve) on 04/22/19 and was transported to the ICU in stable condition on etta and Noel. She was extubated on 04/22/19 at 2015. She developed post-operative Afib and was started on amiodarone rather than home tikosyn due to prolonged QTc, age, and kidney function. She will be discharged to [address] [place] [address]. POD#5:  
1. Severe MS s/p tissue MVR (04/23/19). ASA and Xarelto per Dr. Andrew Alston. 2. Chronic Afib: Started PO amio per EP. Was on tikosyn PTA, but unlikely to resume 2/2 QTC, age, and kidney function. Continue BB. On Xarelto per Dr. Andrew Alston. 3. Iron deficiency anemia s/p iron transfusions with hx GIB 02/2019: H/H trending down. PO Fe 4.  HTN: Continue BB. Consider norvasc, ACE and hydralazine when appropriate. 5. T2DM: A1c 7. Blood sugars ACHS, high sensitivity SSI. Will decrease AM NPH this AM (40 units from 43), continue PM dose (23 units), SSI decreased from normal to high sensitivity 6. HLD: Statin.  
7. Hx Bladder CA: No issues voiding.  
8. Hypothyroid: Synthroid. 9. GERD/Schatzki's ring s/p dilatation 11/2018: No issues swallowing since. Pepcid. 10. Pulmonary HTN: Much improved since outside cath on 03/15. Monitor 11. CKD stage III s/p renal artery stenting: Creatinine baseline. UOP improved. Encouraged PO fluids. Monitor. 12. Chronic diastolic HF (NYHA class III on admission): BB today.  Holding ACE until appropriate. Resume AM lasix. 13. PVD with history of venous ulcers: No current open wounds. Monitor. Activity as tolerated. 15. CVA (2007) with no deficits: PT/OT. Statin 15. Recent urine leukocytosis with Gram neg rods(POA): Completed abx. Monitor 16. Thrombocytopenia: stabilized. Pre-op HIT/DINA negative, heme consult confirm likely medication induced rather than bleeding. ASA/Xarelto given per Dr. Cesilia Monet. 17. Elevated AST: Resolved. Monitor 18. Constipation: Had BM. PRN Dulcolax, miralax, senna. Encouraged activity, PO fluids.  
  
Dispo: PT/OT. Plan to d/c to [address] [place] [address] today. Referral to outpatient cardiac rehab made. Discharge Vital Signs:  
Visit Vitals /48 (BP Patient Position: Sitting) Pulse 80 Temp 97.6 °F (36.4 °C) Resp 18 Ht 5' 9\" (1.753 m) Wt 225 lb 5 oz (102.2 kg) SpO2 97% BMI 33.27 kg/m² Labs:  
Recent Labs  
  04/27/19 
0636 04/27/19 
0557  04/26/19 
5781 WBC  --  5.0  --  8.4 HGB  --  7.6*  --  8.3* HCT  --  25.2*  --  27.0*  
PLT  --  102*  --  110* NA  --  137  --  134* K  --  4.3  --  3.9 BUN  --  37*  --  40* CREA  --  1.84*  --  1.88* GLU  --  78  --  96  
GLUCPOC 90  --    < >  --   
INR  --   --   --  1.2*  
 < > = values in this interval not displayed. Diagnostics: CXR Results  (Last 48 hours) 04/27/19 0439  XR CHEST PORT Final result Impression:  IMPRESSION:   
1. Unchanged pulmonary interstitial edema. Unchanged small bilateral pleural  
effusions and bibasilar atelectasis. Narrative:  EXAM:  XR CHEST PORT INDICATION:   postop heart COMPARISON: Chest radiograph 4/26/2019. FINDINGS: AP radiograph of the chest was obtained. Stable postoperative changes of mitral valve replacement. Small bilateral  
pleural effusions and bibasilar atelectasis are stable. Unchanged diffuse  
bilateral interstitial opacities. No pneumothorax.  Calcifications of the  
 thoracic aorta. Unchanged cardiomegaly. 04/26/19 0430  XR CHEST PORT Final result Impression:   impression: No change. Narrative:  Clinical indication: Postop heart. Portable AP semiupright view of the chest is obtained and compared to April 2015. Blunting of the costophrenic angle and interstitial prominence are stable. Bibasilar platelike atelectasis. 04/25/19 1501  XR CHEST PA LAT Final result Impression:  IMPRESSION:  
   
Bilateral infrahilar atelectasis. Stable small pleural effusions. No  
pneumothorax. Narrative:  EXAM:  XR CHEST PA LAT INDICATION: Heart surgery. Right chest tube and mediastinal drain removal.  
   
COMPARISON: 4/24/2019 at 0408 hours TECHNIQUE: Frontal and lateral chest views FINDINGS: The right chest tube and mediastinal drains have been removed. Sternal  
wires are present. Cardiac monitoring wires overlie the thorax. The  
cardiomediastinal contours are stable. There is bilateral infrahilar atelectasis and small pleural effusions. There is  
no pneumothorax. The bones and upper abdomen are stable. Patient Instructions/Follow Up Care: 
Discharge instructions were reviewed with the patient and family present. Questions were also answered at this time. Prescriptions and medications were reviewed. The patient has a follow up appointment with Dr. Jose Carlos Lim on 05/20/19 at 1:45pm. The patient was also instructed to follow up with her primary care physician as needed. The patient and family were encouraged to call with any questions or concerns. Signed: 
Akiko Pierce NP 
4/27/2019 12:40 PM

## 2019-04-26 NOTE — DIABETES MGMT
[facility] Cardiac Surgery Education Note Recommendations/ Comments: Visited with pt at bedside for diabetes education prior to discharge. She reports good compliance with medication regimen PTA - she was using Humulin N kwik pen at home. Reports using abdomen to inject, encouraged good rotation. She reports to monitor BG twice daily - before breakfast and dinner. Reports BG ranges between 125-170 mg/dl. States she may need more supplies for meter. Unsure which meter she uses. Pt reports she is going to [address] [place] [address] at discharge. Encouraged her once she identifies meter and does inventory of supplies to give her PCP a call for refill. Encouraged her to continue monitoring twice daily and stagger time frames. Reviewed optimal BG targets and when to call MD. Discussed increased infection risk and delayed wound healing when BG remains high. She reports understanding. Reviewed s/sx of high and low BG and appropriate treatments. Also discussed basic nutrition modifications. Pt reports to eat three meals a day and indicates she has good appetite. Drinks 4 oz glass of juice some mornings, otherwise avoids SSB. Reports she may eat a cookie or two at lunch occasionally but does not occur on regular basis. Encourage three meals a day, high fiber/high protein, eating concentrated sweets with meals and limiting portion, avoidance of all SSB. Pt provided contact info and encouraged to call if she has follow up questions. She reports understanding. BG within target range on current regimen. Will continue to follow. Current hospital DM medication: NPH, 43 units am and 23 units pm 
Lispro correction scale - normal sensitivity Chart reviewed and initial evaluation complete on Lea Miller. Patient is a 82 y.o. female with known DM on Humulin N (43 units am, 23 units pm) at home. A1c:  
Lab Results Component Value Date/Time  Hemoglobin A1c 7.0 (H) 04/16/2019 10:09 AM  
 
  
 BG monitoring at home 2 times per day. Patient reports BG levels at home trend: fluctuating a bit. Assessed and instructed patient on the following:  
· risk of sternal wound infection/ delayed healing · interpretation of lab results, blood sugar goals, complications of diabetes mellitus, hypoglycemia prevention and treatment, exercise, insulin adjustments, nutrition and site rotation. Encouraged the following: increased exercise as tolerated, dietary modifications: plate method, stable meal pattern, avoidance of SSB, limit conc. Sweets, regular blood sugar monitorin times daily Provided patient with the following: []         Survival skills education materials 
             []         Insulin education materials 
             []         CHO counting education materials [x]         BG guidelines for post-op patients [x]         Outpatient DTC contact number 
             []         Glucometer 
             []         Insulin start kit- vial/syringe 
             []         Insulin start kit- pen Discussed importance of follow up at discharge. Recent Glucose Results:  
Lab Results Component Value Date/Time GLU 96 2019 03:24 AM  
 GLUCPOC 169 (H) 2019 11:47 AM  
 GLUCPOC 99 2019 06:19 AM  
 GLUCPOC 176 (H) 2019 09:13 PM  
  
 
Lab Results Component Value Date/Time Creatinine 1.88 (H) 2019 03:24 AM  
 
Estimated Creatinine Clearance: 29.4 mL/min (A) (based on SCr of 1.88 mg/dL (H)). Active Orders Diet DIET DIABETIC WITH OPTIONS Consistent Carb 1800kcal; Regular; AHA-LOW-CHOL FAT  
  
 
PO intake:  
Patient Vitals for the past 72 hrs: 
 % Diet Eaten 19 0900 100 % 19 2311 100 % 19 1801 100 % 19 0904 75 % 19 1700 50 % 19 1300 50 % 19 0833 75 % 19 1800 70 % Will continue to follow as needed. Thank you.  
Mane Rubio RD 
 [facility] Time spent: 25 minutes

## 2019-04-26 NOTE — PROGRESS NOTES
Problem: Self Care Deficits Care Plan (Adult) Goal: *Acute Goals and Plan of Care (Insert Text) Description Occupational Therapy Goals Medical Re-Evaluation s/p MVR POD #1 Initiated 4/23/2019 1. Patient will perform ADLs standing 5 mins without fatigue or LOB with SBA within 7 day(s). 2.  Patient will perform lower body ADLs with Min A & AE PRN within 7 day(s). 3.  Patient will perform gathering ADL items high and low 2/2 with Mod A & AE PRN within 7 day(s). 4.  Patient will perform toilet transfers with Min A within 7 day(s). 5.  Patient will perform all aspects of toileting with Min A within 7 day(s). 6.  Patient will participate in cardiac/sternal upper extremity therapeutic exercise/activities to increase independence with ADLs with supervision/set-up for 5 minutes within 7 day(s). 7.  Patient will perform medication management activity with 100% accuracy and SPV within 7 day(s). Initiated 4/19/2019 1. Patient will perform grooming at the sink with independence within 7 day(s). 2.  Patient will perform medication management activity with 100% accuracy and SPV within 7 day(s). 3.  Patient will perform toilet transfers with modified independence within 7 day(s). 4.  Patient will perform all aspects of toileting with modified independence within 7 day(s). 5.  Patient will participate in cardiac upper extremity therapeutic exercise/activities with independence for 10 minutes within 7 day(s). 6.  Patient will utilize energy conservation techniques during functional activities with verbal cues within 7 day(s). Outcome: Progressing Towards Goal 
  
OCCUPATIONAL THERAPY TREATMENT Patient: Shahana Michel (84 y.o. female) Date: 4/26/2019 Diagnosis: Mitral stenosis [I05.0] <principal problem not specified> Procedure(s) (LRB): MITRAL VALVE REPLACEMENT/WITH 25MM BIOPROSTHESIS CARDIOPULMONARY BYPASS/TRANSESOPHAGEAL ECHOCARDIOGRAHM AND EPI AORTIC ULTRASOUND BY  WILVER. (N/A) 4 Days Post-Op Precautions: Fall, Sternal 
Chart, occupational therapy assessment, plan of care, and goals were reviewed. ASSESSMENT: 
Patient received in the chair, pleasantly agreeable to therapy. Progressing with ADLs and functional mobility, largely Min-Mod A for ambulation to/from bathroom and grooming at the sink. Cues required for squat technique to bend and maintenance of sternal precautions throughout, only able to recall 2/3, visual sign hung in room. Completed 6/6 BUE cardiac exercises, initially standing with Min A, however 2 seated rest breaks required, seated to complete the last 3/6. Seated in chair at session end, all needs met. Recommend with nursing patient to complete as able in order to maintain strength, endurance and independence: ADLs with supervision/setup, OOB to chair 3x/day and mobilizing to the bathroom for toileting with 1 assist. Thank you for your assistance. Progression toward goals: 
?       Improving appropriately and progressing toward goals ? Improving slowly and progressing toward goals ? Not making progress toward goals and plan of care will be adjusted PLAN: 
Patient continues to benefit from skilled intervention to address the above impairments. Continue treatment per established plan of care. Discharge Recommendations:  Inpatient Rehab Further Equipment Recommendations for Discharge:  TBD by rehab SUBJECTIVE:  
Patient stated ? I feel good, I'm going to rehab soon. ? The patient stated 2/3 sternal precautions. Reviewed all 3 with patient. OBJECTIVE DATA SUMMARY:  
Cognitive/Behavioral Status: 
Neurologic State: Alert Orientation Level: Oriented X4 Cognition: Appropriate for age attention/concentration; Follows commands;Memory loss Perception: Appears intact Perseveration: No perseveration noted Safety/Judgement: Awareness of environment;Decreased awareness of need for safety; Fall prevention Functional Mobility and Transfers for ADLs: 
Bed Mobility: 
Scooting: Contact guard assistance(seated scooting, cues for technique) Transfers: 
Sit to Stand: Minimum assistance; Moderate assistance(increasing A with continued reps) Functional Transfers Bathroom Mobility: Minimum assistance(ambulation to/from bathroom) Cues: Verbal cues provided(reaching out for obstacle support) Balance: 
Sitting: Intact Standing: Impaired Standing - Static: Good;Fair 
Standing - Dynamic : Fair ADL Intervention: 
  
 
Grooming Grooming Assistance: Minimum assistance(standing at the sink, CGA static stand, Min A for squat) Washing Hands: Contact guard assistance Brushing Teeth: Minimum assistance Cues: Verbal cues provided;Physical assistance Toileting Bowel Hygiene: Compensatory technique training Cues: Verbal cues provided;Visual cues provided Cognitive Retraining Safety/Judgement: Awareness of environment;Decreased awareness of need for safety; Fall prevention Patient instructed no asymmetrical reaching over head to ensure B UEs when shoulders >90* i.e. reaching in cabinets and dressing. Instruction on upper body dressing techniques of over head, then arms through to decrease pain and unilateral shoulder flexion >90*. Instruction on the benefits of utilizing B UEs during functional tasks i.e. opening the fridge, stepping into the tub. Instruction if continued pain at home with shoulder IR for BM hygiene can use wet wipes and toilet tongs PRN. Avoid valsalva maneuvers. May have to adjust home setup to increase ease with items closer to waist height to prevent deep bending and the automatic  of asymmetrical UE WB/pushing for stabilization during bending. Benefit to don clothing tailor sitting and don all clothing while sitting prior to standing.   
Increase activity tolerance for home, work, and sexual intercourse by pacing self with increasing the arm exercises, sitting duration, frequency OOB, walking, standing, and ADLs. Instructed and indicated understanding of s/s of too much activity, how to respond to s/s safely. Therapeutic Exercises:  
Patient instructed on the benefits and demonstrated cardiac exercises while initially standing with Min A, seated to finish with SPV. Instructed and indicated understanding on how to progress reps, sets against gravity, working up to 5 lbs, standing and so on based on surgeon clearance for more weight in prep for basic and instrumental ADLs. Instruction on the use of household items in place of weights as needed. CARDIAC EXERCISE Sets Reps Active  Active Assist   
Passive  Self ROM Comments Shoulder flexion  1  5   ?                            ?                             ?                             ?                             Standing Shoulder abduction  1  5  ?                             ?                             ?                             ?                             Standing Scapular elevation  1  5  ?                             ?                              ?                             ?                             Seated Scapular retraction  1  5  ?                             ?                             ?                             ?                             Seated Trunk rotation  1  5  ?                             ?                             ?                             ?                             Standing Trunk sidebending  1  5  ?                             ?                              ?                             ?                                   Seated Pain: 
Pain Scale 1: Numeric (0 - 10) Pain Intensity 1: 0 Activity Tolerance:  
Good-Fair, SOB with activity Please refer to the flowsheet for vital signs taken during this treatment. After treatment: ? Patient left in no apparent distress sitting up in chair ? Patient left in no apparent distress in bed 
? Call bell left within reach ? Nursing notified ? Caregiver present ? Bed alarm activated COMMUNICATION/COLLABORATION:  
The patient?s plan of care was discussed with: Physical Therapy Assistant and Registered Nurse ANNE Draper, OTR/L Time Calculation: 27 mins

## 2019-04-26 NOTE — PROGRESS NOTES
[facility] ICU Progress Note Admit Date: 2019 POD: 4 Day Post-Op Procedure:  Procedure(s): MITRAL VALVE REPLACEMENT/WITH 25MM BIOPROSTHESIS CARDIOPULMONARY BYPASS/TRANSESOPHAGEAL ECHOCARDIOGRAHM AND EPI AORTIC ULTRASOUND BY DR. Nikita Goldstein. Subjective:  
Pt seen with Dr. Chelsi Oleary. No acute overnight events. No BM, some flatus. On room air. Objective:  
Vitals: 
Blood pressure (!) 129/35, pulse 78, temperature 98.3 °F (36.8 °C), resp. rate 20, height 5' 9\" (1.753 m), weight 226 lb 3.1 oz (102.6 kg), SpO2 96 %. Temp (24hrs), Av.4 °F (36.9 °C), Min:97.7 °F (36.5 °C), Max:99.3 °F (37.4 °C) EKG/Rhythm: NSR 81, QTc 473 Oxygen Therapy: Room air CXR:  
CXR Results  (Last 48 hours) 19 0430  XR CHEST PORT Final result Impression:   impression: No change. Narrative:  Clinical indication: Postop heart. Portable AP semiupright view of the chest is obtained and compared to 2015. Blunting of the costophrenic angle and interstitial prominence are stable. Bibasilar platelike atelectasis. 19 1501  XR CHEST PA LAT Final result Impression:  IMPRESSION:  
   
Bilateral infrahilar atelectasis. Stable small pleural effusions. No  
pneumothorax. Narrative:  EXAM:  XR CHEST PA LAT INDICATION: Heart surgery. Right chest tube and mediastinal drain removal.  
   
COMPARISON: 2019 at 0408 hours TECHNIQUE: Frontal and lateral chest views FINDINGS: The right chest tube and mediastinal drains have been removed. Sternal  
wires are present. Cardiac monitoring wires overlie the thorax. The  
cardiomediastinal contours are stable. There is bilateral infrahilar atelectasis and small pleural effusions. There is  
no pneumothorax. The bones and upper abdomen are stable. Admission Weight: Last Weight Weight: 215 lb 11.2 oz (97.8 kg) Weight: 226 lb 3.1 oz (102.6 kg) Intake / Lazarus Lat / Drain: Current Shift:  07 -  1900 In: 36 [P.O.:480; I.V.:250] Out: 100 [Urine:100] Last 24 hrs.:  
 
Intake/Output Summary (Last 24 hours) at 2019 1041 Last data filed at 2019 0900 Gross per 24 hour Intake 1039.5 ml Output 350 ml Net 689.5 ml EXAM: 
General:  Sitting in chair, NAD Lungs:   Clear to auscultation bilaterally. Incision:  No erythema, drainage or swelling. Heart:  Regular rate and rhythm, S1, S2 normal, no murmur, click, rub or gallop. Abdomen:   Soft, non-tender. Bowel sounds normal. No masses,  No organomegaly. Extremities:  Mild nonpitting edema. PPP. Neurologic:  Gross motor and sensory apparatus intact. Labs:  
Recent Labs  
  19 
2993 19 
3237 WBC  --  8.4 HGB  --  8.3* HCT  --  27.0*  
PLT  --  110* NA  --  134* K  --  3.9 BUN  --  40* CREA  --  1.88* GLU  --  96  
GLUCPOC 99  --   
INR  --  1.2* Assessment:  
 
Active Problems: 
  Mitral stenosis (2019) S/P MVR (mitral valve replacement) (2019) Overview: MITRAL VALVE REPLACEMENT/WITH 25MM BIOPROSTHESIS Plan/Recommendations/Medical Decision Makin. Severe MS s/p tissue MVR (19). ASA and Xarelto per Dr. Chelsi Oleary. 2. Chronic Afib: Started PO amio per EP. Was on tikosyn PTA, but unlikely to resume 2/2 QTC, age, and kidney function. Continue BB. On Xarelto per Dr. Chelsi Oleary. 3. Iron deficiency anemia s/p iron transfusions with hx GIB 2019: H/H trending down. PO Fe 4.  HTN: Continue BB. Consider norvasc, ACE and hydralazine when appropriate. 5. T2DM: A1c 7. Blood sugars ACHS, SSI. Will continue insulin today per DTC (43 units in AM, 23 units in PM) 6. HLD: Statin.  
7. Hx Bladder CA: No issues voiding.  
8. Hypothyroid: Synthroid. 9. GERD/Schatzki's ring s/p dilatation 2018: No issues swallowing since. Pepcid. 10. Pulmonary HTN: Much improved since outside cath on 03/15. Monitor 11. CKD stage III s/p renal artery stenting: Creatinine 1.88 (1.84 yesterday). UOP marginal. Encouraged PO fluids. Gave albumin/Lasix. Hold on renal consult per Dr. Isabel Toribio. Monitor. 12. Chronic diastolic HF (NYHA class III on admission): BB today. Holding ACE until appropriate. Lasix today. 13. PVD with history of venous ulcers: No current open wounds. Monitor. Activity as tolerated. 15. CVA (2007) with no deficits: PT/OT. Statin 15. Recent urine leukocytosis with Gram neg rods(POA): Completed abx. Monitor 16. Thrombocytopenia: improving, 110. Pre-op HIT/DINA negative, heme consult confirm likely medication induced rather than bleeding. Surgery performed w/ Bival. ASA/Xarelto given per Dr. Isabel Toribio. 17. Elevated AST: Improving, have been WNL the past two days. Monitor 18. Constipation: PRN Dulcolax, miralax, senna. Encouraged activity, PO fluids. Dispo: PT/OT. Will d/c pacing wires today. Hopeful D/c to [facility] over weekend.  
 
Signed By: NEVAEH Berry

## 2019-04-26 NOTE — PROGRESS NOTES
Problem: Mobility Impaired (Adult and Pediatric) Goal: *Acute Goals and Plan of Care (Insert Text) Description Physical Therapy Goals Re-evaluation completed 4/23/2019 and most goals downgraded following MVR 4/22/19. 1.  Patient will move from supine to sit and sit to supine, scoot up and down and roll side to side in bed with minimal assistance/contact guard assist within 5 days. 2.  Patient will perform sit to/from stand with minimal assistance/contact guard assist within 5 days. 3.  Patient will ambulate 100 feet with least restrictive assistive device and minimal assistance/contact guard assist within 5 days. 4.  Patient will ascend/descend 4 stairs with single handrail with minimal assistance/contact guard assist within 5 days. 5.  Patient will perform cardiac exercises per protocol with supervision/set-up within 5 days. 6.  Patient will verbally and functionally recall 3/3 sternal precautions within 5 days. Initiated 4/19/2019 1. Patient will move from supine to sit and sit to supine, scoot up and down and roll side to side in bed with minimal assistance/contact guard assist with log roll technique within 7 days. 2.  Patient will perform sit to/from stand with supervision and adherence to sternal precautions within 7 days. 3.  Patient will ambulate 150 feet with least restrictive assistive device and supervision within 7 days. 4.  Patient will ascend/descend 4 stairs with single handrail, for balance only, with supervision within 7 days. 5.  Patient will perform cardiac exercises per protocol with supervision within 7 days. 6.  Patient will verbally and functionally recall 3/3 sternal precautions within 7 days. Outcome: Progressing Towards Goal 
 
PHYSICAL THERAPY TREATMENT: Cardiac with sternotomy Patient: Tushar Kumar (31 y.o. female) Date: 4/26/2019 Diagnosis: Mitral stenosis [I05.0] <principal problem not specified> Procedure(s) (LRB): 
 MITRAL VALVE REPLACEMENT/WITH 25MM BIOPROSTHESIS CARDIOPULMONARY BYPASS/TRANSESOPHAGEAL ECHOCARDIOGRAHM AND EPI AORTIC ULTRASOUND BY DR. Carmelo Hoskins. (N/A) 4 Days Post-Op Precautions: Fall, Sternal 
Chart, physical therapy assessment, plan of care and goals were reviewed. ASSESSMENT: 
Based on the objective data described below, the patient presents with mod A for sit to stand. Gait training completed at Minimum assistance and Moderate Assistance, 90 feet and using a gait belt and HHA . Pt required on standing rest break with reports of JOHN SpO2 97% on room air HR 80 The following are barriers to independence while in acute care:  
-Cognitive and/or behavioral: processing, initiation, sequencing, safety awareness, insight into deficits and insight into abilities -Medical condition: strength, functional endurance, standing balance, cardiopulmonary tolerance, precautions and medical history   
-Other:    
 
Prior level of function: independent living alone PLAN: 
Patient continues to benefit from skilled intervention to address the above impairments. Continue treatment per established plan of care. Recommend with staff: OOB 3x gait with assistance of 2 Recommend next PT session: gait tolerance Discharge recommendations: Rehab at inpatient facility: patient can tolerate 3 hours of therapy (to regain functional baseline patient requires rehab) If above is not an option then recommend: Rehab at skilled nursing facility (SNF) (to regain functional baseline patient requires rehab) Patient's barriers to discharging home, in addition to above impairments: lives alone 
family availability to assist 
total assist driving to follow up medical appointment(s)/groceries/obtain medication 
entry and exit into the home 
family availability for education/training to then follow up at home 
level of physical assist required to maintain patient safety. Equipment recommendations for successful discharge (if) home: TBD SUBJECTIVE:  
Patient stated ? I am so tired . ? OBJECTIVE DATA SUMMARY:  
Critical Behavior: 
Neurologic State: Alert Orientation Level: Oriented X4 Cognition: Appropriate decision making, Appropriate safety awareness, Follows commands Safety/Judgement: Awareness of environment, Decreased awareness of need for assistance, Decreased awareness of need for safety, Decreased insight into deficits The patient recalled 3/3 sternal precautions. Reviewed all 3 with patient. Functional Mobility Training: 
Bed Mobility: 
  
  
  
  
  
  
Transfers: 
Sit to Stand: Moderate assistance Stand to Sit: Minimum assistance Balance: 
Sitting: Intact Standing: Impaired Standing - Static: Fair Standing - Dynamic : Fair Ambulation/Gait Training: 
Distance (ft): 90 Feet (ft) Assistive Device: Gait belt Ambulation - Level of Assistance: Minimal assistance; Moderate assistance Gait Abnormalities: Altered arm swing;Decreased step clearance Base of Support: Center of gravity altered Speed/Melany: Pace decreased (<100 feet/min) Stairs: Therapeutic Exercises:  
 
  
CARDIAC EXERCISE Sets Reps Active Active Assist  
Passive Self ROM Comments Shoulder flexion 1 5 ?                                            ?                                            ?                                            ?                                              
Shoulder abduction 1     5 ?                                            ?                                            ?                                            ?                                              
Scapular elevation 1 5 ?                                            ?                                            ?                                            ?                                              
 Scapular retraction 1 5 ?                                            ?                                            ?                                            ?                                              
Trunk rotation 1 5 ?                                            ?                                            ?                                            ?                                              
Trunk sidebending 1 5 ?                                            ?                                            ?                                            ?                                              
   ?                                            ?                                            ?                                            ?                                              
 
Pain: No complaints Activity Tolerance:  
Limited Please refer to the flowsheet for vital signs taken during this treatment. After treatment patient left:  
Up in chair Call light within reach RN notified COMMUNICATION/COLLABORATION:  
The patient?s plan of care was discussed with: Registered Nurse Gideon Osullivan PTA Time Calculation: 24 mins

## 2019-04-26 NOTE — PROGRESS NOTES
CM notified of Saturday discharge for patient. CM spoke with Vijay Jimenez at [facility] regarding Saturday discharge and bed availability. Vijay Jimenez will return call regarding bed availability after her AM update meeting. 
 
9386 - CM spoke with Jacekrupa Jimenez at [facility] who informed CM that they have a bed available for patient for Saturday discharge after 1500.   
 
1015 - PT spoke with CM regarding discharge patient with family transport to [facility] vs. Wheelchair Daryel Buttery. Patient is two assist and wheelchair Daryel Buttery is determined to be most appropriate for transporting patient. CM to discuss wheelchair van setup with patient and family. 80 - CM called [facility] to schedule wheelchair pickup for discharge on Saturday, 4/27/19 at 1600. [facility] has confirmed pickup time of 1600 with transport to [facility]. CM let RN know of updated status as well. CM will update patient and family regarding wheelchair Daryel Buttery at transport and cost of $65 to be paid to  at time of pickup from [facility]. 
 
1440 - CM updated patient of [facility] via wheelchair to [facility]. Patient agrees to mode of transportation and cost.  Patient requested CM to call daughter, Padmini Watson, to advise her of update. CM called daughter, Padmini Watson, and advised her of transportation update. Daughter will pay for transportation and is agreeable to [facility] via wheelchair van pickup at 1600.  
 
Luiz Laurent, MPH

## 2019-04-26 NOTE — PROGRESS NOTES
CSS Progress Note Admit Date: 2019 POD: 5 Day Post-Op Procedure:  Procedure(s): MITRAL VALVE REPLACEMENT/WITH 25MM BIOPROSTHESIS CARDIOPULMONARY BYPASS/TRANSESOPHAGEAL ECHOCARDIOGRAHM AND EPI AORTIC ULTRASOUND BY DR. Eduardo Cronin. Subjective:  
Pt seen with Dr. Odalys Sommers. No acute overnight events. + BM overnight. On room air. Objective:  
Vitals: 
Blood pressure 121/60, pulse 75, temperature 97.8 °F (36.6 °C), resp. rate 16, height 5' 9\" (1.753 m), weight 225 lb 5 oz (102.2 kg), SpO2 99 %. Temp (24hrs), Av.1 °F (36.7 °C), Min:97.8 °F (36.6 °C), Max:98.3 °F (36.8 °C) EKG/Rhythm: NSR 80s Oxygen Therapy: Room air CXR:  
CXR Results  (Last 48 hours) 19 0439  XR CHEST PORT Final result Impression:  IMPRESSION:   
1. Unchanged pulmonary interstitial edema. Unchanged small bilateral pleural  
effusions and bibasilar atelectasis. Narrative:  EXAM:  XR CHEST PORT INDICATION:   postop heart COMPARISON: Chest radiograph 2019. FINDINGS: AP radiograph of the chest was obtained. Stable postoperative changes of mitral valve replacement. Small bilateral  
pleural effusions and bibasilar atelectasis are stable. Unchanged diffuse  
bilateral interstitial opacities. No pneumothorax. Calcifications of the  
thoracic aorta. Unchanged cardiomegaly. 19 0430  XR CHEST PORT Final result Impression:   impression: No change. Narrative:  Clinical indication: Postop heart. Portable AP semiupright view of the chest is obtained and compared to 2015. Blunting of the costophrenic angle and interstitial prominence are stable. Bibasilar platelike atelectasis. 19 1501  XR CHEST PA LAT Final result Impression:  IMPRESSION:  
   
Bilateral infrahilar atelectasis. Stable small pleural effusions. No  
pneumothorax. Narrative:  EXAM:  XR CHEST PA LAT INDICATION: Heart surgery. Right chest tube and mediastinal drain removal.  
   
COMPARISON: 4/24/2019 at 0408 hours TECHNIQUE: Frontal and lateral chest views FINDINGS: The right chest tube and mediastinal drains have been removed. Sternal  
wires are present. Cardiac monitoring wires overlie the thorax. The  
cardiomediastinal contours are stable. There is bilateral infrahilar atelectasis and small pleural effusions. There is  
no pneumothorax. The bones and upper abdomen are stable. Admission Weight: Last Weight Weight: 215 lb 11.2 oz (97.8 kg) Weight: 225 lb 5 oz (102.2 kg) Intake / Output / Drain: 
Current Shift: No intake/output data recorded. Last 24 hrs.:  
 
Intake/Output Summary (Last 24 hours) at 4/27/2019 8510 Last data filed at 4/26/2019 2004 Gross per 24 hour Intake 1140 ml Output 1100 ml Net 40 ml EXAM: 
General:  Sitting in chair, NAD Lungs:   Clear to auscultation bilaterally. Faint crackles at bases bilaterally. Incision:  No erythema, drainage or swelling. Heart:  Regular rate and rhythm, S1, S2 normal, no murmur, click, rub or gallop. Abdomen:   Soft, non-tender. Bowel sounds normal. No masses,  No organomegaly. Extremities:  Mild nonpitting edema. PPP. Neurologic:  Gross motor and sensory apparatus intact. Labs:  
Recent Labs  
  04/27/19 
0636 04/27/19 
0557  04/26/19 
5309 WBC  --  5.0  --  8.4 HGB  --  7.6*  --  8.3* HCT  --  25.2*  --  27.0*  
PLT  --  102*  --  110* NA  --  137  --  134* K  --  4.3  --  3.9 BUN  --  37*  --  40* CREA  --  1.84*  --  1.88* GLU  --  78  --  96  
GLUCPOC 90  --    < >  --   
INR  --   --   --  1.2*  
 < > = values in this interval not displayed. Assessment:  
 
Active Problems: 
  Mitral stenosis (4/16/2019) S/P MVR (mitral valve replacement) (4/22/2019) Overview: MITRAL VALVE REPLACEMENT/WITH 25MM BIOPROSTHESIS Plan/Recommendations/Medical Decision Makin. Severe MS s/p tissue MVR (19). ASA and Xarelto per Dr. Dina Jacob. 2. Chronic Afib: Started PO amio per EP. Was on tikosyn PTA, but unlikely to resume 2/2 QTC, age, and kidney function. Continue BB. On Xarelto per Dr. Dina Jacob. 3. Iron deficiency anemia s/p iron transfusions with hx GIB 2019: H/H trending down. PO Fe 4.  HTN: Continue BB. Consider norvasc, ACE and hydralazine when appropriate. 5. T2DM: A1c 7. Blood sugars ACHS, high sensitivity SSI. Will decrease AM NPH this AM (40 units from 43), continue PM dose (23 units) 6. HLD: Statin.  
7. Hx Bladder CA: No issues voiding.  
8. Hypothyroid: Synthroid. 9. GERD/Schatzki's ring s/p dilatation 2018: No issues swallowing since. Pepcid. 10. Pulmonary HTN: Much improved since outside cath on 03/15. Monitor 11. CKD stage III s/p renal artery stenting: Creatinine baseline. UOP improved. Encouraged PO fluids. Monitor. 12. Chronic diastolic HF (NYHA class III on admission): BB today. Holding ACE until appropriate. Resume AM lasix. 13. PVD with history of venous ulcers: No current open wounds. Monitor. Activity as tolerated. 15. CVA () with no deficits: PT/OT. Statin 15. Recent urine leukocytosis with Gram neg rods(POA): Completed abx. Monitor 16. Thrombocytopenia: stabilized. Pre-op HIT/DINA negative, heme consult confirm likely medication induced rather than bleeding. Surgery performed w/ Bival. ASA/Xarelto given per Dr. Dina Jacob. 17. Elevated AST: Resolved. Monitor 18. Constipation: Had BM. PRN Dulcolax, miralax, senna. Encouraged activity, PO fluids. Dispo: PT/OT. Plan to d/c to [address] [place] [address] today.   
 
Signed By: NEVAEH Reece

## 2019-04-27 ENCOUNTER — HOSPITAL ENCOUNTER (OUTPATIENT)
Dept: REHABILITATION | Age: 84
End: 2019-05-14
Attending: PHYSICAL MEDICINE & REHABILITATION | Admitting: PHYSICAL MEDICINE & REHABILITATION

## 2019-04-27 ENCOUNTER — APPOINTMENT (OUTPATIENT)
Dept: GENERAL RADIOLOGY | Age: 84
DRG: 220 | End: 2019-04-27
Attending: PHYSICIAN ASSISTANT
Payer: MEDICARE

## 2019-04-27 VITALS
BODY MASS INDEX: 33.37 KG/M2 | HEART RATE: 79 BPM | OXYGEN SATURATION: 97 % | DIASTOLIC BLOOD PRESSURE: 67 MMHG | SYSTOLIC BLOOD PRESSURE: 123 MMHG | WEIGHT: 225.31 LBS | TEMPERATURE: 98.2 F | RESPIRATION RATE: 16 BRPM | HEIGHT: 69 IN

## 2019-04-27 DIAGNOSIS — I38 ENDOCARDITIS: ICD-10-CM

## 2019-04-27 LAB
ALBUMIN SERPL-MCNC: 2.6 G/DL (ref 3.5–5)
ALBUMIN/GLOB SERPL: 0.8 {RATIO} (ref 1.1–2.2)
ALP SERPL-CCNC: 65 U/L (ref 45–117)
ALT SERPL-CCNC: 9 U/L (ref 12–78)
ANION GAP SERPL CALC-SCNC: 6 MMOL/L (ref 5–15)
AST SERPL-CCNC: 13 U/L (ref 15–37)
BILIRUB SERPL-MCNC: 0.6 MG/DL (ref 0.2–1)
BUN SERPL-MCNC: 37 MG/DL (ref 6–20)
BUN/CREAT SERPL: 20 (ref 12–20)
CALCIUM SERPL-MCNC: 8.7 MG/DL (ref 8.5–10.1)
CHLORIDE SERPL-SCNC: 106 MMOL/L (ref 97–108)
CO2 SERPL-SCNC: 25 MMOL/L (ref 21–32)
CREAT SERPL-MCNC: 1.84 MG/DL (ref 0.55–1.02)
ERYTHROCYTE [DISTWIDTH] IN BLOOD BY AUTOMATED COUNT: 17.1 % (ref 11.5–14.5)
GLOBULIN SER CALC-MCNC: 3.4 G/DL (ref 2–4)
GLUCOSE BLD STRIP.AUTO-MCNC: 177 MG/DL (ref 65–100)
GLUCOSE BLD STRIP.AUTO-MCNC: 90 MG/DL (ref 65–100)
GLUCOSE SERPL-MCNC: 78 MG/DL (ref 65–100)
HCT VFR BLD AUTO: 25.2 % (ref 35–47)
HGB BLD-MCNC: 7.6 G/DL (ref 11.5–16)
MCH RBC QN AUTO: 30.2 PG (ref 26–34)
MCHC RBC AUTO-ENTMCNC: 30.2 G/DL (ref 30–36.5)
MCV RBC AUTO: 100 FL (ref 80–99)
NRBC # BLD: 0 K/UL (ref 0–0.01)
NRBC BLD-RTO: 0 PER 100 WBC
PLATELET # BLD AUTO: 102 K/UL (ref 150–400)
PMV BLD AUTO: 12.8 FL (ref 8.9–12.9)
POTASSIUM SERPL-SCNC: 4.3 MMOL/L (ref 3.5–5.1)
PROT SERPL-MCNC: 6 G/DL (ref 6.4–8.2)
RBC # BLD AUTO: 2.52 M/UL (ref 3.8–5.2)
SERVICE CMNT-IMP: ABNORMAL
SERVICE CMNT-IMP: NORMAL
SODIUM SERPL-SCNC: 137 MMOL/L (ref 136–145)
WBC # BLD AUTO: 5 K/UL (ref 3.6–11)

## 2019-04-27 PROCEDURE — 36415 COLL VENOUS BLD VENIPUNCTURE: CPT

## 2019-04-27 PROCEDURE — 97116 GAIT TRAINING THERAPY: CPT

## 2019-04-27 PROCEDURE — 71045 X-RAY EXAM CHEST 1 VIEW: CPT

## 2019-04-27 PROCEDURE — 82962 GLUCOSE BLOOD TEST: CPT

## 2019-04-27 PROCEDURE — 85027 COMPLETE CBC AUTOMATED: CPT

## 2019-04-27 PROCEDURE — 74011250637 HC RX REV CODE- 250/637: Performed by: THORACIC SURGERY (CARDIOTHORACIC VASCULAR SURGERY)

## 2019-04-27 PROCEDURE — 80053 COMPREHEN METABOLIC PANEL: CPT

## 2019-04-27 PROCEDURE — 74011250637 HC RX REV CODE- 250/637: Performed by: INTERNAL MEDICINE

## 2019-04-27 PROCEDURE — 74011250637 HC RX REV CODE- 250/637: Performed by: NURSE PRACTITIONER

## 2019-04-27 PROCEDURE — 74011636637 HC RX REV CODE- 636/637: Performed by: NURSE PRACTITIONER

## 2019-04-27 PROCEDURE — 74011250637 HC RX REV CODE- 250/637: Performed by: PHYSICIAN ASSISTANT

## 2019-04-27 RX ORDER — INSULIN LISPRO 100 [IU]/ML
INJECTION, SOLUTION INTRAVENOUS; SUBCUTANEOUS
Status: DISCONTINUED | OUTPATIENT
Start: 2019-04-27 | End: 2019-04-27 | Stop reason: HOSPADM

## 2019-04-27 RX ORDER — FUROSEMIDE 80 MG/1
80 TABLET ORAL DAILY
Qty: 60 TAB | Refills: 1 | Status: SHIPPED
Start: 2019-04-27 | End: 2019-06-14 | Stop reason: DRUGHIGH

## 2019-04-27 RX ORDER — OXYMETAZOLINE HCL 0.05 %
2 SPRAY, NON-AEROSOL (ML) NASAL
Status: DISCONTINUED | OUTPATIENT
Start: 2019-04-27 | End: 2019-04-27 | Stop reason: HOSPADM

## 2019-04-27 RX ORDER — FUROSEMIDE 40 MG/1
80 TABLET ORAL DAILY
Status: DISCONTINUED | OUTPATIENT
Start: 2019-04-27 | End: 2019-04-27 | Stop reason: HOSPADM

## 2019-04-27 RX ADMIN — HUMAN INSULIN 40 UNITS: 100 INJECTION, SUSPENSION SUBCUTANEOUS at 09:27

## 2019-04-27 RX ADMIN — LEVOTHYROXINE SODIUM 125 MCG: 125 TABLET ORAL at 07:33

## 2019-04-27 RX ADMIN — CHLORHEXIDINE GLUCONATE 10 ML: 1.2 RINSE ORAL at 08:00

## 2019-04-27 RX ADMIN — Medication 10 ML: at 07:30

## 2019-04-27 RX ADMIN — SENNOSIDES AND DOCUSATE SODIUM 1 TABLET: 8.6; 5 TABLET ORAL at 08:00

## 2019-04-27 RX ADMIN — Medication 10 ML: at 11:22

## 2019-04-27 RX ADMIN — OXYMETAZOLINE HYDROCHLORIDE 2 SPRAY: 0.05 SPRAY NASAL at 13:41

## 2019-04-27 RX ADMIN — ASPIRIN 81 MG 81 MG: 81 TABLET ORAL at 08:00

## 2019-04-27 RX ADMIN — FERROUS SULFATE TAB 325 MG (65 MG ELEMENTAL FE) 325 MG: 325 (65 FE) TAB at 08:00

## 2019-04-27 RX ADMIN — ALLOPURINOL 200 MG: 100 TABLET ORAL at 08:00

## 2019-04-27 RX ADMIN — AMIODARONE HYDROCHLORIDE 200 MG: 200 TABLET ORAL at 08:00

## 2019-04-27 RX ADMIN — POTASSIUM CHLORIDE 20 MEQ: 750 TABLET, EXTENDED RELEASE ORAL at 08:00

## 2019-04-27 RX ADMIN — POLYETHYLENE GLYCOL 3350 17 G: 17 POWDER, FOR SOLUTION ORAL at 08:00

## 2019-04-27 RX ADMIN — METOPROLOL TARTRATE 50 MG: 50 TABLET ORAL at 08:00

## 2019-04-27 RX ADMIN — MUPIROCIN: 20 OINTMENT TOPICAL at 08:00

## 2019-04-27 RX ADMIN — FUROSEMIDE 80 MG: 40 TABLET ORAL at 09:27

## 2019-04-27 NOTE — PROGRESS NOTES
Cardiac Surgery Care Coordinator- Met with Mandi Vinson  reviewed plan of care and discussed upcoming discharge date. Reinforced sternal precautions and encouraged continued use of the incentive spirometer. Reviewed goals for the day and discussed the  importance of increased activity and continued activity after discharge. Reviewed importance of wearing the red reminder bracelet and when to call MD. Will continue to follow for educational and emotional needs.  Paula Fonseca RN

## 2019-04-27 NOTE — PROGRESS NOTES
Bedside and Verbal shift change report given to [facility] (oncoming nurse) by Sheldon Bloch (offgoing nurse). Report included the following information SBAR, Kardex, Intake/Output, MAR, Recent Results and Cardiac Rhythm NSR w/ 1st degree. 1145: Called report to [employer] at [place] [address]. 1300: Pt with nose bleed that has been occurring for about 15 minutes now. Will page Alejandro for orders. 1345: Afrin ordered per Alejandro. 1550: Pt converted to AFIB. Pt asymptomatic. Paging Alejandro now. 1630: Alejandro said pt okay to be DC'd to [facility].  
 
I have reviewed discharge instructions with the patient. The patient verbalized understanding.

## 2019-04-27 NOTE — DISCHARGE INSTRUCTIONS
Cardiac Surgery Specialist    [address] [place] [address] [address] [address]  Office- [phone]  Fax- [phone]       Office- [phone]  Fax- [phone]  _____________________________________________________________  KAREEM Enamorado Dr., Dr., Dr., PA-C, PA-C, PA-C  ______________________________________________________________    Name:Lea Bland     Surgery & Date: Procedure(s):  MITRAL VALVE REPLACEMENT/WITH 25MM BIOPROSTHESIS CARDIOPULMONARY BYPASS/TRANSESOPHAGEAL ECHOCARDIOGRAHM AND EPI AORTIC ULTRASOUND BY DR. Agustina Marie. Discharge Date: 04/26/19    MEDICATIONS:  Please refer to your After Visit Summary for your medication list. If you do not have a prescription for a new medication, you may purchase the medication over the counter. DO NOT TAKE ANY MEDICATIONS THAT ARE NOT ON THIS LIST    INSTRUCTIONS:  NO SMOKING OR TOBACCO PRODUCTS  Follow all the instructions in your discharge book  You may shower. Wash all incisions twice daily with mild soap and water. No lotions, ointments or powder. Call the office immediately for any redness, swelling, or drainage from your incision. Take your temperature daily and call for a temperature of 101 degrees or higher or for any symptoms that make you think you have and infection. Weigh yourself each morning. Call if you gain more than 5 pounds in 48 hours. Use the incentive spirometer 6-8 times a day-10 breaths each time. Use a pillow or your bear to splint your breastbone when coughing or sneezing. If you feel your breast bone clicking or popping, notify the office immediately. Walk several hundred feet several times daily.     DIET  Eat an American Heart Association/American Diabetic Association diet. If you are having trouble with your appetite, eat what you can. Try eating small, frequent meals throughout the day. ACTIVITY  NO DRIVING--you will be evaluated to drive at your follow up visit. Increase your activity by walking several times a day. Stay out of bed most of the day. When sitting, keep your legs elevated. You may ride in a car, but you must get out every hour and walk around. If you ride in a car with an airbag that can not be switched off, put the seat ALL the way back or ride in the back seat. NO LIFTING MORE THAN 5 POUND FOR 1 MONTH, THEN YOU CAN INCREASE TO NO MORE THAN 10 LBS FOR THE 2nd MONTH AND NO MORE THAN 15 LBS FOR THE 3rd MONTH.  YOU WILL NO LONGER HAVE ANY LIFTING RESTRICTIONS AFTER 3 MONTHS. FOLLOW UP  1. Your first follow up appointment will be on 05/20/19 at 1:45p. Our office is located in [address] [place] [address]. Please call our office at [phone] if you are unable to make your appointment. 2. You will be receiving a call to begin cardiac rehab. They are located in the [address] [place] [address] on [facility]. Their phone number is [phone]. Please call if you have not been contacted 2-3 weeks after discharge from the hospital.  3. We will make an appointment with your cardiologist at your last appointment. 4. Consult you primary care physician regarding your influenza & pneumovax vaccines. 5.   Please bring all medications with you to your appointment.     Signature:___________________________________________________

## 2019-04-27 NOTE — PROGRESS NOTES
Problem: Mobility Impaired (Adult and Pediatric) Goal: *Acute Goals and Plan of Care (Insert Text) Description Physical Therapy Goals Re-evaluation completed 4/23/2019 and most goals downgraded following MVR 4/22/19. 1.  Patient will move from supine to sit and sit to supine, scoot up and down and roll side to side in bed with minimal assistance/contact guard assist within 5 days. 2.  Patient will perform sit to/from stand with minimal assistance/contact guard assist within 5 days. 3.  Patient will ambulate 100 feet with least restrictive assistive device and minimal assistance/contact guard assist within 5 days. 4.  Patient will ascend/descend 4 stairs with single handrail with minimal assistance/contact guard assist within 5 days. 5.  Patient will perform cardiac exercises per protocol with supervision/set-up within 5 days. 6.  Patient will verbally and functionally recall 3/3 sternal precautions within 5 days. Initiated 4/19/2019 1. Patient will move from supine to sit and sit to supine, scoot up and down and roll side to side in bed with minimal assistance/contact guard assist with log roll technique within 7 days. 2.  Patient will perform sit to/from stand with supervision and adherence to sternal precautions within 7 days. 3.  Patient will ambulate 150 feet with least restrictive assistive device and supervision within 7 days. 4.  Patient will ascend/descend 4 stairs with single handrail, for balance only, with supervision within 7 days. 5.  Patient will perform cardiac exercises per protocol with supervision within 7 days. 6.  Patient will verbally and functionally recall 3/3 sternal precautions within 7 days. Outcome: Progressing Towards Goal 
 PHYSICAL THERAPY TREATMENT: Cardiac Patient: Alec Sacks (88 y.o. female) Date: 4/27/2019 Diagnosis: Mitral stenosis [I05.0] <principal problem not specified> Procedure(s) (LRB): 
 MITRAL VALVE REPLACEMENT/WITH 25MM BIOPROSTHESIS CARDIOPULMONARY BYPASS/TRANSESOPHAGEAL ECHOCARDIOGRAHM AND EPI AORTIC ULTRASOUND BY DR. Willi Tapia. (N/A) 5 Days Post-Op Precautions: Fall, Sternal 
Chart, physical therapy assessment, plan of care and goals were reviewed. ASSESSMENT: 
Based on the objective data described below, the patient presents with Moderate Assistance level overall for transfers. Gait training completed at Contact guard assistance and Minimum assistance, 110 feet with 3 rest breaks using a gait belt and HHA. Poor recall of sternal precautions and moderate cues to follow with fair to poor adherence. Patient c/o LEIJA and noted blue lips with c/o dizziness as she returned to her room. HR 52 BPM per monitor and  mmHg systolic down from 303'R. Notified nursing. Patient progress well but lives alone and is challenged with following sternal precautions. Recommend IPR The following are barriers to independence while in acute care:  
-Cognitive and/or behavioral: short term memory loss 
-Medical condition: strength, functional endurance, standing balance, cardiopulmonary tolerance and precautions   
-Other:    
 
Prior level of function: independent and living alone PLAN: 
Patient continues to benefit from skilled intervention to address the above impairments. Continue treatment per established plan of care. Recommend with staff: up with assist x1 for all meals, monitor VS for further bradycardia or orthostatic hypotension Discharge recommendations: Rehab at inpatient facility: patient can tolerate 3 hours of therapy (to regain functional baseline patient requires rehab) Patient's barriers to discharging home, in addition to above impairments: lives alone. Equipment recommendations for successful discharge (if) home: to be determined SUBJECTIVE:  
Patient stated I feel short of breath.  OBJECTIVE DATA SUMMARY:  
Critical Behavior: 
Neurologic State: Alert Orientation Level: Oriented X4 Cognition: Appropriate for age attention/concentration, Follows commands, Memory loss Safety/Judgement: Awareness of environment, Decreased awareness of need for safety, Fall prevention Functional Mobility Training: 
Bed Mobility: 
  
  
  
Scooting: Contact guard assistance(fair adherence to precautiosn) Transfers: 
Sit to Stand: Moderate assistance Stand to Sit: Minimum assistance Balance: 
Sitting: Impaired Standing: Impaired Standing - Static: Good;Fair 
Standing - Dynamic : Fair Ambulation/Gait Training: 
Distance (ft): 110 Feet (ft)(2 standing and 1 seated rest break) Assistive Device: Gait belt Ambulation - Level of Assistance: Minimal assistance Gait Abnormalities: Altered arm swing Base of Support: Center of gravity altered; Widened Speed/Melany: Pace decreased (<100 feet/min) Therapeutic Exercises:  
 
  
CARDIAC EXERCISE Sets Reps Active Active Assist  
Passive Self ROM Comments Shoulder flexion 1 5 ?                                            ?                                            ?                                            ?                                              
Shoulder abduction 1     5 ?                                            ?                                            ?                                            ?                                              
Scapular retraction 1 5 ?                                            ?                                            ?                                            ?                                              
 
Pain: 
Pre treatment: 5 /10 During treatment: 5/10 Post treatment:  5/10 Location: sternal   
Description:aching Aggravating factors: Activity Tolerance:  
Fair and requires rest breaks Please refer to the flowsheet for vital signs taken during this treatment. After treatment patient left:  
Up in chair Call light within reach RN notified COMMUNICATION/COLLABORATION:  
The patients plan of care was discussed with: Registered Nurse Hunter Maradiaga, PT, DPT Time Calculation: 25 mins

## 2019-04-27 NOTE — PROGRESS NOTES
Bedside shift change report given to ДМИТРИЙ Briseno (oncoming nurse) by NITIN Lim (offgoing nurse). Report included the following information SBAR, Procedure Summary, Intake/Output, MAR and Recent Results.

## 2019-04-27 NOTE — PROGRESS NOTES
Problem: Falls - Risk of 
Goal: *Absence of Falls Description Document Durenda Isaac Fall Risk and appropriate interventions in the flowsheet. Outcome: Progressing Towards Goal 
  
Problem: Diabetes Self-Management Goal: *Disease process and treatment process Description Define diabetes and identify own type of diabetes; list 3 options for treating diabetes. Outcome: Progressing Towards Goal 
Goal: *Using medications safely Description State effect of diabetes medications on diabetes; name diabetes medication taking, action and side effects. Outcome: Progressing Towards Goal 
Goal: *Monitoring blood glucose, interpreting and using results Description Identify recommended blood glucose targets  and personal targets. Outcome: Progressing Towards Goal 
Goal: *Prevention, detection, treatment of acute complications Description List symptoms of hyper- and hypoglycemia; describe how to treat low blood sugar and actions for lowering  high blood glucose level. Outcome: Progressing Towards Goal 
  
Problem: Cardiac Valve Surgery: Post-Op Day 6 Goal: Activity/Safety Outcome: Progressing Towards Goal 
Goal: Diagnostic Test/Procedures Outcome: Progressing Towards Goal 
Goal: Nutrition/Diet Outcome: Progressing Towards Goal 
Goal: Medications Outcome: Progressing Towards Goal 
Goal: Respiratory Outcome: Progressing Towards Goal 
Goal: Psychosocial 
Outcome: Progressing Towards Goal 
  
Problem: Pressure Injury - Risk of 
Goal: *Prevention of pressure injury Description Document Shane Scale and appropriate interventions in the flowsheet.  
Outcome: Progressing Towards Goal

## 2019-04-28 LAB
ALBUMIN SERPL-MCNC: 2.7 G/DL (ref 3.5–5)
ALBUMIN/GLOB SERPL: 0.9 {RATIO} (ref 1.1–2.2)
ALP SERPL-CCNC: 67 U/L (ref 45–117)
ALT SERPL-CCNC: 12 U/L (ref 12–78)
ANION GAP SERPL CALC-SCNC: 4 MMOL/L (ref 5–15)
APPEARANCE UR: CLEAR
AST SERPL-CCNC: 18 U/L (ref 15–37)
BACTERIA URNS QL MICRO: NEGATIVE /HPF
BASOPHILS # BLD: 0 K/UL (ref 0–0.1)
BASOPHILS NFR BLD: 0 % (ref 0–1)
BILIRUB SERPL-MCNC: 0.5 MG/DL (ref 0.2–1)
BILIRUB UR QL: NEGATIVE
BUN SERPL-MCNC: 39 MG/DL (ref 6–20)
BUN/CREAT SERPL: 21 (ref 12–20)
CALCIUM SERPL-MCNC: 8.4 MG/DL (ref 8.5–10.1)
CHLORIDE SERPL-SCNC: 107 MMOL/L (ref 97–108)
CO2 SERPL-SCNC: 28 MMOL/L (ref 21–32)
COLOR UR: ABNORMAL
CREAT SERPL-MCNC: 1.85 MG/DL (ref 0.55–1.02)
DIFFERENTIAL METHOD BLD: ABNORMAL
EOSINOPHIL # BLD: 0.1 K/UL (ref 0–0.4)
EOSINOPHIL NFR BLD: 2 % (ref 0–7)
EPITH CASTS URNS QL MICRO: ABNORMAL /LPF
ERYTHROCYTE [DISTWIDTH] IN BLOOD BY AUTOMATED COUNT: 17.3 % (ref 11.5–14.5)
GLOBULIN SER CALC-MCNC: 3 G/DL (ref 2–4)
GLUCOSE SERPL-MCNC: 88 MG/DL (ref 65–100)
GLUCOSE UR STRIP.AUTO-MCNC: NEGATIVE MG/DL
HCT VFR BLD AUTO: 24.5 % (ref 35–47)
HGB BLD-MCNC: 7.5 G/DL (ref 11.5–16)
HGB UR QL STRIP: NEGATIVE
IMM GRANULOCYTES # BLD AUTO: 0 K/UL (ref 0–0.04)
IMM GRANULOCYTES NFR BLD AUTO: 1 % (ref 0–0.5)
KETONES UR QL STRIP.AUTO: NEGATIVE MG/DL
LEUKOCYTE ESTERASE UR QL STRIP.AUTO: ABNORMAL
LYMPHOCYTES # BLD: 0.7 K/UL (ref 0.8–3.5)
LYMPHOCYTES NFR BLD: 14 % (ref 12–49)
MCH RBC QN AUTO: 29.5 PG (ref 26–34)
MCHC RBC AUTO-ENTMCNC: 30.6 G/DL (ref 30–36.5)
MCV RBC AUTO: 96.5 FL (ref 80–99)
MONOCYTES # BLD: 0.5 K/UL (ref 0–1)
MONOCYTES NFR BLD: 10 % (ref 5–13)
NEUTS SEG # BLD: 3.5 K/UL (ref 1.8–8)
NEUTS SEG NFR BLD: 73 % (ref 32–75)
NITRITE UR QL STRIP.AUTO: NEGATIVE
NRBC # BLD: 0 K/UL (ref 0–0.01)
NRBC BLD-RTO: 0 PER 100 WBC
PH UR STRIP: 5 [PH] (ref 5–8)
PLATELET # BLD AUTO: 102 K/UL (ref 150–400)
PMV BLD AUTO: 12.8 FL (ref 8.9–12.9)
POTASSIUM SERPL-SCNC: 4.3 MMOL/L (ref 3.5–5.1)
PROT SERPL-MCNC: 5.7 G/DL (ref 6.4–8.2)
PROT UR STRIP-MCNC: NEGATIVE MG/DL
RBC # BLD AUTO: 2.54 M/UL (ref 3.8–5.2)
RBC #/AREA URNS HPF: ABNORMAL /HPF (ref 0–5)
RBC MORPH BLD: ABNORMAL
SODIUM SERPL-SCNC: 139 MMOL/L (ref 136–145)
SP GR UR REFRACTOMETRY: 1.02 (ref 1–1.03)
UROBILINOGEN UR QL STRIP.AUTO: 0.2 EU/DL (ref 0.2–1)
WBC # BLD AUTO: 4.8 K/UL (ref 3.6–11)
WBC URNS QL MICRO: ABNORMAL /HPF (ref 0–4)

## 2019-04-28 PROCEDURE — 81001 URINALYSIS AUTO W/SCOPE: CPT

## 2019-04-28 PROCEDURE — 85025 COMPLETE CBC W/AUTO DIFF WBC: CPT

## 2019-04-28 PROCEDURE — 36415 COLL VENOUS BLD VENIPUNCTURE: CPT

## 2019-04-28 PROCEDURE — 80053 COMPREHEN METABOLIC PANEL: CPT

## 2019-04-29 ENCOUNTER — TELEPHONE (OUTPATIENT)
Dept: INTERNAL MEDICINE CLINIC | Age: 84
End: 2019-04-29

## 2019-04-29 ENCOUNTER — PATIENT OUTREACH (OUTPATIENT)
Dept: INTERNAL MEDICINE CLINIC | Age: 84
End: 2019-04-29

## 2019-04-29 RX ORDER — PANTOPRAZOLE SODIUM 40 MG/1
40 GRANULE, DELAYED RELEASE ORAL DAILY
COMMUNITY
End: 2019-06-14

## 2019-04-29 NOTE — TELEPHONE ENCOUNTER
----- Message from Darell Acosta RN sent at 4/29/2019 11:51 AM EDT -----  Regarding: FW: discharged from [facility] to [facility] 4/27/19  I spoke with her daughter. She has an appointment set up in June with Dr. Finley Bosworth to establish care. Thanks  Sera   ----- Message -----  From: Airam Lubin MD  Sent: 4/29/2019  10:24 AM  To: Darell Acosta RN  Subject: RE: discharged from [facility] to [address] R[address]    Before proceeding with her , find out from daughter has she established with a new MD in [place] which is what they said they were going to do  ----- Message -----  From: Silva Self RN  Sent: 4/29/2019  10:14 AM  To: Daniele Neil MD  Subject: discharged from [facility] to [address] [facility] updated and completed   Labs reviewed.  New labs drawn this morning at [facility] but results were not available at time of contact  Unknown discharge date at this time    Thanks,  Max Kaminski

## 2019-04-29 NOTE — PROGRESS NOTES
Transition of Care Coordination/Hospital to Post Acute Facility: 
  
Date/Time:  2019 9:56 AM 
 
Patient was admitted to [facility] on 19 for treatment of Mitral Valve Replacement. Patient was discharged 19 to [address] [place] [address] for continuation of care. Inpatient RRAT score: 34 Top Challenges reviewed Medication Reconciliation: Metoprolol dose decreased to 12.5 BID, Insulin NPH doses changed to 35 units every morning and 19 at dinner, omeprazole changed to Protonix 40mg daily Labs reviewed-New were labs drawn this morning, but nurse did not have results at time of contact Expected DC date: unknown at this time Method of communication with care team :staff message Nurse Navigator(NN) spoke with nurse Daniela at [facility], to provide introduction to self and explanation of the Nurse Navigator Role. Verified name and  as patient identifiers. Nurse Navigator also spoke with daughter, Umair Mata. Pt. name and  verified. Daughter reports Patient will be establishing care with provider in [place] and has an upcoming appointment with Dr. Shira Lambert on 19 at 9:00. Discussed and reviewed  anticipated length of stay, discharge summary, medication reconciliation, recommendations for future lab/imaging ACP:  
Does the patient have a current ACP (including DDNR):  yes Does the post acute facility have a copy of the patients ACP:  yes Medication(s):  
New Medications at Discharge: amiodarone, aspirin 81 mg, ferrous sulfate, polyglycol, senna Changed Medications at Discharge: furosemide, insulin NPH, levothyroxine Discontinued Medications at Discharge: amlodipine, dofetilide, enoxaparin, hydralazine, lisinopril PCP/Specialist follow up:  
Future Appointments Date Time Provider Dafne Jenkins 2019  1:45 PM Montse Downing MD [facility]  
2019  9:00 AM Garcia Woodward MD [place] [id number]  
 9/19/2019  1:15 PM Emilee Fernandez MD [facility] SCHED  
9/20/2019 10:45 AM Emilee Fernandez MD [facility]. 49 Opportunity to ask questions was provided. Contact information was provided for future reference or further questions. Will continue to monitor.

## 2019-05-03 LAB
ANION GAP SERPL CALC-SCNC: 4 MMOL/L (ref 5–15)
BUN SERPL-MCNC: 32 MG/DL (ref 6–20)
BUN/CREAT SERPL: 15 (ref 12–20)
CALCIUM SERPL-MCNC: 8.6 MG/DL (ref 8.5–10.1)
CHLORIDE SERPL-SCNC: 107 MMOL/L (ref 97–108)
CO2 SERPL-SCNC: 29 MMOL/L (ref 21–32)
CREAT SERPL-MCNC: 2.13 MG/DL (ref 0.55–1.02)
ERYTHROCYTE [DISTWIDTH] IN BLOOD BY AUTOMATED COUNT: 18.6 % (ref 11.5–14.5)
GLUCOSE SERPL-MCNC: 89 MG/DL (ref 65–100)
HCT VFR BLD AUTO: 24.9 % (ref 35–47)
HGB BLD-MCNC: 7.4 G/DL (ref 11.5–16)
MCH RBC QN AUTO: 29.6 PG (ref 26–34)
MCHC RBC AUTO-ENTMCNC: 29.7 G/DL (ref 30–36.5)
MCV RBC AUTO: 99.6 FL (ref 80–99)
NRBC # BLD: 0 K/UL (ref 0–0.01)
NRBC BLD-RTO: 0 PER 100 WBC
PLATELET # BLD AUTO: 155 K/UL (ref 150–400)
PMV BLD AUTO: 11.9 FL (ref 8.9–12.9)
POTASSIUM SERPL-SCNC: 4.9 MMOL/L (ref 3.5–5.1)
RBC # BLD AUTO: 2.5 M/UL (ref 3.8–5.2)
SODIUM SERPL-SCNC: 140 MMOL/L (ref 136–145)
WBC # BLD AUTO: 6.6 K/UL (ref 3.6–11)

## 2019-05-03 PROCEDURE — 80048 BASIC METABOLIC PNL TOTAL CA: CPT

## 2019-05-03 PROCEDURE — 36415 COLL VENOUS BLD VENIPUNCTURE: CPT

## 2019-05-03 PROCEDURE — 85027 COMPLETE CBC AUTOMATED: CPT

## 2019-05-07 LAB
ANION GAP SERPL CALC-SCNC: 8 MMOL/L (ref 5–15)
BUN SERPL-MCNC: 38 MG/DL (ref 6–20)
BUN/CREAT SERPL: 19 (ref 12–20)
CALCIUM SERPL-MCNC: 8.7 MG/DL (ref 8.5–10.1)
CHLORIDE SERPL-SCNC: 109 MMOL/L (ref 97–108)
CO2 SERPL-SCNC: 22 MMOL/L (ref 21–32)
CREAT SERPL-MCNC: 1.99 MG/DL (ref 0.55–1.02)
ERYTHROCYTE [DISTWIDTH] IN BLOOD BY AUTOMATED COUNT: 18.7 % (ref 11.5–14.5)
GLUCOSE SERPL-MCNC: 105 MG/DL (ref 65–100)
HCT VFR BLD AUTO: 26.3 % (ref 35–47)
HGB BLD-MCNC: 7.9 G/DL (ref 11.5–16)
MCH RBC QN AUTO: 29.9 PG (ref 26–34)
MCHC RBC AUTO-ENTMCNC: 30 G/DL (ref 30–36.5)
MCV RBC AUTO: 99.6 FL (ref 80–99)
NRBC # BLD: 0 K/UL (ref 0–0.01)
NRBC BLD-RTO: 0 PER 100 WBC
PLATELET # BLD AUTO: 228 K/UL (ref 150–400)
PMV BLD AUTO: 12 FL (ref 8.9–12.9)
POTASSIUM SERPL-SCNC: 4.3 MMOL/L (ref 3.5–5.1)
RBC # BLD AUTO: 2.64 M/UL (ref 3.8–5.2)
SODIUM SERPL-SCNC: 139 MMOL/L (ref 136–145)
WBC # BLD AUTO: 8.8 K/UL (ref 3.6–11)

## 2019-05-07 PROCEDURE — 85027 COMPLETE CBC AUTOMATED: CPT

## 2019-05-07 PROCEDURE — 36415 COLL VENOUS BLD VENIPUNCTURE: CPT

## 2019-05-07 PROCEDURE — 80048 BASIC METABOLIC PNL TOTAL CA: CPT

## 2019-05-08 ENCOUNTER — APPOINTMENT (OUTPATIENT)
Dept: GENERAL RADIOLOGY | Age: 84
End: 2019-05-08
Attending: PHYSICAL MEDICINE & REHABILITATION

## 2019-05-08 PROCEDURE — 71046 X-RAY EXAM CHEST 2 VIEWS: CPT

## 2019-05-13 LAB
ANION GAP SERPL CALC-SCNC: 4 MMOL/L (ref 5–15)
BASOPHILS # BLD: 0 K/UL (ref 0–0.1)
BASOPHILS NFR BLD: 1 % (ref 0–1)
BUN SERPL-MCNC: 34 MG/DL (ref 6–20)
BUN/CREAT SERPL: 19 (ref 12–20)
CALCIUM SERPL-MCNC: 8.5 MG/DL (ref 8.5–10.1)
CHLORIDE SERPL-SCNC: 110 MMOL/L (ref 97–108)
CO2 SERPL-SCNC: 30 MMOL/L (ref 21–32)
CREAT SERPL-MCNC: 1.78 MG/DL (ref 0.55–1.02)
DIFFERENTIAL METHOD BLD: ABNORMAL
EOSINOPHIL # BLD: 0.3 K/UL (ref 0–0.4)
EOSINOPHIL NFR BLD: 4 % (ref 0–7)
ERYTHROCYTE [DISTWIDTH] IN BLOOD BY AUTOMATED COUNT: 18.2 % (ref 11.5–14.5)
GLUCOSE SERPL-MCNC: 124 MG/DL (ref 65–100)
HCT VFR BLD AUTO: 29.2 % (ref 35–47)
HGB BLD-MCNC: 8.6 G/DL (ref 11.5–16)
IMM GRANULOCYTES # BLD AUTO: 0 K/UL (ref 0–0.04)
IMM GRANULOCYTES NFR BLD AUTO: 1 % (ref 0–0.5)
LYMPHOCYTES # BLD: 0.7 K/UL (ref 0.8–3.5)
LYMPHOCYTES NFR BLD: 11 % (ref 12–49)
MCH RBC QN AUTO: 29.5 PG (ref 26–34)
MCHC RBC AUTO-ENTMCNC: 29.5 G/DL (ref 30–36.5)
MCV RBC AUTO: 100 FL (ref 80–99)
MONOCYTES # BLD: 0.4 K/UL (ref 0–1)
MONOCYTES NFR BLD: 7 % (ref 5–13)
NEUTS SEG # BLD: 4.7 K/UL (ref 1.8–8)
NEUTS SEG NFR BLD: 77 % (ref 32–75)
NRBC # BLD: 0 K/UL (ref 0–0.01)
NRBC BLD-RTO: 0 PER 100 WBC
PLATELET # BLD AUTO: 154 K/UL (ref 150–400)
PMV BLD AUTO: 11.4 FL (ref 8.9–12.9)
POTASSIUM SERPL-SCNC: 3.9 MMOL/L (ref 3.5–5.1)
RBC # BLD AUTO: 2.92 M/UL (ref 3.8–5.2)
SODIUM SERPL-SCNC: 144 MMOL/L (ref 136–145)
WBC # BLD AUTO: 6.1 K/UL (ref 3.6–11)

## 2019-05-13 PROCEDURE — 85025 COMPLETE CBC W/AUTO DIFF WBC: CPT

## 2019-05-13 PROCEDURE — 36415 COLL VENOUS BLD VENIPUNCTURE: CPT

## 2019-05-13 PROCEDURE — 80048 BASIC METABOLIC PNL TOTAL CA: CPT

## 2019-05-20 ENCOUNTER — PATIENT OUTREACH (OUTPATIENT)
Dept: INTERNAL MEDICINE CLINIC | Age: 84
End: 2019-05-20

## 2019-05-20 ENCOUNTER — OFFICE VISIT (OUTPATIENT)
Dept: CARDIOLOGY CLINIC | Age: 84
End: 2019-05-20

## 2019-05-20 VITALS
SYSTOLIC BLOOD PRESSURE: 130 MMHG | HEIGHT: 69 IN | WEIGHT: 210 LBS | OXYGEN SATURATION: 96 % | BODY MASS INDEX: 31.1 KG/M2 | RESPIRATION RATE: 16 BRPM | DIASTOLIC BLOOD PRESSURE: 66 MMHG | TEMPERATURE: 98 F | HEART RATE: 58 BPM

## 2019-05-20 DIAGNOSIS — Z98.890 S/P MVR (MITRAL VALVE REPAIR): ICD-10-CM

## 2019-05-20 DIAGNOSIS — Z95.2 S/P MVR (MITRAL VALVE REPLACEMENT): Primary | ICD-10-CM

## 2019-05-20 RX ORDER — LORATADINE 10 MG/1
10 TABLET ORAL DAILY
COMMUNITY
End: 2019-09-13 | Stop reason: SDUPTHER

## 2019-05-20 RX ORDER — NITROGLYCERIN 0.4 MG/1
0.4 TABLET SUBLINGUAL
COMMUNITY
End: 2019-07-15

## 2019-05-20 NOTE — PROGRESS NOTES
19 Attempted call to patient at [facility]. Patient no longer a patient there. Call placed to patient's home #. No answer. LVM. Patient is scheduled to establish care with Dr. Genna Murrell 19. Will try again later to follow up with patient Eva Centeno RN 
 
19 4:47pm  Patient ID verified using name and . Patient reports getting better every day, is being seen by [facility] and was released from surgeon's care today. Patient is scheduled to establish care with Dr. Genna Murrell and will not be followed by this office any longer unless something changes. Patient appreciated call.   
Jame Freitas

## 2019-05-20 NOTE — PROGRESS NOTES
Patient: Romelia Li   Age: 82 y.o. Patient Care Team:  Andres Oneill MD as PCP - General  Claudine Alexandre MD as Physician (Urology)  César Garcia MD as Physician (Cardiology)  Florinda De La Cruz MD as Physician (General Surgery)  Stacy Ram MD (Ophthalmology)  Chayito Jordan MD (General Surgery)  Sj Adams DO (Cardiology)  Andres Oneill MD (Internal Medicine)  Johnny Ceballso, RN as Ambulatory Care Navigator (Internal Medicine)    Diagnosis:  S/p MVR     Problem List:   Patient Active Problem List   Diagnosis Code    CHUCKY De Souza PVD (peripheral vascular disease) ([facility]) I73.9    Reflux esophagitis K21.0    Benign neoplasm of colon D12.6    Iron deficiency anemia D50.9    Hypomagnesemia E83.42    Long term current use of anticoagulant therapy Z79.01    Essential hypertension, benign I10    Bladder cancer ([facility]) C67.9    Gout M10.9    Encounter for long-term (current) use of other medications Z79.899    Plantar fasciitis M72.2    Unspecified late effects of cerebrovascular disease I69.90    Advance directive in chart Z78.9    Type 2 diabetes, controlled, with renal manifestation ([facility]) E11.29    Chronic atrial fibrillation ([facility]) I48.2    Mixed hyperlipidemia E78.2    Atherosclerosis of native coronary artery without angina pectoris I25.10    Hypothyroidism, acquired, autoimmune E06.3    Heart valve problem I38    Mitral regurgitation I34.0    S/P MVR (mitral valve repair) Z98.890    Active advance directive on file Z78.9    Non-rheumatic mitral regurgitation I34.0    Heart failure (HCC) I50.9    Bilateral leg edema R60.0    Esophageal stricture K22.2    Dysphagia R13.10    Cellulitis of right lower leg L03.115    GI bleeding K92.2    Mitral stenosis I05.0    S/P MVR (mitral valve replacement) Z95.2        Surgery: MVR (tissue valve) on 04/22/19    HPI: Pt is here for postop follow up. Home from inpt rehab about 1 week.   Getting HH PT twice a week, but only walking her around house. Living at her house alone. Good family support. Hasn't gone outside. Strength improving, but still SOB w/ exertion. Weight down 15 lbs from hospital discharge to today. On good diuretic regimen. Appetite slowly improving. Denies CP. Some mild dizziness with position changes, but passes. Mod LE edema slowly improving. Good BS control. Current Medications:   Current Outpatient Medications   Medication Sig Dispense Refill    camphor-methyl salicyl-menthol (SALONPAS) ptmd by Apply Externally route as needed.  loratadine (CLARITIN) 10 mg tablet Take 10 mg by mouth daily.  nitroglycerin (NITROSTAT) 0.4 mg SL tablet 0.4 mg by SubLINGual route every five (5) minutes as needed for Chest Pain. Up to 3 doses.  pantoprazole (PROTONIX) 40 mg granules for oral suspension 40 mg daily.  furosemide (LASIX) 80 mg tablet Take 1 Tab by mouth daily. (Patient taking differently: Take 80 mg by mouth daily. Indications: 80mg @ breakfast & 40mg @ lunch) 60 Tab 1    insulin NPH (HUMULIN N NPH INSULIN KWIKPEN) 100 unit/mL (3 mL) inpn INJECT 40 UNITS UNDER THE SKIN EVERY MORNING, 23 UNITS UNDER THE SKIN WITH DINNER (Patient taking differently: 24 units AC breakfast & 8 units AC dinner) 15 Pen 97    amiodarone (CORDARONE) 200 mg tablet Take 1 Tab by mouth two (2) times a day. 60 Tab 0    aspirin 81 mg chewable tablet Take 1 Tab by mouth daily. 90 Tab 0    ferrous sulfate 325 mg (65 mg iron) tablet Take 1 Tab by mouth daily (with breakfast).  30 Tab 0    metoprolol tartrate (LOPRESSOR) 50 mg tablet Take one and one half pill twice daily (Patient taking differently: Take 25 mg by mouth two (2) times a day.) 90 Tab 11    levothyroxine (SYNTHROID) 125 mcg tablet TAKE ONE TABLET BY MOUTH DAILY (Patient taking differently: TAKE ONE TABLET BY MOUTH DAILY BEFORE BREAKFAST) 90 Tab 7    atorvastatin (LIPITOR) 80 mg tablet TAKE ONE TABLET BY MOUTH EVERY EVENING 90 Tab 0    allopurinol (ZYLOPRIM) 100 mg tablet TAKE TWO TABLETS BY MOUTH DAILY 180 Tab 2    rivaroxaban (XARELTO) 15 mg tab tablet Take 1 Tab by mouth daily (with dinner). 30 Tab 11    ACCU-CHEK SHAHIDA PLUS TEST STRP strip USE TO CHECK BLOOD SUGAR FOUR TIMES A  Strip 9    acetaminophen (TYLENOL) 325 mg tablet Take 325 mg by mouth every four (4) hours as needed for Pain.  ADAN PEN NEEDLE 32 gauge x 5/32\" ndle USE WITH INSULIN PEN TWO TIMES A DAY AS DIRECTED BY PHYSICIAN 100 Pen Needle 11    potassium chloride SR (KLOR-CON 10) 10 mEq tablet Take 1 Tab by mouth daily. 30 Tab 12       Vitals: Blood pressure 130/66, pulse (!) 58, temperature 98 °F (36.7 °C), temperature source Oral, resp. rate 16, height 5' 9\" (1.753 m), weight 210 lb (95.3 kg), SpO2 96 %. Allergies: is allergic to actos [pioglitazone]; codeine; and hydrocodone. Physical Exam:  Wounds: clean, dry, no drainage    Lungs: clear to auscultation bilaterally    Heart: Iregular rate and rhythm, S1, S2 normal, no murmur, click, rub or gallop    Extremities: mod LE edema, PPP     Assessment/Plan:   1. Severe MS s/p tissue MVR (04/23/19). ASA and Xarelto per Dr. Jesse Peguero. 2. Chronic Afib: Started PO amio per EP. Was on tikosyn PTA, but unlikely to resume 2/2 QTC, age, and kidney function. Continue BB. On Xarelto per Dr. Jesse Peguero. 3. Iron deficiency anemia s/p iron transfusions with hx GIB 02/2019:  PO Fe, check CBC today. 4. HTN: Continue BB. Consider norvasc, ACE and hydralazine when appropriate. 5. T2DM: A1c 7. Blood sugars ACHS, high sensitivity SSI. AM NPH this AM/PM dose. 6. HLD: Statin.   7. Hx Bladder CA: No issues voiding.   8. Hypothyroid: Synthroid. 9. GERD/Schatzki's ring s/p dilatation 11/2018: No issues swallowing since. Pepcid. 10. Pulmonary HTN: Much improved since outside cath on 03/15. Monitor   11. CKD stage III s/p renal artery stenting: On lasix 80 mg AM/40 mg w/ lunch.   Not on very much KCL repletion. Check labs today. 12. Chronic diastolic HF (NYHA class III on admission): BB, PO lasix. Holding ACE until appropriate. 13. PVD with history of venous ulcers: No current open wounds. Monitor. Activity as tolerated. 15. CVA (2007) with no deficits: PT/OT. Statin  15. Recent urine leukocytosis with Gram neg rods(POA): Completed abx. Monitor  16. Thrombocytopenia: stabilized. Pre-op HIT/DINA negative, heme consult confirm likely medication induced rather than bleeding. ASA/Xarelto given per Dr. Chelsi Oleary. 17. FU with Dr. Olivia Park. Gave cardiac rehab number at [facility] to enroll earlier. Pt is ready to start cardiac rehab.      Rehab - yes  Walking: yes   Glucometer: yes   Antibiotic card for valves: yes

## 2019-05-21 ENCOUNTER — TELEPHONE (OUTPATIENT)
Dept: CARDIOLOGY CLINIC | Age: 84
End: 2019-05-21

## 2019-05-21 LAB
BUN SERPL-MCNC: 21 MG/DL (ref 8–27)
BUN/CREAT SERPL: 11 (ref 12–28)
CALCIUM SERPL-MCNC: 9.2 MG/DL (ref 8.7–10.3)
CHLORIDE SERPL-SCNC: 102 MMOL/L (ref 96–106)
CO2 SERPL-SCNC: 25 MMOL/L (ref 20–29)
CREAT SERPL-MCNC: 1.95 MG/DL (ref 0.57–1)
ERYTHROCYTE [DISTWIDTH] IN BLOOD BY AUTOMATED COUNT: 18 % (ref 12.3–15.4)
GLUCOSE SERPL-MCNC: 145 MG/DL (ref 65–99)
HCT VFR BLD AUTO: 32.9 % (ref 34–46.6)
HGB BLD-MCNC: 10.4 G/DL (ref 11.1–15.9)
MAGNESIUM SERPL-MCNC: 2 MG/DL (ref 1.6–2.3)
MCH RBC QN AUTO: 29.2 PG (ref 26.6–33)
MCHC RBC AUTO-ENTMCNC: 31.6 G/DL (ref 31.5–35.7)
MCV RBC AUTO: 92 FL (ref 79–97)
PLATELET # BLD AUTO: 126 X10E3/UL (ref 150–450)
POTASSIUM SERPL-SCNC: 3.6 MMOL/L (ref 3.5–5.2)
RBC # BLD AUTO: 3.56 X10E6/UL (ref 3.77–5.28)
SODIUM SERPL-SCNC: 145 MMOL/L (ref 134–144)
WBC # BLD AUTO: 6.2 X10E3/UL (ref 3.4–10.8)

## 2019-05-21 NOTE — TELEPHONE ENCOUNTER
Call pt and reveiwed labs. Instructed to increase KCL To 20 meq daily  (2 tabs) while on 80/40 of lasix.  Pt stated understanding

## 2019-05-29 ENCOUNTER — PATIENT OUTREACH (OUTPATIENT)
Dept: INTERNAL MEDICINE CLINIC | Age: 84
End: 2019-05-29

## 2019-05-29 NOTE — PROGRESS NOTES
Patient has graduated from the Transitions of Care Coordination  program on 5/29/19. Patient's symptoms are stable at this time. Patient/family has the ability to self-manage. Care management goals have been completed at this time. Patient is scheduled to establish PCP care with Dr. Sabas Perera. Has appointment for 6/13/19. Patient is also being followed by cardiology. No further nurse navigator follow up scheduled. Pt has nurse navigator's contact information for any further questions, concerns, or needs. Patients upcoming visits:   
Future Appointments Date Time Provider Dafne Jenkins 6/13/2019  9:00 AM Dianna Quiles MD [address]  
6/26/2019  9:00 AM [facility]  
9/19/2019  1:15 PM Olga Collins MD [facility]  
9/20/2019 10:45 AM Olga Collins MD [facility].
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
## 2019-06-13 ENCOUNTER — OFFICE VISIT (OUTPATIENT)
Dept: INTERNAL MEDICINE CLINIC | Age: 84
End: 2019-06-13

## 2019-06-13 ENCOUNTER — HOSPITAL ENCOUNTER (OUTPATIENT)
Dept: LAB | Age: 84
Discharge: HOME | End: 2019-06-13
Payer: MEDICARE

## 2019-06-13 VITALS
WEIGHT: 205 LBS | RESPIRATION RATE: 16 BRPM | BODY MASS INDEX: 32.18 KG/M2 | TEMPERATURE: 97.9 F | DIASTOLIC BLOOD PRESSURE: 71 MMHG | HEIGHT: 67 IN | SYSTOLIC BLOOD PRESSURE: 127 MMHG | HEART RATE: 87 BPM | OXYGEN SATURATION: 96 %

## 2019-06-13 DIAGNOSIS — M17.0 PRIMARY OSTEOARTHRITIS OF BOTH KNEES: ICD-10-CM

## 2019-06-13 DIAGNOSIS — E06.3 HYPOTHYROIDISM, ACQUIRED, AUTOIMMUNE: ICD-10-CM

## 2019-06-13 DIAGNOSIS — I10 ESSENTIAL HYPERTENSION, BENIGN: ICD-10-CM

## 2019-06-13 DIAGNOSIS — C67.9 MALIGNANT NEOPLASM OF URINARY BLADDER, UNSPECIFIED SITE (HCC): ICD-10-CM

## 2019-06-13 DIAGNOSIS — E78.2 MIXED HYPERLIPIDEMIA: ICD-10-CM

## 2019-06-13 DIAGNOSIS — K62.5 RECTAL BLEEDING: Primary | ICD-10-CM

## 2019-06-13 DIAGNOSIS — E11.22 CONTROLLED TYPE 2 DIABETES MELLITUS WITH STAGE 3 CHRONIC KIDNEY DISEASE, WITH LONG-TERM CURRENT USE OF INSULIN (HCC): ICD-10-CM

## 2019-06-13 DIAGNOSIS — D50.0 IRON DEFICIENCY ANEMIA DUE TO CHRONIC BLOOD LOSS: ICD-10-CM

## 2019-06-13 DIAGNOSIS — N18.30 CONTROLLED TYPE 2 DIABETES MELLITUS WITH STAGE 3 CHRONIC KIDNEY DISEASE, WITH LONG-TERM CURRENT USE OF INSULIN (HCC): ICD-10-CM

## 2019-06-13 DIAGNOSIS — I25.10 ATHEROSCLEROSIS OF NATIVE CORONARY ARTERY OF NATIVE HEART WITHOUT ANGINA PECTORIS: ICD-10-CM

## 2019-06-13 DIAGNOSIS — Z79.4 CONTROLLED TYPE 2 DIABETES MELLITUS WITH STAGE 3 CHRONIC KIDNEY DISEASE, WITH LONG-TERM CURRENT USE OF INSULIN (HCC): ICD-10-CM

## 2019-06-13 DIAGNOSIS — M10.9 GOUT, UNSPECIFIED CAUSE, UNSPECIFIED CHRONICITY, UNSPECIFIED SITE: ICD-10-CM

## 2019-06-13 DIAGNOSIS — Z78.0 POSTMENOPAUSAL: ICD-10-CM

## 2019-06-13 DIAGNOSIS — M85.80 OSTEOPENIA, UNSPECIFIED LOCATION: ICD-10-CM

## 2019-06-13 DIAGNOSIS — I48.20 CHRONIC ATRIAL FIBRILLATION (HCC): ICD-10-CM

## 2019-06-13 DIAGNOSIS — E55.9 VITAMIN D DEFICIENCY: ICD-10-CM

## 2019-06-13 LAB
ERYTHROCYTE [DISTWIDTH] IN BLOOD BY AUTOMATED COUNT: 15.8 % (ref 12.3–15.4)
HCT VFR BLD AUTO: 27.8 % (ref 34–46.6)
HGB BLD-MCNC: 8.8 G/DL
HGB BLD-MCNC: 9.3 G/DL (ref 11.1–15.9)
MCH RBC QN AUTO: 30.7 PG (ref 26.6–33)
MCHC RBC AUTO-ENTMCNC: 33.5 G/DL (ref 31.5–35.7)
MCV RBC AUTO: 92 FL (ref 79–97)
MORPHOLOGY BLD-IMP: ABNORMAL
PLATELET # BLD AUTO: 95 X10E3/UL (ref 150–450)
RBC # BLD AUTO: 3.03 X10E6/UL (ref 3.77–5.28)
WBC # BLD AUTO: 7 X10E3/UL (ref 3.4–10.8)

## 2019-06-13 PROCEDURE — 36415 COLL VENOUS BLD VENIPUNCTURE: CPT

## 2019-06-13 PROCEDURE — 80048 BASIC METABOLIC PNL TOTAL CA: CPT

## 2019-06-13 PROCEDURE — 84443 ASSAY THYROID STIM HORMONE: CPT

## 2019-06-13 PROCEDURE — 85027 COMPLETE CBC AUTOMATED: CPT

## 2019-06-13 PROCEDURE — 82306 VITAMIN D 25 HYDROXY: CPT

## 2019-06-13 RX ORDER — DICLOFENAC SODIUM 10 MG/G
2 GEL TOPICAL 4 TIMES DAILY
Qty: 100 G | Refills: 2 | Status: SHIPPED | OUTPATIENT
Start: 2019-06-13 | End: 2019-08-21

## 2019-06-13 RX ORDER — AMIODARONE HYDROCHLORIDE 200 MG/1
200 TABLET ORAL 2 TIMES DAILY
Qty: 180 TAB | Refills: 0 | Status: SHIPPED | OUTPATIENT
Start: 2019-06-13 | End: 2019-07-01

## 2019-06-13 RX ORDER — PANTOPRAZOLE SODIUM 40 MG/1
40 TABLET, DELAYED RELEASE ORAL DAILY
Qty: 90 TAB | Refills: 3 | Status: SHIPPED | OUTPATIENT
Start: 2019-06-13 | End: 2020-01-01

## 2019-06-13 NOTE — PROGRESS NOTES
HPI: Shaneka Izquierdo is a 82 y.o. female presents to establish. Daughter present. She has a history of CAD/MR/chronic atrial fibrillation. Followed by Dr. Inessa Rose, cardiology. On xarelto. Does not take NSAIDS/ASA. She reports she passed \"a little clot\" rectally this morning. Reports rectal pain today. Feeling \"a little dizzy\" and reports weakness for the past week. Denies constipation. She had a GI bleed, Feb 2019 thought to be due to rectal erythema and hemorrhoids. Colonoscopy Feb 20. Dr. Hernesto Martin, with rectal prolapse, rectal erythema, hemorrhoids. Prior iron infusions, Dr. Omid Grant. In April. Had mitral replacement April 22. Required transfusion during that hospitalization. Taking FeSO4 325mg once a day. Last 10.4  May 20. History of CHF. Taking lasix 80mg in am and 40mg in pm. Creatinine 1.95. May 20. Reports LEIJA at baseline, no worsening. No PND. No orthopnea. No worsening of swelling. Prior EGD, dilated in November, no dysphagia now. Treated for hypothyroidism. Last TSH Sept 2018. Has OA of knees. Prior injections with relief. Dr. Kenyon Woodard. Lives alone. Daughter local.  1/2 brother and another daughter local.  Life alert. Driving to grocery and salon. Some short term recall problems. No falls. Is not doing mammogram.  DEXA scan osteopenia. 2014. ROS: 
Constitutional: negative for fevers, chills, anorexia, weight loss, positive for generalized weakness, dizziness. Eyes:   negative for visual disturbance, irritation ENT:   negative for tinnitus,sore throat,nasal congestion,ear pain, Respiratory:  negative for cough, hemoptysis, dyspnea,wheezing CV:   negative for chest pain, palpitations, lower extremity edema GI:   negative for nausea, vomiting, diarrhea, abdominal pain,positive for rectal bleeding. Endo:               negative for polyuria,polydipsia,polyphagia,heat intolerance Genitourinary: negative for frequency, dysuria, hematuria Integument:  negative for rash, pruritus Hematologic:  negative for easy bruising and gum/nose bleeding Musculoskel: negative for myalgias, back pain, muscle weakness, positive for  joint pain Neurological:  negative for headaches,  gait problems, numbness Behavl/Psych: negative for feelings of anxiety, depression, mood changes, positive for memory loss Past Medical History:  
Diagnosis Date  Arthritis KNEES  Atrial fibrillation ([facility] [address] 10/29/2009  Bladder cancer ([facility] [address]  CAD (coronary artery disease) STENT PER PATIENT  Chronic pain KNEES  
 Coagulation disorder ([facility] [address] Chronic prophylactic anticoagulation med  Colon polyps  Diabetes ([facility] [address] IDDM  GERD (gastroesophageal reflux disease)  Gout  Heart valve problem   
 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  
 @ [facility] >> atrial fibrillation  SOB (shortness of breath) on exertion 04/2019  
 T. I.A. 4/23/2009  TIA (transient ischemic attack)  Ulcer of right lower extremity, limited to breakdown of skin ([facility] [address] 7/5/2018 Past Surgical History:  
Procedure Laterality Date  CARDIAC SURG PROCEDURE UNLIST    
 ablation  COLONOSCOPY N/A 2/20/2019 COLONOSCOPY performed by Lana Hodge MD at [facility] ENDOSCOPY  COLONOSCOPY,DIAGNOSTIC  2/20/2019  HX APPENDECTOMY  HX CHOLECYSTECTOMY  HX HYSTERECTOMY  HX KNEE ARTHROSCOPY  [id number]  
 right knee  HX ORTHOPAEDIC    
 HX UROLOGICAL RENAL STENT, tur-b  VASCULAR SURGERY PROCEDURE UNLIST  11/4  
 removed vein in right leg Social History Socioeconomic History  Marital status:  Spouse name: Not on file  Number of children: Not on file  Years of education: Not on file  Highest education level: Not on file Tobacco Use  Smoking status: Former Smoker Packs/day: 0.50 Years: 10.00 Pack years: 5.00 Types: Cigarettes Last attempt to quit: 1967 Years since quittin.4  Smokeless tobacco: Never Used Substance and Sexual Activity  Alcohol use: No  
  Alcohol/week: 0.0 oz  Drug use: No  
 Sexual activity: Not Currently Family History Problem Relation Age of Onset  Stroke Other  Arthritis-osteo Sister   
     spinal stenosis  Gout Son   
 Hypertension Son   
Maddy. Hypertension Mother  Heart Disease Mother CAD  Heart Disease Father CAD  Alcohol abuse Neg Hx  Asthma Neg Hx  Bleeding Prob Neg Hx  Cancer Neg Hx  Diabetes Neg Hx  Elevated Lipids Neg Hx   
 Headache Neg Hx  Lung Disease Neg Hx  Migraines Neg Hx  Psychiatric Disorder Neg Hx  Mental Retardation Neg Hx  Anesth Problems Neg Hx Current Outpatient Medications Medication Sig Dispense Refill  pantoprazole (PROTONIX) 40 mg tablet Take 1 Tab by mouth daily. 90 Tab 3  
 amiodarone (CORDARONE) 200 mg tablet Take 1 Tab by mouth two (2) times a day. 180 Tab 0  
 diclofenac (VOLTAREN) 1 % gel Apply 2 g to affected area four (4) times daily. 100 g 2  camphor-methyl salicyl-menthol (SALONPAS) ptmd by Apply Externally route as needed.  loratadine (CLARITIN) 10 mg tablet Take 10 mg by mouth daily.  nitroglycerin (NITROSTAT) 0.4 mg SL tablet 0.4 mg by SubLINGual route every five (5) minutes as needed for Chest Pain. Up to 3 doses.  pantoprazole (PROTONIX) 40 mg granules for oral suspension 40 mg daily.  furosemide (LASIX) 80 mg tablet Take 1 Tab by mouth daily. (Patient taking differently: Take 80 mg by mouth daily.  Indications: 80mg @ breakfast & 40mg @ lunch) 60 Tab 1  
 insulin NPH (HUMULIN N NPH INSULIN KWIKPEN) 100 unit/mL (3 mL) inpn INJECT 40 UNITS UNDER THE SKIN EVERY MORNING, 23 UNITS UNDER THE SKIN WITH DINNER (Patient taking differently: 24 units AC breakfast & 8 units AC dinner) 15 Pen 97  
 aspirin 81 mg chewable tablet Take 1 Tab by mouth daily. 90 Tab 0  
 ferrous sulfate 325 mg (65 mg iron) tablet Take 1 Tab by mouth daily (with breakfast). 30 Tab 0  
 metoprolol tartrate (LOPRESSOR) 50 mg tablet Take one and one half pill twice daily (Patient taking differently: Take 25 mg by mouth two (2) times a day.) 90 Tab 11  
 levothyroxine (SYNTHROID) 125 mcg tablet TAKE ONE TABLET BY MOUTH DAILY (Patient taking differently: TAKE ONE TABLET BY MOUTH DAILY BEFORE BREAKFAST) 90 Tab 7  
 atorvastatin (LIPITOR) 80 mg tablet TAKE ONE TABLET BY MOUTH EVERY EVENING 90 Tab 0  
 allopurinol (ZYLOPRIM) 100 mg tablet TAKE TWO TABLETS BY MOUTH DAILY 180 Tab 2  
 rivaroxaban (XARELTO) 15 mg tab tablet Take 1 Tab by mouth daily (with dinner). 30 Tab 11  
 ACCU-CHEK SHAHIDA PLUS TEST STRP strip USE TO CHECK BLOOD SUGAR FOUR TIMES A  Strip 9  
 acetaminophen (TYLENOL) 325 mg tablet Take 325 mg by mouth every four (4) hours as needed for Pain.  ADAN PEN NEEDLE 32 gauge x 5/32\" ndle USE WITH INSULIN PEN TWO TIMES A DAY AS DIRECTED BY PHYSICIAN 100 Pen Needle 11  
 potassium chloride SR (KLOR-CON 10) 10 mEq tablet Take 1 Tab by mouth daily. 30 Tab 12 Allergies Allergen Reactions  Actos [Pioglitazone] Swelling Swelling of feet and legs  Codeine Itching  Hydrocodone Rash and Other (comments)  
  hallucinations Physical exam: 
Visit Vitals /71 (BP 1 Location: Right arm, BP Patient Position: Sitting) Pulse 87 Temp 97.9 °F (36.6 °C) (Oral) Resp 16 Ht 5' 7\" (1.702 m) Wt 205 lb (93 kg) SpO2 96% BMI 32.11 kg/m² General appearance - alert, well appearing, and in no distress HEENT- PERLL,normal conjunctiva, TM normal bilaterally, hearing grossly normal, normal nasal turbinates, no sinus tenderness, mucous membranes moist, pharynx normal without lesions Neck - supple, no significant adenopathy Pulm- clear to auscultation, no wheezes, rales or rhonchi CV- normal rate, regular rhythm, normal S1, S2, no murmurs Abdomen - soft, nontender, nondistended, no masses or organomegaly Extrem-peripheral pulses normal, +1 chronic edema to pretibial region. Rectal- heme positive black tarry stool, no rectal prolapse or pain, no palpable hemorrhoid. Neuro -alert, oriented,nonfocal 
 
 
Results for orders placed or performed in visit on 06/13/19 AMB POC HEMOGLOBIN (HGB) Result Value Ref Range Hemoglobin (POC) 8.8 *Note: Due to a large number of results and/or encounters for the requested time period, some results have not been displayed. A complete set of results can be found in Results Review. Assessment/Plan: ICD-10-CM ICD-9-CM 1. Essential hypertension, benign B55 028.7 METABOLIC PANEL, BASIC  
   CANCELED: CBC W/O DIFF 2. Chronic atrial fibrillation (HCC) I48.2 427.31   
3. Atherosclerosis of native coronary artery of native heart without angina pectoris I25.10 414.01   
4. 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   
 Z79.4 V58.67   
5. Hypothyroidism, acquired, autoimmune E06.3 244.8 TSH 3RD GENERATION 6. Malignant neoplasm of urinary bladder, unspecified site (HCC) C67.9 188.9   
7. Mixed hyperlipidemia E78.2 272.2 8. Gout, unspecified cause, unspecified chronicity, unspecified site M10.9 274.9 9. Vitamin D deficiency E55.9 268.9 VITAMIN D, 25 HYDROXY 10. Iron deficiency anemia due to chronic blood loss D50.0 280.0 CANCELED: CBC W/O DIFF 11. Primary osteoarthritis of both knees M17.0 715.16 diclofenac (VOLTAREN) 1 % gel 12. Postmenopausal Z78.0 V49.81 DEXA BONE DENSITY STUDY AXIAL 13. Osteopenia, unspecified location M85.80 733.90 DEXA BONE DENSITY STUDY AXIAL 14. Rectal bleeding K62.5 569.3 AMB POC HEMOGLOBIN (HGB) CBC W/O DIFF Patient and daughter advised to go to ED if patient's symptoms worsen. Is to hold aspirin and xarelto. Discussed with DR. Justin Jones, patient to be seen in office tomorrow. Discussed with Dr. Savannah Davey, covering for fartun Durán to hold xarelto and aspirin. No recent episodes of atrial fibrillation. follow up pending labs and specialist's evaluation. Wilson Mendoza MD 
 
 
 
 
 
 
Discussed the patient's BMI with her. The BMI follow up plan is as follows:  
 
dietary management education, guidance, and counseling 
encourage exercise 
monitor weight 
prescribed dietary intake An After Visit Summary was printed and given to the patient.

## 2019-06-13 NOTE — PATIENT INSTRUCTIONS
Office Policies Phone calls/patient messages: Please allow up to 24 hours for someone in the office to contact you about your call or message. Be mindful your provider may be out of the office or your message may require further review. We encourage you to use [employer] for your messages as this is a faster, more efficient way to communicate with our office Medication Refills: 
         
Prescription medications require 48-72 business hours to process. We encourage you to use [employer] for your refills. For controlled medications: Please allow 72 business hours to process. Certain medications may require you to  a written prescription at our office. NO narcotic/controlled medications will be prescribed after 4pm Monday through Friday or on weekends Form/Paperwork Completion: 
         
Please note a $25 fee may incur for all paperwork for completed by our providers. We ask that you allow 7-10 business days. Pre-payment is due prior to picking up/faxing the completed form. You may also download your forms to [employer] to have your doctor print off. Body Mass Index: Care Instructions Your Care Instructions Body mass index (BMI) can help you see if your weight is raising your risk for health problems. It uses a formula to compare how much you weigh with how tall you are. · A BMI lower than 18.5 is considered underweight. · A BMI between 18.5 and 24.9 is considered healthy. · A BMI between 25 and 29.9 is considered overweight. A BMI of 30 or higher is considered obese. If your BMI is in the normal range, it means that you have a lower risk for weight-related health problems. If your BMI is in the overweight or obese range, you may be at increased risk for weight-related health problems, such as high blood pressure, heart disease, stroke, arthritis or joint pain, and diabetes.  If your BMI is in the underweight range, you may be at increased risk for health problems such as fatigue, lower protection (immunity) against illness, muscle loss, bone loss, hair loss, and hormone problems. BMI is just one measure of your risk for weight-related health problems. You may be at higher risk for health problems if you are not active, you eat an unhealthy diet, or you drink too much alcohol or use tobacco products. Follow-up care is a key part of your treatment and safety. Be sure to make and go to all appointments, and call your doctor if you are having problems. It's also a good idea to know your test results and keep a list of the medicines you take. How can you care for yourself at home? · Practice healthy eating habits. This includes eating plenty of fruits, vegetables, whole grains, lean protein, and low-fat dairy. · If your doctor recommends it, get more exercise. Walking is a good choice. Bit by bit, increase the amount you walk every day. Try for at least 30 minutes on most days of the week. · Do not smoke. Smoking can increase your risk for health problems. If you need help quitting, talk to your doctor about stop-smoking programs and medicines. These can increase your chances of quitting for good. · Limit alcohol to 2 drinks a day for men and 1 drink a day for women. Too much alcohol can cause health problems. If you have a BMI higher than 25 · Your doctor may do other tests to check your risk for weight-related health problems. This may include measuring the distance around your waist. A waist measurement of more than 40 inches in men or 35 inches in women can increase the risk of weight-related health problems. · Talk with your doctor about steps you can take to stay healthy or improve your health. You may need to make lifestyle changes to lose weight and stay healthy, such as changing your diet and getting regular exercise. If you have a BMI lower than 18.5 · Your doctor may do other tests to check your risk for health problems. · Talk with your doctor about steps you can take to stay healthy or improve your health. You may need to make lifestyle changes to gain or maintain weight and stay healthy, such as getting more healthy foods in your diet and doing exercises to build muscle. Where can you learn more? Go to http://gilson-michael.info/. Enter S176 in the search box to learn more about \"Body Mass Index: Care Instructions. \" Current as of: October 13, 2016 Content Version: 11.4 © 6783-6076 [employer]. Care instructions adapted under license by [employer] (which disclaims liability or warranty for this information). If you have questions about a medical condition or this instruction, always ask your healthcare professional. [address] any warranty or liability for your use of this information.

## 2019-06-13 NOTE — PROGRESS NOTES
Reviewed record in preparation for visit and have obtained necessary documentation. Identified pt with two pt identifiers(name and ). Chief Complaint Patient presents with [facility] Care Health Maintenance Due Topic Date Due  Shingrix Vaccine Age 50> (1 of 2) 1987  Pneumococcal 65+ years (1 of 2 - PCV13) 10/01/2003  DTaP/Tdap/Td series (1 - Tdap) 2010  
 EYE EXAM RETINAL OR DILATED  2016  
 FOOT EXAM Q1  2019  GLAUCOMA SCREENING Q2Y  2019  MICROALBUMIN Q1  2019  MEDICARE YEARLY EXAM  2019 Ms. Bella Hart has a reminder for a \"due or due soon\" health maintenance. I have asked that she discuss health maintenance topic(s) due with Her  primary care provider. Coordination of Care Questionnaire: 
:  
 
1) Have you been to an emergency room, urgent care clinic since your last visit? no  
Hospitalized since your last visit? no          
 
2) Have you seen or consulted any other health care providers outside of [address] [place] [address] since your last visit? no  (Include any pap smears or colon screenings in this section.) 3) Do you have an Advance Directive on file? yes 4) Are you interested in receiving information on Advance Directives? NO Patient is accompanied by self I have received verbal consent from Tushar Kumar to discuss any/all medical information while they are present in the room.

## 2019-06-14 ENCOUNTER — HOSPITAL ENCOUNTER (INPATIENT)
Age: 84
LOS: 6 days | Discharge: HOME HEALTH | DRG: 378 | End: 2019-06-20
Attending: EMERGENCY MEDICINE | Admitting: INTERNAL MEDICINE
Payer: MEDICARE

## 2019-06-14 ENCOUNTER — APPOINTMENT (OUTPATIENT)
Dept: GENERAL RADIOLOGY | Age: 84
DRG: 378 | End: 2019-06-14
Attending: INTERNAL MEDICINE
Payer: MEDICARE

## 2019-06-14 DIAGNOSIS — D50.0 BLOOD LOSS ANEMIA: ICD-10-CM

## 2019-06-14 DIAGNOSIS — K92.2 GASTROINTESTINAL HEMORRHAGE, UNSPECIFIED GASTROINTESTINAL HEMORRHAGE TYPE: Primary | ICD-10-CM

## 2019-06-14 DIAGNOSIS — I48.20 CHRONIC ATRIAL FIBRILLATION (HCC): ICD-10-CM

## 2019-06-14 PROBLEM — R10.30 LOWER ABDOMINAL PAIN: Status: ACTIVE | Noted: 2019-06-14

## 2019-06-14 PROBLEM — K92.1 MELENA: Status: ACTIVE | Noted: 2019-06-14

## 2019-06-14 PROBLEM — K62.5 RECTAL BLEEDING: Status: ACTIVE | Noted: 2019-06-14

## 2019-06-14 LAB
25(OH)D3+25(OH)D2 SERPL-MCNC: 19.8 NG/ML (ref 30–100)
ANION GAP SERPL CALC-SCNC: 7 MMOL/L (ref 5–15)
APTT PPP: 32.5 SEC (ref 22.1–32)
BASOPHILS # BLD: 0.1 K/UL (ref 0–0.1)
BASOPHILS NFR BLD: 1 % (ref 0–1)
BUN SERPL-MCNC: 32 MG/DL (ref 8–27)
BUN SERPL-MCNC: 35 MG/DL (ref 6–20)
BUN/CREAT SERPL: 17 (ref 12–20)
BUN/CREAT SERPL: 17 (ref 12–28)
CALCIUM SERPL-MCNC: 8.9 MG/DL (ref 8.5–10.1)
CALCIUM SERPL-MCNC: 9.3 MG/DL (ref 8.7–10.3)
CHLORIDE SERPL-SCNC: 102 MMOL/L (ref 96–106)
CHLORIDE SERPL-SCNC: 106 MMOL/L (ref 97–108)
CO2 SERPL-SCNC: 24 MMOL/L (ref 20–29)
CO2 SERPL-SCNC: 28 MMOL/L (ref 21–32)
CREAT SERPL-MCNC: 1.9 MG/DL (ref 0.57–1)
CREAT SERPL-MCNC: 2.06 MG/DL (ref 0.55–1.02)
DIFFERENTIAL METHOD BLD: ABNORMAL
EOSINOPHIL # BLD: 0.4 K/UL (ref 0–0.4)
EOSINOPHIL NFR BLD: 5 % (ref 0–7)
ERYTHROCYTE [DISTWIDTH] IN BLOOD BY AUTOMATED COUNT: 16 % (ref 11.5–14.5)
GLUCOSE BLD STRIP.AUTO-MCNC: 132 MG/DL (ref 65–100)
GLUCOSE SERPL-MCNC: 154 MG/DL (ref 65–100)
GLUCOSE SERPL-MCNC: 215 MG/DL (ref 65–99)
HCT VFR BLD AUTO: 23.5 % (ref 35–47)
HCT VFR BLD AUTO: 28.2 % (ref 35–47)
HEMOCCULT STL QL: POSITIVE
HGB BLD-MCNC: 7.3 G/DL (ref 11.5–16)
HGB BLD-MCNC: 8.6 G/DL (ref 11.5–16)
IMM GRANULOCYTES # BLD AUTO: 0 K/UL (ref 0–0.04)
IMM GRANULOCYTES NFR BLD AUTO: 0 % (ref 0–0.5)
INR PPP: 1.3 (ref 0.9–1.1)
LYMPHOCYTES # BLD: 1.4 K/UL (ref 0.8–3.5)
LYMPHOCYTES NFR BLD: 19 % (ref 12–49)
MCH RBC QN AUTO: 30 PG (ref 26–34)
MCHC RBC AUTO-ENTMCNC: 30.5 G/DL (ref 30–36.5)
MCV RBC AUTO: 98.3 FL (ref 80–99)
MONOCYTES # BLD: 0.5 K/UL (ref 0–1)
MONOCYTES NFR BLD: 6 % (ref 5–13)
NEUTS SEG # BLD: 5.2 K/UL (ref 1.8–8)
NEUTS SEG NFR BLD: 69 % (ref 32–75)
NRBC # BLD: 0 K/UL (ref 0–0.01)
NRBC BLD-RTO: 0 PER 100 WBC
PLATELET # BLD AUTO: 148 K/UL (ref 150–400)
PMV BLD AUTO: 13.3 FL (ref 8.9–12.9)
POTASSIUM SERPL-SCNC: 4.3 MMOL/L (ref 3.5–5.1)
POTASSIUM SERPL-SCNC: 4.3 MMOL/L (ref 3.5–5.2)
PROTHROMBIN TIME: 13.6 SEC (ref 9–11.1)
RBC # BLD AUTO: 2.87 M/UL (ref 3.8–5.2)
RBC MORPH BLD: ABNORMAL
SERVICE CMNT-IMP: ABNORMAL
SODIUM SERPL-SCNC: 141 MMOL/L (ref 136–145)
SODIUM SERPL-SCNC: 142 MMOL/L (ref 134–144)
THERAPEUTIC RANGE,PTTT: ABNORMAL SECS (ref 58–77)
TSH SERPL DL<=0.005 MIU/L-ACNC: 4.58 UIU/ML (ref 0.45–4.5)
WBC # BLD AUTO: 7.6 K/UL (ref 3.6–11)

## 2019-06-14 PROCEDURE — 85025 COMPLETE CBC W/AUTO DIFF WBC: CPT

## 2019-06-14 PROCEDURE — 82962 GLUCOSE BLOOD TEST: CPT

## 2019-06-14 PROCEDURE — 74011250636 HC RX REV CODE- 250/636: Performed by: EMERGENCY MEDICINE

## 2019-06-14 PROCEDURE — 86923 COMPATIBILITY TEST ELECTRIC: CPT

## 2019-06-14 PROCEDURE — 80048 BASIC METABOLIC PNL TOTAL CA: CPT

## 2019-06-14 PROCEDURE — 85018 HEMOGLOBIN: CPT

## 2019-06-14 PROCEDURE — 86900 BLOOD TYPING SEROLOGIC ABO: CPT

## 2019-06-14 PROCEDURE — 85610 PROTHROMBIN TIME: CPT

## 2019-06-14 PROCEDURE — 85730 THROMBOPLASTIN TIME PARTIAL: CPT

## 2019-06-14 PROCEDURE — 82272 OCCULT BLD FECES 1-3 TESTS: CPT

## 2019-06-14 PROCEDURE — 94761 N-INVAS EAR/PLS OXIMETRY MLT: CPT

## 2019-06-14 PROCEDURE — 65660000000 HC RM CCU STEPDOWN

## 2019-06-14 PROCEDURE — 96374 THER/PROPH/DIAG INJ IV PUSH: CPT

## 2019-06-14 PROCEDURE — 86870 RBC ANTIBODY IDENTIFICATION: CPT

## 2019-06-14 PROCEDURE — 99284 EMERGENCY DEPT VISIT MOD MDM: CPT

## 2019-06-14 PROCEDURE — 71046 X-RAY EXAM CHEST 2 VIEWS: CPT

## 2019-06-14 PROCEDURE — C9113 INJ PANTOPRAZOLE SODIUM, VIA: HCPCS | Performed by: EMERGENCY MEDICINE

## 2019-06-14 PROCEDURE — 77030032490 HC SLV COMPR SCD KNE COVD -B

## 2019-06-14 PROCEDURE — 36415 COLL VENOUS BLD VENIPUNCTURE: CPT

## 2019-06-14 RX ORDER — FUROSEMIDE 10 MG/ML
60 INJECTION INTRAMUSCULAR; INTRAVENOUS DAILY
Status: DISCONTINUED | OUTPATIENT
Start: 2019-06-15 | End: 2019-06-14

## 2019-06-14 RX ORDER — METOPROLOL TARTRATE 25 MG/1
25 TABLET, FILM COATED ORAL 2 TIMES DAILY
COMMUNITY
End: 2019-09-18

## 2019-06-14 RX ORDER — FUROSEMIDE 40 MG/1
40 TABLET ORAL
COMMUNITY
End: 2020-01-01 | Stop reason: SDUPTHER

## 2019-06-14 RX ORDER — ONDANSETRON 2 MG/ML
4 INJECTION INTRAMUSCULAR; INTRAVENOUS
Status: DISCONTINUED | OUTPATIENT
Start: 2019-06-14 | End: 2019-06-20 | Stop reason: HOSPADM

## 2019-06-14 RX ORDER — SODIUM CHLORIDE 9 MG/ML
250 INJECTION, SOLUTION INTRAVENOUS AS NEEDED
Status: DISCONTINUED | OUTPATIENT
Start: 2019-06-14 | End: 2019-06-17 | Stop reason: ALTCHOICE

## 2019-06-14 RX ORDER — PANTOPRAZOLE SODIUM 40 MG/10ML
40 INJECTION, POWDER, LYOPHILIZED, FOR SOLUTION INTRAVENOUS
Status: COMPLETED | OUTPATIENT
Start: 2019-06-14 | End: 2019-06-14

## 2019-06-14 RX ORDER — FUROSEMIDE 40 MG/1
120 TABLET ORAL 2 TIMES DAILY
COMMUNITY
End: 2020-01-01

## 2019-06-14 RX ORDER — SODIUM CHLORIDE, SODIUM LACTATE, POTASSIUM CHLORIDE, CALCIUM CHLORIDE 600; 310; 30; 20 MG/100ML; MG/100ML; MG/100ML; MG/100ML
75 INJECTION, SOLUTION INTRAVENOUS CONTINUOUS
Status: DISCONTINUED | OUTPATIENT
Start: 2019-06-14 | End: 2019-06-16

## 2019-06-14 RX ORDER — SODIUM CHLORIDE 0.9 % (FLUSH) 0.9 %
5-40 SYRINGE (ML) INJECTION EVERY 8 HOURS
Status: DISCONTINUED | OUTPATIENT
Start: 2019-06-14 | End: 2019-06-20 | Stop reason: HOSPADM

## 2019-06-14 RX ORDER — LABETALOL HYDROCHLORIDE 5 MG/ML
10 INJECTION, SOLUTION INTRAVENOUS
Status: DISCONTINUED | OUTPATIENT
Start: 2019-06-14 | End: 2019-06-20 | Stop reason: HOSPADM

## 2019-06-14 RX ORDER — SODIUM CHLORIDE 0.9 % (FLUSH) 0.9 %
5-40 SYRINGE (ML) INJECTION AS NEEDED
Status: DISCONTINUED | OUTPATIENT
Start: 2019-06-14 | End: 2019-06-20 | Stop reason: HOSPADM

## 2019-06-14 RX ADMIN — Medication 5 ML: at 16:07

## 2019-06-14 RX ADMIN — PANTOPRAZOLE SODIUM 40 MG: 40 INJECTION, POWDER, FOR SOLUTION INTRAVENOUS at 18:23

## 2019-06-14 RX ADMIN — Medication 10 ML: at 21:25

## 2019-06-14 RX ADMIN — SODIUM CHLORIDE, SODIUM LACTATE, POTASSIUM CHLORIDE, AND CALCIUM CHLORIDE 100 ML/HR: 600; 310; 30; 20 INJECTION, SOLUTION INTRAVENOUS at 18:23

## 2019-06-14 NOTE — PROGRESS NOTES
Pharmacy Clarification of the Prior to Admission Medication Regimen Retrospective to the Admission Medication Reconciliation The patient was not interviewed regarding clarification of the prior to admission medication regimen. MHT attempted interview but patient stated that her daughter left with her medication list and that she helps manage her medications. MHT called patient's daughter, Kim Berkowitz [phone], who was able to verify the patient's medications and last doses administered. MHT called patient's out patient pharmacy, [facility] [phone], and spoke with [employer] who was able to verify the administration on Metoprolol and Furosemide. Information Obtained From: Rx Query, patient's daughter Recommendations/Findings: The following amendments were made to the patient's active medication list on file at [facility]:  
 
1) Additions: None 2) Removals: None 3) Changes: 
furosemide (LASIX) 40 mg tablet (Old regimen: 1 tab daily /New regimen: 80 mg ACB and 40 mg at lunch) 
insulin NPH (HUMULIN N NPH INSULIN KWIKPEN) 100 unit/mL (3 mL) inpn (Old regimen: 40 units QAM and 23 units with dinner /New regimen: 24 units QAM and 8 units QPM) metoprolol tartrate (LOPRESSOR) tablet (Old regimen: (strength 50 mg) 1.5 tabs BID /New regimen: (strength 25 mg) 1 tab BID) 4) Pertinent Pharmacy Findings: 
nitroglycerin (NITROSTAT) 0.4 mg SL tablet: Per patient's daughter the patient has this agent at home but has not taken it as of 6/14/19. PTA medication list was corrected to the following:  
 
Prior to Admission Medications Prescriptions Last Dose Informant Patient Reported? Taking?  
acetaminophen (TYLENOL) 325 mg tablet 6/7/2019 at Unknown time Child Yes Yes Sig: Take 325 mg by mouth every four (4) hours as needed for Pain. allopurinol (ZYLOPRIM) 100 mg tablet 6/13/2019 at Unknown time Child No Yes Sig: TAKE TWO TABLETS BY MOUTH DAILY amiodarone (CORDARONE) 200 mg tablet 2019 at Unknown time Child No Yes Sig: Take 1 Tab by mouth two (2) times a day. aspirin 81 mg chewable tablet 2019 at Unknown time Child No Yes Sig: Take 1 Tab by mouth daily. atorvastatin (LIPITOR) 80 mg tablet 2019 at Unknown time Child No Yes Sig: TAKE ONE TABLET BY MOUTH EVERY EVENING  
camphor-methyl salicyl-menthol (SALONPAS) ptmd 2019 at Unknown time Child Yes Yes Sig: by Apply Externally route as needed. diclofenac (VOLTAREN) 1 % gel 2019 at Unknown time Child No Yes Sig: Apply 2 g to affected area four (4) times daily. ferrous sulfate 325 mg (65 mg iron) tablet 2019 at Unknown time Child No Yes Sig: Take 1 Tab by mouth daily (with breakfast). furosemide (LASIX) 40 mg tablet 2019 at Unknown time Child Yes Yes Sig: Take 80 mg by mouth Daily (before breakfast). Patient takes 80 mg with breakfast and 40 mg at lunch  
furosemide (LASIX) 40 mg tablet 2019 at Unknown time Child Yes Yes Sig: Take 40 mg by mouth daily (with lunch). Patient takes 80 mg with breakfast and 40 mg at lunch  
insulin NPH (HUMULIN N NPH INSULIN KWIKPEN) 100 unit/mL (3 mL) inpn 2019 at Unknown time Child Yes Yes Si Units by SubCUTAneous route daily. insulin NPH (HUMULIN N NPH INSULIN KWIKPEN) 100 unit/mL (3 mL) inpn 2019 at Unknown time Child Yes Yes Si Units by SubCUTAneous route every evening. levothyroxine (SYNTHROID) 125 mcg tablet 2019 at Unknown time Child No Yes Sig: TAKE ONE TABLET BY MOUTH DAILY Patient taking differently: TAKE ONE TABLET BY MOUTH DAILY BEFORE BREAKFAST  
loratadine (CLARITIN) 10 mg tablet 2019 at Unknown time Child Yes Yes Sig: Take 10 mg by mouth daily. metoprolol tartrate (LOPRESSOR) 25 mg tablet 2019 at Unknown time Child Yes Yes Sig: Take 25 mg by mouth two (2) times a day.   
nitroglycerin (NITROSTAT) 0.4 mg SL tablet Not Taking at Unknown time Child Yes No  
Si.4 mg by SubLINGual route every five (5) minutes as needed for Chest Pain. Up to 3 doses. pantoprazole (PROTONIX) 40 mg tablet 2019 at Unknown time Child No Yes Sig: Take 1 Tab by mouth daily. potassium chloride SR (KLOR-CON 10) 10 mEq tablet 2019 at Unknown time Child No Yes Sig: Take 1 Tab by mouth daily. rivaroxaban (XARELTO) 15 mg tab tablet 2019 at Unknown time Child No Yes Sig: Take 1 Tab by mouth daily (with dinner). Facility-Administered Medications: None Thank you, 
Kim Lobato Medication History Pharmacy Technician

## 2019-06-14 NOTE — H&P
Hospitalist Admission NoteNAME: Romelia Li :  1935 MRN:  [id number] Date/Time:  2019 4:07 PM 
 
Patient PCP: Camila Decker MD 
______________________________________________________________________ Given the patient's current clinical presentation, I have a high level of concern for decompensation if discharged from the emergency department. Complex decision making was performed, which includes reviewing the patient's available past medical records, laboratory results, and x-ray films. My assessment of this patient's clinical condition and my plan of care is as follows. Assessment / Plan: 
Acute GIB -appreciate GI consult 
-EGD planned, timing per GI. NPO after MN in case procedure in AM 
-monitor h/h 
-hold Xarelto 
-cont IVF 
-admit to stepdown Hx mitral stenosis -s/p bioprosthetic MVR 2019 
-on xarelto and asa, holding both in setting of acute GIB Mild JULIANA CKD stage III 
-Cr 2.06, mildly elevated above baseline -?prerenal 
-follow bmp Chronic Afib 
-hold xarelto as above 
-rate reasonably controlled 
-monitor, prn BB until able to resume PO meds Chronic dCHF 
-does not appear vol overloaded 
-CXR pending 
-hold lasix in setting of acute GIB, getting IVF 
-monitor vol status closely. Iron deficiency 
-follows with heme/onc 
-resume ferrous sulfate once taking PO Hypothyroid 
-cont synthroid GERD 
-on IV PPI 
 
HLD/HTN 
-resume home meds PVD Code Status: DNR Surrogate Decision Maker: daughter DVT Prophylaxis: SCDs GI Prophylaxis: IV PPI Baseline: functional, lives alone, daughter lives locally Subjective: CHIEF COMPLAINT: Dark blood per rectum HISTORY OF PRESENT ILLNESS:    
Gurmeet Vallejo is a 82 y.o. female with complaint of dark blood per rectum for the past 2 days.  The patient has PMHx of mitral stenosis s/p MVR 2019, chronic Afib, dCHF, HTN, HLD, PVD, Hypothryoidism, and GERD who presents with the above complaint accompanied by mild b/l lower quadrant abd pain that patient states is \"like menstrual pain when I used to have it\". She denies hematuria or hematemesis, also denies N/V. Per patient some of the blood is mixed with stool but she also reports bleeding per rectum between BMs. Pt does also admit to feeling dizzy upon standing. We were asked to admit for work up and evaluation of the above problems. Past Medical History:  
Diagnosis Date  Arthritis KNEES  Atrial fibrillation ([address] 10/29/2009  Bladder cancer ([address]  CAD (coronary artery disease) STENT PER PATIENT  Chronic pain KNEES  
 Coagulation disorder ([address] Chronic prophylactic anticoagulation med  Colon polyps  Diabetes ([address] IDDM  GERD (gastroesophageal reflux disease)  Gout  Heart valve problem   
 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  
 @ [facility] >> atrial fibrillation  SOB (shortness of breath) on exertion 04/2019  
 T. I.A. 4/23/2009  TIA (transient ischemic attack)  Ulcer of right lower extremity, limited to breakdown of skin ([address] 7/5/2018 Past Surgical History:  
Procedure Laterality Date  CARDIAC SURG PROCEDURE UNLIST    
 ablation  COLONOSCOPY N/A 2/20/2019 COLONOSCOPY performed by Davis Vargas MD at [facility] ENDOSCOPY  COLONOSCOPY,DIAGNOSTIC  2/20/2019  HX APPENDECTOMY  HX CHOLECYSTECTOMY  HX HYSTERECTOMY  HX KNEE ARTHROSCOPY  [id number]  
 right knee  HX ORTHOPAEDIC    
 HX UROLOGICAL RENAL STENT, tur-b  VASCULAR SURGERY PROCEDURE UNLIST  11/4  
 removed vein in right leg Social History Tobacco Use  Smoking status: Former Smoker Packs/day: 0.50 Years: 10.00 Pack years: 5.00 Types: Cigarettes Last attempt to quit: 1967 Years since quittin.4  Smokeless tobacco: Never Used Substance Use Topics  Alcohol use: No  
  Alcohol/week: 0.0 oz Family History Problem Relation Age of Onset  Stroke Other  Arthritis-osteo Sister   
     spinal stenosis  Gout Son   
 Hypertension Son   
Wilberto Goldstein Hypertension Mother  Heart Disease Mother CAD  Heart Disease Father CAD  Alcohol abuse Neg Hx  Asthma Neg Hx  Bleeding Prob Neg Hx  Cancer Neg Hx  Diabetes Neg Hx  Elevated Lipids Neg Hx   
 Headache Neg Hx  Lung Disease Neg Hx  Migraines Neg Hx  Psychiatric Disorder Neg Hx  Mental Retardation Neg Hx  Anesth Problems Neg Hx Allergies Allergen Reactions  Actos [Pioglitazone] Swelling Swelling of feet and legs  Codeine Itching  Hydrocodone Rash and Other (comments)  
  hallucinations Prior to Admission medications Medication Sig Start Date End Date Taking? Authorizing Provider  
pantoprazole (PROTONIX) 40 mg tablet Take 1 Tab by mouth daily. 19   Jose A Chase MD  
amiodarone (CORDARONE) 200 mg tablet Take 1 Tab by mouth two (2) times a day. 19   Jose A Chase MD  
diclofenac (VOLTAREN) 1 % gel Apply 2 g to affected area four (4) times daily. 19   Jose A Chase MD  
camphor-methyl salicyl-menthol [facility]) ptmd by Apply Externally route as needed. Provider, Historical  
loratadine (CLARITIN) 10 mg tablet Take 10 mg by mouth daily. Provider, Historical  
nitroglycerin (NITROSTAT) 0.4 mg SL tablet 0.4 mg by SubLINGual route every five (5) minutes as needed for Chest Pain. Up to 3 doses. Provider, Historical  
furosemide (LASIX) 80 mg tablet Take 1 Tab by mouth daily. Patient taking differently: Take 80 mg by mouth daily.  Indications: 80mg @ breakfast & 40mg @ lunch 19   Charley Jiménez  
 insulin NPH (HUMULIN N NPH INSULIN KWIKPEN) 100 unit/mL (3 mL) inpn INJECT 40 UNITS UNDER THE SKIN EVERY MORNING, 23 UNITS UNDER THE SKIN WITH DINNER Patient taking differently: 24 units AC breakfast & 8 units AC dinner 4/27/19   Jase Velasco Aase, NP  
aspirin 81 mg chewable tablet Take 1 Tab by mouth daily. 4/27/19   NEVAEH Flores  
ferrous sulfate 325 mg (65 mg iron) tablet Take 1 Tab by mouth daily (with breakfast). 4/27/19   NEVAEH Flores  
metoprolol tartrate (LOPRESSOR) 50 mg tablet Take one and one half pill twice daily Patient taking differently: Take 25 mg by mouth two (2) times a day. 3/15/19   Kena Schaffer III,   
levothyroxine (SYNTHROID) 125 mcg tablet TAKE ONE TABLET BY MOUTH DAILY Patient taking differently: TAKE ONE TABLET BY MOUTH DAILY BEFORE BREAKFAST 3/13/19   Andres Oneill MD  
atorvastatin (LIPITOR) 80 mg tablet TAKE ONE TABLET BY MOUTH EVERY EVENING 2/11/19   Ronald Murray MD  
allopurinol (ZYLOPRIM) 100 mg tablet TAKE TWO TABLETS BY MOUTH DAILY 2/5/19   Ronald Murray MD  
rivaroxaban (XARELTO) 15 mg tab tablet Take 1 Tab by mouth daily (with dinner). 9/20/18   Ronald Murray MD  
acetaminophen (TYLENOL) 325 mg tablet Take 325 mg by mouth every four (4) hours as needed for Pain. Provider, Historical  
potassium chloride SR (KLOR-CON 10) 10 mEq tablet Take 1 Tab by mouth daily. 1/23/17   Maria Del Carmen Juarez MD  
 
 
REVIEW OF SYSTEMS:    
I am not able to complete the review of systems because: The patient is intubated and sedated The patient has altered mental status due to his acute medical problems The patient has baseline aphasia from prior stroke(s) The patient has baseline dementia and is not reliable historian The patient is in acute medical distress and unable to provide information Total of 12 systems reviewed as follows:   
   POSITIVE= underlined text  Negative = text not underlined General:  fever, chills, sweats, generalized weakness, weight loss/gain,  
   loss of appetite Eyes:    blurred vision, eye pain, loss of vision, double vision ENT:    rhinorrhea, pharyngitis Respiratory:   cough, sputum production, SOB, LEIJA, wheezing, pleuritic pain  
Cardiology:   chest pain, palpitations, orthopnea, PND, edema, syncope Gastrointestinal:  abdominal pain , N/V, diarrhea, dysphagia, constipation, bleeding Genitourinary:  frequency, urgency, dysuria, hematuria, incontinence Muskuloskeletal :  arthralgia, myalgia, back pain Hematology:  easy bruising, nose or gum bleeding, lymphadenopathy Dermatological: rash, ulceration, pruritis, color change / jaundice Endocrine:   hot flashes or polydipsia Neurological:  headache, dizziness, confusion, focal weakness, paresthesia, Speech difficulties, memory loss, gait difficulty Psychological: Feelings of anxiety, depression, agitation Objective: VITALS:   
Visit Vitals /68 (BP 1 Location: Left arm, BP Patient Position: At rest) Pulse 68 Temp 97.7 °F (36.5 °C) Resp 17 Ht 5' 8\" (1.727 m) Wt 92.5 kg (203 lb 14.8 oz) SpO2 99% BMI 31.01 kg/m² PHYSICAL EXAM: 
 
 
_______________________________________________________________________ Care Plan discussed with: 
  Comments Patient x Family  x Dtr RN Care Manager Consultant:  lennox Meza Cancer  
_______________________________________________________________________ Expected  Disposition:  
Home with Family HH/PT/OT/RN x  
SNF/LTC   
[place]   
________________________________________________________________________ TOTAL TIME:  > 60 Minutes Critical Care Provided     Minutes non procedure based Comments  
 x Reviewed previous records  
>50% of visit spent in counseling and coordination of care x Discussion with patient and/or family and questions answered 
  
 
________________________________________________________________________ Signed: Neftali Party, DO 
 
Procedures: see electronic medical records for all procedures/Xrays and details which were not copied into this note but were reviewed prior to creation of Plan. LAB DATA REVIEWED:   
Recent Results (from the past 24 hour(s)) CBC WITH AUTOMATED DIFF Collection Time: 06/14/19 12:26 PM  
Result Value Ref Range WBC 7.6 3.6 - 11.0 K/uL  
 RBC 2.87 (L) 3.80 - 5.20 M/uL HGB 8.6 (L) 11.5 - 16.0 g/dL HCT 28.2 (L) 35.0 - 47.0 % MCV 98.3 80.0 - 99.0 FL  
 MCH 30.0 26.0 - 34.0 PG  
 MCHC 30.5 30.0 - 36.5 g/dL  
 RDW 16.0 (H) 11.5 - 14.5 % PLATELET 087 (L) 018 - 400 K/uL MPV 13.3 (H) 8.9 - 12.9 FL  
 NRBC 0.0 0  WBC ABSOLUTE NRBC 0.00 0.00 - 0.01 K/uL NEUTROPHILS 69 32 - 75 % LYMPHOCYTES 19 12 - 49 % MONOCYTES 6 5 - 13 % EOSINOPHILS 5 0 - 7 % BASOPHILS 1 0 - 1 % IMMATURE GRANULOCYTES 0 0.0 - 0.5 % ABS. NEUTROPHILS 5.2 1.8 - 8.0 K/UL  
 ABS. LYMPHOCYTES 1.4 0.8 - 3.5 K/UL  
 ABS. MONOCYTES 0.5 0.0 - 1.0 K/UL ABS. EOSINOPHILS 0.4 0.0 - 0.4 K/UL  
 ABS. BASOPHILS 0.1 0.0 - 0.1 K/UL  
 ABS. IMM. GRANS. 0.0 0.00 - 0.04 K/UL  
 DF AUTOMATED    
 RBC COMMENTS ANISOCYTOSIS 
1+ METABOLIC PANEL, BASIC Collection Time: 06/14/19 12:26 PM  
Result Value Ref Range Sodium 141 136 - 145 mmol/L Potassium 4.3 3.5 - 5.1 mmol/L Chloride 106 97 - 108 mmol/L  
 CO2 28 21 - 32 mmol/L Anion gap 7 5 - 15 mmol/L Glucose 154 (H) 65 - 100 mg/dL BUN 35 (H) 6 - 20 MG/DL Creatinine 2.06 (H) 0.55 - 1.02 MG/DL  
 BUN/Creatinine ratio 17 12 - 20 GFR est AA 28 (L) >60 ml/min/1.73m2 GFR est non-AA 23 (L) >60 ml/min/1.73m2 Calcium 8.9 8.5 - 10.1 MG/DL PROTHROMBIN TIME + INR Collection Time: 06/14/19 12:26 PM  
Result Value Ref Range INR 1.3 (H) 0.9 - 1.1 Prothrombin time 13.6 (H) 9.0 - 11.1 sec PTT Collection Time: 06/14/19 12:26 PM  
Result Value Ref Range aPTT 32.5 (H) 22.1 - 32.0 sec  
 aPTT, therapeutic range     58.0 - 77.0 SECS  
TYPE & SCREEN Collection Time: 06/14/19 12:26 PM  
Result Value Ref Range Crossmatch Expiration 06/17/2019 ABO/Rh(D) A POSITIVE  Antibody screen NEG

## 2019-06-14 NOTE — ED PROVIDER NOTES
EMERGENCY DEPARTMENT HISTORY AND PHYSICAL EXAM 
 
 
Date: 6/14/2019 Patient Name: Iván Carmona History of Presenting Illness Chief Complaint Patient presents with  Rectal Bleeding Dark tarry stools that started yesterday. Hx of GI bleed in Feb. Went to PCP yesterday and was told to come to ED if symptoms continued. Patient was taken off of xarelto and ASA by PCP yesterday  Shortness of Breath Patient states this is a chronic issue, and it is no worse with GI symptoms History Provided By: Patient HPI: Iván Carmona, 82 y.o. female with PMHx as noted below presents the emergency department with chief complaint of GI bleed and shortness of breath. Patient notes that she has had dark, tarry stools now for the last few days. She has been feeling shortness of breath and lightheaded since Wednesday. Patient notes she does take Xarelto for a prosthetic mitral valve however she has held her Xarelto since yesterday. Otherwise has had no hematemesis, nausea, vomiting, chest pain. PCP: Bonita Kwong MD 
 
Current Facility-Administered Medications Medication Dose Route Frequency Provider Last Rate Last Dose  ondansetron (ZOFRAN) injection 4 mg  4 mg IntraVENous Q4H PRN Mick Wright MD      
 lactated Ringers infusion  250 mL/hr IntraVENous CONTINUOUS Davis Vargas  mL/hr at 06/14/19 2247 250 mL/hr at 06/14/19 2247  sodium chloride (NS) flush 5-40 mL  5-40 mL IntraVENous Q8H Tom Joshua DO   10 mL at 06/14/19 2125  sodium chloride (NS) flush 5-40 mL  5-40 mL IntraVENous PRN Tom Joshua DO      
 labetalol (NORMODYNE;TRANDATE) injection 10 mg  10 mg IntraVENous Q4H PRN Tom Joshua DO      
 0.9% sodium chloride infusion 250 mL  250 mL IntraVENous PRN Davis Vargas MD      
 
 
Past History Past Medical History: 
Past Medical History:  
Diagnosis Date  Arthritis  KNEES  
  Atrial fibrillation ([facility] 10/29/2009  Bladder cancer ([facility]  CAD (coronary artery disease) STENT PER PATIENT  Chronic pain KNEES  
 Coagulation disorder ([address] Chronic prophylactic anticoagulation med  Colon polyps  Diabetes ([facility] IDDM  GERD (gastroesophageal reflux disease)  Gout  Heart valve problem   
 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  
 @ [facility] >> atrial fibrillation  SOB (shortness of breath) on exertion 04/2019  
 T. I.A. 4/23/2009  TIA (transient ischemic attack)  Ulcer of right lower extremity, limited to breakdown of skin ([facility] 7/5/2018 Past Surgical History: 
Past Surgical History:  
Procedure Laterality Date  CARDIAC SURG PROCEDURE UNLIST    
 ablation  COLONOSCOPY N/A 2/20/2019 COLONOSCOPY performed by Mehreen Araujo MD at \[facility]"" ENDOSCOPY  COLONOSCOPY,DIAGNOSTIC  2/20/2019  HX APPENDECTOMY  HX CHOLECYSTECTOMY  HX HYSTERECTOMY  HX KNEE ARTHROSCOPY  [id number]  
 right knee  HX ORTHOPAEDIC    
 HX UROLOGICAL RENAL STENT, tur-b  VASCULAR SURGERY PROCEDURE UNLIST  11/4  
 removed vein in right leg Family History: 
Family History Problem Relation Age of Onset  Stroke Other  Arthritis-osteo Sister   
     spinal stenosis  Gout Son   
 Hypertension Son   
Verlinda Smoker Hypertension Mother  Heart Disease Mother CAD  Heart Disease Father CAD  Alcohol abuse Neg Hx  Asthma Neg Hx  Bleeding Prob Neg Hx  Cancer Neg Hx  Diabetes Neg Hx  Elevated Lipids Neg Hx   
 Headache Neg Hx  Lung Disease Neg Hx  Migraines Neg Hx  Psychiatric Disorder Neg Hx  Mental Retardation Neg Hx  Anesth Problems Neg Hx Social History: 
Social History Tobacco Use  Smoking status: Former Smoker Packs/day: 0.50 Years: 10.00 Pack years: 5.00 Types: Cigarettes Last attempt to quit: 1967 Years since quittin.4  Smokeless tobacco: Never Used Substance Use Topics  Alcohol use: No  
  Alcohol/week: 0.0 oz  Drug use: No  
 
 
Allergies: Allergies Allergen Reactions  Actos [Pioglitazone] Swelling Swelling of feet and legs  Codeine Itching  Hydrocodone Rash and Other (comments)  
  hallucinations Review of Systems Review of Systems Constitutional: Negative for fever, chills, positive fatigue. HENT: Negative for congestion, sore throat, rhinorrhea, sneezing and neck stiffness Eyes: Negative for discharge and redness. Respiratory: Negative for  shortness of breath, wheezing Cardiovascular: Negative for chest pain, palpitations Gastrointestinal: Negative for nausea, vomiting, abdominal pain, constipation, diarrhea. Positive blood in stool. Genitourinary: Negative for dysuria, urgency, frequency, hematuria, flank pain, decreased urine volume, discharge, Musculoskeletal: Negative for myalgias or joint pain . Skin: Negative for rash or lesions . Neurological: Positive light-headedness. Negative numbness and headaches. Physical Exam  
Physical Exam 
 
GENERAL: alert and oriented, no acute distress EYES: PEERL, No injection, discharge or icterus. ENT: Mucous membranes pink and moist. 
NECK: Supple LUNGS: Airway patent. Non-labored respirations. Breath sounds clear with good air entry bilaterally. HEART: Regular rate and rhythm. No peripheral edema ABDOMEN: Non-distended and non-tender, without guarding or rebound. Rectal: No hemorrhoids or prolapse. Rectal exam with melanotic stool SKIN:  warm, dry EXTREMITIES: Without swelling, tenderness or deformity, symmetric with normal ROM 
NEUROLOGICAL: Alert, oriented Diagnostic Study Results Labs - Recent Results (from the past 12 hour(s)) CBC WITH AUTOMATED DIFF Collection Time: 06/14/19 12:26 PM  
Result Value Ref Range WBC 7.6 3.6 - 11.0 K/uL  
 RBC 2.87 (L) 3.80 - 5.20 M/uL HGB 8.6 (L) 11.5 - 16.0 g/dL HCT 28.2 (L) 35.0 - 47.0 % MCV 98.3 80.0 - 99.0 FL  
 MCH 30.0 26.0 - 34.0 PG  
 MCHC 30.5 30.0 - 36.5 g/dL  
 RDW 16.0 (H) 11.5 - 14.5 % PLATELET 963 (L) 407 - 400 K/uL MPV 13.3 (H) 8.9 - 12.9 FL  
 NRBC 0.0 0  WBC ABSOLUTE NRBC 0.00 0.00 - 0.01 K/uL NEUTROPHILS 69 32 - 75 % LYMPHOCYTES 19 12 - 49 % MONOCYTES 6 5 - 13 % EOSINOPHILS 5 0 - 7 % BASOPHILS 1 0 - 1 % IMMATURE GRANULOCYTES 0 0.0 - 0.5 % ABS. NEUTROPHILS 5.2 1.8 - 8.0 K/UL  
 ABS. LYMPHOCYTES 1.4 0.8 - 3.5 K/UL  
 ABS. MONOCYTES 0.5 0.0 - 1.0 K/UL  
 ABS. EOSINOPHILS 0.4 0.0 - 0.4 K/UL  
 ABS. BASOPHILS 0.1 0.0 - 0.1 K/UL  
 ABS. IMM. GRANS. 0.0 0.00 - 0.04 K/UL  
 DF AUTOMATED    
 RBC COMMENTS ANISOCYTOSIS 
1+ METABOLIC PANEL, BASIC Collection Time: 06/14/19 12:26 PM  
Result Value Ref Range Sodium 141 136 - 145 mmol/L Potassium 4.3 3.5 - 5.1 mmol/L Chloride 106 97 - 108 mmol/L  
 CO2 28 21 - 32 mmol/L Anion gap 7 5 - 15 mmol/L Glucose 154 (H) 65 - 100 mg/dL BUN 35 (H) 6 - 20 MG/DL Creatinine 2.06 (H) 0.55 - 1.02 MG/DL  
 BUN/Creatinine ratio 17 12 - 20 GFR est AA 28 (L) >60 ml/min/1.73m2 GFR est non-AA 23 (L) >60 ml/min/1.73m2 Calcium 8.9 8.5 - 10.1 MG/DL PROTHROMBIN TIME + INR Collection Time: 06/14/19 12:26 PM  
Result Value Ref Range INR 1.3 (H) 0.9 - 1.1 Prothrombin time 13.6 (H) 9.0 - 11.1 sec PTT Collection Time: 06/14/19 12:26 PM  
Result Value Ref Range aPTT 32.5 (H) 22.1 - 32.0 sec  
 aPTT, therapeutic range     58.0 - 77.0 SECS  
TYPE & SCREEN Collection Time: 06/14/19 12:26 PM  
Result Value Ref Range Crossmatch Expiration 06/17/2019  ABO/Rh(D) A POSITIVE   
 Antibody screen NEG Antibody ID NEG Unit number [id number] Blood component type  LR Unit division 00 Status of unit ALLOCATED Crossmatch result Compatible Unit number [id number] Blood component type  LR Unit division 00 Status of unit ALLOCATED Crossmatch result Compatible OCCULT BLOOD, STOOL Collection Time: 06/14/19  3:31 PM  
Result Value Ref Range Occult blood, stool POSITIVE (A) NEG    
HGB & HCT Collection Time: 06/14/19  8:23 PM  
Result Value Ref Range HGB 7.3 (L) 11.5 - 16.0 g/dL HCT 23.5 (L) 35.0 - 47.0 % GLUCOSE, POC Collection Time: 06/14/19  9:14 PM  
Result Value Ref Range Glucose (POC) 132 (H) 65 - 100 mg/dL Performed by Oli Cary (PCT) Radiologic Studies -  
XR CHEST PA LAT Final Result IMPRESSION: No acute cardiopulmonary process. CT Results  (Last 48 hours) None CXR Results  (Last 48 hours) 06/14/19 1707  XR CHEST PA LAT Final result Impression:  IMPRESSION: No acute cardiopulmonary process. Narrative:  INDICATION: Shortness of breath. Rectal bleeding. COMPARISON: 5/8/2019 FINDINGS: PA and lateral views of the chest demonstrate a stable  
cardiomediastinal silhouette and clear lungs bilaterally. The patient is status  
post median sternotomy and mitral valve replacement. Calcified granuloma at the  
left lung base is unchanged. There is no new airspace disease or pleural  
effusion. The visualized osseous structures are unremarkable. Medical Decision Making I am the first provider for this patient. I reviewed the vital signs, available nursing notes, past medical history, past surgical history, family history and social history. Vital Signs-Reviewed the patient's vital signs. Patient Vitals for the past 12 hrs: 
 Temp Pulse Resp BP SpO2  
06/14/19 2243 98 °F (36.7 °C) 64 16 (!) 114/37 96 % 06/14/19 1901 97.9 °F (36.6 °C) 67 18 (!) 119/36 98 % 06/14/19 1845 97.6 °F (36.4 °C) 67 16 (!) 119/36 100 % 06/14/19 1815 97.6 °F (36.4 °C) 71 16 125/43 97 % 06/14/19 1714    120/51   
06/14/19 1515  68 17 116/68 99 % 06/14/19 1430  69 14 114/46 98 % 06/14/19 1416  67 18 129/50 100 % 06/14/19 1208 97.7 °F (36.5 °C) 86 16 119/75 100 % Records Reviewed: Nursing Notes and Old Medical Records Provider Notes (Medical Decision Making): On presentation, the patient is well appearing, in no acute distress with reassuring vital signs. Based on my history and exam the differential diagnosis for this patient includes blood loss anemia, upper GI bleed, diverticular bleed, AVM. Patient hemoglobin low 8.6, we will not transfuse at this time but will continue to monitor vital signs and hemoglobin. Will start Protonix for possible upper GI source. We will plan to consult gastroenterology and admit for further observation and management. ED Course:  
Initial assessment performed. The patients presenting problems have been discussed, and they are in agreement with the care plan formulated and outlined with them. I have encouraged them to ask questions as they arise throughout their visit. PROGRESS: 
The patient has been re-evaluated and , no new or worsening symptoms. Reviewed available results with patient and have counseled them on diagnosis and care plan. They have expressed understanding, and all their questions have been answered. They agree with plan for admission. CONSULT: Spoke with Dr. Rachid Bolanos of gastroenterology, they will evaluate the patient the emergency department. CONSULT: 
Mya Choi MD spoke with the hospitalist.  Discussed HPI and PE, available diagnostic tests and clinical findings. He is in agreement with care plans as outlined and will evaluate for admission Admit Note Patient is being admitted to the hospital by Dr. Vesna Martin.   The results of their tests and reasons for their admission have been discussed with them and/or available family. They convey agreement and understanding for the need to be admitted and for their admission diagnosis. Consultation has been made with the inpatient physician specialist for hospitalization. Disposition: 
admission PLAN: 
1. Admit Diagnosis Clinical Impression:  
1. Gastrointestinal hemorrhage, unspecified gastrointestinal hemorrhage type 2. Blood loss anemia

## 2019-06-14 NOTE — CONSULTS
[address] Gastroenterology Consult Note NEVAEH Reinoso Covering for Dr. Rachid Bolanos Admitting: Dr. Debra Michel Date: 6/14/2019 Subjective: Chief Complaint: Rectal bleeding History of Present Illness: Gustavo Guadarrama is a 82 y.o. female who is seen in consultation for GI bleed. Pt reports she has been experiencing rectal bleeding that has continued to worsen since Wednesday with worsening rectal pain. She reports she was passing a little more than a table spoon of blood at a time. She reports bleeding would continue without BM and so she is wearing a pad. She also admits to melena that started around the same time. She reports one melenotic stool a day until today when BM started becoming more frequent. She is on Xarelto for Afib, last dose was Wednesday. Has since stopped that as well as her ASA, cleared by Dr. Vinayak King by PCP Dr. Roland Escalante. She saw PCP yesterday who consulted Dr. Rachid Bolanos via phone about the case, was recommended for pt to got to ED but daughter wanted to try and hold off. Hgb checked yesterday and was found to be 9.3. She was worked in for an [employer] today but cancelled and came to ED instead given worsening sx. Pt reports she has been having mild suprapubic cramping with episodes that started on Wednesday as well. No N/V, denies any acid reflux of GERD. Has been eating and drinking well. Reports hx of CVA. Has a hx of mitral valve replacement and Afib. In ED labs were done and hgb decreased to 8.6, BUN elevated at 35, Cr: 2.06. Pt was seen back in February for rectal bleeding and had a colonoscopy done: 
 
Colonoscopy: 2/20/19 Findings:  Distal rectal erythema c/w prolapse. No definite bleeding site seen. Previous EGD done on 11/23/18 by Dr. Queenie Barboza:   
Findings: 
Esophagus: Partially obstructing Schatzki's ring was noted at the GE junction. Medium sliding hiatal hernia was noted. Stomach: Mild patchy erythema was noted in  antrum Duodenum/jejunum: Mild patchy erythema was noted in  duodenal bulb Path unavailable in [facility] Colonoscopy 4/14/09 by Dr. Dominic Roberson showed grade 2 internal hemorrhoids as the probable cause of bleeding. Past Medical History:  
Diagnosis Date  Arthritis KNEES  Atrial fibrillation ([address] 10/29/2009  Bladder cancer ([address]  CAD (coronary artery disease) STENT PER PATIENT  Chronic pain KNEES  
 Coagulation disorder ([address] Chronic prophylactic anticoagulation med  Colon polyps  Diabetes ([address] IDDM  GERD (gastroesophageal reflux disease)  Gout  Heart valve problem   
 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  
 @ [facility] >> atrial fibrillation  SOB (shortness of breath) on exertion 04/2019  
 T. I.A. 4/23/2009  TIA (transient ischemic attack)  Ulcer of right lower extremity, limited to breakdown of skin ([address] 7/5/2018 Past Surgical History:  
Procedure Laterality Date  CARDIAC SURG PROCEDURE UNLIST    
 ablation  COLONOSCOPY N/A 2/20/2019 COLONOSCOPY performed by Andreas Gardiner MD at [facility] ENDOSCOPY  COLONOSCOPY,DIAGNOSTIC  2/20/2019  HX APPENDECTOMY  HX CHOLECYSTECTOMY  HX HYSTERECTOMY  HX KNEE ARTHROSCOPY  [id number]  
 right knee  HX ORTHOPAEDIC    
 HX UROLOGICAL RENAL STENT, tur-b  VASCULAR SURGERY PROCEDURE UNLIST  11/4  
 removed vein in right leg Family History Problem Relation Age of Onset  Stroke Other  Arthritis-osteo Sister   
     spinal stenosis  Gout Son   
 Hypertension Son   
[facility] Hypertension Mother  Heart Disease Mother CAD  Heart Disease Father CAD  Alcohol abuse Neg Hx  Asthma Neg Hx  Bleeding Prob Neg Hx  Cancer Neg Hx  Diabetes Neg Hx  Elevated Lipids Neg Hx   
 Headache Neg Hx  Lung Disease Neg Hx  Migraines Neg Hx  Psychiatric Disorder Neg Hx  Mental Retardation Neg Hx  Anesth Problems Neg Hx Social History Tobacco Use  Smoking status: Former Smoker Packs/day: 0.50 Years: 10.00 Pack years: 5.00 Types: Cigarettes Last attempt to quit: 1967 Years since quittin.4  Smokeless tobacco: Never Used Substance Use Topics  Alcohol use: No  
  Alcohol/week: 0.0 oz Allergies Allergen Reactions  Actos [Pioglitazone] Swelling Swelling of feet and legs  Codeine Itching  Hydrocodone Rash and Other (comments)  
  hallucinations Current Facility-Administered Medications Medication Dose Route Frequency  pantoprazole (PROTONIX) injection 40 mg  40 mg IntraVENous NOW Current Outpatient Medications Medication Sig  pantoprazole (PROTONIX) 40 mg tablet Take 1 Tab by mouth daily.  amiodarone (CORDARONE) 200 mg tablet Take 1 Tab by mouth two (2) times a day.  diclofenac (VOLTAREN) 1 % gel Apply 2 g to affected area four (4) times daily.  camphor-methyl salicyl-menthol (SALONPAS) ptmd by Apply Externally route as needed.  loratadine (CLARITIN) 10 mg tablet Take 10 mg by mouth daily.  nitroglycerin (NITROSTAT) 0.4 mg SL tablet 0.4 mg by SubLINGual route every five (5) minutes as needed for Chest Pain. Up to 3 doses.  furosemide (LASIX) 80 mg tablet Take 1 Tab by mouth daily. (Patient taking differently: Take 80 mg by mouth daily. Indications: 80mg @ breakfast & 40mg @ lunch)  insulin NPH (HUMULIN N NPH INSULIN KWIKPEN) 100 unit/mL (3 mL) inpn INJECT 40 UNITS UNDER THE SKIN EVERY MORNING, 23 UNITS UNDER THE SKIN WITH DINNER (Patient taking differently: 24 units AC breakfast & 8 units AC dinner)  aspirin 81 mg chewable tablet Take 1 Tab by mouth daily.  ferrous sulfate 325 mg (65 mg iron) tablet Take 1 Tab by mouth daily (with breakfast).  metoprolol tartrate (LOPRESSOR) 50 mg tablet Take one and one half pill twice daily (Patient taking differently: Take 25 mg by mouth two (2) times a day.)  levothyroxine (SYNTHROID) 125 mcg tablet TAKE ONE TABLET BY MOUTH DAILY (Patient taking differently: TAKE ONE TABLET BY MOUTH DAILY BEFORE BREAKFAST)  atorvastatin (LIPITOR) 80 mg tablet TAKE ONE TABLET BY MOUTH EVERY EVENING  
 allopurinol (ZYLOPRIM) 100 mg tablet TAKE TWO TABLETS BY MOUTH DAILY  rivaroxaban (XARELTO) 15 mg tab tablet Take 1 Tab by mouth daily (with dinner).  acetaminophen (TYLENOL) 325 mg tablet Take 325 mg by mouth every four (4) hours as needed for Pain.  potassium chloride SR (KLOR-CON 10) 10 mEq tablet Take 1 Tab by mouth daily. Review of Systems: A detailed review of systems was performed as follows: 
Constitutional:  Lightheadedness, weakness Eyes:  No ocular sensitivity to the sun, blurred vision or double vision. ENMT:  + nasal drainage. Respiratory: No coughing, wheezing + sob Cardiac:  No chest pain, exertional chest pain or palpitations Gastrointestinal:  See history of the present illness :   No pain with urination or hematuria Musculoskeletal:  +arthritis no hot swollen joints. Endocrine:  No thyroid disease +diabetes Psychiatric: No depression or feeling blue Integumentary:  No skin rash or sensitivity to the sun. Neurologic:  +hx of stroke and TIA no seizure; no numbness or tingling of the extremities. Heme-Lymphatic:  +history of anemia, no unexplained lumps or bumps Objective:  
 
Physical Exam: 
Visit Vitals /50 (BP 1 Location: Left arm, BP Patient Position: At rest) Pulse 67 Temp 97.7 °F (36.5 °C) Resp 18 Ht 5' 8\" (1.727 m) Wt 92.5 kg (203 lb 14.8 oz) SpO2 100% BMI 31.01 kg/m² GEN: Elderly WF, NAD Skin:  Extremities and face reveal no rashes. HEENT: Sclerae anicteric. Extra-occular muscles are intact. No abnormal pigmentation of the lips. Pale conjuctiva Cardiovascular: Regular rate and rhythm. No murmurs, gallops, or rubs. Respiratory:  Comfortable breathing with no accessory muscle use. Clear breath sounds with no wheezes, rales, or rhonchi. GI:  Abdomen nondistended, soft, and nontender. Normal active bowel sounds. No enlargement of the liver or spleen. No masses palpable. Rectal:  Performed by ED physician while Chaim Parker was in exam room significant amount of melenotic stool on rectum and in undergarment, no visible prolapse but limited viability, external skin tags, melanotic stool on exam glove Musculoskeletal:  No pitting edema of the lower legs. Neurological:  Gross memory appears intact. Patient is alert and oriented. Psychiatric:  Mood appears appropriate with judgement intact. Lymphatic:  No cervical or supraclavicular adenopathy. Laboratory:   
Recent Results (from the past 24 hour(s)) CBC WITH AUTOMATED DIFF Collection Time: 06/14/19 12:26 PM  
Result Value Ref Range WBC 7.6 3.6 - 11.0 K/uL  
 RBC 2.87 (L) 3.80 - 5.20 M/uL HGB 8.6 (L) 11.5 - 16.0 g/dL HCT 28.2 (L) 35.0 - 47.0 % MCV 98.3 80.0 - 99.0 FL  
 MCH 30.0 26.0 - 34.0 PG  
 MCHC 30.5 30.0 - 36.5 g/dL  
 RDW 16.0 (H) 11.5 - 14.5 % PLATELET 357 (L) 486 - 400 K/uL MPV 13.3 (H) 8.9 - 12.9 FL  
 NRBC 0.0 0  WBC ABSOLUTE NRBC 0.00 0.00 - 0.01 K/uL NEUTROPHILS 69 32 - 75 % LYMPHOCYTES 19 12 - 49 % MONOCYTES 6 5 - 13 % EOSINOPHILS 5 0 - 7 % BASOPHILS 1 0 - 1 % IMMATURE GRANULOCYTES 0 0.0 - 0.5 % ABS. NEUTROPHILS 5.2 1.8 - 8.0 K/UL  
 ABS. LYMPHOCYTES 1.4 0.8 - 3.5 K/UL  
 ABS. MONOCYTES 0.5 0.0 - 1.0 K/UL  
 ABS. EOSINOPHILS 0.4 0.0 - 0.4 K/UL  
 ABS. BASOPHILS 0.1 0.0 - 0.1 K/UL  
 ABS. IMM. GRANS. 0.0 0.00 - 0.04 K/UL  
 DF AUTOMATED    
 RBC COMMENTS ANISOCYTOSIS 
1+ METABOLIC PANEL, BASIC  
 Collection Time: 06/14/19 12:26 PM  
Result Value Ref Range Sodium 141 136 - 145 mmol/L Potassium 4.3 3.5 - 5.1 mmol/L Chloride 106 97 - 108 mmol/L  
 CO2 28 21 - 32 mmol/L Anion gap 7 5 - 15 mmol/L Glucose 154 (H) 65 - 100 mg/dL BUN 35 (H) 6 - 20 MG/DL Creatinine 2.06 (H) 0.55 - 1.02 MG/DL  
 BUN/Creatinine ratio 17 12 - 20 GFR est AA 28 (L) >60 ml/min/1.73m2 GFR est non-AA 23 (L) >60 ml/min/1.73m2 Calcium 8.9 8.5 - 10.1 MG/DL PROTHROMBIN TIME + INR Collection Time: 06/14/19 12:26 PM  
Result Value Ref Range INR 1.3 (H) 0.9 - 1.1 Prothrombin time 13.6 (H) 9.0 - 11.1 sec PTT Collection Time: 06/14/19 12:26 PM  
Result Value Ref Range aPTT 32.5 (H) 22.1 - 32.0 sec  
 aPTT, therapeutic range     58.0 - 77.0 SECS  
TYPE & SCREEN Collection Time: 06/14/19 12:26 PM  
Result Value Ref Range Crossmatch Expiration 06/17/2019 ABO/Rh(D) A POSITIVE Antibody screen NEG Assessment/Plan: Active Problems: 
  Long term current use of anticoagulant therapy (2/8/2010) GIB (gastrointestinal bleeding) (6/14/2019) Melena (6/14/2019) Rectal bleeding (6/14/2019) Lower abdominal pain (6/14/2019) Patient is presenting with rectal bleeding and hematochezia with hx of chronic Xarelto and ASA use (last dose on Wednesday). Pt has had nearly a 1pt drop in hgb since yesterday and melena appears to be worsening. No BRB per rectum on exam but significant melenotic stool is evident. Would recommend starting evaluation with an EGD to look for PUD, esophagitis, gastritis, AVM, and other sources of UGI bleeding. Timing will depend on progress over the weekend and OR availability. Pending findings on EGD may consider capsule study for further evaluation. NEVAEH Koch 
 
06/14/19 
2:57 PM 
 
[address], [address] [address] Loc: [phone]

## 2019-06-14 NOTE — PROGRESS NOTES
Pharmacy Clarification of the Prior to Admission Medication Regimen Retrospective to the Admission Medication Reconciliation The patient was not interviewed regarding clarification of the prior to admission medication regimen. MHT attempted interview but patient stated that her daughter left with her medication list and that she helps manage her medications. MHT called patient's daughter, Fanta Jewell [phone], who was able to verify the patient's medications and last doses administered. MHT called patient's out patient pharmacy, [facility] [phone], and spoke with [employer] who was able to verify the administration on Metoprolol and Furosemide. Information Obtained From: Rx Query, patient's daughter Recommendations/Findings: The following amendments were made to the patient's active medication list on file at [facility]:  
 
1) Additions: None 2) Removals: None 3) Changes: 
furosemide (LASIX) 40 mg tablet (Old regimen: 1 tab daily /New regimen: 80 mg ACB and 40 mg at lunch) 
insulin NPH (HUMULIN N NPH INSULIN KWIKPEN) 100 unit/mL (3 mL) inpn (Old regimen: 40 units QAM and 23 units with dinner /New regimen: 24 units QAM and 8 units QPM) metoprolol tartrate (LOPRESSOR) tablet (Old regimen: (strength 50 mg) 1.5 tabs BID /New regimen: (strength 25 mg) 1 tab BID) 4) Pertinent Pharmacy Findings: 
High alert Anticoagulant: Xarelto 
nitroglycerin (NITROSTAT) 0.4 mg SL tablet: Per patient's daughter the patient has this agent at home but has not taken it as of 6/14/19. PTA medication list was corrected to the following:  
 
Prior to Admission Medications Prescriptions Last Dose Informant Patient Reported? Taking?  
acetaminophen (TYLENOL) 325 mg tablet 6/7/2019 at Unknown time Child Yes Yes Sig: Take 325 mg by mouth every four (4) hours as needed for Pain. allopurinol (ZYLOPRIM) 100 mg tablet 6/13/2019 at Unknown time Child No Yes Sig: TAKE TWO TABLETS BY MOUTH DAILY amiodarone (CORDARONE) 200 mg tablet 2019 at Unknown time Child No Yes Sig: Take 1 Tab by mouth two (2) times a day. aspirin 81 mg chewable tablet 2019 at Unknown time Child No Yes Sig: Take 1 Tab by mouth daily. atorvastatin (LIPITOR) 80 mg tablet 2019 at Unknown time Child No Yes Sig: TAKE ONE TABLET BY MOUTH EVERY EVENING  
camphor-methyl salicyl-menthol (SALONPAS) ptmd 2019 at Unknown time Child Yes Yes Sig: by Apply Externally route as needed. diclofenac (VOLTAREN) 1 % gel 2019 at Unknown time Child No Yes Sig: Apply 2 g to affected area four (4) times daily. ferrous sulfate 325 mg (65 mg iron) tablet 2019 at Unknown time Child No Yes Sig: Take 1 Tab by mouth daily (with breakfast). furosemide (LASIX) 40 mg tablet 2019 at Unknown time Child Yes Yes Sig: Take 80 mg by mouth Daily (before breakfast). Patient takes 80 mg with breakfast and 40 mg at lunch  
furosemide (LASIX) 40 mg tablet 2019 at Unknown time Child Yes Yes Sig: Take 40 mg by mouth daily (with lunch). Patient takes 80 mg with breakfast and 40 mg at lunch  
insulin NPH (HUMULIN N NPH INSULIN KWIKPEN) 100 unit/mL (3 mL) inpn 2019 at Unknown time Child Yes Yes Si Units by SubCUTAneous route daily. insulin NPH (HUMULIN N NPH INSULIN KWIKPEN) 100 unit/mL (3 mL) inpn 2019 at Unknown time Child Yes Yes Si Units by SubCUTAneous route every evening. levothyroxine (SYNTHROID) 125 mcg tablet 2019 at Unknown time Child No Yes Sig: TAKE ONE TABLET BY MOUTH DAILY Patient taking differently: TAKE ONE TABLET BY MOUTH DAILY BEFORE BREAKFAST  
loratadine (CLARITIN) 10 mg tablet 2019 at Unknown time Child Yes Yes Sig: Take 10 mg by mouth daily. metoprolol tartrate (LOPRESSOR) 25 mg tablet 2019 at Unknown time Child Yes Yes Sig: Take 25 mg by mouth two (2) times a day.   
nitroglycerin (NITROSTAT) 0.4 mg SL tablet Not Taking at Unknown time Child Yes No  
Si.4 mg by SubLINGual route every five (5) minutes as needed for Chest Pain. Up to 3 doses. pantoprazole (PROTONIX) 40 mg tablet 2019 at Unknown time Child No Yes Sig: Take 1 Tab by mouth daily. potassium chloride SR (KLOR-CON 10) 10 mEq tablet 2019 at Unknown time Child No Yes Sig: Take 1 Tab by mouth daily. rivaroxaban (XARELTO) 15 mg tab tablet 2019 at Unknown time Child No Yes Sig: Take 1 Tab by mouth daily (with dinner). Facility-Administered Medications: None Thank you, 
Juan Bautista Medication History Pharmacy Technician

## 2019-06-14 NOTE — ED NOTES
TRANSFER - OUT REPORT: 
 
Verbal report given to Cheyenne Catalan RN on Kashif Jacobs  being transferred to  23  for routine progression of care Report consisted of patients Situation, Background, Assessment and  
Recommendations(SBAR). Information from the following report(s) SBAR was reviewed with the receiving nurse. Lines:  
Peripheral IV 06/14/19 Left Antecubital (Active) Site Assessment Clean, dry, & intact 6/14/2019  5:31 PM  
Phlebitis Assessment 0 6/14/2019  5:31 PM  
Infiltration Assessment 0 6/14/2019  5:31 PM  
Dressing Status Clean, dry, & intact 6/14/2019  5:31 PM  
  
 
Opportunity for questions and clarification was provided.    
 
Patient transported with: 
 Monitor and RN

## 2019-06-15 ENCOUNTER — ANESTHESIA (OUTPATIENT)
Dept: SURGERY | Age: 84
DRG: 378 | End: 2019-06-15
Payer: MEDICARE

## 2019-06-15 ENCOUNTER — ANESTHESIA EVENT (OUTPATIENT)
Dept: SURGERY | Age: 84
DRG: 378 | End: 2019-06-15
Payer: MEDICARE

## 2019-06-15 LAB
ANION GAP SERPL CALC-SCNC: 5 MMOL/L (ref 5–15)
BASOPHILS # BLD: 0 K/UL (ref 0–0.1)
BASOPHILS NFR BLD: 1 % (ref 0–1)
BUN SERPL-MCNC: 30 MG/DL (ref 6–20)
BUN/CREAT SERPL: 18 (ref 12–20)
CALCIUM SERPL-MCNC: 8.2 MG/DL (ref 8.5–10.1)
CHLORIDE SERPL-SCNC: 110 MMOL/L (ref 97–108)
CO2 SERPL-SCNC: 28 MMOL/L (ref 21–32)
CREAT SERPL-MCNC: 1.68 MG/DL (ref 0.55–1.02)
DIFFERENTIAL METHOD BLD: ABNORMAL
EOSINOPHIL # BLD: 0.3 K/UL (ref 0–0.4)
EOSINOPHIL NFR BLD: 8 % (ref 0–7)
ERYTHROCYTE [DISTWIDTH] IN BLOOD BY AUTOMATED COUNT: 16.2 % (ref 11.5–14.5)
GLUCOSE BLD STRIP.AUTO-MCNC: 129 MG/DL (ref 65–100)
GLUCOSE BLD STRIP.AUTO-MCNC: 147 MG/DL (ref 65–100)
GLUCOSE BLD STRIP.AUTO-MCNC: 149 MG/DL (ref 65–100)
GLUCOSE BLD STRIP.AUTO-MCNC: 155 MG/DL (ref 65–100)
GLUCOSE BLD STRIP.AUTO-MCNC: 224 MG/DL (ref 65–100)
GLUCOSE SERPL-MCNC: 112 MG/DL (ref 65–100)
HCT VFR BLD AUTO: 23.3 % (ref 35–47)
HCT VFR BLD AUTO: 25.4 % (ref 35–47)
HCT VFR BLD AUTO: 26.8 % (ref 35–47)
HGB BLD-MCNC: 7 G/DL (ref 11.5–16)
HGB BLD-MCNC: 7.6 G/DL (ref 11.5–16)
HGB BLD-MCNC: 8 G/DL (ref 11.5–16)
IMM GRANULOCYTES # BLD AUTO: 0 K/UL (ref 0–0.04)
IMM GRANULOCYTES NFR BLD AUTO: 1 % (ref 0–0.5)
LYMPHOCYTES # BLD: 1.1 K/UL (ref 0.8–3.5)
LYMPHOCYTES NFR BLD: 26 % (ref 12–49)
MCH RBC QN AUTO: 30.2 PG (ref 26–34)
MCHC RBC AUTO-ENTMCNC: 30 G/DL (ref 30–36.5)
MCV RBC AUTO: 100.4 FL (ref 80–99)
MONOCYTES # BLD: 0.3 K/UL (ref 0–1)
MONOCYTES NFR BLD: 6 % (ref 5–13)
NEUTS SEG # BLD: 2.4 K/UL (ref 1.8–8)
NEUTS SEG NFR BLD: 59 % (ref 32–75)
NRBC # BLD: 0 K/UL (ref 0–0.01)
NRBC BLD-RTO: 0 PER 100 WBC
PLATELET # BLD AUTO: 93 K/UL (ref 150–400)
POTASSIUM SERPL-SCNC: 3.6 MMOL/L (ref 3.5–5.1)
RBC # BLD AUTO: 2.32 M/UL (ref 3.8–5.2)
SERVICE CMNT-IMP: ABNORMAL
SODIUM SERPL-SCNC: 143 MMOL/L (ref 136–145)
WBC # BLD AUTO: 4.1 K/UL (ref 3.6–11)

## 2019-06-15 PROCEDURE — 76010000138 HC OR TIME 0.5 TO 1 HR: Performed by: SPECIALIST

## 2019-06-15 PROCEDURE — 77030021593 HC FCPS BIOP ENDOSC BSC -A: Performed by: SPECIALIST

## 2019-06-15 PROCEDURE — 74011250636 HC RX REV CODE- 250/636

## 2019-06-15 PROCEDURE — 74011250636 HC RX REV CODE- 250/636: Performed by: SPECIALIST

## 2019-06-15 PROCEDURE — 76060000032 HC ANESTHESIA 0.5 TO 1 HR: Performed by: SPECIALIST

## 2019-06-15 PROCEDURE — 77030010104 HC SEAL PRT ENDOSC BYRN -B: Performed by: SPECIALIST

## 2019-06-15 PROCEDURE — 74011000250 HC RX REV CODE- 250: Performed by: SPECIALIST

## 2019-06-15 PROCEDURE — 88305 TISSUE EXAM BY PATHOLOGIST: CPT

## 2019-06-15 PROCEDURE — 0DB68ZX EXCISION OF STOMACH, VIA NATURAL OR ARTIFICIAL OPENING ENDOSCOPIC, DIAGNOSTIC: ICD-10-PCS | Performed by: SPECIALIST

## 2019-06-15 PROCEDURE — 85025 COMPLETE CBC W/AUTO DIFF WBC: CPT

## 2019-06-15 PROCEDURE — 0DJD8ZZ INSPECTION OF LOWER INTESTINAL TRACT, VIA NATURAL OR ARTIFICIAL OPENING ENDOSCOPIC: ICD-10-PCS | Performed by: SPECIALIST

## 2019-06-15 PROCEDURE — 80048 BASIC METABOLIC PNL TOTAL CA: CPT

## 2019-06-15 PROCEDURE — 0DB98ZX EXCISION OF DUODENUM, VIA NATURAL OR ARTIFICIAL OPENING ENDOSCOPIC, DIAGNOSTIC: ICD-10-PCS | Performed by: SPECIALIST

## 2019-06-15 PROCEDURE — 77030018712 HC DEV BLLN INFL BSC -B: Performed by: SPECIALIST

## 2019-06-15 PROCEDURE — 74011250637 HC RX REV CODE- 250/637: Performed by: HOSPITALIST

## 2019-06-15 PROCEDURE — C9113 INJ PANTOPRAZOLE SODIUM, VIA: HCPCS | Performed by: HOSPITALIST

## 2019-06-15 PROCEDURE — 74011250636 HC RX REV CODE- 250/636: Performed by: HOSPITALIST

## 2019-06-15 PROCEDURE — C1726 CATH, BAL DIL, NON-VASCULAR: HCPCS | Performed by: SPECIALIST

## 2019-06-15 PROCEDURE — 0DB58ZX EXCISION OF ESOPHAGUS, VIA NATURAL OR ARTIFICIAL OPENING ENDOSCOPIC, DIAGNOSTIC: ICD-10-PCS | Performed by: SPECIALIST

## 2019-06-15 PROCEDURE — 76210000063 HC OR PH I REC FIRST 0.5 HR: Performed by: SPECIALIST

## 2019-06-15 PROCEDURE — 85018 HEMOGLOBIN: CPT

## 2019-06-15 PROCEDURE — 36415 COLL VENOUS BLD VENIPUNCTURE: CPT

## 2019-06-15 PROCEDURE — 74011636637 HC RX REV CODE- 636/637: Performed by: HOSPITALIST

## 2019-06-15 PROCEDURE — 74011250637 HC RX REV CODE- 250/637: Performed by: SPECIALIST

## 2019-06-15 PROCEDURE — 65660000000 HC RM CCU STEPDOWN

## 2019-06-15 PROCEDURE — 0D758ZZ DILATION OF ESOPHAGUS, VIA NATURAL OR ARTIFICIAL OPENING ENDOSCOPIC: ICD-10-PCS | Performed by: SPECIALIST

## 2019-06-15 PROCEDURE — 82962 GLUCOSE BLOOD TEST: CPT

## 2019-06-15 PROCEDURE — 77030018846 HC SOL IRR STRL H20 ICUM -A: Performed by: SPECIALIST

## 2019-06-15 PROCEDURE — 74011000250 HC RX REV CODE- 250: Performed by: HOSPITALIST

## 2019-06-15 RX ORDER — AMIODARONE HYDROCHLORIDE 200 MG/1
200 TABLET ORAL 2 TIMES DAILY
Status: DISCONTINUED | OUTPATIENT
Start: 2019-06-15 | End: 2019-06-20 | Stop reason: HOSPADM

## 2019-06-15 RX ORDER — FLUMAZENIL 0.1 MG/ML
0.2 INJECTION INTRAVENOUS
Status: CANCELLED | OUTPATIENT
Start: 2019-06-15 | End: 2019-06-15

## 2019-06-15 RX ORDER — ONDANSETRON 2 MG/ML
4 INJECTION INTRAMUSCULAR; INTRAVENOUS AS NEEDED
Status: DISCONTINUED | OUTPATIENT
Start: 2019-06-15 | End: 2019-06-15 | Stop reason: HOSPADM

## 2019-06-15 RX ORDER — DEXTROSE MONOHYDRATE 100 MG/ML
125-250 INJECTION, SOLUTION INTRAVENOUS AS NEEDED
Status: DISCONTINUED | OUTPATIENT
Start: 2019-06-15 | End: 2019-06-20 | Stop reason: HOSPADM

## 2019-06-15 RX ORDER — PROPOFOL 10 MG/ML
INJECTION, EMULSION INTRAVENOUS AS NEEDED
Status: DISCONTINUED | OUTPATIENT
Start: 2019-06-15 | End: 2019-06-15 | Stop reason: HOSPADM

## 2019-06-15 RX ORDER — SODIUM CHLORIDE 9 MG/ML
75 INJECTION, SOLUTION INTRAVENOUS CONTINUOUS
Status: CANCELLED | OUTPATIENT
Start: 2019-06-15 | End: 2019-06-15

## 2019-06-15 RX ORDER — FENTANYL CITRATE 50 UG/ML
50 INJECTION, SOLUTION INTRAMUSCULAR; INTRAVENOUS AS NEEDED
Status: DISCONTINUED | OUTPATIENT
Start: 2019-06-15 | End: 2019-06-15 | Stop reason: HOSPADM

## 2019-06-15 RX ORDER — MORPHINE SULFATE 10 MG/ML
2 INJECTION, SOLUTION INTRAMUSCULAR; INTRAVENOUS
Status: DISCONTINUED | OUTPATIENT
Start: 2019-06-15 | End: 2019-06-15 | Stop reason: HOSPADM

## 2019-06-15 RX ORDER — MAGNESIUM SULFATE 100 %
4 CRYSTALS MISCELLANEOUS AS NEEDED
Status: DISCONTINUED | OUTPATIENT
Start: 2019-06-15 | End: 2019-06-20 | Stop reason: HOSPADM

## 2019-06-15 RX ORDER — LIDOCAINE HYDROCHLORIDE 20 MG/ML
INJECTION, SOLUTION EPIDURAL; INFILTRATION; INTRACAUDAL; PERINEURAL AS NEEDED
Status: DISCONTINUED | OUTPATIENT
Start: 2019-06-15 | End: 2019-06-15 | Stop reason: HOSPADM

## 2019-06-15 RX ORDER — MIDAZOLAM HYDROCHLORIDE 1 MG/ML
.25-5 INJECTION, SOLUTION INTRAMUSCULAR; INTRAVENOUS
Status: CANCELLED | OUTPATIENT
Start: 2019-06-15 | End: 2019-06-15

## 2019-06-15 RX ORDER — ATROPINE SULFATE 0.1 MG/ML
0.5 INJECTION INTRAVENOUS
Status: CANCELLED | OUTPATIENT
Start: 2019-06-15 | End: 2019-06-16

## 2019-06-15 RX ORDER — POLYETHYLENE GLYCOL 3350, SODIUM SULFATE ANHYDROUS, SODIUM BICARBONATE, SODIUM CHLORIDE, POTASSIUM CHLORIDE 236; 22.74; 6.74; 5.86; 2.97 G/4L; G/4L; G/4L; G/4L; G/4L
4000 POWDER, FOR SOLUTION ORAL ONCE
Status: COMPLETED | OUTPATIENT
Start: 2019-06-15 | End: 2019-06-15

## 2019-06-15 RX ORDER — DEXTROMETHORPHAN/PSEUDOEPHED 2.5-7.5/.8
1.2 DROPS ORAL
Status: CANCELLED | OUTPATIENT
Start: 2019-06-15

## 2019-06-15 RX ORDER — SODIUM CHLORIDE 9 MG/ML
25 INJECTION, SOLUTION INTRAVENOUS CONTINUOUS
Status: DISCONTINUED | OUTPATIENT
Start: 2019-06-15 | End: 2019-06-17

## 2019-06-15 RX ORDER — SODIUM CHLORIDE, SODIUM LACTATE, POTASSIUM CHLORIDE, CALCIUM CHLORIDE 600; 310; 30; 20 MG/100ML; MG/100ML; MG/100ML; MG/100ML
INJECTION, SOLUTION INTRAVENOUS
Status: DISCONTINUED | OUTPATIENT
Start: 2019-06-15 | End: 2019-06-15 | Stop reason: HOSPADM

## 2019-06-15 RX ORDER — LEVOTHYROXINE SODIUM 125 UG/1
125 TABLET ORAL
Status: DISCONTINUED | OUTPATIENT
Start: 2019-06-15 | End: 2019-06-20 | Stop reason: HOSPADM

## 2019-06-15 RX ORDER — NALOXONE HYDROCHLORIDE 0.4 MG/ML
0.4 INJECTION, SOLUTION INTRAMUSCULAR; INTRAVENOUS; SUBCUTANEOUS
Status: CANCELLED | OUTPATIENT
Start: 2019-06-15 | End: 2019-06-15

## 2019-06-15 RX ORDER — SODIUM CHLORIDE, SODIUM LACTATE, POTASSIUM CHLORIDE, CALCIUM CHLORIDE 600; 310; 30; 20 MG/100ML; MG/100ML; MG/100ML; MG/100ML
25 INJECTION, SOLUTION INTRAVENOUS CONTINUOUS
Status: DISCONTINUED | OUTPATIENT
Start: 2019-06-15 | End: 2019-06-15 | Stop reason: HOSPADM

## 2019-06-15 RX ORDER — MIDAZOLAM HYDROCHLORIDE 1 MG/ML
1 INJECTION, SOLUTION INTRAMUSCULAR; INTRAVENOUS AS NEEDED
Status: DISCONTINUED | OUTPATIENT
Start: 2019-06-15 | End: 2019-06-15 | Stop reason: HOSPADM

## 2019-06-15 RX ORDER — FENTANYL CITRATE 50 UG/ML
25 INJECTION, SOLUTION INTRAMUSCULAR; INTRAVENOUS
Status: DISCONTINUED | OUTPATIENT
Start: 2019-06-15 | End: 2019-06-15 | Stop reason: HOSPADM

## 2019-06-15 RX ORDER — SODIUM CHLORIDE 0.9 % (FLUSH) 0.9 %
5-40 SYRINGE (ML) INJECTION AS NEEDED
Status: CANCELLED | OUTPATIENT
Start: 2019-06-15

## 2019-06-15 RX ORDER — SUCRALFATE 1 G/1
1 TABLET ORAL
Status: DISCONTINUED | OUTPATIENT
Start: 2019-06-15 | End: 2019-06-20 | Stop reason: HOSPADM

## 2019-06-15 RX ORDER — INSULIN LISPRO 100 [IU]/ML
INJECTION, SOLUTION INTRAVENOUS; SUBCUTANEOUS
Status: DISCONTINUED | OUTPATIENT
Start: 2019-06-15 | End: 2019-06-20 | Stop reason: HOSPADM

## 2019-06-15 RX ORDER — SODIUM CHLORIDE 0.9 % (FLUSH) 0.9 %
5-40 SYRINGE (ML) INJECTION EVERY 8 HOURS
Status: CANCELLED | OUTPATIENT
Start: 2019-06-15

## 2019-06-15 RX ORDER — BISACODYL 5 MG
10 TABLET, DELAYED RELEASE (ENTERIC COATED) ORAL
Status: COMPLETED | OUTPATIENT
Start: 2019-06-15 | End: 2019-06-15

## 2019-06-15 RX ORDER — LIDOCAINE HYDROCHLORIDE 10 MG/ML
0.1 INJECTION, SOLUTION EPIDURAL; INFILTRATION; INTRACAUDAL; PERINEURAL AS NEEDED
Status: DISCONTINUED | OUTPATIENT
Start: 2019-06-15 | End: 2019-06-15 | Stop reason: HOSPADM

## 2019-06-15 RX ORDER — PHENYLEPHRINE HCL IN 0.9% NACL 0.4MG/10ML
SYRINGE (ML) INTRAVENOUS AS NEEDED
Status: DISCONTINUED | OUTPATIENT
Start: 2019-06-15 | End: 2019-06-15 | Stop reason: HOSPADM

## 2019-06-15 RX ADMIN — Medication 80 MCG: at 12:21

## 2019-06-15 RX ADMIN — BISACODYL 10 MG: 5 TABLET, COATED ORAL at 14:18

## 2019-06-15 RX ADMIN — SODIUM CHLORIDE, SODIUM LACTATE, POTASSIUM CHLORIDE, AND CALCIUM CHLORIDE 250 ML/HR: 600; 310; 30; 20 INJECTION, SOLUTION INTRAVENOUS at 01:09

## 2019-06-15 RX ADMIN — ALUMINUM HYDROXIDE AND MAGNESIUM HYDROXIDE 15 ML: 200; 200 SUSPENSION ORAL at 05:18

## 2019-06-15 RX ADMIN — PROPOFOL 30 MG: 10 INJECTION, EMULSION INTRAVENOUS at 12:09

## 2019-06-15 RX ADMIN — INSULIN LISPRO 2 UNITS: 100 INJECTION, SOLUTION INTRAVENOUS; SUBCUTANEOUS at 17:13

## 2019-06-15 RX ADMIN — SODIUM CHLORIDE 25 ML/HR: 900 INJECTION, SOLUTION INTRAVENOUS at 15:00

## 2019-06-15 RX ADMIN — PROPOFOL 30 MG: 10 INJECTION, EMULSION INTRAVENOUS at 12:06

## 2019-06-15 RX ADMIN — AMIODARONE HYDROCHLORIDE 200 MG: 200 TABLET ORAL at 21:27

## 2019-06-15 RX ADMIN — SUCRALFATE 1 G: 1 TABLET ORAL at 17:24

## 2019-06-15 RX ADMIN — LIDOCAINE HYDROCHLORIDE 100 MG: 20 INJECTION, SOLUTION EPIDURAL; INFILTRATION; INTRACAUDAL; PERINEURAL at 11:59

## 2019-06-15 RX ADMIN — PROPOFOL 30 MG: 10 INJECTION, EMULSION INTRAVENOUS at 12:22

## 2019-06-15 RX ADMIN — PROPOFOL 40 MG: 10 INJECTION, EMULSION INTRAVENOUS at 12:04

## 2019-06-15 RX ADMIN — PROPOFOL 80 MG: 10 INJECTION, EMULSION INTRAVENOUS at 12:17

## 2019-06-15 RX ADMIN — PROPOFOL 40 MG: 10 INJECTION, EMULSION INTRAVENOUS at 12:02

## 2019-06-15 RX ADMIN — SODIUM CHLORIDE 40 MG: 9 INJECTION, SOLUTION INTRAMUSCULAR; INTRAVENOUS; SUBCUTANEOUS at 08:33

## 2019-06-15 RX ADMIN — AMIODARONE HYDROCHLORIDE 200 MG: 200 TABLET ORAL at 14:18

## 2019-06-15 RX ADMIN — SODIUM CHLORIDE, SODIUM LACTATE, POTASSIUM CHLORIDE, CALCIUM CHLORIDE: 600; 310; 30; 20 INJECTION, SOLUTION INTRAVENOUS at 11:48

## 2019-06-15 RX ADMIN — PROPOFOL 60 MG: 10 INJECTION, EMULSION INTRAVENOUS at 11:59

## 2019-06-15 RX ADMIN — LEVOTHYROXINE SODIUM 125 MCG: 125 TABLET ORAL at 14:18

## 2019-06-15 RX ADMIN — SUCRALFATE 1 G: 1 TABLET ORAL at 21:27

## 2019-06-15 RX ADMIN — Medication 10 ML: at 21:27

## 2019-06-15 RX ADMIN — Medication 80 MCG: at 12:25

## 2019-06-15 RX ADMIN — POLYETHYLENE GLYCOL-3350 AND ELECTROLYTES 4000 ML: 236; 6.74; 5.86; 2.97; 22.74 POWDER, FOR SOLUTION ORAL at 15:23

## 2019-06-15 NOTE — PROGRESS NOTES
See op notes There is melenic stool coming out of her rectum. Based on hgb, it may be old 
 
egd negative for source of current hemorrhage, but the erosive gastritis could have bled on anticoagulants. Rec: 
 
Continue to monitor, q 8 h  h andh. If there is clinical suspicion of bleed then move to ccu and get ctangio or tagged rbc scan If she does not bleed clinicaly then colonscopy tomorrow or 6.17 I will start prep and speak to her daughter. If colonoscopy negative the capsule endoscopy (the reason I dilated asymptomatic Shatzki ring) to follow Carina Verma MD 
12:29 PM 
6/15/2019

## 2019-06-15 NOTE — PROCEDURES
Esophagogastroduodenoscopy Indications:  Acute / sub acute blood loss anemia Medications:  See anesthesia form Assistants:  Jose D humphrey Post procedure diagnosis:  SCHATZKI'S  RING 
HIATAL HERNIA 
EROSIVE GASTRITIS Description of Procedure:   
Prior to the procedure its objectives, risks, consequences and alternatives were discussed with the patient who then elected to proceed. The Olympus video endoscope was inserted under direct vision into the mouth and then into the esophagus. The esophagus looked normal to the z line where a non obstructing Schatzi ring was seen. A   The z-line was located at 36 cm.   medium sized three cm hiatal hernia was present with the diaphragm pinch at 39 cm. There were no diagnostic abnormalities of the body, fundus, and cardia of the stomach. This included direct and retroflexion examination. In the distal body and antrum there were patches of reticulated mucosa and erosions. There was heme with the erosions. The appearance was more of an erosive gastritis, but GAVE is possible. No bleeding was seen. The first and second portion of the duodenum appeared normal.   
I took biopsies of the duodenum, stomach and mid esophagus. I then dilated the distal esophagus with a through the scope balloon. I dilated from 15mm to 16.5mm to 18 mm. Each time the balloon was inflated for sixty seconds. There were no apparent complications. Complications: There were no apparent complications and the patient tolerated the procedure well. Implants:  none Estimated Blood Loss:  none Specimens Removed:  Duodenum Stomach and erosive gastritis Mid esophagus Impressions:  Successful dilation of Schatzki ring to 18mm (necessary to facilitate capsule swallowing if capsule performed) Erosive gastritis (versus gave) 3 cm hiatal hernia Signed By: Davide García MD 
                      Aleida 15, 2019  
  12:32 PM

## 2019-06-15 NOTE — PERIOP NOTES
Pt put in elastic elbow sleeve.   TRANSFER - IN REPORT: 
 
Verbal report received from [facility]) on [facility]  being received from 2271(unit) for ordered procedure Report consisted of patients Situation, Background, Assessment and  
Recommendations(SBAR). Information from the following report(s) SBAR, Kardex, ED Summary, OR Summary, Procedure Summary, Intake/Output, MAR, Accordion, Recent Results, Med Rec Status, Cardiac Rhythm NSR, Alarm Parameters , Pre Procedure Checklist, Procedure Verification and Quality Measures was reviewed with the receiving nurse. Opportunity for questions and clarification was provided. Assessment completed upon patients arrival to unit and care assumed.

## 2019-06-15 NOTE — PERIOP NOTES
Handoff Report from Operating Room to PACU Report received from Kade Moya and  Home	[place] CRNA regarding Donnie Osborn. Surgeon(s): 
Lana Hodge MD  And Procedure(s) (LRB): ESOPHAGOGASTRODUODENOSCOPY (EGD) (N/A) COLONOSCOPY (N/A) ESOPHAGOGASTRODUODENAL (EGD) BIOPSY (N/A) ESOPHAGEAL DILATION (N/A)  confirmed  
with allergies discussed. Anesthesia type, drugs, patient history, complications, estimated blood loss, vital signs, intake and output, and intra op medications were reviewed.

## 2019-06-15 NOTE — PROGRESS NOTES
Report received from Franklin Burris, [facility]. SBAR, Kardex, Intake/Output, MAR and Recent Results were discussed. Jamia Chowdary, RN  
 
6548  MEWS 1  Pt up in recliner  No complaints of pain  A&O x 4  Up with standby assist and walker  NPO for EGD  RA  NSR  SCDs 0930  Consent signed for colonoscopy 0940  Repeat H&H sent 36 [facility] enema administered  Pt tolerated well 1114  MEWS 1 
1120  Pt left unit for endoscopy 1325  Pt returned to unit  In no acute distress  No complaints  Daughter at bedside  Now on clear liquid diet 1620  MEWS 1  Pt in recliner with no complaints 1800  Pt having large BMs while drinking golytely, watery, still black in color 1900  Report given to [facility]

## 2019-06-15 NOTE — ANESTHESIA PREPROCEDURE EVALUATION
Anesthetic History No history of anesthetic complications Review of Systems / Medical History Patient summary reviewed, nursing notes reviewed and pertinent labs reviewed Pulmonary Within defined limits Comments: Former smoker - [address] - 5 pack years Neuro/Psych CVA TIA Cardiovascular Hypertension Valvular problems/murmurs: mitral stenosis and mitral insufficiency CHF Dysrhythmias : atrial fibrillation and atrial flutter CAD and hyperlipidemia Exercise tolerance: <4 METS Comments: S/P MVR 04/2019 TTE (1/22/18): Mild MR, mild-to-moderate MS, EF=65% GI/Hepatic/Renal 
  
GERD Renal disease: CRI 
PUD Comments: Dysphagia Esophageal Stricture Reflux Esophagitis Endo/Other Diabetes: well controlled, type 2 Hypothyroidism: well controlled Obesity, arthritis, cancer (bladder) and anemia Pertinent negatives: No morbid obesity Comments: Thrombocytopenia Other Findings Comments: Thrombocytopenia 
gout Physical Exam 
 
Airway Mallampati: II 
TM Distance: 4 - 6 cm Neck ROM: normal range of motion Mouth opening: Normal 
 
 Cardiovascular Rhythm: irregular Rate: normal 
 
Murmur, Mitral area Dental 
 
Dentition: Lower partial plate, Caps/crowns and Loose teeth Pulmonary Breath sounds clear to auscultation Abdominal 
GI exam deferred Other Findings Anesthetic Plan ASA: 3 Anesthesia type: MAC and total IV anesthesia Induction: Intravenous Anesthetic plan and risks discussed with: Patient

## 2019-06-15 NOTE — PROGRESS NOTES
Problem: Patient Education: Go to Patient Education Activity Goal: Patient/Family Education Outcome: Progressing Towards Goal 
  
Problem: Upper and Lower GI Bleed: Day 1 Goal: Activity/Safety Outcome: Progressing Towards Goal 
Goal: Consults, if ordered Outcome: Progressing Towards Goal 
Goal: Diagnostic Test/Procedures Outcome: Progressing Towards Goal 
Goal: Nutrition/Diet Outcome: Progressing Towards Goal 
Goal: Discharge Planning Outcome: Progressing Towards Goal 
Goal: Medications Outcome: Progressing Towards Goal 
Goal: Respiratory Outcome: Progressing Towards Goal 
Goal: Treatments/Interventions/Procedures Outcome: Progressing Towards Goal 
Goal: Psychosocial 
Outcome: Progressing Towards Goal 
Goal: *Optimal pain control at patient's stated goal 
Outcome: Progressing Towards Goal 
Goal: *Hemodynamically stable Outcome: Progressing Towards Goal 
Goal: *Demonstrates progressive activity Outcome: Progressing Towards Goal 
  
Problem: Patient Education: Go to Patient Education Activity Goal: Patient/Family Education Outcome: Progressing Towards Goal 
  
Problem: Acute Renal Failure: Day 1 Goal: Activity/Safety Outcome: Progressing Towards Goal 
Goal: Diagnostic Test/Procedures Outcome: Progressing Towards Goal 
Goal: Nutrition/Diet Outcome: Progressing Towards Goal 
Goal: Discharge Planning Outcome: Progressing Towards Goal 
Goal: Medications Outcome: Progressing Towards Goal 
Goal: Respiratory Outcome: Progressing Towards Goal 
Goal: Treatments/Interventions/Procedures Outcome: Progressing Towards Goal 
Goal: Psychosocial 
Outcome: Progressing Towards Goal 
Goal: *Optimal pain control at patient's stated goal 
Outcome: Progressing Towards Goal 
Goal: *Urinary output within identified parameters Outcome: Progressing Towards Goal 
Goal: *Hemodynamically stable Outcome: Progressing Towards Goal 
Goal: *Tolerating diet Outcome: Progressing Towards Goal 
  
Problem: Falls - Risk of 
 Goal: *Absence of Falls Description Document [facility] Fall Risk and appropriate interventions in the flowsheet. Outcome: Progressing Towards Goal 
  
Problem: Patient Education: Go to Patient Education Activity Goal: Patient/Family Education Outcome: Progressing Towards Goal

## 2019-06-15 NOTE — PROGRESS NOTES
F/U for melena Chronic/ acute blood loss anemia S: Ms. Paxton Olivo was seen by me today during rounds. At this time, she is resting + comfortably. She had sob last night. No worse than usual.  Black and maroon stools overnight. The patient has no other new complaints today. Please see admission consult for details of ROS; there are no changes today. O: Blood pressure 121/42, pulse 67, temperature 97.6 °F (36.4 °C), resp. rate 18, height 5' 8\" (1.727 m), weight 92.8 kg (204 lb 9.4 oz), SpO2 99 %. Gen: Patient is in no acute distress. There is no jaundice. Lungs: Clear to auscultation bilaterally . Heart:irregulr RRR. Abd: Soft, non tender, non-distended, bowel sounds present. Extremities: Warm. Lab Results Component Value Date/Time WBC 4.1 06/15/2019 04:27 AM  
 Hemoglobin (POC) 8.8 06/13/2019 10:25 AM  
 Hemoglobin (POC) 10.5 (L) 07/06/2010 10:11 PM  
 HGB 7.0 (L) 06/15/2019 04:27 AM  
 Hematocrit (POC) 31 (L) 07/06/2010 10:11 PM  
 HCT 23.3 (L) 06/15/2019 04:27 AM  
 PLATELET 93 (L) 91/34/9066 04:27 AM  
 .4 (H) 06/15/2019 04:27 AM  
 
Lab Results Component Value Date/Time Sodium 143 06/15/2019 04:27 AM  
 Potassium 3.6 06/15/2019 04:27 AM  
 Chloride 110 (H) 06/15/2019 04:27 AM  
 CO2 28 06/15/2019 04:27 AM  
 Anion gap 5 06/15/2019 04:27 AM  
 Glucose 112 (H) 06/15/2019 04:27 AM  
 BUN 30 (H) 06/15/2019 04:27 AM  
 Creatinine 1.68 (H) 06/15/2019 04:27 AM  
 BUN/Creatinine ratio 18 06/15/2019 04:27 AM  
 GFR est AA 35 (L) 06/15/2019 04:27 AM  
 GFR est non-AA 29 (L) 06/15/2019 04:27 AM  
 Calcium 8.2 (L) 06/15/2019 04:27 AM  
 Bilirubin, total 0.5 04/28/2019 06:50 AM  
 AST (SGOT) 18 04/28/2019 06:50 AM  
 Alk.  phosphatase 67 04/28/2019 06:50 AM  
 Protein, total 5.7 (L) 04/28/2019 06:50 AM  
 Albumin 2.7 (L) 04/28/2019 06:50 AM  
 Globulin 3.0 04/28/2019 06:50 AM  
 A-G Ratio 0.9 (L) 04/28/2019 06:50 AM  
 ALT (SGPT) 12 04/28/2019 06:50 AM  
   
 Cross sectional imaging:  None new A: Active Problems: 
  Long term current use of anticoagulant therapy (2/8/2010) GIB (gastrointestinal bleeding) (6/14/2019) Melena (6/14/2019) Rectal bleeding (6/14/2019) Lower abdominal pain (6/14/2019) Comment:   
P: egd today in or  Will add colon . I discussed with her the objectives, risks, consequences and alternatives to the procedure. Fluids and blood per hospitalist and anesthesia With symptoms and hear

## 2019-06-15 NOTE — PROCEDURES
Colonoscopy Indications: melena Blood loss anemia/ sub acute/ acute Pre-operative Diagnosis: negative exam to 35 cm Poor prep Assistant(s):  see chart Krupa Us RN [facility] Medications:  See anesthesia form Post-operative Diagnosis:  POOR PREP Procedure Details Prior to the procedure its objectives, risks, consequences and alternatives were discussed with the patient who then elected to proceed. All questions were answered. Digital Rectal Exam:  was normal  
 
The Olympus videocolonoscope was inserted in the rectum and advanced to the sigmoid colon at 35 cm. There was thick black stool present. The colonoscope was slowly and carefully withdrawn as the mucosa was inspected. No abnormalities were noted. Retroflexion in the rectum was not performed due to a small vault. No bleeding was seen. No source of blood loss was seen, but the prep was poor. Photos to document the prep were obtained. The preparation was poor. Estimated Blood Loss:  none Specimens:  none Findings:  Poor prep Negative exam (with limitations) to 35 cm Complications:  none Implants:  none Repeat colonoscopy is recommended in:  Tomorrow if symptoms warrant. Fady Villagomez MD 
12:36 PM 
6/15/2019

## 2019-06-15 NOTE — H&P
Pre-endoscopy H and P The patient was seen and examined in the anestheisa pre op aea. The airway was assessed and docuemented. The problem list, past medical history, and medications were reviewed. Patient Active Problem List  
Diagnosis Code  T. I.A.  PVD (peripheral vascular disease) ([facility]) I73.9  Reflux esophagitis K21.0  Benign neoplasm of colon D12.6  Iron deficiency anemia D50.9  Hypomagnesemia E83.42  Long term current use of anticoagulant therapy Z79.01  
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  
 Advance directive in chart Z78.9  Type 2 diabetes, controlled, with renal manifestation ([facility]) E11.29  
 Chronic atrial fibrillation ([facility]) I48.2  Mixed hyperlipidemia E78.2  Atherosclerosis of native coronary artery without angina pectoris I25.10  Hypothyroidism, acquired, autoimmune E06.3  Heart valve problem I38  
 Mitral regurgitation I34.0  
 S/P MVR (mitral valve repair) [id number]  Active advance directive on file Z78.9  Non-rheumatic mitral regurgitation I34.0  Heart failure ([facility]) I50.9  Bilateral leg edema R60.0  Esophageal stricture K22.2  Dysphagia R13.10  Cellulitis of right lower leg L03.115  
 GI bleeding K92.2  Mitral stenosis I05.0  S/P MVR (mitral valve replacement) Z95.2  
 GIB (gastrointestinal bleeding) K92.2  Melena K92.1  Rectal bleeding K62.5  Lower abdominal pain R10.30 Social History Socioeconomic History  Marital status:  Spouse name: Not on file  Number of children: Not on file  Years of education: Not on file  Highest education level: Not on file Occupational History  Not on file Social Needs  Financial resource strain: Not on file  Food insecurity: Worry: Not on file Inability: Not on file  Transportation needs:  
  Medical: Not on file Non-medical: Not on file Tobacco Use  Smoking status: Former Smoker Packs/day: 0.50 Years: 10.00 Pack years: 5.00 Types: Cigarettes Last attempt to quit: 1967 Years since quittin.4  Smokeless tobacco: Never Used Substance and Sexual Activity  Alcohol use: No  
  Alcohol/week: 0.0 oz  Drug use: No  
 Sexual activity: Not Currently Lifestyle  Physical activity:  
  Days per week: Not on file Minutes per session: Not on file  Stress: Not on file Relationships  Social connections:  
  Talks on phone: Not on file Gets together: Not on file Attends Congregational service: Not on file Active member of club or organization: Not on file Attends meetings of clubs or organizations: Not on file Relationship status: Not on file  Intimate partner violence:  
  Fear of current or ex partner: Not on file Emotionally abused: Not on file Physically abused: Not on file Forced sexual activity: Not on file Other Topics Concern  Not on file Social History Narrative  Not on file Past Medical History:  
Diagnosis Date  Arthritis KNEES  Atrial fibrillation ([facility] 10/29/2009  Bladder cancer ([facility]  CAD (coronary artery disease) STENT PER PATIENT  Chronic pain KNEES  
 Coagulation disorder ([facility] [facility] Chronic prophylactic anticoagulation med  Colon polyps  Diabetes ([facility] [facility] IDDM  GERD (gastroesophageal reflux disease)  Gout  Heart valve problem   
 leaking heart valve  Hypercholesterolemia  Hypertension  Hypothyroidism  Hypothyroidism 2009  Hypothyroidism, acquired, autoimmune 2015  Overweight and obesity  PUD (peptic ulcer disease)   S/P ablation of atrial flutter[V45.89HM]   
 @ [facility] >> atrial fibrillation  SOB (shortness of breath) on exertion 2019  
 T. I.A. 2009  TIA (transient ischemic attack)  Ulcer of right lower extremity, limited to breakdown of skin ([address] 2018 The patient has a family history of na Prior to Admission Medications Prescriptions Last Dose Informant Patient Reported? Taking?  
acetaminophen (TYLENOL) 325 mg tablet 2019 at Unknown time Child Yes Yes Sig: Take 325 mg by mouth every four (4) hours as needed for Pain. allopurinol (ZYLOPRIM) 100 mg tablet 2019 at Unknown time Child No Yes Sig: TAKE TWO TABLETS BY MOUTH DAILY  
amiodarone (CORDARONE) 200 mg tablet 2019 at Unknown time Child No Yes Sig: Take 1 Tab by mouth two (2) times a day. aspirin 81 mg chewable tablet 2019 at Unknown time Child No Yes Sig: Take 1 Tab by mouth daily. atorvastatin (LIPITOR) 80 mg tablet 2019 at Unknown time Child No Yes Sig: TAKE ONE TABLET BY MOUTH EVERY EVENING  
camphor-methyl salicyl-menthol (SALONPAS) ptmd 2019 at Unknown time Child Yes Yes Sig: by Apply Externally route as needed. diclofenac (VOLTAREN) 1 % gel 2019 at Unknown time Child No Yes Sig: Apply 2 g to affected area four (4) times daily. ferrous sulfate 325 mg (65 mg iron) tablet 2019 at Unknown time Child No Yes Sig: Take 1 Tab by mouth daily (with breakfast). furosemide (LASIX) 40 mg tablet 2019 at Unknown time Child Yes Yes Sig: Take 80 mg by mouth Daily (before breakfast). Patient takes 80 mg with breakfast and 40 mg at lunch  
furosemide (LASIX) 40 mg tablet 2019 at Unknown time Child Yes Yes Sig: Take 40 mg by mouth daily (with lunch). Patient takes 80 mg with breakfast and 40 mg at lunch  
insulin NPH (HUMULIN N NPH INSULIN KWIKPEN) 100 unit/mL (3 mL) inpn 2019 at Unknown time Child Yes Yes Si Units by SubCUTAneous route daily.   
insulin NPH (HUMULIN N NPH INSULIN KWIKPEN) 100 unit/mL (3 mL) inpn 2019 at Unknown time Child Yes Yes Si Units by SubCUTAneous route every evening. levothyroxine (SYNTHROID) 125 mcg tablet 2019 at Unknown time Child No Yes Sig: TAKE ONE TABLET BY MOUTH DAILY Patient taking differently: TAKE ONE TABLET BY MOUTH DAILY BEFORE BREAKFAST  
loratadine (CLARITIN) 10 mg tablet 2019 at Unknown time Child Yes Yes Sig: Take 10 mg by mouth daily. metoprolol tartrate (LOPRESSOR) 25 mg tablet 2019 at Unknown time Child Yes Yes Sig: Take 25 mg by mouth two (2) times a day. nitroglycerin (NITROSTAT) 0.4 mg SL tablet Not Taking at Unknown time Child Yes No  
Si.4 mg by SubLINGual route every five (5) minutes as needed for Chest Pain. Up to 3 doses. pantoprazole (PROTONIX) 40 mg tablet 2019 at Unknown time Child No Yes Sig: Take 1 Tab by mouth daily. potassium chloride SR (KLOR-CON 10) 10 mEq tablet 2019 at Unknown time Child No Yes Sig: Take 1 Tab by mouth daily. rivaroxaban (XARELTO) 15 mg tab tablet 2019 at Unknown time Child No Yes Sig: Take 1 Tab by mouth daily (with dinner). Facility-Administered Medications: None The review of systems is:  ++ for shortness of breath and negative for chest pain The heart, lungs, and mental status were satisfactory for the administration of anesthesia sedation and for the procedure. I discussed with the patient the objectives, risks, consequences and alternatives to the procedure. heart 2/6 murmur Abdomen soft Lungs cta Davide García MD 
6/15/2019 11:48 AM

## 2019-06-15 NOTE — ANESTHESIA POSTPROCEDURE EVALUATION
Procedure(s): ESOPHAGOGASTRODUODENOSCOPY (EGD) COLONOSCOPY 
ESOPHAGOGASTRODUODENAL (EGD) BIOPSY 
ESOPHAGEAL DILATION. MAC, total IV anesthesia Anesthesia Post Evaluation Patient location during evaluation: PACU Note status: Adequate. Level of consciousness: responsive to verbal stimuli and sleepy but conscious Pain management: satisfactory to patient Airway patency: patent Anesthetic complications: no 
Cardiovascular status: acceptable Respiratory status: acceptable Hydration status: acceptable Comments: +Post-Anesthesia Evaluation and Assessment Patient: Geovani Dave MRN: [id number]  SSN: [id number] YOB: 1935  Age: 82 y.o. Sex: female Cardiovascular Function/Vital Signs /44   Pulse 72   Temp 36.5 °C (97.7 °F)   Resp 23   Ht 5' 8\" (1.727 m)   Wt 92.8 kg (204 lb 9.4 oz)   SpO2 98%   BMI 31.11 kg/m² Patient is status post Procedure(s) with comments: 
ESOPHAGOGASTRODUODENOSCOPY (EGD) - in or today COLONOSCOPY 
ESOPHAGOGASTRODUODENAL (EGD) BIOPSY 
ESOPHAGEAL DILATION. Nausea/Vomiting: Controlled. Postoperative hydration reviewed and adequate. Pain: 
Pain Scale 1: Numeric (0 - 10) (06/15/19 1312) Pain Intensity 1: (pain remains tolerable, no facial grimacing) (06/15/19 1312) Managed. Neurological Status:  
Neuro (WDL): Within Defined Limits (06/15/19 1123) At baseline. Mental Status and Level of Consciousness: Arousable. Pulmonary Status:  
O2 Device: Room air (06/15/19 1240) Adequate oxygenation and airway patent. Complications related to anesthesia: None Post-anesthesia assessment completed. No concerns. Signed By: Maria Guadalupe Sánchez MD  
 6/15/2019 Post anesthesia nausea and vomiting:  controlled Vitals Value Taken Time /39 6/15/2019  1:10 PM  
Temp 36.5 °C (97.7 °F) 6/15/2019  1:12 PM  
Pulse 72 6/15/2019  1:13 PM  
Resp 20 6/15/2019  1:13 PM  
SpO2 95 % 6/15/2019  1:13 PM  
 Vitals shown include unvalidated device data.

## 2019-06-15 NOTE — PROGRESS NOTES
I spoke to nurse Repeat vital shortly No urination One smear of melena since arriving Will bump up fluids Type and cross Told nurse to call on call partner for issues 
 
rkeate

## 2019-06-15 NOTE — PROGRESS NOTES
Bedside shift change report given to Jeremías Mcclure RN (oncoming nurse) by Destiny Siddiqui RN (offgoing nurse). Report included the following information SBAR, Recent Results and Cardiac Rhythm NSR. 
 
1930: Pt resting in bed, 98% on RA, NSR. Lungs clear, diminished, active BS. Bilateral LE ricki, warm, edema 2+. Reports mild, tolerable cramping to bilateral LQ abdomen. No nausea. Call bell within reach, alarm active. Primary Nurse Roxanne Mann RN and Scott Garay RN performed a dual skin assessment on this patient No impairment noted Shane score is 19  
2145: Pt requested removal of SCDs. 2240: Dr. Betty Crockett called for update on pt. Orders to be placed to increase IVF rate. No bleeding since arrival to PCU, pt due to void 2245: Assisted pt using walker to bathroom. Pt voids and has small maroon BM. Assisted back to bed. 
0430: Pt c/o abdominal cramping. Paged telehospitalist. Pt rec'd protonix 40mg once in ED. 
0507: Acknowledged new orders for carafate, protonix, and maalox (see MAR) 0518: Administered Maalox for stomach pain. Reassessed respiratory status. (Pt has hx CHF and usually takes Lasix.) Pt states she feels slightly SOB; lungs remain clear. BP improved. Paused IVF for re-evaluation by MD.  
0600: CHG bath given, gown changed. Pt asked to sit up in recliner. 0720: Bedside shift change report given to [address] [place] [address] (oncoming nurse) by Jeremías Mcclure RN (offgoing nurse). Report included the following information SBAR, Kardex, Intake/Output, MAR, Recent Results and Cardiac Rhythm NSR.

## 2019-06-15 NOTE — PERIOP NOTES
TRANSFER - OUT REPORT: 
 
Verbal report given to [facility] on Brad Rivera  being transferred to [facility](unit) for routine post - op Report consisted of patients Situation, Background, Assessment and  
Recommendations(SBAR). Information from the following report(s) SBAR, OR Summary, Procedure Summary, Intake/Output, MAR, Recent Results and Cardiac Rhythm NSR was reviewed with the receiving nurse. Opportunity for questions and clarification was provided. Patient transported with: 
 Monitor Registered Nurse

## 2019-06-15 NOTE — PROGRESS NOTES
Hospitalist Progress Note NAME: Gustavo Guadarrama :  1935 MRN:  [id number] Assessment / Plan: 
Acute Gastrointestinal bleed/acute on chronic blood loss anemia -appreciate GI consult 
-EGD/colono planned for today 
-monitor h/h 
-hold Xarelto 
-cont IVF gently 
 
  
Hx mitral stenosis -s/p bioprosthetic MVR 2019 
-on xarelto and asa, holding both in setting of acute GIB 
  
Mild JULIANA CKD stage III 
-Cr 2.06 POA, now at 1.68 
-?prerenal 
-follow bmp 
  
Chronic Afib 
-hold xarelto as above 
-rate reasonably controlled 
-monitor, prn BB until able to resume PO meds 
  
Chronic dCHF 
-does not appear vol overloaded 
-CXR pending 
-hold lasix in setting of acute GIB, c/w gentle hydration 
-monitor vol status closely.  
  
Iron deficiency 
-follows with heme/onc 
-resume ferrous sulfate once taking PO 
  
Hypothyroid 
-cont synthroid 
  
GERD 
-on IV PPI 
  
HLD/HTN 
-resume home meds 
  
PVD 
  
Code Status: DNR Surrogate Decision Maker: daughter 
  
DVT Prophylaxis: SCDs GI Prophylaxis: IV PPI 
  
Baseline: functional, lives alone, daughter lives locally 25.0 - 29.9 Overweight / Body mass index is 31.11 kg/m². Recommended Disposition: TBD Subjective: Chief Complaint / Reason for Physician Visit Follow up of rectal bleeding which is still ongoing  Discussed with RN events overnight. Review of Systems: 
Symptom Y/N Comments  Symptom Y/N Comments Fever/Chills    Chest Pain Poor Appetite    Edema Cough    Abdominal Pain Sputum    Joint Pain SOB/LEIJA    Pruritis/Rash Nausea/vomit    Tolerating PT/OT Diarrhea    Tolerating Diet Constipation    Other Could NOT obtain due to:   
 
Objective: VITALS:  
Last 24hrs VS reviewed since prior progress note. Most recent are: 
Patient Vitals for the past 24 hrs: 
 Temp Pulse Resp BP SpO2  
06/15/19 0248 97.6 °F (36.4 °C) 67 18 121/42 99 % 19 2243 98 °F (36.7 °C) 64 16 (!) 114/37 96 % 06/14/19 1901 97.9 °F (36.6 °C) 67 18 (!) 119/36 98 % 06/14/19 1845 97.6 °F (36.4 °C) 67 16 (!) 119/36 100 % 06/14/19 1815 97.6 °F (36.4 °C) 71 16 125/43 97 % 06/14/19 1714    120/51   
06/14/19 1515  68 17 116/68 99 % 06/14/19 1430  69 14 114/46 98 % 06/14/19 1416  67 18 129/50 100 % 06/14/19 1208 97.7 °F (36.5 °C) 86 16 119/75 100 % Intake/Output Summary (Last 24 hours) at 6/15/2019 1012 Last data filed at 6/15/2019 9041 Gross per 24 hour Intake 2334.17 ml Output 775 ml Net 1559.17 ml PHYSICAL EXAM: 
General: WD, WN. Alert, cooperative, no acute distress   
EENT:  EOMI. Anicteric sclerae. MMM Resp:  CTA bilaterally, no wheezing or rales. No accessory muscle use CV:  Regular  rhythm,  No edema GI:  Soft, Non distended, Non tender.   
Neurologic:  Alert and oriented X 3, normal speech, Psych:   Good insight. Not anxious nor agitated Skin:  No rashes. No jaundice Reviewed most current lab test results and cultures  YES Reviewed most current radiology test results   YES Review and summation of old records today    NO Reviewed patient's current orders and MAR    YES 
PMH/ reviewed - no change compared to H&P 
________________________________________________________________________ Care Plan discussed with: 
  Comments Patient y Family RN y   
Care Manager Consultant  y Multidiciplinary team rounds were held today with , nursing, pharmacist and clinical coordinator. Patient's plan of care was discussed; medications were reviewed and discharge planning was addressed. ________________________________________________________________________ 
__________________________________________________________________ Ebenezer Anand MD  
 
Procedures: see electronic medical records for all procedures/Xrays and details which were not copied into this note but were reviewed prior to creation of Plan.    
 
LABS: 
 I reviewed today's most current labs and imaging studies. Pertinent labs include: 
Recent Labs  
  06/15/19 
0427 06/14/19 
2023 06/14/19 
1226 06/13/19 
1031 WBC 4.1  --  7.6 7.0 HGB 7.0* 7.3* 8.6* 9.3* HCT 23.3* 23.5* 28.2* 27.8* PLT 93*  --  148* 95* Recent Labs  
  06/15/19 
0427 06/14/19 
1226 06/13/19 
1031  141 142  
K 3.6 4.3 4.3 * 106 102 CO2 28 28 24 * 154* 215* BUN 30* 35* 32* CREA 1.68* 2.06* 1.90* CA 8.2* 8.9 9.3 INR  --  1.3*  --   
 
 
Signed: Allyn Castro MD

## 2019-06-16 LAB
ALBUMIN SERPL-MCNC: 3 G/DL (ref 3.5–5)
ALBUMIN/GLOB SERPL: 1.3 {RATIO} (ref 1.1–2.2)
ALP SERPL-CCNC: 83 U/L (ref 45–117)
ALT SERPL-CCNC: 18 U/L (ref 12–78)
ANION GAP SERPL CALC-SCNC: 10 MMOL/L (ref 5–15)
AST SERPL-CCNC: 16 U/L (ref 15–37)
BASOPHILS # BLD: 0 K/UL (ref 0–0.1)
BASOPHILS NFR BLD: 0 % (ref 0–1)
BILIRUB SERPL-MCNC: 0.4 MG/DL (ref 0.2–1)
BUN SERPL-MCNC: 21 MG/DL (ref 6–20)
BUN/CREAT SERPL: 14 (ref 12–20)
CALCIUM SERPL-MCNC: 8.5 MG/DL (ref 8.5–10.1)
CHLORIDE SERPL-SCNC: 109 MMOL/L (ref 97–108)
CO2 SERPL-SCNC: 26 MMOL/L (ref 21–32)
CREAT SERPL-MCNC: 1.47 MG/DL (ref 0.55–1.02)
DIFFERENTIAL METHOD BLD: ABNORMAL
EOSINOPHIL # BLD: 0.2 K/UL (ref 0–0.4)
EOSINOPHIL NFR BLD: 5 % (ref 0–7)
ERYTHROCYTE [DISTWIDTH] IN BLOOD BY AUTOMATED COUNT: 16.4 % (ref 11.5–14.5)
GLOBULIN SER CALC-MCNC: 2.4 G/DL (ref 2–4)
GLUCOSE BLD STRIP.AUTO-MCNC: 154 MG/DL (ref 65–100)
GLUCOSE BLD STRIP.AUTO-MCNC: 167 MG/DL (ref 65–100)
GLUCOSE BLD STRIP.AUTO-MCNC: 173 MG/DL (ref 65–100)
GLUCOSE BLD STRIP.AUTO-MCNC: 198 MG/DL (ref 65–100)
GLUCOSE SERPL-MCNC: 149 MG/DL (ref 65–100)
HCT VFR BLD AUTO: 22.4 % (ref 35–47)
HCT VFR BLD AUTO: 23.4 % (ref 35–47)
HGB BLD-MCNC: 7 G/DL (ref 11.5–16)
HGB BLD-MCNC: 7.2 G/DL (ref 11.5–16)
IMM GRANULOCYTES # BLD AUTO: 0 K/UL (ref 0–0.04)
IMM GRANULOCYTES NFR BLD AUTO: 0 % (ref 0–0.5)
LYMPHOCYTES # BLD: 0.6 K/UL (ref 0.8–3.5)
LYMPHOCYTES NFR BLD: 14 % (ref 12–49)
MCH RBC QN AUTO: 31 PG (ref 26–34)
MCHC RBC AUTO-ENTMCNC: 31.3 G/DL (ref 30–36.5)
MCV RBC AUTO: 99.1 FL (ref 80–99)
MONOCYTES # BLD: 0.3 K/UL (ref 0–1)
MONOCYTES NFR BLD: 6 % (ref 5–13)
NEUTS SEG # BLD: 3.4 K/UL (ref 1.8–8)
NEUTS SEG NFR BLD: 74 % (ref 32–75)
NRBC # BLD: 0 K/UL (ref 0–0.01)
NRBC BLD-RTO: 0 PER 100 WBC
PLATELET # BLD AUTO: 101 K/UL (ref 150–400)
PMV BLD AUTO: 12 FL (ref 8.9–12.9)
POTASSIUM SERPL-SCNC: 3.2 MMOL/L (ref 3.5–5.1)
PROT SERPL-MCNC: 5.4 G/DL (ref 6.4–8.2)
RBC # BLD AUTO: 2.26 M/UL (ref 3.8–5.2)
SERVICE CMNT-IMP: ABNORMAL
SODIUM SERPL-SCNC: 145 MMOL/L (ref 136–145)
WBC # BLD AUTO: 4.5 K/UL (ref 3.6–11)

## 2019-06-16 PROCEDURE — 74011250637 HC RX REV CODE- 250/637: Performed by: HOSPITALIST

## 2019-06-16 PROCEDURE — 80053 COMPREHEN METABOLIC PANEL: CPT

## 2019-06-16 PROCEDURE — 77030020782 HC GWN BAIR PAWS FLX 3M -B

## 2019-06-16 PROCEDURE — 65660000000 HC RM CCU STEPDOWN

## 2019-06-16 PROCEDURE — 85025 COMPLETE CBC W/AUTO DIFF WBC: CPT

## 2019-06-16 PROCEDURE — 74011250636 HC RX REV CODE- 250/636: Performed by: HOSPITALIST

## 2019-06-16 PROCEDURE — 82962 GLUCOSE BLOOD TEST: CPT

## 2019-06-16 PROCEDURE — 36415 COLL VENOUS BLD VENIPUNCTURE: CPT

## 2019-06-16 PROCEDURE — C9113 INJ PANTOPRAZOLE SODIUM, VIA: HCPCS | Performed by: HOSPITALIST

## 2019-06-16 PROCEDURE — 85018 HEMOGLOBIN: CPT

## 2019-06-16 PROCEDURE — 74011000250 HC RX REV CODE- 250: Performed by: SPECIALIST

## 2019-06-16 RX ORDER — POTASSIUM CHLORIDE 750 MG/1
40 TABLET, FILM COATED, EXTENDED RELEASE ORAL
Status: COMPLETED | OUTPATIENT
Start: 2019-06-16 | End: 2019-06-16

## 2019-06-16 RX ORDER — POLYETHYLENE GLYCOL 3350, SODIUM SULFATE ANHYDROUS, SODIUM BICARBONATE, SODIUM CHLORIDE, POTASSIUM CHLORIDE 236; 22.74; 6.74; 5.86; 2.97 G/4L; G/4L; G/4L; G/4L; G/4L
1000 POWDER, FOR SOLUTION ORAL ONCE
Status: COMPLETED | OUTPATIENT
Start: 2019-06-16 | End: 2019-06-16

## 2019-06-16 RX ADMIN — POLYETHYLENE GLYCOL-3350 AND ELECTROLYTES 1000 ML: 236; 6.74; 5.86; 2.97; 22.74 POWDER, FOR SOLUTION ORAL at 23:04

## 2019-06-16 RX ADMIN — AMIODARONE HYDROCHLORIDE 200 MG: 200 TABLET ORAL at 08:59

## 2019-06-16 RX ADMIN — LEVOTHYROXINE SODIUM 125 MCG: 125 TABLET ORAL at 06:12

## 2019-06-16 RX ADMIN — SUCRALFATE 1 G: 1 TABLET ORAL at 08:59

## 2019-06-16 RX ADMIN — Medication 10 ML: at 21:25

## 2019-06-16 RX ADMIN — SODIUM CHLORIDE 40 MG: 9 INJECTION, SOLUTION INTRAMUSCULAR; INTRAVENOUS; SUBCUTANEOUS at 08:59

## 2019-06-16 RX ADMIN — SUCRALFATE 1 G: 1 TABLET ORAL at 21:25

## 2019-06-16 RX ADMIN — AMIODARONE HYDROCHLORIDE 200 MG: 200 TABLET ORAL at 21:25

## 2019-06-16 RX ADMIN — SUCRALFATE 1 G: 1 TABLET ORAL at 12:49

## 2019-06-16 RX ADMIN — Medication 10 ML: at 06:12

## 2019-06-16 RX ADMIN — POTASSIUM CHLORIDE 40 MEQ: 750 TABLET, FILM COATED, EXTENDED RELEASE ORAL at 21:25

## 2019-06-16 RX ADMIN — SUCRALFATE 1 G: 1 TABLET ORAL at 17:33

## 2019-06-16 NOTE — PROGRESS NOTES
1039: Received report from Osorio Wells. WALDO, YUKI, [facility], RECENT RESULTS. No complaints of pain, nausea, or shortness of breath. Per report loose watery stools with blood overnight. 0720: Attempting to get out of bed without calling. Up to bedside commode. Must have standby not steady on her feet.

## 2019-06-16 NOTE — PROGRESS NOTES
Hospitalist Progress Note NAME: Aj De Oliveira :  1935 MRN:  [id number] Assessment / Plan: 
 
Acute on chronic blood loss anemia POA Suspect Acute Gastrointestinal bleed On anticoagulation/thrombocytopenia -appreciate GI consult 
-s/p EGD 6/15: Successful dilation of Schatzki ring to 18mm (necessary to facilitate capsule swallowing if capsule performed) Erosive gastritis (versus gave). 3 cm hiatal hernia 
-plan for colonoscopy tomorrow( couldn't be done yesterday because of poor bowel prep) 
-monitor h/h ( ranging 7-8), transfuse if <7 
-holding Xarelto 
-cont IVF gently Hypokalemia 
-replace and monitor Hx mitral stenosis -s/p bioprosthetic MVR 2019 
-on xarelto and asa, holding both in setting of acute GIB 
  
Mild JULIANA CKD stage III 
-Cr 2.06 POA, trending down 1.68 to 1.47 today -?prerenal 
-follow bmp 
  
Chronic Afib 
-hold xarelto as above 
-rate reasonably controlled 
-monitor, prn BB until able to resume PO meds 
  
Chronic dCHF 
-does not appear vol overloaded 
-CXR neg for acute pathology 
-holding lasix in setting of acute GIB, c/w gentle hydration 
-monitor vol status closely.  
  
Iron deficiency 
-follows with heme/onc 
-resume ferrous sulfate once taking PO 
  
Hypothyroid 
-cont synthroid 
  
GERD 
-on IV PPI 
  
HLD/HTN 
-resume home meds 
  
PVD 
  
Code Status: DNR Surrogate Decision Maker: daughter 
  
DVT Prophylaxis: SCDs GI Prophylaxis: IV PPI 
  
Baseline: functional, lives alone, daughter lives locally Recommended Disposition: TBD Subjective: Chief Complaint / Reason for Physician Visit \"I am doing good this morning\"  Discussed with RN events overnight. Review of Systems: 
Symptom Y/N Comments  Symptom Y/N Comments Fever/Chills n   Chest Pain Poor Appetite    Edema n   
Cough n   Abdominal Pain n   
Sputum    Joint Pain SOB/LEIJA n   Pruritis/Rash n   
Nausea/vomit n   Tolerating PT/OT y Diarrhea    Tolerating Diet y Constipation    Other Could NOT obtain due to:   
 
Objective: VITALS:  
Last 24hrs VS reviewed since prior progress note. Most recent are: 
Patient Vitals for the past 24 hrs: 
 Temp Pulse Resp BP SpO2  
06/16/19 0719    116/44   
06/16/19 0718 98.1 °F (36.7 °C) 83 18 116/44 97 % 06/16/19 0335 98.2 °F (36.8 °C) 82 18 140/50 98 % 06/15/19 2348 98.6 °F (37 °C) 83 18 132/53 97 % 06/15/19 1931 98.3 °F (36.8 °C) 77 18 122/72 98 % 06/15/19 1620 98 °F (36.7 °C) 74 18 109/49 99 % 06/15/19 1326  72 23 114/44 98 % 06/15/19 1312 97.7 °F (36.5 °C) 72 20  95 % 06/15/19 1310  72 19 (!) 121/39 96 % 06/15/19 1305  74 17 118/46 99 % 06/15/19 1300  71 18 104/56 96 % 06/15/19 1255  70 17 122/42 95 % 06/15/19 1250  71 18 112/40 96 % 06/15/19 1245  71 19 (!) 112/39 96 % 06/15/19 1240  71 20 (!) 109/39 96 % 06/15/19 1238 97.7 °F (36.5 °C) 71 20 94/47 96 % 06/15/19 3475 [facility] Intake/Output Summary (Last 24 hours) at 6/16/2019 1136 Last data filed at 6/15/2019 1234 Gross per 24 hour Intake 500 ml Output  Net 500 ml PHYSICAL EXAM: 
General: Alert, cooperative, no acute distress   
EENT:  MMM Resp:  CTA bilaterally,No accessory muscle use CV:  Regular  rhythm,  No edema GI:  Soft, Non distended, Non tender.   
Neurologic:  Alert and oriented, normal speech, CN 2-12 grossly intact Psych:   Not anxious nor agitated Skin:  No rashes. No jaundice Reviewed most current lab test results and cultures  YES Reviewed most current radiology test results   YES Review and summation of old records today    NO Reviewed patient's current orders and MAR    YES 
PMH/SH reviewed - no change compared to H&P 
________________________________________________________________________ Care Plan discussed with: 
  Comments Patient x Family RN x Care Manager Consultant                   Multidiciplinary team rounds were held today with case manager, nursing, pharmacist and clinical coordinator. Patient's plan of care was discussed; medications were reviewed and discharge planning was addressed. ________________________________________________________________________ 
__________________________________________________________________ Jackson Love MD  
 
Procedures: see electronic medical records for all procedures/Xrays and details which were not copied into this note but were reviewed prior to creation of Plan. LABS: 
I reviewed today's most current labs and imaging studies. Pertinent labs include: 
Recent Labs  
  06/16/19 
0327 06/15/19 
1840 06/15/19 
0941 06/15/19 
0427  06/14/19 
1226 WBC 4.5  --   --  4.1  --  7.6 HGB 7.0* 8.0* 7.6* 7.0*   < > 8.6* HCT 22.4* 25.4* 26.8* 23.3*   < > 28.2*  
*  --   --  93*  --  148*  
 < > = values in this interval not displayed. Recent Labs  
  06/16/19 
0327 06/15/19 
0427 06/14/19 
1226  143 141  
K 3.2* 3.6 4.3 * 110* 106 CO2 26 28 28 * 112* 154* BUN 21* 30* 35* CREA 1.47* 1.68* 2.06* CA 8.5 8.2* 8.9 ALB 3.0*  --   --   
TBILI 0.4  --   --   
SGOT 16  --   --   
ALT 18  --   --   
INR  --   --  1.3* Signed: Jackson Love MD

## 2019-06-16 NOTE — PROGRESS NOTES
F/U for melena Gi bleed Blood loss anemia Anticoagulants. S: Ms. Gustavo Guadarrama was seen by me today during rounds. At this time, she is resting + comfortably. The patient has no new complaints today. Please see admission consult for details of ROS; there are no other changes today. Bloody stool that ran clear with more bowel prep. O: Blood pressure 116/44, pulse 83, temperature 98.1 °F (36.7 °C), resp. rate 18, height 5' 8\" (1.727 m), weight 92.8 kg (204 lb 9.4 oz), SpO2 97 %. Gen: Patient is in no acute distress. There is no jaundice. Lungs: Clear to auscultation bilaterally . Pale    Abd: Soft, non tender, non-distended, bowel sounds present. Extremities: Warm. Cross sectional imaging:  None lane Rick Recent Labs  
  06/16/19 
0327 06/15/19 
1840  06/15/19 
0427 WBC 4.5  --   --  4.1 HGB 7.0* 8.0*   < > 7.0*  
HCT 22.4* 25.4*   < > 23.3*  
*  --   --  93*  
 < > = values in this interval not displayed. Recent Labs  
  06/16/19 0327 06/15/19 
0427 06/14/19 
1226  143 141  
K 3.2* 3.6 4.3 * 110* 106 CO2 26 28 28 BUN 21* 30* 35* CREA 1.47* 1.68* 2.06* * 112* 154* CA 8.5 8.2* 8.9 Recent Labs  
  06/16/19 
0327 SGOT 16 ALT 18  
AP 83 TBILI 0.4 TP 5.4* ALB 3.0*  
GLOB 2.4 Recent Labs  
  06/14/19 
1226 INR 1.3* PTP 13.6* APTT 32.5* No results for input(s): FE, TIBC, PSAT, FERR in the last 72 hours. Lab Results Component Value Date/Time Folate 72.3 (H) 01/22/2017 03:13 AM  
  
No results for input(s): PH, PCO2, PO2 in the last 72 hours. No results for input(s): CPK, CKNDX, TROIQ in the last 72 hours. No lab exists for component: CPKMB Lab Results Component Value Date/Time Cholesterol, total 119 09/04/2018 11:35 AM  
 HDL Cholesterol 47 09/04/2018 11:35 AM  
 LDL, calculated 58 09/04/2018 11:35 AM  
 Triglyceride 68 09/04/2018 11:35 AM  
 CHOL/HDL Ratio 3.1 09/22/2010 08:12 AM  
 
Lab Results Component Value Date/Time Glucose (POC) 173 (H) 06/16/2019 07:53 AM  
 Glucose (POC) 129 (H) 06/15/2019 08:55 PM  
 Glucose (POC) 224 (H) 06/15/2019 04:46 PM  
 Glucose (POC) 149 (H) 06/15/2019 12:39 PM  
 Glucose (POC) 155 (H) 06/15/2019 11:04 AM  
 
Lab Results Component Value Date/Time Color YELLOW/STRAW 04/28/2019 02:10 AM  
 Appearance CLEAR 04/28/2019 02:10 AM  
 Specific gravity 1.016 04/28/2019 02:10 AM  
 Specific gravity 1.020 07/06/2010 01:10 PM  
 pH (UA) 5.0 04/28/2019 02:10 AM  
 Protein NEGATIVE  04/28/2019 02:10 AM  
 Glucose NEGATIVE  04/28/2019 02:10 AM  
 Ketone NEGATIVE  04/28/2019 02:10 AM  
 Bilirubin NEGATIVE  04/28/2019 02:10 AM  
 Urobilinogen 0.2 04/28/2019 02:10 AM  
 Nitrites NEGATIVE  04/28/2019 02:10 AM  
 Leukocyte Esterase MODERATE (A) 04/28/2019 02:10 AM  
 Epithelial cells MODERATE (A) 04/28/2019 02:10 AM  
 Bacteria NEGATIVE  04/28/2019 02:10 AM  
 WBC 10-20 04/28/2019 02:10 AM  
 RBC 5-10 04/28/2019 02:10 AM  
  
 
A: Active Problems: 
  Long term current use of anticoagulant therapy (2/8/2010) GIB (gastrointestinal bleeding) (6/14/2019) Melena (6/14/2019) Rectal bleeding (6/14/2019) Lower abdominal pain (6/14/2019) Comment:  Stable. No clinical bleedin P:  Best for colonoscopy tomorrow I discussed with her  the objectives, risks, consequences and alternatives to the procedure. More bowel prep today Daily hgb

## 2019-06-17 ENCOUNTER — ANESTHESIA (OUTPATIENT)
Dept: ENDOSCOPY | Age: 84
DRG: 378 | End: 2019-06-17
Payer: MEDICARE

## 2019-06-17 ENCOUNTER — ANESTHESIA EVENT (OUTPATIENT)
Dept: ENDOSCOPY | Age: 84
DRG: 378 | End: 2019-06-17
Payer: MEDICARE

## 2019-06-17 LAB
ANION GAP SERPL CALC-SCNC: 8 MMOL/L (ref 5–15)
BUN SERPL-MCNC: 14 MG/DL (ref 6–20)
BUN/CREAT SERPL: 9 (ref 12–20)
CALCIUM SERPL-MCNC: 8.6 MG/DL (ref 8.5–10.1)
CHLORIDE SERPL-SCNC: 108 MMOL/L (ref 97–108)
CO2 SERPL-SCNC: 25 MMOL/L (ref 21–32)
CREAT SERPL-MCNC: 1.54 MG/DL (ref 0.55–1.02)
GLUCOSE BLD STRIP.AUTO-MCNC: 166 MG/DL (ref 65–100)
GLUCOSE BLD STRIP.AUTO-MCNC: 171 MG/DL (ref 65–100)
GLUCOSE BLD STRIP.AUTO-MCNC: 205 MG/DL (ref 65–100)
GLUCOSE SERPL-MCNC: 141 MG/DL (ref 65–100)
HCT VFR BLD AUTO: 26.6 % (ref 35–47)
HGB BLD-MCNC: 8.4 G/DL (ref 11.5–16)
POTASSIUM SERPL-SCNC: 3.6 MMOL/L (ref 3.5–5.1)
SERVICE CMNT-IMP: ABNORMAL
SODIUM SERPL-SCNC: 141 MMOL/L (ref 136–145)

## 2019-06-17 PROCEDURE — 36415 COLL VENOUS BLD VENIPUNCTURE: CPT

## 2019-06-17 PROCEDURE — 82962 GLUCOSE BLOOD TEST: CPT

## 2019-06-17 PROCEDURE — 30233N1 TRANSFUSION OF NONAUTOLOGOUS RED BLOOD CELLS INTO PERIPHERAL VEIN, PERCUTANEOUS APPROACH: ICD-10-PCS | Performed by: INTERNAL MEDICINE

## 2019-06-17 PROCEDURE — 0DBM8ZX EXCISION OF DESCENDING COLON, VIA NATURAL OR ARTIFICIAL OPENING ENDOSCOPIC, DIAGNOSTIC: ICD-10-PCS | Performed by: SPECIALIST

## 2019-06-17 PROCEDURE — 85018 HEMOGLOBIN: CPT

## 2019-06-17 PROCEDURE — 74011250636 HC RX REV CODE- 250/636: Performed by: HOSPITALIST

## 2019-06-17 PROCEDURE — 0DBP8ZX EXCISION OF RECTUM, VIA NATURAL OR ARTIFICIAL OPENING ENDOSCOPIC, DIAGNOSTIC: ICD-10-PCS | Performed by: SPECIALIST

## 2019-06-17 PROCEDURE — 74011250637 HC RX REV CODE- 250/637: Performed by: HOSPITALIST

## 2019-06-17 PROCEDURE — 74011250636 HC RX REV CODE- 250/636: Performed by: SPECIALIST

## 2019-06-17 PROCEDURE — 65660000000 HC RM CCU STEPDOWN

## 2019-06-17 PROCEDURE — 76040000007: Performed by: SPECIALIST

## 2019-06-17 PROCEDURE — 74011250636 HC RX REV CODE- 250/636

## 2019-06-17 PROCEDURE — 74011000250 HC RX REV CODE- 250: Performed by: SPECIALIST

## 2019-06-17 PROCEDURE — 77030013992 HC SNR POLYP ENDOSC BSC -B: Performed by: SPECIALIST

## 2019-06-17 PROCEDURE — 74011636637 HC RX REV CODE- 636/637: Performed by: HOSPITALIST

## 2019-06-17 PROCEDURE — 76060000032 HC ANESTHESIA 0.5 TO 1 HR: Performed by: SPECIALIST

## 2019-06-17 PROCEDURE — 77030010936 HC CLP LIG BSC -C: Performed by: SPECIALIST

## 2019-06-17 PROCEDURE — 88305 TISSUE EXAM BY PATHOLOGIST: CPT

## 2019-06-17 PROCEDURE — C9113 INJ PANTOPRAZOLE SODIUM, VIA: HCPCS | Performed by: HOSPITALIST

## 2019-06-17 PROCEDURE — 80048 BASIC METABOLIC PNL TOTAL CA: CPT

## 2019-06-17 RX ORDER — SODIUM CHLORIDE 9 MG/ML
75 INJECTION, SOLUTION INTRAVENOUS CONTINUOUS
Status: DISPENSED | OUTPATIENT
Start: 2019-06-17 | End: 2019-06-17

## 2019-06-17 RX ORDER — SODIUM CHLORIDE 0.9 % (FLUSH) 0.9 %
5-40 SYRINGE (ML) INJECTION EVERY 8 HOURS
Status: DISCONTINUED | OUTPATIENT
Start: 2019-06-17 | End: 2019-06-18 | Stop reason: SDUPTHER

## 2019-06-17 RX ORDER — POLYETHYLENE GLYCOL 3350, SODIUM SULFATE ANHYDROUS, SODIUM BICARBONATE, SODIUM CHLORIDE, POTASSIUM CHLORIDE 236; 22.74; 6.74; 5.86; 2.97 G/4L; G/4L; G/4L; G/4L; G/4L
1000 POWDER, FOR SOLUTION ORAL ONCE
Status: COMPLETED | OUTPATIENT
Start: 2019-06-17 | End: 2019-06-17

## 2019-06-17 RX ORDER — FLUMAZENIL 0.1 MG/ML
0.2 INJECTION INTRAVENOUS
Status: DISCONTINUED | OUTPATIENT
Start: 2019-06-17 | End: 2019-06-17 | Stop reason: HOSPADM

## 2019-06-17 RX ORDER — PROPOFOL 10 MG/ML
INJECTION, EMULSION INTRAVENOUS AS NEEDED
Status: DISCONTINUED | OUTPATIENT
Start: 2019-06-17 | End: 2019-06-17 | Stop reason: HOSPADM

## 2019-06-17 RX ORDER — PANTOPRAZOLE SODIUM 40 MG/1
40 TABLET, DELAYED RELEASE ORAL
Status: DISCONTINUED | OUTPATIENT
Start: 2019-06-18 | End: 2019-06-20 | Stop reason: HOSPADM

## 2019-06-17 RX ORDER — ATROPINE SULFATE 0.1 MG/ML
0.5 INJECTION INTRAVENOUS
Status: DISCONTINUED | OUTPATIENT
Start: 2019-06-17 | End: 2019-06-17 | Stop reason: HOSPADM

## 2019-06-17 RX ORDER — DEXTROMETHORPHAN/PSEUDOEPHED 2.5-7.5/.8
1.2 DROPS ORAL
Status: DISCONTINUED | OUTPATIENT
Start: 2019-06-17 | End: 2019-06-17 | Stop reason: HOSPADM

## 2019-06-17 RX ORDER — POLYETHYLENE GLYCOL 3350, SODIUM SULFATE ANHYDROUS, SODIUM BICARBONATE, SODIUM CHLORIDE, POTASSIUM CHLORIDE 236; 22.74; 6.74; 5.86; 2.97 G/4L; G/4L; G/4L; G/4L; G/4L
1000 POWDER, FOR SOLUTION ORAL ONCE
Status: DISCONTINUED | OUTPATIENT
Start: 2019-06-17 | End: 2019-06-17 | Stop reason: DRUGHIGH

## 2019-06-17 RX ORDER — LIDOCAINE HYDROCHLORIDE 20 MG/ML
INJECTION, SOLUTION EPIDURAL; INFILTRATION; INTRACAUDAL; PERINEURAL AS NEEDED
Status: DISCONTINUED | OUTPATIENT
Start: 2019-06-17 | End: 2019-06-17 | Stop reason: HOSPADM

## 2019-06-17 RX ORDER — SODIUM CHLORIDE 9 MG/ML
50 INJECTION, SOLUTION INTRAVENOUS CONTINUOUS
Status: DISCONTINUED | OUTPATIENT
Start: 2019-06-17 | End: 2019-06-17 | Stop reason: HOSPADM

## 2019-06-17 RX ORDER — SODIUM CHLORIDE 0.9 % (FLUSH) 0.9 %
5-40 SYRINGE (ML) INJECTION AS NEEDED
Status: DISCONTINUED | OUTPATIENT
Start: 2019-06-17 | End: 2019-06-20 | Stop reason: HOSPADM

## 2019-06-17 RX ORDER — MIDAZOLAM HYDROCHLORIDE 1 MG/ML
.25-5 INJECTION, SOLUTION INTRAMUSCULAR; INTRAVENOUS
Status: DISCONTINUED | OUTPATIENT
Start: 2019-06-17 | End: 2019-06-17 | Stop reason: HOSPADM

## 2019-06-17 RX ORDER — NALOXONE HYDROCHLORIDE 0.4 MG/ML
0.4 INJECTION, SOLUTION INTRAMUSCULAR; INTRAVENOUS; SUBCUTANEOUS
Status: DISCONTINUED | OUTPATIENT
Start: 2019-06-17 | End: 2019-06-17 | Stop reason: HOSPADM

## 2019-06-17 RX ADMIN — LEVOTHYROXINE SODIUM 125 MCG: 125 TABLET ORAL at 05:28

## 2019-06-17 RX ADMIN — SODIUM CHLORIDE 40 MG: 9 INJECTION, SOLUTION INTRAMUSCULAR; INTRAVENOUS; SUBCUTANEOUS at 09:44

## 2019-06-17 RX ADMIN — SUCRALFATE 1 G: 1 TABLET ORAL at 22:06

## 2019-06-17 RX ADMIN — INSULIN LISPRO 1 UNITS: 100 INJECTION, SOLUTION INTRAVENOUS; SUBCUTANEOUS at 22:00

## 2019-06-17 RX ADMIN — AMIODARONE HYDROCHLORIDE 200 MG: 200 TABLET ORAL at 20:48

## 2019-06-17 RX ADMIN — LIDOCAINE HYDROCHLORIDE 40 MG: 20 INJECTION, SOLUTION EPIDURAL; INFILTRATION; INTRACAUDAL; PERINEURAL at 12:43

## 2019-06-17 RX ADMIN — Medication 10 ML: at 22:06

## 2019-06-17 RX ADMIN — SODIUM CHLORIDE 25 ML/HR: 900 INJECTION, SOLUTION INTRAVENOUS at 05:28

## 2019-06-17 RX ADMIN — PROPOFOL 150 MG: 10 INJECTION, EMULSION INTRAVENOUS at 13:14

## 2019-06-17 RX ADMIN — SODIUM CHLORIDE 50 ML/HR: 900 INJECTION, SOLUTION INTRAVENOUS at 11:59

## 2019-06-17 RX ADMIN — POLYETHYLENE GLYCOL-3350 AND ELECTROLYTES 1000 ML: 236; 6.74; 5.86; 2.97; 22.74 POWDER, FOR SOLUTION ORAL at 20:49

## 2019-06-17 RX ADMIN — AMIODARONE HYDROCHLORIDE 200 MG: 200 TABLET ORAL at 09:44
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
## 2019-06-17 NOTE — PROGRESS NOTES
Two polyps May or may not explain bleeding Both sites clipped Rec:  1)ok to eat 2) resume anticoagulation tomorrow 3) capsule tomorrow if it can be done tomorrow; if not then as outpatient Tre Tejeda MD 
1:12 PM 
6/17/2019

## 2019-06-17 NOTE — ANESTHESIA PREPROCEDURE EVALUATION
Anesthetic History No history of anesthetic complications Review of Systems / Medical History Patient summary reviewed, nursing notes reviewed and pertinent labs reviewed Pulmonary Within defined limits Comments: Former smoker - [address] - 5 pack years Neuro/Psych CVA TIA Cardiovascular Hypertension Valvular problems/murmurs: mitral stenosis and mitral insufficiency CHF Dysrhythmias : atrial fibrillation and atrial flutter CAD, cardiac stents and hyperlipidemia Exercise tolerance: <4 METS Comments: S/P MVR 04/2019 TTE (1/22/18): Mild MR, mild-to-moderate MS, EF=65% GI/Hepatic/Renal 
  
GERD Renal disease: CRI 
PUD Comments: Dysphagia Esophageal Stricture Reflux Esophagitis Endo/Other Diabetes: well controlled, type 2 Hypothyroidism: well controlled Obesity, arthritis, cancer (bladder) and anemia Pertinent negatives: No morbid obesity Comments: Thrombocytopenia Other Findings Comments: Melena Hgb 8.4 Thrombocytopenia Gout Physical Exam 
 
Airway Mallampati: II 
TM Distance: 4 - 6 cm Neck ROM: normal range of motion Mouth opening: Normal 
 
 Cardiovascular Rhythm: regular Rate: normal 
 
Murmur: Grade 3, Mitral area Dental 
 
Dentition: Lower partial plate, Caps/crowns and Loose teeth Pulmonary Breath sounds clear to auscultation Abdominal 
GI exam deferred Other Findings Anesthetic Plan ASA: 3 Anesthesia type: MAC and total IV anesthesia Induction: Intravenous Anesthetic plan and risks discussed with: Patient

## 2019-06-17 NOTE — PROGRESS NOTES
Anesthesia reports 150mg Propofol, 40 mg Lidocaine and 800 mL NS given during procedure. Received report from anesthesia staff on vital signs and status of patient.

## 2019-06-17 NOTE — PROGRESS NOTES
Per Dr. Rachid Bolanos patient to start prep after dinner for pill cam study in am. 
 
Legs swollen per Tika Toribio will give IV Lasix tomorrow am and decrease fluids to 50 ml/hr.

## 2019-06-17 NOTE — PROGRESS NOTES
[facility] Brief Progress note- GI NEVAEH Rios Covering for Dr. Stevie Ramesh Patient sitting comfortably in bedside chair. She denies any pain, no further melena or rectal bleeding. Completed prep last evening and BM are tinted brown but clear, she also noticed she has medication tablets that are in her stool that she swallowed. No further questions regarding procedure. Proceed with colonoscopy as planned with Dr. Stevie Ramesh. NEVAEH Rios 
9:34 AM 
06/17/19

## 2019-06-17 NOTE — H&P
Pre-endoscopy H and P The patient was seen and examined in the endoscopy suite. The airway was assessed and docuemented. The problem list, past medical history, and medications were reviewed. Patient Active Problem List  
Diagnosis Code  T. I.A.  PVD (peripheral vascular disease) ([facility]) I73.9  Reflux esophagitis K21.0  Benign neoplasm of colon D12.6  Iron deficiency anemia D50.9  Hypomagnesemia E83.42  Long term current use of anticoagulant therapy Z79.01  
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  
 Advance directive in chart Z78.9  Type 2 diabetes, controlled, with renal manifestation ([facility]) E11.29  
 Chronic atrial fibrillation ([facility]) I48.2  Mixed hyperlipidemia E78.2  Atherosclerosis of native coronary artery without angina pectoris I25.10  Hypothyroidism, acquired, autoimmune E06.3  Heart valve problem I38  
 Mitral regurgitation I34.0  
 S/P MVR (mitral valve repair) [id number]  Active advance directive on file Z78.9  Non-rheumatic mitral regurgitation I34.0  Heart failure ([facility]) I50.9  Bilateral leg edema R60.0  Esophageal stricture K22.2  Dysphagia R13.10  Cellulitis of right lower leg L03.115  
 GI bleeding K92.2  Mitral stenosis I05.0  S/P MVR (mitral valve replacement) Z95.2  
 GIB (gastrointestinal bleeding) K92.2  Melena K92.1  Rectal bleeding K62.5  Lower abdominal pain R10.30 Social History Socioeconomic History  Marital status:  Spouse name: Not on file  Number of children: Not on file  Years of education: Not on file  Highest education level: Not on file Occupational History  Not on file Social Needs  Financial resource strain: Not on file  Food insecurity: Worry: Not on file Inability: Not on file  Transportation needs:  
  Medical: Not on file Non-medical: Not on file Tobacco Use  Smoking status: Former Smoker Packs/day: 0.50 Years: 10.00 Pack years: 5.00 Types: Cigarettes Last attempt to quit: 1967 Years since quittin.4  Smokeless tobacco: Never Used Substance and Sexual Activity  Alcohol use: No  
  Alcohol/week: 0.0 oz  Drug use: No  
 Sexual activity: Not Currently Lifestyle  Physical activity:  
  Days per week: Not on file Minutes per session: Not on file  Stress: Not on file Relationships  Social connections:  
  Talks on phone: Not on file Gets together: Not on file Attends [employer] service: Not on file Active member of club or organization: Not on file Attends meetings of clubs or organizations: Not on file Relationship status: Not on file  Intimate partner violence:  
  Fear of current or ex partner: Not on file Emotionally abused: Not on file Physically abused: Not on file Forced sexual activity: Not on file Other Topics Concern  Not on file Social History Narrative  Not on file Past Medical History:  
Diagnosis Date  Arthritis KNEES  Atrial fibrillation ([facility] [address] 10/29/2009  Bladder cancer ([facility] [address]  CAD (coronary artery disease) STENT PER PATIENT  Chronic pain KNEES  
 Coagulation disorder ([facility] [address] Chronic prophylactic anticoagulation med  Colon polyps  Diabetes ([facility] [address] IDDM  GERD (gastroesophageal reflux disease)  Gout  Heart valve problem   
 leaking heart valve  Hypercholesterolemia  Hypertension  Hypothyroidism  Hypothyroidism 2009  Hypothyroidism, acquired, autoimmune 2015  Overweight and obesity  PUD (peptic ulcer disease)   S/P ablation of atrial flutter[V45.89HM]   
 @ [facility] >> atrial fibrillation  SOB (shortness of breath) on exertion 2019  
 T. I.A. 2009  TIA (transient ischemic attack)  Ulcer of right lower extremity, limited to breakdown of skin ([address] 2018 The patient has a family history of na Prior to Admission Medications Prescriptions Last Dose Informant Patient Reported? Taking?  
acetaminophen (TYLENOL) 325 mg tablet 2019 at Unknown time Child Yes Yes Sig: Take 325 mg by mouth every four (4) hours as needed for Pain. allopurinol (ZYLOPRIM) 100 mg tablet 2019 at Unknown time Child No Yes Sig: TAKE TWO TABLETS BY MOUTH DAILY  
amiodarone (CORDARONE) 200 mg tablet 2019 at Unknown time Child No Yes Sig: Take 1 Tab by mouth two (2) times a day. aspirin 81 mg chewable tablet 2019 at Unknown time Child No Yes Sig: Take 1 Tab by mouth daily. atorvastatin (LIPITOR) 80 mg tablet 2019 at Unknown time Child No Yes Sig: TAKE ONE TABLET BY MOUTH EVERY EVENING  
camphor-methyl salicyl-menthol (SALONPAS) ptmd 2019 at Unknown time Child Yes Yes Sig: by Apply Externally route as needed. diclofenac (VOLTAREN) 1 % gel 2019 at Unknown time Child No Yes Sig: Apply 2 g to affected area four (4) times daily. ferrous sulfate 325 mg (65 mg iron) tablet 2019 at Unknown time Child No Yes Sig: Take 1 Tab by mouth daily (with breakfast). furosemide (LASIX) 40 mg tablet 2019 at Unknown time Child Yes Yes Sig: Take 80 mg by mouth Daily (before breakfast). Patient takes 80 mg with breakfast and 40 mg at lunch  
furosemide (LASIX) 40 mg tablet 2019 at Unknown time Child Yes Yes Sig: Take 40 mg by mouth daily (with lunch). Patient takes 80 mg with breakfast and 40 mg at lunch  
insulin NPH (HUMULIN N NPH INSULIN KWIKPEN) 100 unit/mL (3 mL) inpn 2019 at Unknown time Child Yes Yes Si Units by SubCUTAneous route daily.   
insulin NPH (HUMULIN N NPH INSULIN KWIKPEN) 100 unit/mL (3 mL) inpn 2019 at Unknown time Child Yes Yes Si Units by SubCUTAneous route every evening. levothyroxine (SYNTHROID) 125 mcg tablet 2019 at Unknown time Child No Yes Sig: TAKE ONE TABLET BY MOUTH DAILY Patient taking differently: TAKE ONE TABLET BY MOUTH DAILY BEFORE BREAKFAST  
loratadine (CLARITIN) 10 mg tablet 2019 at Unknown time Child Yes Yes Sig: Take 10 mg by mouth daily. metoprolol tartrate (LOPRESSOR) 25 mg tablet 2019 at Unknown time Child Yes Yes Sig: Take 25 mg by mouth two (2) times a day. nitroglycerin (NITROSTAT) 0.4 mg SL tablet Not Taking at Unknown time Child Yes No  
Si.4 mg by SubLINGual route every five (5) minutes as needed for Chest Pain. Up to 3 doses. pantoprazole (PROTONIX) 40 mg tablet 2019 at Unknown time Child No Yes Sig: Take 1 Tab by mouth daily. potassium chloride SR (KLOR-CON 10) 10 mEq tablet 2019 at Unknown time Child No Yes Sig: Take 1 Tab by mouth daily. rivaroxaban (XARELTO) 15 mg tab tablet 2019 at Unknown time Child No Yes Sig: Take 1 Tab by mouth daily (with dinner). Facility-Administered Medications: None The review of systems is:  ++  for shortness of breath and negative for chest pain The heart, lungs, and mental status were satisfactory for the administration of anesthesia sedation and for the procedure. I discussed with the patient the objectives, risks, consequences and alternatives to the procedure.    
 
Anuj Feldman MD 
2019 
12:42 PM

## 2019-06-17 NOTE — ROUTINE PROCESS
Anastacia Jansen 1935 
[id number] Situation: 
Verbal report received from: Mendoza Dan Procedure: Procedure(s) with comments: 
COLONOSCOPY - 1230   please ENDOSCOPIC POLYPECTOMY RESOLUTION CLIP x2 Background: 
 
Preoperative diagnosis: Melena [K92.1] Blood loss anemia [D50.0] Postoperative diagnosis: Colon Polyps :  Dr. Tamica Manriquez Assistant(s): Endoscopy Technician-1: Mesfin Melo Endoscopy RN-1: Stephanie Iglesias RN Specimens:  
ID Type Source Tests Collected by Time Destination 1 : Descending Colon Polyp Preservative Colon, Descending  Yuridia Joy MD 6/17/2019 1312 Pathology 2 : Rectal Polyp Preservative Rectum  Yuridia Joy MD 6/17/2019 1313 Pathology H. Pylori  no Assessment: 
Intra-procedure medications Anesthesia gave intra-procedure sedation and medications, see anesthesia flow sheet yes Intravenous fluids: NS@ Cordelia Cyndie Vital signs stable Abdominal assessment: round and soft Recommendation: 
Discharge patient per MD order. Return to floor room 35 23 07 Family or Friend son Permission to share finding with family or friend yes

## 2019-06-17 NOTE — PROGRESS NOTES
Hospitalist Progress Note NAME: Gustavo Guadarrama :  1935 MRN:  [id number] Assessment / Plan: 
Acute on chronic blood loss anemia POA Hematochezia Suspect Acute Gastrointestinal bleed On anticoagulation/thrombocytopenia - appreciate GI input;  
-s/p EGD 6/15: Successful dilation of Schatzki ring to 18mm (necessary to facilitate capsule swallowing if capsule performed) Erosive gastritis (versus gave). 3 cm hiatal hernia 
  s/p colonoscopy : revealed polyps Capsule study in am 
  monitor h/h (8.4/26.6), transfuse if <7 
  holding Xarelto Hypokalemia 
- resolved Hx mitral stenosis - s/p bioprosthetic MVR 2019 
  on xarelto and asa, holding both in setting of acute GIB 
  
Mild JULIANA CKD stage III 
- Cr 2.06 POA, trending down 1.5 Avoid nephrotoxic agents Chronic Afib 
- hold xarelto as above Rate reasonably controlled Continue with Amio 
  
Chronic dCHF 
- does not appear vol overloaded - CXR neg for acute pathology 
- holding lasix in setting of acute GIB, received gentle hydration 
  
Iron deficiency 
- follows with heme/onc Continue with ferrous sulfate  
  
Hypothyroid 
- cont synthroid 
  
GERD 
- on IV PPI 
  
HLD/HTN 
- continue with statin & BB 
  
PVD 
  
Code Status: DNR Surrogate Decision Maker: daughter 
  
DVT Prophylaxis: SCDs GI Prophylaxis: IV PPI 
  
Baseline: functional, lives alone, daughter lives locally Recommended Disposition: TBD Subjective: Chief Complaint / Reason for Physician Visit \"I get dizzy at times, but I am ok for now\"  Discussed with RN events overnight. Review of Systems: 
Symptom Y/N Comments  Symptom Y/N Comments Fever/Chills n   Chest Pain Poor Appetite    Edema n   
Cough n   Abdominal Pain n   
Sputum    Joint Pain SOB/LEIJA n   Pruritis/Rash n   
Nausea/vomit n   Tolerating PT/OT y Diarrhea    Tolerating Diet y Constipation    Other Could NOT obtain due to:   
 
Objective: VITALS:  
 Last 24hrs VS reviewed since prior progress note. Most recent are: 
Patient Vitals for the past 24 hrs: 
 Temp Pulse Resp BP SpO2  
06/17/19 1351  82 14 146/56 100 % 06/17/19 1349  84 11 142/62 99 % 06/17/19 1347  86 19 135/87 98 % 06/17/19 1344  83 20 122/62 100 % 06/17/19 1339  82 15 121/58 99 % 06/17/19 1336  84 13 102/57 100 % 06/17/19 1335  85 15 115/46 98 % 06/17/19 1331  83 26 103/74 96 % 06/17/19 1329 98 °F (36.7 °C) 86 13 125/64 99 % 06/17/19 1323  80 18 110/48 100 % 06/17/19 1320  85 20 109/56 100 % 06/17/19 1317  84 21 119/49 100 % 06/17/19 1154 97.6 °F (36.4 °C) 84 22 153/57 100 % 06/17/19 0936  90   100 % 06/17/19 0935 98.4 °F (36.9 °C) 91 16 139/47 100 % 06/17/19 0529 98.7 °F (37.1 °C) 83 16 144/46 100 % 06/16/19 2310 97.7 °F (36.5 °C) 79 20 147/52 98 % 06/16/19 1931 97.5 °F (36.4 °C) 79 16 121/48 100 % 06/16/19 1737 97.9 °F (36.6 °C) 76 18 (!) 128/37 100 % 06/16/19 1736  76   100 % Intake/Output Summary (Last 24 hours) at 6/17/2019 1704 Last data filed at 6/17/2019 1354 Gross per 24 hour Intake 2770 ml Output 400 ml Net 2370 ml PHYSICAL EXAM: 
General: Alert, cooperative, no acute distress   
EENT:  EOMI. MMM Resp:  CTA bilaterally,No accessory muscle use CV:  Regular  rhythm,  No edema GI:  Soft, Non distended, Non tender.   
Neurologic:  Alert and oriented, normal speech, CN 2-12 grossly intact Psych:   Not anxious nor agitated Skin:  No rashes. No jaundice Reviewed most current lab test results and cultures  YES Reviewed most current radiology test results   YES Review and summation of old records today    NO Reviewed patient's current orders and MAR    YES 
PMH/ reviewed - no change compared to H&P 
________________________________________________________________________ Care Plan discussed with: 
  Comments Patient y Family RN y   
Care Manager Consultant Multidiciplinary team rounds were held today with , nursing, pharmacist and clinical coordinator. Patient's plan of care was discussed; medications were reviewed and discharge planning was addressed. ________________________________________________________________________ 
__________________________________________________________________ Jennifer Perera NP Procedures: see electronic medical records for all procedures/Xrays and details which were not copied into this note but were reviewed prior to creation of Plan. LABS: 
I reviewed today's most current labs and imaging studies. Pertinent labs include: 
Recent Labs  
  06/17/19 
0225 06/16/19 
1442 06/16/19 
0327  06/15/19 
6199 WBC  --   --  4.5  --  4.1 HGB 8.4* 7.2* 7.0*   < > 7.0*  
HCT 26.6* 23.4* 22.4*   < > 23.3*  
PLT  --   --  101*  --  93*  
 < > = values in this interval not displayed. Recent Labs  
  06/17/19 
0225 06/16/19 
0327 06/15/19 
5385  145 143  
K 3.6 3.2* 3.6  109* 110* CO2 25 26 28 * 149* 112* BUN 14 21* 30* CREA 1.54* 1.47* 1.68* CA 8.6 8.5 8.2* ALB  --  3.0*  --   
TBILI  --  0.4  --   
SGOT  --  16  --   
ALT  --  18  --   
 
 
Signed: Jennifer Bunch NP

## 2019-06-17 NOTE — PROCEDURES
Colonoscopy Indications: hematochezia Blood loss anemia Pre-operative Diagnosis: see above Assistant(s):  see chart Pooja Klein Fail Medications:  See anesthesia form Post-operative Diagnosis:  Colon Polyps Procedure Details Prior to the procedure its objectives, risks, consequences and alternatives were discussed with the patient who then elected to proceed. All questions were answered. Digital Rectal Exam:  was normal  
 
The Olympus videocolonoscope was inserted in the rectum and advanced to the cecum. The cecum was identified by typical landmarks. The colonoscope was slowly and carefully withdrawn as the mucosa was inspected. Two polyps were seen 14mm semipedunuclated polyp at 45 cm in the descending colon. Snared with recovery of tissue; site clipped. 8mm sessile polyp proximal rectum at 13 cm. Hot snare, site clipped x 1. All tissue recovered. No other abnormalities were noted. Retroflexion in the rectum was negative. Photos to document the ileocecal valve, appendiceal orifice and retroflexion exam were obtained. The preparation was adequate Estimated Blood Loss:  none Specimens:  Descending polyp Rectal polyp Findings:  Two polyps Complications:  none Implants:  Two resolution clips Repeat colonoscopy is recommended in: Three years if in good health . Jorden Gonzalez MD 
1:17 PM 
6/17/2019

## 2019-06-17 NOTE — PROGRESS NOTES
TRANSFER - IN REPORT: 
 
Verbal report received from RODGER Young(name) on Anastacia Jansen  being received from 2271(unit) for ordered procedure Report consisted of patients Situation, Background, Assessment and  
Recommendations(SBAR). Information from the following report(s) SBAR, Intake/Output, MAR, Recent Results and Med Rec Status was reviewed with the receiving nurse. Opportunity for questions and clarification was provided. Assessment completed upon patients arrival to unit and care assumed.

## 2019-06-17 NOTE — PROGRESS NOTES
TRANSFER - OUT REPORT: 
 
Verbal report given to [address], RN (name) on Darrionc Sacks  being transferred to PCU room 35 23 07 (unit) for routine progression of care Report consisted of patients Situation, Background, Assessment and  
Recommendations(SBAR). Information from the following report(s) Procedure Summary, Intake/Output, MAR and Recent Results was reviewed with the receiving nurse. Lines:  
Peripheral IV 06/14/19 Left Antecubital (Active) Site Assessment Clean, dry, & intact 6/17/2019 11:53 AM  
Phlebitis Assessment 0 6/17/2019 11:53 AM  
Infiltration Assessment 0 6/17/2019 11:53 AM  
Dressing Status Clean, dry, & intact 6/17/2019 11:53 AM  
Dressing Type Transparent;Tape 6/17/2019 11:53 AM  
Hub Color/Line Status Green;Flushed 6/17/2019 11:53 AM  
   
Peripheral IV 06/17/19 Left;Posterior Hand (Active) Site Assessment Clean, dry, & intact 6/17/2019 11:53 AM  
Phlebitis Assessment 2 6/17/2019 11:53 AM  
Infiltration Assessment 0 6/17/2019 11:53 AM  
Dressing Status Clean, dry, & intact 6/17/2019 11:53 AM  
Dressing Type Transparent;Tape 6/17/2019 11:53 AM  
Alcohol Cap Used Yes 6/17/2019  9:00 AM  
  
 
Opportunity for questions and clarification was provided.

## 2019-06-18 LAB
ABO + RH BLD: NORMAL
ANION GAP SERPL CALC-SCNC: 10 MMOL/L (ref 5–15)
ANION GAP SERPL CALC-SCNC: 6 MMOL/L (ref 5–15)
BLD PROD TYP BPU: NORMAL
BLD PROD TYP BPU: NORMAL
BLOOD GROUP ANTIBODIES SERPL: NORMAL
BLOOD GROUP ANTIBODIES SERPL: NORMAL
BPU ID: NORMAL
BPU ID: NORMAL
BUN SERPL-MCNC: 10 MG/DL (ref 6–20)
BUN SERPL-MCNC: 9 MG/DL (ref 6–20)
BUN/CREAT SERPL: 6 (ref 12–20)
BUN/CREAT SERPL: 7 (ref 12–20)
CALCIUM SERPL-MCNC: 8.3 MG/DL (ref 8.5–10.1)
CALCIUM SERPL-MCNC: 8.6 MG/DL (ref 8.5–10.1)
CHLORIDE SERPL-SCNC: 108 MMOL/L (ref 97–108)
CHLORIDE SERPL-SCNC: 110 MMOL/L (ref 97–108)
CO2 SERPL-SCNC: 22 MMOL/L (ref 21–32)
CO2 SERPL-SCNC: 27 MMOL/L (ref 21–32)
CREAT SERPL-MCNC: 1.34 MG/DL (ref 0.55–1.02)
CREAT SERPL-MCNC: 1.39 MG/DL (ref 0.55–1.02)
CROSSMATCH RESULT,%XM: NORMAL
CROSSMATCH RESULT,%XM: NORMAL
GLUCOSE BLD STRIP.AUTO-MCNC: 147 MG/DL (ref 65–100)
GLUCOSE BLD STRIP.AUTO-MCNC: 162 MG/DL (ref 65–100)
GLUCOSE BLD STRIP.AUTO-MCNC: 177 MG/DL (ref 65–100)
GLUCOSE BLD STRIP.AUTO-MCNC: 189 MG/DL (ref 65–100)
GLUCOSE SERPL-MCNC: 138 MG/DL (ref 65–100)
GLUCOSE SERPL-MCNC: 140 MG/DL (ref 65–100)
HCT VFR BLD AUTO: 21.6 % (ref 35–47)
HCT VFR BLD AUTO: 27.4 % (ref 35–47)
HGB BLD-MCNC: 6.8 G/DL (ref 11.5–16)
HGB BLD-MCNC: 8.9 G/DL (ref 11.5–16)
POTASSIUM SERPL-SCNC: 3.3 MMOL/L (ref 3.5–5.1)
POTASSIUM SERPL-SCNC: 3.9 MMOL/L (ref 3.5–5.1)
SERVICE CMNT-IMP: ABNORMAL
SODIUM SERPL-SCNC: 140 MMOL/L (ref 136–145)
SODIUM SERPL-SCNC: 143 MMOL/L (ref 136–145)
SPECIMEN EXP DATE BLD: NORMAL
STATUS OF UNIT,%ST: NORMAL
STATUS OF UNIT,%ST: NORMAL
UNIT DIVISION, %UDIV: 0
UNIT DIVISION, %UDIV: 0

## 2019-06-18 PROCEDURE — 65660000000 HC RM CCU STEPDOWN

## 2019-06-18 PROCEDURE — 82962 GLUCOSE BLOOD TEST: CPT

## 2019-06-18 PROCEDURE — 36415 COLL VENOUS BLD VENIPUNCTURE: CPT

## 2019-06-18 PROCEDURE — 86900 BLOOD TYPING SEROLOGIC ABO: CPT

## 2019-06-18 PROCEDURE — 74011250637 HC RX REV CODE- 250/637: Performed by: HOSPITALIST

## 2019-06-18 PROCEDURE — P9016 RBC LEUKOCYTES REDUCED: HCPCS

## 2019-06-18 PROCEDURE — 86923 COMPATIBILITY TEST ELECTRIC: CPT

## 2019-06-18 PROCEDURE — 80048 BASIC METABOLIC PNL TOTAL CA: CPT

## 2019-06-18 PROCEDURE — 74011250636 HC RX REV CODE- 250/636: Performed by: NURSE PRACTITIONER

## 2019-06-18 PROCEDURE — 97116 GAIT TRAINING THERAPY: CPT

## 2019-06-18 PROCEDURE — 77030018766 HC KT ENDOSC IMAG GVIM -F: Performed by: SPECIALIST

## 2019-06-18 PROCEDURE — 74011250637 HC RX REV CODE- 250/637: Performed by: NURSE PRACTITIONER

## 2019-06-18 PROCEDURE — 76040000019: Performed by: SPECIALIST

## 2019-06-18 PROCEDURE — 97161 PT EVAL LOW COMPLEX 20 MIN: CPT

## 2019-06-18 PROCEDURE — 36430 TRANSFUSION BLD/BLD COMPNT: CPT

## 2019-06-18 PROCEDURE — 85018 HEMOGLOBIN: CPT

## 2019-06-18 RX ORDER — FLUMAZENIL 0.1 MG/ML
0.2 INJECTION INTRAVENOUS
Status: ACTIVE | OUTPATIENT
Start: 2019-06-18 | End: 2019-06-18

## 2019-06-18 RX ORDER — METOPROLOL TARTRATE 25 MG/1
25 TABLET, FILM COATED ORAL 2 TIMES DAILY
Status: DISCONTINUED | OUTPATIENT
Start: 2019-06-18 | End: 2019-06-20 | Stop reason: HOSPADM

## 2019-06-18 RX ORDER — MIDAZOLAM HYDROCHLORIDE 1 MG/ML
.25-5 INJECTION, SOLUTION INTRAMUSCULAR; INTRAVENOUS
Status: ACTIVE | OUTPATIENT
Start: 2019-06-18 | End: 2019-06-18

## 2019-06-18 RX ORDER — LANOLIN ALCOHOL/MO/W.PET/CERES
1 CREAM (GRAM) TOPICAL 2 TIMES DAILY WITH MEALS
Status: DISCONTINUED | OUTPATIENT
Start: 2019-06-18 | End: 2019-06-20 | Stop reason: HOSPADM

## 2019-06-18 RX ORDER — SODIUM CHLORIDE 9 MG/ML
250 INJECTION, SOLUTION INTRAVENOUS AS NEEDED
Status: DISCONTINUED | OUTPATIENT
Start: 2019-06-18 | End: 2019-06-20 | Stop reason: HOSPADM

## 2019-06-18 RX ORDER — FUROSEMIDE 10 MG/ML
40 INJECTION INTRAMUSCULAR; INTRAVENOUS ONCE
Status: COMPLETED | OUTPATIENT
Start: 2019-06-18 | End: 2019-06-18

## 2019-06-18 RX ORDER — NALOXONE HYDROCHLORIDE 0.4 MG/ML
0.4 INJECTION, SOLUTION INTRAMUSCULAR; INTRAVENOUS; SUBCUTANEOUS
Status: ACTIVE | OUTPATIENT
Start: 2019-06-18 | End: 2019-06-18

## 2019-06-18 RX ORDER — SODIUM CHLORIDE 0.9 % (FLUSH) 0.9 %
5-40 SYRINGE (ML) INJECTION AS NEEDED
Status: DISCONTINUED | OUTPATIENT
Start: 2019-06-18 | End: 2019-06-20 | Stop reason: HOSPADM

## 2019-06-18 RX ORDER — SODIUM CHLORIDE 9 MG/ML
75 INJECTION, SOLUTION INTRAVENOUS CONTINUOUS
Status: DISCONTINUED | OUTPATIENT
Start: 2019-06-18 | End: 2019-06-18 | Stop reason: SDUPTHER

## 2019-06-18 RX ORDER — ATROPINE SULFATE 0.1 MG/ML
0.5 INJECTION INTRAVENOUS
Status: DISCONTINUED | OUTPATIENT
Start: 2019-06-18 | End: 2019-06-19 | Stop reason: HOSPADM

## 2019-06-18 RX ORDER — DEXTROMETHORPHAN/PSEUDOEPHED 2.5-7.5/.8
1.2 DROPS ORAL
Status: DISCONTINUED | OUTPATIENT
Start: 2019-06-18 | End: 2019-06-19 | Stop reason: HOSPADM

## 2019-06-18 RX ORDER — ACETAMINOPHEN 325 MG/1
325 TABLET ORAL
Status: DISCONTINUED | OUTPATIENT
Start: 2019-06-18 | End: 2019-06-20 | Stop reason: HOSPADM

## 2019-06-18 RX ORDER — SODIUM CHLORIDE 0.9 % (FLUSH) 0.9 %
5-40 SYRINGE (ML) INJECTION EVERY 8 HOURS
Status: DISCONTINUED | OUTPATIENT
Start: 2019-06-18 | End: 2019-06-18 | Stop reason: SDUPTHER

## 2019-06-18 RX ORDER — ATORVASTATIN CALCIUM 40 MG/1
80 TABLET, FILM COATED ORAL
Status: DISCONTINUED | OUTPATIENT
Start: 2019-06-18 | End: 2019-06-20 | Stop reason: HOSPADM

## 2019-06-18 RX ORDER — POTASSIUM CHLORIDE 7.45 MG/ML
10 INJECTION INTRAVENOUS
Status: COMPLETED | OUTPATIENT
Start: 2019-06-18 | End: 2019-06-18

## 2019-06-18 RX ORDER — METOPROLOL TARTRATE 25 MG/1
25 TABLET, FILM COATED ORAL 2 TIMES DAILY
Status: DISCONTINUED | OUTPATIENT
Start: 2019-06-18 | End: 2019-06-18 | Stop reason: SDUPTHER

## 2019-06-18 RX ADMIN — SUCRALFATE 1 G: 1 TABLET ORAL at 13:32

## 2019-06-18 RX ADMIN — FERROUS SULFATE TAB 325 MG (65 MG ELEMENTAL FE) 325 MG: 325 (65 FE) TAB at 18:35

## 2019-06-18 RX ADMIN — POTASSIUM CHLORIDE 10 MEQ: 10 INJECTION, SOLUTION INTRAVENOUS at 14:31

## 2019-06-18 RX ADMIN — LEVOTHYROXINE SODIUM 125 MCG: 125 TABLET ORAL at 05:56

## 2019-06-18 RX ADMIN — SUCRALFATE 1 G: 1 TABLET ORAL at 18:36

## 2019-06-18 RX ADMIN — METOPROLOL TARTRATE 25 MG: 25 TABLET ORAL at 21:17

## 2019-06-18 RX ADMIN — POTASSIUM CHLORIDE 10 MEQ: 10 INJECTION, SOLUTION INTRAVENOUS at 08:00

## 2019-06-18 RX ADMIN — AMIODARONE HYDROCHLORIDE 200 MG: 200 TABLET ORAL at 21:17

## 2019-06-18 RX ADMIN — Medication 10 ML: at 05:56

## 2019-06-18 RX ADMIN — SUCRALFATE 1 G: 1 TABLET ORAL at 21:17

## 2019-06-18 RX ADMIN — FUROSEMIDE 40 MG: 10 INJECTION, SOLUTION INTRAMUSCULAR; INTRAVENOUS at 13:32

## 2019-06-18 RX ADMIN — ATORVASTATIN CALCIUM 80 MG: 40 TABLET, FILM COATED ORAL at 21:17

## 2019-06-18 RX ADMIN — AMIODARONE HYDROCHLORIDE 200 MG: 200 TABLET ORAL at 13:33

## 2019-06-18 RX ADMIN — Medication 10 ML: at 21:18

## 2019-06-18 NOTE — PROGRESS NOTES
F/U for bleeding, anticoagulants I saw her again about 1830   I informed her and her two sons of the bx diagnosis of GAVE I spoke to Ms Marielena Kaba by phone Plan for egd/ yoel portillo I discussed with her the objectives, risks, consequences and alternatives to the procedure. Michaela Corley MD 
6:56 AM 
6/19/2019

## 2019-06-18 NOTE — PERIOP NOTES
Patient swallowed endoscopic capsule without difficulty. Pt / Nurse given written instructions regarding diet, activity, and end of exam time. Capsule # [id number], Lot # [id number], Expiration Date 8/7/2020.

## 2019-06-18 NOTE — PROGRESS NOTES
Bedside shift change report given to Bethel Cuevas (oncoming nurse) by Jean Arevalo (offgoing nurse). Report included the following information SBAR, Kardex, Intake/Output, MAR, Recent Results and Cardiac Rhythm NSR. 
 
2050  Patient given 1 Liter of Go Lytely per order. Patient knows to finish Go Lytely by early am. 
 
2300  Patient finished Go Lytely. 0100  Patient has had multiple BM. Last BM inspected and found to be liquid with no solids. 0400 am labs drawn 5  Dr. Tianna Toribio paged regarding decreased H/H 6.8/21. 6. Type and cross & 1 unit of PRBCs ordered. 0600  Type and cross sent to lab.  
 
0730  Bedside report given to Jean Arevalo.

## 2019-06-18 NOTE — PROGRESS NOTES
Problem: Mobility Impaired (Adult and Pediatric) Goal: *Acute Goals and Plan of Care (Insert Text) Description Physical Therapy Goals Initiated 6/18/2019 1. Patient will move from supine to sit and sit to supine , scoot up and down and roll side to side in bed with independence within 7 day(s). 2.  Patient will transfer from bed to chair and chair to bed with modified independence using the least restrictive device within 7 day(s). 3.  Patient will perform sit to stand with modified independence within 7 day(s). 4.  Patient will ambulate with modified independence for 150 feet with the least restrictive device within 7 day(s). Outcome: Progressing Towards Goal 
 PHYSICAL THERAPY EVALUATION Patient: Erin Davila (43 y.o. female) Date: 6/18/2019 Primary Diagnosis: GIB (gastrointestinal bleeding) [K92.2] Procedure(s) (LRB): 
CAPSULE (N/A) Day of Surgery Precautions:     
 
ASSESSMENT : 
Based on the objective data described below, the patient presents with decreased endurance/activity tolerance, impaired balance, generalized weakness, and overall impaired functional mobility. Pt received supine in bed, agreeable to participation with therapy. Pt assumed seated position EOB independently, intact sitting balance. Remaining functional mobility occurred with CGAx1 including sit<>stand transfer and ambulation trial of 40ft w/ RW support. Gait stability fair overall with pt exhibiting slow, shuffled gait with mild increase in trunk sway. Pt fatigued at completion of limited distance ambulation trial. Of note, pt c/o increased dizziness upon assuming standing position. BP decreased to 90/66 (143/70 while at supine rest). However BP stabilized with activity and pt denied further dizziness Pt tolerated therapy session well however is well below her baseline independent/modified independent level.  Daughter present today and states their goal [facility] and increased family support. Patient will benefit from skilled intervention to address the above impairments. Patient?s rehabilitation potential is considered to be Good Factors which may influence rehabilitation potential include:  
? None noted ? Mental ability/status ? Medical condition ? Home/family situation and support systems ? Safety awareness 
? Pain tolerance/management 
? Other: PLAN : 
Recommendations and Planned Interventions: 
?           Bed Mobility Training             ? Neuromuscular Re-Education ? Transfer Training                   ? Orthotic/Prosthetic Training 
? Gait Training                         ? Modalities ? Therapeutic Exercises           ? Edema Management/Control ? Therapeutic Activities            ? Patient and Family Training/Education ? Other (comment): Frequency/Duration: Patient will be followed by physical therapy  4 times a week to address goals. Discharge Recommendations: Uma[facility] Salazar PT w/ 24hr assist  
Further Equipment Recommendations for Discharge: None SUBJECTIVE:  
Patient stated ? I was doing okay but now it seems like I am falling apart. ? OBJECTIVE DATA SUMMARY:  
HISTORY:   
Past Medical History:  
Diagnosis Date Arthritis KNEES Atrial fibrillation ([facility] [address] 10/29/2009 Bladder cancer ([facility] [address] CAD (coronary artery disease) STENT PER PATIENT Chronic pain KNEES Coagulation disorder ([facility] [address] Chronic prophylactic anticoagulation med Colon polyps Diabetes ([facility] [address] IDDM  
 GERD (gastroesophageal reflux disease) Gout Heart valve problem   
 leaking heart valve Hypercholesterolemia Hypertension Hypothyroidism Hypothyroidism 4/23/2009 Hypothyroidism, acquired, autoimmune 11/23/2015 Overweight and obesity PUD (peptic ulcer disease) 1990'S S/P ablation of atrial flutter[V45.89HM] 2009 @ [facility] >> atrial fibrillation SOB (shortness of breath) on exertion 04/2019 [facility] 4/23/2009 TIA (transient ischemic attack) Ulcer of right lower extremity, limited to breakdown of skin ([address] 7/5/2018 Past Surgical History:  
Procedure Laterality Date CARDIAC SURG PROCEDURE UNLIST    
 ablation COLONOSCOPY N/A 2/20/2019 COLONOSCOPY performed by Sondra Crocker MD at [facility] ENDOSCOPY  
 COLONOSCOPY N/A 6/17/2019 COLONOSCOPY performed by Sondra Crocker MD at [facility] ENDOSCOPY  
 COLONOSCOPY N/A 6/15/2019 COLONOSCOPY performed by Sondra Crocker MD at [address] [place] [address]  2/20/2019 Rito Tellez  6/17/2019 HX APPENDECTOMY HX CHOLECYSTECTOMY HX HYSTERECTOMY HX KNEE ARTHROSCOPY  [id number]  
 right knee HX ORTHOPAEDIC    
 HX UROLOGICAL RENAL STENT, [facility]  6/15/2019 UPPER GI ENDOSCOPY,KARY ROSA,30MM  6/15/2019 VASCULAR SURGERY PROCEDURE UNLIST  11/4  
 removed vein in right leg Prior Level of Function/Home Situation: Pt lives at home alone. Reports independence w/ household ambulation and use of rolling walker during community distance mobility. States she will occasionally use SPC when ambulating at night. Denies history of falls. Recent MVR in April 2019, states she is receiving HH PT. Drives short distances. Personal factors and/or comorbidities impacting plan of care:  
 
Home Situation Home Environment: Private residence # Steps to Enter: 0 Wheelchair Ramp: No 
One/Two Story Residence: One story Living Alone: Yes Support Systems: Child(veronica) Patient Expects to be Discharged to[de-identified] Private residence Current DME Used/Available at Home: Campbell Pangu, straight, Walker, rolling, Walker, rollator, Grab bars Tub or Shower Type: Shower(built in seat) EXAMINATION/PRESENTATION/DECISION MAKING:  
Critical Behavior: 
Neurologic State: Alert Orientation Level: Oriented X4 
 Cognition: Appropriate decision making, Appropriate for age attention/concentration, Appropriate safety awareness, Follows commands Hearing: Auditory Auditory Impairment: None Hearing Aids/Status: Does not own Skin:  intact Edema: none noted Range Of Motion: 
AROM: Generally decreased, functional 
  
  
  
  
  
  
  
Strength:   
Strength: Generally decreased, functional 
  
  
  
  
  
  
Tone & Sensation:  
Tone: Normal 
  
  
  
  
Sensation: Intact Coordination: 
Coordination: Within functional limits Vision:  
  
Functional Mobility: 
Bed Mobility: 
Rolling: Independent Supine to Sit: Independent Scooting: Independent Transfers: 
Sit to Stand: Contact guard assistance Stand to Sit: Contact guard assistance Bed to Chair: Contact guard assistance Balance:  
Sitting: Intact Standing: Impaired; With support(RW) 
Standing - Static: Fair;Constant support Standing - Dynamic : Fair;Constant support Ambulation/Gait Training: 
Distance (ft): 40 Feet (ft) Assistive Device: Walker, rolling;Gait belt Ambulation - Level of Assistance: Contact guard assistance Gait Abnormalities: Decreased step clearance;Scissoring;Trunk sway increased Speed/Melany: Slow Step Length: Left shortened;Right shortened Functional Measure: 
Barthel Index: 
Bathin Bladder: 10 Bowels: 10 
Groomin Dressin Feeding: 10 Mobility: 0 Stairs: 0 Toilet Use: 5 Transfer (Bed to Chair and Back): 10 Total: 55/100 Percentage of impairment  
0% 1-19% 20-39% 40-59% 60-79% 80-99% 100% Barthel Score 0-100 100 99-80 79-60 59-40 20-39 1-19 
 0 The Barthel ADL Index: Guidelines 1. The index should be used as a record of what a patient does, not as a record of what a patient could do. 2. The main aim is to establish degree of independence from any help, physical or verbal, however minor and for whatever reason. 3. The need for supervision renders the patient not independent. 4. A patient's performance should be established using the best available evidence. Asking the patient, friends/relatives and nurses are the usual sources, but direct observation and common sense are also important. However direct testing is not needed. 5. Usually the patient's performance over the preceding 24-48 hours is important, but occasionally longer periods will be relevant. 6. Middle categories imply that the patient supplies over 50 per cent of the effort. 7. Use of aids to be independent is allowed. Kaylah Mcgill., Barthel, D.W. (7839). Functional evaluation: the Barthel Index. [address] [facility] (14)2. Liliana Ban osei [place], J.J.M.F, Gurjit Fraga, Mirta Gregory., [place], [address] (1999). Measuring the change indisability after inpatient rehabilitation; comparison of the responsiveness of the Barthel Index and Functional [place] Measure. Journal of Neurology, Neurosurgery, and Psychiatry, 66(4), 882-251. Jose Sanford, N.[place].A, RASHAD Wright, & Halle Pelletier MJULIET. (2004.) Assessment of post-stroke quality of life in cost-effectiveness studies: The usefulness of the Barthel Index and the EuroQoL-5D. [facility], 13, 459-44 Physical Therapy Evaluation Charge Determination History Examination Presentation Decision-Making MEDIUM  Complexity : 1-2 comorbidities / personal factors will impact the outcome/ POC  MEDIUM Complexity : 3 Standardized tests and measures addressing body structure, function, activity limitation and / or participation in recreation  MEDIUM Complexity : Evolving with changing characteristics  MEDIUM Complexity : FOTO score of 26-74 Based on the above components, the patient evaluation is determined to be of the following complexity level: MEDIUM Pain: 
Pain Scale 1: Numeric (0 - 10) Pain Intensity 1: 0 Activity Tolerance: VSS on RA 
 Please refer to the flowsheet for vital signs taken during this treatment. After treatment:  
?         Patient left in no apparent distress sitting up in chair ? Patient left in no apparent distress in bed 
? Call bell left within reach ? Nursing notified ? Caregiver present ? Bed alarm activated COMMUNICATION/EDUCATION:  
The patient?s plan of care was discussed with: Registered Nurse. ?         Fall prevention education was provided and the patient/caregiver indicated understanding. ? Patient/family have participated as able in goal setting and plan of care. ?         Patient/family agree to work toward stated goals and plan of care. ?         Patient understands intent and goals of therapy, but is neutral about his/her participation. ? Patient is unable to participate in goal setting and plan of care. Thank you for this referral. 
Merly Delgado, PT, DPT Time Calculation: 21 mins

## 2019-06-18 NOTE — PROGRESS NOTES
Hospitalist Progress Note NAME: Silva Sahni :  1935 MRN:  [id number] Assessment / Plan: 
Acute on chronic blood loss anemia POA Hematochezia Suspect Acute Gastrointestinal bleed On anticoagulation/thrombocytopenia - appreciate GI input;  
-s/p EGD 6/15: Successful dilation of Schatzki ring to 18mm (necessary to facilitate capsule swallowing if capsule performed) Erosive gastritis (versus gave). 3 cm hiatal hernia 
  s/p colonoscopy : revealed polyps Capsule study done; result pending 
  monitor h/h (8.4/26.6), transfuse if <7 
  holding Xarelto Hypokalemia 
- replaced, will monitor Hx mitral stenosis - s/p bioprosthetic MVR 2019 
  on xarelto and asa, holding both in setting of acute GIB 
  
Mild JULIANA CKD stage III 
- Cr 2.06 POA, trending down 1.3 Avoid nephrotoxic agents Chronic Afib 
- hold xarelto as above Rate reasonably controlled Continue with Amio 
  
Chronic dCHF 
- does not appear vol overloaded - CXR neg for acute pathology 
- resume lasix and BB 
  
Iron deficiency 
- follows with heme/onc Continue with ferrous sulfate  
  
Hypothyroid 
- cont synthroid 
  
GERD 
- on IV PPI and carafate 
  
HLD/HTN 
- continue with statin & BB 
  
PVD 
  
Code Status: DNR Surrogate Decision Maker: daughter 
  
DVT Prophylaxis: SCDs GI Prophylaxis: IV PPI 
  
Baseline: functional, lives alone, daughter lives locally Recommended Disposition: TBD Subjective: Chief Complaint / Reason for Physician Visit \"I feel weak and tired\"  Discussed with RN events overnight. Review of Systems: 
Symptom Y/N Comments  Symptom Y/N Comments Fever/Chills n   Chest Pain Poor Appetite    Edema n   
Cough n   Abdominal Pain n   
Sputum    Joint Pain SOB/LEIJA n   Pruritis/Rash n   
Nausea/vomit n   Tolerating PT/OT y Diarrhea    Tolerating Diet y Constipation    Other Could NOT obtain due to:   
 
Objective: VITALS:  
 Last 24hrs VS reviewed since prior progress note. Most recent are: 
Patient Vitals for the past 24 hrs: 
 Temp Pulse Resp BP SpO2  
06/18/19 1332  94  151/56   
06/18/19 1123 98.5 °F (36.9 °C) 86 20 143/49 92 % 06/18/19 1000 98.4 °F (36.9 °C) 91 20 139/65 95 % 06/18/19 0959  91   95 % 06/18/19 0957    141/59   
06/18/19 0945 98 °F (36.7 °C) 91 20 134/47 93 % 06/18/19 0944  91   93 % 06/18/19 0925 98 °F (36.7 °C) 92 20 136/52 95 % 06/18/19 0923  91   (!) 87 % 06/18/19 0922    (!) 54/37   
06/18/19 0900     94 % 06/18/19 0740 97.3 °F (36.3 °C) 87 20 130/47 97 % 06/18/19 0738     97 % 06/18/19 0719    130/47   
06/18/19 0718  88   98 % 06/18/19 0351 98 °F (36.7 °C) 87 20 132/49 97 % 06/17/19 2257 97.7 °F (36.5 °C) 95 18 157/59 99 % 06/17/19 1928 97.9 °F (36.6 °C) 92 18 137/51 99 % Intake/Output Summary (Last 24 hours) at 6/18/2019 1735 Last data filed at 6/18/2019 0740 Gross per 24 hour Intake 1750 ml Output  Net 1750 ml PHYSICAL EXAM: 
General: Alert, cooperative, no acute distress   
EENT:  EOMI. MMM Resp:  CTA bilaterally,No accessory muscle use CV:  Regular  Rhythm, 3+ edema GI:  Soft, Non distended, Non tender.   
Neurologic:  Alert and oriented, normal speech, CN 2-12 grossly intact Psych:   Not anxious nor agitated Skin:  No rashes. No jaundice Reviewed most current lab test results and cultures  YES Reviewed most current radiology test results   YES Review and summation of old records today    NO Reviewed patient's current orders and MAR    YES 
PMH/SH reviewed - no change compared to H&P 
________________________________________________________________________ Care Plan discussed with: 
  Comments Patient y Family RN y   
Care Manager Consultant Multidiciplinary team rounds were held today with , nursing, pharmacist and clinical coordinator.   Patient's plan of care was discussed; medications were reviewed and discharge planning was addressed. ________________________________________________________________________ 
__________________________________________________________________ Jennifer Hart NP Procedures: see electronic medical records for all procedures/Xrays and details which were not copied into this note but were reviewed prior to creation of Plan. LABS: 
I reviewed today's most current labs and imaging studies. Pertinent labs include: 
Recent Labs  
  06/18/19 0349 06/17/19 0225 06/16/19 1442 06/16/19 0327 WBC  --   --   --  4.5 HGB 6.8* 8.4* 7.2* 7.0*  
HCT 21.6* 26.6* 23.4* 22.4* PLT  --   --   --  101* Recent Labs  
  06/18/19 0349 06/17/19 0225 06/16/19 0327  141 145  
K 3.3* 3.6 3.2*  
* 108 109* CO2 27 25 26 * 141* 149* BUN 10 14 21* CREA 1.34* 1.54* 1.47* CA 8.6 8.6 8.5 ALB  --   --  3.0* TBILI  --   --  0.4 SGOT  --   --  16 ALT  --   --  18 Signed: Libby Anguiano NP

## 2019-06-18 NOTE — ROUTINE PROCESS
TRANSFER - IN REPORT: 
 
Verbal report received from RODGER Martinez on [address]  being received from [phone] [id number] for ordered procedure Report consisted of patients Situation, Background, Assessment and  
Recommendations. Information from the following report SBAR was reviewed with the receiving nurse. Opportunity for questions and clarification was provided. Assessment completed upon patients arrival to unit and care assumed.

## 2019-06-18 NOTE — ROUTINE PROCESS
TRANSFER - OUT REPORT: 
 
Verbal report given to RODGER Martinez on Anastacia Jansen  being transferred to [address] [place] [address] for routine post - op Report consisted of patients Situation, Background, Assessment and  
Recommendations. Information from the following reportProcedure Summary and Intake/Output was reviewed with the receiving nurse. Lines:  
Peripheral IV 06/14/19 Left Antecubital (Active) Site Assessment Clean, dry, & intact 6/18/2019  3:51 AM  
Phlebitis Assessment 0 6/18/2019  3:51 AM  
Infiltration Assessment 0 6/18/2019  3:51 AM  
Dressing Status Clean, dry, & intact 6/18/2019  3:51 AM  
Dressing Type Transparent 6/18/2019  3:51 AM  
Hub Color/Line Status Green;Flushed;Patent 6/18/2019  3:51 AM  
Action Taken Open ports on tubing capped 6/17/2019  7:28 PM  
  
 
Opportunity for questions and clarification was provided.

## 2019-06-18 NOTE — PROGRESS NOTES
Reason for Admission:   GIB 
            
RRAT Score:    29 Resources/supports as identified by patient/family:   [place]'s [place] [id number] support Top Challenges facing patient (as identified by patient/family and CM): Finances/Medication cost?   Medicare Transportation? Family to transport Support system or lack thereof? Family Support Living arrangements? Lives alone Self-care/ADLs/Cognition? Independent Current Advanced Directive/Advance Care Plan: DNR-Son/Daughter Plan for utilizing home health: Resumption Transition of Care Plan:               
 
CM met with pt, with son by bedside. Pt reported that she resides alone in their one story home. Pt reported that she is independent with ADLs, and she drives (limited). Pt reported that she is active with PCP: seen May 2019 and uses [facility] pharm Sola Wu). Pt reported DME at home: walker, wheelchair, and cane. Pt reported [facility] in the past, [facility] Pt will need a resumption order at d/c.  Pt's son plans to find out what agency pt received services from and inform CM of the following. Pt has therapy orders. CM will inform MD of resumption order. CM will continue to follow. Care Management Interventions PCP Verified by CM: Yes Mode of Transport at Discharge: Other (see comment) Transition of Care Consult (CM Consult): Discharge Planning Physical Therapy Consult: Yes Occupational Therapy Consult: Yes Speech Therapy Consult: No 
Current Support Network: Lives Alone, Own Home Confirm Follow Up Transport: Family Plan discussed with Pt/Family/Caregiver: Yes Discharge Location Discharge Placement: Home [place], [address] [facility]

## 2019-06-18 NOTE — ANESTHESIA POSTPROCEDURE EVALUATION
Procedure(s): 
COLONOSCOPY 
ENDOSCOPIC POLYPECTOMY RESOLUTION CLIP x2. total IV anesthesia Anesthesia Post Evaluation Patient location during evaluation: PACU Note status: Adequate. Level of consciousness: responsive to verbal stimuli and sleepy but conscious Pain management: satisfactory to patient Airway patency: patent Anesthetic complications: no 
Cardiovascular status: acceptable Respiratory status: acceptable Hydration status: acceptable Comments: +Post-Anesthesia Evaluation and Assessment Patient: Anastacia Jansen MRN: [id number]  SSN: [id number] YOB: 1935  Age: 82 y.o. Sex: female Cardiovascular Function/Vital Signs /56   Pulse 94   Temp 36.9 °C (98.5 °F)   Resp 20   Ht 5' 8\" (1.727 m)   Wt 92.8 kg (204 lb 9.4 oz)   SpO2 92%   BMI 31.11 kg/m² Patient is status post Procedure(s) with comments: 
COLONOSCOPY - 1230   please ENDOSCOPIC POLYPECTOMY RESOLUTION CLIP x2. Nausea/Vomiting: Controlled. Postoperative hydration reviewed and adequate. Pain: 
Pain Scale 1: Numeric (0 - 10) (06/18/19 1000) Pain Intensity 1: 0 (06/18/19 1000) Managed. Neurological Status:  
Neuro (WDL): Within Defined Limits (06/15/19 1123) At baseline. Mental Status and Level of Consciousness: Arousable. Pulmonary Status:  
O2 Device: Room air (06/18/19 0900) Adequate oxygenation and airway patent. Complications related to anesthesia: None Post-anesthesia assessment completed. No concerns. Signed By: Jayro He DO  
 6/18/2019 Post anesthesia nausea and vomiting:  controlled Vitals Value Taken Time /52 6/18/2019  3:30 PM  
Temp 36.9 °C (98.5 °F) 6/18/2019 11:23 AM  
Pulse 92 6/18/2019  3:47 PM  
Resp 20 6/18/2019 11:23 AM  
SpO2 93 % 6/18/2019  3:07 PM  
Vitals shown include unvalidated device data.

## 2019-06-18 NOTE — PROGRESS NOTES
Problem: Patient Education: Go to Patient Education Activity Goal: Patient/Family Education Outcome: Progressing Towards Goal 
  
Problem: Upper and Lower GI Bleed: Day 3 Goal: Off Pathway (Use only if patient is Off Pathway) Outcome: Progressing Towards Goal 
Goal: Activity/Safety Outcome: Progressing Towards Goal 
Goal: Diagnostic Test/Procedures Outcome: Progressing Towards Goal 
Goal: Nutrition/Diet Outcome: Progressing Towards Goal 
Goal: Discharge Planning Outcome: Progressing Towards Goal 
Goal: Medications Outcome: Progressing Towards Goal 
Goal: Treatments/Interventions/Procedures Outcome: Progressing Towards Goal 
Goal: Psychosocial 
Outcome: Progressing Towards Goal 
  
Problem: Upper and Lower GI Bleed:  Discharge Outcomes Goal: *Hemodynamically stable Outcome: Progressing Towards Goal 
Goal: *Lungs clear or at baseline Outcome: Progressing Towards Goal 
Goal: *Demonstrates independent activity or return to baseline Outcome: Progressing Towards Goal 
Goal: *Pain is controlled to three or less Outcome: Progressing Towards Goal 
Goal: *Verbalizes understanding and describes prescribed diet Outcome: Progressing Towards Goal 
Goal: *Tolerating diet Outcome: Progressing Towards Goal 
Goal: *Verbalizes name, dosage, time, side effects, and number of days to continue medications Outcome: Progressing Towards Goal 
Goal: *Anxiety reduced or absent Outcome: Progressing Towards Goal 
Goal: *Understands and describes signs and symptoms to report to providers(Stroke Metric) Outcome: Progressing Towards Goal 
Goal: *Describes follow-up/return visits to physicians Outcome: Progressing Towards Goal 
Goal: *Describes available resources and support systems Outcome: Progressing Towards Goal 
  
Problem: Patient Education: Go to Patient Education Activity Goal: Patient/Family Education Outcome: Progressing Towards Goal 
  
Problem: Acute Renal Failure: Day 3 
 Goal: Off Pathway (Use only if patient is Off Pathway) Outcome: Progressing Towards Goal 
Goal: Activity/Safety Outcome: Progressing Towards Goal 
Goal: Consults, if ordered Outcome: Progressing Towards Goal 
Goal: Diagnostic Test/Procedures Outcome: Progressing Towards Goal 
Goal: Nutrition/Diet Outcome: Progressing Towards Goal 
Goal: Discharge Planning Outcome: Progressing Towards Goal 
Goal: Medications Outcome: Progressing Towards Goal 
Goal: Respiratory Outcome: Progressing Towards Goal 
Goal: Treatments/Interventions/Procedures Outcome: Progressing Towards Goal 
Goal: Psychosocial 
Outcome: Progressing Towards Goal 
Goal: *Optimal pain control at patient's stated goal 
Outcome: Progressing Towards Goal 
Goal: *Urinary output within identified parameters Outcome: Progressing Towards Goal 
Goal: *Hemodynamically stable Outcome: Progressing Towards Goal 
Goal: *Tolerating diet Outcome: Progressing Towards Goal 
Goal: *Lab values improving Outcome: Progressing Towards Goal 
  
Problem: Acute Renal Failure: Day 4 Goal: Off Pathway (Use only if patient is Off Pathway) Outcome: Progressing Towards Goal 
Goal: Activity/Safety Outcome: Progressing Towards Goal 
Goal: Consults, if ordered Outcome: Progressing Towards Goal 
Goal: Diagnostic Test/Procedures Outcome: Progressing Towards Goal 
Goal: Nutrition/Diet Outcome: Progressing Towards Goal 
Goal: Discharge Planning Outcome: Progressing Towards Goal 
Goal: Medications Outcome: Progressing Towards Goal 
Goal: Respiratory Outcome: Progressing Towards Goal 
Goal: Treatments/Interventions/Procedures Outcome: Progressing Towards Goal 
Goal: Psychosocial 
Outcome: Progressing Towards Goal 
Goal: *Optimal pain control at patient's stated goal 
Outcome: Progressing Towards Goal 
Goal: *Urinary output within identified parameters Outcome: Progressing Towards Goal 
Goal: *Hemodynamically stable Outcome: Progressing Towards Goal 
 Goal: *Tolerating diet Outcome: Progressing Towards Goal 
Goal: *Lab values improving Outcome: Progressing Towards Goal 
  
Problem: Acute Renal Failure: Day 5 Goal: Off Pathway (Use only if patient is Off Pathway) Outcome: Progressing Towards Goal 
Goal: Activity/Safety Outcome: Progressing Towards Goal 
Goal: Diagnostic Test/Procedures Outcome: Progressing Towards Goal 
Goal: Nutrition/Diet Outcome: Progressing Towards Goal 
Goal: Discharge Planning Outcome: Progressing Towards Goal 
Goal: Medications Outcome: Progressing Towards Goal 
Goal: Respiratory Outcome: Progressing Towards Goal 
Goal: Treatments/Interventions/Procedures Outcome: Progressing Towards Goal 
Goal: Psychosocial 
Outcome: Progressing Towards Goal 
Goal: *Optimal pain control at patient's stated goal 
Outcome: Progressing Towards Goal 
Goal: *Urinary output within identified parameters Outcome: Progressing Towards Goal 
Goal: *Hemodynamically stable Outcome: Progressing Towards Goal 
Goal: *Tolerating diet Outcome: Progressing Towards Goal 
Goal: *Lab values improving Outcome: Progressing Towards Goal 
  
Problem: Acute Renal Failure: Day 6 Goal: Off Pathway (Use only if patient is Off Pathway) Outcome: Progressing Towards Goal 
Goal: Activity/Safety Outcome: Progressing Towards Goal 
Goal: Diagnostic Test/Procedures Outcome: Progressing Towards Goal 
Goal: Nutrition/Diet Outcome: Progressing Towards Goal 
Goal: Discharge Planning Outcome: Progressing Towards Goal 
Goal: Medications Outcome: Progressing Towards Goal 
Goal: Respiratory Outcome: Progressing Towards Goal 
Goal: Treatments/Interventions/Procedures Outcome: Progressing Towards Goal 
Goal: Psychosocial 
Outcome: Progressing Towards Goal 
  
Problem: Acute Renal Failure: Discharge Outcomes Goal: *Optimal pain control at patient's stated goal 
Outcome: Progressing Towards Goal 
Goal: *Urinary output within identified parameters Outcome: Progressing Towards Goal 
Goal: *Hemodynamically stable Outcome: Progressing Towards Goal 
Goal: *Tolerating diet Outcome: Progressing Towards Goal 
Goal: *Lab values stabilized Outcome: Progressing Towards Goal 
Goal: *Verbalizes understanding and describes medication purposes and frequencies Outcome: Progressing Towards Goal 
Goal: *Medication reconciliation Outcome: Progressing Towards Goal

## 2019-06-18 NOTE — PROGRESS NOTES
F/U for melena Gi bleed Blood loss anemia Anticoagulation S: Ms. Tillman Blizzard was seen by me today during rounds. At this time, she is resting + comfortably. The patient has no new complaints today. No melena or hematochezia. Please see admission consult for details of ROS; there are + changes today with pt being more SOB. Hgb decreased to 6.8 today and getting transfused one unit currently. Colonoscopy done on 6/17: polyp in descending colon and rectum removed and retrieved, clips placed at polypectomy site Pt having capsule study today, swallowed capsule at 8AM.  
. O: Blood pressure 143/49, pulse 86, temperature 98.5 °F (36.9 °C), resp. rate 20, height 5' 8\" (1.727 m), weight 92.8 kg (204 lb 9.4 oz), SpO2 92 %. Gen: Patient is in no acute distress. There is no jaundice. Pallor of the skin. Lungs: Clear to auscultation bilaterally, no wheezes or rales. Abd: Soft, non tender, non-distended, normal bowel sounds. Capsule leads in place. Extremities: Warm. Cross sectional imaging:  None [facility] Recent Labs  
  06/18/19 
0349 06/17/19 0225  06/16/19 0327 WBC  --   --   --  4.5 HGB 6.8* 8.4*   < > 7.0*  
HCT 21.6* 26.6*   < > 22.4* PLT  --   --   --  101*  
 < > = values in this interval not displayed. Recent Labs  
  06/18/19 
0349 06/17/19 
0225 06/16/19 
0327  141 145  
K 3.3* 3.6 3.2*  
* 108 109* CO2 27 25 26 BUN 10 14 21* CREA 1.34* 1.54* 1.47* * 141* 149* CA 8.6 8.6 8.5 Recent Labs  
  06/16/19 0327 SGOT 16 ALT 18  
AP 83 TBILI 0.4 TP 5.4* ALB 3.0*  
GLOB 2.4 No results for input(s): INR, PTP, APTT in the last 72 hours. No lab exists for component: INREXT, INREXT No results for input(s): FE, TIBC, PSAT, FERR in the last 72 hours. Lab Results Component Value Date/Time Folate 72.3 (H) 01/22/2017 03:13 AM  
  
No results for input(s): PH, PCO2, PO2 in the last 72 hours. No results for input(s): CPK, CKNDX, TROIQ in the last 72 hours. No lab exists for component: CPKMB Lab Results Component Value Date/Time Cholesterol, total 119 09/04/2018 11:35 AM  
 HDL Cholesterol 47 09/04/2018 11:35 AM  
 LDL, calculated 58 09/04/2018 11:35 AM  
 Triglyceride 68 09/04/2018 11:35 AM  
 CHOL/HDL Ratio 3.1 09/22/2010 08:12 AM  
 
Lab Results Component Value Date/Time Glucose (POC) 162 (H) 06/18/2019 11:09 AM  
 Glucose (POC) 177 (H) 06/18/2019 07:36 AM  
 Glucose (POC) 205 (H) 06/17/2019 09:11 PM  
 Glucose (POC) 166 (H) 06/17/2019 04:18 PM  
 Glucose (POC) 171 (H) 06/17/2019 07:22 AM  
 
Lab Results Component Value Date/Time Color YELLOW/STRAW 04/28/2019 02:10 AM  
 Appearance CLEAR 04/28/2019 02:10 AM  
 Specific gravity 1.016 04/28/2019 02:10 AM  
 Specific gravity 1.020 07/06/2010 01:10 PM  
 pH (UA) 5.0 04/28/2019 02:10 AM  
 Protein NEGATIVE  04/28/2019 02:10 AM  
 Glucose NEGATIVE  04/28/2019 02:10 AM  
 Ketone NEGATIVE  04/28/2019 02:10 AM  
 Bilirubin NEGATIVE  04/28/2019 02:10 AM  
 Urobilinogen 0.2 04/28/2019 02:10 AM  
 Nitrites NEGATIVE  04/28/2019 02:10 AM  
 Leukocyte Esterase MODERATE (A) 04/28/2019 02:10 AM  
 Epithelial cells MODERATE (A) 04/28/2019 02:10 AM  
 Bacteria NEGATIVE  04/28/2019 02:10 AM  
 WBC 10-20 04/28/2019 02:10 AM  
 RBC 5-10 04/28/2019 02:10 AM  
  
 
A: Active Problems: 
  Long term current use of anticoagulant therapy (2/8/2010) GIB (gastrointestinal bleeding) (6/14/2019) Melena (6/14/2019) Rectal bleeding (6/14/2019) Lower abdominal pain (6/14/2019) Comment: Drop in hgb to 6.8, No clinical bleeding P:  Capsule placed today, Dr. Eric Reddish to interpret study. No overt bleeding. Continue to monitor hgb. Pending results of capsule will determine further management.    
 
NEVAEH Shah 
06/18/19 
11:52 AM

## 2019-06-19 ENCOUNTER — ANESTHESIA (OUTPATIENT)
Dept: ENDOSCOPY | Age: 84
DRG: 378 | End: 2019-06-19
Payer: MEDICARE

## 2019-06-19 ENCOUNTER — ANESTHESIA EVENT (OUTPATIENT)
Dept: ENDOSCOPY | Age: 84
DRG: 378 | End: 2019-06-19
Payer: MEDICARE

## 2019-06-19 PROBLEM — K31.819 GAVE (GASTRIC ANTRAL VASCULAR ECTASIA): Status: ACTIVE | Noted: 2019-06-19

## 2019-06-19 LAB
ABO + RH BLD: NORMAL
ANION GAP SERPL CALC-SCNC: 5 MMOL/L (ref 5–15)
BLD PROD TYP BPU: NORMAL
BLOOD GROUP ANTIBODIES SERPL: NORMAL
BPU ID: NORMAL
BUN SERPL-MCNC: 12 MG/DL (ref 6–20)
BUN/CREAT SERPL: 8 (ref 12–20)
CALCIUM SERPL-MCNC: 8.3 MG/DL (ref 8.5–10.1)
CHLORIDE SERPL-SCNC: 108 MMOL/L (ref 97–108)
CO2 SERPL-SCNC: 27 MMOL/L (ref 21–32)
CREAT SERPL-MCNC: 1.47 MG/DL (ref 0.55–1.02)
CROSSMATCH RESULT,%XM: NORMAL
ERYTHROCYTE [DISTWIDTH] IN BLOOD BY AUTOMATED COUNT: 17.9 % (ref 11.5–14.5)
GLUCOSE BLD STRIP.AUTO-MCNC: 128 MG/DL (ref 65–100)
GLUCOSE BLD STRIP.AUTO-MCNC: 135 MG/DL (ref 65–100)
GLUCOSE BLD STRIP.AUTO-MCNC: 138 MG/DL (ref 65–100)
GLUCOSE BLD STRIP.AUTO-MCNC: 149 MG/DL (ref 65–100)
GLUCOSE BLD STRIP.AUTO-MCNC: 168 MG/DL (ref 65–100)
GLUCOSE SERPL-MCNC: 170 MG/DL (ref 65–100)
HCT VFR BLD AUTO: 28.1 % (ref 35–47)
HGB BLD-MCNC: 8.6 G/DL (ref 11.5–16)
MCH RBC QN AUTO: 29.9 PG (ref 26–34)
MCHC RBC AUTO-ENTMCNC: 30.6 G/DL (ref 30–36.5)
MCV RBC AUTO: 97.6 FL (ref 80–99)
NRBC # BLD: 0 K/UL (ref 0–0.01)
NRBC BLD-RTO: 0 PER 100 WBC
PLATELET # BLD AUTO: 101 K/UL (ref 150–400)
PMV BLD AUTO: 12 FL (ref 8.9–12.9)
POTASSIUM SERPL-SCNC: 3.9 MMOL/L (ref 3.5–5.1)
RBC # BLD AUTO: 2.88 M/UL (ref 3.8–5.2)
SERVICE CMNT-IMP: ABNORMAL
SODIUM SERPL-SCNC: 140 MMOL/L (ref 136–145)
SPECIMEN EXP DATE BLD: NORMAL
STATUS OF UNIT,%ST: NORMAL
UNIT DIVISION, %UDIV: 0
WBC # BLD AUTO: 5.7 K/UL (ref 3.6–11)

## 2019-06-19 PROCEDURE — 74011250637 HC RX REV CODE- 250/637: Performed by: NURSE PRACTITIONER

## 2019-06-19 PROCEDURE — 97165 OT EVAL LOW COMPLEX 30 MIN: CPT

## 2019-06-19 PROCEDURE — 65660000000 HC RM CCU STEPDOWN

## 2019-06-19 PROCEDURE — 80048 BASIC METABOLIC PNL TOTAL CA: CPT

## 2019-06-19 PROCEDURE — 82962 GLUCOSE BLOOD TEST: CPT

## 2019-06-19 PROCEDURE — 76060000031 HC ANESTHESIA FIRST 0.5 HR: Performed by: SPECIALIST

## 2019-06-19 PROCEDURE — 74011250637 HC RX REV CODE- 250/637: Performed by: HOSPITALIST

## 2019-06-19 PROCEDURE — 36415 COLL VENOUS BLD VENIPUNCTURE: CPT

## 2019-06-19 PROCEDURE — 74011250636 HC RX REV CODE- 250/636

## 2019-06-19 PROCEDURE — 85027 COMPLETE CBC AUTOMATED: CPT

## 2019-06-19 PROCEDURE — 0W3P8ZZ CONTROL BLEEDING IN GASTROINTESTINAL TRACT, VIA NATURAL OR ARTIFICIAL OPENING ENDOSCOPIC: ICD-10-PCS | Performed by: SPECIALIST

## 2019-06-19 PROCEDURE — 97116 GAIT TRAINING THERAPY: CPT

## 2019-06-19 PROCEDURE — 77030022875 HC PRB AR PLSM COAG ERBE -C: Performed by: SPECIALIST

## 2019-06-19 PROCEDURE — 74011250636 HC RX REV CODE- 250/636: Performed by: SPECIALIST

## 2019-06-19 PROCEDURE — 77030008565 HC TBNG SUC IRR ERBE -B: Performed by: SPECIALIST

## 2019-06-19 PROCEDURE — 76040000019: Performed by: SPECIALIST

## 2019-06-19 RX ORDER — FENTANYL CITRATE 50 UG/ML
25 INJECTION, SOLUTION INTRAMUSCULAR; INTRAVENOUS
Status: DISCONTINUED | OUTPATIENT
Start: 2019-06-19 | End: 2019-06-19 | Stop reason: HOSPADM

## 2019-06-19 RX ORDER — LIDOCAINE HYDROCHLORIDE 20 MG/ML
INJECTION, SOLUTION EPIDURAL; INFILTRATION; INTRACAUDAL; PERINEURAL AS NEEDED
Status: DISCONTINUED | OUTPATIENT
Start: 2019-06-19 | End: 2019-06-19 | Stop reason: HOSPADM

## 2019-06-19 RX ORDER — MIDAZOLAM HYDROCHLORIDE 1 MG/ML
1-2 INJECTION, SOLUTION INTRAMUSCULAR; INTRAVENOUS
Status: DISCONTINUED | OUTPATIENT
Start: 2019-06-19 | End: 2019-06-19 | Stop reason: HOSPADM

## 2019-06-19 RX ORDER — SODIUM CHLORIDE 9 MG/ML
75 INJECTION, SOLUTION INTRAVENOUS CONTINUOUS
Status: DISCONTINUED | OUTPATIENT
Start: 2019-06-19 | End: 2019-06-19 | Stop reason: HOSPADM

## 2019-06-19 RX ORDER — FUROSEMIDE 40 MG/1
40 TABLET ORAL DAILY
Status: DISCONTINUED | OUTPATIENT
Start: 2019-06-19 | End: 2019-06-20 | Stop reason: HOSPADM

## 2019-06-19 RX ORDER — SODIUM CHLORIDE 9 MG/ML
INJECTION, SOLUTION INTRAVENOUS
Status: DISCONTINUED | OUTPATIENT
Start: 2019-06-19 | End: 2019-06-19 | Stop reason: HOSPADM

## 2019-06-19 RX ORDER — SODIUM CHLORIDE 0.9 % (FLUSH) 0.9 %
5-40 SYRINGE (ML) INJECTION AS NEEDED
Status: DISCONTINUED | OUTPATIENT
Start: 2019-06-19 | End: 2019-06-20 | Stop reason: HOSPADM

## 2019-06-19 RX ORDER — PROPOFOL 10 MG/ML
INJECTION, EMULSION INTRAVENOUS AS NEEDED
Status: DISCONTINUED | OUTPATIENT
Start: 2019-06-19 | End: 2019-06-19 | Stop reason: HOSPADM

## 2019-06-19 RX ORDER — SODIUM CHLORIDE 0.9 % (FLUSH) 0.9 %
5-40 SYRINGE (ML) INJECTION EVERY 8 HOURS
Status: DISCONTINUED | OUTPATIENT
Start: 2019-06-19 | End: 2019-06-19 | Stop reason: SDUPTHER

## 2019-06-19 RX ADMIN — SUCRALFATE 1 G: 1 TABLET ORAL at 17:13

## 2019-06-19 RX ADMIN — SUCRALFATE 1 G: 1 TABLET ORAL at 21:22

## 2019-06-19 RX ADMIN — Medication 10 ML: at 05:57

## 2019-06-19 RX ADMIN — LIDOCAINE HYDROCHLORIDE 20 MG: 20 INJECTION, SOLUTION EPIDURAL; INFILTRATION; INTRACAUDAL; PERINEURAL at 15:46

## 2019-06-19 RX ADMIN — Medication 10 ML: at 21:23

## 2019-06-19 RX ADMIN — LEVOTHYROXINE SODIUM 125 MCG: 125 TABLET ORAL at 05:57

## 2019-06-19 RX ADMIN — METOPROLOL TARTRATE 25 MG: 25 TABLET ORAL at 17:13

## 2019-06-19 RX ADMIN — SODIUM CHLORIDE: 9 INJECTION, SOLUTION INTRAVENOUS at 15:29

## 2019-06-19 RX ADMIN — PROPOFOL 50 MG: 10 INJECTION, EMULSION INTRAVENOUS at 15:33

## 2019-06-19 RX ADMIN — AMIODARONE HYDROCHLORIDE 200 MG: 200 TABLET ORAL at 21:23

## 2019-06-19 RX ADMIN — PROPOFOL 50 MG: 10 INJECTION, EMULSION INTRAVENOUS at 15:41

## 2019-06-19 RX ADMIN — SODIUM CHLORIDE 75 ML/HR: 900 INJECTION, SOLUTION INTRAVENOUS at 15:30

## 2019-06-19 RX ADMIN — PROPOFOL 50 MG: 10 INJECTION, EMULSION INTRAVENOUS at 15:44

## 2019-06-19 RX ADMIN — ATORVASTATIN CALCIUM 80 MG: 40 TABLET, FILM COATED ORAL at 21:22

## 2019-06-19 RX ADMIN — FUROSEMIDE 40 MG: 40 TABLET ORAL at 17:20

## 2019-06-19 RX ADMIN — Medication 10 ML: at 17:14

## 2019-06-19 RX ADMIN — PROPOFOL 20 MG: 10 INJECTION, EMULSION INTRAVENOUS at 15:46

## 2019-06-19 RX ADMIN — FERROUS SULFATE TAB 325 MG (65 MG ELEMENTAL FE) 325 MG: 325 (65 FE) TAB at 17:13

## 2019-06-19 RX ADMIN — PROPOFOL 50 MG: 10 INJECTION, EMULSION INTRAVENOUS at 15:38

## 2019-06-19 NOTE — H&P
Pre-endoscopy H and P The patient was seen and examined in the endoscopy suite. The airway was assessed and docuemented. The problem list, past medical history, and medications were reviewed. Patient Active Problem List  
Diagnosis Code  T. I.A.  PVD (peripheral vascular disease) ([facility]) I73.9  Reflux esophagitis K21.0  Benign neoplasm of colon D12.6  Iron deficiency anemia D50.9  Hypomagnesemia E83.42  Long term current use of anticoagulant therapy Z79.01  
 Essential hypertension, benign I10  Bladder cancer (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  
 Advance directive in chart Z78.9  Type 2 diabetes, controlled, with renal manifestation ([facility]) E11.29  
 Chronic atrial fibrillation ([facility]) I48.2  Mixed hyperlipidemia E78.2  Atherosclerosis of native coronary artery without angina pectoris I25.10  Hypothyroidism, acquired, autoimmune E06.3  Heart valve problem I38  
 Mitral regurgitation I34.0  
 S/P MVR (mitral valve repair) [id number]  Active advance directive on file Z78.9  Non-rheumatic mitral regurgitation I34.0  Heart failure ([facility]) I50.9  Bilateral leg edema R60.0  Esophageal stricture K22.2  Dysphagia R13.10  Cellulitis of right lower leg L03.115  
 GI bleeding K92.2  Mitral stenosis I05.0  S/P MVR (mitral valve replacement) Z95.2  
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  
  Worry: Not on file Inability: Not on file  Transportation needs:  
  Medical: Not on file Non-medical: Not on file Tobacco Use  Smoking status: Former Smoker Packs/day: 0.50 Years: 10.00 Pack years: 5.00 Types: Cigarettes Last attempt to quit: 1967 Years since quittin.4  Smokeless tobacco: Never Used Substance and Sexual Activity  Alcohol use: No  
  Alcohol/week: 0.0 oz  Drug use: No  
 Sexual activity: Not Currently Lifestyle  Physical activity:  
  Days per week: Not on file Minutes per session: Not on file  Stress: Not on file Relationships  Social connections:  
  Talks on phone: Not on file Gets together: Not on file Attends Gnosticism service: Not on file Active member of club or organization: Not on file Attends meetings of clubs or organizations: Not on file Relationship status: Not on file  Intimate partner violence:  
  Fear of current or ex partner: Not on file Emotionally abused: Not on file Physically abused: Not on file Forced sexual activity: Not on file Other Topics Concern  Not on file Social History Narrative  Not on file Past Medical History:  
Diagnosis Date  Arthritis KNEES  Atrial fibrillation ([facility] [address] 10/29/2009  Bladder cancer ([facility] [address]  CAD (coronary artery disease) STENT PER PATIENT  Chronic pain KNEES  
 Coagulation disorder ([facility] [address] Chronic prophylactic anticoagulation med  Colon polyps  Diabetes ([facility] [address] IDDM  
 GAVE (gastric antral vascular ectasia) 2019  
 bx :    Gastric, biopsy:  Mild capillary ectasia and congestion, compatible with gastric antral vascular ectasia (GAVE), negative for background gastritis. One fragment suggestive of hyperplastic polyp  GERD (gastroesophageal reflux disease)  Gout  Heart valve problem leaking heart valve  Hypercholesterolemia  Hypertension  Hypothyroidism  Hypothyroidism 4/23/2009  Hypothyroidism, acquired, autoimmune 11/23/2015  Overweight and obesity  PUD (peptic ulcer disease) 1990'S  S/P ablation of atrial flutter[V45.89HM] 2009  
 @ [facility] >> atrial fibrillation  SOB (shortness of breath) on exertion 04/2019  
 T. I.A. 4/23/2009  TIA (transient ischemic attack)  Ulcer of right lower extremity, limited to breakdown of skin ([address] 7/5/2018 The patient has a family history of na Prior to Admission Medications Prescriptions Last Dose Informant Patient Reported? Taking?  
acetaminophen (TYLENOL) 325 mg tablet 6/7/2019 at Unknown time Child Yes Yes Sig: Take 325 mg by mouth every four (4) hours as needed for Pain. allopurinol (ZYLOPRIM) 100 mg tablet 6/13/2019 at Unknown time Child No Yes Sig: TAKE TWO TABLETS BY MOUTH DAILY  
amiodarone (CORDARONE) 200 mg tablet 6/13/2019 at Unknown time Child No Yes Sig: Take 1 Tab by mouth two (2) times a day. aspirin 81 mg chewable tablet 6/12/2019 at Unknown time Child No Yes Sig: Take 1 Tab by mouth daily. atorvastatin (LIPITOR) 80 mg tablet 6/13/2019 at Unknown time Child No Yes Sig: TAKE ONE TABLET BY MOUTH EVERY EVENING  
camphor-methyl salicyl-menthol (SALONPAS) ptmd 6/13/2019 at Unknown time Child Yes Yes Sig: by Apply Externally route as needed. diclofenac (VOLTAREN) 1 % gel 6/13/2019 at Unknown time Child No Yes Sig: Apply 2 g to affected area four (4) times daily. ferrous sulfate 325 mg (65 mg iron) tablet 6/13/2019 at Unknown time Child No Yes Sig: Take 1 Tab by mouth daily (with breakfast). furosemide (LASIX) 40 mg tablet 6/13/2019 at Unknown time Child Yes Yes Sig: Take 80 mg by mouth Daily (before breakfast). Patient takes 80 mg with breakfast and 40 mg at lunch  
furosemide (LASIX) 40 mg tablet 6/13/2019 at Unknown time Child Yes Yes Sig: Take 40 mg by mouth daily (with lunch). Patient takes 80 mg with breakfast and 40 mg at lunch  
insulin NPH (HUMULIN N NPH INSULIN KWIKPEN) 100 unit/mL (3 mL) inpn 2019 at Unknown time Child Yes Yes Si Units by SubCUTAneous route daily. insulin NPH (HUMULIN N NPH INSULIN KWIKPEN) 100 unit/mL (3 mL) inpn 2019 at Unknown time Child Yes Yes Si Units by SubCUTAneous route every evening. levothyroxine (SYNTHROID) 125 mcg tablet 2019 at Unknown time Child No Yes Sig: TAKE ONE TABLET BY MOUTH DAILY Patient taking differently: TAKE ONE TABLET BY MOUTH DAILY BEFORE BREAKFAST  
loratadine (CLARITIN) 10 mg tablet 2019 at Unknown time Child Yes Yes Sig: Take 10 mg by mouth daily. metoprolol tartrate (LOPRESSOR) 25 mg tablet 2019 at Unknown time Child Yes Yes Sig: Take 25 mg by mouth two (2) times a day. nitroglycerin (NITROSTAT) 0.4 mg SL tablet Not Taking at Unknown time Child Yes No  
Si.4 mg by SubLINGual route every five (5) minutes as needed for Chest Pain. Up to 3 doses. pantoprazole (PROTONIX) 40 mg tablet 2019 at Unknown time Child No Yes Sig: Take 1 Tab by mouth daily. potassium chloride SR (KLOR-CON 10) 10 mEq tablet 2019 at Unknown time Child No Yes Sig: Take 1 Tab by mouth daily. rivaroxaban (XARELTO) 15 mg tab tablet 2019 at Unknown time Child No Yes Sig: Take 1 Tab by mouth daily (with dinner). Facility-Administered Medications: None The review of systems is:  negative for shortness of breath or chest pain The heart, lungs, and mental status were satisfactory for the administration of anesthesia sedation and for the procedure. I discussed with the patient the objectives, risks, consequences and alternatives to the procedure.    
 
Michelle Huang MD 
2019 
3:31 PM

## 2019-06-19 NOTE — PROCEDURES
Esophagogastroduodenoscopy Indications:  Gave Hematochezia Blood loss anemia Medications:  See anesthesia form Assistants:  Kathy Russo Rn 
 
 
Post procedure diagnosis:  GAVE; erosions proximal stomach; hiatal hernia; shatzski's ring Description of Procedure:   
Prior to the procedure its objectives, risks, consequences and alternatives were discussed with the patient who then elected to proceed. The Olympus video endoscope was inserted under direct vision into the mouth and then into the esophagus. The esophagus looked normal.    The z-line was located at 39 cm. A non obstructive ring was seen at the ge junction (acquired). A one to two cm hiatal hernia was seen. There were some patches of vascular ectasia in the distal body and antrum (about six). These were not impressive and not bleeding. There were two erosions in the upper body of the stomach, ? Biopsy related. There was no active bleeding in the stomach. There were no other diagnostic abnormalities of the body, fundus, antrum, cardia and incisura of the stomach. This included direct and retroflexion examination. The first and second portion of the duodenum appeared normal.  I used a straight fire ERBE probe setting apc stomach. I ablated all of the GAVE. Complications: There were no apparent complications and the patient tolerated the procedure well. Implants:  none Estimated Blood Loss:  none Specimens Removed:  none Impressions:  GAVE Erosions stomach Hiatal hernia 
schatzki ring--previously dilated Signed By: Michela Mera MD 
                      June 19, 2019  
  3:54 PM

## 2019-06-19 NOTE — PROGRESS NOTES
1920: Bedside and Verbal shift change report given to [employer] (oncoming nurse) by Tasha Coleman (offgoing nurse). Report included the following information SBAR, Kardex, MAR and Recent Results. 0000: VSS on RA. Will continue to monitor. 0200: The documentation for this period is being entered following the guidelines as defined in the [facility] downtime policy by Duran Dubois. 0400: AM labs drawn. Downtime Complete. 0720: Report given to Mitra SOARES.

## 2019-06-19 NOTE — PROGRESS NOTES
TRANSFER - OUT REPORT: 
 
Verbal report given to RODGER Manriquez(name) on Naida Rios  being transferred to [facility](unit) for routine progression of care Report consisted of patients Situation, Background, Assessment and  
Recommendations(SBAR). Information from the following report(s) SBAR, Procedure Summary, Intake/Output, Recent Results, Med Rec Status and Cardiac Rhythm NSR was reviewed with the receiving nurse. Lines:  
Peripheral IV 06/14/19 Left Antecubital (Active) Site Assessment Clean, dry, & intact 6/19/2019  4:31 AM  
Phlebitis Assessment 0 6/19/2019  4:31 AM  
Infiltration Assessment 0 6/19/2019  4:31 AM  
Dressing Status Clean, dry, & intact 6/19/2019  4:31 AM  
Dressing Type Tape;Transparent 6/19/2019  4:31 AM  
Hub Color/Line Status Green;Flushed 6/19/2019  4:31 AM  
Action Taken Open ports on tubing capped 6/17/2019  7:28 PM  
  
 
Opportunity for questions and clarification was provided. Patient transported with: 
Patient transporter

## 2019-06-19 NOTE — ANESTHESIA POSTPROCEDURE EVALUATION
Procedure(s): ESOPHAGOGASTRODUODENOSCOPY (EGD) ENDOSCOPIC ARGON PLASMA COAGULATION. total IV anesthesia, general 
 
Anesthesia Post Evaluation Patient location during evaluation: PACU Note status: Adequate. Level of consciousness: responsive to verbal stimuli and sleepy but conscious Pain management: satisfactory to patient Airway patency: patent Anesthetic complications: no 
Cardiovascular status: acceptable Respiratory status: acceptable Hydration status: acceptable Comments: +Post-Anesthesia Evaluation and Assessment Patient: Laith Lemus MRN: [id number]  SSN: [id number] YOB: 1935  Age: 82 y.o. Sex: female Cardiovascular Function/Vital Signs /41   Pulse 74   Temp 36.7 °C (98.1 °F)   Resp (!) 37   Ht 5' 8\" (1.727 m)   Wt 96.2 kg (212 lb 1.3 oz)   SpO2 93%   Breastfeeding? No   BMI 32.25 kg/m² Patient is status post Procedure(s) with comments: 
ESOPHAGOGASTRODUODENOSCOPY (EGD) - need erbe ENDOSCOPIC ARGON PLASMA COAGULATION. Nausea/Vomiting: Controlled. Postoperative hydration reviewed and adequate. Pain: 
Pain Scale 1: Numeric (0 - 10) (06/19/19 1613) Pain Intensity 1: 0 (06/19/19 1613) Managed. Neurological Status:  
Neuro (WDL): Within Defined Limits (06/15/19 1123) At baseline. Mental Status and Level of Consciousness: Arousable. Pulmonary Status:  
O2 Device: Room air (06/19/19 1613) Adequate oxygenation and airway patent. Complications related to anesthesia: None Post-anesthesia assessment completed. No concerns. Signed By: Sumeet Tello MD  
 6/19/2019 Post anesthesia nausea and vomiting:  controlled Vitals Value Taken Time /41 6/19/2019  4:14 PM  
Temp Pulse 74 6/19/2019  4:14 PM  
Resp 25 6/19/2019  4:14 PM  
SpO2 93 % 6/19/2019  4:14 PM  
Vitals shown include unvalidated device data.

## 2019-06-19 NOTE — PROGRESS NOTES
Problem: Mobility Impaired (Adult and Pediatric) Goal: *Acute Goals and Plan of Care (Insert Text) Description Physical Therapy Goals Initiated 6/18/2019 1. Patient will move from supine to sit and sit to supine , scoot up and down and roll side to side in bed with independence within 7 day(s). 2.  Patient will transfer from bed to chair and chair to bed with modified independence using the least restrictive device within 7 day(s). 3.  Patient will perform sit to stand with modified independence within 7 day(s). 4.  Patient will ambulate with modified independence for 150 feet with the least restrictive device within 7 day(s). Outcome: Progressing Towards Goal 
 PHYSICAL THERAPY TREATMENT Patient: Tillman Blizzard (40 y.o. female) Date: 6/19/2019 Diagnosis: GIB (gastrointestinal bleeding) [K92.2] <principal problem not specified> Procedure(s) (LRB): 
CAPSULE (N/A) 1 Day Post-Op Precautions:   
Chart, physical therapy assessment, plan of care and goals were reviewed. ASSESSMENT: 
Pt received seated in bedside chair, agreeable to participation with therapy. Pt with significantly improved BLE strength, balance, gait stability, and overall functional mobility this date however remains limited by LEIJA and impaired activity tolerance. Pt able to progress ambulation trial to a distance of 160ft w/ RW and SB/CGAx1 before gait. Gait speed decreased however steady overall with RW support. Pt c/o increased SOB and dizziness at end of ambulation trial however all VSS on RA. Continue to recommend [place] PT w/ 24hr supervision/assist at discharge. Progression toward goals: 
?    Improving appropriately and progressing toward goals ? Improving slowly and progressing toward goals ? Not making progress toward goals and plan of care will be adjusted PLAN: 
Patient continues to benefit from skilled intervention to address the above impairments. Continue treatment per established plan of care. Discharge Recommendations:  Home Health w/ 24hr supervision Further Equipment Recommendations for Discharge:  None, owns necessary equipment SUBJECTIVE:  
Patient stated ? I feel much better today! .? OBJECTIVE DATA SUMMARY:  
Critical Behavior: 
Neurologic State: Alert, Appropriate for age Orientation Level: Oriented X4 Cognition: Follows commands, Appropriate decision making Safety/Judgement: Awareness of environment Functional Mobility Training: 
Bed Mobility: 
  
  
  
  
  
  
Transfers: 
Sit to Stand: Stand-by assistance Stand to Sit: Stand-by assistance Balance: 
Sitting: Intact Standing: Impaired; With support Standing - Static: Good;Constant support Standing - Dynamic : Good;Constant support Ambulation/Gait Training: 
Distance (ft): 160 Feet (ft) Assistive Device: Walker, rolling;Gait belt Ambulation - Level of Assistance: Stand-by assistance;Contact guard assistance Gait Abnormalities: Decreased step clearance Speed/Melany: Pace decreased (<100 feet/min) Step Length: Left shortened;Right shortened Pain: 
Pain Scale 1: Numeric (0 - 10) Pain Intensity 1: 0 Activity Tolerance: VSS on RA however pt limited by LEIJA Please refer to the flowsheet for vital signs taken during this treatment. After treatment:  
?    Patient left in no apparent distress sitting up in chair ? Patient left in no apparent distress in bed 
? Call bell left within reach ? Nursing notified ? Caregiver present ? Bed alarm activated COMMUNICATION/COLLABORATION:  
The patient?s plan of care was discussed with: Registered Nurse Gretchen Blair PT, DPT Time Calculation: 15 mins

## 2019-06-19 NOTE — ROUTINE PROCESS
Anastacia Jansen 1935 
[id number] Situation: 
Verbal report received from: Eusebio Coley RN Procedure: Procedure(s) with comments: 
ESOPHAGOGASTRODUODENOSCOPY (EGD) - need erbe ENDOSCOPIC ARGON PLASMA COAGULATION Background: 
 
Preoperative diagnosis: GAVE (gastric antral vascular ectasia) [K31.819] Postoperative diagnosis: GAVE; erosions proximal stomach; hiatal hernia; shatzski's ring :  Dr. Tamica Manriquez Assistant(s): Endoscopy Technician-1: Mesfin Melo Endoscopy RN-1: Li Morse RN Specimens: * No specimens in log * H. Pylori  no Assessment: 
Intra-procedure medications Anesthesia gave intra-procedure sedation and medications, see anesthesia flow sheet Intravenous fluids: NS@ Cordelia Cyndie Vital signs stable Abdominal assessment: round and soft Recommendation:. Return to floor Family or Friend Permission to share finding with family or friend

## 2019-06-19 NOTE — PROGRESS NOTES
Hospitalist Progress Note NAME: Marc Styles :  1935 MRN:  [id number] Assessment / Plan: 
Acute on chronic blood loss anemia POA Hematochezia Suspect Acute Gastrointestinal bleed GAVE (Gastric Antral Vascular Ectasia) On anticoagulation/thrombocytopenia - appreciate GI input; schedule to have EGD/ERBE today 
-s/p EGD 6/15: Successful dilation of Schatzki ring to 18mm (necessary to facilitate capsule swallowing if capsule performed) Erosive gastritis (versus gave). 3 cm hiatal hernia 
  s/p colonoscopy : revealed polyps Capsule study done; result pending Received 1 unit of PRBC for hgb 6. 8. hgb 8.6 today. No s/sx of bleeding at this time 
  holding Xarelto 3 days after today's procedure Hypokalemia 
- resolved Hx mitral stenosis - s/p bioprosthetic MVR 2019 
  on xarelto and asa, holding both in setting of acute GIB 
  
Mild JULIANA CKD stage III 
- Cr 2.06 POA, trending down 1.4 Avoid nephrotoxic agents Chronic Afib 
- hold xarelto as above Rate reasonably controlled Continue with Amio 
  
Chronic dCHF 
- does not appear vol overloaded - CXR neg for acute pathology 
-  Continue with lasix and BB 
    Weight 212 lbs today vs 203 lbs on admission (most likely secondary to hydration and holding lasix during hydration period) Iron deficiency 
- follows with heme/onc Continue with ferrous sulfate  
  
Hypothyroid 
- cont synthroid 
  
GERD 
- on IV PPI and carafate 
  
HLD/HTN 
- continue with statin & BB 
  
PVD 
  
Code Status: DNR Surrogate Decision Maker: daughter 
  
DVT Prophylaxis: SCDs GI Prophylaxis: IV PPI 
  
Baseline: functional, lives alone, daughter lives locally Recommended Disposition: PT with 24 hour assist  
 
Subjective: Chief Complaint / Reason for Physician Visit \"I feel much better today\"  Discussed with RN events overnight. Review of Systems: 
Symptom Y/N Comments  Symptom Y/N Comments Fever/Chills n   Chest Pain Poor Appetite    Edema n   
Cough n   Abdominal Pain n   
Sputum    Joint Pain SOB/LEIJA n   Pruritis/Rash n   
Nausea/vomit n   Tolerating PT/OT y Diarrhea    Tolerating Diet y Constipation    Other Could NOT obtain due to:   
 
Objective: VITALS:  
Last 24hrs VS reviewed since prior progress note. Most recent are: 
Patient Vitals for the past 24 hrs: 
 Temp Pulse Resp BP SpO2  
06/19/19 0743 98.4 °F (36.9 °C) 72 18 115/50   
06/19/19 0431 98.2 °F (36.8 °C) 69 18 120/62 94 % 06/18/19 2320 98.4 °F (36.9 °C) 79 20 110/50 94 % 06/18/19 1951 98.1 °F (36.7 °C) 87 20 119/55 93 % 06/18/19 1557    146/53   
06/18/19 1553    90/66   
06/18/19 1552  97     
06/18/19 1549  97  143/64   
06/18/19 1530  88  148/52   
06/18/19 1500    149/54   
06/18/19 1459  92   90 % 06/18/19 1430    147/56   
06/18/19 1429  90   94 % 06/18/19 1400    125/90   
06/18/19 1359  89   90 % 06/18/19 1358  87   90 % 06/18/19 1332 98 °F (36.7 °C) 94 20 151/56 92 % 06/18/19 1330    151/56   
06/18/19 1329  96   91 % 06/18/19 1327  94   91 % 06/18/19 1300  87  149/58 91 % 06/18/19 1231  89   92 % 06/18/19 1230    150/57   
06/18/19 1200  89  137/52 95 % 06/18/19 1130    142/53   
06/18/19 1129  89   92 % 06/18/19 1123 98.5 °F (36.9 °C) 86 20 143/49 92 % 06/18/19 1100  90  143/49 91 % 06/18/19 1039    144/53   
06/18/19 1038  91   94 % No intake or output data in the 24 hours ending 06/19/19 1037 PHYSICAL EXAM: 
General: Alert, cooperative, no acute distress   
EENT:  EOMI. MMM Resp:  CTA bilaterally,No accessory muscle use CV:  Regular  Rhythm, 3+ edema GI:  Soft, Non distended, Non tender.   
Neurologic:  Alert and oriented, normal speech, CN 2-12 grossly intact Psych:   Not anxious nor agitated Skin:  No rashes. No jaundice Reviewed most current lab test results and cultures  YES 
 Reviewed most current radiology test results   YES Review and summation of old records today    NO Reviewed patient's current orders and MAR    YES 
PMH/SH reviewed - no change compared to H&P 
________________________________________________________________________ Care Plan discussed with: 
  Comments Patient y Family RN y   
Care Manager Consultant Multidiciplinary team rounds were held today with , nursing, pharmacist and clinical coordinator. Patient's plan of care was discussed; medications were reviewed and discharge planning was addressed. ________________________________________________________________________ 
__________________________________________________________________ Jennifer Hassan NP Procedures: see electronic medical records for all procedures/Xrays and details which were not copied into this note but were reviewed prior to creation of Plan. LABS: 
I reviewed today's most current labs and imaging studies. Pertinent labs include: 
Recent Labs  
  06/19/19 0459 06/18/19 1735 06/18/19 0349 WBC 5.7  --   --   
HGB 8.6* 8.9* 6.8* HCT 28.1* 27.4* 21.6*  
*  --   --   
 
Recent Labs  
  06/19/19 0459 06/18/19 1735 06/18/19 0349  140 143  
K 3.9 3.9 3.3*  
 108 110* CO2 27 22 27 * 140* 138* BUN 12 9 10 CREA 1.47* 1.39* 1.34* CA 8.3* 8.3* 8.6 Signed: Yuriy Ulloa, JOSE DE JESUS

## 2019-06-19 NOTE — PROGRESS NOTES
Occupational Therapy EVALUATION/discharge Patient: Shahana Michel (46 y.o. female) Date: 6/19/2019 Primary Diagnosis: GIB (gastrointestinal bleeding) [K92.2] Procedure(s) (LRB): 
CAPSULE (N/A) 1 Day Post-Op Precautions:    
 
ASSESSMENT:  
Based on the objective data described below, the patient presents just below her independent baseline with performing her dynamic ADL routine at hands-free level, now requiring RW for mobility. Recent MVR in April 2019 and [facility]. Vitals stable throughout session. No SOB/fatigue observed. UE ROM/strength are functional. Pt doffed/donned B socks independently seated in chair. Pt completed chair transfer with SBA and toilet transfer with CGA for cueing to use grab bar vs pulling up on RW. Pt is currently completing ADLs with Mod I using RW for standing aspects, with exception of requiring CGA this session for toilet transfer only. Pts main limitation is decreased dynamic standing balance and increased dependence on RW, which can be addressed through skilled acute PT. Recommend resuming  PT at discharge. Further skilled acute occupational therapy is not indicated at this time. Discharge Recommendations: None for OT Further Equipment Recommendations for Discharge: None for OT SUBJECTIVE:  
Patient stated I think I am doing pretty well\" OBJECTIVE DATA SUMMARY:  
HISTORY:  
Past Medical History:  
Diagnosis Date  Arthritis KNEES  Atrial fibrillation ([address] 10/29/2009  Bladder cancer ([address]  CAD (coronary artery disease) STENT PER PATIENT  Chronic pain KNEES  
 Coagulation disorder ([address] Chronic prophylactic anticoagulation med  Colon polyps  Diabetes ([address] IDDM  GERD (gastroesophageal reflux disease)  Gout  Heart valve problem   
 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  
 @ [facility] >> atrial fibrillation  SOB (shortness of breath) on exertion 04/2019  
 T. I.A. 4/23/2009  TIA (transient ischemic attack)  Ulcer of right lower extremity, limited to breakdown of skin ([facility] 7/5/2018 Past Surgical History:  
Procedure Laterality Date  CARDIAC SURG PROCEDURE UNLIST    
 ablation  COLONOSCOPY N/A 2/20/2019 COLONOSCOPY performed by Sondra Crocker MD at [facility] ENDOSCOPY  COLONOSCOPY N/A 6/17/2019 COLONOSCOPY performed by Sondra Crocker MD at [facility] ENDOSCOPY  COLONOSCOPY N/A 6/15/2019 COLONOSCOPY performed by Sondra Crocker MD at [address] [place] [address]  2/20/2019  COLONOSCOPY,ISI HATFIELD,SNARE  6/17/2019  HX APPENDECTOMY  HX CHOLECYSTECTOMY  HX HYSTERECTOMY  HX KNEE ARTHROSCOPY  [id number]  
 right knee  HX ORTHOPAEDIC    
 HX UROLOGICAL RENAL STENT, tur-b  
 [facility]  6/15/2019  UPPER GI ENDOSCOPY,KARY ROSA,30MM  6/15/2019  VASCULAR SURGERY PROCEDURE UNLIST  11/4  
 removed vein in right leg Prior Level of Function/Environment/Context: Lives alone in one story home. Dtr lives less than 10 minutes away if she ever needs assistance. Independent with ADL/IADL tasks in home at baseline w/o AD; uses SPC PRN at night when walking to bathroom. Uses RW for community distance mobility. Drives short distances. Cooks simple meals and performs light housekeeping tasks. Denies Hx of falls and home O2 use. Home Situation Home Environment: Private residence # Steps to Enter: 0 Wheelchair Ramp: No 
One/Two Story Residence: One story Living Alone: Yes Support Systems: Child(veronica) Patient Expects to be Discharged to[de-identified] Private residence Current DME Used/Available at Home: [employer] Gelineau, straight, Walker, rolling, Walker, rollator, Grab bars Tub or Shower Type: Shower(built in seat) Hand dominance: Right EXAMINATION OF PERFORMANCE DEFICITS: 
Cognitive/Behavioral Status: 
Neurologic State: Alert; Appropriate for age Orientation Level: Oriented X4 Cognition: Follows commands; Appropriate decision making Perception: Appears intact Perseveration: No perseveration noted Safety/Judgement: Awareness of environment Skin: Intact Edema: None Hearing: Auditory Auditory Impairment: None Hearing Aids/Status: Does not own Vision/Perceptual:   
 
Acuity: Within Defined Limits;Able to read clock/calendar on wall without difficulty Corrective Lenses: Reading glasses Range of Motion: 
AROM: Generally decreased, functional(BLEs; BUEs WFL) Strength: 
Strength: Generally decreased, functional 
  
  
  
  
 
Coordination: 
Coordination: Within functional limits Fine Motor Skills-Upper: Left Intact; Right Intact Gross Motor Skills-Upper: Left Intact; Right Intact Tone & Sensation: 
Tone: Normal 
Sensation: Intact Balance: 
Sitting: Intact Standing: Impaired; Without support Standing - Static: Good;Constant support Standing - Dynamic : Fair;Constant support Functional Mobility and Transfers for ADLs:Bed Mobility: 
  
 
Transfers: 
Sit to Stand: Stand-by assistance(Supervision from chair; SBA w/ toilet transfer) Stand to Sit: Stand-by assistance Toilet Transfer : Contact guard assistance(w/ verbal cueing to pull grab bar vs RW) ADL Assessment: 
Feeding: Independent Oral Facial Hygiene/Grooming: Independent Bathing: Independent Upper Body Dressing: Independent Lower Body Dressing: Independent Toileting: Contact guard assistance(with toilet transfer only) ADL Intervention and task modifications: 
 
Lower Body Dressing Assistance Socks: Independent Position Performed: Seated in chair(can bring B feet to knees) Cognitive Retraining Safety/Judgement: Awareness of environment Functional Measure: 
Barthel Index: Bathin Bladder: 10 Bowels: 10 
Groomin Dressing: 10 Feeding: 10 Mobility: 5 Stairs: 5 Toilet Use: 5 Transfer (Bed to Chair and Back): 10 Total: 75/100 Percentage of impairment  
0% 1-19% 20-39% 40-59% 60-79% 80-99% 100% Barthel Score 0-100 100 99-80 79-60 59-40 20-39 1-19 
 0 The Barthel ADL Index: Guidelines 1. The index should be used as a record of what a patient does, not as a record of what a patient could do. 2. The main aim is to establish degree of independence from any help, physical or verbal, however minor and for whatever reason. 3. The need for supervision renders the patient not independent. 4. A patient's performance should be established using the best available evidence. Asking the patient, friends/relatives and nurses are the usual sources, but direct observation and common sense are also important. However direct testing is not needed. 5. Usually the patient's performance over the preceding 24-48 hours is important, but occasionally longer periods will be relevant. 6. Middle categories imply that the patient supplies over 50 per cent of the effort. 7. Use of aids to be independent is allowed. Patricia Gaines., Barthel, D.W. (4117). Functional evaluation: the Barthel Index. [facility] (14)2. ALEJANDRO Lyn, Alyssia Jung., Radha Saucedo.[facility], [address] [place] [address] (). Measuring the change indisability after inpatient rehabilitation; comparison of the responsiveness of the Barthel Index and Functional [place] Measure. Journal of Neurology, Neurosurgery, and Psychiatry, 66(4), 499-638. Megha Caldwell N.J.A, RASHAD Wright, & David Phipps M.A. (2004.) Assessment of post-stroke quality of life in cost-effectiveness studies: The usefulness of the Barthel Index and the EuroQoL-5D. [facility], 13, 323-33 Occupational Therapy Evaluation Charge Determination History Examination Decision-Making LOW Complexity : Brief history review  LOW Complexity : 1-3 performance deficits relating to physical, cognitive , or psychosocial skils that result in activity limitations and / or participation restrictions  LOW Complexity : No comorbidities that affect functional and no verbal or physical assistance needed to complete eval tasks Based on the above components, the patient evaluation is determined to be of the following complexity level: LOW Activity Tolerance:  
See assessment above. Please refer to the flowsheet for vital signs taken during this treatment. After treatment:  
[x]  Patient left in no apparent distress sitting up in chair 
[]  Patient left in no apparent distress in bed 
[x]  Call bell left within reach [x]  Nursing notified 
[]  Caregiver present 
[]  Bed alarm activated COMMUNICATION/EDUCATION:  
Communication/Collaboration: 
[x]      Home safety education was provided and the patient/caregiver indicated understanding. [x]      Patient/family have participated as able and agree with findings and recommendations. []      Patient is unable to participate in plan of care at this time. Findings and recommendations were discussed with: Registered Nurse and Patient [facility], OTR/L Time Calculation: 15 mins

## 2019-06-19 NOTE — PROGRESS NOTES
F/U for polyps Gave 
anemia S: Ms. Shaneka Izquierdo was seen by me today during rounds. At this time, she is resting + comfortably. More energy. No bleeding. The patient has nonew complaints today. Please see admission consult for details of ROS; there are no changes today. O: Blood pressure 117/50, pulse 74, temperature 98 °F (36.7 °C), resp. rate 18, height 5' 8\" (1.727 m), weight 96.2 kg (212 lb 1.3 oz), SpO2 95 %, not currently breastfeeding. Gen: Patient is in no acute distress. There is no jaundice. Lungs: Clear to auscultation bilaterally . Heart +: RRR. Abd: Soft, non tender, non-distended, bowel sounds present. Extremities: Warm. Lab Results Component Value Date/Time WBC 5.7 06/19/2019 04:59 AM  
 Hemoglobin (POC) 8.8 06/13/2019 10:25 AM  
 Hemoglobin (POC) 10.5 (L) 07/06/2010 10:11 PM  
 HGB 8.6 (L) 06/19/2019 04:59 AM  
 Hematocrit (POC) 31 (L) 07/06/2010 10:11 PM  
 HCT 28.1 (L) 06/19/2019 04:59 AM  
 PLATELET 405 (L) 06/58/4826 04:59 AM  
 MCV 97.6 06/19/2019 04:59 AM  
 
 
 
Colon polyp biopsy: 1. Colon, descending colon polyp, polypectomy:  
Serrated polyp with features of sessile serrated adenoma. 2. Rectum, polyp, polypectomy: Hyperplastic Cross sectional imaging A: Active Problems: 
  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   
     Gastric, biopsy:  
    Mild capillary ectasia and congestion, compatible with gastric antral  
    vascular ectasia (GAVE), negative for background gastritis. One fragment suggestive of hyperplastic polyp Comment:   Doing bettter Nice response to one unit of blood. She feels better GAVE has been treated This and the polyp are the suspected sources assuming capsule negative P. I will read capsule today or tomorrow Dc plans per Ms Anil Courts Ok to re start anticoagulatns once in 48 hours if capsule results known and negative. . 
Depending on her healt consider repeat colonsocopy three year.    
Jasmyne Yan MD 
6/19/2019 
:

## 2019-06-19 NOTE — PROGRESS NOTES
Anesthesia reports 220 mg Propofol, 20 mg Lidocaine and 400 ml of Normal Saline were given during procedure. Received report from anesthesia staff on vital signs and status of patient.

## 2019-06-20 VITALS
SYSTOLIC BLOOD PRESSURE: 130 MMHG | OXYGEN SATURATION: 98 % | WEIGHT: 211.86 LBS | HEIGHT: 68 IN | BODY MASS INDEX: 32.11 KG/M2 | DIASTOLIC BLOOD PRESSURE: 48 MMHG | TEMPERATURE: 97.8 F | HEART RATE: 62 BPM | RESPIRATION RATE: 16 BRPM

## 2019-06-20 LAB
ANION GAP SERPL CALC-SCNC: 6 MMOL/L (ref 5–15)
BASOPHILS # BLD: 0 K/UL (ref 0–0.1)
BASOPHILS NFR BLD: 1 % (ref 0–1)
BUN SERPL-MCNC: 15 MG/DL (ref 6–20)
BUN/CREAT SERPL: 10 (ref 12–20)
CALCIUM SERPL-MCNC: 8.3 MG/DL (ref 8.5–10.1)
CHLORIDE SERPL-SCNC: 111 MMOL/L (ref 97–108)
CO2 SERPL-SCNC: 25 MMOL/L (ref 21–32)
CREAT SERPL-MCNC: 1.51 MG/DL (ref 0.55–1.02)
DIFFERENTIAL METHOD BLD: ABNORMAL
EOSINOPHIL # BLD: 0.2 K/UL (ref 0–0.4)
EOSINOPHIL NFR BLD: 4 % (ref 0–7)
ERYTHROCYTE [DISTWIDTH] IN BLOOD BY AUTOMATED COUNT: 17.1 % (ref 11.5–14.5)
GLUCOSE BLD STRIP.AUTO-MCNC: 138 MG/DL (ref 65–100)
GLUCOSE BLD STRIP.AUTO-MCNC: 229 MG/DL (ref 65–100)
GLUCOSE SERPL-MCNC: 144 MG/DL (ref 65–100)
HCT VFR BLD AUTO: 27.7 % (ref 35–47)
HGB BLD-MCNC: 8.5 G/DL (ref 11.5–16)
IMM GRANULOCYTES # BLD AUTO: 0 K/UL (ref 0–0.04)
IMM GRANULOCYTES NFR BLD AUTO: 0 % (ref 0–0.5)
LYMPHOCYTES # BLD: 0.7 K/UL (ref 0.8–3.5)
LYMPHOCYTES NFR BLD: 18 % (ref 12–49)
MCH RBC QN AUTO: 30.1 PG (ref 26–34)
MCHC RBC AUTO-ENTMCNC: 30.7 G/DL (ref 30–36.5)
MCV RBC AUTO: 98.2 FL (ref 80–99)
MONOCYTES # BLD: 0.3 K/UL (ref 0–1)
MONOCYTES NFR BLD: 7 % (ref 5–13)
NEUTS SEG # BLD: 2.7 K/UL (ref 1.8–8)
NEUTS SEG NFR BLD: 71 % (ref 32–75)
NRBC # BLD: 0 K/UL (ref 0–0.01)
NRBC BLD-RTO: 0 PER 100 WBC
PLATELET # BLD AUTO: 94 K/UL (ref 150–400)
PMV BLD AUTO: 12.6 FL (ref 8.9–12.9)
POTASSIUM SERPL-SCNC: 3.6 MMOL/L (ref 3.5–5.1)
RBC # BLD AUTO: 2.82 M/UL (ref 3.8–5.2)
SERVICE CMNT-IMP: ABNORMAL
SERVICE CMNT-IMP: ABNORMAL
SODIUM SERPL-SCNC: 142 MMOL/L (ref 136–145)
WBC # BLD AUTO: 3.8 K/UL (ref 3.6–11)

## 2019-06-20 PROCEDURE — 80048 BASIC METABOLIC PNL TOTAL CA: CPT

## 2019-06-20 PROCEDURE — 74011250637 HC RX REV CODE- 250/637: Performed by: HOSPITALIST

## 2019-06-20 PROCEDURE — 74011250637 HC RX REV CODE- 250/637: Performed by: NURSE PRACTITIONER

## 2019-06-20 PROCEDURE — 74011636637 HC RX REV CODE- 636/637: Performed by: HOSPITALIST

## 2019-06-20 PROCEDURE — 74011250637 HC RX REV CODE- 250/637: Performed by: INTERNAL MEDICINE

## 2019-06-20 PROCEDURE — 85025 COMPLETE CBC W/AUTO DIFF WBC: CPT

## 2019-06-20 PROCEDURE — 36415 COLL VENOUS BLD VENIPUNCTURE: CPT

## 2019-06-20 PROCEDURE — 82962 GLUCOSE BLOOD TEST: CPT

## 2019-06-20 RX ORDER — SUCRALFATE 1 G/1
1 TABLET ORAL
Qty: 56 TAB | Refills: 0 | Status: SHIPPED | OUTPATIENT
Start: 2019-06-15 | End: 2019-06-29

## 2019-06-20 RX ADMIN — PANTOPRAZOLE SODIUM 40 MG: 40 TABLET, DELAYED RELEASE ORAL at 08:39

## 2019-06-20 RX ADMIN — SUCRALFATE 1 G: 1 TABLET ORAL at 11:52

## 2019-06-20 RX ADMIN — INSULIN LISPRO 2 UNITS: 100 INJECTION, SOLUTION INTRAVENOUS; SUBCUTANEOUS at 11:52

## 2019-06-20 RX ADMIN — SUCRALFATE 1 G: 1 TABLET ORAL at 08:39

## 2019-06-20 RX ADMIN — AMIODARONE HYDROCHLORIDE 200 MG: 200 TABLET ORAL at 08:39

## 2019-06-20 RX ADMIN — FUROSEMIDE 40 MG: 40 TABLET ORAL at 08:39

## 2019-06-20 RX ADMIN — FERROUS SULFATE TAB 325 MG (65 MG ELEMENTAL FE) 325 MG: 325 (65 FE) TAB at 08:38

## 2019-06-20 RX ADMIN — LEVOTHYROXINE SODIUM 125 MCG: 125 TABLET ORAL at 05:19

## 2019-06-20 RX ADMIN — Medication 10 ML: at 05:19

## 2019-06-20 RX ADMIN — METOPROLOL TARTRATE 25 MG: 25 TABLET ORAL at 08:38

## 2019-06-20 NOTE — PROGRESS NOTES
1915: Bedside and Verbal shift change report given to [employer] (oncoming nurse) by Emre Falcon (offgoing nurse). Report included the following information SBAR, Kardex, MAR and Recent Results. 0000: VSS on RA. Will continue to monitor. 0500: AM labs drawn. 0725: Report given to [facility] FRANCISCO SOARES.

## 2019-06-20 NOTE — PROGRESS NOTES
CM completed room visit with pt and daughter by bedside. Pt reported that she is currently active with [facility]. CM sent resumption orders to [facility] agency. CM informed pt of 2nd  Medicare letter (signed) placed in chart. Pt daughter will provide transport home on today. Pt's nurse will review d/c paperwork. CM completed the needs of the needs of the pt at this time. Care Management Interventions PCP Verified by CM: Yes Mode of Transport at Discharge: Other (see comment) Transition of Care Consult (CM Consult): Home Health([facility] ) [facility] - INPATIENT: No 
Reason Outside [place]: Patient already serviced by other home care/hospice agency Discharge Durable Medical Equipment: No 
Physical Therapy Consult: Yes Occupational Therapy Consult: Yes Speech Therapy Consult: No 
Current Support Network: Lives Alone, Own Home Confirm Follow Up Transport: Family Plan discussed with Pt/Family/Caregiver: Yes Discharge Location Discharge Placement: Home with home health([facility] ) RAHEEM Montez, [address] [facility]

## 2019-06-20 NOTE — PROGRESS NOTES
Problem: Upper and Lower GI Bleed:  Discharge Outcomes Goal: *Hemodynamically stable Outcome: Progressing Towards Goal 
Goal: *Lungs clear or at baseline Outcome: Progressing Towards Goal 
Goal: *Demonstrates independent activity or return to baseline Outcome: Progressing Towards Goal 
Goal: *Pain is controlled to three or less Outcome: Progressing Towards Goal 
Goal: *Verbalizes understanding and describes prescribed diet Outcome: Progressing Towards Goal 
Goal: *Tolerating diet Outcome: Progressing Towards Goal 
  
Problem: Falls - Risk of 
Goal: *Absence of Falls Description Document Melly Stevens Fall Risk and appropriate interventions in the flowsheet.  
Outcome: Progressing Towards Goal

## 2019-06-20 NOTE — DISCHARGE INSTRUCTIONS
Patient Discharge Instructions     Pt Name  Tom Dyer   Date of Birth 1935   Age  82 y.o. Medical Record Number  [id number]   PCP Lucrecia Merida MD    Admit date:  6/14/2019 @    [address] [place] [address]    Room Number  2271/01   Date of Discharge 6/20/2019     Admission Diagnoses:     GIB (gastrointestinal bleeding)          Allergies   Allergen Reactions    Actos [Pioglitazone] Swelling     Swelling of feet and legs    Codeine Itching    Hydrocodone Rash and Other (comments)     hallucinations        You were admitted to [address] [place] [address] for  GIB (gastrointestinal bleeding)    YOUR OTHER MEDICAL DIAGNOSES INCLUDE (BUT NOT LIMITED TO ):  Present on Admission:   GIB (gastrointestinal bleeding)   Melena   Rectal bleeding   Long term current use of anticoagulant therapy   Lower abdominal pain   GAVE (gastric antral vascular ectasia)   Chronic atrial fibrillation (HCC)   GI bleeding      DIET:  Diabetic Diet     Recommended activity: Activity as tolerated  Follow up : Follow-up Information     Follow up With Specialties Details Why Blaine Mckinley MD Gastroenterology Schedule an appointment as soon as possible for a visit 2 weeks [address]  [phone]      Lucrecia Merida MD Internal Medicine   [address]  [place] [address]  [facility].  [phone]      Radha Reilly DO Cardiology Schedule an appointment as soon as possible for a visit 2 weeks [address]  [address]  [address] [phone]            Resume Xarelto on 6/23/2019. Daily weights: Notify MD for a weight gain of 3 pounds or greater in one day or 5 pounds in one week. Weight upon discharge 211lbs        · It is important that you take the medication exactly as they are prescribed.    · Keep your medication in the bottles provided by the pharmacist and keep a list of the medication names, dosages, and times to be taken in your wallet. · Do not take other medications without consulting your doctor. ADDITIONAL INFORMATION: If you experience any of the following symptoms or have any health problem not listed below, then please call your primary care physician or return to the emergency room if you cannot get hold of your doctor: Fever, chills, nausea, vomiting, diarrhea, change in mentation, falling, bleeding, shortness of breath. I understand that if any problems occur once I am discharged, I am supposed to call my Primary care physician for further care or seek help in the Emergency Department at the nearest Healthcare facility. I have had an opportunity to discuss my clinical issues with my doctor and nursing staff. I understand and acknowledge receipt of the above instructions.                                                                                                                                            Physician's or R.N.'s Signature                                                            Date/Time                                                                                                                                              Patient or Representative Signature                                                 Date/Time

## 2019-06-20 NOTE — DISCHARGE SUMMARY
Hospitalist Discharge Summary Patient ID: Kashif Jacobs [id number] 
32 y.o. 
1935 PCP on record: Shira España MD 
 
Admit date: 6/14/2019 Discharge date and time: 6/20/2019 DISCHARGE DIAGNOSIS: 
Acute on chronic blood loss anemia POA Hematochezia Suspect Acute Gastrointestinal bleed GAVE (Gastric Antral Vascular Ectasia) On anticoagulation/thrombocytopenia Hypokalemia Hx mitral stenosis Mild JULIANA CKD stage III Chronic Afib Chronic dCHF Iron deficiency Hypothyroid GERD 
HLD/HTN 
PVD 
 
CONSULTATIONS: 
IP CONSULT TO GASTROENTEROLOGY 
IP CONSULT TO HOSPITALIST Excerpted HPI from H&P of Aakash Blake DO: 
Leigha Krause is a 82 y.o. female with complaint of dark blood per rectum for the past 2 days. The patient has PMHx of mitral stenosis s/p MVR 4/2019, chronic Afib, dCHF, HTN, HLD, PVD, Hypothryoidism, and GERD who presents with the above complaint accompanied by mild b/l lower quadrant abd pain that patient states is \"like menstrual pain when I used to have it\". She denies hematuria or hematemesis, also denies N/V. Per patient some of the blood is mixed with stool but she also reports bleeding per rectum between BMs. Pt does also admit to feeling dizzy upon standing. 
 
 
 
______________________________________________________________________ DISCHARGE SUMMARY/HOSPITAL COURSE:  for full details see H&P, daily progress notes, labs, consult notes. Acute on chronic blood loss anemia POA Hematochezia Suspect Acute Gastrointestinal bleed GAVE (Gastric Antral Vascular Ectasia) On anticoagulation/thrombocytopenia - pt will follow up with Dr. Ronnie Verduzco as outpaitent 
-s/p EGD 6/15: Successful dilation of Schatzki ring to 18mm (necessary to facilitate capsule swallowing if capsule performed) Erosive gastritis (versus gave). 3 cm hiatal hernia 
  s/p colonoscopy 6/17: revealed polyps Repeat EGD 6/19 revealed GAVE; erosions proximal stomach; hiatal hernia; shatzski's ring Capsule study done; result pending Received 1 unit of PRBC for hgb 6.8. No s/sx of bleeding at this time. hgb 8.5 upon discharge May resume Xarelto on 6/23/2019; 3 days after procedure 
  
Hypokalemia 
- resolved 
  
Hx mitral stenosis - s/p bioprosthetic MVR 4/2019 May resume xarelton on 6/23/2019 per GI. Baby ASA has been d/c'd during this admission. Advised the pt to follow up with her pcp 
  
Mild JULIANA CKD stage III 
- Cr 2.06 POA, trending down 1.5 Avoid nephrotoxic agents 
  
Chronic Afib 
- hold xarelto as above Rate reasonably controlled Continue with Amio 
  
Chronic dCHF 
- does not appear vol overloaded - CXR neg for acute pathology 
- Continue with lasix and BB Daily weights: Notify MD for a weight gain of 3 pounds or greater in one day or 5 pounds in one week. Weight upon discharge 211lbs 
  Iron deficiency 
- continue to follow with heme/onc as outpt Continue with ferrous sulfate  
  
Hypothyroid 
- cont synthroid 
  
GERD 
- continue with PPI and carafate (2 weeks RX given) 
  
HLD/HTN 
- continue with statin & BB 
  
PVD 
 
 
 
 
_______________________________________________________________________ Patient seen and examined by me on discharge day. Pertinent Findings: 
Gen:    Not in distress Chest: Clear lungs CVS:   Regular rhythm. 3+ edema (no increase of swelling, about same as on admission) Abd:  Soft, not distended, not tender Neuro:  Alert, orient x 4 
_______________________________________________________________________ DISCHARGE MEDICATIONS:  
Discharge Medication List as of 6/20/2019 11:46 AM  
  
START taking these medications Details  
sucralfate (CARAFATE) 1 gram tablet Take 1 Tab by mouth Before breakfast, lunch, dinner and at bedtime for 14 days. , Print, Disp-56 Tab, R-0  
  
  
CONTINUE these medications which have CHANGED Details rivaroxaban (XARELTO) 15 mg tab tablet Take 1 Tab by mouth daily (with dinner). Start taking 6/23/2019, No Print, Disp-30 Tab, R-0  
  
  
CONTINUE these medications which have NOT CHANGED Details  
!! furosemide (LASIX) 40 mg tablet Take 80 mg by mouth Daily (before breakfast). Patient takes 80 mg with breakfast and 40 mg at lunch, Historical Med  
  
!! furosemide (LASIX) 40 mg tablet Take 40 mg by mouth daily (with lunch). Patient takes 80 mg with breakfast and 40 mg at lunch, Historical Med  
  
metoprolol tartrate (LOPRESSOR) 25 mg tablet Take 25 mg by mouth two (2) times a day., Historical Med  
  
!! insulin NPH (HUMULIN N NPH INSULIN KWIKPEN) 100 unit/mL (3 mL) inpn 24 Units by SubCUTAneous route daily. , Historical Med  
  
!! insulin NPH (HUMULIN N NPH INSULIN KWIKPEN) 100 unit/mL (3 mL) inpn 8 Units by SubCUTAneous route every evening., Historical Med  
  
pantoprazole (PROTONIX) 40 mg tablet Take 1 Tab by mouth daily. , Normal, Disp-90 Tab, R-3  
  
amiodarone (CORDARONE) 200 mg tablet Take 1 Tab by mouth two (2) times a day., Normal, Disp-180 Tab, R-0  
  
diclofenac (VOLTAREN) 1 % gel Apply 2 g to affected area four (4) times daily. , Normal, Disp-100 g, R-2  
  
camphor-methyl salicyl-menthol (SALONPAS) ptmd by Apply Externally route as needed., Historical Med  
  
loratadine (CLARITIN) 10 mg tablet Take 10 mg by mouth daily. , Historical Med  
  
nitroglycerin (NITROSTAT) 0.4 mg SL tablet 0.4 mg by SubLINGual route every five (5) minutes as needed for Chest Pain. Up to 3 doses. , Historical Med  
  
ferrous sulfate 325 mg (65 mg iron) tablet Take 1 Tab by mouth daily (with breakfast). , No Print, Disp-30 Tab, R-0  
  
levothyroxine (SYNTHROID) 125 mcg tablet TAKE ONE TABLET BY MOUTH DAILY, Normal, Disp-90 Tab, R-7  
  
atorvastatin (LIPITOR) 80 mg tablet TAKE ONE TABLET BY MOUTH EVERY EVENING, Normal, Disp-90 Tab, R-0  
  
allopurinol (ZYLOPRIM) 100 mg tablet TAKE TWO TABLETS BY MOUTH DAILY, Normal, Disp-180 Tab, R-2  
  
acetaminophen (TYLENOL) 325 mg tablet Take 325 mg by mouth every four (4) hours as needed for Pain., Historical Med  
  
potassium chloride SR (KLOR-CON 10) 10 mEq tablet Take 1 Tab by mouth daily. , Print, Disp-30 Tab, R-12  
  
 !! - Potential duplicate medications found. Please discuss with provider. STOP taking these medications  
  
 aspirin 81 mg chewable tablet Comments:  
Reason for Stopping: My Recommended Diet, Activity, Wound Care, and follow-up labs are listed in the patient's Discharge Insturctions which I have personally completed and reviewed. _______________________________________________________________________ DISPOSITION:   
Home with Family:   
Home with HH/PT/OT/RN: y  
SNF/LTC:   
[place]:   
OTHER:   
 
 
Condition at Discharge:  Stable 
_______________________________________________________________________ Follow up with: PCP : Derral Bence, MD 
Follow-up Information Follow up With Specialties Details Why Contact Info Selvin Quijano MD Gastroenterology Schedule an appointment as soon as possible for a visit 2 weeks [address] [facility] 
[phone] Derral Bence, MD Internal Medicine Go on 7/8/2019 For hospital follow up appointment at 10:30AM  [address] [facility] 
[phone] Lulú Guzman DO Cardiology Schedule an appointment as soon as possible for a visit 2 weeks [address] [facility] 
[phone] [address] [place] [address]  On 6/20/2019 THIS IS YOUR HOME HEALTH AGENCY. IF YOU DO NOT HEAR FROM THEM WITHIN 24-48HRS PLEASE CONTACT THEM DIRECTLY [phone] Total time in minutes spent coordinating this discharge (includes going over instructions, follow-up, prescriptions, and preparing report for sign off to her PCP) : 40 minutes Signed: 
Cole Hillman NP

## 2019-06-20 NOTE — PROGRESS NOTES
Spiritual Care Assessment/Progress Note [address] 
 
 
NAME: Shaneka Izquierdo      MRN: [id number] AGE: 82 y.o. SEX: female Rastafarian Affiliation: Buddhism  
Language: English  
 
6/20/2019     Total Time (in minutes): 9 Spiritual Assessment begun in [facility] PROGRESSIVE CARE through conversation with: 
  
    [x]Patient        [x] Family    [] Friend(s) Reason for Consult: Initial/Spiritual assessment, patient floor Spiritual beliefs: (Please include comment if needed) [x] Identifies with a jerica tradition:     
   [x] Supported by a jerica community:        
   [] Claims no spiritual orientation:       
   [] Seeking spiritual identity:            
   [] Adheres to an individual form of spirituality:       
   [] Not able to assess:                   
 
    
Identified resources for coping:  
   [] Prayer                           
   [x] Music                  [] Guided Imagery [x] Family/friends                 [] Pet visits [] Devotional reading                         [] Unknown 
   [] Other:                                          
 
 
Interventions offered during this visit: (See comments for more details) Patient Interventions: Affirmation of emotions/emotional suffering, Affirmation of jerica, Coping skills reviewed/reinforced, Initial/Spiritual assessment, patient floor, Normalization of emotional/spiritual concerns Family/Friend(s): Affirmation of emotions/emotional suffering, Affirmation of jerica, Coping skills reviewed/reinforced, Normalization of emotional/spiritual concerns Plan of Care: 
 
 [x] Support spiritual and/or cultural needs  
 [] Support AMD and/or advance care planning process    
 [] Support grieving process 
 [] Coordinate Rites and/or Rituals  
 [] Coordination with community clergy 
 [x] No spiritual needs identified at this time 
 [] Detailed Plan of Care below (See Comments)  [] Make referral to Music Therapy [] Make referral to Pet Therapy    
[] Make referral to Addiction services 
[] Make referral to [facility] 
[] Make referral to Spiritual Care Partner 
[] No future visits requested       
[x] Follow up visits as needed Comments: Initial spiritual assessment in Progressive Care. Patient/family shared about patient going home, jerica, and family. Provided effective listening. Consulted with patient and patient's family. Advised of  Availability. [address]  Paging Service [phone])

## 2019-06-21 ENCOUNTER — PATIENT OUTREACH (OUTPATIENT)
Dept: INTERNAL MEDICINE CLINIC | Age: 84
End: 2019-06-21

## 2019-06-21 NOTE — PROGRESS NOTES
Hospital Discharge Follow-Up Date/Time:  6/21/2019 11:36 AM 
 
Patient was admitted to [facility] on 6/14/19 and discharged on 6/20/19 for c/o rectal bleeding-dark/tarry stools, shortness of breath. The physician discharge summary was available at the time of outreach. Patient was contacted within 1 business days of discharge. Top Challenges reviewed with the provider - to f/u with Dr. Ronnie Verduzco (GI) in two weeks - to f/u with Dr. Tesha James (Cardiology) in 2 weeks 
 
- resume xarelto on 6/23/19 
 
- aspirin 81 mg tablet discontinued  
 
- hgb 8.5 (L) at discharge; s/p 1 unit PRBCs this admission for Hgb of 6. 8(L) 
 
- creatinine 1.51(H) at discharge - prescribed Carafate and to continue PPI for GERD 
 
-to follow with heme/onc as outpatient for iron deficiency; continue ferrous sulfate 
  Continue with ferrous sulfate Method of communication with provider :chart routing Inpatient RRAT score: 42 Was this a readmission? no  
Patient stated reason for the readmission: n/a Nurse Navigator (NN) attempted to contact the patient by telephone to perform post hospital discharge assessment; unable to lvm due to mailbox full. Disease Specific:   N/A Summary of patient's top problems: 1. Acute on chronic blood loss anemia, Hematochezia: 
Suspect Acute Gastrointestinal bleed GAVE (Gastric Antral Vascular Ectasia) On anticoagulation/thrombocytopenia: history of  
Rectal bleeding secondary to rectal prolapse in February 2019. GI Consulted. 6/14/19- stool positive for occult blood 6/15/19 - s/p EGD - Successful dilation of Schatzki ring to 18mm (necessary to facilitate capsule swallowing if capsule performed) Erosive gastritis (versus gave). 3 cm hiatal hernia 6/17/19-s/p colonoscopy- revealed polyps 6/18/19-Capsule study done; result pending 6/19/19 - Repeat EGD revealed GAVE; erosions proximal stomach; hiatal hernia; shatzski's ring  6/18/19- Received 1 unit of PRBC for hgb 6.8. Hgb 8.5 upon discharge 
   
Resume Xarelto on 6/23/2019; 3 days after procedure Aspirin 81 mg discontinued Patient to follow up with Dr. Chin Marquez as outpatient 2. Mild JULIANA CKD stage III 
- Cr 2.06 POA, trending down 1.5 
  Avoid nephrotoxic agents 3. Chronic diastolic CHF: TTE 8/0/32- estimated LVEF 65%-70%. Per IP documentation, did not appear volume overloaded this admission. Chest x-ray negative for acute pathology Patient to continue with lasix and BB 
    Weight 6/20/19 211 lbsvs 203 lbs on admission (most likely secondary to hydration and holding lasix during hydration period) Home Health orders at discharge: resumption of care, prior history with [facility]. SN, PT, OT. [address]: [facility] Date of initial visit: HATTIE scheduled for 6/21/19 Durable Medical Equipment ordered/company: none Durable Medical Equipment received: n/a Barriers to care? unable to assess due to inability to reach patient today Advance Care Planning:  
Does patient have an Advance Directive:  Advance Medical Directive-on file, General Durable Power of - on file Medication(s):  
New Medications at Discharge: carafate Changed Medications at Discharge: xarelto 15 mg tablet (Take 1 Tab by mouth daily (with dinner). Start taking 6/23/2019) Discontinued Medications at Discharge: aspirin 81 mg tablet [facility] follow up appointment(s):  
Future Appointments Date Time Provider Dafne Jenkins 6/26/2019  9:00 AM [facility]M [facility]  
7/8/2019 10:30 AM Dario Russ MD [facility] SCHED  
9/19/2019  1:15 PM Sharon Pichardo MD [address]  
9/20/2019 10:45 AM MD [address] [facility] follow up appointment(s): Dr. Chin Marquez (GI)-2 weeks, Dr. Varinder Maria (Cardiology)-2 weeks [facility]:   unable to reach patient today to offer/ provide information on [facility] services Goals None

## 2019-06-24 ENCOUNTER — PATIENT OUTREACH (OUTPATIENT)
Dept: INTERNAL MEDICINE CLINIC | Age: 84
End: 2019-06-24

## 2019-06-24 ENCOUNTER — HOSPITAL ENCOUNTER (EMERGENCY)
Age: 84
Discharge: HOME | End: 2019-06-24
Attending: EMERGENCY MEDICINE
Payer: MEDICARE

## 2019-06-24 VITALS
DIASTOLIC BLOOD PRESSURE: 69 MMHG | HEART RATE: 68 BPM | OXYGEN SATURATION: 98 % | TEMPERATURE: 97.8 F | SYSTOLIC BLOOD PRESSURE: 139 MMHG | RESPIRATION RATE: 18 BRPM | BODY MASS INDEX: 31.15 KG/M2 | WEIGHT: 210.32 LBS | HEIGHT: 69 IN

## 2019-06-24 DIAGNOSIS — K92.2 GASTROINTESTINAL HEMORRHAGE, UNSPECIFIED GASTROINTESTINAL HEMORRHAGE TYPE: Primary | ICD-10-CM

## 2019-06-24 LAB
ALBUMIN SERPL-MCNC: 3.3 G/DL (ref 3.5–5)
ALBUMIN/GLOB SERPL: 1.1 {RATIO} (ref 1.1–2.2)
ALP SERPL-CCNC: 91 U/L (ref 45–117)
ALT SERPL-CCNC: 19 U/L (ref 12–78)
ANION GAP SERPL CALC-SCNC: 5 MMOL/L (ref 5–15)
AST SERPL-CCNC: 13 U/L (ref 15–37)
BASOPHILS # BLD: 0 K/UL (ref 0–0.1)
BASOPHILS NFR BLD: 1 % (ref 0–1)
BILIRUB SERPL-MCNC: 0.4 MG/DL (ref 0.2–1)
BUN SERPL-MCNC: 19 MG/DL (ref 6–20)
BUN/CREAT SERPL: 11 (ref 12–20)
CALCIUM SERPL-MCNC: 8.5 MG/DL (ref 8.5–10.1)
CHLORIDE SERPL-SCNC: 108 MMOL/L (ref 97–108)
CO2 SERPL-SCNC: 31 MMOL/L (ref 21–32)
COMMENT, HOLDF: NORMAL
CREAT SERPL-MCNC: 1.75 MG/DL (ref 0.55–1.02)
DIFFERENTIAL METHOD BLD: ABNORMAL
EOSINOPHIL # BLD: 0.2 K/UL (ref 0–0.4)
EOSINOPHIL NFR BLD: 5 % (ref 0–7)
ERYTHROCYTE [DISTWIDTH] IN BLOOD BY AUTOMATED COUNT: 16.3 % (ref 11.5–14.5)
GLOBULIN SER CALC-MCNC: 3 G/DL (ref 2–4)
GLUCOSE SERPL-MCNC: 109 MG/DL (ref 65–100)
HCT VFR BLD AUTO: 28.8 % (ref 35–47)
HEMOCCULT STL QL: NEGATIVE
HGB BLD-MCNC: 8.9 G/DL (ref 11.5–16)
IMM GRANULOCYTES # BLD AUTO: 0 K/UL (ref 0–0.04)
IMM GRANULOCYTES NFR BLD AUTO: 0 % (ref 0–0.5)
LYMPHOCYTES # BLD: 0.9 K/UL (ref 0.8–3.5)
LYMPHOCYTES NFR BLD: 18 % (ref 12–49)
MCH RBC QN AUTO: 30.1 PG (ref 26–34)
MCHC RBC AUTO-ENTMCNC: 30.9 G/DL (ref 30–36.5)
MCV RBC AUTO: 97.3 FL (ref 80–99)
MONOCYTES # BLD: 0.3 K/UL (ref 0–1)
MONOCYTES NFR BLD: 6 % (ref 5–13)
NEUTS SEG # BLD: 3.6 K/UL (ref 1.8–8)
NEUTS SEG NFR BLD: 70 % (ref 32–75)
NRBC # BLD: 0 K/UL (ref 0–0.01)
NRBC BLD-RTO: 0 PER 100 WBC
PLATELET # BLD AUTO: 109 K/UL (ref 150–400)
PMV BLD AUTO: 13 FL (ref 8.9–12.9)
POTASSIUM SERPL-SCNC: 3.4 MMOL/L (ref 3.5–5.1)
PROT SERPL-MCNC: 6.3 G/DL (ref 6.4–8.2)
RBC # BLD AUTO: 2.96 M/UL (ref 3.8–5.2)
SAMPLES BEING HELD,HOLD: NORMAL
SODIUM SERPL-SCNC: 144 MMOL/L (ref 136–145)
WBC # BLD AUTO: 5.1 K/UL (ref 3.6–11)

## 2019-06-24 PROCEDURE — 96374 THER/PROPH/DIAG INJ IV PUSH: CPT

## 2019-06-24 PROCEDURE — 80053 COMPREHEN METABOLIC PANEL: CPT

## 2019-06-24 PROCEDURE — 74011250636 HC RX REV CODE- 250/636: Performed by: EMERGENCY MEDICINE

## 2019-06-24 PROCEDURE — 36415 COLL VENOUS BLD VENIPUNCTURE: CPT

## 2019-06-24 PROCEDURE — 99283 EMERGENCY DEPT VISIT LOW MDM: CPT

## 2019-06-24 PROCEDURE — C9113 INJ PANTOPRAZOLE SODIUM, VIA: HCPCS | Performed by: EMERGENCY MEDICINE

## 2019-06-24 PROCEDURE — 85025 COMPLETE CBC W/AUTO DIFF WBC: CPT

## 2019-06-24 PROCEDURE — 82272 OCCULT BLD FECES 1-3 TESTS: CPT

## 2019-06-24 RX ORDER — PANTOPRAZOLE SODIUM 40 MG/10ML
40 INJECTION, POWDER, LYOPHILIZED, FOR SOLUTION INTRAVENOUS
Status: COMPLETED | OUTPATIENT
Start: 2019-06-24 | End: 2019-06-24

## 2019-06-24 RX ADMIN — PANTOPRAZOLE SODIUM 40 MG: 40 INJECTION, POWDER, FOR SOLUTION INTRAVENOUS at 17:20

## 2019-06-24 NOTE — ED PROVIDER NOTES
EMERGENCY DEPARTMENT HISTORY AND PHYSICAL EXAM 
 
 
Date: 6/24/2019 Patient Name: Tom Dyer History of Presenting Illness Chief Complaint Patient presents with  Rectal Bleeding  
  pt reports black stool beginning again yesterday, was recently admitted for same History Provided By: Patient HPI: Tom Dyer, 82 y.o. female with PMHx as noted below presents the emergency department with chief complaints of GI bleed. Patient was recently admitted here for upper GI bleeding which had resolved, attributed to gastric erosions. Patient went home and was supposed to restart her Xarelto yesterday however started having dark black stools. They called the gastroneurology's office today and referred to the emergency department for further evaluation. Otherwise having no abdominal pain, dizziness, chest pain, shortness of breath. Patient has not been taking her Xarelto. PCP: Lucrecia Merida MD 
 
Current Outpatient Medications Medication Sig Dispense Refill  rivaroxaban (XARELTO) 15 mg tab tablet Take 1 Tab by mouth daily (with dinner). Start taking 6/23/2019 30 Tab 0  
 sucralfate (CARAFATE) 1 gram tablet Take 1 Tab by mouth Before breakfast, lunch, dinner and at bedtime for 14 days. 56 Tab 0  
 furosemide (LASIX) 40 mg tablet Take 80 mg by mouth Daily (before breakfast). Patient takes 80 mg with breakfast and 40 mg at lunch  furosemide (LASIX) 40 mg tablet Take 40 mg by mouth daily (with lunch). Patient takes 80 mg with breakfast and 40 mg at lunch  metoprolol tartrate (LOPRESSOR) 25 mg tablet Take 25 mg by mouth two (2) times a day.  insulin NPH (HUMULIN N NPH INSULIN KWIKPEN) 100 unit/mL (3 mL) inpn 24 Units by SubCUTAneous route daily.  insulin NPH (HUMULIN N NPH INSULIN KWIKPEN) 100 unit/mL (3 mL) inpn 8 Units by SubCUTAneous route every evening.  pantoprazole (PROTONIX) 40 mg tablet Take 1 Tab by mouth daily.  90 Tab 3  
  amiodarone (CORDARONE) 200 mg tablet Take 1 Tab by mouth two (2) times a day. 180 Tab 0  
 diclofenac (VOLTAREN) 1 % gel Apply 2 g to affected area four (4) times daily. 100 g 2  camphor-methyl salicyl-menthol (SALONPAS) ptmd by Apply Externally route as needed.  loratadine (CLARITIN) 10 mg tablet Take 10 mg by mouth daily.  nitroglycerin (NITROSTAT) 0.4 mg SL tablet 0.4 mg by SubLINGual route every five (5) minutes as needed for Chest Pain. Up to 3 doses.  ferrous sulfate 325 mg (65 mg iron) tablet Take 1 Tab by mouth daily (with breakfast). 30 Tab 0  
 levothyroxine (SYNTHROID) 125 mcg tablet TAKE ONE TABLET BY MOUTH DAILY (Patient taking differently: TAKE ONE TABLET BY MOUTH DAILY BEFORE BREAKFAST) 90 Tab 7  
 atorvastatin (LIPITOR) 80 mg tablet TAKE ONE TABLET BY MOUTH EVERY EVENING 90 Tab 0  
 allopurinol (ZYLOPRIM) 100 mg tablet TAKE TWO TABLETS BY MOUTH DAILY 180 Tab 2  
 acetaminophen (TYLENOL) 325 mg tablet Take 325 mg by mouth every four (4) hours as needed for Pain.  potassium chloride SR (KLOR-CON 10) 10 mEq tablet Take 1 Tab by mouth daily. 30 Tab 12 Past History Past Medical History: 
Past Medical History:  
Diagnosis Date  Arthritis KNEES  Atrial fibrillation ([facility] [address] 10/29/2009  Bladder cancer ([facility] [address]  CAD (coronary artery disease) STENT PER PATIENT  Chronic pain KNEES  
 Coagulation disorder ([facility] [address] Chronic prophylactic anticoagulation med  Colon polyps  Diabetes ([facility] [address] IDDM  
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 One fragment suggestive of hyperplastic polyp  GERD (gastroesophageal reflux disease)  Gout  Heart valve problem   
 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  
 @ [facility] >> atrial fibrillation  SOB (shortness of breath) on exertion 04/2019  
 T. I[place]A. 4/23/2009  TIA (transient ischemic attack)  Ulcer of right lower extremity, limited to breakdown of skin ([address] 7/5/2018 Past Surgical History: 
Past Surgical History:  
Procedure Laterality Date  CARDIAC SURG PROCEDURE UNLIST    
 ablation  COLONOSCOPY N/A 2/20/2019 COLONOSCOPY performed by Caterina Baxter MD at [facility] ENDOSCOPY  COLONOSCOPY N/A 6/17/2019 COLONOSCOPY performed by Caterina Baxter MD at [facility] ENDOSCOPY  COLONOSCOPY N/A 6/15/2019 COLONOSCOPY performed by Caterina Baxter MD at [address] [place] [address]  2/20/2019  COLONOSCOPY,REMV LESN,SNARE  6/17/2019  HX APPENDECTOMY  HX CHOLECYSTECTOMY  HX HYSTERECTOMY  HX KNEE ARTHROSCOPY  [id number]  
 right knee  HX ORTHOPAEDIC    
 HX UROLOGICAL RENAL STENT, tur-b  
 [facility]  6/15/2019  UPPER GI ENDOSCOPY,BALL DIL,30MM  6/15/2019  UPPER GI ENDOSCOPY,TUMOR ABLATN  6/19/2019  VASCULAR SURGERY PROCEDURE UNLIST  11/4  
 removed vein in right leg Family History: 
Family History Problem Relation Age of Onset  Stroke Other  Arthritis-osteo Sister   
     spinal stenosis  Gout Son   
 Hypertension Son   
Tegan Hypertension Mother  Heart Disease Mother CAD  Heart Disease Father CAD  Alcohol abuse Neg Hx  Asthma Neg Hx  Bleeding Prob Neg Hx  Cancer Neg Hx  Diabetes Neg Hx  Elevated Lipids Neg Hx   
 Headache Neg Hx  Lung Disease Neg Hx  Migraines Neg Hx  Psychiatric Disorder Neg Hx  Mental Retardation Neg Hx  Anesth Problems Neg Hx Social History: 
Social History Tobacco Use  
  Smoking status: Former Smoker Packs/day: 0.50 Years: 10.00 Pack years: 5.00 Types: Cigarettes Last attempt to quit: 1967 Years since quittin.5  Smokeless tobacco: Never Used Substance Use Topics  Alcohol use: No  
  Alcohol/week: 0.0 oz  Drug use: No  
 
 
Allergies: Allergies Allergen Reactions  Actos [Pioglitazone] Swelling Swelling of feet and legs  Codeine Itching  Hydrocodone Rash and Other (comments)  
  hallucinations Review of Systems Review of Systems Constitutional: Negative for fever, chills, and fatigue. HENT: Negative for congestion, sore throat, rhinorrhea, sneezing and neck stiffness Eyes: Negative for discharge and redness. Respiratory: Negative for  shortness of breath, wheezing Cardiovascular: Negative for chest pain, palpitations Gastrointestinal: Negative for nausea, vomiting, abdominal pain, constipation, diarrhea Genitourinary: Negative for dysuria, urgency, frequency, hematuria, flank pain, decreased urine volume, discharge, Musculoskeletal: Negative for myalgias or joint pain . Skin: Negative for rash or lesions . Neurological: Negative weakness, light-headedness, numbness and headaches. Physical Exam  
Physical Exam 
 
GENERAL: alert and oriented, no acute distress EYES: PEERL, No injection, discharge or icterus. ENT: Mucous membranes pink and moist. 
NECK: Supple LUNGS: Airway patent. Non-labored respirations. Breath sounds clear with good air entry bilaterally. HEART: Regular rate and rhythm. No peripheral edema ABDOMEN: Non-distended and non-tender, without guarding or rebound. RECTAL: No hemorrhoids, masses or fissures noted, dark brown stool SKIN:  warm, dry EXTREMITIES: Without swelling, tenderness or deformity, symmetric with normal ROM 
NEUROLOGICAL: Alert, oriented Diagnostic Study Results Labs - Recent Results (from the past 12 hour(s)) CBC WITH AUTOMATED DIFF  
 Collection Time: 06/24/19  3:40 PM  
Result Value Ref Range WBC 5.1 3.6 - 11.0 K/uL  
 RBC 2.96 (L) 3.80 - 5.20 M/uL HGB 8.9 (L) 11.5 - 16.0 g/dL HCT 28.8 (L) 35.0 - 47.0 % MCV 97.3 80.0 - 99.0 FL  
 MCH 30.1 26.0 - 34.0 PG  
 MCHC 30.9 30.0 - 36.5 g/dL  
 RDW 16.3 (H) 11.5 - 14.5 % PLATELET 586 (L) 065 - 400 K/uL MPV 13.0 (H) 8.9 - 12.9 FL  
 NRBC 0.0 0  WBC ABSOLUTE NRBC 0.00 0.00 - 0.01 K/uL NEUTROPHILS 70 32 - 75 % LYMPHOCYTES 18 12 - 49 % MONOCYTES 6 5 - 13 % EOSINOPHILS 5 0 - 7 % BASOPHILS 1 0 - 1 % IMMATURE GRANULOCYTES 0 0.0 - 0.5 % ABS. NEUTROPHILS 3.6 1.8 - 8.0 K/UL  
 ABS. LYMPHOCYTES 0.9 0.8 - 3.5 K/UL  
 ABS. MONOCYTES 0.3 0.0 - 1.0 K/UL  
 ABS. EOSINOPHILS 0.2 0.0 - 0.4 K/UL  
 ABS. BASOPHILS 0.0 0.0 - 0.1 K/UL  
 ABS. IMM. GRANS. 0.0 0.00 - 0.04 K/UL  
 DF AUTOMATED METABOLIC PANEL, COMPREHENSIVE Collection Time: 06/24/19  3:40 PM  
Result Value Ref Range Sodium 144 136 - 145 mmol/L Potassium 3.4 (L) 3.5 - 5.1 mmol/L Chloride 108 97 - 108 mmol/L  
 CO2 31 21 - 32 mmol/L Anion gap 5 5 - 15 mmol/L Glucose 109 (H) 65 - 100 mg/dL BUN 19 6 - 20 MG/DL Creatinine 1.75 (H) 0.55 - 1.02 MG/DL  
 BUN/Creatinine ratio 11 (L) 12 - 20 GFR est AA 34 (L) >60 ml/min/1.73m2 GFR est non-AA 28 (L) >60 ml/min/1.73m2 Calcium 8.5 8.5 - 10.1 MG/DL Bilirubin, total 0.4 0.2 - 1.0 MG/DL  
 ALT (SGPT) 19 12 - 78 U/L  
 AST (SGOT) 13 (L) 15 - 37 U/L Alk. phosphatase 91 45 - 117 U/L Protein, total 6.3 (L) 6.4 - 8.2 g/dL Albumin 3.3 (L) 3.5 - 5.0 g/dL Globulin 3.0 2.0 - 4.0 g/dL A-G Ratio 1.1 1.1 - 2.2 SAMPLES BEING HELD Collection Time: 06/24/19  3:41 PM  
Result Value Ref Range SAMPLES BEING HELD BLUE   
 COMMENT Add-on orders for these samples will be processed based on acceptable specimen integrity and analyte stability, which may vary by analyte. OCCULT BLOOD, STOOL Collection Time: 06/24/19  4:57 PM  
Result Value Ref Range Occult blood, stool NEGATIVE  NEG Radiologic Studies - No orders to display CT Results  (Last 48 hours) None CXR Results  (Last 48 hours) None Medical Decision Making I am the first provider for this patient. I reviewed the vital signs, available nursing notes, past medical history, past surgical history, family history and social history. Vital Signs-Reviewed the patient's vital signs. Patient Vitals for the past 12 hrs: 
 Temp Pulse Resp BP SpO2  
06/24/19 1700  68 18 139/69 98 %  
06/24/19 1615  63 18 133/55 95 % 06/24/19 1539    132/54   
06/24/19 1520 97.8 °F (36.6 °C) 70 16 (!) 116/93 96 % Records Reviewed: Nursing Notes and Old Medical Records Provider Notes (Medical Decision Making): On presentation, the patient is well appearing, in no acute distress with normal vital signs. Based on my history and exam the differential diagnosis for this patient includes blood loss anemia, upper GI bleed, lower GI bleed. Hemoccult revealed dark stool that was Hemoccult negative. Discussed with Dr. Willard Arevalo agreed the patient could be discharged and can follow-up with him on Friday. Patient in agreement with this plan. ED Course:  
Initial assessment performed. The patients presenting problems have been discussed, and they are in agreement with the care plan formulated and outlined with them. I have encouraged them to ask questions as they arise throughout their visit. PROGRESS: 
The patient has been re-evaluated Reviewed available results with patient and have counseled them on diagnosis and care plan. They have expressed understanding, and all their questions have been answered. They agree with plan 12:08 AM 
Apryl Miller's  results have been reviewed with her. She has been counseled regarding her diagnosis.   She verbally conveys understanding and agreement of the signs, symptoms, diagnosis, treatment and prognosis and additionally agrees to follow up as recommended with Dr. Yuliana Ortiz MD in 24 - 48 hours. She also agrees with the care-plan and conveys that all of her questions have been answered. I have also put together some discharge instructions for her that include: 1) educational information regarding their diagnosis, 2) how to care for their diagnosis at home, as well a 3) list of reasons why they would want to return to the ED prior to their follow-up appointment, should their condition change. Disposition: 
Home PLAN: 
1. Discharge with GI follow-up Friday Diagnosis Clinical Impression:  
1. Gastrointestinal hemorrhage, unspecified gastrointestinal hemorrhage type

## 2019-06-24 NOTE — ED NOTES
Kailey Dooley MD reviewed discharge instructions with the patient. The patient verbalized understanding. All questions and concerns were addressed. The patient was discharged via wheelchair in the care of family members with instructions and prescriptions in hand. Pt is alert and oriented x 4. Respirations are clear and unlabored.

## 2019-06-24 NOTE — PROGRESS NOTES
Nurse Navigator (NN) performed second outreach attempt to by telephone to perform post hospital discharge assessment; lvm requesting a return phone call to this NN. Future Appointments: 
Future Appointments Date Time Provider Dafne Jenkins 6/26/2019  9:00 AM [facility] CARDIOPULM SPECIALTY [facility]  
7/8/2019 10:30 AM Hudson Paul MD [facility]  
9/19/2019  1:15 PM MD [address]  
9/20/2019 10:45 AM MD [address] Last Appointment With Me: 
Visit date not found Last Appointment My Department: 
6/13/2019

## 2019-06-24 NOTE — DISCHARGE INSTRUCTIONS
Patient Education        Gastrointestinal Bleeding: Care Instructions  Your Care Instructions    The digestive or gastrointestinal tract goes from the mouth to the anus. It is often called the GI tract. Bleeding can happen anywhere in the GI tract. It may be caused by an ulcer, an infection, or cancer. It may also be caused by medicines such as aspirin or ibuprofen. Light bleeding may not cause any symptoms at first. But if you continue to bleed for a while, you may feel very weak or tired. Sudden, heavy bleeding means you need to see a doctor right away. This kind of bleeding can be very dangerous. But it can usually be cured or controlled. The doctor may do some tests to find the cause of your bleeding. Follow-up care is a key part of your treatment and safety. Be sure to make and go to all appointments, and call your doctor if you are having problems. It's also a good idea to know your test results and keep a list of the medicines you take. How can you care for yourself at home? · Be safe with medicines. Take your medicines exactly as prescribed. Call your doctor if you think you are having a problem with your medicine. You will get more details on the specific medicines your doctor prescribes. · Do not take aspirin or other anti-inflammatory medicines, such as naproxen (Aleve) or ibuprofen (Advil, Motrin), without talking to your doctor first. Ask your doctor if it is okay to use acetaminophen (Tylenol). · Do not drink alcohol. · The bleeding may make you lose iron. So it's important to eat foods that have a lot of iron. These include red meat, shellfish, poultry, and eggs. They also include beans, raisins, whole-grain breads, and leafy green vegetables. If you want help planning meals, you can make an appointment with a dietitian. When should you call for help? Call 911 anytime you think you may need emergency care.  For example, call if:    · You have sudden, severe belly pain.     · You vomit blood or what looks like coffee grounds.     · You passed out (lost consciousness).     · Your stools are maroon or very bloody.    Call your doctor now or seek immediate medical care if:    · You are dizzy or lightheaded, or you feel like you may faint.     · Your stools are black and look like tar, or they have streaks of blood.     · You have belly pain.     · You vomit or have nausea.     · You have trouble swallowing, or it hurts when you swallow.    Watch closely for changes in your health, and be sure to contact your doctor if:    · You do not get better as expected. Where can you learn more? Go to http://gilson-michael.info/. Enter W310 in the search box to learn more about \"Gastrointestinal Bleeding: Care Instructions. \"  Current as of: September 23, 2018  Content Version: 11.9  © 4254-8399 [employer]. Care instructions adapted under license by [employer] (which disclaims liability or warranty for this information). If you have questions about a medical condition or this instruction, always ask your healthcare professional. [address] any warranty or liability for your use of this information.

## 2019-06-24 NOTE — ED NOTES
Pt arrives ambulatory tot he ED with c/o lower abdominal pain that started around noon 6/23 and two episodes of \"dark stools\". Pt states she was d/c 6/20 for the same issue and was dx with stomach lesions. Pt states she has not taken her blood thinner since 6/12 Pt placed x3 on monitor, daughter at bedside

## 2019-06-28 NOTE — PROCEDURES
[address] PROCEDURE NOTE Name:  Arti Quiles 
MR#:  [id number] :  1935 ACCOUNT #:  [[id number]] DATE OF SERVICE:  2018 Dictation date 2019 PREOPERATIVE DIAGNOSES: 
1. Hematochezia. 2.  Blood loss anemia. POSTOPERATIVE DIAGNOSES: 
1. Gastric emptying time 1 hour 29 minutes. 2.  The capsule does not reach the cecum; therefore small bowel transit time cannot be estimated. 3.  Gastric antral vascular ectasia in the most distal aspect of the antrum. 4.  No other abnormalities seen on this incomplete examination. PROCEDURE PLANNED AND PERFORMED:  Wireless capsule endoscopy. SURGEON:  Tye Ceja MD. ASSISTANT:  Patrice Nova. ANESTHESIA:  none. COMPLICATIONS:  none. SPECIMENS REMOVED:  none. IMPLANTS:  None;. ESTIMATED BLOOD LOSS:  none. DESCRIPTION OF PROCEDURE:  The wireless sensory apparatus was applied to the patient and the capsule was ingested at 0 hours 0 minutes 10 seconds. The esophagus was well seen but the Z-line was not seen. The stomach was entered at 0 hours 0 minutes 23 seconds. There was food in the stomach. In the most distal aspect of the gastric antrum, there are some gastric antral vascular ectasias at 1 hour 28 minutes 2 seconds. These are not impressive. Duodenum was entered at 1 hour 29 minutes 33 seconds. No small bowel lesions are seen. The capsule does not reach the cecum. Therefore, we cannot say anything about small bowel transit. Ailyn Garner MD 
 
 
[id number] 
D:  2019 13:05 
T:  2019 14:25 
JOB #:  [id number] CC:  MD Tye Jenkins MD

## 2019-07-01 ENCOUNTER — PATIENT MESSAGE (OUTPATIENT)
Dept: INTERNAL MEDICINE CLINIC | Age: 84
End: 2019-07-01

## 2019-07-01 ENCOUNTER — PATIENT OUTREACH (OUTPATIENT)
Dept: INTERNAL MEDICINE CLINIC | Age: 84
End: 2019-07-01

## 2019-07-01 RX ORDER — AMIODARONE HYDROCHLORIDE 200 MG/1
200 TABLET ORAL DAILY
Qty: 90 TAB | Refills: 0
Start: 2019-07-01 | End: 2019-01-01

## 2019-07-01 NOTE — PROGRESS NOTES
Patient has not attended or scheduled a [facility] appointment with PCP post-discharge. No patient response to previous NN outreach attempts. [id number] ED Visit is noted for date of 6/24/19 with c/o rectal bleeding, black stools beginning on 6/23/19. Diagnosis of gastrointestinal hemorrhage; received Protonix 40 mg IV during ED Visit. Discharged to home with no new prescriptions or orders and advised to f/u with Dr. Dennis Back and Dr. Sita Benavides. MyChart outreach to patient today due to no patient response to multiple previous NN telephone outreach attempts. Future Appointments: 
Future Appointments Date Time Provider Dafne Jenkins 7/8/2019 10:30 AM Nicole Bowen MD [address]  
8/7/2019  1:00 PM [facility] CARDIOPULM SPECIALTY [facility]  
9/19/2019  1:15 PM Marj Bhatia MD [facility]  
9/20/2019 10:45 AM Marj Bhatia MD [facility] Last Appointment With Me: 
Visit date not found Last Appointment My Department: 
6/13/2019

## 2019-07-15 ENCOUNTER — HOSPITAL ENCOUNTER (OUTPATIENT)
Dept: WOUND CARE | Age: 84
Discharge: HOME | End: 2019-07-15
Payer: MEDICARE

## 2019-07-15 VITALS
HEART RATE: 60 BPM | DIASTOLIC BLOOD PRESSURE: 71 MMHG | TEMPERATURE: 97.5 F | RESPIRATION RATE: 16 BRPM | SYSTOLIC BLOOD PRESSURE: 122 MMHG

## 2019-07-15 PROBLEM — L97.912 NON-PRESSURE CHRONIC ULCER OF RIGHT LOWER LEG, WITH FAT LAYER EXPOSED (HCC): Status: ACTIVE | Noted: 2019-07-15

## 2019-07-15 PROCEDURE — 29581 APPL MULTLAYER CMPRN SYS LEG: CPT

## 2019-07-15 NOTE — PROGRESS NOTES
The patient is an 28-year-old woman who has been seen for a number of episodes of ulceration on the lower legs.  She had most recently been followed in January of this year and as of 01/17/2019 had full healing of ulcerations on both legs.  She has been treated with triple-layer compression wraps. The patient had mitral valve replacement in April 2019. The patient since being discharged from the wound care center has used her elastic compression stockings on both legs at all times when she is not in bed or bathing. 
  
She takes furosemide 80 mg by mouth each morning.  She reports that she does get a good response from the furosemide and reports that she is limiting fluid intake.   
  
The patient has been treated several times in the past with oral antibiotics (Augmentin) for episodes of cellulitis in her lower legs. Of note, the patient in 12/2017 had venous study which did not show evidence of incompetence of the deep veins or superficial veins in either lower extremity. 
  
On examination, the patient has 1+ pitting edema in the left lower leg with scattered tiny ulcers weeping clear fluid.  She has 1+ pitting edema in the right lower leg with scattered tiny ulcers weeping clear fluid. The patient does not have full control of her leg swelling. 
  
I have recommended that she continue with her oral diuretic and continue limitation of fluid intake.  
  
Xeroform placed over ulcers right and left. Triple layer compression wraps placed bilaterally. Follow up one week.  
 
Bring compression garments in for inspection. 
  
FINAL DIAGNOSES:   
 
L97.912, L97.922,  R 60.0 
  
  
  
Gee Martínez MD

## 2019-07-15 NOTE — WOUND CARE
07/15/19 1033 Wound Leg Left; Anterior; Lower 07/15/19 Date First Assessed/Time First Assessed: 07/15/19 1032   Present on Hospital Admission: Yes  Primary Wound Type: Vascular  Location: Leg  Wound Location Orientation: Left; Anterior; Lower  Date of First Observation: 07/15/19 Dressing Status (Open to air) Wound Length (cm) 12.5 cm Wound Width (cm) 9 cm Wound Depth (cm) 0.1 cm Wound Volume (cm^3) 11.25 cm^3 Condition of Base Pink Condition of Edges Open Drainage Amount Small Drainage Color Serous Wound Odor None Josefina-wound Assessment Blanchable erythema Wound Leg lower Right; Anterior 07/15/19 Date First Assessed/Time First Assessed: 07/15/19 1033   Present on Hospital Admission: Yes  Primary Wound Type: Vascular  Location: Leg lower  Wound Location Orientation: Right; Anterior  Date of First Observation: 07/15/19 Dressing Status (Open to air) Wound Length (cm) 10 cm Wound Width (cm) 5 cm Wound Depth (cm) 0.1 cm Wound Volume (cm^3) 5 cm^3 Condition of Base Pink Condition of Edges Open Drainage Amount Small Drainage Color Serous Wound Odor None Josefina-wound Assessment Blanchable erythema Cleansing and Cleansing Agents  Normal saline Visit Vitals /71 (BP 1 Location: Right arm) Pulse 60 Temp 97.5 °F (36.4 °C) Resp 16

## 2019-07-15 NOTE — WOUND CARE
07/15/19 1045 Wound Leg Left; Anterior; Lower 07/15/19 Date First Assessed/Time First Assessed: 07/15/19 1032   Present on Hospital Admission: Yes  Primary Wound Type: Vascular  Location: Leg  Wound Location Orientation: Left; Anterior; Lower  Date of First Observation: 07/15/19 Dressing Type Applied  
(Xeroform, 3 layer wrap) Wound Leg lower Right; Anterior 07/15/19 Date First Assessed/Time First Assessed: 07/15/19 1033   Present on Hospital Admission: Yes  Primary Wound Type: Vascular  Location: Leg lower  Wound Location Orientation: Right; Anterior  Date of First Observation: 07/15/19 Dressing Type Applied  
(Xeroform, 3 layer wrap) Patient discharged to home ambulatory, denied pain at time of discharge. She will return to clinic in 1 week for MD follow-up.

## 2019-07-18 ENCOUNTER — HOSPITAL ENCOUNTER (OUTPATIENT)
Dept: GENERAL RADIOLOGY | Age: 84
Discharge: HOME | End: 2019-07-18
Payer: MEDICARE

## 2019-07-18 DIAGNOSIS — K31.819: ICD-10-CM

## 2019-07-18 PROCEDURE — 74018 RADEX ABDOMEN 1 VIEW: CPT

## 2019-07-22 ENCOUNTER — HOSPITAL ENCOUNTER (OUTPATIENT)
Dept: WOUND CARE | Age: 84
Discharge: HOME | End: 2019-07-22
Payer: MEDICARE

## 2019-07-22 VITALS
RESPIRATION RATE: 16 BRPM | DIASTOLIC BLOOD PRESSURE: 70 MMHG | SYSTOLIC BLOOD PRESSURE: 133 MMHG | TEMPERATURE: 98.4 F | HEART RATE: 66 BPM

## 2019-07-22 PROBLEM — L03.115 BILATERAL LOWER LEG CELLULITIS: Status: ACTIVE | Noted: 2019-07-22

## 2019-07-22 PROBLEM — L03.116 BILATERAL LOWER LEG CELLULITIS: Status: ACTIVE | Noted: 2019-07-22

## 2019-07-22 PROCEDURE — 29581 APPL MULTLAYER CMPRN SYS LEG: CPT

## 2019-07-22 NOTE — WOUND CARE
07/22/19 1025   Wound Leg Left; Anterior; Lower 07/15/19   Date First Assessed/Time First Assessed: 07/15/19 1032   Present on Hospital Admission: Yes  Primary Wound Type: Vascular  Location: Leg  Wound Location Orientation: Left; Anterior; Lower  Date of First Observation: 07/15/19   Dressing Type Applied   (Xeroform, 3 layer wrap)   Wound Leg lower Right; Anterior 07/15/19   Date First Assessed/Time First Assessed: 07/15/19 1033   Present on Hospital Admission: Yes  Primary Wound Type: Vascular  Location: Leg lower  Wound Location Orientation: Right; Anterior  Date of First Observation: 07/15/19   Dressing Type Applied   (Xeroform, 3 layer wrap)   Patient discharged to home ambulatory, denied pain at time of discharge. She will return to clinic in one week for MD follow-up.

## 2019-07-22 NOTE — PROGRESS NOTES
07/22/19 0958   Wound Leg Left; Anterior; Lower 07/15/19   Date First Assessed/Time First Assessed: 07/15/19 1032   Present on Hospital Admission: Yes  Primary Wound Type: Vascular  Location: Leg  Wound Location Orientation: Left; Anterior; Lower  Date of First Observation: 07/15/19   Wound Length (cm) 0.2 cm   Wound Width (cm) 0.2 cm   Wound Depth (cm) 0.1 cm   Wound Volume (cm^3) 0 cm^3   Condition of Base Pink   Drainage Amount None   Cleansing and Cleansing Agents  Soap and water   Wound Leg lower Right; Anterior 07/15/19   Date First Assessed/Time First Assessed: 07/15/19 1033   Present on Hospital Admission: Yes  Primary Wound Type: Vascular  Location: Leg lower  Wound Location Orientation: Right; Anterior  Date of First Observation: 07/15/19   Wound Length (cm) 0.1 cm   Wound Width (cm) 0.1 cm   Wound Depth (cm) 0.1 cm   Wound Volume (cm^3) 0 cm^3   Condition of Base Pink   Drainage Amount None     Visit Vitals  /70   Pulse 66   Temp 98.4 °F (36.9 °C)   Resp 16

## 2019-07-22 NOTE — PROGRESS NOTES
The patient is an 82-year-old woman who has been seen for a number of episodes of ulceration on the lower legs.  She had most recently been followed in January of this year and as of 01/17/2019 had full healing of ulcerations on both legs.  She has been treated with triple-layer compression wraps.     The patient had mitral valve replacement in April 2019.     The patient since being discharged from the wound care center has used her elastic compression stockings on both legs at all times when she is not in bed or bathing.     She takes furosemide 80 mg by mouth each morning.  She reports that she does get a good response from the furosemide and reports that she is limiting fluid intake.       The patient has been treated several times in the past with oral antibiotics (Augmentin) for episodes of cellulitis in her lower legs.     Of note, the patient in 12/2017 had venous study which did not show evidence of incompetence of the deep veins or superficial veins in either lower extremity.     On examination, the patient has trace pitting edema in the left lower leg with scattered tiny ulcers. Moderate erythema.  She has trace pitting edema in the right lower leg with scattered tiny ulcers . Moderate erythema.     I have recommended that she continue with her oral diuretic and continue limitation of fluid intake. There appears to be persisting erythema, some component of cellulitis. Rx for Augmentin 500 bid for 7 days.     Xeroform placed over ulcers right and left. Triple layer compression wraps placed bilaterally.     Follow up one week.     Bring compression garments in for inspection.     FINAL DIAGNOSES:       L97.912, L97.922,  R 60.0, L03.115, L03. 116

## 2019-07-22 NOTE — PROGRESS NOTES
07/22/19 0958   Wound Leg Left; Anterior; Lower 07/15/19   Date First Assessed/Time First Assessed: 07/15/19 1032   Present on Hospital Admission: Yes  Primary Wound Type: Vascular  Location: Leg  Wound Location Orientation: Left; Anterior; Lower  Date of First Observation: 07/15/19   Wound Length (cm) 0.1 cm   Wound Width (cm) 0.1 cm   Wound Depth (cm) 0.1 cm   Wound Volume (cm^3) 0 cm^3   Condition of Base Pink   Drainage Amount None   Cleansing and Cleansing Agents  Soap and water   Wound Leg lower Right; Anterior 07/15/19   Date First Assessed/Time First Assessed: 07/15/19 1033   Present on Hospital Admission: Yes  Primary Wound Type: Vascular  Location: Leg lower  Wound Location Orientation: Right; Anterior  Date of First Observation: 07/15/19   Wound Length (cm) 0.1 cm   Wound Width (cm) 0.1 cm   Wound Depth (cm) 0.1 cm   Wound Volume (cm^3) 0 cm^3   Condition of Base Pink   Drainage Amount None     Visit Vitals  /70   Pulse 66   Temp 98.4 °F (36.9 °C)   Resp 16

## 2019-07-24 ENCOUNTER — TELEPHONE (OUTPATIENT)
Dept: INTERNAL MEDICINE CLINIC | Age: 84
End: 2019-07-24

## 2019-07-24 NOTE — TELEPHONE ENCOUNTER
Spoke with patient. Two pt identifiers confirmed. Patient offered an appointment with Dr. Keysha Dwyer on 08/01/19. Appointment accepted. Patient advised if anything changes or if unable to keep this appointment to call the office  Pt verbalized understanding of information discussed w/ no further questions at this time.

## 2019-07-25 ENCOUNTER — TELEPHONE (OUTPATIENT)
Dept: INTERNAL MEDICINE CLINIC | Age: 84
End: 2019-07-25

## 2019-07-25 ENCOUNTER — PATIENT OUTREACH (OUTPATIENT)
Dept: INTERNAL MEDICINE CLINIC | Age: 84
End: 2019-07-25

## 2019-07-25 NOTE — PROGRESS NOTES
Did not attend OSMEL appt with PCP on 7/8/19; pt rescheduled appt to 8/1/19. NN outreach to patient today in order to perform OSMEL f/u; two patient identifiers verified. Declines to complete med rec during this outreach encounter; patient will bring all of her current medications to scheduled appt with PCP on 8/1/19. Denies any acute questions/concerns at this time. Goals Addressed                 This Visit's Progress       Post Hospitalization     Attends follow-up appointments as directed. On track     7/25/2019   - did not attend hospital f/u appt with Dr. Carlos Santillan on 7/8/19; patient rescheduled this appointment to 8/1/19  - followed by OP Wound care clinic for lower leg ulcerations. Attended appointments with [address] [place] [address] on 7/15/19 (establish care) and 7/22/19; has future f/u scheduled  - to f/u with Dr. Keri Rios (GI) in two weeks post-discharge. Per GI Office staff, patient attended office visit with Dr. Keri Rios on 6/16/19 and next scheduled appt is 9/25/19 with dr. Keri Rios. - to f/u with Dr. Dale Li (Cardiology) in 2 weeks in two weeks post-discharge; patient unable to recall whether she has attended office visit with Dr. Dale Li post-discharge and is unable to find note of a previous appt on her calendar, reports next scheduled f/u is 12/19/19 for routine f/u. Patient is advised today of hospital f/u recommendation within two weeks of hospital discharge date; patient will contact Dr. Nkechi Miranda office today in order to schedule an appointment.  Per S staff, most recent office visit was 6/27/19; next scheduled appt 12/19/19  -to follow with heme/onc as outpatient for iron deficiency; has appointment scheduled for 9/19/19            Future Appointments:  Future Appointments   Date Time Provider Dafne Jenkins   7/29/2019  9:45 AM [facility]   8/1/2019 11:45 AM Mackenzie Meade MD [address]   8/7/2019  1:00 PM [facility] 9/19/2019  1:15 PM MD Rahul Romero. [address]   9/20/2019 10:45 AM MD Rahul Romero. [address]        Last Appointment With Me:  Visit date not found     Last Appointment My Department:  6/13/2019

## 2019-07-25 NOTE — TELEPHONE ENCOUNTER
Spoke with patient. Two pt identifiers confirmed. Patient states that she has been having some rectal bleeding. Patient states that she only notices the blood after she has had a bowel movement and she wipes. Patient states that she spoke with her GI doctor, who advised her that it is probably a small tear or hemorrhoid. Patient states that she wanted to see what Dr. Diego Oneal thought. Patient denies abdominal pain. Advised patient that I agree with GI doctor. Patient advised if she notices more blood or blood in the toilet, she needs to contact her GI doctor ASAP. Pt verbalized understanding of information discussed w/ no further questions at this time.

## 2019-07-25 NOTE — TELEPHONE ENCOUNTER
Patient states she's returning Divina's call. Patient states she needs to discuss Rectal bleeding she is still having. Please call. Thank you      Patient has upcoming appt on 8/1/19 that patient had re-scheduled from 7/8/19.

## 2019-08-05 ENCOUNTER — HOSPITAL ENCOUNTER (OUTPATIENT)
Dept: WOUND CARE | Age: 84
Discharge: HOME | End: 2019-08-05
Payer: MEDICARE

## 2019-08-05 VITALS
DIASTOLIC BLOOD PRESSURE: 69 MMHG | SYSTOLIC BLOOD PRESSURE: 126 MMHG | RESPIRATION RATE: 16 BRPM | HEART RATE: 63 BPM | TEMPERATURE: 98.1 F

## 2019-08-05 PROCEDURE — 99213 OFFICE O/P EST LOW 20 MIN: CPT

## 2019-08-05 PROCEDURE — 74011000250 HC RX REV CODE- 250: Performed by: SURGERY

## 2019-08-05 RX ADMIN — Medication: at 09:25

## 2019-08-05 NOTE — PROGRESS NOTES
The patient is an 30-year-old woman who has been seen for a number of episodes of ulceration on the lower legs.  She had most recently been followed in January of this year and as of 01/17/2019 had full healing of ulcerations on both legs.  She has been treated with triple-layer compression wraps. She reports a painful callus on the left foot. 
  
The patient had mitral valve replacement in April 2019. 
  
The patient since being discharged from the wound care center has used her elastic compression stockings on both legs at all times when she is not in bed or bathing. 
  
She takes furosemide 80 mg by mouth each morning.  She reports that she does get a good response from the furosemide and reports that she is drinking about 5 glasses of water each day.   
  
The patient has been treated several times in the past with oral antibiotics (Augmentin) for episodes of cellulitis in her lower legs. 
  
Of note, the patient in 12/2017 had venous study which did not show evidence of incompetence of the deep veins or superficial veins in either lower extremity. 
  
On examination, the patient has 1+ edema in the left lower leg with no ulcers. No erythema.  She has 1+ edema in the right lower leg with no ulcers. No erythema. She has 2+ DP pulses right and left. There is a tender callus about 1 cm diameter on the plantar left foot at the first metatarsal head. No active ulcer. 
  
I have recommended that she use elastic compressionj stockings right and left when out of bed. I discussed limiting fluid intake because of edema. She does take diuretics.  
 
I have urged the patient to see a podiatrist ASAP regarding left foot callus and pressure point with prominence of metatarsal heads. 
  
No [address] [place] [address] follow up needed. 
  
FINAL DIAGNOSES:   
  
 R 60.0

## 2019-08-05 NOTE — WOUND CARE
Patient healed and discharged from clinic. Patient to see Podiatrist for diabetic foot care and assessment. Patient given name of couple of Podiatrist and the number. Patient was discharged with Self to home and was ambulatory. In stable condition with c/o pain:_0_/10_

## 2019-08-05 NOTE — WOUND CARE
08/05/19 2390 Wound Foot Plantar Date First Assessed/Time First Assessed: 08/05/19 0916   Present on Hospital Admission: Yes  Wound Approximate Age at First Assessment (Weeks): 1 weeks  Primary Wound Type: Callus/corn  Location: Foot  Wound Location Orientation: Plantar Dressing Status Removed Dressing Type Adhesive wound dressing (Band-Aid) Wound Length (cm) 0.6 cm Wound Width (cm) 0.5 cm Condition of Edges Calloused;Closed Drainage Amount None Wound Odor None Cleansing and Cleansing Agents  Normal saline Visit Vitals /69 Pulse 63 Temp 98.1 °F (36.7 °C) Resp 16 LLE Peripheral Vascular Capillary Refill: Less than/equal to 3 seconds (08/05/19 0915) Color: Appropriate for race;Pink (08/05/19 0915) Temperature: Warm (08/05/19 0915) Sensation: Present (08/05/19 0915) Pedal Pulse: Palpable (08/05/19 0915) RLE Peripheral Vascular Capillary Refill: Less than/equal to 3 seconds (08/05/19 0915) Color: Appropriate for race (08/05/19 0915) Temperature: Warm (08/05/19 0915) Sensation: Present (08/05/19 0915) Pedal Pulse: Palpable (08/05/19 0915)

## 2019-08-06 ENCOUNTER — PATIENT OUTREACH (OUTPATIENT)
Dept: INTERNAL MEDICINE CLINIC | Age: 84
End: 2019-08-06

## 2019-08-06 NOTE — PROGRESS NOTES
Goals Addressed This Visit's Progress Post Hospitalization  Attends follow-up appointments as directed. 7/25/2019  
- did not attend hospital f/u appt with Dr. Tyrell Cherry on 7/8/19; patient rescheduled this appointment to 8/1/19 
- followed by OP Wound care clinic for lower leg ulcerations. Attended appointments with [address] [place] [address] on 7/15/19 (establish care) and 7/22/19; has future f/u scheduled - to f/u with Dr. Demond Ibarra (GI) in two weeks post-discharge. Per GI Office staff, patient attended office visit with Dr. Demond Ibarra on 6/16/19 and next scheduled appt is 9/25/19 with dr. Demond Ibarra. - to f/u with Dr. Cassandra Davila (Cardiology) in 2 weeks in two weeks post-discharge; patient unable to recall whether she has attended office visit with Dr. Cassandra Davila post-discharge and is unable to find note of a previous appt on her calendar, reports next scheduled f/u is 12/19/19 for routine f/u. Patient is advised today of hospital f/u recommendation within two weeks of hospital discharge date; patient will contact Dr. Jc Garcia office today in order to schedule an appointment. Per [facility] staff, most recent office visit was 6/27/19; next scheduled appt 12/19/19 
-to follow with heme/onc as outpatient for iron deficiency; has appointment scheduled for 9/19/19 8/6/2019 
- did not attend scheduled appt with Dr. Tyrell Cherry on 8/1/19; per chart review, appt cancelled. No future appts with PCP are scheduled at this time. - pt reports appt on 8/1/19 was cancelled due to her having a headache and a sore foot 
- followed by Outpatient Wound Clinic for ulceration on the lower legs; most recent visit 8/5/19 and was advised to f/u with Podiatrist ASAP regarding left foot callus and pressure point with prominence of metatarsal heads.  
- scheduled to start outpatient cardiac rehab tomorrow, 8/7 
- attempted to rescheduled hospital f/u appt with PCP today, however first available appt is 9/5; NN will request LPN  assistance with appt scheduling for earlier date, if available. - pt has not scheduled appt with Podiatry yet as she was unaware of who to schedule an appt with. Per chart review, patient was previously followed by Dr. Treva Ventura DPM; [facility] [address] . Patient will contact Podiatry today in order to schedule an appointment. Future Appointments: 
Future Appointments Date Time Provider Dafne Jenkins 8/7/2019  1:00 PM [facility] CARDIOPULM SPECIALTY [facility] REG  
9/19/2019  1:15 PM Katerina Richardson MD [facility]  
9/20/2019 10:45 AM Katerina Richardson MD [facility]. [facility] Last Appointment With Me: 
Visit date not found Last Appointment My Department: 
6/13/2019

## 2019-08-07 ENCOUNTER — HOSPITAL ENCOUNTER (OUTPATIENT)
Dept: CARDIAC REHAB | Age: 84
Discharge: HOME | End: 2019-08-07
Payer: MEDICARE

## 2019-08-07 VITALS — WEIGHT: 204.6 LBS | BODY MASS INDEX: 31.01 KG/M2 | HEIGHT: 68 IN

## 2019-08-07 PROCEDURE — 93798 PHYS/QHP OP CAR RHAB W/ECG: CPT

## 2019-08-07 NOTE — CARDIO/PULMONARY
Cardiopulmonary Rehab Orientation: Met with Kennedy Rene  And family member for the initial session. Ms Jayy Mullins is a 82year-old patient of Dr. Maximo Quinn, who presents to rehab for cardiac conditioning and strengthening, S/P MVR on 4/22/2019; LVEF 65-70%  History also includes TIA,PVD,HTN,A-fib,GIB,Type 2 diabetes,Hypothyroidism,iron deficiency anemia,gou,occassional memory loss; Allergic to Actos,codeine,hydrocodone. Immunization for influenza vaccine pt states she does not take vaccines. Pt is former smoker. Exercise at home includes none regular at this time Limitations: recent wound care of sores on leg and completed wound care program 1 week ago. . Patient was given an educational notebook. Psychosocial: pt is  with good family support system.

## 2019-08-08 ENCOUNTER — APPOINTMENT (OUTPATIENT)
Dept: CARDIAC REHAB | Age: 84
End: 2019-08-08
Payer: MEDICARE

## 2019-08-08 ENCOUNTER — PATIENT OUTREACH (OUTPATIENT)
Dept: INTERNAL MEDICINE CLINIC | Age: 84
End: 2019-08-08

## 2019-08-08 RX ORDER — AMIODARONE HYDROCHLORIDE 200 MG/1
TABLET ORAL
Qty: 180 TAB | Refills: 0 | Status: SHIPPED | OUTPATIENT
Start: 2019-08-08 | End: 2019-08-21

## 2019-08-08 NOTE — PROGRESS NOTES
Goals Addressed This Visit's Progress Post Hospitalization  Attends follow-up appointments as directed. 7/25/2019  
- did not attend hospital f/u appt with Dr. Princess Quan on 7/8/19; patient rescheduled this appointment to 8/1/19 
- followed by OP Wound care clinic for lower leg ulcerations. Attended appointments with [address] [place] [address] on 7/15/19 (establish care) and 7/22/19; has future f/u scheduled - to f/u with Dr. Jessica Owen (GI) in two weeks post-discharge. Per GI Office staff, patient attended office visit with Dr. Jessica Owen on 6/16/19 and next scheduled appt is 9/25/19 with dr. Jessica Owen. - to f/u with Dr. Rg Winter (Cardiology) in 2 weeks in two weeks post-discharge; patient unable to recall whether she has attended office visit with Dr. Rg Winter post-discharge and is unable to find note of a previous appt on her calendar, reports next scheduled f/u is 12/19/19 for routine f/u. Patient is advised today of hospital f/u recommendation within two weeks of hospital discharge date; patient will contact Dr. Kaveh Ivan office today in order to schedule an appointment. Per [facility] staff, most recent office visit was 6/27/19; next scheduled appt 12/19/19 
-to follow with heme/onc as outpatient for iron deficiency; has appointment scheduled for 9/19/19 8/6/2019 
- did not attend scheduled appt with Dr. Princess Quan on 8/1/19; per chart review, appt cancelled. No future appts with PCP are scheduled at this time. - pt reports appt on 8/1/19 was cancelled due to her having a headache and a sore foot 
- followed by Outpatient Wound Clinic for ulceration on the lower legs; most recent visit 8/5/19 and was advised to f/u with Podiatrist ASAP regarding left foot callus and pressure point with prominence of metatarsal heads.  
- scheduled to start outpatient cardiac rehab tomorrow, 8/7 
- attempted to rescheduled hospital f/u appt with PCP today, however first available appt is 9/5; NN will request LPN  assistance with appt scheduling for earlier date, if available. - pt has not scheduled appt with Podiatry yet as she was unaware of who to schedule an appt with. Per chart review, patient was previously followed by Dr. Lilyan Halsted, DPM; provided patient with contact information for [facility] and [address] . Patient will contact Podiatry today in order to schedule an appointment. 8/8/2019 
- pt scheduled for appointment with Dr. Marycruz Triplett 8/14/19; pt declines this appt due to having another appt scheduled for 8/14. Awaiting LPN  response regarding next available appt date. 
- has appt scheduled with Podiatry on 8/14/19 at 2:15 pm 
  
  
 
 
 
 
Future Appointments: 
Future Appointments Date Time Provider Dafne Jenkins 8/13/2019  1:00 PM [facility] CARDIOPULM EXERCISE [facility]  
8/14/2019  1:00 PM MRM [address] [place] [address]  
8/14/2019  1:00 PM [facility] CARDIOPULM EXERCISE [facility]  
8/14/2019  3:30 PM Teresita Mayer MD [address]  
8/15/2019  1:00 PM [facility] CARDIOPULM EXERCISE [facility]  
8/20/2019  1:00 PM [facility] CARDIOPULM EXERCISE [facility]  
8/21/2019  1:00 PM MRM [address] [place] [address]  
8/21/2019  1:00 PM [facility] CARDIOPULM EXERCISE [facility]  
8/22/2019  1:00 PM [facility] CARDIOPULM EXERCISE [facility]  
8/27/2019  1:00 PM MRM [id number] [facility] REG  
8/28/2019  1:00 PM MRM [address] [place] [address]  
8/28/2019  1:00 PM MRM [id number] [address] REG  
8/29/2019  1:00 PM MRM [id number] [facility] REG  
9/3/2019  1:00 PM MRM [id number] [facility] REG  
9/4/2019  1:00 PM MRM [address] [place] [address]  
9/4/2019  1:00 PM [facility] CARDIOPULM EXERCISE [facility]  
9/5/2019  1:00 PM MRM [id number]  
 9/10/2019  1:00 PM [facility]ARDIOPULM [facility]ERCISE [facility]  
9/11/2019  1:00 PM  [facility] [address] [facility]  
9/11/2019  1:00 PM [facility] CARDIOPULM EXERCISE [facility]  
9/18/2019  1:00 PM  [facility] [address] [facility]  
9/19/2019  1:15 PM MD [address]  
9/20/2019 10:45 AM MD Rahul Flynn.   
9/25/2019  1:00 PM  [facility]  
10/2/2019  1:00 PM  [facility]  
10/9/2019  1:00 PM  [facility]  
10/16/2019  1:00 PM  [facility]  
10/23/2019  1:00 PM  [facility]  
10/30/2019  1:00 PM  [facility]  
11/6/2019  2:00 PM [facility] NUTRITION [facility] Last Appointment With Me: 
Visit date not found Last Appointment My Department: 
6/13/2019

## 2019-08-09 ENCOUNTER — TELEPHONE (OUTPATIENT)
Dept: INTERNAL MEDICINE CLINIC | Age: 84
End: 2019-08-09

## 2019-08-09 ENCOUNTER — PATIENT OUTREACH (OUTPATIENT)
Dept: INTERNAL MEDICINE CLINIC | Age: 84
End: 2019-08-09

## 2019-08-09 NOTE — TELEPHONE ENCOUNTER
Notify [facility]. [phone]. Received [facility] certification for face to face visit. Patient is not scheduled to see me until 9/9.

## 2019-08-09 NOTE — TELEPHONE ENCOUNTER
[facility] Advised that Dr. Corrina Andrade received the home health certification that was faxed. Advised that this can not be completed until the patient is seen on 09/09/19.

## 2019-08-09 NOTE — PROGRESS NOTES
NN attempted patient outreach today in order to notify of scheduled appt with PCP for 9/9/19; lvm requesting a return phone call to this NN. Goals Addressed This Visit's Progress Post Hospitalization  Attends follow-up appointments as directed. 7/25/2019  
- did not attend hospital f/u appt with Dr. Guillaume Vaughan on 7/8/19; patient rescheduled this appointment to 8/1/19 
- followed by [facility] clinic for lower leg ulcerations. Attended appointments with [address] [place] [address] on 7/15/19 (establish care) and 7/22/19; has future f/u scheduled - to f/u with Dr. Avani Vela (GI) in two weeks post-discharge. Per GI Office staff, patient attended office visit with Dr. Avani Vela on 6/16/19 and next scheduled appt is 9/25/19 with dr. Avani Vela. - to f/u with Dr. Leland Avila (Cardiology) in 2 weeks in two weeks post-discharge; patient unable to recall whether she has attended office visit with Dr. Leland Avila post-discharge and is unable to find note of a previous appt on her calendar, reports next scheduled f/u is 12/19/19 for routine f/u. Patient is advised today of hospital f/u recommendation within two weeks of hospital discharge date; patient will contact Dr. Leonel Vazquez office today in order to schedule an appointment. Per S staff, most recent office visit was 6/27/19; next scheduled appt 12/19/19 
-to follow with heme/onc as outpatient for iron deficiency; has appointment scheduled for 9/19/19 8/6/2019 
- did not attend scheduled appt with Dr. Guillaume Vaughan on 8/1/19; per chart review, appt cancelled. No future appts with PCP are scheduled at this time. - pt reports appt on 8/1/19 was cancelled due to her having a headache and a sore foot 
- followed by [facility] for ulceration on the lower legs; most recent visit 8/5/19 and was advised to f/u with Podiatrist ASAP regarding left foot callus and pressure point with prominence of metatarsal heads. - scheduled to start outpatient cardiac rehab tomorrow, 8/7 
- attempted to rescheduled hospital f/u appt with PCP today, however first available appt is 9/5; NN will request LPN  assistance with appt scheduling for earlier date, if available. - pt has not scheduled appt with Podiatry yet as she was unaware of who to schedule an appt with. Per chart review, patient was previously followed by Dr. Lenin Benavidez DPM; [facility] . Patient will contact Podiatry today in order to schedule an appointment. 8/8/2019 
- pt scheduled for appointment with Dr. Osvaldo Kessler 8/14/19; pt declines this appt due to having another appt scheduled for 8/14. Awaiting LPN  response regarding next available appt date. 
- has appt scheduled with Podiatry on 8/14/19 at 2:15 pm 
 
8/9/2019 
- per LPN , first available appt date with PCP is 9/9/19 and appt has been scheduled Future Appointments: 
Future Appointments Date Time Provider Dafne Jenkins 8/13/2019  1:00 PM MRM CARDIOPULM EXERCISE [facility]  
8/14/2019  1:00 PM MRM [address] [place] [address]  
8/14/2019  1:00 PM MRM CARDIOPULM EXERCISE [facility]  
8/15/2019  1:00 PM MRM CARDIOPULM EXERCISE [facility]  
8/20/2019  1:00 PM MRM CARDIOPULM EXERCISE [facility]  
8/21/2019  1:00 PM MRM [address] [place] [address]  
8/21/2019  1:00 PM MRM CARDIOPULM EXERCISE [facility]  
8/22/2019  1:00 PM MRM CARDIOPULM EXERCISE [facility]  
8/27/2019  1:00 PM MRM CARDIOPULM EXERCISE [facility]  
8/28/2019  1:00 PM MRM [address] [place] [address]  
8/28/2019  1:00 PM MRM CARDIOPULM EXERCISE [facility]  
8/29/2019  1:00 PM MRM CARDIOPULM EXERCISE [facility]  
9/3/2019  1:00 PM TINO CARDIOPULM EXERCISE [facility]  
9/4/2019  1:00 PM [facility] [facility]  
 9/4/2019  1:00 PM [facility] CARDIOPULM EXERCISE [facility]  
9/5/2019  1:00 PM MRM CARDIOPULM EXERCISE [facility]  
9/9/2019 10:30 AM [facility]ésar Weems [facility]  
9/10/2019  1:00 PM MR[facility] CARDIOPULM EXERCISE [facility]  
9/11/2019  [facility]:00 [facility]  
9/11/2019  1:00 PM MRM CARDIOPULM EXERCISE [facility]  
9/18/2019  1:00 PM  [address] [facility]  
9/19/2019  1:15 PM MD [address]  
9/20/2019 10:45 AM MD [address]  
9/25/2019  1:00 PM  [employer] [facility]  
10/2/2019  1:00 PM  [facility]  
10/9/2019  1:00 PM  [facility]  
10/16/2019  1:00 PM  [facility]  
10/23/2019  1:00 PM  [facility]  
10/30/2019  1:00 PM  [facility]  
11/6/2019  2:00 PM MRM NUTRITION [facility] Last Appointment With Me: 
Visit date not found Last Appointment My Department: 
6/13/2019

## 2019-08-21 RX ORDER — SUCRALFATE 1 G/1
1 TABLET ORAL 4 TIMES DAILY
COMMUNITY

## 2019-08-22 ENCOUNTER — HOSPITAL ENCOUNTER (OUTPATIENT)
Age: 84
Setting detail: OUTPATIENT SURGERY
Discharge: HOME | End: 2019-08-22
Attending: SPECIALIST | Admitting: SPECIALIST
Payer: MEDICARE

## 2019-08-22 ENCOUNTER — ANESTHESIA (OUTPATIENT)
Dept: ENDOSCOPY | Age: 84
End: 2019-08-22
Payer: MEDICARE

## 2019-08-22 ENCOUNTER — ANESTHESIA EVENT (OUTPATIENT)
Dept: ENDOSCOPY | Age: 84
End: 2019-08-22
Payer: MEDICARE

## 2019-08-22 VITALS
HEART RATE: 72 BPM | WEIGHT: 203 LBS | RESPIRATION RATE: 21 BRPM | TEMPERATURE: 97.4 F | DIASTOLIC BLOOD PRESSURE: 64 MMHG | SYSTOLIC BLOOD PRESSURE: 124 MMHG | BODY MASS INDEX: 30.07 KG/M2 | HEIGHT: 69 IN | OXYGEN SATURATION: 97 %

## 2019-08-22 LAB
GLUCOSE BLD STRIP.AUTO-MCNC: 120 MG/DL (ref 65–100)
GLUCOSE BLD STRIP.AUTO-MCNC: 121 MG/DL (ref 65–100)
SERVICE CMNT-IMP: ABNORMAL
SERVICE CMNT-IMP: ABNORMAL

## 2019-08-22 PROCEDURE — 76060000031 HC ANESTHESIA FIRST 0.5 HR: Performed by: SPECIALIST

## 2019-08-22 PROCEDURE — 74011250636 HC RX REV CODE- 250/636: Performed by: ANESTHESIOLOGY

## 2019-08-22 PROCEDURE — 74011250636 HC RX REV CODE- 250/636: Performed by: SPECIALIST

## 2019-08-22 PROCEDURE — 82962 GLUCOSE BLOOD TEST: CPT

## 2019-08-22 PROCEDURE — 76040000019: Performed by: SPECIALIST

## 2019-08-22 RX ORDER — MIDAZOLAM HYDROCHLORIDE 1 MG/ML
.25-5 INJECTION, SOLUTION INTRAMUSCULAR; INTRAVENOUS
Status: DISCONTINUED | OUTPATIENT
Start: 2019-08-22 | End: 2019-08-22 | Stop reason: HOSPADM

## 2019-08-22 RX ORDER — FLUMAZENIL 0.1 MG/ML
0.2 INJECTION INTRAVENOUS
Status: DISCONTINUED | OUTPATIENT
Start: 2019-08-22 | End: 2019-08-22 | Stop reason: HOSPADM

## 2019-08-22 RX ORDER — LIDOCAINE HYDROCHLORIDE 20 MG/ML
INJECTION, SOLUTION EPIDURAL; INFILTRATION; INTRACAUDAL; PERINEURAL AS NEEDED
Status: DISCONTINUED | OUTPATIENT
Start: 2019-08-22 | End: 2019-08-22 | Stop reason: HOSPADM

## 2019-08-22 RX ORDER — SODIUM CHLORIDE 9 MG/ML
75 INJECTION, SOLUTION INTRAVENOUS CONTINUOUS
Status: DISCONTINUED | OUTPATIENT
Start: 2019-08-22 | End: 2019-08-22 | Stop reason: HOSPADM

## 2019-08-22 RX ORDER — NALOXONE HYDROCHLORIDE 0.4 MG/ML
0.4 INJECTION, SOLUTION INTRAMUSCULAR; INTRAVENOUS; SUBCUTANEOUS
Status: DISCONTINUED | OUTPATIENT
Start: 2019-08-22 | End: 2019-08-22 | Stop reason: HOSPADM

## 2019-08-22 RX ORDER — SODIUM CHLORIDE 0.9 % (FLUSH) 0.9 %
5-40 SYRINGE (ML) INJECTION EVERY 8 HOURS
Status: DISCONTINUED | OUTPATIENT
Start: 2019-08-22 | End: 2019-08-22 | Stop reason: HOSPADM

## 2019-08-22 RX ORDER — DEXTROMETHORPHAN/PSEUDOEPHED 2.5-7.5/.8
1.2 DROPS ORAL
Status: DISCONTINUED | OUTPATIENT
Start: 2019-08-22 | End: 2019-08-22 | Stop reason: HOSPADM

## 2019-08-22 RX ORDER — ATROPINE SULFATE 0.1 MG/ML
0.5 INJECTION INTRAVENOUS
Status: DISCONTINUED | OUTPATIENT
Start: 2019-08-22 | End: 2019-08-22 | Stop reason: HOSPADM

## 2019-08-22 RX ORDER — PROPOFOL 10 MG/ML
INJECTION, EMULSION INTRAVENOUS AS NEEDED
Status: DISCONTINUED | OUTPATIENT
Start: 2019-08-22 | End: 2019-08-22 | Stop reason: HOSPADM

## 2019-08-22 RX ORDER — SODIUM CHLORIDE 0.9 % (FLUSH) 0.9 %
5-40 SYRINGE (ML) INJECTION AS NEEDED
Status: DISCONTINUED | OUTPATIENT
Start: 2019-08-22 | End: 2019-08-22 | Stop reason: HOSPADM

## 2019-08-22 RX ADMIN — LIDOCAINE HYDROCHLORIDE 100 MG: 20 INJECTION, SOLUTION EPIDURAL; INFILTRATION; INTRACAUDAL; PERINEURAL at 11:55

## 2019-08-22 RX ADMIN — PROPOFOL 100 MG: 10 INJECTION, EMULSION INTRAVENOUS at 12:02

## 2019-08-22 RX ADMIN — SODIUM CHLORIDE 75 ML/HR: 900 INJECTION, SOLUTION INTRAVENOUS at 11:27

## 2019-08-22 NOTE — ANESTHESIA PREPROCEDURE EVALUATION
Anesthetic History   No history of anesthetic complications            Review of Systems / Medical History  Patient summary reviewed, nursing notes reviewed and pertinent labs reviewed    Pulmonary  Within defined limits              Comments: Former smoker - Quit 1967 - 5 pack years   Neuro/Psych       CVA  TIA     Cardiovascular    Hypertension  Valvular problems/murmurs: mitral stenosis and mitral insufficiency    CHF  Dysrhythmias : atrial fibrillation and atrial flutter  CAD, cardiac stents and hyperlipidemia    Exercise tolerance: <4 METS  Comments: S/P MVR 04/2019    TTE (1/22/18): Mild MR, mild-to-moderate MS, EF=65%   GI/Hepatic/Renal     GERD    Renal disease: CRI  PUD    Comments: Dysphagia  Esophageal Stricture  Reflux Esophagitis Endo/Other    Diabetes: well controlled, type 2  Hypothyroidism: well controlled  Obesity, arthritis, cancer (bladder) and anemia  Pertinent negatives: No morbid obesity  Comments:  Thrombocytopenia   Other Findings   Comments: Melena  Hgb 8.9 on 6/24  Thrombocytopenia  Gout           Physical Exam    Airway  Mallampati: II  TM Distance: 4 - 6 cm  Neck ROM: normal range of motion   Mouth opening: Normal     Cardiovascular    Rhythm: regular  Rate: normal    Murmur: Grade 3, Mitral area     Dental    Dentition: Lower partial plate, Caps/crowns and Loose teeth     Pulmonary  Breath sounds clear to auscultation               Abdominal  GI exam deferred       Other Findings            Anesthetic Plan    ASA: 3  Anesthesia type: MAC and total IV anesthesia          Induction: Intravenous  Anesthetic plan and risks discussed with: Patient

## 2019-08-22 NOTE — PROCEDURES
Esophagogastroduodenoscopy    Indications:  History of iron deficiency anemia  History of GAVE, for repeat egd     Medications:  See anesthesia form    Assistants:  Sony Shaw Rn      Post procedure diagnosis:  Hiatal hernia, erythema antrum    Description of Procedure:    Prior to the procedure its objectives, risks, consequences and alternatives were discussed with the patient who then elected to proceed. The Olympus video endoscope was inserted under direct vision into the mouth and then into the esophagus. The esophagus looked normal.    The z-line was located at 38 cm. A two cm hiatal hernia was present with the diaphragm pinch at 40 cm. There were no diagnostic abnormalities of the body, fundus, antrum, cardia and incisura of the stomach. This included direct and retroflexion examination. There was some erythema of the antrum but no gave. The first and second portion of the duodenum appeared normal.          Complications: There were no apparent complications and the patient tolerated the procedure well.         Implants:  none    Estimated Blood Loss:  none  Specimens Removed:  none  Impressions:  Erythema of antrum  Hiatal hernia      Signed By: Fabi Mendiola MD                        August 22, 2019     12:04 PM

## 2019-08-22 NOTE — ROUTINE PROCESS
Jessica Wing  1935  [id number]    Situation:  Verbal report received from: Akosua Jordan RN  Procedure: Procedure(s):  ESOPHAGOGASTRODUODENOSCOPY (EGD) WITH CAUTERY    Background:    Preoperative diagnosis: IRON DEFICENCY ANEMIA DUE TO BLOOD LOSS  Postoperative diagnosis: Hiatal hernia, erythema antrum    :  Dr. Kristina Shah  Assistant(s): Endoscopy RN-1: Karla Singleton RN  Endoscopy RN-2: Buffy Mcmanus    Specimens: * No specimens in log *  H. Pylori  no    Assessment:  Intra-procedure medications     Anesthesia gave intra-procedure sedation and medications, see anesthesia flow sheet     Intravenous fluids: NS@ KVO     Vital signs stable     Abdominal assessment: round and soft     Recommendation:  Discharge patient per MD order.   Family or Friend   Permission to share finding with family or friend yes

## 2019-08-22 NOTE — DISCHARGE INSTRUCTIONS
Jessica Wing  [id number]  1935              Procedure  Discharge Instructions:      Discomfort:  Redness at IV site- apply warm compress to area; if redness or soreness persist- contact your physician  There may be a slight amount of blood passed from the rectum  Gaseous discomfort- walking, belching will help relieve any discomfort  You may not operate a vehicle for 12 hours  You may not engage in an occupation involving machinery or appliances for rest of today  You may not drink alcoholic beverages for at least 12 hours  Avoid making any critical decisions for at least 24 hour  DIET:   You may resume your normal diet today. You should not overeat or \"feast\" today as your abdomen may become distended or uncomfortable. MEDICATIONS:   I reconciled this list from the list you gave us when you came today for the procedure. Please clarify with me, your primary care physician and the nurse who is discharging you if we have any discrepancies. Aspirin and or non-steroidal medication (Ibuprofen, Motrin, naproxen, etc.) is ok in limited quantities. ACTIVITY:  You may resume your normal daily activities it is recommended that you spend the remainder of the day resting -  avoid any strenuous activity. CALL M.D. ANY SIGN OF:  Increasing pain, nausea, vomiting  Abdominal distension (swelling)  New increased bleeding (oral or rectal)  Fever (chills)  Pain in chest area  Bloody discharge from nose or mouth  Shortness of breath          Follow-up Instructions:   No biopsies. Resume all blood thinners today. Telephone #  [phone]  Follow up visit as needed previously scheduled.       Daina Wilson MD  12:06 PM  8/22/2019

## 2019-08-22 NOTE — H&P
Pre-endoscopy H and P    The patient was seen and examined in the endoscopy suite. The airway was assessed and docuemented. The problem list, past medical history, and medications were reviewed. 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    Advance directive in chart Z78.9    Type 2 diabetes, controlled, with renal manifestation ([facility]) E11.29    Chronic atrial fibrillation ([facility]) I48.2    Mixed hyperlipidemia E78.2    Atherosclerosis of native coronary artery without angina pectoris I25.10    Hypothyroidism, acquired, autoimmune E06.3    Heart valve problem I38    Mitral regurgitation I34.0    S/P MVR (mitral valve repair) Z98.890    Active advance directive on file Z78.9    Non-rheumatic mitral regurgitation I34.0    Heart failure ([facility]) I50.9    Bilateral leg edema R60.0    Esophageal stricture K22.2    Dysphagia R13.10    GI bleeding K92.2    Mitral stenosis I05.0    S/P MVR (mitral valve replacement) Z95.2    GIB (gastrointestinal bleeding) K92.2    Melena K92.1    Rectal bleeding K62.5    Lower abdominal pain R10.30    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    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, with fat layer exposed ([facility]) L97.912    Bilateral lower leg cellulitis L03.116, L03.115     Social History     Socioeconomic History    Marital status:      Spouse name: Not on file    Number of children: 2    Years of education: 15    Highest education level: High school graduate Occupational History    Not on file   Social Needs    Financial resource strain: Not hard at all   [place] insecurity:     Worry: Never true     Inability: Never true   restOpolis needs:     Medical: No     Non-medical: No   Tobacco Use    Smoking status: Former Smoker     Packs/day: 0.50     Years: 10.00     Pack years: 5.00     Types: Cigarettes     Last attempt to quit: 1967     Years since quittin.6    Smokeless tobacco: Never Used   Substance and Sexual Activity    Alcohol use: No     Alcohol/week: 0.0 standard drinks    Drug use: Never    Sexual activity: Not Currently   Lifestyle    Physical activity:     Days per week: 0 days     Minutes per session: 0 min    Stress: Only a little   Relationships    Social connections:     Talks on phone: Three times a week     Gets together: Three times a week     Attends [employer] service: 1 to 4 times per year     Active member of club or organization: No     Attends meetings of clubs or organizations: Never     Relationship status:      Intimate partner violence:     Fear of current or ex partner: No     Emotionally abused: No     Physically abused: No     Forced sexual activity: No   Other Topics Concern     Service Not Asked    Blood Transfusions Not Asked    Caffeine Concern Not Asked    Occupational Exposure Not Asked    Hobby Hazards Not Asked    Sleep Concern Not Asked    Stress Concern Not Asked    Weight Concern Yes    Special Diet Not Asked    Back Care Not Asked    Exercise Not Asked    Bike Helmet Not Asked    [address] Not Asked    Self-Exams Not Asked   Social History Narrative    Not on file     Past Medical History:   Diagnosis Date    Arthritis     KNEES    Atrial fibrillation ([facility] 10/29/2009    Bladder cancer ([facility]     CAD (coronary artery disease)     STENT PER PATIENT    Chronic pain     KNEES    Coagulation disorder ([facility]     Chronic prophylactic anticoagulation med    Colon polyps     Diabetes ([facility]     IDDM    GAVE (gastric antral vascular ectasia) 6/19/2019    bx 6.2019:    Gastric, biopsy:  Mild capillary ectasia and congestion, compatible with gastric antral vascular ectasia (GAVE), negative for background gastritis. 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 I[place]. 4/23/2009    TIA (transient ischemic attack)     Ulcer of right lower extremity, limited to breakdown of skin ([facility] 7/5/2018     The patient has a family history of na    Prior to Admission Medications   Prescriptions Last Dose Informant Patient Reported? Taking? ADAN PEN NEEDLE 32 gauge x 5/32\" ndle 8/21/2019 at Unknown time  No Yes   Sig: USE WITH PEN TWICE DAILY   acetaminophen (TYLENOL) 325 mg tablet Unknown at Unknown time Child Yes No   Sig: Take 325 mg by mouth every four (4) hours as needed for Pain. allopurinol (ZYLOPRIM) 100 mg tablet 8/21/2019 at Unknown time Child No Yes   Sig: TAKE TWO TABLETS BY MOUTH DAILY   amiodarone (CORDARONE) 200 mg tablet 8/21/2019 at Unknown time  No Yes   Sig: Take 1 Tab by mouth daily. atorvastatin (LIPITOR) 80 mg tablet 8/21/2019 at Unknown time Child No Yes   Sig: TAKE ONE TABLET BY MOUTH EVERY EVENING   ferrous sulfate 325 mg (65 mg iron) tablet 8/20/2019 Child No Yes   Sig: Take 1 Tab by mouth daily (with breakfast). furosemide (LASIX) 40 mg tablet 8/21/2019 at Unknown time Child Yes Yes   Sig: Take 80 mg by mouth Daily (before breakfast).  Patient takes 80 mg with breakfast   furosemide (LASIX) 40 mg tablet 8/21/2019 at Unknown time Child Yes Yes   Sig: Take 40 mg by mouth daily (with lunch). Patient takes 80 mg with breakfast and 40 mg at lunch   insulin NPH (HUMULIN N NPH INSULIN KWIKPEN) 100 unit/mL (3 mL) inpn 2019 at Unknown time Child Yes Yes   Si Units by SubCUTAneous route daily. insulin NPH (HUMULIN N NPH INSULIN KWIKPEN) 100 unit/mL (3 mL) inpn 2019 at Unknown time Child Yes Yes   Si Units by SubCUTAneous route every evening. levothyroxine (SYNTHROID) 125 mcg tablet 2019 at Unknown time Child No Yes   Sig: TAKE ONE TABLET BY MOUTH DAILY   Patient taking differently: TAKE ONE TABLET BY MOUTH DAILY BEFORE BREAKFAST   loratadine (CLARITIN) 10 mg tablet Unknown at Unknown time Child Yes No   Sig: Take 10 mg by mouth daily. metoprolol tartrate (LOPRESSOR) 25 mg tablet 2019 at Unknown time Child Yes Yes   Sig: Take 25 mg by mouth two (2) times a day. pantoprazole (PROTONIX) 40 mg tablet 2019 at Unknown time Child No Yes   Sig: Take 1 Tab by mouth daily. potassium chloride SR (KLOR-CON 10) 10 mEq tablet 2019 at Unknown time Child No Yes   Sig: Take 1 Tab by mouth daily. rivaroxaban (XARELTO) 15 mg tab tablet 2019  No Yes   Sig: Take 1 Tab by mouth daily (with dinner). Start taking 2019   sucralfate (CARAFATE) 1 gram tablet 2019 at Unknown time  Yes Yes   Sig: Take 1 g by mouth four (4) times daily. Facility-Administered Medications: None           The review of systems is:  postiive for shortness of breath and negative for chest pain      The heart, lungs, and mental status were satisfactory for the administration of anesthesia sedation and for the procedure. I discussed with the patient the objectives, risks, consequences and alternatives to the procedure.       Karina Marie MD  2019  11:49 AM

## 2019-08-23 NOTE — ANESTHESIA POSTPROCEDURE EVALUATION
Procedure(s):  ESOPHAGOGASTRODUODENOSCOPY (EGD) WITH CAUTERY. total IV anesthesia    Anesthesia Post Evaluation        Patient location during evaluation: PACU  Note status: Adequate. Level of consciousness: responsive to verbal stimuli and sleepy but conscious  Pain management: satisfactory to patient  Airway patency: patent  Anesthetic complications: no  Cardiovascular status: acceptable  Respiratory status: acceptable  Hydration status: acceptable  Comments: +Post-Anesthesia Evaluation and Assessment    Patient: Morgan Rodriguez MRN: [id number]  SSN: [id number]   YOB: 1935  Age: 82 y.o. Sex: female      Cardiovascular Function/Vital Signs    /64   Pulse 72   Temp 36.3 °C (97.4 °F)   Resp 21   Ht 5' 9\" (1.753 m)   Wt 92.1 kg (203 lb)   SpO2 97%   Breastfeeding? No   BMI 29.98 kg/m²     Patient is status post Procedure(s):  ESOPHAGOGASTRODUODENOSCOPY (EGD) WITH CAUTERY. Nausea/Vomiting: Controlled. Postoperative hydration reviewed and adequate. Pain:  Pain Scale 1: Numeric (0 - 10) (08/22/19 1228)  Pain Intensity 1: 0 (08/22/19 1228)   Managed. Neurological Status: At baseline. Mental Status and Level of Consciousness: Arousable. Pulmonary Status:   O2 Device: Room air (08/22/19 1228)   Adequate oxygenation and airway patent. Complications related to anesthesia: None    Post-anesthesia assessment completed. No concerns. Signed By: Laura Buck DO    8/23/2019  Post anesthesia nausea and vomiting:  controlled      Vitals Value Taken Time   BP 0/0 8/22/2019 12:32 PM   Temp 36.3 °C (97.4 °F) 8/22/2019 12:15 PM   Pulse 0 8/22/2019 12:32 PM   Resp 0 8/22/2019 12:32 PM   SpO2 0 % 8/22/2019 12:32 PM   Vitals shown include unvalidated device data.

## 2019-08-27 ENCOUNTER — HOSPITAL ENCOUNTER (OUTPATIENT)
Dept: CARDIAC REHAB | Age: 84
End: 2019-08-27
Payer: MEDICARE

## 2019-09-05 ENCOUNTER — PATIENT OUTREACH (OUTPATIENT)
Dept: INTERNAL MEDICINE CLINIC | Age: 84
End: 2019-09-05

## 2019-09-05 NOTE — PROGRESS NOTES
Goals Addressed                 This Visit's Progress       Post Hospitalization     Attends follow-up appointments as directed. On track     7/25/2019   - did not attend hospital f/u appt with Dr. Mary España on 7/8/19; patient rescheduled this appointment to 8/1/19  - followed by OP Wound care clinic for lower leg ulcerations. Attended appointments with [address] [place] [address] on 7/15/19 (establish care) and 7/22/19; has future f/u scheduled  - to f/u with Dr. Shana Hart (GI) in two weeks post-discharge. Per GI Office staff, patient attended office visit with Dr. Shana Hart on 6/16/19 and next scheduled appt is 9/25/19 with dr. Shana Hart. - to f/u with Dr. Angela Lopez (Cardiology) in 2 weeks in two weeks post-discharge; patient unable to recall whether she has attended office visit with Dr. Angela Lopez post-discharge and is unable to find note of a previous appt on her calendar, reports next scheduled f/u is 12/19/19 for routine f/u. Patient is advised today of hospital f/u recommendation within two weeks of hospital discharge date; patient will contact Dr. Mariana Aiken office today in order to schedule an appointment. Per [facility] staff, most recent office visit was 6/27/19; next scheduled appt 12/19/19  -to follow with heme/onc as outpatient for iron deficiency; has appointment scheduled for 9/19/19 8/6/2019  - did not attend scheduled appt with Dr. Mary España on 8/1/19; per chart review, appt cancelled. No future appts with PCP are scheduled at this time. - pt reports appt on 8/1/19 was cancelled due to her having a headache and a sore foot  - followed by [facility] for ulceration on the lower legs; most recent visit 8/5/19 and was advised to f/u with Podiatrist ASAP regarding left foot callus and pressure point with prominence of metatarsal heads.   - scheduled to start outpatient cardiac rehab tomorrow, 8/7  - attempted to rescheduled hospital f/u appt with PCP today, however first available appt is 9/5; NN will request LPN  assistance with appt scheduling for earlier date, if available. - pt has not scheduled appt with Podiatry yet as she was unaware of who to schedule an appt with. Per chart review, patient was previously followed by Dr. Cliff Edgar DPM; provided patient with contact information for [facility] and [address]. Patient will contact Podiatry today in order to schedule an appointment. 8/8/2019  - pt scheduled for appointment with Dr. Shahid 8/14/19; pt declines this appt due to having another appt scheduled for 8/14.  Awaiting LPN  response regarding next available appt date.  - has appt scheduled with Podiatry on 8/14/19 at 2:15 pm    8/9/2019  - per LPN , first available appt date with PCP is 9/9/19 and appt has been scheduled    9/5/2019  - notified patient of scheduled appt with PCP 9/9/19                Future Appointments:  Future Appointments   Date Time Provider Dafne Jenkins   9/5/2019  1:00 PM MR[facility] [id number] [address] REG   9/9/2019 10:30 AM Cande Mai MD [address]   9/10/2019  1:00 PM MRM CARDIOPULM EXERCISE [facility] REG   9/11/2019  1:00 PM MRM [address] [place] [address]   9/11/2019  1:00 PM MR[facility] [id number] [address] REG   9/18/2019  1:00 PM MRM [address] [place] [address]   9/19/2019  1:15 PM Alan Wilburn MD [facility]   9/20/2019 10:45 AM [facility]   9/25/2019  1:00 PM MRM [address] [place] [address]   10/2/2019  1:00 PM MRM [address] [place] [address]   10/9/2019  1:00 PM MRM [address] [place] [address]   10/16/2019  1:00 PM MRM [address] [place] [address]   10/23/2019  1:00 PM MRM [address] [place] [address]   10/30/2019  1:00 PM MRM [address] [place] [address]   11/6/2019  2:00 PM  N Cassy Appointment With Me:  Visit date not found     Last Appointment My Department:  6/13/2019

## 2019-09-09 ENCOUNTER — OFFICE VISIT (OUTPATIENT)
Dept: INTERNAL MEDICINE CLINIC | Age: 84
End: 2019-09-09

## 2019-09-09 ENCOUNTER — HOSPITAL ENCOUNTER (OUTPATIENT)
Dept: LAB | Age: 84
Discharge: HOME | End: 2019-09-09
Payer: MEDICARE

## 2019-09-09 ENCOUNTER — HOSPITAL ENCOUNTER (INPATIENT)
Age: 84
LOS: 4 days | Discharge: HOME | DRG: 378 | End: 2019-09-13
Attending: EMERGENCY MEDICINE | Admitting: INTERNAL MEDICINE
Payer: MEDICARE

## 2019-09-09 VITALS
BODY MASS INDEX: 30.51 KG/M2 | DIASTOLIC BLOOD PRESSURE: 71 MMHG | HEIGHT: 69 IN | WEIGHT: 206 LBS | RESPIRATION RATE: 18 BRPM | HEART RATE: 64 BPM | OXYGEN SATURATION: 98 % | SYSTOLIC BLOOD PRESSURE: 140 MMHG | TEMPERATURE: 97.8 F

## 2019-09-09 DIAGNOSIS — R79.89 ELEVATED SERUM CREATININE: ICD-10-CM

## 2019-09-09 DIAGNOSIS — K62.5 RECTAL BLEEDING: Primary | ICD-10-CM

## 2019-09-09 DIAGNOSIS — I48.20 CHRONIC ATRIAL FIBRILLATION (HCC): ICD-10-CM

## 2019-09-09 DIAGNOSIS — K92.2 GASTROINTESTINAL HEMORRHAGE, UNSPECIFIED GASTROINTESTINAL HEMORRHAGE TYPE: Primary | ICD-10-CM

## 2019-09-09 PROBLEM — Z79.01 CHRONIC ANTICOAGULATION: Status: ACTIVE | Noted: 2019-09-09

## 2019-09-09 PROBLEM — I48.0 PAROXYSMAL A-FIB (HCC): Status: ACTIVE | Noted: 2019-09-09

## 2019-09-09 LAB
ABO + RH BLD: NORMAL
ALBUMIN SERPL-MCNC: 3.6 G/DL (ref 3.5–5)
ALBUMIN/GLOB SERPL: 1.2 {RATIO} (ref 1.1–2.2)
ALP SERPL-CCNC: 85 U/L (ref 45–117)
ALT SERPL-CCNC: 23 U/L (ref 12–78)
ANION GAP SERPL CALC-SCNC: 3 MMOL/L (ref 5–15)
AST SERPL-CCNC: 14 U/L (ref 15–37)
BASOPHILS # BLD: 0 K/UL (ref 0–0.1)
BASOPHILS NFR BLD: 0 % (ref 0–1)
BILIRUB SERPL-MCNC: 0.5 MG/DL (ref 0.2–1)
BLOOD GROUP ANTIBODIES SERPL: NORMAL
BUN SERPL-MCNC: 28 MG/DL (ref 6–20)
BUN/CREAT SERPL: 13 (ref 12–20)
CALCIUM SERPL-MCNC: 8.8 MG/DL (ref 8.5–10.1)
CHLORIDE SERPL-SCNC: 107 MMOL/L (ref 97–108)
CO2 SERPL-SCNC: 33 MMOL/L (ref 21–32)
CREAT SERPL-MCNC: 2.14 MG/DL (ref 0.55–1.02)
DIFFERENTIAL METHOD BLD: ABNORMAL
EOSINOPHIL # BLD: 0.2 K/UL (ref 0–0.4)
EOSINOPHIL NFR BLD: 3 % (ref 0–7)
ERYTHROCYTE [DISTWIDTH] IN BLOOD BY AUTOMATED COUNT: 13.2 % (ref 11.5–14.5)
ERYTHROCYTE [DISTWIDTH] IN BLOOD BY AUTOMATED COUNT: 13.4 % (ref 12.3–15.4)
GLOBULIN SER CALC-MCNC: 3.1 G/DL (ref 2–4)
GLUCOSE SERPL-MCNC: 182 MG/DL (ref 65–100)
HCT VFR BLD AUTO: 31.2 % (ref 34–46.6)
HCT VFR BLD AUTO: 34.4 % (ref 35–47)
HGB BLD-MCNC: 10.4 G/DL (ref 11.1–15.9)
HGB BLD-MCNC: 10.6 G/DL (ref 11.5–16)
HGB BLD-MCNC: 11.5 G/DL
IMM GRANULOCYTES # BLD AUTO: 0 K/UL (ref 0–0.04)
IMM GRANULOCYTES NFR BLD AUTO: 0 % (ref 0–0.5)
INR PPP: 1.2 (ref 0.9–1.1)
LYMPHOCYTES # BLD: 1.2 K/UL (ref 0.8–3.5)
LYMPHOCYTES NFR BLD: 21 % (ref 12–49)
MCH RBC QN AUTO: 28.5 PG (ref 26.6–33)
MCH RBC QN AUTO: 28.5 PG (ref 26–34)
MCHC RBC AUTO-ENTMCNC: 30.8 G/DL (ref 30–36.5)
MCHC RBC AUTO-ENTMCNC: 33.3 G/DL (ref 31.5–35.7)
MCV RBC AUTO: 86 FL (ref 79–97)
MCV RBC AUTO: 92.5 FL (ref 80–99)
MONOCYTES # BLD: 0.4 K/UL (ref 0–1)
MONOCYTES NFR BLD: 8 % (ref 5–13)
MORPHOLOGY BLD-IMP: ABNORMAL
NEUTS SEG # BLD: 3.8 K/UL (ref 1.8–8)
NEUTS SEG NFR BLD: 68 % (ref 32–75)
NRBC # BLD: 0 K/UL (ref 0–0.01)
NRBC BLD-RTO: 0 PER 100 WBC
PLATELET # BLD AUTO: 60 X10E3/UL (ref 150–450)
PLATELET # BLD AUTO: 83 K/UL (ref 150–400)
PMV BLD AUTO: 13 FL (ref 8.9–12.9)
POTASSIUM SERPL-SCNC: 3.9 MMOL/L (ref 3.5–5.1)
PROT SERPL-MCNC: 6.7 G/DL (ref 6.4–8.2)
PROTHROMBIN TIME: 12.1 SEC (ref 9–11.1)
RBC # BLD AUTO: 3.65 X10E6/UL (ref 3.77–5.28)
RBC # BLD AUTO: 3.72 M/UL (ref 3.8–5.2)
SODIUM SERPL-SCNC: 143 MMOL/L (ref 136–145)
SPECIMEN EXP DATE BLD: NORMAL
WBC # BLD AUTO: 5.6 K/UL (ref 3.6–11)
WBC # BLD AUTO: 6.7 X10E3/UL (ref 3.4–10.8)

## 2019-09-09 PROCEDURE — 74011250636 HC RX REV CODE- 250/636: Performed by: EMERGENCY MEDICINE

## 2019-09-09 PROCEDURE — 85610 PROTHROMBIN TIME: CPT

## 2019-09-09 PROCEDURE — 80053 COMPREHEN METABOLIC PANEL: CPT

## 2019-09-09 PROCEDURE — 96360 HYDRATION IV INFUSION INIT: CPT

## 2019-09-09 PROCEDURE — 36415 COLL VENOUS BLD VENIPUNCTURE: CPT

## 2019-09-09 PROCEDURE — 80048 BASIC METABOLIC PNL TOTAL CA: CPT

## 2019-09-09 PROCEDURE — 99283 EMERGENCY DEPT VISIT LOW MDM: CPT

## 2019-09-09 PROCEDURE — 85027 COMPLETE CBC AUTOMATED: CPT

## 2019-09-09 PROCEDURE — 85025 COMPLETE CBC W/AUTO DIFF WBC: CPT

## 2019-09-09 PROCEDURE — 86900 BLOOD TYPING SEROLOGIC ABO: CPT

## 2019-09-09 PROCEDURE — 65270000029 HC RM PRIVATE

## 2019-09-09 RX ORDER — POTASSIUM CHLORIDE 750 MG/1
10 TABLET, FILM COATED, EXTENDED RELEASE ORAL DAILY
Status: DISCONTINUED | OUTPATIENT
Start: 2019-09-10 | End: 2019-09-10

## 2019-09-09 RX ORDER — SODIUM CHLORIDE 0.9 % (FLUSH) 0.9 %
5-40 SYRINGE (ML) INJECTION EVERY 8 HOURS
Status: DISCONTINUED | OUTPATIENT
Start: 2019-09-09 | End: 2019-09-13 | Stop reason: HOSPADM

## 2019-09-09 RX ORDER — PANTOPRAZOLE SODIUM 40 MG/1
40 TABLET, DELAYED RELEASE ORAL DAILY
Status: DISCONTINUED | OUTPATIENT
Start: 2019-09-10 | End: 2019-09-13 | Stop reason: HOSPADM

## 2019-09-09 RX ORDER — INSULIN LISPRO 100 [IU]/ML
INJECTION, SOLUTION INTRAVENOUS; SUBCUTANEOUS
Status: DISCONTINUED | OUTPATIENT
Start: 2019-09-09 | End: 2019-09-13 | Stop reason: HOSPADM

## 2019-09-09 RX ORDER — FUROSEMIDE 40 MG/1
40 TABLET ORAL
Status: DISCONTINUED | OUTPATIENT
Start: 2019-09-10 | End: 2019-09-13 | Stop reason: HOSPADM

## 2019-09-09 RX ORDER — ATORVASTATIN CALCIUM 40 MG/1
80 TABLET, FILM COATED ORAL
Status: DISCONTINUED | OUTPATIENT
Start: 2019-09-09 | End: 2019-09-13 | Stop reason: HOSPADM

## 2019-09-09 RX ORDER — SODIUM CHLORIDE 0.9 % (FLUSH) 0.9 %
5-40 SYRINGE (ML) INJECTION AS NEEDED
Status: DISCONTINUED | OUTPATIENT
Start: 2019-09-09 | End: 2019-09-13 | Stop reason: HOSPADM

## 2019-09-09 RX ORDER — MAGNESIUM SULFATE 100 %
4 CRYSTALS MISCELLANEOUS AS NEEDED
Status: DISCONTINUED | OUTPATIENT
Start: 2019-09-09 | End: 2019-09-13 | Stop reason: HOSPADM

## 2019-09-09 RX ORDER — METOPROLOL TARTRATE 25 MG/1
25 TABLET, FILM COATED ORAL 2 TIMES DAILY
Status: DISCONTINUED | OUTPATIENT
Start: 2019-09-10 | End: 2019-09-13 | Stop reason: HOSPADM

## 2019-09-09 RX ORDER — AMIODARONE HYDROCHLORIDE 200 MG/1
200 TABLET ORAL DAILY
Status: DISCONTINUED | OUTPATIENT
Start: 2019-09-10 | End: 2019-09-13 | Stop reason: HOSPADM

## 2019-09-09 RX ORDER — ALLOPURINOL 100 MG/1
200 TABLET ORAL DAILY
Status: DISCONTINUED | OUTPATIENT
Start: 2019-09-10 | End: 2019-09-13 | Stop reason: HOSPADM

## 2019-09-09 RX ORDER — FUROSEMIDE 40 MG/1
40 TABLET ORAL
Status: DISCONTINUED | OUTPATIENT
Start: 2019-09-10 | End: 2019-09-10

## 2019-09-09 RX ORDER — SUCRALFATE 1 G/1
1 TABLET ORAL 4 TIMES DAILY
Status: DISCONTINUED | OUTPATIENT
Start: 2019-09-09 | End: 2019-09-13 | Stop reason: HOSPADM

## 2019-09-09 RX ORDER — DEXTROSE MONOHYDRATE 100 MG/ML
0-250 INJECTION, SOLUTION INTRAVENOUS AS NEEDED
Status: DISCONTINUED | OUTPATIENT
Start: 2019-09-09 | End: 2019-09-13 | Stop reason: HOSPADM

## 2019-09-09 RX ADMIN — SODIUM CHLORIDE 500 ML: 900 INJECTION, SOLUTION INTRAVENOUS at 19:54

## 2019-09-09 NOTE — PATIENT INSTRUCTIONS
Office Policies    Phone calls/patient messages:            Please allow up to 24 hours for someone in the office to contact you about your call or message. Be mindful your provider may be out of the office or your message may require further review. We encourage you to use [employer] for your messages as this is a faster, more efficient way to communicate with our office                         Medication Refills:            Prescription medications require 48-72 business hours to process. We encourage you to use [employer] for your refills. For controlled medications: Please allow 72 business hours to process. Certain medications may require you to  a written prescription at our office. NO narcotic/controlled medications will be prescribed after 4pm Monday through Friday or on weekends              Form/Paperwork Completion:            Please note a $25 fee may incur for all paperwork for completed by our providers. We ask that you allow 7-10 business days. Pre-payment is due prior to picking up/faxing the completed form. You may also download your forms to [employer] to have your doctor print off. HOLD THE Shahriar Seip. WE WILL CALL WITH BLOOD COUNT TODAY. TO THE ER IF YOU PASS BLOOD AND ARE DIZZY.

## 2019-09-09 NOTE — PROGRESS NOTES
Krystyna Mccarty is a 82 y.o. female who presents for follow up. Hospitalized 6/14- 6/20 with UGI bleed. Underwent EGD with gastric erosions thought to be source of bleeding. Underwent colonoscopy in June with polyps. Hgb dropped to 6.8. Received 1 unit pRBC, hgb 8.9 in June at discharge. Resumed xarelto. Readmitted for EGD on 8/22  Dr. Paul Altamirano. EGD antrum erythema, no active bleeding. xarelto held for procedure and resumed 3 days later. She reports that she started bleeding yesterday at noon. She things the stool was brown with BRBPR on wiping. That was last BM that she has had, none today. It was a large BM, some straining. No abdominal pain. Some am nausea. Some dizziness with changing positions. Hgb today 11.5 by fingerstick. hgb was 8.9 in June. Off iron for past 3 weeks. On xarelto 15mg daily for stroke prevention with atrial fibrillation per Dr. Pramod Núñez. 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    Overweight and obesity     PUD (peptic ulcer disease) 1990'S    S/P ablation of atrial flutter[V45.89HM] 2009    @ [facility] >> atrial fibrillation    SOB (shortness of breath) on exertion 04/2019    CHUCKY HERNANDEZ 2009    TIA (transient ischemic attack)     Ulcer of right lower extremity, limited to breakdown of skin ([facility] 2018       Family History   Problem Relation Age of Onset    Stroke Other     Arthritis-osteo Sister         spinal stenosis    Gout Son     Hypertension Son     Hypertension Mother     Heart Disease Mother         CAD    Heart Disease Father         CAD    Alcohol abuse Neg Hx     Asthma Neg Hx     Bleeding Prob Neg Hx     Cancer Neg Hx     Diabetes Neg Hx     Elevated Lipids Neg Hx     Headache Neg Hx     Lung Disease Neg Hx     Migraines Neg Hx     Psychiatric Disorder Neg Hx     Mental Retardation Neg Hx     Anesth Problems Neg Hx        Social History     Socioeconomic History    Marital status:      Spouse name: Not on file    Number of children: 2    Years of education: 15    Highest education level: High school graduate   Occupational History    Not on file   Social Needs    Financial resource strain: Not hard at all   [employer] insecurity:     Worry: Never true     Inability: Never true   Breadcrumbtracking needs:     Medical: No     Non-medical: No   Tobacco Use    Smoking status: Former Smoker     Packs/day: 0.50     Years: 10.00     Pack years: 5.00     Types: Cigarettes     Last attempt to quit: 1967     Years since quittin.7    Smokeless tobacco: Never Used   Substance and Sexual Activity    Alcohol use: No     Alcohol/week: 0.0 standard drinks    Drug use: Never    Sexual activity: Not Currently     Partners: Male   Lifestyle    Physical activity:     Days per week: 0 days     Minutes per session: 0 min    Stress: Only a little   Relationships    Social connections:     Talks on phone: Three times a week     Gets together: Three times a week     Attends Hindu service: 1 to 4 times per year     Active member of club or organization: No     Attends meetings of clubs or organizations: Never     Relationship status:      Intimate partner violence:     Fear of current or ex partner: No     Emotionally abused: No     Physically abused: No     Forced sexual activity: No   Other Topics Concern     Service Not Asked    Blood Transfusions Not Asked    Caffeine Concern Not Asked    Occupational Exposure Not Asked    Hobby Hazards Not Asked    Sleep Concern Not Asked    Stress Concern Not Asked    Weight Concern Yes    Special Diet Not Asked    Back Care Not Asked    Exercise Not Asked    Bike Helmet Not Asked   [address] Not Asked    Self-Exams Not Asked   Social History Narrative    Not on file       Current Outpatient Medications on File Prior to Visit   Medication Sig Dispense Refill    sucralfate (CARAFATE) 1 gram tablet Take 1 g by mouth four (4) times daily.  ADAN PEN NEEDLE 32 gauge x 5/32\" ndle USE WITH PEN TWICE DAILY 100 Pen Needle 3    amiodarone (CORDARONE) 200 mg tablet Take 1 Tab by mouth daily. 90 Tab 0    rivaroxaban (XARELTO) 15 mg tab tablet Take 1 Tab by mouth daily (with dinner). Start taking 6/23/2019 30 Tab 0    furosemide (LASIX) 40 mg tablet Take 80 mg by mouth Daily (before breakfast). Patient takes 80 mg with breakfast      furosemide (LASIX) 40 mg tablet Take 40 mg by mouth daily (with lunch). Patient takes 80 mg with breakfast and 40 mg at lunch      metoprolol tartrate (LOPRESSOR) 25 mg tablet Take 25 mg by mouth two (2) times a day.  insulin NPH (HUMULIN N NPH INSULIN KWIKPEN) 100 unit/mL (3 mL) inpn 24 Units by SubCUTAneous route daily.  insulin NPH (HUMULIN N NPH INSULIN KWIKPEN) 100 unit/mL (3 mL) inpn 8 Units by SubCUTAneous route every evening.  pantoprazole (PROTONIX) 40 mg tablet Take 1 Tab by mouth daily. 90 Tab 3    loratadine (CLARITIN) 10 mg tablet Take 10 mg by mouth daily.       levothyroxine (SYNTHROID) 125 mcg tablet TAKE ONE TABLET BY MOUTH DAILY (Patient taking differently: TAKE ONE TABLET BY MOUTH DAILY BEFORE BREAKFAST) 90 Tab 7    atorvastatin (LIPITOR) 80 mg tablet TAKE ONE TABLET BY MOUTH EVERY EVENING 90 Tab 0    allopurinol (ZYLOPRIM) 100 mg tablet TAKE TWO TABLETS BY MOUTH DAILY 180 Tab 2    acetaminophen (TYLENOL) 325 mg tablet Take 325 mg by mouth every four (4) hours as needed for Pain.  potassium chloride SR (KLOR-CON 10) 10 mEq tablet Take 1 Tab by mouth daily. 30 Tab 12     No current facility-administered medications on file prior to visit. Review of Systems  Pertinent items are noted in HPI. Objective:     Visit Vitals  /71 (BP 1 Location: Left arm, BP Patient Position: Sitting)   Pulse 64   Temp 97.8 °F (36.6 °C) (Oral)   Resp 18   Ht 5' 9\" (1.753 m)   Wt 206 lb (93.4 kg)   SpO2 98%   BMI 30.42 kg/m²     Gen: well appearing elderly female  Resp:  No wheezing, no rhonchi, no rales. CV:  RRR, normal S1S2, no murmur. GI: soft, nontender, without masses. Extrem:no edema, warm distally  Rectal: maroon stool, heme positive. Assessment/Plan:       ICD-10-CM ICD-9-CM    1. Rectal bleeding K62.5 569.3 AMB POC HEMOGLOBIN (HGB)      CBC W/O DIFF   2. Chronic atrial fibrillation (HCC) I48.2 427.31    3. Elevated serum creatinine A36.90 905.69 METABOLIC PANEL, BASIC   HOLD XARELTO. STAT CBC. TO ED FOR WORSENING SYMPTOMS. Follow-up and Dispositions    · Return for follow up pending labs today. Ro Stanton MD     ADDENDUM:  Call from lab patient's platelet count is 16U. hgb 10.4. Patient's last dose of xarelto was 9/8. Advised patient to ED. Needs admission for serial hgb, GI consult. Spoke with patient and left message with daughter. Spoke with Dr. Adal Mcknight in ED.

## 2019-09-10 ENCOUNTER — APPOINTMENT (OUTPATIENT)
Dept: CT IMAGING | Age: 84
DRG: 378 | End: 2019-09-10
Attending: PHYSICIAN ASSISTANT
Payer: MEDICARE

## 2019-09-10 LAB
ANION GAP SERPL CALC-SCNC: 6 MMOL/L (ref 5–15)
BUN SERPL-MCNC: 23 MG/DL (ref 6–20)
BUN SERPL-MCNC: 25 MG/DL (ref 8–27)
BUN/CREAT SERPL: 13 (ref 12–20)
BUN/CREAT SERPL: 13 (ref 12–28)
CALCIUM SERPL-MCNC: 8.4 MG/DL (ref 8.5–10.1)
CALCIUM SERPL-MCNC: 9.2 MG/DL (ref 8.7–10.3)
CHLORIDE SERPL-SCNC: 102 MMOL/L (ref 96–106)
CHLORIDE SERPL-SCNC: 111 MMOL/L (ref 97–108)
CO2 SERPL-SCNC: 28 MMOL/L (ref 20–29)
CO2 SERPL-SCNC: 28 MMOL/L (ref 21–32)
CREAT SERPL-MCNC: 1.82 MG/DL (ref 0.55–1.02)
CREAT SERPL-MCNC: 1.92 MG/DL (ref 0.57–1)
ERYTHROCYTE [DISTWIDTH] IN BLOOD BY AUTOMATED COUNT: 13.2 % (ref 11.5–14.5)
GLUCOSE BLD STRIP.AUTO-MCNC: 107 MG/DL (ref 65–100)
GLUCOSE BLD STRIP.AUTO-MCNC: 142 MG/DL (ref 65–100)
GLUCOSE BLD STRIP.AUTO-MCNC: 144 MG/DL (ref 65–100)
GLUCOSE BLD STRIP.AUTO-MCNC: 173 MG/DL (ref 65–100)
GLUCOSE BLD STRIP.AUTO-MCNC: 60 MG/DL (ref 65–100)
GLUCOSE BLD STRIP.AUTO-MCNC: 69 MG/DL (ref 65–100)
GLUCOSE BLD STRIP.AUTO-MCNC: 90 MG/DL (ref 65–100)
GLUCOSE BLD STRIP.AUTO-MCNC: 93 MG/DL (ref 65–100)
GLUCOSE SERPL-MCNC: 102 MG/DL (ref 65–100)
GLUCOSE SERPL-MCNC: 109 MG/DL (ref 65–99)
HCT VFR BLD AUTO: 30.3 % (ref 35–47)
HCT VFR BLD AUTO: 31 % (ref 35–47)
HGB BLD-MCNC: 9.7 G/DL (ref 11.5–16)
HGB BLD-MCNC: 9.9 G/DL (ref 11.5–16)
MCH RBC QN AUTO: 29.2 PG (ref 26–34)
MCHC RBC AUTO-ENTMCNC: 32 G/DL (ref 30–36.5)
MCV RBC AUTO: 91.3 FL (ref 80–99)
NRBC # BLD: 0 K/UL (ref 0–0.01)
NRBC BLD-RTO: 0 PER 100 WBC
PLATELET # BLD AUTO: 84 K/UL (ref 150–400)
POTASSIUM SERPL-SCNC: 3.3 MMOL/L (ref 3.5–5.1)
POTASSIUM SERPL-SCNC: 4 MMOL/L (ref 3.5–5.2)
RBC # BLD AUTO: 3.32 M/UL (ref 3.8–5.2)
SERVICE CMNT-IMP: ABNORMAL
SERVICE CMNT-IMP: NORMAL
SODIUM SERPL-SCNC: 144 MMOL/L (ref 134–144)
SODIUM SERPL-SCNC: 145 MMOL/L (ref 136–145)
WBC # BLD AUTO: 4.7 K/UL (ref 3.6–11)

## 2019-09-10 PROCEDURE — 82962 GLUCOSE BLOOD TEST: CPT

## 2019-09-10 PROCEDURE — 36415 COLL VENOUS BLD VENIPUNCTURE: CPT

## 2019-09-10 PROCEDURE — 74011000250 HC RX REV CODE- 250: Performed by: PHYSICIAN ASSISTANT

## 2019-09-10 PROCEDURE — 80048 BASIC METABOLIC PNL TOTAL CA: CPT

## 2019-09-10 PROCEDURE — 85018 HEMOGLOBIN: CPT

## 2019-09-10 PROCEDURE — 65270000015 HC RM PRIVATE ONCOLOGY

## 2019-09-10 PROCEDURE — 74176 CT ABD & PELVIS W/O CONTRAST: CPT

## 2019-09-10 PROCEDURE — 74011250637 HC RX REV CODE- 250/637: Performed by: NURSE PRACTITIONER

## 2019-09-10 PROCEDURE — 74011250637 HC RX REV CODE- 250/637: Performed by: INTERNAL MEDICINE

## 2019-09-10 PROCEDURE — 85027 COMPLETE CBC AUTOMATED: CPT

## 2019-09-10 PROCEDURE — 74011636637 HC RX REV CODE- 636/637: Performed by: INTERNAL MEDICINE

## 2019-09-10 RX ORDER — POTASSIUM CHLORIDE 750 MG/1
40 TABLET, FILM COATED, EXTENDED RELEASE ORAL
Status: COMPLETED | OUTPATIENT
Start: 2019-09-10 | End: 2019-09-10

## 2019-09-10 RX ORDER — POTASSIUM CHLORIDE 750 MG/1
10 TABLET, FILM COATED, EXTENDED RELEASE ORAL DAILY
Status: DISCONTINUED | OUTPATIENT
Start: 2019-09-11 | End: 2019-09-11

## 2019-09-10 RX ORDER — FUROSEMIDE 80 MG/1
80 TABLET ORAL
Status: DISCONTINUED | OUTPATIENT
Start: 2019-09-11 | End: 2019-09-13 | Stop reason: HOSPADM

## 2019-09-10 RX ORDER — ACETAMINOPHEN 325 MG/1
650 TABLET ORAL
Status: DISCONTINUED | OUTPATIENT
Start: 2019-09-10 | End: 2019-09-13 | Stop reason: HOSPADM

## 2019-09-10 RX ADMIN — Medication 10 ML: at 22:04

## 2019-09-10 RX ADMIN — SUCRALFATE 1 G: 1 TABLET ORAL at 09:22

## 2019-09-10 RX ADMIN — HUMAN INSULIN 24 UNITS: 100 INJECTION, SUSPENSION SUBCUTANEOUS at 12:13

## 2019-09-10 RX ADMIN — METOPROLOL TARTRATE 25 MG: 25 TABLET ORAL at 09:23

## 2019-09-10 RX ADMIN — POTASSIUM CHLORIDE 40 MEQ: 750 TABLET, FILM COATED, EXTENDED RELEASE ORAL at 09:22

## 2019-09-10 RX ADMIN — ATORVASTATIN CALCIUM 80 MG: 40 TABLET, FILM COATED ORAL at 00:39

## 2019-09-10 RX ADMIN — PANTOPRAZOLE SODIUM 40 MG: 40 TABLET, DELAYED RELEASE ORAL at 09:23

## 2019-09-10 RX ADMIN — LEVOTHYROXINE SODIUM 125 MCG: 100 TABLET ORAL at 06:01

## 2019-09-10 RX ADMIN — Medication 10 ML: at 06:01

## 2019-09-10 RX ADMIN — Medication 10 ML: at 14:00

## 2019-09-10 RX ADMIN — FUROSEMIDE 40 MG: 40 TABLET ORAL at 09:23

## 2019-09-10 RX ADMIN — POLYETHYLENE GLYCOL-3350 AND ELECTROLYTES 4000 ML: 236; 6.74; 5.86; 2.97; 22.74 POWDER, FOR SOLUTION ORAL at 17:34

## 2019-09-10 RX ADMIN — ALLOPURINOL 200 MG: 100 TABLET ORAL at 09:23

## 2019-09-10 RX ADMIN — METOPROLOL TARTRATE 25 MG: 25 TABLET ORAL at 17:35

## 2019-09-10 RX ADMIN — FUROSEMIDE 40 MG: 40 TABLET ORAL at 12:13

## 2019-09-10 RX ADMIN — AMIODARONE HYDROCHLORIDE 200 MG: 200 TABLET ORAL at 09:23

## 2019-09-10 RX ADMIN — SUCRALFATE 1 G: 1 TABLET ORAL at 12:13

## 2019-09-10 RX ADMIN — Medication 10 ML: at 00:39

## 2019-09-10 RX ADMIN — SUCRALFATE 1 G: 1 TABLET ORAL at 22:03

## 2019-09-10 RX ADMIN — ATORVASTATIN CALCIUM 80 MG: 40 TABLET, FILM COATED ORAL at 22:03

## 2019-09-10 RX ADMIN — SUCRALFATE 1 G: 1 TABLET ORAL at 00:40

## 2019-09-10 RX ADMIN — SUCRALFATE 1 G: 1 TABLET ORAL at 17:35

## 2019-09-10 NOTE — CONSULTS
Gastroenterology Consult     Referring Physician: Dr. Leslie Gamez Date: 9/10/2019     Subjective:     Chief Complaint: rectal bleeding    History of Present Illness: Sami Bhatia is a 82 y.o. female with PMH Afib on Xarelto who is seen in consultation for GIB. She has been on Xarelto for at least a month. The past week has felt dizzy and SOB. Two days ago in the afternoon, she had what she thought was a normal BM but noted rust colored blood in the commode, about a Tbsp worth. She took her Xarelto that night and went to bed without any more GIB that night. The next AM (yesterday AM) she had another normal stool but again noted rust colored blood, a little less than a Tbsp. She called her daughter and was brought to the ER. She had a similar stool last night before dinner but has not had any further bleeding. Xarelto was held. Her hgb on admission was 10.6 (up from baseline) and this AM is 9.7. Plts 84. She also notes she has had 4/10 intermittent cramping lower abd pain since the bleeding started which is unusual for her. She has been taking her PPI and Carafate given hx of bleeding GAVE and denies any NSAID use. No CT was done yesterday. Crt was 2.14 and today is 1.82. BUN 23-28. She is on CLD. Last EGD was 8/22/19 by Dr. Shana Hart and showed erythema in the antrum but no GAVE and 2cm hiatal hernia. Last colonoscopy was 6/2019. 14mm semipedunculated polyp in descending colon removed and 8mm sessile polyp in rectum removed. No diverticulosis and retroflexion in rectum was negative. Small bowel capsule 6/20/19 did not reach the cecum.      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 4/23/2009    TIA (transient ischemic attack)     Ulcer of right lower extremity, limited to breakdown of skin ([address] 7/5/2018     Past Surgical History:   Procedure Laterality Date    CARDIAC SURG PROCEDURE UNLIST      ablation    COLONOSCOPY N/A 2/20/2019    COLONOSCOPY performed by [facility] Yi [facility] at [facility] ENDOSCOPY    COLONOSCOPY N/A 6/17/2019    COLONOSCOPY performed by [facility]i MD at [facility] ENDOSCOPY    COLONOSCOPY N/A 6/15/2019    COLONOSCOPY performed by Marium Yi MD at [address] [place] [address]  2/20/2019         Tenisha Daley  6/17/2019         GI TRACT CAPSULE ENDOSCOPY  6/28/2019    HX APPENDECTOMY      HX CHOLECYSTECTOMY      HX HYSTERECTOMY      HX KNEE ARTHROSCOPY  1439,8108    right knee    HX ORTHOPAEDIC      HX UROLOGICAL      RENAL STENT, tur-b    IA ESOPHAGOGASTRODUODENOSCOPY TRANSORAL DIAGNOSTIC  8/22/2019         SIGMOIDOSCOPY,DIAGNOSTIC  6/15/2019         UPPER GI ENDOSCOPY,BALL DIL,30MM  6/15/2019         UPPER GI ENDOSCOPY,TUMOR ABLATN  6/19/2019         VASCULAR SURGERY PROCEDURE UNLIST  11/4    removed vein in right leg      Family History   Problem Relation Age of Onset    Stroke Other     Arthritis-osteo Sister         spinal stenosis    Gout Son  Hypertension Son     Hypertension Mother     Heart Disease Mother         CAD    Heart Disease Father         CAD    Alcohol abuse Neg Hx     Asthma Neg Hx     Bleeding Prob Neg Hx     Cancer Neg Hx     Diabetes Neg Hx     Elevated Lipids Neg Hx     Headache Neg Hx     Lung Disease Neg Hx     Migraines Neg Hx     Psychiatric Disorder Neg Hx     Mental Retardation Neg Hx     Anesth Problems Neg Hx      Social History     Tobacco Use    Smoking status: Former Smoker     Packs/day: 0.50     Years: 10.00     Pack years: 5.00     Types: Cigarettes     Last attempt to quit: 1967     Years since quittin.7    Smokeless tobacco: Never Used   Substance Use Topics    Alcohol use: No     Alcohol/week: 0.0 standard drinks      Allergies   Allergen Reactions    Actos [Pioglitazone] Swelling     Swelling of feet and legs    Codeine Itching    Hydrocodone Rash and Other (comments)     hallucinations     Current Facility-Administered Medications   Medication Dose Route Frequency    PEG 3350-Electrolytes (GO-LYTELY) SUSPENSION 4,000 mL  4,000 mL Oral ONCE    allopurinol (ZYLOPRIM) tablet 200 mg  200 mg Oral DAILY    amiodarone (CORDARONE) tablet 200 mg  200 mg Oral DAILY    atorvastatin (LIPITOR) tablet 80 mg  80 mg Oral QHS    furosemide (LASIX) tablet 40 mg  40 mg Oral ACB    furosemide (LASIX) tablet 40 mg  40 mg Oral DAILY WITH LUNCH    . PHARMACY TO SUBSTITUTE PER PROTOCOL (Reordered from: insulin NPH (HUMULIN N NPH INSULIN KWIKPEN) 100 unit/mL (3 mL) inpn)    Per Protocol    . PHARMACY TO SUBSTITUTE PER PROTOCOL (Reordered from: insulin NPH (HUMULIN N NPH INSULIN KWIKPEN) 100 unit/mL (3 mL) inpn)    Per Protocol    levothyroxine (SYNTHROID) tablet 125 mcg  125 mcg Oral ACB    metoprolol tartrate (LOPRESSOR) tablet 25 mg  25 mg Oral BID    pantoprazole (PROTONIX) tablet 40 mg  40 mg Oral DAILY    potassium chloride SR (KLOR-CON 10) tablet 10 mEq  10 mEq Oral DAILY    sucralfate (CARAFATE) tablet 1 g  1 g Oral QID    sodium chloride (NS) flush 5-40 mL  5-40 mL IntraVENous Q8H    sodium chloride (NS) flush 5-40 mL  5-40 mL IntraVENous PRN    insulin lispro (HUMALOG) injection   SubCUTAneous AC&HS    glucose chewable tablet 16 g  4 Tab Oral PRN    glucagon (GLUCAGEN) injection 1 mg  1 mg IntraMUSCular PRN    dextrose 10% infusion 0-250 mL  0-250 mL IntraVENous PRN        Review of Systems:  A detailed 10 organ review of systems is obtained with pertinent positives as listed in the History of Present Illness and Past Medical History. A detailed review of systems was performed as follows:  Constitutional:  Negative  Eyes:  No ocular sensitivity to the sun, blurred vision or double vision. ENMT:  No nose or sinus problems. Respiratory: +sob  Cardiac:  No chest pain, exertional chest pain or palpitations  Gastrointestinal:  See history of the present illness  :   No pain with urination or hematuria  Musculoskeletal:  No arthritis or hot swollen joints. Endocrine:  +thyroid disease and diabetes  Psychiatric: No depression or feeling blue  Integumentary:  No skin rash or sensitivity to the sun. Neurologic:  +hx of stroke  Heme-Lymphatic:  +hx of GIB    Objective:     Physical Exam:  Visit Vitals  /43 (BP 1 Location: Right arm, BP Patient Position: At rest;Supine)   Pulse (!) 56   Temp 97.7 °F (36.5 °C)   Resp 18   Ht 5' 8\" (1.727 m)   Wt 92.6 kg (204 lb 2.3 oz)   SpO2 94%   BMI 31.04 kg/m²        Gen: Elderly white female in NAD  Skin:  Extremities and face reveal no rashes. HEENT: Sclerae anicteric. No abnormal pigmentation of the lips. Cardiovascular: Regular rate and rhythm. No murmurs, gallops, or rubs. Respiratory:  Comfortable breathing with no accessory muscle use. Clear breath sounds with no wheezes, rales, or rhonchi. GI:  Abdomen nondistended, soft, and minimally tender in epigastrium. Normal active bowel sounds. No enlargement of the liver or spleen.  No masses palpable. Rectal:  No external lesions or internal nodules or masses. No tenderness. Gross dark red blood on the glove. Musculoskeletal:  No pitting edema of the lower legs. Neurological:  Gross memory appears intact. Patient is alert and oriented. Psychiatric:  Mood appears appropriate with judgement intact. Lymphatic:  No cervical or supraclavicular adenopathy. Lab/Data Review:  Lab Results   Component Value Date/Time    WBC 4.7 09/10/2019 03:04 AM    Hemoglobin (POC) 11.5 09/09/2019 10:43 AM    Hemoglobin (POC) 10.5 (L) 07/06/2010 10:11 PM    HGB 9.7 (L) 09/10/2019 03:04 AM    Hematocrit (POC) 31 (L) 07/06/2010 10:11 PM    HCT 30.3 (L) 09/10/2019 03:04 AM    PLATELET 84 (L) 93/95/6800 03:04 AM    MCV 91.3 09/10/2019 03:04 AM     Lab Results   Component Value Date/Time    Sodium 145 09/10/2019 03:04 AM    Potassium 3.3 (L) 09/10/2019 03:04 AM    Chloride 111 (H) 09/10/2019 03:04 AM    CO2 28 09/10/2019 03:04 AM    Anion gap 6 09/10/2019 03:04 AM    Glucose 102 (H) 09/10/2019 03:04 AM    BUN 23 (H) 09/10/2019 03:04 AM    Creatinine 1.82 (H) 09/10/2019 03:04 AM    BUN/Creatinine ratio 13 09/10/2019 03:04 AM    GFR est AA 32 (L) 09/10/2019 03:04 AM    GFR est non-AA 27 (L) 09/10/2019 03:04 AM    Calcium 8.4 (L) 09/10/2019 03:04 AM    Bilirubin, total 0.5 09/09/2019 06:26 PM    AST (SGOT) 14 (L) 09/09/2019 06:26 PM    Alk. phosphatase 85 09/09/2019 06:26 PM    Protein, total 6.7 09/09/2019 06:26 PM    Albumin 3.6 09/09/2019 06:26 PM    Globulin 3.1 09/09/2019 06:26 PM    A-G Ratio 1.2 09/09/2019 06:26 PM    ALT (SGPT) 23 09/09/2019 06:26 PM         Assessment/Plan:     Active Problems:    Acute GI bleeding (9/9/2019)      Chronic anticoagulation (9/9/2019)      Paroxysmal A-fib ([address] (9/9/2019)       Patient is not hemorrhaging so bleeding scan would likely not be helpful. Bleeding seems consistent with LGIB. Continue to hold Xarelto. Continue PPI and Carafate. Recent EGD neg for GAVE. Continue CLD. Prep today for colonoscopy tomorrow. Non contrast CT today (due to renal insufficiency) to evaluate for colitis given abd pain.       NEVAEH Chaidez  09/10/19  8:39 AM

## 2019-09-10 NOTE — PROGRESS NOTES
Reason for Admission:   GI bleed; Hx of previous GI bleed, Bladder Ca               RRAT Score:    44              Resources/supports as identified by patient/family:   Good family and community support; daughter lives nearby. Top Challenges facing patient (as identified by patient/family and CM): Finances/Medication cost?     Per patient, no financial concerns, states she never even has co-payments when getting medications. Patient uses [facility] on [address]. Transportation? Patient still drives; daughter will drive transport home from hospital; daughter will drive or go with patient to some follow-up appointments. Support system or lack thereof? Family and community support. Daughter Janes Young Free lives nearby and was at bedside during assessment. Living arrangements? Patient lives in her own 1 story home; there are no steps leading into her home. Patient has a cane, walker and Rollator that she uses at times. Self-care/ADLs/Cognition? Patient is independent of ADL's and IADL's. Patient still drives her own car. Current Advanced Directive/Advance Care Plan:  Current DNR and AD on file. Plan for utilizing home health:    Patient has used [facility] in the past following a heart valve replacement in June 19'. She would want to use them again if [address] is recommended.     Care Management Interventions  PCP Verified by CM: Yes(9/9/19)  Last Visit to PCP: 09/09/19  Palliative Care Criteria Met (RRAT>21 & CHF Dx)?: No  Mode of Transport at Discharge: (Anticipate daughter to transport)  Transition of Care Consult (CM Consult): Discharge Planning  Physical Therapy Consult: No  Occupational Therapy Consult: No  Current Support Network: Family Lives [place], Lives Alone, Own Home  Confirm Follow Up Transport: Self  Plan discussed with Pt/Family/Caregiver: Yes  Freedom of Choice Offered: Yes  Discharge Location  Discharge Placement: (Anticipate home with family support)                   Transition of Care Plan:     Patient went to see physician yesterday for follow-up from last GI bleed in June. Patient reported rectal bleeding and physician ordered admission due to low blood count. She is scheduled for a colonoscopy in the morning. Patient has heart valve replacement surgery in April 19'. She went to [facility] for IP rehab, then had [facility] services following [facility] discharge. Anticipate will discharge to home. If needs [facility] services, patient would like to use [facility].

## 2019-09-10 NOTE — WOUND CARE
Wound Care consult for a \"black spot\" on the bottom of left foot - POA. Patient has a history of venous stasis that requires compression per her vascular physician, Dr. Osbaldo Quinteros. She now only uses compression \"stockings\" but not regularly. She also sees Dr. Rei Nowak for her callous on the left foot but has not seen him in a few months. Assessment: The left plantar surface first metatarsal head callous is hard and darkly discolored but NOT boggy and not infected looking. Her A1C in not on record in the EMR but reported as \"uncontrolled\". Treatment Recommendation - discussed with RN, Raul Faust today. This is a stable callous and patient has an off loading device in her shoe specifically for this. No dressing needed for this but patient should not be barefooted and walking around. Encouraged to see Dr. Rei Nowak as an outpatient upon discharge and take her daughter with her to help with asking important questions.    Karyn Madera, RN, BSN, CWON ([id number])

## 2019-09-10 NOTE — PROGRESS NOTES
Oncology End of Shift Note      Bedside shift change report given to Miguel Angel Portillo (incoming nurse) by Bobo Mcgrath RN (outgoing nurse) on Sabina Bob. Report included the following information SBAR, Kardex, MAR and Recent Results. Shift Summary: Pt admitted to the unit. No complaints of pain. H&H drawn. Next due at 1100. Stated she had blood with BM this morning, wound consult placed      Issues for Physician to Address:  None     Patient on Cardiac Monitoring?     [x] Yes  [] No    Rhythm: SB to SR         Shift Events        Bobo Mcgrath RN

## 2019-09-10 NOTE — PROGRESS NOTES
Attempted to call ED for report. No answer. [id number] TRANSFER - IN REPORT:    Verbal report received from Shayla(name) on Stephany Hyde  being received from ED(unit) for routine progression of care      Report consisted of patients Situation, Background, Assessment and   Recommendations(SBAR). Information from the following report(s) SBAR, Kardex and Recent Results was reviewed with the receiving nurse. Opportunity for questions and clarification was provided. Assessment completed upon patients arrival to unit and care assumed.

## 2019-09-10 NOTE — PROGRESS NOTES
Problem: Falls - Risk of  Goal: *Absence of Falls  Description  Document [facility] Fall Risk and appropriate interventions in the flowsheet.   Outcome: Progressing Towards Goal  Note:   Fall Risk Interventions:  Mobility Interventions: Communicate number of staff needed for ambulation/transfer              Elimination Interventions: Patient to call for help with toileting needs              Problem: Upper and Lower GI Bleed: Day 1  Goal: Activity/Safety  Outcome: Progressing Towards Goal  Goal: Consults, if ordered  Outcome: Progressing Towards Goal  Goal: Diagnostic Test/Procedures  Outcome: Progressing Towards Goal  Goal: Treatments/Interventions/Procedures  Outcome: Progressing Towards Goal

## 2019-09-10 NOTE — PROGRESS NOTES
Oncology End of Shift Note      Bedside shift change report given to Wilber Jama RN (incoming nurse) by Perico Dennis (outgoing nurse) on Steven Cramp. Report included the following information SBAR, Kardex and MAR. Shift Summary:  Patient received all medication and medication education was provided. Blood was drawn for h & h. Patient will be NPO at midnight. ANTHONY hose were applied. Patient had a hypoglycemic episode. Patient has started drinking the bowel prep for her colonoscopy tomorrow. Issues for Physician to Address:  None. Patient on Cardiac Monitoring?     [x] Yes  [] No    Rhythm: Sinus zaid             Melody Posadas

## 2019-09-10 NOTE — ED PROVIDER NOTES
EMERGENCY DEPARTMENT HISTORY AND PHYSICAL EXAM      Date: 9/9/2019  Patient Name: Jessica Wing  Patient Age and Sex: 82 y.o. female     History of Presenting Illness     Chief Complaint   Patient presents with    Rectal Bleeding     Patient reffered by PCP for rectal bleed, has positive occult stool, takes xarelto, noticed blood in stool on Sunday, has happened before       History Provided By: Patient     HPI: Jessica Wing Is a 61-year-old female with past medical history of atrial fibrillation, CAD, diabetes presenting today with rectal bleeding. Patient has had issues with both upper GI and lower GI bleeding in the recent past.  She is been on and off of anticoagulants. Yesterday she noticed bright red blood per rectum. She has had multiple stools with bright red blood. She went to see her primary care provider who referred her to the emergency department. Today she was Hemoccult positive on her rectal exam.  She reports dizziness and shortness of breath. She denies any large stools since she has been here. She denies any abdominal pain. There are no other complaints, changes, or physical findings at this time. PCP: Joy Bauman MD    No current facility-administered medications on file prior to encounter. Current Outpatient Medications on File Prior to Encounter   Medication Sig Dispense Refill    sucralfate (CARAFATE) 1 gram tablet Take 1 g by mouth four (4) times daily.  ADAN PEN NEEDLE 32 gauge x 5/32\" ndle USE WITH PEN TWICE DAILY 100 Pen Needle 3    amiodarone (CORDARONE) 200 mg tablet Take 1 Tab by mouth daily. 90 Tab 0    rivaroxaban (XARELTO) 15 mg tab tablet Take 1 Tab by mouth daily (with dinner). Start taking 6/23/2019 30 Tab 0    furosemide (LASIX) 40 mg tablet Take 80 mg by mouth Daily (before breakfast). Patient takes 80 mg with breakfast      furosemide (LASIX) 40 mg tablet Take 40 mg by mouth daily (with lunch).  Patient takes 80 mg with breakfast and 40 mg at lunch      metoprolol tartrate (LOPRESSOR) 25 mg tablet Take 25 mg by mouth two (2) times a day.  insulin NPH (HUMULIN N NPH INSULIN KWIKPEN) 100 unit/mL (3 mL) inpn 24 Units by SubCUTAneous route daily.  insulin NPH (HUMULIN N NPH INSULIN KWIKPEN) 100 unit/mL (3 mL) inpn 8 Units by SubCUTAneous route every evening.  pantoprazole (PROTONIX) 40 mg tablet Take 1 Tab by mouth daily. 90 Tab 3    loratadine (CLARITIN) 10 mg tablet Take 10 mg by mouth daily.  levothyroxine (SYNTHROID) 125 mcg tablet TAKE ONE TABLET BY MOUTH DAILY (Patient taking differently: TAKE ONE TABLET BY MOUTH DAILY BEFORE BREAKFAST) 90 Tab 7    atorvastatin (LIPITOR) 80 mg tablet TAKE ONE TABLET BY MOUTH EVERY EVENING 90 Tab 0    allopurinol (ZYLOPRIM) 100 mg tablet TAKE TWO TABLETS BY MOUTH DAILY 180 Tab 2    acetaminophen (TYLENOL) 325 mg tablet Take 325 mg by mouth every four (4) hours as needed for Pain.  potassium chloride SR (KLOR-CON 10) 10 mEq tablet Take 1 Tab by mouth daily. 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 I.A. 4/23/2009    TIA (transient ischemic attack)     Ulcer of right lower extremity, limited to breakdown of skin ([facility] 7/5/2018       Past Surgical History:  Past Surgical History:   Procedure Laterality Date    CARDIAC SURG PROCEDURE UNLIST      ablation    COLONOSCOPY N/A 2/20/2019    COLONOSCOPY performed [facility]clendon [facility] at [facility] ENDOSCOPY    COLONOSCOPY N/A 6/17/2019    COLONOSCOPY performed by [facility]on [facility] at [facility] ENDOSCOPY    COLONOSCOPY N/A 6/15/2019    COLONOSCOPY performed by [facility]cl[facility]on MD at [facility] MAIN OR    COLONOSCOPY,DIAGNOSTIC  2/20/2019         [place]  6/17/2019         GI TRACT CAPSULE ENDOSCOPY  6/28/2019    HX APPENDECTOMY      HX CHOLECYSTECTOMY      HX HYSTERECTOMY      HX KNEE ARTHROSCOPY  [id number]    right knee    HX ORTHOPAEDIC      HX UROLOGICAL      RENAL STENT, tur-b    [place] ESOPHAGOGASTRODUODENOSCOPY TRANSORAL DIAGNOSTIC  8/22/2019         SIGMOIDOSCOPY,DIAGNOSTIC  6/15/2019         UPPER GI ENDOSCOPY,BALL DIL,30MM  6/15/2019         UPPER GI ENDOSCOPY,TUMOR ABLATN  6/19/2019         VASCULAR SURGERY PROCEDURE UNLIST  11/4    removed vein in right leg       Family History:  Family History   Problem Relation Age of Onset    Stroke Other     Arthritis-osteo Sister         spinal stenosis    Gout Son     Hypertension Son     Hypertension Mother     Heart Disease Mother         CAD    Heart Disease Father         CAD    Alcohol abuse Neg Hx     Asthma Neg Hx     Bleeding Prob Neg Hx     Cancer Neg Hx     Diabetes Neg Hx     Elevated Lipids Neg Hx     Headache Neg Hx     Lung Disease Neg Hx     Migraines Neg Hx     Psychiatric Disorder Neg Hx     Mental Retardation Neg Hx     Anesth Problems Neg Hx        Social History:  Social History     Tobacco Use    Smoking status: Former Smoker     Packs/day: 0.50     Years: 10.00     Pack years: 5.00     Types: Cigarettes     Last attempt to quit: 1967     Years since quittin.7    Smokeless tobacco: Never Used   Substance Use Topics    Alcohol use: No     Alcohol/week: 0.0 standard drinks    Drug use: Never       Allergies: Allergies   Allergen Reactions    Actos [Pioglitazone] Swelling     Swelling of feet and legs    Codeine Itching    Hydrocodone Rash and Other (comments)     hallucinations         Review of Systems   Constitutional: No  fever,  No  headache  Skin: No  rash, No  jaundice  HEENT: No  nasal congestion, No  eye drainage. Resp: No cough,  No  wheezing  CV: No chest pain, No  palpitations  GI: No vomiting,  No  diarrhea.,  No  constipation  : No dysuria,  No  hematuria  MSK: No joint pain,  No  trauma  Neuro: No numbness, No  tingling  Psych: No suicidal, No  paranoid      Physical Exam     Patient Vitals for the past 12 hrs:   Temp Pulse Resp BP SpO2   19 2130  (!) 57 20 133/52 96 %   19 2100  (!) 57 18 138/49 97 %   19 2030  60 16 140/49 96 %   19 1808 97.6 °F (36.4 °C) 63 18 147/58 98 %     General: alert, No acute distress  Eyes: EOMI, normal conjunctiva  ENT: moist mucous membranes. Neck: Active, full ROM of neck. Skin: No rashes. no jaundice              Lungs: Equal chest expansion. no respiratory distress. clear to auscultation bilaterally No accessory muscle usage  Heart: regular rate     no peripheral edema   2+ radial pulses and DPs bilaterally  Abd:  non distended soft, nontender. No rebound tenderness. No guarding  Back: Full ROM  MSK: Full, active ROM in all 4 extremities.    Neuro: Person, Place, Time and Situation; normal speech;   Psych: Cooperative with exam; Appropriate mood and affect             Diagnostic Study Results     Labs -     Recent Results (from the past 12 hour(s))   CBC W/O DIFF    Collection Time: 19 11:17 AM   Result Value Ref Range    WBC 6.7 3.4 - 10.8 x10E3/uL    RBC 3.65 (L) 3.77 - 5.28 x10E6/uL    HGB 10.4 (L) 11.1 - 15.9 g/dL    HCT 31.2 (L) 34.0 - 46.6 %    MCV 86 79 - 97 fL    MCH 28.5 26.6 - 33.0 pg    MCHC 33.3 31.5 - 35.7 g/dL    RDW 13.4 12.3 - 15.4 %    PLATELET 60 (LL) 630 - 450 x10E3/uL    Hematology comments: Note:    CBC WITH AUTOMATED DIFF    Collection Time: 09/09/19  6:26 PM   Result Value Ref Range    WBC 5.6 3.6 - 11.0 K/uL    RBC 3.72 (L) 3.80 - 5.20 M/uL    HGB 10.6 (L) 11.5 - 16.0 g/dL    HCT 34.4 (L) 35.0 - 47.0 %    MCV 92.5 80.0 - 99.0 FL    MCH 28.5 26.0 - 34.0 PG    MCHC 30.8 30.0 - 36.5 g/dL    RDW 13.2 11.5 - 14.5 %    PLATELET 83 (L) 386 - 400 K/uL    MPV 13.0 (H) 8.9 - 12.9 FL    NRBC 0.0 0  WBC    ABSOLUTE NRBC 0.00 0.00 - 0.01 K/uL    NEUTROPHILS 68 32 - 75 %    LYMPHOCYTES 21 12 - 49 %    MONOCYTES 8 5 - 13 %    EOSINOPHILS 3 0 - 7 %    BASOPHILS 0 0 - 1 %    IMMATURE GRANULOCYTES 0 0.0 - 0.5 %    ABS. NEUTROPHILS 3.8 1.8 - 8.0 K/UL    ABS. LYMPHOCYTES 1.2 0.8 - 3.5 K/UL    ABS. MONOCYTES 0.4 0.0 - 1.0 K/UL    ABS. EOSINOPHILS 0.2 0.0 - 0.4 K/UL    ABS. BASOPHILS 0.0 0.0 - 0.1 K/UL    ABS. IMM. GRANS. 0.0 0.00 - 0.04 K/UL    DF AUTOMATED     PROTHROMBIN TIME + INR    Collection Time: 09/09/19  6:26 PM   Result Value Ref Range    INR 1.2 (H) 0.9 - 1.1      Prothrombin time 12.1 (H) 9.0 - 74.3 sec   METABOLIC PANEL, COMPREHENSIVE    Collection Time: 09/09/19  6:26 PM   Result Value Ref Range    Sodium 143 136 - 145 mmol/L    Potassium 3.9 3.5 - 5.1 mmol/L    Chloride 107 97 - 108 mmol/L    CO2 33 (H) 21 - 32 mmol/L    Anion gap 3 (L) 5 - 15 mmol/L    Glucose 182 (H) 65 - 100 mg/dL    BUN 28 (H) 6 - 20 MG/DL    Creatinine 2.14 (H) 0.55 - 1.02 MG/DL    BUN/Creatinine ratio 13 12 - 20      GFR est AA 27 (L) >60 ml/min/1.73m2    GFR est non-AA 22 (L) >60 ml/min/1.73m2    Calcium 8.8 8.5 - 10.1 MG/DL    Bilirubin, total 0.5 0.2 - 1.0 MG/DL    ALT (SGPT) 23 12 - 78 U/L    AST (SGOT) 14 (L) 15 - 37 U/L    Alk. phosphatase 85 45 - 117 U/L    Protein, total 6.7 6.4 - 8.2 g/dL    Albumin 3.6 3.5 - 5.0 g/dL    Globulin 3.1 2.0 - 4.0 g/dL    A-G Ratio 1.2 1.1 - 2.2     TYPE & SCREEN    Collection Time: 09/09/19  8:18 PM   Result Value Ref Range    Crossmatch Expiration 09/12/2019     ABO/Rh(D) A POSITIVE     Antibody screen NEG        Radiologic Studies -   No orders to display     CT Results  (Last 48 hours)    None        CXR Results  (Last 48 hours)    None            Medical Decision Making     Differential Diagnosis: GI bleeding, anemia,    I reviewed the vital signs, available nursing notes, past medical history, past surgical history, family history and social history and old medical records. On my interpretation, Laboratory workup is significant for hemoglobin of 10.4, INR 1.2, largely unremarkable electro lites, creatinine is 2.14    Management/ED course: Patient presents as referral from her primary care office with bright red blood per rectum. She has a hemoglobin 10.6, and is hematin medically stable with normal heart rate and normal blood pressure. She has not had any further large stools. She does have a slightly elevated creatinine level and I will treat her with IV fluids for this. Patient is ultimately admitted to the internal medicine service. ED Course:   Initial assessment performed. The patients presenting problems have been discussed, and they are in agreement with the care plan formulated and outlined with them. I have encouraged them to ask questions as they arise throughout their visit. Procedures:  0    Disposition: Admitted    Admission Note:  Patient is being admitted to the hospital by Service: Hospitalist.  The results of their tests and reasons for their admission have been discussed with them and available family. They convey agreement and understanding for the need to be admitted and for their admission diagnosis. Diagnosis     Clinical Impression:   1.  Gastrointestinal hemorrhage, unspecified gastrointestinal hemorrhage type        Attestations:  Eagle Dsouza MD        Please note that this dictation was completed with [employer], the computer voice recognition software. Quite often unanticipated grammatical, syntax, homophones, and other interpretive errors are inadvertently transcribed by the computer software. Please disregard these errors. Please excuse any errors that have escaped final proofreading. Thank you.

## 2019-09-10 NOTE — PROGRESS NOTES
Spiritual Care Partner Volunteer visited patient in Oncology unit on September 10, 2019.       Documented by:  AUSTIN Chamorro, [facility], glenn [facility] Paging Service  [phone])

## 2019-09-10 NOTE — H&P
Hospitalist Admission Note    NAME: Kenneth Marques   :  1935   MRN:  [id number]     Date/Time:  2019 9:00 PM    Patient PCP: Jaimie Kelley MD GI=  [facility] AT [facility] Cardio= Dr Rekha Blanca ([facility])  ______________________________________________________________________  Given the patient's current clinical presentation, I have a high level of concern for decompensation if discharged from the emergency department. Complex decision making was performed, which includes reviewing the patient's available past medical records, laboratory results, and x-ray films. My assessment of this patient's clinical condition and my plan of care is as follows. Assessment / Plan:  Acute GI Bleeding POA  H/o PUD  Paroxsymal Afib POA- on Xarelto  Hb= 10.6    Admit to tele bed  Serial H/H Q 8 hrs, Transfuse as needed  Cont PPI  Holding Xarelto  IP GI consult- [facility]  Cont carafate QID  Cont Amiodarone, Lasix  Clear liquids for now    DM type 2 - uncontrolled with hyperglycemia  FS Q AC & HS  Cont NPH Q 12  SSI lispro here    HTN  Hypothyroidism  Chronic Pain syndrome            Code Status: DNR  Surrogate Decision Maker:  Daughter Carolyn Ramirez    DVT Prophylaxis: SCDs  GI Prophylaxis: PPI    Baseline: Pt is independent with ADLs      Subjective:   CHIEF COMPLAINT: GI bleeding- BRBPR per pt    HISTORY OF PRESENT ILLNESS:     Jocy Glynn is a 82 y.o.  female who presents with above complains from home. Pt presents with CC of sudden onset Blood per rectum- bright red per pt. H/o Upper GI bleed on Carafate, recently put back on Xarelto for Afib  Pt was found to have Hb at 10.6 in ER with Stool Guaiac +ve stools in ER. We were asked to admit for work up and evaluation of the above problems.      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 I[place] 4/23/2009    TIA (transient ischemic attack)     Ulcer of right lower extremity, limited to breakdown of skin ([facility] [address] 7/5/2018        Past Surgical History:   Procedure Laterality Date    CARDIAC SURG PROCEDURE UNLIST      ablation    COLONOSCOPY N/A 2/20/2019    COLONOSCOPY performed by [facility] Marie Lizama [facility] at [facility] ENDOSCOPY    COLONOSCOPY N/A 6/17/2019    COLONOSCOPY performed by Rose Marie Lizama MD at [facility] ENDOSCOPY    COLONOSCOPY N/A 6/15/2019    [facility]  2/20/2019         Kavya Asp  6/17/2019         GI TRACT CAPSULE ENDOSCOPY  6/28/2019    HX APPENDECTOMY      HX CHOLECYSTECTOMY      HX HYSTERECTOMY      HX KNEE ARTHROSCOPY  9914,2246    right knee    HX ORTHOPAEDIC      HX UROLOGICAL      RENAL STENT, tur-b    IL ESOPHAGOGASTRODUODENOSCOPY TRANSORAL DIAGNOSTIC  8/22/2019         SIGMOIDOSCOPY,DIAGNOSTIC  6/15/2019         UPPER GI ENDOSCOPY,BALL DIL,30MM  6/15/2019         UPPER GI ENDOSCOPY,TUMOR ABLATN  6/19/2019         VASCULAR SURGERY PROCEDURE UNLIST  11/4    removed vein in right leg       Social History     Tobacco Use    Smoking status: Former Smoker     Packs/day: 0.50     Years: 10.00     Pack years: 5.00     Types: Cigarettes     Last attempt to quit: 1967     Years since quittin.7    Smokeless tobacco: Never Used   Substance Use Topics    Alcohol use: No     Alcohol/week: 0.0 standard drinks        Family History   Problem Relation Age of Onset    Stroke Other     Arthritis-osteo Sister         spinal stenosis    Gout Son     Hypertension Son     Hypertension Mother     Heart Disease Mother         CAD    Heart Disease Father         CAD    Alcohol abuse Neg Hx     Asthma Neg Hx     Bleeding Prob Neg Hx     Cancer Neg Hx     Diabetes Neg Hx     Elevated Lipids Neg Hx     Headache Neg Hx     Lung Disease Neg Hx     Migraines Neg Hx     Psychiatric Disorder Neg Hx     Mental Retardation Neg Hx     Anesth Problems Neg Hx      Allergies   Allergen Reactions    Actos [Pioglitazone] Swelling     Swelling of feet and legs    Codeine Itching    Hydrocodone Rash and Other (comments)     hallucinations        Prior to Admission medications    Medication Sig Start Date End Date Taking? Authorizing Provider   sucralfate (CARAFATE) 1 gram tablet Take 1 g by mouth four (4) times daily. Provider, Historical   ADAN PEN NEEDLE 32 gauge x \" ndle USE WITH PEN TWICE DAILY 19   Rolanda Solitario MD   amiodarone (CORDARONE) 200 mg tablet Take 1 Tab by mouth daily. 19   Rolanda Solitario MD   rivaroxaban (XARELTO) 15 mg tab tablet Take 1 Tab by mouth daily (with dinner). Start taking 2019   Jennifer Bunch, JOSE DE JESUS   furosemide (LASIX) 40 mg tablet Take 80 mg by mouth Daily (before breakfast). Patient takes 80 mg with breakfast    Provider, Historical   furosemide (LASIX) 40 mg tablet Take 40 mg by mouth daily (with lunch).  Patient takes 80 mg with breakfast and 40 mg at lunch    Provider, Historical   metoprolol tartrate (LOPRESSOR) 25 mg tablet Take 25 mg by mouth two (2) times a day. Provider, Historical   insulin NPH (HUMULIN N NPH INSULIN KWIKPEN) 100 unit/mL (3 mL) inpn 24 Units by SubCUTAneous route daily. Provider, Historical   insulin NPH (HUMULIN N NPH INSULIN KWIKPEN) 100 unit/mL (3 mL) inpn 8 Units by SubCUTAneous route every evening. Provider, Historical   pantoprazole (PROTONIX) 40 mg tablet Take 1 Tab by mouth daily. 6/13/19   Sari Mg MD   loratadine (CLARITIN) 10 mg tablet Take 10 mg by mouth daily. Provider, Historical   levothyroxine (SYNTHROID) 125 mcg tablet TAKE ONE TABLET BY MOUTH DAILY  Patient taking differently: TAKE ONE TABLET BY MOUTH DAILY BEFORE BREAKFAST 3/13/19   Ayla Murray MD   atorvastatin (LIPITOR) 80 mg tablet TAKE ONE TABLET BY MOUTH EVERY EVENING 2/11/19   Ayla Murray MD   allopurinol (ZYLOPRIM) 100 mg tablet TAKE TWO TABLETS BY MOUTH DAILY 2/5/19   Ayla Murray MD   acetaminophen (TYLENOL) 325 mg tablet Take 325 mg by mouth every four (4) hours as needed for Pain. Provider, Historical   potassium chloride SR (KLOR-CON 10) 10 mEq tablet Take 1 Tab by mouth daily.  1/23/17   Agustin Aaron MD       REVIEW OF SYSTEMS:         Total of 12 systems reviewed as follows:       POSITIVE= underlined text  Negative = text not underlined  General:  fever, chills, sweats, generalized weakness, weight loss/gain,      loss of appetite   Eyes:    blurred vision, eye pain, loss of vision, double vision  ENT:    rhinorrhea, pharyngitis   Respiratory:   cough, sputum production, SOB, LEIJA, wheezing, pleuritic pain   Cardiology:   chest pain, palpitations, orthopnea, PND, edema, syncope   Gastrointestinal:  abdominal pain , N/V, diarrhea, dysphagia, constipation, bleeding   Genitourinary:  frequency, urgency, dysuria, hematuria, incontinence   Muskuloskeletal :  arthralgia, myalgia, back pain  Hematology:  easy bruising, nose or gum bleeding, lymphadenopathy   Dermatological: rash, ulceration, pruritis, color change / jaundice  Endocrine:   hot flashes or polydipsia   Neurological:  headache, dizziness, confusion, focal weakness, paresthesia,     Speech difficulties, memory loss, gait difficulty  Psychological: Feelings of anxiety, depression, agitation    Objective:   VITALS:    Visit Vitals  /58 (BP 1 Location: Left arm, BP Patient Position: At rest)   Pulse 63   Temp 97.6 °F (36.4 °C)   Resp 18   Ht 5' 8\" (1.727 m)   Wt 92.6 kg (204 lb 2.3 oz)   SpO2 98%   BMI 31.04 kg/m²       PHYSICAL EXAM:    General:    Alert, cooperative, no distress, appears stated age. HEENT: Atraumatic, anicteric sclerae, pink conjunctivae     No oral ulcers, mucosa moist, throat clear, dentition fair  Neck:  Supple, symmetrical,  thyroid: non tender  Lungs:   Clear to auscultation bilaterally. No Wheezing or Rhonchi. No rales. Chest wall:  No tenderness  No Accessory muscle use. Heart:   Regular  rhythm,  No  murmur   No edema  Abdomen:   Soft, non-tender. Not distended. Bowel sounds normal  Extremities: No cyanosis. No clubbing,      Skin turgor normal, Capillary refill normal, Radial dial pulse 2+  Skin:     Not pale. Not Jaundiced  No rashes   Psych:  Good insight. Not depressed. Not anxious or agitated. Neurologic: EOMs intact. No facial asymmetry. No aphasia or slurred speech. Symmetrical strength, Sensation grossly intact.  Alert and oriented X 4.     _______________________________________________________________________  Care Plan discussed with:    Comments   Patient x    Family  x Daughter at bedside in ER   RN x    Care Manager                    Consultant:  lennox Avendaño   _______________________________________________________________________  Expected  Disposition:   Home with Family x   HH/PT/OT/RN x   SNF/LTC    [place]    ________________________________________________________________________  TOTAL TIME:  54 Minutes    Critical Care Provided     Minutes non procedure based Comments    x Reviewed previous records   >50% of visit spent in counseling and coordination of care x Discussion with patient and family and questions answered       ________________________________________________________________________  Signed: Misha Burnett MD    Procedures: see electronic medical records for all procedures/Xrays and details which were not copied into this note but were reviewed prior to creation of Plan. LAB DATA REVIEWED:    Recent Results (from the past 24 hour(s))   AMB POC HEMOGLOBIN (HGB)    Collection Time: 09/09/19 10:43 AM   Result Value Ref Range    Hemoglobin (POC) 11.5    CBC W/O DIFF    Collection Time: 09/09/19 11:17 AM   Result Value Ref Range    WBC 6.7 3.4 - 10.8 x10E3/uL    RBC 3.65 (L) 3.77 - 5.28 x10E6/uL    HGB 10.4 (L) 11.1 - 15.9 g/dL    HCT 31.2 (L) 34.0 - 46.6 %    MCV 86 79 - 97 fL    MCH 28.5 26.6 - 33.0 pg    MCHC 33.3 31.5 - 35.7 g/dL    RDW 13.4 12.3 - 15.4 %    PLATELET 60 (LL) 157 - 450 x10E3/uL    Hematology comments: Note:    CBC WITH AUTOMATED DIFF    Collection Time: 09/09/19  6:26 PM   Result Value Ref Range    WBC 5.6 3.6 - 11.0 K/uL    RBC 3.72 (L) 3.80 - 5.20 M/uL    HGB 10.6 (L) 11.5 - 16.0 g/dL    HCT 34.4 (L) 35.0 - 47.0 %    MCV 92.5 80.0 - 99.0 FL    MCH 28.5 26.0 - 34.0 PG    MCHC 30.8 30.0 - 36.5 g/dL    RDW 13.2 11.5 - 14.5 %    PLATELET 83 (L) 430 - 400 K/uL    MPV 13.0 (H) 8.9 - 12.9 FL    NRBC 0.0 0  WBC    ABSOLUTE NRBC 0.00 0.00 - 0.01 K/uL    NEUTROPHILS 68 32 - 75 %    LYMPHOCYTES 21 12 - 49 %    MONOCYTES 8 5 - 13 %    EOSINOPHILS 3 0 - 7 %    BASOPHILS 0 0 - 1 %    IMMATURE GRANULOCYTES 0 0.0 - 0.5 %    ABS. NEUTROPHILS 3.8 1.8 - 8.0 K/UL    ABS. LYMPHOCYTES 1.2 0.8 - 3.5 K/UL    ABS. MONOCYTES 0.4 0.0 - 1.0 K/UL    ABS. EOSINOPHILS 0.2 0.0 - 0.4 K/UL    ABS. BASOPHILS 0.0 0.0 - 0.1 K/UL    ABS. IMM.  GRANS. 0.0 0.00 - 0.04 K/UL    DF AUTOMATED     PROTHROMBIN TIME + INR    Collection Time: 09/09/19  6:26 PM   Result Value Ref Range    INR 1.2 (H) 0.9 - 1.1      Prothrombin time 12.1 (H) 9.0 - 71.3 sec   METABOLIC PANEL, COMPREHENSIVE    Collection Time: 09/09/19  6:26 PM   Result Value Ref Range    Sodium 143 136 - 145 mmol/L    Potassium 3.9 3.5 - 5.1 mmol/L    Chloride 107 97 - 108 mmol/L    CO2 33 (H) 21 - 32 mmol/L    Anion gap 3 (L) 5 - 15 mmol/L    Glucose 182 (H) 65 - 100 mg/dL    BUN 28 (H) 6 - 20 MG/DL    Creatinine 2.14 (H) 0.55 - 1.02 MG/DL    BUN/Creatinine ratio 13 12 - 20      GFR est AA 27 (L) >60 ml/min/1.73m2    GFR est non-AA 22 (L) >60 ml/min/1.73m2    Calcium 8.8 8.5 - 10.1 MG/DL    Bilirubin, total 0.5 0.2 - 1.0 MG/DL    ALT (SGPT) 23 12 - 78 U/L    AST (SGOT) 14 (L) 15 - 37 U/L    Alk.  phosphatase 85 45 - 117 U/L    Protein, total 6.7 6.4 - 8.2 g/dL    Albumin 3.6 3.5 - 5.0 g/dL    Globulin 3.1 2.0 - 4.0 g/dL    A-G Ratio 1.2 1.1 - 2.2

## 2019-09-10 NOTE — PROGRESS NOTES
I have reviewed pertinent labs and imaging, discussed/agreed on the plan of care with Dr. Jes Kinsey. Hospitalist Progress Note    NAME: Neris Irving   :  1935   MRN:  [id number]       Interim Hospital Summary: 82 y.o. female whom presented on 2019 with  BRBPR with. She has a hx of GAVE. Assessment / Plan:  Acute GI bleeding   Hx of PUD  Hx GERD  Hx Colon Polyps  Hx of GAVE  heme + stools in ER  -Last EGD 19 by Dr. Nikita Newton and showed erythema in the antrum but no GAVE and 2cm hiatal hernia.  -Last Colonoscopy 19  14mm semipedunculated polyp in descending colon removed and 8mm sessile polyp in rectum removed. No diverticulosis and retroflexion in rectum was negative. Small bowel capsule 19 did not reach the cecum. -GI following  -Colonoscopy in am  -(+) lower abdmoninal cramping   -Hgb down to 9.7 from 10.6 on admission   -Continue Carafate and PPI  -NPO after midnight      Paroxysmal A-fib   Hx of ablation of atrial flutter  -Xarelto on hold due to bleeding   -HR upper 50's to lower 60's  -Continue Amiodarone  -Continue tele for now       DM type 2 with hyperglycemia   -Continue NPH and SSI  -AC/HS accu-checks      HTN  CAD  Hypercholesteremia  S/P Mitral Valve Replacement with mechanical valve   PVD  Diastolic heart Failure  -Continue lasix  -Continue statin  -Continue Metoprolol  -Continue Potassium supplements K today 3.3  -ANTHONY hose ordered for chronic lower extremity edema       Hypokalemia  - K+ 3.3  -Potassium supplements given today and PTA daily supplements to start in am   -Repeat BMP in am       Hypothyroidism  -Continue Synthroid      Chronic Pain syndrome  -Tylenol as needed      Gout POA  -not having an acute attack at this time  -Continue Allopurinol       Body mass index is 31.04 kg/m².     Code status: DNR  Prophylaxis: H2B/PPI  Recommended Disposition: Home w/Family     Subjective:     Chief Complaint / Reason for Physician Visit  F/U on rectal bleeding \" I feel good all things considering, I had a stool with rust color last night, but none since then\". Discussed with RN events overnight. Review of Systems:  Symptom Y/N Comments  Symptom Y/N Comments   Fever/Chills N   Chest Pain N    Poor Appetite N   Edema Y Right <left hx of PVD and swelling     Cough N   Abdominal Pain Y Cramping lower abdomen    Sputum    Joint Pain     SOB/LEIJA N   Pruritis/Rash     Nausea/vomit N   Tolerating PT/OT     Diarrhea N   Tolerating Diet Y    Constipation N   Other       Could NOT obtain due to:      Objective:     VITALS:   Last 24hrs VS reviewed since prior progress note. Most recent are:  Patient Vitals for the past 24 hrs:   Temp Pulse Resp BP SpO2   09/10/19 0742 97.7 °F (36.5 °C) (!) 56 18 122/43 94 %   09/10/19 0400  74      09/10/19 0301 97.5 °F (36.4 °C) (!) 56 18 110/56 97 %   09/10/19 0017 97.6 °F (36.4 °C) 63 18 151/55 98 %   09/10/19 0000  (!) 56      09/09/19 2130  (!) 57 20 133/52 96 %   09/09/19 2100  (!) 57 18 138/49 97 %   09/09/19 2030  60 16 140/49 96 %   09/09/19 1808 97.6 °F (36.4 °C) 63 18 147/58 98 %       Intake/Output Summary (Last 24 hours) at 9/10/2019 0840  Last data filed at 9/9/2019 2243  Gross per 24 hour   Intake 500 ml   Output    Net 500 ml        PHYSICAL EXAM:  General: WD, WN. Alert, cooperative, no acute distress    EENT:  EOMI. Anicteric sclerae. MMM  Resp:  CTA bilaterally, no wheezing or rales. No accessory muscle use  CV:  Bradycardia in upper 50's,  bilateral lower extremity edema right more so than left   GI:  Left lower abdomen cramping, no tenderness, Soft, Non distended, . +Bowel sounds  Neurologic:  Alert and oriented X 3, normal speech,   Psych:   Good insight. Not anxious nor agitated  Skin:  No rashes.   No jaundice    Reviewed most current lab test results and cultures  YES  Reviewed most current radiology test results   YES  Review and summation of old records today    NO  Reviewed patient's current orders and [facility] YES  PMH/SH reviewed - no change compared to H&P  ________________________________________________________________________  Care Plan discussed with:    Comments   Patient [address]     Consultant                        Multidiciplinary team rounds were held today with , nursing, pharmacist and clinical coordinator. Patient's plan of care was discussed; medications were reviewed and discharge planning was addressed. ________________________________________________________________________      Comments   >50% of visit spent in counseling and coordination of care     ________________________________________________________________________  Alana Holstein, NP     Procedures: see electronic medical records for all procedures/Xrays and details which were not copied into this note but were reviewed prior to creation of Plan. LABS:  I reviewed today's most current labs and imaging studies.   Pertinent labs include:  Recent Labs     09/10/19  0304 09/09/19  1826 09/09/19  1117   WBC 4.7 5.6 6.7   HGB 9.7* 10.6* 10.4*   HCT 30.3* 34.4* 31.2*   PLT 84* 83* 60*     Recent Labs     09/10/19  0304 09/09/19  1826 09/09/19  1117    143 144   K 3.3* 3.9 4.0   * 107 102   CO2 28 33* 28   * 182* 109*   BUN 23* 28* 25   CREA 1.82* 2.14* 1.92*   CA 8.4* 8.8 9.2   ALB  --  3.6  --    TBILI  --  0.5  --    SGOT  --  14*  --    ALT  --  23  --    INR  --  1.2*  --        Signed: )Alana Holstein, NP

## 2019-09-10 NOTE — ROUTINE PROCESS
TRANSFER - IN REPORT:    Verbal report received from [facility], [address] (name) on Jolynn Dasilva  being received from [phone] (unit) for routine progression of care      Report consisted of patients Situation, Background, Assessment and   Recommendations(SBAR). Information from the following report(s) SBAR, Kardex, ED Summary, Procedure Summary, Intake/Output, MAR, Accordion and Recent Results was reviewed with the receiving nurse. Opportunity for questions and clarification was provided. Assessment completed upon patients arrival to unit and care assumed.

## 2019-09-11 ENCOUNTER — ANESTHESIA (OUTPATIENT)
Dept: ENDOSCOPY | Age: 84
DRG: 378 | End: 2019-09-11
Payer: MEDICARE

## 2019-09-11 ENCOUNTER — ANESTHESIA EVENT (OUTPATIENT)
Dept: ENDOSCOPY | Age: 84
DRG: 378 | End: 2019-09-11
Payer: MEDICARE

## 2019-09-11 LAB
ANION GAP SERPL CALC-SCNC: 4 MMOL/L (ref 5–15)
BASOPHILS # BLD: 0 K/UL (ref 0–0.1)
BASOPHILS NFR BLD: 0 % (ref 0–1)
BUN SERPL-MCNC: 16 MG/DL (ref 6–20)
BUN/CREAT SERPL: 10 (ref 12–20)
CALCIUM SERPL-MCNC: 8.9 MG/DL (ref 8.5–10.1)
CHLORIDE SERPL-SCNC: 107 MMOL/L (ref 97–108)
CO2 SERPL-SCNC: 30 MMOL/L (ref 21–32)
CREAT SERPL-MCNC: 1.61 MG/DL (ref 0.55–1.02)
DIFFERENTIAL METHOD BLD: ABNORMAL
EOSINOPHIL # BLD: 0.2 K/UL (ref 0–0.4)
EOSINOPHIL NFR BLD: 4 % (ref 0–7)
ERYTHROCYTE [DISTWIDTH] IN BLOOD BY AUTOMATED COUNT: 13.2 % (ref 11.5–14.5)
GLUCOSE BLD STRIP.AUTO-MCNC: 108 MG/DL (ref 65–100)
GLUCOSE BLD STRIP.AUTO-MCNC: 131 MG/DL (ref 65–100)
GLUCOSE BLD STRIP.AUTO-MCNC: 160 MG/DL (ref 65–100)
GLUCOSE SERPL-MCNC: 90 MG/DL (ref 65–100)
HCT VFR BLD AUTO: 31.5 % (ref 35–47)
HGB BLD-MCNC: 9.9 G/DL (ref 11.5–16)
IMM GRANULOCYTES # BLD AUTO: 0 K/UL (ref 0–0.04)
IMM GRANULOCYTES NFR BLD AUTO: 0 % (ref 0–0.5)
LYMPHOCYTES # BLD: 1 K/UL (ref 0.8–3.5)
LYMPHOCYTES NFR BLD: 19 % (ref 12–49)
MAGNESIUM SERPL-MCNC: 2 MG/DL (ref 1.6–2.4)
MCH RBC QN AUTO: 28.5 PG (ref 26–34)
MCHC RBC AUTO-ENTMCNC: 31.4 G/DL (ref 30–36.5)
MCV RBC AUTO: 90.8 FL (ref 80–99)
MONOCYTES # BLD: 0.4 K/UL (ref 0–1)
MONOCYTES NFR BLD: 8 % (ref 5–13)
NEUTS SEG # BLD: 3.4 K/UL (ref 1.8–8)
NEUTS SEG NFR BLD: 69 % (ref 32–75)
NRBC # BLD: 0 K/UL (ref 0–0.01)
NRBC BLD-RTO: 0 PER 100 WBC
PLATELET # BLD AUTO: 87 K/UL (ref 150–400)
POTASSIUM SERPL-SCNC: 3.3 MMOL/L (ref 3.5–5.1)
RBC # BLD AUTO: 3.47 M/UL (ref 3.8–5.2)
SERVICE CMNT-IMP: ABNORMAL
SODIUM SERPL-SCNC: 141 MMOL/L (ref 136–145)
WBC # BLD AUTO: 4.9 K/UL (ref 3.6–11)

## 2019-09-11 PROCEDURE — 74011636637 HC RX REV CODE- 636/637: Performed by: INTERNAL MEDICINE

## 2019-09-11 PROCEDURE — 74011250636 HC RX REV CODE- 250/636: Performed by: NURSE ANESTHETIST, CERTIFIED REGISTERED

## 2019-09-11 PROCEDURE — 76060000032 HC ANESTHESIA 0.5 TO 1 HR: Performed by: SPECIALIST

## 2019-09-11 PROCEDURE — 74011250636 HC RX REV CODE- 250/636: Performed by: SPECIALIST

## 2019-09-11 PROCEDURE — 74011000250 HC RX REV CODE- 250: Performed by: NURSE ANESTHETIST, CERTIFIED REGISTERED

## 2019-09-11 PROCEDURE — 85025 COMPLETE CBC W/AUTO DIFF WBC: CPT

## 2019-09-11 PROCEDURE — 0W3P8ZZ CONTROL BLEEDING IN GASTROINTESTINAL TRACT, VIA NATURAL OR ARTIFICIAL OPENING ENDOSCOPIC: ICD-10-PCS | Performed by: SPECIALIST

## 2019-09-11 PROCEDURE — 36415 COLL VENOUS BLD VENIPUNCTURE: CPT

## 2019-09-11 PROCEDURE — 74011250637 HC RX REV CODE- 250/637: Performed by: INTERNAL MEDICINE

## 2019-09-11 PROCEDURE — 77030010936 HC CLP LIG BSC -C: Performed by: SPECIALIST

## 2019-09-11 PROCEDURE — 76040000007: Performed by: SPECIALIST

## 2019-09-11 PROCEDURE — 80048 BASIC METABOLIC PNL TOTAL CA: CPT

## 2019-09-11 PROCEDURE — 83735 ASSAY OF MAGNESIUM: CPT

## 2019-09-11 PROCEDURE — 77030012890

## 2019-09-11 PROCEDURE — 82962 GLUCOSE BLOOD TEST: CPT

## 2019-09-11 PROCEDURE — 65270000015 HC RM PRIVATE ONCOLOGY

## 2019-09-11 PROCEDURE — 74011250637 HC RX REV CODE- 250/637: Performed by: NURSE PRACTITIONER

## 2019-09-11 RX ORDER — SODIUM CHLORIDE 9 MG/ML
75 INJECTION, SOLUTION INTRAVENOUS CONTINUOUS
Status: DISCONTINUED | OUTPATIENT
Start: 2019-09-11 | End: 2019-09-11 | Stop reason: HOSPADM

## 2019-09-11 RX ORDER — PROPOFOL 10 MG/ML
INJECTION, EMULSION INTRAVENOUS AS NEEDED
Status: DISCONTINUED | OUTPATIENT
Start: 2019-09-11 | End: 2019-09-11 | Stop reason: HOSPADM

## 2019-09-11 RX ORDER — POTASSIUM CHLORIDE 750 MG/1
40 TABLET, FILM COATED, EXTENDED RELEASE ORAL
Status: COMPLETED | OUTPATIENT
Start: 2019-09-11 | End: 2019-09-11

## 2019-09-11 RX ORDER — LIDOCAINE HYDROCHLORIDE 20 MG/ML
INJECTION, SOLUTION EPIDURAL; INFILTRATION; INTRACAUDAL; PERINEURAL AS NEEDED
Status: DISCONTINUED | OUTPATIENT
Start: 2019-09-11 | End: 2019-09-11 | Stop reason: HOSPADM

## 2019-09-11 RX ORDER — POTASSIUM CHLORIDE 750 MG/1
10 TABLET, FILM COATED, EXTENDED RELEASE ORAL DAILY
Status: DISCONTINUED | OUTPATIENT
Start: 2019-09-12 | End: 2019-09-13 | Stop reason: HOSPADM

## 2019-09-11 RX ADMIN — Medication 10 ML: at 14:00

## 2019-09-11 RX ADMIN — ATORVASTATIN CALCIUM 80 MG: 40 TABLET, FILM COATED ORAL at 21:01

## 2019-09-11 RX ADMIN — FUROSEMIDE 80 MG: 80 TABLET ORAL at 08:25

## 2019-09-11 RX ADMIN — PROPOFOL 30 MG: 10 INJECTION, EMULSION INTRAVENOUS at 13:10

## 2019-09-11 RX ADMIN — POTASSIUM CHLORIDE 40 MEQ: 750 TABLET, FILM COATED, EXTENDED RELEASE ORAL at 08:25

## 2019-09-11 RX ADMIN — PROPOFOL 20 MG: 10 INJECTION, EMULSION INTRAVENOUS at 12:49

## 2019-09-11 RX ADMIN — LEVOTHYROXINE SODIUM 125 MCG: 100 TABLET ORAL at 06:00

## 2019-09-11 RX ADMIN — AMIODARONE HYDROCHLORIDE 200 MG: 200 TABLET ORAL at 08:25

## 2019-09-11 RX ADMIN — SODIUM CHLORIDE 75 ML/HR: 900 INJECTION, SOLUTION INTRAVENOUS at 11:41

## 2019-09-11 RX ADMIN — ALLOPURINOL 200 MG: 100 TABLET ORAL at 08:25

## 2019-09-11 RX ADMIN — METOPROLOL TARTRATE 25 MG: 25 TABLET ORAL at 08:25

## 2019-09-11 RX ADMIN — LIDOCAINE HYDROCHLORIDE 80 MG: 20 INJECTION, SOLUTION EPIDURAL; INFILTRATION; INTRACAUDAL; PERINEURAL at 12:47

## 2019-09-11 RX ADMIN — PROPOFOL 20 MG: 10 INJECTION, EMULSION INTRAVENOUS at 12:57

## 2019-09-11 RX ADMIN — HUMAN INSULIN 8 UNITS: 100 INJECTION, SUSPENSION SUBCUTANEOUS at 17:31

## 2019-09-11 RX ADMIN — PROPOFOL 20 MG: 10 INJECTION, EMULSION INTRAVENOUS at 12:55

## 2019-09-11 RX ADMIN — PROPOFOL 20 MG: 10 INJECTION, EMULSION INTRAVENOUS at 12:53

## 2019-09-11 RX ADMIN — PANTOPRAZOLE SODIUM 40 MG: 40 TABLET, DELAYED RELEASE ORAL at 08:24

## 2019-09-11 RX ADMIN — SUCRALFATE 1 G: 1 TABLET ORAL at 21:01

## 2019-09-11 RX ADMIN — PROPOFOL 20 MG: 10 INJECTION, EMULSION INTRAVENOUS at 12:51

## 2019-09-11 RX ADMIN — Medication 10 ML: at 21:01

## 2019-09-11 RX ADMIN — PROPOFOL 20 MG: 10 INJECTION, EMULSION INTRAVENOUS at 12:59

## 2019-09-11 RX ADMIN — PROPOFOL 20 MG: 10 INJECTION, EMULSION INTRAVENOUS at 13:01

## 2019-09-11 RX ADMIN — PROPOFOL 30 MG: 10 INJECTION, EMULSION INTRAVENOUS at 13:07

## 2019-09-11 RX ADMIN — SUCRALFATE 1 G: 1 TABLET ORAL at 08:25

## 2019-09-11 RX ADMIN — Medication 10 ML: at 05:52

## 2019-09-11 RX ADMIN — SUCRALFATE 1 G: 1 TABLET ORAL at 17:31

## 2019-09-11 RX ADMIN — METOPROLOL TARTRATE 25 MG: 25 TABLET ORAL at 17:31

## 2019-09-11 RX ADMIN — PROPOFOL 30 MG: 10 INJECTION, EMULSION INTRAVENOUS at 13:04

## 2019-09-11 RX ADMIN — PROPOFOL 40 MG: 10 INJECTION, EMULSION INTRAVENOUS at 12:47

## 2019-09-11 NOTE — PROGRESS NOTES
Spoke with nurse and reviewed chart. She completed the whole bowel prep and had no issues. K was 3.3 this AM and was given 40mEq of potassium per RN. Hgb stable at 9.9. Proceeding with colonoscopy as scheduled at 1230 today.     NEVAEH Aaron  09/11/19  10:10 AM

## 2019-09-11 NOTE — PROGRESS NOTES
Problem: Falls - Risk of  Goal: *Absence of Falls  Description  Document Valencia Betts Fall Risk and appropriate interventions in the flowsheet.   Outcome: Progressing Towards Goal  Note:   Fall Risk Interventions:  Mobility Interventions: Communicate number of staff needed for ambulation/transfer              Elimination Interventions: Call light in reach, Toileting schedule/hourly rounds              Problem: Upper and Lower GI Bleed: Day 2  Goal: Activity/Safety  Outcome: Progressing Towards Goal  Goal: Diagnostic Test/Procedures  Outcome: Progressing Towards Goal  Goal: Nutrition/Diet  Outcome: Progressing Towards Goal  Goal: Medications  Outcome: Progressing Towards Goal  Goal: Treatments/Interventions/Procedures  Outcome: Progressing Towards Goal

## 2019-09-11 NOTE — PROCEDURES
,Colonoscopy    Indications: chronic blood loss anemia    Pre-operative Diagnosis: see above  hematochezia    Assistant(s):  see chart   [address] Rn    Medications:  See anesthesia form    Post-operative Diagnosis:  Vascular ectasia ascending colon            Procedure Details   Prior to the procedure its objectives, risks, consequences and alternatives were discussed with the patient who then elected to proceed. All questions were answered. Digital Rectal Exam:  was normal     The Olympus videocolonoscope was inserted in the rectum and advanced to the cecum. The cecum was identified by typical landmarks. The colonoscope was slowly and carefully withdrawn as the mucosa was inspected. No other abnormalities were noted. Retroflexion in the rectum was negative. Photos to document the ileocecal valve, appendiceal orifice and retroflexion exam were obtained. The preparation was adequate      Estimated Blood Loss:  none    Specimens:  none    Findings:  Single vascular ectasia ascending colon, clipped. This was not impressive    Complications:  none    Implants:  One resolution clip    Repeat colonoscopy is recommended in:  Na (not a screening exam).                Corina Dejesus MD  1:15 PM  9/11/2019

## 2019-09-11 NOTE — DIABETES MGMT
DTC Progress Note    Recommendations/ Comments: Please consider reducing NPH to 12 units ac b and 4 units ac d. Current hospital DM medication: humalog correction, NPH 24 units ac b and 8 units ac d    Chart reviewed on Lea Miller. Due to hypoglycemia on admission - pt is currently using 66 units/24 hours of NPH which given her renal status (CrCl 37.6 ml/min)  Increasing the peak and action of the insulin. Peakless basal insulin would be the preferred medication during her admission. Patient is a 82 y.o. female with known Type 2 DM on NPH 24 units am and 8 units pm at home. A1c:   Lab Results   Component Value Date/Time    Hemoglobin A1c 7.0 (H) 04/16/2019 10:09 AM    Hemoglobin A1c 7.0 (H) 02/21/2019 04:25 AM       Recent Glucose Results:   Lab Results   Component Value Date/Time    GLU 90 09/11/2019 03:12 AM    GLUCPOC 108 (H) 09/11/2019 08:12 AM    GLUCPOC 142 (H) 09/10/2019 10:02 PM    GLUCPOC 93 09/10/2019 06:22 PM        Lab Results   Component Value Date/Time    Creatinine 1.61 (H) 09/11/2019 03:12 AM     Estimated Creatinine Clearance: 32.1 mL/min (A) (based on SCr of 1.61 mg/dL (H)). Active Orders   Diet    DIET CLEAR LIQUID      PO intake: No data found. Will continue to follow as needed.     Thank you  NANDINI Stubbs, RN, [address]        Time spent: 5 minutes

## 2019-09-11 NOTE — PERIOP NOTES
TRANSFER - OUT REPORT:    Verbal report given to [facility] RN(name) on Jessica Wing  being transferred to  1116(unit) for routine progression of care       Report consisted of patients Situation, Background, Assessment and   Recommendations(SBAR). Information from the following report(s) SBAR was reviewed with the receiving nurse. Lines:   Peripheral IV 09/09/19 Left Antecubital (Active)   Site Assessment Clean, dry, & intact 9/10/2019 11:10 PM   Phlebitis Assessment 0 9/10/2019 11:10 PM   Infiltration Assessment 0 9/10/2019 11:10 PM   Dressing Status Clean, dry, & intact 9/10/2019 11:10 PM   Dressing Type Transparent;Tape 9/10/2019 11:10 PM   Hub Color/Line Status Pink;Capped 9/10/2019 11:10 PM        Opportunity for questions and clarification was provided.

## 2019-09-11 NOTE — PROGRESS NOTES
See op note    Rec:  Clear liquids  If capsule available tomorrow will do, if not can do capsule as outpatient.       Katja Singh MD  1:17 PM  9/11/2019

## 2019-09-11 NOTE — PROGRESS NOTES
Oncology End of Shift Note      Bedside shift change report given to Jess Tyler RN (incoming nurse) by Lilia Donovan (outgoing nurse[facility] Report included the following information SBAR, Kardex and MAR. Shift Summary: Patient had colonoscopy completed. Oral potassium was given in the morning. Patient did not complain of pain. Patient did not need insulin coverage. Wilhemina Rios was changed. Issues for Physician to Address:  None. Patient on Cardiac Monitoring?     [x] Yes  [] No    Rhythm: Sinus zaid               Melody Posadas

## 2019-09-11 NOTE — ANESTHESIA PREPROCEDURE EVALUATION
Anesthetic History   No history of anesthetic complications            Review of Systems / Medical History  Patient summary reviewed, nursing notes reviewed and pertinent labs reviewed    Pulmonary          Shortness of breath and smoker      Comments: Former smoker - Quit 1967 - 5 pack years   Neuro/Psych       CVA  TIA     Cardiovascular    Hypertension  Valvular problems/murmurs: mitral stenosis and mitral insufficiency    CHF  Dysrhythmias : atrial fibrillation and atrial flutter  CAD, cardiac stents and hyperlipidemia    Exercise tolerance: <4 METS  Comments: S/P MVR 04/2019  Coronary stent  TTE (1/22/18): Mild MR, mild-to-moderate MS, EF=65%   GI/Hepatic/Renal     GERD    Renal disease: CRI  PUD    Comments: gi bleed  Hx Dysphagia  Hx Esophageal Stricture  Hx Reflux Esophagitis  Hx Melena Endo/Other    Diabetes: well controlled, type 2  Hypothyroidism: well controlled  Obesity, arthritis, cancer (bladder) and anemia  Pertinent negatives: No morbid obesity  Comments:  Thrombocytopenia   Other Findings   Comments:   Hx Thrombocytopenia  Gout  Chronic knee pain              Physical Exam    Airway  Mallampati: II  TM Distance: 4 - 6 cm  Neck ROM: normal range of motion   Mouth opening: Normal     Cardiovascular    Rhythm: regular  Rate: normal    Murmur: Grade 3, Mitral area     Dental    Dentition: Lower partial plate, Caps/crowns and Loose teeth     Pulmonary  Breath sounds clear to auscultation               Abdominal  GI exam deferred       Other Findings            Anesthetic Plan    ASA: 3  Anesthesia type: total IV anesthesia          Induction: Intravenous  Anesthetic plan and risks discussed with: Patient

## 2019-09-11 NOTE — INTERDISCIPLINARY ROUNDS
Oncology Interdisciplinary rounds were held today to discuss patient plan of care and outcomes.  The following members were present: Nursing, Physician, Case Management, Pharmacy, and PT/OT    Actual Length of Stay: 2         Expected Length of Stay: - - -                       Plan            Discharge    Dietitian consult  Discharge possible  today  Discharge possible today

## 2019-09-11 NOTE — ROUTINE PROCESS
Krystyna Mccarty  1935  [id number]    Situation:  Verbal report received from: ДМИТРИЙ Sullivan RN  Procedure: Procedure(s):  COLONOSCOPY  RESOLUTION CLIP    Background:    Preoperative diagnosis: gi bleed  Postoperative diagnosis: Vascular ectasia    :  Dr. Paul Altamirano  Assistant(s): Endoscopy Technician-1: Jorge A Hahn  Endoscopy RN-1: Peter Velasquez RN    Specimens: * No specimens in log *  H. Pylori  no    Assessment:  Intra-procedure medications     Anesthesia gave intra-procedure sedation and medications, see anesthesia flow sheet yes    Intravenous fluids: NS@ KVO     Vital signs stable  yes    Abdominal assessment: round and soft  yes    Recommendation:  Discharge patient per MD order NO.   Return to floor yes-- room 1116  Family or Friend  Inpatient, room 1116  Permission to share finding with family or friend yes

## 2019-09-11 NOTE — H&P
Pre-endoscopy H and P    The patient was seen and examined in the endoscopy suite. The airway was assessed and docuemented. The problem list, past medical history, and medications were reviewed. 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.90    Advance directive in chart Z78.9    Type 2 diabetes, controlled, with renal manifestation ([facility]) E11.29    Chronic atrial fibrillation ([facility]) I48.2    Mixed hyperlipidemia E78.2    Atherosclerosis of native coronary artery without angina pectoris I25.10    Hypothyroidism, acquired, autoimmune E06.3    Heart valve problem I38    Mitral regurgitation I34.0    S/P MVR (mitral valve repair) Z98.890    Active advance directive on file Z78.9    Non-rheumatic mitral regurgitation I34.0    Heart failure ([facility]) I50.9    Bilateral leg edema R60.0    Esophageal stricture K22.2    Dysphagia R13.10    GI bleeding K92.2    Mitral stenosis I05.0    S/P MVR (mitral valve replacement) Z95.2    GIB (gastrointestinal bleeding) K92.2    Melena K92.1    Rectal bleeding K62.5    Lower abdominal pain R10.30    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    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, with fat layer exposed ([facility]) L97.912    Bilateral lower leg cellulitis L03.116, L03.115    Acute GI bleeding K92.2    Chronic anticoagulation Z79.01    Paroxysmal A-fib ([facility]) I48.0     Social History     Socioeconomic History    Marital status:      Spouse name: Not on file    Number of children: 2    Years of education: 15    Highest education level: High school graduate   Occupational History    Not on file   Social Needs    Financial resource strain: Not hard at all   [place] insecurity:     Worry: Never true     Inability: Never true   feedPack needs:     Medical: No     Non-medical: No   Tobacco Use    Smoking status: Former Smoker     Packs/day: 0.50     Years: 10.00     Pack years: 5.00     Types: Cigarettes     Last attempt to quit: 1967     Years since quittin.7    Smokeless tobacco: Never Used   Substance and Sexual Activity    Alcohol use: No     Alcohol/week: 0.0 standard drinks    Drug use: Never    Sexual activity: Not Currently     Partners: Male   Lifestyle    Physical activity:     Days per week: 0 days     Minutes per session: 0 min    Stress: Only a little   Relationships    Social connections:     Talks on phone: Three times a week     Gets together: Three times a week     Attends [employer] service: 1 to 4 times per year     Active member of club or organization: No     Attends meetings of clubs or organizations: Never     Relationship status:      Intimate partner violence:     Fear of current or ex partner: No     Emotionally abused: No     Physically abused: No     Forced sexual activity: No   Other Topics Concern     Service Not Asked    Blood Transfusions Not Asked    Caffeine Concern Not Asked    Occupational Exposure Not Asked    Hobby Hazards Not Asked    Sleep Concern Not Asked    Stress Concern Not Asked    Weight Concern Yes    Special Diet Not Asked    Back Care Not Asked    Exercise Not Asked    Bike Helmet Not Asked    [address] Not Asked    Self-Exams Not Asked   Social History Narrative    Not on file     Past Medical History:   Diagnosis Date    Arthritis     KNEES    Atrial fibrillation ([facility] 10/29/2009    [facility]     CAD (coronary artery disease)     STENT PER PATIENT    Chronic pain     KNEES    Coagulation disorder (HCC)     Chronic prophylactic anticoagulation med    Colon polyps     Diabetes (HCC)     IDDM    GAVE (gastric antral vascular ectasia) 2019    bx :    Gastric, biopsy:  Mild capillary ectasia and congestion, compatible with gastric antral vascular ectasia (GAVE), negative for background gastritis. One fragment suggestive of hyperplastic polyp     GERD (gastroesophageal reflux disease)     Gout     Heart valve problem     leaking heart valve    Hypercholesterolemia     Hypertension     Hypothyroidism     Hypothyroidism 2009    Hypothyroidism, acquired, autoimmune 2015    Overweight and obesity     PUD (peptic ulcer disease)     S/P ablation of atrial flutter[V45.89HM]     @ [facility] >> atrial fibrillation    SOB (shortness of breath) on exertion 2019    T. I.A. 2009    TIA (transient ischemic attack)     Ulcer of right lower extremity, limited to breakdown of skin ([facility] [address] 2018     The patient has a family history of na    Prior to Admission Medications   Prescriptions Last Dose Informant Patient Reported? Taking?   acetaminophen (TYLENOL) 325 mg tablet  Child Yes No   Sig: Take 325 mg by mouth every four (4) hours as needed for Pain. allopurinol (ZYLOPRIM) 100 mg tablet  Child No No   Sig: TAKE TWO TABLETS BY MOUTH DAILY   amiodarone (CORDARONE) 200 mg tablet   No No   Sig: Take 1 Tab by mouth daily. atorvastatin (LIPITOR) 80 mg tablet  Child No No   Sig: TAKE ONE TABLET BY MOUTH EVERY EVENING   furosemide (LASIX) 40 mg tablet  Child Yes No   Sig: Take 80 mg by mouth Daily (before breakfast). Patient takes 80 mg with breakfast   furosemide (LASIX) 40 mg tablet  Child Yes No   Sig: Take 40 mg by mouth daily (with lunch). Patient takes 80 mg with breakfast and 40 mg at lunch   insulin NPH (HUMULIN N NPH INSULIN KWIKPEN) 100 unit/mL (3 mL) inpn  Child Yes No   Si Units by SubCUTAneous route daily.    insulin NPH (HUMULIN N NPH INSULIN KWIKPEN) 100 unit/mL (3 mL) inpn  Child Yes No   Si Units by SubCUTAneous route every evening. levothyroxine (SYNTHROID) 125 mcg tablet  Child No No   Sig: TAKE ONE TABLET BY MOUTH DAILY   Patient taking differently: TAKE ONE TABLET BY MOUTH DAILY BEFORE BREAKFAST   loratadine (CLARITIN) 10 mg tablet  Child Yes No   Sig: Take 10 mg by mouth daily. metoprolol tartrate (LOPRESSOR) 25 mg tablet  Child Yes No   Sig: Take 25 mg by mouth two (2) times a day. pantoprazole (PROTONIX) 40 mg tablet  Child No No   Sig: Take 1 Tab by mouth daily. potassium chloride SR (KLOR-CON 10) 10 mEq tablet  Child No No   Sig: Take 1 Tab by mouth daily. rivaroxaban (XARELTO) 15 mg tab tablet   No No   Sig: Take 1 Tab by mouth daily (with dinner). Start taking 2019   sucralfate (CARAFATE) 1 gram tablet   Yes No   Sig: Take 1 g by mouth four (4) times daily. Facility-Administered Medications: None           The review of systems is:  positive for shortness of breath and negative for chest pain      The heart, lungs, and mental status were satisfactory for the administration of anesthesia sedation and for the procedure. I discussed with the patient the objectives, risks, consequences and alternatives to the procedure.       Edmar Baires MD  2019  12:45 PM

## 2019-09-11 NOTE — PROGRESS NOTES
I have reviewed pertinent labs and imaging, discussed/agreed on the plan of care with Dr. Jesus Chester. Hospitalist Progress Note    NAME: Waldo Brock   :  1935   MRN:  [id number]       Interim Hospital Summary: 82 y.o. female whom presented on 2019 with  BRBPR with. She has a hx of GAVE. Assessment / Plan:  Acute Lower GI bleeding   Hx of PUD  Hx GERD  Hx Colon Polyps  Hx of GAVE  heme + stools in ER  -Last EGD 19 by Dr. Lorena Shoemaker and showed erythema in the antrum but no GAVE and 2cm hiatal hernia. - Colonoscopy 19  14mm semipedunculated polyp in descending colon removed and 8mm sessile polyp in rectum removed. No diverticulosis and retroflexion in rectum was negative. Small bowel capsule 19 did not reach the cecum.   -Colonoscopy done today with findings of Single vascular ectasia ascending colon, clipped.    -GI following  -Pill capsule study in am if available  -(+) lower abdmoninal cramping with rust colored stools overnight   -Hgb stable 9.9  -Continue Carafate and PPI        Paroxysmal A-fib   Hx of ablation of atrial flutter  -Xarelto on hold due to bleeding   -HR upper 50's to lower 60's  -Continue Amiodarone  -Continue tele for now       DM type 2 with hyperglycemia   -Continue NPH and SSI  -AC/HS accu-checks  -yesterday had an episode of asymptomatic hypoglycemia in 60's   -today       HTN  CAD  Hypercholesteremia  S/P Mitral Valve Replacement with mechanical valve   PVD  Diastolic heart Failure  -Continue lasix  -Continue statin  -Continue Metoprolol  -Continue Potassium supplements K today 3.3   -ANTHONY hose ordered for chronic lower extremity edema       Hypokalemia  - K+ 3.3  -40 meq potassium supplements given today and PTA daily supplements to start in am   -Repeat BMP in am       Hypothyroidism  -Continue Synthroid      Chronic Pain syndrome  -Tylenol as needed      Gout POA  -not having an acute attack at this time  -Continue Allopurinol       Body mass index is 31.04 kg/m². Code status: DNR  Prophylaxis: H2B/PPI  Recommended Disposition: Home w/Family     Subjective:     Chief Complaint / Reason for Physician Visit  F/U on rectal bleeding  \"I had rust color BM's last night\". Discussed with RN events overnight. Review of Systems:  Symptom Y/N Comments  Symptom Y/N Comments   Fever/Chills N   Chest Pain N    Poor Appetite N   Edema Y Right <left hx of PVD and swelling     Cough N   Abdominal Pain Y Cramping lower abdomen    Sputum    Joint Pain     SOB/LEIJA N   Pruritis/Rash     Nausea/vomit N   Tolerating PT/OT     Diarrhea  Had coloscopy prep last night   Tolerating Diet Y    Constipation N   Other       Could NOT obtain due to:      Objective:     VITALS:   Last 24hrs VS reviewed since prior progress note. Most recent are:  Patient Vitals for the past 24 hrs:   Temp Pulse Resp BP SpO2   09/11/19 1345  61 16 122/51 92 %   09/11/19 1343  61 17 132/59 95 %   09/11/19 1340   26 121/45 96 %   09/11/19 1337  60 21 120/48 96 %   09/11/19 1333  60 21 112/47 99 %   09/11/19 1329 97.4 °F (36.3 °C) 60 18 124/50 98 %   09/11/19 1319  (!) 111 19 109/41 100 %   09/11/19 1315  60 20 114/40 100 %   09/11/19 1133  62 19 142/67 98 %   09/11/19 0810 97.4 °F (36.3 °C) 64 16 144/57 100 %   09/11/19 0400  60      09/11/19 0309 97.5 °F (36.4 °C) 61 16 136/69 95 %   09/11/19 0000  (!) 56      09/10/19 2306 97.8 °F (36.6 °C) 67 16 141/67 95 %   09/10/19 1940 97.7 °F (36.5 °C) 75 18 150/66 95 %   09/10/19 1500 97.5 °F (36.4 °C) (!) 57 18 124/50 99 %       Intake/Output Summary (Last 24 hours) at 9/11/2019 1412  Last data filed at 9/11/2019 1341  Gross per 24 hour   Intake 4550 ml   Output    Net 4550 ml        PHYSICAL EXAM:  General: WD, WN. Alert, cooperative, no acute distress    EENT:  EOMI. Anicteric sclerae. MMM  Resp:  CTA bilaterally, no wheezing or rales.   No accessory muscle use  CV:  SB to NSR  bilateral lower extremity edema right more so than left ( wearing ANTHONY hose now)   GI:  Intermittent  lower abdominal cramping, no tenderness, Soft, Non distended, . +Bowel sounds  Neurologic:  Alert and oriented X 3, normal speech,   Psych:   Good insight. Not anxious nor agitated  Skin:  No rashes. No jaundice    Reviewed most current lab test results and cultures  YES  Reviewed most current radiology test results   YES  Review and summation of old records today    NO  Reviewed patient's current orders and MAR    YES  PMH/SH reviewed - no change compared to H&P  ________________________________________________________________________  Care Plan discussed with:    Comments   Patient [address]     Consultant                        Multidiciplinary team rounds were held today with , nursing, pharmacist and clinical coordinator. Patient's plan of care was discussed; medications were reviewed and discharge planning was addressed. ________________________________________________________________________      Comments   >50% of visit spent in counseling and coordination of care     ________________________________________________________________________  Mitul Hernández NP     Procedures: see electronic medical records for all procedures/Xrays and details which were not copied into this note but were reviewed prior to creation of Plan. LABS:  I reviewed today's most current labs and imaging studies.   Pertinent labs include:  Recent Labs     09/11/19  0312 09/10/19  1508 09/10/19  0304 09/09/19  1826   WBC 4.9  --  4.7 5.6   HGB 9.9* 9.9* 9.7* 10.6*   HCT 31.5* 31.0* 30.3* 34.4*   PLT 87*  --  84* 83*     Recent Labs     09/11/19  0312 09/10/19  0304 09/09/19  1826    145 143   K 3.3* 3.3* 3.9    111* 107   CO2 30 28 33*   GLU 90 102* 182*   BUN 16 23* 28*   CREA 1.61* 1.82* 2.14*   CA 8.9 8.4* 8.8   MG 2.0  --   --    ALB  --   --  3.6   TBILI  --   --  0.5   SGOT  --   --  14*   ALT  --   --  23   INR  --   --  1.2* Signed: )Kranthi Lane NP

## 2019-09-11 NOTE — PROGRESS NOTES
Oncology End of Shift Note      Bedside shift change report given to Young Duane (incoming nurse) by Danielle Bird RN (outgoing nurse) on Natacha Tong. Report included the following information SBAR, Kardex, MAR and Recent Results. Shift Summary: Pt completed bowel prep, npo since midnight  am labs drawn, no complaints overnight      Issues for Physician to Address:  None     Patient on Cardiac Monitoring?     [x] Yes  [] No    Rhythm: SB-SR         Shift Events        Danielle Bird RN

## 2019-09-11 NOTE — PERIOP NOTES
Anesthesia reports 270mg Propofol, 80mg Lidocaine and 550mL NS given during procedure. Received report from anesthesia staff on vital signs and status of patient.

## 2019-09-12 ENCOUNTER — PATIENT OUTREACH (OUTPATIENT)
Dept: INTERNAL MEDICINE CLINIC | Age: 84
End: 2019-09-12

## 2019-09-12 LAB
ANION GAP SERPL CALC-SCNC: 2 MMOL/L (ref 5–15)
BUN SERPL-MCNC: 14 MG/DL (ref 6–20)
BUN/CREAT SERPL: 8 (ref 12–20)
CALCIUM SERPL-MCNC: 8.7 MG/DL (ref 8.5–10.1)
CHLORIDE SERPL-SCNC: 108 MMOL/L (ref 97–108)
CO2 SERPL-SCNC: 31 MMOL/L (ref 21–32)
CREAT SERPL-MCNC: 1.65 MG/DL (ref 0.55–1.02)
ERYTHROCYTE [DISTWIDTH] IN BLOOD BY AUTOMATED COUNT: 13.1 % (ref 11.5–14.5)
GLUCOSE BLD STRIP.AUTO-MCNC: 110 MG/DL (ref 65–100)
GLUCOSE BLD STRIP.AUTO-MCNC: 123 MG/DL (ref 65–100)
GLUCOSE BLD STRIP.AUTO-MCNC: 140 MG/DL (ref 65–100)
GLUCOSE BLD STRIP.AUTO-MCNC: 146 MG/DL (ref 65–100)
GLUCOSE SERPL-MCNC: 85 MG/DL (ref 65–100)
HCT VFR BLD AUTO: 31.3 % (ref 35–47)
HGB BLD-MCNC: 10.2 G/DL (ref 11.5–16)
MCH RBC QN AUTO: 29.7 PG (ref 26–34)
MCHC RBC AUTO-ENTMCNC: 32.6 G/DL (ref 30–36.5)
MCV RBC AUTO: 91.3 FL (ref 80–99)
NRBC # BLD: 0 K/UL (ref 0–0.01)
NRBC BLD-RTO: 0 PER 100 WBC
PLATELET # BLD AUTO: 90 K/UL (ref 150–400)
POTASSIUM SERPL-SCNC: 3.7 MMOL/L (ref 3.5–5.1)
RBC # BLD AUTO: 3.43 M/UL (ref 3.8–5.2)
SERVICE CMNT-IMP: ABNORMAL
SODIUM SERPL-SCNC: 141 MMOL/L (ref 136–145)
WBC # BLD AUTO: 4.9 K/UL (ref 3.6–11)

## 2019-09-12 PROCEDURE — 82962 GLUCOSE BLOOD TEST: CPT

## 2019-09-12 PROCEDURE — 77030018766 HC KT ENDOSC IMAG GVIM -F: Performed by: SPECIALIST

## 2019-09-12 PROCEDURE — 80048 BASIC METABOLIC PNL TOTAL CA: CPT

## 2019-09-12 PROCEDURE — 65270000015 HC RM PRIVATE ONCOLOGY

## 2019-09-12 PROCEDURE — 85027 COMPLETE CBC AUTOMATED: CPT

## 2019-09-12 PROCEDURE — 74011250637 HC RX REV CODE- 250/637: Performed by: PHYSICIAN ASSISTANT

## 2019-09-12 PROCEDURE — 0DJ07ZZ INSPECTION OF UPPER INTESTINAL TRACT, VIA NATURAL OR ARTIFICIAL OPENING: ICD-10-PCS | Performed by: SPECIALIST

## 2019-09-12 PROCEDURE — 74011250637 HC RX REV CODE- 250/637: Performed by: INTERNAL MEDICINE

## 2019-09-12 PROCEDURE — 74011250637 HC RX REV CODE- 250/637: Performed by: NURSE PRACTITIONER

## 2019-09-12 PROCEDURE — 74011636637 HC RX REV CODE- 636/637: Performed by: INTERNAL MEDICINE

## 2019-09-12 PROCEDURE — 76040000019: Performed by: SPECIALIST

## 2019-09-12 PROCEDURE — 36415 COLL VENOUS BLD VENIPUNCTURE: CPT

## 2019-09-12 RX ORDER — METOCLOPRAMIDE 10 MG/1
5 TABLET ORAL 2 TIMES DAILY
Status: COMPLETED | OUTPATIENT
Start: 2019-09-12 | End: 2019-09-12

## 2019-09-12 RX ADMIN — HUMAN INSULIN 8 UNITS: 100 INJECTION, SUSPENSION SUBCUTANEOUS at 18:11

## 2019-09-12 RX ADMIN — POTASSIUM CHLORIDE 10 MEQ: 750 TABLET, FILM COATED, EXTENDED RELEASE ORAL at 09:29

## 2019-09-12 RX ADMIN — PANTOPRAZOLE SODIUM 40 MG: 40 TABLET, DELAYED RELEASE ORAL at 09:29

## 2019-09-12 RX ADMIN — SUCRALFATE 1 G: 1 TABLET ORAL at 22:27

## 2019-09-12 RX ADMIN — ALLOPURINOL 200 MG: 100 TABLET ORAL at 09:28

## 2019-09-12 RX ADMIN — AMIODARONE HYDROCHLORIDE 200 MG: 200 TABLET ORAL at 09:28

## 2019-09-12 RX ADMIN — SUCRALFATE 1 G: 1 TABLET ORAL at 12:16

## 2019-09-12 RX ADMIN — FUROSEMIDE 40 MG: 40 TABLET ORAL at 15:44

## 2019-09-12 RX ADMIN — ATORVASTATIN CALCIUM 80 MG: 40 TABLET, FILM COATED ORAL at 22:27

## 2019-09-12 RX ADMIN — METOCLOPRAMIDE HYDROCHLORIDE 5 MG: 10 TABLET ORAL at 12:16

## 2019-09-12 RX ADMIN — METOPROLOL TARTRATE 25 MG: 25 TABLET ORAL at 09:29

## 2019-09-12 RX ADMIN — HUMAN INSULIN 24 UNITS: 100 INJECTION, SUSPENSION SUBCUTANEOUS at 10:03

## 2019-09-12 RX ADMIN — Medication 10 ML: at 06:07

## 2019-09-12 RX ADMIN — METOPROLOL TARTRATE 25 MG: 25 TABLET ORAL at 18:13

## 2019-09-12 RX ADMIN — Medication 10 ML: at 22:27

## 2019-09-12 RX ADMIN — METOCLOPRAMIDE HYDROCHLORIDE 5 MG: 10 TABLET ORAL at 09:29

## 2019-09-12 RX ADMIN — Medication 10 ML: at 15:45

## 2019-09-12 RX ADMIN — SUCRALFATE 1 G: 1 TABLET ORAL at 09:29

## 2019-09-12 RX ADMIN — SUCRALFATE 1 G: 1 TABLET ORAL at 18:13

## 2019-09-12 RX ADMIN — FUROSEMIDE 80 MG: 80 TABLET ORAL at 09:37

## 2019-09-12 NOTE — PROGRESS NOTES
F/U for stuttering gi hemorrhage  anticoagulants    S: Ms. Bethel Santos was seen by me today during rounds. At this time, she is resting + comfortably. The patient has no new complaints today. Please see admission consult for details of ROS; there are + changes today. No bleeding    O: Blood pressure 121/48, pulse 61, temperature 98.6 °F (37 °C), resp. rate 16, height 5' 8\" (1.727 m), weight 92.6 kg (204 lb 2.3 oz), SpO2 98 %, not currently breastfeeding. Gen: Patient is in no acute distress. There is no jaundice. Lungs: Clear to auscultation bilaterally . Heart:+RRR. Abd: Soft, non tender, non-distended, bowel sounds present. Extremities: Warm. Cross sectional imaging:  None new  (Ct 9.10:     IMPRESSION  IMPRESSION:  No acute abnormality identified. Again noted is borderline splenomegaly similar  to the prior study dated 2/19/2019.)     Recent Labs     09/12/19 0327 09/11/19  0312   WBC 4.9 4.9   HGB 10.2* 9.9*   HCT 31.3* 31.5*   PLT 90* 87*     Recent Labs     09/12/19  0327 09/11/19  0312 09/10/19  0304    141 145   K 3.7 3.3* 3.3*    107 111*   CO2 31 30 28   BUN 14 16 23*   CREA 1.65* 1.61* 1.82*   GLU 85 90 102*   CA 8.7 8.9 8.4*   MG  --  2.0  --      Recent Labs     09/09/19  1826   SGOT 14*   ALT 23   AP 85   TBILI 0.5   TP 6.7   ALB 3.6   GLOB 3.1     Recent Labs     09/09/19  1826   INR 1.2*   PTP 12.1*      No results for input(s): FE, TIBC, PSAT, FERR in the last 72 hours. Lab Results   Component Value Date/Time    Folate 72.3 (H) 01/22/2017 03:13 AM      No results for input(s): PH, PCO2, PO2 in the last 72 hours. No results for input(s): CPK, CKNDX, TROIQ in the last 72 hours.     No lab exists for component: CPKMB  Lab Results   Component Value Date/Time    Cholesterol, total 119 09/04/2018 11:35 AM    HDL Cholesterol 47 09/04/2018 11:35 AM    LDL, calculated 58 09/04/2018 11:35 AM    Triglyceride 68 09/04/2018 11:35 AM    CHOL/HDL Ratio 3.1 09/22/2010 08:12 AM Lab Results   Component Value Date/Time    Glucose (POC) 146 (H) 09/12/2019 04:46 PM    Glucose (POC) 140 (H) 09/12/2019 11:29 AM    Glucose (POC) 110 (H) 09/12/2019 08:44 AM    Glucose (POC) 160 (H) 09/11/2019 08:58 PM    Glucose (POC) 131 (H) 09/11/2019 04:20 PM     Lab Results   Component Value Date/Time    Color YELLOW/STRAW 04/28/2019 02:10 AM    Appearance CLEAR 04/28/2019 02:10 AM    Specific gravity 1.016 04/28/2019 02:10 AM    Specific gravity 1.020 07/06/2010 01:10 PM    pH (UA) 5.0 04/28/2019 02:10 AM    Protein NEGATIVE  04/28/2019 02:10 AM    Glucose NEGATIVE  04/28/2019 02:10 AM    Ketone NEGATIVE  04/28/2019 02:10 AM    Bilirubin NEGATIVE  04/28/2019 02:10 AM    Urobilinogen 0.2 04/28/2019 02:10 AM    Nitrites NEGATIVE  04/28/2019 02:10 AM    Leukocyte Esterase MODERATE (A) 04/28/2019 02:10 AM    Epithelial cells MODERATE (A) 04/28/2019 02:10 AM    Bacteria NEGATIVE  04/28/2019 02:10 AM    WBC 10-20 04/28/2019 02:10 AM    RBC 5-10 04/28/2019 02:10 AM        A: Active Problems:    Acute GI bleeding (9/9/2019)      Chronic anticoagulation (9/9/2019)      Paroxysmal A-fib ([address] (9/9/2019)        Comment:  She has not had bleeding since the anticoagulants were stopped. P:  The capsule will be downloaded today. I will llikely read it tomorrow  Ok to advance diet   Dc plans per the hospitalist service  She did not have major hemorrhage.   I am ok if anticoagulants are re started    Eloise Homans, MD  5:38 PM  9/12/2019

## 2019-09-12 NOTE — PROGRESS NOTES
Currently admitted to [facility] as of 9/9/19. Goals Addressed                 This Visit's Progress       Post Hospitalization     Attends follow-up appointments as directed. 7/25/2019   - did not attend hospital f/u appt with Dr. Inna Barger on 7/8/19; patient rescheduled this appointment to 8/1/19  - followed by OP Wound care clinic for lower leg ulcerations. Attended appointments with [address] [place] [address] on 7/15/19 (establish care) and 7/22/19; has future f/u scheduled  - to f/u with Dr. Yung Wood (GI) in two weeks post-discharge. Per GI Office staff, patient attended office visit with Dr. Yung Wood on 6/16/19 and next scheduled appt is 9/25/19 with dr. Yung Wood. - to f/u with Dr. Bro Velazco (Cardiology) in 2 weeks in two weeks post-discharge; patient unable to recall whether she has attended office visit with Dr. Bro Velazco post-discharge and is unable to find note of a previous appt on her calendar, reports next scheduled f/u is 12/19/19 for routine f/u. Patient is advised today of hospital f/u recommendation within two weeks of hospital discharge date; patient will contact Dr. Stephanie Ness office today in order to schedule an appointment. Per [facility] staff, most recent office visit was 6/27/19; next scheduled appt 12/19/19  -to follow with heme/onc as outpatient for iron deficiency; has appointment scheduled for 9/19/19 8/6/2019  - did not attend scheduled appt with Dr. Inna Barger on 8/1/19; per chart review, appt cancelled. No future appts with PCP are scheduled at this time. - pt reports appt on 8/1/19 was cancelled due to her having a headache and a sore foot  - followed by Outpatient Wound Clinic for ulceration on the lower legs; most recent visit 8/5/19 and was advised to f/u with Podiatrist ASAP regarding left foot callus and pressure point with prominence of metatarsal heads.   - scheduled to start outpatient cardiac rehab tomorrow, 8/7  - attempted to rescheduled hospital f/u appt with PCP today, however first available appt is 9/5; NN will request LPN  assistance with appt scheduling for earlier date, if available. - pt has not scheduled appt with Podiatry yet as she was unaware of who to schedule an appt with. Per chart review, patient was previously followed by Dr. Cole Peña DPM; [facility] . Patient will contact Podiatry today in order to schedule an appointment. 8/8/2019  - pt scheduled for appointment with Dr. Sera Reveles 8/14/19; pt declines this appt due to having another appt scheduled for 8/14.  Awaiting LPN  response regarding next available appt date.  - has appt scheduled with Podiatry on 8/14/19 at 2:15 pm    8/9/2019  - per LPN , first available appt date with PCP is 9/9/19 and appt has been scheduled    9/5/2019  - notified patient of scheduled appt with PCP 9/9/19 9/12/2019  - attended office visit with PCP 9/9/19; was referred to ED by PCP for c/o rectal bleeding with onset 9/8/19, need for hospital admission for Serial Hgb and GI Consult

## 2019-09-12 NOTE — INTERDISCIPLINARY ROUNDS
Oncology Interdisciplinary rounds were held today to discuss patient plan of care and outcomes. The following members were present: Nursing, Physician, Case Management, Pharmacy, and PT/OT    Actual Length of Stay: 3    DRG GLOS: 3    Expected Length of Stay: 3d 0h                       Plan            Discharge    Colonoscopy 9/11/19  Capsule study 9/12/19  Home with family when discharged.

## 2019-09-12 NOTE — ANESTHESIA POSTPROCEDURE EVALUATION
Procedure(s):  COLONOSCOPY  RESOLUTION CLIP. general    Anesthesia Post Evaluation        Patient location during evaluation: PACU  Note status: Adequate. Level of consciousness: responsive to verbal stimuli and sleepy but conscious  Pain management: satisfactory to patient  Airway patency: patent  Anesthetic complications: no  Cardiovascular status: acceptable  Respiratory status: acceptable  Hydration status: acceptable  Comments: +Post-Anesthesia Evaluation and Assessment    Patient: Erika Glynn MRN: [id number]  SSN: [id number]   YOB: 1935  Age: 82 y.o. Sex: female      Cardiovascular Function/Vital Signs    /52 (BP 1 Location: Right arm, BP Patient Position: At rest)   Pulse (!) 59   Temp 36.7 °C (98 °F)   Resp 16   Ht 5' 8\" (1.727 m)   Wt 92.6 kg (204 lb 2.3 oz)   SpO2 97%   Breastfeeding? No   BMI 31.04 kg/m²     Patient is status post Procedure(s):  COLONOSCOPY  RESOLUTION CLIP. Nausea/Vomiting: Controlled. Postoperative hydration reviewed and adequate. Pain:  Pain Scale 1: Numeric (0 - 10) (09/11/19 1345)  Pain Intensity 1: 0 (09/11/19 1329)   Managed. Neurological Status: At baseline. Mental Status and Level of Consciousness: Arousable. Pulmonary Status:   O2 Device: Room air (09/11/19 1345)   Adequate oxygenation and airway patent. Complications related to anesthesia: None    Post-anesthesia assessment completed. No concerns.     Signed By: Porfirio Luna MD    9/12/2019  Post anesthesia nausea and vomiting:  controlled      Vitals Value Taken Time   /52 9/12/2019  3:26 AM   Temp 36.7 °C (98 °F) 9/12/2019  3:26 AM   Pulse 59 9/12/2019  3:26 AM   Resp 16 9/12/2019  3:26 AM   SpO2 97 % 9/12/2019  3:26 AM

## 2019-09-12 NOTE — PROGRESS NOTES
Oncology End of Shift Note      Bedside shift change report given to Lisa Ryan (incoming nurse) by Brissa Dawson RN (outgoing nurse) on [facility]. Report included the following information SBAR.       Shift Summary: Meds,meals, safety      Issues for Physician to Address:                      Callie Landaverde RN

## 2019-09-12 NOTE — PROGRESS NOTES
Problem: Falls - Risk of  Goal: *Absence of Falls  9/12/2019 1045 by Aura Landaverde RN  Outcome: Progressing Towards Goal  Note:   Fall Risk Interventions:  Mobility Interventions: Communicate number of staff needed for ambulation/transfer, Utilize walker, cane, or other assistive device    Mentation Interventions: Door open when patient unattended, Evaluate medications/consider consulting pharmacy    Medication Interventions: Evaluate medications/consider consulting pharmacy    Elimination Interventions: Call light in reach, Toileting schedule/hourly rounds           9/12/2019 1045 by Joshua Concepcion RN  Outcome: Progressing Towards Goal  Note:   Fall Risk Interventions:  Mobility Interventions: Communicate number of staff needed for ambulation/transfer, Utilize walker, cane, or other assistive device    Mentation Interventions: Door open when patient unattended, Evaluate medications/consider consulting pharmacy    Medication Interventions: Evaluate medications/consider consulting pharmacy    Elimination Interventions: Call light in reach, Toileting schedule/hourly rounds

## 2019-09-12 NOTE — PROGRESS NOTES
I posted a capsule for tomorrow.   I do not know if we have one available    Will consider giving her reglan 10 mg twice tomorrow to help aixa go thru gut.  (she will need to be advised about the neuro side effects that are possible)    Dari Fallon MD  9:35 PM  9/11/2019

## 2019-09-12 NOTE — PROGRESS NOTES
Gastroenterology Progress Note    9/12/2019    Admit Date: 9/9/2019    Subjective: Follow up for:  1)  GIB      Colonoscopy yesterday revealed one single AVM in the cecum that was not impressive and was clipped. Tolerated procedure well with no overnight issues. No further bleeding. Hgb stable. No abd pain, N/V/D. Swallowed small bowel capsule this AM without difficulty. Current Facility-Administered Medications   Medication Dose Route Frequency    metoclopramide HCl (REGLAN) tablet 5 mg  5 mg Oral BID    potassium chloride SR (KLOR-CON 10) tablet 10 mEq  10 mEq Oral DAILY    acetaminophen (TYLENOL) tablet 650 mg  650 mg Oral Q6H PRN    furosemide (LASIX) tablet 80 mg  80 mg Oral ACB    allopurinol (ZYLOPRIM) tablet 200 mg  200 mg Oral DAILY    amiodarone (CORDARONE) tablet 200 mg  200 mg Oral DAILY    atorvastatin (LIPITOR) tablet 80 mg  80 mg Oral QHS    furosemide (LASIX) tablet 40 mg  40 mg Oral DAILY WITH LUNCH    insulin NPH (NOVOLIN N, HUMULIN N) injection 24 Units  24 Units SubCUTAneous ACB    insulin NPH (NOVOLIN N, HUMULIN N) injection 8 Units  8 Units SubCUTAneous ACD    levothyroxine (SYNTHROID) tablet 125 mcg  125 mcg Oral ACB    metoprolol tartrate (LOPRESSOR) tablet 25 mg  25 mg Oral BID    pantoprazole (PROTONIX) tablet 40 mg  40 mg Oral DAILY    sucralfate (CARAFATE) tablet 1 g  1 g Oral QID    sodium chloride (NS) flush 5-40 mL  5-40 mL IntraVENous Q8H    sodium chloride (NS) flush 5-40 mL  5-40 mL IntraVENous PRN    insulin lispro (HUMALOG) injection   SubCUTAneous AC&HS    glucose chewable tablet 16 g  4 Tab Oral PRN    glucagon (GLUCAGEN) injection 1 mg  1 mg IntraMUSCular PRN    dextrose 10% infusion 0-250 mL  0-250 mL IntraVENous PRN        Objective:     Blood pressure 126/52, pulse (!) 59, temperature 98 °F (36.7 °C), resp. rate 16, height 5' 8\" (1.727 m), weight 92.6 kg (204 lb 2.3 oz), SpO2 97 %, not currently breastfeeding.     No intake/output data recorded. 09/10 1901 - 09/12 0700  In: 7046 [P.O.:4000;  I.V.:550]  Out: -     EXAM:     Gen: WDWN, NAD    Data Review    Recent Results (from the past 24 hour(s))   GLUCOSE, POC    Collection Time: 09/11/19  8:12 AM   Result Value Ref Range    Glucose (POC) 108 (H) 65 - 100 mg/dL    Performed by Khoa Bolton    GLUCOSE, POC    Collection Time: 09/11/19  4:20 PM   Result Value Ref Range    Glucose (POC) 131 (H) 65 - 100 mg/dL    Performed by Merari Velasquez (PCT)    GLUCOSE, POC    Collection Time: 09/11/19  8:58 PM   Result Value Ref Range    Glucose (POC) 160 (H) 65 - 100 mg/dL    Performed by Donald Joy (PCT)    CBC W/O DIFF    Collection Time: 09/12/19  3:27 AM   Result Value Ref Range    WBC 4.9 3.6 - 11.0 K/uL    RBC 3.43 (L) 3.80 - 5.20 M/uL    HGB 10.2 (L) 11.5 - 16.0 g/dL    HCT 31.3 (L) 35.0 - 47.0 %    MCV 91.3 80.0 - 99.0 FL    MCH 29.7 26.0 - 34.0 PG    MCHC 32.6 30.0 - 36.5 g/dL    RDW 13.1 11.5 - 14.5 %    PLATELET 90 (L) 744 - 400 K/uL    NRBC 0.0 0  WBC    ABSOLUTE NRBC 0.00 0.00 - 4.02 K/uL   METABOLIC PANEL, BASIC    Collection Time: 09/12/19  3:27 AM   Result Value Ref Range    Sodium 141 136 - 145 mmol/L    Potassium 3.7 3.5 - 5.1 mmol/L    Chloride 108 97 - 108 mmol/L    CO2 31 21 - 32 mmol/L    Anion gap 2 (L) 5 - 15 mmol/L    Glucose 85 65 - 100 mg/dL    BUN 14 6 - 20 MG/DL    Creatinine 1.65 (H) 0.55 - 1.02 MG/DL    BUN/Creatinine ratio 8 (L) 12 - 20      GFR est AA 36 (L) >60 ml/min/1.73m2    GFR est non-AA 30 (L) >60 ml/min/1.73m2    Calcium 8.7 8.5 - 10.1 MG/DL     Recent Labs     09/12/19 0327 09/11/19 0312   WBC 4.9 4.9   HGB 10.2* 9.9*   HCT 31.3* 31.5*   PLT 90* 87*     Recent Labs     09/12/19  0327 09/11/19  0312 09/10/19  0304    141 145   K 3.7 3.3* 3.3*    107 111*   CO2 31 30 28   BUN 14 16 23*   CREA 1.65* 1.61* 1.82*   GLU 85 90 102*   CA 8.7 8.9 8.4*   MG  --  2.0  --      Recent Labs     09/09/19  1826   SGOT 14*   ALT 23   AP 85 TBILI 0.5   TP 6.7   ALB 3.6   GLOB 3.1     Recent Labs     09/09/19  1826   INR 1.2*   PTP 12.1*      No results for input(s): FE, TIBC, PSAT, FERR in the last 72 hours. Lab Results   Component Value Date/Time    Folate 72.3 (H) 01/22/2017 03:13 AM      No results for input(s): PH, PCO2, PO2 in the last 72 hours. No results for input(s): CPK, CKNDX, TROIQ in the last 72 hours. No lab exists for component: CPKMB  Lab Results   Component Value Date/Time    Cholesterol, total 119 09/04/2018 11:35 AM    HDL Cholesterol 47 09/04/2018 11:35 AM    LDL, calculated 58 09/04/2018 11:35 AM    Triglyceride 68 09/04/2018 11:35 AM    CHOL/HDL Ratio 3.1 09/22/2010 08:12 AM     Lab Results   Component Value Date/Time    Glucose (POC) 160 (H) 09/11/2019 08:58 PM    Glucose (POC) 131 (H) 09/11/2019 04:20 PM    Glucose (POC) 108 (H) 09/11/2019 08:12 AM    Glucose (POC) 142 (H) 09/10/2019 10:02 PM    Glucose (POC) 93 09/10/2019 06:22 PM     Lab Results   Component Value Date/Time    Color YELLOW/STRAW 04/28/2019 02:10 AM    Appearance CLEAR 04/28/2019 02:10 AM    Specific gravity 1.016 04/28/2019 02:10 AM    Specific gravity 1.020 07/06/2010 01:10 PM    pH (UA) 5.0 04/28/2019 02:10 AM    Protein NEGATIVE  04/28/2019 02:10 AM    Glucose NEGATIVE  04/28/2019 02:10 AM    Ketone NEGATIVE  04/28/2019 02:10 AM    Bilirubin NEGATIVE  04/28/2019 02:10 AM    Urobilinogen 0.2 04/28/2019 02:10 AM    Nitrites NEGATIVE  04/28/2019 02:10 AM    Leukocyte Esterase MODERATE (A) 04/28/2019 02:10 AM    Epithelial cells MODERATE (A) 04/28/2019 02:10 AM    Bacteria NEGATIVE  04/28/2019 02:10 AM    WBC 10-20 04/28/2019 02:10 AM    RBC 5-10 04/28/2019 02:10 AM           Assessment:     Active Problems:    Acute GI bleeding (9/9/2019)      Chronic anticoagulation (9/9/2019)      Paroxysmal A-fib ([address] (9/9/2019)        Plan:     Proceed with small bowel capsule today as colonoscopy was negative for the source of bleeding.   The last time she swallowed the capsule it didn't reach the cecum so we discussed using reglan. I counseled her on potential extrapyramidal side effects and she voices understanding and willingness to proceed. Will use 5mg BID today (instead of 10mg due to reduced crt clearance).      NEVAEH Perez  09/12/19  7:53 AM

## 2019-09-12 NOTE — PROGRESS NOTES
Problem: Falls - Risk of  Goal: *Absence of Falls  Outcome: Progressing Towards Goal  Note:   Fall Risk Interventions:  Mobility Interventions: Communicate number of staff needed for ambulation/transfer, Utilize walker, cane, or other assistive device    Mentation Interventions: Door open when patient unattended, Evaluate medications/consider consulting pharmacy    Medication Interventions: Evaluate medications/consider consulting pharmacy    Elimination Interventions: Call light in reach, Toileting schedule/hourly rounds

## 2019-09-12 NOTE — PROGRESS NOTES
Oncology End of Shift Note      Bedside shift change report given to Dennis Morrell RN (incoming nurse) by Rayray Torres RN (outgoing nurse) on Erika Renard. Report included the following information SBAR, Kardex, MAR and Accordion. Shift Summary: Received report on pt at 2300, patient slept all night, am labs drawn, vss, pill cam study started this morning      Issues for Physician to Address:  None     Patient on Cardiac Monitoring?     [x] Yes  [] No    Rhythm: SB to SR         Shift Events        Rayray Torres RN

## 2019-09-12 NOTE — PROGRESS NOTES
Pill Cam Progress Note    Sami Bhatia  1935  1116/01    Date of Procedure: 09/12/19    Outcome of Pill Cam Swallow: Successful    Condition of patient after procedure: a/o x4, no complaints of pain or signs of distress    Belt was applied by:     Shirley Stover  09/12/19     Verbal report given to [address] [place] [address],[address].     Time of ingestion 0711  Clear liquids begin 0911  Light lunch/snack 1111  Belt off at 1511

## 2019-09-13 ENCOUNTER — TELEPHONE (OUTPATIENT)
Dept: INTERNAL MEDICINE CLINIC | Age: 84
End: 2019-09-13

## 2019-09-13 VITALS
HEIGHT: 68 IN | DIASTOLIC BLOOD PRESSURE: 50 MMHG | HEART RATE: 68 BPM | TEMPERATURE: 97.9 F | WEIGHT: 204.15 LBS | SYSTOLIC BLOOD PRESSURE: 141 MMHG | OXYGEN SATURATION: 95 % | BODY MASS INDEX: 30.94 KG/M2 | RESPIRATION RATE: 18 BRPM

## 2019-09-13 LAB
ANION GAP SERPL CALC-SCNC: 6 MMOL/L (ref 5–15)
BUN SERPL-MCNC: 13 MG/DL (ref 6–20)
BUN/CREAT SERPL: 7 (ref 12–20)
CALCIUM SERPL-MCNC: 9 MG/DL (ref 8.5–10.1)
CHLORIDE SERPL-SCNC: 108 MMOL/L (ref 97–108)
CO2 SERPL-SCNC: 29 MMOL/L (ref 21–32)
CREAT SERPL-MCNC: 1.82 MG/DL (ref 0.55–1.02)
ERYTHROCYTE [DISTWIDTH] IN BLOOD BY AUTOMATED COUNT: 13 % (ref 11.5–14.5)
GLUCOSE BLD STRIP.AUTO-MCNC: 102 MG/DL (ref 65–100)
GLUCOSE SERPL-MCNC: 98 MG/DL (ref 65–100)
HCT VFR BLD AUTO: 36.8 % (ref 35–47)
HGB BLD-MCNC: 11.9 G/DL (ref 11.5–16)
MCH RBC QN AUTO: 29.3 PG (ref 26–34)
MCHC RBC AUTO-ENTMCNC: 32.3 G/DL (ref 30–36.5)
MCV RBC AUTO: 90.6 FL (ref 80–99)
NRBC # BLD: 0 K/UL (ref 0–0.01)
NRBC BLD-RTO: 0 PER 100 WBC
PLATELET # BLD AUTO: 104 K/UL (ref 150–400)
PMV BLD AUTO: 12.6 FL (ref 8.9–12.9)
POTASSIUM SERPL-SCNC: 3.6 MMOL/L (ref 3.5–5.1)
RBC # BLD AUTO: 4.06 M/UL (ref 3.8–5.2)
SERVICE CMNT-IMP: ABNORMAL
SODIUM SERPL-SCNC: 143 MMOL/L (ref 136–145)
WBC # BLD AUTO: 7.5 K/UL (ref 3.6–11)

## 2019-09-13 PROCEDURE — 82962 GLUCOSE BLOOD TEST: CPT

## 2019-09-13 PROCEDURE — 36415 COLL VENOUS BLD VENIPUNCTURE: CPT

## 2019-09-13 PROCEDURE — 74011250637 HC RX REV CODE- 250/637: Performed by: NURSE PRACTITIONER

## 2019-09-13 PROCEDURE — 74011250637 HC RX REV CODE- 250/637: Performed by: INTERNAL MEDICINE

## 2019-09-13 PROCEDURE — 85027 COMPLETE CBC AUTOMATED: CPT

## 2019-09-13 PROCEDURE — 74011636637 HC RX REV CODE- 636/637: Performed by: INTERNAL MEDICINE

## 2019-09-13 PROCEDURE — 80048 BASIC METABOLIC PNL TOTAL CA: CPT

## 2019-09-13 RX ADMIN — FUROSEMIDE 80 MG: 80 TABLET ORAL at 08:08

## 2019-09-13 RX ADMIN — AMIODARONE HYDROCHLORIDE 200 MG: 200 TABLET ORAL at 08:08

## 2019-09-13 RX ADMIN — METOPROLOL TARTRATE 25 MG: 25 TABLET ORAL at 08:08

## 2019-09-13 RX ADMIN — PANTOPRAZOLE SODIUM 40 MG: 40 TABLET, DELAYED RELEASE ORAL at 08:08

## 2019-09-13 RX ADMIN — POTASSIUM CHLORIDE 10 MEQ: 750 TABLET, FILM COATED, EXTENDED RELEASE ORAL at 08:08

## 2019-09-13 RX ADMIN — ALLOPURINOL 200 MG: 100 TABLET ORAL at 08:08

## 2019-09-13 RX ADMIN — Medication 10 ML: at 06:37

## 2019-09-13 RX ADMIN — HUMAN INSULIN 24 UNITS: 100 INJECTION, SUSPENSION SUBCUTANEOUS at 08:08

## 2019-09-13 RX ADMIN — SUCRALFATE 1 G: 1 TABLET ORAL at 08:08

## 2019-09-13 RX ADMIN — LEVOTHYROXINE SODIUM 125 MCG: 100 TABLET ORAL at 06:37

## 2019-09-13 NOTE — PROGRESS NOTES
Bedside shift change report given to [facility], RN (oncoming nurse) by Lucía Leonardo RN (offgoing nurse). Report included the following information SBAR, Kardex, ED Summary, Intake/Output, MAR and Recent Results.

## 2019-09-13 NOTE — PROGRESS NOTES
Spiritual Care Assessment/Progress Note  [facility]      NAME: Aurora Navas      MRN: [id number]  AGE: 82 y.o.  SEX: female  Mormonism Affiliation: Jain   Language: English     9/13/2019     Total Time (in minutes): 17     Spiritual Assessment begun in [facility] through conversation with:         [x]Patient        [] Family    [] Friend(s)        Reason for Consult: Initial/Spiritual assessment, patient floor     Spiritual beliefs: (Please include comment if needed)     [x] Identifies with a jerica tradition:  Muslim       [x] Supported by a jerica community:[facility]            [] Claims no spiritual orientation:           [] Seeking spiritual identity:                [] Adheres to an individual form of spirituality:           [] Not able to assess:                           Identified resources for coping:      [x] Prayer                               [] Music                  [] Guided Imagery     [x] Family/friends                 [] Pet visits     [] Devotional reading                         [] Unknown     [] Other:                                               Interventions offered during this visit: (See comments for more details)    Patient Interventions: Affirmation of jerica, Affirmation of emotions/emotional suffering, Initial visit, Initial/Spiritual assessment, patient floor, Catharsis/review of pertinent events in supportive environment, Prayer (assurance of)           Plan of Care:     [x] Support spiritual and/or cultural needs    [] Support AMD and/or advance care planning process      [] Support grieving process   [] Coordinate Rites and/or Rituals    [] Coordination with community clergy   [] No spiritual needs identified at this time   [] Detailed Plan of Care below (See Comments)  [] Make referral to Music Therapy  [] Make referral to Pet Therapy     [] Make referral to Addiction services  [] Make referral to [facility]  [] Make referral to Spiritual Care Partner  [] No future visits requested        [x] Follow up visits as needed     Comments:  made initial visit to patients room for spiritual assessment. Patient was lying in bed when  arrived. Patient shared that she she must be feeling better as she is going home today. She said she was ready to go home and looking forward to it. Her daughter is taking her home and helps her when needed. Still lives alone, but daughter is close by to help her along with her grandchildren. Her son lives in [place] and is coming to visit this weekend.  and patient visited about having twins as grandchildren as bother children have twins and this  has twin grandchildren as well. Patient attends [employer]. Her jerica is important to her.  provided active listening skills and pastoral care and support during this visit. Patient was thankful for the visit. Spiritual Care will follow up as needed.     Mignon Riley MDiv  Pager: [phone]

## 2019-09-13 NOTE — DISCHARGE SUMMARY
Hospitalist Discharge Summary     Patient ID:  Krystyna Mccarty  [id number]  17 y.o.  1935 9/9/2019    PCP on record: Amanda Sky MD    Admit date: 9/9/2019  Discharge date and time: 9/13/2019    DISCHARGE DIAGNOSIS:    Acute Lower GI Bleed POA Resolved  PUD POA Stable  DM type 2 POA stable  HTN POA Stable  Hypothyroidism POA Stable  Chronic Anticoagulation POA  Paroxysmal Atrial Fibrillation POA Stable  Gout    CONSULTATIONS:  IP CONSULT TO GASTROENTEROLOGY    Excerpted HPI from H&P of Lucy Dobson MD:    Kennedy Rene is a 82 y.o.  female who presents with above complains from home. Pt presents with CC of sudden onset Blood per rectum- bright red per pt. H/o Upper GI bleed on Carafate, recently put back on Xarelto for Afib  Pt was found to have Hb at 10.6 in ER with Stool Guaiac +ve stools in ER.    ______________________________________________________________________  DISCHARGE SUMMARY/HOSPITAL COURSE:  for full details see H&P, daily progress notes, labs, consult notes. Ms. Neda Connors was admitted on 9/9/11 from home with sudden onset of bright red blood per rectum. Stool were heme positive on admission to the ER. HGB was 10.6. Colonoscopy done on 9/11/19 with findings of a small vascular ectasia ascending colon found and clipped. During prep for coloscopy she c/o rust colored stool. Ater colonoscopy done no further rectal bleeding noted. Bowel capsule study done on 9/12/19. Results pending. Hgb throughout hospital was stable. Initial HGB was 10.6 and final HGB  11.9. Cleared by GI to restart Xarelto. She will need to F/U with GI and PCP upon discharge.         Acute Lower GI Bleed POA Resolved  PUD POA Stable  -F/U with PCP      DM type 2 POA stable  -F/U with PCP  -Continue Insulin      HTN POA Stable   -Continue home meds      Hypothyroidism POA Stable  -Continue Synthroid      Chronic Anticoagulation POA  Paroxysmal Atrial Fibrillation POA Stable  -Continue Amiodarone  -Resume Xarelto      Gout  -Continue Allopurinol    _______________________________________________________________________  Patient seen and examined by me on discharge day. Pertinent Findings:  Gen:    Not in distress  Chest: Clear lungs  CVS:   Regular rhythm. Chronic lower extremity  edema  Abd:  Soft, not distended, not tender  Neuro:  Alert, Oriented x4   _______________________________________________________________________  DISCHARGE MEDICATIONS:   Discharge Medication List as of 9/13/2019 10:53 AM      CONTINUE these medications which have NOT CHANGED    Details   sucralfate (CARAFATE) 1 gram tablet Take 1 g by mouth four (4) times daily. , Historical Med      amiodarone (CORDARONE) 200 mg tablet Take 1 Tab by mouth daily. , No Print, Disp-90 Tab, R-0      rivaroxaban (XARELTO) 15 mg tab tablet Take 1 Tab by mouth daily (with dinner). Start taking 6/23/2019, No Print, Disp-30 Tab, R-0      !! furosemide (LASIX) 40 mg tablet Take 80 mg by mouth Daily (before breakfast). Patient takes 80 mg with breakfast, Historical Med      !! furosemide (LASIX) 40 mg tablet Take 40 mg by mouth daily (with lunch). Patient takes 80 mg with breakfast and 40 mg at lunch, Historical Med      metoprolol tartrate (LOPRESSOR) 25 mg tablet Take 25 mg by mouth two (2) times a day., Historical Med      !! insulin NPH (HUMULIN N NPH INSULIN KWIKPEN) 100 unit/mL (3 mL) inpn 24 Units by SubCUTAneous route daily. , Historical Med      !! insulin NPH (HUMULIN N NPH INSULIN KWIKPEN) 100 unit/mL (3 mL) inpn 8 Units by SubCUTAneous route every evening., Historical Med      pantoprazole (PROTONIX) 40 mg tablet Take 1 Tab by mouth daily. , Normal, Disp-90 Tab, R-3      loratadine (CLARITIN) 10 mg tablet Take 10 mg by mouth daily. , Historical Med      levothyroxine (SYNTHROID) 125 mcg tablet TAKE ONE TABLET BY MOUTH DAILY, Normal, Disp-90 Tab, R-7      atorvastatin (LIPITOR) 80 mg tablet TAKE ONE TABLET BY MOUTH EVERY EVENING, Normal, Disp-90 Tab, R-0      allopurinol (ZYLOPRIM) 100 mg tablet TAKE TWO TABLETS BY MOUTH DAILY, Normal, Disp-180 Tab, R-2      acetaminophen (TYLENOL) 325 mg tablet Take 325 mg by mouth every four (4) hours as needed for Pain., Historical Med      potassium chloride SR (KLOR-CON 10) 10 mEq tablet Take 1 Tab by mouth daily. , Print, Disp-30 Tab, R-12       !! - Potential duplicate medications found. Please discuss with provider. Patient Follow Up Instructions: Activity: Activity as tolerated  Diet: Cardiac Diet  Wound Care: None needed    Follow-up with PCP in 1 week. Follow-up tests/labs   Follow-up Information     Follow up With Specialties Details Why Wilda Rogers MD Internal Medicine Go on 10/3/2019 For hospital follow up appointment at 10:30AM  [address] [facility]  [phone]      [facility] Clinical Assessments On 9/15/2019 Expect a phone call from [place] to schedule a follow up visit with you in 24-48 hours. If you have questions please Contact #[phone] Visit at [place] [address] [facility] That Comes To [address] [facility]  [address] Matthew Guido MD Gastroenterology Go on 9/25/2019 At 10:15am for follow up appointment.  Please bring discharge paper work and a list of ALL medications [address]  [place] [address] [facility]  [phone]          ________________________________________________________________    Risk of deterioration: Moderate    Condition at Discharge:  Stable  __________________________________________________________________    Disposition  Home with family, no needs    ____________________________________________________________________    Code Status: DNR/DNI  ___________________________________________________________________      Total time in minutes spent coordinating this discharge (includes going over instructions, follow-up, prescriptions, and preparing report for sign off to her PCP) :  45 minutes    Signed:  Jelly Sherwood NP

## 2019-09-13 NOTE — TELEPHONE ENCOUNTER
Patient's daughter, Krystal Malcolm states she needs a call back in reference to discussing patient's recent Hospital stay. Please call to discuss.  Thank you

## 2019-09-13 NOTE — ROUTINE PROCESS
The following appointments have been successfully scheduled:    Date/time Thursday, October 03, 2019 10:30 AM  Patient  Saurabh Hunt 1935 (09UK F) #[id number] X#[id number]  Department [facility]-MAIN OFFICE-[facility]  Appointment type Hospital Follow-Up  Provider Marilee 2 visit scheduled for 9/15/19 at [facility] will contact the patient for additional information and to confirm visit.   Apts added to AVS

## 2019-09-13 NOTE — PROGRESS NOTES

## 2019-09-13 NOTE — PROGRESS NOTES
OSMEL Plan:    -pt to discharge home w/daughter. -PCP & GI Follow up           Medicare pt has received, reviewed, and signed 2nd IM letter informing them of their right to appeal the discharge. Signed copy has been placed on pt bedside chart.       Charlene Pena, MSW  [phone]

## 2019-09-13 NOTE — DISCHARGE INSTRUCTIONS
Patient Discharge Instructions     Pt Name  Bethel Santos   Date of Birth 1935   Age  82 y.o. Medical Record Number  [id number]   PCP Arturo Nassar MD    Admit date:  9/9/2019 @    [place] [address]    Room Number  6529/98   Date of Discharge 9/13/2019     Admission Diagnoses:     <principal problem not specified>          Allergies   Allergen Reactions    Actos [Pioglitazone] Swelling     Swelling of feet and legs    Codeine Itching    Hydrocodone Rash and Other (comments)     hallucinations        You were admitted to [address] for  <principal problem not specified>    YOUR OTHER MEDICAL DIAGNOSES INCLUDE (BUT NOT LIMITED TO ):  Present on Admission:   Acute GI bleeding   Chronic anticoagulation   Paroxysmal A-fib ([address]   Gout   Essential hypertension, benign   Hypothyroidism, acquired, autoimmune   Long term current use of anticoagulant therapy   Encounter for long-term (current) use of other medications   GAVE (gastric antral vascular ectasia)      DIET:  Diabetic Diet   Oral Nutritional Supplements: No Oral Supplement prescribed  Supplement Frequency: none    Recommended activity: Activity as tolerated  Follow up : Follow-up Information     Follow up With Specialties Details Why Alondra Smith MD Internal Medicine Go on 10/3/2019 For hospital follow up appointment at 10:30AM  [address] [place] [address] 83.  [phone]      [address] [place], [place] [address]Third Floor Urgent Care, In-Home Clinical Assessments On 9/15/2019 Expect a phone call from [facility] to schedule a follow up visit with you in 24-48 hours. If you have questions please Contact #[phone] Visit at [facility] Urgent Care That Comes To [address] [facility]  [address] [place] Ma, MD Gastroenterology Go on 9/25/2019 At 10:15am for follow up appointment.  Please bring discharge paper work and a list of ALL medications [address]  [phone]                · It is important that you take the medication exactly as they are prescribed. · Keep your medication in the bottles provided by the pharmacist and keep a list of the medication names, dosages, and times to be taken in your wallet. · Do not take other medications without consulting your doctor. ADDITIONAL INFORMATION: If you experience any of the following symptoms or have any health problem not listed below, then please call your primary care physician or return to the emergency room if you cannot get hold of your doctor: Fever, chills, nausea, vomiting, diarrhea, change in mentation, falling, bleeding, shortness of breath. I understand that if any problems occur once I am discharged, I am supposed to call my Primary care physician for further care or seek help in the Emergency Department at the nearest Healthcare facility. I have had an opportunity to discuss my clinical issues with my doctor and nursing staff. I understand and acknowledge receipt of the above instructions.                                                                                                                                            Physician's or R.N.'s Signature                                                            Date/Time                                                                                                                                              Patient or Representative Signature                                                 Date/Time

## 2019-09-14 NOTE — PROGRESS NOTES
Visit charted 9/14/19 of visit in Oncology at [facility] on 9/10/19. Rev.  Hardeep Bacon MDiv   For  Assistance Page [phone])

## 2019-09-16 ENCOUNTER — PATIENT OUTREACH (OUTPATIENT)
Dept: INTERNAL MEDICINE CLINIC | Age: 84
End: 2019-09-16

## 2019-09-16 NOTE — PROGRESS NOTES
Hospital Discharge Follow-Up      Date/Time:  2019 10:44 AM    Patient was admitted to [facility] on 19 and discharged on 19 for c/o rectal bleeding. The physician discharge summary was available at the time of outreach. Patient was contacted within 1 business days of discharge. Top Challenges reviewed with the provider   - hgb 11.9 (wnl)    - one episode of \"rust\" colored stool on toilet paper when she wiped on Friday, 19 (after d/c to home), however denies any further episodes since then; \"it was just a little bit\"    - hypokalemia this admission-potassium level 3.3 (L) on 9/10/19, improved to 3. 6(wnl) at discharge; currently taking oral supplement 10 mEq daily    - resumed Xarelto at hospital discharge per GI    - [facility] post-acute visit scheduled for today between 215p-315p      - pt reports currently taking metoprolol tartrate 50 mg tablet, take 1 and 1/2 tablets PO BID with original prescription order date of 19   - does not have allopurinol; refill request pended to PCP     Method of communication with provider :chart routing    Inpatient RRAT score: 40  Was this a readmission? no   Patient stated reason for the readmission: n/a    Nurse Navigator (NN) contacted the patient by telephone to perform post hospital discharge assessment. Verified name and  with patient as identifiers. Provided introduction to self, and explanation of the Nurse Navigator role. Reviewed discharge instructions and red flags with patient who verbalized understanding. Patient given an opportunity to ask questions and does not have any further questions or concerns at this time. The patient agrees to contact the PCP office for questions related to their healthcare. NN provided contact information for future reference. Disease Specific:   N/A    Summary of patient's top problems:  1. Acute Lower GI Bleed, PUD: sudden onset of bright red blood per rectum at home. History of GERD, colon polyps, GAVE. Last EGD 8/22/19 by Dr. Elizabeth Engel and showed erythema in the antrum but no GAVE and 2cm hiatal hernia. GI Consulted this admission. Stool Heme positive in ED, hgb 10.6 (L). No PRBCs required this admission; hgb stable throughout admission, discharge hgb result of 11.9 (wnl). S/p colonoscopy 9/11/19 (Dr. Elizabeth Engel); findings of Single vascular ectasia ascending colon, clipped. Bowel capsule study done on 9/12/19; results pending. Xarelto held this admission due to bleeding; cleared by GI to resume Xarelto at hospital discharge. To f/u with GI as outpatient. 2. Chronic Anticoagulation, Paroxysmal Atrial Fibrillation: Followed by Dr. Quinn Martines (Cardiology). Continue Amiodarone. Xarelto on hold this admission due to bleeding; per GI, cleared to resume Xarelto at discharge. 3. Type 2 DM: most recent hgb a1c 4/16/19 - 7.0%; compare to previous result on 2/21/19 of 7.0%    Home Health orders at discharge: [address]: n/a  Date of initial visit: [facility] ordered/company: none  Durable Medical Equipment received: n/a    Barriers to care? none reported/identified at this time. Advance Care Planning:   Does patient have an Advance Directive:  reviewed and current     Medication(s):   New Medications at Discharge: none  Changed Medications at Discharge: none  Discontinued Medications at Discharge: none    Patient requests NN outreach at a later time in order to review her current medications.       [facility] follow up appointment(s):   Future Appointments   Date Time Provider Dafne Jenkins   9/18/2019  1:00 PM [address]   9/19/2019  1:15 PM MD Y[address]   9/20/2019 10:45 AM MD [address]   10/2/2019  1:00 PM [address]   10/3/2019 10:30 AM Graciela More MD [address]   10/9/2019  1:00 PM [address]   10/16/2019  1:00 PM [facility]   10/23/2019  1:00 PM  [facility]   10/30/2019  1:00 PM  [facility]   11/6/2019  2:00 PM [facility] NUTRITION [facility] REG      Non-[facility] follow up appointment(s): Dr. Duane Elmore () - 9/25/19  [facility]:  scheduled

## 2019-09-16 NOTE — TELEPHONE ENCOUNTER
Spoke with patients daughter Colleen Sanhdu [facility])  Two pt identifiers confirmed. Colleen Edson states that patient is taking Xarelto due to increase se of stroke. Colleen Edson states that she is concerned because keeps getting admitted for GI bleeding. Colleen Sandhu states that she feels that there needs to be better communication between all of the patients doctors and she would like some recommendations on how to facilitate this. Daniel Brood that I will send her request to Dr. Osvaldo Kessler and will give her a call back with her recommendations as soon as I can. Colleen Sandhu verbalized understanding of information discussed w/ no further questions at this time.

## 2019-09-16 NOTE — TELEPHONE ENCOUNTER
MD Deonna James LPN   Caller: Unspecified (3 days ago, 12:07 PM)             The cardiologist is in charge of the [address]. I recommend that patient schedule a follow up with cardiology and daughter addend appointment with cardiologist. The GI and I cannot stop the xarelto. It will need to be discussed with cardiologist.     Spoke with patients daughter Roz Klinefelter [facility])  Two pt identifiers confirmed. Roz Klinefelter advised of the above message from Dr. Bentley Reed. Roz Klinefelter verbalized understanding of information discussed w/ no further questions at this time.

## 2019-09-18 ENCOUNTER — APPOINTMENT (OUTPATIENT)
Dept: CARDIAC REHAB | Age: 84
End: 2019-09-18

## 2019-09-18 ENCOUNTER — TELEPHONE (OUTPATIENT)
Dept: CARDIAC REHAB | Age: 84
End: 2019-09-18

## 2019-09-18 RX ORDER — ALLOPURINOL 100 MG/1
TABLET ORAL
Qty: 180 TAB | Refills: 2 | Status: SHIPPED | OUTPATIENT
Start: 2019-09-18 | End: 2020-01-01

## 2019-09-18 RX ORDER — METOPROLOL TARTRATE 50 MG/1
TABLET ORAL
Refills: 3 | COMMUNITY
Start: 2019-09-13 | End: 2020-01-01 | Stop reason: SDUPTHER

## 2019-09-18 NOTE — TELEPHONE ENCOUNTER
Patient's son called to report that patient is still having some knee problems and under the care of an orthopedist.  She is hoping to return in October. Patient's son was told that she would need clearance from orthopedist to continue in our exercise program.  Patient's appointments adjusted in computer.

## 2019-09-18 NOTE — PROGRESS NOTES
9/18/2019 
2:02 PM 
 
 
Goals Addressed This Visit's Progress Post Hospitalization  Attends follow-up appointments as directed. 7/25/2019  
- did not attend hospital f/u appt with Dr. Michelle Plata on 7/8/19; patient rescheduled this appointment to 8/1/19 
- followed by OP Wound care clinic for lower leg ulcerations. Attended appointments with [address] [place] [address] on 7/15/19 (establish care) and 7/22/19; has future f/u scheduled - to f/u with Dr. Luis Miguel Pearson (GI) in two weeks post-discharge. Per GI Office staff, patient attended office visit with Dr. Luis Miguel Pearson on 6/16/19 and next scheduled appt is 9/25/19 with dr. Luis Miguel Pearson. - to f/u with Dr. Yanely Ward (Cardiology) in 2 weeks in two weeks post-discharge; patient unable to recall whether she has attended office visit with Dr. Yanely Ward post-discharge and is unable to find note of a previous appt on her calendar, reports next scheduled f/u is 12/19/19 for routine f/u. Patient is advised today of hospital f/u recommendation within two weeks of hospital discharge date; patient will contact Dr. Ivett Batista office today in order to schedule an appointment. Per [facility] staff, most recent office visit was 6/27/19; next scheduled appt 12/19/19 
-to follow with heme/onc as outpatient for iron deficiency; has appointment scheduled for 9/19/19 8/6/2019 
- did not attend scheduled appt with Dr. Michelle Plata on 8/1/19; per chart review, appt cancelled. No future appts with PCP are scheduled at this time. - pt reports appt on 8/1/19 was cancelled due to her having a headache and a sore foot 
- followed by Outpatient Wound Clinic for ulceration on the lower legs; most recent visit 8/5/19 and was advised to f/u with Podiatrist ASAP regarding left foot callus and pressure point with prominence of metatarsal heads.  
- scheduled to start outpatient cardiac rehab tomorrow, 8/7 
- attempted to rescheduled hospital f/u appt with PCP today, however first available appt is 9/5; NN will request LPN  assistance with appt scheduling for earlier date, if available. - pt has not scheduled appt with Podiatry yet as she was unaware of who to schedule an appt with. Per chart review, patient was previously followed by Dr. Silva Bloch, AMAURY; [facility] and [address] . Patient will contact Podiatry today in order to schedule an appointment. 8/8/2019 
- pt scheduled for appointment with Dr. Shai Tripp 8/14/19; pt declines this appt due to having another appt scheduled for 8/14. Awaiting LPN  response regarding next available appt date. 
- has appt scheduled with Podiatry on 8/14/19 at 2:15 pm 
 
8/9/2019 
- per LPN , first available appt date with PCP is 9/9/19 and appt has been scheduled 9/5/2019 
- notified patient of scheduled appt with PCP 9/9/19 9/12/2019 
- attended office visit with PCP 9/9/19; was referred to ED by PCP for c/o rectal bleeding with onset 9/8/19, need for hospital admission for Serial Hgb and GI Consult 9/18/2019  
- appt with Dr. Marni Anaya 9/20/19 
- OSMEL appt with PCP 9/25/19 3:45 p 
- appt with Dr. Ashia Ocampo (GI) 9/25 at 10:15 a  Prevent complications post hospitalization. 9/16/2019 
- \"I think everything's going pretty good. \" 
- one episode of \"rust\" colored stool on toilet paper when she wiped on Friday, 9/13/19 (after d/c to home), however denies any further episodes since then; \"it was just a little bit\" - denies weakness, fatigue. Jerri Yousif I first get up I get a little dizzy, but I've had that for a while\"; reports that this is not new. Dizziness resolves within <20 seconds. - hgb 11. 9(wnl) 
- no medication changes at hospital discharge 
- resumed Xarelto at hospital discharge - [facility] post-acute f/u scheduled for 9/16/19 9/18/2019  
- denies black, bloody, tarry stools; denies \"rust\" color stools and states, \"It seems to be normal now. \" 
- denies abdominal pain, nausea, vomiting 
- confirmed with [facility] that post-acute visit was completed on 9/16/19; [facility], VSS during visit and no further rectal bleeding noted - OSMEL appt with PCP 9/25/19 Medication reconciliation was performed with patient, who verbalizes understanding of administration of home medications. There were no barriers to obtaining medications identified at this time. Referral to Pharm D needed: no  
 
 
Med Rec during this call Current Outpatient Medications Medication Sig  
 metoprolol tartrate (LOPRESSOR) 50 mg tablet TK 1 AND 1/2 TS PO BID  sucralfate (CARAFATE) 1 gram tablet Take 1 g by mouth four (4) times daily.  amiodarone (CORDARONE) 200 mg tablet Take 1 Tab by mouth daily.  rivaroxaban (XARELTO) 15 mg tab tablet Take 1 Tab by mouth daily (with dinner). Start taking 6/23/2019  furosemide (LASIX) 40 mg tablet Take 80 mg by mouth Daily (before breakfast). Patient takes 80 mg with breakfast  
 furosemide (LASIX) 40 mg tablet Take 40 mg by mouth daily (with lunch). Patient takes 80 mg with breakfast and 40 mg at lunch  
 insulin NPH (HUMULIN N NPH INSULIN KWIKPEN) 100 unit/mL (3 mL) inpn 24 Units by SubCUTAneous route every morning.  insulin NPH (HUMULIN N NPH INSULIN KWIKPEN) 100 unit/mL (3 mL) inpn 8 Units by SubCUTAneous route every evening.  pantoprazole (PROTONIX) 40 mg tablet Take 1 Tab by mouth daily.  levothyroxine (SYNTHROID) 125 mcg tablet TAKE ONE TABLET BY MOUTH DAILY (Patient taking differently: TAKE ONE TABLET BY MOUTH DAILY BEFORE BREAKFAST)  atorvastatin (LIPITOR) 80 mg tablet TAKE ONE TABLET BY MOUTH EVERY EVENING  
 acetaminophen (TYLENOL) 325 mg tablet Take 325 mg by mouth every four (4) hours as needed for Pain.  potassium chloride SR (KLOR-CON 10) 10 mEq tablet Take 1 Tab by mouth daily.  loratadine (CLARITIN) 10 mg tablet TAKE 1 TABLET BY MOUTH DAILY (Patient not taking: Reported on 9/18/2019)  allopurinol (ZYLOPRIM) 100 mg tablet TAKE TWO TABLETS BY MOUTH DAILY (Patient not taking: Reported on 9/18/2019) No current facility-administered medications for this visit. Medications Discontinued During This Encounter Medication Reason  metoprolol tartrate (LOPRESSOR) 25 mg tablet Other Metoprolol tartrate 25 mg tablet discontinued because patient reports currently taking metoprolol tartrate 50 mg tablet. Med Rec updated. Future Appointments: 
Future Appointments Date Time Provider Dafne Alice 9/20/2019 10:45 AM MD [address]  
9/25/2019  3:45 PM Delbert Jefferson MD [facility] 87  
10/2/2019  1:00 PM [facility]ULM [facility]ERCISE [facility]  
10/2/2019  2:00 PM MRM [address] [place] [address]  
10/4/2019  1:00 PM MRM CARDIOPULM EXERCISE [facility]  
10/7/2019  1:00 PM MRM CARDIOPULM EXERCISE [facility]  
10/9/2019  1:00 PM [facility] CARDIOPULM EXERCISE [facility] REG  
10/9/2019  2:00 PM MRM [address] [place] [address]  
10/11/2019  1:00 PM MRM [address]  
10/14/2019  1:00 PM MRM [address] REG  
10/16/2019  1:00 PM MR[facility] [address] REG  
10/16/2019  2:00 PM MRM [address] [place] [address]  
10/18/2019  1:00 PM [facility] [address] REG  
10/21/2019  1:00 PM [facility] [address] REG  
10/23/2019  1:00 PM [facility] [address] REG  
10/23/2019  2:00 PM MRM [address] [place] [address]  
10/25/2019  1:00 PM [facility] CARDIOPULM EXERCISE [facility]  
10/28/2019  1:00 PM MR[facility] [address] REG  
10/30/2019  2:00 PM MRM [address] [place] [address]  
11/6/2019  2:00 PM [facility] NUTRITION [facility]  
  
 
 Last Appointment With Me: 
Visit date not found Last Appointment My Department: 
9/9/2019

## 2019-09-18 NOTE — CARDIO/PULMONARY
Pt has been not been attending cardiac rehab due to knee problems. She is under the care of an orthopedist.  She hopes to return in October and was told she could need clearance from orthopedist to exercise. Appointments adjusted in computer.

## 2019-09-20 ENCOUNTER — OFFICE VISIT (OUTPATIENT)
Dept: ONCOLOGY | Age: 84
End: 2019-09-20

## 2019-09-20 VITALS
OXYGEN SATURATION: 96 % | BODY MASS INDEX: 30.92 KG/M2 | WEIGHT: 204 LBS | SYSTOLIC BLOOD PRESSURE: 153 MMHG | HEART RATE: 76 BPM | TEMPERATURE: 98.1 F | DIASTOLIC BLOOD PRESSURE: 69 MMHG | HEIGHT: 68 IN | RESPIRATION RATE: 16 BRPM

## 2019-09-20 DIAGNOSIS — D50.9 IRON DEFICIENCY ANEMIA, UNSPECIFIED IRON DEFICIENCY ANEMIA TYPE: Primary | ICD-10-CM

## 2019-09-20 DIAGNOSIS — D69.6 THROMBOCYTOPENIA (HCC): ICD-10-CM

## 2019-09-20 NOTE — PROGRESS NOTES
Follow-up Note Patient: Nuno Miller MRN: [id number]  SSN: [id number] YOB: 1935  Age: 82 y.o. Sex: female Subjective:  
  
Nuno Miller is a 82 y.o. female who I am seeing for mild thrombocytopenia and anemia. It has been present for over 6 months. She denies bleeding. It was observed in routine laboratory studies. She received IV iron in April 2019. She was recently hospitalized for GI bleed. Subsequent workup was unremarkable for any significant bleeding source. She is here today with her son. She denies bleeding. She reports chronic bilateral knee pain. Review of Systems: 
 
Constitutional: negative Eyes: negative Ears, Nose, Mouth, Throat, and Face: negative Respiratory: negative Cardiovascular: negative Gastrointestinal: negative Genitourinary:negative Integument/Breast: negative Hematologic/Lymphatic: negative Musculoskeletal:chronic b/l knee pain Neurological: negative Past Medical History:  
Diagnosis Date  Arthritis KNEES  Atrial fibrillation ([facility] [address] 10/29/2009  Bladder cancer ([facility] [address]  CAD (coronary artery disease) STENT PER PATIENT  Chronic pain KNEES  
 Coagulation disorder ([address] Chronic prophylactic anticoagulation med  Colon polyps  Diabetes ([facility] [address] IDDM  
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 One fragment suggestive of hyperplastic polyp  GERD (gastroesophageal reflux disease)  Gout  Heart valve problem   
 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  
 @ [facility] >> atrial fibrillation  SOB (shortness of breath) on exertion 04/2019  
 T. I.A. 4/23/2009  TIA (transient ischemic attack)  Ulcer of right lower extremity, limited to breakdown of skin ([address] 7/5/2018 Past Surgical History:  
Procedure Laterality Date  CARDIAC SURG PROCEDURE UNLIST    
 ablation  COLONOSCOPY N/A 2/20/2019 COLONOSCOPY performed by Murtaza Angulo MD at [facility] ENDOSCOPY  COLONOSCOPY N/A 6/17/2019 COLONOSCOPY performed by Murtaza Angulo MD at [facility] ENDOSCOPY  COLONOSCOPY N/A 6/15/2019 COLONOSCOPY performed by Murtaza Angulo MD at [facility] MAIN OR  
 COLONOSCOPY N/A 9/11/2019 COLONOSCOPY performed by Murtaza Angulo MD at [facility] ENDOSCOPY  COLONOSCOPY,DIAGNOSTIC  2/20/2019  COLONOSCOPY,FLEX,W/CONTROL, BLEEDING  9/11/2019  COLONOSCOPY,REMV LESN,SNARE  6/17/2019  GI TRACT CAPSULE ENDOSCOPY  6/28/2019  HX APPENDECTOMY  HX CHOLECYSTECTOMY  HX HEART VALVE SURGERY  04/2019  HX HYSTERECTOMY  HX KNEE ARTHROSCOPY  [id number]  
 right knee  HX ORTHOPAEDIC    
 HX UROLOGICAL RENAL STENT, tur-b  MI ESOPHAGOGASTRODUODENOSCOPY TRANSORAL DIAGNOSTIC  8/22/2019  
    
 SIGMOIDOSCOPY,DIAGNOSTIC  6/15/2019  UPPER GI ENDOSCOPY,BALL DIL,30MM  6/15/2019  UPPER GI ENDOSCOPY,TUMOR ABLATN  6/19/2019  VASCULAR SURGERY PROCEDURE UNLIST  11/4  
 removed vein in right leg Family History Problem Relation Age of Onset  Stroke Other  Arthritis-osteo Sister   
     spinal stenosis  Gout Son   
 Hypertension Son   
[facility] Hypertension Mother  Heart Disease Mother CAD  Heart Disease Father CAD  Alcohol abuse Neg Hx  Asthma Neg Hx  Bleeding Prob Neg Hx  Cancer Neg Hx  Diabetes Neg Hx  Elevated Lipids Neg Hx   
 Headache Neg Hx  Lung Disease Neg Hx  Migraines Neg Hx  Psychiatric Disorder Neg Hx  Mental Retardation Neg Hx  Anesth Problems Neg Hx Social History Tobacco Use  
  Smoking status: Former Smoker Packs/day: 0.50 Years: 10.00 Pack years: 5.00 Types: Cigarettes Last attempt to quit: 1967 Years since quittin.7  Smokeless tobacco: Never Used Substance Use Topics  Alcohol use: No  
  Alcohol/week: 0.0 standard drinks Prior to Admission medications Medication Sig Start Date End Date Taking? Authorizing Provider  
metoprolol tartrate (LOPRESSOR) 50 mg tablet TK 1 AND 1/2 TS PO BID 19  Yes Provider, Historical  
allopurinol (ZYLOPRIM) 100 mg tablet TAKE TWO TABLETS BY MOUTH DAILY 19  Yes Boubacar Dumont MD  
loratadine (CLARITIN) 10 mg tablet TAKE 1 TABLET BY MOUTH DAILY 19  Yes Boubacar Dumont MD  
sucralfate (CARAFATE) 1 gram tablet Take 1 g by mouth four (4) times daily. Yes Provider, Historical  
amiodarone (CORDARONE) 200 mg tablet Take 1 Tab by mouth daily. 19  Yes Boubacar Dumont MD  
rivaroxaban (XARELTO) 15 mg tab tablet Take 1 Tab by mouth daily (with dinner). Start taking 2019  Yes Jennifer Bunch NP  
furosemide (LASIX) 40 mg tablet Take 80 mg by mouth Daily (before breakfast). Patient takes 80 mg with breakfast   Yes Provider, Historical  
insulin NPH (HUMULIN N NPH INSULIN KWIKPEN) 100 unit/mL (3 mL) inpn 24 Units by SubCUTAneous route every morning. Yes Provider, Historical  
insulin NPH (HUMULIN N NPH INSULIN KWIKPEN) 100 unit/mL (3 mL) inpn 8 Units by SubCUTAneous route every evening. Yes Provider, Historical  
pantoprazole (PROTONIX) 40 mg tablet Take 1 Tab by mouth daily. 19  Yes Boubacar Dumont MD  
levothyroxine (SYNTHROID) 125 mcg tablet TAKE ONE TABLET BY MOUTH DAILY Patient taking differently: TAKE ONE TABLET BY MOUTH DAILY BEFORE BREAKFAST 3/13/19  Yes Reny Murray MD  
atorvastatin (LIPITOR) 80 mg tablet TAKE ONE TABLET BY MOUTH EVERY EVENING 19  Yes Tommy Bowen MD  
 acetaminophen (TYLENOL) 325 mg tablet Take 325 mg by mouth every four (4) hours as needed for Pain. Yes Provider, Historical  
potassium chloride SR (KLOR-CON 10) 10 mEq tablet Take 1 Tab by mouth daily. 1/23/17  Yes Prudence MD Marjan  
furosemide (LASIX) 40 mg tablet Take 40 mg by mouth daily (with lunch). Patient takes 80 mg with breakfast and 40 mg at lunch    Provider, Historical  
  
 
 
 
 
Allergies Allergen Reactions  Actos [Pioglitazone] Swelling Swelling of feet and legs  Codeine Itching  Hydrocodone Rash and Other (comments)  
  hallucinations Objective:  
 
Visit Vitals /69 (BP 1 Location: Left arm, BP Patient Position: Sitting) Pulse 76 Temp 98.1 °F (36.7 °C) (Oral) Resp 16 Ht 5' 8\" (1.727 m) Wt 204 lb (92.5 kg) SpO2 96% BMI 31.02 kg/m² Pain Scale: 5/10 Pain Location: Knee Physical Exam: 
 
GENERAL: alert, cooperative, no distress, appears stated age EYE: conjunctivae/corneas clear. PERRL, EOM's intact LYMPHATIC: Cervical, supraclavicular, and axillary nodes normal.  
THROAT & NECK: normal and no erythema or exudates noted. LUNG: clear to auscultation bilaterally HEART: regular rate and rhythm, S1, S2 normal, no murmur, click, rub or gallop ABDOMEN: soft, non-tender. Bowel sounds normal. No masses,  no organomegaly EXTREMITIES:  extremities normal, atraumatic, no cyanosis or edema SKIN: Normal. 
NEUROLOGIC: AOx3. Gait normal. Reflexes and motor strength normal and symmetric. Cranial nerves 2-12 and sensation grossly intact. Lab Results Component Value Date/Time WBC 7.5 09/13/2019 05:51 AM  
 Hemoglobin (POC) 11.5 09/09/2019 10:43 AM  
 Hemoglobin (POC) 10.5 (L) 07/06/2010 10:11 PM  
 HGB 11.9 09/13/2019 05:51 AM  
 Hematocrit (POC) 31 (L) 07/06/2010 10:11 PM  
 HCT 36.8 09/13/2019 05:51 AM  
 PLATELET 790 (L) 04/20/0986 05:51 AM  
 MCV 90.6 09/13/2019 05:51 AM  
 
  
 
 
Assessment:  
 
1.  Mild thrombocytopenia: 
 
 Asymptomatic Unclear cause ? Iron deficiency S/p IV iron in April 2019 Platelets stable Observation Symptom management form reviewed with patient. 2. Anemia From renal insufficiency S/p IV iron in April 2019 Plan:  
 
 
> Observation 
> Labs today: iron profile, ferritin 
> IV iron as needed 
> Follow-up in 6 months Signed by: Lizzy Lan MD 
                   September 21, 2019 
 
 
 
CC.  Lowell Krueger MD

## 2019-09-20 NOTE — PROGRESS NOTES
1. Have you been to the ER, urgent care clinic since your last visit? Hospitalized since your last visit? Yes When: 9/9-9/13/2019 Where: [facility] Reason for visit: GI bleeding 2. Have you seen or consulted any other health care providers outside of the [address] [place] [address] since your last visit? Include any pap smears or colon screening. No  
 
Chief Complaint Patient presents with  Follow-up Visit Vitals /69 (BP 1 Location: Left arm, BP Patient Position: Sitting) Pulse 76 Temp 98.1 °F (36.7 °C) (Oral) Resp 16 Ht 5' 8\" (1.727 m) Wt 204 lb (92.5 kg) SpO2 96% BMI 31.02 kg/m²

## 2019-09-23 ENCOUNTER — HOSPITAL ENCOUNTER (OUTPATIENT)
Dept: LAB | Age: 84
Discharge: HOME | End: 2019-09-23
Payer: MEDICARE

## 2019-09-23 PROCEDURE — 36415 COLL VENOUS BLD VENIPUNCTURE: CPT

## 2019-09-23 PROCEDURE — 82728 ASSAY OF FERRITIN: CPT

## 2019-09-23 PROCEDURE — 83550 IRON BINDING TEST: CPT

## 2019-09-24 ENCOUNTER — TELEPHONE (OUTPATIENT)
Dept: INTERNAL MEDICINE CLINIC | Age: 84
End: 2019-09-24

## 2019-09-24 LAB
FERRITIN SERPL-MCNC: 54 NG/ML (ref 15–150)
IRON SATN MFR SERPL: 10 % (ref 15–55)
IRON SERPL-MCNC: 29 UG/DL (ref 27–139)
TIBC SERPL-MCNC: 285 UG/DL (ref 250–450)
UIBC SERPL-MCNC: 256 UG/DL (ref 118–369)

## 2019-09-24 RX ORDER — ONDANSETRON 2 MG/ML
8 INJECTION INTRAMUSCULAR; INTRAVENOUS AS NEEDED
Status: CANCELLED | OUTPATIENT
Start: 2019-10-10

## 2019-09-24 RX ORDER — HEPARIN 100 UNIT/ML
300-500 SYRINGE INTRAVENOUS AS NEEDED
Status: CANCELLED
Start: 2019-10-10

## 2019-09-24 RX ORDER — DIPHENHYDRAMINE HYDROCHLORIDE 50 MG/ML
50 INJECTION, SOLUTION INTRAMUSCULAR; INTRAVENOUS AS NEEDED
Status: CANCELLED
Start: 2019-10-17

## 2019-09-24 RX ORDER — HYDROCORTISONE SODIUM SUCCINATE 100 MG/2ML
100 INJECTION, POWDER, FOR SOLUTION INTRAMUSCULAR; INTRAVENOUS AS NEEDED
Status: CANCELLED | OUTPATIENT
Start: 2019-10-10

## 2019-09-24 RX ORDER — ALBUTEROL SULFATE 0.83 MG/ML
2.5 SOLUTION RESPIRATORY (INHALATION) AS NEEDED
Status: CANCELLED
Start: 2019-10-17

## 2019-09-24 RX ORDER — EPINEPHRINE 1 MG/ML
0.3 INJECTION, SOLUTION, CONCENTRATE INTRAVENOUS AS NEEDED
Status: CANCELLED | OUTPATIENT
Start: 2019-10-10

## 2019-09-24 RX ORDER — ACETAMINOPHEN 325 MG/1
650 TABLET ORAL AS NEEDED
Status: CANCELLED
Start: 2019-10-17

## 2019-09-24 RX ORDER — SODIUM CHLORIDE 0.9 % (FLUSH) 0.9 %
10 SYRINGE (ML) INJECTION AS NEEDED
Status: CANCELLED
Start: 2019-10-17

## 2019-09-24 RX ORDER — ONDANSETRON 2 MG/ML
8 INJECTION INTRAMUSCULAR; INTRAVENOUS AS NEEDED
Status: CANCELLED | OUTPATIENT
Start: 2019-10-17

## 2019-09-24 RX ORDER — SODIUM CHLORIDE 9 MG/ML
10 INJECTION INTRAMUSCULAR; INTRAVENOUS; SUBCUTANEOUS AS NEEDED
Status: CANCELLED | OUTPATIENT
Start: 2019-10-17

## 2019-09-24 RX ORDER — DIPHENHYDRAMINE HYDROCHLORIDE 50 MG/ML
50 INJECTION, SOLUTION INTRAMUSCULAR; INTRAVENOUS AS NEEDED
Status: CANCELLED
Start: 2019-10-10

## 2019-09-24 RX ORDER — SODIUM CHLORIDE 0.9 % (FLUSH) 0.9 %
10 SYRINGE (ML) INJECTION AS NEEDED
Status: CANCELLED
Start: 2019-10-10

## 2019-09-24 RX ORDER — EPINEPHRINE 1 MG/ML
0.3 INJECTION, SOLUTION, CONCENTRATE INTRAVENOUS AS NEEDED
Status: CANCELLED | OUTPATIENT
Start: 2019-10-17

## 2019-09-24 RX ORDER — HEPARIN 100 UNIT/ML
300-500 SYRINGE INTRAVENOUS AS NEEDED
Status: CANCELLED
Start: 2019-10-17

## 2019-09-24 RX ORDER — HYDROCORTISONE SODIUM SUCCINATE 100 MG/2ML
100 INJECTION, POWDER, FOR SOLUTION INTRAMUSCULAR; INTRAVENOUS AS NEEDED
Status: CANCELLED | OUTPATIENT
Start: 2019-10-17

## 2019-09-24 RX ORDER — ACETAMINOPHEN 325 MG/1
650 TABLET ORAL AS NEEDED
Status: CANCELLED
Start: 2019-10-10

## 2019-09-24 RX ORDER — SODIUM CHLORIDE 9 MG/ML
10 INJECTION INTRAMUSCULAR; INTRAVENOUS; SUBCUTANEOUS AS NEEDED
Status: CANCELLED | OUTPATIENT
Start: 2019-10-10

## 2019-09-24 RX ORDER — ALBUTEROL SULFATE 0.83 MG/ML
2.5 SOLUTION RESPIRATORY (INHALATION) AS NEEDED
Status: CANCELLED
Start: 2019-10-10

## 2019-09-24 NOTE — TELEPHONE ENCOUNTER
Best contact number(s):[phone]       Details to clarify the request: Pt requesting a call back regarding status of appointment for the blood transfusion.        Anne Marie Fenton

## 2019-09-24 NOTE — TELEPHONE ENCOUNTER
MD Kvng Hoyt, LPN   Caller: Unspecified (Today, 12:19 PM)             No, this is not handled through our office. She is seeing hematology.       Left message for patient to return call

## 2019-09-25 ENCOUNTER — PATIENT OUTREACH (OUTPATIENT)
Dept: INTERNAL MEDICINE CLINIC | Age: 84
End: 2019-09-25

## 2019-09-25 ENCOUNTER — OFFICE VISIT (OUTPATIENT)
Dept: INTERNAL MEDICINE CLINIC | Age: 84
End: 2019-09-25

## 2019-09-25 ENCOUNTER — APPOINTMENT (OUTPATIENT)
Dept: CARDIAC REHAB | Age: 84
End: 2019-09-25

## 2019-09-25 VITALS
DIASTOLIC BLOOD PRESSURE: 76 MMHG | OXYGEN SATURATION: 97 % | BODY MASS INDEX: 30.77 KG/M2 | SYSTOLIC BLOOD PRESSURE: 137 MMHG | HEART RATE: 58 BPM | WEIGHT: 203 LBS | TEMPERATURE: 97.5 F | RESPIRATION RATE: 16 BRPM | HEIGHT: 68 IN

## 2019-09-25 DIAGNOSIS — I48.0 PAF (PAROXYSMAL ATRIAL FIBRILLATION) (HCC): ICD-10-CM

## 2019-09-25 DIAGNOSIS — D50.9 IRON DEFICIENCY ANEMIA, UNSPECIFIED IRON DEFICIENCY ANEMIA TYPE: Primary | ICD-10-CM

## 2019-09-25 DIAGNOSIS — Z87.19 HISTORY OF GI BLEED: ICD-10-CM

## 2019-09-25 LAB — HGB BLD-MCNC: 10.6 G/DL

## 2019-09-25 NOTE — TELEPHONE ENCOUNTER
Spoke with patients daughter Bertha Contreras ([phone])  Two pt identifiers confirmed. Bertha Contreras advised that the blood transfusion would not be handled by our office, patients hematologist should be setting that up.   Bertha Contreras states that patient has already been scheduled for 10/17/19

## 2019-09-25 NOTE — TELEPHONE ENCOUNTER
Davey Quintero Free pt daughter returned a phone from [facility] [address].  Best contact number is [phone]     Message received & copied from [facility] after closing on 9/24/19

## 2019-09-25 NOTE — PATIENT INSTRUCTIONS
Office Policies Phone calls/patient messages: Please allow up to 24 hours for someone in the office to contact you about your call or message. Be mindful your provider may be out of the office or your message may require further review. We encourage you to use [employer] for your messages as this is a faster, more efficient way to communicate with our office Medication Refills: 
         
Prescription medications require 48-72 business hours to process. We encourage you to use [employer] for your refills. For controlled medications: Please allow 72 business hours to process. Certain medications may require you to  a written prescription at our office. NO narcotic/controlled medications will be prescribed after 4pm Monday through Friday or on weekends Form/Paperwork Completion: 
         
Please note a $25 fee may incur for all paperwork for completed by our providers. We ask that you allow 7-10 business days. Pre-payment is due prior to picking up/faxing the completed form. You may also download your forms to [employer] to have your doctor print off. Talk to cardiology about watchman's device.

## 2019-09-25 NOTE — PROGRESS NOTES
Goals Addressed This Visit's Progress Post Hospitalization  Attends follow-up appointments as directed. 7/25/2019  
- did not attend hospital f/u appt with Dr. Inna Barger on 7/8/19; patient rescheduled this appointment to 8/1/19 
- followed by OP Wound care clinic for lower leg ulcerations. Attended appointments with [address] [place] [address] on 7/15/19 (establish care) and 7/22/19; has future f/u scheduled - to f/u with Dr. Yung Wood (GI) in two weeks post-discharge. Per GI Office staff, patient attended office visit with Dr. Yung Wood on 6/16/19 and next scheduled appt is 9/25/19 with dr. Yung Wood. - to f/u with Dr. Bro Velazco (Cardiology) in 2 weeks in two weeks post-discharge; patient unable to recall whether she has attended office visit with Dr. Bro Velazco post-discharge and is unable to find note of a previous appt on her calendar, reports next scheduled f/u is 12/19/19 for routine f/u. Patient is advised today of hospital f/u recommendation within two weeks of hospital discharge date; patient will contact Dr. Stephanie Ness office today in order to schedule an appointment. Per [facility] staff, most recent office visit was 6/27/19; next scheduled appt 12/19/19 
-to follow with heme/onc as outpatient for iron deficiency; has appointment scheduled for 9/19/19 8/6/2019 
- did not attend scheduled appt with Dr. Inna Barger on 8/1/19; per chart review, appt cancelled. No future appts with PCP are scheduled at this time. - pt reports appt on 8/1/19 was cancelled due to her having a headache and a sore foot 
- followed by Outpatient Wound Clinic for ulceration on the lower legs; most recent visit 8/5/19 and was advised to f/u with Podiatrist ASAP regarding left foot callus and pressure point with prominence of metatarsal heads.  
- scheduled to start outpatient cardiac rehab tomorrow, 8/7 
- attempted to rescheduled hospital f/u appt with PCP today, however first available appt is 9/5; NN will request LPN  assistance with appt scheduling for earlier date, if available. - pt has not scheduled appt with Podiatry yet as she was unaware of who to schedule an appt with. Per chart review, patient was previously followed by Dr. Jacinto Barbosa, AMAURY; [facility] [address] [facility] . Patient will contact Podiatry today in order to schedule an appointment. 8/8/2019 
- pt scheduled for appointment with Dr. Jimmie Enriquez 8/14/19; pt declines this appt due to having another appt scheduled for 8/14. Awaiting LPN  response regarding next available appt date. 
- has appt scheduled with Podiatry on 8/14/19 at 2:15 pm 
 
8/9/2019 
- per LPN , first available appt date with PCP is 9/9/19 and appt has been scheduled 9/5/2019 
- notified patient of scheduled appt with PCP 9/9/19 9/12/2019 
- attended office visit with PCP 9/9/19; was referred to ED by PCP for c/o rectal bleeding with onset 9/8/19, need for hospital admission for Serial Hgb and GI Consult 9/18/2019  
- appt with Dr. Chanel Puentes 9/20/19 
- OSM[facility] appt with PCP 9/25/19 3:45 p 
- appt with Dr. Jamey De La Cruz (GI) 9/25 at 10:15 a 
 
9/25/2019 
- attended OV with Dr. Chanel Puentes 9/20/19; labs ordered (iron profile, ferritin). Per result note, to be scheduled for IV iron infusion, f/u in 6 months 
- has appt scheduled with PCP and GI today Future Appointments: 
Future Appointments Date Time Provider Dafne Jenkins 9/25/2019  3:45 PM Jaimie Kelley MD [address]  
10/2/2019  1:00 PM [facility] [id number] [address] REG  
10/2/2019  2:00 PM  [facility]  
10/4/2019  1:00 PM [facility] CARDIOPULM EXERCISE [facility] REG  
10/7/2019  1:00 PM [facility] CARDIOPULM EXERCISE [facility] REG  
10/9/2019  1:00 PM [facility] REG  
10/9/2019  2:00 PM [facility]  
 10/10/2019 11:00 AM [place] [facility] CHAIR 2 [facility] REG  
10/11/2019  1:00 PM [facility]ARD[facility]OPULM [facility]ERCISE [facility] REG  
10/14/2019  1:00 PM [facility] CARDIOPULM EXERCISE [facility] REG  
10/16/2019  1:00 PM [facility] CARDIOPULM EXERCISE [facility] REG  
10/16/2019  2:00 PM [facility] [address] [place] [address]  
10/17/2019 11:00 AM [facility] CHAIR 2 [facility] REG  
10/18/2019  1:00 PM [facility] CARDIOPULM EXERCISE [facility] REG  
10/21/2019  1:00 PM [facility] CARDIOPULM EXERCISE [facility] REG  
10/23/2019  1:00 PM [facility] CARDIOPULM EXERCISE [facility] REG  
10/23/2019  2:00 PM  [facility]  
10/25/2019  1:00 PM [facility] CARDIOPULM EXERCISE [facility] REG  
10/28/2019  1:00 PM [facility] CARDIOPULM EXERCISE [facility] REG  
10/30/2019  2:00 PM  [facility]  
11/6/2019  2:00 PM [facility] NUTRITION [facility] REG  
3/20/2020 11:15 AM MD [address] Last Appointment With Me: 
Visit date not found Last Appointment My Department: 
9/9/2019

## 2019-09-25 NOTE — PROGRESS NOTES
Adriana Rosario is a 82 y.o. female who presents with daughter. Hospitalized 9/9-9/13. For GI bleed. Hgb 11.6 and PLT 60K.  plt 104K on 9/13. Cleared by GI to resume xarelto. Patient reports BM returned to normal until 9/21 then saw rust colored stool, none today. She stopped xarelto 9/21, none since then. Will get dizzy on arising at times, not like she was the day of admission. To see Dr Ellie Ken, cardiology. Past Medical History:  
Diagnosis Date  Arthritis KNEES  Atrial fibrillation ([address] 10/29/2009  Bladder cancer ([address]  CAD (coronary artery disease) STENT PER PATIENT  Chronic pain KNEES  
 Coagulation disorder ([address] Chronic prophylactic anticoagulation med  Colon polyps  Diabetes ([address] IDDM  
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 One fragment suggestive of hyperplastic polyp  GERD (gastroesophageal reflux disease)  Gout  Heart valve problem   
 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  
 @ [facility] >> atrial fibrillation  SOB (shortness of breath) on exertion 04/2019  
 T. I.A. 4/23/2009  TIA (transient ischemic attack)  Ulcer of right lower extremity, limited to breakdown of skin ([address] 7/5/2018 Family History Problem Relation Age of Onset  Stroke Other  Arthritis-osteo Sister   
     spinal stenosis  Gout Son   
 Hypertension Son   
[place] Hypertension Mother  Heart Disease Mother CAD  Heart Disease Father CAD  Alcohol abuse Neg Hx  Asthma Neg Hx  Bleeding Prob Neg Hx  Cancer Neg Hx  Diabetes Neg Hx  Elevated Lipids Neg Hx   
 Headache Neg Hx  Lung Disease Neg Hx  Migraines Neg Hx  Psychiatric Disorder Neg Hx  Mental Retardation Neg Hx  Anesth Problems Neg Hx Social History Socioeconomic History  Marital status:  Spouse name: Not on file  Number of children: 2  
 Years of education: 15  
 Highest education level: High school graduate Occupational History  Not on file Social Needs  Financial resource strain: Not hard at all  Food insecurity:  
  Worry: Never true Inability: Never true  Transportation needs:  
  Medical: No  
  Non-medical: No  
Tobacco Use  Smoking status: Former Smoker Packs/day: 0.50 Years: 10.00 Pack years: 5.00 Types: Cigarettes Last attempt to quit: 1967 Years since quittin.7  Smokeless tobacco: Never Used Substance and Sexual Activity  Alcohol use: No  
  Alcohol/week: 0.0 standard drinks  Drug use: Never  Sexual activity: Not Currently Partners: Male Lifestyle  Physical activity:  
  Days per week: 0 days Minutes per session: 0 min  Stress: Only a little Relationships  Social connections:  
  Talks on phone: Three times a week Gets together: Three times a week Attends [employer] service: 1 to 4 times per year Active member of club or organization: No  
  Attends meetings of clubs or organizations: Never Relationship status:   Intimate partner violence:  
  Fear of current or ex partner: No  
  Emotionally abused: No  
  Physically abused: No  
  Forced sexual activity: No  
Other Topics Concern [address] Service Not Asked  Blood Transfusions Not Asked  Caffeine Concern Not Asked  Occupational Exposure Not Asked Luma Guerra Hazards Not Asked  Sleep Concern Not Asked  Stress Concern Not Asked  Weight Concern Yes  Special Diet Not Asked  Back Care Not Asked  Exercise Not Asked  Bike Helmet Not Asked  Seat Belt Not Asked  Self-Exams Not Asked Social History Narrative  Not on file Current Outpatient Medications on File Prior to Visit Medication Sig Dispense Refill  metoprolol tartrate (LOPRESSOR) 50 mg tablet TK 1 AND 1/2 TS PO BID  3  
 allopurinol (ZYLOPRIM) 100 mg tablet TAKE TWO TABLETS BY MOUTH DAILY 180 Tab 2  
 loratadine (CLARITIN) 10 mg tablet TAKE 1 TABLET BY MOUTH DAILY 30 Tab 11  
 sucralfate (CARAFATE) 1 gram tablet Take 1 g by mouth four (4) times daily.  amiodarone (CORDARONE) 200 mg tablet Take 1 Tab by mouth daily. 90 Tab 0  
 furosemide (LASIX) 40 mg tablet Take 80 mg by mouth Daily (before breakfast). Patient takes 80 mg with breakfast    
 furosemide (LASIX) 40 mg tablet Take 40 mg by mouth daily (with lunch). Patient takes 80 mg with breakfast and 40 mg at lunch    
 insulin NPH (HUMULIN N NPH INSULIN KWIKPEN) 100 unit/mL (3 mL) inpn 24 Units by SubCUTAneous route every morning.  pantoprazole (PROTONIX) 40 mg tablet Take 1 Tab by mouth daily. 90 Tab 3  
 levothyroxine (SYNTHROID) 125 mcg tablet TAKE ONE TABLET BY MOUTH DAILY (Patient taking differently: TAKE ONE TABLET BY MOUTH DAILY BEFORE BREAKFAST) 90 Tab 7  
 atorvastatin (LIPITOR) 80 mg tablet TAKE ONE TABLET BY MOUTH EVERY EVENING 90 Tab 0  
 acetaminophen (TYLENOL) 325 mg tablet Take 325 mg by mouth every four (4) hours as needed for Pain.  potassium chloride SR (KLOR-CON 10) 10 mEq tablet Take 1 Tab by mouth daily. 30 Tab 12  
 rivaroxaban (XARELTO) 15 mg tab tablet Take 1 Tab by mouth daily (with dinner). Start taking 6/23/2019 30 Tab 0  
 insulin NPH (HUMULIN N NPH INSULIN KWIKPEN) 100 unit/mL (3 mL) inpn 8 Units by SubCUTAneous route every evening. No current facility-administered medications on file prior to visit. Review of Systems Pertinent items are noted in HPI. Objective:  
 
Visit Vitals /76 (BP 1 Location: Left arm, BP Patient Position: Sitting) Pulse (!) 58 Temp 97.5 °F (36.4 °C) (Oral) Resp 16 Ht 5' 8\" (1.727 m) Wt 203 lb (92.1 kg) SpO2 97% BMI 30.87 kg/m² Gen: well appearing female HEENT:   PERRL,normal conjunctiva. OP no erythema, no exudates, MMM Neck:   No masses or LAD Resp:  No wheezing, no rhonchi, no rales. CV:  RRR, normal Q5U1, 3/6 systolic murmur. GI: soft, nontender, without masses. Extrem:  +2 pulses, no edema, warm distally Assessment/Plan: ICD-10-CM ICD-9-CM 1. Iron deficiency anemia, unspecified iron deficiency anemia type D50.9 280.9 AMB POC HEMOGLOBIN (HGB) 2. PAF (paroxysmal atrial fibrillation) (HCC) I48.0 427.31   
3. History of GI bleed Z87.19 V12.79   
resume xarelto, to discuss risk vs. Benefit with cardiology. To ED if recurrent GI bleed.   
 
 
Titus Dave MD

## 2019-09-26 NOTE — PROCEDURES
[address] PROCEDURE NOTE Name:  Disha Mills 
MR#:  [id number] :  1935 ACCOUNT #:  [de-identified] DATE OF SERVICE:  2019 DATE OF CAPSULE PLACED:  2019 DATE OF CAPSULE INTERPRETED:  2019 PREPROCEDURE DIAGNOSIS:  Stuttering gastrointestinal hemorrhage. POSTPROCEDURE DIAGNOSES: 
1. Gastric emptying time 1 hour 37 minutes. 2.  Small bowel emptying time 4 hours 45 minutes. 3.  No source of bleeding identified. PROCEDURE PLANNED AND PERFORMED:  Wireless PillCam endoscopy. SURGEON:  Madhu Freeman MD 
 
ASSISTANT:  Perico Licea ANESTHESIA:  None. COMPLICATIONS:  None. SPECIMENS REMOVED:  none. IMPLANTS:  None. ESTIMATED BLOOD LOSS:  None. DESCRIPTION OF PROCEDURE:  The sensory apparatus was deployed and the capsule was ingested at 0 hours 0 minutes 30 seconds. The esophagus was well seen, but the Z-line was not seen. The stomach was entered at 0 hours 0 minutes 42 seconds. No gastric lesions were seen. Specifically no gastric antral vascular ectasia. The duodenum was entered at 1 hour 37 minutes 57 seconds. 1.  No small bowel lesions were seen. 2.  No colon lesions were seen. COMMENT:  This is a negative study for source of GI bleeding. Consider MR enterography to look at the bowel for mucosal disease. Tamar Wells MD 
 
 
[id number] 
D:  2019 7:06 
T:  2019 11:27 
JOB #:  [id number] CC:  MD Madhu Meneses MD

## 2019-09-27 ENCOUNTER — PATIENT OUTREACH (OUTPATIENT)
Dept: INTERNAL MEDICINE CLINIC | Age: 84
End: 2019-09-27

## 2019-09-27 NOTE — PROGRESS NOTES
Goals Addressed This Visit's Progress Post Hospitalization  Attends follow-up appointments as directed. 7/25/2019  
- did not attend hospital f/u appt with Dr. Karissa Shelley on 7/8/19; patient rescheduled this appointment to 8/1/19 
- followed by OP Wound care clinic for lower leg ulcerations. Attended appointments with [address] [place] [address] on 7/15/19 (establish care) and 7/22/19; has future f/u scheduled - to f/u with Dr. Blaise Cavanaugh (GI) in two weeks post-discharge. Per GI Office staff, patient attended office visit with Dr. Blaise Cavanaugh on 6/16/19 and next scheduled appt is 9/25/19 with dr. Blaise Cavanaugh. - to f/u with Dr. Sneha Mcdonald (Cardiology) in 2 weeks in two weeks post-discharge; patient unable to recall whether she has attended office visit with Dr. Sneha Mcdonald post-discharge and is unable to find note of a previous appt on her calendar, reports next scheduled f/u is 12/19/19 for routine f/u. Patient is advised today of hospital f/u recommendation within two weeks of hospital discharge date; patient will contact Dr. Cassandra Yoo office today in order to schedule an appointment. Per [facility] staff, most recent office visit was 6/27/19; next scheduled appt 12/19/19 
-to follow with heme/onc as outpatient for iron deficiency; has appointment scheduled for 9/19/19 8/6/2019 
- did not attend scheduled appt with Dr. Karissa Shelley on 8/1/19; per chart review, appt cancelled. No future appts with PCP are scheduled at this time. - pt reports appt on 8/1/19 was cancelled due to her having a headache and a sore foot 
- followed by Outpatient Wound Clinic for ulceration on the lower legs; most recent visit 8/5/19 and was advised to f/u with Podiatrist ASAP regarding left foot callus and pressure point with prominence of metatarsal heads.  
- scheduled to start outpatient cardiac rehab tomorrow, 8/7 
- attempted to rescheduled hospital f/u appt with PCP today, however first available appt is 9/5; NN will request LPN  assistance with appt scheduling for earlier date, if available. - pt has not scheduled appt with Podiatry yet as she was unaware of who to schedule an appt with. Per chart review, patient was previously followed by Dr. Aron Myles DPM; [facility] . Patient will contact Podiatry today in order to schedule an appointment. 8/8/2019 
- pt scheduled for appointment with Dr. Corrina Andrade 8/14/19; pt declines this appt due to having another appt scheduled for 8/14. Awaiting LPN  response regarding next available appt date. 
- has appt scheduled with Podiatry on 8/14/19 at 2:15 pm 
 
8/9/2019 
- per LPN , first available appt date with PCP is 9/9/19 and appt has been scheduled 9/5/2019 
- notified patient of scheduled appt with PCP 9/9/19 9/12/2019 
- attended office visit with PCP 9/9/19; was referred to ED by PCP for c/o rectal bleeding with onset 9/8/19, need for hospital admission for Serial Hgb and GI Consult 9/18/2019  
- appt with Dr. Lucien English 9/20/19 
- OSMEL appt with PCP 9/25/19 3:45 p 
- appt with Dr. Susana Marie (GI) 9/25 at 10:15 a 
 
9/25/2019 
- attended OV with Dr. Lucien English 9/20/19; labs ordered (iron profile, ferritin). Per result note, to be scheduled for IV iron infusion, f/u in 6 months 
- has appt scheduled with PCP and GI today 9/27/2019  
- attended OSMEL appt with PCP 9/25 
- attended OV with Dr. Susana Marie (GI) 9/25 at 10:15 a; next scheduled appt with Dr. Susana Marie 12/17/19 for 3 month f/u 
- pt reports scheduled for appt with Podiatry today; NN outreach encounter today is brief as patient is getting ready for this appointment - per chart review, scheduled to resume OP Cardiac Rehab 10/2 (?)  Prevent complications post hospitalization. 9/16/2019 
- \"I think everything's going pretty good. \" 
 - one episode of \"rust\" colored stool on toilet paper when she wiped on Friday, 9/13/19 (after d/c to home), however denies any further episodes since then; \"it was just a little bit\" - denies weakness, fatigue. Neptali Hermosillo I first get up I get a little dizzy, but I've had that for a while\"; reports that this is not new. Dizziness resolves within <20 seconds. - hgb 11. 9(wnl) 
- no medication changes at hospital discharge 
- resumed Xarelto at hospital discharge - [facility] post-acute f/u scheduled for 9/16/19 9/18/2019  
- denies black, bloody, tarry stools; denies \"rust\" color stools and states, \"It seems to be normal now. \" 
- denies abdominal pain, nausea, vomiting 
- [facility]; per visiting [facility] Provider, VSS during visit and no further rectal bleeding noted - OSMEL appt with PCP 9/25/19 9/27/2019 
- attended OSMEL appt with PCP 9/25/19; hgb 10.6 
- scheduled for Iron Infusion (order by Dr. Anali Bonilla) 
- no questions/concerns at this time 
- NN f/u next week Future Appointments: 
Future Appointments Date Time Provider Dafne Jenkins 10/2/2019  1:00 PM MRM CARDIOPULM EXERCISE [facility]  
10/2/2019  2:00 PM [facility]  
10/4/2019  1:00 PM MRM CARDIOPULM EXERCISE [facility]  
10/7/2019  1:00 PM MRM CARDIOPULM EXERCISE [facility]  
10/9/2019  1:00 PM MRM CARDIOPULM EXERCISE [facility]  
10/9/2019  2:00 PM [facility] [employer] [facility]  
10/10/2019 11:00 AM [facility] CHAIR 2 [facility]  
10/11/2019  1:00 PM MRM CARDIOPULM EXERCISE [facility]  
10/14/2019  1:00 PM MRM CARDIOPULM EXERCISE [facility]  
10/16/2019  1:00 PM MRM CARDIOPULM EXERCISE [facility]  
10/16/2019  2:00 PM  [facility]  
10/17/2019 11:00 AM [place] FT CHAIR 2 [facility]  
 10/18/2019  1:00 PM MRM CARDIOPULM EXERCISE [facility]  
10/21/2019  1:00 PM MRM CARDIOPULM EXERCISE [facility]  
10/23/2019  1:00 PM MRM CARDIOPULM EXERCISE [facility]  
10/23/2019  2:00 PM  [place]  
10/25/2019  1:00 PM MRM CARDIOPULM EXERCISE [facility]  
10/28/2019  1:00 PM MRM CARDIOPULM EXERCISE [facility]  
10/30/2019  2:00 PM  [place]  
11/6/2019  2:00 PM MRM NUTRITION [facility]  
3/20/2020 11:15 AM MD Rahul Vallejo. 49 Last Appointment With Me: 
Visit date not found Last Appointment My Department: 
9/25/2019

## 2019-09-29 PROBLEM — L97.912 NON-PRESSURE CHRONIC ULCER OF RIGHT LOWER LEG, WITH FAT LAYER EXPOSED (HCC): Status: RESOLVED | Noted: 2019-07-15 | Resolved: 2019-09-29

## 2019-10-02 ENCOUNTER — TELEPHONE (OUTPATIENT)
Dept: CARDIAC REHAB | Age: 84
End: 2019-10-02

## 2019-10-02 ENCOUNTER — APPOINTMENT (OUTPATIENT)
Dept: CARDIAC REHAB | Age: 84
End: 2019-10-02
Payer: MEDICARE

## 2019-10-02 ENCOUNTER — HOSPITAL ENCOUNTER (OUTPATIENT)
Dept: CARDIAC REHAB | Age: 84
End: 2019-10-02
Payer: MEDICARE

## 2019-10-02 NOTE — TELEPHONE ENCOUNTER
Ms. Little Barreto showed up early for an appointment today. However, she did not bring clearance from her orthopedist as requested nor was any clearance faxed from her orthopedist.  Nurse called orthopedist's office nurse and requested clearance for patient to return. Patient will be called when clearance is received. Ms. Miller's daughter called later and was explained the situation. She states her mother's memory is not good and requested that we call her for any issues, concerns or questions regarding her mother. Daughter's name is [employer], [phone] (cell).

## 2019-10-02 NOTE — CARDIO/PULMONARY
10-2-2019    Patient presented to cardiac rehab without a clearance note. Patient not sure of who her orthopedist is, she believes Dr. Annalee Neumann. Left a message for his nurse advising if she is a patient there, we need a clearance note.

## 2019-10-03 ENCOUNTER — APPOINTMENT (OUTPATIENT)
Dept: GENERAL RADIOLOGY | Age: 84
End: 2019-10-03
Payer: MEDICARE

## 2019-10-03 ENCOUNTER — PATIENT OUTREACH (OUTPATIENT)
Dept: INTERNAL MEDICINE CLINIC | Age: 84
End: 2019-10-03

## 2019-10-03 ENCOUNTER — HOSPITAL ENCOUNTER (OUTPATIENT)
Dept: ULTRASOUND IMAGING | Age: 84
Discharge: HOME | End: 2019-10-03
Payer: MEDICARE

## 2019-10-03 DIAGNOSIS — D50.0 ANEMIA DUE TO CHRONIC BLOOD LOSS: ICD-10-CM

## 2019-10-03 DIAGNOSIS — K31.819 VASCULAR ECTASIA OF STOMACH: ICD-10-CM

## 2019-10-03 DIAGNOSIS — R16.1 SPLENOMEGALY: ICD-10-CM

## 2019-10-03 DIAGNOSIS — D50.0 IRON DEFICIENCY ANEMIA DUE TO CHRONIC BLOOD LOSS: ICD-10-CM

## 2019-10-03 PROCEDURE — 76700 US EXAM ABDOM COMPLETE: CPT

## 2019-10-03 NOTE — PROGRESS NOTES
Goals Addressed This Visit's Progress Chronic Disease  Supportive resources in place to maintain patient in the community (ie. Home Health, DME equipment, refer to, medication assistant plan, etc.)   On track 10/3/2019 
- started with [facility] yesterday; will have  two days per week, 8 hours total per week Post Hospitalization  Attends follow-up appointments as directed. On track 7/25/2019  
- did not attend hospital f/u appt with Dr. Nader Gregorio on 7/8/19; patient rescheduled this appointment to 8/1/19 
- followed by OP Wound care clinic for lower leg ulcerations. Attended appointments with [address] [place] [address] on 7/15/19 (establish care) and 7/22/19; has future f/u scheduled - to f/u with Dr. Ro Jacobs (GI) in two weeks post-discharge. Per GI Office staff, patient attended office visit with Dr. Ro Jacobs on 6/16/19 and next scheduled appt is 9/25/19 with dr. Ro Jacobs. - to f/u with Dr. Deepak Miles (Cardiology) in 2 weeks in two weeks post-discharge; patient unable to recall whether she has attended office visit with Dr. Deepak Miles post-discharge and is unable to find note of a previous appt on her calendar, reports next scheduled f/u is 12/19/19 for routine f/u. Patient is advised today of hospital f/u recommendation within two weeks of hospital discharge date; patient will contact Dr. Bin Muro office today in order to schedule an appointment. Per [facility] staff, most recent office visit was 6/27/19; next scheduled appt 12/19/19 
-to follow with heme/onc as outpatient for iron deficiency; has appointment scheduled for 9/19/19 8/6/2019 
- did not attend scheduled appt with Dr. Nader Gregorio on 8/1/19; per chart review, appt cancelled. No future appts with PCP are scheduled at this time.   
- pt reports appt on 8/1/19 was cancelled due to her having a headache and a sore foot 
- followed by Outpatient Wound Clinic for ulceration on the lower legs; most recent visit 8/5/19 and was advised to f/u with Podiatrist ASAP regarding left foot callus and pressure point with prominence of metatarsal heads. - scheduled to start outpatient cardiac rehab tomorrow, 8/7 
- attempted to rescheduled hospital f/u appt with PCP today, however first available appt is 9/5; NN will request LPN  assistance with appt scheduling for earlier date, if available. - pt has not scheduled appt with Podiatry yet as she was unaware of who to schedule an appt with. Per chart review, patient was previously followed by Dr. Mora Ortiz DPM; [facility] . Patient will contact Podiatry today in order to schedule an appointment. 8/8/2019 
- pt scheduled for appointment with Dr. Jerry Escalante 8/14/19; pt declines this appt due to having another appt scheduled for 8/14. Awaiting LPN  response regarding next available appt date. 
- has appt scheduled with Podiatry on 8/14/19 at 2:15 pm 
 
8/9/2019 
- per LPN , first available appt date with PCP is 9/9/19 and appt has been scheduled 9/5/2019 
- notified patient of scheduled appt with PCP 9/9/19 9/12/2019 
- attended office visit with PCP 9/9/19; was referred to ED by PCP for c/o rectal bleeding with onset 9/8/19, need for hospital admission for Serial Hgb and GI Consult 9/18/2019  
- appt with Dr. Carlotta Hobson 9/20/19 
- OSMEL appt with PCP 9/25/19 3:45 p 
- appt with Dr. Yaneth Aguilar (GI) 9/25 at 10:15 a 
 
9/25/2019 
- attended OV with Dr. Carlotta Hobson 9/20/19; labs ordered (iron profile, ferritin). Per result note, to be scheduled for IV iron infusion, f/u in 6 months 
- has appt scheduled with PCP and GI today 9/27/2019  
- attended OSMEL appt with PCP 9/25 
- attended OV with Dr. Yaneth Aguilar (GI) 9/25 at 10:15 a; next scheduled appt with Dr. Yaneth Aguilar 12/17/19 for 3 month f/u 
- pt reports scheduled for appt with Podiatry today; NN outreach encounter today is brief as patient is getting ready for this appointment - per chart review, scheduled to resume OP Cardiac Rehab 10/2 (?) 
 
10/3/2019  
- did not attend OV with Podiatry 9/27/19 
- attended [address] with Dr. Deepak Miles (Cardiology) 9/27/19 
- scheduled for OV with Dr. Ron Licea 10/18 to eval for Watchman Device Placement 
- needs Ortho clearance by OP Cardiac Rehab prior to starting Cardiac Rehab; daughter reports patient went to Cardiac Rehab yesterday and was advised of need for ortho clearance. Cardiac Rehab is attempting to obtain Ortho Clearance.  Prevent complications post hospitalization. On track 9/16/2019 
- \"I think everything's going pretty good. \" 
- one episode of \"rust\" colored stool on toilet paper when she wiped on Friday, 9/13/19 (after d/c to home), however denies any further episodes since then; \"it was just a little bit\" - denies weakness, fatigue. Aby Latch I first get up I get a little dizzy, but I've had that for a while\"; reports that this is not new. Dizziness resolves within <20 seconds. - hgb 11. 9(wnl) 
- no medication changes at hospital discharge 
- resumed Xarelto at hospital discharge - [facility] post-acute f/u scheduled for 9/16/19 9/18/2019  
- denies black, bloody, tarry stools; denies \"rust\" color stools and states, \"It seems to be normal now. \" 
- denies abdominal pain, nausea, vomiting 
- [facility]; per visiting [facility] Provider, VSS during visit and no further rectal bleeding noted - OSMEL appt with PCP 9/25/19 9/27/2019 
- attended OSMEL appt with PCP 9/25/19; hgb 10.6 
- scheduled for Iron Infusion (order by Dr. Jose G Jean) 
- no questions/concerns at this time 
- NN f/u next week 10/3/2019 
- \"She's doing okay\" - denies black, bloody, tarry stools - abdominal ultrasound completed today (ordered by Dr. Ro Jacobs) Future Appointments: 
Future Appointments Date Time Provider Dafne Jenkins 10/10/2019 11:00 AM [place] FT CHAIR 2 [facility]  
10/11/2019  1:00 PM MRM CARDIOPULM EXERCISE [facility]  
10/16/2019  1:00 PM [facility] CARDIOPULM EXERCISE [facility]  
10/16/2019  2:00 PM  [facility]  
10/17/2019 11:00 AM [place] FT CHAIR 2 [facility]  
10/21/2019  1:00 PM MRM CARDIOPULM EXERCISE [facility]  
10/23/2019  1:00 PM MRM CARDIOPULM EXERCISE [facility]  
10/23/2019  2:00 PM  [facility]  
10/25/2019  1:00 PM MRM CARDIOPULM EXERCISE [facility]  
10/28/2019  1:00 PM MRM CARDIOPULM EXERCISE [facility]  
10/30/2019  2:00 PM  [facility]  
11/6/2019  2:00 PM MRM NUTRITION [facility]  
3/20/2020 11:15 AM MD Rahul Garzon. 49 Last Appointment With Me: 
Visit date not found Last Appointment My Department: 
9/25/2019

## 2019-10-04 ENCOUNTER — APPOINTMENT (OUTPATIENT)
Dept: CARDIAC REHAB | Age: 84
End: 2019-10-04
Payer: MEDICARE

## 2019-10-07 ENCOUNTER — APPOINTMENT (OUTPATIENT)
Dept: CARDIAC REHAB | Age: 84
End: 2019-10-07
Payer: MEDICARE

## 2019-10-09 ENCOUNTER — APPOINTMENT (OUTPATIENT)
Dept: CARDIAC REHAB | Age: 84
End: 2019-10-09
Payer: MEDICARE

## 2019-10-10 ENCOUNTER — PATIENT OUTREACH (OUTPATIENT)
Dept: INTERNAL MEDICINE CLINIC | Age: 84
End: 2019-10-10

## 2019-10-10 ENCOUNTER — HOSPITAL ENCOUNTER (OUTPATIENT)
Dept: INFUSION THERAPY | Age: 84
Discharge: HOME | End: 2019-10-10
Payer: MEDICARE

## 2019-10-10 VITALS
WEIGHT: 196.8 LBS | DIASTOLIC BLOOD PRESSURE: 68 MMHG | BODY MASS INDEX: 29.15 KG/M2 | OXYGEN SATURATION: 98 % | TEMPERATURE: 97.9 F | HEART RATE: 58 BPM | HEIGHT: 69 IN | RESPIRATION RATE: 16 BRPM | SYSTOLIC BLOOD PRESSURE: 146 MMHG

## 2019-10-10 DIAGNOSIS — D50.9 IRON DEFICIENCY ANEMIA, UNSPECIFIED IRON DEFICIENCY ANEMIA TYPE: Primary | ICD-10-CM

## 2019-10-10 PROCEDURE — 96365 THER/PROPH/DIAG IV INF INIT: CPT

## 2019-10-10 PROCEDURE — 74011250636 HC RX REV CODE- 250/636: Performed by: INTERNAL MEDICINE

## 2019-10-10 RX ORDER — SODIUM CHLORIDE 0.9 % (FLUSH) 0.9 %
10 SYRINGE (ML) INJECTION AS NEEDED
Status: ACTIVE | OUTPATIENT
Start: 2019-10-10 | End: 2019-10-10

## 2019-10-10 RX ADMIN — FERRIC CARBOXYMALTOSE INJECTION 750 MG: 50 INJECTION, SOLUTION INTRAVENOUS at 11:40

## 2019-10-10 RX ADMIN — Medication 10 ML: at 11:05

## 2019-10-10 RX ADMIN — Medication 10 ML: at 12:30

## 2019-10-10 NOTE — DISCHARGE INSTRUCTIONS
Patient Education      Ferric Carboxymaltose (Injectafer) - (By injection)   Why this medicine is used:   Treats low levels of iron in the blood. Contact a nurse or doctor right away if you have:  · Fast or pounding heartbeat  · Lightheadedness, dizziness, or fainting  · Warmth or redness in your face, neck, arms, or upper chest     Common side effects:  · Nausea  © 2017 [facility] Information is for End User's use only and may not be sold, redistributed or otherwise used for commercial purposes.

## 2019-10-11 NOTE — PROGRESS NOTES
Spiritual Care Partner Volunteer visited patient in [facility] on October 10, 2019.     Documented on October 11, 2019 by:    AUSTIN Salinas, [facility], alphonso [place] [facility] Paging Service  [phone]

## 2019-10-14 ENCOUNTER — APPOINTMENT (OUTPATIENT)
Dept: CARDIAC REHAB | Age: 84
End: 2019-10-14
Payer: MEDICARE

## 2019-10-16 ENCOUNTER — HOSPITAL ENCOUNTER (OUTPATIENT)
Dept: CARDIAC REHAB | Age: 84
Discharge: HOME | End: 2019-10-16
Payer: MEDICARE

## 2019-10-16 VITALS — WEIGHT: 198 LBS | BODY MASS INDEX: 29.67 KG/M2

## 2019-10-16 PROCEDURE — 93798 PHYS/QHP OP CAR RHAB W/ECG: CPT

## 2019-10-17 ENCOUNTER — HOSPITAL ENCOUNTER (OUTPATIENT)
Dept: INFUSION THERAPY | Age: 84
Discharge: HOME | End: 2019-10-17
Payer: MEDICARE

## 2019-10-17 ENCOUNTER — PATIENT OUTREACH (OUTPATIENT)
Dept: INTERNAL MEDICINE CLINIC | Age: 84
End: 2019-10-17

## 2019-10-17 VITALS
HEART RATE: 63 BPM | DIASTOLIC BLOOD PRESSURE: 65 MMHG | SYSTOLIC BLOOD PRESSURE: 132 MMHG | RESPIRATION RATE: 16 BRPM | OXYGEN SATURATION: 99 % | TEMPERATURE: 97.5 F

## 2019-10-17 DIAGNOSIS — D50.9 IRON DEFICIENCY ANEMIA, UNSPECIFIED IRON DEFICIENCY ANEMIA TYPE: Primary | ICD-10-CM

## 2019-10-17 PROCEDURE — 74011250636 HC RX REV CODE- 250/636: Performed by: INTERNAL MEDICINE

## 2019-10-17 PROCEDURE — 96365 THER/PROPH/DIAG IV INF INIT: CPT

## 2019-10-17 RX ORDER — SODIUM CHLORIDE 0.9 % (FLUSH) 0.9 %
10 SYRINGE (ML) INJECTION AS NEEDED
Status: ACTIVE | OUTPATIENT
Start: 2019-10-17 | End: 2019-10-17

## 2019-10-17 RX ADMIN — FERRIC CARBOXYMALTOSE INJECTION 750 MG: 50 INJECTION, SOLUTION INTRAVENOUS at 11:05

## 2019-10-17 RX ADMIN — Medication 10 ML: at 11:05

## 2019-10-17 RX ADMIN — Medication 10 ML: at 11:30

## 2019-10-17 NOTE — PROGRESS NOTES
Goals Addressed This Visit's Progress Post Hospitalization  Attends follow-up appointments as directed. 7/25/2019  
- did not attend hospital f/u appt with Dr. Fermín Benedict on 7/8/19; patient rescheduled this appointment to 8/1/19 
- followed by OP Wound care clinic for lower leg ulcerations. Attended appointments with [address] [place] [address] on 7/15/19 (establish care) and 7/22/19; has future f/u scheduled - to f/u with Dr. Brittany Joseph (GI) in two weeks post-discharge. Per GI Office staff, patient attended office visit with Dr. Brittany Joseph on 6/16/19 and next scheduled appt is 9/25/19 with dr. Brittany Joseph. - to f/u with Dr. Randal Bach (Cardiology) in 2 weeks in two weeks post-discharge; patient unable to recall whether she has attended office visit with Dr. Randal Bach post-discharge and is unable to find note of a previous appt on her calendar, reports next scheduled f/u is 12/19/19 for routine f/u. Patient is advised today of hospital f/u recommendation within two weeks of hospital discharge date; patient will contact Dr. Kane Tolentino office today in order to schedule an appointment. Per [facility] staff, most recent office visit was 6/27/19; next scheduled appt 12/19/19 
-to follow with heme/onc as outpatient for iron deficiency; has appointment scheduled for 9/19/19 8/6/2019 
- did not attend scheduled appt with Dr. Fermín Benedict on 8/1/19; per chart review, appt cancelled. No future appts with PCP are scheduled at this time. - pt reports appt on 8/1/19 was cancelled due to her having a headache and a sore foot 
- followed by Outpatient Wound Clinic for ulceration on the lower legs; most recent visit 8/5/19 and was advised to f/u with Podiatrist ASAP regarding left foot callus and pressure point with prominence of metatarsal heads.  
- scheduled to start outpatient cardiac rehab tomorrow, 8/7 
- attempted to rescheduled hospital f/u appt with PCP today, however first available appt is 9/5; NN will request LPN  assistance with appt scheduling for earlier date, if available. - pt has not scheduled appt with Podiatry yet as she was unaware of who to schedule an appt with. Per chart review, patient was previously followed by Dr. Nguyen Guaman DPM; [facility] and [facility] . Patient will contact Podiatry today in order to schedule an appointment. 8/8/2019 
- pt scheduled for appointment with Dr. Fransisco Aponte 8/14/19; pt declines this appt due to having another appt scheduled for 8/14. Awaiting LPN  response regarding next available appt date. 
- has appt scheduled with Podiatry on 8/14/19 at 2:15 pm 
 
8/9/2019 
- per LPN , first available appt date with PCP is 9/9/19 and appt has been scheduled 9/5/2019 
- notified patient of scheduled appt with PCP 9/9/19 9/12/2019 
- attended office visit with PCP 9/9/19; was referred to ED by PCP for c/o rectal bleeding with onset 9/8/19, need for hospital admission for Serial Hgb and GI Consult 9/18/2019  
- appt with Dr. Manda Nair 9/20/19 
- OSMEL appt with PCP 9/25/19 3:45 p 
- appt with Dr. Yuliana Espinosa (GI) 9/25 at 10:15 a 
 
9/25/2019 
- attended OV with Dr. Manda Nair 9/20/19; labs ordered (iron profile, ferritin). Per result note, to be scheduled for IV iron infusion, f/u in 6 months 
- has appt scheduled with PCP and GI today 9/27/2019  
- attended OSMEL appt with PCP 9/25 
- attended OV with Dr. Yuliana Espinosa (GI) 9/25 at 10:15 a; next scheduled appt with Dr. Yuliana Espinosa 12/17/19 for 3 month f/u 
- pt reports scheduled for appt with Podiatry today; NN outreach encounter today is brief as patient is getting ready for this appointment - per chart review, scheduled to resume OP Cardiac Rehab 10/2 (?) 
 
10/3/2019  
- did not attend OV with Podiatry 9/27/19 
- attended OV with Dr. Michelle Ryan (Cardiology) 9/27/19 - scheduled for OV with Dr. Artur Abebe 10/18 to eval for Watchman Device Placement 
- needs Ortho clearance by OP Cardiac Rehab prior to starting Cardiac Rehab; daughter reports patient went to Cardiac Rehab yesterday and was advised of need for ortho clearance. Cardiac Rehab is attempting to obtain Ortho Clearance. 10/17/2019 
- resumed OP Cardiac Rehab (10/16/19) (chart review)  Prevent complications post hospitalization. 9/16/2019 
- \"I think everything's going pretty good. \" 
- one episode of \"rust\" colored stool on toilet paper when she wiped on Friday, 9/13/19 (after d/c to home), however denies any further episodes since then; \"it was just a little bit\" - denies weakness, fatigue. Daphine Davila I first get up I get a little dizzy, but I've had that for a while\"; reports that this is not new. Dizziness resolves within <20 seconds. - hgb 11. 9(wnl) 
- no medication changes at hospital discharge 
- resumed Xarelto at hospital discharge - [facility] post-acute f/u scheduled for 9/16/19 9/18/2019  
- denies black, bloody, tarry stools; denies \"rust\" color stools and states, \"It seems to be normal now. \" 
- denies abdominal pain, nausea, vomiting 
- [facility]; per visiting [facility] Provider, VSS during visit and no further rectal bleeding noted - OSMEL appt with PCP 9/25/19 9/27/2019 
- attended OSMEL appt with PCP 9/25/19; hgb 10.6 
- scheduled for Iron Infusion (order by Dr. Siddharth Encinas) 
- no questions/concerns at this time 
- NN f/u next week 10/3/2019 
- \"She's doing okay\" - denies black, bloody, tarry stools - abdominal ultrasound completed today (ordered by Dr. Victor Manuel Johnson) 10/17/2019 
- per chart review, receiving Injectafer at [facility] 
- unable to reach patient/daughter today to f/u Future Appointments: 
Future Appointments Date Time Provider Dafne Jenkins 10/21/2019  1:00 PM MRM CARDIOPULM EXERCISE [facility]  
10/23/2019  1:00 PM MRM CARDIOPULM EXERCISE [facility]  
10/23/2019  2:00 PM  [facility]  
10/25/2019  1:00 PM MRM CARDIOPULM EXERCISE [facility]  
10/28/2019  1:00 PM MRM CARDIOPULM EXERCISE [facility]  
10/30/2019  2:00 PM  [facility]  
11/6/2019  2:00 PM MRM NUTRITION [facility]  
3/20/2020 11:15 AM MD Rahul Cheng. 49 Last Appointment With Me: 
Visit date not found Last Appointment My Department: 
9/25/2019

## 2019-10-17 NOTE — PROGRESS NOTES
[facility] ([place], [place]) Progress Note    1100  Pt arrived ambulatory and in no distress for Injectafer day 8    Assessment unremarkable. 24G PIV established in right AC with positive blood return noted. Patient Vitals for the past 12 hrs:   Temp Pulse Resp BP SpO2   10/17/19 1156 -- 63 -- 132/65 --   10/17/19 1100 97.5 °F (36.4 °C) 65 16 142/66 99 %      The following medications administered:  Medications Administered     ferric carboxymaltose (INJECTAFER) 750 mg in 0.9% sodium chloride 250 mL IVPB     Admin Date  10/10/2019 Action  Given Dose  750 mg Rate  750 mL/hr Route  IntraVENous Administered By  Manas Henriquez RN          saline peripheral flush soln 10 mL     Admin Date  10/10/2019 Action  Given Dose  10 mL Route  InterCATHeter Administered By  Manas Henriquez RN           Admin Date  10/10/2019 Action  Given Dose  10 mL Route  InterCATHeter Administered By  Manas Henriquez RN              Pt monitored x 30 mins post infusion. Pt tolerated treatment well. IV flushed per policy and removed, 2x2 and coban placed. Pt provided with education on possible side effects of medication along with discharge instructions. Pt verbalized understanding. 1200  Pt discharged ambulatory in no acute distress.  Next appointment:    Future Appointments   Date Time Provider Dafne Alice   10/21/2019  1:00 PM MRM CARDIOPULM EXERCISE [facility]   10/23/2019  1:00 PM MRM CARDIOPULM EXERCISE [facility]   10/23/2019  2:00 PM MRM [address] [place] [address]   10/25/2019  1:00 PM MRM CARDIOPULM EXERCISE [facility]   10/28/2019  1:00 PM MRM [id number] [facility]   10/30/2019  2:00 PM MRM [address] [place] [address]   11/6/2019  2:00 PM MRM NUTRITION [facility]   3/20/2020 11:15 AM MD Jace Taylorr. 49

## 2019-10-18 ENCOUNTER — HOSPITAL ENCOUNTER (OUTPATIENT)
Dept: CARDIAC REHAB | Age: 84
End: 2019-10-18
Payer: MEDICARE

## 2019-10-21 ENCOUNTER — HOSPITAL ENCOUNTER (OUTPATIENT)
Dept: CARDIAC REHAB | Age: 84
Discharge: HOME | End: 2019-10-21
Payer: MEDICARE

## 2019-10-21 VITALS — WEIGHT: 186 LBS | BODY MASS INDEX: 27.87 KG/M2

## 2019-10-21 PROCEDURE — 93798 PHYS/QHP OP CAR RHAB W/ECG: CPT

## 2019-10-23 ENCOUNTER — TELEPHONE (OUTPATIENT)
Dept: CARDIAC REHAB | Age: 84
End: 2019-10-23

## 2019-10-23 ENCOUNTER — APPOINTMENT (OUTPATIENT)
Dept: CARDIAC REHAB | Age: 84
End: 2019-10-23
Payer: MEDICARE

## 2019-10-23 ENCOUNTER — HOSPITAL ENCOUNTER (OUTPATIENT)
Dept: CARDIAC REHAB | Age: 84
Discharge: HOME | End: 2019-10-23
Payer: MEDICARE

## 2019-10-23 ENCOUNTER — HOSPITAL ENCOUNTER (OUTPATIENT)
Dept: CARDIAC REHAB | Age: 84
End: 2019-10-23
Payer: MEDICARE

## 2019-10-23 VITALS — WEIGHT: 196.6 LBS | BODY MASS INDEX: 29.46 KG/M2

## 2019-10-23 PROCEDURE — 93798 PHYS/QHP OP CAR RHAB W/ECG: CPT

## 2019-10-24 ENCOUNTER — PATIENT OUTREACH (OUTPATIENT)
Dept: INTERNAL MEDICINE CLINIC | Age: 84
End: 2019-10-24

## 2019-10-24 DIAGNOSIS — I48.20 CHRONIC ATRIAL FIBRILLATION (HCC): ICD-10-CM

## 2019-10-24 NOTE — PROGRESS NOTES
Goals Addressed This Visit's Progress Chronic Disease  Supportive resources in place to maintain patient in the community (ie. Home Health, DME equipment, refer to, medication assistant plan, etc.)     
  10/3/2019 
- started with [facility] yesterday; will have  two days per week, 8 hours total per week 10/24/2019 
- continues with [facility] on Wednesdays and Fridays; minimum of 8 hours/week 
- reports that patient \"really likes\" her caregiver 
- denies the need for additional resources/support at this time Post Hospitalization  Attends follow-up appointments as directed. 7/25/2019  
- did not attend hospital f/u appt with Dr. Harriet Suresh on 7/8/19; patient rescheduled this appointment to 8/1/19 
- followed by OP Wound care clinic for lower leg ulcerations. Attended appointments with [address] [place] [address] on 7/15/19 (establish care) and 7/22/19; has future f/u scheduled - to f/u with Dr. Kailey Joiner (GI) in two weeks post-discharge. Per GI Office staff, patient attended office visit with Dr. Kailey Joiner on 6/16/19 and next scheduled appt is 9/25/19 with dr. Kailey Joiner. - to f/u with Dr. Konrad Litten (Cardiology) in 2 weeks in two weeks post-discharge; patient unable to recall whether she has attended office visit with Dr. Konrad Litten post-discharge and is unable to find note of a previous appt on her calendar, reports next scheduled f/u is 12/19/19 for routine f/u. Patient is advised today of hospital f/u recommendation within two weeks of hospital discharge date; patient will contact Dr. Roxy Kayser office today in order to schedule an appointment.  Per [facility] staff, most recent office visit was 6/27/19; next scheduled appt 12/19/19 
-to follow with heme/onc as outpatient for iron deficiency; has appointment scheduled for 9/19/19 8/6/2019 
- did not attend scheduled appt with Dr. Harriet Suresh on 8/1/19; per chart review, appt cancelled. No future appts with PCP are scheduled at this time. - pt reports appt on 8/1/19 was cancelled due to her having a headache and a sore foot 
- followed by Outpatient Wound Clinic for ulceration on the lower legs; most recent visit 8/5/19 and was advised to f/u with Podiatrist ASAP regarding left foot callus and pressure point with prominence of metatarsal heads. - scheduled to start outpatient cardiac rehab tomorrow, 8/7 
- attempted to rescheduled hospital f/u appt with PCP today, however first available appt is 9/5; NN will request LPN  assistance with appt scheduling for earlier date, if available. - pt has not scheduled appt with Podiatry yet as she was unaware of who to schedule an appt with. Per chart review, patient was previously followed by Dr. Kirby Baldwin, AMAURY; provided patient with contact information for [facility] and [facility] Marla Stockton . Patient will contact Podiatry today in order to schedule an appointment. 8/8/2019 
- pt scheduled for appointment with Dr. Benito Kocher 8/14/19; pt declines this appt due to having another appt scheduled for 8/14. Awaiting LPN  response regarding next available appt date. 
- has appt scheduled with Podiatry on 8/14/19 at 2:15 pm 
 
8/9/2019 
- per LPN , first available appt date with PCP is 9/9/19 and appt has been scheduled 9/5/2019 
- notified patient of scheduled appt with PCP 9/9/19 9/12/2019 
- attended office visit with PCP 9/9/19; was referred to ED by PCP for c/o rectal bleeding with onset 9/8/19, need for hospital admission for Serial Hgb and GI Consult 9/18/2019  
- appt with Dr. Jai Bustos 9/20/19 
- OSMEL appt with PCP 9/25/19 3:45 p 
- appt with Dr. Delon Armando (GI) 9/25 at 10:15 a 
 
9/25/2019 
- attended OV with Dr. Jai Bustos 9/20/19; labs ordered (iron profile, ferritin). Per result note, to be scheduled for IV iron infusion, f/u in 6 months 
- has appt scheduled with PCP and GI today 9/27/2019 - attended OSMEL appt with PCP 9/25 
- attended OV with Dr. Yuliana Espinosa (GI) 9/25 at 10:15 a; next scheduled appt with Dr. Yuliana Espinosa 12/17/19 for 3 month f/u 
- pt reports scheduled for appt with Podiatry today; NN outreach encounter today is brief as patient is getting ready for this appointment - per chart review, scheduled to resume OP Cardiac Rehab 10/2 (?) 
 
10/3/2019  
- did not attend OV with Podiatry 9/27/19 
- attended [facility] with Dr. Michelle Ryan (Cardiology) 9/27/19 
- scheduled for OV with Dr. Sachin Bocanegra 10/18 to eval for Watchman Device Placement 
- needs Ortho clearance by OP Cardiac Rehab prior to starting Cardiac Rehab; daughter reports patient went to Cardiac Rehab yesterday and was advised of need for ortho clearance. Cardiac Rehab is attempting to obtain Ortho Clearance. 10/17/2019 
- resumed OP Cardiac Rehab (10/16/19) (chart review) 10/24/2019 
- attended Chapin Arauz with Podiatry 10/18/19  Prevent complications post hospitalization. 9/16/2019 
- \"I think everything's going pretty good. \" 
- one episode of \"rust\" colored stool on toilet paper when she wiped on Friday, 9/13/19 (after d/c to home), however denies any further episodes since then; \"it was just a little bit\" - denies weakness, fatigue. Ofilia Brightly I first get up I get a little dizzy, but I've had that for a while\"; reports that this is not new. Dizziness resolves within <20 seconds. - hgb 11. 9(wnl) 
- no medication changes at hospital discharge 
- resumed Xarelto at hospital discharge - [facility] post-acute f/u scheduled for 9/16/19 9/18/2019  
- denies black, bloody, tarry stools; denies \"rust\" color stools and states, \"It seems to be normal now. \" 
- denies abdominal pain, nausea, vomiting 
- confirmed with [facility] that post-acute visit was completed on 9/16/19; per [facility] Provider, VSS during visit and no further rectal bleeding noted - OSMEL appt with PCP 9/25/19 9/27/2019 - attended OSMEL appt with PCP 9/25/19; hgb 10.6 
- scheduled for Iron Infusion (order by Dr. Venkatesh Farrar) 
- no questions/concerns at this time 
- NN f/u next week 10/3/2019 
- \"She's doing okay\" - denies black, bloody, tarry stools - abdominal ultrasound completed today (ordered by Dr. Patrice Fajardo) 10/17/2019 
- per chart review, receiving Injectafer at [facility] 
- unable to reach patient/daughter today to f/u 
 
10/24/2019 
- \"she's doing okay\" - reports waiting for response from Dr. Dk Wilson as to whether pt is eligible for Watchman Device Placement; reports that Dr. Dk Wilson was going to consult with Dr. Vicki Reid regarding patient's cardiac history/history of cardiac procedures. Daughter will contact office to f/u on this tomorrow 
- daughter denies presence of wounds on lower extremities; states, \"Her legs look really good; right now her legs look clear. \" (was previously followed by [address] [place] [address] for lower leg ulcerations 
- denies black, bloody, tarry stools Future Appointments: 
Future Appointments Date Time Provider Dafne Jenkins 10/25/2019  1:00 PM [facility] CARDIOPULM EXERCISE [facility]  
10/28/2019  1:00 PM MR[facility] CARDIOPULM EXERCISE [facility]  
10/30/2019  2:00 PM  [facility]  
11/6/2019  2:00 PM MRM NUTRITION [facility] REG  
3/20/2020 11:15 AM MD Rahul Blackwell. 51 Last Appointment With Me: 
Visit date not found Last Appointment My Department: 
9/25/2019

## 2019-10-25 ENCOUNTER — HOSPITAL ENCOUNTER (OUTPATIENT)
Dept: CARDIAC REHAB | Age: 84
End: 2019-10-25
Payer: MEDICARE

## 2019-10-25 RX ORDER — RIVAROXABAN 15 MG/1
TABLET, FILM COATED ORAL
Qty: 30 TAB | Refills: 0 | OUTPATIENT
Start: 2019-10-25

## 2019-10-28 ENCOUNTER — HOSPITAL ENCOUNTER (OUTPATIENT)
Dept: CARDIAC REHAB | Age: 84
Discharge: HOME | End: 2019-10-28
Payer: MEDICARE

## 2019-10-28 VITALS — WEIGHT: 197.2 LBS | BODY MASS INDEX: 29.55 KG/M2

## 2019-10-28 PROCEDURE — 93798 PHYS/QHP OP CAR RHAB W/ECG: CPT

## 2019-10-30 ENCOUNTER — HOSPITAL ENCOUNTER (OUTPATIENT)
Dept: CARDIAC REHAB | Age: 84
Discharge: HOME | End: 2019-10-30
Payer: MEDICARE

## 2019-10-30 VITALS — BODY MASS INDEX: 29.67 KG/M2 | WEIGHT: 198 LBS

## 2019-10-30 PROCEDURE — 93798 PHYS/QHP OP CAR RHAB W/ECG: CPT

## 2019-11-04 ENCOUNTER — HOSPITAL ENCOUNTER (OUTPATIENT)
Dept: CARDIAC REHAB | Age: 84
Discharge: HOME | End: 2019-11-04
Payer: MEDICARE

## 2019-11-04 VITALS — WEIGHT: 200 LBS | BODY MASS INDEX: 29.97 KG/M2

## 2019-11-04 PROCEDURE — 93798 PHYS/QHP OP CAR RHAB W/ECG: CPT

## 2019-11-06 ENCOUNTER — HOSPITAL ENCOUNTER (OUTPATIENT)
Dept: CARDIAC REHAB | Age: 84
Discharge: HOME | End: 2019-11-06
Payer: MEDICARE

## 2019-11-06 VITALS — BODY MASS INDEX: 30.21 KG/M2 | WEIGHT: 201.6 LBS

## 2019-11-06 PROCEDURE — 93798 PHYS/QHP OP CAR RHAB W/ECG: CPT

## 2019-11-06 PROCEDURE — 93797 PHYS/QHP OP CAR RHAB WO ECG: CPT | Performed by: DIETITIAN, REGISTERED

## 2019-11-06 NOTE — PROGRESS NOTES
Cardiac Rehab Nutrition Assessment - 1:1 Evaluation NAME: Nitish Kamara : 1935 AGE: 82 y.o. GENDER: female CARDIAC REHAB ADMITTING DIAGNOSIS: valve replacement Relevant Comorbidites: HTN, dyslipidemia, DM, TIA 
 
LABS:  
Lab Results Component Value Date/Time Hemoglobin A1c 7.0 (H) 2019 10:09 AM  
 Hemoglobin A1c (POC) 6.5 2017 03:25 PM  
 
Lab Results Component Value Date/Time Cholesterol, total 119 2018 11:35 AM  
 HDL Cholesterol 47 2018 11:35 AM  
 LDL, calculated 58 2018 11:35 AM  
 VLDL, calculated 14 2018 11:35 AM  
 Triglyceride 68 2018 11:35 AM  
 CHOL/HDL Ratio 3.1 2010 08:12 AM  
 
 
MEDICATIONS/SUPPLEMENTS:  
[unfilled] Prior to Admission medications Medication Sig Start Date End Date Taking? Authorizing Provider  
metoprolol tartrate (LOPRESSOR) 50 mg tablet TK 1 AND 1/2 TS PO BID 19   Provider, Historical  
allopurinol (ZYLOPRIM) 100 mg tablet TAKE TWO TABLETS BY MOUTH DAILY 19   Kingston Loera MD  
loratadine (CLARITIN) 10 mg tablet TAKE 1 TABLET BY MOUTH DAILY 19   Kingston Loera MD  
sucralfate (CARAFATE) 1 gram tablet Take 1 g by mouth four (4) times daily. Provider, Historical  
amiodarone (CORDARONE) 200 mg tablet Take 1 Tab by mouth daily. 19   Kingston Loera MD  
rivaroxaban (XARELTO) 15 mg tab tablet Take 1 Tab by mouth daily (with dinner). Start taking 2019   Jennifer Bunch NP  
furosemide (LASIX) 40 mg tablet Take 80 mg by mouth Daily (before breakfast). Patient takes 80 mg with breakfast    Provider, Historical  
furosemide (LASIX) 40 mg tablet Take 40 mg by mouth daily (with lunch). Patient takes 80 mg with breakfast and 40 mg at lunch    Provider, Historical  
insulin NPH (HUMULIN N NPH INSULIN KWIKPEN) 100 unit/mL (3 mL) inpn 24 Units by SubCUTAneous route every morning.     Provider, Historical  
insulin NPH (HUMULIN N NPH INSULIN KWIKPEN) 100 unit/mL (3 mL) inpn 8 Units by SubCUTAneous route every evening. Provider, Historical  
pantoprazole (PROTONIX) 40 mg tablet Take 1 Tab by mouth daily. 6/13/19   Mahnaz Murry MD  
levothyroxine (SYNTHROID) 125 mcg tablet TAKE ONE TABLET BY MOUTH DAILY Patient taking differently: TAKE ONE TABLET BY MOUTH DAILY BEFORE BREAKFAST 3/13/19   Ruddy Almaraz MD  
atorvastatin (LIPITOR) 80 mg tablet TAKE ONE TABLET BY MOUTH EVERY EVENING 2/11/19   Adalberto Murray MD  
acetaminophen (TYLENOL) 325 mg tablet Take 325 mg by mouth every four (4) hours as needed for Pain. Provider, Historical  
potassium chloride SR (KLOR-CON 10) 10 mEq tablet Take 1 Tab by mouth daily. 1/23/17   Olivia Mccall MD  
 
 
ANTHROPOMETRICS:   
Ht Readings from Last 1 Encounters:  
10/10/19 5' 8.5\" (1.74 m) Wt Readings from Last 1 Encounters:  
11/04/19 90.7 kg (200 lb) IBW: 142.5 # +/- 10%  %IBW: 140 % +/- 10% BMI: 30 kg/M2 Category: Obesity Class I Waist: 46  inches Reported Wt Hx: 
Wt Readings from Last 10 Encounters:  
11/04/19 90.7 kg (200 lb) 10/30/19 89.8 kg (198 lb) 10/28/19 89.4 kg (197 lb 3.2 oz) 10/23/19 89.2 kg (196 lb 9.6 oz) 10/21/19 84.4 kg (186 lb) 10/16/19 89.8 kg (198 lb) 10/10/19 89.3 kg (196 lb 12.8 oz) 09/25/19 92.1 kg (203 lb)  
09/20/19 92.5 kg (204 lb) 09/09/19 92.6 kg (204 lb 2.3 oz) Reported Diet Hx: 
 
Rate Your Plate KPFPY:22 (Score 58-72: Making many healthy choices; 41-57: Some choices need improving 24-40: many choices need improving) 24 Hour Diet Recall Breakfast Cereal, coffee, OJ Lunch Salad or sandwich Nitza Marga, veggies, fruit Snacks pbutter crackers Beverages Water, coffee, OJ Environmental/Social: 
Pt lives alone; does own cooking NUTRITION INTERVENTION: 
Nutrition 60 minute one-on-one education & goal setting with Cristo Alvarado Reviewed with Cristo Alvarado relevant labs compared to ideals. Reviewed weight history and patient's verbalized weight goal as well as any real or perceived barriers to obtaining the goal. Collaborated with patient to set a specific short and long term weight goal.  
 
Reviewed Rate Your Plate and conducted a verbal diet recall. Assessed for environmental, financial, psychosocial, physical and comorbidities that may impact the food and eating patterns / behaviors of Bria Nam Collaborated with patient to set specific nutrient goals as well as specific food / behavior changes that will help patient meet the overall goal of following a heart healthy eating pattern (using guidelines as set forth by the American Heart Association and modeled after healthful eating patterns as recognized by the USDA Dietary Guidelines such as DASH, Mediterranean or plant-based). Briefly reviewed with Bria Nam the nutrition information in the Cardiac Rehab patient education book and encouraged Bria Nam to read thoroughly, ask questions as needed, and use for future reference for heart healthy nutrition information. Bria Nam is not scheduled to participate in Cardiac Rehab group nutrition classes. PATIENT GOALS: 
 
Weight Goals: 
Short Term Weight Goal:190 lbs Long Term Weight Goal:170 lbs Nutrition Goals: 
Daily Recommendations: 
Calories: 2509-2036 /day 
(using [address] with AF x1.3-1.4-500) Saturated Fat: no more than 9 g/day Trans Fat: 0 g/day Sodium: no more than 3044-2162 mg/day Fruit: 2-3 ser / day Vegetables: 4-5 ser/day Other: 1. Read food labels for sodium and saturated fat and limit to above recommendations 2. Switch from canned veggies to frozen or fresh veggies Questions addressed. Follow-up plans discussed. Bria Nam verbalized understanding.  
 
        Kelton Jacobs RD

## 2019-11-13 ENCOUNTER — HOSPITAL ENCOUNTER (OUTPATIENT)
Dept: CARDIAC REHAB | Age: 84
Discharge: HOME | End: 2019-11-13
Payer: MEDICARE

## 2019-11-13 VITALS — BODY MASS INDEX: 29.67 KG/M2 | WEIGHT: 198 LBS

## 2019-11-13 PROCEDURE — 93798 PHYS/QHP OP CAR RHAB W/ECG: CPT

## 2019-11-18 ENCOUNTER — HOSPITAL ENCOUNTER (OUTPATIENT)
Dept: CARDIAC REHAB | Age: 84
Discharge: HOME | End: 2019-11-18
Payer: MEDICARE

## 2019-11-18 VITALS — BODY MASS INDEX: 29.52 KG/M2 | WEIGHT: 197 LBS

## 2019-11-18 PROCEDURE — 93798 PHYS/QHP OP CAR RHAB W/ECG: CPT

## 2019-11-20 NOTE — TELEPHONE ENCOUNTER
MD Traci Richards LPN   Caller: Unspecified (Today, 11:22 AM)             Advise patient this is usually hemorrhoidal bleeding. If this persists, she will need to be seen no later than early next week. To ED if significant blood in stool or weakness. Spoke with Madelyn Ochoa  Two pt identifiers confirmed. Madelyn Ochoa advised of the above message from Dr. Beth Mistry. Madelyn Ochoa verbalized understanding of information discussed w/ no further questions at this time.

## 2019-11-20 NOTE — TELEPHONE ENCOUNTER
Lorin Robertson states pt says there was blood in the stool prior to flushing after a bm & urination. Not sure where it's coming from.   Please call #[phone]

## 2019-11-20 NOTE — TELEPHONE ENCOUNTER
Spoke with patient. Two pt identifiers confirmed. Madelyn Ochoa states that patient has a history of \"watermelon stomach\". Madelyn Ochoa states that patient saw a small amount of blood in the toilet this morning after having a bowel movement. Madelyn Ochoa states that patient denies having to strain or being constipated. Madelyn Ochoa states that the patient states that she feels ok, just feels a little weak. Kris Kos that I will check with Dr. Beth Mistry and will give her a call back with her recommendations as soon as I can. Pt verbalized understanding of information discussed w/ no further questions at this time.

## 2019-12-16 NOTE — PROGRESS NOTES
NN outreach to daughter today in order to complete final OSMEL/Bundle call and to assess need for patient transition to CCM services; unable to lvm due to mailbox full. Patient has graduated from the Transitions of Care Coordination  program on 12/16/2019. Goals Addressed This Visit's Progress Chronic Disease  COMPLETED: Supportive resources in place to maintain patient in the community (ie. Home Health, DME equipment, refer to, medication assistant plan, etc.)     
  10/3/2019 
- started with [facility] yesterday; will have  two days per week, 8 hours total per week 10/24/2019 
- continues with [facility] on Wednesdays and Fridays; minimum of 8 hours/week 
- reports that patient \"really likes\" her caregiver 
- denies the need for additional resources/support at this time 12/16/2019 
- unable to reach daughter to assess need for CCM services Post Hospitalization  COMPLETED: Attends follow-up appointments as directed. 7/25/2019  
- did not attend hospital f/u appt with Dr. Fransisco Aponte on 7/8/19; patient rescheduled this appointment to 8/1/19 
- followed by OP Wound care clinic for lower leg ulcerations. Attended appointments with [address] [place] [address] on 7/15/19 (establish care) and 7/22/19; has future f/u scheduled - to f/u with Dr. Yuliana Espinosa (GI) in two weeks post-discharge. Per GI Office staff, patient attended office visit with Dr. Yuliana Espinosa on 6/16/19 and next scheduled appt is 9/25/19 with dr. Yuliana Espinosa. - to f/u with Dr. Michelle Ryan (Cardiology) in 2 weeks in two weeks post-discharge; patient unable to recall whether she has attended office visit with Dr. Michelle Ryan post-discharge and is unable to find note of a previous appt on her calendar, reports next scheduled f/u is 12/19/19 for routine f/u.  Patient is advised today of hospital f/u recommendation within two weeks of hospital discharge date; patient will contact  Karime's office today in order to schedule an appointment. Per [facility] staff, most recent office visit was 6/27/19; next scheduled appt 12/19/19 
-to follow with heme/onc as outpatient for iron deficiency; has appointment scheduled for 9/19/19 8/6/2019 
- did not attend scheduled appt with Dr. Kyle Coyle on 8/1/19; per chart review, appt cancelled. No future appts with PCP are scheduled at this time. - pt reports appt on 8/1/19 was cancelled due to her having a headache and a sore foot 
- followed by Outpatient Wound Clinic for ulceration on the lower legs; most recent visit 8/5/19 and was advised to f/u with Podiatrist ASAP regarding left foot callus and pressure point with prominence of metatarsal heads. - scheduled to start outpatient cardiac rehab tomorrow, 8/7 
- attempted to rescheduled hospital f/u appt with PCP today, however first available appt is 9/5; NN will request LPN  assistance with appt scheduling for earlier date, if available. - pt has not scheduled appt with Podiatry yet as she was unaware of who to schedule an appt with. Per chart review, patient was previously followed by Dr. Farhat Nielsen, [facility]; [facility] . Patient will contact Podiatry today in order to schedule an appointment. 8/8/2019 
- pt scheduled for appointment with Dr. Kyle Coyle 8/14/19; pt declines this appt due to having another appt scheduled for 8/14. Awaiting LPN  response regarding next available appt date. 
- has appt scheduled with Podiatry on 8/14/19 at 2:15 pm 
 
8/9/2019 
- per LPN , first available appt date with PCP is 9/9/19 and appt has been scheduled 9/5/2019 
- notified patient of scheduled appt with PCP 9/9/19 9/12/2019 
- attended office visit with PCP 9/9/19; was referred to ED by PCP for c/o rectal bleeding with onset 9/8/19, need for hospital admission for Serial Hgb and GI Consult 9/18/2019 - appt with Dr. Gisell Quinn 9/20/19 
- OSMEL appt with PCP 9/25/19 3:45 p 
- appt with Dr. Nimisha Pastor (GI) 9/25 at 10:15 a 
 
9/25/2019 
- attended OV with Dr. Gisell Quinn 9/20/19; labs ordered (iron profile, ferritin). Per result note, to be scheduled for IV iron infusion, f/u in 6 months 
- has appt scheduled with PCP and GI today 9/27/2019  
- attended OSMEL appt with PCP 9/25 
- attended OV with Dr. Nimisha Pastor (GI) 9/25 at 10:15 a; next scheduled appt with Dr. Nimisha Pastor 12/17/19 for 3 month f/u 
- pt reports scheduled for appt with Podiatry today; NN outreach encounter today is brief as patient is getting ready for this appointment - per chart review, scheduled to resume OP Cardiac Rehab 10/2 (?) 
 
10/3/2019  
- did not attend OV with Podiatry 9/27/19 
- attended [address] with Dr. Jeannie Rodriguez (Cardiology) 9/27/19 
- scheduled for OV with Dr. Abbe Cline 10/18 to eval for Watchman Device Placement 
- needs Ortho clearance by OP Cardiac Rehab prior to starting Cardiac Rehab; daughter reports patient went to Cardiac Rehab yesterday and was advised of need for ortho clearance. Cardiac Rehab is attempting to obtain Ortho Clearance. 10/17/2019 
- resumed OP Cardiac Rehab (10/16/19) (chart review) 10/24/2019 
- attended [address] with Podiatry 10/18/19 
 
12/16/2019 
- unable to reach daughter to complete final OSMEL call  COMPLETED: Prevent complications post hospitalization. On track 9/16/2019 
- \"I think everything's going pretty good. \" 
- one episode of \"rust\" colored stool on toilet paper when she wiped on Friday, 9/13/19 (after d/c to home), however denies any further episodes since then; \"it was just a little bit\" - denies weakness, fatigue. Neetu Polo I first get up I get a little dizzy, but I've had that for a while\"; reports that this is not new. Dizziness resolves within <20 seconds. - hgb 11. 9(wnl) 
- no medication changes at hospital discharge 
- resumed Xarelto at hospital discharge - Toni Fantasma post-acute f/u scheduled for 9/16/19 9/18/2019  
- denies black, bloody, tarry stools; denies \"rust\" color stools and states, \"It seems to be normal now. \" 
- denies abdominal pain, nausea, vomiting 
- confirmed with [facility] that post-acute visit was completed on 9/16/19; per visiting [facility] Provider, VSS during visit and no further rectal bleeding noted - OSMEL appt with PCP 9/25/19 9/27/2019 
- attended OSMEL appt with PCP 9/25/19; hgb 10.6 
- scheduled for Iron Infusion (order by Dr. Jem Burch) 
- no questions/concerns at this time 
- NN f/u next week 10/3/2019 
- \"She's doing okay\" - denies black, bloody, tarry stools - abdominal ultrasound completed today (ordered by Dr. Sumanth Gaytan) 10/17/2019 
- per chart review, receiving Injectafer at [facility] 
- unable to reach patient/daughter today to f/u 
 
10/24/2019 
- \"she's doing okay\" - reports waiting for response from Dr. Gonzalo Palmer as to whether pt is eligible for Watchman Device Placement; reports that Dr. Gonzalo Palmer was going to consult with Dr. Karen Amador regarding patient's cardiac history/history of cardiac procedures. Daughter will contact office to f/u on this tomorrow 
- daughter denies presence of wounds on lower extremities; states, \"Her legs look really good; right now her legs look clear. \" (was previously followed by [address] [place] [address] for lower leg ulcerations 
- denies black, bloody, tarry stools 12/16/2019  
- unable to reach daughter to complete final OSMEL call - To the best of this NN's knowledge, this patient had no additional Inpatient Hospital Admissions during 90 day OSMEL period following admission to [facility] 9/9/19-9/13/19 (GI Bleed Bundle?). - OSMEL period has ended. Goal Completed. Future Appointments: 
Future Appointments Date Time Provider Dafne Jenkins 12/18/2019  1:00 PM [facility] CARDIOPULM EXERCISE [facility]  
 12/23/2019  1:00 PM [facility] CARDIOPULM EXERCISE [facility]  
12/26/2019  1:00 PM MRM CARD-RUDDY ROOM [facility] [address] REG  
12/30/2019  1:00 PM MRM [id number] REG  
1/6/2020  1:00 PM MR[facility] CARDIOPULM EXERCISE [facility]  
1/8/2020  1:00 PM MRM CARDIOPULM EXERCISE [facility]  
1/13/2020  1:00 PM MRM CARDIOPULM EXERCISE [facility]  
1/15/2020  1:00 PM MRM CARDIOPULM EXERCISE [facility]  
1/20/2020  1:00 PM MRM CARDIOPULM EXERCISE [facility]  
1/22/2020  1:00 PM MRM CARDIOPULM EXERCISE [facility]  
1/27/2020  1:00 PM MRM CARDIOPULM EXERCISE [facility]  
1/29/2020  1:00 PM MRM CARDIOPULM EXERCISE [facility]  
2/3/2020  1:00 PM MRM CARDIOPULM EXERCISE [facility]  
2/5/2020  1:00 PM MRM CARDIOPULM EXERCISE [facility]  
2/10/2020  1:00 PM MRM CARDIOPULM EXERCISE [facility]  
3/20/2020 11:15 AM MD Rahul Culp. 49 Last Appointment With Me: 
Visit date not found Last Appointment My Department: 
9/25/2019

## 2020-01-01 ENCOUNTER — ANESTHESIA EVENT (OUTPATIENT)
Dept: CARDIAC CATH/INVASIVE PROCEDURES | Age: 85
DRG: 274 | End: 2020-01-01
Payer: MEDICARE

## 2020-01-01 ENCOUNTER — HOME CARE VISIT (OUTPATIENT)
Dept: SCHEDULING | Facility: HOME HEALTH | Age: 85
End: 2020-01-01
Payer: MEDICARE

## 2020-01-01 ENCOUNTER — TELEPHONE (OUTPATIENT)
Dept: INTERNAL MEDICINE CLINIC | Age: 85
End: 2020-01-01

## 2020-01-01 ENCOUNTER — PATIENT OUTREACH (OUTPATIENT)
Dept: FAMILY MEDICINE CLINIC | Age: 85
End: 2020-01-01

## 2020-01-01 ENCOUNTER — APPOINTMENT (OUTPATIENT)
Dept: GENERAL RADIOLOGY | Age: 85
DRG: 871 | End: 2020-01-01
Attending: INTERNAL MEDICINE
Payer: MEDICARE

## 2020-01-01 ENCOUNTER — HOSPITAL ENCOUNTER (OUTPATIENT)
Dept: NON INVASIVE DIAGNOSTICS | Age: 85
Discharge: HOME | End: 2020-03-24
Attending: INTERNAL MEDICINE
Payer: MEDICARE

## 2020-01-01 ENCOUNTER — TELEPHONE (OUTPATIENT)
Dept: CARDIAC REHAB | Age: 85
End: 2020-01-01

## 2020-01-01 ENCOUNTER — PATIENT OUTREACH (OUTPATIENT)
Dept: CASE MANAGEMENT | Age: 85
End: 2020-01-01

## 2020-01-01 ENCOUNTER — HOSPITAL ENCOUNTER (OUTPATIENT)
Dept: CARDIAC REHAB | Age: 85
Discharge: HOME | End: 2020-01-20
Payer: MEDICARE

## 2020-01-01 ENCOUNTER — APPOINTMENT (OUTPATIENT)
Dept: CARDIAC REHAB | Age: 85
End: 2020-01-01

## 2020-01-01 ENCOUNTER — APPOINTMENT (OUTPATIENT)
Dept: MRI IMAGING | Age: 85
DRG: 871 | End: 2020-01-01
Attending: INTERNAL MEDICINE
Payer: MEDICARE

## 2020-01-01 ENCOUNTER — HOME CARE VISIT (OUTPATIENT)
Dept: HOME HEALTH SERVICES | Facility: HOME HEALTH | Age: 85
End: 2020-01-01
Payer: MEDICARE

## 2020-01-01 ENCOUNTER — HOSPITAL ENCOUNTER (OUTPATIENT)
Dept: CARDIAC REHAB | Age: 85
Discharge: HOME | End: 2020-03-04

## 2020-01-01 ENCOUNTER — TELEPHONE (OUTPATIENT)
Dept: FAMILY MEDICINE CLINIC | Age: 85
End: 2020-01-01

## 2020-01-01 ENCOUNTER — HOSPITAL ENCOUNTER (INPATIENT)
Age: 85
LOS: 1 days | Discharge: HOME | DRG: 274 | End: 2020-02-20
Attending: INTERNAL MEDICINE | Admitting: INTERNAL MEDICINE
Payer: MEDICARE

## 2020-01-01 ENCOUNTER — APPOINTMENT (OUTPATIENT)
Dept: GENERAL RADIOLOGY | Age: 85
DRG: 871 | End: 2020-01-01
Attending: ANESTHESIOLOGY
Payer: MEDICARE

## 2020-01-01 ENCOUNTER — HOSPITAL ENCOUNTER (OUTPATIENT)
Dept: LAB | Age: 85
Discharge: HOME | End: 2020-07-13
Payer: MEDICARE

## 2020-01-01 ENCOUNTER — HOSPITAL ENCOUNTER (OUTPATIENT)
Dept: CARDIAC REHAB | Age: 85
Discharge: HOME | End: 2020-02-12
Payer: MEDICARE

## 2020-01-01 ENCOUNTER — ANESTHESIA (OUTPATIENT)
Dept: CARDIAC CATH/INVASIVE PROCEDURES | Age: 85
DRG: 274 | End: 2020-01-01
Payer: MEDICARE

## 2020-01-01 ENCOUNTER — HOSPITAL ENCOUNTER (OUTPATIENT)
Dept: CARDIAC REHAB | Age: 85
Discharge: HOME | End: 2020-01-22
Payer: MEDICARE

## 2020-01-01 ENCOUNTER — HOSPITAL ENCOUNTER (INPATIENT)
Age: 85
LOS: 11 days | Discharge: HOME HEALTH | DRG: 291 | End: 2020-10-21
Attending: EMERGENCY MEDICINE | Admitting: HOSPITALIST
Payer: MEDICARE

## 2020-01-01 ENCOUNTER — APPOINTMENT (OUTPATIENT)
Dept: ULTRASOUND IMAGING | Age: 85
DRG: 871 | End: 2020-01-01
Attending: EMERGENCY MEDICINE
Payer: MEDICARE

## 2020-01-01 ENCOUNTER — HOSPITAL ENCOUNTER (OUTPATIENT)
Dept: NON INVASIVE DIAGNOSTICS | Age: 85
Discharge: HOME | End: 2020-01-02
Attending: INTERNAL MEDICINE
Payer: MEDICARE

## 2020-01-01 ENCOUNTER — APPOINTMENT (OUTPATIENT)
Dept: NON INVASIVE DIAGNOSTICS | Age: 85
DRG: 871 | End: 2020-01-01
Attending: SPECIALIST
Payer: MEDICARE

## 2020-01-01 ENCOUNTER — HOSPITAL ENCOUNTER (OUTPATIENT)
Dept: CARDIAC REHAB | Age: 85
Discharge: HOME | End: 2020-01-08
Payer: MEDICARE

## 2020-01-01 ENCOUNTER — HOSPITAL ENCOUNTER (OUTPATIENT)
Dept: CARDIAC REHAB | Age: 85
Discharge: HOME | End: 2020-01-29
Payer: MEDICARE

## 2020-01-01 ENCOUNTER — APPOINTMENT (OUTPATIENT)
Dept: ULTRASOUND IMAGING | Age: 85
DRG: 291 | End: 2020-01-01
Attending: INTERNAL MEDICINE
Payer: MEDICARE

## 2020-01-01 ENCOUNTER — VIRTUAL VISIT (OUTPATIENT)
Dept: INTERNAL MEDICINE CLINIC | Age: 85
End: 2020-01-01
Payer: MEDICARE

## 2020-01-01 ENCOUNTER — APPOINTMENT (OUTPATIENT)
Dept: NON INVASIVE DIAGNOSTICS | Age: 85
DRG: 274 | End: 2020-01-01
Attending: INTERNAL MEDICINE
Payer: MEDICARE

## 2020-01-01 ENCOUNTER — HOSPITAL ENCOUNTER (OUTPATIENT)
Dept: CARDIAC REHAB | Age: 85
Discharge: HOME | End: 2020-01-15
Payer: MEDICARE

## 2020-01-01 ENCOUNTER — ANESTHESIA (OUTPATIENT)
Dept: ENDOSCOPY | Age: 85
DRG: 291 | End: 2020-01-01
Payer: MEDICARE

## 2020-01-01 ENCOUNTER — HOSPITAL ENCOUNTER (INPATIENT)
Age: 85
LOS: 8 days | Discharge: HOSPICE | DRG: 871 | End: 2020-11-17
Attending: EMERGENCY MEDICINE | Admitting: INTERNAL MEDICINE
Payer: MEDICARE

## 2020-01-01 ENCOUNTER — APPOINTMENT (OUTPATIENT)
Dept: CARDIAC CATH/INVASIVE PROCEDURES | Age: 85
DRG: 274 | End: 2020-01-01
Attending: INTERNAL MEDICINE
Payer: MEDICARE

## 2020-01-01 ENCOUNTER — APPOINTMENT (OUTPATIENT)
Dept: GENERAL RADIOLOGY | Age: 85
DRG: 291 | End: 2020-01-01
Attending: EMERGENCY MEDICINE
Payer: MEDICARE

## 2020-01-01 ENCOUNTER — ANESTHESIA EVENT (OUTPATIENT)
Dept: ENDOSCOPY | Age: 85
DRG: 291 | End: 2020-01-01
Payer: MEDICARE

## 2020-01-01 ENCOUNTER — HOME HEALTH ADMISSION (OUTPATIENT)
Dept: HOME HEALTH SERVICES | Facility: HOME HEALTH | Age: 85
End: 2020-01-01
Payer: MEDICARE

## 2020-01-01 ENCOUNTER — APPOINTMENT (OUTPATIENT)
Dept: INTERVENTIONAL RADIOLOGY/VASCULAR | Age: 85
DRG: 871 | End: 2020-01-01
Attending: INTERNAL MEDICINE
Payer: MEDICARE

## 2020-01-01 ENCOUNTER — VIRTUAL VISIT (OUTPATIENT)
Dept: ONCOLOGY | Age: 85
End: 2020-01-01
Payer: MEDICARE

## 2020-01-01 ENCOUNTER — ANESTHESIA (OUTPATIENT)
Dept: SURGERY | Age: 85
DRG: 871 | End: 2020-01-01
Payer: MEDICARE

## 2020-01-01 ENCOUNTER — VIRTUAL VISIT (OUTPATIENT)
Dept: INTERNAL MEDICINE CLINIC | Age: 85
End: 2020-01-01

## 2020-01-01 ENCOUNTER — HOSPITAL ENCOUNTER (OUTPATIENT)
Dept: CARDIAC REHAB | Age: 85
Discharge: HOME | End: 2020-02-03
Payer: MEDICARE

## 2020-01-01 ENCOUNTER — ANESTHESIA EVENT (OUTPATIENT)
Dept: SURGERY | Age: 85
DRG: 871 | End: 2020-01-01
Payer: MEDICARE

## 2020-01-01 ENCOUNTER — HOSPITAL ENCOUNTER (OUTPATIENT)
Dept: CARDIAC REHAB | Age: 85
Discharge: HOME | End: 2020-01-27
Payer: MEDICARE

## 2020-01-01 ENCOUNTER — APPOINTMENT (OUTPATIENT)
Dept: GENERAL RADIOLOGY | Age: 85
DRG: 871 | End: 2020-01-01
Attending: RADIOLOGY
Payer: MEDICARE

## 2020-01-01 VITALS
RESPIRATION RATE: 18 BRPM | OXYGEN SATURATION: 98 % | DIASTOLIC BLOOD PRESSURE: 80 MMHG | HEART RATE: 70 BPM | TEMPERATURE: 98 F | SYSTOLIC BLOOD PRESSURE: 122 MMHG

## 2020-01-01 VITALS — WEIGHT: 196 LBS | BODY MASS INDEX: 29.37 KG/M2

## 2020-01-01 VITALS
SYSTOLIC BLOOD PRESSURE: 140 MMHG | OXYGEN SATURATION: 97 % | HEART RATE: 50 BPM | TEMPERATURE: 97.7 F | DIASTOLIC BLOOD PRESSURE: 60 MMHG

## 2020-01-01 VITALS
SYSTOLIC BLOOD PRESSURE: 133 MMHG | HEART RATE: 70 BPM | DIASTOLIC BLOOD PRESSURE: 67 MMHG | OXYGEN SATURATION: 97 % | TEMPERATURE: 98 F

## 2020-01-01 VITALS
BODY MASS INDEX: 29.53 KG/M2 | OXYGEN SATURATION: 98 % | WEIGHT: 200 LBS | RESPIRATION RATE: 18 BRPM | TEMPERATURE: 98 F | HEART RATE: 70 BPM | DIASTOLIC BLOOD PRESSURE: 70 MMHG | SYSTOLIC BLOOD PRESSURE: 126 MMHG

## 2020-01-01 VITALS
SYSTOLIC BLOOD PRESSURE: 132 MMHG | TEMPERATURE: 97.6 F | HEART RATE: 70 BPM | DIASTOLIC BLOOD PRESSURE: 80 MMHG | RESPIRATION RATE: 18 BRPM | OXYGEN SATURATION: 98 %

## 2020-01-01 VITALS
DIASTOLIC BLOOD PRESSURE: 58 MMHG | SYSTOLIC BLOOD PRESSURE: 134 MMHG | HEART RATE: 55 BPM | OXYGEN SATURATION: 96 % | TEMPERATURE: 98.1 F

## 2020-01-01 VITALS
OXYGEN SATURATION: 95 % | DIASTOLIC BLOOD PRESSURE: 54 MMHG | SYSTOLIC BLOOD PRESSURE: 130 MMHG | RESPIRATION RATE: 16 BRPM | TEMPERATURE: 97.1 F | HEART RATE: 63 BPM

## 2020-01-01 VITALS
OXYGEN SATURATION: 99 % | TEMPERATURE: 97.7 F | DIASTOLIC BLOOD PRESSURE: 55 MMHG | SYSTOLIC BLOOD PRESSURE: 114 MMHG | HEART RATE: 50 BPM

## 2020-01-01 VITALS
HEART RATE: 52 BPM | DIASTOLIC BLOOD PRESSURE: 70 MMHG | TEMPERATURE: 97.2 F | SYSTOLIC BLOOD PRESSURE: 125 MMHG | OXYGEN SATURATION: 99 %

## 2020-01-01 VITALS
TEMPERATURE: 98 F | SYSTOLIC BLOOD PRESSURE: 128 MMHG | RESPIRATION RATE: 18 BRPM | BODY MASS INDEX: 25.84 KG/M2 | HEART RATE: 88 BPM | DIASTOLIC BLOOD PRESSURE: 80 MMHG | OXYGEN SATURATION: 97 % | WEIGHT: 175 LBS

## 2020-01-01 VITALS
SYSTOLIC BLOOD PRESSURE: 100 MMHG | OXYGEN SATURATION: 97 % | HEART RATE: 50 BPM | TEMPERATURE: 98.1 F | DIASTOLIC BLOOD PRESSURE: 60 MMHG

## 2020-01-01 VITALS
SYSTOLIC BLOOD PRESSURE: 140 MMHG | RESPIRATION RATE: 18 BRPM | TEMPERATURE: 97.9 F | OXYGEN SATURATION: 98 % | DIASTOLIC BLOOD PRESSURE: 72 MMHG | HEART RATE: 74 BPM

## 2020-01-01 VITALS
SYSTOLIC BLOOD PRESSURE: 142 MMHG | HEART RATE: 52 BPM | TEMPERATURE: 97.4 F | DIASTOLIC BLOOD PRESSURE: 70 MMHG | OXYGEN SATURATION: 99 %

## 2020-01-01 VITALS
TEMPERATURE: 97.4 F | HEART RATE: 55 BPM | SYSTOLIC BLOOD PRESSURE: 116 MMHG | RESPIRATION RATE: 18 BRPM | OXYGEN SATURATION: 98 % | DIASTOLIC BLOOD PRESSURE: 70 MMHG

## 2020-01-01 VITALS
RESPIRATION RATE: 18 BRPM | SYSTOLIC BLOOD PRESSURE: 128 MMHG | DIASTOLIC BLOOD PRESSURE: 64 MMHG | OXYGEN SATURATION: 95 % | WEIGHT: 180 LBS | TEMPERATURE: 98 F | HEART RATE: 67 BPM | BODY MASS INDEX: 26.58 KG/M2

## 2020-01-01 VITALS
OXYGEN SATURATION: 97 % | DIASTOLIC BLOOD PRESSURE: 70 MMHG | SYSTOLIC BLOOD PRESSURE: 130 MMHG | TEMPERATURE: 97.3 F | HEIGHT: 69 IN | BODY MASS INDEX: 25.92 KG/M2 | HEART RATE: 56 BPM | RESPIRATION RATE: 18 BRPM | WEIGHT: 175 LBS

## 2020-01-01 VITALS
SYSTOLIC BLOOD PRESSURE: 122 MMHG | HEART RATE: 70 BPM | TEMPERATURE: 98.2 F | RESPIRATION RATE: 18 BRPM | DIASTOLIC BLOOD PRESSURE: 80 MMHG | WEIGHT: 175 LBS | BODY MASS INDEX: 25.84 KG/M2 | OXYGEN SATURATION: 98 %

## 2020-01-01 VITALS
WEIGHT: 183 LBS | SYSTOLIC BLOOD PRESSURE: 128 MMHG | HEART RATE: 70 BPM | DIASTOLIC BLOOD PRESSURE: 80 MMHG | BODY MASS INDEX: 27.02 KG/M2 | RESPIRATION RATE: 18 BRPM | OXYGEN SATURATION: 97 % | TEMPERATURE: 98 F

## 2020-01-01 VITALS
OXYGEN SATURATION: 95 % | TEMPERATURE: 98.2 F | HEIGHT: 69 IN | DIASTOLIC BLOOD PRESSURE: 53 MMHG | WEIGHT: 192.24 LBS | RESPIRATION RATE: 17 BRPM | BODY MASS INDEX: 28.47 KG/M2 | SYSTOLIC BLOOD PRESSURE: 144 MMHG | HEART RATE: 80 BPM

## 2020-01-01 VITALS
DIASTOLIC BLOOD PRESSURE: 70 MMHG | OXYGEN SATURATION: 97 % | RESPIRATION RATE: 18 BRPM | TEMPERATURE: 97.3 F | SYSTOLIC BLOOD PRESSURE: 130 MMHG | HEART RATE: 56 BPM

## 2020-01-01 VITALS
TEMPERATURE: 97.3 F | SYSTOLIC BLOOD PRESSURE: 122 MMHG | DIASTOLIC BLOOD PRESSURE: 55 MMHG | OXYGEN SATURATION: 99 % | HEART RATE: 45 BPM

## 2020-01-01 VITALS — WEIGHT: 195 LBS | BODY MASS INDEX: 29.22 KG/M2

## 2020-01-01 VITALS
OXYGEN SATURATION: 97 % | DIASTOLIC BLOOD PRESSURE: 65 MMHG | HEART RATE: 51 BPM | TEMPERATURE: 97.5 F | SYSTOLIC BLOOD PRESSURE: 135 MMHG

## 2020-01-01 VITALS
HEART RATE: 76 BPM | OXYGEN SATURATION: 97 % | SYSTOLIC BLOOD PRESSURE: 120 MMHG | DIASTOLIC BLOOD PRESSURE: 70 MMHG | TEMPERATURE: 98 F

## 2020-01-01 VITALS
RESPIRATION RATE: 18 BRPM | HEART RATE: 70 BPM | BODY MASS INDEX: 25.84 KG/M2 | DIASTOLIC BLOOD PRESSURE: 80 MMHG | WEIGHT: 175 LBS | SYSTOLIC BLOOD PRESSURE: 118 MMHG | TEMPERATURE: 98 F | OXYGEN SATURATION: 98 %

## 2020-01-01 VITALS
HEART RATE: 51 BPM | SYSTOLIC BLOOD PRESSURE: 125 MMHG | OXYGEN SATURATION: 98 % | DIASTOLIC BLOOD PRESSURE: 70 MMHG | TEMPERATURE: 97 F | RESPIRATION RATE: 18 BRPM

## 2020-01-01 VITALS
WEIGHT: 187 LBS | TEMPERATURE: 97.6 F | HEIGHT: 70 IN | HEART RATE: 73 BPM | BODY MASS INDEX: 26.77 KG/M2 | SYSTOLIC BLOOD PRESSURE: 118 MMHG | OXYGEN SATURATION: 96 % | RESPIRATION RATE: 18 BRPM | DIASTOLIC BLOOD PRESSURE: 61 MMHG

## 2020-01-01 VITALS — WEIGHT: 193.2 LBS | BODY MASS INDEX: 28.95 KG/M2

## 2020-01-01 VITALS — TEMPERATURE: 97.9 F | BODY MASS INDEX: 26.58 KG/M2 | HEART RATE: 77 BPM | WEIGHT: 180 LBS

## 2020-01-01 VITALS
HEART RATE: 73 BPM | HEIGHT: 69 IN | OXYGEN SATURATION: 99 % | OXYGEN SATURATION: 97 % | TEMPERATURE: 98.1 F | TEMPERATURE: 97.3 F | DIASTOLIC BLOOD PRESSURE: 68 MMHG | SYSTOLIC BLOOD PRESSURE: 124 MMHG | SYSTOLIC BLOOD PRESSURE: 140 MMHG | HEART RATE: 56 BPM | DIASTOLIC BLOOD PRESSURE: 33 MMHG | RESPIRATION RATE: 17 BRPM | WEIGHT: 203.48 LBS | BODY MASS INDEX: 30.14 KG/M2

## 2020-01-01 VITALS
SYSTOLIC BLOOD PRESSURE: 121 MMHG | OXYGEN SATURATION: 100 % | WEIGHT: 180 LBS | HEIGHT: 69 IN | HEART RATE: 52 BPM | RESPIRATION RATE: 21 BRPM | BODY MASS INDEX: 26.66 KG/M2 | DIASTOLIC BLOOD PRESSURE: 68 MMHG

## 2020-01-01 VITALS — BODY MASS INDEX: 28.62 KG/M2 | WEIGHT: 191 LBS

## 2020-01-01 VITALS
DIASTOLIC BLOOD PRESSURE: 80 MMHG | BODY MASS INDEX: 29.18 KG/M2 | SYSTOLIC BLOOD PRESSURE: 115 MMHG | OXYGEN SATURATION: 91 % | HEIGHT: 69 IN | RESPIRATION RATE: 19 BRPM | HEART RATE: 56 BPM | WEIGHT: 197 LBS

## 2020-01-01 VITALS
OXYGEN SATURATION: 98 % | DIASTOLIC BLOOD PRESSURE: 60 MMHG | HEART RATE: 53 BPM | TEMPERATURE: 97.6 F | SYSTOLIC BLOOD PRESSURE: 119 MMHG

## 2020-01-01 VITALS
RESPIRATION RATE: 18 BRPM | HEART RATE: 70 BPM | OXYGEN SATURATION: 98 % | BODY MASS INDEX: 26.58 KG/M2 | WEIGHT: 180 LBS | TEMPERATURE: 98.2 F | SYSTOLIC BLOOD PRESSURE: 130 MMHG | DIASTOLIC BLOOD PRESSURE: 80 MMHG

## 2020-01-01 VITALS
TEMPERATURE: 97 F | OXYGEN SATURATION: 98 % | SYSTOLIC BLOOD PRESSURE: 130 MMHG | DIASTOLIC BLOOD PRESSURE: 60 MMHG | HEART RATE: 77 BPM | RESPIRATION RATE: 16 BRPM

## 2020-01-01 VITALS
BODY MASS INDEX: 29.24 KG/M2 | TEMPERATURE: 96.9 F | DIASTOLIC BLOOD PRESSURE: 70 MMHG | RESPIRATION RATE: 18 BRPM | SYSTOLIC BLOOD PRESSURE: 126 MMHG | OXYGEN SATURATION: 99 % | HEART RATE: 54 BPM | WEIGHT: 198 LBS

## 2020-01-01 VITALS — WEIGHT: 197 LBS | BODY MASS INDEX: 29.52 KG/M2

## 2020-01-01 VITALS — WEIGHT: 192.3 LBS | BODY MASS INDEX: 28.81 KG/M2

## 2020-01-01 VITALS — BODY MASS INDEX: 29.1 KG/M2 | WEIGHT: 194.2 LBS

## 2020-01-01 VITALS — WEIGHT: 199 LBS | BODY MASS INDEX: 29.82 KG/M2

## 2020-01-01 DIAGNOSIS — E11.22 CONTROLLED TYPE 2 DIABETES MELLITUS WITH STAGE 3 CHRONIC KIDNEY DISEASE, WITH LONG-TERM CURRENT USE OF INSULIN (HCC): ICD-10-CM

## 2020-01-01 DIAGNOSIS — N18.4 CHRONIC KIDNEY DISEASE, STAGE 4, SEVERELY DECREASED GFR (HCC): Primary | ICD-10-CM

## 2020-01-01 DIAGNOSIS — I50.32 CHRONIC DIASTOLIC HEART FAILURE (HCC): Primary | ICD-10-CM

## 2020-01-01 DIAGNOSIS — I73.9 PVD (PERIPHERAL VASCULAR DISEASE) (HCC): ICD-10-CM

## 2020-01-01 DIAGNOSIS — Z91.81 AT RISK FOR FALLS: ICD-10-CM

## 2020-01-01 DIAGNOSIS — E11.22 CONTROLLED TYPE 2 DIABETES MELLITUS WITH STAGE 3 CHRONIC KIDNEY DISEASE, WITH LONG-TERM CURRENT USE OF INSULIN (HCC): Primary | ICD-10-CM

## 2020-01-01 DIAGNOSIS — I10 ESSENTIAL HYPERTENSION, BENIGN: ICD-10-CM

## 2020-01-01 DIAGNOSIS — Z79.4 CONTROLLED TYPE 2 DIABETES MELLITUS WITH STAGE 3 CHRONIC KIDNEY DISEASE, WITH LONG-TERM CURRENT USE OF INSULIN (HCC): ICD-10-CM

## 2020-01-01 DIAGNOSIS — N18.4 CHRONIC KIDNEY DISEASE, STAGE 4, SEVERELY DECREASED GFR (HCC): ICD-10-CM

## 2020-01-01 DIAGNOSIS — I48.91 ATRIAL FIBRILLATION, UNSPECIFIED TYPE (HCC): ICD-10-CM

## 2020-01-01 DIAGNOSIS — E78.00 HYPERCHOLESTEREMIA: ICD-10-CM

## 2020-01-01 DIAGNOSIS — Z79.4 CONTROLLED TYPE 2 DIABETES MELLITUS WITH STAGE 3 CHRONIC KIDNEY DISEASE, WITH LONG-TERM CURRENT USE OF INSULIN (HCC): Primary | ICD-10-CM

## 2020-01-01 DIAGNOSIS — N18.30 CONTROLLED TYPE 2 DIABETES MELLITUS WITH STAGE 3 CHRONIC KIDNEY DISEASE, WITH LONG-TERM CURRENT USE OF INSULIN (HCC): ICD-10-CM

## 2020-01-01 DIAGNOSIS — I48.20 CHRONIC ATRIAL FIBRILLATION (HCC): ICD-10-CM

## 2020-01-01 DIAGNOSIS — E03.9 ACQUIRED HYPOTHYROIDISM: ICD-10-CM

## 2020-01-01 DIAGNOSIS — I50.32 CHRONIC DIASTOLIC HEART FAILURE (HCC): ICD-10-CM

## 2020-01-01 DIAGNOSIS — D64.9 ANEMIA, UNSPECIFIED TYPE: ICD-10-CM

## 2020-01-01 DIAGNOSIS — C67.9 MALIGNANT NEOPLASM OF URINARY BLADDER, UNSPECIFIED SITE (HCC): ICD-10-CM

## 2020-01-01 DIAGNOSIS — D69.6 THROMBOCYTOPENIA (HCC): ICD-10-CM

## 2020-01-01 DIAGNOSIS — N30.00 ACUTE CYSTITIS WITHOUT HEMATURIA: ICD-10-CM

## 2020-01-01 DIAGNOSIS — D50.9 IRON DEFICIENCY ANEMIA, UNSPECIFIED IRON DEFICIENCY ANEMIA TYPE: Primary | ICD-10-CM

## 2020-01-01 DIAGNOSIS — E87.70 HYPERVOLEMIA, UNSPECIFIED HYPERVOLEMIA TYPE: Primary | ICD-10-CM

## 2020-01-01 DIAGNOSIS — N18.30 CONTROLLED TYPE 2 DIABETES MELLITUS WITH STAGE 3 CHRONIC KIDNEY DISEASE, WITH LONG-TERM CURRENT USE OF INSULIN (HCC): Primary | ICD-10-CM

## 2020-01-01 DIAGNOSIS — R19.5 OCCULT GI BLEEDING: ICD-10-CM

## 2020-01-01 DIAGNOSIS — R26.81 UNSTEADY GAIT: ICD-10-CM

## 2020-01-01 DIAGNOSIS — R79.89 ELEVATED LIVER FUNCTION TESTS: Primary | ICD-10-CM

## 2020-01-01 DIAGNOSIS — E03.9 ACQUIRED HYPOTHYROIDISM: Primary | ICD-10-CM

## 2020-01-01 DIAGNOSIS — D50.9 IRON DEFICIENCY ANEMIA, UNSPECIFIED IRON DEFICIENCY ANEMIA TYPE: ICD-10-CM

## 2020-01-01 LAB
A1AT SERPL-MCNC: 190 MG/DL (ref 101–187)
ABO + RH BLD: NORMAL
ABO + RH BLD: NORMAL
ACT BLD: 290 SECS (ref 79–138)
ACT BLD: 98 SECS (ref 79–138)
ACTIN IGG SERPL-ACNC: 14 UNITS (ref 0–19)
ALBUMIN SERPL ELPH-MCNC: 2.5 G/DL (ref 2.9–4.4)
ALBUMIN SERPL-MCNC: 1.5 G/DL (ref 3.5–5)
ALBUMIN SERPL-MCNC: 2.1 G/DL (ref 3.5–5)
ALBUMIN SERPL-MCNC: 2.2 G/DL (ref 3.5–5)
ALBUMIN SERPL-MCNC: 2.4 G/DL (ref 3.5–5)
ALBUMIN SERPL-MCNC: 2.4 G/DL (ref 3.5–5)
ALBUMIN SERPL-MCNC: 2.7 G/DL (ref 3.5–5)
ALBUMIN SERPL-MCNC: 3.2 G/DL (ref 3.5–5)
ALBUMIN SERPL-MCNC: 4.2 G/DL (ref 3.6–4.6)
ALBUMIN/CREAT UR: 5 MG/G CREAT (ref 0–29)
ALBUMIN/GLOB SERPL: 0.4 {RATIO} (ref 1.1–2.2)
ALBUMIN/GLOB SERPL: 0.6 {RATIO} (ref 1.1–2.2)
ALBUMIN/GLOB SERPL: 0.7 {RATIO} (ref 1.1–2.2)
ALBUMIN/GLOB SERPL: 0.8 {RATIO} (ref 1.1–2.2)
ALBUMIN/GLOB SERPL: 1 {RATIO} (ref 0.7–1.7)
ALBUMIN/GLOB SERPL: 1.1 {RATIO} (ref 1.1–2.2)
ALBUMIN/GLOB SERPL: 1.4 {RATIO} (ref 1.2–2.2)
ALP BONE CFR SERPL: 36 % (ref 14–68)
ALP INTEST CFR SERPL: 60 % (ref 0–18)
ALP LIVER CFR SERPL: 4 % (ref 18–85)
ALP SERPL-CCNC: 275 U/L (ref 45–117)
ALP SERPL-CCNC: 282 U/L (ref 45–117)
ALP SERPL-CCNC: 299 U/L (ref 45–117)
ALP SERPL-CCNC: 318 U/L (ref 45–117)
ALP SERPL-CCNC: 323 U/L (ref 45–117)
ALP SERPL-CCNC: 328 U/L (ref 45–117)
ALP SERPL-CCNC: 362 U/L (ref 45–117)
ALP SERPL-CCNC: 368 IU/L (ref 39–117)
ALP SERPL-CCNC: 402 U/L (ref 45–117)
ALP SERPL-CCNC: 68 IU/L (ref 39–117)
ALP SERPL-CCNC: 91 U/L (ref 45–117)
ALPHA1 GLOB SERPL ELPH-MCNC: 0.3 G/DL (ref 0–0.4)
ALPHA2 GLOB SERPL ELPH-MCNC: 0.6 G/DL (ref 0.4–1)
ALT SERPL-CCNC: 112 U/L (ref 12–78)
ALT SERPL-CCNC: 113 U/L (ref 12–78)
ALT SERPL-CCNC: 147 U/L (ref 12–78)
ALT SERPL-CCNC: 150 U/L (ref 12–78)
ALT SERPL-CCNC: 154 U/L (ref 12–78)
ALT SERPL-CCNC: 165 U/L (ref 12–78)
ALT SERPL-CCNC: 186 U/L (ref 12–78)
ALT SERPL-CCNC: 192 U/L (ref 12–78)
ALT SERPL-CCNC: 212 U/L (ref 12–78)
ALT SERPL-CCNC: 44 IU/L (ref 0–32)
AMMONIA PLAS-SCNC: <10 UMOL/L
AMMONIA PLAS-SCNC: <10 UMOL/L
ANA SER QL: NEGATIVE
ANION GAP SERPL CALC-SCNC: 10 MMOL/L (ref 5–15)
ANION GAP SERPL CALC-SCNC: 10 MMOL/L (ref 5–15)
ANION GAP SERPL CALC-SCNC: 2 MMOL/L (ref 5–15)
ANION GAP SERPL CALC-SCNC: 3 MMOL/L (ref 5–15)
ANION GAP SERPL CALC-SCNC: 4 MMOL/L (ref 5–15)
ANION GAP SERPL CALC-SCNC: 5 MMOL/L (ref 5–15)
ANION GAP SERPL CALC-SCNC: 5 MMOL/L (ref 5–15)
ANION GAP SERPL CALC-SCNC: 6 MMOL/L (ref 5–15)
ANION GAP SERPL CALC-SCNC: 7 MMOL/L (ref 5–15)
ANION GAP SERPL CALC-SCNC: 9 MMOL/L (ref 5–15)
ANION GAP SERPL CALC-SCNC: 9 MMOL/L (ref 5–15)
APAP SERPL-MCNC: <2 UG/ML (ref 10–30)
APAP SERPL-MCNC: <2 UG/ML (ref 10–30)
APPEARANCE UR: ABNORMAL
APPEARANCE UR: CLEAR
AST SERPL-CCNC: 166 U/L (ref 15–37)
AST SERPL-CCNC: 250 U/L (ref 15–37)
AST SERPL-CCNC: 252 U/L (ref 15–37)
AST SERPL-CCNC: 275 U/L (ref 15–37)
AST SERPL-CCNC: 323 U/L (ref 15–37)
AST SERPL-CCNC: 389 U/L (ref 15–37)
AST SERPL-CCNC: 393 U/L (ref 15–37)
AST SERPL-CCNC: 395 U/L (ref 15–37)
AST SERPL-CCNC: 48 IU/L (ref 0–40)
AST SERPL-CCNC: 92 U/L (ref 15–37)
ATRIAL RATE: 52 BPM
ATRIAL RATE: 93 BPM
B-GLOBULIN SERPL ELPH-MCNC: 0.9 G/DL (ref 0.7–1.3)
BACTERIA SPEC CULT: ABNORMAL
BACTERIA SPEC CULT: NORMAL
BACTERIA URNS QL MICRO: ABNORMAL /HPF
BACTERIA URNS QL MICRO: NEGATIVE /HPF
BASOPHILS # BLD: 0 K/UL (ref 0–0.1)
BASOPHILS # BLD: 0.1 K/UL (ref 0–0.1)
BASOPHILS NFR BLD: 0 % (ref 0–1)
BASOPHILS NFR BLD: 1 % (ref 0–1)
BILIRUB DIRECT SERPL-MCNC: 10.3 MG/DL (ref 0–0.2)
BILIRUB SERPL-MCNC: 0.4 MG/DL (ref 0–1.2)
BILIRUB SERPL-MCNC: 0.6 MG/DL (ref 0.2–1)
BILIRUB SERPL-MCNC: 10 MG/DL (ref 0.2–1)
BILIRUB SERPL-MCNC: 10.6 MG/DL (ref 0.2–1)
BILIRUB SERPL-MCNC: 12.2 MG/DL (ref 0.2–1)
BILIRUB SERPL-MCNC: 13.9 MG/DL (ref 0.2–1)
BILIRUB SERPL-MCNC: 14.6 MG/DL (ref 0.2–1)
BILIRUB SERPL-MCNC: 15.6 MG/DL (ref 0.2–1)
BILIRUB SERPL-MCNC: 8.6 MG/DL (ref 0.2–1)
BILIRUB SERPL-MCNC: 8.8 MG/DL (ref 0.2–1)
BILIRUB UR QL CFM: POSITIVE
BILIRUB UR QL: NEGATIVE
BLOOD GROUP ANTIBODIES SERPL: NORMAL
BLOOD GROUP ANTIBODIES SERPL: NORMAL
BNP SERPL-MCNC: 7290 PG/ML
BUN SERPL-MCNC: 21 MG/DL (ref 6–20)
BUN SERPL-MCNC: 24 MG/DL (ref 6–20)
BUN SERPL-MCNC: 24 MG/DL (ref 6–20)
BUN SERPL-MCNC: 29 MG/DL (ref 6–20)
BUN SERPL-MCNC: 32 MG/DL (ref 6–20)
BUN SERPL-MCNC: 33 MG/DL (ref 6–20)
BUN SERPL-MCNC: 34 MG/DL (ref 8–27)
BUN SERPL-MCNC: 41 MG/DL (ref 6–20)
BUN SERPL-MCNC: 41 MG/DL (ref 6–20)
BUN SERPL-MCNC: 49 MG/DL (ref 6–20)
BUN SERPL-MCNC: 51 MG/DL (ref 6–20)
BUN SERPL-MCNC: 57 MG/DL (ref 6–20)
BUN SERPL-MCNC: 61 MG/DL (ref 6–20)
BUN SERPL-MCNC: 62 MG/DL (ref 6–20)
BUN SERPL-MCNC: 63 MG/DL (ref 6–20)
BUN SERPL-MCNC: 67 MG/DL (ref 6–20)
BUN SERPL-MCNC: 69 MG/DL (ref 6–20)
BUN SERPL-MCNC: 73 MG/DL (ref 6–20)
BUN SERPL-MCNC: 86 MG/DL (ref 6–20)
BUN SERPL-MCNC: 86 MG/DL (ref 6–20)
BUN/CREAT SERPL: 12 (ref 12–20)
BUN/CREAT SERPL: 13 (ref 12–20)
BUN/CREAT SERPL: 14 (ref 12–20)
BUN/CREAT SERPL: 15 (ref 12–20)
BUN/CREAT SERPL: 15 (ref 12–20)
BUN/CREAT SERPL: 15 (ref 12–28)
BUN/CREAT SERPL: 16 (ref 12–20)
BUN/CREAT SERPL: 16 (ref 12–20)
BUN/CREAT SERPL: 17 (ref 12–20)
BUN/CREAT SERPL: 20 (ref 12–20)
BUN/CREAT SERPL: 21 (ref 12–20)
BUN/CREAT SERPL: 23 (ref 12–20)
CALCIUM SERPL-MCNC: 8.4 MG/DL (ref 8.5–10.1)
CALCIUM SERPL-MCNC: 8.6 MG/DL (ref 8.5–10.1)
CALCIUM SERPL-MCNC: 8.8 MG/DL (ref 8.5–10.1)
CALCIUM SERPL-MCNC: 8.8 MG/DL (ref 8.5–10.1)
CALCIUM SERPL-MCNC: 8.9 MG/DL (ref 8.5–10.1)
CALCIUM SERPL-MCNC: 9 MG/DL (ref 8.5–10.1)
CALCIUM SERPL-MCNC: 9.1 MG/DL (ref 8.5–10.1)
CALCIUM SERPL-MCNC: 9.2 MG/DL (ref 8.5–10.1)
CALCIUM SERPL-MCNC: 9.3 MG/DL (ref 8.5–10.1)
CALCIUM SERPL-MCNC: 9.3 MG/DL (ref 8.5–10.1)
CALCIUM SERPL-MCNC: 9.4 MG/DL (ref 8.5–10.1)
CALCIUM SERPL-MCNC: 9.7 MG/DL (ref 8.5–10.1)
CALCIUM SERPL-MCNC: 9.8 MG/DL (ref 8.7–10.3)
CALCULATED P AXIS, ECG09: 49 DEGREES
CALCULATED R AXIS, ECG10: -32 DEGREES
CALCULATED R AXIS, ECG10: -48 DEGREES
CALCULATED T AXIS, ECG11: 108 DEGREES
CALCULATED T AXIS, ECG11: 108 DEGREES
CC UR VC: ABNORMAL
CC UR VC: ABNORMAL
CERULOPLASMIN SERPL-MCNC: 25.7 MG/DL (ref 19–39)
CHLORIDE SERPL-SCNC: 100 MMOL/L (ref 97–108)
CHLORIDE SERPL-SCNC: 103 MMOL/L (ref 97–108)
CHLORIDE SERPL-SCNC: 104 MMOL/L (ref 97–108)
CHLORIDE SERPL-SCNC: 104 MMOL/L (ref 97–108)
CHLORIDE SERPL-SCNC: 105 MMOL/L (ref 97–108)
CHLORIDE SERPL-SCNC: 105 MMOL/L (ref 97–108)
CHLORIDE SERPL-SCNC: 107 MMOL/L (ref 97–108)
CHLORIDE SERPL-SCNC: 107 MMOL/L (ref 97–108)
CHLORIDE SERPL-SCNC: 108 MMOL/L (ref 97–108)
CHLORIDE SERPL-SCNC: 109 MMOL/L (ref 97–108)
CHLORIDE SERPL-SCNC: 109 MMOL/L (ref 97–108)
CHLORIDE SERPL-SCNC: 110 MMOL/L (ref 97–108)
CHLORIDE SERPL-SCNC: 97 MMOL/L (ref 96–106)
CHLORIDE SERPL-SCNC: 99 MMOL/L (ref 97–108)
CHLORIDE UR-SCNC: 18 MMOL/L
CHOLEST SERPL-MCNC: 256 MG/DL (ref 100–199)
CO2 SERPL-SCNC: 20 MMOL/L (ref 21–32)
CO2 SERPL-SCNC: 21 MMOL/L (ref 21–32)
CO2 SERPL-SCNC: 23 MMOL/L (ref 21–32)
CO2 SERPL-SCNC: 24 MMOL/L (ref 21–32)
CO2 SERPL-SCNC: 25 MMOL/L (ref 21–32)
CO2 SERPL-SCNC: 26 MMOL/L (ref 21–32)
CO2 SERPL-SCNC: 27 MMOL/L (ref 21–32)
CO2 SERPL-SCNC: 28 MMOL/L (ref 20–29)
CO2 SERPL-SCNC: 28 MMOL/L (ref 21–32)
CO2 SERPL-SCNC: 29 MMOL/L (ref 21–32)
CO2 SERPL-SCNC: 29 MMOL/L (ref 21–32)
CO2 SERPL-SCNC: 30 MMOL/L (ref 21–32)
CO2 SERPL-SCNC: 30 MMOL/L (ref 21–32)
CO2 SERPL-SCNC: 31 MMOL/L (ref 21–32)
CO2 SERPL-SCNC: 31 MMOL/L (ref 21–32)
CO2 SERPL-SCNC: 32 MMOL/L (ref 21–32)
CO2 SERPL-SCNC: 32 MMOL/L (ref 21–32)
COLOR UR: ABNORMAL
COLOR UR: ABNORMAL
COMMENT, HOLDF: NORMAL
CREAT SERPL-MCNC: 1.72 MG/DL (ref 0.55–1.02)
CREAT SERPL-MCNC: 1.73 MG/DL (ref 0.55–1.02)
CREAT SERPL-MCNC: 1.73 MG/DL (ref 0.55–1.02)
CREAT SERPL-MCNC: 1.83 MG/DL (ref 0.55–1.02)
CREAT SERPL-MCNC: 1.92 MG/DL (ref 0.55–1.02)
CREAT SERPL-MCNC: 2 MG/DL (ref 0.55–1.02)
CREAT SERPL-MCNC: 2.27 MG/DL (ref 0.57–1)
CREAT SERPL-MCNC: 2.3 MG/DL (ref 0.55–1.02)
CREAT SERPL-MCNC: 2.43 MG/DL (ref 0.55–1.02)
CREAT SERPL-MCNC: 2.49 MG/DL (ref 0.55–1.02)
CREAT SERPL-MCNC: 2.66 MG/DL (ref 0.55–1.02)
CREAT SERPL-MCNC: 2.71 MG/DL (ref 0.55–1.02)
CREAT SERPL-MCNC: 3.26 MG/DL (ref 0.55–1.02)
CREAT SERPL-MCNC: 3.62 MG/DL (ref 0.55–1.02)
CREAT SERPL-MCNC: 4.06 MG/DL (ref 0.55–1.02)
CREAT SERPL-MCNC: 4.21 MG/DL (ref 0.55–1.02)
CREAT SERPL-MCNC: 4.27 MG/DL (ref 0.55–1.02)
CREAT SERPL-MCNC: 4.31 MG/DL (ref 0.55–1.02)
CREAT SERPL-MCNC: 5.08 MG/DL (ref 0.55–1.02)
CREAT SERPL-MCNC: 5.59 MG/DL (ref 0.55–1.02)
CREAT UR-MCNC: 112 MG/DL
CREAT UR-MCNC: 89.7 MG/DL
DIAGNOSIS, 93000: NORMAL
DIAGNOSIS, 93000: NORMAL
DIFFERENTIAL METHOD BLD: ABNORMAL
ECHO AO ROOT DIAM: 2.69 CM
ECHO AO ROOT DIAM: 3.08 CM
ECHO AV AREA PEAK VELOCITY: 2.48 CM2
ECHO AV AREA PEAK VELOCITY: 2.49 CM2
ECHO AV AREA PEAK VELOCITY: 2.5 CM2
ECHO AV AREA PEAK VELOCITY: 2.51 CM2
ECHO AV AREA VTI: 2.32 CM2
ECHO AV AREA/BSA VTI: 1.1 CM2/M2
ECHO AV MEAN GRADIENT: 12.13 MMHG
ECHO AV PEAK GRADIENT: 20.49 MMHG
ECHO AV PEAK GRADIENT: 20.79 MMHG
ECHO AV PEAK VELOCITY: 226.35 CM/S
ECHO AV PEAK VELOCITY: 227.96 CM/S
ECHO AV VTI: 44.44 CM
ECHO EST RA PRESSURE: 10 MMHG
ECHO EST RA PRESSURE: 10 MMHG
ECHO LA AREA 4C: 26.76 CM2
ECHO LA MAJOR AXIS: 4.28 CM
ECHO LA MINOR AXIS: 2.06 CM
ECHO LA VOL 2C: 71.26 ML (ref 22–52)
ECHO LA VOL 4C: 84.13 ML (ref 22–52)
ECHO LA VOL BP: 83.96 ML (ref 22–52)
ECHO LA VOL/BSA BIPLANE: 40.35 ML/M2 (ref 16–28)
ECHO LA VOLUME INDEX A2C: 34.25 ML/M2 (ref 16–28)
ECHO LA VOLUME INDEX A4C: 40.43 ML/M2 (ref 16–28)
ECHO LV E' LATERAL VELOCITY: 5.76 CM/S
ECHO LV E' SEPTAL VELOCITY: 4.91 CM/S
ECHO LV INTERNAL DIMENSION DIASTOLIC: 3.08 CM (ref 3.9–5.3)
ECHO LV INTERNAL DIMENSION SYSTOLIC: 1.67 CM
ECHO LV IVSD: 2.04 CM (ref 0.6–0.9)
ECHO LV IVSS: 2.66 CM
ECHO LV MASS 2D: 240.2 G (ref 67–162)
ECHO LV MASS INDEX 2D: 115.4 G/M2 (ref 43–95)
ECHO LV POSTERIOR WALL DIASTOLIC: 1.7 CM (ref 0.6–0.9)
ECHO LV POSTERIOR WALL SYSTOLIC: 1.88 CM
ECHO LVOT CARDIAC OUTPUT: 7.78 LITER/MINUTE
ECHO LVOT DIAM: 2.07 CM
ECHO LVOT PEAK GRADIENT: 11.28 MMHG
ECHO LVOT PEAK GRADIENT: 11.34 MMHG
ECHO LVOT PEAK VELOCITY: 167.96 CM/S
ECHO LVOT PEAK VELOCITY: 168.41 CM/S
ECHO LVOT SV: 103.1 ML
ECHO LVOT VTI: 30.57 CM
ECHO MV AREA PHT: 1.84 CM2
ECHO MV PRESSURE HALF TIME (PHT): 119.5 MS
ECHO PULMONARY ARTERY SYSTOLIC PRESSURE (PASP): 46 MMHG
ECHO PV MAX VELOCITY: 80.9 CM/S
ECHO PV PEAK INSTANTANEOUS GRADIENT SYSTOLIC: 2.62 MMHG
ECHO RIGHT VENTRICULAR SYSTOLIC PRESSURE (RVSP): 66.53 MMHG
ECHO RV INTERNAL DIMENSION: 3.42 CM
ECHO TV MAX VELOCITY: 3 CM/S
ECHO TV MEAN GRADIENT: 38.85 MMHG
ECHO TV REGURGITANT MAX VELOCITY: 375.92 CM/S
ECHO TV REGURGITANT MAX VELOCITY: 393.32 CM/S
ECHO TV REGURGITANT PEAK GRADIENT: 56.53 MMHG
ECHO TV REGURGITANT PEAK GRADIENT: 61.88 MMHG
ECHO TV REGURGITANT VTI: 125.98 CM
EOSINOPHIL # BLD: 0 K/UL (ref 0–0.4)
EOSINOPHIL # BLD: 0.2 K/UL (ref 0–0.4)
EOSINOPHIL NFR BLD: 0 % (ref 0–7)
EOSINOPHIL NFR BLD: 3 % (ref 0–7)
EPITH CASTS URNS QL MICRO: ABNORMAL /LPF
EPITH CASTS URNS QL MICRO: ABNORMAL /LPF
ERYTHROCYTE [DISTWIDTH] IN BLOOD BY AUTOMATED COUNT: 13.7 % (ref 11.5–14.5)
ERYTHROCYTE [DISTWIDTH] IN BLOOD BY AUTOMATED COUNT: 16.8 % (ref 11.5–14.5)
ERYTHROCYTE [DISTWIDTH] IN BLOOD BY AUTOMATED COUNT: 16.8 % (ref 11.5–14.5)
ERYTHROCYTE [DISTWIDTH] IN BLOOD BY AUTOMATED COUNT: 17 % (ref 11.5–14.5)
ERYTHROCYTE [DISTWIDTH] IN BLOOD BY AUTOMATED COUNT: 17 % (ref 11.5–14.5)
ERYTHROCYTE [DISTWIDTH] IN BLOOD BY AUTOMATED COUNT: 17.1 % (ref 11.5–14.5)
ERYTHROCYTE [DISTWIDTH] IN BLOOD BY AUTOMATED COUNT: 17.1 % (ref 11.5–14.5)
ERYTHROCYTE [DISTWIDTH] IN BLOOD BY AUTOMATED COUNT: 19.9 % (ref 11.5–14.5)
ERYTHROCYTE [DISTWIDTH] IN BLOOD BY AUTOMATED COUNT: 20 % (ref 11.5–14.5)
ERYTHROCYTE [DISTWIDTH] IN BLOOD BY AUTOMATED COUNT: 20.4 % (ref 11.5–14.5)
ERYTHROCYTE [DISTWIDTH] IN BLOOD BY AUTOMATED COUNT: 20.4 % (ref 11.5–14.5)
ERYTHROCYTE [DISTWIDTH] IN BLOOD BY AUTOMATED COUNT: 20.7 % (ref 11.5–14.5)
ERYTHROCYTE [DISTWIDTH] IN BLOOD BY AUTOMATED COUNT: 21.4 % (ref 11.5–14.5)
ERYTHROCYTE [DISTWIDTH] IN BLOOD BY AUTOMATED COUNT: 22.5 % (ref 11.5–14.5)
ERYTHROCYTE [DISTWIDTH] IN BLOOD BY AUTOMATED COUNT: 22.8 % (ref 11.5–14.5)
FERRITIN SERPL-MCNC: 201 NG/ML (ref 26–388)
GAMMA GLOB SERPL ELPH-MCNC: 0.8 G/DL (ref 0.4–1.8)
GLOBULIN SER CALC-MCNC: 2.5 G/DL (ref 2.2–3.9)
GLOBULIN SER CALC-MCNC: 2.9 G/DL (ref 1.5–4.5)
GLOBULIN SER CALC-MCNC: 3 G/DL (ref 2–4)
GLOBULIN SER CALC-MCNC: 3.2 G/DL (ref 2–4)
GLOBULIN SER CALC-MCNC: 3.3 G/DL (ref 2–4)
GLOBULIN SER CALC-MCNC: 3.4 G/DL (ref 2–4)
GLOBULIN SER CALC-MCNC: 3.6 G/DL (ref 2–4)
GLOBULIN SER CALC-MCNC: 3.7 G/DL (ref 2–4)
GLOBULIN SER CALC-MCNC: 3.8 G/DL (ref 2–4)
GLUCOSE BLD STRIP.AUTO-MCNC: 101 MG/DL (ref 65–100)
GLUCOSE BLD STRIP.AUTO-MCNC: 102 MG/DL (ref 65–100)
GLUCOSE BLD STRIP.AUTO-MCNC: 106 MG/DL (ref 65–100)
GLUCOSE BLD STRIP.AUTO-MCNC: 110 MG/DL (ref 65–100)
GLUCOSE BLD STRIP.AUTO-MCNC: 110 MG/DL (ref 65–100)
GLUCOSE BLD STRIP.AUTO-MCNC: 113 MG/DL (ref 65–100)
GLUCOSE BLD STRIP.AUTO-MCNC: 114 MG/DL (ref 65–100)
GLUCOSE BLD STRIP.AUTO-MCNC: 116 MG/DL (ref 65–100)
GLUCOSE BLD STRIP.AUTO-MCNC: 117 MG/DL (ref 65–100)
GLUCOSE BLD STRIP.AUTO-MCNC: 118 MG/DL (ref 65–100)
GLUCOSE BLD STRIP.AUTO-MCNC: 119 MG/DL (ref 65–100)
GLUCOSE BLD STRIP.AUTO-MCNC: 120 MG/DL (ref 65–100)
GLUCOSE BLD STRIP.AUTO-MCNC: 122 MG/DL (ref 65–100)
GLUCOSE BLD STRIP.AUTO-MCNC: 125 MG/DL (ref 65–100)
GLUCOSE BLD STRIP.AUTO-MCNC: 126 MG/DL (ref 65–100)
GLUCOSE BLD STRIP.AUTO-MCNC: 126 MG/DL (ref 65–100)
GLUCOSE BLD STRIP.AUTO-MCNC: 129 MG/DL (ref 65–100)
GLUCOSE BLD STRIP.AUTO-MCNC: 129 MG/DL (ref 65–100)
GLUCOSE BLD STRIP.AUTO-MCNC: 130 MG/DL (ref 65–100)
GLUCOSE BLD STRIP.AUTO-MCNC: 131 MG/DL (ref 65–100)
GLUCOSE BLD STRIP.AUTO-MCNC: 135 MG/DL (ref 65–100)
GLUCOSE BLD STRIP.AUTO-MCNC: 136 MG/DL (ref 65–100)
GLUCOSE BLD STRIP.AUTO-MCNC: 137 MG/DL (ref 65–100)
GLUCOSE BLD STRIP.AUTO-MCNC: 137 MG/DL (ref 65–100)
GLUCOSE BLD STRIP.AUTO-MCNC: 139 MG/DL (ref 65–100)
GLUCOSE BLD STRIP.AUTO-MCNC: 140 MG/DL (ref 65–100)
GLUCOSE BLD STRIP.AUTO-MCNC: 141 MG/DL (ref 65–100)
GLUCOSE BLD STRIP.AUTO-MCNC: 142 MG/DL (ref 65–100)
GLUCOSE BLD STRIP.AUTO-MCNC: 142 MG/DL (ref 65–100)
GLUCOSE BLD STRIP.AUTO-MCNC: 144 MG/DL (ref 65–100)
GLUCOSE BLD STRIP.AUTO-MCNC: 145 MG/DL (ref 65–100)
GLUCOSE BLD STRIP.AUTO-MCNC: 146 MG/DL (ref 65–100)
GLUCOSE BLD STRIP.AUTO-MCNC: 148 MG/DL (ref 65–100)
GLUCOSE BLD STRIP.AUTO-MCNC: 149 MG/DL (ref 65–100)
GLUCOSE BLD STRIP.AUTO-MCNC: 151 MG/DL (ref 65–100)
GLUCOSE BLD STRIP.AUTO-MCNC: 152 MG/DL (ref 65–100)
GLUCOSE BLD STRIP.AUTO-MCNC: 153 MG/DL (ref 65–100)
GLUCOSE BLD STRIP.AUTO-MCNC: 154 MG/DL (ref 65–100)
GLUCOSE BLD STRIP.AUTO-MCNC: 154 MG/DL (ref 65–100)
GLUCOSE BLD STRIP.AUTO-MCNC: 155 MG/DL (ref 65–100)
GLUCOSE BLD STRIP.AUTO-MCNC: 155 MG/DL (ref 65–100)
GLUCOSE BLD STRIP.AUTO-MCNC: 157 MG/DL (ref 65–100)
GLUCOSE BLD STRIP.AUTO-MCNC: 158 MG/DL (ref 65–100)
GLUCOSE BLD STRIP.AUTO-MCNC: 159 MG/DL (ref 65–100)
GLUCOSE BLD STRIP.AUTO-MCNC: 160 MG/DL (ref 65–100)
GLUCOSE BLD STRIP.AUTO-MCNC: 160 MG/DL (ref 65–100)
GLUCOSE BLD STRIP.AUTO-MCNC: 161 MG/DL (ref 65–100)
GLUCOSE BLD STRIP.AUTO-MCNC: 166 MG/DL (ref 65–100)
GLUCOSE BLD STRIP.AUTO-MCNC: 169 MG/DL (ref 65–100)
GLUCOSE BLD STRIP.AUTO-MCNC: 171 MG/DL (ref 65–100)
GLUCOSE BLD STRIP.AUTO-MCNC: 171 MG/DL (ref 65–100)
GLUCOSE BLD STRIP.AUTO-MCNC: 175 MG/DL (ref 65–100)
GLUCOSE BLD STRIP.AUTO-MCNC: 185 MG/DL (ref 65–100)
GLUCOSE BLD STRIP.AUTO-MCNC: 192 MG/DL (ref 65–100)
GLUCOSE BLD STRIP.AUTO-MCNC: 199 MG/DL (ref 65–100)
GLUCOSE BLD STRIP.AUTO-MCNC: 201 MG/DL (ref 65–100)
GLUCOSE BLD STRIP.AUTO-MCNC: 203 MG/DL (ref 65–100)
GLUCOSE BLD STRIP.AUTO-MCNC: 240 MG/DL (ref 65–100)
GLUCOSE BLD STRIP.AUTO-MCNC: 350 MG/DL (ref 65–100)
GLUCOSE BLD STRIP.AUTO-MCNC: 62 MG/DL (ref 65–100)
GLUCOSE BLD STRIP.AUTO-MCNC: 65 MG/DL (ref 65–100)
GLUCOSE BLD STRIP.AUTO-MCNC: 80 MG/DL (ref 65–100)
GLUCOSE BLD STRIP.AUTO-MCNC: 86 MG/DL (ref 65–100)
GLUCOSE BLD STRIP.AUTO-MCNC: 87 MG/DL (ref 65–100)
GLUCOSE BLD STRIP.AUTO-MCNC: 90 MG/DL (ref 65–100)
GLUCOSE BLD STRIP.AUTO-MCNC: 92 MG/DL (ref 65–100)
GLUCOSE BLD STRIP.AUTO-MCNC: 93 MG/DL (ref 65–100)
GLUCOSE BLD STRIP.AUTO-MCNC: 97 MG/DL (ref 65–100)
GLUCOSE BLD STRIP.AUTO-MCNC: 97 MG/DL (ref 65–100)
GLUCOSE SERPL-MCNC: 102 MG/DL (ref 65–100)
GLUCOSE SERPL-MCNC: 103 MG/DL (ref 65–100)
GLUCOSE SERPL-MCNC: 113 MG/DL (ref 65–100)
GLUCOSE SERPL-MCNC: 117 MG/DL (ref 65–100)
GLUCOSE SERPL-MCNC: 117 MG/DL (ref 65–100)
GLUCOSE SERPL-MCNC: 120 MG/DL (ref 65–100)
GLUCOSE SERPL-MCNC: 121 MG/DL (ref 65–100)
GLUCOSE SERPL-MCNC: 123 MG/DL (ref 65–99)
GLUCOSE SERPL-MCNC: 131 MG/DL (ref 65–100)
GLUCOSE SERPL-MCNC: 139 MG/DL (ref 65–100)
GLUCOSE SERPL-MCNC: 141 MG/DL (ref 65–100)
GLUCOSE SERPL-MCNC: 142 MG/DL (ref 65–100)
GLUCOSE SERPL-MCNC: 146 MG/DL (ref 65–100)
GLUCOSE SERPL-MCNC: 164 MG/DL (ref 65–100)
GLUCOSE SERPL-MCNC: 68 MG/DL (ref 65–100)
GLUCOSE SERPL-MCNC: 80 MG/DL (ref 65–100)
GLUCOSE SERPL-MCNC: 81 MG/DL (ref 65–100)
GLUCOSE SERPL-MCNC: 83 MG/DL (ref 65–100)
GLUCOSE SERPL-MCNC: 88 MG/DL (ref 65–100)
GLUCOSE SERPL-MCNC: 93 MG/DL (ref 65–100)
GLUCOSE UR STRIP.AUTO-MCNC: NEGATIVE MG/DL
GLUCOSE UR STRIP.AUTO-MCNC: NEGATIVE MG/DL
HAPTOGLOB SERPL-MCNC: 114 MG/DL (ref 30–200)
HAV IGM SER QL: NONREACTIVE
HBA1C MFR BLD: 6 % (ref 4.8–5.6)
HBV CORE IGM SER QL: NONREACTIVE
HBV SURFACE AB SER QL: NONREACTIVE
HBV SURFACE AB SER-ACNC: <3.1 MIU/ML
HBV SURFACE AG SER QL: <0.1 INDEX
HBV SURFACE AG SER QL: <0.1 INDEX
HBV SURFACE AG SER QL: NEGATIVE
HBV SURFACE AG SER QL: NEGATIVE
HCT VFR BLD AUTO: 22.1 % (ref 35–47)
HCT VFR BLD AUTO: 22.6 % (ref 35–47)
HCT VFR BLD AUTO: 23.2 % (ref 35–47)
HCT VFR BLD AUTO: 23.3 % (ref 35–47)
HCT VFR BLD AUTO: 23.7 % (ref 35–47)
HCT VFR BLD AUTO: 24 % (ref 35–47)
HCT VFR BLD AUTO: 25.3 % (ref 35–47)
HCT VFR BLD AUTO: 26.3 % (ref 35–47)
HCT VFR BLD AUTO: 27 % (ref 35–47)
HCT VFR BLD AUTO: 28.5 % (ref 35–47)
HCT VFR BLD AUTO: 28.7 % (ref 35–47)
HCT VFR BLD AUTO: 29.6 % (ref 35–47)
HCT VFR BLD AUTO: 29.7 % (ref 35–47)
HCT VFR BLD AUTO: 30 % (ref 35–47)
HCT VFR BLD AUTO: 30.1 % (ref 35–47)
HCT VFR BLD AUTO: 41.4 % (ref 35–47)
HCV AB SERPL QL IA: NONREACTIVE
HCV COMMENT,HCGAC: NORMAL
HDLC SERPL-MCNC: 58 MG/DL
HEMOCCULT STL QL: POSITIVE
HGB BLD-MCNC: 13.1 G/DL (ref 11.5–16)
HGB BLD-MCNC: 7 G/DL (ref 11.5–16)
HGB BLD-MCNC: 7.1 G/DL (ref 11.5–16)
HGB BLD-MCNC: 7.2 G/DL (ref 11.5–16)
HGB BLD-MCNC: 7.2 G/DL (ref 11.5–16)
HGB BLD-MCNC: 7.5 G/DL (ref 11.5–16)
HGB BLD-MCNC: 7.6 G/DL (ref 11.5–16)
HGB BLD-MCNC: 7.6 G/DL (ref 11.5–16)
HGB BLD-MCNC: 8.1 G/DL (ref 11.5–16)
HGB BLD-MCNC: 8.2 G/DL (ref 11.5–16)
HGB BLD-MCNC: 8.3 G/DL (ref 11.5–16)
HGB BLD-MCNC: 8.5 G/DL (ref 11.5–16)
HGB BLD-MCNC: 8.7 G/DL (ref 11.5–16)
HGB BLD-MCNC: 8.7 G/DL (ref 11.5–16)
HGB BLD-MCNC: 8.9 G/DL (ref 11.5–16)
HGB BLD-MCNC: 8.9 G/DL (ref 11.5–16)
HGB UR QL STRIP: ABNORMAL
HGB UR QL STRIP: NEGATIVE
HYALINE CASTS URNS QL MICRO: ABNORMAL /LPF (ref 0–5)
IMM GRANULOCYTES # BLD AUTO: 0 K/UL (ref 0–0.04)
IMM GRANULOCYTES # BLD AUTO: 0.1 K/UL (ref 0–0.04)
IMM GRANULOCYTES # BLD AUTO: 0.2 K/UL (ref 0–0.04)
IMM GRANULOCYTES NFR BLD AUTO: 0 % (ref 0–0.5)
IMM GRANULOCYTES NFR BLD AUTO: 1 % (ref 0–0.5)
INR PPP: 1.1 (ref 0.9–1.1)
INR PPP: 1.4 (ref 0.9–1.1)
INR PPP: 1.5 (ref 0.9–1.1)
INR PPP: 1.5 (ref 0.9–1.1)
IRON SATN MFR SERPL: 8 % (ref 20–50)
IRON SERPL-MCNC: 22 UG/DL (ref 35–150)
KETONES UR QL STRIP.AUTO: ABNORMAL MG/DL
KETONES UR QL STRIP.AUTO: NEGATIVE MG/DL
LACTATE BLD-SCNC: 0.99 MMOL/L (ref 0.4–2)
LACTATE BLD-SCNC: 1.03 MMOL/L (ref 0.4–2)
LDH SERPL L TO P-CCNC: 298 U/L (ref 81–246)
LDLC SERPL CALC-MCNC: 169 MG/DL (ref 0–99)
LEUKOCYTE ESTERASE UR QL STRIP.AUTO: ABNORMAL
LEUKOCYTE ESTERASE UR QL STRIP.AUTO: ABNORMAL
LIPASE SERPL-CCNC: 158 U/L (ref 73–393)
LIPASE SERPL-CCNC: 170 U/L (ref 73–393)
LIPASE SERPL-CCNC: 201 U/L (ref 73–393)
LIPASE SERPL-CCNC: 208 U/L (ref 73–393)
LIPASE SERPL-CCNC: 326 U/L (ref 73–393)
LIPASE SERPL-CCNC: 354 U/L (ref 73–393)
LIPASE SERPL-CCNC: 505 U/L (ref 73–393)
LVOT MG: 4.56 MMHG
LYMPHOCYTES # BLD: 0.3 K/UL (ref 0.8–3.5)
LYMPHOCYTES # BLD: 0.4 K/UL (ref 0.8–3.5)
LYMPHOCYTES # BLD: 0.6 K/UL (ref 0.8–3.5)
LYMPHOCYTES # BLD: 0.7 K/UL (ref 0.8–3.5)
LYMPHOCYTES # BLD: 0.8 K/UL (ref 0.8–3.5)
LYMPHOCYTES # BLD: 0.9 K/UL (ref 0.8–3.5)
LYMPHOCYTES NFR BLD: 13 % (ref 12–49)
LYMPHOCYTES NFR BLD: 2 % (ref 12–49)
LYMPHOCYTES NFR BLD: 3 % (ref 12–49)
LYMPHOCYTES NFR BLD: 4 % (ref 12–49)
LYMPHOCYTES NFR BLD: 5 % (ref 12–49)
LYMPHOCYTES NFR BLD: 5 % (ref 12–49)
LYMPHOCYTES NFR BLD: 6 % (ref 12–49)
LYMPHOCYTES NFR BLD: 6 % (ref 12–49)
M PROTEIN SERPL ELPH-MCNC: ABNORMAL G/DL
MAGNESIUM SERPL-MCNC: 2.2 MG/DL (ref 1.6–2.4)
MAGNESIUM SERPL-MCNC: 2.2 MG/DL (ref 1.6–2.4)
MAGNESIUM SERPL-MCNC: 2.3 MG/DL (ref 1.6–2.4)
MAGNESIUM SERPL-MCNC: 2.4 MG/DL (ref 1.6–2.4)
MAGNESIUM SERPL-MCNC: 2.8 MG/DL (ref 1.6–2.4)
MCH RBC QN AUTO: 22.7 PG (ref 26–34)
MCH RBC QN AUTO: 22.9 PG (ref 26–34)
MCH RBC QN AUTO: 22.9 PG (ref 26–34)
MCH RBC QN AUTO: 23 PG (ref 26–34)
MCH RBC QN AUTO: 23.1 PG (ref 26–34)
MCH RBC QN AUTO: 23.3 PG (ref 26–34)
MCH RBC QN AUTO: 23.6 PG (ref 26–34)
MCH RBC QN AUTO: 24 PG (ref 26–34)
MCH RBC QN AUTO: 24 PG (ref 26–34)
MCH RBC QN AUTO: 24.1 PG (ref 26–34)
MCH RBC QN AUTO: 24.1 PG (ref 26–34)
MCH RBC QN AUTO: 24.3 PG (ref 26–34)
MCH RBC QN AUTO: 30.5 PG (ref 26–34)
MCHC RBC AUTO-ENTMCNC: 29.1 G/DL (ref 30–36.5)
MCHC RBC AUTO-ENTMCNC: 29.3 G/DL (ref 30–36.5)
MCHC RBC AUTO-ENTMCNC: 29.4 G/DL (ref 30–36.5)
MCHC RBC AUTO-ENTMCNC: 29.6 G/DL (ref 30–36.5)
MCHC RBC AUTO-ENTMCNC: 29.7 G/DL (ref 30–36.5)
MCHC RBC AUTO-ENTMCNC: 30 G/DL (ref 30–36.5)
MCHC RBC AUTO-ENTMCNC: 30 G/DL (ref 30–36.5)
MCHC RBC AUTO-ENTMCNC: 30.9 G/DL (ref 30–36.5)
MCHC RBC AUTO-ENTMCNC: 31 G/DL (ref 30–36.5)
MCHC RBC AUTO-ENTMCNC: 31.2 G/DL (ref 30–36.5)
MCHC RBC AUTO-ENTMCNC: 31.4 G/DL (ref 30–36.5)
MCHC RBC AUTO-ENTMCNC: 31.6 G/DL (ref 30–36.5)
MCHC RBC AUTO-ENTMCNC: 31.6 G/DL (ref 30–36.5)
MCHC RBC AUTO-ENTMCNC: 31.7 G/DL (ref 30–36.5)
MCHC RBC AUTO-ENTMCNC: 31.7 G/DL (ref 30–36.5)
MCV RBC AUTO: 72.9 FL (ref 80–99)
MCV RBC AUTO: 72.9 FL (ref 80–99)
MCV RBC AUTO: 73.7 FL (ref 80–99)
MCV RBC AUTO: 73.7 FL (ref 80–99)
MCV RBC AUTO: 74.4 FL (ref 80–99)
MCV RBC AUTO: 74.5 FL (ref 80–99)
MCV RBC AUTO: 75.5 FL (ref 80–99)
MCV RBC AUTO: 75.6 FL (ref 80–99)
MCV RBC AUTO: 81.1 FL (ref 80–99)
MCV RBC AUTO: 81.2 FL (ref 80–99)
MCV RBC AUTO: 81.8 FL (ref 80–99)
MCV RBC AUTO: 82 FL (ref 80–99)
MCV RBC AUTO: 96.3 FL (ref 80–99)
MICROALBUMIN UR-MCNC: 4.1 UG/ML
MITOCHONDRIA M2 IGG SER-ACNC: <20 UNITS (ref 0–20)
MONOCYTES # BLD: 0.4 K/UL (ref 0–1)
MONOCYTES # BLD: 0.4 K/UL (ref 0–1)
MONOCYTES # BLD: 0.8 K/UL (ref 0–1)
MONOCYTES # BLD: 0.9 K/UL (ref 0–1)
MONOCYTES # BLD: 1 K/UL (ref 0–1)
MONOCYTES # BLD: 1 K/UL (ref 0–1)
MONOCYTES NFR BLD: 3 % (ref 5–13)
MONOCYTES NFR BLD: 5 % (ref 5–13)
MONOCYTES NFR BLD: 6 % (ref 5–13)
MONOCYTES NFR BLD: 6 % (ref 5–13)
MONOCYTES NFR BLD: 7 % (ref 5–13)
MONOCYTES NFR BLD: 8 % (ref 5–13)
NEUTS SEG # BLD: 10.3 K/UL (ref 1.8–8)
NEUTS SEG # BLD: 11.5 K/UL (ref 1.8–8)
NEUTS SEG # BLD: 11.6 K/UL (ref 1.8–8)
NEUTS SEG # BLD: 12.1 K/UL (ref 1.8–8)
NEUTS SEG # BLD: 12.4 K/UL (ref 1.8–8)
NEUTS SEG # BLD: 13.6 K/UL (ref 1.8–8)
NEUTS SEG # BLD: 13.7 K/UL (ref 1.8–8)
NEUTS SEG # BLD: 4.2 K/UL (ref 1.8–8)
NEUTS SEG NFR BLD: 76 % (ref 32–75)
NEUTS SEG NFR BLD: 85 % (ref 32–75)
NEUTS SEG NFR BLD: 87 % (ref 32–75)
NEUTS SEG NFR BLD: 88 % (ref 32–75)
NEUTS SEG NFR BLD: 88 % (ref 32–75)
NEUTS SEG NFR BLD: 89 % (ref 32–75)
NEUTS SEG NFR BLD: 90 % (ref 32–75)
NEUTS SEG NFR BLD: 93 % (ref 32–75)
NITRITE UR QL STRIP.AUTO: NEGATIVE
NITRITE UR QL STRIP.AUTO: POSITIVE
NRBC # BLD: 0 K/UL (ref 0–0.01)
NRBC BLD-RTO: 0 PER 100 WBC
P-R INTERVAL, ECG05: 280 MS
PH UR STRIP: 5.5 [PH] (ref 5–8)
PH UR STRIP: 5.5 [PH] (ref 5–8)
PHOSPHATE SERPL-MCNC: 2.3 MG/DL (ref 2.6–4.7)
PHOSPHATE SERPL-MCNC: 3 MG/DL (ref 2.6–4.7)
PHOSPHATE SERPL-MCNC: 3.6 MG/DL (ref 2.6–4.7)
PLATELET # BLD AUTO: 119 K/UL (ref 150–400)
PLATELET # BLD AUTO: 131 K/UL (ref 150–400)
PLATELET # BLD AUTO: 133 K/UL (ref 150–400)
PLATELET # BLD AUTO: 145 K/UL (ref 150–400)
PLATELET # BLD AUTO: 147 K/UL (ref 150–400)
PLATELET # BLD AUTO: 168 K/UL (ref 150–400)
PLATELET # BLD AUTO: 181 K/UL (ref 150–400)
PLATELET # BLD AUTO: 184 K/UL (ref 150–400)
PLATELET # BLD AUTO: 188 K/UL (ref 150–400)
PLATELET # BLD AUTO: 190 K/UL (ref 150–400)
PLATELET # BLD AUTO: 195 K/UL (ref 150–400)
PLATELET # BLD AUTO: 196 K/UL (ref 150–400)
PLATELET # BLD AUTO: 228 K/UL (ref 150–400)
PLATELET # BLD AUTO: 77 K/UL (ref 150–400)
PLATELET # BLD AUTO: 95 K/UL (ref 150–400)
PLATELET COMMENTS,PCOM: ABNORMAL
PLATELET COMMENTS,PCOM: ABNORMAL
PMV BLD AUTO: 13 FL (ref 8.9–12.9)
PMV BLD AUTO: 13.5 FL (ref 8.9–12.9)
PMV BLD AUTO: ABNORMAL FL (ref 8.9–12.9)
POTASSIUM SERPL-SCNC: 3 MMOL/L (ref 3.5–5.1)
POTASSIUM SERPL-SCNC: 3.2 MMOL/L (ref 3.5–5.1)
POTASSIUM SERPL-SCNC: 3.3 MMOL/L (ref 3.5–5.1)
POTASSIUM SERPL-SCNC: 3.5 MMOL/L (ref 3.5–5.1)
POTASSIUM SERPL-SCNC: 3.6 MMOL/L (ref 3.5–5.1)
POTASSIUM SERPL-SCNC: 3.8 MMOL/L (ref 3.5–5.1)
POTASSIUM SERPL-SCNC: 4.4 MMOL/L (ref 3.5–5.1)
POTASSIUM SERPL-SCNC: 4.5 MMOL/L (ref 3.5–5.2)
POTASSIUM SERPL-SCNC: 4.7 MMOL/L (ref 3.5–5.1)
POTASSIUM SERPL-SCNC: 4.8 MMOL/L (ref 3.5–5.1)
POTASSIUM SERPL-SCNC: 4.9 MMOL/L (ref 3.5–5.1)
POTASSIUM SERPL-SCNC: 5 MMOL/L (ref 3.5–5.1)
POTASSIUM SERPL-SCNC: 5.3 MMOL/L (ref 3.5–5.1)
PROT SERPL-MCNC: 5 G/DL (ref 6–8.5)
PROT SERPL-MCNC: 5.3 G/DL (ref 6.4–8.2)
PROT SERPL-MCNC: 5.3 G/DL (ref 6.4–8.2)
PROT SERPL-MCNC: 5.4 G/DL (ref 6.4–8.2)
PROT SERPL-MCNC: 5.5 G/DL (ref 6.4–8.2)
PROT SERPL-MCNC: 5.6 G/DL (ref 6.4–8.2)
PROT SERPL-MCNC: 5.8 G/DL (ref 6.4–8.2)
PROT SERPL-MCNC: 5.8 G/DL (ref 6.4–8.2)
PROT SERPL-MCNC: 6.2 G/DL (ref 6.4–8.2)
PROT SERPL-MCNC: 6.4 G/DL (ref 6.4–8.2)
PROT SERPL-MCNC: 7.1 G/DL (ref 6–8.5)
PROT UR STRIP-MCNC: 100 MG/DL
PROT UR STRIP-MCNC: NEGATIVE MG/DL
PROTHROMBIN TIME: 11.2 SEC (ref 9–11.1)
PROTHROMBIN TIME: 14.5 SEC (ref 9–11.1)
PROTHROMBIN TIME: 15.2 SEC (ref 9–11.1)
PROTHROMBIN TIME: 15.6 SEC (ref 9–11.1)
Q-T INTERVAL, ECG07: 358 MS
Q-T INTERVAL, ECG07: 508 MS
QRS DURATION, ECG06: 110 MS
QRS DURATION, ECG06: 116 MS
QTC CALCULATION (BEZET), ECG08: 472 MS
QTC CALCULATION (BEZET), ECG08: 479 MS
RBC # BLD AUTO: 3 M/UL (ref 3.8–5.2)
RBC # BLD AUTO: 3.1 M/UL (ref 3.8–5.2)
RBC # BLD AUTO: 3.13 M/UL (ref 3.8–5.2)
RBC # BLD AUTO: 3.15 M/UL (ref 3.8–5.2)
RBC # BLD AUTO: 3.22 M/UL (ref 3.8–5.2)
RBC # BLD AUTO: 3.25 M/UL (ref 3.8–5.2)
RBC # BLD AUTO: 3.33 M/UL (ref 3.8–5.2)
RBC # BLD AUTO: 3.35 M/UL (ref 3.8–5.2)
RBC # BLD AUTO: 3.48 M/UL (ref 3.8–5.2)
RBC # BLD AUTO: 3.51 M/UL (ref 3.8–5.2)
RBC # BLD AUTO: 3.61 M/UL (ref 3.8–5.2)
RBC # BLD AUTO: 3.63 M/UL (ref 3.8–5.2)
RBC # BLD AUTO: 3.7 M/UL (ref 3.8–5.2)
RBC # BLD AUTO: 3.71 M/UL (ref 3.8–5.2)
RBC # BLD AUTO: 4.3 M/UL (ref 3.8–5.2)
RBC #/AREA URNS HPF: ABNORMAL /HPF (ref 0–5)
RBC #/AREA URNS HPF: ABNORMAL /HPF (ref 0–5)
RBC MORPH BLD: ABNORMAL
SAMPLES BEING HELD,HOLD: NORMAL
SERVICE CMNT-IMP: ABNORMAL
SERVICE CMNT-IMP: NORMAL
SODIUM SERPL-SCNC: 130 MMOL/L (ref 136–145)
SODIUM SERPL-SCNC: 134 MMOL/L (ref 136–145)
SODIUM SERPL-SCNC: 135 MMOL/L (ref 136–145)
SODIUM SERPL-SCNC: 135 MMOL/L (ref 136–145)
SODIUM SERPL-SCNC: 136 MMOL/L (ref 136–145)
SODIUM SERPL-SCNC: 137 MMOL/L (ref 136–145)
SODIUM SERPL-SCNC: 138 MMOL/L (ref 134–144)
SODIUM SERPL-SCNC: 138 MMOL/L (ref 136–145)
SODIUM SERPL-SCNC: 138 MMOL/L (ref 136–145)
SODIUM SERPL-SCNC: 141 MMOL/L (ref 136–145)
SODIUM SERPL-SCNC: 142 MMOL/L (ref 136–145)
SODIUM SERPL-SCNC: 143 MMOL/L (ref 136–145)
SODIUM SERPL-SCNC: 144 MMOL/L (ref 136–145)
SODIUM SERPL-SCNC: 144 MMOL/L (ref 136–145)
SODIUM SERPL-SCNC: 145 MMOL/L (ref 136–145)
SODIUM UR-SCNC: 17 MMOL/L
SP GR UR REFRACTOMETRY: 1.01 (ref 1–1.03)
SP GR UR REFRACTOMETRY: 1.02 (ref 1–1.03)
SP1: NORMAL
SP2: NORMAL
SP3: NORMAL
SPECIMEN EXP DATE BLD: NORMAL
SPECIMEN EXP DATE BLD: NORMAL
TIBC SERPL-MCNC: 270 UG/DL (ref 250–450)
TRIGL SERPL-MCNC: 143 MG/DL (ref 0–149)
TROPONIN I SERPL-MCNC: <0.05 NG/ML
TSH SERPL DL<=0.005 MIU/L-ACNC: 87.91 UIU/ML (ref 0.45–4.5)
UA: UC IF INDICATED,UAUC: ABNORMAL
UA: UC IF INDICATED,UAUC: ABNORMAL
UROBILINOGEN UR QL STRIP.AUTO: 1 EU/DL (ref 0.2–1)
UROBILINOGEN UR QL STRIP.AUTO: 1 EU/DL (ref 0.2–1)
VENTRICULAR RATE, ECG03: 108 BPM
VENTRICULAR RATE, ECG03: 52 BPM
VLDLC SERPL CALC-MCNC: 29 MG/DL (ref 5–40)
WAXY CASTS URNS QL MICRO: ABNORMAL /LPF
WBC # BLD AUTO: 12.1 K/UL (ref 3.6–11)
WBC # BLD AUTO: 12.4 K/UL (ref 3.6–11)
WBC # BLD AUTO: 12.9 K/UL (ref 3.6–11)
WBC # BLD AUTO: 13.2 K/UL (ref 3.6–11)
WBC # BLD AUTO: 13.8 K/UL (ref 3.6–11)
WBC # BLD AUTO: 14.2 K/UL (ref 3.6–11)
WBC # BLD AUTO: 14.6 K/UL (ref 3.6–11)
WBC # BLD AUTO: 15.4 K/UL (ref 3.6–11)
WBC # BLD AUTO: 4.6 K/UL (ref 3.6–11)
WBC # BLD AUTO: 4.6 K/UL (ref 3.6–11)
WBC # BLD AUTO: 5.3 K/UL (ref 3.6–11)
WBC # BLD AUTO: 5.6 K/UL (ref 3.6–11)
WBC # BLD AUTO: 5.7 K/UL (ref 3.6–11)
WBC # BLD AUTO: 5.7 K/UL (ref 3.6–11)
WBC # BLD AUTO: 6.7 K/UL (ref 3.6–11)
WBC URNS QL MICRO: >100 /HPF (ref 0–4)
WBC URNS QL MICRO: ABNORMAL /HPF (ref 0–4)

## 2020-01-01 PROCEDURE — G0299 HHS/HOSPICE OF RN EA 15 MIN: HCPCS

## 2020-01-01 PROCEDURE — 3331090002 HH PPS REVENUE DEBIT

## 2020-01-01 PROCEDURE — 93798 PHYS/QHP OP CAR RHAB W/ECG: CPT

## 2020-01-01 PROCEDURE — 400013 HH SOC

## 2020-01-01 PROCEDURE — 85610 PROTHROMBIN TIME: CPT

## 2020-01-01 PROCEDURE — 85025 COMPLETE CBC W/AUTO DIFF WBC: CPT

## 2020-01-01 PROCEDURE — 74011250637 HC RX REV CODE- 250/637: Performed by: HOSPITALIST

## 2020-01-01 PROCEDURE — 84080 ASSAY ALKALINE PHOSPHATASES: CPT

## 2020-01-01 PROCEDURE — 84443 ASSAY THYROID STIM HORMONE: CPT

## 2020-01-01 PROCEDURE — 74011250637 HC RX REV CODE- 250/637: Performed by: INTERNAL MEDICINE

## 2020-01-01 PROCEDURE — 74011636637 HC RX REV CODE- 636/637: Performed by: INTERNAL MEDICINE

## 2020-01-01 PROCEDURE — 1101F PT FALLS ASSESS-DOCD LE1/YR: CPT | Performed by: FAMILY MEDICINE

## 2020-01-01 PROCEDURE — 83690 ASSAY OF LIPASE: CPT

## 2020-01-01 PROCEDURE — G0157 HHC PT ASSISTANT EA 15: HCPCS

## 2020-01-01 PROCEDURE — 65660000000 HC RM CCU STEPDOWN

## 2020-01-01 PROCEDURE — C9113 INJ PANTOPRAZOLE SODIUM, VIA: HCPCS | Performed by: HOSPITALIST

## 2020-01-01 PROCEDURE — G0151 HHCP-SERV OF PT,EA 15 MIN: HCPCS

## 2020-01-01 PROCEDURE — 82962 GLUCOSE BLOOD TEST: CPT

## 2020-01-01 PROCEDURE — 74011250636 HC RX REV CODE- 250/636: Performed by: INTERNAL MEDICINE

## 2020-01-01 PROCEDURE — C9113 INJ PANTOPRAZOLE SODIUM, VIA: HCPCS | Performed by: INTERNAL MEDICINE

## 2020-01-01 PROCEDURE — 97535 SELF CARE MNGMENT TRAINING: CPT

## 2020-01-01 PROCEDURE — 3331090001 HH PPS REVENUE CREDIT

## 2020-01-01 PROCEDURE — 80048 BASIC METABOLIC PNL TOTAL CA: CPT

## 2020-01-01 PROCEDURE — 83735 ASSAY OF MAGNESIUM: CPT

## 2020-01-01 PROCEDURE — 87077 CULTURE AEROBIC IDENTIFY: CPT

## 2020-01-01 PROCEDURE — 82728 ASSAY OF FERRITIN: CPT

## 2020-01-01 PROCEDURE — 76210000002 HC OR PH I REC 3 TO 3.5 HR: Performed by: INTERNAL MEDICINE

## 2020-01-01 PROCEDURE — 77030013140 HC MSK NEB VYRM -A

## 2020-01-01 PROCEDURE — 77030013797 HC KT TRNSDUC PRSSR EDWD -A: Performed by: NURSE ANESTHETIST, CERTIFIED REGISTERED

## 2020-01-01 PROCEDURE — 77030012596 HC SPHNTOM BILI BSC -E: Performed by: INTERNAL MEDICINE

## 2020-01-01 PROCEDURE — 83010 ASSAY OF HAPTOGLOBIN QUANT: CPT

## 2020-01-01 PROCEDURE — 74011000250 HC RX REV CODE- 250: Performed by: NURSE ANESTHETIST, CERTIFIED REGISTERED

## 2020-01-01 PROCEDURE — 77010033678 HC OXYGEN DAILY

## 2020-01-01 PROCEDURE — 99152 MOD SED SAME PHYS/QHP 5/>YRS: CPT

## 2020-01-01 PROCEDURE — 0DB78ZX EXCISION OF STOMACH, PYLORUS, VIA NATURAL OR ARTIFICIAL OPENING ENDOSCOPIC, DIAGNOSTIC: ICD-10-PCS | Performed by: INTERNAL MEDICINE

## 2020-01-01 PROCEDURE — C1769 GUIDE WIRE: HCPCS | Performed by: INTERNAL MEDICINE

## 2020-01-01 PROCEDURE — 74011000258 HC RX REV CODE- 258: Performed by: INTERNAL MEDICINE

## 2020-01-01 PROCEDURE — 74011000250 HC RX REV CODE- 250: Performed by: HOSPITALIST

## 2020-01-01 PROCEDURE — 77030026265 HC CATH BILI STN RTVR BSC -B: Performed by: INTERNAL MEDICINE

## 2020-01-01 PROCEDURE — 51798 US URINE CAPACITY MEASURE: CPT

## 2020-01-01 PROCEDURE — 2709999900 HC NON-CHARGEABLE SUPPLY

## 2020-01-01 PROCEDURE — 74011250636 HC RX REV CODE- 250/636: Performed by: NURSE PRACTITIONER

## 2020-01-01 PROCEDURE — 93325 DOPPLER ECHO COLOR FLOW MAPG: CPT

## 2020-01-01 PROCEDURE — 74011000250 HC RX REV CODE- 250: Performed by: INTERNAL MEDICINE

## 2020-01-01 PROCEDURE — 87086 URINE CULTURE/COLONY COUNT: CPT

## 2020-01-01 PROCEDURE — 80053 COMPREHEN METABOLIC PANEL: CPT

## 2020-01-01 PROCEDURE — 0DJD8ZZ INSPECTION OF LOWER INTESTINAL TRACT, VIA NATURAL OR ARTIFICIAL OPENING ENDOSCOPIC: ICD-10-PCS | Performed by: SPECIALIST

## 2020-01-01 PROCEDURE — 76060000034 HC ANESTHESIA 1.5 TO 2 HR: Performed by: INTERNAL MEDICINE

## 2020-01-01 PROCEDURE — 74011636637 HC RX REV CODE- 636/637: Performed by: HOSPITALIST

## 2020-01-01 PROCEDURE — 2709999900 HC NON-CHARGEABLE SUPPLY: Performed by: INTERNAL MEDICINE

## 2020-01-01 PROCEDURE — 82570 ASSAY OF URINE CREATININE: CPT

## 2020-01-01 PROCEDURE — 90935 HEMODIALYSIS ONE EVALUATION: CPT

## 2020-01-01 PROCEDURE — 71045 X-RAY EXAM CHEST 1 VIEW: CPT

## 2020-01-01 PROCEDURE — 0FC98ZZ EXTIRPATION OF MATTER FROM COMMON BILE DUCT, VIA NATURAL OR ARTIFICIAL OPENING ENDOSCOPIC: ICD-10-PCS | Performed by: INTERNAL MEDICINE

## 2020-01-01 PROCEDURE — B24BZZ4 ULTRASONOGRAPHY OF HEART WITH AORTA, TRANSESOPHAGEAL: ICD-10-PCS | Performed by: ANESTHESIOLOGY

## 2020-01-01 PROCEDURE — 77030019988 HC FCPS ENDOSC DISP BSC -B: Performed by: INTERNAL MEDICINE

## 2020-01-01 PROCEDURE — 76937 US GUIDE VASCULAR ACCESS: CPT

## 2020-01-01 PROCEDURE — 94760 N-INVAS EAR/PLS OXIMETRY 1: CPT

## 2020-01-01 PROCEDURE — 3331090003 HH PPS REVENUE ADJ

## 2020-01-01 PROCEDURE — 74011000258 HC RX REV CODE- 258: Performed by: SPECIALIST

## 2020-01-01 PROCEDURE — 77030021678 HC GLIDESCP STAT DISP VERT -B: Performed by: NURSE ANESTHETIST, CERTIFIED REGISTERED

## 2020-01-01 PROCEDURE — 74011250636 HC RX REV CODE- 250/636: Performed by: HOSPITALIST

## 2020-01-01 PROCEDURE — 36415 COLL VENOUS BLD VENIPUNCTURE: CPT

## 2020-01-01 PROCEDURE — G0300 HHS/HOSPICE OF LPN EA 15 MIN: HCPCS

## 2020-01-01 PROCEDURE — 5A1D70Z PERFORMANCE OF URINARY FILTRATION, INTERMITTENT, LESS THAN 6 HOURS PER DAY: ICD-10-PCS | Performed by: INTERNAL MEDICINE

## 2020-01-01 PROCEDURE — 400014 HH F/U

## 2020-01-01 PROCEDURE — 99153 MOD SED SAME PHYS/QHP EA: CPT

## 2020-01-01 PROCEDURE — 83516 IMMUNOASSAY NONANTIBODY: CPT

## 2020-01-01 PROCEDURE — 83615 LACTATE (LD) (LDH) ENZYME: CPT

## 2020-01-01 PROCEDURE — 02L73DK OCCLUSION OF LEFT ATRIAL APPENDAGE WITH INTRALUMINAL DEVICE, PERCUTANEOUS APPROACH: ICD-10-PCS | Performed by: INTERNAL MEDICINE

## 2020-01-01 PROCEDURE — C1894 INTRO/SHEATH, NON-LASER: HCPCS | Performed by: INTERNAL MEDICINE

## 2020-01-01 PROCEDURE — 74011000250 HC RX REV CODE- 250: Performed by: SPECIALIST

## 2020-01-01 PROCEDURE — 77030008771 HC TU NG SALEM SUMP -A: Performed by: NURSE ANESTHETIST, CERTIFIED REGISTERED

## 2020-01-01 PROCEDURE — 99214 OFFICE O/P EST MOD 30 MIN: CPT | Performed by: FAMILY MEDICINE

## 2020-01-01 PROCEDURE — 82390 ASSAY OF CERULOPLASMIN: CPT

## 2020-01-01 PROCEDURE — 74011250636 HC RX REV CODE- 250/636: Performed by: ANESTHESIOLOGY

## 2020-01-01 PROCEDURE — 74011250637 HC RX REV CODE- 250/637: Performed by: ANESTHESIOLOGY

## 2020-01-01 PROCEDURE — P9047 ALBUMIN (HUMAN), 25%, 50ML: HCPCS | Performed by: NURSE PRACTITIONER

## 2020-01-01 PROCEDURE — 65270000015 HC RM PRIVATE ONCOLOGY

## 2020-01-01 PROCEDURE — 74011000258 HC RX REV CODE- 258: Performed by: NURSE PRACTITIONER

## 2020-01-01 PROCEDURE — 82140 ASSAY OF AMMONIA: CPT

## 2020-01-01 PROCEDURE — 83605 ASSAY OF LACTIC ACID: CPT

## 2020-01-01 PROCEDURE — 74011250636 HC RX REV CODE- 250/636: Performed by: RADIOLOGY

## 2020-01-01 PROCEDURE — 85027 COMPLETE CBC AUTOMATED: CPT

## 2020-01-01 PROCEDURE — 84100 ASSAY OF PHOSPHORUS: CPT

## 2020-01-01 PROCEDURE — 74011000258 HC RX REV CODE- 258: Performed by: EMERGENCY MEDICINE

## 2020-01-01 PROCEDURE — 86900 BLOOD TYPING SEROLOGIC ABO: CPT

## 2020-01-01 PROCEDURE — 77030026438 HC STYL ET INTUB CARD -A: Performed by: ANESTHESIOLOGY

## 2020-01-01 PROCEDURE — 97161 PT EVAL LOW COMPLEX 20 MIN: CPT

## 2020-01-01 PROCEDURE — G0152 HHCP-SERV OF OT,EA 15 MIN: HCPCS

## 2020-01-01 PROCEDURE — G8536 NO DOC ELDER MAL SCRN: HCPCS | Performed by: FAMILY MEDICINE

## 2020-01-01 PROCEDURE — 74011250636 HC RX REV CODE- 250/636: Performed by: EMERGENCY MEDICINE

## 2020-01-01 PROCEDURE — 74011000250 HC RX REV CODE- 250: Performed by: REGISTERED NURSE

## 2020-01-01 PROCEDURE — 86706 HEP B SURFACE ANTIBODY: CPT

## 2020-01-01 PROCEDURE — 77030010104 HC SEAL PRT ENDOSC BYRN -B: Performed by: INTERNAL MEDICINE

## 2020-01-01 PROCEDURE — G8427 DOCREV CUR MEDS BY ELIG CLIN: HCPCS | Performed by: FAMILY MEDICINE

## 2020-01-01 PROCEDURE — 2709999900 HC NON-CHARGEABLE SUPPLY: Performed by: SPECIALIST

## 2020-01-01 PROCEDURE — 85018 HEMOGLOBIN: CPT

## 2020-01-01 PROCEDURE — 93005 ELECTROCARDIOGRAM TRACING: CPT

## 2020-01-01 PROCEDURE — 99285 EMERGENCY DEPT VISIT HI MDM: CPT

## 2020-01-01 PROCEDURE — 97116 GAIT TRAINING THERAPY: CPT

## 2020-01-01 PROCEDURE — 0F798DZ DILATION OF COMMON BILE DUCT WITH INTRALUMINAL DEVICE, VIA NATURAL OR ARTIFICIAL OPENING ENDOSCOPIC: ICD-10-PCS | Performed by: INTERNAL MEDICINE

## 2020-01-01 PROCEDURE — 97535 SELF CARE MNGMENT TRAINING: CPT | Performed by: OCCUPATIONAL THERAPIST

## 2020-01-01 PROCEDURE — 77030008684 HC TU ET CUF COVD -B: Performed by: NURSE ANESTHETIST, CERTIFIED REGISTERED

## 2020-01-01 PROCEDURE — G8756 NO BP MEASURE DOC: HCPCS | Performed by: FAMILY MEDICINE

## 2020-01-01 PROCEDURE — 74181 MRI ABDOMEN W/O CONTRAST: CPT

## 2020-01-01 PROCEDURE — BF101ZZ FLUOROSCOPY OF BILE DUCTS USING LOW OSMOLAR CONTRAST: ICD-10-PCS | Performed by: INTERNAL MEDICINE

## 2020-01-01 PROCEDURE — 99214 OFFICE O/P EST MOD 30 MIN: CPT | Performed by: INTERNAL MEDICINE

## 2020-01-01 PROCEDURE — 99496 TRANSJ CARE MGMT HIGH F2F 7D: CPT | Performed by: FAMILY MEDICINE

## 2020-01-01 PROCEDURE — 77030038112 HC IMPL LAA WATCHMAN 30MM BSC -L: Performed by: INTERNAL MEDICINE

## 2020-01-01 PROCEDURE — 87040 BLOOD CULTURE FOR BACTERIA: CPT

## 2020-01-01 PROCEDURE — 87186 SC STD MICRODIL/AGAR DIL: CPT

## 2020-01-01 PROCEDURE — 74011000250 HC RX REV CODE- 250: Performed by: RADIOLOGY

## 2020-01-01 PROCEDURE — 74011250636 HC RX REV CODE- 250/636: Performed by: SPECIALIST

## 2020-01-01 PROCEDURE — 74011250637 HC RX REV CODE- 250/637: Performed by: NURSE PRACTITIONER

## 2020-01-01 PROCEDURE — 82103 ALPHA-1-ANTITRYPSIN TOTAL: CPT

## 2020-01-01 PROCEDURE — 80307 DRUG TEST PRSMV CHEM ANLYZR: CPT

## 2020-01-01 PROCEDURE — G8432 DEP SCR NOT DOC, RNG: HCPCS | Performed by: FAMILY MEDICINE

## 2020-01-01 PROCEDURE — 77030029065 HC DRSG HEMO QCLOT ZMED -B: Performed by: INTERNAL MEDICINE

## 2020-01-01 PROCEDURE — 97165 OT EVAL LOW COMPLEX 30 MIN: CPT

## 2020-01-01 PROCEDURE — 84165 PROTEIN E-PHORESIS SERUM: CPT

## 2020-01-01 PROCEDURE — 81001 URINALYSIS AUTO W/SCOPE: CPT

## 2020-01-01 PROCEDURE — 82436 ASSAY OF URINE CHLORIDE: CPT

## 2020-01-01 PROCEDURE — 74011250636 HC RX REV CODE- 250/636: Performed by: REGISTERED NURSE

## 2020-01-01 PROCEDURE — 77030005401 HC CATH RAD ARRO -A: Performed by: NURSE ANESTHETIST, CERTIFIED REGISTERED

## 2020-01-01 PROCEDURE — C2625 STENT, NON-COR, TEM W/DEL SY: HCPCS | Performed by: INTERNAL MEDICINE

## 2020-01-01 PROCEDURE — 77030021668 HC NEB PREFIL KT VYRM -A

## 2020-01-01 PROCEDURE — 77030040361 HC SLV COMPR DVT MDII -B: Performed by: INTERNAL MEDICINE

## 2020-01-01 PROCEDURE — 74330 X-RAY BILE/PANC ENDOSCOPY: CPT

## 2020-01-01 PROCEDURE — 96375 TX/PRO/DX INJ NEW DRUG ADDON: CPT

## 2020-01-01 PROCEDURE — 97530 THERAPEUTIC ACTIVITIES: CPT

## 2020-01-01 PROCEDURE — 82248 BILIRUBIN DIRECT: CPT

## 2020-01-01 PROCEDURE — 80074 ACUTE HEPATITIS PANEL: CPT

## 2020-01-01 PROCEDURE — 74011250636 HC RX REV CODE- 250/636

## 2020-01-01 PROCEDURE — 74011000636 HC RX REV CODE- 636: Performed by: INTERNAL MEDICINE

## 2020-01-01 PROCEDURE — C1752 CATH,HEMODIALYSIS,SHORT-TERM: HCPCS

## 2020-01-01 PROCEDURE — 80061 LIPID PANEL: CPT

## 2020-01-01 PROCEDURE — 1090F PRES/ABSN URINE INCON ASSESS: CPT | Performed by: FAMILY MEDICINE

## 2020-01-01 PROCEDURE — G0153 HHCP-SVS OF S/L PATH,EA 15MN: HCPCS

## 2020-01-01 PROCEDURE — 93312 ECHO TRANSESOPHAGEAL: CPT

## 2020-01-01 PROCEDURE — G8419 CALC BMI OUT NRM PARAM NOF/U: HCPCS | Performed by: FAMILY MEDICINE

## 2020-01-01 PROCEDURE — 03HY32Z INSERTION OF MONITORING DEVICE INTO UPPER ARTERY, PERCUTANEOUS APPROACH: ICD-10-PCS | Performed by: ANESTHESIOLOGY

## 2020-01-01 PROCEDURE — 33340 PERQ CLSR TCAT L ATR APNDGE: CPT | Performed by: ANESTHESIOLOGY

## 2020-01-01 PROCEDURE — 82272 OCCULT BLD FECES 1-3 TESTS: CPT

## 2020-01-01 PROCEDURE — 84300 ASSAY OF URINE SODIUM: CPT

## 2020-01-01 PROCEDURE — 77030018729 HC ELECTRD DEFIB PAD CARD -B: Performed by: INTERNAL MEDICINE

## 2020-01-01 PROCEDURE — 93306 TTE W/DOPPLER COMPLETE: CPT

## 2020-01-01 PROCEDURE — G0463 HOSPITAL OUTPT CLINIC VISIT: HCPCS | Performed by: INTERNAL MEDICINE

## 2020-01-01 PROCEDURE — 83036 HEMOGLOBIN GLYCOSYLATED A1C: CPT

## 2020-01-01 PROCEDURE — 77030026438 HC STYL ET INTUB CARD -A: Performed by: NURSE ANESTHETIST, CERTIFIED REGISTERED

## 2020-01-01 PROCEDURE — 86038 ANTINUCLEAR ANTIBODIES: CPT

## 2020-01-01 PROCEDURE — G0463 HOSPITAL OUTPT CLINIC VISIT: HCPCS | Performed by: FAMILY MEDICINE

## 2020-01-01 PROCEDURE — G8399 PT W/DXA RESULTS DOCUMENT: HCPCS | Performed by: FAMILY MEDICINE

## 2020-01-01 PROCEDURE — 76040000019: Performed by: INTERNAL MEDICINE

## 2020-01-01 PROCEDURE — 77030020268 HC MISC GENERAL SUPPLY: Performed by: INTERNAL MEDICINE

## 2020-01-01 PROCEDURE — 77030026818 HC NDL TRNSPTL BRK STJU -C: Performed by: INTERNAL MEDICINE

## 2020-01-01 PROCEDURE — 74011250636 HC RX REV CODE- 250/636: Performed by: NURSE ANESTHETIST, CERTIFIED REGISTERED

## 2020-01-01 PROCEDURE — 85347 COAGULATION TIME ACTIVATED: CPT

## 2020-01-01 PROCEDURE — 77030013797 HC KT TRNSDUC PRSSR EDWD -A: Performed by: INTERNAL MEDICINE

## 2020-01-01 PROCEDURE — 88305 TISSUE EXAM BY PATHOLOGIST: CPT

## 2020-01-01 PROCEDURE — 92610 EVALUATE SWALLOWING FUNCTION: CPT

## 2020-01-01 PROCEDURE — 82043 UR ALBUMIN QUANTITATIVE: CPT

## 2020-01-01 PROCEDURE — 76210000017 HC OR PH I REC 1.5 TO 2 HR

## 2020-01-01 PROCEDURE — 93970 EXTREMITY STUDY: CPT

## 2020-01-01 PROCEDURE — 77030019905 HC CATH URETH INTMIT MDII -A

## 2020-01-01 PROCEDURE — 74011000250 HC RX REV CODE- 250: Performed by: NURSE PRACTITIONER

## 2020-01-01 PROCEDURE — 83880 ASSAY OF NATRIURETIC PEPTIDE: CPT

## 2020-01-01 PROCEDURE — 93308 TTE F-UP OR LMTD: CPT

## 2020-01-01 PROCEDURE — 87340 HEPATITIS B SURFACE AG IA: CPT

## 2020-01-01 PROCEDURE — 76060000031 HC ANESTHESIA FIRST 0.5 HR: Performed by: SPECIALIST

## 2020-01-01 PROCEDURE — 74011000250 HC RX REV CODE- 250

## 2020-01-01 PROCEDURE — 84484 ASSAY OF TROPONIN QUANT: CPT

## 2020-01-01 PROCEDURE — 74011636320 HC RX REV CODE- 636/320: Performed by: INTERNAL MEDICINE

## 2020-01-01 PROCEDURE — 77030019908 HC STETH ESOPH SIMS -A: Performed by: NURSE ANESTHETIST, CERTIFIED REGISTERED

## 2020-01-01 PROCEDURE — C1892 INTRO/SHEATH,FIXED,PEEL-AWAY: HCPCS

## 2020-01-01 PROCEDURE — 96374 THER/PROPH/DIAG INJ IV PUSH: CPT

## 2020-01-01 PROCEDURE — 77030003700 HC NDL TSEPTL STJU -C: Performed by: INTERNAL MEDICINE

## 2020-01-01 PROCEDURE — 76060000033 HC ANESTHESIA 1 TO 1.5 HR: Performed by: INTERNAL MEDICINE

## 2020-01-01 PROCEDURE — 83540 ASSAY OF IRON: CPT

## 2020-01-01 PROCEDURE — 76040000019: Performed by: SPECIALIST

## 2020-01-01 PROCEDURE — 02HV33Z INSERTION OF INFUSION DEVICE INTO SUPERIOR VENA CAVA, PERCUTANEOUS APPROACH: ICD-10-PCS | Performed by: RADIOLOGY

## 2020-01-01 PROCEDURE — 76010000149 HC OR TIME 1 TO 1.5 HR: Performed by: INTERNAL MEDICINE

## 2020-01-01 PROCEDURE — C1893 INTRO/SHEATH, FIXED,NON-PEEL: HCPCS | Performed by: INTERNAL MEDICINE

## 2020-01-01 PROCEDURE — 96365 THER/PROPH/DIAG IV INF INIT: CPT

## 2020-01-01 PROCEDURE — 76705 ECHO EXAM OF ABDOMEN: CPT

## 2020-01-01 PROCEDURE — 76060000031 HC ANESTHESIA FIRST 0.5 HR: Performed by: INTERNAL MEDICINE

## 2020-01-01 PROCEDURE — 77030040361 HC SLV COMPR DVT MDII -B

## 2020-01-01 PROCEDURE — 77030005513 HC CATH URETH FOL11 MDII -B

## 2020-01-01 PROCEDURE — 77030008684 HC TU ET CUF COVD -B: Performed by: ANESTHESIOLOGY

## 2020-01-01 PROCEDURE — 77030019908 HC STETH ESOPH SIMS -A: Performed by: ANESTHESIOLOGY

## 2020-01-01 PROCEDURE — 97530 THERAPEUTIC ACTIVITIES: CPT | Performed by: OCCUPATIONAL THERAPIST

## 2020-01-01 DEVICE — LEFT ATRIAL APPENDAGE CLOSURE DEVICE WITH DELIVERY SYSTEM
Type: IMPLANTABLE DEVICE | Status: FUNCTIONAL
Brand: WATCHMAN®

## 2020-01-01 DEVICE — BILIARY STENT WITH NAVIFLEXTM RX DELIVERY SYSTEM
Type: IMPLANTABLE DEVICE | Site: BILE DUCT | Status: FUNCTIONAL
Brand: ADVANIX™ BILIARY

## 2020-01-01 RX ORDER — ATORVASTATIN CALCIUM 40 MG/1
80 TABLET, FILM COATED ORAL
Status: DISCONTINUED | OUTPATIENT
Start: 2020-01-01 | End: 2020-01-01 | Stop reason: HOSPADM

## 2020-01-01 RX ORDER — ACETYLCYSTEINE 200 MG/ML
200 SOLUTION ORAL; RESPIRATORY (INHALATION) DAILY
Status: DISCONTINUED | OUTPATIENT
Start: 2020-01-01 | End: 2020-01-01

## 2020-01-01 RX ORDER — AMIODARONE HYDROCHLORIDE 200 MG/1
TABLET ORAL
Qty: 180 TAB | Refills: 0 | Status: SHIPPED | OUTPATIENT
Start: 2020-01-01 | End: 2020-01-01 | Stop reason: SDUPTHER

## 2020-01-01 RX ORDER — MAGNESIUM SULFATE 100 %
4 CRYSTALS MISCELLANEOUS AS NEEDED
Status: DISCONTINUED | OUTPATIENT
Start: 2020-01-01 | End: 2020-01-01 | Stop reason: HOSPADM

## 2020-01-01 RX ORDER — ROCURONIUM BROMIDE 10 MG/ML
INJECTION, SOLUTION INTRAVENOUS AS NEEDED
Status: DISCONTINUED | OUTPATIENT
Start: 2020-01-01 | End: 2020-01-01 | Stop reason: HOSPADM

## 2020-01-01 RX ORDER — METRONIDAZOLE 500 MG/100ML
500 INJECTION, SOLUTION INTRAVENOUS
Status: COMPLETED | OUTPATIENT
Start: 2020-01-01 | End: 2020-01-01

## 2020-01-01 RX ORDER — ACETAMINOPHEN 325 MG/1
650 TABLET ORAL
Status: DISCONTINUED | OUTPATIENT
Start: 2020-01-01 | End: 2020-01-01

## 2020-01-01 RX ORDER — POTASSIUM CHLORIDE 750 MG/1
20 TABLET, FILM COATED, EXTENDED RELEASE ORAL DAILY
Status: DISCONTINUED | OUTPATIENT
Start: 2020-01-01 | End: 2020-01-01

## 2020-01-01 RX ORDER — FENTANYL CITRATE 50 UG/ML
12.5-5 INJECTION, SOLUTION INTRAMUSCULAR; INTRAVENOUS
Status: DISCONTINUED | OUTPATIENT
Start: 2020-01-01 | End: 2020-01-01

## 2020-01-01 RX ORDER — BUMETANIDE 0.25 MG/ML
2 INJECTION INTRAMUSCULAR; INTRAVENOUS ONCE
Status: COMPLETED | OUTPATIENT
Start: 2020-01-01 | End: 2020-01-01

## 2020-01-01 RX ORDER — EPINEPHRINE 0.1 MG/ML
1 INJECTION INTRACARDIAC; INTRAVENOUS
Status: ACTIVE | OUTPATIENT
Start: 2020-01-01 | End: 2020-01-01

## 2020-01-01 RX ORDER — CLOPIDOGREL BISULFATE 75 MG/1
75 TABLET ORAL DAILY
Status: DISCONTINUED | OUTPATIENT
Start: 2020-01-01 | End: 2020-01-01 | Stop reason: HOSPADM

## 2020-01-01 RX ORDER — SODIUM CHLORIDE 9 MG/ML
75 INJECTION, SOLUTION INTRAVENOUS CONTINUOUS
Status: DISPENSED | OUTPATIENT
Start: 2020-01-01 | End: 2020-01-01

## 2020-01-01 RX ORDER — ACETYLCYSTEINE 200 MG/ML
70 SOLUTION ORAL; RESPIRATORY (INHALATION) EVERY 4 HOURS
Status: DISCONTINUED | OUTPATIENT
Start: 2020-01-01 | End: 2020-01-01

## 2020-01-01 RX ORDER — CLOPIDOGREL BISULFATE 75 MG/1
75 TABLET ORAL DAILY
Qty: 30 TAB | Refills: 5 | Status: SHIPPED | OUTPATIENT
Start: 2020-01-01 | End: 2020-01-01

## 2020-01-01 RX ORDER — CLOPIDOGREL BISULFATE 75 MG/1
75 TABLET ORAL DAILY
Qty: 30 TAB | Refills: 5 | Status: SHIPPED | OUTPATIENT
Start: 2020-01-01 | End: 2020-01-01 | Stop reason: SDUPTHER

## 2020-01-01 RX ORDER — SODIUM CHLORIDE 0.9 % (FLUSH) 0.9 %
5-40 SYRINGE (ML) INJECTION EVERY 8 HOURS
Status: DISCONTINUED | OUTPATIENT
Start: 2020-01-01 | End: 2020-01-01 | Stop reason: HOSPADM

## 2020-01-01 RX ORDER — SODIUM CHLORIDE 0.9 % (FLUSH) 0.9 %
5-40 SYRINGE (ML) INJECTION AS NEEDED
Status: DISCONTINUED | OUTPATIENT
Start: 2020-01-01 | End: 2020-01-01 | Stop reason: SDUPTHER

## 2020-01-01 RX ORDER — SODIUM CHLORIDE 9 MG/ML
25 INJECTION, SOLUTION INTRAVENOUS AS NEEDED
Status: DISCONTINUED | OUTPATIENT
Start: 2020-01-01 | End: 2020-01-01 | Stop reason: HOSPADM

## 2020-01-01 RX ORDER — MIDAZOLAM HYDROCHLORIDE 1 MG/ML
.25-5 INJECTION, SOLUTION INTRAMUSCULAR; INTRAVENOUS
Status: ACTIVE | OUTPATIENT
Start: 2020-01-01 | End: 2020-01-01

## 2020-01-01 RX ORDER — SODIUM CHLORIDE 0.9 % (FLUSH) 0.9 %
5-40 SYRINGE (ML) INJECTION AS NEEDED
Status: DISCONTINUED | OUTPATIENT
Start: 2020-01-01 | End: 2020-01-01 | Stop reason: HOSPADM

## 2020-01-01 RX ORDER — BLOOD SUGAR DIAGNOSTIC
STRIP MISCELLANEOUS
Qty: 200 STRIP | Refills: 10 | Status: SHIPPED | OUTPATIENT
Start: 2020-01-01 | End: 2020-01-01 | Stop reason: SDUPTHER

## 2020-01-01 RX ORDER — LIDOCAINE HYDROCHLORIDE 20 MG/ML
20 INJECTION, SOLUTION INFILTRATION; PERINEURAL ONCE
Status: COMPLETED | OUTPATIENT
Start: 2020-01-01 | End: 2020-01-01

## 2020-01-01 RX ORDER — MIDAZOLAM HYDROCHLORIDE 1 MG/ML
.5-1 INJECTION, SOLUTION INTRAMUSCULAR; INTRAVENOUS
Status: DISCONTINUED | OUTPATIENT
Start: 2020-01-01 | End: 2020-01-01

## 2020-01-01 RX ORDER — INSULIN LISPRO 100 [IU]/ML
INJECTION, SOLUTION INTRAVENOUS; SUBCUTANEOUS
Status: DISCONTINUED | OUTPATIENT
Start: 2020-01-01 | End: 2020-01-01 | Stop reason: HOSPADM

## 2020-01-01 RX ORDER — ATROPINE SULFATE 0.1 MG/ML
0.5 INJECTION INTRAVENOUS
Status: DISCONTINUED | OUTPATIENT
Start: 2020-01-01 | End: 2020-01-01 | Stop reason: HOSPADM

## 2020-01-01 RX ORDER — DIPHENHYDRAMINE HYDROCHLORIDE 50 MG/ML
12.5 INJECTION, SOLUTION INTRAMUSCULAR; INTRAVENOUS AS NEEDED
Status: DISCONTINUED | OUTPATIENT
Start: 2020-01-01 | End: 2020-01-01 | Stop reason: HOSPADM

## 2020-01-01 RX ORDER — PROPOFOL 10 MG/ML
INJECTION, EMULSION INTRAVENOUS AS NEEDED
Status: DISCONTINUED | OUTPATIENT
Start: 2020-01-01 | End: 2020-01-01 | Stop reason: HOSPADM

## 2020-01-01 RX ORDER — AMIODARONE HYDROCHLORIDE 200 MG/1
200 TABLET ORAL 2 TIMES DAILY
Status: DISCONTINUED | OUTPATIENT
Start: 2020-01-01 | End: 2020-01-01

## 2020-01-01 RX ORDER — ACETYLCYSTEINE 200 MG/ML
140 SOLUTION ORAL; RESPIRATORY (INHALATION) ONCE
Status: COMPLETED | OUTPATIENT
Start: 2020-01-01 | End: 2020-01-01

## 2020-01-01 RX ORDER — SODIUM CHLORIDE 0.9 % (FLUSH) 0.9 %
5-40 SYRINGE (ML) INJECTION EVERY 8 HOURS
Status: DISCONTINUED | OUTPATIENT
Start: 2020-01-01 | End: 2020-01-01 | Stop reason: SDUPTHER

## 2020-01-01 RX ORDER — LEVOTHYROXINE SODIUM 125 UG/1
125 TABLET ORAL
Status: DISCONTINUED | OUTPATIENT
Start: 2020-01-01 | End: 2020-01-01 | Stop reason: HOSPADM

## 2020-01-01 RX ORDER — ONDANSETRON 4 MG/1
4 TABLET, ORALLY DISINTEGRATING ORAL
Qty: 30 TAB | Refills: 1 | Status: SHIPPED | OUTPATIENT
Start: 2020-01-01

## 2020-01-01 RX ORDER — BLOOD SUGAR DIAGNOSTIC
STRIP MISCELLANEOUS
Qty: 200 STRIP | Refills: 10 | OUTPATIENT
Start: 2020-01-01

## 2020-01-01 RX ORDER — LEVOTHYROXINE SODIUM 125 UG/1
TABLET ORAL
Qty: 90 TAB | Refills: 0 | Status: SHIPPED | OUTPATIENT
Start: 2020-01-01

## 2020-01-01 RX ORDER — FENTANYL CITRATE 50 UG/ML
25-50 INJECTION, SOLUTION INTRAMUSCULAR; INTRAVENOUS
Status: DISCONTINUED | OUTPATIENT
Start: 2020-01-01 | End: 2020-01-01 | Stop reason: HOSPADM

## 2020-01-01 RX ORDER — NEOSTIGMINE METHYLSULFATE 1 MG/ML
INJECTION INTRAVENOUS AS NEEDED
Status: DISCONTINUED | OUTPATIENT
Start: 2020-01-01 | End: 2020-01-01 | Stop reason: HOSPADM

## 2020-01-01 RX ORDER — POTASSIUM CHLORIDE 750 MG/1
40 TABLET, FILM COATED, EXTENDED RELEASE ORAL
Status: COMPLETED | OUTPATIENT
Start: 2020-01-01 | End: 2020-01-01

## 2020-01-01 RX ORDER — SODIUM CHLORIDE 9 MG/ML
75 INJECTION, SOLUTION INTRAVENOUS CONTINUOUS
Status: DISCONTINUED | OUTPATIENT
Start: 2020-01-01 | End: 2020-01-01

## 2020-01-01 RX ORDER — POTASSIUM CHLORIDE 750 MG/1
30 TABLET, FILM COATED, EXTENDED RELEASE ORAL
Status: COMPLETED | OUTPATIENT
Start: 2020-01-01 | End: 2020-01-01

## 2020-01-01 RX ORDER — DEXTROMETHORPHAN/PSEUDOEPHED 2.5-7.5/.8
1.2 DROPS ORAL
Status: DISCONTINUED | OUTPATIENT
Start: 2020-01-01 | End: 2020-01-01 | Stop reason: HOSPADM

## 2020-01-01 RX ORDER — POTASSIUM CHLORIDE 750 MG/1
10 TABLET, FILM COATED, EXTENDED RELEASE ORAL DAILY
Qty: 30 TAB | Refills: 12 | Status: SHIPPED | OUTPATIENT
Start: 2020-01-01 | End: 2020-01-01 | Stop reason: SDUPTHER

## 2020-01-01 RX ORDER — ONDANSETRON 2 MG/ML
INJECTION INTRAMUSCULAR; INTRAVENOUS AS NEEDED
Status: DISCONTINUED | OUTPATIENT
Start: 2020-01-01 | End: 2020-01-01 | Stop reason: HOSPADM

## 2020-01-01 RX ORDER — ALBUMIN HUMAN 250 G/1000ML
25 SOLUTION INTRAVENOUS ONCE
Status: COMPLETED | OUTPATIENT
Start: 2020-01-01 | End: 2020-01-01

## 2020-01-01 RX ORDER — MIDAZOLAM HYDROCHLORIDE 1 MG/ML
.25-5 INJECTION, SOLUTION INTRAMUSCULAR; INTRAVENOUS
Status: DISCONTINUED | OUTPATIENT
Start: 2020-01-01 | End: 2020-01-01 | Stop reason: HOSPADM

## 2020-01-01 RX ORDER — AMIODARONE HYDROCHLORIDE 200 MG/1
TABLET ORAL
Qty: 180 TAB | Refills: 0 | Status: SHIPPED | OUTPATIENT
Start: 2020-01-01 | End: 2020-01-01

## 2020-01-01 RX ORDER — ONDANSETRON 2 MG/ML
4 INJECTION INTRAMUSCULAR; INTRAVENOUS
Status: DISCONTINUED | OUTPATIENT
Start: 2020-01-01 | End: 2020-01-01 | Stop reason: HOSPADM

## 2020-01-01 RX ORDER — LIDOCAINE HYDROCHLORIDE AND EPINEPHRINE 20; 10 MG/ML; UG/ML
1-20 INJECTION, SOLUTION INFILTRATION; PERINEURAL ONCE
Status: DISCONTINUED | OUTPATIENT
Start: 2020-01-01 | End: 2020-01-01

## 2020-01-01 RX ORDER — INSULIN HUMAN 100 [IU]/ML
10 INJECTION, SUSPENSION SUBCUTANEOUS
Qty: 15 ADJUSTABLE DOSE PRE-FILLED PEN SYRINGE | Refills: 3 | Status: ON HOLD | OUTPATIENT
Start: 2020-01-01 | End: 2020-01-01 | Stop reason: SDUPTHER

## 2020-01-01 RX ORDER — DEXAMETHASONE SODIUM PHOSPHATE 4 MG/ML
INJECTION, SOLUTION INTRA-ARTICULAR; INTRALESIONAL; INTRAMUSCULAR; INTRAVENOUS; SOFT TISSUE AS NEEDED
Status: DISCONTINUED | OUTPATIENT
Start: 2020-01-01 | End: 2020-01-01 | Stop reason: HOSPADM

## 2020-01-01 RX ORDER — DEXTROMETHORPHAN/PSEUDOEPHED 2.5-7.5/.8
DROPS ORAL
Status: DISPENSED
Start: 2020-01-01 | End: 2020-01-01

## 2020-01-01 RX ORDER — HEPARIN 100 UNIT/ML
300 SYRINGE INTRAVENOUS ONCE
Status: COMPLETED | OUTPATIENT
Start: 2020-01-01 | End: 2020-01-01

## 2020-01-01 RX ORDER — LORATADINE 10 MG/1
10 TABLET ORAL
COMMUNITY

## 2020-01-01 RX ORDER — MIDAZOLAM HYDROCHLORIDE 1 MG/ML
.5-2 INJECTION, SOLUTION INTRAMUSCULAR; INTRAVENOUS
Status: DISCONTINUED | OUTPATIENT
Start: 2020-01-01 | End: 2020-01-01 | Stop reason: HOSPADM

## 2020-01-01 RX ORDER — AMIODARONE HYDROCHLORIDE 200 MG/1
100 TABLET ORAL DAILY
Status: DISCONTINUED | OUTPATIENT
Start: 2020-01-01 | End: 2020-01-01 | Stop reason: HOSPADM

## 2020-01-01 RX ORDER — PANTOPRAZOLE SODIUM 40 MG/1
40 TABLET, DELAYED RELEASE ORAL DAILY
Status: DISCONTINUED | OUTPATIENT
Start: 2020-01-01 | End: 2020-01-01 | Stop reason: HOSPADM

## 2020-01-01 RX ORDER — AMIODARONE HYDROCHLORIDE 200 MG/1
TABLET ORAL
Qty: 180 TAB | Refills: 0 | OUTPATIENT
Start: 2020-01-01

## 2020-01-01 RX ORDER — FENTANYL CITRATE 50 UG/ML
INJECTION, SOLUTION INTRAMUSCULAR; INTRAVENOUS AS NEEDED
Status: DISCONTINUED | OUTPATIENT
Start: 2020-01-01 | End: 2020-01-01 | Stop reason: HOSPADM

## 2020-01-01 RX ORDER — NALOXONE HYDROCHLORIDE 0.4 MG/ML
0.4 INJECTION, SOLUTION INTRAMUSCULAR; INTRAVENOUS; SUBCUTANEOUS
Status: DISCONTINUED | OUTPATIENT
Start: 2020-01-01 | End: 2020-01-01 | Stop reason: HOSPADM

## 2020-01-01 RX ORDER — METOPROLOL TARTRATE 25 MG/1
25 TABLET, FILM COATED ORAL 2 TIMES DAILY
Status: DISCONTINUED | OUTPATIENT
Start: 2020-01-01 | End: 2020-01-01

## 2020-01-01 RX ORDER — LIDOCAINE HYDROCHLORIDE 20 MG/ML
15 SOLUTION OROPHARYNGEAL ONCE
Status: COMPLETED | OUTPATIENT
Start: 2020-01-01 | End: 2020-01-01

## 2020-01-01 RX ORDER — SODIUM CHLORIDE 9 MG/ML
INJECTION, SOLUTION INTRAVENOUS
Status: DISCONTINUED | OUTPATIENT
Start: 2020-01-01 | End: 2020-01-01 | Stop reason: HOSPADM

## 2020-01-01 RX ORDER — SODIUM,POTASSIUM PHOSPHATES 280-250MG
1 POWDER IN PACKET (EA) ORAL 4 TIMES DAILY
Status: DISPENSED | OUTPATIENT
Start: 2020-01-01 | End: 2020-01-01

## 2020-01-01 RX ORDER — DEXTROSE 50 % IN WATER (D50W) INTRAVENOUS SYRINGE
12.5-25 AS NEEDED
Status: DISCONTINUED | OUTPATIENT
Start: 2020-01-01 | End: 2020-01-01 | Stop reason: HOSPADM

## 2020-01-01 RX ORDER — PANTOPRAZOLE SODIUM 40 MG/1
TABLET, DELAYED RELEASE ORAL
Qty: 90 TAB | Refills: 3 | Status: SHIPPED | OUTPATIENT
Start: 2020-01-01

## 2020-01-01 RX ORDER — HEPARIN SODIUM 200 [USP'U]/100ML
400 INJECTION, SOLUTION INTRAVENOUS ONCE
Status: COMPLETED | OUTPATIENT
Start: 2020-01-01 | End: 2020-01-01

## 2020-01-01 RX ORDER — HYDROMORPHONE HYDROCHLORIDE 1 MG/ML
0.2 INJECTION, SOLUTION INTRAMUSCULAR; INTRAVENOUS; SUBCUTANEOUS
Status: DISCONTINUED | OUTPATIENT
Start: 2020-01-01 | End: 2020-01-01 | Stop reason: HOSPADM

## 2020-01-01 RX ORDER — SODIUM CHLORIDE 9 MG/ML
500 INJECTION, SOLUTION INTRAVENOUS ONCE
Status: COMPLETED | OUTPATIENT
Start: 2020-01-01 | End: 2020-01-01

## 2020-01-01 RX ORDER — INSULIN HUMAN 100 [IU]/ML
23 INJECTION, SUSPENSION SUBCUTANEOUS
Qty: 15 ADJUSTABLE DOSE PRE-FILLED PEN SYRINGE | Refills: 3 | Status: ON HOLD | OUTPATIENT
Start: 2020-01-01 | End: 2020-01-01 | Stop reason: SDUPTHER

## 2020-01-01 RX ORDER — POTASSIUM CHLORIDE 750 MG/1
10 TABLET, FILM COATED, EXTENDED RELEASE ORAL DAILY
Status: DISCONTINUED | OUTPATIENT
Start: 2020-01-01 | End: 2020-01-01

## 2020-01-01 RX ORDER — ACETAMINOPHEN 325 MG/1
650 TABLET ORAL
Status: DISCONTINUED | OUTPATIENT
Start: 2020-01-01 | End: 2020-01-01 | Stop reason: HOSPADM

## 2020-01-01 RX ORDER — METOPROLOL TARTRATE 50 MG/1
TABLET ORAL
Qty: 135 TAB | Refills: 3 | Status: SHIPPED | OUTPATIENT
Start: 2020-01-01 | End: 2020-01-01

## 2020-01-01 RX ORDER — ACETAMINOPHEN 650 MG/1
650 SUPPOSITORY RECTAL
Status: DISCONTINUED | OUTPATIENT
Start: 2020-01-01 | End: 2020-01-01 | Stop reason: HOSPADM

## 2020-01-01 RX ORDER — LIDOCAINE HYDROCHLORIDE 20 MG/ML
15 SOLUTION OROPHARYNGEAL AS NEEDED
Status: DISCONTINUED | OUTPATIENT
Start: 2020-01-01 | End: 2020-01-01

## 2020-01-01 RX ORDER — BUMETANIDE 2 MG/1
2 TABLET ORAL DAILY
Qty: 30 TAB | Refills: 1 | Status: SHIPPED | OUTPATIENT
Start: 2020-01-01 | End: 2020-01-01

## 2020-01-01 RX ORDER — SODIUM CHLORIDE FOR INHALATION 0.9 %
VIAL, NEBULIZER (ML) INHALATION
Status: DISPENSED
Start: 2020-01-01 | End: 2020-01-01

## 2020-01-01 RX ORDER — FUROSEMIDE 10 MG/ML
80 INJECTION INTRAMUSCULAR; INTRAVENOUS
Status: COMPLETED | OUTPATIENT
Start: 2020-01-01 | End: 2020-01-01

## 2020-01-01 RX ORDER — RIVAROXABAN 15 MG/1
TABLET, FILM COATED ORAL
Qty: 30 TAB | Refills: 0 | Status: SHIPPED | OUTPATIENT
Start: 2020-01-01 | End: 2020-01-01

## 2020-01-01 RX ORDER — HEPARIN SODIUM 200 [USP'U]/100ML
INJECTION, SOLUTION INTRAVENOUS
Status: COMPLETED | OUTPATIENT
Start: 2020-01-01 | End: 2020-01-01

## 2020-01-01 RX ORDER — AMIODARONE HYDROCHLORIDE 100 MG/1
100 TABLET ORAL DAILY
Qty: 30 TAB | Refills: 1 | Status: SHIPPED | OUTPATIENT
Start: 2020-01-01 | End: 2020-12-20

## 2020-01-01 RX ORDER — ACETYLCYSTEINE 200 MG/ML
70 SOLUTION ORAL; RESPIRATORY (INHALATION) EVERY 4 HOURS
Status: DISCONTINUED | OUTPATIENT
Start: 2020-01-01 | End: 2020-01-01 | Stop reason: SDUPTHER

## 2020-01-01 RX ORDER — INSULIN HUMAN 100 [IU]/ML
5 INJECTION, SUSPENSION SUBCUTANEOUS
Qty: 15 ADJUSTABLE DOSE PRE-FILLED PEN SYRINGE | Refills: 3 | Status: SHIPPED | OUTPATIENT
Start: 2020-01-01

## 2020-01-01 RX ORDER — ALBUTEROL SULFATE 0.83 MG/ML
2.5 SOLUTION RESPIRATORY (INHALATION)
Status: COMPLETED | OUTPATIENT
Start: 2020-01-01 | End: 2020-01-01

## 2020-01-01 RX ORDER — AMIODARONE HYDROCHLORIDE 200 MG/1
TABLET ORAL
Qty: 180 TAB | Refills: 3 | Status: SHIPPED | OUTPATIENT
Start: 2020-01-01 | End: 2020-01-01

## 2020-01-01 RX ORDER — SUCCINYLCHOLINE CHLORIDE 20 MG/ML
INJECTION INTRAMUSCULAR; INTRAVENOUS AS NEEDED
Status: DISCONTINUED | OUTPATIENT
Start: 2020-01-01 | End: 2020-01-01 | Stop reason: HOSPADM

## 2020-01-01 RX ORDER — EPINEPHRINE 0.1 MG/ML
1 INJECTION INTRACARDIAC; INTRAVENOUS
Status: DISCONTINUED | OUTPATIENT
Start: 2020-01-01 | End: 2020-01-01 | Stop reason: HOSPADM

## 2020-01-01 RX ORDER — MIDAZOLAM HYDROCHLORIDE 1 MG/ML
.5-2 INJECTION, SOLUTION INTRAMUSCULAR; INTRAVENOUS
Status: DISCONTINUED | OUTPATIENT
Start: 2020-01-01 | End: 2020-01-01

## 2020-01-01 RX ORDER — PROMETHAZINE HYDROCHLORIDE 25 MG/1
12.5 TABLET ORAL
Status: DISCONTINUED | OUTPATIENT
Start: 2020-01-01 | End: 2020-01-01 | Stop reason: HOSPADM

## 2020-01-01 RX ORDER — ONDANSETRON 2 MG/ML
4 INJECTION INTRAMUSCULAR; INTRAVENOUS
Status: COMPLETED | OUTPATIENT
Start: 2020-01-01 | End: 2020-01-01

## 2020-01-01 RX ORDER — PANTOPRAZOLE SODIUM 40 MG/1
40 TABLET, DELAYED RELEASE ORAL
Status: DISCONTINUED | OUTPATIENT
Start: 2020-01-01 | End: 2020-01-01 | Stop reason: HOSPADM

## 2020-01-01 RX ORDER — ETOMIDATE 2 MG/ML
INJECTION INTRAVENOUS AS NEEDED
Status: DISCONTINUED | OUTPATIENT
Start: 2020-01-01 | End: 2020-01-01 | Stop reason: HOSPADM

## 2020-01-01 RX ORDER — FENTANYL CITRATE 50 UG/ML
25 INJECTION, SOLUTION INTRAMUSCULAR; INTRAVENOUS
Status: DISCONTINUED | OUTPATIENT
Start: 2020-01-01 | End: 2020-01-01 | Stop reason: HOSPADM

## 2020-01-01 RX ORDER — FUROSEMIDE 40 MG/1
120 TABLET ORAL 2 TIMES DAILY
Status: DISCONTINUED | OUTPATIENT
Start: 2020-01-01 | End: 2020-01-01

## 2020-01-01 RX ORDER — LEVOTHYROXINE SODIUM 125 UG/1
TABLET ORAL
Qty: 90 TAB | Refills: 0 | Status: SHIPPED | OUTPATIENT
Start: 2020-01-01 | End: 2020-01-01 | Stop reason: SDUPTHER

## 2020-01-01 RX ORDER — ALBUMIN HUMAN 250 G/1000ML
12.5 SOLUTION INTRAVENOUS ONCE
Status: ACTIVE | OUTPATIENT
Start: 2020-01-01 | End: 2020-01-01

## 2020-01-01 RX ORDER — FLUMAZENIL 0.1 MG/ML
0.2 INJECTION INTRAVENOUS
Status: DISCONTINUED | OUTPATIENT
Start: 2020-01-01 | End: 2020-01-01 | Stop reason: HOSPADM

## 2020-01-01 RX ORDER — FUROSEMIDE 40 MG/1
80 TABLET ORAL
Status: DISCONTINUED | OUTPATIENT
Start: 2020-01-01 | End: 2020-01-01 | Stop reason: HOSPADM

## 2020-01-01 RX ORDER — ALBUTEROL SULFATE 0.83 MG/ML
SOLUTION RESPIRATORY (INHALATION)
Status: COMPLETED
Start: 2020-01-01 | End: 2020-01-01

## 2020-01-01 RX ORDER — PHENYLEPHRINE HCL IN 0.9% NACL 0.4MG/10ML
SYRINGE (ML) INTRAVENOUS AS NEEDED
Status: DISCONTINUED | OUTPATIENT
Start: 2020-01-01 | End: 2020-01-01 | Stop reason: HOSPADM

## 2020-01-01 RX ORDER — FUROSEMIDE 10 MG/ML
100 INJECTION INTRAMUSCULAR; INTRAVENOUS 2 TIMES DAILY
Status: DISCONTINUED | OUTPATIENT
Start: 2020-01-01 | End: 2020-01-01

## 2020-01-01 RX ORDER — MORPHINE SULFATE 2 MG/ML
1 INJECTION, SOLUTION INTRAMUSCULAR; INTRAVENOUS
Status: DISCONTINUED | OUTPATIENT
Start: 2020-01-01 | End: 2020-01-01 | Stop reason: HOSPADM

## 2020-01-01 RX ORDER — PROTAMINE SULFATE 10 MG/ML
INJECTION, SOLUTION INTRAVENOUS AS NEEDED
Status: DISCONTINUED | OUTPATIENT
Start: 2020-01-01 | End: 2020-01-01 | Stop reason: HOSPADM

## 2020-01-01 RX ORDER — SODIUM CHLORIDE 9 MG/ML
25 INJECTION, SOLUTION INTRAVENOUS CONTINUOUS
Status: DISCONTINUED | OUTPATIENT
Start: 2020-01-01 | End: 2020-01-01 | Stop reason: HOSPADM

## 2020-01-01 RX ORDER — ENOXAPARIN SODIUM 100 MG/ML
80 INJECTION SUBCUTANEOUS ONCE
Status: COMPLETED | OUTPATIENT
Start: 2020-01-01 | End: 2020-01-01

## 2020-01-01 RX ORDER — LIDOCAINE HYDROCHLORIDE 20 MG/ML
INJECTION, SOLUTION EPIDURAL; INFILTRATION; INTRACAUDAL; PERINEURAL AS NEEDED
Status: DISCONTINUED | OUTPATIENT
Start: 2020-01-01 | End: 2020-01-01 | Stop reason: HOSPADM

## 2020-01-01 RX ORDER — LIDOCAINE HYDROCHLORIDE 10 MG/ML
1-20 INJECTION INFILTRATION; PERINEURAL ONCE
Status: COMPLETED | OUTPATIENT
Start: 2020-01-01 | End: 2020-01-01

## 2020-01-01 RX ORDER — ALLOPURINOL 100 MG/1
TABLET ORAL
Qty: 180 TAB | Refills: 2 | Status: SHIPPED | OUTPATIENT
Start: 2020-01-01 | End: 2020-01-01

## 2020-01-01 RX ORDER — LIDOCAINE HYDROCHLORIDE 10 MG/ML
0.1 INJECTION, SOLUTION EPIDURAL; INFILTRATION; INTRACAUDAL; PERINEURAL AS NEEDED
Status: DISCONTINUED | OUTPATIENT
Start: 2020-01-01 | End: 2020-01-01 | Stop reason: HOSPADM

## 2020-01-01 RX ORDER — SUCRALFATE 1 G/1
1 TABLET ORAL 4 TIMES DAILY
Status: DISCONTINUED | OUTPATIENT
Start: 2020-01-01 | End: 2020-01-01 | Stop reason: HOSPADM

## 2020-01-01 RX ORDER — FLUMAZENIL 0.1 MG/ML
0.2 INJECTION INTRAVENOUS
Status: ACTIVE | OUTPATIENT
Start: 2020-01-01 | End: 2020-01-01

## 2020-01-01 RX ORDER — FENTANYL CITRATE 50 UG/ML
INJECTION, SOLUTION INTRAMUSCULAR; INTRAVENOUS
Status: COMPLETED
Start: 2020-01-01 | End: 2020-01-01

## 2020-01-01 RX ORDER — POTASSIUM CHLORIDE 750 MG/1
20 TABLET, FILM COATED, EXTENDED RELEASE ORAL 3 TIMES DAILY
Status: DISCONTINUED | OUTPATIENT
Start: 2020-01-01 | End: 2020-01-01 | Stop reason: HOSPADM

## 2020-01-01 RX ORDER — SODIUM CHLORIDE, SODIUM LACTATE, POTASSIUM CHLORIDE, CALCIUM CHLORIDE 600; 310; 30; 20 MG/100ML; MG/100ML; MG/100ML; MG/100ML
25 INJECTION, SOLUTION INTRAVENOUS CONTINUOUS
Status: DISCONTINUED | OUTPATIENT
Start: 2020-01-01 | End: 2020-01-01 | Stop reason: HOSPADM

## 2020-01-01 RX ORDER — GLYCOPYRROLATE 0.2 MG/ML
INJECTION INTRAMUSCULAR; INTRAVENOUS AS NEEDED
Status: DISCONTINUED | OUTPATIENT
Start: 2020-01-01 | End: 2020-01-01 | Stop reason: HOSPADM

## 2020-01-01 RX ORDER — LIDOCAINE HYDROCHLORIDE 10 MG/ML
1-20 INJECTION INFILTRATION; PERINEURAL
Status: DISCONTINUED | OUTPATIENT
Start: 2020-01-01 | End: 2020-01-01 | Stop reason: HOSPADM

## 2020-01-01 RX ORDER — METRONIDAZOLE 500 MG/100ML
500 INJECTION, SOLUTION INTRAVENOUS EVERY 12 HOURS
Status: DISCONTINUED | OUTPATIENT
Start: 2020-01-01 | End: 2020-01-01

## 2020-01-01 RX ORDER — ENOXAPARIN SODIUM 100 MG/ML
40 INJECTION SUBCUTANEOUS EVERY 12 HOURS
Status: DISCONTINUED | OUTPATIENT
Start: 2020-01-01 | End: 2020-01-01 | Stop reason: SDUPTHER

## 2020-01-01 RX ORDER — NALOXONE HYDROCHLORIDE 0.4 MG/ML
0.4 INJECTION, SOLUTION INTRAMUSCULAR; INTRAVENOUS; SUBCUTANEOUS
Status: ACTIVE | OUTPATIENT
Start: 2020-01-01 | End: 2020-01-01

## 2020-01-01 RX ORDER — ATORVASTATIN CALCIUM 80 MG/1
TABLET, FILM COATED ORAL
Qty: 90 TAB | Refills: 3 | Status: SHIPPED | OUTPATIENT
Start: 2020-01-01

## 2020-01-01 RX ORDER — ATROPINE SULFATE 0.1 MG/ML
0.5 INJECTION INTRAVENOUS
Status: ACTIVE | OUTPATIENT
Start: 2020-01-01 | End: 2020-01-01

## 2020-01-01 RX ORDER — FENTANYL CITRATE 50 UG/ML
25-50 INJECTION, SOLUTION INTRAMUSCULAR; INTRAVENOUS
Status: DISCONTINUED | OUTPATIENT
Start: 2020-01-01 | End: 2020-01-01

## 2020-01-01 RX ORDER — INSULIN LISPRO 100 [IU]/ML
INJECTION, SOLUTION INTRAVENOUS; SUBCUTANEOUS EVERY 6 HOURS
Status: DISCONTINUED | OUTPATIENT
Start: 2020-01-01 | End: 2020-01-01

## 2020-01-01 RX ORDER — BUMETANIDE 1 MG/1
2 TABLET ORAL DAILY
Status: DISCONTINUED | OUTPATIENT
Start: 2020-01-01 | End: 2020-01-01 | Stop reason: HOSPADM

## 2020-01-01 RX ORDER — INSULIN HUMAN 100 [IU]/ML
3 INJECTION, SUSPENSION SUBCUTANEOUS EVERY EVENING
Qty: 0.9 ML | Refills: 0 | Status: SHIPPED | OUTPATIENT
Start: 2020-01-01 | End: 2020-12-17

## 2020-01-01 RX ORDER — RIVAROXABAN 15 MG/1
TABLET, FILM COATED ORAL
Qty: 30 TAB | Refills: 0 | OUTPATIENT
Start: 2020-01-01

## 2020-01-01 RX ORDER — AMIODARONE HYDROCHLORIDE 200 MG/1
200 TABLET ORAL DAILY
Status: DISCONTINUED | OUTPATIENT
Start: 2020-01-01 | End: 2020-01-01 | Stop reason: HOSPADM

## 2020-01-01 RX ORDER — OXYCODONE AND ACETAMINOPHEN 5; 325 MG/1; MG/1
1 TABLET ORAL
Status: DISCONTINUED | OUTPATIENT
Start: 2020-01-01 | End: 2020-01-01

## 2020-01-01 RX ORDER — BLOOD SUGAR DIAGNOSTIC
STRIP MISCELLANEOUS
Qty: 200 STRIP | Refills: 10 | Status: SHIPPED | OUTPATIENT
Start: 2020-01-01 | End: 2020-01-01

## 2020-01-01 RX ORDER — POTASSIUM CHLORIDE 750 MG/1
10 TABLET, FILM COATED, EXTENDED RELEASE ORAL DAILY
Qty: 90 TAB | Refills: 3 | Status: SHIPPED | OUTPATIENT
Start: 2020-01-01

## 2020-01-01 RX ORDER — HEPARIN SODIUM 1000 [USP'U]/ML
INJECTION, SOLUTION INTRAVENOUS; SUBCUTANEOUS AS NEEDED
Status: DISCONTINUED | OUTPATIENT
Start: 2020-01-01 | End: 2020-01-01 | Stop reason: HOSPADM

## 2020-01-01 RX ORDER — METOPROLOL TARTRATE 25 MG/1
12.5 TABLET, FILM COATED ORAL 2 TIMES DAILY
Status: DISCONTINUED | OUTPATIENT
Start: 2020-01-01 | End: 2020-01-01 | Stop reason: HOSPADM

## 2020-01-01 RX ORDER — POLYETHYLENE GLYCOL 3350 17 G/17G
17 POWDER, FOR SOLUTION ORAL DAILY PRN
Status: DISCONTINUED | OUTPATIENT
Start: 2020-01-01 | End: 2020-01-01 | Stop reason: HOSPADM

## 2020-01-01 RX ORDER — ALLOPURINOL 100 MG/1
200 TABLET ORAL DAILY
Status: DISCONTINUED | OUTPATIENT
Start: 2020-01-01 | End: 2020-01-01 | Stop reason: HOSPADM

## 2020-01-01 RX ADMIN — CEFTRIAXONE 1 G: 1 INJECTION, POWDER, FOR SOLUTION INTRAMUSCULAR; INTRAVENOUS at 09:06

## 2020-01-01 RX ADMIN — Medication 5 ML: at 21:19

## 2020-01-01 RX ADMIN — CLOPIDOGREL BISULFATE 75 MG: 75 TABLET ORAL at 08:22

## 2020-01-01 RX ADMIN — ROCURONIUM BROMIDE 10 MG: 10 INJECTION INTRAVENOUS at 16:42

## 2020-01-01 RX ADMIN — ALLOPURINOL 200 MG: 100 TABLET ORAL at 08:21

## 2020-01-01 RX ADMIN — HUMAN INSULIN 3 UNITS: 100 INJECTION, SUSPENSION SUBCUTANEOUS at 19:42

## 2020-01-01 RX ADMIN — SUCRALFATE 1 G: 1 TABLET ORAL at 22:36

## 2020-01-01 RX ADMIN — Medication 10 ML: at 06:27

## 2020-01-01 RX ADMIN — SUCRALFATE 1 G: 1 TABLET ORAL at 09:13

## 2020-01-01 RX ADMIN — Medication 10 ML: at 22:30

## 2020-01-01 RX ADMIN — Medication 3 AMPULE: at 12:15

## 2020-01-01 RX ADMIN — GLYCOPYRROLATE 0.4 MG: 0.2 INJECTION, SOLUTION INTRAMUSCULAR; INTRAVENOUS at 18:00

## 2020-01-01 RX ADMIN — SUCRALFATE 1 G: 1 TABLET ORAL at 08:01

## 2020-01-01 RX ADMIN — CEFTRIAXONE 1 G: 1 INJECTION, POWDER, FOR SOLUTION INTRAMUSCULAR; INTRAVENOUS at 09:02

## 2020-01-01 RX ADMIN — Medication 10 ML: at 06:04

## 2020-01-01 RX ADMIN — SUCRALFATE 1 G: 1 TABLET ORAL at 17:13

## 2020-01-01 RX ADMIN — SUCRALFATE 1 G: 1 TABLET ORAL at 18:01

## 2020-01-01 RX ADMIN — FENTANYL CITRATE 12.5 MCG: 50 INJECTION, SOLUTION INTRAMUSCULAR; INTRAVENOUS at 13:15

## 2020-01-01 RX ADMIN — CLOPIDOGREL BISULFATE 75 MG: 75 TABLET ORAL at 09:13

## 2020-01-01 RX ADMIN — Medication 80 MG: at 12:17

## 2020-01-01 RX ADMIN — AMIODARONE HYDROCHLORIDE 100 MG: 200 TABLET ORAL at 09:15

## 2020-01-01 RX ADMIN — Medication 10 ML: at 07:02

## 2020-01-01 RX ADMIN — HUMAN INSULIN 23 UNITS: 100 INJECTION, SUSPENSION SUBCUTANEOUS at 08:23

## 2020-01-01 RX ADMIN — PROPOFOL 20 MG: 10 INJECTION, EMULSION INTRAVENOUS at 12:07

## 2020-01-01 RX ADMIN — LEVOTHYROXINE SODIUM 125 MCG: 0.12 TABLET ORAL at 05:24

## 2020-01-01 RX ADMIN — CLOPIDOGREL BISULFATE 75 MG: 75 TABLET ORAL at 09:11

## 2020-01-01 RX ADMIN — LEVOTHYROXINE SODIUM 125 MCG: 0.12 TABLET ORAL at 06:25

## 2020-01-01 RX ADMIN — SODIUM CHLORIDE 40 MG: 9 INJECTION, SOLUTION INTRAMUSCULAR; INTRAVENOUS; SUBCUTANEOUS at 08:00

## 2020-01-01 RX ADMIN — ALBUMIN (HUMAN) 25 G: 0.25 INJECTION, SOLUTION INTRAVENOUS at 14:35

## 2020-01-01 RX ADMIN — FUROSEMIDE 30 MG/HR: 10 INJECTION, SOLUTION INTRAMUSCULAR; INTRAVENOUS at 21:18

## 2020-01-01 RX ADMIN — ENOXAPARIN SODIUM 80 MG: 80 INJECTION SUBCUTANEOUS at 09:29

## 2020-01-01 RX ADMIN — ONDANSETRON 4 MG: 2 INJECTION INTRAMUSCULAR; INTRAVENOUS at 02:12

## 2020-01-01 RX ADMIN — HUMAN INSULIN 3 UNITS: 100 INJECTION, SUSPENSION SUBCUTANEOUS at 17:56

## 2020-01-01 RX ADMIN — BENZOCAINE, BUTAMBEN, AND TETRACAINE HYDROCHLORIDE 1 SPRAY: .028; .004; .004 AEROSOL, SPRAY TOPICAL at 08:14

## 2020-01-01 RX ADMIN — SUCCINYLCHOLINE CHLORIDE 120 MG: 20 INJECTION, SOLUTION INTRAMUSCULAR; INTRAVENOUS at 16:42

## 2020-01-01 RX ADMIN — Medication 10 ML: at 21:36

## 2020-01-01 RX ADMIN — Medication 10 ML: at 14:00

## 2020-01-01 RX ADMIN — PANTOPRAZOLE SODIUM 40 MG: 40 TABLET, DELAYED RELEASE ORAL at 09:48

## 2020-01-01 RX ADMIN — SUCRALFATE 1 G: 1 TABLET ORAL at 17:38

## 2020-01-01 RX ADMIN — CLOPIDOGREL BISULFATE 75 MG: 75 TABLET ORAL at 09:49

## 2020-01-01 RX ADMIN — HUMAN INSULIN 6 UNITS: 100 INJECTION, SUSPENSION SUBCUTANEOUS at 17:14

## 2020-01-01 RX ADMIN — FUROSEMIDE 100 MG: 10 INJECTION, SOLUTION INTRAMUSCULAR; INTRAVENOUS at 08:39

## 2020-01-01 RX ADMIN — SODIUM CHLORIDE, PRESERVATIVE FREE 300 UNITS: 5 INJECTION INTRAVENOUS at 10:35

## 2020-01-01 RX ADMIN — SUCRALFATE 1 G: 1 TABLET ORAL at 22:22

## 2020-01-01 RX ADMIN — Medication 10 ML: at 21:01

## 2020-01-01 RX ADMIN — SODIUM CHLORIDE 50 MCG/MIN: 900 INJECTION, SOLUTION INTRAVENOUS at 14:46

## 2020-01-01 RX ADMIN — ATORVASTATIN CALCIUM 80 MG: 40 TABLET, FILM COATED ORAL at 21:32

## 2020-01-01 RX ADMIN — DEXTRAN 70, GLYCERIN, HYPROMELLOSE 1 DROP: 1; 2; 3 SOLUTION/ DROPS OPHTHALMIC at 21:40

## 2020-01-01 RX ADMIN — Medication 10 ML: at 05:29

## 2020-01-01 RX ADMIN — HUMAN INSULIN 6 UNITS: 100 INJECTION, SUSPENSION SUBCUTANEOUS at 17:37

## 2020-01-01 RX ADMIN — SODIUM CHLORIDE 40 MG: 9 INJECTION, SOLUTION INTRAMUSCULAR; INTRAVENOUS; SUBCUTANEOUS at 08:48

## 2020-01-01 RX ADMIN — POLYETHYLENE GLYCOL 3350, SODIUM SULFATE ANHYDROUS, SODIUM BICARBONATE, SODIUM CHLORIDE, POTASSIUM CHLORIDE 4000 ML: 236; 22.74; 6.74; 5.86; 2.97 POWDER, FOR SOLUTION ORAL at 16:06

## 2020-01-01 RX ADMIN — FUROSEMIDE 100 MG: 10 INJECTION, SOLUTION INTRAMUSCULAR; INTRAVENOUS at 08:01

## 2020-01-01 RX ADMIN — DEXTRAN 70, GLYCERIN, HYPROMELLOSE 1 DROP: 1; 2; 3 SOLUTION/ DROPS OPHTHALMIC at 12:04

## 2020-01-01 RX ADMIN — ONDANSETRON 4 MG: 2 INJECTION INTRAMUSCULAR; INTRAVENOUS at 22:27

## 2020-01-01 RX ADMIN — ALBUTEROL SULFATE 2.5 MG: 0.83 SOLUTION RESPIRATORY (INHALATION) at 15:35

## 2020-01-01 RX ADMIN — AMIODARONE HYDROCHLORIDE 100 MG: 200 TABLET ORAL at 09:13

## 2020-01-01 RX ADMIN — POTASSIUM CHLORIDE 20 MEQ: 750 TABLET, FILM COATED, EXTENDED RELEASE ORAL at 18:24

## 2020-01-01 RX ADMIN — Medication 10 ML: at 06:31

## 2020-01-01 RX ADMIN — LIDOCAINE HYDROCHLORIDE 15 ML: 20 SOLUTION ORAL; TOPICAL at 12:54

## 2020-01-01 RX ADMIN — Medication 10 ML: at 21:38

## 2020-01-01 RX ADMIN — FUROSEMIDE 100 MG: 10 INJECTION, SOLUTION INTRAMUSCULAR; INTRAVENOUS at 17:37

## 2020-01-01 RX ADMIN — EPOETIN ALFA-EPBX 10000 UNITS: 10000 INJECTION, SOLUTION INTRAVENOUS; SUBCUTANEOUS at 21:18

## 2020-01-01 RX ADMIN — SODIUM CHLORIDE 500 ML: 900 INJECTION, SOLUTION INTRAVENOUS at 17:12

## 2020-01-01 RX ADMIN — ALLOPURINOL 200 MG: 100 TABLET ORAL at 09:48

## 2020-01-01 RX ADMIN — CLOPIDOGREL BISULFATE 75 MG: 75 TABLET ORAL at 12:40

## 2020-01-01 RX ADMIN — ATORVASTATIN CALCIUM 80 MG: 40 TABLET, FILM COATED ORAL at 21:10

## 2020-01-01 RX ADMIN — Medication 20 ML: at 22:32

## 2020-01-01 RX ADMIN — MORPHINE SULFATE 1 MG: 2 INJECTION, SOLUTION INTRAMUSCULAR; INTRAVENOUS at 02:48

## 2020-01-01 RX ADMIN — FUROSEMIDE 30 MG/HR: 10 INJECTION, SOLUTION INTRAMUSCULAR; INTRAVENOUS at 11:07

## 2020-01-01 RX ADMIN — FUROSEMIDE 30 MG/HR: 10 INJECTION, SOLUTION INTRAMUSCULAR; INTRAVENOUS at 03:52

## 2020-01-01 RX ADMIN — LIDOCAINE HYDROCHLORIDE 200 MG: 20 INJECTION, SOLUTION INFILTRATION; PERINEURAL at 10:35

## 2020-01-01 RX ADMIN — BUMETANIDE 2 MG: 1 TABLET ORAL at 09:14

## 2020-01-01 RX ADMIN — MIDAZOLAM 1 MG: 1 INJECTION INTRAMUSCULAR; INTRAVENOUS at 08:15

## 2020-01-01 RX ADMIN — HEPARIN SODIUM IN SODIUM CHLORIDE 400 UNITS: 200 INJECTION INTRAVENOUS at 10:35

## 2020-01-01 RX ADMIN — Medication 16 G: at 11:58

## 2020-01-01 RX ADMIN — ATORVASTATIN CALCIUM 80 MG: 40 TABLET, FILM COATED ORAL at 21:58

## 2020-01-01 RX ADMIN — ALLOPURINOL 200 MG: 100 TABLET ORAL at 08:36

## 2020-01-01 RX ADMIN — ATORVASTATIN CALCIUM 80 MG: 40 TABLET, FILM COATED ORAL at 21:18

## 2020-01-01 RX ADMIN — FUROSEMIDE 20 MG/HR: 10 INJECTION, SOLUTION INTRAMUSCULAR; INTRAVENOUS at 09:33

## 2020-01-01 RX ADMIN — SUCRALFATE 1 G: 1 TABLET ORAL at 14:13

## 2020-01-01 RX ADMIN — POTASSIUM CHLORIDE 10 MEQ: 750 TABLET, FILM COATED, EXTENDED RELEASE ORAL at 09:48

## 2020-01-01 RX ADMIN — Medication 10 ML: at 21:33

## 2020-01-01 RX ADMIN — AMIODARONE HYDROCHLORIDE 100 MG: 200 TABLET ORAL at 08:36

## 2020-01-01 RX ADMIN — ONDANSETRON 4 MG: 2 INJECTION INTRAMUSCULAR; INTRAVENOUS at 12:46

## 2020-01-01 RX ADMIN — METRONIDAZOLE 500 MG: 500 INJECTION, SOLUTION INTRAVENOUS at 14:34

## 2020-01-01 RX ADMIN — ATORVASTATIN CALCIUM 80 MG: 40 TABLET, FILM COATED ORAL at 21:01

## 2020-01-01 RX ADMIN — AMIODARONE HYDROCHLORIDE 100 MG: 200 TABLET ORAL at 09:01

## 2020-01-01 RX ADMIN — LEVOTHYROXINE SODIUM 125 MCG: 0.12 TABLET ORAL at 08:47

## 2020-01-01 RX ADMIN — ALLOPURINOL 200 MG: 100 TABLET ORAL at 13:53

## 2020-01-01 RX ADMIN — PROPOFOL 20 MG: 10 INJECTION, EMULSION INTRAVENOUS at 12:05

## 2020-01-01 RX ADMIN — Medication 5 ML: at 21:09

## 2020-01-01 RX ADMIN — SODIUM CHLORIDE 40 MG: 9 INJECTION, SOLUTION INTRAMUSCULAR; INTRAVENOUS; SUBCUTANEOUS at 21:13

## 2020-01-01 RX ADMIN — CEFTRIAXONE 1 G: 1 INJECTION, POWDER, FOR SOLUTION INTRAMUSCULAR; INTRAVENOUS at 09:13

## 2020-01-01 RX ADMIN — ACETAMINOPHEN 650 MG: 325 TABLET ORAL at 05:29

## 2020-01-01 RX ADMIN — INSULIN LISPRO 3 UNITS: 100 INJECTION, SOLUTION INTRAVENOUS; SUBCUTANEOUS at 08:50

## 2020-01-01 RX ADMIN — FENTANYL CITRATE 25 MCG: 50 INJECTION, SOLUTION INTRAMUSCULAR; INTRAVENOUS at 19:13

## 2020-01-01 RX ADMIN — MIDAZOLAM 1 MG: 1 INJECTION INTRAMUSCULAR; INTRAVENOUS at 13:08

## 2020-01-01 RX ADMIN — PHYTONADIONE 10 MG: 10 INJECTION, EMULSION INTRAMUSCULAR; INTRAVENOUS; SUBCUTANEOUS at 17:42

## 2020-01-01 RX ADMIN — Medication 10 ML: at 06:32

## 2020-01-01 RX ADMIN — PROCHLORPERAZINE EDISYLATE 5 MG: 5 INJECTION INTRAMUSCULAR; INTRAVENOUS at 23:07

## 2020-01-01 RX ADMIN — SUCRALFATE 1 G: 1 TABLET ORAL at 12:49

## 2020-01-01 RX ADMIN — SUCRALFATE 1 G: 1 TABLET ORAL at 17:22

## 2020-01-01 RX ADMIN — ETOMIDATE 20 MG: 2 INJECTION, SOLUTION INTRAVENOUS at 14:12

## 2020-01-01 RX ADMIN — Medication 10 ML: at 13:15

## 2020-01-01 RX ADMIN — SUCRALFATE 1 G: 1 TABLET ORAL at 13:00

## 2020-01-01 RX ADMIN — SODIUM CHLORIDE 500 ML: 900 INJECTION, SOLUTION INTRAVENOUS at 23:17

## 2020-01-01 RX ADMIN — LEVOTHYROXINE SODIUM 125 MCG: 0.12 TABLET ORAL at 07:30

## 2020-01-01 RX ADMIN — LEVOTHYROXINE SODIUM 125 MCG: 125 TABLET ORAL at 06:14

## 2020-01-01 RX ADMIN — POTASSIUM CHLORIDE 30 MEQ: 750 TABLET, FILM COATED, EXTENDED RELEASE ORAL at 13:53

## 2020-01-01 RX ADMIN — AMIODARONE HYDROCHLORIDE 100 MG: 200 TABLET ORAL at 13:53

## 2020-01-01 RX ADMIN — SODIUM CHLORIDE 40 MG: 9 INJECTION, SOLUTION INTRAMUSCULAR; INTRAVENOUS; SUBCUTANEOUS at 09:12

## 2020-01-01 RX ADMIN — LEVOTHYROXINE SODIUM 125 MCG: 0.12 TABLET ORAL at 07:21

## 2020-01-01 RX ADMIN — METRONIDAZOLE 500 MG: 500 INJECTION, SOLUTION INTRAVENOUS at 14:06

## 2020-01-01 RX ADMIN — ALBUTEROL SULFATE 2.5 MG: 2.5 SOLUTION RESPIRATORY (INHALATION) at 15:35

## 2020-01-01 RX ADMIN — ONDANSETRON HYDROCHLORIDE 4 MG: 2 INJECTION, SOLUTION INTRAMUSCULAR; INTRAVENOUS at 17:36

## 2020-01-01 RX ADMIN — MIDAZOLAM 1 MG: 1 INJECTION INTRAMUSCULAR; INTRAVENOUS at 15:53

## 2020-01-01 RX ADMIN — CLOPIDOGREL BISULFATE 75 MG: 75 TABLET ORAL at 08:25

## 2020-01-01 RX ADMIN — SODIUM CHLORIDE 40 MG: 9 INJECTION, SOLUTION INTRAMUSCULAR; INTRAVENOUS; SUBCUTANEOUS at 20:13

## 2020-01-01 RX ADMIN — ONDANSETRON HYDROCHLORIDE 4 MG: 2 INJECTION, SOLUTION INTRAMUSCULAR; INTRAVENOUS at 14:31

## 2020-01-01 RX ADMIN — BENZOCAINE, BUTAMBEN, AND TETRACAINE HYDROCHLORIDE 1 SPRAY: .028; .004; .004 AEROSOL, SPRAY TOPICAL at 12:55

## 2020-01-01 RX ADMIN — SODIUM CHLORIDE 40 MG: 9 INJECTION, SOLUTION INTRAMUSCULAR; INTRAVENOUS; SUBCUTANEOUS at 20:26

## 2020-01-01 RX ADMIN — Medication 10 ML: at 02:18

## 2020-01-01 RX ADMIN — FENTANYL CITRATE 25 MCG: 50 INJECTION INTRAMUSCULAR; INTRAVENOUS at 08:15

## 2020-01-01 RX ADMIN — Medication 10 ML: at 22:22

## 2020-01-01 RX ADMIN — Medication 10 ML: at 22:24

## 2020-01-01 RX ADMIN — SUCRALFATE 1 G: 1 TABLET ORAL at 13:54

## 2020-01-01 RX ADMIN — POTASSIUM & SODIUM PHOSPHATES POWDER PACK 280-160-250 MG 1 PACKET: 280-160-250 PACK at 14:13

## 2020-01-01 RX ADMIN — Medication 10 ML: at 05:23

## 2020-01-01 RX ADMIN — POTASSIUM CHLORIDE 10 MEQ: 750 TABLET, FILM COATED, EXTENDED RELEASE ORAL at 09:14

## 2020-01-01 RX ADMIN — SODIUM CHLORIDE 40 MG: 9 INJECTION, SOLUTION INTRAMUSCULAR; INTRAVENOUS; SUBCUTANEOUS at 09:06

## 2020-01-01 RX ADMIN — HUMAN INSULIN 8 UNITS: 100 INJECTION, SUSPENSION SUBCUTANEOUS at 21:38

## 2020-01-01 RX ADMIN — LEVOTHYROXINE SODIUM 125 MCG: 0.12 TABLET ORAL at 09:15

## 2020-01-01 RX ADMIN — FUROSEMIDE 30 MG/HR: 10 INJECTION, SOLUTION INTRAMUSCULAR; INTRAVENOUS at 14:08

## 2020-01-01 RX ADMIN — POTASSIUM CHLORIDE 10 MEQ: 750 TABLET, FILM COATED, EXTENDED RELEASE ORAL at 12:33

## 2020-01-01 RX ADMIN — SODIUM CHLORIDE: 900 INJECTION, SOLUTION INTRAVENOUS at 18:25

## 2020-01-01 RX ADMIN — SUCRALFATE 1 G: 1 TABLET ORAL at 17:15

## 2020-01-01 RX ADMIN — CEFTRIAXONE 1 G: 1 INJECTION, POWDER, FOR SOLUTION INTRAMUSCULAR; INTRAVENOUS at 12:46

## 2020-01-01 RX ADMIN — SUCRALFATE 1 G: 1 TABLET ORAL at 12:04

## 2020-01-01 RX ADMIN — SODIUM CHLORIDE 40 MG: 9 INJECTION, SOLUTION INTRAMUSCULAR; INTRAVENOUS; SUBCUTANEOUS at 09:29

## 2020-01-01 RX ADMIN — AMIODARONE HYDROCHLORIDE 200 MG: 200 TABLET ORAL at 17:15

## 2020-01-01 RX ADMIN — LEVOTHYROXINE SODIUM 125 MCG: 0.12 TABLET ORAL at 09:48

## 2020-01-01 RX ADMIN — HUMAN INSULIN 3 UNITS: 100 INJECTION, SUSPENSION SUBCUTANEOUS at 18:00

## 2020-01-01 RX ADMIN — FUROSEMIDE 30 MG/HR: 10 INJECTION, SOLUTION INTRAMUSCULAR; INTRAVENOUS at 17:01

## 2020-01-01 RX ADMIN — PROTAMINE SULFATE 70 MG: 10 INJECTION, SOLUTION INTRAVENOUS at 17:53

## 2020-01-01 RX ADMIN — HUMAN INSULIN 6 UNITS: 100 INJECTION, SUSPENSION SUBCUTANEOUS at 16:30

## 2020-01-01 RX ADMIN — PROPOFOL 10 MG: 10 INJECTION, EMULSION INTRAVENOUS at 12:11

## 2020-01-01 RX ADMIN — ATORVASTATIN CALCIUM 80 MG: 40 TABLET, FILM COATED ORAL at 21:09

## 2020-01-01 RX ADMIN — CLOPIDOGREL BISULFATE 75 MG: 75 TABLET ORAL at 09:48

## 2020-01-01 RX ADMIN — LIDOCAINE HYDROCHLORIDE 15 ML: 20 SOLUTION ORAL; TOPICAL at 08:14

## 2020-01-01 RX ADMIN — ONDANSETRON 4 MG: 2 INJECTION INTRAMUSCULAR; INTRAVENOUS at 22:46

## 2020-01-01 RX ADMIN — SODIUM CHLORIDE 50 ML/HR: 900 INJECTION, SOLUTION INTRAVENOUS at 09:00

## 2020-01-01 RX ADMIN — AMIODARONE HYDROCHLORIDE 100 MG: 200 TABLET ORAL at 09:17

## 2020-01-01 RX ADMIN — FUROSEMIDE 30 MG/HR: 10 INJECTION, SOLUTION INTRAMUSCULAR; INTRAVENOUS at 23:51

## 2020-01-01 RX ADMIN — HEPARIN SODIUM 12000 UNITS: 1000 INJECTION, SOLUTION INTRAVENOUS; SUBCUTANEOUS at 17:11

## 2020-01-01 RX ADMIN — LEVOTHYROXINE SODIUM 125 MCG: 0.12 TABLET ORAL at 06:11

## 2020-01-01 RX ADMIN — PHENYLEPHRINE HYDROCHLORIDE 50 MCG/MIN: 10 INJECTION INTRAVENOUS at 17:09

## 2020-01-01 RX ADMIN — POTASSIUM CHLORIDE 20 MEQ: 750 TABLET, FILM COATED, EXTENDED RELEASE ORAL at 21:58

## 2020-01-01 RX ADMIN — SODIUM CHLORIDE 40 MG: 9 INJECTION, SOLUTION INTRAMUSCULAR; INTRAVENOUS; SUBCUTANEOUS at 21:08

## 2020-01-01 RX ADMIN — ACETYLCYSTEINE 6462 MG: 200 SOLUTION ORAL; RESPIRATORY (INHALATION) at 01:56

## 2020-01-01 RX ADMIN — Medication 5 ML: at 05:22

## 2020-01-01 RX ADMIN — POTASSIUM & SODIUM PHOSPHATES POWDER PACK 280-160-250 MG 1 PACKET: 280-160-250 PACK at 18:01

## 2020-01-01 RX ADMIN — SODIUM CHLORIDE 40 MG: 9 INJECTION, SOLUTION INTRAMUSCULAR; INTRAVENOUS; SUBCUTANEOUS at 09:11

## 2020-01-01 RX ADMIN — POTASSIUM CHLORIDE 40 MEQ: 750 TABLET, FILM COATED, EXTENDED RELEASE ORAL at 08:24

## 2020-01-01 RX ADMIN — PROCHLORPERAZINE EDISYLATE 5 MG: 5 INJECTION INTRAMUSCULAR; INTRAVENOUS at 16:40

## 2020-01-01 RX ADMIN — SUCRALFATE 1 G: 1 TABLET ORAL at 08:47

## 2020-01-01 RX ADMIN — Medication 80 MCG: at 14:43

## 2020-01-01 RX ADMIN — POTASSIUM CHLORIDE 10 MEQ: 750 TABLET, FILM COATED, EXTENDED RELEASE ORAL at 12:03

## 2020-01-01 RX ADMIN — ALLOPURINOL 200 MG: 100 TABLET ORAL at 09:13

## 2020-01-01 RX ADMIN — Medication 10 ML: at 06:12

## 2020-01-01 RX ADMIN — SODIUM CHLORIDE 25 ML/HR: 900 INJECTION, SOLUTION INTRAVENOUS at 12:16

## 2020-01-01 RX ADMIN — DEXTRAN 70, GLYCERIN, HYPROMELLOSE 1 DROP: 1; 2; 3 SOLUTION/ DROPS OPHTHALMIC at 06:03

## 2020-01-01 RX ADMIN — NEOSTIGMINE METHYLSULFATE 3 MG: 1 INJECTION INTRAVENOUS at 18:00

## 2020-01-01 RX ADMIN — ATORVASTATIN CALCIUM 80 MG: 40 TABLET, FILM COATED ORAL at 21:04

## 2020-01-01 RX ADMIN — Medication 10 ML: at 05:51

## 2020-01-01 RX ADMIN — MORPHINE SULFATE 1 MG: 2 INJECTION, SOLUTION INTRAMUSCULAR; INTRAVENOUS at 19:54

## 2020-01-01 RX ADMIN — LIDOCAINE HYDROCHLORIDE 1.5 ML: 10 INJECTION, SOLUTION INFILTRATION; PERINEURAL at 15:58

## 2020-01-01 RX ADMIN — SODIUM CHLORIDE 40 MG: 9 INJECTION, SOLUTION INTRAMUSCULAR; INTRAVENOUS; SUBCUTANEOUS at 12:31

## 2020-01-01 RX ADMIN — SODIUM CHLORIDE 40 MG: 9 INJECTION, SOLUTION INTRAMUSCULAR; INTRAVENOUS; SUBCUTANEOUS at 09:02

## 2020-01-01 RX ADMIN — DEXTRAN 70, GLYCERIN, HYPROMELLOSE 1 DROP: 1; 2; 3 SOLUTION/ DROPS OPHTHALMIC at 17:01

## 2020-01-01 RX ADMIN — DEXTRAN 70, GLYCERIN, HYPROMELLOSE 1 DROP: 1; 2; 3 SOLUTION/ DROPS OPHTHALMIC at 17:21

## 2020-01-01 RX ADMIN — PANTOPRAZOLE SODIUM 40 MG: 40 TABLET, DELAYED RELEASE ORAL at 09:14

## 2020-01-01 RX ADMIN — SUCRALFATE 1 G: 1 TABLET ORAL at 13:21

## 2020-01-01 RX ADMIN — Medication 10 ML: at 21:58

## 2020-01-01 RX ADMIN — SODIUM CHLORIDE 75 ML/HR: 900 INJECTION, SOLUTION INTRAVENOUS at 10:56

## 2020-01-01 RX ADMIN — SUCRALFATE 1 G: 1 TABLET ORAL at 21:11

## 2020-01-01 RX ADMIN — Medication 10 ML: at 13:22

## 2020-01-01 RX ADMIN — SUCRALFATE 1 G: 1 TABLET ORAL at 23:16

## 2020-01-01 RX ADMIN — HUMAN INSULIN 6 UNITS: 100 INJECTION, SUSPENSION SUBCUTANEOUS at 17:22

## 2020-01-01 RX ADMIN — HUMAN INSULIN 23 UNITS: 100 INJECTION, SUSPENSION SUBCUTANEOUS at 08:00

## 2020-01-01 RX ADMIN — FENTANYL CITRATE 12.5 MCG: 50 INJECTION, SOLUTION INTRAMUSCULAR; INTRAVENOUS at 13:13

## 2020-01-01 RX ADMIN — SUCRALFATE 1 G: 1 TABLET ORAL at 21:18

## 2020-01-01 RX ADMIN — Medication 10 ML: at 06:03

## 2020-01-01 RX ADMIN — FUROSEMIDE 100 MG: 10 INJECTION, SOLUTION INTRAMUSCULAR; INTRAVENOUS at 17:14

## 2020-01-01 RX ADMIN — ROCURONIUM BROMIDE 5 MG: 10 INJECTION INTRAVENOUS at 14:12

## 2020-01-01 RX ADMIN — CLOPIDOGREL BISULFATE 75 MG: 75 TABLET ORAL at 09:15

## 2020-01-01 RX ADMIN — DEXTRAN 70, GLYCERIN, HYPROMELLOSE 1 DROP: 1; 2; 3 SOLUTION/ DROPS OPHTHALMIC at 15:20

## 2020-01-01 RX ADMIN — PHYTONADIONE 10 MG: 10 INJECTION, EMULSION INTRAMUSCULAR; INTRAVENOUS; SUBCUTANEOUS at 09:47

## 2020-01-01 RX ADMIN — ATORVASTATIN CALCIUM 80 MG: 40 TABLET, FILM COATED ORAL at 22:22

## 2020-01-01 RX ADMIN — SUCRALFATE 1 G: 1 TABLET ORAL at 12:00

## 2020-01-01 RX ADMIN — Medication 10 ML: at 05:14

## 2020-01-01 RX ADMIN — SUCRALFATE 1 G: 1 TABLET ORAL at 21:01

## 2020-01-01 RX ADMIN — METRONIDAZOLE 500 MG: 500 INJECTION, SOLUTION INTRAVENOUS at 02:34

## 2020-01-01 RX ADMIN — SUCRALFATE 1 G: 1 TABLET ORAL at 21:32

## 2020-01-01 RX ADMIN — FENTANYL CITRATE 50 MCG: 50 INJECTION, SOLUTION INTRAMUSCULAR; INTRAVENOUS at 14:26

## 2020-01-01 RX ADMIN — HUMAN INSULIN 5 UNITS: 100 INJECTION, SUSPENSION SUBCUTANEOUS at 09:07

## 2020-01-01 RX ADMIN — SODIUM CHLORIDE: 900 INJECTION, SOLUTION INTRAVENOUS at 16:30

## 2020-01-01 RX ADMIN — Medication 10 ML: at 14:13

## 2020-01-01 RX ADMIN — HUMAN INSULIN 3 UNITS: 100 INJECTION, SUSPENSION SUBCUTANEOUS at 19:22

## 2020-01-01 RX ADMIN — FUROSEMIDE 100 MG: 10 INJECTION, SOLUTION INTRAMUSCULAR; INTRAVENOUS at 12:33

## 2020-01-01 RX ADMIN — FUROSEMIDE 80 MG: 10 INJECTION, SOLUTION INTRAMUSCULAR; INTRAVENOUS at 11:32

## 2020-01-01 RX ADMIN — Medication 10 ML: at 21:26

## 2020-01-01 RX ADMIN — EPOETIN ALFA-EPBX 10000 UNITS: 10000 INJECTION, SOLUTION INTRAVENOUS; SUBCUTANEOUS at 00:00

## 2020-01-01 RX ADMIN — POTASSIUM CHLORIDE 10 MEQ: 750 TABLET, FILM COATED, EXTENDED RELEASE ORAL at 08:47

## 2020-01-01 RX ADMIN — PROPOFOL 20 MG: 10 INJECTION, EMULSION INTRAVENOUS at 12:09

## 2020-01-01 RX ADMIN — SUCRALFATE 1 G: 1 TABLET ORAL at 21:41

## 2020-01-01 RX ADMIN — CEFTRIAXONE 1 G: 1 INJECTION, POWDER, FOR SOLUTION INTRAMUSCULAR; INTRAVENOUS at 12:29

## 2020-01-01 RX ADMIN — CEFTRIAXONE 1 G: 1 INJECTION, POWDER, FOR SOLUTION INTRAMUSCULAR; INTRAVENOUS at 13:11

## 2020-01-01 RX ADMIN — HUMAN INSULIN 23 UNITS: 100 INJECTION, SUSPENSION SUBCUTANEOUS at 08:41

## 2020-01-01 RX ADMIN — DEXTRAN 70, GLYCERIN, HYPROMELLOSE 1 DROP: 1; 2; 3 SOLUTION/ DROPS OPHTHALMIC at 13:21

## 2020-01-01 RX ADMIN — LIDOCAINE HYDROCHLORIDE 80 MG: 20 INJECTION, SOLUTION EPIDURAL; INFILTRATION; INTRACAUDAL; PERINEURAL at 14:12

## 2020-01-01 RX ADMIN — FUROSEMIDE 80 MG: 40 TABLET ORAL at 08:51

## 2020-01-01 RX ADMIN — LEVOTHYROXINE SODIUM 125 MCG: 0.12 TABLET ORAL at 09:13

## 2020-01-01 RX ADMIN — FUROSEMIDE 100 MG: 10 INJECTION, SOLUTION INTRAMUSCULAR; INTRAVENOUS at 17:46

## 2020-01-01 RX ADMIN — ROCURONIUM BROMIDE 20 MG: 10 INJECTION INTRAVENOUS at 16:58

## 2020-01-01 RX ADMIN — FENTANYL CITRATE 25 MCG: 50 INJECTION, SOLUTION INTRAMUSCULAR; INTRAVENOUS at 15:53

## 2020-01-01 RX ADMIN — Medication 10 ML: at 05:59

## 2020-01-01 RX ADMIN — Medication 10 ML: at 21:20

## 2020-01-01 RX ADMIN — ONDANSETRON 4 MG: 2 INJECTION INTRAMUSCULAR; INTRAVENOUS at 02:18

## 2020-01-01 RX ADMIN — SODIUM CHLORIDE 500 ML: 900 INJECTION, SOLUTION INTRAVENOUS at 15:00

## 2020-01-01 RX ADMIN — SUCRALFATE 1 G: 1 TABLET ORAL at 09:48

## 2020-01-01 RX ADMIN — ATORVASTATIN CALCIUM 80 MG: 40 TABLET, FILM COATED ORAL at 22:21

## 2020-01-01 RX ADMIN — MORPHINE SULFATE 1 MG: 2 INJECTION, SOLUTION INTRAMUSCULAR; INTRAVENOUS at 00:18

## 2020-01-01 RX ADMIN — Medication 5 ML: at 05:14

## 2020-01-01 RX ADMIN — AMIODARONE HYDROCHLORIDE 100 MG: 200 TABLET ORAL at 08:25

## 2020-01-01 RX ADMIN — ALLOPURINOL 200 MG: 100 TABLET ORAL at 12:33

## 2020-01-01 RX ADMIN — PROPOFOL 50 MG: 10 INJECTION, EMULSION INTRAVENOUS at 12:03

## 2020-01-01 RX ADMIN — METRONIDAZOLE 500 MG: 500 INJECTION, SOLUTION INTRAVENOUS at 02:42

## 2020-01-01 RX ADMIN — HUMAN INSULIN 6 UNITS: 100 INJECTION, SUSPENSION SUBCUTANEOUS at 18:39

## 2020-01-01 RX ADMIN — ONDANSETRON 4 MG: 2 INJECTION INTRAMUSCULAR; INTRAVENOUS at 12:09

## 2020-01-01 RX ADMIN — ALLOPURINOL 200 MG: 100 TABLET ORAL at 08:25

## 2020-01-01 RX ADMIN — ACETYLCYSTEINE 12922 MG: 200 SOLUTION ORAL; RESPIRATORY (INHALATION) at 21:47

## 2020-01-01 RX ADMIN — SODIUM CHLORIDE 40 MG: 9 INJECTION, SOLUTION INTRAMUSCULAR; INTRAVENOUS; SUBCUTANEOUS at 12:33

## 2020-01-01 RX ADMIN — Medication 10 ML: at 13:29

## 2020-01-01 RX ADMIN — Medication 5 ML: at 05:00

## 2020-01-01 RX ADMIN — DEXAMETHASONE SODIUM PHOSPHATE 4 MG: 4 INJECTION, SOLUTION INTRAMUSCULAR; INTRAVENOUS at 14:31

## 2020-01-01 RX ADMIN — Medication 10 ML: at 13:54

## 2020-01-01 RX ADMIN — HUMAN INSULIN 5 UNITS: 100 INJECTION, SUSPENSION SUBCUTANEOUS at 09:29

## 2020-01-01 RX ADMIN — SODIUM CHLORIDE 40 MG: 9 INJECTION, SOLUTION INTRAMUSCULAR; INTRAVENOUS; SUBCUTANEOUS at 08:38

## 2020-01-01 RX ADMIN — Medication 10 ML: at 06:26

## 2020-01-01 RX ADMIN — POTASSIUM CHLORIDE 10 MEQ: 750 TABLET, FILM COATED, EXTENDED RELEASE ORAL at 08:01

## 2020-01-01 RX ADMIN — Medication 10 ML: at 21:37

## 2020-01-01 RX ADMIN — Medication 10 ML: at 05:39

## 2020-01-01 RX ADMIN — SUCRALFATE 1 G: 1 TABLET ORAL at 17:46

## 2020-01-01 RX ADMIN — Medication 10 ML: at 21:52

## 2020-01-01 RX ADMIN — FUROSEMIDE 30 MG/HR: 10 INJECTION, SOLUTION INTRAMUSCULAR; INTRAVENOUS at 06:31

## 2020-01-01 RX ADMIN — SUCRALFATE 1 G: 1 TABLET ORAL at 18:24

## 2020-01-01 RX ADMIN — Medication 10 ML: at 17:16

## 2020-01-01 RX ADMIN — Medication 10 ML: at 17:44

## 2020-01-01 RX ADMIN — POTASSIUM CHLORIDE 20 MEQ: 750 TABLET, FILM COATED, EXTENDED RELEASE ORAL at 09:48

## 2020-01-01 RX ADMIN — POTASSIUM & SODIUM PHOSPHATES POWDER PACK 280-160-250 MG 1 PACKET: 280-160-250 PACK at 09:48

## 2020-01-01 RX ADMIN — AMIODARONE HYDROCHLORIDE 100 MG: 200 TABLET ORAL at 09:47

## 2020-01-01 RX ADMIN — SUCCINYLCHOLINE CHLORIDE 100 MG: 20 INJECTION, SOLUTION INTRAMUSCULAR; INTRAVENOUS at 14:12

## 2020-01-01 RX ADMIN — FUROSEMIDE 100 MG: 10 INJECTION, SOLUTION INTRAMUSCULAR; INTRAVENOUS at 17:22

## 2020-01-01 RX ADMIN — MIDAZOLAM 1 MG: 1 INJECTION INTRAMUSCULAR; INTRAVENOUS at 13:15

## 2020-01-01 RX ADMIN — DEXAMETHASONE SODIUM PHOSPHATE 8 MG: 4 INJECTION, SOLUTION INTRAMUSCULAR; INTRAVENOUS at 17:36

## 2020-01-01 RX ADMIN — PANTOPRAZOLE SODIUM 40 MG: 40 TABLET, DELAYED RELEASE ORAL at 08:25

## 2020-01-01 RX ADMIN — SUCRALFATE 1 G: 1 TABLET ORAL at 08:21

## 2020-01-01 RX ADMIN — SUCRALFATE 1 G: 1 TABLET ORAL at 13:28

## 2020-01-01 RX ADMIN — Medication 5 ML: at 21:04

## 2020-01-01 RX ADMIN — Medication 10 ML: at 21:39

## 2020-01-01 RX ADMIN — LIDOCAINE HYDROCHLORIDE 100 MG: 20 INJECTION, SOLUTION INTRAVENOUS at 16:42

## 2020-01-01 RX ADMIN — POTASSIUM CHLORIDE 10 MEQ: 750 TABLET, FILM COATED, EXTENDED RELEASE ORAL at 08:36

## 2020-01-01 RX ADMIN — Medication 10 ML: at 18:57

## 2020-01-01 RX ADMIN — Medication 10 ML: at 17:48

## 2020-01-01 RX ADMIN — Medication 10 ML: at 14:38

## 2020-01-01 RX ADMIN — Medication 10 ML: at 22:32

## 2020-01-01 RX ADMIN — ATORVASTATIN CALCIUM 80 MG: 40 TABLET, FILM COATED ORAL at 21:20

## 2020-01-01 RX ADMIN — FENTANYL CITRATE 50 MCG: 50 INJECTION, SOLUTION INTRAMUSCULAR; INTRAVENOUS at 16:53

## 2020-01-01 RX ADMIN — EPOETIN ALFA-EPBX 10000 UNITS: 10000 INJECTION, SOLUTION INTRAVENOUS; SUBCUTANEOUS at 22:28

## 2020-01-01 RX ADMIN — POTASSIUM CHLORIDE 20 MEQ: 750 TABLET, FILM COATED, EXTENDED RELEASE ORAL at 09:15

## 2020-01-01 RX ADMIN — CEFTRIAXONE 1 G: 1 INJECTION, POWDER, FOR SOLUTION INTRAMUSCULAR; INTRAVENOUS at 09:29

## 2020-01-01 RX ADMIN — Medication 10 ML: at 16:58

## 2020-01-01 RX ADMIN — SODIUM CHLORIDE 75 ML/HR: 900 INJECTION, SOLUTION INTRAVENOUS at 08:42

## 2020-01-01 RX ADMIN — LEVOTHYROXINE SODIUM 125 MCG: 0.12 TABLET ORAL at 07:22

## 2020-01-01 RX ADMIN — LEVOTHYROXINE SODIUM 125 MCG: 0.12 TABLET ORAL at 06:01

## 2020-01-01 RX ADMIN — POTASSIUM CHLORIDE 10 MEQ: 750 TABLET, FILM COATED, EXTENDED RELEASE ORAL at 13:53

## 2020-01-01 RX ADMIN — SUCRALFATE 1 G: 1 TABLET ORAL at 09:15

## 2020-01-01 RX ADMIN — SUCRALFATE 1 G: 1 TABLET ORAL at 18:39

## 2020-01-01 RX ADMIN — LEVOTHYROXINE SODIUM 125 MCG: 0.12 TABLET ORAL at 05:12

## 2020-01-01 RX ADMIN — HUMAN INSULIN 23 UNITS: 100 INJECTION, SUSPENSION SUBCUTANEOUS at 09:12

## 2020-01-01 RX ADMIN — ENOXAPARIN SODIUM 80 MG: 80 INJECTION SUBCUTANEOUS at 23:17

## 2020-01-01 RX ADMIN — BUMETANIDE 2 MG: 0.25 INJECTION INTRAMUSCULAR; INTRAVENOUS at 16:20

## 2020-01-01 RX ADMIN — SODIUM CHLORIDE 40 MG: 9 INJECTION, SOLUTION INTRAMUSCULAR; INTRAVENOUS; SUBCUTANEOUS at 20:41

## 2020-01-01 RX ADMIN — FENTANYL CITRATE 50 MCG: 50 INJECTION, SOLUTION INTRAMUSCULAR; INTRAVENOUS at 16:42

## 2020-01-01 RX ADMIN — AMIODARONE HYDROCHLORIDE 100 MG: 200 TABLET ORAL at 09:11

## 2020-01-01 RX ADMIN — FUROSEMIDE 20 MG/HR: 10 INJECTION, SOLUTION INTRAMUSCULAR; INTRAVENOUS at 05:47

## 2020-01-01 RX ADMIN — AMIODARONE HYDROCHLORIDE 200 MG: 200 TABLET ORAL at 08:51

## 2020-01-01 RX ADMIN — AMIODARONE HYDROCHLORIDE 100 MG: 200 TABLET ORAL at 12:40

## 2020-01-01 RX ADMIN — LEVOTHYROXINE SODIUM 125 MCG: 0.12 TABLET ORAL at 08:01

## 2020-01-01 RX ADMIN — ATORVASTATIN CALCIUM 80 MG: 40 TABLET, FILM COATED ORAL at 21:41

## 2020-01-01 RX ADMIN — ALBUMIN (HUMAN) 25 G: 0.25 INJECTION, SOLUTION INTRAVENOUS at 08:56

## 2020-01-01 RX ADMIN — Medication 10 ML: at 22:33

## 2020-01-01 RX ADMIN — Medication 10 ML: at 05:45

## 2020-01-01 RX ADMIN — Medication 10 ML: at 14:36

## 2020-01-01 RX ADMIN — FUROSEMIDE 120 MG: 40 TABLET ORAL at 08:25

## 2020-01-01 RX ADMIN — SUCRALFATE 1 G: 1 TABLET ORAL at 17:01

## 2020-01-01 RX ADMIN — SUCRALFATE 1 G: 1 TABLET ORAL at 17:20

## 2020-01-01 RX ADMIN — MORPHINE SULFATE 1 MG: 2 INJECTION, SOLUTION INTRAMUSCULAR; INTRAVENOUS at 01:04

## 2020-01-01 RX ADMIN — Medication 10 ML: at 22:28

## 2020-01-01 RX ADMIN — Medication 5 ML: at 21:20

## 2020-01-01 RX ADMIN — SODIUM CHLORIDE: 900 INJECTION, SOLUTION INTRAVENOUS at 14:06

## 2020-01-01 RX ADMIN — SUCRALFATE 1 G: 1 TABLET ORAL at 08:36

## 2020-01-01 RX ADMIN — SUCRALFATE 1 G: 1 TABLET ORAL at 21:20

## 2020-01-01 RX ADMIN — Medication 10 ML: at 21:12

## 2020-01-01 RX ADMIN — PROPOFOL 170 MG: 10 INJECTION, EMULSION INTRAVENOUS at 09:09

## 2020-01-01 RX ADMIN — MORPHINE SULFATE 1 MG: 2 INJECTION, SOLUTION INTRAMUSCULAR; INTRAVENOUS at 18:20

## 2020-01-01 RX ADMIN — FUROSEMIDE 20 MG/HR: 10 INJECTION, SOLUTION INTRAMUSCULAR; INTRAVENOUS at 10:59

## 2020-01-01 RX ADMIN — Medication 10 ML: at 16:48

## 2020-01-01 RX ADMIN — Medication 80 MCG: at 14:46

## 2020-01-01 RX ADMIN — LEVOTHYROXINE SODIUM 125 MCG: 0.12 TABLET ORAL at 13:53

## 2020-01-01 RX ADMIN — PANTOPRAZOLE SODIUM 40 MG: 40 TABLET, DELAYED RELEASE ORAL at 08:36

## 2020-01-01 RX ADMIN — LEVOTHYROXINE SODIUM 125 MCG: 0.12 TABLET ORAL at 05:59

## 2020-01-01 RX ADMIN — SODIUM CHLORIDE 40 MG: 9 INJECTION, SOLUTION INTRAMUSCULAR; INTRAVENOUS; SUBCUTANEOUS at 17:15

## 2020-01-01 RX ADMIN — Medication 10 ML: at 22:29

## 2020-01-01 RX ADMIN — PROPOFOL 130 MG: 10 INJECTION, EMULSION INTRAVENOUS at 16:42

## 2020-01-01 RX ADMIN — Medication 1 LOZENGE: at 10:38

## 2020-01-01 RX ADMIN — Medication 10 ML: at 06:23

## 2020-01-01 RX ADMIN — LIDOCAINE HYDROCHLORIDE 80 MG: 20 INJECTION, SOLUTION EPIDURAL; INFILTRATION; INTRACAUDAL; PERINEURAL at 08:59

## 2020-01-01 RX ADMIN — FENTANYL CITRATE 25 MCG: 50 INJECTION, SOLUTION INTRAMUSCULAR; INTRAVENOUS at 15:56

## 2020-01-01 RX ADMIN — SODIUM CHLORIDE 50 ML/HR: 900 INJECTION, SOLUTION INTRAVENOUS at 09:15

## 2020-01-01 RX ADMIN — FUROSEMIDE 100 MG: 10 INJECTION, SOLUTION INTRAMUSCULAR; INTRAVENOUS at 09:12

## 2020-01-01 RX ADMIN — DEXTRAN 70, GLYCERIN, HYPROMELLOSE 1 DROP: 1; 2; 3 SOLUTION/ DROPS OPHTHALMIC at 18:01

## 2020-01-01 RX ADMIN — SODIUM CHLORIDE 50 ML/HR: 900 INJECTION, SOLUTION INTRAVENOUS at 18:56

## 2020-01-01 RX ADMIN — FUROSEMIDE 120 MG: 40 TABLET ORAL at 18:39

## 2020-01-01 RX ADMIN — Medication 5 ML: at 05:04

## 2020-01-01 RX ADMIN — FUROSEMIDE 100 MG: 10 INJECTION, SOLUTION INTRAMUSCULAR; INTRAVENOUS at 17:38

## 2020-01-01 RX ADMIN — HUMAN INSULIN 24 UNITS: 100 INJECTION, SUSPENSION SUBCUTANEOUS at 08:50

## 2020-01-01 RX ADMIN — SUCRALFATE 1 G: 1 TABLET ORAL at 08:24

## 2020-01-01 RX ADMIN — ALLOPURINOL 200 MG: 100 TABLET ORAL at 08:46

## 2020-01-01 RX ADMIN — PANTOPRAZOLE SODIUM 40 MG: 40 TABLET, DELAYED RELEASE ORAL at 08:21

## 2020-01-01 RX ADMIN — AMIODARONE HYDROCHLORIDE 100 MG: 200 TABLET ORAL at 08:22

## 2020-01-01 RX ADMIN — POTASSIUM CHLORIDE 10 MEQ: 750 TABLET, FILM COATED, EXTENDED RELEASE ORAL at 08:23

## 2020-01-01 RX ADMIN — HEPARIN SODIUM 3000 UNITS: 1000 INJECTION, SOLUTION INTRAVENOUS; SUBCUTANEOUS at 17:28

## 2020-01-01 RX ADMIN — METOPROLOL TARTRATE 25 MG: 25 TABLET, FILM COATED ORAL at 17:15

## 2020-01-01 RX ADMIN — Medication 10 ML: at 14:35

## 2020-01-01 RX ADMIN — SUCRALFATE 1 G: 1 TABLET ORAL at 21:58

## 2020-01-01 RX ADMIN — ALLOPURINOL 200 MG: 100 TABLET ORAL at 08:01

## 2020-01-01 RX ADMIN — SUCRALFATE 1 G: 1 TABLET ORAL at 08:51

## 2020-01-01 RX ADMIN — HUMAN INSULIN 5 UNITS: 100 INJECTION, SUSPENSION SUBCUTANEOUS at 09:01

## 2020-01-01 RX ADMIN — ALLOPURINOL 200 MG: 100 TABLET ORAL at 09:15

## 2020-01-01 RX ADMIN — AMIODARONE HYDROCHLORIDE 100 MG: 200 TABLET ORAL at 09:48

## 2020-01-01 RX ADMIN — Medication 10 ML: at 15:20

## 2020-01-01 RX ADMIN — SUCRALFATE 1 G: 1 TABLET ORAL at 21:04

## 2020-01-01 RX ADMIN — ONDANSETRON 4 MG: 2 INJECTION INTRAMUSCULAR; INTRAVENOUS at 08:44

## 2020-01-01 RX ADMIN — FUROSEMIDE 20 MG/HR: 10 INJECTION, SOLUTION INTRAMUSCULAR; INTRAVENOUS at 19:40

## 2020-01-01 RX ADMIN — MORPHINE SULFATE 1 MG: 2 INJECTION, SOLUTION INTRAMUSCULAR; INTRAVENOUS at 02:14

## 2020-01-01 RX ADMIN — CLOPIDOGREL BISULFATE 75 MG: 75 TABLET ORAL at 08:36

## 2020-01-01 RX ADMIN — HUMAN INSULIN 6 UNITS: 100 INJECTION, SUSPENSION SUBCUTANEOUS at 17:47

## 2020-01-01 RX ADMIN — AMIODARONE HYDROCHLORIDE 100 MG: 200 TABLET ORAL at 09:06

## 2020-01-01 RX ADMIN — METRONIDAZOLE 500 MG: 500 INJECTION, SOLUTION INTRAVENOUS at 14:15

## 2020-01-01 RX ADMIN — AMIODARONE HYDROCHLORIDE 100 MG: 200 TABLET ORAL at 08:47

## 2020-01-01 RX ADMIN — PANTOPRAZOLE SODIUM 40 MG: 40 TABLET, DELAYED RELEASE ORAL at 08:50

## 2020-01-02 NOTE — PROGRESS NOTES
Patient arrived to Non-Invasive Cardiology Lab for Out Patient RUDDY Procedure. Staff introduced to patient. Patient identifiers verified with Name and Date of Birth. Procedure verified with patient. Consent forms reviewed and signed by patient or authorized representative and verified. Allergies verified. Patient informed of procedure and plan of care. Questions answered with review. Patient on cardiac monitor, non-invasive blood pressure, SPO2 monitor. On 2l NC. Patient is A&Ox3. Patient reports no complaints. Patient on stretcher, in low position, with side rails up. Patient instructed to call for assistance as needed. Family in waiting room.

## 2020-01-02 NOTE — DISCHARGE INSTRUCTIONS
DISCHARGE SUMMARY       The following personal items collected during your admission are returned to you:   Jewelry:  With patient, earrings, rings,  & watch  Clothing:  Shirt, shoes  Cane        PATIENT INSTRUCTIONS: Continue taking all the same medications as previously directed. You had a transesophageal echocardiogram, your throat was numbed for the procedure, so start with sips of water and advance diet as swallowing returns to normal. Avoid any scalding hot beverages for the next 2 hours. Dr. Everardo Collado office will call you regarding follow-up. Please keep and previously scheduled appointments. What to do at Home:  Recommended activity: No driving today      The discharge information has been reviewed with the PATIENT/Daughter . The PATIENT  verbalized understanding.

## 2020-01-14 NOTE — TELEPHONE ENCOUNTER
PCP: Thelma Dyer MD    Last appt: 9/25/2019  Future Appointments   Date Time Provider Dafne Jenkins   1/15/2020  1:00 PM [facility] CARDIOPULM EXERCISE [facility]   1/20/2020  1:00 PM [facility]ARD[facility]OPULM [facility]ERCISE [facility]   1/22/2020  1:00 PM [facility] CARDIOPULM EXERCISE [facility]   1/27/2020  1:00 PM MR[facility] CARDIOPULM EXERCISE [facility]   1/29/2020  1:00 PM MRM CARDIOPULM EXERCISE [facility]   2/3/2020  1:00 PM MR[facility] CARDIOPULM EXERCISE [facility]   2/5/2020  1:00 PM MRM CARDIOPULM EXERCISE [facility]   2/10/2020  1:00 PM MRM CARDIOPULM EXERCISE [facility]   3/20/2020 11:15 AM [facility]       Requested Prescriptions     Pending Prescriptions Disp Refills    glucose blood VI test strips (ACCU-CHEK SHAHIDA PLUS TEST STRP) strip 200 Strip 10     Sig: USE TO TEST BLOOD SUGAR FOUR TIMES DAILY

## 2020-02-19 PROBLEM — Z95.818 PRESENCE OF WATCHMAN LEFT ATRIAL APPENDAGE CLOSURE DEVICE: Status: ACTIVE | Noted: 2020-01-01

## 2020-02-19 NOTE — Clinical Note
[facility]

## 2020-02-19 NOTE — PROGRESS NOTES
Cardiac Cath Lab Recovery Arrival Note: 
 
 
[place] arrived to Cardiac Cath Lab, Recovery Area. Staff introduced to patient. Patient identifiers verified with NAME and DATE OF BIRTH. Procedure verified with patient. Consent forms reviewed and signed by patient or authorized representative and verified. Allergies verified. Patient and family oriented to department. Patient and family informed of procedure and plan of care. Questions answered with review. Patient prepped for procedure, per orders from physician, prior to arrival. 
 
Patient on cardiac monitor, non-invasive blood pressure, SPO2 monitor. On room. Patient is A&Ox 3. Patient reports no c/o. Patient in stretcher, in low position, with side rails up, call bell within reach, patient instructed to call if assistance as needed. Patient prep in: [address], [place]. Patient family has pager # none Family in: CCL waiting area.   
Prep by: Lee Hansen, RODGER and Agapito March RN

## 2020-02-19 NOTE — ANESTHESIA PROCEDURE NOTES
Arterial Line Placement Performed by: Chioma Bellaym DO Authorized by: Chioma Bellamy DO  
 
Pre-Procedure Indications:  Blood sampling and arterial pressure monitoring Preanesthetic Checklist: patient identified, risks and benefits discussed, anesthesia consent, site marked, patient being monitored, timeout performed and patient being monitored Procedure:  
Prep:  ChloraPrep Seldinger Technique?: Yes Orientation:  Right Location:  Radial artery Catheter size:  20 G Number of attempts:  2 Assessment:  
Post-procedure:  Line secured and sterile dressing applied Patient Tolerance:  Patient tolerated the procedure well with no immediate complications Comment:  
collateral perfusion verified

## 2020-02-19 NOTE — PERIOP NOTES
Handoff Report from Operating Room to PACU Report received from Lilly Sorto RN and Nova Roger, CRNA regarding Hetal Beltran. Surgeon(s): 
Anesthesia, Case  And Procedure(s) (LRB): 
PACU/RECOVERY FROM RUDDY / WATCHMAN (N/A)  confirmed  
with allergies and dressings discussed. Anesthesia type, drugs, patient history, complications, estimated blood loss, vital signs, intake and output, and last pain medication, lines, reversal medications and temperature were reviewed.

## 2020-02-19 NOTE — Clinical Note
TRANSFER - OUT REPORT:     Verbal report given to: RODGER Shah. Report consisted of patient's Situation, Background, Assessment and   Recommendations(SBAR). Opportunity for questions and clarification was provided. Patient transported with a Registered Nurse, Monitor and Oxygen. Oxygen used for patient = nasal cannula, @ 2 - 6 Liters.     Patient transported to: PACU.   transported with CRNA and EP Lab staff

## 2020-02-19 NOTE — Clinical Note
Sheath #1: Sheath: inserted. Sheath inserted/placed in the right femoral  vein. Hemostasis achieved.  8fr sheath RFV advanced/ aspirated and flushed

## 2020-02-19 NOTE — ANESTHESIA PREPROCEDURE EVALUATION
Anesthetic History No history of anesthetic complications Review of Systems / Medical History Patient summary reviewed, nursing notes reviewed and pertinent labs reviewed Pulmonary Shortness of breath and smoker Comments: Former smoker - [address] - 5 pack years Neuro/Psych CVA TIA Cardiovascular Hypertension Valvular problems/murmurs CHF Dysrhythmias : atrial fibrillation and atrial flutter CAD, cardiac stents and hyperlipidemia Exercise tolerance: <4 METS Comments: S/P MVR 04/2019 Coronary stent 
TTE (1/22/18): Mild MR, mild-to-moderate MS, EF=65% GI/Hepatic/Renal 
  
GERD Renal disease: CRI 
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  
Hx Thrombocytopenia Gout Chronic knee pain Physical Exam 
 
Airway Mallampati: III 
TM Distance: 4 - 6 cm Neck ROM: decreased range of motion Mouth opening: Normal 
 
 Cardiovascular Rhythm: regular Rate: normal 
 
 
 
 Dental 
 
Dentition: Lower partial plate, Caps/crowns and Loose teeth Pulmonary Breath sounds clear to auscultation Abdominal 
GI exam deferred Other Findings Anesthetic Plan ASA: 3 Anesthesia type: general 
 
Monitoring Plan: Arterial line and RUDDY Induction: Intravenous Anesthetic plan and risks discussed with: Patient and Son / Daughter

## 2020-02-19 NOTE — Clinical Note
Sheath #1: sheath exchanged for SET INTRO CHECKFLO 16 FR 13CM -- RCF-16.0-38-J. 8fr sheath RFV removed/  16fr sheath advanced RFV/ aspirated and flushed

## 2020-02-20 NOTE — PROGRESS NOTES
CM acknowleged discharge order. CM tasks complete. Nursing informed. Viviane Mfcadden RN CM Ext [phone]

## 2020-02-20 NOTE — PROGRESS NOTES
[address] Follow up appointments No qualifying dx for [facility]; not homebound Reason for Admission:   Afib, s/p watchman this admission RUR Score:     21 Do you (patient/family) have any concerns for transition/discharge? No concerns at this time. Plan for utilizing home health:   No qualifying dx for [facility]; not homebound Current Advanced Directive/Advance Care Plan: On file. Rubin OSORIO 
 
CM met with patient to confirm demographics. Pt name and  confirmed as pt identifiers. ADL's/IADL's - independent pta. Drives occasionally. DME - cane Preferred Rx -  [facility] and [address] Daughter will be providing transport and discharge and staying as long as need. Care Management Interventions PCP Verified by CM: Yes Mode of Transport at Discharge: Other (see comment)(family) Transition of Care Consult (CM Consult): Discharge Planning Discharge Durable Medical Equipment: No(has cane) Physical Therapy Consult: No 
Occupational Therapy Consult: No 
Speech Therapy Consult: No 
Current Support Network: Lives Alone(55+ Community - 1 story no steps for entry) Confirm Follow Up Transport: Family Discharge Location Discharge Placement: Home Verena Hurtado RN CM Ext [phone]

## 2020-02-20 NOTE — ANESTHESIA POSTPROCEDURE EVALUATION
Procedure(s): WATCHMAN TRAN CLOSURE DEVICE. general 
 
Anesthesia Post Evaluation Patient location during evaluation: PACU Note status: Adequate. Level of consciousness: responsive to verbal stimuli and sleepy but conscious Pain management: satisfactory to patient Airway patency: patent Anesthetic complications: no 
Cardiovascular status: acceptable Respiratory status: acceptable Hydration status: acceptable Comments: +Post-Anesthesia Evaluation and Assessment Patient: Brad Rivera MRN: [id number]  SSN: [id number] YOB: 1935  Age: 82 y.o. Sex: female Cardiovascular Function/Vital Signs /41 (BP 1 Location: Left arm, BP Patient Position: At rest)   Pulse (!) 55   Temp 36.4 °C (97.6 °F)   Resp 20   Ht 5' 10\" (1.778 m)   Wt 84.8 kg (187 lb)   SpO2 100%   Breastfeeding No   BMI 26.83 kg/m² Patient is status post Procedure(s): WATCHMAN TRAN CLOSURE DEVICE. Nausea/Vomiting: Controlled. Postoperative hydration reviewed and adequate. Pain: 
Pain Scale 1: Numeric (0 - 10) (02/19/20 1915) Pain Intensity 1: 6 (02/19/20 1915) Managed. Neurological Status:  
Neuro (WDL): Exceptions to WDL (02/19/20 1830) At baseline. Mental Status and Level of Consciousness: Arousable. Pulmonary Status:  
O2 Device: Nasal cannula (02/19/20 1915) Adequate oxygenation and airway patent. Complications related to anesthesia: None Post-anesthesia assessment completed. No concerns. Signed By: Amanda Garcia MD  
 2/19/2020 Post anesthesia nausea and vomiting:  controlled Vitals Value Taken Time BP Temp Pulse 54 2/19/2020  7:30 PM  
Resp 16 2/19/2020  7:30 PM  
SpO2 100 % 2/19/2020  7:30 PM  
Vitals shown include unvalidated device data.

## 2020-02-20 NOTE — DISCHARGE INSTRUCTIONS
Patient ID:  Claudia Grayson  [id number]  89 y.o.  1935    Admit Date: 2/19/2020    Discharge Date: 2/20/2020     Admitting Physician: Lawyer Jennie MD     Discharge Physician: Gustavo Trinh NP    Admission Diagnoses:   Atrial fibrillation, unspecified type [facility]) [I48.91]  Presence of Watchman left atrial appendage closure device [Z95.818]    Discharge Diagnoses: Active Problems:    Presence of Watchman left atrial appendage closure device (2/19/2020)        Discharge Condition: {condition:60566817}    Cardiology Procedures this Admission:  {CARDIOLOGY PROCEDURES:44549673}    Disposition: {disposition:94407}    Reference discharge instructions provided by nursing for diet and activity. Signed:  Gustavo Trinh NP  2/20/2020  11:18 AM        POST-EP STUDY AND/OR ABLATION DISCHARGE INSTRUCTIONS:      Do not drive, operate any machinery, or sign any legal documents for 24 hours after your procedure. You must have someone to drive you home. You may take a shower 24 hours after your cardiac procedure. Be sure to get the dressing wet and then remove it; gently wash the area with warm soapy water. Pat dry and leave open to air. To help prevent infections, be sure to keep the cath site clean and dry. No lotions, creams, powders, ointments, etc. in the cath site for approximately 1 week.  Do not take a tub bath, get in a hot tub or swimming pool for approximately 5 days or until the cath site is completely healed.  No strenuous activity or heavy lifting over 20 lbs. for 7 days.  After your procedure, some bruising or discomfort is common during the healing process. Tylenol, 1-2 tablets every 6 hours as needed, is recommended if you experience any discomfort.   If you experience any signs or symptoms of infection such as fever, chills, or poorly healing incision, persistent tenderness or swelling in the groin, redness and/or warmth to the touch, numbness, significant tingling or pain at the groin site or affected extremity, rash, drainage from the site, or if the leg feels tight or swollen, call your physician right away.  If bleeding at the site occurs, take a clean gauze pad and apply direct pressure to the groin just above the puncture site, and call your physician right away.  If your procedure involved ablation therapy, you may feel some mild or vague chest discomfort due to delivery of heat therapy to the heart muscle. This should resolve in 1-2 days. If it gets worse or is associated with shortness of breath, dizziness, loss of consciousness, call your physician right away or call 911 if emergency medical care is needed.

## 2020-02-20 NOTE — TELEPHONE ENCOUNTER
Patient's Daughter [facility], states patient will be out of medication by Sunday & is requesting refill approval by tomorrow, Friday, 2/21/20. [facility] requests a call when approved.  Thank you

## 2020-02-20 NOTE — PROGRESS NOTES
07:00 - Bedside report rec'd from Checo Esa, [address] [place] [address]. R groin site with bruising, area soft to palpation. Dressing CDI.   
 
09:35 - Verbal order rec'd for throat lozenges. No c/o difficulty swallowing, only some soreness. Daughter at bedside. 10:20 - Patient concerned about rectal/vaginal bleeding (prior hx of GIB on Xarelto) after wiping in bathroom and seeing blood. Examined patient and no visible areas of bleeding were noted. Patient had some dried blood in her pubic hair which I cleaned off and reassured patient that this was most likely the cause. 12:05 - Discharge instructions given to patient and daughter. Verbalizes understanding. PIVs and tele removed.

## 2020-02-20 NOTE — PROGRESS NOTES
2005-  Rec'd pt from PACU. Pt had a pressure dressing to right groin. Noted pt also had a dressing to right forearm. Pt alert, Ox4. Denies pain. Only c/o some soreness to right groin but denies need for pain medication. Family currently at bedside. 2300-  Assisted pt in to sitting position. Pt c/o feeling mildly lightheaded. Pt stated that the feeling disapeared after sitting up for a couple of minutes. Pt ambulated to bathroom with assistance from nurse. Tolerated well. Pt back to bed afterwards. 4727-  Pressure dressing removed from right groin. No bleeding or hematoma noted to site but ecchymosis was present. Dressing to right forarm removed. Noted  1 inch long skin tear with skin missing. Small Mepilex dressing applied. 8460-  Bedside report given to RODGER Godfrey. She will assume care of pt.

## 2020-02-20 NOTE — DISCHARGE SUMMARY
Cardiology Discharge Summary Patient ID: Naida Rios [id number] 
89 y.o. 
1935 Admit Date: 2/19/2020    Discharge Date: 2/20/2020 Admitting Physician: Keara Eagle MD  
Discharge Physician: Keara Eagle MD 
 
Cardiology Procedures this Admission: 
1. Watchman procedure on 2/19 2. Limited echo on 2/20, OK Hospital Course and Discharge Exam: 
Underwent a Watchman procedure yesterday, no complications. Had some groin bleeding at the access site last night, manual pressure held, she did OK. She's been on Lovenox in place of her Xarelto. Got 40 mg SQ 2/19 PM, 80 mg on 2/20 AM, so will restart Xarelto 15 mg on 2/21 AM, the following day at lunch, then the following day at her usual dinner time thereafter. Her GFR is estimated at ~20. All questions answered. Aware of signs and symptoms warranting urgent medical follow-up or calling 911. Visit Vitals BP (!) 162/91 Pulse (!) 58 Temp 97.3 °F (36.3 °C) Resp 18 Ht 5' 10\" (1.778 m) Wt 84.8 kg (187 lb) SpO2 95% Breastfeeding No  
BMI 26.83 kg/m² Physical Exam: 
Nondiaphoretic, not in acute distress. Unlabored, clear to auscultation bilaterally. Regular rate and rhythm, no rub. Palpable pulses. R groin site OK. No cyanosis. Skin warm and dry. Awake, appropriate, neuro grossly nonfocal.  Ambulatory. Disposition: home Patient Instructions:  
Current Discharge Medication List  
  
CONTINUE these medications which have NOT CHANGED Details  
glucose blood VI test strips (ACCU-CHEK SHAHIDA PLUS TEST STRP) strip USE TO TEST BLOOD SUGAR FOUR TIMES DAILY Qty: 200 Strip, Refills: 10  
 Associated Diagnoses: Controlled type 2 diabetes mellitus with stage 3 chronic kidney disease, with long-term current use of insulin ([address] folic acid/multivit-min/lutein (CENTRUM SILVER PO) Take  by mouth. amiodarone (CORDARONE) 200 mg tablet TAKE 1 TABLET BY MOUTH TWICE DAILY Qty: 180 Tab, Refills: 0  
  
metoprolol tartrate (LOPRESSOR) 50 mg tablet TK 1 AND 1/2 TS PO BID Refills: 3  
  
allopurinol (ZYLOPRIM) 100 mg tablet TAKE TWO TABLETS BY MOUTH DAILY Qty: 180 Tab, Refills: 2  
  
loratadine (CLARITIN) 10 mg tablet TAKE 1 TABLET BY MOUTH DAILY Qty: 30 Tab, Refills: 11  
  
sucralfate (CARAFATE) 1 gram tablet Take 1 g by mouth four (4) times daily. rivaroxaban (XARELTO) 15 mg tab tablet Take 1 Tab by mouth daily (with dinner). Start taking 6/23/2019 Qty: 30 Tab, Refills: 0 Associated Diagnoses: Chronic atrial fibrillation  
  
furosemide (LASIX) 40 mg tablet Take 80 mg by mouth Daily (before breakfast). Patient takes 80 mg with breakfast  
  
  
insulin NPH (HUMULIN N NPH INSULIN KWIKPEN) 100 unit/mL (3 mL) inpn 24 Units by SubCUTAneous route every morning. insulin NPH (HUMULIN N NPH INSULIN KWIKPEN) 100 unit/mL (3 mL) inpn 8 Units by SubCUTAneous route every evening. pantoprazole (PROTONIX) 40 mg tablet Take 1 Tab by mouth daily. Qty: 90 Tab, Refills: 3  
  
levothyroxine (SYNTHROID) 125 mcg tablet TAKE ONE TABLET BY MOUTH DAILY Qty: 90 Tab, Refills: 7  
  
atorvastatin (LIPITOR) 80 mg tablet TAKE ONE TABLET BY MOUTH EVERY EVENING Qty: 90 Tab, Refills: 0 Associated Diagnoses: Hypercholesteremia  
  
acetaminophen (TYLENOL) 325 mg tablet Take 325 mg by mouth every four (4) hours as needed for Pain.  
  
potassium chloride SR (KLOR-CON 10) 10 mEq tablet Take 1 Tab by mouth daily. Qty: 30 Tab, Refills: 12 FOLLOW-UP: 
 
Call the office [phone] to make an appointment for 2 weeks with the NP 
 
[facility], [address] [phone] [address]    [email or web address] Signed: 
Jesús Story MD 
2/20/2020

## 2020-02-20 NOTE — PERIOP NOTES
Patient coughed and groin site began to bleed, manual pressure applied for 10 minutes and then another 5 minutes, site soft, with no bleeding or hematoma noted, new quickclot dressing applied, patient tolerated procedure without difficulty, did report pain during pressure application [facility] TRANSFER - OUT REPORT: 
 
Verbal report given to Crista Borjas RN on Tushar Kumar  being transferred to [facility], 2160 for routine progression of care Report consisted of patients Situation, Background, Assessment and  
Recommendations(SBAR). Information from the following report(s) SBAR, Kardex, Procedure Summary, Intake/Output and Cardiac Rhythm sinus zaid was reviewed with the receiving nurse. Lines:  
Arterial Line 02/19/20 Right Radial artery (Active) Site Assessment Clean, dry, & intact 2/19/2020  6:30 PM  
Dressing Status Clean, dry, & intact 2/19/2020  6:30 PM  
Dressing Type Transparent 2/19/2020  6:30 PM  
Line Status Intact and in place 2/19/2020  6:30 PM  
Treatment Arm board on;Zeroed or re-zeroed 2/19/2020  6:30 PM  
Affected Extremity/Extremities Color distal to insertion site pink (or appropriate for race); Pulses palpable;Range of motion performed 2/19/2020  6:30 PM  
  
 
Opportunity for questions and clarification was provided. Patient transported with: 
 Monitor O2 @ 2 liters Registered Nurse Tech  
 
2292 Patient coughed and site bleeding, manual pressure applied for 10 minutes, site soft, no hematoma noted, new quickclot and pressure dressing applied prior to transfer from PACU, no bleeding present at the time of transfer.

## 2020-02-20 NOTE — PROCEDURES
[address] 
RUDDY Name:  Rukhsana Strange 
MR#:  [id number] :  1935 ACCOUNT #:  [de-identified] DATE OF SERVICE:  2020 TRANSESOPHAGEAL ECHOCARDIOGRAPHIC REPORT. CARDIOLOGIST:  Jaimie Burroughs MD. 
 
The transesophageal echocardiographic exam was requested by the cardiologist in order to evaluate real-time cardiac and valvular form and function in a patient with chronic atrial fibrillation and the inability to tolerate anticoagulation hence scheduled for a Watchman procedure. The transesophageal echocardiographic probe was easily and atraumatically inserted into the patient's esophagus in the cardiac catheterization lab while the patient was sedated under general anesthesia. Modalities incorporated included 2-D, color flow mode and continuous wave Doppler. AORTA: 
 
Ascending aorta. The patient's ascending aorta measured 2.9 cm in diameter. There was no evidence of dissection. There was mild and nonmobile plaque. Aortic arch. The aortic arch measured 2.4 cm in diameter. There was no evidence of dissection. There was mild and nonmobile plaque. Descending aorta. The descending aorta measured 2.3 cm in diameter. There was no evidence of dissection. There was mild and nonmobile plaque. VALVES: 
 
Aortic valve. The aortic valve annulus measured 2.0 cm. The maximal velocity through the valve was 214 cm/sec. The peak gradient was 18 mmHg. The mean gradient was 8 mmHg. There was trace aortic insufficiency. The aortic leaflet morphology was that of a trileaflet and sclerotic valve without significant stenosis. Mitral valve. The mitral valve annulus measured 2.7 cm. It was a bioprosthetic valve with thickened leaflets. There was minimal excursion of the valve with severe mitral stenosis and a mean gradient of 18 mmHg. There was mild mitral insufficiency. There was no paravalvular leak. Tricuspid valve. The tricuspid valve annulus was normal.  There was no tricuspid stenosis. There was trace tricuspid insufficiency. The tricuspid leaflet morphology and motion were normal. 
 
Pulmonic valve. The pulmonic valve was grossly normal.  There was no significant regurgitation or stenosis. ATRIA: 
 
Right atrium. The right atrial size was upper normal.  There was no right atrial spontaneous echo contrast.  There was no right atrial thrombus or tumor. No device was visualized. Left atrium. The left atrial size was upper normal.  There was no spontaneous echo contrast.  There was no left atrial thrombus or tumor. No device was visualized. Left atrial appendage. Multiple views and measurements of the appendage were obtained including 0 degrees, 45 degrees, 90 degrees and 135 degrees. There was no thrombus. Interatrial septum. The interatrial septal morphology exhibited tenting to the left atrium, no transseptal flow could be visualized. VENTRICLES: 
 
Right ventricle. The right ventricular size exhibited mild dilation. There was mild right ventricular hypertrophy. There was no right ventricular thrombus and the right ventricular ejection fraction was normal. 
 
Left ventricle: The left ventricular size was normal, it was under filled. There was mild left ventricular hypertrophy. There was no left ventricular thrombus and the left ventricular ejection fraction was 55%. REGIONAL FUNCTION:  There were no isolated regional wall motion abnormalities. PERICARDIUM:  The pericardium was normal. 
 
PLEURAE:  The pleurae were normal. 
 
POST INTERVENTION FOLLOWUP STUDY:  After venous cannulation and transatrial septal puncture, a Watchman device was placed in the left atrial appendage. Multiple measurements were made after placement at 0 degrees, 45 degrees. 90 degrees, and 135 degrees.   These views were also reviewed with color-flow mode. The left ventricular and right ventricular functions were unchanged. There were no effusions. There was residual mitral stenosis and left-to-right flow could be visualized from the transseptal atrial puncture site. These results were discussed and viewed with the cardiologist.  The transesophageal echocardiographic probe was easily and atraumatically removed from the patient's esophagus. The patient tolerated the procedure without event. [place]ana, DO 
 
 
[id number] 
D:  02/19/2020 18:14 
T:  02/20/2020 1:44 JOB #:  [id number]

## 2020-02-21 NOTE — PROGRESS NOTES
Hospital Discharge Follow-Up Date/Time:  2020 9:17 AM 
Alyssa Ramos RN Care Transition Nurse [facility] Coordination Team 
Office number: [phone] Patient was admitted to [facility] on 2020 and discharged on 2020 for watchman procedure. The physician discharge summary was available at the time of outreach. Patient was contacted within 1 business days of discharge. Top Challenges reviewed with the provider Hgb A1C on 19 was 10.6 Advance Care Planning:  
Does patient have an Advance Directive:  reviewed and current Method of communication with provider :chart routing Was this a readmission? no  
Patient stated reason for the readmission: n/a Care Transition Nurse (CTN) contacted the patient by telephone to perform post hospital discharge assessment. Verified name and  with patient as identifiers. Provided introduction to self, and explanation of the CTN role. Patient received hospital discharge instructions. CTN reviewed discharge instructions and red flags with patient who verbalized understanding. Patient given an opportunity to ask questions and does not have any further questions or concerns at this time. The patient agrees to contact the PCP office for questions related to their healthcare. CTN provided contact information for future reference. Disease Specific:   N/A Patients top risk factors for readmission:  lack of knowledge about disease, medical condition Home Health orders at discharge: none [address]: n/a Date of initial visit: n/a Durable Medical Equipment ordered at discharge: none [address]: n/a Durable Medical Equipment received: n/a Medication(s):  
New Medications at Discharge: none Changed Medications at Discharge: none Discontinued Medications at Discharge: none Medication reconciliation was performed with patient, who verbalizes understanding of administration of home medications. There were no barriers to obtaining medications identified at this time. Referral to Pharm D needed: no  
 
Current Outpatient Medications Medication Sig  XARELTO 15 mg tab tablet TAKE 1 TABLET BY MOUTH EVERY DAY WITH DINNER  potassium chloride SR (KLOR-CON 10) 10 mEq tablet Take 1 Tab by mouth daily.  glucose blood VI test strips (ACCU-CHEK SHAHIDA PLUS TEST STRP) strip USE TO TEST BLOOD SUGAR FOUR TIMES DAILY  folic acid/multivit-min/lutein (CENTRUM SILVER PO) Take  by mouth.  amiodarone (CORDARONE) 200 mg tablet TAKE 1 TABLET BY MOUTH TWICE DAILY  metoprolol tartrate (LOPRESSOR) 50 mg tablet TK 1 AND 1/2 TS PO BID  allopurinol (ZYLOPRIM) 100 mg tablet TAKE TWO TABLETS BY MOUTH DAILY  loratadine (CLARITIN) 10 mg tablet TAKE 1 TABLET BY MOUTH DAILY  sucralfate (CARAFATE) 1 gram tablet Take 1 g by mouth four (4) times daily.  furosemide (LASIX) 40 mg tablet Take 80 mg by mouth Daily (before breakfast). Patient takes 80 mg with breakfast  
 furosemide (LASIX) 40 mg tablet Take 40 mg by mouth daily (with lunch). Patient takes 80 mg with breakfast and 40 mg at lunch  
 insulin NPH (HUMULIN N NPH INSULIN KWIKPEN) 100 unit/mL (3 mL) inpn 24 Units by SubCUTAneous route every morning.  insulin NPH (HUMULIN N NPH INSULIN KWIKPEN) 100 unit/mL (3 mL) inpn 8 Units by SubCUTAneous route every evening.  pantoprazole (PROTONIX) 40 mg tablet Take 1 Tab by mouth daily.  levothyroxine (SYNTHROID) 125 mcg tablet TAKE ONE TABLET BY MOUTH DAILY (Patient taking differently: TAKE ONE TABLET BY MOUTH DAILY BEFORE BREAKFAST)  atorvastatin (LIPITOR) 80 mg tablet TAKE ONE TABLET BY MOUTH EVERY EVENING  
 acetaminophen (TYLENOL) 325 mg tablet Take 325 mg by mouth every four (4) hours as needed for Pain. No current facility-administered medications for this visit. There are no discontinued medications. [facility] follow up appointment(s):  
Future Appointments Date Time Provider Dafne Alice 2/24/2020  1:00 PM [facility] CARDIOPULM EXERCISE [facility]  
2/26/2020  1:00 PM [facility] CARDIOPULM EXERCISE [facility]  
3/2/2020  1:00 PM [facility] CARDIOPULM EXERCISE [facility]  
3/4/2020  1:00 PM [facility] CARDIOPULM EXERCISE [facility]  
3/9/2020  1:00 PM [facility]ARD[facility]OPULM [facility]ERCISE [facility]  
3/11/2020  1:00 PM [facility]ARDIOPULM [facility]ERCISE [facility]  
3/16/2020  1:00 PM [facility] CARDIOPULM EXERCISE [facility]  
3/18/2020  1:00 PM [facility] CARDIOPULM EXERCISE [facility]  
3/20/2020 11:15 AM Algernon Saint, MD [address]  
3/23/2020  1:00 PM [facility] [id number]  
3/25/2020  1:00 PM [facility] [id number] REG  
3/30/2020  1:00 PM [facility] [id number] REG  
4/1/2020  1:00 PM [facility] CARDIOPULM EXERCISE [facility]  
4/6/2020  1:00 PM [facility] CARDIOPULM EXERCISE [facility] Non-[facility] follow up appointment(s): [facility] 2 weeks needed [facility]:  n/a  
 
 
Goals  Prevent complications post hospitalization. 2/21/2020 Patient reports she is waiting for call back for follow up appointment with PCP. She will call [facility] and schedule 2 week follow up. Denies chest pain, fever, drainage. Endorses some bruising and SOB which is expected. Patient will report appointments and monitor S&S of infection to report at next outreach.  [facility]

## 2020-03-12 NOTE — PROGRESS NOTES
Goals  Prevent complications post hospitalization. 2/21/2020 Patient reports she is waiting for call back for follow up appointment with PCP. She will call [facility] and schedule 2 week follow up. Denies chest pain, fever, drainage. Endorses some bruising and SOB which is expected. Patient will report appointments and monitor S&S of infection to report at next outreach. [facility] 
 
3/4/2020 Patient denies chest pain, fever, rash, odor and redness. Cy Limes with [facility] reports patient attended appointment on 3/2/2020 and scheduled for RUDDY on 3/24/2020. Patient will monitor bruising and S&S of ifectio and report at next outreach. [facility] 
 
3/12/2020Unable to reach patient. Message with CTN contact information left on voicemail.  [facility]

## 2020-03-13 NOTE — TELEPHONE ENCOUNTER
#[phone] daughter states [facility] needs a CMN before they can fill pt's test strips. Pt is completely out of strips. Daughter states mom just told her. Please send CMN to pharmacy as soon as possible as pt will need these strips yet today.

## 2020-03-13 NOTE — TELEPHONE ENCOUNTER
CMN faxed to [facility] [phone]. Fax confirmation received. Spoke with Sonam at [facility] CMN have been received and Test strips have been approved. Called, spoke to Pau (HIPAA). Two pt identifiers confirmed. Pau  informed prescription for Test strips has been approved. Pau verbalized understanding of information discussed w/ no further questions at this time.

## 2020-03-24 NOTE — DISCHARGE INSTRUCTIONS
Cardiology Discharge Instructions             Patient ID:  Tom Dyer  [id number]  33 y.o.  1935    Admit Date: 3/24/2020     Discharge Date: 3/24/2020   Physician: Cristian Bates MD    Admission and Discharge Diagnoses include:  1. Paroxysmal atrial fibrillation  2. History of bleeding      Cardiology Procedures this Admission:  1.  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 No eating or drinking HOT liquids for 4 hours post-RUDDY. Cool or warm is OK. 2.  No driving, operating heavy machinery, or signing legal documents today. 3.  Please STOP XARELTO and on the 3rd day off, START CLOPIDOGREL 75 mg by mouth daily instead. A prescription was sent electronically to your pharmacy. FOLLOW-UP:       Call the office [phone] to make an appointment for 5 smonths from now with your regular cardiologist (if you don't already have one around that time). I believe this is Dr. Mahamed Avila. [email or web address], [address]    [phone]  [address]    [email or web address]    Thank you for placing your trust for your heart with the physicians and staff of [facility]. We have long provided [place] with the most advanced cardiovascular care possible using a personalized and caring approach. And we hope to continue this strong tradition well into the future. A heart condition, or the fear of having a heart condition, can be a stressful time for a patient and their family. There are appointments, testing procedures and unfamiliar terms that can cause natural questions and concerns. While we encourage you to speak with your nurse or physician regarding any questions you might have, please consider this web site as a powerful resource you can use at any time. Often, questions or concerns only arise once you've left our office.  There are many useful sections on this web site and links to other professional organizations and advocacy groups. These sources can help answer many questions you might have from the comfort of your own home or office. Again, thank you for considering [facility] as your trusted provider of heart care. Please don't hesitate to let us know if there is anything we can do to enhance your experience with us.      Signed:  Shayy Pena MD  3/24/2020

## 2020-03-24 NOTE — PROGRESS NOTES
Discharge instructions reviewed with patient and daughter, Petar Myers. Allowed adequate time to ask questions, all questions answered. Printed copy of AVS given to patient. All belongings gathered, IV and tele discontinued. Transported via wheelchair to main entrance and into care of family.

## 2020-03-24 NOTE — PROGRESS NOTES
RUDDY showed the Watchman TRAN occluder is in good position with a good seal and no adherent thrombus. Will have her stop Xarelto and on the 3rd day off, start clopidogrel 75 mg daily instead. All questions answered for her preprocedure and I spoke to her daughter at her request afterward to review the plan.     Signed By: Alli Armas MD     March 24, 2020

## 2020-03-24 NOTE — PROGRESS NOTES
Patient arrived to Non-Invasive Cardiology Lab for Out Patient RUDDY Procedure. Staff introduced to patient. Patient identifiers verified with Name and Date of Birth. Procedure verified with patient. Consent forms reviewed and signed by patient or authorized representative and verified. Allergies verified. Patient informed of procedure and plan of care. Questions answered with review. Patient on cardiac monitor, non-invasive blood pressure, SPO2 monitor. On room air. Patient is A&Ox3. Patient reports no complaints. Patient on stretcher, in low position, with side rails up. Patient instructed to call for assistance as needed. Family in waiting room.

## 2020-04-14 NOTE — PROGRESS NOTES
SHANTE FROM DR Kelley Encinas CBC WITH DIFF IRON PROFILE FERRITIN AS ONE TIME ORDER. NEEDS A VIRTUAL VISIT WHEN DONE. WILL MAIL.

## 2020-04-20 NOTE — TELEPHONE ENCOUNTER
Called, spoke to pt. Two pt identifiers confirmed. Pt informed per Dr. Yuridia Frias  See message below. Janette Mcintosh MD 3 hours ago (8:05 AM)         Advise patient amiodarone refill should come from Dr. Luc Johnson, cardiology         Documentation      . Pt verbalized understanding of information discussed w/ no further questions at this time.

## 2020-06-07 NOTE — TELEPHONE ENCOUNTER
Patient overdue for follow up. Cannot order diabetic supplies until follow up. No record of any recent HbA1C. Is she being followed by another PCP? If not, please schedule follow up.

## 2020-06-09 NOTE — PATIENT INSTRUCTIONS
Medicare Wellness Visit, Female The best way to live healthy is to have a lifestyle where you eat a well-balanced diet, exercise regularly, limit alcohol use, and quit all forms of tobacco/nicotine, if applicable. Regular preventive services are another way to keep healthy. Preventive services (vaccines, screening tests, monitoring & exams) can help personalize your care plan, which helps you manage your own care. Screening tests can find health problems at the earliest stages, when they are easiest to treat. [facility] follows the current, evidence-based guidelines published by the [facility] Aly Machuca ([facility]) when recommending preventive services for our patients. Because we follow these guidelines, sometimes recommendations change over time as research supports it. (For example, mammograms used to be recommended annually. Even though Medicare will still pay for an annual mammogram, the newer guidelines recommend a mammogram every two years for women of average risk). Of course, you and your doctor may decide to screen more often for some diseases, based on your risk and your co-morbidities (chronic disease you are already diagnosed with). Preventive services for you include: - Medicare offers their members a free annual wellness visit, which is time for you and your primary care provider to discuss and plan for your preventive service needs. Take advantage of this benefit every year! 
-All adults over the age of 72 should receive the recommended pneumonia vaccines. Current USPSTF guidelines recommend a series of two vaccines for the best pneumonia protection.  
-All adults should have a flu vaccine yearly and a tetanus vaccine every 10 years.  
-All adults age 48 and older should receive the shingles vaccines (series of two vaccines). -All adults age 38-68 who are overweight should have a diabetes screening test once every three years. -All adults born between 80 and 1965 should be screened once for Hepatitis C. 
-Other screening tests and preventive services for persons with diabetes include: an eye exam to screen for diabetic retinopathy, a kidney function test, a foot exam, and stricter control over your cholesterol.  
-Cardiovascular screening for adults with routine risk involves an electrocardiogram (ECG) at intervals determined by your doctor.  
-Colorectal cancer screenings should be done for adults age 54-65 with no increased risk factors for colorectal cancer. There are a number of acceptable methods of screening for this type of cancer. Each test has its own benefits and drawbacks. Discuss with your doctor what is most appropriate for you during your annual wellness visit. The different tests include: colonoscopy (considered the best screening method), a fecal occult blood test, a fecal DNA test, and sigmoidoscopy. 
 
-A bone mass density test is recommended when a woman turns 65 to screen for osteoporosis. This test is only recommended one time, as a screening. Some providers will use this same test as a disease monitoring tool if you already have osteoporosis. -Breast cancer screenings are recommended every other year for women of normal risk, age 54-69. 
-Cervical cancer screenings for women over age 72 are only recommended with certain risk factors. Here is a list of your current Health Maintenance items (your personalized list of preventive services) with a due date: 
Health Maintenance Due Topic Date Due  
 DTaP/Tdap/Td  (1 - Tdap) 04/17/1958  Shingles Vaccine (1 of 2) 04/17/1987 [address] [place] [address] Eye Exam  05/21/2016 [address] [place] [address] Diabetic Foot Care  01/03/2019  Glaucoma Screening   02/22/2019  Albumin Urine Test  05/01/2019 [address] [place] [address] Annual Well Visit  05/02/2019  Cholesterol Test   09/04/2019  Hemoglobin A1C    10/16/2019 Medicare Wellness Visit, Female The best way to live healthy is to have a lifestyle where you eat a well-balanced diet, exercise regularly, limit alcohol use, and quit all forms of tobacco/nicotine, if applicable. Regular preventive services are another way to keep healthy. Preventive services (vaccines, screening tests, monitoring & exams) can help personalize your care plan, which helps you manage your own care. Screening tests can find health problems at the earliest stages, when they are easiest to treat. [facility] follows the current, evidence-based guidelines published by the [facility] Aly S[facility] ([facility]) when recommending preventive services for our patients. Because we follow these guidelines, sometimes recommendations change over time as research supports it. (For example, mammograms used to be recommended annually. Even though Medicare will still pay for an annual mammogram, the newer guidelines recommend a mammogram every two years for women of average risk). Of course, you and your doctor may decide to screen more often for some diseases, based on your risk and your co-morbidities (chronic disease you are already diagnosed with). Preventive services for you include: - Medicare offers their members a free annual wellness visit, which is time for you and your primary care provider to discuss and plan for your preventive service needs. Take advantage of this benefit every year! 
-All adults over the age of 72 should receive the recommended pneumonia vaccines. Current USPSTF guidelines recommend a series of two vaccines for the best pneumonia protection.  
-All adults should have a flu vaccine yearly and a tetanus vaccine every 10 years.  
-All adults age 48 and older should receive the shingles vaccines (series of two vaccines).      
-All adults age 38-68 who are overweight should have a diabetes screening test once every three years.  
-All adults born between 80 and 1965 should be screened once for Hepatitis C. 
 -Other screening tests and preventive services for persons with diabetes include: an eye exam to screen for diabetic retinopathy, a kidney function test, a foot exam, and stricter control over your cholesterol.  
-Cardiovascular screening for adults with routine risk involves an electrocardiogram (ECG) at intervals determined by your doctor.  
-Colorectal cancer screenings should be done for adults age 54-65 with no increased risk factors for colorectal cancer. There are a number of acceptable methods of screening for this type of cancer. Each test has its own benefits and drawbacks. Discuss with your doctor what is most appropriate for you during your annual wellness visit. The different tests include: colonoscopy (considered the best screening method), a fecal occult blood test, a fecal DNA test, and sigmoidoscopy. 
 
-A bone mass density test is recommended when a woman turns 65 to screen for osteoporosis. This test is only recommended one time, as a screening. Some providers will use this same test as a disease monitoring tool if you already have osteoporosis. -Breast cancer screenings are recommended every other year for women of normal risk, age 54-69. 
-Cervical cancer screenings for women over age 72 are only recommended with certain risk factors. Here is a list of your current Health Maintenance items (your personalized list of preventive services) with a due date: 
Health Maintenance Due Topic Date Due  
 DTaP/Tdap/Td  (1 - Tdap) 04/17/1958  Shingles Vaccine (1 of 2) 04/17/1987 [facility] Eye Exam  05/21/2016 [facility] Diabetic Foot Care  01/03/2019  Glaucoma Screening   02/22/2019  Albumin Urine Test  05/01/2019 [facility] Annual Well Visit  05/02/2019  Cholesterol Test   09/04/2019  Hemoglobin A1C    10/16/2019

## 2020-06-09 NOTE — PROGRESS NOTES
Ivonne Membreno is a 82 y.o. female who presents for follow up. Last seen September 2019 after GI bleed. She has a history of CAD/ CHF/MR/chronic atrial fibrillation. Followed by Dr. Miguel Naqvi, cardiology. Had mitral replacement April 22, 2019. Due for follow up with cardiology. Reports LEIJA unchanged. No chest pain or palpitations. No worsening swelling in legs. Prior GI bleed. Prior iron infusions, Dr. Donna Tan. Taking FeSO4 325mg once a day.      
  
Prior EGD, dilated in November, no dysphagia now. Colonoscopy Feb 20. Dr. Sylvie Claude, with rectal prolapse, rectal erythema, hemorrhoids. 
  
Treated for hypothyroidism. Diabetic on insulin. She does not know who prescribed her insulin. Morning glucose 90, evening glucose, 100. No hypoglycemia. Overdue for labs.  
  
Lives alone. Daughter local.  1/2 brother and another daughter local.  Has a Life alert. Driving minimally. No falls. This is an established visit conducted via telemedicine, by phone. The patient has been instructed that this meets HIPAA criteria and acknowledges and agrees to this method of visitation. Pursuant to the emergency declaration under the [facility] waiver authority and the [employer] and [employer] Act, this Virtual Visit was conducted, with patient's consent, to reduce the patient's risk of exposure to COVID-19 and provide continuity of care for an established patient. Services were provided by phone to substitute for in-person clinic visit. Past Medical History:  
Diagnosis Date  Arthritis KNEES  Atrial fibrillation ([address] 10/29/2009  Bladder cancer ([address]  CAD (coronary artery disease) STENT PER PATIENT  Chronic pain KNEES  
 Coagulation disorder ([address] Chronic prophylactic anticoagulation med  Colon polyps  Diabetes ([address] IDDM  
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 One fragment suggestive of hyperplastic polyp  GERD (gastroesophageal reflux disease)  Gout  Heart valve problem   
 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  
 @ [facility] >> atrial fibrillation  SOB (shortness of breath) on exertion 04/2019  
 T. I.A. 4/23/2009  TIA (transient ischemic attack)  Ulcer of right lower extremity, limited to breakdown of skin ([address] 7/5/2018 Family History Problem Relation Age of Onset  Stroke Other  Arthritis-osteo Sister   
     spinal stenosis  Gout Son   
 Hypertension Son   
[facility] Hypertension Mother  Heart Disease Mother CAD  Heart Disease Father CAD  Alcohol abuse Neg Hx  Asthma Neg Hx  Bleeding Prob Neg Hx  Cancer Neg Hx  Diabetes Neg Hx  Elevated Lipids Neg Hx   
 Headache Neg Hx  Lung Disease Neg Hx  Migraines Neg Hx  Psychiatric Disorder Neg Hx  Mental Retardation Neg Hx  Anesth Problems Neg Hx Social History Socioeconomic History  Marital status:  Spouse name: Not on file  Number of children: 2  
 Years of education: 15  
 Highest education level: High school graduate Occupational History  Not on file Social Needs  Financial resource strain: Not hard at all  Food insecurity Worry: Never true Inability: Never true  Transportation needs Medical: No  
  Non-medical: No  
Tobacco Use  Smoking status: Former Smoker Packs/day: 0.50 Years: 10.00 Pack years: 5.00 Types: Cigarettes Last attempt to quit: 1967 Years since quittin.4  Smokeless tobacco: Never Used Substance and Sexual Activity  Alcohol use: No  
  Alcohol/week: 0.0 standard drinks  Drug use: Never  Sexual activity: Not Currently Partners: Male Lifestyle  Physical activity Days per week: 0 days Minutes per session: 0 min  Stress: Only a little Relationships  Social connections Talks on phone: Three times a week Gets together: Three times a week Attends [employer] service: 1 to 4 times per year Active member of club or organization: No  
  Attends meetings of clubs or organizations: Never Relationship status:   Intimate partner violence Fear of current or ex partner: No  
  Emotionally abused: No  
  Physically abused: No  
  Forced sexual activity: No  
Other Topics Concern [address] Service Not Asked  Blood Transfusions Not Asked  Caffeine Concern Not Asked  Occupational Exposure Not Asked [place] Hazards Not Asked  Sleep Concern Not Asked  Stress Concern Not Asked  Weight Concern Yes  Special Diet Not Asked  Back Care Not Asked  Exercise Not Asked  Bike Helmet Not Asked  Seat Belt Not Asked  Self-Exams Not Asked Social History Narrative  Not on file Current Outpatient Medications on File Prior to Visit Medication Sig Dispense Refill  levothyroxine (SYNTHROID) 125 mcg tablet TAKE ONE TABLET BY MOUTH DAILY BEFORE BREAKFAST 90 Tab 0  
 amiodarone (CORDARONE) 200 mg tablet TAKE 1 TABLET BY MOUTH TWICE DAILY 180 Tab 0  
 potassium chloride SR (KLOR-CON 10) 10 mEq tablet Take 1 Tab by mouth daily. 30 Tab 12  
 folic acid/multivit-min/lutein (CENTRUM SILVER PO) Take  by mouth.     
 metoprolol tartrate (LOPRESSOR) 50 mg tablet TK 1 AND 1/2 TS PO BID  3  
 allopurinol (ZYLOPRIM) 100 mg tablet TAKE TWO TABLETS BY MOUTH DAILY 180 Tab 2  
  loratadine (CLARITIN) 10 mg tablet TAKE 1 TABLET BY MOUTH DAILY 30 Tab 11  
 sucralfate (CARAFATE) 1 gram tablet Take 1 g by mouth four (4) times daily.  furosemide (LASIX) 40 mg tablet Take 80 mg by mouth Daily (before breakfast). Patient takes 80 mg with breakfast    
 pantoprazole (PROTONIX) 40 mg tablet Take 1 Tab by mouth daily. 90 Tab 3  
 atorvastatin (LIPITOR) 80 mg tablet TAKE ONE TABLET BY MOUTH EVERY EVENING 90 Tab 0  
 acetaminophen (TYLENOL) 325 mg tablet Take 325 mg by mouth every four (4) hours as needed for Pain.  [DISCONTINUED] insulin NPH (HumuLIN N NPH U-100 Insulin) 100 unit/mL injection Humulin N NPH U-100 Insulin (isophane susp) 100 unit/mL subcutaneous  [DISCONTINUED] clopidogreL (PLAVIX) 75 mg tab Take 1 Tab by mouth daily. 30 Tab 5  
 glucose blood VI test strips (ACCU-CHEK SHAHIDA PLUS TEST STRP) strip USE TO TEST BLOOD SUGAR FOUR TIMES DAILY 200 Strip 10  
 insulin NPH (HUMULIN N NPH INSULIN KWIKPEN) 100 unit/mL (3 mL) inpn 23 Units by SubCUTAneous route every morning.  insulin NPH (HUMULIN N NPH INSULIN KWIKPEN) 100 unit/mL (3 mL) inpn 6 Units by SubCUTAneous route every evening.  [DISCONTINUED] furosemide (LASIX) 40 mg tablet Take 40 mg by mouth daily (with lunch). Patient takes 80 mg with breakfast and 40 mg at lunch No current facility-administered medications on file prior to visit. Review of Systems Pertinent items are noted in HPI. Objective:  
 
Gen: healthy sounding female HEENT: no audible congestion, patient does not see oral erythema and sees MMM Neck: patient does not feel enlarged or tender LAD or masses Resp: normal respiratory effort, no audible wheezing. CV: patient does not feel palpitations or heart irregularity Abd: patient does not feel abdominal tenderness or mass, patient does not notice distension Extrem: patient does not see swelling in ankles or joints. Neuro: Alert and oriented, able to answer questions without difficulty, patient reports able to move all extremities and walk normally Assessment/Plan: ICD-10-CM ICD-9-CM 1. 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   
 Z79.4 V58.67   
2. Screening for depression Z13.31 V79.0 [place] [address] 3. Medicare annual wellness visit, subsequent Z00.00 V70.0 4. Chronic atrial fibrillation (HCC) I48.20 427.31   
5. Hypercholesterolemia E78.00 272.0 6. Essential hypertension, benign I10 401.1 7. Chronic diastolic heart failure (HCC) I50.32 428.32   
8. Malignant neoplasm of urinary bladder, unspecified site (HCC) C67.9 188.9 9. PVD (peripheral vascular disease) (HCC) I73.9 443.9 10. Thrombocytopenia (HCC) D69.6 287.5 This was a telemedicine visit by phone, duration 22 minutes. Neetu Clemens MD 
 
Follow-up and Dispositions · Return in about 6 months (around 12/9/2020) for follow up on medications. Lokesh Tellez

## 2020-06-23 NOTE — TELEPHONE ENCOUNTER
Spoke with Ms. Jhonatan Felder via phone. She wants to complete her last 10 sessions with the program but needed to check with her daughter. Gave Ms. Miller our phone number to give to her daughter to call us to make appointments made.

## 2020-06-29 NOTE — TELEPHONE ENCOUNTER
Patient's daughter, Rhea Martinez, states pharmacy advised that [facility] Form\" is required for patient to get Test Strips refilled. Please call pharmacy if any questions & call Bandar Swain when done.  Thank you

## 2020-06-29 NOTE — TELEPHONE ENCOUNTER
Called, spoke to Pau. Two pt identifiers confirmed. Pau informed CME test strips paperwork has been faxed to [facility]. Fax confirmation received. Pau verbalized understanding of information discussed w/ no further questions at this time.

## 2020-07-01 NOTE — TELEPHONE ENCOUNTER
Advise patient cannot order diabetic supplies without recent labs. Orders in system for her to have labs done, as discussed at her virtual visit.

## 2020-07-10 NOTE — TELEPHONE ENCOUNTER
Requested Prescriptions     Pending Prescriptions Disp Refills    levothyroxine (SYNTHROID) 125 mcg tablet 90 Tab 0     Sig: TAKE ONE TABLET BY MOUTH DAILY BEFORE BREAKFAST    insulin NPH (HumuLIN N NPH Insulin KwikPen) 100 unit/mL (3 mL) inpn       Si Units by SubCUTAneous route every morning.  glucose blood VI test strips (Accu-Chek Letty Plus test strp) strip 200 Strip 10     Sig: USE TO TEST BLOOD SUGAR FOUR TIMES DAILY     PCP: Army Mike MD    Last appt: 2020  No future appointments. Requested Prescriptions     Pending Prescriptions Disp Refills    levothyroxine (SYNTHROID) 125 mcg tablet 90 Tab 0     Sig: TAKE ONE TABLET BY MOUTH DAILY BEFORE BREAKFAST    insulin NPH (HumuLIN N NPH Insulin KwikPen) 100 unit/mL (3 mL) inpn       Si Units by SubCUTAneous route every morning.     glucose blood VI test strips (Accu-Chek Letty Plus test strp) strip 200 Strip 10     Sig: USE TO TEST BLOOD SUGAR FOUR TIMES DAILY

## 2020-07-10 NOTE — TELEPHONE ENCOUNTER
Advise patient or family that I cannot order test strips without her doing labs. Medicare requires HbA1C testing. I have told patient this at her virtual appointment. On June 9.

## 2020-07-10 NOTE — TELEPHONE ENCOUNTER
Daughter, Shauna Wright states pt is out of test strips and insulin. She states pt had gotten these from a previous doctor? She wasn't sure what insulin other than Humulin kwikpen. Please call Shauna Wright to let her know when all of this has been done or If you have questions.

## 2020-07-10 NOTE — TELEPHONE ENCOUNTER
Called, spoke to Pau. Two pt identifiers confirmed. Pau informed prescription has been sent to Dr. Jose Espinal for approval and lab orders faxed to [employer] [phone]. Fax confirmation received. Pau verbalized understanding of information discussed w/ no further questions at this time.

## 2020-08-06 NOTE — TELEPHONE ENCOUNTER
Requested Prescriptions     Pending Prescriptions Disp Refills    amiodarone (CORDARONE) 200 mg tablet 180 Tab 0    potassium chloride SR (KLOR-CON 10) 10 mEq tablet 30 Tab 12     Sig: Take 1 Tab by mouth daily.  metoprolol tartrate (LOPRESSOR) 50 mg tablet   3    atorvastatin (LIPITOR) 80 mg tablet 90 Tab 0     Future Appointments:  No future appointments. Last Appointment With Me:  6/9/2020     Last Appointment My Department:  Visit date not found    Requested Prescriptions     Pending Prescriptions Disp Refills    amiodarone (CORDARONE) 200 mg tablet 180 Tab 0    potassium chloride SR (KLOR-CON 10) 10 mEq tablet 30 Tab 12     Sig: Take 1 Tab by mouth daily.     metoprolol tartrate (LOPRESSOR) 50 mg tablet   3    atorvastatin (LIPITOR) 80 mg tablet 90 Tab 0

## 2020-08-17 NOTE — TELEPHONE ENCOUNTER
#[phone]  Daughter, Raz Ike states pt is out of strips and needs yet today. Please be sure to code for insurance/medicare part B    Pt will need today and Raz Ramires is asking for a call back to know this has been done.

## 2020-08-19 NOTE — TELEPHONE ENCOUNTER
----- Message from Kayleyfritzabraham Chloe sent at 8/19/2020  1:24 PM EDT -----  Regarding: Dr. Pereira Savanna: [phone]  Caller (if not patient): [facility]   Relationship of caller (if not patient): Samantha pharmacist  Best contact number(s): [phone]  Name of medication and dosage if known: Test strips, 200 quantity. Is patient out of this medication (yes/no): yes  Pharmacy name: [address] [place] [id number] listed in chart? (yes/no): yes  Pharmacy phone number: [phone]  Date of last visit: 6/9/20  Details to clarify the request: Faxed request earlier today.

## 2020-08-19 NOTE — TELEPHONE ENCOUNTER
#[phone] [facility] states pt is OUT of test strips. The order was sent, but needed to be corrected and resent electronically. Medicare part B only allows testing at 3 times per day. They can't do 4. There needs to be a diagnosis code and directions on that. Please send yet today as pt is out.

## 2020-08-20 NOTE — TELEPHONE ENCOUNTER
Spoke with patient's daughter, patient will be exercising at home with an aide. Daughter does not wish for her mother to return to the Cardiac Rehab program due to Covid risk.

## 2020-09-01 NOTE — PROGRESS NOTES
Uvaldo Perez is a 82 y.o. female who presents for follow up. Last seen June 9. Daughter present. Daughter is concerned about decline in her function. Has visiting angels 3 days a week, helping with meals and medications. Lives alone. Daughter local.  1/2 brother and another daughter local.  
 
Marino Carcamo one month ago, no injury. patient recalls event. Using rollator walker. Is forgetting to take medications. Has an emergency button, has tested it. Reviewed labs. Daughter was called, did not get message on lab results. TSH and LDL high. Patient not consistent with medications. Has been setting up own medications and ordering medications by herself. Daughter is now intervening and setting up in trays.  
  
She has a history of CAD/ CHF/MR/chronic atrial fibrillation. Followed by Dr. Guillaume Dee, cardiology.   Had mitral replacement April 22, 2019. Now has watchman's device. On plavix. Has been in cardiac rehab, but daughter called on August 20 and cancelled due to concern of COVID risk.   
   
Diabetic on insulin. no hypoglycemia. Hba1C 6%. This is an established visit conducted via telemedicine with video. The patient has been instructed that this meets HIPAA criteria and acknowledges and agrees to this method of visitation. Pursuant to the emergency declaration under the [facility] waiver authority and the [employer] and [employer] Act, this Virtual Visit was conducted, with patient's consent, to reduce the patient's risk of exposure to COVID-19 and provide continuity of care for an established patient. Services were provided through a video synchronous discussion virtually to substitute for in-person clinic visit. Past Medical History:  
Diagnosis Date  Arthritis KNEES  Atrial fibrillation ([facility] 10/29/2009  Bladder cancer ([facility]  CAD (coronary artery disease) STENT PER PATIENT  Chronic pain KNEES  
 Coagulation disorder ([facility] Chronic prophylactic anticoagulation med  Colon polyps  Diabetes ([facility] IDDM  
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 One fragment suggestive of hyperplastic polyp  GERD (gastroesophageal reflux disease)  Gout  Heart valve problem   
 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  
 @ [facility] >> atrial fibrillation  SOB (shortness of breath) on exertion 04/2019  
 T. I.A. 4/23/2009  TIA (transient ischemic attack)  Ulcer of right lower extremity, limited to breakdown of skin ([facility] 7/5/2018 Family History Problem Relation Age of Onset  Stroke Other  Arthritis-osteo Sister   
     spinal stenosis  Gout Son   
 Hypertension Son   
Sherice Neas Hypertension Mother  Heart Disease Mother CAD  Heart Disease Father CAD  Alcohol abuse Neg Hx  Asthma Neg Hx  Bleeding Prob Neg Hx  Cancer Neg Hx  Diabetes Neg Hx  Elevated Lipids Neg Hx   
 Headache Neg Hx  Lung Disease Neg Hx  Migraines Neg Hx  Psychiatric Disorder Neg Hx  Mental Retardation Neg Hx  Anesth Problems Neg Hx Social History Socioeconomic History  Marital status:  Spouse name: Not on file  Number of children: 2  
 Years of education: 15  
 Highest education level: High school graduate Occupational History  Not on file Social Needs  Financial resource strain: Not hard at all  Food insecurity Worry: Never true Inability: Never true  Transportation needs Medical: No  
  Non-medical: No  
Tobacco Use  Smoking status: Former Smoker Packs/day: 0.50 Years: 10.00 Pack years: 5.00 Types: Cigarettes Last attempt to quit: 1967 Years since quittin.7  Smokeless tobacco: Never Used Substance and Sexual Activity  Alcohol use: No  
  Alcohol/week: 0.0 standard drinks  Drug use: Never  Sexual activity: Not Currently Partners: Male Lifestyle  Physical activity Days per week: 0 days Minutes per session: 0 min  Stress: Only a little Relationships  Social connections Talks on phone: Three times a week Gets together: Three times a week Attends [employer] service: 1 to 4 times per year Active member of club or organization: No  
  Attends meetings of clubs or organizations: Never Relationship status:   Intimate partner violence Fear of current or ex partner: No  
  Emotionally abused: No  
  Physically abused: No  
  Forced sexual activity: No  
Other Topics Concern [address] Service Not Asked  Blood Transfusions Not Asked  Caffeine Concern Not Asked  Occupational Exposure Not Asked [place] Hazards Not Asked  Sleep Concern Not Asked  Stress Concern Not Asked  Weight Concern Yes  Special Diet Not Asked  Back Care Not Asked  Exercise Not Asked  Bike Helmet Not Asked  Seat Belt Not Asked  Self-Exams Not Asked Social History Narrative  Not on file Current Outpatient Medications on File Prior to Visit Medication Sig Dispense Refill  glucose blood VI test strips (Accu-Chek Letty Plus test strp) strip USE TO TEST BLOOD SUGAR FOUR TIMES DAILY as directed. DX: Diabetes, E11.9, CRF stage IV  N18.4 200 Strip 10  
 amiodarone (CORDARONE) 200 mg tablet TAKE 1 TABLET BY MOUTH TWICE DAILY 180 Tab 3  potassium chloride SR (KLOR-CON 10) 10 mEq tablet Take 1 Tab by mouth daily.  90 Tab 3  
  metoprolol tartrate (LOPRESSOR) 50 mg tablet TK 1 AND 1/2 TS PO  Tab 3  
 atorvastatin (LIPITOR) 80 mg tablet TAKE ONE TABLET BY MOUTH EVERY EVENING 90 Tab 3  
 allopurinoL (ZYLOPRIM) 100 mg tablet TAKE 2 TABLETS BY MOUTH DAILY 180 Tab 2  
 levothyroxine (SYNTHROID) 125 mcg tablet TAKE ONE TABLET BY MOUTH DAILY BEFORE BREAKFAST 90 Tab 0  
 insulin NPH (HumuLIN N NPH Insulin KwikPen) 100 unit/mL (3 mL) inpn 23 Units by SubCUTAneous route every morning. 15 Adjustable Dose Pre-filled Pen Syringe 3  pantoprazole (PROTONIX) 40 mg tablet TAKE 1 TABLET BY MOUTH DAILY 90 Tab 3  clopidogreL (PLAVIX) 75 mg tab Take 1 Tab by mouth daily. 30 Tab 5  
 folic acid/multivit-min/lutein (CENTRUM SILVER PO) Take  by mouth.  loratadine (CLARITIN) 10 mg tablet TAKE 1 TABLET BY MOUTH DAILY 30 Tab 11  
 sucralfate (CARAFATE) 1 gram tablet Take 1 g by mouth four (4) times daily.  furosemide (LASIX) 40 mg tablet Take 80 mg by mouth Daily (before breakfast). Patient takes 80 mg with breakfast    
 insulin NPH (HUMULIN N NPH INSULIN KWIKPEN) 100 unit/mL (3 mL) inpn 6 Units by SubCUTAneous route every evening.  acetaminophen (TYLENOL) 325 mg tablet Take 325 mg by mouth every four (4) hours as needed for Pain. No current facility-administered medications on file prior to visit. Review of Systems Pertinent items are noted in HPI. Objective:  
 
Gen: well appearing female HEENT: normal conjunctiva, no audible congestion, patient does not see oral erythema, has MMM Neck: patient does not feel enlarged or tender LAD or masses Resp: normal respiratory effort, no audible wheezing. CV: patient does not feel palpitations or heart irregularity Abd: patient does not feel abdominal tenderness or mass, patient does not notice distension Extrem: patient does not see swelling in ankles or joints.   
Neuro: Alert and oriented, able to answer questions without difficulty, able to move all extremities and walk normally Assessment/Plan: ICD-10-CM ICD-9-CM 1. Acquired hypothyroidism  E03.9 244.9 TSH 3RD GENERATION 2. Controlled type 2 diabetes mellitus with stage 3 chronic kidney disease, with long-term current use of insulin (HCC)  E11.22 250.40 REFERRAL TO HOME HEALTH  
 N18.3 585.3   
 Z79.4 V58.67   
3. Chronic kidney disease, stage 4, severely decreased GFR (HCC)  N18.4 585.4 REFERRAL TO [address] [place] [address] 4. Hypercholesterolemia  E78.00 272.0 LIPID PANEL 5. Chronic atrial fibrillation (HCC)  I48.20 427.31 REFERRAL TO HOME HEALTH 6. Essential hypertension, benign  B11 042.4 METABOLIC PANEL, COMPREHENSIVE 7. Chronic diastolic heart failure (HCC)  I50.32 428.32 REFERRAL TO HOME HEALTH 8. At risk for falls  Z91.81 V15.88 [facility] 9. Unsteady gait  R26.81 781.2 REFERRAL TO HOME HEALTH  
be consistent with medications, requires supervision. Home health contacted for nursing assessment and PT to reduce risk of falls. Discussed with patient and daughter starting to think of long term plan as it is difficult for her to be on own at this time. This was a telemedicine visit with video.    
 
 
Adrian More MD

## 2020-09-25 NOTE — TELEPHONE ENCOUNTER
#[phone] Rinaldo Kawasaki is requesting a new order. She would like to extend PT by three weeks and add OT.     She can take a verbal and you may leave a vm on confidential vm

## 2020-09-25 NOTE — TELEPHONE ENCOUNTER
Spoke with Jey Hyde with [facility]. Verbal order given to extend patients PT and add OT.

## 2020-10-06 NOTE — TELEPHONE ENCOUNTER
Patient's Daughter, Yary David, states she needs a call back in reference to finding out if there is a Diabetic/Insulin medication in pill form that patient could take instead of Shot. Cheyenne Langford states they're looking to having someone come to help patient with medications & remembering to take them but they're concerned patient will forget if she had an insulin injection & someone will give to her twice & pill form would be easier to track. Please call to discuss.  Thank you

## 2020-10-07 NOTE — TELEPHONE ENCOUNTER
MD Anastasia Dykes, SHAHNAZ    Caller: Unspecified Netta Fonseca,  1:29 PM)               No.  The patient is on a significant amount of insulin and there will be no oral medication that will be comparable.  I would suggest they keep a record to document when the dose is given to reduce the risk of double dosing. Spoke with patients daughter Caitie Donnelly. Two pt identifiers confirmed. Caitie Donnelly advised of the above message from Dr. Vi Engel. Caitie Donnelly verbalized understanding of information discussed w/ no further questions at this time.

## 2020-10-09 NOTE — TELEPHONE ENCOUNTER
Abdullahi Murphy [facility] Front Office Pool               Caller's first and last name and relationship (if not the patient): Maxime Spicer (son)   Best contact number(s):  [phone]   What are the symptoms: swelling abnormal weight gain and blood in the stool. Transfer successful - yes/no (include outcome): no   Transfer declined - yes/no (include reason): no   Was caller advised to seek appropriate level of care - yes/no: yes   Details to clarify the request: Caller states pt. Was seen by dispatch health last night where they performed blood work.  Her kidney results came back abnormal and she has anemia     Copy/paste  ENVERA

## 2020-10-09 NOTE — TELEPHONE ENCOUNTER
Called, spoke to Pau. Two pt identifiers confirmed. Pau states states her mom is having SOB and  is concerned about her recent weight gain, anemia and recent blood in the stool. Pau advised to take patient to the emergency room. Pau verbalized understanding of information discussed w/ no further questions at this time.

## 2020-10-09 NOTE — TELEPHONE ENCOUNTER
#[phone] daughter, Jolie Renee states they are waiting on lab results that were taken last night by [id number]. She needs to know about kidney numbers. Does pt need a VV? And where do they go from here? Please call Jolie Renee as she is worried about this and the wknd is here.

## 2020-10-10 PROBLEM — N18.4 CKD (CHRONIC KIDNEY DISEASE) STAGE 4, GFR 15-29 ML/MIN (HCC): Status: ACTIVE | Noted: 2020-01-01

## 2020-10-10 PROBLEM — I50.33 ACUTE ON CHRONIC DIASTOLIC CHF (CONGESTIVE HEART FAILURE) (HCC): Status: ACTIVE | Noted: 2020-01-01

## 2020-10-10 NOTE — ED PROVIDER NOTES
EMERGENCY DEPARTMENT HISTORY AND PHYSICAL EXAM 
 
 
Date: 10/10/2020 Patient Name: Ej Xiao History of Presenting Illness Chief Complaint Patient presents with  Weight Gain  
  reports weight gain of 10 pounds in one week  Leg Swelling  
  bilateral lower extremity edema times on week History Provided By: Patient and Patient's Daughter HPI: Ej Xiao, 82 y.o. female presents to the ED with cc of leg swelling. Family reports 10 lbs of weight gain over past week, has noted increasing tachypnea. Has been talking to Dr. Saurabh Peña, recommends increasing furosemide. Was on 80 mg lasix BID, recently increased to 2 days ago to 120 BID. No chest pain. 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 Has had some diarrhea x2 days, has noted some BRB in stool. No change in urination. No chest pain. No N/V/D but does have history of CKD. There are no other complaints, changes, or physical findings at this time. PCP: Lesa Krause MD 
 
No current facility-administered medications on file prior to encounter. Current Outpatient Medications on File Prior to Encounter Medication Sig Dispense Refill  glucose blood VI test strips (Accu-Chek Letty Plus test strp) strip USE TO TEST BLOOD SUGAR FOUR TIMES DAILY as directed. DX: Diabetes, E11.9, CRF stage IV  N18.4 200 Strip 10  
 amiodarone (CORDARONE) 200 mg tablet TAKE 1 TABLET BY MOUTH TWICE DAILY 180 Tab 3  potassium chloride SR (KLOR-CON 10) 10 mEq tablet Take 1 Tab by mouth daily.  90 Tab 3  
 metoprolol tartrate (LOPRESSOR) 50 mg tablet TK 1 AND 1/2 TS PO  Tab 3  
 atorvastatin (LIPITOR) 80 mg tablet TAKE ONE TABLET BY MOUTH EVERY EVENING 90 Tab 3  
 allopurinoL (ZYLOPRIM) 100 mg tablet TAKE 2 TABLETS BY MOUTH DAILY 180 Tab 2  
 levothyroxine (SYNTHROID) 125 mcg tablet TAKE ONE TABLET BY MOUTH DAILY BEFORE BREAKFAST 90 Tab 0  
  insulin NPH (HumuLIN N NPH Insulin KwikPen) 100 unit/mL (3 mL) inpn 23 Units by SubCUTAneous route every morning. 15 Adjustable Dose Pre-filled Pen Syringe 3  pantoprazole (PROTONIX) 40 mg tablet TAKE 1 TABLET BY MOUTH DAILY 90 Tab 3  clopidogreL (PLAVIX) 75 mg tab Take 1 Tab by mouth daily. 30 Tab 5  
 folic acid/multivit-min/lutein (CENTRUM SILVER PO) Take 1 Tab by mouth daily.  loratadine (CLARITIN) 10 mg tablet TAKE 1 TABLET BY MOUTH DAILY 30 Tab 11  
 sucralfate (CARAFATE) 1 gram tablet Take 1 g by mouth four (4) times daily.  furosemide (LASIX) 40 mg tablet Take 80 mg by mouth Daily (before breakfast). Patient takes 80 mg with breakfast    
 insulin NPH (HUMULIN N NPH INSULIN KWIKPEN) 100 unit/mL (3 mL) inpn 6 Units by SubCUTAneous route every evening.  acetaminophen (TYLENOL) 325 mg tablet Take 325 mg by mouth every four (4) hours as needed for Pain. Past History Past Medical History: 
Past Medical History:  
Diagnosis Date  Arthritis KNEES  Atrial fibrillation ([facility] [address] 10/29/2009  Bladder cancer ([facility] [address]  CAD (coronary artery disease) STENT PER PATIENT  Chronic pain KNEES  
 Coagulation disorder ([facility] [address] Chronic prophylactic anticoagulation med  Colon polyps  Diabetes ([facility] [address] IDDM  
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 One fragment suggestive of hyperplastic polyp  GERD (gastroesophageal reflux disease)  Gout  Heart valve problem   
 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  
 @ [facility] >> atrial fibrillation  SOB (shortness of breath) on exertion 04/2019  
 T. I.A. 4/23/2009  TIA (transient ischemic attack)  Ulcer of right lower extremity, limited to breakdown of skin ([address] 7/5/2018 Past Surgical History: 
Past Surgical History:  
Procedure Laterality Date  CARDIAC SURG PROCEDURE UNLIST    
 ablation  CARDIAC SURG PROCEDURE UNLIST  01/2020 Watchman device implant  COLONOSCOPY N/A 2/20/2019 COLONOSCOPY performed by Mauricio Monterroso MD at [facility] ENDOSCOPY  COLONOSCOPY N/A 6/17/2019 COLONOSCOPY performed by Mauricio Monterroso MD at [facility] ENDOSCOPY  COLONOSCOPY N/A 6/15/2019 COLONOSCOPY performed by Mauricio Monterroso MD at [facility] MAIN OR  
 COLONOSCOPY N/A 9/11/2019 COLONOSCOPY performed by Mauricio Monterroso MD at [facility] ENDOSCOPY  COLONOSCOPY,DIAGNOSTIC  2/20/2019  COLONOSCOPY,FLEX,W/CONTROL, BLEEDING  9/11/2019  COLONOSCOPY,REMV LESN,SNARE  6/17/2019  GI TRACT CAPSULE ENDOSCOPY  6/28/2019  GI TRACT CAPSULE ENDOSCOPY  9/26/2019  HX APPENDECTOMY  HX CHOLECYSTECTOMY  HX HEART VALVE SURGERY  04/2019  HX HYSTERECTOMY  HX KNEE ARTHROSCOPY  [id number]  
 right knee  HX ORTHOPAEDIC    
 HX UROLOGICAL RENAL STENT, tur-b  IL ESOPHAGOGASTRODUODENOSCOPY TRANSORAL DIAGNOSTIC  8/22/2019  
    
 SIGMOIDOSCOPY,DIAGNOSTIC  6/15/2019  UPPER GI ENDOSCOPY,BALL DIL,30MM  6/15/2019  UPPER GI ENDOSCOPY,TUMOR ABLATN  6/19/2019  VASCULAR SURGERY PROCEDURE UNLIST  11/4  
 removed vein in right leg Family History: 
Family History Problem Relation Age of Onset  Stroke Other  Arthritis-osteo Sister   
     spinal stenosis  Gout Son   
 Hypertension Son   
Aren.Josefina Hypertension Mother  Heart Disease Mother CAD  Heart Disease Father CAD  Alcohol abuse Neg Hx  Asthma Neg Hx  Bleeding Prob Neg Hx  Cancer Neg Hx  Diabetes Neg Hx  Elevated Lipids Neg Hx   
 Headache Neg Hx  Lung Disease Neg Hx  Migraines Neg Hx  Psychiatric Disorder Neg Hx  Mental Retardation Neg Hx  Anesth Problems Neg Hx Social History: 
Social History Tobacco Use  Smoking status: Former Smoker Packs/day: 0.50 Years: 10.00 Pack years: 5.00 Types: Cigarettes Last attempt to quit: 1967 Years since quittin.8  Smokeless tobacco: Never Used Substance Use Topics  Alcohol use: No  
  Alcohol/week: 0.0 standard drinks  Drug use: Never Allergies: Allergies Allergen Reactions  Actos [Pioglitazone] Swelling Swelling of feet and legs  Codeine Itching  Hydrocodone Rash and Other (comments)  
  hallucinations Review of Systems Review of Systems Constitutional: Negative for activity change, chills and fever. HENT: Negative for facial swelling and voice change. Eyes: Negative for redness. Respiratory: Positive for shortness of breath. Negative for cough and wheezing. Cardiovascular: Positive for leg swelling. Negative for chest pain. Gastrointestinal: Positive for blood in stool and diarrhea. Negative for abdominal pain, nausea and vomiting. Genitourinary: Negative for decreased urine volume. Musculoskeletal: Negative for gait problem. Skin: Negative for pallor and rash. Neurological: Negative for tremors and facial asymmetry. Psychiatric/Behavioral: Negative for agitation. All other systems reviewed and are negative. Physical Exam  
Physical Exam 
Vitals signs and nursing note reviewed. Exam conducted with a chaperone present. Constitutional:   
   Comments: 77-year-old female, mildly confused but in no acute distress resting in bed HENT:  
   Head: Normocephalic and atraumatic. Neck: Musculoskeletal: Normal range of motion. Cardiovascular:  
   Rate and Rhythm: Normal rate and regular rhythm. Heart sounds: No murmur. No friction rub. No gallop. Pulmonary: Breath sounds: Rales (Bilateral lower bases) present. Comments: Mild tachypnea, able to speak in full sentences at rest.  Not hypoxic on room air as interpreted by me. Abdominal:  
   Palpations: Abdomen is soft. Tenderness: There is no abdominal tenderness. Genitourinary: 
   Rectum: Normal.  
   Comments: Non-bleeding external hemorrhoids Musculoskeletal: Normal range of motion. Right lower leg: Edema present. Left lower leg: Edema present. Skin: 
   General: Skin is warm. Capillary Refill: Capillary refill takes less than 2 seconds. Neurological:  
   General: No focal deficit present. Mental Status: She is alert. Psychiatric:     
   Mood and Affect: Mood normal.  
 
 
 
Diagnostic Study Results Labs - Recent Results (from the past 12 hour(s)) EKG, 12 LEAD, INITIAL Collection Time: 10/10/20 10:41 AM  
Result Value Ref Range Ventricular Rate 52 BPM  
 Atrial Rate 52 BPM  
 P-R Interval 280 ms QRS Duration 116 ms  
 Q-T Interval 508 ms QTC Calculation (Bezet) 472 ms Calculated P Axis 49 degrees Calculated R Axis -32 degrees Calculated T Axis 108 degrees Diagnosis Sinus bradycardia with 1st degree AV block Left axis deviation Incomplete left bundle branch block Abnormal QRS-T angle, consider primary T wave abnormality When compared with ECG of 24-APR-2019 03:10, 
[place] interval has increased Vent. rate has decreased BY  29 BPM 
Incomplete left bundle branch block is now present CBC WITH AUTOMATED DIFF Collection Time: 10/10/20 11:03 AM  
Result Value Ref Range WBC 5.6 3.6 - 11.0 K/uL  
 RBC 3.61 (L) 3.80 - 5.20 M/uL HGB 8.7 (L) 11.5 - 16.0 g/dL HCT 29.6 (L) 35.0 - 47.0 % MCV 82.0 80.0 - 99.0 FL  
 MCH 24.1 (L) 26.0 - 34.0 PG  
 MCHC 29.4 (L) 30.0 - 36.5 g/dL  
 RDW 16.8 (H) 11.5 - 14.5 % PLATELET 740 (L) 992 - 400 K/uL MPV 13.5 (H) 8.9 - 12.9 FL  
 NRBC 0.0 0  WBC ABSOLUTE NRBC 0.00 0.00 - 0.01 K/uL NEUTROPHILS 76 (H) 32 - 75 % LYMPHOCYTES 13 12 - 49 % MONOCYTES 7 5 - 13 % EOSINOPHILS 3 0 - 7 % BASOPHILS 1 0 - 1 % IMMATURE GRANULOCYTES 0 0.0 - 0.5 % ABS. NEUTROPHILS 4.2 1.8 - 8.0 K/UL  
 ABS. LYMPHOCYTES 0.7 (L) 0.8 - 3.5 K/UL  
 ABS. MONOCYTES 0.4 0.0 - 1.0 K/UL  
 ABS. EOSINOPHILS 0.2 0.0 - 0.4 K/UL  
 ABS. BASOPHILS 0.1 0.0 - 0.1 K/UL  
 ABS. IMM. GRANS. 0.0 0.00 - 0.04 K/UL  
 DF SMEAR SCANNED    
 PLATELET COMMENTS Large Platelets RBC COMMENTS ANISOCYTOSIS 
1+ TYPE & SCREEN Collection Time: 10/10/20 11:03 AM  
Result Value Ref Range Crossmatch Expiration 10/13/2020 ABO/Rh(D) A POSITIVE Antibody screen NEG METABOLIC PANEL, COMPREHENSIVE Collection Time: 10/10/20 11:03 AM  
Result Value Ref Range Sodium 142 136 - 145 mmol/L Potassium 3.8 3.5 - 5.1 mmol/L Chloride 108 97 - 108 mmol/L  
 CO2 28 21 - 32 mmol/L Anion gap 6 5 - 15 mmol/L Glucose 164 (H) 65 - 100 mg/dL BUN 63 (H) 6 - 20 MG/DL Creatinine 2.71 (H) 0.55 - 1.02 MG/DL  
 BUN/Creatinine ratio 23 (H) 12 - 20 GFR est AA 20 (L) >60 ml/min/1.73m2 GFR est non-AA 17 (L) >60 ml/min/1.73m2 Calcium 9.0 8.5 - 10.1 MG/DL Bilirubin, total 0.6 0.2 - 1.0 MG/DL  
 ALT (SGPT) 112 (H) 12 - 78 U/L  
 AST (SGOT) 92 (H) 15 - 37 U/L Alk. phosphatase 91 45 - 117 U/L Protein, total 6.2 (L) 6.4 - 8.2 g/dL Albumin 3.2 (L) 3.5 - 5.0 g/dL Globulin 3.0 2.0 - 4.0 g/dL A-G Ratio 1.1 1.1 - 2.2 NT-PRO BNP Collection Time: 10/10/20 11:03 AM  
Result Value Ref Range NT pro-BNP 7,290 (H) <450 PG/ML  
TROPONIN I Collection Time: 10/10/20 11:03 AM  
Result Value Ref Range Troponin-I, Qt. <0.05 <0.05 ng/mL OCCULT BLOOD, STOOL Collection Time: 10/10/20 11:03 AM  
Result Value Ref Range Occult blood, stool Positive (A) NEG Radiologic Studies -  
XR CHEST PORT Final Result IMPRESSION: No evidence of acute cardiopulmonary process. CT Results  (Last 48 hours) None CXR Results  (Last 48 hours) 10/10/20 1102  XR CHEST PORT Final result Impression:  IMPRESSION: No evidence of acute cardiopulmonary process. Narrative:  INDICATION:  shortness of breath COMPARISON: June 2019 FINDINGS: Single AP portable view of the chest obtained at 1056 demonstrates a  
stable cardiomediastinal silhouette. The lungs are clear bilaterally. There are  
median sternotomy wires. No osseous abnormalities are seen. Medical Decision Making I am the first provider for this patient. I reviewed the vital signs, available nursing notes, past medical history, past surgical history, family history and social history. Vital Signs-Reviewed the patient's vital signs. Patient Vitals for the past 12 hrs: 
 Temp Pulse Resp BP SpO2  
10/10/20 1145  (!) 49 18 (!) 124/46 97 % 10/10/20 1103  (!) 51 16  100 % 10/10/20 1029 97.3 °F (36.3 °C) 100 18 (!) 140/65 97 % EKG interpretation: (Preliminary) Preliminary EKG interpreted by me. Shows sinus bradycardia with a HR of 52. No ST elevations or depressions concerning for ischemia. Normal intervals. Records Reviewed: Nursing Notes, Old Medical Records and Previous Radiology Studies Provider Notes (Medical Decision Making):  
 
82-year-old female presents the emergency department with a chief complaint of gradual fluid overload, has been increasing Lasix at home to 120 twice a day after discussion with her cardiologist.  Tamara Loft with increasing dyspnea on exertion per family and weight gain. Patient's abdomen is benign, does report some hematochezia, rectal grossly negative, will send FOBT. No infectious symptoms. Doubt PE given history. No chest pain. Plan work-up with lasix, anticipate hospitalization. ED Course: Initial assessment performed. The patients presenting problems have been discussed, and they are in agreement with the care plan formulated and outlined with them. I have encouraged them to ask questions as they arise throughout their visit. ED Course as of Oct 10 1318 Sat Oct 10, 2020  
1235 Patient does have new anemia to 8.7, patient's fecal occult blood is positive. BNP is elevated. Renal function mildly elevated. Patient given Lasix, we will discuss with hospitalist for hospitalization. [MB] ED Course User Index [MB] MD Taylor Arias MD 
 
 
Disposition: 
 
 
Admitted Diagnosis Clinical Impression: 1. Hypervolemia, unspecified hypervolemia type 2. Anemia, unspecified type 3. Occult GI bleeding Attestations: 
 
Taylor Pinto MD 
 
Please note that this dictation was completed with [employer], the computer voice recognition software. Quite often unanticipated grammatical, syntax, homophones, and other interpretive errors are inadvertently transcribed by the computer software. Please disregard these errors. Please excuse any errors that have escaped final proofreading. Thank you.

## 2020-10-10 NOTE — H&P
Hospitalist Admission Note NAME: Candie Colbert :  1935 MRN:  [id number] Date/Time:  10/10/2020 2:38 PM 
 
Patient PCP: Geovanna Pierre MD  
Cardiologist:  Dr. Laurie Dey Please note that this dictation was completed with [employer], the computer voice recognition software. Quite often unanticipated grammatical, syntax, homophones, and other interpretive errors are inadvertently transcribed by the computer software. Please disregard these errors. Please excuse any errors that have escaped final proofreading 
______________________________________________________________________ Assessment & Plan: 
Acute on chronic diastolic chf, POA with 05ZW weight gain in 1 week by patient reported (20 lbs up from  weight of 183 to current 203 lbs) Hx severe mitral stenosis s/p [employer]tr[employer] Fregoso bioprosthetic mitral valve 2019 Echo 2020 EF 55% 
--failed outpatient increase of lasix. Was on lasix 80mg AM and 40mg PM and was increased to 120mg bid by Dr. Laurie Dey 2 days ago 
--probnp elevated 7290. Suspect LFTs more elevated today (compared to ) due to hepatic congestion from chf 
--give lasix IV 100mg bid. Monitor daily weight 
--continue kdur 
--cardiology consult and see if plavix can be held temporarily. GI bleed with hemeoccult positive brown stool, POA  Suspect upper GI bleed since BUN elevated. 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 Hgb 13 in , now 8.7 with report of recent intermittent dark stool and epigastric pain Hx UGI bleed  and LGIB  
--change PPI to IV q12h 
--continue carafate qid 
--hold plavix for now. 
--GI consult 
--monitor H&H and transfuse for hgb <7 Chronic atrial fibrillation s/p Watchman procedure 2020 Hx atrial flutter with abalation Sinus bradycardia HR 44, POA Hx CAD 
HTN 
 --reduce metoprolol from 75mg bid to 12.5mg bid with hold parameter for HR <55 
--continue amiodarone 200mg bid with hold parameter for HR <55 CKD stage 4, POA 
--Cr 2.7 today, baseline 2.3. Monitor with diuresis --not followed by nephrology and would need to consult if she does not respond to IV lasix IDDM, POA 
--A1c 6 7/20 
--continue NPH 23 units AM and 6 units PM.  Low dose SSI Hx TIA 
--hold plavix due to anemia, concern for GI bleed and recurrence of GAVE. Hypothyroid --continue levothyroxine Gout 
--continue allopurinol Body mass index is 29.98 kg/m². Code: discussed, DNR/DNI 
DVT prophylaxis:  SCD Surrogate decision maker:  Daughter Tye Ricketts and son Subjective: CHIEF COMPLAINT:   Weight gain, b/l leg edema, orthopnea, LEIJA. Blood when wipe and sometimes dark stools HISTORY OF PRESENT ILLNESS:    
Dave Louis is a 82 y.o. 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 History obtained from mostly daughter and some from patient. She typically has mild edema in ankles. Has had increased b/l leg edema for about 1 week but a lot worse in past 2-3 days with increased LEIJA, increased abdominal girth, 10 lb weight gain, orthopnea. Dr. Jc Choi increased lasix from 80mg AM and 40mg PM to 120mg bid past 2 days without improvement in leg edema. Also had some red blood on tissue when wiped after BM yesterday (no blood in stool). She has had intermittent dark stools and epigastric crampy pain for about 1-2 weeks. evaluation of the above problems. Past Medical History:  
Diagnosis Date  Arthritis KNEES  Atrial fibrillation ([facility] [address] 10/29/2009  Bladder cancer ([facility] [address]  CAD (coronary artery disease) STENT PER PATIENT  Chronic pain KNEES  
 Coagulation disorder ([address] Chronic prophylactic anticoagulation med  Colon polyps  Diabetes ([address] IDDM  
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 One fragment suggestive of hyperplastic polyp  GERD (gastroesophageal reflux disease)  Gout  Heart valve problem   
 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  
 @ [facility] >> atrial fibrillation  SOB (shortness of breath) on exertion 04/2019  
 T. I.A. 4/23/2009  TIA (transient ischemic attack)  Ulcer of right lower extremity, limited to breakdown of skin ([facility] [address] 7/5/2018 Past Surgical History:  
Procedure Laterality Date  CARDIAC SURG PROCEDURE UNLIST    
 ablation  CARDIAC SURG PROCEDURE UNLIST  01/2020 Watchman device implant  COLONOSCOPY N/A 2/20/2019 COLONOSCOPY performed by Mandy Marquez MD at [facility] ENDOSCOPY  COLONOSCOPY N/A 6/17/2019 COLONOSCOPY performed by Mandy Marquez MD at [facility] ENDOSCOPY  COLONOSCOPY N/A 6/15/2019 COLONOSCOPY performed by Mandy Marquez MD at [facility] MAIN OR  
 COLONOSCOPY N/A 9/11/2019 COLONOSCOPY performed by Mandy Marquez MD at [facility] ENDOSCOPY  COLONOSCOPY,DIAGNOSTIC  2/20/2019  COLONOSCOPY,FLEX,W/CONTROL, BLEEDING  9/11/2019  COLONOSCOPY,REMV LESN,SNARE  6/17/2019  GI TRACT CAPSULE ENDOSCOPY  6/28/2019  GI TRACT CAPSULE ENDOSCOPY  9/26/2019  HX APPENDECTOMY  HX CHOLECYSTECTOMY  HX HEART VALVE SURGERY  04/2019  HX HYSTERECTOMY  HX KNEE ARTHROSCOPY  [id number]  
 right knee  HX ORTHOPAEDIC    
 HX UROLOGICAL RENAL STENT, tur-b  SD ESOPHAGOGASTRODUODENOSCOPY TRANSORAL DIAGNOSTIC  2019  
    
 SIGMOIDOSCOPY,DIAGNOSTIC  6/15/2019  UPPER GI ENDOSCOPY,BALL DIL,30MM  6/15/2019  UPPER GI ENDOSCOPY,TUMOR ABLATN  2019  VASCULAR SURGERY PROCEDURE UNLIST    
 removed vein in right leg Social History Tobacco Use  Smoking status: Former Smoker Packs/day: 0.50 Years: 10.00 Pack years: 5.00 Types: Cigarettes Last attempt to quit: 1967 Years since quittin.8  Smokeless tobacco: Never Used Substance Use Topics  Alcohol use: No  
  Alcohol/week: 0.0 standard drinks Lives alone Family History Problem Relation Age of Onset  Stroke Other  Arthritis-osteo Sister   
     spinal stenosis  Gout Son   
 Hypertension Son   
[facility] Hypertension Mother  Heart Disease Mother CAD  Heart Disease Father CAD  Alcohol abuse Neg Hx  Asthma Neg Hx  Bleeding Prob Neg Hx  Cancer Neg Hx  Diabetes Neg Hx  Elevated Lipids Neg Hx   
 Headache Neg Hx  Lung Disease Neg Hx  Migraines Neg Hx  Psychiatric Disorder Neg Hx  Mental Retardation Neg Hx  Anesth Problems Neg Hx Allergies Allergen Reactions  Actos [Pioglitazone] Swelling Swelling of feet and legs  Codeine Itching  Hydrocodone Rash and Other (comments)  
  hallucinations Prior to Admission medications Medication Sig Start Date End Date Taking? Authorizing Provider  
loratadine (CLARITIN) 10 mg tablet Take 10 mg by mouth daily as needed for Allergies. Yes Provider, Historical  
neomycin sulf/bacitracin/poly (NEOSPORIN EX) by Apply Externally route daily as needed (wound care).    Yes Provider, Historical  
amiodarone (CORDARONE) 200 mg tablet TAKE 1 TABLET BY MOUTH TWICE DAILY 8/6/20  Yes Marizol Luna MD  
potassium chloride SR (KLOR-CON 10) 10 mEq tablet Take 1 Tab by mouth daily. 8/6/20  Yes Marizol Luna MD  
metoprolol tartrate (LOPRESSOR) 50 mg tablet TK 1 AND 1/2 TS PO BID 8/6/20  Yes Marizol Luna MD  
atorvastatin (LIPITOR) 80 mg tablet TAKE ONE TABLET BY MOUTH EVERY EVENING 8/6/20  Yes Marizol Luna MD  
allopurinoL (ZYLOPRIM) 100 mg tablet TAKE 2 TABLETS BY MOUTH DAILY 7/16/20  Yes Marizol Luna MD  
levothyroxine (SYNTHROID) 125 mcg tablet TAKE ONE TABLET BY MOUTH DAILY BEFORE BREAKFAST 7/12/20  Yes Marizol Luna MD  
insulin NPH (HumuLIN N NPH Insulin KwikPen) 100 unit/mL (3 mL) inpn 23 Units by SubCUTAneous route every morning. 7/12/20  Yes Marizol Luna MD  
pantoprazole (PROTONIX) 40 mg tablet TAKE 1 TABLET BY MOUTH DAILY 6/19/20  Yes Marizol Luna MD  
clopidogreL (PLAVIX) 75 mg tab Take 1 Tab by mouth daily. 6/9/20  Yes Marizol Luna MD  
sucralfate (CARAFATE) 1 gram tablet Take 1 g by mouth four (4) times daily. Yes Provider, Historical  
furosemide (LASIX) 40 mg tablet Take 120 mg by mouth two (2) times a day. Patient takes 80 mg with breakfast and 40mg in afternoon. Since 10/8 taking 120mg bid   Yes Provider, Historical  
insulin NPH (HUMULIN N NPH INSULIN KWIKPEN) 100 unit/mL (3 mL) inpn 6 Units by SubCUTAneous route every evening. Yes Provider, Historical  
acetaminophen (TYLENOL) 325 mg tablet Take 325 mg by mouth every four (4) hours as needed for Pain. Yes Provider, Historical  
 
REVIEW OF SYSTEMS:  POSITIVE= Bold. Negative = normal text General:  fever, chills, sweats, generalized weakness, weight gain, loss of appetite Eyes:  blurred vision, eye pain, loss of vision, diplopia Ear Nose and Throat:  rhinorrhea, pharyngitis Respiratory:   cough, sputum production, SOB, wheezing, LEIJA, pleuritic pain 
Cardiology:  chest pain, palpitations, orthopnea, PND, edema, syncope Gastrointestinal:  abdominal pain, N/V, dysphagia, diarrhea, constipation, bleeding Genitourinary:  frequency, urgency, dysuria, hematuria, incontinence Muskuloskeletal :  arthralgia, myalgia Hematology:  easy bruising, bleeding, lymphadenopathy Dermatological:  rash, ulceration, pruritis Endocrine:  hot flashes or polydipsia Neurological:  headache, dizziness, confusion, focal weakness, paresthesia, memory loss, gait disturbance Psychological: anxiety, depression, agitation Objective: VITALS:   
Visit Vitals BP (!) 128/45 Pulse (!) 46 Temp 97.3 °F (36.3 °C) Resp 20 Ht 5' 9\" (1.753 m) Wt 92.1 kg (203 lb) SpO2 99% BMI 29.98 kg/m² Temp (24hrs), Av.3 °F (36.3 °C), Min:97.3 °F (36.3 °C), Max:97.3 °F (36.3 °C) Body mass index is 29.98 kg/m². Wt Readings from Last 25 Encounters:  
10/10/20 92.1 kg (203 lb) 10/05/20 90.7 kg (200 lb)  
20 83 kg (183 lb)  
20 89.8 kg (198 lb)  
20 79.4 kg (175 lb) 20 81.6 kg (180 lb) 20 79.4 kg (175 lb) PHYSICAL EXAM: 
 
General:    Alert, cooperative, no distress, appears stated age. HEENT: Atraumatic, anicteric sclerae, pink conjunctivae No oral ulcers, mucosa moist, throat clear. Hearing intact. Neck:  Supple, symmetrical,  thyroid: non tender Lungs:   Clear to auscultation bilaterally. No Wheezing or Rhonchi. No rales. Chest wall:  No tenderness  No Accessory muscle use. Heart:   Regular  rhythm,  Bradycardic, 2/6 systolic murmur   No gallop. 2+ pitting edema lower legs, 1+ edema thighs b/l Abdomen:   Obese, non-tender. Not distended. Bowel sounds normal. No masses. Rectal exam per ER physician:  Heme Occult positive brown stools Extremities: No cyanosis. No clubbing Skin:     Not pale Not Jaundiced  No rashes Psych:  Good insight. Not depressed. Not anxious or agitated. Neurologic: EOMs intact. No facial asymmetry. No aphasia or slurred speech. Alert and oriented X 3. IMAGING RESULTS: 
 []       I have personally reviewed the actual   []     CXR  []     CT scan CXR: 
CT : 
EKG: 
 ________________________________________________________________________ Care Plan discussed with: 
  Comments Patient y Family  y Daughter Pau bedside RN Care Manager Consultant:  duarte Joyner Sink  
________________________________________________________________________ Prophylaxis: 
GI PPI, carafate DVT SCD  
________________________________________________________________________ Recommended Disposition:  
Home with Family y HH/PT/OT/RN y  
SNF/LTC   
[place]   
________________________________________________________________________ Code Status: 
Full Code DNR/DNI y  
________________________________________________________________________ TOTAL TIME:  65 minutes Comments Reviewed previous records  
>50% of visit spent in counseling and coordination of care  Discussion with patient and/or family and questions answered 
  
 
 
______________________________________________________________________ Paige King MD 
 
 
Procedures: see electronic medical records for all procedures/Xrays and details which were not copied into this note but were reviewed prior to creation of Plan. LAB DATA REVIEWED:   
Recent Results (from the past 24 hour(s)) EKG, 12 LEAD, INITIAL Collection Time: 10/10/20 10:41 AM  
Result Value Ref Range Ventricular Rate 52 BPM  
 Atrial Rate 52 BPM  
 P-R Interval 280 ms QRS Duration 116 ms  
 Q-T Interval 508 ms QTC Calculation (Bezet) 472 ms Calculated P Axis 49 degrees Calculated R Axis -32 degrees Calculated T Axis 108 degrees Diagnosis Sinus bradycardia with 1st degree AV block Left axis deviation Incomplete left bundle branch block Abnormal QRS-T angle, consider primary T wave abnormality When compared with ECG of 24-APR-2019 03:10, 
ME interval has increased Vent. rate has decreased BY  29 BPM 
Incomplete left bundle branch block is now present CBC WITH AUTOMATED DIFF Collection Time: 10/10/20 11:03 AM  
Result Value Ref Range WBC 5.6 3.6 - 11.0 K/uL  
 RBC 3.61 (L) 3.80 - 5.20 M/uL HGB 8.7 (L) 11.5 - 16.0 g/dL HCT 29.6 (L) 35.0 - 47.0 % MCV 82.0 80.0 - 99.0 FL  
 MCH 24.1 (L) 26.0 - 34.0 PG  
 MCHC 29.4 (L) 30.0 - 36.5 g/dL  
 RDW 16.8 (H) 11.5 - 14.5 % PLATELET 656 (L) 306 - 400 K/uL MPV 13.5 (H) 8.9 - 12.9 FL  
 NRBC 0.0 0  WBC ABSOLUTE NRBC 0.00 0.00 - 0.01 K/uL NEUTROPHILS 76 (H) 32 - 75 % LYMPHOCYTES 13 12 - 49 % MONOCYTES 7 5 - 13 % EOSINOPHILS 3 0 - 7 % BASOPHILS 1 0 - 1 % IMMATURE GRANULOCYTES 0 0.0 - 0.5 % ABS. NEUTROPHILS 4.2 1.8 - 8.0 K/UL  
 ABS. LYMPHOCYTES 0.7 (L) 0.8 - 3.5 K/UL  
 ABS. MONOCYTES 0.4 0.0 - 1.0 K/UL  
 ABS. EOSINOPHILS 0.2 0.0 - 0.4 K/UL  
 ABS. BASOPHILS 0.1 0.0 - 0.1 K/UL  
 ABS. IMM. GRANS. 0.0 0.00 - 0.04 K/UL  
 DF SMEAR SCANNED    
 PLATELET COMMENTS Large Platelets RBC COMMENTS ANISOCYTOSIS 
1+ TYPE & SCREEN Collection Time: 10/10/20 11:03 AM  
Result Value Ref Range Crossmatch Expiration 10/13/2020 ABO/Rh(D) A POSITIVE Antibody screen NEG METABOLIC PANEL, COMPREHENSIVE Collection Time: 10/10/20 11:03 AM  
Result Value Ref Range Sodium 142 136 - 145 mmol/L Potassium 3.8 3.5 - 5.1 mmol/L Chloride 108 97 - 108 mmol/L  
 CO2 28 21 - 32 mmol/L Anion gap 6 5 - 15 mmol/L Glucose 164 (H) 65 - 100 mg/dL BUN 63 (H) 6 - 20 MG/DL Creatinine 2.71 (H) 0.55 - 1.02 MG/DL  
 BUN/Creatinine ratio 23 (H) 12 - 20 GFR est AA 20 (L) >60 ml/min/1.73m2 GFR est non-AA 17 (L) >60 ml/min/1.73m2 Calcium 9.0 8.5 - 10.1 MG/DL Bilirubin, total 0.6 0.2 - 1.0 MG/DL  
 ALT (SGPT) 112 (H) 12 - 78 U/L  
 AST (SGOT) 92 (H) 15 - 37 U/L Alk. phosphatase 91 45 - 117 U/L Protein, total 6.2 (L) 6.4 - 8.2 g/dL Albumin 3.2 (L) 3.5 - 5.0 g/dL Globulin 3.0 2.0 - 4.0 g/dL A-G Ratio 1.1 1.1 - 2.2 NT-PRO BNP Collection Time: 10/10/20 11:03 AM  
Result Value Ref Range NT pro-BNP 7,290 (H) <450 PG/ML  
TROPONIN I Collection Time: 10/10/20 11:03 AM  
Result Value Ref Range Troponin-I, Qt. <0.05 <0.05 ng/mL OCCULT BLOOD, STOOL Collection Time: 10/10/20 11:03 AM  
Result Value Ref Range  Occult blood, stool Positive (A) NEG

## 2020-10-10 NOTE — PROGRESS NOTES
Bedside and Verbal shift change report given to [address] [place] [address] (oncoming nurse) by Michael Bridges (offgoing nurse). Report included the following information SBAR, Kardex, Intake/Output and MAR.

## 2020-10-10 NOTE — ED NOTES
Patient to ED with complaint of increased bilateral leg swelling. Patient placed on stretcher with wheels locked. EKG and lab work obtained

## 2020-10-10 NOTE — ED NOTES
TRANSFER - OUT REPORT: 
 
Verbal report given to robert Smith  being transferred to [facility](unit) for routine progression of care Report consisted of patients Situation, Background, Assessment and  
Recommendations(SBAR). Information from the following report(s) Kardex, ED Summary, Intake/Output and MAR was reviewed with the receiving nurse. Lines:  
Peripheral IV 10/10/20 Left Antecubital (Active) Site Assessment Clean, dry, & intact 10/10/20 1118 Phlebitis Assessment 0 10/10/20 1118 Infiltration Assessment 0 10/10/20 1118 Dressing Status Clean, dry, & intact 10/10/20 1118 Dressing Type Transparent 10/10/20 1118 Hub Color/Line Status Pink;Flushed;Patent 10/10/20 1118 Action Taken Blood drawn 10/10/20 1118 Alcohol Cap Used No 10/10/20 1118 Opportunity for questions and clarification was provided.    
 
Patient transported with:

## 2020-10-10 NOTE — ED NOTES
Up to bathroom with one person assist. Then back to bed; collected clean catch urine in case it is ordered.

## 2020-10-10 NOTE — PROGRESS NOTES
Pharmacy Clarification of the Prior to Admission Medication Regimen Retrospective to the Admission Medication Reconciliation The patient was interviewed regarding clarification of the prior to admission medication regimen. Patient's daughter was present in room and obtained permission from patient to discuss drug regimen with visitor(s) present. Patient was questioned regarding use of any other inhalers, topical products, over the counter medications, herbal medications, vitamin products or ophthalmic/nasal/otic medication use. Information Obtained From: Patient, Patient's daughter, Medication list, Rx query Recommendations/Findings: The following amendments were made to the patient's active medication list on file at [facility]:  
 
1) Additions:  
neomycin sulf/bacitracin/poly (NEOSPORIN EX) 2) Removals: none 3) Changes: none 4) Pertinent Pharmacy Findings: 
Patient is taking an antiplatelet: clopidogreL (PLAVIX) 75 mg tab Patient's daughter helps manage her medications PTA medication list was corrected to the following:  
 
Prior to Admission Medications Prescriptions Last Dose Informant Taking?  
acetaminophen (TYLENOL) 325 mg tablet 10/3/2020 at Unknown time Child Yes Sig: Take 325 mg by mouth every four (4) hours as needed for Pain. allopurinoL (ZYLOPRIM) 100 mg tablet 10/10/2020 at Unknown time Child Yes Sig: TAKE 2 TABLETS BY MOUTH DAILY  
amiodarone (CORDARONE) 200 mg tablet 10/10/2020 at Unknown time Child Yes Sig: TAKE 1 TABLET BY MOUTH TWICE DAILY  
atorvastatin (LIPITOR) 80 mg tablet 10/9/2020 at Unknown time Child Yes Sig: TAKE ONE TABLET BY MOUTH EVERY EVENING  
clopidogreL (PLAVIX) 75 mg tab 10/10/2020 at Unknown time Child Yes Sig: Take 1 Tab by mouth daily. furosemide (LASIX) 40 mg tablet 10/10/2020 at Unknown time Child Yes Sig: Take 120 mg by mouth two (2) times a day.  Patient takes 80 mg with breakfast and 40mg in afternoon. Since 10/8 taking 120mg bid  
insulin NPH (HUMULIN N NPH INSULIN KWIKPEN) 100 unit/mL (3 mL) inpn 10/9/2020 at Unknown time Child Yes Si Units by SubCUTAneous route every evening. insulin NPH (HumuLIN N NPH Insulin KwikPen) 100 unit/mL (3 mL) inpn 10/10/2020 at Unknown time Child Yes Si Units by SubCUTAneous route every morning. levothyroxine (SYNTHROID) 125 mcg tablet 10/10/2020 at Unknown time Child Yes Sig: TAKE ONE TABLET BY MOUTH DAILY BEFORE BREAKFAST  
loratadine (CLARITIN) 10 mg tablet 9/10/2020 at Unknown time Child Yes Sig: Take 10 mg by mouth daily as needed for Allergies. metoprolol tartrate (LOPRESSOR) 50 mg tablet 10/10/2020 at Unknown time Child Yes Sig: TK 1 AND 1/2 TS PO BID  
neomycin sulf/bacitracin/poly (NEOSPORIN EX) 10/9/2020 at Unknown time Child Yes Sig: by Apply Externally route daily as needed (wound care). pantoprazole (PROTONIX) 40 mg tablet 10/10/2020 at Unknown time Child Yes Sig: TAKE 1 TABLET BY MOUTH DAILY potassium chloride SR (KLOR-CON 10) 10 mEq tablet 10/10/2020 at Unknown time Child Yes Sig: Take 1 Tab by mouth daily. sucralfate (CARAFATE) 1 gram tablet 10/10/2020 at Unknown time Child Yes Sig: Take 1 g by mouth four (4) times daily. Facility-Administered Medications: None Thank you, Any Orozco Pharmacy Intern

## 2020-10-11 NOTE — CONSULTS
[facility] Name:  Jessica Zavaleta 
MR#:  [id number] :  1935 ACCOUNT #:  [de-identified] DATE OF SERVICE:  10/11/2020 REQUESTING PHYSICIAN:  Connie Fuentes MD 
 
PRIMARY GASTROENTEROLOGIST:  Sajan Painter MD 
 
REASON FOR CONSULTATION:  Heme-positive stool, possible GI bleed. HISTORY OF PRESENT ILLNESS:  The patient is an 79-year-old woman with past medical history of organic heart disease, congestive heart failure, mitral stenosis, status post biosynthetic mitral valve in 2019, atrial fibrillation, chronic kidney disease, diabetes mellitus, chronic AFib status post Watchman procedure in 2020, who presented to the emergency department with increased edema, increased shortness of breath, increased abdominal girth. She had seen some red blood on the tissue when she wiped and had been having intermittent dark stools for about 1-2 weeks prior to admission. She was found to have drop in her hemoglobin from 2020 when it was 13.1-8.7. We were asked to see her in consultation for the GI bleeding. She has an extensive GI history summarized in prior consultation notes by Dr. Katlyn Jha and his colleagues. Most importantly, she has a history of gastric antral vascular ectasia treated in 2019 with argon plasma coagulation. She had a repeat endoscopy in 2019 which really did not show any active GAVE. She has had multiple colonoscopies, PillCam, and upper endoscopies as summarized in the old chart. PAST MEDICAL HISTORY:  Includes, chronic atrial fibrillation, history of bladder cancer, history of colonic polyps, diabetes mellitus, GI bleeding from gastric antral vascular ectasia, GERD, gout, status post mitral valve replacement and congestive heart failure. SOCIAL HISTORY:  She is . She smoked remotely. Does not drink alcohol. FAMILY HISTORY:  Noncontributory. ALLERGIES:  ACTOS, CODEINE, HYDROCODONE.  
 
PHYSICAL EXAMINATION: 
 GENERAL:  Pleasant elderly woman, in no acute distress. VITAL SIGNS:  Stable. She is afebrile. SKIN:  No rash or jaundice. LUNGS:  Clear. CARDIAC:  Regular rate and rhythm. ABDOMEN:  Soft and benign. LABORATORY STUDIES:  WBC today 5.3. Hemoglobin down to 8.3, hematocrit 28.5, platelet count [phone]. IMPRESSION: 
1. Heme-positive stool. 2.  Acute on chronic gastrointestinal bleeding. 3.  History of gastric antral vascular ectasia treated with argon plasma coagulation in 06/2019. 
4.  History of small ascending colon vascular ectasia treated with clipping in 09/2019. 
5.  Chronic atrial fibrillation status post Watchman procedure. Acute on chronic diastolic congestive heart failure. History of mitral stenosis, status post Medtronic biosynthetic mitral valve 04/2019 RECOMMENDATIONS: 
1. NPO after midnight. 2.  Upper endoscopy by Dr. Yolande Potts tomorrow who is covering for Dr. Francie Castaneda. Susana Travis MD 
 
 
[id number] 
D:  10/11/2020 13:11 
T:  10/11/2020 17:13 
JOB #:  [id number]

## 2020-10-11 NOTE — CONSULTS
Consult/Admission NAME: Denice Jackson :  1935 MRN:  [id number] Date/Time:  10/10/2020 10:22 PM 
 
Patient PCP: Sean Cruz MD 
________________________________________________________________________ Assessment: S/p Bioprosthetic MV replacement Hx of A F ib    . Current sinus rhythm S/p Watchman device. Hx of CAD S/p remote coronary stent . Diabetes CKD stage 4. Hypertension. Hyperlipidemia. Anemia Plan:  
 
continue on meds. With anemia and GI bleed , would hold Plavix Will review office chart for further details. See if she needs to be on Plavix . []           High complexity decision making was performed Subjective: CHIEF COMPLAINT: anemia . GI bleed. HISTORY OF PRESENT ILLNESS:    
Riaz Lehman is a 82 y.o.  female who has above diagnoses. Past Medical History:  
Diagnosis Date  Arthritis KNEES  Atrial fibrillation ([facility] [address] 10/29/2009  Bladder cancer ([facility] [address]  CAD (coronary artery disease) STENT PER PATIENT  Chronic pain KNEES  
 Coagulation disorder ([address] Chronic prophylactic anticoagulation med  Colon polyps  Diabetes ([facility] [address] IDDM  
 GAVE (gastric antral vascular ectasia) 2019  
 bx :    Gastric, biopsy:  Mild capillary ectasia and congestion, compatible with gastric antral vascular ectasia (GAVE), negative for background gastritis. One fragment suggestive of hyperplastic polyp  GERD (gastroesophageal reflux disease)  Gout  Heart valve problem   
 leaking heart valve  Hypercholesterolemia  Hypertension  Hypothyroidism  Hypothyroidism 2009  Hypothyroidism, acquired, autoimmune 2015  Overweight and obesity  PUD (peptic ulcer disease)   S/P ablation of atrial flutter[V45.89HM]   
 @ [facility] >> atrial fibrillation  SOB (shortness of breath) on exertion 2019  
 [place] 2009  TIA (transient ischemic attack)  Ulcer of right lower extremity, limited to breakdown of skin ([address] 2018 Past Surgical History:  
Procedure Laterality Date  CARDIAC SURG PROCEDURE UNLIST    
 ablation  CARDIAC SURG PROCEDURE UNLIST  2020 Watchman device implant  COLONOSCOPY N/A 2019 COLONOSCOPY performed by Lu Cervantes MD at [facility] ENDOSCOPY  COLONOSCOPY N/A 2019 COLONOSCOPY performed by Lu Cervantes MD at [facility] ENDOSCOPY  COLONOSCOPY N/A 6/15/2019 COLONOSCOPY performed by Lu Cervantes MD at [facility] MAIN OR  
 COLONOSCOPY N/A 2019 COLONOSCOPY performed by Lu Cervantes MD at [facility] ENDOSCOPY  COLONOSCOPY,DIAGNOSTIC  2019  COLONOSCOPY,FLEX,W/CONTROL, BLEEDING  2019  COLONOSCOPY,REMV LESN,SNARE  2019  GI TRACT CAPSULE ENDOSCOPY  2019  GI TRACT CAPSULE ENDOSCOPY  2019  HX APPENDECTOMY  HX CHOLECYSTECTOMY  HX HEART VALVE SURGERY  2019  HX HYSTERECTOMY  HX KNEE ARTHROSCOPY  [id number]  
 right knee  HX ORTHOPAEDIC    
 HX UROLOGICAL RENAL STENT, tur-b  IL ESOPHAGOGASTRODUODENOSCOPY TRANSORAL DIAGNOSTIC  2019  
    
 SIGMOIDOSCOPY,DIAGNOSTIC  6/15/2019  UPPER GI ENDOSCOPY,BALL DIL,30MM  6/15/2019  UPPER GI ENDOSCOPY,TUMOR ABLATN  2019  VASCULAR SURGERY PROCEDURE UNLIST    
 removed vein in right leg Allergies Allergen Reactions  Actos [Pioglitazone] Swelling Swelling of feet and legs  Codeine Itching  Hydrocodone Rash and Other (comments)  
  hallucinations Meds:  See below Social History Tobacco Use  Smoking status: Former Smoker Packs/day: 0.50 Years: 10.00 Pack years: 5.00 Types: Cigarettes Last attempt to quit: 1967 Years since quittin.8  Smokeless tobacco: Never Used Substance Use Topics  Alcohol use: No  
  Alcohol/week: 0.0 standard drinks Family History Problem Relation Age of Onset  Stroke Other  Arthritis-osteo Sister   
     spinal stenosis  Gout Son   
 Hypertension Son   
[facility] Hypertension Mother  Heart Disease Mother CAD  Heart Disease Father CAD  Alcohol abuse Neg Hx  Asthma Neg Hx  Bleeding Prob Neg Hx  Cancer Neg Hx  Diabetes Neg Hx  Elevated Lipids Neg Hx   
 Headache Neg Hx  Lung Disease Neg Hx  Migraines Neg Hx  Psychiatric Disorder Neg Hx  Mental Retardation Neg Hx  Anesth Problems Neg Hx REVIEW OF SYSTEMS:   
 []            Unable to obtain  ROS due to --- 
 [x]            Total of 12 systems reviewed as follows: 
 
Constitutional: negative fever, negative chills, negative weight loss Eyes:   negative visual changes ENT:   negative sore throat, tongue or lip swelling Respiratory:  negative cough, negative dyspnea Cards:  negative for chest pain, palpitations, lower extremity edema GI:   negative for nausea, vomiting, diarrhea, and abdominal pain Genitourinary: negative for frequency, dysuria Integument:  negative for rash Hematologic:  negative for easy bruising and gum/nose bleeding Musculoskel: negative for myalgias,  back pain Neurological:  negative for headaches, dizziness, vertigo, weakness Behavl/Psych: negative for feelings of anxiety, depression Pertinent Positives include : 
 
Objective:  
  
Physical Exam: 
 
Last 24hrs VS reviewed since prior progress note. Most recent are: 
 
Visit Vitals BP (!) 119/44 (BP 1 Location: Right arm, BP Patient Position: At rest) Pulse (!) 46 Temp 97.7 °F (36.5 °C) Resp 18 Ht 5' 9\" (1.753 m) Wt 92.1 kg (203 lb) SpO2 100% BMI 29.98 kg/m² Intake/Output Summary (Last 24 hours) at 10/10/2020 2222 Last data filed at 10/10/2020 1925 Gross per 24 hour Intake 240 ml Output 300 ml Net -60 ml General Appearance: Well developed, well nourished, alert & oriented x 3,  
 no acute distress. Ears/Nose/Mouth/Throat: Pupils equal and round, Hearing grossly normal. 
Neck: Supple. JVP within normal limits. Carotids good upstrokes, with no bruit. Chest: Lungs clear to auscultation bilaterally. Cardiovascular: Regular rate and rhythm, S1S2 normal, no murmur, rubs, gallops. Abdomen: Soft, non-tender, bowel sounds are active. No organomegaly. Extremities: No edema bilaterally. Femoral pulses +2, Distal Pulses +1. Skin: Warm and dry. Neuro: CN II-XII grossly intact, Strength and sensation grossly intact. Data:  
  
Prior to Admission medications Medication Sig Start Date End Date Taking? Authorizing Provider  
loratadine (CLARITIN) 10 mg tablet Take 10 mg by mouth daily as needed for Allergies. Yes Provider, Historical  
neomycin sulf/bacitracin/poly (NEOSPORIN EX) by Apply Externally route daily as needed (wound care). Yes Provider, Historical  
amiodarone (CORDARONE) 200 mg tablet TAKE 1 TABLET BY MOUTH TWICE DAILY 8/6/20  Yes Kristina Catalan MD  
potassium chloride SR (KLOR-CON 10) 10 mEq tablet Take 1 Tab by mouth daily. 8/6/20  Yes Kristina Catalan MD  
metoprolol tartrate (LOPRESSOR) 50 mg tablet TK 1 AND 1/2 TS PO BID 8/6/20  Yes Kristina Catalan MD  
atorvastatin (LIPITOR) 80 mg tablet TAKE ONE TABLET BY MOUTH EVERY EVENING 8/6/20  Yes Kristina Catalan MD  
allopurinoL (ZYLOPRIM) 100 mg tablet TAKE 2 TABLETS BY MOUTH DAILY 7/16/20  Yes Kristina Catalan MD  
levothyroxine (SYNTHROID) 125 mcg tablet TAKE ONE TABLET BY MOUTH DAILY BEFORE BREAKFAST 7/12/20  Yes Kristina Catalan MD  
insulin NPH (HumuLIN N NPH Insulin KwikPen) 100 unit/mL (3 mL) inpn 23 Units by SubCUTAneous route every morning. 7/12/20  Yes Kristina Catalan MD  
pantoprazole (PROTONIX) 40 mg tablet TAKE 1 TABLET BY MOUTH DAILY 6/19/20  Yes Kristina Catalan MD  
clopidogreL (PLAVIX) 75 mg tab Take 1 Tab by mouth daily.  6/9/20  Yes Kristina Catalan MD  
 sucralfate (CARAFATE) 1 gram tablet Take 1 g by mouth four (4) times daily. Yes Provider, Historical  
furosemide (LASIX) 40 mg tablet Take 120 mg by mouth two (2) times a day. Patient takes 80 mg with breakfast and 40mg in afternoon. Since 10/8 taking 120mg bid   Yes Provider, Historical  
insulin NPH (HUMULIN N NPH INSULIN KWIKPEN) 100 unit/mL (3 mL) inpn 6 Units by SubCUTAneous route every evening. Yes Provider, Historical  
acetaminophen (TYLENOL) 325 mg tablet Take 325 mg by mouth every four (4) hours as needed for Pain. Yes Provider, Historical  
 
 
Recent Results (from the past 24 hour(s)) EKG, 12 LEAD, INITIAL Collection Time: 10/10/20 10:41 AM  
Result Value Ref Range Ventricular Rate 52 BPM  
 Atrial Rate 52 BPM  
 P-R Interval 280 ms QRS Duration 116 ms  
 Q-T Interval 508 ms QTC Calculation (Bezet) 472 ms Calculated P Axis 49 degrees Calculated R Axis -32 degrees Calculated T Axis 108 degrees Diagnosis Sinus bradycardia with 1st degree AV block Left axis deviation Incomplete left bundle branch block Abnormal QRS-T angle, consider primary T wave abnormality When compared with ECG of 24-APR-2019 03:10, 
NE interval has increased Vent. rate has decreased BY  29 BPM 
Incomplete left bundle branch block is now present CBC WITH AUTOMATED DIFF Collection Time: 10/10/20 11:03 AM  
Result Value Ref Range WBC 5.6 3.6 - 11.0 K/uL  
 RBC 3.61 (L) 3.80 - 5.20 M/uL HGB 8.7 (L) 11.5 - 16.0 g/dL HCT 29.6 (L) 35.0 - 47.0 % MCV 82.0 80.0 - 99.0 FL  
 MCH 24.1 (L) 26.0 - 34.0 PG  
 MCHC 29.4 (L) 30.0 - 36.5 g/dL  
 RDW 16.8 (H) 11.5 - 14.5 % PLATELET 236 (L) 565 - 400 K/uL MPV 13.5 (H) 8.9 - 12.9 FL  
 NRBC 0.0 0  WBC ABSOLUTE NRBC 0.00 0.00 - 0.01 K/uL NEUTROPHILS 76 (H) 32 - 75 % LYMPHOCYTES 13 12 - 49 % MONOCYTES 7 5 - 13 % EOSINOPHILS 3 0 - 7 % BASOPHILS 1 0 - 1 % IMMATURE GRANULOCYTES 0 0.0 - 0.5 % ABS. NEUTROPHILS 4.2 1.8 - 8.0 K/UL  
 ABS. LYMPHOCYTES 0.7 (L) 0.8 - 3.5 K/UL  
 ABS. MONOCYTES 0.4 0.0 - 1.0 K/UL  
 ABS. EOSINOPHILS 0.2 0.0 - 0.4 K/UL  
 ABS. BASOPHILS 0.1 0.0 - 0.1 K/UL  
 ABS. IMM. GRANS. 0.0 0.00 - 0.04 K/UL  
 DF SMEAR SCANNED    
 PLATELET COMMENTS Large Platelets RBC COMMENTS ANISOCYTOSIS 
1+ TYPE & SCREEN Collection Time: 10/10/20 11:03 AM  
Result Value Ref Range Crossmatch Expiration 10/13/2020 ABO/Rh(D) A POSITIVE Antibody screen NEG METABOLIC PANEL, COMPREHENSIVE Collection Time: 10/10/20 11:03 AM  
Result Value Ref Range Sodium 142 136 - 145 mmol/L Potassium 3.8 3.5 - 5.1 mmol/L Chloride 108 97 - 108 mmol/L  
 CO2 28 21 - 32 mmol/L Anion gap 6 5 - 15 mmol/L Glucose 164 (H) 65 - 100 mg/dL BUN 63 (H) 6 - 20 MG/DL Creatinine 2.71 (H) 0.55 - 1.02 MG/DL  
 BUN/Creatinine ratio 23 (H) 12 - 20 GFR est AA 20 (L) >60 ml/min/1.73m2 GFR est non-AA 17 (L) >60 ml/min/1.73m2 Calcium 9.0 8.5 - 10.1 MG/DL Bilirubin, total 0.6 0.2 - 1.0 MG/DL  
 ALT (SGPT) 112 (H) 12 - 78 U/L  
 AST (SGOT) 92 (H) 15 - 37 U/L Alk. phosphatase 91 45 - 117 U/L Protein, total 6.2 (L) 6.4 - 8.2 g/dL Albumin 3.2 (L) 3.5 - 5.0 g/dL Globulin 3.0 2.0 - 4.0 g/dL A-G Ratio 1.1 1.1 - 2.2 NT-PRO BNP Collection Time: 10/10/20 11:03 AM  
Result Value Ref Range NT pro-BNP 7,290 (H) <450 PG/ML  
TROPONIN I Collection Time: 10/10/20 11:03 AM  
Result Value Ref Range Troponin-I, Qt. <0.05 <0.05 ng/mL OCCULT BLOOD, STOOL Collection Time: 10/10/20 11:03 AM  
Result Value Ref Range Occult blood, stool Positive (A) NEG    
GLUCOSE, POC Collection Time: 10/10/20  4:27 PM  
Result Value Ref Range Glucose (POC) 145 (H) 65 - 100 mg/dL Performed by Duane Little (PCT) HGB & HCT Collection Time: 10/10/20  8:17 PM  
Result Value Ref Range HGB 8.5 (L) 11.5 - 16.0 g/dL HCT 28.7 (L) 35.0 - 47.0 % GLUCOSE, POC  
 Collection Time: 10/10/20  9:34 PM  
Result Value Ref Range Glucose (POC) 149 (H) 65 - 100 mg/dL Performed by Ignacio Jeffrey (PCT)

## 2020-10-11 NOTE — PROGRESS NOTES
Bedside and Verbal shift change report given to *Ibeth (oncoming nurse) by [facility] (offgoing nurse). Report included the following information SBAR, Kardex, Intake/Output and MAR.

## 2020-10-11 NOTE — PROGRESS NOTES
Placed a hold on metoprolol and amiodarone because RN still administering medications despite parameters written to hold for HR <55.  
 
Arlene Oconnor MD

## 2020-10-11 NOTE — PROGRESS NOTES
Hospitalist Progress Note NAME: Aliya Fay :  1935 MRN:  [id number] Assessment / Plan: 
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 
h/o 10lb weight gain in 1 week by patient reported (20 lbs up from  weight of 183 to current 203 lbs) Echo 2020 EF 55% S/p failed outpatient increase of lasix. Was on lasix 80mg AM and 40mg PM and was increased to 120mg bid by Dr. Leatha Miles 2 days ago Probnp elevated 7290. Suspect LFTs more elevated today (compared to ) due to hepatic congestion from chf 
 
Cont aggressive  lasix IV 100mg bid. Monitor daily weight, strict Is & Os 
Cont K+ with diruesisr IP Cardiology consulted- following Hold plavix Holding Metoprolol & Amio due to Bradycardia Cardiac low salt & fluid restricted diet 
  
GI bleed with hemeoccult positive brown stool, POA  \Suspect upper GI bleed since BUN elevated. 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 Hgb 13 in , now 8.7 with report of recent intermittent dark stool and epigastric pain Hx UGI bleed  and LGIB  
--change PPI to IV q12h 
--continue carafate qid 
--hold plavix for now. 
--GI consulted in ER- will await recommendations --monitor H&H and transfuse for hgb <7 
  
Chronic atrial fibrillation s/p Watchman procedure 2020 Hx atrial flutter with abalation Sinus bradycardia HR 44, POA Hx CAD 
HTN 
holding metoprolol due to cont bradycardia 
holding amiodarone as above 
  
CKD stage 4, POA 
--Cr 2.6 today, baseline 2.3. Monitor with diuresis --not followed by nephrology and would need to consult if she does not respond to IV lasix 
  
IDDM, POA 
--A1c 6  
--continue NPH 23 units AM and 6 units PM.  Low dose SSI 
  
Hx TIA 
--hold plavix due to anemia, concern for GI bleed and recurrence of GAVE.   
Hypothyroid --continue levothyroxine 
  
Gout 
--continue allopurinol Body mass index is 29.98 kg/m². 
  
Code: discussed, DNR/DNI 
DVT prophylaxis:  SCD Surrogate decision maker:  Daughter Kmi Shell and son Recommended Disposition: SNF/LTC and  PT, OT, RN? TBD pending hospital course Subjective: Chief Complaint / Reason for Physician Visit :F/U Acute /Chronic Systolic CHF, GI bleed, Anemia, CKD \"I am fine\". Discussed with RN events overnight. Review of Systems: 
Symptom Y/N Comments  Symptom Y/N Comments Fever/Chills n   Chest Pain n   
Poor Appetite n   Edema y Cough n   Abdominal Pain n   
Sputum n   Joint Pain n   
SOB/LEIJA y LEIJA  Pruritis/Rash n   
Nausea/vomit n   Tolerating PT/OT y Diarrhea n   Tolerating Diet y Constipation    Other Could NOT obtain due to:   
 
Objective: VITALS:  
Last 24hrs VS reviewed since prior progress note. Most recent are: 
Patient Vitals for the past 24 hrs: 
 Temp Pulse Resp BP SpO2  
10/11/20 0726 98.6 °F (37 °C) (!) 48 16 (!) 118/50 98 % 10/11/20 0425 97.9 °F (36.6 °C) (!) 52 17 (!) 127/47 95 % 10/11/20 0008 97.9 °F (36.6 °C) (!) 51 18 (!) 129/36 99 % 10/10/20 2033 97.7 °F (36.5 °C) (!) 46 18 (!) 119/44 100 % 10/10/20 1457 97.5 °F (36.4 °C) (!) 52 18 (!) 141/70 95 % 10/10/20 1400  (!) 46 20 (!) 128/45 99 % 10/10/20 1330  (!) 45 17 (!) 122/45 99 % 10/10/20 1300  (!) 46 17 (!) 121/45 99 % 10/10/20 1200  (!) 49 18 (!) 119/47 96 % 10/10/20 1145  (!) 49 18 (!) 124/46 97 % 10/10/20 1103  (!) 51 16  100 % Intake/Output Summary (Last 24 hours) at 10/11/2020 1044 Last data filed at 10/11/2020 1511 Gross per 24 hour Intake 240 ml Output 1050 ml Net -810 ml PHYSICAL EXAM: 
General: WD, WN. Alert, cooperative, no acute distress, Obese+  
EENT:  EOMI. Anicteric sclerae. MMM Resp:  CTA bilaterally, no wheezing or rales. No accessory muscle use CV:  Regular  rhythm,  2+ LE pitting edema noted + GI:  Soft, Non distended, Non tender.  +Bowel sounds Neurologic:  Alert and oriented X 3, normal speech, Psych:   Good insight. Not anxious nor agitated Skin:  No rashes. No jaundice Reviewed most current lab test results and cultures  YES Reviewed most current radiology test results   YES Review and summation of old records today    NO Reviewed patient's current orders and MAR    YES 
PMH/SH reviewed - no change compared to H&P 
________________________________________________________________________ Care Plan discussed with: 
  Comments Patient x Family RN x Care Manager Consultant Multidiciplinary team rounds were held today with , nursing, pharmacist and clinical coordinator. Patient's plan of care was discussed; medications were reviewed and discharge planning was addressed. ________________________________________________________________________ Total NON critical care TIME:  36   Minutes Total CRITICAL CARE TIME Spent:   Minutes non procedure based Comments >50% of visit spent in counseling and coordination of care    
________________________________________________________________________ Genaro Jacobs MD  
 
Procedures: see electronic medical records for all procedures/Xrays and details which were not copied into this note but were reviewed prior to creation of Plan. LABS: 
I reviewed today's most current labs and imaging studies. Pertinent labs include: 
Recent Labs 10/11/20 
7136 10/10/20 2017 10/10/20 
1103 WBC 5.3  --  5.6 HGB 8.3* 8.5* 8.7* HCT 28.5* 28.7* 29.6*  
*  --  145* Recent Labs 10/11/20 
0414 10/10/20 
1103  142  
K 3.5 3.8 * 108 CO2 26 28 GLU 93 164* BUN 61* 63* CREA 2.66* 2.71* CA 9.1 9.0 ALB  --  3.2* TBILI  --  0.6 ALT  --  112* Signed: Genaro Jacobs MD

## 2020-10-11 NOTE — CONSULTS
GI NOTE dictated Impression: 
Heme positive stool Anemia, chronic GI blood loss with acute increase Hx of gastric antral vascular ectasia---treated by Dr Sammy Pascual, last in 2019 Rec 
NPO after midnight EGD by Dr. Kenzie Hogan covering for Dr Sammy Pascual on Monday

## 2020-10-11 NOTE — PROGRESS NOTES
Problem: Pressure Injury - Risk of 
Goal: *Prevention of pressure injury Description: Document Shane Scale and appropriate interventions in the flowsheet. Outcome: Progressing Towards Goal 
Note: Pressure Injury Interventions: 
Sensory Interventions: Assess changes in LOC Moisture Interventions: Absorbent underpads Activity Interventions: Increase time out of bed Mobility Interventions: Assess need for specialty bed Nutrition Interventions: Document food/fluid/supplement intake Problem: Patient Education: Go to Patient Education Activity Goal: Patient/Family Education Outcome: Progressing Towards Goal 
  
Problem: Falls - Risk of 
Goal: *Absence of Falls Description: Document [place] Fall Risk and appropriate interventions in the flowsheet. Outcome: Progressing Towards Goal 
Note: Fall Risk Interventions: 
  
 
  
 
Medication Interventions: Teach patient to arise slowly Problem: Patient Education: Go to Patient Education Activity Goal: Patient/Family Education Outcome: Progressing Towards Goal

## 2020-10-11 NOTE — PROGRESS NOTES
Pt is a 82year old female admitted 10/10/20 for CHF and CKD. Pt reports that she resides in the home alone,however,her daughter Oral Sandra resides 15 min away. Prior to admission pt reports that she drove herself to and from appointment and completes ADL's and IADL's independently. [facility] Reason for Admission:  CHF, CKD RUR Score:     23 PCP: First and Last name:  Chan Samaniego MD 
 Name of Practice:  
 Are you a current patient: Yes/No:  
 Approximate date of last visit: Pt  Reports last visit with 4/5 months ago Can you participate in a virtual visit if needed: Do you (patient/family) have any concerns for transition/discharge? Pt reports none at this time. Plan for utilizing home health: therapy not ordered at this time but pt open to possible home health if deemed appropriate. Current Advanced Directive/Advance Care Plan:  Yes, On file Transition of Care Plan:CM following Pt to have scheduled EGD on tomorrow 10/11/20 Care Management Interventions PCP Verified by CM: Yes Transition of Care Consult (CM Consult): (pt reports no home health or SNF in the past) Current Support Network: Lives with Spouse CM will continue to follow patient for discharge planning needs and arrange for services as deemed necessary. Sneha Shoemaker [address], MSW 
PRN Care Manager [facility]

## 2020-10-11 NOTE — PROGRESS NOTES
Progress Note 10/11/2020 3:52 PM 
NAME: Tez Beyer MRN:  [id number] Admit Diagnosis: Acute on chronic diastolic CHF (congestive heart failure) ([facility]) [I50.33];CKD (chronic kidney disease) stage 4, GFR 15-29 ml/min ([facility]) [N18.4] Assessment: S/p Bioprosthetic MV replacement Hx of A F ib    . Current sinus rhythm S/p Watchman device. Hx of CAD S/p remote coronary stent . Diabetes CKD stage 4. Hypertension. Hyperlipidemia. Anemia Discussed with DR Pita Coughlin . For EGD tomorrow. OK from heart standpoint Some SOB noted by son/    
 
 Dr Ben Corral. Plan:  
 
Continue current meds. [x]        High complexity decision making was performed Subjective:  
 
Tez Beyer denies chest pain, dyspnea. Discussed with RN events overnight. Patient Active Problem List  
Diagnosis Code  T. I.A.  PVD (peripheral vascular disease) ([facility]) I73.9  Reflux esophagitis K21.00  Benign neoplasm of colon D12.6  Iron deficiency anemia D50.9  Hypomagnesemia E83.42  Long term current use of anticoagulant therapy Z79.01  
 Essential hypertension, benign I10  Bladder cancer (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  
 Advance directive in chart Z78.9  Type 2 diabetes, controlled, with renal manifestation ([facility]) E11.29  
 Chronic atrial fibrillation ([facility]) I48.20  Mixed hyperlipidemia E78.2  Atherosclerosis of native coronary artery without angina pectoris I25.10  Hypothyroidism, acquired, autoimmune E06.3  Heart valve problem I38  
 Mitral regurgitation I34.0  
 S/P MVR (mitral valve repair) [id number]  Active advance directive on file Z78.9  Non-rheumatic mitral regurgitation I34.0  Heart failure ([facility]) I50.9  Bilateral leg edema R60.0  Esophageal stricture K22.2  Dysphagia R13.10  GI bleeding K92.2  Mitral stenosis I05.0  S/P MVR (mitral valve replacement) Z95.2  
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  
 Acute GI bleeding K92.2  Chronic anticoagulation Z79.01  
 Paroxysmal A-fib ([facility]) I48.0  History of GI bleed Z87.19  
 Presence of Watchman left atrial appendage closure device Z95.818  
 CKD (chronic kidney disease) stage 4, GFR 15-29 ml/min ([facility]) N18.4  Acute on chronic diastolic CHF (congestive heart failure) ([facility]) I50.33 Review of Systems: 
 
Symptom Y/N Comments  Symptom Y/N Comments Fever/Chills N   Chest Pain N Poor Appetite N   Edema N   
Cough N   Abdominal Pain N Sputum N   Joint Pain N   
SOB/LEIJA N   Pruritis/Rash N   
Nausea/vomit N   Tolerating PT/OT Y Diarrhea N   Tolerating Diet Y Constipation N   Other Could NOT obtain due to:   
 
Objective:  
  
Physical Exam: 
 
Last 24hrs VS reviewed since prior progress note. Most recent are: 
 
Visit Vitals BP (!) 127/39 (BP 1 Location: Right arm, BP Patient Position: At rest;Sitting) Pulse (!) 53 Temp 97.6 °F (36.4 °C) Resp 16 Ht 5' 9\" (1.753 m) Wt 91.7 kg (202 lb 2.6 oz) SpO2 100% BMI 29.85 kg/m² Intake/Output Summary (Last 24 hours) at 10/11/2020 1552 Last data filed at 10/11/2020 6842 Gross per 24 hour Intake 240 ml Output 750 ml Net -510 ml General Appearance: Well developed, well nourished, alert & oriented x 3,  
 no acute distress. Ears/Nose/Mouth/Throat: Hearing grossly normal. 
Neck: Supple. Chest: Lungs clear to auscultation bilaterally. Cardiovascular: Regular rate and rhythm, S1S2 normal, no murmur. Abdomen: Soft, non-tender, bowel sounds are active. Extremities: No edema bilaterally. Skin: Warm and dry. PMH/SH reviewed - no change compared to H&P Data Review Telemetry: normal sinus rhythm Lab Data Personally Reviewed: 
 
Recent Labs 10/11/20 
8231 10/10/20 2017 10/10/20 
1103 WBC 5.3  --  5.6 HGB 8.3* 8.5* 8.7* HCT 28.5* 28.7* 29.6*  
*  --  145* LABRCNT(INR:3,PTP:3,APTT:3,) Recent Labs 10/11/20 
0414 10/10/20 
1103  142  
K 3.5 3.8 * 108 CO2 26 28 BUN 61* 63* CREA 2.66* 2.71* GLU 93 164* CA 9.1 9.0 LABRCNT(CPK:3,CpKMB:3,ckndx:3,troiq:3) Lab Results Component Value Date/Time Cholesterol, total 256 (H) 07/13/2020 08:02 AM  
 HDL Cholesterol 58 07/13/2020 08:02 AM  
 LDL, calculated 169 (H) 07/13/2020 08:02 AM  
 Triglyceride 143 07/13/2020 08:02 AM  
 CHOL/HDL Ratio 3.1 09/22/2010 08:12 AM  
LABRCNT(sgot:3,gpt:3,ap:3,tbiL:3,TP:3,ALB:3,GLOB:3,ggt:3,aml:3,amyp:3,lpse:3,hlpse:3)No results for input(s): PH, PCO2, PO2 in the last 72 hours. Lab Results Component Value Date/Time Cholesterol, total 256 (H) 07/13/2020 08:02 AM  
 HDL Cholesterol 58 07/13/2020 08:02 AM  
 LDL, calculated 169 (H) 07/13/2020 08:02 AM  
 Triglyceride 143 07/13/2020 08:02 AM  
 CHOL/HDL Ratio 3.1 09/22/2010 08:12 AM  
Vaishali Patterson MD 
No results for input(s): PH, PCO2, PO2 in the last 72 hours. Medications Personally Reviewed: 
 
Current Facility-Administered Medications Medication Dose Route Frequency  pantoprazole (PROTONIX) 40 mg in 0.9% sodium chloride 10 mL injection  40 mg IntraVENous Q12H  furosemide (LASIX) injection 100 mg  100 mg IntraVENous BID  sodium chloride (NS) flush 5-40 mL  5-40 mL IntraVENous Q8H  
 sodium chloride (NS) flush 5-40 mL  5-40 mL IntraVENous PRN  
 acetaminophen (TYLENOL) tablet 650 mg  650 mg Oral Q6H PRN Or  
 acetaminophen (TYLENOL) suppository 650 mg  650 mg Rectal Q6H PRN  polyethylene glycol (MIRALAX) packet 17 g  17 g Oral DAILY PRN  
  promethazine (PHENERGAN) tablet 12.5 mg  12.5 mg Oral Q6H PRN Or  
 ondansetron (ZOFRAN) injection 4 mg  4 mg IntraVENous Q6H PRN  
 allopurinoL (ZYLOPRIM) tablet 200 mg  200 mg Oral DAILY  [Held by provider] amiodarone (CORDARONE) tablet 200 mg  200 mg Oral BID  atorvastatin (LIPITOR) tablet 80 mg  80 mg Oral QHS  insulin NPH (NOVOLIN N, HUMULIN N) injection 6 Units  6 Units SubCUTAneous ACD  insulin NPH (NOVOLIN N, HUMULIN N) injection 23 Units  23 Units SubCUTAneous ACB  levothyroxine (SYNTHROID) tablet 125 mcg  125 mcg Oral ACB  potassium chloride SR (KLOR-CON 10) tablet 10 mEq  10 mEq Oral DAILY  sucralfate (CARAFATE) tablet 1 g  1 g Oral QID  insulin lispro (HUMALOG) injection   SubCUTAneous AC&HS  
 glucose chewable tablet 16 g  4 Tab Oral PRN  
 dextrose (D50W) injection syrg 12.5-25 g  12.5-25 g IntraVENous PRN  
 glucagon (GLUCAGEN) injection 1 mg  1 mg IntraMUSCular PRN  
 [Held by provider] metoprolol tartrate (LOPRESSOR) tablet 12.5 mg  12.5 mg Oral BID Kevin Barbosa MD

## 2020-10-11 NOTE — PROGRESS NOTES
0530: Notified by the lab staff  that CBC  clotted and needs to be re-collected. 0630: Placed the order and informed the patient. Blood draw completed and sent  to lab Bedside and Verbal shift change report given to Suraj Pritchard  (oncoming nurse) by Lucas Barreto RN (offgoing nurse). Report included the following information SBAR, Kardex, Intake/Output, MAR, Accordion, Recent Results and Med Rec Status.

## 2020-10-12 NOTE — PERIOP NOTES
Anesthesia reports 170mg Propofol, 80mg Lidocaine and 200mL NS given during procedure. Received report from anesthesia staff on vital signs and status of patient. Endoscope was pre-cleaned at the bedside immediately following procedure by [facility] BORIS

## 2020-10-12 NOTE — PROGRESS NOTES
Bedside and Verbal shift change report given to Chato Morales (oncoming nurse) by Latha banksgoing nurse). Report included the following information Kardex, MAR and Recent Results.

## 2020-10-12 NOTE — PROCEDURES
[place] Office: [phone] Esophagogastroduodenoscopy Procedure Note Galen Parker 1935 
[id number] Indication: Anemia, FOBT+ : Nataliia Lo MD 
 
Referring Provider:  Jordan Henry MD 
 
Sedation:  MAC anesthesia Propofol Procedure Details: After detailed informed consent was obtained for the procedure, with all risks and benefits of procedure explained the patient was taken to the endoscopy suite and placed in the left lateral decubitus position. Following sequential administration of sedation as per above, the endoscope was inserted into the mouth and advanced under direct vision to second portion of the duodenum. A careful inspection was made as the gastroscope was withdrawn, including a retroflexed view of the proximal stomach; findings and interventions are described below. Findings:  
 
Esophagus: The esophageal mucosa in the proximal, mid and distal esophagus is normal.  
The squamo-columnar junction is at 35 cm where the Z-line was noted. There is a partial Schatzki's ring at the GE junction(not dilated 2/2 low platelelts). There is a 2-3 cm hiatal hernia with swallowed phlegm. Stomach: The gastric mucosa has radial erythema in the antrum more suggestive of gastropathy/gastritis than GAVE. I took one biopsy only. There is no blood or bleeding. The fundus was found to be normal with no lesions noted on retroflexion. Duodenum:  
The bulb and post bulbar mucosa is normal in appearance. The duodenal folds are normal.  
 
Therapies:  biopsy of stomach antrum Specimen:  Specimens were collected as described and send to the laboratory. Complications:   None were encountered during the procedure. EBL:  None. Recommendations:  
 
-Continue acid suppression. ,  
-Await pathology. ,  
-follow hgb. 
-Consider hematology consult for normocytic anemia and thrombocytopenia, if not already done. -If anemia remains unexplained consider a SB Pillcam. 
 
 
Mubashir A. Courtney Hodgkins, MD 
10/12/2020  9:11 AM

## 2020-10-12 NOTE — PROGRESS NOTES
1733-Dr. Vicente Monson notified pt's voided 300 ml of urine and post void residual bladder scan showed 285 ml. Will continue to monitor. Bedside shift change report given to Soledad Lockett RN (oncoming nurse) by Brian Polk RN(offgoing nurse). Report included the following information SBAR, Kardex, ED Summary, [facility] and Recent Results.

## 2020-10-12 NOTE — CONSULTS
Consultation Note NAME: Anil Velasco :  1935 MRN:  [id number] Date/Time:  10/12/2020 1:53 PM 
 
I have been asked to see this patient by Dr. [place] Pollard  for advice/opinion re: CKD stage 4. Assessment :    Plan: 
CKD stage 4 Hypokalemia HTN Bradycardia Anemia Chronic Thrombocytopenia S/p bioprosthetic MV Afib S/p watchman H/o cad/stent Creatinine now 2.5 (progressive CKD); long standing DM/HTN. Wagoner urine. Admitted with acute on chronic diastolic HF and GIB Diuresing on Lasix 100 mg IV bid? Only down 1.5 liters on I&O and weight is up; leg edema is better On PO potassium Check hemolysis labs Subjective: CHIEF COMPLAINT:  CKD stage 4 HISTORY OF PRESENT ILLNESS:    
Davey Meza is a 82 y.o.   female who has a history of the below. oob in the chair. Denies resting sob. Does ok with walking to the bathroom, but swenson if much further. Legs swollen, but better. No lh feeling. Only rare advil. Past Medical History:  
Diagnosis Date  Arthritis KNEES  Atrial fibrillation ([address] 10/29/2009  Bladder cancer ([address]  CAD (coronary artery disease) STENT PER PATIENT  Chronic pain KNEES  
 Coagulation disorder ([address] Chronic prophylactic anticoagulation med  Colon polyps  Diabetes ([address] IDDM  
 GAVE (gastric antral vascular ectasia) 2019  
 bx :    Gastric, biopsy:  Mild capillary ectasia and congestion, compatible with gastric antral vascular ectasia (GAVE), negative for background gastritis. One fragment suggestive of hyperplastic polyp  GERD (gastroesophageal reflux disease)  Gout  Heart valve problem   
 leaking heart valve  Hypercholesterolemia  Hypertension  Hypothyroidism  Hypothyroidism 2009  Hypothyroidism, acquired, autoimmune 2015  Overweight and obesity  PUD (peptic ulcer disease)   S/P ablation of atrial flutter[V45.89HM]  @ [facility] >> atrial fibrillation  SOB (shortness of breath) on exertion 2019  
 T. I.A. 2009  TIA (transient ischemic attack)  Ulcer of right lower extremity, limited to breakdown of skin ([address] 2018 Past Surgical History:  
Procedure Laterality Date  CARDIAC SURG PROCEDURE UNLIST    
 ablation  CARDIAC SURG PROCEDURE UNLIST  2020 Watchman device implant  COLONOSCOPY N/A 2019 COLONOSCOPY performed by Archie Newsome MD at [facility] ENDOSCOPY  COLONOSCOPY N/A 2019 COLONOSCOPY performed by Archie Newsome MD at [facility] ENDOSCOPY  COLONOSCOPY N/A 6/15/2019 COLONOSCOPY performed by Archie Newsome MD at [facility] MAIN OR  
 COLONOSCOPY N/A 2019 COLONOSCOPY performed by Archie Newsome MD at [facility] ENDOSCOPY  COLONOSCOPY,DIAGNOSTIC  2019  COLONOSCOPY,FLEX,W/CONTROL, BLEEDING  2019  COLONOSCOPY,REMV LESN,SNARE  2019  GI TRACT CAPSULE ENDOSCOPY  2019  GI TRACT CAPSULE ENDOSCOPY  2019  HX APPENDECTOMY  HX CHOLECYSTECTOMY  HX HEART VALVE SURGERY  2019  HX HYSTERECTOMY  HX KNEE ARTHROSCOPY  [id number]  
 right knee  HX ORTHOPAEDIC    
 HX UROLOGICAL RENAL STENT, tur-b  PA ESOPHAGOGASTRODUODENOSCOPY TRANSORAL DIAGNOSTIC  2019  
    
 SIGMOIDOSCOPY,DIAGNOSTIC  6/15/2019  UPPER GI ENDOSCOPY,BALL DIL,30MM  6/15/2019  UPPER GI ENDOSCOPY,TUMOR ABLATN  2019  VASCULAR SURGERY PROCEDURE UNLIST    
 removed vein in right leg Social History Tobacco Use  Smoking status: Former Smoker Packs/day: 0.50 Years: 10.00 Pack years: 5.00 Types: Cigarettes Last attempt to quit: 1967 Years since quittin.8  Smokeless tobacco: Never Used Substance Use Topics  Alcohol use: No  
  Alcohol/week: 0.0 standard drinks Family History Problem Relation Age of Onset  Stroke Other  Arthritis-osteo Sister spinal stenosis  Gout Son   
 Hypertension Son   
[address] Hypertension Mother  Heart Disease Mother CAD  Heart Disease Father CAD  Alcohol abuse Neg Hx  Asthma Neg Hx  Bleeding Prob Neg Hx  Cancer Neg Hx  Diabetes Neg Hx  Elevated Lipids Neg Hx   
 Headache Neg Hx  Lung Disease Neg Hx  Migraines Neg Hx  Psychiatric Disorder Neg Hx  Mental Retardation Neg Hx  Anesth Problems Neg Hx Allergies Allergen Reactions  Actos [Pioglitazone] Swelling Swelling of feet and legs  Codeine Itching  Hydrocodone Rash and Other (comments)  
  hallucinations Prior to Admission medications Medication Sig Start Date End Date Taking? Authorizing Provider  
loratadine (CLARITIN) 10 mg tablet Take 10 mg by mouth daily as needed for Allergies. Yes Provider, Historical  
neomycin sulf/bacitracin/poly (NEOSPORIN EX) by Apply Externally route daily as needed (wound care). Yes Provider, Historical  
amiodarone (CORDARONE) 200 mg tablet TAKE 1 TABLET BY MOUTH TWICE DAILY 8/6/20  Yes Gomez Weir MD  
potassium chloride SR (KLOR-CON 10) 10 mEq tablet Take 1 Tab by mouth daily. 8/6/20  Yes Gomez Weir MD  
metoprolol tartrate (LOPRESSOR) 50 mg tablet TK 1 AND 1/2 TS PO BID 8/6/20  Yes Gomez Weir MD  
atorvastatin (LIPITOR) 80 mg tablet TAKE ONE TABLET BY MOUTH EVERY EVENING 8/6/20  Yes Gomez Weir MD  
allopurinoL (ZYLOPRIM) 100 mg tablet TAKE 2 TABLETS BY MOUTH DAILY 7/16/20  Yes Gomez Weir MD  
levothyroxine (SYNTHROID) 125 mcg tablet TAKE ONE TABLET BY MOUTH DAILY BEFORE BREAKFAST 7/12/20  Yes Gomez Weir MD  
insulin NPH (HumuLIN N NPH Insulin KwikPen) 100 unit/mL (3 mL) inpn 23 Units by SubCUTAneous route every morning.  7/12/20  Yes Gomez Weir MD  
pantoprazole (PROTONIX) 40 mg tablet TAKE 1 TABLET BY MOUTH DAILY 6/19/20  Yes Gomez Weir MD  
 clopidogreL (PLAVIX) 75 mg tab Take 1 Tab by mouth daily. 6/9/20  Yes Cipriano Borjas MD  
sucralfate (CARAFATE) 1 gram tablet Take 1 g by mouth four (4) times daily. Yes Provider, Historical  
furosemide (LASIX) 40 mg tablet Take 120 mg by mouth two (2) times a day. Patient takes 80 mg with breakfast and 40mg in afternoon. Since 10/8 taking 120mg bid   Yes Provider, Historical  
insulin NPH (HUMULIN N NPH INSULIN KWIKPEN) 100 unit/mL (3 mL) inpn 6 Units by SubCUTAneous route every evening. Yes Provider, Historical  
acetaminophen (TYLENOL) 325 mg tablet Take 325 mg by mouth every four (4) hours as needed for Pain. Yes Provider, Historical  
 
REVIEW OF SYSTEMS:   
 []  Unable to obtain reliable ROS due to  [] mental status  [] sedated   [] intubated 
 [x] Total of 12 systems reviewed as follows: 
Constitutional: negative fever, negative chills, negative weight loss Eyes:   negative visual changes ENT:   negative sore throat, tongue or lip swelling Respiratory:  negative cough, negative dyspnea Cards:  negative for chest pain, palpitations, ++ lower extremity edema GI:   negative for nausea, vomiting, diarrhea, and abdominal pain :  negative for frequency, dysuria Integument:  negative for rash and pruritus Heme:  negative for easy bruising and gum/nose bleeding Musculoskel: negative for myalgias,  back pain and muscle weakness Neuro:  negative for headaches, dizziness, vertigo Psych:  negative for feelings of anxiety, depression Travel?: none Objective: VITALS:   
Visit Vitals /62 (BP 1 Location: Left arm, BP Patient Position: At rest) Pulse (!) 50 Temp 97.6 °F (36.4 °C) Resp 15 Ht 5' 9\" (1.753 m) Wt 97.7 kg (215 lb 6.2 oz) SpO2 98% Breastfeeding No  
BMI 31.81 kg/m² PHYSICAL EXAM: 
Gen:  []  WD []  WN  [] cachectic []  thin [x]  obese []  disheveled 
           []  ill apearing  []   Critical  []   Chronic    [x]  No acute distress HEENT:   [x] NC/AT/PERRLA/EOMI 
  [] pink conjunctivae      [] pale conjunctivae PERRL  [] yes  [] no      [] moist mucosa    [] dry mucosa 
  hearing intact to voice [x] yes  [] No 
              
NECK:   supple [x] yes  [] no        masses [] yes  [] No 
             thyroid  []  non tender  []  tender RESP:   [] CTA bilaterally/no wheezing/rhonchi/rales/crackles [] rhonchi bilaterally - no dullness  [] wheezing   [] rhonchi   [x] crackles  
  use of accessory muscles [] yes [x] no CARD:   [x]  regular rate and rhythm/No murmurs/rubs/gallops 
  murmur  [] yes ()  [] no      Rubs  [] yes  [] no       Gallops [] yes  [] no 
  Rate []  regular  []  irregular        carotid bruits  [] Right  []  Left LE edema [x] yes  [] no           JVP  []  yes   []  no 
 
ABD:    [x] soft/non distended/non tender/+bowel sounds/no HSM []  Rigid    tenderness [] yes [] no   Liver enlargement  []  yes []  no  
             Spleen enlargement  []  yes []  no     distended []  yes [] no  
  bowel sound  [] hypoactive   [] hyperactive LYMPH:    Neck []  yes [x]  no       Axillae []  yes []  no SKIN:   Rashes []  yes   [x]  no    Ulcers []  yes   []  no 
             [] tight to palpitation    skin turgor []  good  [] poor  [] decreased Cyanosis/clubbing []  yes []  no 
 
NEUR:   [x] cranial nerves II-XII grossly intact    
  [] Cranial nerves deficit 
               []  facial droop    []  slurred speech   [] aphasic  
  [] Strength normal     []  weakness  []  LUE  []   RUE/ []  LLE  []   RLE 
  follows commands  [x]  yes []  no        
 
PSYCH:   insight [] poor [x] good   Alert and Oriented to  [x] person  [x] place  [x]  time  
                 [] depressed [] anxious [] agitated  [] lethargic [] stuporous  [] sedated LAB DATA REVIEWED:   
Recent Labs 10/12/20 
0254 10/11/20 
9488 WBC 4.6 5.3 HGB 8.1* 8.3* HCT 27.0* 28.5* PLT 95* 131* Recent Labs 10/12/20 
0254 10/11/20 
0414 10/10/20 
1103  142 142  
K 3.2* 3.5 3.8 * 110* 108 CO2 29 26 28 BUN 57* 61* 63* CREA 2.49* 2.66* 2.71* GLU 68 93 164* CA 9.0 9.1 9.0 MG 2.8*  --   --   
 
Recent Labs 10/10/20 
1103 * AP 91 TBILI 0.6 ALB 3.2*  
GLOB 3.0 No results for input(s): INR, PTP, APTT, INREXT in the last 72 hours. No results for input(s): FE, TIBC, PSAT, FERR in the last 72 hours. No results for input(s): PH, PCO2, PO2 in the last 72 hours. No results for input(s): CPK, CKMB in the last 72 hours. No lab exists for component: TROPONINI Lab Results Component Value Date/Time Glucose (POC) 92 10/12/2020 11:14 AM  
 Glucose (POC) 80 10/12/2020 07:27 AM  
 Glucose (POC) 101 (H) 10/11/2020 10:17 PM  
 Glucose (POC) 106 (H) 10/11/2020 04:01 PM  
 Glucose (POC) 139 (H) 10/11/2020 11:13 AM  
 
 
Procedures: see electronic medical records for all procedures/Xrays and details which were not copied into this note but were reviewed prior to creation of Plan.   
________________________________________________________________________ 
  
 
___________________________________________________ Consulting Physician:  Vidal Berkowitz MD

## 2020-10-12 NOTE — PROGRESS NOTES
Bedside shift change report given to [address] (oncoming nurse) by Blanche SOARES (offgoing nurse). Report included the following information SBAR, Kardex, ED Summary, Intake/Output, MAR, Recent Results and Cardiac Rhythm NSR.

## 2020-10-12 NOTE — PROGRESS NOTES
Hospitalist Progress Note NAME: Marline Cam :  1935 MRN:  [id number] Assessment / Plan: 
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 
h/o 10lb weight gain in 1 week by patient reported (20 lbs up from  weight of 183 to current 203 lbs) Echo 2020 EF 55% S/p failed outpatient increase of lasix. Was on lasix 80mg AM and 40mg PM and was increased to 120mg bid by Dr. Karolina Montgomery 2 days ago Probnp elevated 7290. Suspect LFTs more elevated today (compared to ) due to hepatic congestion from chf 
 
Cont aggressive  lasix IV 100mg bid- seems to be tolerating well, Cr improved to 2.4 Monitor daily weight, strict Is & Os= neg 1.1L in past 24 hrs/neg 1.5L total this admission Cont K+ with diruesis IP Cardiology consulted- following Hold plavix Holding Metoprolol & Amio due to Bradycardia Cardiac low salt & fluid restricted diet when not NPO 
  
GI bleed with hemeoccult positive brown stool, POA  \Suspect upper GI bleed since BUN elevated. 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 Hgb 13 in , now 8.7 with report of recent intermittent dark stool and epigastric pain Hx UGI bleed  and LGIB  
--change PPI to IV q12h 
--continue carafate qid 
--hold plavix for now- ok with cardiology for now, will need to be resumed when cleared by GI 
--GI consulted - EGD today noted, pt is NPO p MN, resume PO diet post EGD 
--monitor H&H and transfuse for hgb <7 
  
Chronic atrial fibrillation s/p Watchman procedure 2020 Hx atrial flutter with abalation Sinus bradycardia HR 44, POA Hx CAD 
HTN 
holding metoprolol due to cont bradycardia 
holding amiodarone as above 
  
CKD stage 4, POA- Cr now close to baseline 
--Cr 2.4 today, baseline 2.3. Monitor with diuresis --not followed by nephrology and would need to consult if she does not respond to IV lasix 
  
IDDM, POA 
--A1c 6 7/20 
--continue NPH 23 units AM and 6 units PM.  Low dose SSI 
  
Hx TIA 
--hold plavix due to anemia, concern for GI bleed and recurrence of GAVE. 
  
Hypothyroid --continue levothyroxine 
  
Gout 
--continue allopurinol Body mass index is 29.98 kg/m². 
  
Code: discussed, DNR/DNI 
DVT prophylaxis:  SCD Surrogate decision maker:  Daughter Joann Menjivar and son Recommended Disposition: SNF/LTC and  PT, OT, RN? TBD pending hospital course Subjective: Chief Complaint / Reason for Physician Visit :F/U Acute /Chronic Systolic CHF, GI bleed, Anemia, CKD \"I am fine\". Discussed with RN events overnight. Review of Systems: 
Symptom Y/N Comments  Symptom Y/N Comments Fever/Chills n   Chest Pain n   
Poor Appetite n   Edema y Cough n   Abdominal Pain n   
Sputum n   Joint Pain n   
SOB/LEIJA y LEIJA  Pruritis/Rash n   
Nausea/vomit n   Tolerating PT/OT y Diarrhea n   Tolerating Diet y Constipation    Other Could NOT obtain due to:   
 
Objective: VITALS:  
Last 24hrs VS reviewed since prior progress note. Most recent are: 
Patient Vitals for the past 24 hrs: 
 Temp Pulse Resp BP SpO2  
10/12/20 0839 98 °F (36.7 °C) (!) 57 19 (!) 171/48 99 % 10/12/20 0724 97.6 °F (36.4 °C) (!) 53 16 (!) 142/60 99 % 10/12/20 0255 97.5 °F (36.4 °C) (!) 52 16 (!) 127/55 94 % 10/11/20 2318 97.7 °F (36.5 °C) (!) 52 18 (!) 128/44 96 % 10/11/20 1944 97.5 °F (36.4 °C) (!) 56 17 (!) 118/59 98 % 10/11/20 1431 97.6 °F (36.4 °C) (!) 53 16 (!) 127/39 100 % 10/11/20 1045 97.4 °F (36.3 °C) (!) 52 18 (!) 118/52 99 % Intake/Output Summary (Last 24 hours) at 10/12/2020 6483 Last data filed at 10/12/2020 3604 Gross per 24 hour Intake 300 ml Output 1050 ml Net -750 ml PHYSICAL EXAM: 
General: WD, WN.  Alert, cooperative, no acute distress, Obese+  
 EENT:  EOMI. Anicteric sclerae. MMM Resp:  CTA bilaterally, no wheezing or rales. No accessory muscle use CV:  Regular  rhythm,  2+ LE pitting edema noted + GI:  Soft, Non distended, Non tender.  +Bowel sounds Neurologic:  Alert and oriented X 3, normal speech, Psych:   Good insight. Not anxious nor agitated Skin:  No rashes. No jaundice Reviewed most current lab test results and cultures  YES Reviewed most current radiology test results   YES Review and summation of old records today    NO Reviewed patient's current orders and MAR    YES 
PMH/SH reviewed - no change compared to H&P 
________________________________________________________________________ Care Plan discussed with: 
  Comments Patient x Family RN x Care Manager Consultant Multidiciplinary team rounds were held today with , nursing, pharmacist and clinical coordinator. Patient's plan of care was discussed; medications were reviewed and discharge planning was addressed. ________________________________________________________________________ Total NON critical care TIME:  26   Minutes Total CRITICAL CARE TIME Spent:   Minutes non procedure based Comments >50% of visit spent in counseling and coordination of care    
________________________________________________________________________ Annmarie Smith MD  
 
Procedures: see electronic medical records for all procedures/Xrays and details which were not copied into this note but were reviewed prior to creation of Plan. LABS: 
I reviewed today's most current labs and imaging studies. Pertinent labs include: 
Recent Labs 10/12/20 
0254 10/11/20 
2563 10/10/20 2017 10/10/20 
1103 WBC 4.6 5.3  --  5.6 HGB 8.1* 8.3* 8.5* 8.7* HCT 27.0* 28.5* 28.7* 29.6* PLT 95* 131*  --  145* Recent Labs 10/12/20 
0254 10/11/20 
0414 10/10/20 
1103  142 142  
K 3.2* 3.5 3.8 * 110* 108 CO2 29 26 28 GLU 68 93 164* BUN 57* 61* 63* CREA 2.49* 2.66* 2.71* CA 9.0 9.1 9.0 MG 2.8*  --   --   
ALB  --   --  3.2* TBILI  --   --  0.6 ALT  --   --  112* Signed: Anette Aj MD

## 2020-10-12 NOTE — PERIOP NOTES
[facility] [place] 1935 
[id number] Situation: 
Verbal report received from: Brittney Gianluca Procedure: Procedure(s): ESOPHAGOGASTRODUODENOSCOPY (EGD) ESOPHAGOGASTRODUODENAL (EGD) BIOPSY Background: 
 
Preoperative diagnosis: melena Postoperative diagnosis: Hiatal hernia, schiatzki ring, gastritis :  Dr. Giovanni Jasmine 
Assistant(s): Endoscopy Technician-1: Maria Teresa Williamson Endoscopy RN-1: Bria Arechiga RN Specimens:  
ID Type Source Tests Collected by Time Destination 1 : Gastric biopsy Preservative Gastric  Ilene Echavarria MD 10/12/2020 6883 Pathology H. Pylori  no Assessment: 
Intra-procedure medications Anesthesia gave intra-procedure sedation and medications, see anesthesia flow sheet yes Intravenous fluids: NS@ Allegra See Vital signs stable Abdominal assessment: round and soft Recommendation: 
Return to floor Family or Friend Permission to share finding with family or friend yes

## 2020-10-12 NOTE — PROGRESS NOTES
Problem: Pressure Injury - Risk of 
Goal: *Prevention of pressure injury Description: Document Shane Scale and appropriate interventions in the flowsheet. Outcome: Progressing Towards Goal 
Note: Pressure Injury Interventions: 
Sensory Interventions: Avoid rigorous massage over bony prominences, Keep linens dry and wrinkle-free, Float heels, Pad between skin to skin, Pressure redistribution bed/mattress (bed type), Turn and reposition approx. every two hours (pillows and wedges if needed) Moisture Interventions: Absorbent underpads Activity Interventions: Pressure redistribution bed/mattress(bed type), Increase time out of bed Mobility Interventions: Pressure redistribution bed/mattress (bed type) Nutrition Interventions: Document food/fluid/supplement intake

## 2020-10-12 NOTE — H&P
Pre-endoscopy H and P The patient was seen and examined in the room/pre-op holding area. The airway was assessed and documented. The problem list, past medical history, and medications were reviewed. Patient Active Problem List  
Diagnosis Code  T. I.A.  PVD (peripheral vascular disease) ([facility]) I73.9  Reflux esophagitis K21.00  Benign neoplasm of colon D12.6  Iron deficiency anemia D50.9  Hypomagnesemia E83.42  Long term current use of anticoagulant therapy Z79.01  
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  
 Advance directive in chart Z78.9  Type 2 diabetes, controlled, with renal manifestation ([facility]) E11.29  
 Chronic atrial fibrillation ([facility]) I48.20  Mixed hyperlipidemia E78.2  Atherosclerosis of native coronary artery without angina pectoris I25.10  Hypothyroidism, acquired, autoimmune E06.3  Heart valve problem I38  
 Mitral regurgitation I34.0  
 S/P MVR (mitral valve repair) [id number]  Active advance directive on file Z78.9  Non-rheumatic mitral regurgitation I34.0  Heart failure ([facility]) I50.9  Bilateral leg edema R60.0  Esophageal stricture K22.2  Dysphagia R13.10  GI bleeding K92.2  Mitral stenosis I05.0  S/P MVR (mitral valve replacement) Z95.2  
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  
 Acute GI bleeding K92.2  Chronic anticoagulation Z79.01  
 Paroxysmal A-fib ([facility]) I48.0  History of GI bleed Z87.19  
 Presence of Watchman left atrial appendage closure device Z95.818  
 CKD (chronic kidney disease) stage 4, GFR 15-29 ml/min ([facility]) N18.4  Acute on chronic diastolic CHF (congestive heart failure) ([facility]) I50.33 Social History Socioeconomic History  Marital status:  Spouse name: Not on file  Number of children: 2  
 Years of education: 15  
 Highest education level: High school graduate Occupational History  Not on file Social Needs  Financial resource strain: Not hard at all  Food insecurity Worry: Never true Inability: Never true  Transportation needs Medical: No  
  Non-medical: No  
Tobacco Use  Smoking status: Former Smoker Packs/day: 0.50 Years: 10.00 Pack years: 5.00 Types: Cigarettes Last attempt to quit: 1967 Years since quittin.8  Smokeless tobacco: Never Used Substance and Sexual Activity  Alcohol use: No  
  Alcohol/week: 0.0 standard drinks  Drug use: Never  Sexual activity: Not Currently Partners: Male Lifestyle  Physical activity Days per week: 0 days Minutes per session: 0 min  Stress: Only a little Relationships  Social connections Talks on phone: Three times a week Gets together: Three times a week Attends [employer] service: 1 to 4 times per year Active member of club or organization: No  
  Attends meetings of clubs or organizations: Never Relationship status:   Intimate partner violence Fear of current or ex partner: No  
  Emotionally abused: No  
  Physically abused: No  
  Forced sexual activity: No  
Other Topics Concern [address] Service Not Asked  Blood Transfusions Not Asked  Caffeine Concern Not Asked  Occupational Exposure Not Asked [place] [employer] Not Asked  Sleep Concern Not Asked  Stress Concern Not Asked  Weight Concern Yes  Special Diet Not Asked  Back Care Not Asked  Exercise Not Asked  Bike Helmet Not Asked  Seat Belt Not Asked  Self-Exams Not Asked Social History Narrative  Not on file Past Medical History:  
Diagnosis Date  Arthritis KNEES  Atrial fibrillation ([facility] [address] 10/29/2009  [facility]  CAD (coronary artery disease) STENT PER PATIENT  Chronic pain KNEES  
 Coagulation disorder ([facility] Chronic prophylactic anticoagulation med  Colon polyps  Diabetes ([facility] IDDM  
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 One fragment suggestive of hyperplastic polyp  GERD (gastroesophageal reflux disease)  Gout  Heart valve problem   
 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  
 @ [facility] >> atrial fibrillation  SOB (shortness of breath) on exertion 04/2019  
 T. I.A. 4/23/2009  TIA (transient ischemic attack)  Ulcer of right lower extremity, limited to breakdown of skin ([facility] 7/5/2018 Prior to Admission Medications Prescriptions Last Dose Informant Patient Reported? Taking?  
acetaminophen (TYLENOL) 325 mg tablet 10/3/2020 at Unknown time Child Yes Yes Sig: Take 325 mg by mouth every four (4) hours as needed for Pain. allopurinoL (ZYLOPRIM) 100 mg tablet 10/10/2020 at Unknown time Child No Yes Sig: TAKE 2 TABLETS BY MOUTH DAILY  
amiodarone (CORDARONE) 200 mg tablet 10/10/2020 at Unknown time Child No Yes Sig: TAKE 1 TABLET BY MOUTH TWICE DAILY  
atorvastatin (LIPITOR) 80 mg tablet 10/9/2020 at Unknown time Child No Yes Sig: TAKE ONE TABLET BY MOUTH EVERY EVENING  
clopidogreL (PLAVIX) 75 mg tab 10/10/2020 at Unknown time Child No Yes Sig: Take 1 Tab by mouth daily. furosemide (LASIX) 40 mg tablet 10/10/2020 at Unknown time Child Yes Yes Sig: Take 120 mg by mouth two (2) times a day. Patient takes 80 mg with breakfast and 40mg in afternoon. Since 10/8 taking 120mg bid  
insulin NPH (HUMULIN N NPH INSULIN KWIKPEN) 100 unit/mL (3 mL) inpn 10/9/2020 at Unknown time Child Yes Yes Si Units by SubCUTAneous route every evening. insulin NPH (HumuLIN N NPH Insulin KwikPen) 100 unit/mL (3 mL) inpn 10/10/2020 at Unknown time Child No Yes Si Units by SubCUTAneous route every morning. levothyroxine (SYNTHROID) 125 mcg tablet 10/10/2020 at Unknown time Child No Yes Sig: TAKE ONE TABLET BY MOUTH DAILY BEFORE BREAKFAST  
loratadine (CLARITIN) 10 mg tablet 9/10/2020 at Unknown time Child Yes Yes Sig: Take 10 mg by mouth daily as needed for Allergies. metoprolol tartrate (LOPRESSOR) 50 mg tablet 10/10/2020 at Unknown time Child No Yes Sig: TK 1 AND 1/2 TS PO BID  
neomycin sulf/bacitracin/poly (NEOSPORIN EX) 10/9/2020 at Unknown time Child Yes Yes Sig: by Apply Externally route daily as needed (wound care). pantoprazole (PROTONIX) 40 mg tablet 10/10/2020 at Unknown time Child No Yes Sig: TAKE 1 TABLET BY MOUTH DAILY potassium chloride SR (KLOR-CON 10) 10 mEq tablet 10/10/2020 at Unknown time Child No Yes Sig: Take 1 Tab by mouth daily. sucralfate (CARAFATE) 1 gram tablet 10/10/2020 at Unknown time Child Yes Yes Sig: Take 1 g by mouth four (4) times daily. Facility-Administered Medications: None The review of systems is:  Negative  for shortness of breath or chest pain The heart, lungs, and mental status were satisfactory for the administration of deep sedation and for the procedure. I discussed with the patient the objectives, risks, consequences and alternatives to the procedure.    
 
Armida Gtz MD 
10/12/2020 
8:58 AM

## 2020-10-12 NOTE — PROGRESS NOTES
Progress Note 10/12/2020 3:52 PM 
NAME: Anil Velasco MRN:  [id number] Admit Diagnosis: Acute on chronic diastolic CHF (congestive heart failure) ([facility]) [I50.33];CKD (chronic kidney disease) stage 4, GFR 15-29 ml/min ([facility]) [N18.4] Assessment: S/p Bioprosthetic MV replacement Hx of A F ib    . Current sinus rhythm S/p Watchman device. Hx of CAD S/p remote coronary stent . Diabetes CKD stage 4. Hypertension. Hyperlipidemia. Anemia Plan:  
EGD noted; gastritis Continue lasix 100mg BID Renal consult Resume plavix when ok w/ GI Continue amio; reduce to 100mg OK w/ holding metoprolol Continue statin  
 
 [x]        High complexity decision making was performed Subjective:  
 
Anil Velasco denies chest pain, dyspnea. Discussed with RN events overnight. Patient Active Problem List  
Diagnosis Code  T. I.A.  PVD (peripheral vascular disease) ([facility]) I73.9  Reflux esophagitis K21.00  Benign neoplasm of colon D12.6  Iron deficiency anemia D50.9  Hypomagnesemia E83.42  Long term current use of anticoagulant therapy Z79.01  
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  
 Advance directive in chart Z78.9  Type 2 diabetes, controlled, with renal manifestation ([facility]) E11.29  
 Chronic atrial fibrillation ([facility]) I48.20  Mixed hyperlipidemia E78.2  Atherosclerosis of native coronary artery without angina pectoris I25.10  Hypothyroidism, acquired, autoimmune E06.3  Heart valve problem I38  
 Mitral regurgitation I34.0  
 S/P MVR (mitral valve repair) [id number]  Active advance directive on file Z78.9  Non-rheumatic mitral regurgitation I34.0  Heart failure ([facility]) I50.9  Bilateral leg edema R60.0  Esophageal stricture K22.2  Dysphagia R13.10  GI bleeding K92.2  Mitral stenosis I05.0  S/P MVR (mitral valve replacement) Z95.2  
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  
 Acute GI bleeding K92.2  Chronic anticoagulation Z79.01  
 Paroxysmal A-fib ([facility]) I48.0  History of GI bleed Z87.19  
 Presence of Watchman left atrial appendage closure device Z95.818  
 CKD (chronic kidney disease) stage 4, GFR 15-29 ml/min ([facility]) N18.4  Acute on chronic diastolic CHF (congestive heart failure) ([facility]) I50.33 Review of Systems: 
 
Symptom Y/N Comments  Symptom Y/N Comments Fever/Chills N   Chest Pain N Poor Appetite N   Edema N   
Cough N   Abdominal Pain N Sputum N   Joint Pain N   
SOB/LEIJA N   Pruritis/Rash N   
Nausea/vomit N   Tolerating PT/OT Y Diarrhea N   Tolerating Diet Y Constipation N   Other Could NOT obtain due to:   
 
Objective:  
  
Physical Exam: 
 
Last 24hrs VS reviewed since prior progress note. Most recent are: 
 
Visit Vitals /62 (BP 1 Location: Left arm, BP Patient Position: At rest) Pulse (!) 50 Temp 97.6 °F (36.4 °C) Resp 15 Ht 5' 9\" (1.753 m) Wt 97.7 kg (215 lb 6.2 oz) SpO2 98% Breastfeeding No  
BMI 31.81 kg/m² Intake/Output Summary (Last 24 hours) at 10/12/2020 1204 Last data filed at 10/12/2020 1479 Gross per 24 hour Intake 500 ml Output 1050 ml Net -550 ml General Appearance: Well developed, well nourished, alert & oriented x 3,  
 no acute distress. Ears/Nose/Mouth/Throat: Hearing grossly normal. 
Neck: Supple. Chest: Lungs clear to auscultation bilaterally. Cardiovascular: Regular rate and rhythm, S1S2 normal, no murmur. Abdomen: Soft, non-tender, bowel sounds are active. Extremities: No edema bilaterally. Skin: Warm and dry. PMH/ reviewed - no change compared to H&P Data Review Telemetry: sinus rhythm Lab Data Personally Reviewed: 
 
Recent Labs 10/12/20 
0254 10/11/20 
8806 WBC 4.6 5.3 HGB 8.1* 8.3* HCT 27.0* 28.5* PLT 95* 131* LABRCNT(INR:3,PTP:3,APTT:3,) Recent Labs 10/12/20 
0254 10/11/20 
0414 10/10/20 
1103  142 142  
K 3.2* 3.5 3.8 * 110* 108 CO2 29 26 28 BUN 57* 61* 63* CREA 2.49* 2.66* 2.71* GLU 68 93 164* CA 9.0 9.1 9.0 MG 2.8*  --   --   
LABRCNT(CPK:3,CpKMB:3,ckndx:3,troiq:3) Lab Results Component Value Date/Time Cholesterol, total 256 (H) 07/13/2020 08:02 AM  
 HDL Cholesterol 58 07/13/2020 08:02 AM  
 LDL, calculated 169 (H) 07/13/2020 08:02 AM  
 Triglyceride 143 07/13/2020 08:02 AM  
 CHOL/HDL Ratio 3.1 09/22/2010 08:12 AM  
LABRCNT(sgot:3,gpt:3,ap:3,tbiL:3,TP:3,ALB:3,GLOB:3,ggt:3,aml:3,amyp:3,lpse:3,hlpse:3)No results for input(s): PH, PCO2, PO2 in the last 72 hours. Lab Results Component Value Date/Time Cholesterol, total 256 (H) 07/13/2020 08:02 AM  
 HDL Cholesterol 58 07/13/2020 08:02 AM  
 LDL, calculated 169 (H) 07/13/2020 08:02 AM  
 Triglyceride 143 07/13/2020 08:02 AM  
 CHOL/HDL Ratio 3.1 09/22/2010 08:12 AM  
MEDTABLEGeorge DANITZA Schaffer III, DO No results for input(s): PH, PCO2, PO2 in the last 72 hours. Medications Personally Reviewed: 
 
Current Facility-Administered Medications Medication Dose Route Frequency  0.9% sodium chloride infusion  75 mL/hr IntraVENous CONTINUOUS  
 sodium chloride (NS) flush 5-40 mL  5-40 mL IntraVENous Q8H  
 sodium chloride (NS) flush 5-40 mL  5-40 mL IntraVENous PRN  pantoprazole (PROTONIX) 40 mg in 0.9% sodium chloride 10 mL injection  40 mg IntraVENous Q12H  furosemide (LASIX) injection 100 mg  100 mg IntraVENous BID  sodium chloride (NS) flush 5-40 mL  5-40 mL IntraVENous Q8H  
 sodium chloride (NS) flush 5-40 mL  5-40 mL IntraVENous PRN  
  acetaminophen (TYLENOL) tablet 650 mg  650 mg Oral Q6H PRN Or  
 acetaminophen (TYLENOL) suppository 650 mg  650 mg Rectal Q6H PRN  polyethylene glycol (MIRALAX) packet 17 g  17 g Oral DAILY PRN  promethazine (PHENERGAN) tablet 12.5 mg  12.5 mg Oral Q6H PRN Or  
 ondansetron (ZOFRAN) injection 4 mg  4 mg IntraVENous Q6H PRN  
 allopurinoL (ZYLOPRIM) tablet 200 mg  200 mg Oral DAILY  [Held by provider] amiodarone (CORDARONE) tablet 200 mg  200 mg Oral BID  atorvastatin (LIPITOR) tablet 80 mg  80 mg Oral QHS  insulin NPH (NOVOLIN N, HUMULIN N) injection 6 Units  6 Units SubCUTAneous ACD  insulin NPH (NOVOLIN N, HUMULIN N) injection 23 Units  23 Units SubCUTAneous ACB  levothyroxine (SYNTHROID) tablet 125 mcg  125 mcg Oral ACB  potassium chloride SR (KLOR-CON 10) tablet 10 mEq  10 mEq Oral DAILY  sucralfate (CARAFATE) tablet 1 g  1 g Oral QID  insulin lispro (HUMALOG) injection   SubCUTAneous AC&HS  
 glucose chewable tablet 16 g  4 Tab Oral PRN  
 dextrose (D50W) injection syrg 12.5-25 g  12.5-25 g IntraVENous PRN  
 glucagon (GLUCAGEN) injection 1 mg  1 mg IntraMUSCular PRN  
 [Held by provider] metoprolol tartrate (LOPRESSOR) tablet 12.5 mg  12.5 mg Oral BID Viji Jones III, DO

## 2020-10-12 NOTE — ANESTHESIA POSTPROCEDURE EVALUATION
Procedure(s): ESOPHAGOGASTRODUODENOSCOPY (EGD) ESOPHAGOGASTRODUODENAL (EGD) BIOPSY. MAC Anesthesia Post Evaluation Patient location during evaluation: PACU Note status: Adequate. Level of consciousness: responsive to verbal stimuli and sleepy but conscious Pain management: satisfactory to patient Airway patency: patent Anesthetic complications: no 
Cardiovascular status: acceptable Respiratory status: acceptable Hydration status: acceptable Comments: +Post-Anesthesia Evaluation and Assessment Patient: Luz Marina Montiel MRN: [id number]  SSN: [id number] YOB: 1935  Age: 82 y.o. Sex: female Cardiovascular Function/Vital Signs BP (!) 148/45   Pulse (!) 53   Temp 36.5 °C (97.7 °F)   Resp 15   Ht 5' 9\" (1.753 m)   Wt 97.7 kg (215 lb 6.2 oz)   SpO2 99%   Breastfeeding No   BMI 31.81 kg/m² Patient is status post Procedure(s): ESOPHAGOGASTRODUODENOSCOPY (EGD) ESOPHAGOGASTRODUODENAL (EGD) BIOPSY. Nausea/Vomiting: Controlled. Postoperative hydration reviewed and adequate. Pain: 
Pain Scale 1: Numeric (0 - 10) (10/12/20 0957) Pain Intensity 1: 0 (10/12/20 0957) Managed. Neurological Status: At baseline. Mental Status and Level of Consciousness: Arousable. Pulmonary Status:  
O2 Device: Room air (10/12/20 0957) Adequate oxygenation and airway patent. Complications related to anesthesia: None Post-anesthesia assessment completed. No concerns. Signed By: Katie Quinonez MD  
 10/12/2020 Post anesthesia nausea and vomiting:  controlled INITIAL Post-op Vital signs:  
Vitals Value Taken Time /41 10/12/2020 10:00 AM  
Temp 36.5 °C (97.7 °F) 10/12/2020  9:21 AM  
Pulse 53 10/12/2020 10:00 AM  
Resp 15 10/12/2020 10:00 AM  
SpO2 98 % 10/12/2020 10:00 AM  
Vitals shown include unvalidated device data.

## 2020-10-12 NOTE — TELEPHONE ENCOUNTER
----- Message from [id number] sent at 10/12/2020 10:42 AM EDT -----  Regarding: FW: Test Results Question  Contact: [phone]    ----- Message -----  From: Luz Marina Montiel  Sent: 10/12/2020  10:41 AM EDT  To: Augusto  Nurse Pool  Subject: Test Results Question                            Looking for results of endoscopy from Dr. Giovanni Jasmine. I'm Lea's daughter and I'm in room 3256 with her. Also looking for feedback from Dr. Cherri Calvert and/or Dr. Tyree Gilbert about better juggling daily care, given stage 4 kidney disease, which we recently found in her chart, continued bleeding and the effect of blood thinner and furosemide on those conditions.

## 2020-10-12 NOTE — ANESTHESIA PREPROCEDURE EVALUATION
Anesthetic History No history of anesthetic complications Review of Systems / Medical History Patient summary reviewed, nursing notes reviewed and pertinent labs reviewed Pulmonary Smoker Comments: Former smoker - [address] - 5 pack years Neuro/Psych CVA TIA Cardiovascular Hypertension Valvular problems/murmurs CHF Dysrhythmias : atrial fibrillation and atrial flutter CAD, cardiac stents and hyperlipidemia Exercise tolerance: <4 METS Comments: S/P MVR 04/2019 Coronary stent 
TTE (1/22/18): Mild MR, mild-to-moderate MS, EF=65% SP [facility] GI/Hepatic/Renal 
  
GERD Renal disease: CRI 
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  
Hx Thrombocytopenia Gout Chronic knee pain Physical Exam 
 
Airway Mallampati: III 
TM Distance: 4 - 6 cm Neck ROM: decreased range of motion Mouth opening: Normal 
 
 Cardiovascular Rhythm: regular Rate: normal 
 
 
 
 Dental 
 
Dentition: Lower partial plate, Caps/crowns and Loose teeth Pulmonary Breath sounds clear to auscultation Abdominal 
GI exam deferred Other Findings Anesthetic Plan ASA: 3 Anesthesia type: MAC Induction: Intravenous Anesthetic plan and risks discussed with: Patient

## 2020-10-12 NOTE — PROGRESS NOTES
Gastroenterology Progress Note 10/12/2020 Admit Date: 10/10/2020 Subjective: Follow up for: anemia, hx of GI bleed, FOBT+ 79-year-old woman with past medical history of organic heart disease, congestive heart failure,  
mitral stenosis, status post biosynthetic mitral valve in 2019, atrial fibrillation,  
chronic kidney disease, diabetes mellitus, chronic AFib status post Watchman procedure in 02/2020,  
who presented to the emergency department with increased edema, increased shortness of breath and found to have worsened anemia. Patient was seen in rounds by me today. She is NPO At this time, the patient is resting comfortably. The patient has no new complaints today. Current Facility-Administered Medications Medication Dose Route Frequency  pantoprazole (PROTONIX) 40 mg in 0.9% sodium chloride 10 mL injection  40 mg IntraVENous Q12H  furosemide (LASIX) injection 100 mg  100 mg IntraVENous BID  sodium chloride (NS) flush 5-40 mL  5-40 mL IntraVENous Q8H  
 sodium chloride (NS) flush 5-40 mL  5-40 mL IntraVENous PRN  
 acetaminophen (TYLENOL) tablet 650 mg  650 mg Oral Q6H PRN Or  
 acetaminophen (TYLENOL) suppository 650 mg  650 mg Rectal Q6H PRN  polyethylene glycol (MIRALAX) packet 17 g  17 g Oral DAILY PRN  promethazine (PHENERGAN) tablet 12.5 mg  12.5 mg Oral Q6H PRN Or  
 ondansetron (ZOFRAN) injection 4 mg  4 mg IntraVENous Q6H PRN  
 allopurinoL (ZYLOPRIM) tablet 200 mg  200 mg Oral DAILY  [Held by provider] amiodarone (CORDARONE) tablet 200 mg  200 mg Oral BID  atorvastatin (LIPITOR) tablet 80 mg  80 mg Oral QHS  insulin NPH (NOVOLIN N, HUMULIN N) injection 6 Units  6 Units SubCUTAneous ACD  insulin NPH (NOVOLIN N, HUMULIN N) injection 23 Units  23 Units SubCUTAneous ACB  levothyroxine (SYNTHROID) tablet 125 mcg  125 mcg Oral ACB  potassium chloride SR (KLOR-CON 10) tablet 10 mEq  10 mEq Oral DAILY  sucralfate (CARAFATE) tablet 1 g  1 g Oral QID  insulin lispro (HUMALOG) injection   SubCUTAneous AC&HS  
 glucose chewable tablet 16 g  4 Tab Oral PRN  
 dextrose (D50W) injection syrg 12.5-25 g  12.5-25 g IntraVENous PRN  
 glucagon (GLUCAGEN) injection 1 mg  1 mg IntraMUSCular PRN  
 [Held by provider] metoprolol tartrate (LOPRESSOR) tablet 12.5 mg  12.5 mg Oral BID Objective:  
 
Blood pressure (!) 142/60, pulse (!) 53, temperature 97.6 °F (36.4 °C), resp. rate 16, height 5' 9\" (1.753 m), weight 97.7 kg (215 lb 6.2 oz), SpO2 99 %, not currently breastfeeding. No intake/output data recorded. 10/10 1901 - 10/12 0700 In: 540 [P.O.:540] Out: 1800 [Urine:1800] Physical Examination:  
 
 
General:AAO x 3, HEENT:  EOMI, Chest:  CTA, Heart: S1, S2, RRR 
GI: Soft, NT, ND + bowel sounds Extremities: No edema or cyanosis CNS: CNs grossly normal. 
 
Data Review Recent Results (from the past 24 hour(s)) GLUCOSE, POC Collection Time: 10/11/20 11:13 AM  
Result Value Ref Range Glucose (POC) 139 (H) 65 - 100 mg/dL Performed by Jayro Ledbetter (PCT) GLUCOSE, POC Collection Time: 10/11/20  4:01 PM  
Result Value Ref Range Glucose (POC) 106 (H) 65 - 100 mg/dL Performed by Jayro Ledbetter (PCT) GLUCOSE, POC Collection Time: 10/11/20 10:17 PM  
Result Value Ref Range Glucose (POC) 101 (H) 65 - 100 mg/dL Performed by Lazaro Donnelly ([facility]) CBC W/O DIFF Collection Time: 10/12/20  2:54 AM  
Result Value Ref Range WBC 4.6 3.6 - 11.0 K/uL  
 RBC 3.33 (L) 3.80 - 5.20 M/uL HGB 8.1 (L) 11.5 - 16.0 g/dL HCT 27.0 (L) 35.0 - 47.0 % MCV 81.1 80.0 - 99.0 FL  
 MCH 24.3 (L) 26.0 - 34.0 PG  
 MCHC 30.0 30.0 - 36.5 g/dL  
 RDW 17.0 (H) 11.5 - 14.5 % PLATELET 95 (L) 883 - 400 K/uL MPV 13.0 (H) 8.9 - 12.9 FL  
 NRBC 0.0 0  WBC ABSOLUTE NRBC 0.00 0.00 - 0.01 K/uL METABOLIC PANEL, BASIC  Collection Time: 10/12/20  2:54 AM  
 Result Value Ref Range Sodium 144 136 - 145 mmol/L Potassium 3.2 (L) 3.5 - 5.1 mmol/L Chloride 110 (H) 97 - 108 mmol/L  
 CO2 29 21 - 32 mmol/L Anion gap 5 5 - 15 mmol/L Glucose 68 65 - 100 mg/dL BUN 57 (H) 6 - 20 MG/DL Creatinine 2.49 (H) 0.55 - 1.02 MG/DL  
 BUN/Creatinine ratio 23 (H) 12 - 20 GFR est AA 22 (L) >60 ml/min/1.73m2 GFR est non-AA 18 (L) >60 ml/min/1.73m2 Calcium 9.0 8.5 - 10.1 MG/DL MAGNESIUM Collection Time: 10/12/20  2:54 AM  
Result Value Ref Range Magnesium 2.8 (H) 1.6 - 2.4 mg/dL GLUCOSE, POC Collection Time: 10/12/20  7:27 AM  
Result Value Ref Range Glucose (POC) 80 65 - 100 mg/dL Performed by Aleja Plasencia (CON) Recent Labs 10/12/20 
0254 10/11/20 
9467 WBC 4.6 5.3 HGB 8.1* 8.3* HCT 27.0* 28.5* PLT 95* 131* Recent Labs 10/12/20 
0254 10/11/20 
0414 10/10/20 
1103  142 142  
K 3.2* 3.5 3.8 * 110* 108 CO2 29 26 28 BUN 57* 61* 63* CREA 2.49* 2.66* 2.71* GLU 68 93 164* CA 9.0 9.1 9.0 MG 2.8*  --   --   
 
Recent Labs 10/10/20 
1103 AP 91  
TP 6.2* ALB 3.2*  
GLOB 3.0 No results for input(s): INR, PTP, APTT, INREXT in the last 72 hours. No results for input(s): FE, TIBC, PSAT, FERR in the last 72 hours. Lab Results Component Value Date/Time Folate 72.3 (H) 01/22/2017 03:13 AM  
  
No results for input(s): PH, PCO2, PO2 in the last 72 hours. Recent Labs 10/10/20 
1103 TROIQ <0.05 Lab Results Component Value Date/Time Cholesterol, total 256 (H) 07/13/2020 08:02 AM  
 HDL Cholesterol 58 07/13/2020 08:02 AM  
 LDL, calculated 169 (H) 07/13/2020 08:02 AM  
 Triglyceride 143 07/13/2020 08:02 AM  
 CHOL/HDL Ratio 3.1 09/22/2010 08:12 AM  
 
No components found for: [place] Lab Results Component Value Date/Time  Color YELLOW/STRAW 10/10/2020 10:51 PM  
 Appearance CLEAR 10/10/2020 10:51 PM  
 Specific gravity 1.010 10/10/2020 10:51 PM  
 Specific gravity 1.020 07/06/2010 01:10 PM  
 pH (UA) 5.5 10/10/2020 10:51 PM  
 Protein Negative 10/10/2020 10:51 PM  
 Glucose Negative 10/10/2020 10:51 PM  
 Ketone Negative 10/10/2020 10:51 PM  
 Bilirubin Negative 10/10/2020 10:51 PM  
 Urobilinogen 1.0 10/10/2020 10:51 PM  
 Nitrites Negative 10/10/2020 10:51 PM  
 Leukocyte Esterase MODERATE (A) 10/10/2020 10:51 PM  
 Epithelial cells MANY (A) 10/10/2020 10:51 PM  
 Bacteria Negative 10/10/2020 10:51 PM  
 WBC 10-20 10/10/2020 10:51 PM  
 RBC 0-5 10/10/2020 10:51 PM  
 
  
ROS: -CP, SOB, Dysuria, palpitations, cough. Assessment: 
 
Active Problems: 
  CKD (chronic kidney disease) stage 4, GFR 15-29 ml/min ([facility] [address] (10/10/2020) Acute on chronic diastolic CHF (congestive heart failure) ([facility] [address] (10/10/2020) Plan/Discussion: Please keep her NPO. Case d/w pt, her RNs and GI team. 
EGD today. R,B and alternatives d/w pt. The patient appeared to understand. I discussed with the patient the objectives, risks, consequences and alternatives to upper endoscopy with possible biopsy and treatment of bleeding. Signed By: Lisa Pearson.  Adarsh Rand MD 
 
10/12/2020  7:45 AM

## 2020-10-12 NOTE — PROGRESS NOTES
Problem: Pressure Injury - Risk of 
Goal: *Prevention of pressure injury Description: Document Shane Scale and appropriate interventions in the flowsheet. 10/12/2020 0239 by Ankur Godinez RN Outcome: Progressing Towards Goal 
Note: Pressure Injury Interventions: 
Sensory Interventions: Assess changes in LOC, Chair cushion, Check visual cues for pain, Float heels, Keep linens dry and wrinkle-free, Minimize linen layers, Monitor skin under medical devices, Pad between skin to skin, Pressure redistribution bed/mattress (bed type) Moisture Interventions: Absorbent underpads Activity Interventions: Increase time out of bed Mobility Interventions: Assess need for specialty bed Nutrition Interventions: Document food/fluid/supplement intake 10/12/2020 0239 by Ankur Godinez RN Outcome: Progressing Towards Goal 
Note: Pressure Injury Interventions: 
Sensory Interventions: Assess changes in LOC, Chair cushion, Check visual cues for pain, Float heels, Keep linens dry and wrinkle-free, Minimize linen layers, Monitor skin under medical devices, Pad between skin to skin, Pressure redistribution bed/mattress (bed type) Moisture Interventions: Absorbent underpads Activity Interventions: Increase time out of bed Mobility Interventions: Assess need for specialty bed Nutrition Interventions: Document food/fluid/supplement intake

## 2020-10-13 NOTE — PROGRESS NOTES
Gastroenterology Progress Note 10/13/2020 Admit Date: 10/10/2020 Subjective: Follow up for: anemia, hx of GI bleed, FOBT+ 82-year-old woman with past medical history of organic heart disease, congestive heart failure,  
mitral stenosis, status post biosynthetic mitral valve in 2019, atrial fibrillation,  
chronic kidney disease, diabetes mellitus, chronic AFib status post Watchman procedure in 02/2020,  
who presented to the emergency department with increased edema, increased shortness of breath and found to have worsened anemia. EGD with no active bleeding. Her platelets are better. 
 
hgb is as follows:  
 
 Ref. Range 10/10/2020 20:17 10/11/2020 06:52 10/12/2020 02:54 10/13/2020 01:00 HGB Latest Ref Range: 11.5 - 16.0 g/dL 8.5 (L) 8.3 (L) 8.1 (L) 8.9 (L) HCT Latest Ref Range: 35.0 - 47.0 % 28.7 (L) 28.5 (L) 27.0 (L) 30.1 (L) Current Facility-Administered Medications Medication Dose Route Frequency  peg 3350-electrolytes (COLYTE) 4000 mL  4,000 mL Oral ONCE  
 sodium chloride (NS) flush 5-40 mL  5-40 mL IntraVENous Q8H  
 sodium chloride (NS) flush 5-40 mL  5-40 mL IntraVENous PRN  
 amiodarone (CORDARONE) tablet 100 mg  100 mg Oral DAILY  pantoprazole (PROTONIX) 40 mg in 0.9% sodium chloride 10 mL injection  40 mg IntraVENous Q12H  furosemide (LASIX) injection 100 mg  100 mg IntraVENous BID  sodium chloride (NS) flush 5-40 mL  5-40 mL IntraVENous Q8H  
 sodium chloride (NS) flush 5-40 mL  5-40 mL IntraVENous PRN  
 acetaminophen (TYLENOL) tablet 650 mg  650 mg Oral Q6H PRN Or  
 acetaminophen (TYLENOL) suppository 650 mg  650 mg Rectal Q6H PRN  polyethylene glycol (MIRALAX) packet 17 g  17 g Oral DAILY PRN  promethazine (PHENERGAN) tablet 12.5 mg  12.5 mg Oral Q6H PRN Or  
 ondansetron (ZOFRAN) injection 4 mg  4 mg IntraVENous Q6H PRN  
 allopurinoL (ZYLOPRIM) tablet 200 mg  200 mg Oral DAILY  [Held by provider] amiodarone (CORDARONE) tablet 200 mg  200 mg Oral BID  atorvastatin (LIPITOR) tablet 80 mg  80 mg Oral QHS  insulin NPH (NOVOLIN N, HUMULIN N) injection 6 Units  6 Units SubCUTAneous ACD  insulin NPH (NOVOLIN N, HUMULIN N) injection 23 Units  23 Units SubCUTAneous ACB  levothyroxine (SYNTHROID) tablet 125 mcg  125 mcg Oral ACB  potassium chloride SR (KLOR-CON 10) tablet 10 mEq  10 mEq Oral DAILY  sucralfate (CARAFATE) tablet 1 g  1 g Oral QID  insulin lispro (HUMALOG) injection   SubCUTAneous AC&HS  
 glucose chewable tablet 16 g  4 Tab Oral PRN  
 dextrose (D50W) injection syrg 12.5-25 g  12.5-25 g IntraVENous PRN  
 glucagon (GLUCAGEN) injection 1 mg  1 mg IntraMUSCular PRN  
 [Held by provider] metoprolol tartrate (LOPRESSOR) tablet 12.5 mg  12.5 mg Oral BID Objective:  
 
Blood pressure (!) 123/54, pulse (!) 57, temperature 97.4 °F (36.3 °C), resp. rate 18, height 5' 9\" (1.753 m), weight 96.8 kg (213 lb 4.8 oz), SpO2 94 %, not currently breastfeeding. No intake/output data recorded. 10/11 1901 - 10/13 0700 In: 1100 [P.O.:900; I.V.:200] Out: 2600 [Urine:2600] Physical Examination:  
 
 
General:AAO x 3, HEENT:  EOMI, Chest:  CTA, Heart: S1, S2, RRR 
GI: Soft, NT, ND + bowel sounds Extremities: No edema or cyanosis CNS: CNs grossly normal. 
 
Data Review Recent Results (from the past 24 hour(s)) GLUCOSE, POC Collection Time: 10/12/20 11:14 AM  
Result Value Ref Range Glucose (POC) 92 65 - 100 mg/dL Performed by Lisa Wright (CON) GLUCOSE, POC Collection Time: 10/12/20  4:38 PM  
Result Value Ref Range Glucose (POC) 154 (H) 65 - 100 mg/dL Performed by Yosef Peña (PCT) GLUCOSE, POC Collection Time: 10/12/20  8:59 PM  
Result Value Ref Range Glucose (POC) 120 (H) 65 - 100 mg/dL Performed by Lázaro Harper (PCT) METABOLIC PANEL, BASIC Collection Time: 10/13/20  1:00 AM  
Result Value Ref Range Sodium 142 136 - 145 mmol/L Potassium 3.5 3.5 - 5.1 mmol/L Chloride 109 (H) 97 - 108 mmol/L  
 CO2 28 21 - 32 mmol/L Anion gap 5 5 - 15 mmol/L Glucose 103 (H) 65 - 100 mg/dL BUN 49 (H) 6 - 20 MG/DL Creatinine 2.43 (H) 0.55 - 1.02 MG/DL  
 BUN/Creatinine ratio 20 12 - 20 GFR est AA 23 (L) >60 ml/min/1.73m2 GFR est non-AA 19 (L) >60 ml/min/1.73m2 Calcium 9.0 8.5 - 10.1 MG/DL  
CBC W/O DIFF Collection Time: 10/13/20  1:00 AM  
Result Value Ref Range WBC 5.7 3.6 - 11.0 K/uL  
 RBC 3.71 (L) 3.80 - 5.20 M/uL HGB 8.9 (L) 11.5 - 16.0 g/dL HCT 30.1 (L) 35.0 - 47.0 % MCV 81.1 80.0 - 99.0 FL  
 MCH 24.0 (L) 26.0 - 34.0 PG  
 MCHC 29.6 (L) 30.0 - 36.5 g/dL  
 RDW 17.1 (H) 11.5 - 14.5 % PLATELET 133 (L) 122 - 400 K/uL NRBC 0.0 0  WBC ABSOLUTE NRBC 0.00 0.00 - 0.01 K/uL HAPTOGLOBIN Collection Time: 10/13/20  1:00 AM  
Result Value Ref Range Haptoglobin 114 30 - 200 mg/dL LD Collection Time: 10/13/20  1:00 AM  
Result Value Ref Range  (H) 81 - 246 U/L  
GLUCOSE, POC Collection Time: 10/13/20  8:21 AM  
Result Value Ref Range Glucose (POC) 131 (H) 65 - 100 mg/dL Performed by Marivel Vieira (PCT) Recent Labs 10/13/20 
0100 10/12/20 
0254 WBC 5.7 4.6 HGB 8.9* 8.1* HCT 30.1* 27.0*  
* 95* Recent Labs 10/13/20 
0100 10/12/20 
0254 10/11/20 
0414  144 142  
K 3.5 3.2* 3.5 * 110* 110* CO2 28 29 26 BUN 49* 57* 61* CREA 2.43* 2.49* 2.66* * 68 93 CA 9.0 9.0 9.1 MG  --  2.8*  --   
 
Recent Labs 10/10/20 
1103 AP 91  
TP 6.2* ALB 3.2*  
GLOB 3.0 No results for input(s): INR, PTP, APTT, INREXT, INREXT in the last 72 hours. No results for input(s): FE, TIBC, PSAT, FERR in the last 72 hours. Lab Results Component Value Date/Time Folate 72.3 (H) 01/22/2017 03:13 AM  
  
No results for input(s): PH, PCO2, PO2 in the last 72 hours. Recent Labs 10/10/20 
1104 TROIQ <0.05 Lab Results Component Value Date/Time Cholesterol, total 256 (H) 07/13/2020 08:02 AM  
 HDL Cholesterol 58 07/13/2020 08:02 AM  
 LDL, calculated 169 (H) 07/13/2020 08:02 AM  
 Triglyceride 143 07/13/2020 08:02 AM  
 CHOL/HDL Ratio 3.1 09/22/2010 08:12 AM  
 
No components found for: [place] Lab Results Component Value Date/Time Color YELLOW/STRAW 10/10/2020 10:51 PM  
 Appearance CLEAR 10/10/2020 10:51 PM  
 Specific gravity 1.010 10/10/2020 10:51 PM  
 Specific gravity 1.020 07/06/2010 01:10 PM  
 pH (UA) 5.5 10/10/2020 10:51 PM  
 Protein Negative 10/10/2020 10:51 PM  
 Glucose Negative 10/10/2020 10:51 PM  
 Ketone Negative 10/10/2020 10:51 PM  
 Bilirubin Negative 10/10/2020 10:51 PM  
 Urobilinogen 1.0 10/10/2020 10:51 PM  
 Nitrites Negative 10/10/2020 10:51 PM  
 Leukocyte Esterase MODERATE (A) 10/10/2020 10:51 PM  
 Epithelial cells MANY (A) 10/10/2020 10:51 PM  
 Bacteria Negative 10/10/2020 10:51 PM  
 WBC 10-20 10/10/2020 10:51 PM  
 RBC 0-5 10/10/2020 10:51 PM  
 
  
ROS: -CP, SOB, Dysuria, palpitations, cough. Assessment: 
 
Active Problems: 
  CKD (chronic kidney disease) stage 4, GFR 15-29 ml/min ([facility] [address] (10/10/2020) Acute on chronic diastolic CHF (congestive heart failure) ([facility] [address] (10/10/2020) Plan/Discussion:  
 
With a hx of colonic AVM and cardiology wanting to restart Plavix we will set her up for a colonoscopy tomorrow. Pt and family are agreeable The patient appeared to understand. I discussed with the patient the objectives, risks, consequences and alternatives to colonoscopy with possible biopsy and treatment of bleeding. Signed By: Carmina Osborn. Denita , MD 
 
10/13/2020

## 2020-10-13 NOTE — PROGRESS NOTES
Progress Note 10/13/2020 3:52 PM 
NAME: Thalia Ricketts MRN:  [id number] Admit Diagnosis: Acute on chronic diastolic CHF (congestive heart failure) ([facility]) [I50.33];CKD (chronic kidney disease) stage 4, GFR 15-29 ml/min ([facility]) [N18.4] Assessment: S/p Bioprosthetic MV replacement Hx of A F ib    . Current sinus rhythm S/p Watchman device. Hx of CAD S/p remote coronary stent . Diabetes CKD stage 4. Hypertension. Hyperlipidemia. Anemia Plan:  
EGD noted; gastritis Diuretics per renal; appreciate consult Resume plavix when ok w/ GI; cscope tomorrow noted Continue amio; reduce to 100mg OK w/ holding metoprolol Continue statin  
 
 [x]        High complexity decision making was performed Subjective:  
 
Thalia Ricketts denies chest pain, dyspnea. Discussed with RN events overnight. Patient Active Problem List  
Diagnosis Code  T. I.A.  PVD (peripheral vascular disease) ([facility]) I73.9  Reflux esophagitis K21.00  Benign neoplasm of colon D12.6  Iron deficiency anemia D50.9  Hypomagnesemia E83.42  Long term current use of anticoagulant therapy Z79.01  
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  
 Advance directive in chart Z78.9  Type 2 diabetes, controlled, with renal manifestation ([facility]) E11.29  
 Chronic atrial fibrillation ([facility]) I48.20  Mixed hyperlipidemia E78.2  Atherosclerosis of native coronary artery without angina pectoris I25.10  Hypothyroidism, acquired, autoimmune E06.3  Heart valve problem I38  
 Mitral regurgitation I34.0  
 S/P MVR (mitral valve repair) [id number]  Active advance directive on file Z78.9  Non-rheumatic mitral regurgitation I34.0  Heart failure ([facility]) I50.9  Bilateral leg edema R60.0  Esophageal stricture K22.2  Dysphagia R13.10  GI bleeding K92.2  Mitral stenosis I05.0  S/P MVR (mitral valve replacement) Z95.2  
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  
 Acute GI bleeding K92.2  Chronic anticoagulation Z79.01  
 Paroxysmal A-fib ([facility]) I48.0  History of GI bleed Z87.19  
 Presence of Watchman left atrial appendage closure device Z95.818  
 CKD (chronic kidney disease) stage 4, GFR 15-29 ml/min ([facility]) N18.4  Acute on chronic diastolic CHF (congestive heart failure) ([facility]) I50.33 Review of Systems: 
 
Symptom Y/N Comments  Symptom Y/N Comments Fever/Chills N   Chest Pain N Poor Appetite N   Edema N   
Cough N   Abdominal Pain N Sputum N   Joint Pain N   
SOB/LEIJA N   Pruritis/Rash N   
Nausea/vomit N   Tolerating PT/OT Y Diarrhea N   Tolerating Diet Y Constipation N   Other Could NOT obtain due to:   
 
Objective:  
  
Physical Exam: 
 
Last 24hrs VS reviewed since prior progress note. Most recent are: 
 
Visit Vitals BP (!) 123/54 (BP 1 Location: Left arm, BP Patient Position: At rest) Pulse (!) 57 Temp 97.4 °F (36.3 °C) Resp 18 Ht 5' 9\" (1.753 m) Wt 96.8 kg (213 lb 4.8 oz) SpO2 94% Breastfeeding No  
BMI 31.50 kg/m² Intake/Output Summary (Last 24 hours) at 10/13/2020 8201 Last data filed at 10/13/2020 8765 Gross per 24 hour Intake 800 ml Output 1550 ml Net -750 ml General Appearance: Well developed, well nourished, alert & oriented x 3,  
 no acute distress. Ears/Nose/Mouth/Throat: Hearing grossly normal. 
Neck: Supple. Chest: Lungs clear to auscultation bilaterally. Cardiovascular: Regular rate and rhythm, S1S2 normal, no murmur. Abdomen: Soft, non-tender, bowel sounds are active. Extremities: No edema bilaterally. Skin: Warm and dry. PMH/ reviewed - no change compared to H&P Data Review Telemetry: sinus rhythm Lab Data Personally Reviewed: 
 
Recent Labs 10/13/20 
0100 10/12/20 
0254 WBC 5.7 4.6 HGB 8.9* 8.1* HCT 30.1* 27.0*  
* 95* LABRCNT(INR:3,PTP:3,APTT:3,) Recent Labs 10/13/20 
0100 10/12/20 
0254 10/11/20 
0414  144 142  
K 3.5 3.2* 3.5 * 110* 110* CO2 28 29 26 BUN 49* 57* 61* CREA 2.43* 2.49* 2.66* * 68 93 CA 9.0 9.0 9.1 MG  --  2.8*  --   
LABRCNT(CPK:3,CpKMB:3,ckndx:3,troiq:3) Lab Results Component Value Date/Time Cholesterol, total 256 (H) 07/13/2020 08:02 AM  
 HDL Cholesterol 58 07/13/2020 08:02 AM  
 LDL, calculated 169 (H) 07/13/2020 08:02 AM  
 Triglyceride 143 07/13/2020 08:02 AM  
 CHOL/HDL Ratio 3.1 09/22/2010 08:12 AM  
LABRCNT(sgot:3,gpt:3,ap:3,tbiL:3,TP:3,ALB:3,GLOB:3,ggt:3,aml:3,amyp:3,lpse:3,hlpse:3)No results for input(s): PH, PCO2, PO2 in the last 72 hours. Lab Results Component Value Date/Time Cholesterol, total 256 (H) 07/13/2020 08:02 AM  
 HDL Cholesterol 58 07/13/2020 08:02 AM  
 LDL, calculated 169 (H) 07/13/2020 08:02 AM  
 Triglyceride 143 07/13/2020 08:02 AM  
 CHOL/HDL Ratio 3.1 09/22/2010 08:12 AM  
MEDTABLEGeorge DANITZA Schaffer III, DO No results for input(s): PH, PCO2, PO2 in the last 72 hours. Medications Personally Reviewed: 
 
Current Facility-Administered Medications Medication Dose Route Frequency  peg 3350-electrolytes (COLYTE) 4000 mL  4,000 mL Oral ONCE  
 sodium chloride (NS) flush 5-40 mL  5-40 mL IntraVENous Q8H  
 sodium chloride (NS) flush 5-40 mL  5-40 mL IntraVENous PRN  
 amiodarone (CORDARONE) tablet 100 mg  100 mg Oral DAILY  pantoprazole (PROTONIX) 40 mg in 0.9% sodium chloride 10 mL injection  40 mg IntraVENous Q12H  furosemide (LASIX) injection 100 mg  100 mg IntraVENous BID  sodium chloride (NS) flush 5-40 mL  5-40 mL IntraVENous Q8H  
  sodium chloride (NS) flush 5-40 mL  5-40 mL IntraVENous PRN  
 acetaminophen (TYLENOL) tablet 650 mg  650 mg Oral Q6H PRN Or  
 acetaminophen (TYLENOL) suppository 650 mg  650 mg Rectal Q6H PRN  polyethylene glycol (MIRALAX) packet 17 g  17 g Oral DAILY PRN  promethazine (PHENERGAN) tablet 12.5 mg  12.5 mg Oral Q6H PRN Or  
 ondansetron (ZOFRAN) injection 4 mg  4 mg IntraVENous Q6H PRN  
 allopurinoL (ZYLOPRIM) tablet 200 mg  200 mg Oral DAILY  [Held by provider] amiodarone (CORDARONE) tablet 200 mg  200 mg Oral BID  atorvastatin (LIPITOR) tablet 80 mg  80 mg Oral QHS  insulin NPH (NOVOLIN N, HUMULIN N) injection 6 Units  6 Units SubCUTAneous ACD  insulin NPH (NOVOLIN N, HUMULIN N) injection 23 Units  23 Units SubCUTAneous ACB  levothyroxine (SYNTHROID) tablet 125 mcg  125 mcg Oral ACB  potassium chloride SR (KLOR-CON 10) tablet 10 mEq  10 mEq Oral DAILY  sucralfate (CARAFATE) tablet 1 g  1 g Oral QID  insulin lispro (HUMALOG) injection   SubCUTAneous AC&HS  
 glucose chewable tablet 16 g  4 Tab Oral PRN  
 dextrose (D50W) injection syrg 12.5-25 g  12.5-25 g IntraVENous PRN  
 glucagon (GLUCAGEN) injection 1 mg  1 mg IntraMUSCular PRN  
 [Held by provider] metoprolol tartrate (LOPRESSOR) tablet 12.5 mg  12.5 mg Oral BID Rosa M Coad III, DO

## 2020-10-13 NOTE — PROGRESS NOTES
Bedside and Verbal shift change report given to Niya Koenig (oncoming nurse) by Zo Serrano (offgoing nurse). Report included the following information Kardex, MAR and Recent Results.

## 2020-10-13 NOTE — TELEPHONE ENCOUNTER
Called, spoke to Pau  Two pt identifiers confirmed. Pau states shaun Sierra is still in the hospital.    Pau states she will call back once mom is discharged from the hospital to make an appointment. Pt verbalized understanding of information discussed w/ no further questions at this time.

## 2020-10-13 NOTE — PROGRESS NOTES
NAME: Ulisses Ramirez :  1935 MRN:  [id number] Assessment :    Plan: 
--CKD stage 4 Hypokalemia HTN Bradycardia Anemia Chronic Thrombocytopenia 
  
S/p bioprosthetic MV Afib S/p watchman H/o cad/stent --Creatinine now 2.5 to 2.4 (progressive CKD); long standing DM/HTN. [place] urine. Likely underlying age and HTN kidney disease. Some urinary retention (trend) 
  Admitted with acute on chronic diastolic HF and GIB 
  
Diuresing on Lasix 100 mg IV bid? down 2.3 liters on I&O and weight is puzzling (203 #'s on admission, 202 #'s the next day, yesterday 215 # and today 213 #) ; leg edema is better We discussed the importance of low salt diet given her heart and kidney disease 
  
On PO potassium 
  
Neg hemolysis labs Subjective: Chief Complaint:  No resting sob. + leija. + yas, better. I'll call her daughter in a bit to discuss. Review of Systems: 
 
Symptom Y/N Comments  Symptom Y/N Comments Fever/Chills    Chest Pain Poor Appetite    Edema y Cough    Abdominal Pain Sputum    Joint Pain SOB/LEIJA n/y   Pruritis/Rash Nausea/vomit    Tolerating PT/OT Diarrhea    Tolerating Diet Constipation    Other Could not obtain due to:   
 
Objective: VITALS:  
Last 24hrs VS reviewed since prior progress note. Most recent are: 
Visit Vitals BP (!) 123/54 (BP 1 Location: Left arm, BP Patient Position: At rest) Pulse (!) 57 Temp 97.4 °F (36.3 °C) Resp 18 Ht 5' 9\" (1.753 m) Wt 96.8 kg (213 lb 4.8 oz) SpO2 94% Breastfeeding No  
BMI 31.50 kg/m² Intake/Output Summary (Last 24 hours) at 10/13/2020 0289 Last data filed at 10/13/2020 1203 Gross per 24 hour Intake 800 ml Output 1550 ml Net -750 ml Telemetry Reviewed: PHYSICAL EXAM: 
General: NAD 
cta 
++ edema (a tad better than yesterday) - legs elevated Lab Data Reviewed: (see below) Medications Reviewed: (see below) PMH/SH reviewed - no change compared to H&P 
________________________________________________________________________ Care Plan discussed with: 
Patient y Family RN Care Manager Consultant:     
 
  Comments >50% of visit spent in counseling and coordination of care    
 
________________________________________________________________________ Jones Barragan MD  
 
Procedures: see electronic medical records for all procedures/Xrays and details which 
were not copied into this note but were reviewed prior to creation of Plan. LABS: 
Recent Labs 10/13/20 
0100 10/12/20 
0254 WBC 5.7 4.6 HGB 8.9* 8.1* HCT 30.1* 27.0*  
* 95* Recent Labs 10/13/20 
0100 10/12/20 
0254 10/11/20 
0414  144 142  
K 3.5 3.2* 3.5 * 110* 110* CO2 28 29 26 BUN 49* 57* 61* CREA 2.43* 2.49* 2.66* * 68 93 CA 9.0 9.0 9.1 MG  --  2.8*  --   
 
Recent Labs 10/10/20 
1103 AP 91  
TP 6.2* ALB 3.2*  
GLOB 3.0 No results for input(s): INR, PTP, APTT, INREXT in the last 72 hours. No results for input(s): FE, TIBC, PSAT, FERR in the last 72 hours. Lab Results Component Value Date/Time Folate 72.3 (H) 01/22/2017 03:13 AM  
  
No results for input(s): PH, PCO2, PO2 in the last 72 hours. No results for input(s): CPK, CKMB in the last 72 hours. No lab exists for component: TROPONINI No components found for: [place] Lab Results Component Value Date/Time  Color YELLOW/STRAW 10/10/2020 10:51 PM  
 Appearance CLEAR 10/10/2020 10:51 PM  
 Specific gravity 1.010 10/10/2020 10:51 PM  
 Specific gravity 1.020 07/06/2010 01:10 PM  
 pH (UA) 5.5 10/10/2020 10:51 PM  
 Protein Negative 10/10/2020 10:51 PM  
 Glucose Negative 10/10/2020 10:51 PM  
 Ketone Negative 10/10/2020 10:51 PM  
 Bilirubin Negative 10/10/2020 10:51 PM  
 Urobilinogen 1.0 10/10/2020 10:51 PM  
 Nitrites Negative 10/10/2020 10:51 PM  
 Leukocyte Esterase MODERATE (A) 10/10/2020 10:51 PM  
 Epithelial cells MANY (A) 10/10/2020 10:51 PM  
 Bacteria Negative 10/10/2020 10:51 PM  
 WBC 10-20 10/10/2020 10:51 PM  
 RBC 0-5 10/10/2020 10:51 PM  
 
 
MEDICATIONS: 
Current Facility-Administered Medications Medication Dose Route Frequency  peg 3350-electrolytes (COLYTE) 4000 mL  4,000 mL Oral ONCE  
 sodium chloride (NS) flush 5-40 mL  5-40 mL IntraVENous Q8H  
 sodium chloride (NS) flush 5-40 mL  5-40 mL IntraVENous PRN  
 amiodarone (CORDARONE) tablet 100 mg  100 mg Oral DAILY  pantoprazole (PROTONIX) 40 mg in 0.9% sodium chloride 10 mL injection  40 mg IntraVENous Q12H  furosemide (LASIX) injection 100 mg  100 mg IntraVENous BID  sodium chloride (NS) flush 5-40 mL  5-40 mL IntraVENous Q8H  
 sodium chloride (NS) flush 5-40 mL  5-40 mL IntraVENous PRN  
 acetaminophen (TYLENOL) tablet 650 mg  650 mg Oral Q6H PRN Or  
 acetaminophen (TYLENOL) suppository 650 mg  650 mg Rectal Q6H PRN  polyethylene glycol (MIRALAX) packet 17 g  17 g Oral DAILY PRN  promethazine (PHENERGAN) tablet 12.5 mg  12.5 mg Oral Q6H PRN Or  
 ondansetron (ZOFRAN) injection 4 mg  4 mg IntraVENous Q6H PRN  
 allopurinoL (ZYLOPRIM) tablet 200 mg  200 mg Oral DAILY  [Held by provider] amiodarone (CORDARONE) tablet 200 mg  200 mg Oral BID  atorvastatin (LIPITOR) tablet 80 mg  80 mg Oral QHS  insulin NPH (NOVOLIN N, HUMULIN N) injection 6 Units  6 Units SubCUTAneous ACD  insulin NPH (NOVOLIN N, HUMULIN N) injection 23 Units  23 Units SubCUTAneous ACB  levothyroxine (SYNTHROID) tablet 125 mcg  125 mcg Oral ACB  potassium chloride SR (KLOR-CON 10) tablet 10 mEq  10 mEq Oral DAILY  sucralfate (CARAFATE) tablet 1 g  1 g Oral QID  insulin lispro (HUMALOG) injection   SubCUTAneous AC&HS  
 glucose chewable tablet 16 g  4 Tab Oral PRN  
  dextrose (D50W) injection syrg 12.5-25 g  12.5-25 g IntraVENous PRN  
 glucagon (GLUCAGEN) injection 1 mg  1 mg IntraMUSCular PRN  
 [Held by provider] metoprolol tartrate (LOPRESSOR) tablet 12.5 mg  12.5 mg Oral BID

## 2020-10-13 NOTE — PROGRESS NOTES
Problem: Pressure Injury - Risk of 
Goal: *Prevention of pressure injury Description: Document Shane Scale and appropriate interventions in the flowsheet. Outcome: Progressing Towards Goal 
Note: Pressure Injury Interventions: 
Sensory Interventions: Chair cushion, Keep linens dry and wrinkle-free, Maintain/enhance activity level, Minimize linen layers, Monitor skin under medical devices, Pad between skin to skin, Pressure redistribution bed/mattress (bed type), Turn and reposition approx. every two hours (pillows and wedges if needed) Moisture Interventions: Absorbent underpads Activity Interventions: Chair cushion, Increase time out of bed, Pressure redistribution bed/mattress(bed type) Mobility Interventions: Float heels, HOB 30 degrees or less, Pressure redistribution bed/mattress (bed type), Turn and reposition approx. every two hours(pillow and wedges) Nutrition Interventions: Document food/fluid/supplement intake, Offer support with meals,snacks and hydration

## 2020-10-13 NOTE — PROGRESS NOTES
1000 Perfect serve message to Dr. Fadumo Frey. Pt voided 100ml, bladder hmgq=[id number]. Pt ambulates to BR with walker and assistance of one. 1115 In & Out xfki=626 ml of yellow urine without sedimentation noted, per order of Dr. Fadumo Frey. 1900 Pt drinking Golytely with good results. Bedside and Verbal shift change report given to Flako Nazario RN (oncoming nurse) by Sneha Randhawa RN (offgoing nurse). Report included the following information SBAR, Kardex, Intake/Output and MAR and events of the day.

## 2020-10-13 NOTE — PROGRESS NOTES
Gastroenterology Progress Note 10/13/2020 Admit Date: 10/10/2020 Subjective: Follow up for: anemia, hx of GI bleed, FOBT+ 
   
EGD done yesterday not showing source for anemia. Hgb improved today to 8.9, was 8.1 yesterday. Plt also increased to 147. No s/sx of bleeding. Normal BM last night. No abdominal pain, N/V. Tolerating diet. Current Facility-Administered Medications Medication Dose Route Frequency  peg 3350-electrolytes (COLYTE) 4000 mL  4,000 mL Oral ONCE  
 sodium chloride (NS) flush 5-40 mL  5-40 mL IntraVENous Q8H  
 sodium chloride (NS) flush 5-40 mL  5-40 mL IntraVENous PRN  
 amiodarone (CORDARONE) tablet 100 mg  100 mg Oral DAILY  pantoprazole (PROTONIX) 40 mg in 0.9% sodium chloride 10 mL injection  40 mg IntraVENous Q12H  furosemide (LASIX) injection 100 mg  100 mg IntraVENous BID  sodium chloride (NS) flush 5-40 mL  5-40 mL IntraVENous Q8H  
 sodium chloride (NS) flush 5-40 mL  5-40 mL IntraVENous PRN  
 acetaminophen (TYLENOL) tablet 650 mg  650 mg Oral Q6H PRN Or  
 acetaminophen (TYLENOL) suppository 650 mg  650 mg Rectal Q6H PRN  polyethylene glycol (MIRALAX) packet 17 g  17 g Oral DAILY PRN  promethazine (PHENERGAN) tablet 12.5 mg  12.5 mg Oral Q6H PRN Or  
 ondansetron (ZOFRAN) injection 4 mg  4 mg IntraVENous Q6H PRN  
 allopurinoL (ZYLOPRIM) tablet 200 mg  200 mg Oral DAILY  [Held by provider] amiodarone (CORDARONE) tablet 200 mg  200 mg Oral BID  atorvastatin (LIPITOR) tablet 80 mg  80 mg Oral QHS  insulin NPH (NOVOLIN N, HUMULIN N) injection 6 Units  6 Units SubCUTAneous ACD  insulin NPH (NOVOLIN N, HUMULIN N) injection 23 Units  23 Units SubCUTAneous ACB  levothyroxine (SYNTHROID) tablet 125 mcg  125 mcg Oral ACB  potassium chloride SR (KLOR-CON 10) tablet 10 mEq  10 mEq Oral DAILY  sucralfate (CARAFATE) tablet 1 g  1 g Oral QID  insulin lispro (HUMALOG) injection   SubCUTAneous AC&HS  
  glucose chewable tablet 16 g  4 Tab Oral PRN  
 dextrose (D50W) injection syrg 12.5-25 g  12.5-25 g IntraVENous PRN  
 glucagon (GLUCAGEN) injection 1 mg  1 mg IntraMUSCular PRN  
 [Held by provider] metoprolol tartrate (LOPRESSOR) tablet 12.5 mg  12.5 mg Oral BID Objective:  
 
Blood pressure (!) 123/54, pulse (!) 57, temperature 97.4 °F (36.3 °C), resp. rate 18, height 5' 9\" (1.753 m), weight 96.8 kg (213 lb 4.8 oz), SpO2 94 %, not currently breastfeeding. No intake/output data recorded. 10/11 1901 - 10/13 0700 In: 1100 [P.O.:900; I.V.:200] Out: 2600 [Urine:2600] Physical Examination:  
 
 
General:AAO x 3, HEENT:  EOMI, Chest:  CTA, Heart: S1, S2, RRR 
GI: obese, Soft, NT, ND + bowel sounds Extremities: + edema, no cyanosis CNS: CNs grossly normal. 
 
Data Review Recent Results (from the past 24 hour(s)) GLUCOSE, POC Collection Time: 10/12/20 11:14 AM  
Result Value Ref Range Glucose (POC) 92 65 - 100 mg/dL Performed by April Green (CON) GLUCOSE, POC Collection Time: 10/12/20  4:38 PM  
Result Value Ref Range Glucose (POC) 154 (H) 65 - 100 mg/dL Performed by Gaby Park (PCT) GLUCOSE, POC Collection Time: 10/12/20  8:59 PM  
Result Value Ref Range Glucose (POC) 120 (H) 65 - 100 mg/dL Performed by Marilee Barnes (PCT) METABOLIC PANEL, BASIC Collection Time: 10/13/20  1:00 AM  
Result Value Ref Range Sodium 142 136 - 145 mmol/L Potassium 3.5 3.5 - 5.1 mmol/L Chloride 109 (H) 97 - 108 mmol/L  
 CO2 28 21 - 32 mmol/L Anion gap 5 5 - 15 mmol/L Glucose 103 (H) 65 - 100 mg/dL BUN 49 (H) 6 - 20 MG/DL Creatinine 2.43 (H) 0.55 - 1.02 MG/DL  
 BUN/Creatinine ratio 20 12 - 20 GFR est AA 23 (L) >60 ml/min/1.73m2 GFR est non-AA 19 (L) >60 ml/min/1.73m2 Calcium 9.0 8.5 - 10.1 MG/DL  
CBC W/O DIFF Collection Time: 10/13/20  1:00 AM  
Result Value Ref Range WBC 5.7 3.6 - 11.0 K/uL RBC 3.71 (L) 3.80 - 5.20 M/uL HGB 8.9 (L) 11.5 - 16.0 g/dL HCT 30.1 (L) 35.0 - 47.0 % MCV 81.1 80.0 - 99.0 FL  
 MCH 24.0 (L) 26.0 - 34.0 PG  
 MCHC 29.6 (L) 30.0 - 36.5 g/dL  
 RDW 17.1 (H) 11.5 - 14.5 % PLATELET 942 (L) 843 - 400 K/uL NRBC 0.0 0  WBC ABSOLUTE NRBC 0.00 0.00 - 0.01 K/uL HAPTOGLOBIN Collection Time: 10/13/20  1:00 AM  
Result Value Ref Range Haptoglobin 114 30 - 200 mg/dL LD Collection Time: 10/13/20  1:00 AM  
Result Value Ref Range  (H) 81 - 246 U/L  
GLUCOSE, POC Collection Time: 10/13/20  8:21 AM  
Result Value Ref Range Glucose (POC) 131 (H) 65 - 100 mg/dL Performed by Denis Sawant (PCT) Recent Labs 10/13/20 
0100 10/12/20 
0254 WBC 5.7 4.6 HGB 8.9* 8.1* HCT 30.1* 27.0*  
* 95* Recent Labs 10/13/20 
0100 10/12/20 
0254 10/11/20 
0414  144 142  
K 3.5 3.2* 3.5 * 110* 110* CO2 28 29 26 BUN 49* 57* 61* CREA 2.43* 2.49* 2.66* * 68 93 CA 9.0 9.0 9.1 MG  --  2.8*  --   
 
Recent Labs 10/10/20 
1103 AP 91  
TP 6.2* ALB 3.2*  
GLOB 3.0 No results for input(s): INR, PTP, APTT, INREXT, INREXT in the last 72 hours. No results for input(s): FE, TIBC, PSAT, FERR in the last 72 hours. Lab Results Component Value Date/Time Folate 72.3 (H) 01/22/2017 03:13 AM  
  
No results for input(s): PH, PCO2, PO2 in the last 72 hours. Recent Labs 10/10/20 
1103 TROIQ <0.05 Lab Results Component Value Date/Time Cholesterol, total 256 (H) 07/13/2020 08:02 AM  
 HDL Cholesterol 58 07/13/2020 08:02 AM  
 LDL, calculated 169 (H) 07/13/2020 08:02 AM  
 Triglyceride 143 07/13/2020 08:02 AM  
 CHOL/HDL Ratio 3.1 09/22/2010 08:12 AM  
 
No components found for: [place] Lab Results Component Value Date/Time  Color YELLOW/STRAW 10/10/2020 10:51 PM  
 Appearance CLEAR 10/10/2020 10:51 PM  
 Specific gravity 1.010 10/10/2020 10:51 PM  
 Specific gravity 1.020 07/06/2010 01:10 PM  
 pH (UA) 5.5 10/10/2020 10:51 PM  
 Protein Negative 10/10/2020 10:51 PM  
 Glucose Negative 10/10/2020 10:51 PM  
 Ketone Negative 10/10/2020 10:51 PM  
 Bilirubin Negative 10/10/2020 10:51 PM  
 Urobilinogen 1.0 10/10/2020 10:51 PM  
 Nitrites Negative 10/10/2020 10:51 PM  
 Leukocyte Esterase MODERATE (A) 10/10/2020 10:51 PM  
 Epithelial cells MANY (A) 10/10/2020 10:51 PM  
 Bacteria Negative 10/10/2020 10:51 PM  
 WBC 10-20 10/10/2020 10:51 PM  
 RBC 0-5 10/10/2020 10:51 PM  
 
  
ROS: -CP, SOB, Dysuria, palpitations, cough. Assessment: 
 
Active Problems: 
  CKD (chronic kidney disease) stage 4, GFR 15-29 ml/min ([facility] [address] (10/10/2020) Acute on chronic diastolic CHF (congestive heart failure) ([facility] [address] (10/10/2020) Plan/Discussion:  
 
Can have CLD today Plan for colonoscopy tomorrow with prep today, NPO after midnight I discussed with the patient and son the objectives, risks, consequences and alternatives to the colonoscopy with possible biopsy and treatment of bleeding. The patient was counseled at length about the risks of melisa Covid-19 during their perioperative period and any recovery window from their procedure. The patient was made aware that melisa Covid-19  may worsen their prognosis for recovering from their procedure and lend to a higher morbidity and/or mortality risk. All material risks, benefits, and reasonable alternatives including postponing the procedure were discussed. The patient does  wish to proceed with the procedure at this time.  
  
  
 
NEVAEH Winters 
 
10/13/2020  
8:47 AM

## 2020-10-13 NOTE — PROGRESS NOTES
Hospitalist Progress Note NAME: Candie Colbert :  1935 MRN:  [id number] Assessment / Plan: 
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 
h/o 10lb weight gain in 1 week by patient reported (20 lbs up from  weight of 183 to current 203 lbs) Echo 2020 EF 55% S/p failed outpatient increase of lasix. Was on lasix 80mg AM and 40mg PM and was increased to 120mg bid by Dr. Laurie Dey 2 days ago Probnp elevated 7290. Suspect LFTs more elevated today (compared to ) due to hepatic congestion from chf 
 
Cont aggressive  lasix IV 100mg bid- seems to be tolerating well, Cr improved to 2.4 Monitor daily weight, strict Is & Os= neg 1.1L in past 24 hrs/neg 1.5L total this admission Cont K+ with diruesis IP Cardiology consulted- following Hold plavix Holding Metoprolol & Amio due to Bradycardia Cardiac low salt & fluid restricted diet when not NPO 
  
GI bleed with hemeoccult positive brown stool, POA  \Suspect upper GI bleed since BUN elevated. 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 Hgb 13 in , now 8.7 with report of recent intermittent dark stool and epigastric pain Hx UGI bleed  and LGIB  
--change PPI to IV q12h 
--continue carafate qid 
--hold plavix for now- ok with cardiology for now, will need to be resumed when cleared by GI 
--GI consulted - EGD today noted, pt is NPO p MN, resume PO diet post EGD 
--monitor H&H and transfuse for hgb <7 
  
Chronic atrial fibrillation s/p Watchman procedure 2020 Hx atrial flutter with abalation Sinus bradycardia HR 44, POA Hx CAD 
HTN 
holding metoprolol due to cont bradycardia 
holding amiodarone as above 
  
CKD stage 4, POA- Cr now close to baseline 
--Cr 2.4 today, baseline 2.3. Monitor with diuresis --not followed by nephrology and would need to consult if she does not respond to IV lasix 
  
IDDM, POA 
--A1c 6 7/20 
--continue NPH 23 units AM and 6 units PM.  Low dose SSI 
  
Hx TIA 
--hold plavix due to anemia, concern for GI bleed and recurrence of GAVE. 
  
Hypothyroid --continue levothyroxine 
  
Gout 
--continue allopurinol Body mass index is 29.98 kg/m². 10/13: 
She is resting has no new complaints. EGD done yesterday no acute finding to explain anemia now scheduled for Colonoscopy as had history of Colonic AVM cardiology wants to start Plavix. Diuresing well says leg edema is better. Hemoglobin is 8.9 . Renal function is stable. 
 
  
Code: discussed, DNR/DNI 
DVT prophylaxis:  SCD Surrogate decision maker:  Daughter Lauren  and son Recommended Disposition: SNF/LTC and  PT, OT, RN? TBD pending hospital course Subjective: Chief Complaint / Reason for Physician Visit :F/U Acute /Chronic Systolic CHF, GI bleed, Anemia, CKD \"I am fine\". Discussed with RN events overnight. Review of Systems: 
Symptom Y/N Comments  Symptom Y/N Comments Fever/Chills n   Chest Pain n   
Poor Appetite n   Edema y Cough n   Abdominal Pain n   
Sputum n   Joint Pain n   
SOB/LEIJA y LEIJA  Pruritis/Rash n   
Nausea/vomit n   Tolerating PT/OT y Diarrhea n   Tolerating Diet y Constipation    Other Could NOT obtain due to:   
 
Objective: VITALS:  
Last 24hrs VS reviewed since prior progress note. Most recent are: 
Patient Vitals for the past 24 hrs: 
 Temp Pulse Resp BP SpO2  
10/13/20 0815 97.4 °F (36.3 °C) (!) 57 18 (!) 123/54 94 % 10/13/20 0354 97.8 °F (36.6 °C) (!) 56 18 (!) 134/46 99 % 10/12/20 2318 97.5 °F (36.4 °C) 60 17 (!) 145/47 100 % 10/12/20 1945 98.6 °F (37 °C) (!) 55 16 (!) 125/53 98 % 10/12/20 1421 98.1 °F (36.7 °C) (!) 57 15 (!) 113/42 97 % 10/12/20 1043 97.6 °F (36.4 °C) (!) 50 15 125/62 98 % 10/12/20 0957  (!) 53 15 (!) 148/45 99 % 10/12/20 0951  (!) 53 16 (!) 150/39 99 % 10/12/20 0949  (!) 53 17 (!) 143/47 98 % 10/12/20 0944  (!) 53 16 (!) 147/47 100 % 10/12/20 0939  (!) 53 17 (!) 147/44 99 % 10/12/20 0937  (!) 53 19 (!) 138/44 99 % Intake/Output Summary (Last 24 hours) at 10/13/2020 0935 Last data filed at 10/13/2020 9679 Gross per 24 hour Intake 600 ml Output 1550 ml Net -950 ml PHYSICAL EXAM: 
General: WD, WN. Alert, cooperative, no acute distress, Obese+  
EENT:  EOMI. Anicteric sclerae. MMM Resp:  CTA bilaterally, no wheezing or rales. No accessory muscle use CV:  Regular  rhythm,  2+ LE pitting edema noted + GI:  Soft, Non distended, Non tender.  +Bowel sounds Neurologic:  Alert and oriented X 3, normal speech, Psych:   Good insight. Not anxious nor agitated Skin:  No rashes. No jaundice Reviewed most current lab test results and cultures  YES Reviewed most current radiology test results   YES Review and summation of old records today    NO Reviewed patient's current orders and MAR    YES 
PMH/SH reviewed - no change compared to H&P 
________________________________________________________________________ Care Plan discussed with: 
  Comments Patient x Family RN x Care Manager Consultant Multidiciplinary team rounds were held today with , nursing, pharmacist and clinical coordinator. Patient's plan of care was discussed; medications were reviewed and discharge planning was addressed. ________________________________________________________________________ Total NON critical care TIME:  26   Minutes Total CRITICAL CARE TIME Spent:   Minutes non procedure based Comments >50% of visit spent in counseling and coordination of care    
________________________________________________________________________ Rome Meza MD  
 
Procedures: see electronic medical records for all procedures/Xrays and details which were not copied into this note but were reviewed prior to creation of Plan. LABS: 
I reviewed today's most current labs and imaging studies. Pertinent labs include: 
Recent Labs 10/13/20 
0100 10/12/20 
0254 10/11/20 
7744 WBC 5.7 4.6 5.3 HGB 8.9* 8.1* 8.3* HCT 30.1* 27.0* 28.5*  
* 95* 131* Recent Labs 10/13/20 
0100 10/12/20 
0254 10/11/20 
0414 10/10/20 
1103  144 142 142  
K 3.5 3.2* 3.5 3.8 * 110* 110* 108 CO2 28 29 26 28 * 68 93 164* BUN 49* 57* 61* 63* CREA 2.43* 2.49* 2.66* 2.71* CA 9.0 9.0 9.1 9.0 MG  --  2.8*  --   --   
ALB  --   --   --  3.2* TBILI  --   --   --  0.6 ALT  --   --   --  112* Signed: Renard Zarate MD

## 2020-10-14 NOTE — PROGRESS NOTES
Bedside and Verbal, Bedside shift change report given to [address] [place] [address] Ne (oncoming nurse) by Braydon Méndez (offgoing nurse). Report included the following information SBAR, Kardex, Intake/Output and MAR.

## 2020-10-14 NOTE — PROCEDURES
Colonoscopy Procedure Note Indications:   Heme + stool, anemia Referring Physician: Amanuel Dykes MD 
Anesthesia/Sedation: MAC anesthesia Propofol Endoscopist:  Dr. Claribel Moon Procedure in Detail: 
Informed consent was obtained for the procedure, including sedation. Risks of perforation, hemorrhage, adverse drug reaction, and aspiration were discussed. The patient was placed in the left lateral decubitus position. Based on the pre-procedure assessment, including review of the patient's medical history, medications, allergies, and review of systems, she had been deemed to be an appropriate candidate for moderate sedation; she was therefore sedated with the medications listed above. The patient was monitored continuously with ECG tracing, pulse oximetry, blood pressure monitoring, and direct observations. A rectal examination was performed. The [id number] was inserted into the rectum and advanced under direct vision to the terminal ileum. The quality of the colonic preparation was adequate. A careful inspection was made as the colonoscope was withdrawn, including a retroflexed view of the rectum; findings and interventions are described below. Appropriate photodocumentation was obtained. Findings:  
 
1. Scope advanced to the cecum and terminal ileum. 2.  Mucosa was normal throughout colon. 3.  No polyps seen. 4.  Moderate severity diverticulosis of sigmoid and descending colon. 5.  Small internal hemorrhoids. Therapies:  none Specimen:  none Complications: None were encountered during the procedure. EBL: < 10 ml. Recommendations:  
-Advance diet Signed By: Mohit Chakraborty MD 
                      October 14, 2020

## 2020-10-14 NOTE — PROGRESS NOTES
OSMEL plan: 
 
-  Possible HH SN vs. None -  OP f/u appts - PCP, GI, Cardio -  Daughter to transport home at d/c 
-  2nd IMM letter Chart reviewed. Colonoscopy planned for today. Rehab services not consulted this admission. Please consider PT/OT consult if functional limitations are present. CM will continue to follow. Joseph Phoenix, LMSW Care Manager ED [facility] 
[phone]

## 2020-10-14 NOTE — ANESTHESIA PREPROCEDURE EVALUATION
Anesthetic History No history of anesthetic complications Review of Systems / Medical History Patient summary reviewed and pertinent labs reviewed Pulmonary Smoker Comments: Former smoker - [address] - 5 pack years Neuro/Psych CVA TIA Cardiovascular Hypertension Valvular problems/murmurs CHF Dysrhythmias : atrial fibrillation and atrial flutter CAD, cardiac stents and hyperlipidemia Exercise tolerance: <4 METS Comments: S/P MVR 04/2019 Coronary stent 
TTE (1/22/18): Mild MR, mild-to-moderate MS, EF=65% SP Watchman GI/Hepatic/Renal 
  
GERD Renal disease: CRI 
PUD Comments: GI bleed Hx Dysphagia Hx Esophageal Stricture Hx Reflux Esophagitis Hx Melena GAVE (gastric antral vascular ectasia) (K31.819) Endo/Other Diabetes: well controlled, type 2 Hypothyroidism: well controlled Obesity, arthritis, cancer (bladder) and anemia Pertinent negatives: No morbid obesity Comments: Thrombocytopenia Other Findings Comments: Melena Hgb 8.9 Hx Thrombocytopenia Gout Chronic knee pain Physical Exam 
 
Airway Mallampati: III 
TM Distance: 4 - 6 cm Neck ROM: decreased range of motion Mouth opening: Normal 
 
 Cardiovascular Rhythm: regular Rate: normal 
 
 
 
 Dental 
 
Dentition: Lower partial plate, Caps/crowns and Loose teeth Pulmonary Breath sounds clear to auscultation Abdominal 
GI exam deferred Other Findings Anesthetic Plan ASA: 3 Anesthesia type: MAC and total IV anesthesia Induction: Intravenous Anesthetic plan and risks discussed with: Patient

## 2020-10-14 NOTE — PROGRESS NOTES
Progress Note 10/14/2020 3:52 PM 
NAME: Melisa Gay MRN:  [id number] Admit Diagnosis: Acute on chronic diastolic CHF (congestive heart failure) ([facility]) [I50.33];CKD (chronic kidney disease) stage 4, GFR 15-29 ml/min ([facility]) [N18.4] Assessment: S/p Bioprosthetic MV replacement Hx of A F ib    . Current sinus rhythm S/p Watchman device. Hx of CAD S/p remote coronary stent . Diabetes CKD stage 4. Hypertension. Hyperlipidemia. Anemia Plan:  
EGD noted; gastritis Diuretics per renal; appreciate consult Resume plavix when ok w/ GI; cscope today Continue amio; reduce to 100mg OK w/ holding metoprolol Continue statin  
 
 [x]        High complexity decision making was performed Subjective:  
 
Melisa Gay denies chest pain, dyspnea. Discussed with RN events overnight. Patient Active Problem List  
Diagnosis Code  T. I.A.  PVD (peripheral vascular disease) ([facility]) I73.9  Reflux esophagitis K21.00  Benign neoplasm of colon D12.6  Iron deficiency anemia D50.9  Hypomagnesemia E83.42  Long term current use of anticoagulant therapy Z79.01  
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  
 Advance directive in chart Z78.9  Type 2 diabetes, controlled, with renal manifestation ([facility]) E11.29  
 Chronic atrial fibrillation ([facility]) I48.20  Mixed hyperlipidemia E78.2  Atherosclerosis of native coronary artery without angina pectoris I25.10  Hypothyroidism, acquired, autoimmune E06.3  Heart valve problem I38  
 Mitral regurgitation I34.0  
 S/P MVR (mitral valve repair) [id number]  Active advance directive on file Z78.9  Non-rheumatic mitral regurgitation I34.0  Heart failure ([facility]) I50.9  Bilateral leg edema R60.0  Esophageal stricture K22.2  Dysphagia R13.10  GI bleeding K92.2  Mitral stenosis I05.0  S/P MVR (mitral valve replacement) Z95.2  
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  
 Acute GI bleeding K92.2  Chronic anticoagulation Z79.01  
 Paroxysmal A-fib ([facility]) I48.0  History of GI bleed Z87.19  
 Presence of Watchman left atrial appendage closure device Z95.818  
 CKD (chronic kidney disease) stage 4, GFR 15-29 ml/min ([facility]) N18.4  Acute on chronic diastolic CHF (congestive heart failure) ([facility]) I50.33 Review of Systems: 
 
Symptom Y/N Comments  Symptom Y/N Comments Fever/Chills N   Chest Pain N Poor Appetite N   Edema N   
Cough N   Abdominal Pain N Sputum N   Joint Pain N   
SOB/LEIJA N   Pruritis/Rash N   
Nausea/vomit N   Tolerating PT/OT Y Diarrhea N   Tolerating Diet Y Constipation N   Other Could NOT obtain due to:   
 
Objective:  
  
Physical Exam: 
 
Last 24hrs VS reviewed since prior progress note. Most recent are: 
 
Visit Vitals /62 (BP 1 Location: Left arm, BP Patient Position: At rest) Pulse 61 Temp 97.8 °F (36.6 °C) Resp 18 Ht 5' 9\" (1.753 m) Wt 90.9 kg (200 lb 6.4 oz) SpO2 94% Breastfeeding No  
BMI 29.59 kg/m² Intake/Output Summary (Last 24 hours) at 10/14/2020 6223 Last data filed at 10/13/2020 1747 Gross per 24 hour Intake 3040 ml Output 358 ml Net 2682 ml General Appearance: Well developed, well nourished, alert & oriented x 3,  
 no acute distress. Ears/Nose/Mouth/Throat: Hearing grossly normal. 
Neck: Supple. Chest: Lungs clear to auscultation bilaterally. Cardiovascular: Regular rate and rhythm, S1S2 normal, no murmur. Abdomen: Soft, non-tender, bowel sounds are active. Extremities: No edema bilaterally. Skin: Warm and dry. PMH/SH reviewed - no change compared to H&P Data Review Telemetry: sinus rhythm Lab Data Personally Reviewed: 
 
Recent Labs 10/14/20 
0142 10/13/20 
0100 WBC 4.6 5.7 HGB 8.9* 8.9* HCT 30.0* 30.1*  
* 147* LABRCNT(INR:3,PTP:3,APTT:3,) Recent Labs 10/14/20 
0142 10/13/20 
0100 10/12/20 
0254  142 144  
K 3.0* 3.5 3.2*  
* 109* 110* CO2 30 28 29 BUN 41* 49* 57* CREA 2.00* 2.43* 2.49* GLU 81 103* 68  
CA 8.9 9.0 9.0 MG  --   --  2.8* LABRCNT(CPK:3,CpKMB:3,ckndx:3,troiq:3) Lab Results Component Value Date/Time Cholesterol, total 256 (H) 07/13/2020 08:02 AM  
 HDL Cholesterol 58 07/13/2020 08:02 AM  
 LDL, calculated 169 (H) 07/13/2020 08:02 AM  
 Triglyceride 143 07/13/2020 08:02 AM  
 CHOL/HDL Ratio 3.1 09/22/2010 08:12 AM  
LABRCNT(sgot:3,gpt:3,ap:3,tbiL:3,TP:3,ALB:3,GLOB:3,ggt:3,aml:3,amyp:3,lpse:3,hlpse:3)No results for input(s): PH, PCO2, PO2 in the last 72 hours. Lab Results Component Value Date/Time Cholesterol, total 256 (H) 07/13/2020 08:02 AM  
 HDL Cholesterol 58 07/13/2020 08:02 AM  
 LDL, calculated 169 (H) 07/13/2020 08:02 AM  
 Triglyceride 143 07/13/2020 08:02 AM  
 CHOL/HDL Ratio 3.1 09/22/2010 08:12 AM  
MEDTABLEGeorge DANITZA Schaffer III, DO No results for input(s): PH, PCO2, PO2 in the last 72 hours. Medications Personally Reviewed: 
 
Current Facility-Administered Medications Medication Dose Route Frequency  sodium chloride (NS) flush 5-40 mL  5-40 mL IntraVENous Q8H  
 sodium chloride (NS) flush 5-40 mL  5-40 mL IntraVENous PRN  
 amiodarone (CORDARONE) tablet 100 mg  100 mg Oral DAILY  pantoprazole (PROTONIX) 40 mg in 0.9% sodium chloride 10 mL injection  40 mg IntraVENous Q12H  furosemide (LASIX) injection 100 mg  100 mg IntraVENous BID  sodium chloride (NS) flush 5-40 mL  5-40 mL IntraVENous Q8H  
 sodium chloride (NS) flush 5-40 mL  5-40 mL IntraVENous PRN  
  acetaminophen (TYLENOL) tablet 650 mg  650 mg Oral Q6H PRN Or  
 acetaminophen (TYLENOL) suppository 650 mg  650 mg Rectal Q6H PRN  polyethylene glycol (MIRALAX) packet 17 g  17 g Oral DAILY PRN  promethazine (PHENERGAN) tablet 12.5 mg  12.5 mg Oral Q6H PRN Or  
 ondansetron (ZOFRAN) injection 4 mg  4 mg IntraVENous Q6H PRN  
 allopurinoL (ZYLOPRIM) tablet 200 mg  200 mg Oral DAILY  [Held by provider] amiodarone (CORDARONE) tablet 200 mg  200 mg Oral BID  atorvastatin (LIPITOR) tablet 80 mg  80 mg Oral QHS  insulin NPH (NOVOLIN N, HUMULIN N) injection 6 Units  6 Units SubCUTAneous ACD  insulin NPH (NOVOLIN N, HUMULIN N) injection 23 Units  23 Units SubCUTAneous ACB  levothyroxine (SYNTHROID) tablet 125 mcg  125 mcg Oral ACB  potassium chloride SR (KLOR-CON 10) tablet 10 mEq  10 mEq Oral DAILY  sucralfate (CARAFATE) tablet 1 g  1 g Oral QID  insulin lispro (HUMALOG) injection   SubCUTAneous AC&HS  
 glucose chewable tablet 16 g  4 Tab Oral PRN  
 dextrose (D50W) injection syrg 12.5-25 g  12.5-25 g IntraVENous PRN  
 glucagon (GLUCAGEN) injection 1 mg  1 mg IntraMUSCular PRN  
 [Held by provider] metoprolol tartrate (LOPRESSOR) tablet 12.5 mg  12.5 mg Oral BID France Tafoya III, DO

## 2020-10-14 NOTE — PERIOP NOTES
Endoscope was pre-cleaned at bedside immediately following procedure by  ABBEY Aranda Anesthesia reports 120mg Propofol,  and 250mL NS given during procedure. Received report from anesthesia staff on vital signs and status of patient. Marti Mera

## 2020-10-14 NOTE — PROGRESS NOTES
Problem: Falls - Risk of 
Goal: *Absence of Falls Description: Document Yobani Olsonlin Fall Risk and appropriate interventions in the flowsheet. Outcome: Progressing Towards Goal 
Note: Fall Risk Interventions: 
Mobility Interventions: Bed/chair exit alarm, Patient to call before getting OOB Mentation Interventions: Bed/chair exit alarm, Door open when patient unattended Medication Interventions: Assess postural VS orthostatic hypotension, Bed/chair exit alarm Elimination Interventions: Bed/chair exit alarm, Call light in reach, Toilet paper/wipes in reach, Toileting schedule/hourly rounds History of Falls Interventions: Bed/chair exit alarm, Door open when patient unattended

## 2020-10-14 NOTE — PERIOP NOTES
TRANSFER - OUT REPORT: 
 
Verbal report given to ADA RN(name) on Thierno Payer  being transferred to [facility](unit) for routine progression of care Report consisted of patients Situation, Background, Assessment and  
Recommendations(SBAR). Information from the following report(s) SBAR was reviewed with the receiving nurse. Opportunity for questions and clarification was provided.

## 2020-10-14 NOTE — PROGRESS NOTES
[facility] 1935 
[id number] Situation: 
Verbal report received from: Alfredoricki Everett Procedure: Procedure(s): 
COLONOSCOPY Background: 
 
Preoperative diagnosis: anemia, melena Postoperative diagnosis: Diverticulosis :  Dr. Andrews Clark Assistant(s): Endoscopy Technician-1: Laurie Menon Endoscopy RN-1: Gurvinder Streeter Specimens: * No specimens in log * H. Pylori  no Assessment: 
Intra-procedure medications Anesthesia gave intra-procedure sedation and medications, see anesthesia flow sheet yes Intravenous fluids: NS@ [facility] Vital signs stable Abdominal assessment: round and soft Recommendation: 
Discharge patient per MD order. Return to floor Family or Friend Permission to share finding with family or friend yes

## 2020-10-14 NOTE — PROGRESS NOTES
Bedside and Verbal shift change report given to Parris Hobson (oncoming nurse) by Christiana Vogt (offgoing nurse). Report included the following information Kardex, MAR and Recent Results.

## 2020-10-14 NOTE — PROGRESS NOTES
NAME: Thierno Pederson :  1935 MRN:  [id number] Assessment :    Plan: 
--CKD stage 4 Hypokalemia HTN Bradycardia Anemia Chronic Thrombocytopenia 
  
S/p bioprosthetic MV Afib S/p watchman H/o cad/stent --Creatinine better (2.4 to 2) (progressive CKD); long standing DM/HTN. Boyle urine. Likely underlying age and HTN kidney disease. Some urinary retention (trend); bladder scan again today 
  
Admitted with acute on chronic diastolic HF and GIB 
  
Diuresing on Lasix 100 mg IV bid? inaccurate I&O and weight is puzzling (ranging from 200 #'s to 215 #; leg edema is better, still significant and not gone We discussed the importance of low salt diet given her heart and kidney disease; needs salt and fluid restriction 
  
On PO potassium 
  
Neg hemolysis labs Subjective: Chief Complaint:  No resting sob. + leija. + yas, better, not gone. Review of Systems: 
 
Symptom Y/N Comments  Symptom Y/N Comments Fever/Chills    Chest Pain Poor Appetite    Edema y Cough    Abdominal Pain Sputum    Joint Pain SOB/LEIJA n/y   Pruritis/Rash Nausea/vomit    Tolerating PT/OT Diarrhea    Tolerating Diet Constipation    Other Could not obtain due to:   
 
Objective: VITALS:  
Last 24hrs VS reviewed since prior progress note. Most recent are: 
Visit Vitals BP (!) 143/46 (BP 1 Location: Left arm, BP Patient Position: At rest;Sitting) Pulse 62 Temp 97.1 °F (36.2 °C) Resp 18 Ht 5' 9\" (1.753 m) Wt 90.9 kg (200 lb 6.4 oz) SpO2 97% Breastfeeding No  
BMI 29.59 kg/m² Intake/Output Summary (Last 24 hours) at 10/14/2020 8618 Last data filed at 10/13/2020 1747 Gross per 24 hour Intake 2800 ml Output 358 ml Net 2442 ml Telemetry Reviewed: PHYSICAL EXAM: 
General: NAD 
cta 
++ edema (a tad better than yesterday) - legs elevated Lab Data Reviewed: (see below) Medications Reviewed: (see below) PMH/SH reviewed - no change compared to H&P 
________________________________________________________________________ Care Plan discussed with: 
Patient y Family RN Care Manager Consultant:     
 
  Comments >50% of visit spent in counseling and coordination of care    
 
________________________________________________________________________ Karley Nazario MD  
 
Procedures: see electronic medical records for all procedures/Xrays and details which 
were not copied into this note but were reviewed prior to creation of Plan. LABS: 
Recent Labs 10/14/20 
0142 10/13/20 
0100 WBC 4.6 5.7 HGB 8.9* 8.9* HCT 30.0* 30.1*  
* 147* Recent Labs 10/14/20 
0142 10/13/20 
0100 10/12/20 
0254  142 144  
K 3.0* 3.5 3.2*  
* 109* 110* CO2 30 28 29 BUN 41* 49* 57* CREA 2.00* 2.43* 2.49* GLU 81 103* 68  
CA 8.9 9.0 9.0 MG  --   --  2.8* No results for input(s): AP, TBIL, TP, ALB, GLOB, GGT, AML, LPSE in the last 72 hours. No lab exists for component: SGOT, GPT, AMYP, HLPSE No results for input(s): INR, PTP, APTT, INREXT, INREXT in the last 72 hours. No results for input(s): FE, TIBC, PSAT, FERR in the last 72 hours. Lab Results Component Value Date/Time Folate 72.3 (H) 01/22/2017 03:13 AM  
  
No results for input(s): PH, PCO2, PO2 in the last 72 hours. No results for input(s): CPK, CKMB in the last 72 hours. No lab exists for component: TROPONINI No components found for: [place] Lab Results Component Value Date/Time  Color YELLOW/STRAW 10/10/2020 10:51 PM  
 Appearance CLEAR 10/10/2020 10:51 PM  
 Specific gravity 1.010 10/10/2020 10:51 PM  
 Specific gravity 1.020 07/06/2010 01:10 PM  
 pH (UA) 5.5 10/10/2020 10:51 PM  
 Protein Negative 10/10/2020 10:51 PM  
 Glucose Negative 10/10/2020 10:51 PM  
 Ketone Negative 10/10/2020 10:51 PM  
 Bilirubin Negative 10/10/2020 10:51 PM  
 Urobilinogen 1.0 10/10/2020 10:51 PM  
 Nitrites Negative 10/10/2020 10:51 PM  
 Leukocyte Esterase MODERATE (A) 10/10/2020 10:51 PM  
 Epithelial cells MANY (A) 10/10/2020 10:51 PM  
 Bacteria Negative 10/10/2020 10:51 PM  
 WBC 10-20 10/10/2020 10:51 PM  
 RBC 0-5 10/10/2020 10:51 PM  
 
 
MEDICATIONS: 
Current Facility-Administered Medications Medication Dose Route Frequency  potassium chloride SR (KLOR-CON 10) tablet 30 mEq  30 mEq Oral NOW  sodium chloride (NS) flush 5-40 mL  5-40 mL IntraVENous Q8H  
 sodium chloride (NS) flush 5-40 mL  5-40 mL IntraVENous PRN  
 amiodarone (CORDARONE) tablet 100 mg  100 mg Oral DAILY  pantoprazole (PROTONIX) 40 mg in 0.9% sodium chloride 10 mL injection  40 mg IntraVENous Q12H  furosemide (LASIX) injection 100 mg  100 mg IntraVENous BID  sodium chloride (NS) flush 5-40 mL  5-40 mL IntraVENous Q8H  
 sodium chloride (NS) flush 5-40 mL  5-40 mL IntraVENous PRN  
 acetaminophen (TYLENOL) tablet 650 mg  650 mg Oral Q6H PRN Or  
 acetaminophen (TYLENOL) suppository 650 mg  650 mg Rectal Q6H PRN  polyethylene glycol (MIRALAX) packet 17 g  17 g Oral DAILY PRN  promethazine (PHENERGAN) tablet 12.5 mg  12.5 mg Oral Q6H PRN Or  
 ondansetron (ZOFRAN) injection 4 mg  4 mg IntraVENous Q6H PRN  
 allopurinoL (ZYLOPRIM) tablet 200 mg  200 mg Oral DAILY  [Held by provider] amiodarone (CORDARONE) tablet 200 mg  200 mg Oral BID  atorvastatin (LIPITOR) tablet 80 mg  80 mg Oral QHS  insulin NPH (NOVOLIN N, HUMULIN N) injection 6 Units  6 Units SubCUTAneous ACD  [Held by provider] insulin NPH (NOVOLIN N, HUMULIN N) injection 23 Units  23 Units SubCUTAneous ACB  levothyroxine (SYNTHROID) tablet 125 mcg  125 mcg Oral ACB  potassium chloride SR (KLOR-CON 10) tablet 10 mEq  10 mEq Oral DAILY  sucralfate (CARAFATE) tablet 1 g  1 g Oral QID  insulin lispro (HUMALOG) injection   SubCUTAneous AC&HS  
  glucose chewable tablet 16 g  4 Tab Oral PRN  
 dextrose (D50W) injection syrg 12.5-25 g  12.5-25 g IntraVENous PRN  
 glucagon (GLUCAGEN) injection 1 mg  1 mg IntraMUSCular PRN  
 [Held by provider] metoprolol tartrate (LOPRESSOR) tablet 12.5 mg  12.5 mg Oral BID

## 2020-10-14 NOTE — PROGRESS NOTES
Hospitalist Progress Note NAME: Anisha Reeves :  1935 MRN:  [id number] Assessment / Plan: 
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 
-Continue Lasix 100 mg IV twice daily. Renal adjusting dose of Lasix. Holding beta-blocker due to bradycardia. -Strict I's and O's, daily weights and low-salt diet. Fluid restriction. 
-I's and O's and weights are inaccurate Suspected GI bleed Positive stool for occult blood History of given previous endoscopy Mild acute blood loss anemia 
-S/p colonoscopy today with no clear evidence of bleeding 
-Continue Protonix 
-GI is following 
-Resume Plavix and okay with GI. -Check hemoglobin in a.m. 
 
  
Chronic atrial fibrillation s/p Watchman procedure 2020 Hx atrial flutter with abalation Sinus bradycardia HR 44, POA Hx CAD 
HTN 
holding metoprolol due to cont bradycardia Continue amiodarone per cardiology 
  
CKD stage 4, POA- Cr now close to baseline 
--Creatinine is close to baseline. Renal following. 
-Bladder scan today for urinary retention 
-On Lasix. Weight is not accurate. IDDM, POA 
--A1c 6  
-A.m. dose of insulin NPH held as patient is n.p.o. and will resume in a.m. 
--continue NPH 23 units AM and 6 units PM.  Low dose SSI 
  
Hx TIA 
--hold plavix due to anemia, concern for GI bleed and recurrence of GAVE. 
  
Hypothyroid --continue levothyroxine 
  
Gout 
--continue allopurinol Body mass index is 29.98 kg/m². 
 
  
Code: discussed, DNR/DNI 
DVT prophylaxis:  SCD Surrogate decision maker:  Daughter Donaldo Magaña and son Recommended Disposition: Home vs SNF Subjective: Chief Complaint / Reason for Physician Visit :F/U Acute /Chronic Systolic CHF, GI bleed, Anemia, CKD Had colonoscopy today. Still report swelling of the legs. Objective: VITALS:  
Last 24hrs VS reviewed since prior progress note. Most recent are: Patient Vitals for the past 24 hrs: 
 Temp Pulse Resp BP SpO2  
10/14/20 1359 97.8 °F (36.6 °C) 65 18 (!) 154/66 95 % 10/14/20 1236 97.4 °F (36.3 °C) 61 18 (!) 137/47   
10/14/20 1224  (!) 59 14 (!) 132/55 100 % 10/14/20 1223  60 18 (!) 144/45 98 % 10/14/20 1046  64 17 (!) 148/47 100 % 10/14/20 0750 97.1 °F (36.2 °C) 62 18 (!) 143/46 97 % 10/14/20 0337 97.8 °F (36.6 °C) 61 18 127/62 94 % 10/14/20 0016 97.7 °F (36.5 °C) (!) 57 18 (!) 131/57 92 % 10/13/20 1922 97.7 °F (36.5 °C) 87 18 136/72 92 % Intake/Output Summary (Last 24 hours) at 10/14/2020 1450 Last data filed at 10/14/2020 1224 Gross per 24 hour Intake 2650 ml Output 8 ml Net 2642 ml PHYSICAL EXAM: 
General: WD, WN. Alert, cooperative, no acute distress EENT:  EOMI. Anicteric sclerae. MMM Resp:  CTA bilaterally, no wheezing or rales. No accessory muscle use CV:  Regular  rhythm, 2+ edema present GI:  Soft, Non distended, Non tender.  +Bowel sounds Neurologic:  Alert and oriented X 3, normal speech, Psych:   Good insight. Not anxious nor agitated Skin:  No rashes. No jaundice Reviewed most current lab test results and cultures  YES Reviewed most current radiology test results   YES Review and summation of old records today    NO Reviewed patient's current orders and MAR    YES 
PMH/SH reviewed - no change compared to H&P 
________________________________________________________________________ Care Plan discussed with: 
  Comments Patient x Family RN x Care Manager Consultant Multidiciplinary team rounds were held today with , nursing, pharmacist and clinical coordinator. Patient's plan of care was discussed; medications were reviewed and discharge planning was addressed. ________________________________________________________________________ Total NON critical care TIME:  35    Minutes Total CRITICAL CARE TIME Spent:   Minutes non procedure based Comments >50% of visit spent in counseling and coordination of care    
________________________________________________________________________ Keila Ruby MD  
 
Procedures: see electronic medical records for all procedures/Xrays and details which were not copied into this note but were reviewed prior to creation of Plan. LABS: 
I reviewed today's most current labs and imaging studies. Pertinent labs include: 
Recent Labs 10/14/20 
0142 10/13/20 
0100 10/12/20 
0254 WBC 4.6 5.7 4.6 HGB 8.9* 8.9* 8.1* HCT 30.0* 30.1* 27.0*  
* 147* 95* Recent Labs 10/14/20 
0142 10/13/20 
0100 10/12/20 
0254  142 144  
K 3.0* 3.5 3.2*  
* 109* 110* CO2 30 28 29 GLU 81 103* 68  
BUN 41* 49* 57* CREA 2.00* 2.43* 2.49* CA 8.9 9.0 9.0 MG  --   --  2.8* Signed: Keila Ruby MD

## 2020-10-14 NOTE — PROGRESS NOTES
Problem: Self Care Deficits Care Plan (Adult) Goal: *Acute Goals and Plan of Care (Insert Text) Description:  
FUNCTIONAL STATUS PRIOR TO ADMISSION: Pt reports living alone in single story home, using SPC/RW for ADL mobility/balance, with daughter living close by and providing ADL/IADL assist as needed. Drives locally. Has walk-in shower with seat. HOME SUPPORT: The patient lived alone with local daughter to provide assistance. Occupational Therapy Goals Initiated 10/14/2020 1. Patient will perform RW grooming with modified independence within 7 day(s). 2.  Patient will perform EOB/RW bathing with modified independence within 7 day(s). 3.  Patient will perform RW lower body dressing with modified independence within 7 day(s). 4.  Patient will perform RW  toilet transfers with modified independence within 7 day(s). 5.  Patient will perform all aspects of toileting, at  RW, with modified independence within 7 day(s). Outcome: Progressing Towards Goal 
 
OCCUPATIONAL THERAPY EVALUATION Patient: Governor Luis (61 y.o. female) Date: 10/14/2020 Primary Diagnosis: Acute on chronic diastolic CHF (congestive heart failure) ([facility] [I50.33] CKD (chronic kidney disease) stage 4, GFR 15-29 ml/min ([facility]) [N18.4] Procedure(s) (LRB): 
COLONOSCOPY (N/A) Day of Surgery Precautions:  Fall ASSESSMENT Based on the objective data described below, the patient presents with decreased STM, decreased distal LE ADL management and decreased higher level mobility/balance/activity tolerance, all of which limit pts ability to complete self-care routine at level congruent with reported PLOF. Currently, pt benefits from [address] [place] [address] for LE bathing/dressing and CGA for standing grooming/toileting at RW. Pt reports living alone with PRN assist from daughter who lives within 15 minutes of patient.   Pt noted to repeat herself throughout OT evaluation and stated her memory has been \"off\" this hospitalization; however, demo'd good CGA level mobility/balance at RW, with Min verbal cues to proximate self closer to RW. Pt benefits from skilled OT to address functional deficits in an overall attempt at maximizing pt's highest level of safe functional independence prior to discharge, with reporting therapist believing pt would benefit from [address] [place] [address] services and ADL/IADL Supervision 2/2 noted decreased STM and mobility/balance deficits. Current Level of Function Impacting Discharge (ADLs/self-care): Min A Functional Outcome Measure: The patient scored 65/100 on the Barthel Index outcome measure. Other factors to consider for discharge: decreased STM, lives alone Patient will benefit from skilled therapy intervention to address the above noted impairments. PLAN : 
Recommendations and Planned Interventions: self care training, functional mobility training, therapeutic exercise, balance training, therapeutic activities, endurance activities, and patient education Frequency/Duration: Patient will be followed by occupational therapy 4 times a week to address goals. Recommendation for discharge: (in order for the patient to meet his/her long term goals) Occupational therapy at least 2 days/week in the home AND ensure assist and/or supervision for safety with ADL/IADLs This discharge recommendation: 
Has not yet been discussed the attending provider and/or case management IF patient discharges home will need the following DME: patient owns DME required for discharge SUBJECTIVE:  
Patient stated my memory is off a little, this is new since I've been here.  OBJECTIVE DATA SUMMARY:  
HISTORY:  
Past Medical History:  
Diagnosis Date Arthritis KNEES Atrial fibrillation ([facility] 10/29/2009 Bladder [facility] CAD (coronary artery disease) STENT PER PATIENT Chronic pain KNEES Coagulation disorder ([facility] Chronic prophylactic anticoagulation med Colon polyps Diabetes ([place] [facility]) IDDM  
 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 One fragment suggestive of hyperplastic polyp GERD (gastroesophageal reflux disease) Gout Heart valve problem   
 leaking heart valve Hypercholesterolemia Hypertension Hypothyroidism Hypothyroidism 4/23/2009 Hypothyroidism, acquired, autoimmune 11/23/2015 Overweight and obesity PUD (peptic ulcer disease) 1990'S S/P ablation of atrial flutter[V45.89HM] 2009  
 @ [facility] >> atrial fibrillation SOB (shortness of breath) on exertion 04/[place] 4/23/2009 TIA (transient ischemic attack) Ulcer of right lower extremity, limited to breakdown of skin ([place] [facility]) 7/5/2018 Past Surgical History:  
Procedure Laterality Date CARDIAC SURG PROCEDURE UNLIST    
 ablation CARDIAC SURG PROCEDURE UNLIST  01/2020 Watchman device implant COLONOSCOPY N/A 2/20/2019 COLONOSCOPY performed by Elton Hobson MD at [facility] ENDOSCOPY  
 COLONOSCOPY N/A 6/17/2019 COLONOSCOPY performed by Elton Hobson MD at [facility] ENDOSCOPY  
 COLONOSCOPY N/A 6/15/2019 COLONOSCOPY performed by Elton Hobson MD at [facility] MAIN OR  
 COLONOSCOPY N/A 9/11/2019 COLONOSCOPY performed by Elton Hobson MD at [facility] ENDOSCOPY  
 COLONOSCOPY N/A 10/14/2020 COLONOSCOPY performed by Blessing Nowak MD at [facility] ENDOSCOPY  
 COLONOSCOPY,DIAGNOSTIC  2/20/2019 COLONOSCOPY,FLEX,W/CONTROL, BLEEDING  9/11/2019 Randine Burn  6/17/2019 GI TRACT CAPSULE ENDOSCOPY  6/28/2019 GI TRACT CAPSULE ENDOSCOPY  9/26/2019 HX APPENDECTOMY HX CHOLECYSTECTOMY HX HEART VALVE SURGERY  04/2019 HX HYSTERECTOMY HX KNEE ARTHROSCOPY  [id number]  
 right knee  HX ORTHOPAEDIC    
 HX UROLOGICAL RENAL STENT, tur-b  
 HI ESOPHAGOGASTRODUODENOSCOPY TRANSORAL DIAGNOSTIC  8/22/2019 [facility]  6/15/2019 UPPER GI ENDOSCOPY,BALL DIL,30MM  6/15/2019 UPPER GI ENDOSCOPY,TUMOR ABLATN  6/19/2019 VASCULAR SURGERY PROCEDURE UNLIST  11/4  
 removed vein in right leg Expanded or extensive additional review of patient history:  
 
Home Situation Home Environment: Private residence # Steps to Enter: 0 One/Two Story Residence: One story Living Alone: Yes Support Systems: Child(veronica)(daughter lives ~15 mins from patient) Patient Expects to be Discharged to[de-identified] Private residence Current DME Used/Available at Home: Blood pressure cuff, Cane, straight, Glucometer, Raised toilet seat, Walker, rolling, Walker, Grab bars Tub or Shower Type: Shower(fixed bench) Hand dominance: Right EXAMINATION OF PERFORMANCE DEFICITS: 
Cognitive/Behavioral Status: 
Neurologic State: Alert; Appropriate for age Orientation Level: Oriented X4 Cognition: Follows commands;Memory loss(decreased STM noted) Perception: Appears intact Perseveration: No perseveration noted Safety/Judgement: Awareness of environment; Fall prevention; Insight into deficits Hearing: Auditory Auditory Impairment: None Vision/Perceptual:   
 
Acuity: Within Defined Limits Corrective Lenses: Reading glasses Range of Motion: 
AROM: Generally decreased, functional 
PROM: Within functional limits Strength: 
Strength: Generally decreased, functional 
 
Coordination: 
Coordination: Within functional limits Fine Motor Skills-Upper: Left Intact; Right Intact Gross Motor Skills-Upper: Left Intact; Right Intact Tone & Sensation: 
Tone: Normal 
Sensation: Intact Balance: 
Sitting: Intact Standing: Impaired; With support Standing - Static: Good(at RW) Standing - Dynamic : Good;Fair(at RW) Functional Mobility and Transfers for ADLs: 
 
Transfers: Sit to Stand: Contact guard assistance Stand to Sit: Contact guard assistance Bed to Chair: Contact guard assistance Bathroom Mobility: Contact guard assistance Toilet Transfer : Contact guard assistance ADL Assessment: 
Feeding: Modified independent Oral Facial Hygiene/Grooming: Stand-by assistance(RW at sink) Bathing: Minimum assistance(for distal LE management) Upper Body Dressing: Setup Lower Body Dressing: Minimum assistance Toileting: Contact guard assistance ADL Intervention and task modifications: 
Patient instructed and indicated understanding the benefits of maintaining activity tolerance, functional mobility, and independence with self care tasks during acute stay  to ensure safe return home and to baseline. Encouraged patient to increase frequency and duration OOB, be out of bed for all meals, perform daily ADLs (as approved by RN/MD regarding bathing etc), and performing functional mobility to/from bathroom with assist of staff. Cognitive Retraining Safety/Judgement: Awareness of environment; Fall prevention; Insight into deficits Functional Measure: 
Barthel Index: 
 
Bathin Bladder: 10 Bowels: 10 
Groomin Dressin Feeding: 10 Mobility: 10 Stairs: 0 Toilet Use: 5 Transfer (Bed to Chair and Back): 10 Total: 65/100 The Barthel ADL Index: Guidelines 1. The index should be used as a record of what a patient does, not as a record of what a patient could do. 2. The main aim is to establish degree of independence from any help, physical or verbal, however minor and for whatever reason. 3. The need for supervision renders the patient not independent. 4. A patient's performance should be established using the best available evidence. Asking the patient, friends/relatives and nurses are the usual sources, but direct observation and common sense are also important. However direct testing is not needed. 5. Usually the patient's performance over the preceding 24-48 hours is important, but occasionally longer periods will be relevant. 6. Middle categories imply that the patient supplies over 50 per cent of the effort. 7. Use of aids to be independent is allowed. Sima Lockett., Barthel, D.W. (4133). Functional evaluation: the Barthel Index. [address] [facility] (14)2. [place] osei ALEJANDRO Nixon, Jose Brower.Gricel., [place], [address] (1999). Measuring the change indisability after inpatient rehabilitation; comparison of the responsiveness of the Barthel Index and Functional [place] Measure. Journal of Neurology, Neurosurgery, and Psychiatry, 66(4), 584-074. Xiao Tellez, N.J.A, RASHAD Wright, & Maria Teresa Dubon M.A. (2004.) Assessment of post-stroke quality of life in cost-effectiveness studies: The usefulness of the Barthel Index and the EuroQoL-5D. [facility], 13, 111-85 Occupational Therapy Evaluation Charge Determination History Examination Decision-Making LOW Complexity : Brief history review  MEDIUM Complexity : 3-5 performance deficits relating to physical, cognitive , or psychosocial skils that result in activity limitations and / or participation restrictions LOW Complexity : No comorbidities that affect functional and no verbal or physical assistance needed to complete eval tasks Based on the above components, the patient evaluation is determined to be of the following complexity level: LOW Pain Rating: 
No c/o pain Activity Tolerance:  
Good, Fair, and requires rest breaks After treatment patient left in no apparent distress:   
Sitting in chair, Call bell within reach, and Bed / chair alarm activated COMMUNICATION/EDUCATION:  
The patients plan of care was discussed with: Registered nurse.   
 
Home safety education was provided and the patient/caregiver indicated understanding., Patient/family have participated as able in goal setting and plan of care. , and Patient/family agree to work toward stated goals and plan of care. This patients plan of care is appropriate for delegation to BRITTNEY. Thank you for this referral. 
[facility],  Time Calculation: 23 mins

## 2020-10-15 NOTE — PROGRESS NOTES
Hospitalist Progress Note NAME: Ilya Smith :  1935 MRN:  [id number] Assessment / Plan: 
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 
-Still has some swelling in the legs but breathing is better. Patient son is concerned about persistent swelling in the legs 
-We will check ultrasound Doppler 
-Renal is adjusting her dose of diuretics. Currently changed to 120 mg of Lasix p.o. twice daily. Will check with Dr. Arin Garcia about further diuresis 
-Holding beta-blocker due to bradycardia. -Strict I's and O's, daily weights and low-salt diet. Fluid restriction. 
-I's and O's and weights are inaccurate Suspected GI bleed Positive stool for occult blood Mild acute blood loss anemia 
-S/p colonoscopy today with no clear evidence of bleeding 
-S/p endoscopy with no clear source of anemia 
-Continue Protonix 
-We will need outpatient follow-up with oncology for further evaluation given anemia and thrombocytopenia 
 
  
Chronic atrial fibrillation s/p Watchman procedure 2020 Hx atrial flutter with abalation Sinus bradycardia HR 44, POA Hx CAD 
HTN 
holding metoprolol due to cont bradycardia Continue amiodarone per cardiology 
continue Plavix 
  
CKD stage 4, POA- Cr now close to baseline 
--Creatinine is close to baseline. Renal following. 
-Bladder scan today for urinary retention 
-On Lasix. Weight is not accurate. IDDM, POA 
--A1c 6  
-A.m. dose of insulin NPH held as patient is n.p.o. and will resume in a.m. 
--continue NPH 23 units AM and 6 units PM.  Low dose SSI 
  
Hx TIA 
--Continue Plavix. Okay with GI 
  
Hypothyroid --continue levothyroxine 
  
Gout 
--continue allopurinol Body mass index is 29.98 kg/m².  
 
Disposition: 
Discharge once swelling of legs is improved, okay with family (patient's son) 
 
  
Code: discussed, DNR/DNI 
DVT prophylaxis:  SCD 
 Surrogate decision maker:  Daughter Christiana Cabot and son Recommended Disposition: Home vs SNF Subjective: Chief Complaint / Reason for Physician Visit :F/U Acute /Chronic Systolic CHF, GI bleed, Anemia, CKD Had colonoscopy today. Still report swelling of the legs. Objective: VITALS:  
Last 24hrs VS reviewed since prior progress note. Most recent are: 
Patient Vitals for the past 24 hrs: 
 Temp Pulse Resp BP SpO2  
10/15/20 1121 97.6 °F (36.4 °C) 67 18 135/60 100 % 10/15/20 0300 98.3 °F (36.8 °C) 73 20 134/72 97 % 10/14/20 2223 97.6 °F (36.4 °C) 66 15 (!) 125/50 96 % 10/14/20 1926 97.9 °F (36.6 °C) 63 15 (!) 134/47 98 % 10/14/20 1556 97.5 °F (36.4 °C) 65 18 (!) 129/57 95 % No intake or output data in the 24 hours ending 10/15/20 1423 PHYSICAL EXAM: 
General: WD, WN. Alert, cooperative, no acute distress EENT:  EOMI. Anicteric sclerae. MMM Resp:  CTA bilaterally, no wheezing or rales. No accessory muscle use CV:  Regular  rhythm, 2+ edema present GI:  Soft, Non distended, Non tender.  +Bowel sounds Neurologic:  Alert and oriented X 3, normal speech, Psych:   Good insight. Not anxious nor agitated Skin:  No rashes. No jaundice Reviewed most current lab test results and cultures  YES Reviewed most current radiology test results   YES Review and summation of old records today    NO Reviewed patient's current orders and MAR    YES 
PMH/SH reviewed - no change compared to H&P 
________________________________________________________________________ Care Plan discussed with: 
  Comments Patient x Family RN x Care Manager Consultant Multidiciplinary team rounds were held today with , nursing, pharmacist and clinical coordinator. Patient's plan of care was discussed; medications were reviewed and discharge planning was addressed. ________________________________________________________________________ Total NON critical care TIME:  35    Minutes Total CRITICAL CARE TIME Spent:   Minutes non procedure based Comments >50% of visit spent in counseling and coordination of care    
________________________________________________________________________ Elaine Zaragoza MD  
 
Procedures: see electronic medical records for all procedures/Xrays and details which were not copied into this note but were reviewed prior to creation of Plan. LABS: 
I reviewed today's most current labs and imaging studies. Pertinent labs include: 
Recent Labs 10/15/20 
0119 10/14/20 
0142 10/13/20 
0100 WBC 5.7 4.6 5.7 HGB 8.7* 8.9* 8.9* HCT 29.7* 30.0* 30.1*  
* 119* 147* Recent Labs 10/15/20 
0119 10/14/20 
0142 10/13/20 
0100  144 142  
K 3.6 3.0* 3.5 * 110* 109* CO2 27 30 28 * 81 103* BUN 33* 41* 49* CREA 1.92* 2.00* 2.43* CA 9.1 8.9 9.0 Signed: Elaine Zaragoza MD

## 2020-10-15 NOTE — ANESTHESIA POSTPROCEDURE EVALUATION
Procedure(s): 
COLONOSCOPY. 
 
total IV anesthesia Anesthesia Post Evaluation Patient location during evaluation: PACU Note status: Adequate. Level of consciousness: responsive to verbal stimuli and sleepy but conscious Pain management: satisfactory to patient Airway patency: patent Anesthetic complications: no 
Cardiovascular status: acceptable Respiratory status: acceptable Hydration status: acceptable Comments: +Post-Anesthesia Evaluation and Assessment Patient: Anil Velasco MRN: [id number]  SSN: [id number] YOB: 1935  Age: 82 y.o. Sex: female Cardiovascular Function/Vital Signs /60 (BP 1 Location: Left arm, BP Patient Position: Sitting)   Pulse 67   Temp 36.4 °C (97.6 °F)   Resp 18   Ht 5' 9\" (1.753 m)   Wt 91.2 kg (201 lb 1 oz)   SpO2 100%   Breastfeeding No   BMI 29.69 kg/m² Patient is status post Procedure(s): 
COLONOSCOPY. Nausea/Vomiting: Controlled. Postoperative hydration reviewed and adequate. Pain: 
Pain Scale 1: Numeric (0 - 10) (10/15/20 0253) Pain Intensity 1: 0 (10/15/20 0253) Managed. Neurological Status: At baseline. Mental Status and Level of Consciousness: Arousable. Pulmonary Status:  
O2 Device: Room air (10/15/20 0300) Adequate oxygenation and airway patent. Complications related to anesthesia: None Post-anesthesia assessment completed. No concerns. Signed By: Aditya Duong MD  
 10/15/2020 Post anesthesia nausea and vomiting:  controlled INITIAL Post-op Vital signs:  
Vitals Value Taken Time /60 10/15/2020 11:21 AM  
Temp 36.4 °C (97.6 °F) 10/15/2020 11:21 AM  
Pulse 67 10/15/2020 11:21 AM  
Resp 18 10/15/2020 11:21 AM  
SpO2 100 % 10/15/2020 11:21 AM

## 2020-10-15 NOTE — PROGRESS NOTES
Gastroenterology Progress Note NEVAEH Monique 
 for Dr. Rolan House 10/15/2020 Admit Date: 10/10/2020 Subjective: Follow up for: anemia, hx of GI bleed, FOBT+ Patient is doing well, no abdominal pain. Pt and son worried about persistent swelling. No N/V, normal BM last night without s/sx of bleeding. Hgb today 8.7 (8.9 yesterday), plt 133, BUN 33, Cr 1.91 (both improving) EGD and colonoscopy non revealing for cause to anemia. Current Facility-Administered Medications Medication Dose Route Frequency  sodium chloride (NS) flush 5-40 mL  5-40 mL IntraVENous Q8H  
 sodium chloride (NS) flush 5-40 mL  5-40 mL IntraVENous PRN  
 clopidogreL (PLAVIX) tablet 75 mg  75 mg Oral DAILY  sodium chloride (NS) flush 5-40 mL  5-40 mL IntraVENous Q8H  
 sodium chloride (NS) flush 5-40 mL  5-40 mL IntraVENous PRN  
 amiodarone (CORDARONE) tablet 100 mg  100 mg Oral DAILY  pantoprazole (PROTONIX) 40 mg in 0.9% sodium chloride 10 mL injection  40 mg IntraVENous Q12H  furosemide (LASIX) injection 100 mg  100 mg IntraVENous BID  sodium chloride (NS) flush 5-40 mL  5-40 mL IntraVENous Q8H  
 sodium chloride (NS) flush 5-40 mL  5-40 mL IntraVENous PRN  
 acetaminophen (TYLENOL) tablet 650 mg  650 mg Oral Q6H PRN Or  
 acetaminophen (TYLENOL) suppository 650 mg  650 mg Rectal Q6H PRN  polyethylene glycol (MIRALAX) packet 17 g  17 g Oral DAILY PRN  promethazine (PHENERGAN) tablet 12.5 mg  12.5 mg Oral Q6H PRN Or  
 ondansetron (ZOFRAN) injection 4 mg  4 mg IntraVENous Q6H PRN  
 allopurinoL (ZYLOPRIM) tablet 200 mg  200 mg Oral DAILY  [Held by provider] amiodarone (CORDARONE) tablet 200 mg  200 mg Oral BID  atorvastatin (LIPITOR) tablet 80 mg  80 mg Oral QHS  insulin NPH (NOVOLIN N, HUMULIN N) injection 6 Units  6 Units SubCUTAneous ACD  insulin NPH (NOVOLIN N, HUMULIN N) injection 23 Units  23 Units SubCUTAneous ACB  levothyroxine (SYNTHROID) tablet 125 mcg  125 mcg Oral ACB  potassium chloride SR (KLOR-CON 10) tablet 10 mEq  10 mEq Oral DAILY  sucralfate (CARAFATE) tablet 1 g  1 g Oral QID  insulin lispro (HUMALOG) injection   SubCUTAneous AC&HS  
 glucose chewable tablet 16 g  4 Tab Oral PRN  
 dextrose (D50W) injection syrg 12.5-25 g  12.5-25 g IntraVENous PRN  
 glucagon (GLUCAGEN) injection 1 mg  1 mg IntraMUSCular PRN  
 [Held by provider] metoprolol tartrate (LOPRESSOR) tablet 12.5 mg  12.5 mg Oral BID Objective:  
 
Blood pressure 134/72, pulse 73, temperature 98.3 °F (36.8 °C), resp. rate 20, height 5' 9\" (1.753 m), weight 91.2 kg (201 lb 1 oz), SpO2 97 %, not currently breastfeeding. No intake/output data recorded. 10/13 1901 - 10/15 0700 In: 250 [I.V.:250] Out: - Physical Examination:  
 
 
General:Pleasent WF, NAD, AAO x 3, HEENT:  EOMI, Chest:  CTA, Heart: S1, S2, RRR 
GI: Obese, soft, NT, ND + bowel sounds Extremities: + edema of BLE, no cyanosis CNS: CNs grossly normal. 
 
Data Review Recent Results (from the past 24 hour(s)) GLUCOSE, POC Collection Time: 10/14/20  4:51 PM  
Result Value Ref Range Glucose (POC) 159 (H) 65 - 100 mg/dL Performed by Luisa BOWEN GLUCOSE, POC Collection Time: 10/14/20  8:18 PM  
Result Value Ref Range Glucose (POC) 166 (H) 65 - 100 mg/dL Performed by Keaton Thurman CBC W/O DIFF Collection Time: 10/15/20  1:19 AM  
Result Value Ref Range WBC 5.7 3.6 - 11.0 K/uL  
 RBC 3.63 (L) 3.80 - 5.20 M/uL HGB 8.7 (L) 11.5 - 16.0 g/dL HCT 29.7 (L) 35.0 - 47.0 % MCV 81.8 80.0 - 99.0 FL  
 MCH 24.0 (L) 26.0 - 34.0 PG  
 MCHC 29.3 (L) 30.0 - 36.5 g/dL  
 RDW 17.0 (H) 11.5 - 14.5 % PLATELET 485 (L) 527 - 400 K/uL NRBC 0.0 0  WBC ABSOLUTE NRBC 0.00 0.00 - 0.01 K/uL METABOLIC PANEL, BASIC Collection Time: 10/15/20  1:19 AM  
Result Value Ref Range  Sodium 143 136 - 145 mmol/L  
 Potassium 3.6 3.5 - 5.1 mmol/L Chloride 110 (H) 97 - 108 mmol/L  
 CO2 27 21 - 32 mmol/L Anion gap 6 5 - 15 mmol/L Glucose 117 (H) 65 - 100 mg/dL BUN 33 (H) 6 - 20 MG/DL Creatinine 1.92 (H) 0.55 - 1.02 MG/DL  
 BUN/Creatinine ratio 17 12 - 20 GFR est AA 30 (L) >60 ml/min/1.73m2 GFR est non-AA 25 (L) >60 ml/min/1.73m2 Calcium 9.1 8.5 - 10.1 MG/DL  
GLUCOSE, POC Collection Time: 10/15/20  7:58 AM  
Result Value Ref Range Glucose (POC) 130 (H) 65 - 100 mg/dL Performed by Ryan Pinon (PCT) Recent Labs 10/15/20 
0119 10/14/20 
0142 WBC 5.7 4.6 HGB 8.7* 8.9* HCT 29.7* 30.0*  
* 119* Recent Labs 10/15/20 
0119 10/14/20 
0142 10/13/20 
0100  144 142  
K 3.6 3.0* 3.5 * 110* 109* CO2 27 30 28 BUN 33* 41* 49* CREA 1.92* 2.00* 2.43* * 81 103* CA 9.1 8.9 9.0 No results for input(s): AP, TBIL, TP, ALB, GLOB, GGT, AML, LPSE in the last 72 hours. No lab exists for component: SGOT, GPT, AMYP, HLPSE No results for input(s): INR, PTP, APTT, INREXT, INREXT in the last 72 hours. No results for input(s): FE, TIBC, PSAT, FERR in the last 72 hours. Lab Results Component Value Date/Time Folate 72.3 (H) 01/22/2017 03:13 AM  
  
No results for input(s): PH, PCO2, PO2 in the last 72 hours. No results for input(s): CPK, CKNDX, TROIQ in the last 72 hours. No lab exists for component: CPKMB Lab Results Component Value Date/Time Cholesterol, total 256 (H) 07/13/2020 08:02 AM  
 HDL Cholesterol 58 07/13/2020 08:02 AM  
 LDL, calculated 169 (H) 07/13/2020 08:02 AM  
 Triglyceride 143 07/13/2020 08:02 AM  
 CHOL/HDL Ratio 3.1 09/22/2010 08:12 AM  
 
No components found for: [place] Lab Results Component Value Date/Time  Color YELLOW/STRAW 10/10/2020 10:51 PM  
 Appearance CLEAR 10/10/2020 10:51 PM  
 Specific gravity 1.010 10/10/2020 10:51 PM  
 Specific gravity 1.020 07/06/2010 01:10 PM  
 pH (UA) 5.5 10/10/2020 10:51 PM  
 Protein Negative 10/10/2020 10:51 PM  
 Glucose Negative 10/10/2020 10:51 PM  
 Ketone Negative 10/10/2020 10:51 PM  
 Bilirubin Negative 10/10/2020 10:51 PM  
 Urobilinogen 1.0 10/10/2020 10:51 PM  
 Nitrites Negative 10/10/2020 10:51 PM  
 Leukocyte Esterase MODERATE (A) 10/10/2020 10:51 PM  
 Epithelial cells MANY (A) 10/10/2020 10:51 PM  
 Bacteria Negative 10/10/2020 10:51 PM  
 WBC 10-20 10/10/2020 10:51 PM  
 RBC 0-5 10/10/2020 10:51 PM  
 
  
ROS: -CP, SOB, Dysuria, palpitations, cough. Assessment: 
 
Active Problems: 
  CKD (chronic kidney disease) stage 4, GFR 15-29 ml/min ([facility] [address] (10/10/2020) Acute on chronic diastolic CHF (congestive heart failure) ([facility] [address] (10/10/2020) Plan/Discussion:  
 
Patient is doing well today, reviewed negative endoscopic eval for cause to anemia. Many other causes, may be worth following up with Dr. Terrance Limon as an outpatient for further evaluation as she has seen him in the past.  Can proceed with Plavix therapy per cardiology. Continue to monitor hgb while admitted. D/c plans per primary care team.    
  
 
NEVAEH Nelson 
10:55 AM 
 
10/15/2020

## 2020-10-15 NOTE — ROUTINE PROCESS
Bedside and Verbal, Bedside shift change report given to Kanika Casper (oncoming nurse) by Braydon Méndez (offgoing nurse). Report included the following information SBAR, Kardex, Intake/Output and MAR.

## 2020-10-15 NOTE — PROGRESS NOTES
Spiritual Care Assessment/Progress Note [address] 
 
 
NAME: Tyson Hall      MRN: [id number] AGE: 82 y.o. SEX: female Jainism Affiliation: Jewish  
Language: English  
 
10/15/2020     Total Time (in minutes): 5 Spiritual Assessment begun in [facility] 3 MED TELE through conversation with: 
  
    []Patient        [] Family    [] Friend(s) Reason for Consult: Initial visit Spiritual beliefs: (Please include comment if needed) 
   [] Identifies with a jerica tradition:     
   [] Supported by a jerica community:        
   [] Claims no spiritual orientation:       
   [] Seeking spiritual identity:            
   [] Adheres to an individual form of spirituality:       
   [x] Not able to assess:                   
 
    
Identified resources for coping:  
   [] Prayer                           
   [] Music                  [] Guided Imagery 
   [] Family/friends                 [] Pet visits [] Devotional reading                         [x] Unknown 
   [] Other:                                         
 
 
Interventions offered during this visit: (See comments for more details) Plan of Care: 
 
 [] Support spiritual and/or cultural needs  
 [] Support AMD and/or advance care planning process    
 [] Support grieving process 
 [] Coordinate Rites and/or Rituals  
 [] Coordination with community clergy [] No spiritual needs identified at this time 
 [] Detailed Plan of Care below (See Comments)  [] Make referral to Music Therapy 
[] Make referral to Pet Therapy    
[] Make referral to Addiction services 
[] Make referral to [facility] 
[] Make referral to Spiritual Care Partner 
[] No future visits requested       
[] Follow up visits as needed Comments: Attempted Initial Spiritual Assessment [place] [address]. Pt was unable to be assessed at this time.   No family/friends present at time of visit. Contact Spiritual Care Services for any spiritual or emotional support needs. Idania Lennon MDiv. Staff  Request  Support/Spiritual Care Services via [facility]

## 2020-10-15 NOTE — PROGRESS NOTES
Problem: Self Care Deficits Care Plan (Adult) Goal: *Acute Goals and Plan of Care (Insert Text) Description:  
FUNCTIONAL STATUS PRIOR TO ADMISSION: Pt reports living alone in single story home, using SPC/RW for ADL mobility/balance, with daughter living close by and providing ADL/IADL assist as needed. Drives locally. Has walk-in shower with seat. HOME SUPPORT: The patient lived alone with local daughter to provide assistance. Occupational Therapy Goals Initiated 10/14/2020 1. Patient will perform RW grooming with modified independence within 7 day(s). 2.  Patient will perform EOB/RW bathing with modified independence within 7 day(s). 3.  Patient will perform RW lower body dressing with modified independence within 7 day(s). 4.  Patient will perform RW  toilet transfers with modified independence within 7 day(s). 5.  Patient will perform all aspects of toileting, at  RW, with modified independence within 7 day(s). Outcome: Progressing Towards Goal 
 OCCUPATIONAL THERAPY TREATMENT Patient: Governor Luis (49 y.o. female) Date: 10/15/2020 Diagnosis: Acute on chronic diastolic CHF (congestive heart failure) ([facility] [address] [I50.33] CKD (chronic kidney disease) stage 4, GFR 15-29 ml/min ([facility]) [N18.4] <principal problem not specified> Procedure(s) (LRB): 
COLONOSCOPY (N/A) 1 Day Post-Op Precautions: Fall Chart, occupational therapy assessment, plan of care, and goals were reviewed. ASSESSMENT Patient continues with skilled OT services and is progressing towards goals. Remains with fair endurance with mild SOB during activity, SPO2 93% post activity on RA. Tolerated up to bathroom with overall SBA with RW, VCs for safety with RW use, extensive energy conservation and modification to ADLs education for plan to discharge home alone with family support. Son present at end of session and stating patient will return with [facility] services including OT.  Recommended safety evaluation from HH for modifications in home and constant RW use during ADLs, continues to improve Current Level of Function Impacting Discharge (ADLs): SBA for RW use during standing ADLs, fair endurance with mild SOB with activity limtiing tolerance Other factors to consider for discharge: lives alone, has fall alert, supportive family PLAN : 
Patient continues to benefit from skilled intervention to address the above impairments. Continue treatment per established plan of care. to address goals. Recommend with staff: OOB for all meals, bathroom for toileting Recommend next OT session: standing ADL retraining, endurance/strength building Recommendation for discharge: (in order for the patient to meet his/her long term goals) Occupational therapy at least 2 days/week in the home This discharge recommendation: 
Has not yet been discussed the attending provider and/or case management IF patient discharges home will need the following DME: son setting this up and is getting home safety evaluation for DME needs/home modifications with home therapists SUBJECTIVE:  
Patient stated I know I can do this, I've done harder things.  OBJECTIVE DATA SUMMARY:  
Cognitive/Behavioral Status: 
Neurologic State: Alert; Appropriate for age Orientation Level: Oriented X4 Cognition: Impaired decision making Functional Mobility and Transfers for ADLs: 
Bed Mobility: 
  
 
Transfers: 
Sit to Stand: Stand-by assistance Functional Transfers Toilet Transfer : Stand-by assistance Balance: 
Sitting: Intact Standing: Impaired; With support Standing - Static: Good Standing - Dynamic : Fair ADL Intervention: 
  
 
Grooming Position Performed: Standing Washing Hands: Stand-by assistance Patient instructed and indicated understanding the benefits of maintaining activity tolerance, functional mobility, and independence with self care tasks during acute stay  to ensure safe return home and to baseline. Encouraged patient to increase frequency and duration OOB, be out of bed for all meals, perform daily ADLs (as approved by RN/MD regarding bathing etc), and performing functional mobility to/from bathroom. Educated patient on pacing, pursed lip breathing and home modifications and strategies to maximize her quality of life and decrease her stress/anxiety. 1. Deep breathing 2. Educated on pacing and making sure he/she takes short frequent breaks (e.g. In the shower wash the upper body, rest for 1 minute, then wash the lower body, etc) 3. Educated on using cooler water in the shower so as to not get fatigued from the heat 4. Educated on drying off by using a jp cloth robe 5. Educated on re-arranging his/her routine to allow for rest breaks in the morning routine 8. Educated on looking at the consequences of his/her actions before deciding he/she needs to take on a task (e.g not getting down on one's hands and knees to wash floors because it will take all of one's energy for the day and result in exhaustion). 9. Educated on fall alert necklaces/bracelets to increase safety 10. Educated on DME used to help conserve energy, such as a shower seat, a stool or chair in the kitchen, and pushing or pulling items instead of carrying them Patient educated on pursed lip breathing technique (Ie. Breathing in through nose, out through mouth with increased time out) while completing ADLs, functional mobility and during rest for SPO2 recovery, good understanding noted. LB dressing retraining with tailor sit, completes easily and safely, educated on sitting for ADL and IADL tasks to retain energy.  
 
Patient instructed and indicated understanding home modifications (ie raise height of ADL objects, appropriate chair heights, recliner safety), safety (ie change of floor surfaces, clear pathways), to increase independence and fall prevention with RW use. Upper Body Dressing Assistance [facility] Gown: Set-up Lower Body Dressing Assistance Socks: Set-up Leg Crossed Method Used: Yes Position Performed: Seated in chair Toileting Bladder Hygiene: Stand-by assistance Clothing Management: Stand-by assistance Cues: Verbal cues provided Therapeutic Exercises:  
encouraged OOB and full participation with ADLs to improve strength and endurance. Pain: 
None noted Activity Tolerance:  
Fair and requires rest breaks Please refer to the flowsheet for vital signs taken during this treatment. After treatment patient left in no apparent distress:  
Sitting in chair, Call bell within reach, and Bed / chair alarm activated COMMUNICATION/COLLABORATION:  
The patients plan of care was discussed with: Physical therapist and Registered nurse. Mary Angulo OT Time Calculation: 40 mins

## 2020-10-15 NOTE — PROGRESS NOTES
Problem: Mobility Impaired (Adult and Pediatric) Goal: *Acute Goals and Plan of Care (Insert Text) Description: FUNCTIONAL STATUS PRIOR TO ADMISSION: Patient was independent and active without use of DME. 
 
HOME SUPPORT PRIOR TO ADMISSION: The patient lived alone with local family/friends to provide assistance. Physical Therapy Goals Initiated 10/15/2020 1. Patient will move from supine to sit and sit to supine  in bed with independence within 7 day(s). 2.  Patient will transfer from bed to chair and chair to bed with modified independence using the least restrictive device within 7 day(s). 3.  Patient will perform sit to stand with modified independence within 7 day(s). 4.  Patient will ambulate with modified independence for 200 feet with the least restrictive device within 7 day(s). 5.  Patient will ascend/descend 2 stairs with 1 handrail(s) with modified independence within 7 day(s). Outcome: Progressing Towards Goal 
 PHYSICAL THERAPY EVALUATION Patient: Luz Marina Montiel (25 y.o. female) Date: 10/15/2020 Primary Diagnosis: Acute on chronic diastolic CHF (congestive heart failure) ([facility] [address] [I50.33] CKD (chronic kidney disease) stage 4, GFR 15-29 ml/min ([facility]) [N18.4] Procedure(s) (LRB): 
COLONOSCOPY (N/A) 1 Day Post-Op Precautions:   Fall ASSESSMENT Based on the objective data described below, the patient presents with decreased independence with functional mobility and decreased strength/endurance S/P admission for CHF, CKD, and post -op day 1 colonoscopy. She is received in bedside chair after having washed her self up. Patient demonstrates decreased endurance with gait training requiring multiple standing rest breaks with use of rolling walker. When returned to room patient's LE are elevated for reduced edema. Current Level of Function Impacting Discharge (mobility/balance): CGA Functional Outcome Measure: The patient scored 18/28 on the Tinetti outcome measure. Other factors to consider for discharge: medical stability, decreased balance, decreased strength/endurance, increased need for assistance, increased risk for falls Patient will benefit from skilled therapy intervention to address the above noted impairments. PLAN : 
Recommendations and Planned Interventions: bed mobility training, transfer training, gait training, therapeutic exercises, neuromuscular re-education, edema management/control, patient and family training/education, and therapeutic activities Frequency/Duration: Patient will be followed by physical therapy:  5 times a week to address goals. Recommendation for discharge: (in order for the patient to meet his/her long term goals) HH PT with use of RW while patient is building endurance This discharge recommendation: 
Has been made in collaboration with the attending provider and/or case management IF patient discharges home will need the following DME: patient owns DME required for discharge SUBJECTIVE:  
Patient stated i'm doing ok.  OBJECTIVE DATA SUMMARY:  
HISTORY:   
Past Medical History:  
Diagnosis Date Arthritis KNEES Atrial fibrillation ([facility] [address] 10/29/2009 Bladder cancer ([facility] CAD (coronary artery disease) STENT PER PATIENT Chronic pain KNEES Coagulation disorder ([facility] [address] Chronic prophylactic anticoagulation med Colon polyps Diabetes ([facility] [address] IDDM  
 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 One fragment suggestive of hyperplastic polyp GERD (gastroesophageal reflux disease) Gout Heart valve problem   
 leaking heart valve Hypercholesterolemia Hypertension Hypothyroidism Hypothyroidism 4/23/2009 Hypothyroidism, acquired, autoimmune 11/23/2015 Overweight and obesity PUD (peptic ulcer disease) 1990'S S/P ablation of atrial flutter[V45.89HM] 2009  
 @ [facility] >> atrial fibrillation SOB (shortness of breath) on exertion 04/2019 T. I.A. 4/23/2009 TIA (transient ischemic attack) Ulcer of right lower extremity, limited to breakdown of skin ([address] 7/5/2018 Past Surgical History:  
Procedure Laterality Date CARDIAC SURG PROCEDURE UNLIST    
 ablation CARDIAC SURG PROCEDURE UNLIST  01/2020 Watchman device implant COLONOSCOPY N/A 2/20/2019 COLONOSCOPY performed by Stephan Lynch MD at [facility] ENDOSCOPY  
 COLONOSCOPY N/A 6/17/2019 COLONOSCOPY performed by Stephan Lynch MD at [facility] ENDOSCOPY  
 COLONOSCOPY N/A 6/15/2019 COLONOSCOPY performed by Stephan Lynch MD at [facility] MAIN OR  
 COLONOSCOPY N/A 9/11/2019 COLONOSCOPY performed by Stephan Lynch MD at [facility] ENDOSCOPY  
 COLONOSCOPY N/A 10/14/2020 COLONOSCOPY performed by Isaac Bocanegra MD at [facility] ENDOSCOPY  
 COLONOSCOPY,DIAGNOSTIC  2/20/2019 COLONOSCOPY,FLEX,W/CONTROL, BLEEDING  9/11/2019 Neha Don  6/17/2019 GI TRACT CAPSULE ENDOSCOPY  6/28/2019 GI TRACT CAPSULE ENDOSCOPY  9/26/2019 HX APPENDECTOMY HX CHOLECYSTECTOMY HX HEART VALVE SURGERY  04/2019 HX HYSTERECTOMY HX KNEE ARTHROSCOPY  [id number]  
 right knee HX ORTHOPAEDIC    
 HX UROLOGICAL RENAL STENT, tur-b  
 [place] ESOPHAGOGASTRODUODENOSCOPY TRANSORAL DIAGNOSTIC  8/22/2019 [facility]  6/15/2019 UPPER GI ENDOSCOPY,BALL DIL,30MM  6/15/2019 UPPER GI ENDOSCOPY,TUMOR ABLATN  6/19/2019 VASCULAR SURGERY PROCEDURE UNLIST  11/4  
 removed vein in right leg Personal factors and/or comorbidities impacting plan of care: please see above Home Situation Home Environment: Private residence # Steps to Enter: 0 One/Two Story Residence: One story Living Alone: Yes Support Systems: Child(veronica)(daughter lives ~15 mins from patient) Patient Expects to be Discharged to[de-identified] Private residence Current DME Used/Available at Home: Blood pressure cuff, Cane, straight, Glucometer, Raised toilet seat, Walker, rolling, Walker, Grab bars Tub or Shower Type: Shower(fixed bench) EXAMINATION/PRESENTATION/DECISION MAKING:  
Critical Behavior: 
Neurologic State: Alert, Appropriate for age Orientation Level: Oriented X4 Cognition: Impaired decision making Safety/Judgement: Awareness of environment, Fall prevention, Insight into deficits Hearing: Auditory Auditory Impairment: None Skin:   
Edema:  
Range Of Motion: 
AROM: Generally decreased, functional 
  
  
  
PROM: Generally decreased, functional 
  
  
  
Strength:   
Strength: Generally decreased, functional 
  
  
  
  
  
  
Tone & Sensation:  
Tone: Normal 
  
  
  
  
Sensation: Intact Coordination: 
Coordination: Within functional limits Vision:  
  
Functional Mobility: 
Bed Mobility: 
  
  
  
  
Transfers: 
Sit to Stand: Stand-by assistance Stand to Sit: Stand-by assistance Balance:  
Sitting: Intact Standing: Impaired; With support Standing - Static: Good Standing - Dynamic : Fair Ambulation/Gait Training: 
Distance (ft): 120 Feet (ft) Assistive Device: Gait belt;Walker, rolling Ambulation - Level of Assistance: Contact guard assistance Gait Abnormalities: Decreased step clearance Base of Support: Widened Speed/Melany: Slow Step Length: Right shortened;Left shortened Stairs: Therapeutic Exercises:  
 
 
Functional Measure: 
Tinetti test: 
 
Sitting Balance: 1 Arises: 1 Attempts to Rise: 1 Immediate Standing Balance: 1 Standing Balance: 1 Nudged: 1 Eyes Closed: 1 Turn 360 Degrees - Continuous/Discontinuous: 1 Turn 360 Degrees - Steady/Unsteady: 1 Sitting Down: 1 Balance Score: 10 Balance total score Indication of Gait: 1 
R Step Length/Height: 1 L Step Length/Height: 1 R Foot Clearance: 1 L Foot Clearance: 1 Step Symmetry: 1 Step Continuity: 1 Path: 1 Trunk: 0 Walking Time: 0 Gait Score: 8 Gait total score Total Score: 18/28 Overall total score Tinetti Tool Score Risk of Falls 
<19 = High Fall Risk 19-24 = Moderate Fall Risk 25-28 = Low Fall Risk Tinetti ME. Performance-Oriented Assessment of Mobility Problems in Elderly Patients. [facility]; [id number]. (Scoring Description: PT Bulletin Feb. 10, 1993) Older adults: Lorenzo De Los Santos et al, 2009; n = 1601 S [employer] elderly evaluated with ABC, ZARA, ADL, and IADL) · Mean ZARA score for males aged 69-68 years = 26.21(3.40) · Mean ZARA score for females age 69-68 years = 25.16(4.30) · Mean ZARA score for males over 80 years = 23.29(6.02) · Mean ZARA score for females over 80 years = 17.20(8.32) Physical Therapy Evaluation Charge Determination History Examination Presentation Decision-Making MEDIUM  Complexity : 1-2 comorbidities / personal factors will impact the outcome/ POC  LOW Complexity : 1-2 Standardized tests and measures addressing body structure, function, activity limitation and / or participation in recreation  MEDIUM Complexity : Evolving with changing characteristics  Other outcome measures Tinetti  HIGH Based on the above components, the patient evaluation is determined to be of the following complexity level: MEDIUM Pain Rating: 
 
 
Activity Tolerance:  
Fair and requires rest breaks Please refer to the flowsheet for vital signs taken during this treatment. After treatment patient left in no apparent distress:  
Sitting in chair, Heels elevated for pressure relief, Call bell within reach, and Bed / chair alarm activated COMMUNICATION/EDUCATION:  
The patients plan of care was discussed with: Registered nurse.   
 
Fall prevention education was provided and the patient/caregiver indicated understanding., Patient/family have participated as able in goal setting and plan of care. , and Patient/family agree to work toward stated goals and plan of care. Thank you for this referral. 
John Green, PT Time Calculation: 15 mins

## 2020-10-15 NOTE — PROGRESS NOTES
OSMEL plan: 
  
-  Home health PT/OT/SN - referral sent to [facility] 
-  OP f/u appts - PCP, GI, Cardio -  Daughter to transport home at d/c 
-  2nd IMM letter Addendum 3:47 PM - CM informed by [facility] that pt was already open to their services and will resume post-d/c. Added to AVS. Original Note 9:36 AM - CM met with pt at the bedside to discuss d/c planning. Pt is amendable to [facility] as recommended by [id number] [facility] rehab services. FOC provided to pt [facility] chosen. Signed FOC letter placed on chart. Referral sent to [facility] via [facility]. ACO pt. Anticipated d/c tomorrow pending diuresis and Cardio clearance. Daughter will transport home. Randal Hernandez LMSW Care Manager [id number] [facility] 
[phone]

## 2020-10-15 NOTE — PROGRESS NOTES
NAME: Thalia Ricketts :  1935 MRN:  [id number] Assessment :    Plan: 
--CKD stage 4 Hypokalemia HTN Bradycardia Anemia Chronic Thrombocytopenia 
  
S/p bioprosthetic MV Afib S/p watchman H/o cad/stent 
 
  --Creatinine better (2.4 to 2 to 1.9); long standing DM/HTN. [place] urine. Likely underlying age and HTN kidney disease. Some urinary retention (trend); bladder scan again today 
  
Admitted with acute on chronic diastolic HF and GIB 
  
Will try back on PO Lasix (120 mg BID) salt and fluid restriction; keep legs elevated 
  
On PO potassium 
  
Neg hemolysis labs She is stable for discharge from a kidney standpoint, outpatient f/u on discharge Subjective: Chief Complaint:  No resting sob. + leija. + yas, better, not gone. Review of Systems: 
 
Symptom Y/N Comments  Symptom Y/N Comments Fever/Chills    Chest Pain Poor Appetite    Edema y Cough    Abdominal Pain Sputum    Joint Pain SOB/LEIJA n/y   Pruritis/Rash Nausea/vomit    Tolerating PT/OT Diarrhea    Tolerating Diet Constipation    Other Could not obtain due to:   
 
Objective: VITALS:  
Last 24hrs VS reviewed since prior progress note. Most recent are: 
Visit Vitals /60 (BP 1 Location: Left arm, BP Patient Position: Sitting) Pulse 67 Temp 97.6 °F (36.4 °C) Resp 18 Ht 5' 9\" (1.753 m) Wt 91.2 kg (201 lb 1 oz) SpO2 100% Breastfeeding No  
BMI 29.69 kg/m² No intake or output data in the 24 hours ending 10/15/20 1346 Telemetry Reviewed: PHYSICAL EXAM: 
General: NAD 
cta 
++ edema (a tad better than yesterday) - legs elevated Lab Data Reviewed: (see below) Medications Reviewed: (see below) PMH/SH reviewed - no change compared to H&P 
________________________________________________________________________ Care Plan discussed with: 
Patient y Family RN    
Care Manager Consultant:     
 
  Comments >50% of visit spent in counseling and coordination of care    
 
________________________________________________________________________ González Naidu MD  
 
Procedures: see electronic medical records for all procedures/Xrays and details which 
were not copied into this note but were reviewed prior to creation of Plan. LABS: 
Recent Labs 10/15/20 
0119 10/14/20 
0142 WBC 5.7 4.6 HGB 8.7* 8.9* HCT 29.7* 30.0*  
* 119* Recent Labs 10/15/20 
0119 10/14/20 
0142 10/13/20 
0100  144 142  
K 3.6 3.0* 3.5 * 110* 109* CO2 27 30 28 BUN 33* 41* 49* CREA 1.92* 2.00* 2.43* * 81 103* CA 9.1 8.9 9.0 No results for input(s): AP, TBIL, TP, ALB, GLOB, GGT, AML, LPSE in the last 72 hours. No lab exists for component: SGOT, GPT, AMYP, HLPSE No results for input(s): INR, PTP, APTT, INREXT, INREXT in the last 72 hours. No results for input(s): FE, TIBC, PSAT, FERR in the last 72 hours. Lab Results Component Value Date/Time Folate 72.3 (H) 01/22/2017 03:13 AM  
  
No results for input(s): PH, PCO2, PO2 in the last 72 hours. No results for input(s): CPK, CKMB in the last 72 hours. No lab exists for component: TROPONINI No components found for: [place] Lab Results Component Value Date/Time  Color YELLOW/STRAW 10/10/2020 10:51 PM  
 Appearance CLEAR 10/10/2020 10:51 PM  
 Specific gravity 1.010 10/10/2020 10:51 PM  
 Specific gravity 1.020 07/06/2010 01:10 PM  
 pH (UA) 5.5 10/10/2020 10:51 PM  
 Protein Negative 10/10/2020 10:51 PM  
 Glucose Negative 10/10/2020 10:51 PM  
 Ketone Negative 10/10/2020 10:51 PM  
 Bilirubin Negative 10/10/2020 10:51 PM  
 Urobilinogen 1.0 10/10/2020 10:51 PM  
 Nitrites Negative 10/10/2020 10:51 PM  
 Leukocyte Esterase MODERATE (A) 10/10/2020 10:51 PM  
 Epithelial cells MANY (A) 10/10/2020 10:51 PM  
 Bacteria Negative 10/10/2020 10:51 PM  
 WBC 10-20 10/10/2020 10:51 PM  
 RBC 0-5 10/10/2020 10:51 PM  
 
 
MEDICATIONS: 
Current Facility-Administered Medications Medication Dose Route Frequency  artificial tears (dextran-hypromellose-glycerin) (GENTEAL) ophthalmic solution 1 Drop  1 Drop Both Eyes PRN  
 sodium chloride (NS) flush 5-40 mL  5-40 mL IntraVENous Q8H  
 sodium chloride (NS) flush 5-40 mL  5-40 mL IntraVENous PRN  
 clopidogreL (PLAVIX) tablet 75 mg  75 mg Oral DAILY  sodium chloride (NS) flush 5-40 mL  5-40 mL IntraVENous Q8H  
 sodium chloride (NS) flush 5-40 mL  5-40 mL IntraVENous PRN  
 amiodarone (CORDARONE) tablet 100 mg  100 mg Oral DAILY  pantoprazole (PROTONIX) 40 mg in 0.9% sodium chloride 10 mL injection  40 mg IntraVENous Q12H  furosemide (LASIX) injection 100 mg  100 mg IntraVENous BID  sodium chloride (NS) flush 5-40 mL  5-40 mL IntraVENous Q8H  
 sodium chloride (NS) flush 5-40 mL  5-40 mL IntraVENous PRN  
 acetaminophen (TYLENOL) tablet 650 mg  650 mg Oral Q6H PRN Or  
 acetaminophen (TYLENOL) suppository 650 mg  650 mg Rectal Q6H PRN  polyethylene glycol (MIRALAX) packet 17 g  17 g Oral DAILY PRN  promethazine (PHENERGAN) tablet 12.5 mg  12.5 mg Oral Q6H PRN Or  
 ondansetron (ZOFRAN) injection 4 mg  4 mg IntraVENous Q6H PRN  
 allopurinoL (ZYLOPRIM) tablet 200 mg  200 mg Oral DAILY  [Held by provider] amiodarone (CORDARONE) tablet 200 mg  200 mg Oral BID  atorvastatin (LIPITOR) tablet 80 mg  80 mg Oral QHS  insulin NPH (NOVOLIN N, HUMULIN N) injection 6 Units  6 Units SubCUTAneous ACD  insulin NPH (NOVOLIN N, HUMULIN N) injection 23 Units  23 Units SubCUTAneous ACB  levothyroxine (SYNTHROID) tablet 125 mcg  125 mcg Oral ACB  potassium chloride SR (KLOR-CON 10) tablet 10 mEq  10 mEq Oral DAILY  sucralfate (CARAFATE) tablet 1 g  1 g Oral QID  insulin lispro (HUMALOG) injection   SubCUTAneous AC&HS  
 glucose chewable tablet 16 g  4 Tab Oral PRN  
  dextrose (D50W) injection syrg 12.5-25 g  12.5-25 g IntraVENous PRN  
 glucagon (GLUCAGEN) injection 1 mg  1 mg IntraMUSCular PRN  
 [Held by provider] metoprolol tartrate (LOPRESSOR) tablet 12.5 mg  12.5 mg Oral BID

## 2020-10-15 NOTE — PROGRESS NOTES
Progress Note 10/15/2020 3:52 PM 
NAME: Gaylan Ormond MRN:  [id number] Admit Diagnosis: Acute on chronic diastolic CHF (congestive heart failure) ([facility]) [I50.33];CKD (chronic kidney disease) stage 4, GFR 15-29 ml/min ([facility]) [N18.4] Assessment: S/p Bioprosthetic MV replacement Hx of A F ib    . Current sinus rhythm S/p Watchman device. Hx of CAD S/p remote coronary stent . Diabetes CKD stage 4. Hypertension. Hyperlipidemia. Anemia Plan:  
EGD noted; gastritis Cscope noted; diverticulosis HRs better Diuretics per renal; appreciate consult Resumed plavix Continue amio; reduced to 100mg OK w/ holding metoprolol Continue statin Home soon? [x]        High complexity decision making was performed Subjective:  
 
Gaylan Ormond denies chest pain, dyspnea. Discussed with RN events overnight. Patient Active Problem List  
Diagnosis Code  T. I.A.  PVD (peripheral vascular disease) ([facility]) I73.9  Reflux esophagitis K21.00  Benign neoplasm of colon D12.6  Iron deficiency anemia D50.9  Hypomagnesemia E83.42  Long term current use of anticoagulant therapy Z79.01  
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  
 Advance directive in chart Z78.9  Type 2 diabetes, controlled, with renal manifestation ([facility]) E11.29  
 Chronic atrial fibrillation ([facility]) I48.20  Mixed hyperlipidemia E78.2  Atherosclerosis of native coronary artery without angina pectoris I25.10  Hypothyroidism, acquired, autoimmune E06.3  Heart valve problem I38  
 Mitral regurgitation I34.0  
 S/P MVR (mitral valve repair) [id number]  Active advance directive on file Z78.9  Non-rheumatic mitral regurgitation I34.0  Heart failure ([facility]) I50.9  Bilateral leg edema R60.0  Esophageal stricture K22.2  Dysphagia R13.10  GI bleeding K92.2  Mitral stenosis I05.0  S/P MVR (mitral valve replacement) Z95.2  
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  
 Acute GI bleeding K92.2  Chronic anticoagulation Z79.01  
 Paroxysmal A-fib ([facility]) I48.0  History of GI bleed Z87.19  
 Presence of Watchman left atrial appendage closure device Z95.818  
 CKD (chronic kidney disease) stage 4, GFR 15-29 ml/min ([facility]) N18.4  Acute on chronic diastolic CHF (congestive heart failure) ([facility]) I50.33 Review of Systems: 
 
Symptom Y/N Comments  Symptom Y/N Comments Fever/Chills N   Chest Pain N Poor Appetite N   Edema N   
Cough N   Abdominal Pain N Sputum N   Joint Pain N   
SOB/LEIJA N   Pruritis/Rash N   
Nausea/vomit N   Tolerating PT/OT Y Diarrhea N   Tolerating Diet Y Constipation N   Other Could NOT obtain due to:   
 
Objective:  
  
Physical Exam: 
 
Last 24hrs VS reviewed since prior progress note. Most recent are: 
 
Visit Vitals /72 (BP 1 Location: Right arm, BP Patient Position: Post activity) Pulse 73 Temp 98.3 °F (36.8 °C) Resp 20 Ht 5' 9\" (1.753 m) Wt 91.2 kg (201 lb 1 oz) SpO2 97% Breastfeeding No  
BMI 29.69 kg/m² Intake/Output Summary (Last 24 hours) at 10/15/2020 0845 Last data filed at 10/14/2020 1224 Gross per 24 hour Intake 250 ml Output  Net 250 ml General Appearance: Well developed, well nourished, alert & oriented x 3,  
 no acute distress. Ears/Nose/Mouth/Throat: Hearing grossly normal. 
Neck: Supple. Chest: Lungs clear to auscultation bilaterally. Cardiovascular: Regular rate and rhythm, S1S2 normal, no murmur. Abdomen: Soft, non-tender, bowel sounds are active. Extremities: No edema bilaterally. Skin: Warm and dry. PMH/SH reviewed - no change compared to H&P Data Review Telemetry: sinus rhythm Lab Data Personally Reviewed: 
 
Recent Labs 10/15/20 
0119 10/14/20 
0142 WBC 5.7 4.6 HGB 8.7* 8.9* HCT 29.7* 30.0*  
* 119* LABRCNT(INR:3,PTP:3,APTT:3,) Recent Labs 10/15/20 
0119 10/14/20 
0142 10/13/20 
0100  144 142  
K 3.6 3.0* 3.5 * 110* 109* CO2 27 30 28 BUN 33* 41* 49* CREA 1.92* 2.00* 2.43* * 81 103* CA 9.1 8.9 9.0 LABRCNT(CPK:3,CpKMB:3,ckndx:3,troiq:3) Lab Results Component Value Date/Time Cholesterol, total 256 (H) 07/13/2020 08:02 AM  
 HDL Cholesterol 58 07/13/2020 08:02 AM  
 LDL, calculated 169 (H) 07/13/2020 08:02 AM  
 Triglyceride 143 07/13/2020 08:02 AM  
 CHOL/HDL Ratio 3.1 09/22/2010 08:12 AM  
LABRCNT(sgot:3,gpt:3,ap:3,tbiL:3,TP:3,ALB:3,GLOB:3,ggt:3,aml:3,amyp:3,lpse:3,hlpse:3)No results for input(s): PH, PCO2, PO2 in the last 72 hours. Lab Results Component Value Date/Time Cholesterol, total 256 (H) 07/13/2020 08:02 AM  
 HDL Cholesterol 58 07/13/2020 08:02 AM  
 LDL, calculated 169 (H) 07/13/2020 08:02 AM  
 Triglyceride 143 07/13/2020 08:02 AM  
 CHOL/HDL Ratio 3.1 09/22/2010 08:12 AM  
MEDTABLEGeorge DANITZA Schaffer III, DO No results for input(s): PH, PCO2, PO2 in the last 72 hours. Medications Personally Reviewed: 
 
Current Facility-Administered Medications Medication Dose Route Frequency  sodium chloride (NS) flush 5-40 mL  5-40 mL IntraVENous Q8H  
 sodium chloride (NS) flush 5-40 mL  5-40 mL IntraVENous PRN  
 clopidogreL (PLAVIX) tablet 75 mg  75 mg Oral DAILY  sodium chloride (NS) flush 5-40 mL  5-40 mL IntraVENous Q8H  
 sodium chloride (NS) flush 5-40 mL  5-40 mL IntraVENous PRN  
 amiodarone (CORDARONE) tablet 100 mg  100 mg Oral DAILY  pantoprazole (PROTONIX) 40 mg in 0.9% sodium chloride 10 mL injection  40 mg IntraVENous Q12H  furosemide (LASIX) injection 100 mg  100 mg IntraVENous BID  sodium chloride (NS) flush 5-40 mL  5-40 mL IntraVENous Q8H  
 sodium chloride (NS) flush 5-40 mL  5-40 mL IntraVENous PRN  
 acetaminophen (TYLENOL) tablet 650 mg  650 mg Oral Q6H PRN Or  
 acetaminophen (TYLENOL) suppository 650 mg  650 mg Rectal Q6H PRN  polyethylene glycol (MIRALAX) packet 17 g  17 g Oral DAILY PRN  promethazine (PHENERGAN) tablet 12.5 mg  12.5 mg Oral Q6H PRN Or  
 ondansetron (ZOFRAN) injection 4 mg  4 mg IntraVENous Q6H PRN  
 allopurinoL (ZYLOPRIM) tablet 200 mg  200 mg Oral DAILY  [Held by provider] amiodarone (CORDARONE) tablet 200 mg  200 mg Oral BID  atorvastatin (LIPITOR) tablet 80 mg  80 mg Oral QHS  insulin NPH (NOVOLIN N, HUMULIN N) injection 6 Units  6 Units SubCUTAneous ACD  insulin NPH (NOVOLIN N, HUMULIN N) injection 23 Units  23 Units SubCUTAneous ACB  levothyroxine (SYNTHROID) tablet 125 mcg  125 mcg Oral ACB  potassium chloride SR (KLOR-CON 10) tablet 10 mEq  10 mEq Oral DAILY  sucralfate (CARAFATE) tablet 1 g  1 g Oral QID  insulin lispro (HUMALOG) injection   SubCUTAneous AC&HS  
 glucose chewable tablet 16 g  4 Tab Oral PRN  
 dextrose (D50W) injection syrg 12.5-25 g  12.5-25 g IntraVENous PRN  
 glucagon (GLUCAGEN) injection 1 mg  1 mg IntraMUSCular PRN  
 [Held by provider] metoprolol tartrate (LOPRESSOR) tablet 12.5 mg  12.5 mg Oral BID Santos Shelby III, DO

## 2020-10-16 NOTE — PROGRESS NOTES
NAME: Kennedy Watson :  1935 MRN:  [id number] Assessment :    Plan: 
--CKD stage 4 Hypokalemia HTN Bradycardia Anemia Chronic Thrombocytopenia 
  
S/p bioprosthetic MV Afib S/p watchman H/o cad/stent 
 
  --Creatinine better (peaked at 2.4, now 1.8); long standing DM/HTN. [place] urine. Likely underlying age and HTN kidney disease. Some urinary retention (trend) Admitted with acute on chronic diastolic HF and GIB 
  
No response to PO loop (and a so, so response to high dose loop bolus therapy); will start on a lasix gtt, place a tamayo given urine retention and see if PO loop works better after \"better\" diuresis 
 
salt and fluid restriction; keep legs elevated 
  
On PO potassium Subjective: Chief Complaint:  No resting sob. + swenson. + yas, worse today. Weight is up. I spoke with MsTyrone Kavya Arboleda about the plan. I had a long talk with her daughter over the phone about the above and she agrees with the plan. Review of Systems: 
 
Symptom Y/N Comments  Symptom Y/N Comments Fever/Chills    Chest Pain Poor Appetite    Edema y Cough    Abdominal Pain Sputum    Joint Pain SOB/SWENSON n/y   Pruritis/Rash Nausea/vomit    Tolerating PT/OT Diarrhea    Tolerating Diet Constipation    Other Could not obtain due to:   
 
Objective: VITALS:  
Last 24hrs VS reviewed since prior progress note. Most recent are: 
Visit Vitals BP (!) 128/52 Pulse 67 Temp 97.9 °F (36.6 °C) Resp 17 Ht 5' 9\" (1.753 m) Wt 91.2 kg (201 lb 1 oz) SpO2 97% Breastfeeding No  
BMI 29.69 kg/m² Intake/Output Summary (Last 24 hours) at 10/16/2020 1040 Last data filed at 10/16/2020 8931 Gross per 24 hour Intake 500 ml Output  Net 500 ml Telemetry Reviewed: PHYSICAL EXAM: 
General: NAD 
cta 
++ edema (a tad better than yesterday) - legs elevated Lab Data Reviewed: (see below) Medications Reviewed: (see below) PMH/SH reviewed - no change compared to H&P 
________________________________________________________________________ Care Plan discussed with: 
Patient y Family RN Care Manager Consultant:     
 
  Comments >50% of visit spent in counseling and coordination of care    
 
________________________________________________________________________ Radha Zhang MD  
 
Procedures: see electronic medical records for all procedures/Xrays and details which 
were not copied into this note but were reviewed prior to creation of Plan. LABS: 
Recent Labs 10/15/20 
0119 10/14/20 
0142 WBC 5.7 4.6 HGB 8.7* 8.9* HCT 29.7* 30.0*  
* 119* Recent Labs 10/16/20 
0411 10/15/20 
0119 10/14/20 
0142  143 144  
K 3.2* 3.6 3.0*  
* 110* 110* CO2 28 27 30 BUN 29* 33* 41* CREA 1.83* 1.92* 2.00* GLU 83 117* 81  
CA 9.2 9.1 8.9 No results for input(s): AP, TBIL, TP, ALB, GLOB, GGT, AML, LPSE in the last 72 hours. No lab exists for component: SGOT, GPT, AMYP, HLPSE No results for input(s): INR, PTP, APTT, INREXT, INREXT in the last 72 hours. No results for input(s): FE, TIBC, PSAT, FERR in the last 72 hours. Lab Results Component Value Date/Time Folate 72.3 (H) 01/22/2017 03:13 AM  
  
No results for input(s): PH, PCO2, PO2 in the last 72 hours. No results for input(s): CPK, CKMB in the last 72 hours. No lab exists for component: TROPONINI No components found for: [place] Lab Results Component Value Date/Time  Color YELLOW/STRAW 10/10/2020 10:51 PM  
 Appearance CLEAR 10/10/2020 10:51 PM  
 Specific gravity 1.010 10/10/2020 10:51 PM  
 Specific gravity 1.020 07/06/2010 01:10 PM  
 pH (UA) 5.5 10/10/2020 10:51 PM  
 Protein Negative 10/10/2020 10:51 PM  
 Glucose Negative 10/10/2020 10:51 PM  
 Ketone Negative 10/10/2020 10:51 PM  
 Bilirubin Negative 10/10/2020 10:51 PM  
 Urobilinogen 1.0 10/10/2020 10:51 PM  
 Nitrites Negative 10/10/2020 10:51 PM  
 Leukocyte Esterase MODERATE (A) 10/10/2020 10:51 PM  
 Epithelial cells MANY (A) 10/10/2020 10:51 PM  
 Bacteria Negative 10/10/2020 10:51 PM  
 WBC 10-20 10/10/2020 10:51 PM  
 RBC 0-5 10/10/2020 10:51 PM  
 
 
MEDICATIONS: 
Current Facility-Administered Medications Medication Dose Route Frequency  furosemide (LASIX) 200 mg in 0.9% sodium chloride 100 mL infusion  20 mg/hr IntraVENous CONTINUOUS  
 artificial tears (dextran-hypromellose-glycerin) (GENTEAL) ophthalmic solution 1 Drop  1 Drop Both Eyes PRN  pantoprazole (PROTONIX) tablet 40 mg  40 mg Oral ACB  sodium chloride (NS) flush 5-40 mL  5-40 mL IntraVENous Q8H  
 sodium chloride (NS) flush 5-40 mL  5-40 mL IntraVENous PRN  
 clopidogreL (PLAVIX) tablet 75 mg  75 mg Oral DAILY  sodium chloride (NS) flush 5-40 mL  5-40 mL IntraVENous Q8H  
 sodium chloride (NS) flush 5-40 mL  5-40 mL IntraVENous PRN  
 amiodarone (CORDARONE) tablet 100 mg  100 mg Oral DAILY  sodium chloride (NS) flush 5-40 mL  5-40 mL IntraVENous Q8H  
 sodium chloride (NS) flush 5-40 mL  5-40 mL IntraVENous PRN  
 acetaminophen (TYLENOL) tablet 650 mg  650 mg Oral Q6H PRN Or  
 acetaminophen (TYLENOL) suppository 650 mg  650 mg Rectal Q6H PRN  polyethylene glycol (MIRALAX) packet 17 g  17 g Oral DAILY PRN  promethazine (PHENERGAN) tablet 12.5 mg  12.5 mg Oral Q6H PRN Or  
 ondansetron (ZOFRAN) injection 4 mg  4 mg IntraVENous Q6H PRN  
 allopurinoL (ZYLOPRIM) tablet 200 mg  200 mg Oral DAILY  atorvastatin (LIPITOR) tablet 80 mg  80 mg Oral QHS  insulin NPH (NOVOLIN N, HUMULIN N) injection 6 Units  6 Units SubCUTAneous ACD  insulin NPH (NOVOLIN N, HUMULIN N) injection 23 Units  23 Units SubCUTAneous ACB  levothyroxine (SYNTHROID) tablet 125 mcg  125 mcg Oral ACB  potassium chloride SR (KLOR-CON 10) tablet 10 mEq  10 mEq Oral DAILY  sucralfate (CARAFATE) tablet 1 g  1 g Oral QID  insulin lispro (HUMALOG) injection   SubCUTAneous AC&HS  
 glucose chewable tablet 16 g  4 Tab Oral PRN  
 dextrose (D50W) injection syrg 12.5-25 g  12.5-25 g IntraVENous PRN  
 glucagon (GLUCAGEN) injection 1 mg  1 mg IntraMUSCular PRN  
 [Held by provider] metoprolol tartrate (LOPRESSOR) tablet 12.5 mg  12.5 mg Oral BID

## 2020-10-16 NOTE — PROGRESS NOTES
Bedside and Verbal shift change report given to Sarah (oncoming nurse) by Teresa Gonzalez (offgoing nurse). Report included the following information SBAR, Kardex, ED Summary, Procedure Summary, MAR and Recent Results. Tonye Cogan

## 2020-10-16 NOTE — PROGRESS NOTES
Problem: Mobility Impaired (Adult and Pediatric) Goal: *Acute Goals and Plan of Care (Insert Text) Description: FUNCTIONAL STATUS PRIOR TO ADMISSION: Patient was independent and active without use of DME. 
 
HOME SUPPORT PRIOR TO ADMISSION: The patient lived alone with local family/friends to provide assistance. Physical Therapy Goals Initiated 10/15/2020 1. Patient will move from supine to sit and sit to supine  in bed with independence within 7 day(s). 2.  Patient will transfer from bed to chair and chair to bed with modified independence using the least restrictive device within 7 day(s). 3.  Patient will perform sit to stand with modified independence within 7 day(s). 4.  Patient will ambulate with modified independence for 200 feet with the least restrictive device within 7 day(s). 5.  Patient will ascend/descend 2 stairs with 1 handrail(s) with modified independence within 7 day(s). Outcome: Progressing Towards Goal 
 PHYSICAL THERAPY TREATMENT Patient: Delma Alvares (51 y.o. female) Date: 10/16/2020 Diagnosis: Acute on chronic diastolic CHF (congestive heart failure) ([facility] [address] [I50.33] CKD (chronic kidney disease) stage 4, GFR 15-29 ml/min ([facility]) [N18.4] <principal problem not specified> Procedure(s) (LRB): 
COLONOSCOPY (N/A) 2 Days Post-Op Precautions: Fall Chart, physical therapy assessment, plan of care and goals were reviewed. ASSESSMENT Patient continues with skilled PT services and is progressing towards goals. Pt received seated in the chair and agreeable to therapy. Pt tolerated session well and making good progress towards goals. Pt continues to be limited by decreased tolerance to activity and generalized weakness. Pt still able to ambulate x 115 ft with RW with CGA demonstrating decreased prabhakar and slight forward flexed trunk, but was able to self correct to neutral/upright posture.  Pt educated on importance of frequent mobilization and LE therex. Elevated LEs in the recliner on exit to improve LE edema. Continue to recommend home with HHPT and family support as needed. Current Level of Function Impacting Discharge (mobility/balance): CGA gait training x 115 ft with RW Other factors to consider for discharge: none PLAN : 
Patient continues to benefit from skilled intervention to address the above impairments. Continue treatment per established plan of care. to address goals. Recommendation for discharge: (in order for the patient to meet his/her long term goals) Physical therapy at least 2 days/week in the home This discharge recommendation: 
Has been made in collaboration with the attending provider and/or case management IF patient discharges home will need the following DME: patient owns DME required for discharge SUBJECTIVE:  
Patient stated I feel weak in my legs from time to time.  OBJECTIVE DATA SUMMARY:  
Critical Behavior: 
Neurologic State: Alert, Appropriate for age Orientation Level: Oriented X4 Cognition: Follows commands, Appropriate decision making, Appropriate safety awareness Safety/Judgement: Awareness of environment, Fall prevention, Insight into deficits Functional Mobility Training: 
Bed Mobility: 
  
  
  
  
  
  
Transfers: 
Sit to Stand: Stand-by assistance Stand to Sit: Stand-by assistance Balance: 
Sitting: Intact Standing: Impaired; With support Standing - Static: Good Standing - Dynamic : Fair Ambulation/Gait Training: 
Distance (ft): 115 Feet (ft)(1 standing rest break) Assistive Device: Gait belt;Walker, rolling Ambulation - Level of Assistance: Contact guard assistance Gait Abnormalities: Decreased step clearance Base of Support: Widened Speed/Melany: Slow Step Length: Right shortened;Left shortened Stairs: Therapeutic Exercises: Ankle pumps, LAQ Pain Rating: none 
 
Activity Tolerance:  
Good Please refer to the flowsheet for vital signs taken during this treatment. After treatment patient left in no apparent distress:  
Sitting in chair, Call bell within reach, and Bed / chair alarm activated COMMUNICATION/COLLABORATION:  
The patients plan of care was discussed with: Occupational therapist and Registered nurse. Aishwarya Garcia, PT, DPT Time Calculation: 10 mins

## 2020-10-16 NOTE — PROGRESS NOTES
Bedside shift change report given to [address] [place] [address] (oncoming nurse) by Kane Gentile (offgoing nurse). Report included the following information SBAR, Kardex, Intake/Output, MAR and Recent Results.

## 2020-10-16 NOTE — PROGRESS NOTES
Comprehensive Nutrition Assessment Type and Reason for Visit: Initial, RD nutrition re-screen/LOS Nutrition Recommendations/Plan:  
Continue cardiac/low Na diet Nutrition Assessment:    
Patient medically noted for CHF, CKD, and GI bleed. PMH PVD, HTN, Gout, and DM. Chart reviewed for length of stay. Patient sleeping in chair at time of attempted visit. Good intake and appetite noted per flowsheets. Nephrology working on diuresis. Episode of hypoglycemia today noted; BG mostly under control otherwise. Continue current diet. Encourage intake of meals. Patient Vitals for the past 72 hrs: 
 % Diet Eaten 10/16/20 0823 95 % 10/13/20 1747 75 % Estimated Daily Nutrient Needs: 
Energy (kcal):  1884 kcal (BMR 1449 x 1. 3AF) Protein (g):  73-91g (0.8-1.0 g/kg bw) Fluid (ml/day):  per MD 
 
Nutrition Related Findings:      
3+ edema bilateral lower extremities BM 10/14 K+ 3.2, BG 53-08-19-77--126-161 Medications: Atorvastatin, Plavix, Lasix, Humalog, NPH, Synthroid, Protonix, KCl, Carafate Wounds:   
None Current Nutrition Therapies: DIET ONE TIME MESSAGE 
DIET CARDIAC Regular; 2 GM NA (House Low NA) Anthropometric Measures: 
· Height:  5' 9\" (175.3 cm) · Current Body Wt:  91.2 kg (201 lb 1 oz) · BMI Category: Overweight (BMI 25.0-29. 9) Nutrition Diagnosis: No nutrition diagnosis at this time Nutrition Interventions:  
Food and/or Nutrient Delivery: Continue current diet Nutrition Education and Counseling: No recommendations at this time Coordination of Nutrition Care: No recommendation at this time Goals: 
PO intake >75% of meals next 5-7 days Nutrition Monitoring and Evaluation:  
Behavioral-Environmental Outcomes: Knowledge or skill Food/Nutrient Intake Outcomes: Food and nutrient intake Physical Signs/Symptoms Outcomes: Biochemical data, Fluid status or edema, Weight Discharge Planning:   
Continue current diet Electronically signed by Lex Montes RD on 10/16/2020 at 1:59 PM 
 
Contact: ext [phone]

## 2020-10-16 NOTE — PROGRESS NOTES
Hospitalist Progress Note NAME: Ilya Smith :  1935 MRN:  [id number] Assessment / Plan: 
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 
-Still with significant swelling of the legs and poor response to p.o. Lasix 
-Ultrasound Doppler shows no evidence of DVT 
-Due to lack of response to p.o. diuretics, started on IV Lasix GTT today. Scanlon placed. Appreciate help from Dr. Arin Garcia. 
-Holding beta-blocker due to bradycardia. -Strict I's and O's, daily weights and low-salt diet. Fluid restriction. 
-I's and O's and weights are inaccurate Suspected GI bleed Positive stool for occult blood Mild acute blood loss anemia 
-S/p colonoscopy today with no clear evidence of bleeding 
-S/p endoscopy with no clear source of anemia 
-Continue Protonix 
-We will need outpatient follow-up with oncology for further evaluation given anemia and thrombocytopenia 
 
  
Chronic atrial fibrillation s/p Watchman procedure 2020 Hx atrial flutter with abalation Sinus bradycardia HR 44, POA Hx CAD 
HTN 
holding metoprolol due to cont bradycardia Continue amiodarone per cardiology 
continue Plavix 
  
CKD stage 4, POA- Cr now close to baseline 
--Creatinine is close to baseline of around 1.9 renal following. 
-Bladder scan as needed for urinary retention 
-Continue Lasix GTT. Renal following IDDM, POA now with hypoglycemia new 
--A1c 6.0 on   -Since blood sugars are low, will discontinue insulin NPH for now. Continue insulin sliding scale only 
  
Hx TIA 
--Continue Plavix. Okay with GI 
  
Hypothyroid --continue levothyroxine 
  
Gout 
--continue allopurinol Body mass index is 29.98 kg/m². Disposition: 
Discharge once okay with renal early next week 
 
  
Code: discussed, DNR/DNI 
DVT prophylaxis:  SCD Surrogate decision maker:  Daughter Juventino Blackwell and son Recommended Disposition: Home vs SNF Subjective: Chief Complaint / Reason for Physician Visit :F/U Acute /Chronic Systolic CHF, GI bleed, Anemia, CKD She still report swelling of the legs Shortness of breath is slightly better Started on IV Lasix this a.m. due to poor response to p.o. Lasix. Scanlon placed yesterday. Blood sugars are low this a.m. and required D5 Objective: VITALS:  
Last 24hrs VS reviewed since prior progress note. Most recent are: 
Patient Vitals for the past 24 hrs: 
 Temp Pulse Resp BP SpO2  
10/16/20 1134 97.7 °F (36.5 °C) 68 17 (!) 130/47 100 % 10/16/20 0819 97.9 °F (36.6 °C) 67 17 (!) 128/52 97 % 10/16/20 0412 98 °F (36.7 °C) 72 16 (!) 150/52 95 % 10/15/20 1940 97.8 °F (36.6 °C) 69 18 (!) 149/61 99 % Intake/Output Summary (Last 24 hours) at 10/16/2020 1431 Last data filed at 10/16/2020 8401 Gross per 24 hour Intake 500 ml Output  Net 500 ml PHYSICAL EXAM: 
General: Alert, cooperative, no acute distress EENT:  EOMI. Anicteric sclerae. MMM Resp:  CTA bilaterally, no wheezing or rales. No accessory muscle use CV:  Regular  rhythm, 2+ edema present GI:  Soft, Non distended, Non tender.  +Bowel sounds Neurologic:  Alert and oriented X 3, normal speech, Psych:   Good insight. Not anxious nor agitated Skin:  No rashes. No jaundice Reviewed most current lab test results and cultures  YES Reviewed most current radiology test results   YES Review and summation of old records today    NO Reviewed patient's current orders and MAR    YES 
PMH/SH reviewed - no change compared to H&P 
________________________________________________________________________ Care Plan discussed with: 
  Comments Patient x Family RN x Care Manager Consultant Multidiciplinary team rounds were held today with , nursing, pharmacist and clinical coordinator. Patient's plan of care was discussed; medications were reviewed and discharge planning was addressed. ________________________________________________________________________ Total NON critical care TIME:  35    Minutes Total CRITICAL CARE TIME Spent:   Minutes non procedure based Comments >50% of visit spent in counseling and coordination of care    
________________________________________________________________________ Cristiane Andrews MD  
 
Procedures: see electronic medical records for all procedures/Xrays and details which were not copied into this note but were reviewed prior to creation of Plan. LABS: 
I reviewed today's most current labs and imaging studies. Pertinent labs include: 
Recent Labs 10/15/20 
0119 10/14/20 
0142 WBC 5.7 4.6 HGB 8.7* 8.9* HCT 29.7* 30.0*  
* 119* Recent Labs 10/16/20 
0411 10/15/20 
0119 10/14/20 
0142  143 144  
K 3.2* 3.6 3.0*  
* 110* 110* CO2 28 27 30 GLU 83 117* 81 BUN 29* 33* 41* CREA 1.83* 1.92* 2.00* CA 9.2 9.1 8.9 Signed: Cristiane Andrews MD

## 2020-10-17 NOTE — PROGRESS NOTES
NAME: Dave Louis :  1935 MRN:  [id number] Assessment :    Plan: 
--CKD stage 4 Hypokalemia HTN Bradycardia Anemia Chronic Thrombocytopenia 
  
S/p bioprosthetic MV Afib S/p watchman H/o cad/stent 
 
  --Creatinine better (peaked at 2.4, now 1.7); long standing DM/HTN. [place] urine. Likely underlying age and HTN kidney disease. Admitted with acute on chronic diastolic HF and GIB 
  
No response to PO loop (and a so, so response to high dose loop bolus therapy); initiated on lasix gtt 10/16 (unfortunately, it sounds like she didn't get it last night?), continue tamayo while on lasix gtt 
 
salt and fluid restriction; keep legs elevated; accurate I&O and daily weight 
  
On PO potassium Subjective: Chief Complaint: edema a bit better. No sob. Still some swenson. Tamayo. I discussed the above with her nurse and she. Review of Systems: 
 
Symptom Y/N Comments  Symptom Y/N Comments Fever/Chills    Chest Pain Poor Appetite    Edema y Cough    Abdominal Pain Sputum    Joint Pain SOB/SWENSON n/y   Pruritis/Rash Nausea/vomit    Tolerating PT/OT Diarrhea    Tolerating Diet Constipation    Other Could not obtain due to:   
 
Objective: VITALS:  
Last 24hrs VS reviewed since prior progress note. Most recent are: 
Visit Vitals BP (!) 145/68 Pulse 73 Temp 98.2 °F (36.8 °C) Resp 18 Ht 5' 9\" (1.753 m) Wt 91.2 kg (201 lb 1 oz) SpO2 93% Breastfeeding No  
BMI 29.69 kg/m² Intake/Output Summary (Last 24 hours) at 10/17/2020 7092 Last data filed at 10/17/2020 2471 Gross per 24 hour Intake 120 ml Output 2300 ml Net -2180 ml Telemetry Reviewed: PHYSICAL EXAM: 
General: NAD 
cta 
++ edema (a tad better than yesterday) - legs elevated Lab Data Reviewed: (see below) Medications Reviewed: (see below) PMH/SH reviewed - no change compared to H&P 
________________________________________________________________________ Care Plan discussed with: 
Patient y Family RN Care Manager Consultant:     
 
  Comments >50% of visit spent in counseling and coordination of care    
 
________________________________________________________________________ Laxmi Mo MD  
 
Procedures: see electronic medical records for all procedures/Xrays and details which 
were not copied into this note but were reviewed prior to creation of Plan. LABS: 
Recent Labs 10/15/20 
0119 WBC 5.7 HGB 8.7* HCT 29.7*  
* Recent Labs 10/17/20 
8285 10/16/20 
0411 10/15/20 
0119  145 143  
K 3.5 3.2* 3.6 * 110* 110* CO2 32 28 27 BUN 24* 29* 33* CREA 1.73* 1.83* 1.92* GLU 88 83 117* CA 9.0 9.2 9.1 MG 2.2  --   -- No results for input(s): AP, TBIL, TP, ALB, GLOB, GGT, AML, LPSE in the last 72 hours. No lab exists for component: SGOT, GPT, AMYP, HLPSE No results for input(s): INR, PTP, APTT, INREXT, INREXT in the last 72 hours. No results for input(s): FE, TIBC, PSAT, FERR in the last 72 hours. Lab Results Component Value Date/Time Folate 72.3 (H) 01/22/2017 03:13 AM  
  
No results for input(s): PH, PCO2, PO2 in the last 72 hours. No results for input(s): CPK, CKMB in the last 72 hours. No lab exists for component: TROPONINI No components found for: [place] Lab Results Component Value Date/Time  Color YELLOW/STRAW 10/10/2020 10:51 PM  
 Appearance CLEAR 10/10/2020 10:51 PM  
 Specific gravity 1.010 10/10/2020 10:51 PM  
 Specific gravity 1.020 07/06/2010 01:10 PM  
 pH (UA) 5.5 10/10/2020 10:51 PM  
 Protein Negative 10/10/2020 10:51 PM  
 Glucose Negative 10/10/2020 10:51 PM  
 Ketone Negative 10/10/2020 10:51 PM  
 Bilirubin Negative 10/10/2020 10:51 PM  
 Urobilinogen 1.0 10/10/2020 10:51 PM  
 Nitrites Negative 10/10/2020 10:51 PM  
 Leukocyte Esterase MODERATE (A) 10/10/2020 10:51 PM  
 Epithelial cells MANY (A) 10/10/2020 10:51 PM  
 Bacteria Negative 10/10/2020 10:51 PM  
 WBC 10-20 10/10/2020 10:51 PM  
 RBC 0-5 10/10/2020 10:51 PM  
 
 
MEDICATIONS: 
Current Facility-Administered Medications Medication Dose Route Frequency  furosemide (LASIX) 200 mg in 0.9% sodium chloride 100 mL infusion  20 mg/hr IntraVENous CONTINUOUS  
 artificial tears (dextran-hypromellose-glycerin) (GENTEAL) ophthalmic solution 1 Drop  1 Drop Both Eyes PRN  pantoprazole (PROTONIX) tablet 40 mg  40 mg Oral ACB  sodium chloride (NS) flush 5-40 mL  5-40 mL IntraVENous Q8H  
 sodium chloride (NS) flush 5-40 mL  5-40 mL IntraVENous PRN  
 clopidogreL (PLAVIX) tablet 75 mg  75 mg Oral DAILY  sodium chloride (NS) flush 5-40 mL  5-40 mL IntraVENous Q8H  
 sodium chloride (NS) flush 5-40 mL  5-40 mL IntraVENous PRN  
 amiodarone (CORDARONE) tablet 100 mg  100 mg Oral DAILY  sodium chloride (NS) flush 5-40 mL  5-40 mL IntraVENous Q8H  
 sodium chloride (NS) flush 5-40 mL  5-40 mL IntraVENous PRN  
 acetaminophen (TYLENOL) tablet 650 mg  650 mg Oral Q6H PRN Or  
 acetaminophen (TYLENOL) suppository 650 mg  650 mg Rectal Q6H PRN  polyethylene glycol (MIRALAX) packet 17 g  17 g Oral DAILY PRN  promethazine (PHENERGAN) tablet 12.5 mg  12.5 mg Oral Q6H PRN Or  
 ondansetron (ZOFRAN) injection 4 mg  4 mg IntraVENous Q6H PRN  
 allopurinoL (ZYLOPRIM) tablet 200 mg  200 mg Oral DAILY  atorvastatin (LIPITOR) tablet 80 mg  80 mg Oral QHS  levothyroxine (SYNTHROID) tablet 125 mcg  125 mcg Oral ACB  potassium chloride SR (KLOR-CON 10) tablet 10 mEq  10 mEq Oral DAILY  sucralfate (CARAFATE) tablet 1 g  1 g Oral QID  insulin lispro (HUMALOG) injection   SubCUTAneous AC&HS  
 glucose chewable tablet 16 g  4 Tab Oral PRN  
 dextrose (D50W) injection syrg 12.5-25 g  12.5-25 g IntraVENous PRN  
  glucagon (GLUCAGEN) injection 1 mg  1 mg IntraMUSCular PRN  
 [Held by provider] metoprolol tartrate (LOPRESSOR) tablet 12.5 mg  12.5 mg Oral BID

## 2020-10-17 NOTE — PROGRESS NOTES
Bedside and Verbal shift change report given to Sarah (oncoming nurse) by Misael Montelongo (offgoing nurse). Report included the following information SBAR, Kardex, ED Summary, Procedure Summary, MAR and Recent Results.

## 2020-10-17 NOTE — PROGRESS NOTES
900 Bedside shift change report given to [address] [place] [address] (oncoming nurse) by Calos Calvert RN (offgoing nurse). Report included the following information SBAR, Kardex, Procedure Summary, Intake/Output, MAR and Recent Results.

## 2020-10-18 NOTE — PROGRESS NOTES
Bedside shift change report given to Kenna Harrington (oncoming nurse) by Benoit Roca RN (offgoing nurse). Report included the following information SBAR, Kardex, Procedure Summary, Intake/Output, MAR and Recent Results.

## 2020-10-18 NOTE — PROGRESS NOTES
Hospitalist Progress Note NAME: Lurdes Cantu :  1935 MRN:  [id number] Assessment / Plan: 
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 
-Still with significant swelling of the legs and poor response to p.o. Lasix 
-Ultrasound Doppler shows no evidence of DVT 
-Continue Lasix GTT. Continue Scanlon catheter. Renal following and appreciate help. Apparently did not get her Lasix drip last night so not much diuresis yesterday 
-Holding beta-blocker due to bradycardia. -Strict I's and O's, daily weights and low-salt diet. Fluid restriction. 
-I's and O's and weights are inaccurate Suspected GI bleed Positive stool for occult blood Mild acute blood loss anemia 
-S/p colonoscopy today with no clear evidence of bleeding 
-S/p endoscopy with no clear source of anemia 
-Continue Protonix 
-We will need outpatient follow-up with oncology for further evaluation given anemia and thrombocytopenia 
 
  
Chronic atrial fibrillation s/p Watchman procedure 2020 Hx atrial flutter with abalation Sinus bradycardia HR 44, POA Hx CAD 
HTN 
holding metoprolol due to cont bradycardia Continue amiodarone per cardiology 
continue Plavix 
  
CKD stage 4, POA- Cr now close to baseline 
--Creatinine is close to baseline of around 1.7 renal following. 
-Continue Lasix GTT. Renal following IDDM, POA now with hypoglycemia new 
--A1c 6.0 on   -Since blood sugars are low, will discontinue insulin NPH for now. Continue insulin sliding scale only 
  
Hx TIA 
--Continue Plavix. Okay with GI 
  
Hypothyroid --continue levothyroxine 
  
Gout 
--continue allopurinol Body mass index is 29.98 kg/m². Disposition: 
Discharge once okay with renal early next week 
 
  
Code: discussed, DNR/DNI 
DVT prophylaxis:  SCD Surrogate decision maker:  Daughter Clarence Zimmerman and son Recommended Disposition: Home vs SNF Subjective: Chief Complaint / Reason for Physician Visit :F/U Acute /Chronic Systolic CHF, GI bleed, Anemia, CKD Still reports some swelling. Shortness of breath is better. Unfortunately did not get Lasix drip last night Objective: VITALS:  
Last 24hrs VS reviewed since prior progress note. Most recent are: 
Patient Vitals for the past 24 hrs: 
 Temp Pulse Resp BP SpO2  
10/17/20 1950 98.2 °F (36.8 °C) 71 18 (!) 134/58 96 % 10/17/20 1449 98.3 °F (36.8 °C) 70 18 136/63 95 % 10/17/20 1119 98 °F (36.7 °C) 69 16 (!) 133/46 100 % 10/17/20 0723 98.2 °F (36.8 °C) 73 18 (!) 145/68 93 % 10/17/20 0215 98.1 °F (36.7 °C) 73 16 137/66 93 % Intake/Output Summary (Last 24 hours) at 10/17/2020 2217 Last data filed at 10/17/2020 1814 Gross per 24 hour Intake 1000 ml Output 1800 ml Net -800 ml PHYSICAL EXAM: 
General: Alert, cooperative, no acute distress EENT:  EOMI. Anicteric sclerae. MMM Resp:  CTA bilaterally, no wheezing or rales. No accessory muscle use CV:  Regular  rhythm, 2+ edema present GI:  Soft, Non distended, Non tender.  +Bowel sounds Neurologic:  Alert and oriented X 3, normal speech, Psych:   Good insight. Not anxious nor agitated Skin:  No rashes. No jaundice Reviewed most current lab test results and cultures  YES Reviewed most current radiology test results   YES Review and summation of old records today    NO Reviewed patient's current orders and MAR    YES 
PMH/ reviewed - no change compared to H&P 
________________________________________________________________________ Care Plan discussed with: 
  Comments Patient x Family RN x Care Manager Consultant Multidiciplinary team rounds were held today with , nursing, pharmacist and clinical coordinator. Patient's plan of care was discussed; medications were reviewed and discharge planning was addressed. ________________________________________________________________________ Total NON critical care TIME:  35    Minutes Total CRITICAL CARE TIME Spent:   Minutes non procedure based Comments >50% of visit spent in counseling and coordination of care    
________________________________________________________________________ Evon Rose MD  
 
Procedures: see electronic medical records for all procedures/Xrays and details which were not copied into this note but were reviewed prior to creation of Plan. LABS: 
I reviewed today's most current labs and imaging studies. Pertinent labs include: 
Recent Labs 10/15/20 
0119 WBC 5.7 HGB 8.7* HCT 29.7*  
* Recent Labs 10/17/20 
3691 10/16/20 
0411 10/15/20 
0119  145 143  
K 3.5 3.2* 3.6 * 110* 110* CO2 32 28 27 GLU 88 83 117* BUN 24* 29* 33* CREA 1.73* 1.83* 1.92* CA 9.0 9.2 9.1 MG 2.2  --   --   
 
 
Signed: Evon Rose MD

## 2020-10-18 NOTE — PROGRESS NOTES
NAME: Camryn Salinas :  1935 MRN:  [id number] Assessment :    Plan: 
--CKD stage 4 Hypokalemia HTN Bradycardia Anemia Chronic Thrombocytopenia 
  
S/p bioprosthetic MV Afib S/p watchman H/o cad/stent 
 
  --Creatinine better (peaked at 2.4, now stable at 1.7); long standing DM/HTN. [place] urine. Likely underlying age and HTN kidney disease. Admitted with acute on chronic diastolic HF and GIB 
  
No response to PO loop (and a so, so response to high dose loop bolus therapy); initiated on lasix gtt 10/16 (so, so response to 20mg/hr -- will increase to 30 mg/hr), continue tamayo while on lasix gtt (she had some mild urine retention prior to tamayo as well) 
 
salt and fluid restriction; keep legs elevated; accurate I&O and daily weight 
  
On PO potassium Subjective: Chief Complaint: edema a bit better. No sob. Still some swenson. Tamayo. I discussed the above with her nurse and she. Review of Systems: 
 
Symptom Y/N Comments  Symptom Y/N Comments Fever/Chills    Chest Pain Poor Appetite    Edema y Cough    Abdominal Pain Sputum    Joint Pain SOB/SWENSON n/y   Pruritis/Rash Nausea/vomit    Tolerating PT/OT Diarrhea    Tolerating Diet Constipation    Other Could not obtain due to:   
 
Objective: VITALS:  
Last 24hrs VS reviewed since prior progress note. Most recent are: 
Visit Vitals BP (!) 121/55 (BP 1 Location: Left arm, BP Patient Position: Sitting) Pulse 68 Temp 98.7 °F (37.1 °C) Resp 18 Ht 5' 9\" (1.753 m) Wt 91.2 kg (201 lb 1 oz) SpO2 97% Breastfeeding No  
BMI 29.69 kg/m² Intake/Output Summary (Last 24 hours) at 10/18/2020 3057 Last data filed at 10/18/2020 0077 Gross per 24 hour Intake 1240 ml Output 3000 ml Net -1760 ml Telemetry Reviewed: PHYSICAL EXAM: 
General: NAD 
cta ++ edema (a tad better than yesterday) - legs elevated Lab Data Reviewed: (see below) Medications Reviewed: (see below) PMH/SH reviewed - no change compared to H&P 
________________________________________________________________________ Care Plan discussed with: 
Patient y Family RN Care Manager Consultant:     
 
  Comments >50% of visit spent in counseling and coordination of care    
 
________________________________________________________________________ Suha Rothman MD  
 
Procedures: see electronic medical records for all procedures/Xrays and details which 
were not copied into this note but were reviewed prior to creation of Plan. LABS: 
No results for input(s): WBC, HGB, HCT, PLT, HGBEXT, HCTEXT, PLTEXT, HGBEXT, HCTEXT, PLTEXT in the last 72 hours. Recent Labs 10/18/20 
9168 10/17/20 
0152 10/16/20 
0411  143 145  
K 3.5 3.5 3.2*  
 109* 110* CO2 31 32 28 BUN 24* 24* 29* CREA 1.73* 1.73* 1.83* * 88 83 CA 9.1 9.0 9.2 MG 2.4 2.2  --   
PHOS 2.3*  --   -- No results for input(s): AP, TBIL, TP, ALB, GLOB, GGT, AML, LPSE in the last 72 hours. No lab exists for component: SGOT, GPT, AMYP, HLPSE No results for input(s): INR, PTP, APTT, INREXT, INREXT in the last 72 hours. No results for input(s): FE, TIBC, PSAT, FERR in the last 72 hours. Lab Results Component Value Date/Time Folate 72.3 (H) 01/22/2017 03:13 AM  
  
No results for input(s): PH, PCO2, PO2 in the last 72 hours. No results for input(s): CPK, CKMB in the last 72 hours. No lab exists for component: TROPONINI No components found for: [place] Lab Results Component Value Date/Time  Color YELLOW/STRAW 10/10/2020 10:51 PM  
 Appearance CLEAR 10/10/2020 10:51 PM  
 Specific gravity 1.010 10/10/2020 10:51 PM  
 Specific gravity 1.020 07/06/2010 01:10 PM  
 pH (UA) 5.5 10/10/2020 10:51 PM  
 Protein Negative 10/10/2020 10:51 PM  
 Glucose Negative 10/10/2020 10:51 PM  
 Ketone Negative 10/10/2020 10:51 PM  
 Bilirubin Negative 10/10/2020 10:51 PM  
 Urobilinogen 1.0 10/10/2020 10:51 PM  
 Nitrites Negative 10/10/2020 10:51 PM  
 Leukocyte Esterase MODERATE (A) 10/10/2020 10:51 PM  
 Epithelial cells MANY (A) 10/10/2020 10:51 PM  
 Bacteria Negative 10/10/2020 10:51 PM  
 WBC 10-20 10/10/2020 10:51 PM  
 RBC 0-5 10/10/2020 10:51 PM  
 
 
MEDICATIONS: 
Current Facility-Administered Medications Medication Dose Route Frequency  furosemide (LASIX) 200 mg in 0.9% sodium chloride 100 mL infusion  30 mg/hr IntraVENous CONTINUOUS  
 artificial tears (dextran-hypromellose-glycerin) (GENTEAL) ophthalmic solution 1 Drop  1 Drop Both Eyes PRN  pantoprazole (PROTONIX) tablet 40 mg  40 mg Oral ACB  clopidogreL (PLAVIX) tablet 75 mg  75 mg Oral DAILY  amiodarone (CORDARONE) tablet 100 mg  100 mg Oral DAILY  sodium chloride (NS) flush 5-40 mL  5-40 mL IntraVENous Q8H  
 sodium chloride (NS) flush 5-40 mL  5-40 mL IntraVENous PRN  
 acetaminophen (TYLENOL) tablet 650 mg  650 mg Oral Q6H PRN Or  
 acetaminophen (TYLENOL) suppository 650 mg  650 mg Rectal Q6H PRN  polyethylene glycol (MIRALAX) packet 17 g  17 g Oral DAILY PRN  promethazine (PHENERGAN) tablet 12.5 mg  12.5 mg Oral Q6H PRN Or  
 ondansetron (ZOFRAN) injection 4 mg  4 mg IntraVENous Q6H PRN  
 allopurinoL (ZYLOPRIM) tablet 200 mg  200 mg Oral DAILY  atorvastatin (LIPITOR) tablet 80 mg  80 mg Oral QHS  levothyroxine (SYNTHROID) tablet 125 mcg  125 mcg Oral ACB  potassium chloride SR (KLOR-CON 10) tablet 10 mEq  10 mEq Oral DAILY  sucralfate (CARAFATE) tablet 1 g  1 g Oral QID  insulin lispro (HUMALOG) injection   SubCUTAneous AC&HS  
 glucose chewable tablet 16 g  4 Tab Oral PRN  
 dextrose (D50W) injection syrg 12.5-25 g  12.5-25 g IntraVENous PRN  
 glucagon (GLUCAGEN) injection 1 mg  1 mg IntraMUSCular PRN  
  [Held by provider] metoprolol tartrate (LOPRESSOR) tablet 12.5 mg  12.5 mg Oral BID

## 2020-10-18 NOTE — PROGRESS NOTES
Progress Note Pt Name  Dave Louis Date of Birth 1935 Medical Record Number  [id number] Age  82 y.o. PCP Mark Estrada MD  
Admit date:  10/10/2020 Room Number  1928/62  @ [facility] Date of Service  10/18/2020 Admission Diagnoses:  Acute on chronic CHF Assessment and plan:  
 
Acute on chronic diastolic chf, POA Hx severe mitral stenosis s/p Medtronic Fregoso bioprosthetic mitral valve 4/2019 
-Continue Lasix GTT. Continue Scanlon catheter. Renal following and appreciate help. Apparently did not get her Lasix drip last night so not much diuresis yesterday 
-Holding beta-blocker due to bradycardia. -Strict I's and O's, daily weights and low-salt diet. Fluid restriction. 
-I's and O's and weights are inaccurate 
  
Suspected GI bleed Positive stool for occult blood Mild acute blood loss anemia 
-S/p colonoscopy today with no clear evidence of bleeding 
-S/p endoscopy with no clear source of anemia 
-Continue Protonix 
-We will need outpatient follow-up with oncology for further evaluation given anemia and thrombocytopenia 
  
  
Chronic atrial fibrillation s/p Watchman procedure 2/2020 Hx atrial flutter with abalation Sinus bradycardia HR 44, POA Hx CAD 
HTN 
holding metoprolol due to cont bradycardia Continue amiodarone per cardiology 
continue Plavix 
  
CKD stage 4, POA- Cr now close to baseline 
--Creatinine is close to baseline of around 1.7 renal following. 
-Continue Lasix GTT. Renal following 
  
  
  
IDDM, POA now with hypoglycemia new 
--A1c 6.0 on  7/20 -Since blood sugars are low, will discontinue insulin NPH for now. Continue insulin sliding scale only 
  
Hx TIA 
--Continue Plavix. Okay with GI 
  
Hypothyroid --continue levothyroxine 
  
Gout 
--continue allopurinol Body mass index is 29.98 kg/m². 
  
Disposition: 
Discharge once okay with renal early next week 
  
  
Code: discussed, DNR/DNI 
DVT prophylaxis:  SCD Surrogate decision maker:  Daughter Christiana Cabot and son 
  
Recommended Disposition: Home vs SNF  
 
  
CODE STATUS   DNR Functional Status   pt resides with her family in [place] Surrogate decision maker:  Pt's son Prophylaxis Discharge Plan:  Umair Salazar PT, OT, RN, Misc Benefit:  [place] / Plan: [place] MED[place]ARE PART A & B / Product Type: Medicare /   
Isolation :  There are currently no Active Isolations ADT status:  INPATIENT Query   None noted today Prognosis Social issues  Date  Comment 10/18/20 3:54 PM met with pt's son in the room Subjective Data \"I feel a little better \" Review of Systems - History obtained from the patient Respiratory ROS: positive for - shortness of breath Cardiovascular ROS: positive for - dyspnea on exertion and edema Musculoskeletal ROS: Bilateral extensive edema of lower extremities Objective Data Comments Patient Vitals for the past 24 hrs: 
 BP  
10/18/20 1207 (!) 132/47  
10/18/20 0816 (!) 121/55  
10/18/20 0350 133/69  
10/17/20 2319 (!) 142/51  
10/17/20 1950 (!) 134/58 Patient Vitals for the past 24 hrs: 
 Pulse 10/18/20 1207 71  
10/18/20 0816 68  
10/18/20 0350 71  
10/17/20 2319 67  
10/17/20 1950 71 Patient Vitals for the past 24 hrs: 
 Resp 10/18/20 1207 16  
10/18/20 0816 18  
10/18/20 0350 18  
10/17/20 2319 18  
10/17/20 1950 18 Patient Vitals for the past 24 hrs: 
 Temp 10/18/20 1207 98 °F (36.7 °C)  
10/18/20 0816 98.7 °F (37.1 °C)  
10/18/20 0350 98.3 °F (36.8 °C)  
10/17/20 2319 98.2 °F (36.8 °C)  
10/17/20 1950 98.2 °F (36.8 °C) SpO2 Readings from Last 6 Encounters:  
10/18/20 98% 10/09/20 98% 10/08/20 97% 10/07/20 99% 10/05/20 97% 10/05/20 98% O2 Device: Room air Body mass index is 28.32 kg/m². - 
Wt Readings from Last 10 Encounters:  
10/18/20 87 kg (191 lb 12.8 oz) 10/05/20 90.7 kg (200 lb)  
09/28/20 83 kg (183 lb) 09/21/20 89.8 kg (198 lb)  
09/17/20 79.4 kg (175 lb) 09/08/20 81.6 kg (180 lb) 09/04/20 79.4 kg (175 lb)  
03/24/20 81.6 kg (180 lb)  
02/20/20 84.8 kg (187 lb) 02/12/20 86.6 kg (191 lb) Physical Exam:            
General:  Alert, cooperative,  
well noursished,  
well developed,  
appears stated age Ears/Eyes:  Hearing intact Sclera anicteric. Pupils equal  
Mouth/Throat:  Mucous membranes normal pink and moist 
  
Neck:    
Lungs:  Trachea midline Chest excursion symmetrical  
Auscultation B/L Symmetrical with Vesicular breath sounds No Crepitations noted Percussion note resonant on mid Clavicular line; no sign of pneumothorax CVS:  Regular rate and rhythm Systolic murmur, No click, rub or gallop S1 normal  
S2 normal  
Pedal pulses  b/l symmetrical but feeble due to edema Abdomen:  Obese Soft, non-tender Bowel sounds normal 
No distension Percussion note tympanitic Extremities: No cyanosis, jaundice 3+ Edema on both lower extremities upto mid-thighs No sign of DVT/cord like lesion on palpation No sign of acute trauma . Skin:   
Skin color, texture, turgor normal. no acute rash or lesions Lymph nodes: Musculoskeletal Muscle bulk B/L symmetrical  
Neuro Cranial nerves are intact,  
motor movement b/l symmetrical, Sensory evaluation b/l symmetrical   
Psych:  Alert and oriented,  
normal mood & affect Medications reviewed Current Facility-Administered Medications Medication Dose Route Frequency  furosemide (LASIX) 200 mg in 0.9% sodium chloride 100 mL infusion  30 mg/hr IntraVENous CONTINUOUS  
 artificial tears (dextran-hypromellose-glycerin) (GENTEAL) ophthalmic solution 1 Drop  1 Drop Both Eyes PRN  pantoprazole (PROTONIX) tablet 40 mg  40 mg Oral ACB  clopidogreL (PLAVIX) tablet 75 mg  75 mg Oral DAILY  amiodarone (CORDARONE) tablet 100 mg  100 mg Oral DAILY  sodium chloride (NS) flush 5-40 mL  5-40 mL IntraVENous Q8H  
 sodium chloride (NS) flush 5-40 mL  5-40 mL IntraVENous PRN  
 acetaminophen (TYLENOL) tablet 650 mg  650 mg Oral Q6H PRN Or  
 acetaminophen (TYLENOL) suppository 650 mg  650 mg Rectal Q6H PRN  polyethylene glycol (MIRALAX) packet 17 g  17 g Oral DAILY PRN  promethazine (PHENERGAN) tablet 12.5 mg  12.5 mg Oral Q6H PRN Or  
 ondansetron (ZOFRAN) injection 4 mg  4 mg IntraVENous Q6H PRN  
 allopurinoL (ZYLOPRIM) tablet 200 mg  200 mg Oral DAILY  atorvastatin (LIPITOR) tablet 80 mg  80 mg Oral QHS  levothyroxine (SYNTHROID) tablet 125 mcg  125 mcg Oral ACB  potassium chloride SR (KLOR-CON 10) tablet 10 mEq  10 mEq Oral DAILY  sucralfate (CARAFATE) tablet 1 g  1 g Oral QID  insulin lispro (HUMALOG) injection   SubCUTAneous AC&HS  
 glucose chewable tablet 16 g  4 Tab Oral PRN  
 dextrose (D50W) injection syrg 12.5-25 g  12.5-25 g IntraVENous PRN  
 glucagon (GLUCAGEN) injection 1 mg  1 mg IntraMUSCular PRN  
 [Held by provider] metoprolol tartrate (LOPRESSOR) tablet 12.5 mg  12.5 mg Oral BID Relevant other informations: Other medical conditions listed in [facility] problem list section; all of these and other pertinent data were taken into consideration when treatment plan is developed and customized to this patient's unique overall circumstances and needs. We have reviewed available old medical records within the constraints of this admission process. Data Review:  
Recent Days: 
All Micro Results Procedure Component Value Units Date/Time CULTURE, URINE [[id number]]  (Abnormal)  (Susceptibility) Collected:  10/10/20 6843 Order Status:  Completed Specimen:  Urine Updated:  10/13/20 1031 Special Requests: --     
  NO SPECIAL REQUESTS Reflexed from [id number] [place] Count --     
  [id number] COLONIES/mL Culture result:    
  ESCHERICHIA COLI (PREDOMINATING) ALPHA STREPTOCOCCUS (8,000 COLONIES/mL) No results for input(s): WBC, HGB, HCT, PLT, HGBEXT, HCTEXT, PLTEXT in the last 72 hours. Recent Labs 10/18/20 
3665 10/17/20 
0152 10/16/20 
0411  143 145  
K 3.5 3.5 3.2*  
 109* 110* CO2 31 32 28 * 88 83 BUN 24* 24* 29* CREA 1.73* 1.73* 1.83* CA 9.1 9.0 9.2 MG 2.4 2.2  --   
PHOS 2.3*  --   --   
  
Lab Results Component Value Date/Time TSH 87.910 (H) 07/13/2020 08:02 AM  
 
 
 
  
Care Plan discussed with:Patient/Family and Nurse Other medical conditions are listed in the active hospital problem list section; these and other pertinent data were taken into consideration when the treatment plan was developed and customized to this patient's unique overall circumstances and needs. High complexity decision making was performed for this patient who is at high risk for decompensation with multiple organ involvement. Today total floor/unit time was 35 minutes while caring for this patient and greater than 50% of that time was spent with patient (and/or family) coordinating patients clinical issues; this includes time spent during multidisciplinary rounds. Andrew Rubio MD MPH FACP   
10/18/2020

## 2020-10-19 NOTE — PROGRESS NOTES
Hospitalist Progress Note NAME: Ted Hanks :  1935 MRN:  [id number] Assessment / Plan: 
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 
-Continue Lasix GTT.  Continue Scanlon catheter.  Renal following and appreciate help.  -Holding beta-blocker due to bradycardia. -Strict I's and O's, daily weights and low-salt diet.  Fluid restriction. 
-I's and O's and weights are inaccurate 
  
Suspected GI bleed Positive stool for occult blood Mild acute blood loss anemia 
-S/p colonoscopy today with no clear evidence of bleeding 
-S/p endoscopy with no clear source of anemia 
-Continue Protonix 
-We will need outpatient follow-up with oncology for further evaluation given anemia and thrombocytopenia 
  
  
Chronic atrial fibrillation s/p Watchman procedure 2020 Hx atrial flutter with abalation Sinus bradycardia HR 44, POA Hx CAD 
HTN 
holding metoprolol due to cont bradycardia Continue amiodarone per cardiology 
continue Plavix 
  
CKD stage 4, POA- Cr now close to baseline 
--Creatinine is close to baseline of around 1.7 renal following. 
-Continue Lasix GTT.  Renal following 
  
   
IDDM, POA  
--A1c 6.0 on   -Since blood sugars are low,  insulin NPH DC ontinue insulin sliding scale only 
  
Hx TIA 
--Continue Plavix.  Okay with GI 
  
Hypothyroid --continue levothyroxine 
  
Gout 
--continue allopurinol Body mass index is 29.98 kg/m². 
  
  
  
Code: discussed, DNR/DNI 
DVT prophylaxis:  SCD Surrogate decision maker:  Daughter Juanita Hartley and son 
  
Recommended Disposition: Home vs SNF 
 
 
25.0 - 29.9 Overweight / Body mass index is 26.89 kg/m². Subjective: Chief Complaint / Reason for Physician Visit FU CHF . still with edema 3+ LE   Discussed with RN events overnight. Review of Systems: 
Symptom Y/N Comments  Symptom Y/N Comments Fever/Chills n   Chest Pain n   
Poor Appetite    Edema Cough    Abdominal Pain n   
Sputum    Joint Pain SOB/LEIJA n   Pruritis/Rash Nausea/vomit n   Tolerating PT/OT Diarrhea    Tolerating Diet y Constipation    Other Could NOT obtain due to:   
 
Objective: VITALS:  
Last 24hrs VS reviewed since prior progress note. Most recent are: 
Patient Vitals for the past 24 hrs: 
 Temp Pulse Resp BP SpO2  
10/19/20 0802 97.6 °F (36.4 °C) 74 16 (!) 147/69 98 % 10/19/20 0245 98.1 °F (36.7 °C) 78 16 (!) 144/79 98 % 10/18/20 2240 97.8 °F (36.6 °C) 65 17 119/63 96 % 10/18/20 1922 98 °F (36.7 °C) 68 16 (!) 123/59 98 % 10/18/20 1557 97.8 °F (36.6 °C) 69 18 (!) 138/48 99 % 10/18/20 1207 98 °F (36.7 °C) 71 16 (!) 132/47 98 % Intake/Output Summary (Last 24 hours) at 10/19/2020 2383 Last data filed at 10/19/2020 6159 Gross per 24 hour Intake 800 ml Output 3050 ml Net -2250 ml PHYSICAL EXAM: 
General: WD, WN. Alert, cooperative, no acute distress   
EENT:  EOMI. Anicteric sclerae. MMM Resp:  CTA bilaterally, no wheezing or rales. No accessory muscle use CV:  Regular  rhythm, LE  Edema 3+ GI:  Soft, Non distended, Non tender.  +Bowel sounds Neurologic:  Alert and oriented X 3, normal speech, Psych:   Fair insight. Not anxious nor agitated Skin:  No rashes. No jaundice Reviewed most current lab test results and cultures  YES Reviewed most current radiology test results   YES Review and summation of old records today    NO Reviewed patient's current orders and MAR    YES 
PMH/SH reviewed - no change compared to H&P 
________________________________________________________________________ Care Plan discussed with: 
  Comments Patient x Family RN x Care Manager Consultant Multidiciplinary team rounds were held today with , nursing, pharmacist and clinical coordinator. Patient's plan of care was discussed; medications were reviewed and discharge planning was addressed. ________________________________________________________________________ Total NON critical care TIME: 35   Minutes Total CRITICAL CARE TIME Spent:   Minutes non procedure based Comments >50% of visit spent in counseling and coordination of care    
________________________________________________________________________ Leland Frey MD  
 
Procedures: see electronic medical records for all procedures/Xrays and details which were not copied into this note but were reviewed prior to creation of Plan. LABS: 
I reviewed today's most current labs and imaging studies. Pertinent labs include: No results for input(s): WBC, HGB, HCT, PLT, HGBEXT, HCTEXT, PLTEXT in the last 72 hours. Recent Labs 10/18/20 
[phone] 10/17/20 
4115  143  
K 3.5 3.5  109* CO2 31 32 * 88 BUN 24* 24* CREA 1.73* 1.73* CA 9.1 9.0 MG 2.4 2.2 PHOS 2.3*  --   
 
 
Signed: Leland Frey MD

## 2020-10-19 NOTE — PROGRESS NOTES
Bedside shift change report given to Jeny (oncoming nurse) by Lore Story (offgoing nurse). Report included the following information SBAR, Kardex, Intake/Output, MAR and Recent Results. Pt slept in recliner most of the night. Lasix gtt infusing.

## 2020-10-19 NOTE — PROGRESS NOTES
NAME: Osmani Motley :  1935 MRN:  [id number] Assessment :    Plan: 
--CKD stage 4 Hypokalemia HTN Bradycardia Anemia Chronic Thrombocytopenia 
  
S/p bioprosthetic MV Afib S/p watchman H/o cad/stent 
 
  --Creatinine better (peaked at 2.4, now stable at 1.7); long standing DM/HTN. [place] urine. Likely underlying age and HTN kidney disease. Admitted with acute on chronic diastolic HF and GIB 
  
Diuresing on lasix gtt at 30 mg/hr 
 
salt and fluid restriction; keep legs elevated; accurate I&O and daily weight 
  
On PO potassium--adding kphos today Subjective: Chief Complaint: edema a bit better. No sob when sitting still Review of Systems: 
 
Symptom Y/N Comments  Symptom Y/N Comments Fever/Chills    Chest Pain Poor Appetite    Edema y Cough    Abdominal Pain Sputum    Joint Pain SOB/LEIJA n/y   Pruritis/Rash Nausea/vomit    Tolerating PT/OT Diarrhea    Tolerating Diet Constipation    Other Could not obtain due to:   
 
Objective: VITALS:  
Last 24hrs VS reviewed since prior progress note. Most recent are: 
Visit Vitals BP (!) 142/65 (BP 1 Location: Left arm, BP Patient Position: Sitting) Pulse 67 Temp 97.4 °F (36.3 °C) Resp 14 Ht 5' 9\" (1.753 m) Wt 82.6 kg (182 lb 1.6 oz) SpO2 99% Breastfeeding No  
BMI 26.89 kg/m² Intake/Output Summary (Last 24 hours) at 10/19/2020 1332 Last data filed at 10/19/2020 2960 Gross per 24 hour Intake 800 ml Output 2400 ml Net -1600 ml Telemetry Reviewed: PHYSICAL EXAM: 
General: NAD 
cta 
++ edema (a tad better than yesterday) - legs elevated Lab Data Reviewed: (see below) Medications Reviewed: (see below) PMH/SH reviewed - no change compared to H&P 
________________________________________________________________________ Care Plan discussed with: 
Patient y Family RN    
Care Manager Consultant:     
 
  Comments >50% of visit spent in counseling and coordination of care    
 
________________________________________________________________________ Aleta Drake MD  
 
Procedures: see electronic medical records for all procedures/Xrays and details which 
were not copied into this note but were reviewed prior to creation of Plan. LABS: 
No results for input(s): WBC, HGB, HCT, PLT, HGBEXT, HCTEXT, PLTEXT, HGBEXT, HCTEXT, PLTEXT in the last 72 hours. Recent Labs 10/18/20 
071 [id number] 34 37 10/17/20 
8091  143  
K 3.5 3.5  109* CO2 31 32 BUN 24* 24* CREA 1.73* 1.73* * 88  
CA 9.1 9.0 MG 2.4 2.2 PHOS 2.3*  -- No results for input(s): AP, TBIL, TP, ALB, GLOB, GGT, AML, LPSE in the last 72 hours. No lab exists for component: SGOT, GPT, AMYP, HLPSE No results for input(s): INR, PTP, APTT, INREXT, INREXT in the last 72 hours. No results for input(s): FE, TIBC, PSAT, FERR in the last 72 hours. Lab Results Component Value Date/Time Folate 72.3 (H) 01/22/2017 03:13 AM  
  
No results for input(s): PH, PCO2, PO2 in the last 72 hours. No results for input(s): CPK, CKMB in the last 72 hours. No lab exists for component: TROPONINI No components found for: [place] Lab Results Component Value Date/Time  Color YELLOW/STRAW 10/10/2020 10:51 PM  
 Appearance CLEAR 10/10/2020 10:51 PM  
 Specific gravity 1.010 10/10/2020 10:51 PM  
 Specific gravity 1.020 07/06/2010 01:10 PM  
 pH (UA) 5.5 10/10/2020 10:51 PM  
 Protein Negative 10/10/2020 10:51 PM  
 Glucose Negative 10/10/2020 10:51 PM  
 Ketone Negative 10/10/2020 10:51 PM  
 Bilirubin Negative 10/10/2020 10:51 PM  
 Urobilinogen 1.0 10/10/2020 10:51 PM  
 Nitrites Negative 10/10/2020 10:51 PM  
 Leukocyte Esterase MODERATE (A) 10/10/2020 10:51 PM  
 Epithelial cells MANY (A) 10/10/2020 10:51 PM  
 Bacteria Negative 10/10/2020 10:51 PM  
 WBC 10-20 10/10/2020 10:51 PM  
 RBC 0-5 10/10/2020 10:51 PM  
 
 
MEDICATIONS: 
Current Facility-Administered Medications Medication Dose Route Frequency  potassium, sodium phosphates (NEUTRA-PHOS) packet 1 Packet  1 Packet Oral QID  furosemide (LASIX) 200 mg in 0.9% sodium chloride 100 mL infusion  30 mg/hr IntraVENous CONTINUOUS  
 artificial tears (dextran-hypromellose-glycerin) (GENTEAL) ophthalmic solution 1 Drop  1 Drop Both Eyes PRN  pantoprazole (PROTONIX) tablet 40 mg  40 mg Oral ACB  clopidogreL (PLAVIX) tablet 75 mg  75 mg Oral DAILY  amiodarone (CORDARONE) tablet 100 mg  100 mg Oral DAILY  sodium chloride (NS) flush 5-40 mL  5-40 mL IntraVENous Q8H  
 sodium chloride (NS) flush 5-40 mL  5-40 mL IntraVENous PRN  
 acetaminophen (TYLENOL) tablet 650 mg  650 mg Oral Q6H PRN Or  
 acetaminophen (TYLENOL) suppository 650 mg  650 mg Rectal Q6H PRN  polyethylene glycol (MIRALAX) packet 17 g  17 g Oral DAILY PRN  promethazine (PHENERGAN) tablet 12.5 mg  12.5 mg Oral Q6H PRN Or  
 ondansetron (ZOFRAN) injection 4 mg  4 mg IntraVENous Q6H PRN  
 allopurinoL (ZYLOPRIM) tablet 200 mg  200 mg Oral DAILY  atorvastatin (LIPITOR) tablet 80 mg  80 mg Oral QHS  levothyroxine (SYNTHROID) tablet 125 mcg  125 mcg Oral ACB  potassium chloride SR (KLOR-CON 10) tablet 10 mEq  10 mEq Oral DAILY  sucralfate (CARAFATE) tablet 1 g  1 g Oral QID  insulin lispro (HUMALOG) injection   SubCUTAneous AC&HS  
 glucose chewable tablet 16 g  4 Tab Oral PRN  
 dextrose (D50W) injection syrg 12.5-25 g  12.5-25 g IntraVENous PRN  
 glucagon (GLUCAGEN) injection 1 mg  1 mg IntraMUSCular PRN  
 [Held by provider] metoprolol tartrate (LOPRESSOR) tablet 12.5 mg  12.5 mg Oral BID

## 2020-10-19 NOTE — PROGRESS NOTES
OSMEL plan: 
  
-  Home health resumption with [facility] - PT/OT/SN 
-  OP f/u appts - PCP on 10/20, Cardio on 10/22, Nephro on 11/13 
-  Daughter to transport home at d/c [facility] IMM letter Pt remains on IV Lasix for continued diuresis. CM will continue to follow. Edwar Mayers, JASPER Care Manager [facility] 
[phone]

## 2020-10-19 NOTE — PROGRESS NOTES
Problem: Mobility Impaired (Adult and Pediatric) Goal: *Acute Goals and Plan of Care (Insert Text) Description: FUNCTIONAL STATUS PRIOR TO ADMISSION: Patient was independent and active without use of DME. 
 
HOME SUPPORT PRIOR TO ADMISSION: The patient lived alone with local family/friends to provide assistance. Physical Therapy Goals Initiated 10/15/2020 1. Patient will move from supine to sit and sit to supine  in bed with independence within 7 day(s). 2.  Patient will transfer from bed to chair and chair to bed with modified independence using the least restrictive device within 7 day(s). 3.  Patient will perform sit to stand with modified independence within 7 day(s). 4.  Patient will ambulate with modified independence for 200 feet with the least restrictive device within 7 day(s). 5.  Patient will ascend/descend 2 stairs with 1 handrail(s) with modified independence within 7 day(s). Outcome: Progressing Towards Goal 
 PHYSICAL THERAPY TREATMENT Patient: Ulisses Ramirez (85 y.o. female) Date: 10/19/2020 Diagnosis: Acute on chronic diastolic CH[facility]congest[facility] [I50.33] CKD (chronic kidney disease) stage 4, GFR 15-29 ml/min ([facility]) [N18.4] <principal problem not specified> Procedure(s) (LRB): 
COLONOSCOPY (N/A) 5 Days Post-Op Precautions: Fall Chart, physical therapy assessment, plan of care and goals were reviewed. ASSESSMENT Patient continues with skilled PT services and is progressing towards goals. She demonstrates the improved gait speed this session and only requires one standing rest break. Patient is returned to post therapy. Current Level of Function Impacting Discharge (mobility/balance): CGA Other factors to consider for discharge: medical stability, decreased strength/endurance, PLOF 
    
 
PLAN : 
Patient continues to benefit from skilled intervention to address the above impairments. Continue treatment per established plan of care. to address goals. Recommendation for discharge: (in order for the patient to meet his/her long term goals) Therapy up to 5 days/week in SNF setting This discharge recommendation: 
Has been made in collaboration with the attending provider and/or case management IF patient discharges home will need the following DME: to be determined (TBD) SUBJECTIVE:  
Patient stated i'm doing a bit better.  OBJECTIVE DATA SUMMARY:  
Critical Behavior: 
Neurologic State: Alert Orientation Level: Oriented X4 Cognition: Follows commands Safety/Judgement: Awareness of environment, Fall prevention, Insight into deficits Functional Mobility Training: 
Bed Mobility: 
  
  
  
  
  
  
Transfers: 
  
  
     
  
     
  
  
  
  
Balance: 
  
Ambulation/Gait Training: 
  
  
  
  
  
  
  
  
  
  
  
  
  
  
  
  
  
  
Stairs: Therapeutic Exercises:  
 
Pain Rating: 
 
 
Activity Tolerance:  
Fair Please refer to the flowsheet for vital signs taken during this treatment. After treatment patient left in no apparent distress:  
Call bell within reach, Bed / chair alarm activated, Side rails x 3, and seated EOB 
 
COMMUNICATION/COLLABORATION:  
The patients plan of care was discussed with: Registered nurse. Kay Kent, PT Time Calculation: 17 mins

## 2020-10-19 NOTE — PROGRESS NOTES
Bedside and Verbal shift change report given to Lonnie Caba (oncoming nurse) by Mushtaq Desouza (offgoing nurse). Report included the following information SBAR, Kardex, Intake/Output, MAR and Recent Results.

## 2020-10-19 NOTE — PROGRESS NOTES
Problem: Self Care Deficits Care Plan (Adult) Goal: *Acute Goals and Plan of Care (Insert Text) Description:  
FUNCTIONAL STATUS PRIOR TO ADMISSION: Pt reports living alone in single story home, using SPC/RW for ADL mobility/balance, with daughter living close by and providing ADL/IADL assist as needed. Drives locally. Has walk-in shower with seat. HOME SUPPORT: The patient lived alone with local daughter to provide assistance. Occupational Therapy Goals Initiated 10/14/2020 1. Patient will perform RW grooming with modified independence within 7 day(s). 2.  Patient will perform EOB/RW bathing with modified independence within 7 day(s). 3.  Patient will perform RW lower body dressing with modified independence within 7 day(s). 4.  Patient will perform RW  toilet transfers with modified independence within 7 day(s). 5.  Patient will perform all aspects of toileting, at  RW, with modified independence within 7 day(s). Outcome: Progressing Towards Goal 
 
OCCUPATIONAL THERAPY TREATMENT Patient: Mitchel Nicholas (14 y.o. female) Date: 10/19/2020 Diagnosis: Acute on chronic diastolic CHF (congestive heart failure) ([facility] [I50.33] CKD (chronic kidney disease) stage 4, GFR 15-29 ml/min ([facility]) [N18.4] <principal problem not specified> Procedure(s) (LRB): 
COLONOSCOPY (N/A) 5 Days Post-Op Precautions: Fall Chart, occupational therapy assessment, plan of care, and goals were reviewed. ASSESSMENT Patient continues with skilled OT services and is progressing towards goals. This patient completed ADL at RW level into/out of bathroom, stood for grooming tasks yet was unable to stand for bathing at sink due to LE fatigue. She completed bathing in chair as below. She repeated self several times during session, yet didn't require cues for sequencing any ADL tasks.  Patient was educated on CHF management at home (see below for details). She will benefit from [facility] OT at d/c as she lives alone and was mod I at [facility] or RW level, driving, ,performing I-ADL PTA. Current Level of Function Impacting Discharge (ADLs): close SBA for ADL seated or at RW level Other factors to consider for discharge: lives alone PLAN : 
Patient continues to benefit from skilled intervention to address the above impairments. Continue treatment per established plan of care. to address goals. Recommend with staff: amb to/from toilet with RW, up to chair for meals x 3 Recommend next OT session: standing tolerance, make bed at RW level?, CHF education with handout Recommendation for discharge: (in order for the patient to meet his/her long term goals) Occupational therapy at least 2 days/week in the home AND ensure assist and/or supervision for safety with I-ADL and CHF management This discharge recommendation: 
Has not yet been discussed the attending provider and/or case management IF patient discharges home will need the following DME: patient owns DME required for discharge SUBJECTIVE:  
Patient stated I didn't do that at home (daily weights).  OBJECTIVE DATA SUMMARY:  
Cognitive/Behavioral Status: 
Neurologic State: Alert Orientation Level: Oriented X4 Cognition: Follows commands Functional Mobility and Transfers for ADLs: 
Bed Mobility: 
  
 
Transfers: 
Sit to Stand: Stand-by assistance Bed to Chair: Stand-by assistance Balance: 
Sitting: Intact Standing: Impaired; With support Standing - Static: Good Standing - Dynamic : Fair ADL Intervention: 
  
 
Grooming Grooming Assistance: Stand-by assistance Position Performed: Standing Washing Face: Stand-by assistance Washing Hands: Stand-by assistance Brushing Teeth: Stand-by assistance Brushing/Combing Hair: Stand-by assistance Lower Body Bathing Bathing Assistance: Stand-by assistance Perineal  : Stand-by assistance Position Performed: Standing Lower Body : Stand-by assistance Position Performed: Seated in chair Upper Body Dressing Assistance Hospital Gown: Minimum  assistance Lower Body Dressing Assistance Socks: Set-up Toileting Bladder Hygiene: Total assistance (dependent)(tamayo management) Clothing Management: Stand-by assistance Cues: Verbal cues provided Education provided for CHF management (no handout available today): 1. Daily weights and track on calendar every am after toileting 2. Limit salt intake and salty meats 3. Notify MD in weight gain is more than 2# in one day or 5# in one week 4. Elevate LE in bed/chair and perform ankle pumps intermittently for edema management She stated that this had sounded familiar yet she did NOT perform daily weights at home. Therapeutic Exercises:  
 
 
Pain: 
No c/o Activity Tolerance:  
Fair and requires rest breaks Please refer to the flowsheet for vital signs taken during this treatment. After treatment patient left in no apparent distress:  
Sitting in chair, Call bell within reach, and Bed / chair alarm activated COMMUNICATION/COLLABORATION:  
The patients plan of care was discussed with: Registered nurse. Robert Plascencia OTR/L Time Calculation: 40 mins

## 2020-10-19 NOTE — PROGRESS NOTES
Progress Note 10/19/2020 3:52 PM 
NAME: Aliya Fay MRN:  [id number] Admit Diagnosis: Acute on chronic diastolic CHF (congestive heart failure) ([facility]) [I50.33];CKD (chronic kidney disease) stage 4, GFR 15-29 ml/min ([facility]) [N18.4] Assessment: S/p Bioprosthetic MV replacement Hx of A F ib    . Current sinus rhythm S/p Watchman device. Hx of CAD S/p remote coronary stent . Diabetes CKD stage 4. Hypertension. Hyperlipidemia. Anemia Plan:  
EGD noted; gastritis Cscope noted; diverticulosis HRs better Intake/Output Summary (Last 24 hours) at 10/19/2020 3871 Last data filed at 10/19/2020 3307 Gross per 24 hour Intake 800 ml Output 3050 ml Net -2250 ml Last 3 Recorded Weights in this Encounter 10/16/20 1017 10/18/20 0933 10/19/20 0534 Weight: 91.2 kg (201 lb 1 oz) 87 kg (191 lb 12.8 oz) 82.6 kg (182 lb 1.6 oz) Diuretics per renal; now on a lasix gtt @ 30mg/hr Continue plavix Continue amio; reduced to 100mg OK w/ holding metoprolol Continue statin  
 
 [x]        High complexity decision making was performed Subjective:  
 
Aliya Fay denies chest pain, dyspnea. Discussed with RN events overnight. Patient Active Problem List  
Diagnosis Code  T. I.A.  PVD (peripheral vascular disease) ([facility]) I73.9  Reflux esophagitis K21.00  Benign neoplasm of colon D12.6  Iron deficiency anemia D50.9  Hypomagnesemia E83.42  Long term current use of anticoagulant therapy Z79.01  
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  
 Advance directive in chart Z78.9  Type 2 diabetes, controlled, with renal manifestation ([facility]) E11.29  
 Chronic atrial fibrillation ([facility]) I48.20  Mixed hyperlipidemia E78.2  Atherosclerosis of native coronary artery without angina pectoris I25.10  Hypothyroidism, acquired, autoimmune E06.3  Heart valve problem I38  
 Mitral regurgitation I34.0  
 S/P MVR (mitral valve repair) [id number]  Active advance directive on file Z78.9  Non-rheumatic mitral regurgitation I34.0  Heart failure ([facility]) I50.9  Bilateral leg edema R60.0  Esophageal stricture K22.2  Dysphagia R13.10  GI bleeding K92.2  Mitral stenosis I05.0  S/P MVR (mitral valve replacement) Z95.2  
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  
 Acute GI bleeding K92.2  Chronic anticoagulation Z79.01  
 Paroxysmal A-fib ([facility]) I48.0  History of GI bleed Z87.19  
 Presence of Watchman left atrial appendage closure device Z95.818  
 CKD (chronic kidney disease) stage 4, GFR 15-29 ml/min ([facility]) N18.4  Acute on chronic diastolic CHF (congestive heart failure) ([facility]) I50.33 Review of Systems: 
 
Symptom Y/N Comments  Symptom Y/N Comments Fever/Chills N   Chest Pain N Poor Appetite N   Edema N   
Cough N   Abdominal Pain N Sputum N   Joint Pain N   
SOB/LEIJA N   Pruritis/Rash N   
Nausea/vomit N   Tolerating PT/OT Y Diarrhea N   Tolerating Diet Y Constipation N   Other Could NOT obtain due to:   
 
Objective:  
  
Physical Exam: 
 
Last 24hrs VS reviewed since prior progress note. Most recent are: 
 
Visit Vitals BP (!) 147/69 (BP 1 Location: Left arm, BP Patient Position: Sitting) Pulse 74 Temp 97.6 °F (36.4 °C) Resp 16 Ht 5' 9\" (1.753 m) Wt 82.6 kg (182 lb 1.6 oz) SpO2 98% Breastfeeding No  
BMI 26.89 kg/m² Intake/Output Summary (Last 24 hours) at 10/19/2020 8397 Last data filed at 10/19/2020 7990 Gross per 24 hour Intake 800 ml Output 3050 ml Net -2250 ml  
  
 
 General Appearance: Well developed, well nourished, alert & oriented x 3,  
 no acute distress. Ears/Nose/Mouth/Throat: Hearing grossly normal. 
Neck: Supple. Chest: Lungs clear to auscultation bilaterally. Cardiovascular: Regular rate and rhythm, S1S2 normal, no murmur. Abdomen: Soft, non-tender, bowel sounds are active. Extremities: No edema bilaterally. Skin: Warm and dry. PMH/SH reviewed - no change compared to H&P Data Review Telemetry: sinus rhythm Lab Data Personally Reviewed: 
 
No results for input(s): WBC, HGB, HCT, PLT, HGBEXT, HCTEXT, PLTEXT, HGBEXT, HCTEXT, PLTEXT in the last 72 hours. LABRCNT(INR:3,PTP:3,APTT:3,) Recent Labs 10/18/20 
071 977 34 37 10/17/20 
8024  143  
K 3.5 3.5  109* CO2 31 32 BUN 24* 24* CREA 1.73* 1.73* * 88  
CA 9.1 9.0 MG 2.4 2.2 LABRCNT(CPK:3,CpKMB:3,ckndx:3,troiq:3) Lab Results Component Value Date/Time Cholesterol, total 256 (H) 07/13/2020 08:02 AM  
 HDL Cholesterol 58 07/13/2020 08:02 AM  
 LDL, calculated 169 (H) 07/13/2020 08:02 AM  
 Triglyceride 143 07/13/2020 08:02 AM  
 CHOL/HDL Ratio 3.1 09/22/2010 08:12 AM  
LABRCNT(sgot:3,gpt:3,ap:3,tbiL:3,TP:3,ALB:3,GLOB:3,ggt:3,aml:3,amyp:3,lpse:3,hlpse:3)No results for input(s): PH, PCO2, PO2 in the last 72 hours. Lab Results Component Value Date/Time Cholesterol, total 256 (H) 07/13/2020 08:02 AM  
 HDL Cholesterol 58 07/13/2020 08:02 AM  
 LDL, calculated 169 (H) 07/13/2020 08:02 AM  
 Triglyceride 143 07/13/2020 08:02 AM  
 CHOL/HDL Ratio 3.1 09/22/2010 08:12 AM  
MEDTABLEGeorge H Schaffer III, DO No results for input(s): PH, PCO2, PO2 in the last 72 hours. Medications Personally Reviewed: 
 
Current Facility-Administered Medications Medication Dose Route Frequency  potassium, sodium phosphates (NEUTRA-PHOS) packet 1 Packet  1 Packet Oral QID  furosemide (LASIX) 200 mg in 0.9% sodium chloride 100 mL infusion  30 mg/hr IntraVENous CONTINUOUS  
  artificial tears (dextran-hypromellose-glycerin) (GENTEAL) ophthalmic solution 1 Drop  1 Drop Both Eyes PRN  pantoprazole (PROTONIX) tablet 40 mg  40 mg Oral ACB  clopidogreL (PLAVIX) tablet 75 mg  75 mg Oral DAILY  amiodarone (CORDARONE) tablet 100 mg  100 mg Oral DAILY  sodium chloride (NS) flush 5-40 mL  5-40 mL IntraVENous Q8H  
 sodium chloride (NS) flush 5-40 mL  5-40 mL IntraVENous PRN  
 acetaminophen (TYLENOL) tablet 650 mg  650 mg Oral Q6H PRN Or  
 acetaminophen (TYLENOL) suppository 650 mg  650 mg Rectal Q6H PRN  polyethylene glycol (MIRALAX) packet 17 g  17 g Oral DAILY PRN  promethazine (PHENERGAN) tablet 12.5 mg  12.5 mg Oral Q6H PRN Or  
 ondansetron (ZOFRAN) injection 4 mg  4 mg IntraVENous Q6H PRN  
 allopurinoL (ZYLOPRIM) tablet 200 mg  200 mg Oral DAILY  atorvastatin (LIPITOR) tablet 80 mg  80 mg Oral QHS  levothyroxine (SYNTHROID) tablet 125 mcg  125 mcg Oral ACB  potassium chloride SR (KLOR-CON 10) tablet 10 mEq  10 mEq Oral DAILY  sucralfate (CARAFATE) tablet 1 g  1 g Oral QID  insulin lispro (HUMALOG) injection   SubCUTAneous AC&HS  
 glucose chewable tablet 16 g  4 Tab Oral PRN  
 dextrose (D50W) injection syrg 12.5-25 g  12.5-25 g IntraVENous PRN  
 glucagon (GLUCAGEN) injection 1 mg  1 mg IntraMUSCular PRN  
 [Held by provider] metoprolol tartrate (LOPRESSOR) tablet 12.5 mg  12.5 mg Oral BID Shannon Baldwin III, DO

## 2020-10-20 NOTE — PROGRESS NOTES
Hospitalist Progress Note NAME: Melisa Gay :  1935 MRN:  [id number] Assessment / Plan: 
Acute on chronic diastolic chf, POA Hx severe mitral stenosis s/p [employer] Fregoso bioprosthetic mitral valve 2019 S/p  Lasix GTT.  now on PO bumex , Continue Scanlon catheter.  Renal following and appreciate help.  -Holding beta-blocker due to bradycardia. -Strict I's and O's, daily weights and low-salt diet.  Fluid restriction. 
-I's and O's and weights are inaccurate 
  
Suspected GI bleed Positive stool for occult blood Mild acute blood loss anemia 
-S/p colonoscopy today with no clear evidence of bleeding 
-S/p endoscopy with no clear source of anemia 
-Continue Protonix 
-We will need outpatient follow-up with oncology for further evaluation given anemia and thrombocytopenia 
  
  
Chronic atrial fibrillation s/p Watchman procedure 2020 Hx atrial flutter with abalation Sinus bradycardia HR 44, POA Hx CAD 
HTN 
holding metoprolol due to cont bradycardia Continue amiodarone per cardiology 
continue Plavix 
  
CKD stage 4, POA- Cr now close to baseline 
--Creatinine is close to baseline of around 1.7 renal following. S/p Lasix GTT.  Renal following 
  
   
IDDM, POA  
--A1c 6.0 on   -Since blood sugars are low,  insulin NPH DC ontinue insulin sliding scale only 
  
Hx TIA 
--Continue Plavix.  Okay with GI 
  
Hypothyroid --continue levothyroxine 
  
Gout 
--continue allopurinol Body mass index is 29.98 kg/m². 
  
  
  
Code: discussed, DNR/DNI 
DVT prophylaxis:  SCD Surrogate decision maker:  Daughter Martha Hendricks and son 
  
Recommended Disposition: Home vs SNF 
 
 
25.0 - 29.9 Overweight / Body mass index is 28.22 kg/m². Subjective: Chief Complaint / Reason for Physician Visit FU CHF . still with edema 3+ LEbut seems to be chronic . Pt not hypoxic    Discussed with RN events overnight. Review of Systems: 
Symptom Y/N Comments  Symptom Y/N Comments Fever/Chills n   Chest Pain n   
Poor Appetite    Edema Cough    Abdominal Pain n   
Sputum    Joint Pain SOB/LEIJA n   Pruritis/Rash Nausea/vomit n   Tolerating PT/OT Diarrhea    Tolerating Diet y Constipation    Other Could NOT obtain due to:   
 
Objective: VITALS:  
Last 24hrs VS reviewed since prior progress note. Most recent are: 
Patient Vitals for the past 24 hrs: 
 Temp Pulse Resp BP SpO2  
10/20/20 0800 97.7 °F (36.5 °C) 72 16 (!) 131/55 93 % 10/20/20 0319 98 °F (36.7 °C) 77 18 (!) 137/56 94 % 10/19/20 2304 98.3 °F (36.8 °C) 71 16 (!) 125/48 93 % 10/19/20 1949 97.6 °F (36.4 °C) 84 18 136/65 97 % 10/19/20 1548 97.8 °F (36.6 °C) 74 16 (!) 113/55 99 % 10/19/20 1028 97.4 °F (36.3 °C) 67 14 (!) 142/65 99 % Intake/Output Summary (Last 24 hours) at 10/20/2020 0831 Last data filed at 10/20/2020 3077 Gross per 24 hour Intake 152.5 ml Output 2700 ml Net -2547.5 ml PHYSICAL EXAM: 
General: WD, WN. Alert, cooperative, no acute distress   
EENT:  EOMI. Anicteric sclerae. MMM Resp:  CTA bilaterally, no wheezing or rales. No accessory muscle use CV:  Regular  rhythm, LE  Edema 3+ GI:  Soft, Non distended, Non tender.  +Bowel sounds Neurologic:  Alert and oriented X 3, normal speech, Psych:   Fair insight. Not anxious nor agitated Skin:  No rashes. No jaundice Reviewed most current lab test results and cultures  YES Reviewed most current radiology test results   YES Review and summation of old records today    NO Reviewed patient's current orders and MAR    YES 
PMH/SH reviewed - no change compared to H&P 
________________________________________________________________________ Care Plan discussed with: 
  Comments Patient x Family RN x Care Manager Consultant     
                 x Multidiciplinary team rounds were held today with , nursing, pharmacist and clinical coordinator.   Patient's plan of care was discussed; medications were reviewed and discharge planning was addressed. ________________________________________________________________________ Total NON critical care TIME: 35   Minutes Total CRITICAL CARE TIME Spent:   Minutes non procedure based Comments >50% of visit spent in counseling and coordination of care    
________________________________________________________________________ Benjamin Coto MD  
 
Procedures: see electronic medical records for all procedures/Xrays and details which were not copied into this note but were reviewed prior to creation of Plan. LABS: 
I reviewed today's most current labs and imaging studies. Pertinent labs include: No results for input(s): WBC, HGB, HCT, PLT, HGBEXT, HCTEXT, PLTEXT, HGBEXT, HCTEXT, PLTEXT in the last 72 hours. Recent Labs 10/20/20 
0134 10/18/20 
7984  141  
K 3.2* 3.5  107 CO2 32 31 * 113* BUN 21* 24* CREA 1.72* 1.73* CA 9.2 9.1 MG 2.2 2.4 PHOS 3.0 2.3* Signed: Benjamin Coto MD

## 2020-10-20 NOTE — PROGRESS NOTES
Bedside and Verbal shift change report given to Maria Teresa Dangelo (oncoming nurse) by Tino Denton RN (offgoing nurse). Report included the following information Kardex, MAR and Recent Results.

## 2020-10-20 NOTE — PROGRESS NOTES
NAME: Janet Buenrostro :  1935 MRN:  [id number] Assessment :    Plan: 
--CKD stage 4 Hypokalemia HTN Bradycardia Anemia Chronic Thrombocytopenia 
  
S/p bioprosthetic MV Afib S/p watchman H/o cad/stent 
 
  --Creatinine better (peaked at 2.4, now stable at 1.7); long standing DM/HTN. Spearman urine. Likely underlying age and HTN kidney disease. Admitted with acute on chronic diastolic HF and GIB 
 stop lasix gtt Transition to oral lasix 
 
salt and fluid restriction; keep legs elevated; accurate I&O and daily weight 
  
On PO potassium--adding kphos today DC planning-? Tomorrow Has fu with dr. Huber Ryan with 11/15 at 2:45 Subjective: Chief Complaint: edema a bit better. No sob when sitting still Review of Systems: 
 
Symptom Y/N Comments  Symptom Y/N Comments Fever/Chills    Chest Pain Poor Appetite    Edema y Cough    Abdominal Pain Sputum    Joint Pain SOB/LEIJA n/y   Pruritis/Rash Nausea/vomit    Tolerating PT/OT Diarrhea    Tolerating Diet Constipation    Other Could not obtain due to:   
 
Objective: VITALS:  
Last 24hrs VS reviewed since prior progress note. Most recent are: 
Visit Vitals BP (!) 122/48 Pulse 78 Temp 98.4 °F (36.9 °C) Resp 16 Ht 5' 9\" (1.753 m) Wt 86.7 kg (191 lb 1.6 oz) SpO2 96% Breastfeeding No  
BMI 28.22 kg/m² Intake/Output Summary (Last 24 hours) at 10/20/2020 1340 Last data filed at 10/20/2020 7127 Gross per 24 hour Intake 152.5 ml Output 2700 ml Net -2547.5 ml Telemetry Reviewed: PHYSICAL EXAM: 
General: NAD 
cta 
++ edema (a tad better than yesterday) - legs elevated Lab Data Reviewed: (see below) Medications Reviewed: (see below) PMH/SH reviewed - no change compared to H&P 
________________________________________________________________________ Care Plan discussed with: 
 Patient y Family RN Care Manager Consultant:     
 
  Comments >50% of visit spent in counseling and coordination of care    
 
________________________________________________________________________ Giorgio Knapp MD  
 
Procedures: see electronic medical records for all procedures/Xrays and details which 
were not copied into this note but were reviewed prior to creation of Plan. LABS: 
No results for input(s): WBC, HGB, HCT, PLT, HGBEXT, HCTEXT, PLTEXT, HGBEXT, HCTEXT, PLTEXT in the last 72 hours. Recent Labs 10/20/20 
0134 10/18/20 
2211  141  
K 3.2* 3.5  107 CO2 32 31 BUN 21* 24* CREA 1.72* 1.73* * 113* CA 9.2 9.1 MG 2.2 2.4 PHOS 3.0 2.3* No results for input(s): AP, TBIL, TP, ALB, GLOB, GGT, AML, LPSE in the last 72 hours. No lab exists for component: SGOT, GPT, AMYP, HLPSE No results for input(s): INR, PTP, APTT, INREXT, INREXT in the last 72 hours. No results for input(s): FE, TIBC, PSAT, FERR in the last 72 hours. Lab Results Component Value Date/Time Folate 72.3 (H) 01/22/2017 03:13 AM  
  
No results for input(s): PH, PCO2, PO2 in the last 72 hours. No results for input(s): CPK, CKMB in the last 72 hours. No lab exists for component: TROPONINI No components found for: [place] Lab Results Component Value Date/Time  Color YELLOW/STRAW 10/10/2020 10:51 PM  
 Appearance CLEAR 10/10/2020 10:51 PM  
 Specific gravity 1.010 10/10/2020 10:51 PM  
 Specific gravity 1.020 07/06/2010 01:10 PM  
 pH (UA) 5.5 10/10/2020 10:51 PM  
 Protein Negative 10/10/2020 10:51 PM  
 Glucose Negative 10/10/2020 10:51 PM  
 Ketone Negative 10/10/2020 10:51 PM  
 Bilirubin Negative 10/10/2020 10:51 PM  
 Urobilinogen 1.0 10/10/2020 10:51 PM  
 Nitrites Negative 10/10/2020 10:51 PM  
 Leukocyte Esterase MODERATE (A) 10/10/2020 10:51 PM  
 Epithelial cells MANY (A) 10/10/2020 10:51 PM  
 Bacteria Negative 10/10/2020 10:51 PM  
 WBC 10-20 10/10/2020 10:51 PM  
 RBC 0-5 10/10/2020 10:51 PM  
 
 
MEDICATIONS: 
Current Facility-Administered Medications Medication Dose Route Frequency  potassium chloride SR (KLOR-CON 10) tablet 20 mEq  20 mEq Oral DAILY  furosemide (LASIX) 200 mg in 0.9% sodium chloride 100 mL infusion  30 mg/hr IntraVENous CONTINUOUS  
 artificial tears (dextran-hypromellose-glycerin) (GENTEAL) ophthalmic solution 1 Drop  1 Drop Both Eyes PRN  pantoprazole (PROTONIX) tablet 40 mg  40 mg Oral ACB  clopidogreL (PLAVIX) tablet 75 mg  75 mg Oral DAILY  amiodarone (CORDARONE) tablet 100 mg  100 mg Oral DAILY  sodium chloride (NS) flush 5-40 mL  5-40 mL IntraVENous Q8H  
 sodium chloride (NS) flush 5-40 mL  5-40 mL IntraVENous PRN  
 acetaminophen (TYLENOL) tablet 650 mg  650 mg Oral Q6H PRN Or  
 acetaminophen (TYLENOL) suppository 650 mg  650 mg Rectal Q6H PRN  polyethylene glycol (MIRALAX) packet 17 g  17 g Oral DAILY PRN  promethazine (PHENERGAN) tablet 12.5 mg  12.5 mg Oral Q6H PRN Or  
 ondansetron (ZOFRAN) injection 4 mg  4 mg IntraVENous Q6H PRN  
 allopurinoL (ZYLOPRIM) tablet 200 mg  200 mg Oral DAILY  atorvastatin (LIPITOR) tablet 80 mg  80 mg Oral QHS  levothyroxine (SYNTHROID) tablet 125 mcg  125 mcg Oral ACB  sucralfate (CARAFATE) tablet 1 g  1 g Oral QID  insulin lispro (HUMALOG) injection   SubCUTAneous AC&HS  
 glucose chewable tablet 16 g  4 Tab Oral PRN  
 dextrose (D50W) injection syrg 12.5-25 g  12.5-25 g IntraVENous PRN  
 glucagon (GLUCAGEN) injection 1 mg  1 mg IntraMUSCular PRN  
 [Held by provider] metoprolol tartrate (LOPRESSOR) tablet 12.5 mg  12.5 mg Oral BID

## 2020-10-20 NOTE — PROGRESS NOTES
1648-Scanlon d/c'd 
 
1800-Pt voided 100 ml of yellow urine Bedside shift change report given to Sherri Arriaza RN (oncoming nurse) by Ly Contreras RN(offgoing nurse). Report included the following information SBAR, Kardex, ED Summary, [facility] and Recent Results.

## 2020-10-20 NOTE — PROGRESS NOTES
Problem: Self Care Deficits Care Plan (Adult) Goal: *Acute Goals and Plan of Care (Insert Text) Description:  
FUNCTIONAL STATUS PRIOR TO ADMISSION: Pt reports living alone in single story home, using SPC/RW for ADL mobility/balance, with daughter living close by and providing ADL/IADL assist as needed. Drives locally. Has walk-in shower with seat. HOME SUPPORT: The patient lived alone with local daughter to provide assistance. Occupational Therapy Goals Initiated 10/14/2020 1. Patient will perform RW grooming with modified independence within 7 day(s). 2.  Patient will perform EOB/RW bathing with modified independence within 7 day(s). 3.  Patient will perform RW lower body dressing with modified independence within 7 day(s). 4.  Patient will perform RW  toilet transfers with modified independence within 7 day(s). 5.  Patient will perform all aspects of toileting, at  RW, with modified independence within 7 day(s). Outcome: Progressing Towards Goal 
 
OCCUPATIONAL THERAPY TREATMENT Patient: Luz Marina Montiel (13 y.o. female) Date: 10/20/2020 Diagnosis: Acute on chronic diastolic CHF (congestive heart failure) ([facility] [address] [I50.33] CKD (chronic kidney disease) stage 4, GFR 15-29 ml/min ([facility]) [N18.4] <principal problem not specified> Procedure(s) (LRB): 
COLONOSCOPY (N/A) 6 Days Post-Op Precautions: Fall Chart, occupational therapy assessment, plan of care, and goals were reviewed. ASSESSMENT Patient continues with skilled OT services and is progressing towards goals. Pt continues to demonstrate good standby assist level RW grooming and ADL related mobility/balance challenges; however, intermittent confusion, with noted decreased STM, continues to limit pt's progression to a Modified Independent level, with pt benefiting from cues for anterior RW placement during sustained reach outside base of support, as well as, cues to reach back prior to sitting to allow for slow lower to seat.    OT revisited CHF training presented yesterday, with unable to recall, requiring Max verbal cues for understanding. Pt continues to report daughter is able to provide daily PRN assist; however, given fact that she lives alone, reporting therapist believes pt would benefit from [facility] services with in-home ADL/IADL Supervision upon discharge, with acute OT to continue to follow during hospitalization. Current Level of Function Impacting Discharge (ADLs): Standby Assist-Supervision Other factors to consider for discharge: Lives alone PLAN : 
Patient continues to benefit from skilled intervention to address the above impairments. Continue treatment per established plan of care. to address goals. Recommend with staff: OOB meals, Active ADL engagement, Assist x1 to/from bathroom Recommend next OT session: POC progression Recommendation for discharge: (in order for the patient to meet his/her long term goals) Occupational therapy at least 2 days/week in the home AND ensure assist and/or supervision for safety with ADL/IADL; in-home Supervision recommended. This discharge recommendation: 
Has been made in collaboration with the attending provider and/or case management IF patient discharges home will need the following DME: patient owns DME required for discharge SUBJECTIVE:  
Patient stated I have a walker at home, do you think I should use it?  OBJECTIVE DATA SUMMARY:  
Cognitive/Behavioral Status: 
Neurologic State: Alert Orientation Level: Oriented X4(intermittent confusion, decreased STM) Cognition: Follows commands Perception: Appears intact Perseveration: No perseveration noted Safety/Judgement: Awareness of environment Functional Mobility and Transfers for ADLs: 
Transfers: 
Sit to Stand: Stand-by assistance Bed to Chair: Stand-by assistance Pt educated on safe transfer techniques, with specific emphasis on proper hand placement to push up from seated surface rather than attempt to pull self up, fully positioning self in-front of desired seated location, feeling chair on back of legs and reaching back with 1-2 UE to slowly lower self to seated position. Balance: 
Standing: Impaired Standing - Static: Good Standing - Dynamic : Good;Fair ADL Intervention: 
Grooming Grooming Assistance: Stand-by assistance Position Performed: Standing Washing Face: Stand-by assistance Washing Hands: Stand-by assistance Cues: Verbal cues provided(Min verbal cues for safe RW technique) Cognitive Retraining Safety/Judgement: Awareness of environment Re-visited previously provided Education for CHF management, with Max verbal cues for understanding 1. Daily weights and track on calendar every am after toileting 2. Limit salt intake and salty meats 3. Notify MD in weight gain is more than 2# in one day or 5# in one week 4. Elevate LE in bed/chair and perform ankle pumps intermittently for edema management Pain: 
No c/o pain Activity Tolerance:  
Fair and requires rest breaks After treatment patient left in no apparent distress:  
Sitting in chair, Call bell within reach, and Bed / chair alarm activated COMMUNICATION/COLLABORATION:  
The patients plan of care was discussed with: Registered nurse. Elpidio Jackson OT Time Calculation: 13 mins

## 2020-10-20 NOTE — PROGRESS NOTES
Progress Note 
 
 
10/20/2020 3:52 PM 
NAME: Gaylan Ormond MRN:  [id number] Admit Diagnosis: Acute on chronic diastolic CHF (congestive heart failure) ([facility]) [I50.33];CKD (chronic kidney disease) stage 4, GFR 15-29 ml/min ([facility]) [N18.4] Assessment: S/p Bioprosthetic MV replacement Hx of A F ib    . Current sinus rhythm S/p Watchman device. Hx of CAD S/p remote coronary stent . Diabetes CKD stage 4. Hypertension. Hyperlipidemia. Anemia Plan:  
EGD noted; gastritis Cscope noted; diverticulosis HRs better Intake/Output Summary (Last 24 hours) at 10/20/2020 1141 Last data filed at 10/20/2020 4207 Gross per 24 hour Intake 152.5 ml Output 2700 ml Net -2547.5 ml Last 3 Recorded Weights in this Encounter 10/18/20 1893 10/19/20 0534 10/20/20 7449 Weight: 87 kg (191 lb 12.8 oz) 82.6 kg (182 lb 1.6 oz) 86.7 kg (191 lb 1.6 oz) Diuretics per renal; oral lasix now. Still w/ tamayo. Continue plavix Continue amio; reduced to 100mg OK w/ holding metoprolol Continue statin Will be available as needed  
 
 [x]        High complexity decision making was performed Subjective:  
 
Gaylan Ormond denies chest pain, dyspnea. Discussed with RN events overnight. Patient Active Problem List  
Diagnosis Code  T. I.A.  PVD (peripheral vascular disease) ([facility]) I73.9  Reflux esophagitis K21.00  Benign neoplasm of colon D12.6  Iron deficiency anemia D50.9  Hypomagnesemia E83.42  Long term current use of anticoagulant therapy Z79.01  
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  
 Advance directive in chart Z78.9  Type 2 diabetes, controlled, with renal manifestation ([facility]) E11.29  
 Chronic atrial fibrillation ([facility]) I48.20  Mixed hyperlipidemia E78.2  Atherosclerosis of native coronary artery without angina pectoris I25.10  Hypothyroidism, acquired, autoimmune E06.3  Heart valve problem I38  
 Mitral regurgitation I34.0  
 S/P MVR (mitral valve repair) [id number]  Active advance directive on file Z78.9  Non-rheumatic mitral regurgitation I34.0  Heart failure ([facility]) I50.9  Bilateral leg edema R60.0  Esophageal stricture K22.2  Dysphagia R13.10  GI bleeding K92.2  Mitral stenosis I05.0  S/P MVR (mitral valve replacement) Z95.2  
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  
 Acute GI bleeding K92.2  Chronic anticoagulation Z79.01  
 Paroxysmal A-fib ([facility]) I48.0  History of GI bleed Z87.19  
 Presence of Watchman left atrial appendage closure device Z95.818  
 CKD (chronic kidney disease) stage 4, GFR 15-29 ml/min ([facility]) N18.4  Acute on chronic diastolic CHF (congestive heart failure) ([facility]) I50.33 Review of Systems: 
 
Symptom Y/N Comments  Symptom Y/N Comments Fever/Chills N   Chest Pain N Poor Appetite N   Edema N   
Cough N   Abdominal Pain N Sputum N   Joint Pain N   
SOB/LEIJA N   Pruritis/Rash N   
Nausea/vomit N   Tolerating PT/OT Y Diarrhea N   Tolerating Diet Y Constipation N   Other Could NOT obtain due to:   
 
Objective:  
  
Physical Exam: 
 
Last 24hrs VS reviewed since prior progress note. Most recent are: 
 
Visit Vitals BP (!) 122/48 Pulse 78 Temp 98.4 °F (36.9 °C) Resp 16 Ht 5' 9\" (1.753 m) Wt 86.7 kg (191 lb 1.6 oz) SpO2 96% Breastfeeding No  
BMI 28.22 kg/m² Intake/Output Summary (Last 24 hours) at 10/20/2020 1141 Last data filed at 10/20/2020 6544 Gross per 24 hour Intake 152.5 ml Output 2700 ml Net -2547.5 ml  
  
 
 General Appearance: Well developed, well nourished, alert & oriented x 3,  
 no acute distress. Ears/Nose/Mouth/Throat: Hearing grossly normal. 
Neck: Supple. Chest: Lungs clear to auscultation bilaterally. Cardiovascular: Regular rate and rhythm, S1S2 normal, no murmur. Abdomen: Soft, non-tender, bowel sounds are active. Extremities: No edema bilaterally. Skin: Warm and dry. PMH/SH reviewed - no change compared to H&P Data Review Telemetry: sinus rhythm Lab Data Personally Reviewed: 
 
No results for input(s): WBC, HGB, HCT, PLT, HGBEXT, HCTEXT, PLTEXT, HGBEXT, HCTEXT, PLTEXT in the last 72 hours. LABRCNT(INR:3,PTP:3,APTT:3,) Recent Labs 10/20/20 
0134 10/18/20 
8195  141  
K 3.2* 3.5  107 CO2 32 31 BUN 21* 24* CREA 1.72* 1.73* * 113* CA 9.2 9.1 MG 2.2 2.4 LABRCNT(CPK:3,CpKMB:3,ckndx:3,troiq:3) Lab Results Component Value Date/Time Cholesterol, total 256 (H) 07/13/2020 08:02 AM  
 HDL Cholesterol 58 07/13/2020 08:02 AM  
 LDL, calculated 169 (H) 07/13/2020 08:02 AM  
 Triglyceride 143 07/13/2020 08:02 AM  
 CHOL/HDL Ratio 3.1 09/22/2010 08:12 AM  
LABRCNT(sgot:3,gpt:3,ap:3,tbiL:3,TP:3,ALB:3,GLOB:3,ggt:3,aml:3,amyp:3,lpse:3,hlpse:3)No results for input(s): PH, PCO2, PO2 in the last 72 hours. Lab Results Component Value Date/Time Cholesterol, total 256 (H) 07/13/2020 08:02 AM  
 HDL Cholesterol 58 07/13/2020 08:02 AM  
 LDL, calculated 169 (H) 07/13/2020 08:02 AM  
 Triglyceride 143 07/13/2020 08:02 AM  
 CHOL/HDL Ratio 3.1 09/22/2010 08:12 AM  
MEDTABLEGecindi Schaffer III, DO No results for input(s): PH, PCO2, PO2 in the last 72 hours. Medications Personally Reviewed: 
 
Current Facility-Administered Medications Medication Dose Route Frequency  potassium chloride SR (KLOR-CON 10) tablet 20 mEq  20 mEq Oral DAILY  furosemide (LASIX) 200 mg in 0.9% sodium chloride 100 mL infusion  30 mg/hr IntraVENous CONTINUOUS  
  artificial tears (dextran-hypromellose-glycerin) (GENTEAL) ophthalmic solution 1 Drop  1 Drop Both Eyes PRN  pantoprazole (PROTONIX) tablet 40 mg  40 mg Oral ACB  clopidogreL (PLAVIX) tablet 75 mg  75 mg Oral DAILY  amiodarone (CORDARONE) tablet 100 mg  100 mg Oral DAILY  sodium chloride (NS) flush 5-40 mL  5-40 mL IntraVENous Q8H  
 sodium chloride (NS) flush 5-40 mL  5-40 mL IntraVENous PRN  
 acetaminophen (TYLENOL) tablet 650 mg  650 mg Oral Q6H PRN Or  
 acetaminophen (TYLENOL) suppository 650 mg  650 mg Rectal Q6H PRN  polyethylene glycol (MIRALAX) packet 17 g  17 g Oral DAILY PRN  promethazine (PHENERGAN) tablet 12.5 mg  12.5 mg Oral Q6H PRN Or  
 ondansetron (ZOFRAN) injection 4 mg  4 mg IntraVENous Q6H PRN  
 allopurinoL (ZYLOPRIM) tablet 200 mg  200 mg Oral DAILY  atorvastatin (LIPITOR) tablet 80 mg  80 mg Oral QHS  levothyroxine (SYNTHROID) tablet 125 mcg  125 mcg Oral ACB  sucralfate (CARAFATE) tablet 1 g  1 g Oral QID  insulin lispro (HUMALOG) injection   SubCUTAneous AC&HS  
 glucose chewable tablet 16 g  4 Tab Oral PRN  
 dextrose (D50W) injection syrg 12.5-25 g  12.5-25 g IntraVENous PRN  
 glucagon (GLUCAGEN) injection 1 mg  1 mg IntraMUSCular PRN  
 [Held by provider] metoprolol tartrate (LOPRESSOR) tablet 12.5 mg  12.5 mg Oral BID Henny Martinez III, DO

## 2020-10-20 NOTE — PROGRESS NOTES
Problem: Falls - Risk of 
Goal: *Absence of Falls Description: Document Anali Gusman Fall Risk and appropriate interventions in the flowsheet. Outcome: Progressing Towards Goal 
Note: Fall Risk Interventions: 
Mobility Interventions: Assess mobility with egress test, Bed/chair exit alarm Mentation Interventions: Bed/chair exit alarm Medication Interventions: Assess postural VS orthostatic hypotension, Bed/chair exit alarm Elimination Interventions: Bed/chair exit alarm, Call light in reach History of Falls Interventions: Bed/chair exit alarm, Consult care management for discharge planning

## 2020-10-20 NOTE — PROGRESS NOTES
Problem: Mobility Impaired (Adult and Pediatric) Goal: *Acute Goals and Plan of Care (Insert Text) Description: FUNCTIONAL STATUS PRIOR TO ADMISSION: Patient was independent and active without use of DME. 
 
HOME SUPPORT PRIOR TO ADMISSION: The patient lived alone with local family/friends to provide assistance. Physical Therapy Goals Initiated 10/15/2020 1. Patient will move from supine to sit and sit to supine  in bed with independence within 7 day(s). 2.  Patient will transfer from bed to chair and chair to bed with modified independence using the least restrictive device within 7 day(s). 3.  Patient will perform sit to stand with modified independence within 7 day(s). 4.  Patient will ambulate with modified independence for 200 feet with the least restrictive device within 7 day(s). 5.  Patient will ascend/descend 2 stairs with 1 handrail(s) with modified independence within 7 day(s). Outcome: Progressing Towards Goal 
PHYSICAL THERAPY TREATMENT Patient: Laina Magana (69 y.o. female) Date: 10/20/2020 Diagnosis: Acute on chronic diastolic CHF (congestive heart failure) ([facility] [address] [I50.33] CKD (chronic kidney disease) stage 4, GFR 15-29 ml/min ([facility]) [N18.4] <principal problem not specified> Procedure(s) (LRB): 
COLONOSCOPY (N/A) 6 Days Post-Op Precautions: Fall Chart, physical therapy assessment, plan of care and goals were reviewed. ASSESSMENT Patient continues with skilled PT services and is progressing towards goals. Patient is making daily progress towards goals but remains limited by endurance and lives alone. Gait training completed x160 with 1 standing and 1 seated rest break. Initially rested d/t mild patient LEIJA and fatigue. Seated rest break provided when the patient demonstrated increasing difficulty maintaining full knee extension during stance phase of gait. SpO2 and HR remained stable on RA throughout.  Returned to bedside chair with a poorly controlled descent to sitting. Continue to recommend discharge to a rehab setting d/t patient living alone, use of a RW at present, and decreased endurance. If she defers rehab, then recommend increased in home support with HHPT services. Current Level of Function Impacting Discharge (mobility/balance): CGA for gait, seated rest break required in 160' Other factors to consider for discharge: lives alone PLAN : 
Patient continues to benefit from skilled intervention to address the above impairments. Continue treatment per established plan of care. to address goals. Recommendation for discharge: (in order for the patient to meet his/her long term goals) Therapy up to 5 days/week in SNF setting This discharge recommendation: 
Has been made in collaboration with the attending provider and/or case management IF patient discharges home will need the following DME: to be determined (TBD) SUBJECTIVE:  
Patient stated I will take a break.  OBJECTIVE DATA SUMMARY:  
Critical Behavior: 
Neurologic State: Alert Orientation Level: Oriented X4(intermittent confusion, decreased STM) Cognition: Follows commands Safety/Judgement: Awareness of environment Functional Mobility Training: 
Bed Mobility: 
  
  
  
  
  
  
Transfers: 
Sit to Stand: Stand-by assistance Stand to Sit: Stand-by assistance Bed to Chair: Stand-by assistance Balance: 
Standing: Impaired Standing - Static: Good Standing - Dynamic : Good;Fair Ambulation/Gait Training: 
Distance (ft): 160 Feet (ft)(with 1 standing and 1 seated rest) Assistive Device: Gait belt;Walker, rolling Ambulation - Level of Assistance: Contact guard assistance Gait Abnormalities: Decreased step clearance Base of Support: Widened Speed/Melany: Shuffled;Pace decreased (<100 feet/min) Step Length: Left shortened;Right shortened Activity Tolerance:  
Fair After treatment patient left in no apparent distress:  
Sitting in chair and Call bell within reach COMMUNICATION/COLLABORATION:  
The patients plan of care was discussed with: Registered nurse. Viji Matthews PT, DPT Time Calculation: 18 mins

## 2020-10-21 NOTE — DISCHARGE INSTRUCTIONS
DISCHARGE DIAGNOSIS:  Acute on chronic diastolic chf, POA  Hx severe mitral stenosis s/p Medtronic Fregoso bioprosthetic mitral valve 4/2019  Suspected GI bleed  Positive stool for occult blood  Mild acute blood loss anemia   Chronic atrial fibrillation s/p Watchman procedure 2/2020  Hx atrial flutter with abalation  Sinus bradycardia HR 44, POA  Hx CAD  HTN  CKD stage 4, POA-     IDDM, POA   -Hx TIA  Hypothyroid  Gout    Body mass index is 29.98 kg/m².       MEDICATIONS:  · It is important that you take the medication exactly as they are prescribed. · Keep your medication in the bottles provided by the pharmacist and keep a list of the medication names, dosages, and times to be taken in your wallet. · Do not take other medications without consulting your doctor. Pain Management: per above medications    What to do at Home    Recommended diet:  Diabetic Diet    Recommended activity: Activity as tolerated    If you have questions regarding the hospital related prescriptions or hospital related issues please call [address] at . You can always direct your questions to your primary care doctor if you are unable to reach your hospital physician; your PCP works as an extension of your hospital doctor just like your hospital doctor is an extension of your PCP for your time at the hospital [facility]). If you experience any of the following symptoms then please call your primary care physician or return to the emergency room if you cannot get hold of your doctor:  Fever, chills, nausea, vomiting, diarrhea, change in mentation, falling, bleeding, shortness of breath  Patient Education        Fluid Restriction: Care Instructions  Your Care Instructions     A buildup of fluid in the body can cause low sodium levels in the blood. It may also cause symptoms such as swelling and pain. Your doctor may suggest that you limit liquids, including foods that contain a lot of liquid.  Limiting liquids is called fluid restriction. Keeping track of the amount of fluids you take in may help you feel better. Your doctor will tell you how much fluid you can have in a day. Follow-up care is a key part of your treatment and safety. Be sure to make and go to all appointments, and call your doctor if you are having problems. It's also a good idea to know your test results and keep a list of the medicines you take. How can you care for yourself at home? · Find a way of tracking the fluids you take in that works for you. Here are two methods you can try:  ? Write down how much you drink throughout the day. ? Keep a container filled with the amount of liquid allowed for the day. As you drink liquids during the day, such as a 6-ounce cup of coffee, pour that same amount out of the container. When the container is empty, you've had your liquid for the day. · Count any foods that will melt (such as ice cream, gelatin, or flavored ice treats) or liquid foods (such as soup) as part of your fluids for the day. Also count the liquid in canned fruits and vegetables as part of your daily intake, or drain them well before serving. · Space your liquids throughout the day. Then you won't be tempted to drink more than the amount your doctor recommends. · To relieve thirst without taking in extra water, try chewing gum, sucking on hard candy (sugarless if you have diabetes), or rinsing your mouth with water and spitting it out. Where can you learn more? Go to http://www.gray.com/  Enter R478 in the search box to learn more about \"Fluid Restriction: Care Instructions. \"  Current as of: December 16, 2019               Content Version: 12.6  © 4367-2574 [employer]. Care instructions adapted under license by [employer] (which disclaims liability or warranty for this information).  If you have questions about a medical condition or this instruction, always ask your healthcare professional. [address] any warranty or liability for your use of this information.

## 2020-10-21 NOTE — PROGRESS NOTES
Bedside shift change report given to Josette Kelley RN (oncoming nurse) by DEBBI HARDIN RN (offgoing nurse). Report included the following information SBAR, Kardex, ED Summary, Intake/Output, MAR and Recent Results.

## 2020-10-21 NOTE — DISCHARGE SUMMARY
Hospitalist Discharge Summary Patient ID: Governor Luis [id number] 
40 y.o. 
1935 
10/10/2020 PCP on record: Iliana Wolf MD 
 
Admit date: 10/10/2020 Discharge date and time: 10/21/2020 DISCHARGE DIAGNOSIS: 
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  
Chronic atrial fibrillation s/p Watchman procedure 2/2020 Hx atrial flutter with abalation Sinus bradycardia HR 44, POA Hx CAD 
HTN 
CKD stage 4, POA-    
IDDM, POA  
-Hx TIA Hypothyroid Gout CONSULTATIONS: 
IP CONSULT TO CARDIOLOGY 
IP CONSULT TO GASTROENTEROLOGY 
IP CONSULT TO NEPHROLOGY Excerpted HPI from H&P of Negin Horvath MD: 
Governor Luis is a 82 y.o. 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  
  
History obtained from mostly daughter and some from patient. She typically has mild edema in ankles. Has had increased b/l leg edema for about 1 week but a lot worse in past 2-3 days with increased LEIJA, increased abdominal girth, 10 lb weight gain, orthopnea. Dr. Perico Kilgore increased lasix from 80mg AM and 40mg PM to 120mg bid past 2 days without improvement in leg edema. Also had some red blood on tissue when wiped after BM yesterday (no blood in stool). She has had intermittent dark stools and epigastric crampy pain for about 1-2 weeks. 
  
evaluation of the above problems. ______________________________________________________________________ DISCHARGE SUMMARY/HOSPITAL COURSE:  for full details see H&P, daily progress notes, labs, consult notes. Acute on chronic diastolic chf, POA Hx severe mitral stenosis s/p Medtronic Fregoso bioprosthetic mitral valve 4/2019 S/p  Lasix GTT.  now on PO bumex ,  Scanlon catheter dc .  Renal following and appreciate help.  -Holding beta-blocker due to bradycardia. -Strict I's and O's, daily weights and low-salt diet.  Fluid restriction. - elevate  lower extremities Suspected GI bleed Positive stool for occult blood Mild acute blood loss anemia 
-S/p colonoscopy today with no clear evidence of bleeding 
-S/p endoscopy with no clear source of anemia 
-Continue Protonix 
-We will need outpatient follow-up with oncology for further evaluation given anemia and thrombocytopenia 
  
  
Chronic atrial fibrillation s/p Watchman procedure 2/2020 Hx atrial flutter with abalation Sinus bradycardia HR 44, POA Hx CAD 
HTN 
DC  metoprolol due to cont bradycardia Continue amiodarone per cardiology at reduced dose  
continue Plavix 
  
CKD stage 4, POA- Cr now close to baseline 
--Creatinine is close to baseline of around 1.7 renal following. S/p Lasix GTT.  Renal following FU with Dr Leonard Chow as outpatient  
  
   
IDDM, POA  
--A1c 6.0 on  7/20 -Since blood sugars are low,  home NPH reduced   
Hx TIA 
--Continue Plavix.  Okay with GI 
  
Hypothyroid --continue levothyroxine 
  
Gout 
--continue allopurinol Body mass index is 29.98 kg/m². 
  
 
 
Physical therapy recommended SNF but family wanted to be discharged home Discussed with daughter , CM and RN   
 
_______________________________________________________________________ Patient seen and examined by me on discharge day. Pertinent Findings: 
Gen:    Not in distress Chest: Clear lungs CVS:   Regular rhythm. No edema Abd:  Soft, not distended, not tender Neuro:  Alert, orientedx4 
_______________________________________________________________________ DISCHARGE MEDICATIONS:  
Current Discharge Medication List  
  
 START taking these medications Details  
bumetanide (BUMEX) 2 mg tablet Take 1 Tab by mouth daily for 60 days. Qty: 30 Tab, Refills: 1 CONTINUE these medications which have CHANGED Details  
!! insulin NPH (HumuLIN N NPH Insulin KwikPen) 100 unit/mL (3 mL) inpn 10 Units by SubCUTAneous route every morning. Qty: 15 Adjustable Dose Pre-filled Pen Syringe, Refills: 3  
  
amiodarone (PACERONE) 100 mg tablet Take 1 Tab by mouth daily for 60 days. Qty: 30 Tab, Refills: 1  
  
 !! - Potential duplicate medications found. Please discuss with provider. CONTINUE these medications which have NOT CHANGED Details  
loratadine (CLARITIN) 10 mg tablet Take 10 mg by mouth daily as needed for Allergies. neomycin sulf/bacitracin/poly (NEOSPORIN EX) by Apply Externally route daily as needed (wound care). potassium chloride SR (KLOR-CON 10) 10 mEq tablet Take 1 Tab by mouth daily. Qty: 90 Tab, Refills: 3  
  
atorvastatin (LIPITOR) 80 mg tablet TAKE ONE TABLET BY MOUTH EVERY EVENING Qty: 90 Tab, Refills: 3 Associated Diagnoses: Hypercholesteremia  
  
allopurinoL (ZYLOPRIM) 100 mg tablet TAKE 2 TABLETS BY MOUTH DAILY Qty: 180 Tab, Refills: 2  
  
levothyroxine (SYNTHROID) 125 mcg tablet TAKE ONE TABLET BY MOUTH DAILY BEFORE BREAKFAST Qty: 90 Tab, Refills: 0  
  
pantoprazole (PROTONIX) 40 mg tablet TAKE 1 TABLET BY MOUTH DAILY Qty: 90 Tab, Refills: 3  
  
clopidogreL (PLAVIX) 75 mg tab Take 1 Tab by mouth daily. Qty: 30 Tab, Refills: 5  
  
sucralfate (CARAFATE) 1 gram tablet Take 1 g by mouth four (4) times daily. !! insulin NPH (HUMULIN N NPH INSULIN KWIKPEN) 100 unit/mL (3 mL) inpn 6 Units by SubCUTAneous route every evening. acetaminophen (TYLENOL) 325 mg tablet Take 325 mg by mouth every four (4) hours as needed for Pain. !! - Potential duplicate medications found. Please discuss with provider. STOP taking these medications metoprolol tartrate (LOPRESSOR) 50 mg tablet Comments:  
Reason for Stopping:   
   
 furosemide (LASIX) 40 mg tablet Comments:  
Reason for Stopping:   
   
  
 
 
 
Patient Follow Up Instructions: Activity: Activity as tolerated Diet: Cardiac Diet Wound Care: None needed Follow-up with PCP in 7days Follow-up tests/labs Follow-up Information Follow up With Specialties Details Why Contact Info Reina Rosas MD Internal Medicine On 10/23/2020 10:00 am for [facility] hospital follow-up [address] [facility] 
[phone] [facility] [address]  Your home health agency. A nurse will call you to schedule a home visit. [address] [place] [address] [id number] [phone] Osiris Joseph MD Nephrology Go on 11/13/2020 2:45 pm for Nephrology follow-up [address]  [address] [facility] 
[phone] Ten Ng NP Nurse Practitioner Go on 10/22/2020 11:00 am for Cardiology appointment [address] [place] [address] [address] [place] [address] 
[phone] 
  
  
 
________________________________________________________________ Risk of deterioration: Moderate Condition at Discharge:  Stable 
__________________________________________________________________ Disposition Home with family and home health services 
 
____________________________________________________________________ Code Status: DNR/DNI 
___________________________________________________________________ Total time in minutes spent coordinating this discharge (includes going over instructions, follow-up, prescriptions, and preparing report for sign off to her PCP) :  >30 minutes Signed: 
Luh Pink MD

## 2020-10-21 NOTE — PROGRESS NOTES
Discussed discharge instructions with pt and pt daughter. Opportunity to ask questions given. Pt advised to obtain all belongings prior to leaving room.

## 2020-10-21 NOTE — PROGRESS NOTES
OSMEL plan: -  Discharge home today. Daughter to transport around 10:00 am.  
[facility] [place] [place] will resume services of PT/OT/SN and are aware of d/c today. -  Daughter stated she has increased caregiver hours with [facility] and will have someone stay with pt for a few nights post-discharge. -  PCP appt on 10/23/20. Cardio appt on 10/22/20. Nephro appt on 11/13/20. Medicare pt has received, reviewed, and signed 2nd IM letter informing them of their right to appeal the discharge. Signed copy has been placed on pt bedside chart. Pt and daughter, Ashok Mitchell, both verbalized understanding of the plan. No further concerns indicated at this time. AVS updated. Patient is ready for discharge from a Care Management standpoint. RN informed. Care Management Interventions PCP Verified by CM: Yes Mode of Transport at Discharge: Other (see comment)(daughter) Hospital Transport Time of Discharge: 1000 Transition of Care Consult (CM Consult): Home Health [facility] - INPATIENT: Yes Discharge Durable Medical Equipment: No 
Physical Therapy Consult: Yes Occupational Therapy Consult: Yes Speech Therapy Consult: No 
Current Support Network: Family Lives [place], Lives Alone, Own Home, Has Personal Caregivers Confirm Follow Up Transport: Family The Plan for Transition of Care is Related to the Following Treatment Goals : home health The Patient and/or Patient Representative was Provided with a Choice of Provider and Agrees with the Discharge Plan?: Yes Freedom of Choice List was Provided with Basic Dialogue that Supports the Patient's Individualized Plan of Care/Goals, Treatment Preferences and Shares the Quality Data Associated with the Providers?: Yes Discharge Location Discharge Placement: Home with home health Louise Tirado LMSW Care Manager [id number] [facility] 
[phone]

## 2020-10-22 NOTE — PROGRESS NOTES
Patient was admitted to [facility] on 10-10-20 and discharged on 10-21-20 for: 
 
DISCHARGE DIAGNOSIS: 
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  
Chronic atrial fibrillation s/p Watchman procedure 2/2020 Hx atrial flutter with abalation Sinus bradycardia HR 44, POA Hx CAD 
HTN 
CKD stage 4, POA-    
IDDM, POA  
-Hx TIA Hypothyroid Gout Outreach made within 2 business days of discharge: Yes Discharge Challenges to be reviewed by the provider:  
Additional needs identified to be addressed with provider:  yes -  Daughter reports patient has \"Open sores on both legs\" and daughter states the skin is weeping from bilateral knees down to bilateral ankles. Daughter states patient has wound on right hand and on left arm. Daughter reports patient becomes short of breath with exertion and patient has swelling in bilateral lower extremities, daughter states patient is utilizing elevation with success. Patient did not weigh today; however daughter states she will assist patient in weighing daily and recording home daily weights starting tomorrow, 10-23-20. Care Transitions Nurse reviewed the rules of weight gain with daughter: if patient should gain three pounds in twenty-four hours and/or five pounds in one week, patient's PCP and/or cardiologist should be contacted immediately. Daughter is able to provide teach back of weight rules and daughter verbalized an understanding.  
- Daughter states patient has been complaining of her \"Eyes burning. \" Daughter states both eyes are red, have some swelling present, and clear drainage. Eyes have been bothering patient for approximately 1 1/2 weeks per daughter.  
- Daughter reports patient's appetite is poor, states she is eating 3 small meals per day. - Daughter reports three falls in the last six months and a total of three falls in the last year. Daughter states patient did not sustain any traumatic injury with any of these three falls. - Daughter states [facility] will begin care tomorrow, 10-23-20, from 8am until 8pm and daughter and son plan to stay with patient at night for several nights to assess night time care needs. COVID-19 related testing was not completed during this admission. Method of communication with provider : chart routing Component Latest Ref Rng & Units 10/21/2020 10/20/2020 10/20/2020 10/20/2020  
 
      7:55 AM  9:33 PM  4:21 PM 11:30 AM  
GLUCOSE,FAST - POC 
    65 - 100 mg/dL 129 (H) 148 (H) 201 (H) 119 (H) Component Latest Ref Rng & Units 10/20/2020  
 
      7:33 AM  
GLUCOSE,FAST - POC 
    65 - 100 mg/dL 110 (H) Component Latest Ref Rng & Units 10/20/2020 10/18/2020 10/18/2020 10/18/2020  
 
      1:34 AM  3:57 AM  3:57 AM  3:57 AM  
Potassium 3.5 - 5.1 mmol/L 3.2 (L)  3.5 Component Latest Ref Rng & Units 10/17/2020 10/17/2020  
 
      1:52 AM  1:52 AM  
Potassium 3.5 - 5.1 mmol/L  3.5 Component Latest Ref Rng & Units 10/20/2020 10/18/2020 10/18/2020 10/18/2020  
 
      1:34 AM  3:57 AM  3:57 AM  3:57 AM  
BUN 
    6 - 20 MG/DL 21 (H)  24 (H) Creatinine 
    0.55 - 1.02 MG/DL 1.72 (H)  1.73 (H) BUN/Creatinine ratio 12 - 20   12  14 GFR est AA 
    >60 ml/min/1.73m2 34 (L)  34 (L) Component Latest Ref Rng & Units 10/17/2020 10/17/2020  
 
      1:52 AM  1:52 AM  
BUN 
    6 - 20 MG/DL  24 (H) Creatinine 
    0.55 - 1.02 MG/DL  1.73 (H) BUN/Creatinine ratio 12 - 20    14 GFR est AA 
    >60 ml/min/1.73m2  34 (L) Component Latest Ref Rng & Units 10/15/2020 10/14/2020 10/13/2020  
 
      1:19 AM  1:42 AM  1:00 AM  
RBC 
    3.80 - 5.20 M/uL 3.63 (L) 3.70 (L) 3.71 (L) HGB 
    11.5 - 16.0 g/dL 8.7 (L) 8.9 (L) 8.9 (L) HCT 
    35.0 - 47.0 % 29.7 (L) 30.0 (L) 30.1 (L) Component Latest Ref Rng & Units 10/15/2020 10/14/2020 10/13/2020  
 
      1:19 AM  1:42 AM  1:00 AM  
MCH 
    26.0 - 34.0 PG 24.0 (L) 24.1 (L) 24.0 (L) MCHC 30.0 - 36.5 g/dL 29.3 (L) 29.7 (L) 29.6 (L) RDW 
    11.5 - 14.5 % 17.0 (H) 17.1 (H) 17.1 (H) Component Latest Ref Rng & Units 10/15/2020 10/14/2020 10/13/2020  
 
      1:19 AM  1:42 AM  1:00 AM  
PLATELET 
    431 - 777 K/uL 133 (L) 119 (L) 147 (L) Component Latest Ref Rng & Units 10/10/2020  
 
     11:03 AM  
NT pro-BNP 
    <450 PG/ML 7,290 (H) Component Latest Ref Rng & Units 10/13/2020  
 
      1:00 AM  
LD 
    81 - 246 U/L 298 (H) Component Latest Ref Rng & Units 10/10/2020  
 
     10:51 PM  
Culture result: 
     ALPHA STREPTOCOCCUS (8,000 COLONIES/mL) (A) Component Latest Ref Rng & Units 10/10/2020  
 
     10:51 PM  
Culture result: 
     ESCHERICHIA COLI (PREDOMINATING) (A) Component Latest Ref Rng & Units 10/10/2020  
 
     10:51 PM  
Leukocyte Esterase NEG   MODERATE (A) Component Latest Ref Rng & Units 10/10/2020  
 
     10:51 PM  
Epithelial cells FEW /lpf MANY (A) Component Latest Ref Rng & Units 10/10/2020  
 
     10:51 PM  
Waxy cast 
    NEG /lpf 0-2 (A) Advance Care Planning:  
Does patient have an Advance Directive:  yes; reviewed and current per daughter. Was this a readmission? no  
Patient stated reason for the admission: N/A, telephonic assessment completed with patient's daughter. Patients top risk factors for readmission: medical condition Interventions to address risk factors: Education provided regarding signs/symptoms of CHF with daughter, daughter verbalizes an understanding. Care Transition Nurse (CTN) contacted the patient's daughter, Ramírez Chong (on [address] [place] [address] Road), by telephone to perform post hospital discharge assessment. Verified name and  with daughter as identifiers. Provided introduction to self, and explanation of the CTN role. CTN reviewed discharge instructions, medical action plan and red flags with daughter who verbalized understanding. Daughter given an opportunity to ask questions and does not have any further questions or concerns at this time. The daughter agrees to contact the PCP office for questions related to their healthcare. Medication reconciliation was not performed with daughter during this phone conversation as daughter declined, stating, \"The list of medications at discharge is the most current. \" Sandee Duarte obtaining a 90-day supply of all daily and as-needed medications. Referral to Pharm D needed: no  
 
Home Health/Outpatient orders at discharge: PT, OT and SN Home Health company: [facility] - INPATIENT Date of initial visit: Not currently scheduled per daughter. Durable Medical Equipment ordered at discharge: none [address] [facility] [address]: n/a Durable Medical Equipment received: n/a Covid Risk Education Patient has following risk factors of: heart failure, diabetes and chronic kidney disease. Education provided regarding infection prevention, and signs and symptoms of COVID-19 and when to seek medical attention with daughter who verbalized understanding. Discussed exposure protocols and quarantine From CDC: Are you at higher risk for severe illness?  and given an opportunity for questions and concerns. The daughter agrees to contact the COVID-19 hotline 130-732-8017 or PCP office for questions related to COVID-19. For more information on steps you can take to protect yourself, see CDC's How to Protect Yourself Patient/family/caregiver given information for [employer] and agrees to enroll no Patient's preferred e-mail: declines Patient's preferred phone number: declines Discussed follow-up appointments.  If no appointment was previously scheduled, appointment scheduling offered: yes [address] follow up appointment(s):  
Future Appointments Date Time Provider Dafne Jenkins 10/22/2020  4:00 PM Alfonza Aschoff [place]  
10/23/2020  3:00 PM Rocael Freedman MD [facility] BS [facility] Non-BS follow up appointment(s): Please see below for appointments. Plan for follow-up call in 10-14 days based on severity of symptoms and risk factors. CTN provided contact information for future needs. Goals Addressed This Visit's Progress  Attends follow-up appointments as directed. 10-22-20: Patient has OSMEL appointment scheduled with Dr. Denny Aguilar. Pablo/PCP on 10-23-20 and nephrology follow-up appointment scheduled with Dr. Cristina Tong on 11-13-20. Daughter states she will discuss with Dr. Abby Newby when patient should have next cardio follow-up appointment, daughter cancelled cardio follow-up that was scheduled with Sherry Choi NP today, 10-22-20. Family providing transportation. Kaycee Correa Understands red flags post discharge. 10-22-20: Red flags of CHF reviewed with daughter and daughter verbalizes an understanding. Daughter reports patient has \"Open sores on both legs\" and daughter states the skin is weeping from bilateral knees down to bilateral ankles. Daughter states patient has wound on right hand and on left arm. Daughter reports patient becomes short of breath with exertion and patient has swelling in bilateral lower extremities, daughter states patient is utilizing elevation with success. Patient did not weigh today; however daughter states she will assist patient in weighing daily and recording home daily weights starting tomorrow, 10-23-20.  Care Transitions Nurse reviewed the rules of weight gain with daughter: if patient should gain three pounds in twenty-four hours and/or five pounds in one week, patient's PCP and/or cardiologist should be contacted immediately. Daughter is able to provide teach back of weight rules and daughter verbalized an understanding. Care Transitions Nurse will review red flags again on next phone conversation with daughter.  Link Fournier

## 2020-10-23 NOTE — PROGRESS NOTES
Pharmacist Discharge Medication Reconciliation Significant PMH:  
Past Medical History:  
Diagnosis Date Arthritis KNEES Atrial fibrillation ([facility] 10/29/2009 Bladder cancer ([facility] CAD (coronary artery disease) STENT PER PATIENT Chronic pain KNEES Coagulation disorder ([facility] Chronic prophylactic anticoagulation med Colon polyps Diabetes ([facility] IDDM  
 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 One fragment suggestive of hyperplastic polyp GERD (gastroesophageal reflux disease) Gout Heart valve problem   
 leaking heart valve Hypercholesterolemia Hypertension Hypothyroidism Hypothyroidism 4/23/2009 Hypothyroidism, acquired, autoimmune 11/23/2015 Overweight and obesity PUD (peptic ulcer disease) 1990'S S/P ablation of atrial flutter[V45.89HM] 2009  
 @ [facility] >> atrial fibrillation SOB (shortness of breath) on exertion 04/2019 T. I.A. 4/23/2009 TIA (transient ischemic attack) Ulcer of right lower extremity, limited to breakdown of skin ([facility] 7/5/2018 Chief Complaint for this Admission: Chief Complaint Patient presents with Weight Gain  
  reports weight gain of 10 pounds in one week Leg Swelling  
  bilateral lower extremity edema times on week Allergies: Actos [pioglitazone]; Codeine; and Hydrocodone Discharge Medications:  
Discharge Medication List as of 10/21/2020 10:04 AM  
  
 
START taking these medications Details  
bumetanide (BUMEX) 2 mg tablet Take 1 Tab by mouth daily for 60 days. , Normal, Disp-30 Tab,R-1 CONTINUE these medications which have CHANGED  Details  
!! insulin NPH (HumuLIN N NPH Insulin KwikPen) 100 unit/mL (3 mL) inpn 10 Units by SubCUTAneous route every morning., Normal, Disp-15 Adjustable Dose Pre-filled Pen Syringe,R-3  
  
amiodarone (PACERONE) 100 mg tablet Take 1 Tab by mouth daily for 60 days. , Normal, Disp-30 Tab,R-1  
  
 !! - Potential duplicate medications found. Please discuss with provider. CONTINUE these medications which have NOT CHANGED Details  
loratadine (CLARITIN) 10 mg tablet Take 10 mg by mouth daily as needed for Allergies. , Historical Med  
  
neomycin sulf/bacitracin/poly (NEOSPORIN EX) by Apply Externally route daily as needed (wound care). , Historical Med  
  
potassium chloride SR (KLOR-CON 10) 10 mEq tablet Take 1 Tab by mouth daily. , Normal, Disp-90 Tab,R-3  
  
atorvastatin (LIPITOR) 80 mg tablet TAKE ONE TABLET BY MOUTH EVERY EVENING, Normal, Disp-90 Tab,R-3  
  
allopurinoL (ZYLOPRIM) 100 mg tablet TAKE 2 TABLETS BY MOUTH DAILY, Normal, Disp-180 Tab,R-2  
  
levothyroxine (SYNTHROID) 125 mcg tablet TAKE ONE TABLET BY MOUTH DAILY BEFORE BREAKFAST, Normal, Disp-90 Tab,R-0  
  
pantoprazole (PROTONIX) 40 mg tablet TAKE 1 TABLET BY MOUTH DAILY, Normal, Disp-90 Tab,R-3  
  
clopidogreL (PLAVIX) 75 mg tab Take 1 Tab by mouth daily. , Normal, Disp-30 Tab,R-5  
  
sucralfate (CARAFATE) 1 gram tablet Take 1 g by mouth four (4) times daily. , Historical Med  
  
!! insulin NPH (HUMULIN N NPH INSULIN KWIKPEN) 100 unit/mL (3 mL) inpn 6 Units by SubCUTAneous route every evening., Historical Med  
  
acetaminophen (TYLENOL) 325 mg tablet Take 325 mg by mouth every four (4) hours as needed for Pain., Historical Med  
  
 !! - Potential duplicate medications found. Please discuss with provider. STOP taking these medications  
  
 metoprolol tartrate (LOPRESSOR) 50 mg tablet Comments:  
Reason for Stopping:   
   
 furosemide (LASIX) 40 mg tablet Comments:  
Reason for Stopping:   
   
  
 
 
The patient's chart, MAR and AVS were reviewed by Pamela William, PHARMD. Discharging Provider: Sherren Spinner

## 2020-10-23 NOTE — PROGRESS NOTES
Dave Louis is a 82 y.o. female who presents for follow up after hospitalization for CHF and blood in stool. Daughter present. Has home health. Seen by Dr Chandrika Pace, nephrology. stage 4 CKD. Started on bumex 2mg once a day. Lasix stopped. Seen by cardiology, Dr Jc Choi. Chronic AF off xarelto- prior Watchman's, CAD, CHF- EF 55%, s/p mitral valve replacement 2019. During hospitalization, Metoprolol tartrate stopped. History of GIB due to colon ectasia and GAVE. Underwent colonoscopy and EGD 10/14, Dr Christi Tellez. Negative for GIB. Is anemic with mildly low PLT. To see hematology in follow up. Daughter states her eyes are itchy. Not red. This is an established visit conducted via telemedicine with video. The patient has been instructed that this meets HIPAA criteria and acknowledges and agrees to this method of visitation. Pursuant to the emergency declaration under the [facility], [facility] waiver authority and the [employer] Act, this Virtual Visit was conducted, with patient's consent, to reduce the patient's risk of exposure to COVID-19 and provide continuity of care for an established patient. Services were provided through a video synchronous discussion virtually to substitute for in-person clinic visit. Past Medical History:  
Diagnosis Date  Arthritis KNEES  Atrial fibrillation ([address] 10/29/2009  Bladder cancer ([address]  CAD (coronary artery disease) STENT PER PATIENT  Chronic pain KNEES  
 Coagulation disorder ([address] Chronic prophylactic anticoagulation med  Colon polyps  Diabetes ([address]  IDDM  
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 One fragment suggestive of hyperplastic polyp  GERD (gastroesophageal reflux disease)  Gout  Heart valve problem   
 leaking heart valve  Hypercholesterolemia  Hypertension  Hypothyroidism  Hypothyroidism 2009  Hypothyroidism, acquired, autoimmune 2015  Overweight and obesity  PUD (peptic ulcer disease)   S/P ablation of atrial flutter[V45.89HM]   
 @ [facility] >> atrial fibrillation  SOB (shortness of breath) on exertion 2019  
 T. I.A. 2009  TIA (transient ischemic attack)  Ulcer of right lower extremity, limited to breakdown of skin ([address] 2018 Family History Problem Relation Age of Onset  Stroke Other  Arthritis-osteo Sister   
     spinal stenosis  Gout Son   
 Hypertension Son   
[facility] Hypertension Mother  Heart Disease Mother CAD  Heart Disease Father CAD  Alcohol abuse Neg Hx  Asthma Neg Hx  Bleeding Prob Neg Hx  Cancer Neg Hx  Diabetes Neg Hx  Elevated Lipids Neg Hx   
 Headache Neg Hx  Lung Disease Neg Hx  Migraines Neg Hx  Psychiatric Disorder Neg Hx  Mental Retardation Neg Hx  Anesth Problems Neg Hx Social History Socioeconomic History  Marital status:  Spouse name: Not on file  Number of children: 2  
 Years of education: 15  
 Highest education level: High school graduate Occupational History  Not on file Social Needs  Financial resource strain: Not hard at all  Food insecurity Worry: Never true Inability: Never true  Transportation needs Medical: No  
  Non-medical: No  
Tobacco Use  Smoking status: Former Smoker Packs/day: 0.50 Years: 10.00 Pack years: 5.00 Types: Cigarettes Last attempt to quit: 1967 Years since quittin.8  Smokeless tobacco: Never Used Substance and Sexual Activity  Alcohol use:  No  
 Alcohol/week: 0.0 standard drinks  Drug use: Never  Sexual activity: Not Currently Partners: Male Lifestyle  Physical activity Days per week: 0 days Minutes per session: 0 min  Stress: Only a little Relationships  Social connections Talks on phone: Three times a week Gets together: Three times a week Attends [employer] service: 1 to 4 times per year Active member of club or organization: No  
  Attends meetings of clubs or organizations: Never Relationship status:   Intimate partner violence Fear of current or ex partner: No  
  Emotionally abused: No  
  Physically abused: No  
  Forced sexual activity: No  
Other Topics Concern [address] Service Not Asked  Blood Transfusions Not Asked  Caffeine Concern Not Asked  Occupational Exposure Not Asked [place] Speak Hazards Not Asked  Sleep Concern Not Asked  Stress Concern Not Asked  Weight Concern Yes  Special Diet Not Asked  Back Care Not Asked  Exercise Not Asked  Bike Helmet Not Asked  Seat Belt Not Asked  Self-Exams Not Asked Social History Narrative  Not on file Current Outpatient Medications on File Prior to Visit Medication Sig Dispense Refill  insulin NPH (HumuLIN N NPH Insulin KwikPen) 100 unit/mL (3 mL) inpn 10 Units by SubCUTAneous route every morning. 15 Adjustable Dose Pre-filled Pen Syringe 3  
 amiodarone (PACERONE) 100 mg tablet Take 1 Tab by mouth daily for 60 days. 30 Tab 1  
 bumetanide (BUMEX) 2 mg tablet Take 1 Tab by mouth daily for 60 days. 30 Tab 1  
 loratadine (CLARITIN) 10 mg tablet Take 10 mg by mouth daily as needed for Allergies.  neomycin sulf/bacitracin/poly (NEOSPORIN EX) by Apply Externally route daily as needed (wound care).  potassium chloride SR (KLOR-CON 10) 10 mEq tablet Take 1 Tab by mouth daily.  90 Tab 3  
 atorvastatin (LIPITOR) 80 mg tablet TAKE ONE TABLET BY MOUTH EVERY EVENING 90 Tab 3  
  allopurinoL (ZYLOPRIM) 100 mg tablet TAKE 2 TABLETS BY MOUTH DAILY 180 Tab 2  
 levothyroxine (SYNTHROID) 125 mcg tablet TAKE ONE TABLET BY MOUTH DAILY BEFORE BREAKFAST 90 Tab 0  
 pantoprazole (PROTONIX) 40 mg tablet TAKE 1 TABLET BY MOUTH DAILY 90 Tab 3  clopidogreL (PLAVIX) 75 mg tab Take 1 Tab by mouth daily. 30 Tab 5  
 sucralfate (CARAFATE) 1 gram tablet Take 1 g by mouth four (4) times daily.  insulin NPH (HUMULIN N NPH INSULIN KWIKPEN) 100 unit/mL (3 mL) inpn 6 Units by SubCUTAneous route every evening.  acetaminophen (TYLENOL) 325 mg tablet Take 325 mg by mouth every four (4) hours as needed for Pain. No current facility-administered medications on file prior to visit. Review of Systems Pertinent items are noted in HPI. Objective:  
 
Gen: well appearing female HEENT: normal conjunctiva, no audible congestion, patient does not see oral erythema, has MMM Neck: patient does not feel enlarged or tender LAD or masses Resp: normal respiratory effort, no audible wheezing. CV: patient does not feel palpitations or heart irregularity Abd: patient does not feel abdominal tenderness or mass, patient does not notice distension Extrem: patient does not see swelling in ankles or joints. Neuro: Alert and oriented, able to answer questions without difficulty Assessment/Plan: ICD-10-CM ICD-9-CM 1. Chronic diastolic heart failure (HCC)  I50.32 428.32   
2. Chronic atrial fibrillation (HCC)  I48.20 427.31   
3. Thrombocytopenia ([facility])  D69.6 287.5 4. Essential hypertension, benign  I10 401.1 5. Acquired hypothyroidism  E03.9 244.9 6. Controlled type 2 diabetes mellitus with stage 3 chronic kidney disease, with long-term current use of insulin (HCC)  E11.22 250.40 N18.30 585.3   
 Z79.4 V58.67   
7. Chronic kidney disease, stage 4, severely decreased GFR (HCC)  N18.4 585.4 8. Hypercholesterolemia  E78.00 272.0 follow up with cardiology, nephrology, and hematology. Patient has advanced directive. Patient does not want a DNR order at this time. This was a telemedicine visit with video.    
 
 
Jones Stockton MD

## 2020-11-02 NOTE — TELEPHONE ENCOUNTER
Home Based Primary [facility] Supportive [facility]   (previously: At Home)  [phone] 4101 [facility], [address] [place] [address], [address]    Name:  Pedrito Robertson  YOB: 1935    Incoming call from [facility], Care Manager at [email or web address]. She is inquiring about [facility] services for [employer]. She states Ms. Vic Salcido it is extremely taxing for Ms. Miller to leave her home and she is looking for primary care services in the home. This nurse explained that [address] [place] [address] currently is on hold for accepting new patients. We will probably be able to accept new patients in approximately 2-3 months. Ms. Evgeny Manning says pt currently has a PCP and is willing to wait. She requests that Ms. Miller be placed on the [facility] wait list.    Carlitos Ball, RN  Referral Navigator, Home Based [address]

## 2020-11-05 NOTE — PROGRESS NOTES
Ainl Velasco is a 82 y.o. female here for one year follow up for mild thrombocytopenia/anemia. Pt had Watchman Procedure and ECHO done 2/20/20. She was in hospital 10/10/20 - 10/21/20 for weight gain and leg swelling. On Bumex now. She last had injfectafer in Oct, 2019. Dr Ryan Love is her GI doctor. She has been seeing him. She is on Sucralfate.

## 2020-11-06 NOTE — PROGRESS NOTES
[facility] [place] 
at [place] [address], [facility], [address] 
[phone] Follow-up Note Patient: Thierno Pederson MRN: [id number]  SSN: [id number] YOB: 1935  Age: 82 y.o. Sex: female Reason for Visit:  
Thierno Pederson is a 82 y.o. female who is seen by synchronous (real-time) audio-video technology for follow up of anemia and thrombocytopenia. Subjective:  
  
Thienro Pederson is a 82 y.o. female who I am seeing for mild thrombocytopenia and anemia. She denies bleeding. It was observed in routine laboratory studies. She received IV iron in April 2019. She remains fatigued. Review of Systems: 
 
Constitutional: fatigue Eyes: negative Ears, Nose, Mouth, Throat, and Face: negative Respiratory: negative Cardiovascular: negative Gastrointestinal: negative Genitourinary:negative Integument/Breast: negative Hematologic/Lymphatic: negative Musculoskeletal:chronic b/l knee pain Neurological: negative Past Medical History:  
Diagnosis Date  Arthritis KNEES  Atrial fibrillation ([address] 10/29/2009  Bladder cancer ([address]  CAD (coronary artery disease) STENT PER PATIENT  Chronic pain KNEES  
 Coagulation disorder ([address] Chronic prophylactic anticoagulation med  Colon polyps  Diabetes ([address] IDDM  
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 One fragment suggestive of hyperplastic polyp  GERD (gastroesophageal reflux disease)  Gout  Heart valve problem   
 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  
 @ [facility] >> atrial fibrillation  SOB (shortness of breath) on exertion 04/2019  
 T. I.A. 4/23/2009  TIA (transient ischemic attack)  Ulcer of right lower extremity, limited to breakdown of skin ([address] 7/5/2018 Past Surgical History:  
Procedure Laterality Date  CARDIAC SURG PROCEDURE UNLIST    
 ablation  CARDIAC SURG PROCEDURE UNLIST  01/2020 Watchman device implant  COLONOSCOPY N/A 2/20/2019 COLONOSCOPY performed by Monroe Casillas MD at [facility] ENDOSCOPY  COLONOSCOPY N/A 6/17/2019 COLONOSCOPY performed by Monroe Casillas MD at [facility] ENDOSCOPY  COLONOSCOPY N/A 6/15/2019 COLONOSCOPY performed by Monroe Casillas MD at [facility] MAIN OR  
 COLONOSCOPY N/A 9/11/2019 COLONOSCOPY performed by Monroe Casillas MD at [facility] ENDOSCOPY  COLONOSCOPY N/A 10/14/2020 COLONOSCOPY performed by Donna Machuca MD at [facility] ENDOSCOPY  COLONOSCOPY,DIAGNOSTIC  2/20/2019  COLONOSCOPY,FLEX,W/CONTROL, BLEEDING  9/11/2019  COLONOSCOPY,REMV LESN,SNARE  6/17/2019  GI TRACT CAPSULE ENDOSCOPY  6/28/2019  GI TRACT CAPSULE ENDOSCOPY  9/26/2019  HX APPENDECTOMY  HX CHOLECYSTECTOMY  HX HEART VALVE SURGERY  04/2019  HX HYSTERECTOMY  HX KNEE ARTHROSCOPY  [id number]  
 right knee  HX ORTHOPAEDIC    
 HX UROLOGICAL RENAL STENT, tur-b  KS ESOPHAGOGASTRODUODENOSCOPY TRANSORAL DIAGNOSTIC  8/22/2019  
    
 SIGMOIDOSCOPY,DIAGNOSTIC  6/15/2019  UPPER GI ENDOSCOPY,BALL DIL,30MM  6/15/2019  UPPER GI ENDOSCOPY,TUMOR ABLATN  6/19/2019  VASCULAR SURGERY PROCEDURE UNLIST  11/4  
 removed vein in right leg Family History Problem Relation Age of Onset  Stroke Other  Arthritis-osteo Sister   
     spinal stenosis  Gout Son   
 Hypertension Son   
[facility] Hypertension Mother  Heart Disease Mother CAD  Heart Disease Father      CAD  
  Alcohol abuse Neg Hx  Asthma Neg Hx  Bleeding Prob Neg Hx  Cancer Neg Hx  Diabetes Neg Hx  Elevated Lipids Neg Hx   
 Headache Neg Hx  Lung Disease Neg Hx  Migraines Neg Hx  Psychiatric Disorder Neg Hx  Mental Retardation Neg Hx  Anesth Problems Neg Hx Social History Tobacco Use  Smoking status: Former Smoker Packs/day: 0.50 Years: 10.00 Pack years: 5.00 Types: Cigarettes Last attempt to quit: 1967 Years since quittin.8  Smokeless tobacco: Never Used Substance Use Topics  Alcohol use: No  
  Alcohol/week: 0.0 standard drinks Prior to Admission medications Medication Sig Start Date End Date Taking? Authorizing Provider  
insulin NPH (HumuLIN N NPH Insulin KwikPen) 100 unit/mL (3 mL) inpn 10 Units by SubCUTAneous route every morning. 10/21/20  Yes Desiree Baldwin MD  
amiodarone (PACERONE) 100 mg tablet Take 1 Tab by mouth daily for 60 days. 10/21/20 12/20/20 Yes Desiree Baldwin MD  
bumetanide (BUMEX) 2 mg tablet Take 1 Tab by mouth daily for 60 days. 10/21/20 12/20/20 Yes Desiree Baldwin MD  
loratadine (CLARITIN) 10 mg tablet Take 10 mg by mouth daily as needed for Allergies. Yes Provider, Historical  
neomycin sulf/bacitracin/poly (NEOSPORIN EX) by Apply Externally route daily as needed (wound care). Yes Provider, Historical  
potassium chloride SR (KLOR-CON 10) 10 mEq tablet Take 1 Tab by mouth daily.  20  Yes Iliana Wolf MD  
atorvastatin (LIPITOR) 80 mg tablet TAKE ONE TABLET BY MOUTH EVERY EVENING 20  Yes Iliana Wolf MD  
allopurinoL (ZYLOPRIM) 100 mg tablet TAKE 2 TABLETS BY MOUTH DAILY 20  Yes Iliana Wolf MD  
levothyroxine (SYNTHROID) 125 mcg tablet TAKE ONE TABLET BY MOUTH DAILY BEFORE BREAKFAST 20  Yes Iliana Wolf MD  
pantoprazole (PROTONIX) 40 mg tablet TAKE 1 TABLET BY MOUTH DAILY 20  Yes Iliana Wolf MD  
 clopidogreL (PLAVIX) 75 mg tab Take 1 Tab by mouth daily. 6/9/20  Yes Lucrecia Merida MD  
sucralfate (CARAFATE) 1 gram tablet Take 1 g by mouth four (4) times daily. Yes Provider, Historical  
insulin NPH (HUMULIN N NPH INSULIN KWIKPEN) 100 unit/mL (3 mL) inpn 6 Units by SubCUTAneous route every evening. Yes Provider, Historical  
acetaminophen (TYLENOL) 325 mg tablet Take 325 mg by mouth every four (4) hours as needed for Pain. Yes Provider, Historical  
  
 
 
 
 
Allergies Allergen Reactions  Actos [Pioglitazone] Swelling Swelling of feet and legs  Codeine Itching  Hydrocodone Rash and Other (comments)  
  hallucinations Objective:  
 
General: alert, cooperative, no distress Mental  status: normal mood, behavior, speech, dress, motor activity, and thought processes, able to follow commands HENT: NCAT Neck: no visualized mass Resp: no respiratory distress Neuro: no gross deficits Skin: no discoloration or lesions of concern on visible areas Psychiatric: normal affect, consistent with stated mood, no evidence of hallucinations Due to this being a TeleHealth evaluation (During [facility]- public health emergency), many elements of the physical examination are unable to be assessed. Evaluation of the following organ systems was limited: Vitals/Constitutional/EENT/Resp/CV/GI//MS/Neuro/Skin/Heme-Lymph-Imm. Lab Results Component Value Date/Time WBC 5.7 10/15/2020 01:19 AM  
 Hemoglobin (POC) 10.6 09/25/2019 03:51 PM  
 Hemoglobin (POC) 10.5 (L) 07/06/2010 10:11 PM  
 HGB 8.7 (L) 10/15/2020 01:19 AM  
 Hematocrit (POC) 31 (L) 07/06/2010 10:11 PM  
 HCT 29.7 (L) 10/15/2020 01:19 AM  
 PLATELET 785 (L) 20/98/5666 01:19 AM  
 MCV 81.8 10/15/2020 01:19 AM  
 
  
Lab Results Component Value Date/Time  Iron 22 (L) 11/12/2020 02:59 AM  
 TIBC 270 11/12/2020 02:59 AM  
 Iron % saturation 8 (L) 11/12/2020 02:59 AM  
 Ferritin 201 11/12/2020 02:59 AM  
 
 
 Assessment:  
 
1. Mild thrombocytopenia: 
 
Asymptomatic Unclear cause ? Iron deficiency S/p IV iron in April 2019 Platelets stable Observation Symptom management form reviewed with patient. 2. Anemia From renal insufficiency Iron deficiency S/p IV iron in April 2019 Arrange for IV iron Plan:  
 
 
> IV iron 
> Labs in 3 and 6 months 
> Follow-up in 6 months Signed by: Latoya Manzo MD 
                   November 6, 2020 
 
 
 
CC. Maggie Babcock MD 
 
 
I was in the office while conducting this encounter. The patient was at her home. Consent: 
She and/or her healthcare decision maker is aware that this patient-initiated Telehealth encounter is a billable service, with coverage as determined by her insurance carrier. She is aware that she may receive a bill and has provided verbal consent to proceed: Yes Pursuant to the emergency declaration under the [facility], [facility]5 waiver authority and the [employer] Act, this Virtual  Visit was conducted, with patient's (and/or legal guardian's) consent, to reduce the patient's risk of exposure to COVID-19 and provide necessary medical care. Services were provided through a video synchronous discussion virtually to substitute for in-person visit.

## 2020-11-09 PROBLEM — R79.89 ELEVATED LFTS: Status: ACTIVE | Noted: 2020-01-01

## 2020-11-09 PROBLEM — K83.09 CHOLANGITIS: Status: ACTIVE | Noted: 2020-01-01

## 2020-11-09 PROBLEM — K83.8 COMMON BILE DUCT DILATION: Status: ACTIVE | Noted: 2020-01-01

## 2020-11-09 NOTE — ACP (ADVANCE CARE PLANNING)
Advance Care Planning Note NAME: Denice Jackson :  1935 MRN:  [id number] Date/Time:  2020 6:03 PM 
 
Active Diagnoses: 
Hospital Problems  Date Reviewed: 10/23/2020 Codes Class Noted POA Elevated LFTs ICD-10-CM: R79.89 ICD-9-CM: 790.6  2020 Unknown Common bile duct dilation ICD-10-CM: K83.8 ICD-9-CM: 576.8  2020 Unknown Cholangitis ICD-10-CM: K83.09 
ICD-9-CM: 576.1  2020 Unknown GAVE (gastric antral vascular ectasia) ICD-10-CM: D65.851 ICD-9-CM: 537.82  2019 Yes Overview Signed 2019  3:31 PM by Alberto Otto MD  
  bx 0.2392:   
 Gastric, biopsy:  
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 One fragment suggestive of hyperplastic polyp These active diagnoses are of sufficient risk that focused discussion on advance care planning is indicated in order to allow the patient to thoughtfully consider personal goals of care, and if situations arise that prevent the ability to personally give input, to ensure appropriate representation of their personal desires for different levels and aggressiveness of care. Discussion:  
Code status addressed and wants to be a Full Code. Patient wants central line and vasopressors if needed. Patient would also want a feeding tube, if needed, for nutritional support. Patient  would like to assign james espinoza as the surrogate decision maker. Persons present and participating in discussion: Jenna Trujillo MD,James Jewell Time Spent:  
Total time spent face-to-face in education and discussion:   16  minutes.   
 
 
 
Jenna Ornelas MD  
Hospitalist

## 2020-11-09 NOTE — CONSULTS
Gastroenterology Consultation Note NEVAEH Mosqueda 
 for Dr. Brandie Doe NAME: Candie Colbert : 1935 MRN: [id number] ATTG: Dr. Harman Bolivar  PCP: Geovanna Pierre MD 
Date/Time:  2020 1:47 PM 
Subjective:  
REASON FOR CONSULT:     
Candie Colbert is a 82 y.o.  female who I was asked to see for elevated LFTs and penuomobilia vs echogenic debris in CBD. Per daughter at bedside patient has been nauseated but no actual vomiting since Wednesday. No F/C. Has some lower abdominal pain as well. Reports some worsening confusion and that led to her calling [facility]. They came to the house and did labs and advised to go to ED for liver US and further w/u. They were concerned as she was jaundiced, daughter started to notice that yesterday. She has hx of CCY years ago, no issues following that up until now. We had most recently seen patient for GI bleed during admission last month and she had EGD showing GAVE as well as colonoscopy that was fairly non revealing. She had resumed plavix with last dose being this morning. WBC elevated at 12.9, hgb 8.2 (around baseline), BUN 67, Cr 4.06, bili 8.8, , , Alk phos 282, lipase 505, ammonia normal.   
 
US was done showing: Echogenic debris versus pneumobilia in the common bile duct. No evidence of intrahepatic biliary dilatation. CBD 8mm. No alcohol or tobacco use Past Medical History:  
Diagnosis Date  Arthritis KNEES  Atrial fibrillation ([address] 10/29/2009  Bladder cancer ([address]  CAD (coronary artery disease) STENT PER PATIENT  Chronic pain KNEES  
 Coagulation disorder ([address] Chronic prophylactic anticoagulation med  Colon polyps  Diabetes ([facility]  IDDM  
 GAVE (gastric antral vascular ectasia) 2019  
 bx :    Gastric, biopsy:  Mild capillary ectasia and congestion, compatible with gastric antral vascular ectasia (GAVE), negative for background gastritis. One fragment suggestive of hyperplastic polyp  GERD (gastroesophageal reflux disease)  Gout  Heart valve problem   
 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  
 @ [facility] >> atrial fibrillation  SOB (shortness of breath) on exertion 04/2019  
 T. I.A. 4/23/2009  TIA (transient ischemic attack)  Ulcer of right lower extremity, limited to breakdown of skin ([address] 7/5/2018 Past Surgical History:  
Procedure Laterality Date  CARDIAC SURG PROCEDURE UNLIST    
 ablation  CARDIAC SURG PROCEDURE UNLIST  01/2020 Watchman device implant  COLONOSCOPY N/A 2/20/2019 COLONOSCOPY performed by Remberto Soto MD at [facility] ENDOSCOPY  COLONOSCOPY N/A 6/17/2019 COLONOSCOPY performed by Remberto Soto MD at [facility] ENDOSCOPY  COLONOSCOPY N/A 6/15/2019 COLONOSCOPY performed by Remberto Soto MD at [facility] MAIN OR  
 COLONOSCOPY N/A 9/11/2019 COLONOSCOPY performed by Remberto Soto MD at [facility] ENDOSCOPY  COLONOSCOPY N/A 10/14/2020 COLONOSCOPY performed by Randi Thurman MD at [facility] ENDOSCOPY  COLONOSCOPY,DIAGNOSTIC  2/20/2019  COLONOSCOPY,FLEX,W/CONTROL, BLEEDING  9/11/2019  COLONOSCOPY,ISI HATFIELD,SNARE  6/17/2019  GI TRACT CAPSULE ENDOSCOPY  6/28/2019  GI TRACT CAPSULE ENDOSCOPY  9/26/2019  HX APPENDECTOMY  HX CHOLECYSTECTOMY  HX HEART VALVE SURGERY  04/2019  HX HYSTERECTOMY  HX KNEE ARTHROSCOPY  [id number]  
 right knee  HX ORTHOPAEDIC    
 HX UROLOGICAL RENAL STENT, tur-b  HI ESOPHAGOGASTRODUODENOSCOPY TRANSORAL DIAGNOSTIC  8/22/2019  
    
 SIGMOIDOSCOPY,DIAGNOSTIC  6/15/2019  UPPER GI ENDOSCOPY,BALL DIL,30MM  6/15/2019  UPPER GI ENDOSCOPY,TUMOR ABLATN  2019  VASCULAR SURGERY PROCEDURE UNLIST    
 removed vein in right leg Social History Tobacco Use  Smoking status: Former Smoker Packs/day: 0.50 Years: 10.00 Pack years: 5.00 Types: Cigarettes Last attempt to quit: 1967 Years since quittin.8  Smokeless tobacco: Never Used Substance Use Topics  Alcohol use: No  
  Alcohol/week: 0.0 standard drinks Family History Problem Relation Age of Onset  Stroke Other  Arthritis-osteo Sister   
     spinal stenosis  Gout Son   
 Hypertension Son   
Kinza Ortiz Hypertension Mother  Heart Disease Mother CAD  Heart Disease Father CAD  Alcohol abuse Neg Hx  Asthma Neg Hx  Bleeding Prob Neg Hx  Cancer Neg Hx  Diabetes Neg Hx  Elevated Lipids Neg Hx   
 Headache Neg Hx  Lung Disease Neg Hx  Migraines Neg Hx  Psychiatric Disorder Neg Hx  Mental Retardation Neg Hx  Anesth Problems Neg Hx Allergies Allergen Reactions  Actos [Pioglitazone] Swelling Swelling of feet and legs  Codeine Itching  Hydrocodone Rash and Other (comments)  
  hallucinations Home Medications: 
Prior to Admission Medications Prescriptions Last Dose Informant Patient Reported? Taking?  
acetaminophen (TYLENOL) 325 mg tablet  Child Yes No  
Sig: Take 325 mg by mouth every four (4) hours as needed for Pain. allopurinoL (ZYLOPRIM) 100 mg tablet  Child No No  
Sig: TAKE 2 TABLETS BY MOUTH DAILY  
amiodarone (PACERONE) 100 mg tablet   No No  
Sig: Take 1 Tab by mouth daily for 60 days. atorvastatin (LIPITOR) 80 mg tablet  Child No No  
Sig: TAKE ONE TABLET BY MOUTH EVERY EVENING  
bumetanide (BUMEX) 2 mg tablet   No No  
Sig: Take 1 Tab by mouth daily for 60 days. clopidogreL (PLAVIX) 75 mg tab  Child No No  
Sig: Take 1 Tab by mouth daily. insulin NPH (HUMULIN N NPH INSULIN KWIKPEN) 100 unit/mL (3 mL) inpn  Child Yes No  
Si Units by SubCUTAneous route every evening. insulin NPH (HumuLIN N NPH Insulin KwikPen) 100 unit/mL (3 mL) inpn   No No  
Sig: 10 Units by SubCUTAneous route every morning. levothyroxine (SYNTHROID) 125 mcg tablet  Child No No  
Sig: TAKE ONE TABLET BY MOUTH DAILY BEFORE BREAKFAST  
loratadine (CLARITIN) 10 mg tablet  Child Yes No  
Sig: Take 10 mg by mouth daily as needed for Allergies. neomycin sulf/bacitracin/poly (NEOSPORIN EX)  Child Yes No  
Sig: by Apply Externally route daily as needed (wound care). ondansetron (ZOFRAN ODT) 4 mg disintegrating tablet   No No  
Sig: Take 1 Tab by mouth every eight (8) hours as needed for Nausea or Vomiting. pantoprazole (PROTONIX) 40 mg tablet  Child No No  
Sig: TAKE 1 TABLET BY MOUTH DAILY potassium chloride SR (KLOR-CON 10) 10 mEq tablet  Child No No  
Sig: Take 1 Tab by mouth daily. sucralfate (CARAFATE) 1 gram tablet  Child Yes No  
Sig: Take 1 g by mouth four (4) times daily. Facility-Administered Medications: None Hospital medications: 
Current Facility-Administered Medications Medication Dose Route Frequency  metroNIDAZOLE (FLAGYL) IVPB premix 500 mg  500 mg IntraVENous NOW  acetaminophen (TYLENOL) tablet 650 mg  650 mg Oral Q6H PRN  
 sodium chloride 0.9 % bolus infusion 1,000 mL  1,000 mL IntraVENous NOW Current Outpatient Medications Medication Sig  
 ondansetron (ZOFRAN ODT) 4 mg disintegrating tablet Take 1 Tab by mouth every eight (8) hours as needed for Nausea or Vomiting.  insulin NPH (HumuLIN N NPH Insulin KwikPen) 100 unit/mL (3 mL) inpn 10 Units by SubCUTAneous route every morning.  amiodarone (PACERONE) 100 mg tablet Take 1 Tab by mouth daily for 60 days.  bumetanide (BUMEX) 2 mg tablet Take 1 Tab by mouth daily for 60 days.  loratadine (CLARITIN) 10 mg tablet Take 10 mg by mouth daily as needed for Allergies.  neomycin sulf/bacitracin/poly (NEOSPORIN EX) by Apply Externally route daily as needed (wound care).  potassium chloride SR (KLOR-CON 10) 10 mEq tablet Take 1 Tab by mouth daily.  atorvastatin (LIPITOR) 80 mg tablet TAKE ONE TABLET BY MOUTH EVERY EVENING  
 allopurinoL (ZYLOPRIM) 100 mg tablet TAKE 2 TABLETS BY MOUTH DAILY  levothyroxine (SYNTHROID) 125 mcg tablet TAKE ONE TABLET BY MOUTH DAILY BEFORE BREAKFAST  pantoprazole (PROTONIX) 40 mg tablet TAKE 1 TABLET BY MOUTH DAILY  clopidogreL (PLAVIX) 75 mg tab Take 1 Tab by mouth daily.  sucralfate (CARAFATE) 1 gram tablet Take 1 g by mouth four (4) times daily.  insulin NPH (HUMULIN N NPH INSULIN KWIKPEN) 100 unit/mL (3 mL) inpn 6 Units by SubCUTAneous route every evening.  acetaminophen (TYLENOL) 325 mg tablet Take 325 mg by mouth every four (4) hours as needed for Pain. REVIEW OF SYSTEMS:   
 []     Unable to obtain  ROS due to  []    mental status change  []    sedated   []    intubated Review of Systems - History obtained from the patient General ROS: negative for - chills or fever Psychological ROS: negative for - anxiety or depression Hematological and Lymphatic ROS: positive for - blood transfusions Respiratory ROS: no cough, shortness of breath, or wheezing Cardiovascular ROS: no chest pain or dyspnea on exertion Gastrointestinal ROS: See HPI Genito-Urinary ROS: no dysuria, trouble voiding, or hematuria Musculoskeletal ROS: negative for - joint pain Neurological ROS: no TIA or stroke symptoms Dermatological ROS: negative for - rash Objective: VITALS:   
Visit Vitals BP (!) 114/51 (BP 1 Location: Left arm, BP Patient Position: Supine) Pulse 96 Temp 97.8 °F (36.6 °C) Resp 23 Ht 5' 9\" (1.753 m) Wt 81.6 kg (180 lb) SpO2 94% BMI 26.58 kg/m² Temp (24hrs), Av °F (36.7 °C), Min:97.8 °F (36.6 °C), Max:98.2 °F (36.8 °C) PHYSICAL EXAM:  
 General:    Alert, cooperative, no distress, appears stated age. Head:   Normocephalic, without obvious abnormality, atraumatic. Eyes:   Conjunctivae clear, + scleral icterus. Pupils are equal 
Nose:  Nares normal. No drainage or sinus tenderness. Throat:    Lips, mucosa, and tongue normal.  No Thrush Neck:  Supple, symmetrical,  no adenopathy, thyroid: non tender Back:    Symmetric,  No CVA tenderness. Lungs:   CTA bilaterally. No wheezing/rhonchi/rales. Heart:   Regular rate and rhythm,  no murmur, rub or gallop. Abdomen:   Soft, +RUQ, epigastric, and LLQ abdominal tenderness. Not distended. Bowel sounds normal. No masses. No    hepatosplenomegaly. Rectal:  Deferred Extremities: No cyanosis. No edema. No clubbing Skin:     Texture, turgor normal. +jaundice Lymph: Cervical, supraclavicular normal. 
Psych:  Good insight. Not depressed. Not anxious or agitated. Neurologic: EOMs intact. No facial asymmetry. No aphasia or slurred speech normal strength, A/O X 3. LAB DATA REVIEWED:   
Lab Results Component Value Date/Time WBC 12.9 (H) 11/09/2020 11:39 AM  
 Hemoglobin (POC) 10.6 09/25/2019 03:51 PM  
 Hemoglobin (POC) 10.5 (L) 07/06/2010 10:11 PM  
 HGB 8.2 (L) 11/09/2020 11:39 AM  
 Hematocrit (POC) 31 (L) 07/06/2010 10:11 PM  
 HCT 26.3 (L) 11/09/2020 11:39 AM  
 PLATELET 280 94/90/1775 11:39 AM  
 MCV 75.6 (L) 11/09/2020 11:39 AM  
 
Lab Results Component Value Date/Time ALT (SGPT) 212 (H) 11/09/2020 11:39 AM  
 GGT 55 12/17/2013 07:57 AM  
 Alk. phosphatase 282 (H) 11/09/2020 11:39 AM  
 Bilirubin, direct 0.13 11/30/2015 07:31 AM  
 Bilirubin, total 8.8 (H) 11/09/2020 11:39 AM  
 
Lab Results Component Value Date/Time  Sodium 134 (L) 11/09/2020 11:39 AM  
 Potassium 4.8 11/09/2020 11:39 AM  
 Chloride 100 11/09/2020 11:39 AM  
 CO2 28 11/09/2020 11:39 AM  
 Anion gap 6 11/09/2020 11:39 AM  
 Glucose 141 (H) 11/09/2020 11:39 AM  
 BUN 67 (H) 11/09/2020 11:39 AM  
 Creatinine 4.06 (H) 11/09/2020 11:39 AM  
 BUN/Creatinine ratio 17 11/09/2020 11:39 AM  
 GFR est AA 13 (L) 11/09/2020 11:39 AM  
 GFR est non-AA 11 (L) 11/09/2020 11:39 AM  
 Calcium 9.4 11/09/2020 11:39 AM  
 
Lab Results Component Value Date/Time Lipase 505 (H) 11/09/2020 11:39 AM  
 
Lab Results Component Value Date/Time INR 1.1 02/19/2020 01:45 PM  
 INR 1.2 (H) 09/09/2019 06:26 PM  
 INR 1.3 (H) 06/14/2019 12:26 PM  
 Prothrombin time 11.2 (H) 02/19/2020 01:45 PM  
 Prothrombin time 12.1 (H) 09/09/2019 06:26 PM  
 Prothrombin time 13.6 (H) 06/14/2019 12:26 PM  
 
 
US Results (most recent): 
Results from Hospital Encounter encounter on 11/09/20 US [facility] ULTRASOUND OF THE RIGHT UPPER QUADRANT INDICATION: jaundice COMPARISON: October 3, 2019 TECHNIQUE: 
Sonography of the right upper quadrant was performed. FINDINGS: 
 
GALLBLADDER: Surgically absent. COMMON DUCT: 8 mm in diameter. Contains echogenic debris versus pneumobilia. LIVER: Normal echogenicity. 1 cm echogenic mass adjacent to the gallbladder 
fossa is not significantly changed. No intrahepatic biliary dilatation is shown. MAIN PORTAL VEIN: Patent. Appropriate hepatopetal flow. PANCREAS: The visualized portions of the pancreas are normal.  
RIGHT KIDNEY: 7.6 cm in length. Increased cortical echogenicity. No 
hydronephrosis, shadowing calculus, or contour-deforming renal mass. Impression IMPRESSION:  
Echogenic debris versus pneumobilia in the common bile duct. No evidence of 
intrahepatic biliary dilatation. Status post cholecystectomy. Unchanged 1 cm 
echogenic hepatic mass, compatible with hemangioma. Small, echogenic right 
kidney, compatible with chronic medical renal disease. Impression: Active Problems: 
  GAVE (gastric antral vascular ectasia) (6/19/2019)     Overview: bx 6.2019:   
     Gastric, biopsy:  
    Mild capillary ectasia and congestion, compatible with gastric antral  
 vascular ectasia (GAVE), negative for background gastritis. One fragment suggestive of hyperplastic polyp Elevated LFTs (11/9/2020) Common bile duct dilation (11/9/2020) Plan: 
Patient is presenting with nausea and abdominal pain, labs reveal elevated LFTs and US showing pneumobilia vs echogenic density in CBD. Will proceed with MRCP w/o contrast given kidney function to further evaluate CBD. Continue with supportive treatment with IVF and empiric abx. Pending results of MRCP to determine need for ERCP, has been on plavix, needs to be held if cleared by primary care team/hospitalist prior to ERCP.  
  
   
___________________________________________________ Care Plan discussed with: 
  [x]    Patient   [x]    Family   []    Nursing   []    Attending Total Time :  30  minutes 
 ___________________________________________________ GI: NEVAEH Crespo

## 2020-11-09 NOTE — PROGRESS NOTES
-Please complete MRI History and Safety Screening Form for this patient using [facility] only under Orders Requiring a Screening Form: 
 

## 2020-11-09 NOTE — REMOTE MONITORING
Spoke with primary RN regarding lactic acid. Call back number [phone], thank you! [id number] [facility] Signed By: Richard Ngo RN November 9, 2020

## 2020-11-09 NOTE — TELEPHONE ENCOUNTER
She has had nausea and vomiting for about 4 days, according to daughter, Jeremie Tao. She is on pantoprazole. She has appt with Dr. Reese Arroyo coming up. Orders Placed This Encounter    ondansetron (ZOFRAN ODT) 4 mg disintegrating tablet     Sig: Take 1 Tab by mouth every eight (8) hours as needed for Nausea or Vomiting.      Dispense:  30 Tab     Refill:  1

## 2020-11-09 NOTE — H&P
Hospitalist Admission Note NAME: Tez Beyer :  1935 MRN:  [id number] Date/Time:  2020 4:58 PM 
 
Patient PCP: Claudell Kerry, MD 
______________________________________________________________________ Given the patient's current clinical presentation, I have a high level of concern for decompensation if discharged from the emergency department. Complex decision making was performed, which includes reviewing the patient's available past medical records, laboratory results, and x-ray films. My assessment of this patient's clinical condition and my plan of care is as follows. Assessment / Plan: 
Severe sepsis  POA:heart  Rate 111 WBC 12.9 acute kidney injury Secondary to intra-abdominal infection  ( ?ascending cholangitis) versus UTI We will admit to telemetry, keep n.p.o., plan for MRCP, if if MRCP positive plan for ERCP Continue with IV ceftriaxone and Flagyl started in the ED, follow-up blood cultures, urine cultures . check lactic acid Abnormal LFTs with elevated T bili 
Jaundice Pneumobilia on the ultrasound Echogenic debris versus pneumobilia in the common bile duct. No evidence of 
intrahepatic biliary dilatation. Status post cholecystectomy. Unchanged 1 cm 
echogenic hepatic mass, compatible with hemangioma. Small, echogenic right 
kidney, compatible with chronic medical renal disease. No obstruction on ultrasound GI consulted,plan for MRCP Prn morphine  
 
abbi on ckd stage 4 
ivf , decreased  bumex  
chronic diastolic chf, POA Hx severe mitral stenosis s/p [employer] Fregoso bioprosthetic mitral valve 2019 Chronic atrial fibrillation s/p Watchman procedure 2020 Hx atrial flutter with abalation Sinus bradycardia Hx CAD 
HTN 
C/w amiodarone , hold bumex Hold plavix for  Possible ERCP  
 
CKD stage 4, POA-    
IDDM, POA  
-Hx TIA Hypothyroid Gout Reduce insulin regimen by half as pt NPO , ssi  
C/w synthroid Hold allopurinol Code Status: full code Surrogate Decision Maker:daughter DVT Prophylaxis: SCD  
GI Prophylaxis: not indicated Baseline:ambulatory Subjective: CHIEF COMPLAINT: Abdominal pain, nausea vomiting and jaundice HISTORY OF PRESENT ILLNESS:    
Rosibel Ceja is a 82 y.o.  female past medical history of CHF, a flutter status post ablation, hypertension, CKD stage IV, diabetes mellitus who presents with nausea vomiting of nonbloody emesis associated with epigastric pain since yesterday and was found to be jaundiced. Patient was recently discharged from the hospital after being treated for CHF exacerbation. In the ED, her blood pressure was soft, she was tachycardic but afebrile Her labs revealed elevated white blood cell count, BMP showed hyponatremia, abnormal LFTs elevated T bili, elevated lipase. BMP showed acute onchronic kidney injury . her UA was concerning for UTI Liver ultrasound showed: 
Echogenic debris versus pneumobilia in the common bile duct. No evidence of 
intrahepatic biliary dilatation. Status post cholecystectomy. Unchanged 1 cm 
echogenic hepatic mass, compatible with hemangioma. Small, echogenic right 
kidney, compatible with chronic medical renal disease We were asked to admit for work up and evaluation of the above problems. Past Medical History:  
Diagnosis Date  Arthritis KNEES  Atrial fibrillation ([address] 10/29/2009  Bladder cancer ([address]  CAD (coronary artery disease) STENT PER PATIENT  Chronic pain KNEES  
 Coagulation disorder ([address] Chronic prophylactic anticoagulation med  Colon polyps  Diabetes ([address] IDDM  
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 One fragment suggestive of hyperplastic polyp  GERD (gastroesophageal reflux disease)  Gout  Heart valve problem   
 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  
 @ [facility] >> atrial fibrillation  SOB (shortness of breath) on exertion 04/2019  
 T. I.A. 4/23/2009  TIA (transient ischemic attack)  Ulcer of right lower extremity, limited to breakdown of skin ([address] 7/5/2018 Past Surgical History:  
Procedure Laterality Date  CARDIAC SURG PROCEDURE UNLIST    
 ablation  CARDIAC SURG PROCEDURE UNLIST  01/2020 Watchman device implant  COLONOSCOPY N/A 2/20/2019 COLONOSCOPY performed by Elton Hobson MD at [facility] ENDOSCOPY  COLONOSCOPY N/A 6/17/2019 COLONOSCOPY performed by Elton Hobson MD at [facility] ENDOSCOPY  COLONOSCOPY N/A 6/15/2019 COLONOSCOPY performed by Elton Hobson MD at [facility] MAIN OR  
 COLONOSCOPY N/A 9/11/2019 COLONOSCOPY performed by Elton Hobson MD at [facility] ENDOSCOPY  COLONOSCOPY N/A 10/14/2020 COLONOSCOPY performed by Blessing Nowak MD at [facility] ENDOSCOPY  COLONOSCOPY,DIAGNOSTIC  2/20/2019  COLONOSCOPY,FLEX,W/CONTROL, BLEEDING  9/11/2019  COLONOSCOPY,REMV LESN,SNARE  6/17/2019  GI TRACT CAPSULE ENDOSCOPY  6/28/2019  GI TRACT CAPSULE ENDOSCOPY  9/26/2019  HX APPENDECTOMY  HX CHOLECYSTECTOMY  HX HEART VALVE SURGERY  04/2019  HX HYSTERECTOMY  HX KNEE ARTHROSCOPY  [id number]  
 right knee  HX ORTHOPAEDIC    
 HX UROLOGICAL RENAL STENT, tur-b  SD ESOPHAGOGASTRODUODENOSCOPY TRANSORAL DIAGNOSTIC  8/22/2019  
    
 SIGMOIDOSCOPY,DIAGNOSTIC  6/15/2019  UPPER GI ENDOSCOPY,BALL DIL,30MM  6/15/2019  UPPER GI ENDOSCOPY,TUMOR ABLATN  6/19/2019  VASCULAR SURGERY PROCEDURE UNLIST  11/4  
 removed vein in right leg Social History Tobacco Use  Smoking status: Former Smoker Packs/day: 0.50 Years: 10.00 Pack years: 5.00 Types: Cigarettes Last attempt to quit: 1967 Years since quittin.8  Smokeless tobacco: Never Used Substance Use Topics  Alcohol use: No  
  Alcohol/week: 0.0 standard drinks Family History Problem Relation Age of Onset  Stroke Other  Arthritis-osteo Sister   
     spinal stenosis  Gout Son   
 Hypertension Son   
[address] Hypertension Mother  Heart Disease Mother CAD  Heart Disease Father CAD  Alcohol abuse Neg Hx  Asthma Neg Hx  Bleeding Prob Neg Hx  Cancer Neg Hx  Diabetes Neg Hx  Elevated Lipids Neg Hx   
 Headache Neg Hx  Lung Disease Neg Hx  Migraines Neg Hx  Psychiatric Disorder Neg Hx  Mental Retardation Neg Hx  Anesth Problems Neg Hx Allergies Allergen Reactions  Actos [Pioglitazone] Swelling Swelling of feet and legs  Codeine Itching  Hydrocodone Rash and Other (comments)  
  hallucinations Prior to Admission medications Medication Sig Start Date End Date Taking? Authorizing Provider  
ondansetron (ZOFRAN ODT) 4 mg disintegrating tablet Take 1 Tab by mouth every eight (8) hours as needed for Nausea or Vomiting. 20   Shakira Duque MD  
insulin NPH (HumuLIN N NPH Insulin KwikPen) 100 unit/mL (3 mL) inpn 10 Units by SubCUTAneous route every morning. 10/21/20   Nigel Asif MD  
amiodarone (PACERONE) 100 mg tablet Take 1 Tab by mouth daily for 60 days. 10/21/20 12/20/20  Nigel Asif MD  
bumetanide (BUMEX) 2 mg tablet Take 1 Tab by mouth daily for 60 days. 10/21/20 12/20/20  Nigel Asif MD  
loratadine (CLARITIN) 10 mg tablet Take 10 mg by mouth daily as needed for Allergies. Provider, Historical  
neomycin sulf/bacitracin/poly (NEOSPORIN EX) by Apply Externally route daily as needed (wound care).     Provider, Historical  
 potassium chloride SR (KLOR-CON 10) 10 mEq tablet Take 1 Tab by mouth daily. 8/6/20   Deniz Moore MD  
atorvastatin (LIPITOR) 80 mg tablet TAKE ONE TABLET BY MOUTH EVERY EVENING 8/6/20   Deniz Moore MD  
allopurinoL (ZYLOPRIM) 100 mg tablet TAKE 2 TABLETS BY MOUTH DAILY 7/16/20   Deniz Moore MD  
levothyroxine (SYNTHROID) 125 mcg tablet TAKE ONE TABLET BY MOUTH DAILY BEFORE BREAKFAST 7/12/20   Deniz Moore MD  
pantoprazole (PROTONIX) 40 mg tablet TAKE 1 TABLET BY MOUTH DAILY 6/19/20   Deniz Moore MD  
clopidogreL (PLAVIX) 75 mg tab Take 1 Tab by mouth daily. 6/9/20   Deniz Moore MD  
sucralfate (CARAFATE) 1 gram tablet Take 1 g by mouth four (4) times daily. Provider, Historical  
insulin NPH (HUMULIN N NPH INSULIN KWIKPEN) 100 unit/mL (3 mL) inpn 6 Units by SubCUTAneous route every evening. Provider, Historical  
acetaminophen (TYLENOL) 325 mg tablet Take 325 mg by mouth every four (4) hours as needed for Pain. Provider, Historical  
 
 
REVIEW OF SYSTEMS:    
I am not able to complete the review of systems because: The patient is intubated and sedated The patient has altered mental status due to his acute medical problems The patient has baseline aphasia from prior stroke(s) The patient has baseline dementia and is not reliable historian The patient is in acute medical distress and unable to provide information Total of 12 systems reviewed as follows:   
   POSITIVE= underlined text  Negative = text not underlined General:  fever, chills, sweats, generalized weakness, weight loss/gain,  
   loss of appetite Eyes:    blurred vision, eye pain, loss of vision, double vision ENT:    rhinorrhea, pharyngitis Respiratory:   cough, sputum production, SOB, LEIJA, wheezing, pleuritic pain  
Cardiology:   chest pain, palpitations, orthopnea, PND, edema, syncope Gastrointestinal:  abdominal pain , N/V, diarrhea, dysphagia, constipation, bleeding Genitourinary:  frequency, urgency, dysuria, hematuria, incontinence Muskuloskeletal :  arthralgia, myalgia, back pain Hematology:  easy bruising, nose or gum bleeding, lymphadenopathy Dermatological: rash, ulceration, pruritis, color change / jaundice Endocrine:   hot flashes or polydipsia Neurological:  headache, dizziness, confusion, focal weakness, paresthesia, Speech difficulties, memory loss, gait difficulty Psychological: Feelings of anxiety, depression, agitation Objective: VITALS:   
Visit Vitals BP (!) 116/42 Pulse 91 Temp 98 °F (36.7 °C) Resp 18 Ht 5' 9\" (1.753 m) Wt 81.6 kg (180 lb) SpO2 100% BMI 26.58 kg/m² PHYSICAL EXAM: 
 
General:    Alert, cooperative, no distress, appears stated age. HEENT: Atraumatic, anicteric sclerae, pink conjunctivae No oral ulcers, mucosa moist, throat clear, dentition fair Neck:  Supple, symmetrical,  thyroid: non tender Lungs:   Clear to auscultation bilaterally. No Wheezing or Rhonchi. No rales. Chest wall:  No tenderness  No Accessory muscle use. Heart:   Regular  rhythm,  No  murmur   No edema Abdomen:   +tender. Not distended. Bowel sounds normal 
Extremities: No cyanosis. No clubbing,   
  Skin turgor normal, Capillary refill normal, Radial dial pulse 2+ Skin:     Not pale. +Jaundiced  No rashes Psych:  Good insight. Not depressed. Not anxious or agitated. Neurologic: EOMs intact. No facial asymmetry. No aphasia or slurred speech. Symmetrical strength, Sensation grossly intact. Alert and oriented X 4.  
 
_______________________________________________________________________ Care Plan discussed with: 
  Comments Patient x Family RN x Care Manager Consultant:     
_______________________________________________________________________ Expected  Disposition:  
Home with Family x HH/PT/OT/RN   
SNF/LTC   
[facility]   
 ________________________________________________________________________ TOTAL TIME: 65 Minutes Critical Care Provided     Minutes non procedure based Comments Reviewed previous records  
>50% of visit spent in counseling and coordination of care  Discussion with patient and/or family and questions answered 
  
 
________________________________________________________________________ Signed: Ale Perez MD 
 
Procedures: see electronic medical records for all procedures/Xrays and details which were not copied into this note but were reviewed prior to creation of Plan. LAB DATA REVIEWED:   
Recent Results (from the past 24 hour(s)) EKG, 12 LEAD, INITIAL Collection Time: 11/09/20 11:37 AM  
Result Value Ref Range Ventricular Rate 108 BPM  
 Atrial Rate 93 BPM  
 QRS Duration 110 ms  
 Q-T Interval 358 ms QTC Calculation (Bezet) 479 ms Calculated R Axis -48 degrees Calculated T Axis 108 degrees Diagnosis Undetermined rhythm Left axis deviation ST & T wave abnormality, consider lateral ischemia When compared with ECG of 10-OCT-2020 10:41, 
Current undetermined rhythm precludes rhythm comparison, needs review T wave amplitude has increased in Inferior leads CBC W/O DIFF Collection Time: 11/09/20 11:39 AM  
Result Value Ref Range WBC 12.9 (H) 3.6 - 11.0 K/uL  
 RBC 3.48 (L) 3.80 - 5.20 M/uL HGB 8.2 (L) 11.5 - 16.0 g/dL HCT 26.3 (L) 35.0 - 47.0 % MCV 75.6 (L) 80.0 - 99.0 FL  
 MCH 23.6 (L) 26.0 - 34.0 PG  
 MCHC 31.2 30.0 - 36.5 g/dL  
 RDW 19.9 (H) 11.5 - 14.5 % PLATELET 995 637 - 782 K/uL MPV ABNORMAL 8.9 - 12.9 FL  
 NRBC 0.0 0  WBC ABSOLUTE NRBC 0.00 0.00 - 0.01 K/uL METABOLIC PANEL, COMPREHENSIVE Collection Time: 11/09/20 11:39 AM  
Result Value Ref Range Sodium 134 (L) 136 - 145 mmol/L Potassium 4.8 3.5 - 5.1 mmol/L Chloride 100 97 - 108 mmol/L  
 CO2 28 21 - 32 mmol/L  Anion gap 6 5 - 15 mmol/L  
 Glucose 141 (H) 65 - 100 mg/dL BUN 67 (H) 6 - 20 MG/DL Creatinine 4.06 (H) 0.55 - 1.02 MG/DL  
 BUN/Creatinine ratio 17 12 - 20 GFR est AA 13 (L) >60 ml/min/1.73m2 GFR est non-AA 11 (L) >60 ml/min/1.73m2 Calcium 9.4 8.5 - 10.1 MG/DL Bilirubin, total 8.8 (H) 0.2 - 1.0 MG/DL  
 ALT (SGPT) 212 (H) 12 - 78 U/L  
 AST (SGOT) 389 (H) 15 - 37 U/L Alk. phosphatase 282 (H) 45 - 117 U/L Protein, total 6.4 6.4 - 8.2 g/dL Albumin 2.7 (L) 3.5 - 5.0 g/dL Globulin 3.7 2.0 - 4.0 g/dL A-G Ratio 0.7 (L) 1.1 - 2.2 LIPASE Collection Time: 11/09/20 11:39 AM  
Result Value Ref Range Lipase 505 (H) 73 - 393 U/L  
AMMONIA Collection Time: 11/09/20 12:44 PM  
Result Value Ref Range Ammonia <10 <32 UMOL/L  
ACETAMINOPHEN Collection Time: 11/09/20 12:44 PM  
Result Value Ref Range Acetaminophen level <2 (L) 10 - 30 ug/mL URINALYSIS W/ REFLEX CULTURE Collection Time: 11/09/20  2:22 PM  
 Specimen: Miscellaneous sample; Urine Urine specimen Result Value Ref Range Color ORANGE Appearance TURBID (A) CLEAR Specific gravity 1.016 1.003 - 1.030    
 pH (UA) 5.5 5.0 - 8.0 Protein 100 (A) NEG mg/dL Glucose Negative NEG mg/dL Ketone TRACE (A) NEG mg/dL Blood MODERATE (A) NEG Urobilinogen 1.0 0.2 - 1.0 EU/dL Nitrites Positive (A) NEG Leukocyte Esterase LARGE (A) NEG    
 WBC >100 (H) 0 - 4 /hpf  
 RBC  0 - 5 /hpf Epithelial cells FEW FEW /lpf Bacteria 3+ (A) NEG /hpf  
 UA:UC IF INDICATED URINE CULTURE ORDERED (A) CNI    
BILIRUBIN, CONFIRM Collection Time: 11/09/20  2:22 PM  
Result Value Ref Range  Bilirubin UA, confirm Positive (A) NEG

## 2020-11-09 NOTE — PROGRESS NOTES
Pharmacy Automatic Renal Dosing Protocol - Antimicrobials Indication for Antimicrobials: IAI Current Regimen of Each Antimicrobial: 
Ceftriaxone 1gm IV q24h - started  day 1 of 5 Metronidazole 500mg IV q8h - started  day 1 Previous Antimicrobial Therapy: 
- Significant Cultures:  
 BCx - pending  UCx - pending Radiology / Imaging results: (X-ray, CT scan or MRI): - 
 
Paralysis, amputations, malnutrition:  none Labs: 
Recent Labs 20 
1139 CREA 4.06* BUN 67* WBC 12.9* Temp (24hrs), Av °F (36.7 °C), Min:97.8 °F (36.6 °C), Max:98.2 °F (36.8 °C) Creatinine Clearance (mL/min):  
CrCl (Actual Body Weight): 13.5 CrCl (Adjusted Body Weight): 12.0 CrCl (Ideal Body Weight): 11.0 Impression/Plan:  
Adjusted Metronidazole to 500mg IV q12h per [facility] dosing protocol Contnue Ceftriaxone regimen CBC and BMP daily already ordered Antimicrobial stop date - Ceftriaxone 5 days, Metronida zole pending Pharmacy will follow daily and adjust medications as appropriate for renal function and/or serum levels.  
 
Thank you, 
Romina Alexis, [facility]

## 2020-11-09 NOTE — ED NOTES
Reported /44 to MD Park Sanz MD ordered 500 mL bolus NS. Rechecked BP at 1528 /50 reported to MD Park Sanz and was asked to hold second 500 mL bolus.

## 2020-11-09 NOTE — ED PROVIDER NOTES
EMERGENCY DEPARTMENT HISTORY AND PHYSICAL EXAM 
 
 
Date: 11/9/2020 Patient Name: Aliya Fay Patient Age and Sex: 82 y.o. female History of Presenting Illness Chief Complaint Patient presents with  Altered mental status Patient has been feeling nauseous with lower abd pain since Wednesday. She is also altered. [facility] came to see her at her home and thought she looked jaundiced and wanted her to come to the hospital.  
 Nausea  Abdominal Pain History Provided By: Patient HPI: Aliya Fay is an 82-year-old female with no significant past medical history except for cholecystectomy presenting for jaundice and nausea. According to daughter, for a little less than a week patient has been having nausea and just feeling generally unwell. Called her primary care doctor office who prescribed her Zofran however she is continued to be very nauseated. Daughter also states that she sounds she is a little bit more confused and more jaundiced and yellow. They called [facility] today who evaluated her and did a urinalysis which showed not only UTI but also bilirubin in her urine so sent her to the ER. Patient denies any liver or pancreas pathology in the past, any more Tylenol than normal or any history of hepatitis. There are no other complaints, changes, or physical findings at this time. PCP: Rocael Freedman MD 
 
No current facility-administered medications on file prior to encounter. Current Outpatient Medications on File Prior to Encounter Medication Sig Dispense Refill  ondansetron (ZOFRAN ODT) 4 mg disintegrating tablet Take 1 Tab by mouth every eight (8) hours as needed for Nausea or Vomiting. 30 Tab 1  
 insulin NPH (HumuLIN N NPH Insulin KwikPen) 100 unit/mL (3 mL) inpn 10 Units by SubCUTAneous route every morning. 15 Adjustable Dose Pre-filled Pen Syringe 3  
 amiodarone (PACERONE) 100 mg tablet Take 1 Tab by mouth daily for 60 days.  30 Tab 1  
  bumetanide (BUMEX) 2 mg tablet Take 1 Tab by mouth daily for 60 days. 30 Tab 1  
 loratadine (CLARITIN) 10 mg tablet Take 10 mg by mouth daily as needed for Allergies.  neomycin sulf/bacitracin/poly (NEOSPORIN EX) by Apply Externally route daily as needed (wound care).  potassium chloride SR (KLOR-CON 10) 10 mEq tablet Take 1 Tab by mouth daily. 90 Tab 3  
 atorvastatin (LIPITOR) 80 mg tablet TAKE ONE TABLET BY MOUTH EVERY EVENING 90 Tab 3  
 allopurinoL (ZYLOPRIM) 100 mg tablet TAKE 2 TABLETS BY MOUTH DAILY 180 Tab 2  
 levothyroxine (SYNTHROID) 125 mcg tablet TAKE ONE TABLET BY MOUTH DAILY BEFORE BREAKFAST 90 Tab 0  
 pantoprazole (PROTONIX) 40 mg tablet TAKE 1 TABLET BY MOUTH DAILY 90 Tab 3  clopidogreL (PLAVIX) 75 mg tab Take 1 Tab by mouth daily. 30 Tab 5  
 sucralfate (CARAFATE) 1 gram tablet Take 1 g by mouth four (4) times daily.  insulin NPH (HUMULIN N NPH INSULIN KWIKPEN) 100 unit/mL (3 mL) inpn 6 Units by SubCUTAneous route every evening.  acetaminophen (TYLENOL) 325 mg tablet Take 325 mg by mouth every four (4) hours as needed for Pain. Past History Past Medical History: 
Past Medical History:  
Diagnosis Date  Arthritis KNEES  Atrial fibrillation ([facility] [address] 10/29/2009  Bladder cancer ([facility] [address]  CAD (coronary artery disease) STENT PER PATIENT  Chronic pain KNEES  
 Coagulation disorder ([facility] [address] Chronic prophylactic anticoagulation med  Colon polyps  Diabetes ([facility] [address] IDDM  
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 One fragment suggestive of hyperplastic polyp  GERD (gastroesophageal reflux disease)  Gout  Heart valve problem   
 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  
 @ [facility] >> atrial fibrillation  SOB (shortness of breath) on exertion 04/2019  
 T. I.A. 4/23/2009  TIA (transient ischemic attack)  Ulcer of right lower extremity, limited to breakdown of skin ([address] 7/5/2018 Past Surgical History: 
Past Surgical History:  
Procedure Laterality Date  CARDIAC SURG PROCEDURE UNLIST    
 ablation  CARDIAC SURG PROCEDURE UNLIST  01/2020 Watchman device implant  COLONOSCOPY N/A 2/20/2019 COLONOSCOPY performed by Casie Moreland MD at [facility] ENDOSCOPY  COLONOSCOPY N/A 6/17/2019 COLONOSCOPY performed by Casie Moreland MD at [facility] ENDOSCOPY  COLONOSCOPY N/A 6/15/2019 COLONOSCOPY performed by Casie Moreland MD at [facility] MAIN OR  
 COLONOSCOPY N/A 9/11/2019 COLONOSCOPY performed by Casie Moreland MD at [facility] ENDOSCOPY  COLONOSCOPY N/A 10/14/2020 COLONOSCOPY performed by Magen Stark MD at [facility] ENDOSCOPY  COLONOSCOPY,DIAGNOSTIC  2/20/2019  COLONOSCOPY,FLEX,W/CONTROL, BLEEDING  9/11/2019  COLONOSCOPY,REMV LESN,SNARE  6/17/2019  GI TRACT CAPSULE ENDOSCOPY  6/28/2019  GI TRACT CAPSULE ENDOSCOPY  9/26/2019  HX APPENDECTOMY  HX CHOLECYSTECTOMY  HX HEART VALVE SURGERY  04/2019  HX HYSTERECTOMY  HX KNEE ARTHROSCOPY  [id number]  
 right knee  HX ORTHOPAEDIC    
 HX UROLOGICAL RENAL STENT, tur-b  CA ESOPHAGOGASTRODUODENOSCOPY TRANSORAL DIAGNOSTIC  8/22/2019  
    
 SIGMOIDOSCOPY,DIAGNOSTIC  6/15/2019  UPPER GI ENDOSCOPY,BALL DIL,30MM  6/15/2019  UPPER GI ENDOSCOPY,TUMOR ABLATN  6/19/2019  VASCULAR SURGERY PROCEDURE UNLIST  11/4  
 removed vein in right leg Family History: 
Family History Problem Relation Age of Onset  Stroke Other  Arthritis-osteo Sister   
     spinal stenosis  Gout Son   
  Hypertension Son   
[address] Hypertension Mother  Heart Disease Mother CAD  Heart Disease Father CAD  Alcohol abuse Neg Hx  Asthma Neg Hx  Bleeding Prob Neg Hx  Cancer Neg Hx  Diabetes Neg Hx  Elevated Lipids Neg Hx   
 Headache Neg Hx  Lung Disease Neg Hx  Migraines Neg Hx  Psychiatric Disorder Neg Hx  Mental Retardation Neg Hx  Anesth Problems Neg Hx Social History: 
Social History Tobacco Use  Smoking status: Former Smoker Packs/day: 0.50 Years: 10.00 Pack years: 5.00 Types: Cigarettes Last attempt to quit: 1967 Years since quittin.8  Smokeless tobacco: Never Used Substance Use Topics  Alcohol use: No  
  Alcohol/week: 0.0 standard drinks  Drug use: Never Allergies: Allergies Allergen Reactions  Actos [Pioglitazone] Swelling Swelling of feet and legs  Codeine Itching  Hydrocodone Rash and Other (comments)  
  hallucinations Review of Systems Review of Systems Constitutional: Negative for chills and fever. Respiratory: Negative for cough and shortness of breath. Cardiovascular: Negative for chest pain. Gastrointestinal: Positive for nausea. Negative for abdominal pain, constipation, diarrhea and vomiting. Genitourinary: Negative for dysuria, frequency and hematuria. Skin: Positive for color change. Neurological: Negative for weakness and numbness. Psychiatric/Behavioral: Positive for confusion. All other systems reviewed and are negative. Physical Exam  
Physical Exam 
Vitals signs and nursing note reviewed. Constitutional:   
   Appearance: She is well-developed. HENT:  
   Head: Normocephalic and atraumatic. Nose: Nose normal.  
   Mouth/Throat:  
   Mouth: Mucous membranes are moist.  
Eyes:  
   General: Scleral icterus present. Extraocular Movements: Extraocular movements intact. Conjunctiva/sclera: Conjunctivae normal.  
Neck: Musculoskeletal: Normal range of motion and neck supple. Cardiovascular:  
   Rate and Rhythm: Normal rate and regular rhythm. Pulmonary:  
   Effort: Pulmonary effort is normal. No respiratory distress. Breath sounds: Normal breath sounds. Abdominal:  
   General: There is no distension. Palpations: Abdomen is soft. Tenderness: There is abdominal tenderness. Comments: Minimal suprapubic tenderness Musculoskeletal: Normal range of motion. Skin: 
   General: Skin is warm and dry. Comments: Jaundice skin Neurological:  
   General: No focal deficit present. Mental Status: She is alert and oriented to person, place, and time. Mental status is at baseline. Comments: Patient is alert and answering questions appropriately Psychiatric:     
   Mood and Affect: Mood normal.  
 
  
 
Diagnostic Study Results Labs - Recent Results (from the past 12 hour(s)) EKG, 12 LEAD, INITIAL Collection Time: 11/09/20 11:37 AM  
Result Value Ref Range Ventricular Rate 108 BPM  
 Atrial Rate 93 BPM  
 QRS Duration 110 ms  
 Q-T Interval 358 ms QTC Calculation (Bezet) 479 ms Calculated R Axis -48 degrees Calculated T Axis 108 degrees Diagnosis Undetermined rhythm Left axis deviation ST & T wave abnormality, consider lateral ischemia When compared with ECG of 10-OCT-2020 10:41, 
Current undetermined rhythm precludes rhythm comparison, needs review T wave amplitude has increased in Inferior leads CBC W/O DIFF Collection Time: 11/09/20 11:39 AM  
Result Value Ref Range WBC 12.9 (H) 3.6 - 11.0 K/uL  
 RBC 3.48 (L) 3.80 - 5.20 M/uL HGB 8.2 (L) 11.5 - 16.0 g/dL HCT 26.3 (L) 35.0 - 47.0 % MCV 75.6 (L) 80.0 - 99.0 FL  
 MCH 23.6 (L) 26.0 - 34.0 PG  
 MCHC 31.2 30.0 - 36.5 g/dL  
 RDW 19.9 (H) 11.5 - 14.5 % PLATELET 627 242 - 073 K/uL  MPV ABNORMAL 8.9 - 12.9 FL  
 NRBC 0.0 0  WBC ABSOLUTE NRBC 0.00 0.00 - 0.01 K/uL METABOLIC PANEL, COMPREHENSIVE Collection Time: 11/09/20 11:39 AM  
Result Value Ref Range Sodium 134 (L) 136 - 145 mmol/L Potassium 4.8 3.5 - 5.1 mmol/L Chloride 100 97 - 108 mmol/L  
 CO2 28 21 - 32 mmol/L Anion gap 6 5 - 15 mmol/L Glucose 141 (H) 65 - 100 mg/dL BUN 67 (H) 6 - 20 MG/DL Creatinine 4.06 (H) 0.55 - 1.02 MG/DL  
 BUN/Creatinine ratio 17 12 - 20 GFR est AA 13 (L) >60 ml/min/1.73m2 GFR est non-AA 11 (L) >60 ml/min/1.73m2 Calcium 9.4 8.5 - 10.1 MG/DL Bilirubin, total 8.8 (H) 0.2 - 1.0 MG/DL  
 ALT (SGPT) 212 (H) 12 - 78 U/L  
 AST (SGOT) 389 (H) 15 - 37 U/L Alk. phosphatase 282 (H) 45 - 117 U/L Protein, total 6.4 6.4 - 8.2 g/dL Albumin 2.7 (L) 3.5 - 5.0 g/dL Globulin 3.7 2.0 - 4.0 g/dL A-G Ratio 0.7 (L) 1.1 - 2.2 LIPASE Collection Time: 11/09/20 11:39 AM  
Result Value Ref Range Lipase 505 (H) 73 - 393 U/L  
AMMONIA Collection Time: 11/09/20 12:44 PM  
Result Value Ref Range Ammonia <10 <32 UMOL/L  
ACETAMINOPHEN Collection Time: 11/09/20 12:44 PM  
Result Value Ref Range Acetaminophen level <2 (L) 10 - 30 ug/mL URINALYSIS W/ REFLEX CULTURE Collection Time: 11/09/20  2:22 PM  
 Specimen: Miscellaneous sample; Urine Urine specimen Result Value Ref Range Color ORANGE Appearance TURBID (A) CLEAR Specific gravity 1.016 1.003 - 1.030    
 pH (UA) 5.5 5.0 - 8.0 Protein 100 (A) NEG mg/dL Glucose Negative NEG mg/dL Ketone TRACE (A) NEG mg/dL Blood MODERATE (A) NEG Urobilinogen 1.0 0.2 - 1.0 EU/dL Nitrites Positive (A) NEG Leukocyte Esterase LARGE (A) NEG    
 WBC >100 (H) 0 - 4 /hpf  
 RBC  0 - 5 /hpf Epithelial cells FEW FEW /lpf Bacteria 3+ (A) NEG /hpf  
 UA:UC IF INDICATED URINE CULTURE ORDERED (A) CNI    
BILIRUBIN, CONFIRM Collection Time: 11/09/20  2:22 PM  
Result Value Ref Range Bilirubin UA, confirm Positive (A) NEG    
GLUCOSE, POC Collection Time: 11/09/20  5:08 PM  
Result Value Ref Range Glucose (POC) 116 (H) 65 - 100 mg/dL Performed by Nga LANIER Radiologic Studies -  
MRI ABD W MRCP WO CONT Final Result IMPRESSION: Pneumobilia within the mildly dilated common bile duct. No  
visualized choledocholithiasis. Peripancreatic edema suggests acute  
pancreatitis. Splenomegaly. Small right kidney with small right renal cysts. Small right pleural effusion. US ABD LTD Final Result IMPRESSION:   
Echogenic debris versus pneumobilia in the common bile duct. No evidence of  
intrahepatic biliary dilatation. Status post cholecystectomy. Unchanged 1 cm  
echogenic hepatic mass, compatible with hemangioma. Small, echogenic right  
kidney, compatible with chronic medical renal disease. CT Results  (Last 48 hours) None CXR Results  (Last 48 hours) None Medical Decision Making I am the first provider for this patient. I reviewed the vital signs, available nursing notes, past medical history, past surgical history, family history and social history. Vital Signs-Reviewed the patient's vital signs. Patient Vitals for the past 12 hrs: 
 Temp Pulse Resp BP SpO2  
11/09/20 1600  91 18 (!) 116/42 100 % 11/09/20 1528    (!) 122/50   
11/09/20 1526 98 °F (36.7 °C) 92 17 (!) 117/49 100 % 11/09/20 1454 98 °F (36.7 °C) 95 18 (!) 107/44 97 % 11/09/20 1400  91 19 (!) 114/49 94 % 11/09/20 1321 97.8 °F (36.6 °C) 96 23 (!) 114/51 94 % 11/09/20 1315  90 19 (!) 125/48 94 % 11/09/20 1245  99 19 (!) 118/57 98 % 11/09/20 1221  92 18 137/62 100 % 11/09/20 1123 98.2 °F (36.8 °C) (!) 111 18 117/62 97 % Records Reviewed: Nursing Notes and Old Medical Records Provider Notes (Medical Decision Making):  
Patient presenting with jaundice as well as nausea.   High concern for liver or pancreas pathology causing her symptoms. Also concerning for hepatic encephalopathy. Will get labs, repeat urinalysis, give her ceftriaxone for her clear UTI based on the paperwork they brought, and ultrasound. She will need admission. ED Course:  
Initial assessment performed. The patients presenting problems have been discussed, and they are in agreement with the care plan formulated and outlined with them. I have encouraged them to ask questions as they arise throughout their visit. ED Course as of Nov 09 1928 Mon Nov 09, 2020  
1327 Patient has a white count of 12.9. Asked nurse to get blood cultures and POC lactate. Her liver function tests are elevated and ultrasound shows possible pneumobilia vs. Echogenic debriis [JS] 1357 Patient has a history of congestive heart failure eating with patient and family decided not to do bolus of fluids at this time. Waiting for lactate to come back. Her creatinine is 4.06 and likely some aspect of of dehydration there from all the nausea and vomiting she has been having over the past couple of days. [JS] [address] me aware that patient's map is now 61. We will give her a 500 normal saline bolus. Lactate was normal.  
 [JS] ED Course User Index [JS] Molly Rodriguez MD  
 
Critical Care Time:  
0 Disposition: 
 
Admission Note: 
Patient is being admitted to the hospital by Dr. Abi Lanza, Service: Hospitalist.  The results of their tests and reasons for their admission have been discussed with them and available family. They convey agreement and understanding for the need to be admitted and for their admission diagnosis. Diagnosis Clinical Impression: 1. Elevated liver function tests 2. Acute cystitis without hematuria Attestations: 
Liliana Cortes M.D. Please note that this dictation was completed with [employer], the [employer] voice recognition software.   Quite often unanticipated grammatical, syntax, homophones, and other interpretive errors are inadvertently transcribed by the computer software. Please disregard these errors. Please excuse any errors that have escaped final proofreading. Thank you.

## 2020-11-10 NOTE — PROGRESS NOTES
Comprehensive Nutrition Assessment Type and Reason for Visit: Initial, Positive nutrition screen Nutrition Recommendations/Plan:  
· Continue diet as tolerated. Advance to Consistent Carb Diet (CCD) when ready. · RD added Ensure Clear while on clear liquids once per day with dinner meal.  May sip in the evenings as a snack. Please document % meals and supplements consumed in flowsheet I/O's under intake. Nutrition Assessment:    
11/10:  Chart reviewed; med noted for severe sepsis POA secondary to intra-abdominal infection, elevated LFT. No longer appears jaundice. Pt's diet has progressed to clear liquids. Pt sleeping soundly at time of visit so did not wake. Per po documentation, tolerated some of the clear liquids today. Documented weight hx varies so difficult to determine if actual weight loss has occurred. Sig PMH: DM, TIA, bladder ca. Patient Vitals for the past 72 hrs: 
 % Diet Eaten 11/10/20 1307 25 % 11/10/20 1033 25 % Last Weight Metric Weight Loss Metrics 11/9/2020 11/4/2020 11/2/2020 10/30/2020 10/28/2020 10/21/2020 10/5/2020 Today's Wt 180 lb 175 lb 175 lb 180 lb 180 lb 192 lb 3.9 oz 200 lb BMI 26.58 kg/m2 25.84 kg/m2 25.84 kg/m2 26.58 kg/m2 26.58 kg/m2 28.39 kg/m2 29.53 kg/m2 Estimated Daily Nutrient Needs: 
Energy (kcal): 1733 (BMR 1333 x 1. 3AF); Weight Used for Energy Requirements: Current Protein (g): 82 (1.0 g/kg bw); Weight Used for Protein Requirements: Current Fluid (ml/day): 1700 ml/day; Method Used for Fluid Requirements: 1 ml/kcal 
 
Nutrition Related Findings:  Meds: H/H 7.6/25.3; Meds: IVF, insulin regimen, synthroid, flagyl; BM: 11/9 Wounds:   
None Current Nutrition Therapies: DIET CLEAR LIQUID 
DIET NUTRITIONAL SUPPLEMENTS Dinner; Ensure Clear Anthropometric Measures: 
· Height:  5' 9\" (175.3 cm) · Current Body Wt:  81.6 kg (179 lb 14.3 oz) · Ideal Body Wt:  145 lbs:  124.1 % · BMI Category: Overweight (BMI 25.0-29. 9) Nutrition Diagnosis:  
· Inadequate protein-energy intake related to (intra-abdominal infection vs sepsis) as evidenced by NPO or clear liquid status due to medical condition Nutrition Interventions:  
Food and/or Nutrient Delivery: Continue current diet, Start oral nutrition supplement Nutrition Education and Counseling: No recommendations at this time Coordination of Nutrition Care: No recommendation at this time Goals: 
PO intake at least 50% of meals + consume 240 ml ONS next 2-4 days Nutrition Monitoring and Evaluation:  
Behavioral-Environmental Outcomes: None identified Food/Nutrient Intake Outcomes: Diet advancement/tolerance, Food and nutrient intake, Supplement intake Physical Signs/Symptoms Outcomes: GI status, Weight, Skin, Biochemical data Discharge Planning: Too soon to determine Electronically signed by Tonia Noonan RD on 11/10/2020 at 3:26 PM

## 2020-11-10 NOTE — PROGRESS NOTES
I reviewed pertinent labs and imaging, and discussed /agreed on the plan of care with Dr. Lokesh Nichols. Hospitalist Progress Note NAME: Tez Beyer :  1935 MRN:  [id number] Assessment / Plan: 
Severe Sepsis secondary to Intra-abdominal Infection vs. UTI WBC 12.9, HR 11 on admission Abnormal LFTs with Elevated Total Bilirubin Total bili 8.8, , , Lipase 505 Jaundice Pneumobilia on US 
· MRI Abd MRCP  results - Pneumobilia within the mildly dilated common bile duct. No visualized choledocholithiasis. Peripancreatic edema suggests acute pancreatitis. Splenomegaly. Small right kidney with small right renal cysts. Small right pleural effusion. · US of Abd  results - Echogenic debris vs. Pneumobilia in the common bile duct. No evidence of intrahepatic biliary dilatation. Status post cholecystectomy. Unchanged 1 cm echogenic hepatic mass, compatible with hemangioma. Small, echogenic right kidney, compatible with chronic medical renal disease. · Follow blood cultures - NO GROWTH in 16 hours · Follow urine cultures - pending · Continue IV ceftriaxone and flagyl · Appreciate GI input - plan for ERCP Thursday (awaiting plavix washout) · LFTs and total bili improving - total bili 8.6, ,  and lipase 354 · Follow LFTs and Lipase · Tolerating clear liquid diet Acute Kidney Injury in Setting of CKD stage 4 
· Continue gentle IVF hydration · Cr 4.27 today · Follow BMP   
· Hold bumex and allopurinol · Avoid nephrotoxic medications Weakness and Debility · Consult PT/OT - likely will need SNF at d/c Chronic Diastolic CHF - not in exacerbation History of Severe Mitral Stenosis S/p Medtronic Fregoso Bioprosthetic MV 2019 Chronic Atrial Fibrillation S/p Watchman Procedure 2020 History of Atrial Flutter S/p Ablation Sinus Bradycardia History of Coronary Artery Disease Hypertension · ECHO 2/2020 results - 60-65%. Severe concentric hypertrophy. Mitral valve thickening and is prosthetic. · Atrial Fibrillation is rate controlled · Continue amiodarone · Hold bumex and plavix Insulin Dependant Type I Diabetes · Continue NPH Gout Hold allopurinol with JULIANA Hypothyroidism Continue levothyroxine GERD Continue protonix Dementia 25.0 - 29.9 Overweight / Body mass index is 26.58 kg/m². Code status: Full Prophylaxis: SCD's Recommended Disposition: SNF/LTC Subjective: Chief Complaint / Reason for Physician Visit \"I feel OK\". Patient seen at bedside, she was sitting in bedside chair. She denied any complaints at this time. Updated on plan of care, PT/OT will see her today. Patient no longer appears to have any jaundice. 1406 Update given to Chantel Griffith (daughter) via phone. Answered all questions. Discussed with RN events overnight. Review of Systems: 
Symptom Y/N Comments  Symptom Y/N Comments Fever/Chills n   Chest Pain n   
Poor Appetite n   Edema n   
Cough n   Abdominal Pain n   
Sputum n   Joint Pain n   
SOB/LEIJA n   Pruritis/Rash n   
Nausea/vomit n   Tolerating PT/OT Diarrhea n   Tolerating Diet Constipation n   Other Could NOT obtain due to:   
 
Objective: VITALS:  
Last 24hrs VS reviewed since prior progress note. Most recent are: 
Patient Vitals for the past 24 hrs: 
 Temp Pulse Resp BP SpO2  
11/10/20 1104 98.7 °F (37.1 °C) 74 16 123/67 99 % 11/10/20 1033  77  (!) 113/58   
11/10/20 1028  78  (!) 116/51   
11/10/20 1023  75  (!) 103/46 99 % 11/10/20 0721 97.7 °F (36.5 °C) 87 18 (!) 117/46 95 % 11/10/20 0251 97.9 °F (36.6 °C) 88 18 (!) 128/40 92 % 11/10/20 0058 98.3 °F (36.8 °C) (!) 109 18 (!) 116/47 96 % 11/10/20 0017 98 °F (36.7 °C) (!) 118 18 (!) 118/49 94 % 11/09/20 2137 98.9 °F (37.2 °C) (!) 116 18 (!) 99/45 96 % 11/09/20 2015   20 (!) 118/54 95 % 11/09/20 1952     97 % 11/09/20 1945  (!) 109 20 (!) 112/47   
11/09/20 1715  97 17 (!) 105/48 97 % 11/09/20 1600  91 18 (!) 116/42 100 % 11/09/20 1528    (!) 122/50   
11/09/20 1526 98 °F (36.7 °C) 92 17 (!) 117/49 100 % 11/09/20 1454 98 °F (36.7 °C) 95 18 (!) 107/44 97 % 11/09/20 1400  91 19 (!) 114/49 94 % Intake/Output Summary (Last 24 hours) at 11/10/2020 1338 Last data filed at 11/10/2020 1307 Gross per 24 hour Intake 1353.33 ml Output  Net 1353.33 ml PHYSICAL EXAM: 
General: WD, WN. Alert, cooperative, no acute distress,  female    
EENT:  EOMI. Anicteric sclerae. MMM Resp:  CTA bilaterally, no wheezing or rales. No accessory muscle use CV:  Regular  rhythm,  No edema GI:  Soft, Non distended, Non tender.  +Bowel sounds Neurologic:  Alert and oriented X 2-3, normal speech, Psych:   Good insight. Not anxious nor agitated Skin:  No rashes. No jaundice Reviewed most current lab test results and cultures  YES Reviewed most current radiology test results   YES Review and summation of old records today    NO Reviewed patient's current orders and MAR    YES 
PMH/ reviewed - no change compared to H&P 
________________________________________________________________________ Care Plan discussed with: 
  Comments Patient x Family  x   
RN x Care Manager Consultant Multidiciplinary team rounds were held today with , nursing, pharmacist and clinical coordinator. Patient's plan of care was discussed; medications were reviewed and discharge planning was addressed. ________________________________________________________________________ Total NON critical care TIME:  25   Minutes Total CRITICAL CARE TIME Spent:   Minutes non procedure based Comments >50% of visit spent in counseling and coordination of care x   
________________________________________________________________________ Maria Del Carmen Kelley NP  
 
 Procedures: see electronic medical records for all procedures/Xrays and details which were not copied into this note but were reviewed prior to creation of Plan. LABS: 
I reviewed today's most current labs and imaging studies. Pertinent labs include: 
Recent Labs 11/10/20 
0313 11/09/20 
1139 WBC 12.4* 12.9* HGB 7.6* 8.2* HCT 25.3* 26.3*  
 228 Recent Labs 11/10/20 
0313 11/09/20 
1139  134* K 4.9 4.8  
 100 CO2 23 28 GLU 80 141* BUN 73* 67* CREA 4.27* 4.06* CA 8.8 9.4 ALB 2.4* 2.7* TBILI 8.6* 8.8* * 212* Signed: Tanner Mijares NP

## 2020-11-10 NOTE — PROGRESS NOTES
Pt amb pt to bathroom with asst, pt is very weak, [holding onto everything as she amb to BR] and states she lives alone. Pt up to chair. Pts daughter called and would like updates. Perfect serve text to Dr Kristi Lozano to request PT order and to call pts daughter Ramírez Chong with updates.

## 2020-11-10 NOTE — PROGRESS NOTES
Problem: Self Care Deficits Care Plan (Adult) Goal: *Acute Goals and Plan of Care (Insert Text) Description:  
FUNCTIONAL STATUS PRIOR TO ADMISSION: Pt was independent w/ all ADL/IADLs prior to admission. Pt did state she was mod I w/ rollator PRN, and when feeling weaker at home. HOME SUPPORT: Pt lives alone w/ daughter living within 10 minutes. Occupational Therapy Goals Initiated 11/10/2020 1. Patient will perform grooming standing with modified independence within 7 day(s). 2.  Patient will perform UB and LB dressing with independence within 7 day(s). 3.  Patient will perform 1 standing ADL with modified independence within 7 day(s). 4.  Patient will perform all aspects of toileting and transfers with modified independence within 7 day(s). 5.  Patient will participate in upper extremity therapeutic exercise/activities with independence for 8 minutes within 10 day(s). Outcome: Not Met OCCUPATIONAL THERAPY EVALUATION Patient: Galen Pakrer (79 y.o. female) Date: 11/10/2020 Primary Diagnosis: Cholangitis [K83.09] Precautions: Fall ASSESSMENT Based on the objective data described below, the patient presents with impaired functional mobility, poor standing balance, general weakness, decreased endurance and activity tolerance when standing due to recent cholangitis. The pt was alert and willing to participate w/ therapy. The pt's BP was lower throughout the session but stable (103/46 sitting legs elevated, 116/51 sitting legs down). The pt was CGA to stand and during functional mobility but over time pt leaned anteriorly and needed verbal and tactile cueing to stand upright. The pt would likely benefit from using a RW at home vs rollator for safe support when walking. The pt had decreased endurance and when standing to wash hands at the sink post toileting the pt was using B hands and leaning towards sink for support. This is not the pt's baseline and would benefit from skilled OT services to address deficits impacting ADL participation. Current Level of Function Impacting Discharge (ADLs/self-care): Feeding: Setup(was mod I prior w/ ADL/IADLs) Oral Facial Hygiene/Grooming: Contact guard assistance;Minimum assistance(standing, pt decreased standing tolerance) Bathing: Adaptive equipment; Moderate assistance Upper Body Dressing: Minimum assistance Lower Body Dressing: Moderate assistance Toileting: Contact guard assistance;Stand by assistance Functional Outcome Measure: The patient scored 45/100 on the Barthel Index outcome measure which is indicative of moderate impairment. Other factors to consider for discharge: Impaired functional mobility Patient will benefit from skilled therapy intervention to address the above noted impairments. PLAN : 
Recommendations and Planned Interventions: self care training, functional mobility training, therapeutic exercise, therapeutic activities, and endurance activities Frequency/Duration: Patient will be followed by occupational therapy 4 times a week to address goals. Recommendation for discharge: (in order for the patient to meet his/her long term goals) Therapy up to 5 days/week in SNF setting This discharge recommendation: A follow-up discussion with the attending provider and/or case management is planned IF patient discharges home will need the following DME: walker: rolling SUBJECTIVE:  
Patient stated i'm a lot weaker now.  OBJECTIVE DATA SUMMARY:  
HISTORY:  
Past Medical History:  
Diagnosis Date  Arthritis KNEES  Atrial fibrillation ([facility] [address] 10/29/2009  Bladder cancer ([facility] [address]  CAD (coronary artery disease) STENT PER PATIENT  Chronic pain KNEES  
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 One fragment suggestive of hyperplastic polyp  GERD (gastroesophageal reflux disease)  Gout  Heart valve problem   
 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  
 @ [facility] >> atrial fibrillation  SOB (shortness of breath) on exertion 04/2019  
 T. I.A. 4/23/2009  TIA (transient ischemic attack)  Ulcer of right lower extremity, limited to breakdown of skin ([address] 7/5/2018 Past Surgical History:  
Procedure Laterality Date  CARDIAC SURG PROCEDURE UNLIST    
 ablation  CARDIAC SURG PROCEDURE UNLIST  01/2020 Watchman device implant  COLONOSCOPY N/A 2/20/2019 COLONOSCOPY performed by Tammy Howell MD at [facility] ENDOSCOPY  COLONOSCOPY N/A 6/17/2019 COLONOSCOPY performed by Tammy Howell MD at [facility] ENDOSCOPY  COLONOSCOPY N/A 6/15/2019 COLONOSCOPY performed by Tammy Howell MD at [facility] MAIN OR  
 COLONOSCOPY N/A 9/11/2019 COLONOSCOPY performed by Tammy Howell MD at [facility] ENDOSCOPY  COLONOSCOPY N/A 10/14/2020 COLONOSCOPY performed by Tanna Cervantes MD at [facility] ENDOSCOPY  COLONOSCOPY,DIAGNOSTIC  2/20/2019  COLONOSCOPY,FLEX,W/CONTROL, BLEEDING  9/11/2019  COLONOSCOPY,REMV LESN,SNARE  6/17/2019  GI TRACT CAPSULE ENDOSCOPY  6/28/2019  GI TRACT CAPSULE ENDOSCOPY  9/26/2019  HX APPENDECTOMY  HX CHOLECYSTECTOMY  HX HEART VALVE SURGERY  04/2019  HX HYSTERECTOMY  HX KNEE ARTHROSCOPY  [id number]  
 right knee  HX ORTHOPAEDIC    
 HX UROLOGICAL  RENAL STENT, tur-b  
  IA ESOPHAGOGASTRODUODENOSCOPY TRANSORAL DIAGNOSTIC  8/22/2019  
    
 SIGMOIDOSCOPY,DIAGNOSTIC  6/15/2019  UPPER GI ENDOSCOPY,BALL DIL,30MM  6/15/2019  UPPER GI ENDOSCOPY,TUMOR ABLATN  6/19/2019  VASCULAR SURGERY PROCEDURE UNLIST  11/4  
 removed vein in right leg Expanded or extensive additional review of patient history:  
 
Home Situation Home Environment: Private residence # Steps to Enter: 0 One/Two Story Residence: One story Living Alone: Yes Support Systems: Child(veronica) Patient Expects to be Discharged to[de-identified] Private residence Current DME Used/Available at Home: Shower chair, Walker, rollator, [place], straight, Wheelchair, Grab bars Tub or Shower Type: Shower Hand dominance: Right EXAMINATION OF PERFORMANCE DEFICITS: 
Cognitive/Behavioral Status: 
Neurologic State: Alert Orientation Level: Oriented X4 Cognition: Appropriate decision making; Follows commands Perception: Appears intact Perseveration: No perseveration noted Safety/Judgement: Awareness of environment Hearing: Auditory Auditory Impairment: None Range of Motion: 
AROM: Generally decreased, functional 
  
  
  
  
  
  
  
 
Strength: 
Strength: Generally decreased, functional  (shld flexion 4-5, shld extension 4+/5) Coordination: 
Coordination: Within functional limits Fine Motor Skills-Upper: Left Intact; Right Intact Gross Motor Skills-Upper: Left Intact; Right Intact Tone & Sensation: 
Tone: Normal 
  
  
  
  
  
  
  
 
Balance: 
Sitting: Intact Standing: Impaired; With support(RW) 
Standing - Static: Good;Constant support Standing - Dynamic : Fair;Constant support Functional Mobility and Transfers for ADLs: 
Bed Mobility: 
Rolling: Other (comment)(seated in bedside chair) Transfers: 
Sit to Stand: Contact guard assistance Stand to Sit: Contact guard assistance Bathroom Mobility: Contact guard assistance Toilet Transfer : Contact guard assistance ADL Assessment: 
Feeding: Setup Oral Facial Hygiene/Grooming: Contact guard assistance;Minimum assistance(standing, pt decreased standing tolerance) Bathing: Adaptive equipment; Moderate assistance Upper Body Dressing: Minimum assistance Lower Body Dressing: Moderate assistance Toileting: Contact guard assistance;Stand by assistance ADL Intervention and task modifications: 
  
 
Grooming Position Performed: Standing Washing Hands: Contact guard assistance Toileting Bladder Hygiene: Supervision Bowel Hygiene: Supervision Clothing Management: Stand-by assistance Cognitive Retraining Safety/Judgement: Awareness of environment Functional Measure: 
Barthel Index: 
 
Bathin Bladder: 5 Bowels: 5 Groomin Dressin Feeding: 10 Mobility: 0 Stairs: 0 Toilet Use: 5 Transfer (Bed to Chair and Back): 10 Total: 45/100 The Barthel ADL Index: Guidelines 1. The index should be used as a record of what a patient does, not as a record of what a patient could do. 2. The main aim is to establish degree of independence from any help, physical or verbal, however minor and for whatever reason. 3. The need for supervision renders the patient not independent. 4. A patient's performance should be established using the best available evidence. Asking the patient, friends/relatives and nurses are the usual sources, but direct observation and common sense are also important. However direct testing is not needed. 5. Usually the patient's performance over the preceding 24-48 hours is important, but occasionally longer periods will be relevant. 6. Middle categories imply that the patient supplies over 50 per cent of the effort. 7. Use of aids to be independent is allowed. Patricio Uriarte., Barthel, D.W. ([id number]). Functional evaluation: the Barthel Index. [facility] [address] [facility] (14)2. ALEJANDRO Staley, Isis Singh., Dallin Awan., [place], [address] (1999). Measuring the change indisability after inpatient rehabilitation; comparison of the responsiveness of the Barthel Index and Functional Raphine Measure. Journal of Neurology, Neurosurgery, and Psychiatry, 66(4), 134-523. ADAM Sesay, RASHAD Wright, & Lito Jaramillo M.A. (2004.) Assessment of post-stroke quality of life in cost-effectiveness studies: The usefulness of the Barthel Index and the EuroQoL-5D. [facility], 13, 799-11 Occupational Therapy Evaluation Charge Determination History Examination Decision-Making LOW Complexity : Brief history review  LOW Complexity : 1-3 performance deficits relating to physical, cognitive , or psychosocial skils that result in activity limitations and / or participation restrictions  LOW Complexity : No comorbidities that affect functional and no verbal or physical assistance needed to complete eval tasks Based on the above components, the patient evaluation is determined to be of the following complexity level: LOW Activity Tolerance:  
Fair and requires rest breaks 11/10/20 1023 11/10/20 1028 11/10/20 1033 Vital Signs BP (!) 103/46 (!) 116/51 (!) 113/58 MAP (Calculated) 65 73 76 BP 1 Location Left arm Left arm Left arm BP 1 Method Automatic Automatic Automatic  
BP Patient Position Sitting;Pre-activity (LE elevated in recliner) Sitting Standing After treatment patient left in no apparent distress:   
Sitting in chair and Call bell within reach COMMUNICATION/EDUCATION:  
The patients plan of care was discussed with: Registered nurse and patient . Patient/family agree to work toward stated goals and plan of care. This patients plan of care is appropriate for delegation to [facility]. Thank you for this referral. 
Higinio Nagy Time Calculation: 30 mins Regarding student involvement in patient care: A student participated in this treatment session. Per CMS Medicare statements and AOTA guidelines I certify that the following was true: 1. I was present and directly observed the entire session. 2. I made all skilled judgments and clinical decisions regarding care. 3. I am the practitioner responsible for assessment, treatment, and documentation.

## 2020-11-10 NOTE — PROGRESS NOTES
General Surgery End of Shift Nursing Note Bedside shift change report given to King Alfredo (oncoming nurse) by Joey Pelletier (offgoing nurse). Report included the following information SBAR, Kardex, Intake/Output, MAR and Recent Results. Shift worked:   2130-7a Summary of shift:   Patient admitted to unit from ED at 2130. Medicated for nausea x1. Patient received 500ml bolus of NS for hypotension and tachycardia. Issues for physician to address:     
 
 
Izabel Singh

## 2020-11-10 NOTE — PROGRESS NOTES
Gastroenterology Progress Note NEVAEH Kingston 
 for Dr. Lisa Arreguin 
 
11/10/2020 Admit Date: 11/9/2020 Subjective: Follow up for:  1) Elevated LFTs 2) Sepsis  
`  3) UTI Patient is on NPO. Pain: Patient complains of abdominal pain yes. The pain is located in the periumbilical area. The pain is described as constant. No further vomiting, some nausea. No F/C. Labs reviewed today and WBC 12.4 from 12.9 yesterday, hgb 7.6, BUN 73, Cr 4.27 (worsening). Results for Desiree Cam (MRN [id number]) as of 11/10/2020 07:54 Ref. Range 11/9/2020 11:39 11/10/2020 03:13 ALT Latest Ref Range: 12 - 78 U/L 212 (H) 192 (H) AST Latest Ref Range: 15 - 37 U/L 389 (H) 395 (H) Alk. phosphatase Latest Ref Range: 45 - 117 U/L 282 (H) 275 (H) Lipase Latest Ref Range: 73 - 393 U/L 505 (H) 354 Results for Desiree Cam (MRN [id number]) as of 11/10/2020 07:54 Ref. Range 11/9/2020 11:39 11/10/2020 03:13 Bilirubin, total Latest Ref Range: 0.2 - 1.0 MG/DL 8.8 (H) 8.6 (H) MRCP: Pneumobilia with moderately dilated CBD, no definitive CBD stone Current Facility-Administered Medications Medication Dose Route Frequency  amiodarone (CORDARONE) tablet 100 mg  100 mg Oral DAILY  insulin NPH (NOVOLIN N, HUMULIN N) injection 3 Units  3 Units SubCUTAneous QPM  
 insulin NPH (NOVOLIN N, HUMULIN N) injection 5 Units  5 Units SubCUTAneous QAM  
 levothyroxine (SYNTHROID) tablet 125 mcg  125 mcg Oral 6am  
 pantoprazole (PROTONIX) 40 mg in 0.9% sodium chloride 10 mL injection  40 mg IntraVENous DAILY  sodium chloride (NS) flush 5-40 mL  5-40 mL IntraVENous Q8H  
 sodium chloride (NS) flush 5-40 mL  5-40 mL IntraVENous PRN  
 0.9% sodium chloride infusion  50 mL/hr IntraVENous CONTINUOUS  
 cefTRIAXone (ROCEPHIN) 1 g in 0.9% sodium chloride (MBP/ADV) 50 mL  1 g IntraVENous Q24H  
 metroNIDAZOLE (FLAGYL) IVPB premix 500 mg  500 mg IntraVENous Q12H  ondansetron (ZOFRAN) injection 4 mg  4 mg IntraVENous Q6H PRN  
 insulin lispro (HUMALOG) injection   SubCUTAneous Q6H  
 glucose chewable tablet 16 g  4 Tab Oral PRN  
 dextrose (D50W) injection syrg 12.5-25 g  12.5-25 g IntraVENous PRN  
 glucagon (GLUCAGEN) injection 1 mg  1 mg IntraMUSCular PRN  
 morphine injection 1 mg  1 mg IntraVENous Q6H PRN Objective:  
 
Blood pressure (!) 117/46, pulse 87, temperature 97.7 °F (36.5 °C), resp. rate 18, height 5' 9\" (1.753 m), weight 81.6 kg (180 lb), SpO2 95 %. No intake/output data recorded. 11/08 1901 - 11/10 0700 In: 1113.3 [I.V.:1113.3] Out: - EXAM: 
GEN: Elderly WF, NAD HEENT: +Scleral icterus Heart: RRR Lungs: CTA Abdomen: soft, +RUQ and umbilical tenderness, no guarding or rebound, normal BS Ext:bilateral pitting edema Data Review Recent Results (from the past 24 hour(s)) EKG, 12 LEAD, INITIAL Collection Time: 11/09/20 11:37 AM  
Result Value Ref Range Ventricular Rate 108 BPM  
 Atrial Rate 93 BPM  
 QRS Duration 110 ms  
 Q-T Interval 358 ms QTC Calculation (Bezet) 479 ms Calculated R Axis -48 degrees Calculated T Axis 108 degrees Diagnosis Atrial fibrillation Left axis deviation ST & T wave abnormality, consider lateral ischemia When compared with ECG of 10-OCT-2020 10:41, Atrial fibrillation has replaced Sinus rhythm T wave amplitude has increased in Inferior leads Confirmed by Pamella Iba, P.V. ([id number]) on 11/9/2020 11:23:01 PM 
  
CBC W/O DIFF Collection Time: 11/09/20 11:39 AM  
Result Value Ref Range WBC 12.9 (H) 3.6 - 11.0 K/uL  
 RBC 3.48 (L) 3.80 - 5.20 M/uL HGB 8.2 (L) 11.5 - 16.0 g/dL HCT 26.3 (L) 35.0 - 47.0 % MCV 75.6 (L) 80.0 - 99.0 FL  
 MCH 23.6 (L) 26.0 - 34.0 PG  
 MCHC 31.2 30.0 - 36.5 g/dL  
 RDW 19.9 (H) 11.5 - 14.5 % PLATELET 338 816 - 162 K/uL MPV ABNORMAL 8.9 - 12.9 FL  
 NRBC 0.0 0  WBC ABSOLUTE NRBC 0.00 0.00 - 0.01 K/uL METABOLIC PANEL, COMPREHENSIVE Collection Time: 11/09/20 11:39 AM  
Result Value Ref Range Sodium 134 (L) 136 - 145 mmol/L Potassium 4.8 3.5 - 5.1 mmol/L Chloride 100 97 - 108 mmol/L  
 CO2 28 21 - 32 mmol/L Anion gap 6 5 - 15 mmol/L Glucose 141 (H) 65 - 100 mg/dL BUN 67 (H) 6 - 20 MG/DL Creatinine 4.06 (H) 0.55 - 1.02 MG/DL  
 BUN/Creatinine ratio 17 12 - 20 GFR est AA 13 (L) >60 ml/min/1.73m2 GFR est non-AA 11 (L) >60 ml/min/1.73m2 Calcium 9.4 8.5 - 10.1 MG/DL Bilirubin, total 8.8 (H) 0.2 - 1.0 MG/DL  
 ALT (SGPT) 212 (H) 12 - 78 U/L  
 AST (SGOT) 389 (H) 15 - 37 U/L Alk. phosphatase 282 (H) 45 - 117 U/L Protein, total 6.4 6.4 - 8.2 g/dL Albumin 2.7 (L) 3.5 - 5.0 g/dL Globulin 3.7 2.0 - 4.0 g/dL A-G Ratio 0.7 (L) 1.1 - 2.2 LIPASE Collection Time: 11/09/20 11:39 AM  
Result Value Ref Range Lipase 505 (H) 73 - 393 U/L  
AMMONIA Collection Time: 11/09/20 12:44 PM  
Result Value Ref Range Ammonia <10 <32 UMOL/L  
ACETAMINOPHEN Collection Time: 11/09/20 12:44 PM  
Result Value Ref Range Acetaminophen level <2 (L) 10 - 30 ug/mL CULTURE, BLOOD, PAIRED Collection Time: 11/09/20  1:25 PM  
 Specimen: Blood Result Value Ref Range Special Requests: NO SPECIAL REQUESTS Culture result: NO GROWTH AFTER 16 HOURS    
POC LACTIC ACID Collection Time: 11/09/20  1:44 PM  
Result Value Ref Range Lactic Acid (POC) 0.99 0.40 - 2.00 mmol/L  
URINALYSIS W/ REFLEX CULTURE Collection Time: 11/09/20  2:22 PM  
 Specimen: Miscellaneous sample; Urine Urine specimen Result Value Ref Range Color ORANGE Appearance TURBID (A) CLEAR Specific gravity 1.016 1.003 - 1.030    
 pH (UA) 5.5 5.0 - 8.0 Protein 100 (A) NEG mg/dL Glucose Negative NEG mg/dL Ketone TRACE (A) NEG mg/dL Blood MODERATE (A) NEG Urobilinogen 1.0 0.2 - 1.0 EU/dL Nitrites Positive (A) NEG  Leukocyte Esterase LARGE (A) NEG    
 WBC >100 (H) 0 - 4 /hpf  
 RBC  0 - 5 /hpf Epithelial cells FEW FEW /lpf Bacteria 3+ (A) NEG /hpf  
 UA:UC IF INDICATED URINE CULTURE ORDERED (A) CNI    
BILIRUBIN, CONFIRM Collection Time: 11/09/20  2:22 PM  
Result Value Ref Range Bilirubin UA, confirm Positive (A) NEG    
GLUCOSE, POC Collection Time: 11/09/20  5:08 PM  
Result Value Ref Range Glucose (POC) 116 (H) 65 - 100 mg/dL Performed by Jocelyn LANIER   
POC LACTIC ACID Collection Time: 11/09/20  6:40 PM  
Result Value Ref Range Lactic Acid (POC) 1.03 0.40 - 2.00 mmol/L  
GLUCOSE, POC Collection Time: 11/09/20 11:17 PM  
Result Value Ref Range Glucose (POC) 110 (H) 65 - 100 mg/dL Performed by Conner Hernandez METABOLIC PANEL, COMPREHENSIVE Collection Time: 11/10/20  3:13 AM  
Result Value Ref Range Sodium 137 136 - 145 mmol/L Potassium 4.9 3.5 - 5.1 mmol/L Chloride 105 97 - 108 mmol/L  
 CO2 23 21 - 32 mmol/L Anion gap 9 5 - 15 mmol/L Glucose 80 65 - 100 mg/dL BUN 73 (H) 6 - 20 MG/DL Creatinine 4.27 (H) 0.55 - 1.02 MG/DL  
 BUN/Creatinine ratio 17 12 - 20 GFR est AA 12 (L) >60 ml/min/1.73m2 GFR est non-AA 10 (L) >60 ml/min/1.73m2 Calcium 8.8 8.5 - 10.1 MG/DL Bilirubin, total 8.6 (H) 0.2 - 1.0 MG/DL  
 ALT (SGPT) 192 (H) 12 - 78 U/L  
 AST (SGOT) 395 (H) 15 - 37 U/L Alk. phosphatase 275 (H) 45 - 117 U/L Protein, total 5.8 (L) 6.4 - 8.2 g/dL Albumin 2.4 (L) 3.5 - 5.0 g/dL Globulin 3.4 2.0 - 4.0 g/dL A-G Ratio 0.7 (L) 1.1 - 2.2    
CBC WITH AUTOMATED DIFF Collection Time: 11/10/20  3:13 AM  
Result Value Ref Range WBC 12.4 (H) 3.6 - 11.0 K/uL  
 RBC 3.35 (L) 3.80 - 5.20 M/uL HGB 7.6 (L) 11.5 - 16.0 g/dL HCT 25.3 (L) 35.0 - 47.0 % MCV 75.5 (L) 80.0 - 99.0 FL  
 MCH 22.7 (L) 26.0 - 34.0 PG  
 MCHC 30.0 30.0 - 36.5 g/dL RDW 20.0 (H) 11.5 - 14.5 % PLATELET 588 365 - 759 K/uL MPV ABNORMAL 8.9 - 12.9 FL  
 NRBC 0.0 0  WBC ABSOLUTE NRBC 0.00 0.00 - 0.01 K/uL NEUTROPHILS 90 (H) 32 - 75 % LYMPHOCYTES 2 (L) 12 - 49 % MONOCYTES 7 5 - 13 % EOSINOPHILS 0 0 - 7 % BASOPHILS 0 0 - 1 % IMMATURE GRANULOCYTES 1 (H) 0.0 - 0.5 % ABS. NEUTROPHILS 11.5 (H) 1.8 - 8.0 K/UL  
 ABS. LYMPHOCYTES 0.3 (L) 0.8 - 3.5 K/UL  
 ABS. MONOCYTES 0.9 0.0 - 1.0 K/UL  
 ABS. EOSINOPHILS 0.0 0.0 - 0.4 K/UL  
 ABS. BASOPHILS 0.0 0.0 - 0.1 K/UL  
 ABS. IMM. GRANS. 0.1 (H) 0.00 - 0.04 K/UL  
 DF AUTOMATED    
LIPASE Collection Time: 11/10/20  3:13 AM  
Result Value Ref Range Lipase 354 73 - 393 U/L  
GLUCOSE, POC Collection Time: 11/10/20  6:09 AM  
Result Value Ref Range Glucose (POC) 93 65 - 100 mg/dL Performed by Louann Quezada Recent Labs 11/10/20 
0313 11/09/20 
1139 WBC 12.4* 12.9* HGB 7.6* 8.2* HCT 25.3* 26.3*  
 228 Recent Labs 11/10/20 
0313 11/09/20 
1139  134* K 4.9 4.8  
 100 CO2 23 28 BUN 73* 67* CREA 4.27* 4.06* GLU 80 141* CA 8.8 9.4 Recent Labs 11/10/20 
0313 11/09/20 
1139 * 212* * 282* TBILI 8.6* 8.8* TP 5.8* 6.4 ALB 2.4* 2.7*  
GLOB 3.4 3.7 LPSE 354 505* No results for input(s): INR, PTP, APTT, INREXT in the last 72 hours. No results for input(s): FE, TIBC, PSAT, FERR in the last 72 hours. Lab Results Component Value Date/Time Folate 72.3 (H) 01/22/2017 03:13 AM  
  
No results for input(s): PH, PCO2, PO2 in the last 72 hours. No results for input(s): CPK, CKNDX, TROIQ in the last 72 hours. No lab exists for component: CPKMB Lab Results Component Value Date/Time Cholesterol, total 256 (H) 07/13/2020 08:02 AM  
 HDL Cholesterol 58 07/13/2020 08:02 AM  
 LDL, calculated 169 (H) 07/13/2020 08:02 AM  
 Triglyceride 143 07/13/2020 08:02 AM  
 CHOL/HDL Ratio 3.1 09/22/2010 08:12 AM  
 
Lab Results Component Value Date/Time  Glucose (POC) 93 11/10/2020 06:09 AM  
 Glucose (POC) 110 (H) 11/09/2020 11:17 PM  
 Glucose (POC) 116 (H) 11/09/2020 05:08 PM  
 Glucose (POC) 129 (H) 10/21/2020 07:55 AM  
 Glucose (POC) 148 (H) 10/20/2020 09:33 PM  
 
Lab Results Component Value Date/Time Color ORANGE 11/09/2020 02:22 PM  
 Appearance TURBID (A) 11/09/2020 02:22 PM  
 Specific gravity 1.016 11/09/2020 02:22 PM  
 Specific gravity 1.020 07/06/2010 01:10 PM  
 pH (UA) 5.5 11/09/2020 02:22 PM  
 Protein 100 (A) 11/09/2020 02:22 PM  
 Glucose Negative 11/09/2020 02:22 PM  
 Ketone TRACE (A) 11/09/2020 02:22 PM  
 Bilirubin Negative 10/10/2020 10:51 PM  
 Urobilinogen 1.0 11/09/2020 02:22 PM  
 Nitrites Positive (A) 11/09/2020 02:22 PM  
 Leukocyte Esterase LARGE (A) 11/09/2020 02:22 PM  
 Epithelial cells FEW 11/09/2020 02:22 PM  
 Bacteria 3+ (A) 11/09/2020 02:22 PM  
 WBC >100 (H) 11/09/2020 02:22 PM  
 RBC  11/09/2020 02:22 PM  
 
MRI Results (most recent): 
Results from Hospital Encounter encounter on 11/09/20 MRI ABD W MRCP WO CONT Narrative INDICATION: Jaundice. Abnormal ultrasound. COMPARISON: Right upper quadrant abdominal ultrasound from November 9, 2020. Abdomen CT from September 10, 2019. TECHNIQUE: Routine axial and coronal MR sequences were obtained through the 
abdomen. MRCP sequences were included but are nondiagnostic secondary to motion 
artifact. FINDINGS:  
LIVER: Normal size and signal. No mass. GALLBLADDER: Surgically absent. COMMON BILE DUCT: 9 mm in diameter. Apparent pneumobilia within the nondependent 
portions. No visualized choledocholithiasis. PANCREATIC DUCT: Not visualized secondary to motion artifact. PANCREAS: No mass or fluid collection. Mild peripancreatic edema. SPLEEN: Enlarged, measuring 17.6 cm in length. No mass. ADRENALS: No mass. KIDNEYS: Small right kidney. No hydronephrosis. 1.5 cm and 0.7 cm right renal 
cysts. RETROPERITONEUM: No mass or lymphadenopathy. No aortic aneurysm. BOWEL: No focal abnormality. OTHER: Small right pleural effusion Impression IMPRESSION: Pneumobilia within the mildly dilated common bile duct. No 
visualized choledocholithiasis. Peripancreatic edema suggests acute 
pancreatitis. Splenomegaly. Small right kidney with small right renal cysts. Small right pleural effusion. Assessment:  
 
Active Problems: 
  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   
     Gastric, biopsy:  
    Mild capillary ectasia and congestion, compatible with gastric antral  
    vascular ectasia (GAVE), negative for background gastritis. One fragment suggestive of hyperplastic polyp Elevated LFTs (11/9/2020) Common bile duct dilation (11/9/2020) Cholangitis (11/9/2020) Plan:  
Patient continues with abdominal pain, nausea and elevated LFTs. Also has UTI that may be contributing however unusual for her to have pneumobilia without recent instrumentation. Plan for ERCP possibly Thursday, continue to hold Plavix. Continue empiric abx in the interim. NEVAEH Crespo 
 
11/10/20 
8:10 AM 
 
[address] [place] [address], [phone] Loc: [phone] The patient was seen and examined independently by me. I have discussed the case with the mid-level provider in detail. I have reviewed the patient's chart and the note. I personally performed all components of the history, physical, and medical decision making and agree with the assessment and plan, with minor modifications as noted. Please see APPs note for full details. Physical exam: 
General:AAO x 3, HEENT:  EOMI, Chest:  CTA,Heart: S1, S2, RRR 
GI: Soft, NT, ND + bowel sounds Extremities: No edema Data Review:  reviewed Impression:  
 
 Elevated LFTs (11/9/2020) Common bile duct dilation (11/9/2020) Cholangitis (11/9/2020) UTI 
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 She was given Plavix yesterday. · We will give it a few days (Ideally 5-7) but will arrange ERCP for this Thursday noon in the OR to let medication wash out. Pt is aware of the plan. · C/w IV abx. · I discussed with the patient the objectives, risks, consequences and alternatives of ERCP with possible stone removal and/ or stent. The discussion included risks of sedation, heart problem, lung problem, bleeding, injury that might require surgery and pancreatitis. The patient appeared to understand. Inc bleed risk 2/2 being on Clopidogrel recently. Signed By: Terrance Lentz.  Sumit Marin MD 
 
11/10/2020  9:58 AM

## 2020-11-10 NOTE — PROGRESS NOTES
RAPID RESPONSE TEAM- Follow Up Rounded on patient for MEWS 4. HR ST 110s-120. BP borderline low. Admitted from ER with UTI with bili in urine, jaundice, nausea, and elevated LFT's. Also has JULIANA with creatinine 4.06. Patient was given 500cc bolus in ER. Currently afebrile. On abx for UTI. Blood cultures pending. 1840 lactic was 1.03. Primary RN reaching out to NP for fluid bolus order d/t hypotension and tachycradia. Patient's EF 50-55%. 500cc bolus ordered. Please call with any questions or concerns. Yang Westbrook RN 
RRT Ana Moya Patient Vitals for the past 12 hrs: 
 Temp Pulse Resp BP SpO2  
11/09/20 2137 98.9 °F (37.2 °C) (!) 116 18 (!) 99/45 96 % 11/09/20 2015   20 (!) 118/54 95 % 11/09/20 1952     97 % 11/09/20 1945  (!) 109 20 (!) 112/47   
11/09/20 1715  97 17 (!) 105/48 97 % 11/09/20 1600  91 18 (!) 116/42 100 % 11/09/20 1528    (!) 122/50   
11/09/20 1526 98 °F (36.7 °C) 92 17 (!) 117/49 100 % 11/09/20 1454 98 °F (36.7 °C) 95 18 (!) 107/44 97 % 11/09/20 1400  91 19 (!) 114/49 94 % 11/09/20 1321 97.8 °F (36.6 °C) 96 23 (!) 114/51 94 % 11/09/20 1315  90 19 (!) 125/48 94 % 11/09/20 1245  99 19 (!) 118/57 98 % 11/09/20 1221  92 18 137/62 100 % 11/09/20 1123 98.2 °F (36.8 °C) (!) 111 18 117/62 97 %

## 2020-11-10 NOTE — WOUND CARE
Wound Care consult: Chart reviewed and patient assessed for her sacrum area and buttocks skin discoloration. Pt. Is incontinent chronically and has had a consult for the same skin condition. Assessment: Patient is alert when awakened and is cooperative. She was rolled her the left side and her sacrum was examined. Patient has several areas of hyperpigmented skin along the border of the distal buttocks. All of the skin is blanchable with no open areas. This is usually attributed to the chronic incontinence and sitting a lot. She also has some light lavender areas on the lower back that are intact, blanchable and not painful. These are most likely healing bruises or an anomaly. Treatment: patient was cleaned up and her skin left open to air. Nursing will need to apply the Z-Guard Cream (orange tube).   
Tiffanie Rivera RN, BSN, [employer]

## 2020-11-10 NOTE — PROGRESS NOTES
Received notification from bedside RN about patient with regards to: hypotension, tachycardia VS: , BP 99/45, RR 18, O2 sat 96% on RA Intervention given:  ml IV once

## 2020-11-10 NOTE — ED NOTES
TRANSFER - OUT REPORT: 
 
Verbal report given to Mervat(name) on Tez Beyer  being transferred to Gen Surg(unit) for routine progression of care Report consisted of patients Situation, Background, Assessment and  
Recommendations(SBAR). Information from the following report(s) SBAR, Kardex, Intake/Output and MAR was reviewed with the receiving nurse. Lines:  
Peripheral IV 11/09/20 Right Antecubital (Active) Site Assessment Clean, dry, & intact 11/09/20 1140 Phlebitis Assessment 0 11/09/20 1140 Infiltration Assessment 0 11/09/20 1140 Dressing Status Clean, dry, & intact 11/09/20 1140 Dressing Type Tape;Transparent 11/09/20 1140 Opportunity for questions and clarification was provided.

## 2020-11-11 NOTE — ANESTHESIA PREPROCEDURE EVALUATION
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 Estimated left ventricular ejection fraction is 50 - 55%. Visually measured ejection fraction. No regional wall motion abnormality noted. No ventricular septal defect present in the left ventricle. ·Moderately dilated left atrium. Left atrial appendage is completely occluded. A Watchman left atrial appendage occluder is present. ·Mildly dilated right atrium. ·Color flow Doppler was used. ·Mild aortic valve leaflet calcification present. Mildly reduced non corornary leaflet mobility of the aortic valve. 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 Unable to assess mitral valve stenosis is present. There is a unknown type bioprosthetic mitral valve. GI/Hepatic/Renal 
  
GERD Renal disease: CRI and ARF 
PUD Comments: Common Bile Duct Stone with jaundice and sepsis UTI 
CRI, Stage IV 
GI bleed H/O Dysphagia H/O Esophageal Stricture H/O Reflux Esophagitis H/O Melena GAVE (gastric antral vascular ectasia) (K08.224) Endo/Other Diabetes: well controlled, type 2, using insulin Hypothyroidism: well controlled Arthritis, cancer (bladder) and anemia Comments: Thrombocytopenia Other Findings Comments: Sepsis Jaundiced H/O Thrombocytopenia Gout Chronic knee pain Physical Exam 
 
Airway Mallampati: III 
TM Distance: 4 - 6 cm Neck ROM: decreased range of motion Mouth opening: Normal 
 
 Cardiovascular Rhythm: regular Rate: normal 
 
 
 
 Dental 
 
Dentition: Lower partial plate, Caps/crowns and Loose teeth Pulmonary Breath sounds clear to auscultation Abdominal 
GI exam deferred Other Findings Anesthetic Plan ASA: 4 Anesthesia type: general 
 
Monitoring Plan: BIS Induction: Intravenous Anesthetic plan and risks discussed with: Patient

## 2020-11-11 NOTE — PROGRESS NOTES
General Surgery End of Shift Nursing Note Bedside shift change report given to Myrlene Riedel, RN (oncoming nurse) by Maria Del Rosario Plaza RN (offgoing nurse). Report included the following information SBAR, Kardex, Procedure Summary, Intake/Output, MAR, Accordion and Recent Results. Shift worked:   7a-7p Summary of shift:    Pt with first degree block on telemetry, reported to Willi Luna NP. Pt is getting po amiodarone. Tele ordered. Attempt to draw aPTT, unsuccessful call to PICC team for assist. Scanlon placed per order. Plan for ERCP tomcharanjit, pts daughter informed by GI team and signed consent. Issues for physician to address:   none Number times ambulated in hallway past shift: 0 Number of times OOB to chair past shift: 1, for five hours Pain Management: 
Current medication: none Patient states pain is manageable on current pain medication: YES 
 
GI: 
 
Current diet:  DIET CLEAR LIQUID 
DIET NUTRITIONAL SUPPLEMENTS Dinner; Ensure Clear DIET NPO 
DIET NPO Tolerating current diet: YES Passing flatus: YES Last Bowel Movement: several days ago Appearance:  
 
Respiratory: 
 
Incentive Spirometer at bedside: NO 
Patient instructed on use: NO 
 
Patient Safety: 
 
Falls Score: 2 Bed Alarm On? No 
Sitter?  No 
 
Beckie Nelson RN

## 2020-11-11 NOTE — PROGRESS NOTES
Gastroenterology Progress Note NEVAEH Chu 
 for Dr. Bang Aguero 11/11/2020 Admit Date: 11/9/2020 Subjective: Follow up for:  1) Elevated LFTs 2) Sepsis  
`  3) UTI Patient is on CLD. No N/V, generally feels poorly. Afebrile. WBC 12.1 today from 12.4 yesterday, hgb 7.2 Results for Michela Haas (MRN [id number]) as of 11/11/2020 09:09 Ref. Range 11/9/2020 11:39 11/10/2020 03:13 11/11/2020 02:39 ALT Latest Ref Range: 12 - 78 U/L 212 (H) 192 (H) 186 (H) AST Latest Ref Range: 15 - 37 U/L 389 (H) 395 (H) 393 (H) Alk. phosphatase Latest Ref Range: 45 - 117 U/L 282 (H) 275 (H) 299 (H) Lipase Latest Ref Range: 73 - 393 U/L 505 (H) 354 326 Results for Michela Haas (MRN [id number]) as of 11/11/2020 09:09 Ref. Range 11/9/2020 11:39 11/10/2020 03:13 11/11/2020 02:39 Bilirubin, total Latest Ref Range: 0.2 - 1.0 MG/DL 8.8 (H) 8.6 (H) 10.0 (H) Current Facility-Administered Medications Medication Dose Route Frequency  cefTRIAXone (ROCEPHIN) 1 g in 0.9% sodium chloride (MBP/ADV) 50 mL MBP  1 g IntraVENous Q24H  
 amiodarone (CORDARONE) tablet 100 mg  100 mg Oral DAILY  insulin NPH (NOVOLIN N, HUMULIN N) injection 3 Units  3 Units SubCUTAneous QPM  
 insulin NPH (NOVOLIN N, HUMULIN N) injection 5 Units  5 Units SubCUTAneous QAM  
 levothyroxine (SYNTHROID) tablet 125 mcg  125 mcg Oral 6am  
 pantoprazole (PROTONIX) 40 mg in 0.9% sodium chloride 10 mL injection  40 mg IntraVENous DAILY  sodium chloride (NS) flush 5-40 mL  5-40 mL IntraVENous Q8H  
 sodium chloride (NS) flush 5-40 mL  5-40 mL IntraVENous PRN  
 0.9% sodium chloride infusion  50 mL/hr IntraVENous CONTINUOUS  
 metroNIDAZOLE (FLAGYL) IVPB premix 500 mg  500 mg IntraVENous Q12H  
 ondansetron (ZOFRAN) injection 4 mg  4 mg IntraVENous Q6H PRN  
 insulin lispro (HUMALOG) injection   SubCUTAneous Q6H  
 glucose chewable tablet 16 g  4 Tab Oral PRN  
  dextrose (D50W) injection syrg 12.5-25 g  12.5-25 g IntraVENous PRN  
 glucagon (GLUCAGEN) injection 1 mg  1 mg IntraMUSCular PRN  
 morphine injection 1 mg  1 mg IntraVENous Q6H PRN Objective:  
 
Blood pressure (!) 135/41, pulse 75, temperature 97.3 °F (36.3 °C), resp. rate 16, height 5' 9\" (1.753 m), weight 81.6 kg (180 lb), SpO2 96 %. No intake/output data recorded. 11/09 1901 - 11/11 0700 In: 2383.3 [P.O.:360; I.V.:2023.3] Out: - EXAM: 
GEN: Elderly pleasent WF, jaundice, NAD HEENT: +Scleral icterus Heart: RRR Lungs: CTA Abdomen: soft, mild RUQ and umbilical tenderness, no guarding or rebound, normal BS Ext:bilateral pitting edema Data Review Recent Results (from the past 24 hour(s)) GLUCOSE, POC Collection Time: 11/10/20 11:02 AM  
Result Value Ref Range Glucose (POC) 185 (H) 65 - 100 mg/dL Performed by Tam Solid (PCT) GLUCOSE, POC Collection Time: 11/10/20  4:34 PM  
Result Value Ref Range Glucose (POC) 350 (H) 65 - 100 mg/dL Performed by Tam Solid (PCT) GLUCOSE, POC Collection Time: 11/11/20 12:44 AM  
Result Value Ref Range Glucose (POC) 154 (H) 65 - 100 mg/dL Performed by Johnathon Bud METABOLIC PANEL, COMPREHENSIVE Collection Time: 11/11/20  2:39 AM  
Result Value Ref Range Sodium 136 136 - 145 mmol/L Potassium 4.9 3.5 - 5.1 mmol/L Chloride 105 97 - 108 mmol/L  
 CO2 24 21 - 32 mmol/L Anion gap 7 5 - 15 mmol/L Glucose 131 (H) 65 - 100 mg/dL BUN 86 (H) 6 - 20 MG/DL Creatinine 5.08 (H) 0.55 - 1.02 MG/DL  
 BUN/Creatinine ratio 17 12 - 20 GFR est AA 10 (L) >60 ml/min/1.73m2 GFR est non-AA 8 (L) >60 ml/min/1.73m2 Calcium 8.8 8.5 - 10.1 MG/DL Bilirubin, total 10.0 (H) 0.2 - 1.0 MG/DL  
 ALT (SGPT) 186 (H) 12 - 78 U/L  
 AST (SGOT) 393 (H) 15 - 37 U/L Alk. phosphatase 299 (H) 45 - 117 U/L Protein, total 5.6 (L) 6.4 - 8.2 g/dL Albumin 2.4 (L) 3.5 - 5.0 g/dL Globulin 3.2 2.0 - 4.0 g/dL A-G Ratio 0.8 (L) 1.1 - 2.2    
CBC WITH AUTOMATED DIFF Collection Time: 11/11/20  2:39 AM  
Result Value Ref Range WBC 12.1 (H) 3.6 - 11.0 K/uL  
 RBC 3.13 (L) 3.80 - 5.20 M/uL HGB 7.2 (L) 11.5 - 16.0 g/dL HCT 23.3 (L) 35.0 - 47.0 % MCV 74.4 (L) 80.0 - 99.0 FL  
 MCH 23.0 (L) 26.0 - 34.0 PG  
 MCHC 30.9 30.0 - 36.5 g/dL RDW 20.4 (H) 11.5 - 14.5 % PLATELET 736 679 - 881 K/uL MPV ABNORMAL 8.9 - 12.9 FL  
 NRBC 0.0 0  WBC ABSOLUTE NRBC 0.00 0.00 - 0.01 K/uL NEUTROPHILS 85 (H) 32 - 75 % LYMPHOCYTES 6 (L) 12 - 49 % MONOCYTES 8 5 - 13 % EOSINOPHILS 0 0 - 7 % BASOPHILS 0 0 - 1 % IMMATURE GRANULOCYTES 1 (H) 0.0 - 0.5 % ABS. NEUTROPHILS 10.3 (H) 1.8 - 8.0 K/UL  
 ABS. LYMPHOCYTES 0.7 (L) 0.8 - 3.5 K/UL  
 ABS. MONOCYTES 1.0 0.0 - 1.0 K/UL  
 ABS. EOSINOPHILS 0.0 0.0 - 0.4 K/UL  
 ABS. BASOPHILS 0.0 0.0 - 0.1 K/UL  
 ABS. IMM. GRANS. 0.1 (H) 0.00 - 0.04 K/UL  
 DF SMEAR SCANNED    
 RBC COMMENTS MICROCYTOSIS 1+ 
    
 RBC COMMENTS HYPOCHROMIA 1+ 
    
 RBC COMMENTS YOLANDE CELLS 
PRESENT 
    
 RBC COMMENTS SCHISTOCYTES 2+ 
    
 RBC COMMENTS ANISOCYTOSIS 2+ LIPASE Collection Time: 11/11/20  2:39 AM  
Result Value Ref Range Lipase 326 73 - 393 U/L  
GLUCOSE, POC Collection Time: 11/11/20  5:28 AM  
Result Value Ref Range Glucose (POC) 148 (H) 65 - 100 mg/dL Performed by Leeann Ibrahim GLUCOSE, POC Collection Time: 11/11/20  7:24 AM  
Result Value Ref Range Glucose (POC) 141 (H) 65 - 100 mg/dL Performed by Marti Corley Recent Labs 11/11/20 
0239 11/10/20 
6011 WBC 12.1* 12.4* HGB 7.2* 7.6* HCT 23.3* 25.3*  
 188 Recent Labs 11/11/20 
0239 11/10/20 
0313 11/09/20 
1139  137 134* K 4.9 4.9 4.8  
 105 100 CO2 24 23 28 BUN 86* 73* 67* CREA 5.08* 4.27* 4.06* * 80 141* CA 8.8 8.8 9.4 Recent Labs 11/11/20 
0239 11/10/20 
0313 11/09/20 
1139 * 192* 212* * 275* 282* TBILI 10.0* 8.6* 8.8* TP 5.6* 5.8* 6.4 ALB 2.4* 2.4* 2.7*  
GLOB 3.2 3.4 3.7 LPSE 326 354 505* No results for input(s): INR, PTP, APTT, INREXT, INREXT in the last 72 hours. No results for input(s): FE, TIBC, PSAT, FERR in the last 72 hours. Lab Results Component Value Date/Time Folate 72.3 (H) 01/22/2017 03:13 AM  
  
No results for input(s): PH, PCO2, PO2 in the last 72 hours. No results for input(s): CPK, CKNDX, TROIQ in the last 72 hours. No lab exists for component: CPKMB Lab Results Component Value Date/Time Cholesterol, total 256 (H) 07/13/2020 08:02 AM  
 HDL Cholesterol 58 07/13/2020 08:02 AM  
 LDL, calculated 169 (H) 07/13/2020 08:02 AM  
 Triglyceride 143 07/13/2020 08:02 AM  
 CHOL/HDL Ratio 3.1 09/22/2010 08:12 AM  
 
Lab Results Component Value Date/Time Glucose (POC) 141 (H) 11/11/2020 07:24 AM  
 Glucose (POC) 148 (H) 11/11/2020 05:28 AM  
 Glucose (POC) 154 (H) 11/11/2020 12:44 AM  
 Glucose (POC) 350 (H) 11/10/2020 04:34 PM  
 Glucose (POC) 185 (H) 11/10/2020 11:02 AM  
 
Lab Results Component Value Date/Time Color ORANGE 11/09/2020 02:22 PM  
 Appearance TURBID (A) 11/09/2020 02:22 PM  
 Specific gravity 1.016 11/09/2020 02:22 PM  
 Specific gravity 1.020 07/06/2010 01:10 PM  
 pH (UA) 5.5 11/09/2020 02:22 PM  
 Protein 100 (A) 11/09/2020 02:22 PM  
 Glucose Negative 11/09/2020 02:22 PM  
 Ketone TRACE (A) 11/09/2020 02:22 PM  
 Bilirubin Negative 10/10/2020 10:51 PM  
 Urobilinogen 1.0 11/09/2020 02:22 PM  
 Nitrites Positive (A) 11/09/2020 02:22 PM  
 Leukocyte Esterase LARGE (A) 11/09/2020 02:22 PM  
 Epithelial cells FEW 11/09/2020 02:22 PM  
 Bacteria 3+ (A) 11/09/2020 02:22 PM  
 WBC >100 (H) 11/09/2020 02:22 PM  
 RBC  11/09/2020 02:22 PM  
 
MRI Results (most recent): 
Results from Hospital Encounter encounter on 11/09/20 MRI ABD W MRCP WO CONT Narrative INDICATION: Jaundice. Abnormal ultrasound. COMPARISON: Right upper quadrant abdominal ultrasound from November 9, 2020. Abdomen CT from September 10, 2019. TECHNIQUE: Routine axial and coronal MR sequences were obtained through the 
abdomen. MRCP sequences were included but are nondiagnostic secondary to motion 
artifact. FINDINGS:  
LIVER: Normal size and signal. No mass. GALLBLADDER: Surgically absent. COMMON BILE DUCT: 9 mm in diameter. Apparent pneumobilia within the nondependent 
portions. No visualized choledocholithiasis. PANCREATIC DUCT: Not visualized secondary to motion artifact. PANCREAS: No mass or fluid collection. Mild peripancreatic edema. SPLEEN: Enlarged, measuring 17.6 cm in length. No mass. ADRENALS: No mass. KIDNEYS: Small right kidney. No hydronephrosis. 1.5 cm and 0.7 cm right renal 
cysts. RETROPERITONEUM: No mass or lymphadenopathy. No aortic aneurysm. BOWEL: No focal abnormality. OTHER: Small right pleural effusion Impression IMPRESSION: Pneumobilia within the mildly dilated common bile duct. No 
visualized choledocholithiasis. Peripancreatic edema suggests acute 
pancreatitis. Splenomegaly. Small right kidney with small right renal cysts. Small right pleural effusion. Assessment:  
 
Active Problems: 
  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   
     Gastric, biopsy:  
    Mild capillary ectasia and congestion, compatible with gastric antral  
    vascular ectasia (GAVE), negative for background gastritis. One fragment suggestive of hyperplastic polyp Elevated LFTs (11/9/2020) Common bile duct dilation (11/9/2020) Cholangitis (11/9/2020) Plan:  
Bili has gone up to 10 and LFTs remain elevated. WBC remains the same. Given presentation and findings on MRCP will proceed with ERCP tomorrow. Procedure including risks, benefits and alteratives reviewed with pt and she is in agreement with proceeding.   Attempted to call daughter Caitie Donnelly without success, unable to LVM and VM box is full. Will attempt to call later today. Continue with IVF and abx, NPO after midnight. NEVAEH Harmon 
 
11/11/20 
9:09 AM 
 
 
[address] [facility], [address] [address] Loc: [phone] Spoke with daughter Beatriz Pretty by phone. ERCP reviewed in great detail including risks, benefits and alternative and she is in agreement with proceeding. All questions answered. The patient was counseled at length about the risks of melisa Covid-19 during their perioperative period and any recovery window from their procedure. The patient was made aware that melisa Covid-19  may worsen their prognosis for recovering from their procedure and lend to a higher morbidity and/or mortality risk. All material risks, benefits, and reasonable alternatives including postponing the procedure were discussed. The patient does  wish to proceed with the procedure at this time.  
 
NEVAEH Christianson 
11/11/20 
11:24 AM

## 2020-11-11 NOTE — PROGRESS NOTES
Physical Therapy PT order inadvertently completed today. Nursing notified and states she will re-instate PT order. She also reports patient has just returned to bed after sitting up since 9am this morning. Will defer therapy at this time and follow back tomorrow. Thank you, Meg Sands, PT

## 2020-11-11 NOTE — PROGRESS NOTES
Pharmacy - EPO Dosing & Monitoring Dose and schedule: 10K Wed Sat Labs: 
Recent Labs 11/11/20 
0239 11/10/20 
0313 11/09/20 
1139 HGB 7.2* 7.6* 8.2* Impression/Plan: Will verify and dispense. Hgb < 10. Thanks, Shaji Tong, PHARMD 
 
 
[email or web address]/[place]/[facility]/Pharmacy/Clinical%20Companion/[email or web address]. pdf

## 2020-11-11 NOTE — PROGRESS NOTES
I reviewed pertinent labs and imaging, and discussed /agreed on the plan of care with Dr. Kristi Lozano. Hospitalist Progress Note NAME: Denice Jackson :  1935 MRN:  [id number] Assessment / Plan: 
Severe Sepsis secondary to UTI WBC 12.9, HR 11 on admission Abnormal LFTs with Elevated Total Bilirubin Total bili 8.8, , , Lipase 505 Jaundice Pneumobilia on US 
· MRI Abd MRCP  results - Pneumobilia within the mildly dilated common bile duct. No visualized choledocholithiasis. Peripancreatic edema suggests acute pancreatitis. Splenomegaly. Small right kidney with small right renal cysts. Small right pleural effusion. · US of Abd  results - Echogenic debris vs. Pneumobilia in the common bile duct. No evidence of intrahepatic biliary dilatation. Status post cholecystectomy. Unchanged 1 cm echogenic hepatic mass, compatible with hemangioma. Small, echogenic right kidney, compatible with chronic medical renal disease. · Follow blood cultures - NO GROWTH in 2 days · Follow urine cultures POSITIVE for E COLI 
· Continue IV ceftriaxone, discontinue flagyl with culture results · Appreciate GI input - plan for ERCP Thursday (awaiting plavix washout) · Total bili worsening - total bili 10 
· ,  and lipase 326 · Follow LFTs and Lipase · Tolerating clear liquid diet - NPO after MD for ERCP  
  
Acute Kidney Injury in Setting of CKD stage 4 Baseline Cr 1.7 · Continue gentle IVF hydration · Cr 5.08 today - large jump from yesterday (4.27) · Appreciate Nephrology input - will start HD tomorrow if renal function does not improve · Will need Livan placed tomorrow (Scheduling around ERCP) if Cr remains high · Follow BMP   
· Hold bumex and allopurinol · Avoid nephrotoxic medications  
  
Weakness and Debility · Consult PT/OT - likely will need SNF at d/c  
  
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 Severe concentric hypertrophy. Mitral valve thickening and is prosthetic. · Atrial Fibrillation is rate controlled · Continue amiodarone · Hold bumex and plavix  
  
Insulin Dependant Type I Diabetes · Continue NPH  
  
Gout Hold allopurinol with JULIANA Hypothyroidism Continue levothyroxine GERD Continue protonix Dementia 25.0 - 29.9 Overweight / Body mass index is 26.58 kg/m². Code status: Full Prophylaxis: SCD's Recommended Disposition: SNF vs  Subjective: Chief Complaint / Reason for Physician Visit Patient reports general malaise. Seen at bedside this AM, she is in agreement to ERCP. Discussed worsening renal function - she had a f/u appt with Dr. Bernard Meek in the next few days OP. She voiced no specific complaints at this time, just generally does not feel well. Discussed with RN events overnight. Review of Systems: 
Symptom Y/N Comments  Symptom Y/N Comments Fever/Chills n   Chest Pain n   
Poor Appetite y   Edema n   
Cough n   Abdominal Pain n   
Sputum n   Joint Pain n   
SOB/LEIJA n   Pruritis/Rash n   
Nausea/vomit n   Tolerating PT/OT y Diarrhea n   Tolerating Diet y Constipation n   Other Could NOT obtain due to:   
 
Objective: VITALS:  
Last 24hrs VS reviewed since prior progress note. Most recent are: 
Patient Vitals for the past 24 hrs: 
 Temp Pulse Resp BP SpO2  
11/11/20 1456 97.5 °F (36.4 °C) 74 18 (!) 113/42 96 % 11/11/20 1118 97.6 °F (36.4 °C) 75 16 (!) 127/47 99 % 11/11/20 0737    (!) 135/41   
11/11/20 0734 97.3 °F (36.3 °C) 75 16 (!) 118/38 96 % 11/11/20 0434 97.7 °F (36.5 °C) 78 17 (!) 122/46 98 % 11/10/20 2358 97.6 °F (36.4 °C) 78 17 (!) 121/58 99 % 11/10/20 2027 97.7 °F (36.5 °C) 77 17 (!) 122/47 96 % Intake/Output Summary (Last 24 hours) at 11/11/2020 1162 Last data filed at 11/11/2020 9791 Gross per 24 hour Intake 1030 ml Output  Net 1030 ml PHYSICAL EXAM: 
General: WD, WN. Alert, cooperative, no acute distress,  female   
EENT:  EOMI. Slightly icteric sclerae. MMM Resp:  CTA bilaterally, no wheezing or rales. No accessory muscle use CV:  Regular  rhythm,  No edema GI:  Soft, obese, Non tender.  +Bowel sounds Neurologic:  Alert and oriented X 3, normal speech, Psych:   Good insight. Not anxious nor agitated Skin:  No rashes. improved jaundice Reviewed most current lab test results and cultures  YES Reviewed most current radiology test results   YES Review and summation of old records today    NO Reviewed patient's current orders and MAR    YES 
PMH/SH reviewed - no change compared to H&P 
________________________________________________________________________ Care Plan discussed with: 
  Comments Patient x Family RN x Care Manager x Consultant  x Multidiciplinary team rounds were held today with , nursing, pharmacist and clinical coordinator. Patient's plan of care was discussed; medications were reviewed and discharge planning was addressed. ________________________________________________________________________ Total NON critical care TIME:  25   Minutes Total CRITICAL CARE TIME Spent:   Minutes non procedure based Comments >50% of visit spent in counseling and coordination of care x   
________________________________________________________________________ Lili Castro NP Procedures: see electronic medical records for all procedures/Xrays and details which were not copied into this note but were reviewed prior to creation of Plan.    
 
LABS: 
 I reviewed today's most current labs and imaging studies. Pertinent labs include: 
Recent Labs 11/11/20 
0239 11/10/20 
0313 11/09/20 
1139 WBC 12.1* 12.4* 12.9* HGB 7.2* 7.6* 8.2* HCT 23.3* 25.3* 26.3*  
 188 228 Recent Labs 11/11/20 
0239 11/10/20 
0313 11/09/20 
1139  137 134* K 4.9 4.9 4.8  
 105 100 CO2 24 23 28 * 80 141* BUN 86* 73* 67* CREA 5.08* 4.27* 4.06* CA 8.8 8.8 9.4 ALB 2.4* 2.4* 2.7* TBILI 10.0* 8.6* 8.8* * 192* 212* Signed: Livan Mccoy NP

## 2020-11-11 NOTE — PROGRESS NOTES
Reason for Readmission:     Cholangitis RUR Score/Risk Level:     26% PCP: First and Last name:  Bernarda Wyatt Name of Practice:  
 Are you a current patient: Yes/No: Yes Approximate date of last visit: Oct 2020 Can you participate in a virtual visit with your PCP: Yes, if recommended Is a Care Conference indicated:  CM reviewed pt's clinicals with pt's daughter: Rusty Olivares Did you attend your follow up appointment (s): If not, why not: Pt had followed up with dispatch health Resources/supports as identified by patient/family:   Care Aides, Dispatch Health, and Possible Hospice Top Challenges facing patient (as identified by patient/family and CM): Finances/Medication cost?  Measurabl Transportation   Family assist with transport Support system or lack thereof? Family Support, Care Aides, Possible Hospice Living arrangements? Lives alone Self-care/ADLs/Cognition? Independent with ADLs Current Advanced Directive/Advance Care Plan:  FULL-code benefit in addressing code status Plan for utilizing home health: Possible Hospice Transition of Care Plan:    Based on readmission, the patient's previous Plan of Care 
 has been evaluated and/or modified. The current Transition of Care Plan is:        
 
CM completed assessment with pt's daughter: Rusty Olivares, via telephone. Pt is known to reside alone at address: [address], [facility] [address] [place] [address]. Address on face sheet is daughter's [place]amaria Saleh residence. Pt is known to be active with PCP: see in Oct 220 and uses [facility] (in [place]) on [address]. Pt is known to need assistance with ADLs, and she does not drive. Pt will transport home at the time of d/c. Pt has DME at home: rollator, cane, walker, bedside commode. Pt has had [address] in the past and also been to [facility].  
 
Pt's daughter: Rusty Olivares listed she and her brother: Martha Grant as medical decision maker, when discussing ACP. Pt is known to have personal care aides, provided by [facility] ([email or web address]). Jeremie Tao [facility] and [facility] recommendations for pt was to report to hospital for total assessment. Jeremie Tao reported that she has been in contact with hospice liaison: Lucia Skelton ([phone]) from [facility] for additional information. Jeremie Tao would like CM to follow up with Morgan De La Cruz to complete pt assessment and hospice informational session. CM will continue to follow up. Care Management Interventions PCP Verified by CM: Yes Mode of Transport at Discharge: Other (see comment) Transition of Care Consult (CM Consult): Discharge Planning Discharge Durable Medical Equipment: No 
Physical Therapy Consult: Yes Occupational Therapy Consult: Yes Speech Therapy Consult: No 
Current Support Network: Lives Alone, Own Home Confirm Follow Up Transport: Family Discharge Location Discharge Placement: Home [place], [place] 
'

## 2020-11-11 NOTE — PROGRESS NOTES
General Surgery End of Shift Nursing Note Bedside shift change report given to Reji Juares RN (oncoming nurse) by Chad Carmona (offgoing nurse). Report included the following information SBAR, Kardex, Intake/Output, MAR and Recent Results. Carlitos Jonas

## 2020-11-11 NOTE — PROGRESS NOTES
Writer shared care with primary nurse Donold Boxer, RN. Pt up ad cecilio x1-2 person assist with walker. Pt alert and oriented to self, time, and year. Wasn't able to tell who the correct president was along with month. Pt follows commands. Tolerating meal and fluid intake. Cont of bowel and bladder ad uses bedside commode. No c/o pian/discomfort.  
 
An Martinez LPN

## 2020-11-11 NOTE — PROGRESS NOTES
This RN messaged Dr. Sumeet Bunch about pt blood sugar of 350 at 1636. No orders were received for insulin administration. Called Dr. Sumeet Bunch and left a message at [phone] and no orders were placed.

## 2020-11-11 NOTE — CONSULTS
[id number] [facility] Consult Note NAME: Anil Velasco :  1935 MRN:  [id number] Date/Time: 2020 Risk of deterioration: medium Assessment:    Plan: JULIANA onCKD stage 4 Pneumobilia in CBD/Transaminitis HTN Ecoli uti on antibiotics Bradycardia Anemia S/p bioprosthetic MV Afib s/p recent ablation S/p watchman H/o cad/stent MRI/MRCP reviewed. No hydro noted Creatinine over 5 today, was 4 on admission with a baseline around 1.7-2 NPO x MN, if renal failure does not improved with IVF will proceed with HD as she is getting uremic. Admits to \"being fuzzy\" and gives consent for HD, if needed. Scanlon-no documented uop Meds reviewed/diuretics on hold Asked to see for JULIANA on CKD IV. Chart reviewed. Pt was seen by us in October requiring diuresis. Creatinine was 1.7 on dc, 4 on admission Placed on ivf. Subjective: Chief Complaint:  I'm getting fuzzy. Review of Systems: (+) N/V (-) SOB/CP/F/C (+) confusion Objective: VITALS:  
Last 24hrs VS reviewed since prior progress note. Most recent are: 
Visit Vitals BP (!) 127/47 Pulse 75 Temp 97.6 °F (36.4 °C) Resp 16 Ht 5' 9\" (1.753 m) Wt 81.6 kg (180 lb) SpO2 99% BMI 26.58 kg/m² SpO2 Readings from Last 6 Encounters:  
20 99% 20 98% 20 98% 20 98% 10/30/20 98% 10/28/20 95% Intake/Output Summary (Last 24 hours) at 2020 1415 Last data filed at 2020 2231 Gross per 24 hour Intake 1030 ml Output  Net 1030 ml Telemetry Reviewed PHYSICAL EXAM: 
 
General   well developed, well nourished, appears stated age, in no acute distress EENT  Normocephalic, Atraumatic,  EOMI Respiratory   Clear To Auscultation bilaterally Cardiology  Regular Rate and Rythmn  Sternal scar Abdominal  Soft, non-tender, non-distended, positive bowel sounds, no hepatosplenomegaly Extremities  No clubbing, cyanosis, (+) chronic edema Pulses intact. Lab Data Reviewed: (see below) Medications Reviewed: (see below) PMH/SH reviewed - no change compared to H&P 
___________________________________________________ 
 
___________________________________________________ Attending Physician: Zahira Eli MD  
 
____________________________________________________ MEDICATIONS: 
Current Facility-Administered Medications Medication Dose Route Frequency  epoetin hali-epbx (RETACRIT) injection 10,000 Units  10,000 Units SubCUTAneous EVERY WED & SAT  cefTRIAXone (ROCEPHIN) 1 g in 0.9% sodium chloride (MBP/ADV) 50 mL MBP  1 g IntraVENous Q24H  
 amiodarone (CORDARONE) tablet 100 mg  100 mg Oral DAILY  insulin NPH (NOVOLIN N, HUMULIN N) injection 3 Units  3 Units SubCUTAneous QPM  
 insulin NPH (NOVOLIN N, HUMULIN N) injection 5 Units  5 Units SubCUTAneous QAM  
 levothyroxine (SYNTHROID) tablet 125 mcg  125 mcg Oral 6am  
 pantoprazole (PROTONIX) 40 mg in 0.9% sodium chloride 10 mL injection  40 mg IntraVENous DAILY  sodium chloride (NS) flush 5-40 mL  5-40 mL IntraVENous Q8H  
 sodium chloride (NS) flush 5-40 mL  5-40 mL IntraVENous PRN  
 0.9% sodium chloride infusion  75 mL/hr IntraVENous CONTINUOUS  
 metroNIDAZOLE (FLAGYL) IVPB premix 500 mg  500 mg IntraVENous Q12H  
 ondansetron (ZOFRAN) injection 4 mg  4 mg IntraVENous Q6H PRN  
 insulin lispro (HUMALOG) injection   SubCUTAneous Q6H  
 glucose chewable tablet 16 g  4 Tab Oral PRN  
 dextrose (D50W) injection syrg 12.5-25 g  12.5-25 g IntraVENous PRN  
 glucagon (GLUCAGEN) injection 1 mg  1 mg IntraMUSCular PRN  
 morphine injection 1 mg  1 mg IntraVENous Q6H PRN  
  
 
LABS: 
Recent Labs 11/11/20 
0239 11/10/20 
9344 WBC 12.1* 12.4* HGB 7.2* 7.6* HCT 23.3* 25.3*  
 188 Recent Labs 11/11/20 
0239 11/10/20 
0313 11/09/20 
1139  137 134* K 4.9 4.9 4.8  
 105 100 CO2 24 23 28 BUN 86* 73* 67* CREA 5.08* 4.27* 4.06* * 80 141* CA 8.8 8.8 9.4 Recent Labs 11/11/20 
0239 11/10/20 
0313 11/09/20 
1139 * 192* 212* * 275* 282* TBILI 10.0* 8.6* 8.8* TP 5.6* 5.8* 6.4 ALB 2.4* 2.4* 2.7*  
GLOB 3.2 3.4 3.7 LPSE 326 354 505* No results for input(s): INR, PTP, APTT, INREXT in the last 72 hours. No results for input(s): FE, TIBC, PSAT, FERR in the last 72 hours. No results for input(s): PH, PCO2, PO2 in the last 72 hours. No results for input(s): CPK, CKNDX, TROIQ in the last 72 hours. No lab exists for component: CPKMB Lab Results Component Value Date/Time  Glucose (POC) 192 (H) 11/11/2020 11:20 AM  
 Glucose (POC) 141 (H) 11/11/2020 07:24 AM  
 Glucose (POC) 148 (H) 11/11/2020 05:28 AM  
 Glucose (POC) 154 (H) 11/11/2020 12:44 AM  
 Glucose (POC) 350 (H) 11/10/2020 04:34 PM

## 2020-11-12 NOTE — PROGRESS NOTES
I reviewed pertinent labs and imaging, and discussed /agreed on the plan of care with Dr. Yves Goldmann. Hospitalist Progress Note NAME: Lucinda George :  1935 MRN:  [id number] Assessment / Plan: 
1730 Update - Rounded on patient in PACU. Currently awaiting bed on PCU to start emergent HD. Severe Sepsis secondary to UTI WBC 12.9, HR 11 on admission Abnormal LFTs with Elevated Total Bilirubin Total bili 8.8, , , Lipase 505 Jaundice Pneumobilia on US 
· MRI Abd MRCP  results - Pneumobilia within the mildly dilated common bile duct. No visualized choledocholithiasis. Peripancreatic edema suggests acute pancreatitis. Splenomegaly. Small right kidney with small right renal cysts. Small right pleural effusion. · US of Abd  results - Echogenic debris vs. Pneumobilia in the common bile duct. No evidence of intrahepatic biliary dilatation. Status post cholecystectomy. Unchanged 1 cm echogenic hepatic mass, compatible with hemangioma. Small, echogenic right kidney, compatible with chronic medical renal disease. · Follow blood cultures - NO GROWTH in 3 days · Follow urine cultures POSITIVE for E COLI - Final  
· Continue IV ceftriaxone - Day 3 · Appreciate GI input - Plan for ERCP today at 1200 · Total bili worsening - total bili 10.6 · ,  and lipase 326 · Follow LFTs and Lipase · NPO for ERCP and Eliane placement   
  
Acute Kidney Injury in Setting of CKD stage 4 Baseline Cr 1.7 · Continue gentle IVF hydration · Cr 5.59 today - continues to worsen · Uremic symptoms with worsening Cr · Appreciate Nephrology input - will place eliane and start HD this afternoon · Follow BMP   
· Hold bumex and allopurinol · Avoid nephrotoxic medications  
  
Weakness and Debility · Consult PT/OT - plan for [facility] [place] · Family is pursuing [facility] [place] vs Hospice - have spoken to [facility] · Family very realistic that patient has had gradual decline over past few months - they are not ready for hospice yet but feel they may been soon.   
  
Chronic Diastolic CHF - not in exacerbation History of Severe Mitral Stenosis S/p [employer] [place] Bioprosthetic MV 4/2019 Chronic Atrial Fibrillation S/p Watchman Procedure 2/2020 History of Atrial Flutter S/p Ablation Sinus Bradycardia History of Coronary Artery Disease Hypertension · ECHO 2/2020 results - 60-65%. Severe concentric hypertrophy. Mitral valve thickening and is prosthetic. · Atrial Fibrillation is rate controlled · Continue amiodarone · Hold bumex · Resume plavix after procedures today  
  
Insulin Dependant Type I Diabetes · Continue NPH  
  
Gout Hold allopurinol with JULIANA Hypothyroidism Continue levothyroxine GERD Continue protonix  
Dementia  
 
 
25.0 - 29.9 Overweight / Body mass index is 26.58 kg/m². Code status: Full Prophylaxis: SCD's Recommended Disposition:  PT, OT, RN Subjective: Chief Complaint / Reason for Physician Visit \"I feel fuzy\". Patient reports she feels fuzzy, aware she is for procedure today. Denies any complaints or concerns. Long discussion with daughter [facility] via phone this afternoon, they are aware of patient decline and have met with hospice on their own. They are not ready for hospice at this point but [facility] acknowledges they may not be far from utilizing services. Family is planning on [facility] services at this time and wish to avoid SNF. She would like to have an ACP talk with patient when she is more herself after dialysis - interested in [facility] at discharge. Discussed with RN events overnight. Review of Systems: 
Symptom Y/N Comments  Symptom Y/N Comments Fever/Chills n   Chest Pain n   
Poor Appetite n   Edema n   
Cough n   Abdominal Pain n   
Sputum n   Joint Pain n   
SOB/LEIJA n   Pruritis/Rash n   
Nausea/vomit n   Tolerating PT/OT y Diarrhea n   Tolerating Diet n NPO Constipation n   Other y Feels \"fuzzy\" Could NOT obtain due to:   
 
Objective: VITALS:  
Last 24hrs VS reviewed since prior progress note. Most recent are: 
Patient Vitals for the past 24 hrs: 
 Temp Pulse Resp BP SpO2  
11/12/20 1118 97.7 °F (36.5 °C) 73 15 (!) 124/44 97 % 11/12/20 1057  76   99 % 11/12/20 1030  74  (!) 126/50 99 % 11/12/20 0800  72     
11/12/20 0725 97.7 °F (36.5 °C) 72 16 (!) 125/43 95 % 11/12/20 0338 97.8 °F (36.6 °C) 74 17 (!) 121/57 95 % 11/11/20 2325 97.3 °F (36.3 °C) 77 18 (!) 112/46 96 % 11/11/20 2012 97.7 °F (36.5 °C) 76 18 (!) 112/39 95 % 11/11/20 1456 97.5 °F (36.4 °C) 74 18 (!) 113/42 96 % Intake/Output Summary (Last 24 hours) at 11/12/2020 1311 Last data filed at 11/12/2020 1021 Gross per 24 hour Intake 976.25 ml Output 500 ml Net 476.25 ml PHYSICAL EXAM: 
General: WD, WN. Alert, cooperative, ill appearing  female   
EENT:  EOMI. icteric sclerae. MMM Resp:  CTA bilaterally, no wheezing or rales. No accessory muscle use CV:  Regular  rhythm,  No edema GI:  Soft, obese, Non tender.  +Bowel sounds Neurologic:  Alert and oriented X 2-3, normal speech, Psych:   Good insight. Not anxious nor agitated Skin:  No rashes. Jaundice + Reviewed most current lab test results and cultures  YES Reviewed most current radiology test results   YES Review and summation of old records today    NO Reviewed patient's current orders and MAR    YES 
PMH/SH reviewed - no change compared to H&P 
________________________________________________________________________ Care Plan discussed with: 
  Comments Patient x Family  x   
RN x Care Manager x Consultant  x Multidiciplinary team rounds were held today with , nursing, pharmacist and clinical coordinator.   Patient's plan of care was discussed; medications were reviewed and discharge planning was addressed. ________________________________________________________________________ Total NON critical care TIME:  25   Minutes Total CRITICAL CARE TIME Spent:   Minutes non procedure based Comments >50% of visit spent in counseling and coordination of care x   
________________________________________________________________________ Tanner Mijares NP Procedures: see electronic medical records for all procedures/Xrays and details which were not copied into this note but were reviewed prior to creation of Plan. LABS: 
I reviewed today's most current labs and imaging studies. Pertinent labs include: 
Recent Labs 11/12/20 
3537 11/11/20 
0239 11/10/20 
5078 WBC 13.8* 12.1* 12.4* HGB 7.0* 7.2* 7.6* HCT 22.1* 23.3* 25.3*  
 190 188 Recent Labs 11/12/20 
6866 11/11/20 
1747 11/11/20 
0239 11/10/20 
0102 *  --  136 137  
K 4.7  --  4.9 4.9  
  --  105 105 CO2 21  --  24 23 *  --  131* 80  
BUN 86*  --  86* 73* CREA 5.59*  --  5.08* 4.27* CA 8.4*  --  8.8 8.8 ALB 2.2*  --  2.4* 2.4* TBILI 10.6*  --  10.0* 8.6* *  --  186* 192* INR  --  1.5*  --   --   
 
 
Signed: Tanner Mijares NP

## 2020-11-12 NOTE — PROGRESS NOTES
Problem: Self Care Deficits Care Plan (Adult) Goal: *Acute Goals and Plan of Care (Insert Text) Description:  
FUNCTIONAL STATUS PRIOR TO ADMISSION: Pt was independent w/ all ADL/IADLs prior to admission. HOME SUPPORT: Pt lives alone w/ daughter living within 10 minutes. Occupational Therapy Goals Initiated 11/10/2020 1. Patient will perform grooming standing with modified independence within 7 day(s). 2.  Patient will perform UB and LB dressing with independence within 7 day(s). 3.  Patient will perform 1 standing ADL with modified independence within 7 day(s). 4.  Patient will perform all aspects of toileting and transfers with modified independence within 7 day(s). 5.  Patient will participate in upper extremity therapeutic exercise/activities with independence for 8 minutes within 10 day(s). Outcome: Not Met OCCUPATIONAL THERAPY TREATMENT Patient: Aliya Fay (68 y.o. female) Date: 11/12/2020 Diagnosis: Cholangitis [K83.09]  
<principal problem not specified> Procedure(s) (LRB): ENDOSCOPIC RETROGRADE CHOLANGIOPANCREATOGRAPHY (ERCP) (URGENT) (N/A) Day of Surgery Precautions:   
Chart, occupational therapy assessment, plan of care, and goals were reviewed. ASSESSMENT Patient continues with skilled OT services and is progressing towards goals. Patient was agreeable to participate in therapy session. Noted to require more assistance today with bed mobility, transfers, and functional mobility with RW during ADL. Patient left up in the recliner chair at the end of the session. She was nauseated and dry heaving; nursing aware. Patient will continue to benefit from skilled OT. PLAN : 
Patient continues to benefit from skilled intervention to address the above impairments. Continue treatment per established plan of care. to address goals. Recommendation for discharge: (in order for the patient to meet his/her long term goals) Therapy up to 5 days/week in SNF setting This discharge recommendation: 
Has been made in collaboration with the attending provider and/or case management IF patient discharges home will need the following DME: TBD SUBJECTIVE:  
Patient stated I'm just tired.  OBJECTIVE DATA SUMMARY:  
Cognitive/Behavioral Status: 
Neurologic State: Alert Orientation Level: Oriented X4 Cognition: Follows commands Perception: Appears intact Perseveration: No perseveration noted Safety/Judgement: Decreased awareness of need for safety Functional Mobility and Transfers for ADLs: 
Bed Mobility: 
Supine to Sit: Moderate assistance; Additional time Scooting: Moderate assistance Transfers: 
Sit to Stand: Minimum assistance;Assist x2; Additional time Balance: 
Sitting: Intact Standing: Impaired Standing - Static: Fair;Constant support Standing - Dynamic : Poor;Constant support ADL Intervention: 
  
 
Grooming Washing Hands: Set-up(seated; unable to tolerate extended standing) Upper Body Dressing Assistance Hospital Gown: Minimum  assistance Lower Body Dressing Assistance Socks: Total assistance (dependent) Cognitive Retraining Safety/Judgement: Decreased awareness of need for safety Pain: 
Pain did not interfere with therapy session Activity Tolerance:  
Poor and requires rest breaks After treatment patient left in no apparent distress:  
Sitting in chair and Call bell within reach COMMUNICATION/COLLABORATION:  
The patients plan of care was discussed with: Physical therapist and Registered nurse. Chuck Gee OTR/L Time Calculation: 25 mins

## 2020-11-12 NOTE — PROGRESS NOTES
[facility] Brief Progress Note NEVAEH Garcia 
 for Dr. Rancho Shepard Patient seen during morning rounds, she is feeling about the same. No pain, N/V. Remains jaundiced. No F/C. Bili increased to 10.6 today, , , alk phos 318. Hgb 7 today. No s/sx of bleeding. Keep NPO for ERCP today, planned for 12 with Dr. Rancho Shepard.  
 
NEVAEH Garcia 
11/12/20 
8:44 AM

## 2020-11-12 NOTE — PROGRESS NOTES
Comprehensive Nutrition Assessment Type and Reason for Visit: Saad Covington Nutrition Recommendations/Plan:  
Resume diet as medically able per MD 
Continue PO supplements Nutrition Assessment:   Chart reviewed, pt off the floor at time of attempted visit for ERCP and cath placement. Appetite appears to remain poor per RN flowsheet's. Noted in progress notes family has been in touch with hospice, considering. Will monitor plan of care upon F/U. Patient Vitals for the past 72 hrs: 
 % Diet Eaten 11/11/20 1215 25 % 11/11/20 0800 25 % 11/10/20 1307 25 % 11/10/20 1033 25 % Estimated Daily Nutrient Needs: 
Energy (kcal): 1733 (BMR 1333 x 1. 3AF); Weight Used for Energy Requirements: Current Protein (g): 82 (1.0 g/kg bw); Weight Used for Protein Requirements: Current Fluid (ml/day): 1700 ml/day; Method Used for Fluid Requirements: 1 ml/kcal 
 
 
Nutrition Related Findings:  Meds: rocephin, humalog, insulin NPH, protonix, [email or web address]. Edema: +2-BLE. BM 11/10 Wounds:   
Stage I   (sacrum red) Current Nutrition Therapies: DIET NUTRITIONAL SUPPLEMENTS Dinner; Ensure Clear DIET GI LITE (POST SURGICAL) Anthropometric Measures: 
· Height:  5' 9\" (175.3 cm) · Current Body Wt:  81.6 kg (179 lb 14.3 oz) · Ideal Body Wt:  145 lbs:  124.1 % · BMI Category: Overweight (BMI 25.0-29. 9) Nutrition Diagnosis:  
· Inadequate protein-energy intake related to (intra-abdominal infection vs sepsis) as evidenced by NPO or clear liquid status due to medical condition Previous dx continues. Nutrition Interventions:  
Food and/or Nutrient Delivery: Start oral diet, Continue oral nutrition supplement Nutrition Education and Counseling: No recommendations at this time Coordination of Nutrition Care: Continue to monitor while inpatient Goals: Pt will resume diet and consume >25% of meals/supplements vs go hospice in 2-4 days. Nutrition Monitoring and Evaluation: Behavioral-Environmental Outcomes: None identified Food/Nutrient Intake Outcomes: Diet advancement/tolerance, Supplement intake Physical Signs/Symptoms Outcomes: GI status, Weight, Skin, Biochemical data Discharge Planning: Too soon to determine Electronically signed by Maria Guadalupe Andrew RD, [address]  on 11/12/2020 at 3:06 PM 
 
Contact: [id number]

## 2020-11-12 NOTE — H&P
Pre-endoscopy H and P The patient was seen and examined in the room/pre-op holding area. The airway was assessed and documented. The problem list, past medical history, and medications were reviewed. Patient Active Problem List  
Diagnosis Code  T. I.A.  PVD (peripheral vascular disease) ([facility]) I73.9  Reflux esophagitis K21.00  Benign neoplasm of colon D12.6  Iron deficiency anemia D50.9  Hypomagnesemia E83.42  Long term current use of anticoagulant therapy Z79.01  
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  
 Advance directive in chart Z78.9  Type 2 diabetes, controlled, with renal manifestation ([facility]) E11.29  
 Chronic atrial fibrillation ([facility]) I48.20  Mixed hyperlipidemia E78.2  Atherosclerosis of native coronary artery without angina pectoris I25.10  Hypothyroidism, acquired, autoimmune E06.3  Heart valve problem I38  
 Mitral regurgitation I34.0  
 S/P MVR (mitral valve repair) [id number]  Active advance directive on file Z78.9  Non-rheumatic mitral regurgitation I34.0  Heart failure ([facility]) I50.9  Bilateral leg edema R60.0  Esophageal stricture K22.2  Dysphagia R13.10  GI bleeding K92.2  Mitral stenosis I05.0  S/P MVR (mitral valve replacement) Z95.2  
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  
 Acute GI bleeding K92.2  Chronic anticoagulation Z79.01  
 Paroxysmal A-fib ([facility]) I48.0  History of GI bleed Z87.19  
 Presence of Watchman left atrial appendage closure device Z95.818  
 CKD (chronic kidney disease) stage 4, GFR 15-29 ml/min ([facility]) N18.4  Acute on chronic diastolic CHF (congestive heart failure) ([facility]) I50.33  Elevated LFTs R79.89  Common bile duct dilation K83.8  Cholangitis K83.09 Social History Socioeconomic History  Marital status:  Spouse name: Not on file  Number of children: 2  
 Years of education: 15  
 Highest education level: High school graduate Occupational History  Not on file Social Needs  Financial resource strain: Not hard at all  Food insecurity Worry: Never true Inability: Never true  Transportation needs Medical: No  
  Non-medical: No  
Tobacco Use  Smoking status: Former Smoker Packs/day: 0.50 Years: 10.00 Pack years: 5.00 Types: Cigarettes Last attempt to quit: 1967 Years since quittin.9  Smokeless tobacco: Never Used Substance and Sexual Activity  Alcohol use: No  
  Alcohol/week: 0.0 standard drinks  Drug use: Never  Sexual activity: Not Currently Partners: Male Lifestyle  Physical activity Days per week: 0 days Minutes per session: 0 min  Stress: Only a little Relationships  Social connections Talks on phone: Three times a week Gets together: Three times a week Attends Anabaptism service: 1 to 4 times per year Active member of club or organization: No  
  Attends meetings of clubs or organizations: Never Relationship status:   Intimate partner violence Fear of current or ex partner: No  
  Emotionally abused: No  
  Physically abused: No  
  Forced sexual activity: No  
Other Topics Concern [address] Service Not Asked  Blood Transfusions Not Asked  Caffeine Concern Not Asked  Occupational Exposure Not Asked [place] Hazards Not Asked  Sleep Concern Not Asked  Stress Concern Not Asked  Weight Concern Yes  Special Diet Not Asked  Back Care Not Asked  Exercise Not Asked  Bike Helmet Not Asked  Seat Belt Not Asked  Self-Exams Not Asked Social History Narrative  Not on file Past Medical History:  
Diagnosis Date  Arthritis KNEES  Atrial fibrillation ([facility] 10/29/2009  Bladder cancer ([facility]  CAD (coronary artery disease) STENT PER PATIENT  Chronic pain KNEES  
 Coagulation disorder ([facility] Chronic prophylactic anticoagulation med  Colon polyps  Diabetes ([facility] IDDM  
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 One fragment suggestive of hyperplastic polyp  GERD (gastroesophageal reflux disease)  Gout  Heart valve problem   
 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  
 @ [facility] >> atrial fibrillation  SOB (shortness of breath) on exertion 04/2019  
 T. I.A. 4/23/2009  TIA (transient ischemic attack)  Ulcer of right lower extremity, limited to breakdown of skin ([facility] 7/5/2018 Prior to Admission Medications Prescriptions Last Dose Informant Patient Reported? Taking?  
acetaminophen (TYLENOL) 325 mg tablet 11/3/2020 at Unknown time Child Yes Yes Sig: Take 325 mg by mouth every four (4) hours as needed for Pain. allopurinoL (ZYLOPRIM) 100 mg tablet 11/9/2020 at Unknown time Child No Yes Sig: TAKE 2 TABLETS BY MOUTH DAILY  
amiodarone (PACERONE) 100 mg tablet 11/9/2020 at Unknown time  No Yes Sig: Take 1 Tab by mouth daily for 60 days. atorvastatin (LIPITOR) 80 mg tablet 11/9/2020 at Unknown time Child No Yes Sig: TAKE ONE TABLET BY MOUTH EVERY EVENING  
bumetanide (BUMEX) 2 mg tablet 11/9/2020 at Unknown time  No Yes Sig: Take 1 Tab by mouth daily for 60 days. clopidogreL (PLAVIX) 75 mg tab 2020 at Unknown time Child No Yes Sig: Take 1 Tab by mouth daily. insulin NPH (HUMULIN N NPH INSULIN KWIKPEN) 100 unit/mL (3 mL) inpn 2020 at Unknown time Child Yes Yes Si Units by SubCUTAneous route every evening. insulin NPH (HumuLIN N NPH Insulin KwikPen) 100 unit/mL (3 mL) inpn 2020 at Unknown time  No Yes Sig: 10 Units by SubCUTAneous route every morning. levothyroxine (SYNTHROID) 125 mcg tablet 2020 at Unknown time Child No Yes Sig: TAKE ONE TABLET BY MOUTH DAILY BEFORE BREAKFAST  
loratadine (CLARITIN) 10 mg tablet Not Taking at Unknown time Child Yes No  
Sig: Take 10 mg by mouth daily as needed for Allergies. neomycin sulf/bacitracin/poly (NEOSPORIN EX) Not Taking at Unknown time Child Yes No  
Sig: by Apply Externally route daily as needed (wound care). ondansetron (ZOFRAN ODT) 4 mg disintegrating tablet Not Taking at Unknown time  No No  
Sig: Take 1 Tab by mouth every eight (8) hours as needed for Nausea or Vomiting. pantoprazole (PROTONIX) 40 mg tablet Not Taking at Unknown time Child No No  
Sig: TAKE 1 TABLET BY MOUTH DAILY potassium chloride SR (KLOR-CON 10) 10 mEq tablet Not Taking at Unknown time Child No No  
Sig: Take 1 Tab by mouth daily. sucralfate (CARAFATE) 1 gram tablet 2020 at Unknown time Child Yes Yes Sig: Take 1 g by mouth four (4) times daily. Facility-Administered Medications: None The review of systems is:  Negative  for shortness of breath or chest pain The heart, lungs, and mental status were satisfactory for the administration of deep sedation and for the procedure. I spoke w/ her daughter in detail I discussed with the patient's daughter and pt the objectives, risks, consequences and alternatives to the procedure.    
 
 Tutu Ugarte MD 
2020 
11:38 AM

## 2020-11-12 NOTE — PROGRESS NOTES
General Surgery End of Shift Nursing Note Bedside shift change report given to [facility] (oncoming nurse) . Report included the following information SBAR, Kardex, Intake/Output, MAR and Recent Results. Shift worked:   [id number] Summary of shift:    Confused at times. Some pain and nausea. Urine output tea-colored. For ERCP today Issues for physician to address:     
 
Number times ambulated in hallway past shift:    
Number of times OOB to chair past shift:  
 
Pain Management: 
Current medication: Morphine Patient states pain is manageable on current pain medication: YES 
 
GI: 
 
Current diet:  DIET NUTRITIONAL SUPPLEMENTS Dinner; Ensure Clear DIET NPO Tolerating current diet: YES Passing flatus: YES Last Bowel Movement:  
 Appearance: 
 
 
Patient Safety: 
 
Falls Score: 3 Bed Alarm On? No 
Sitter?  No 
 
Mirella Sherwood RN

## 2020-11-12 NOTE — PROGRESS NOTES
TRANSFER - OUT REPORT: 
 
Verbal report given to [facility] NKECHI, RN(name) on Karma Byers  being transferred to [facility](unit) for routine progression of care Report consisted of patients Situation, Background, Assessment and  
Recommendations(SBAR). Information from the following report(s) Procedure Summary was reviewed with the receiving nurse. Lines:  
Peripheral IV 11/09/20 Right Antecubital (Active) Site Assessment Clean, dry, & intact 11/12/20 0429 Phlebitis Assessment 0 11/12/20 0429 Infiltration Assessment 0 11/12/20 0429 Dressing Status Clean, dry, & intact 11/12/20 0429 Dressing Type Transparent 11/12/20 0429 Hub Color/Line Status Pink; Infusing 11/12/20 0429 Action Taken Open ports on tubing capped 11/10/20 1930 Alcohol Cap Used Yes 11/10/20 1930 Opportunity for questions and clarification was provided.

## 2020-11-12 NOTE — PERIOP NOTES
TRANSFER - IN REPORT: 
 
Verbal report received from dang(name) on Galen Parker  being received from([id number]) for ordered procedure Report consisted of patients Situation, Background, Assessment and  
Recommendations(SBAR). Information from the following report(s) SBAR, Kardex, Intake/Output and MAR was reviewed with the receiving nurse. Opportunity for questions and clarification was provided. Assessment completed upon patients arrival to unit and care assumed. 1059: Called IR, no answer. Per floor nurse patient has gone to radiology for eliane catheter placement, and will be transported to pre-op post placement. 1152; Skin assessment, Bilateral upper extremities edema, 1 plus pitting. Bruising noted to right hand and wrist, redness miguelina. A/C. Bilateral heel redness, slightly larger than quarter to center of each heel. Feet elevated on pillow with heels handing off. Patient complained of constant pain since arrival to preop. States feels much better hanging off pillow. In center of buttocks redness noted. Mepilex placed preventively to sacrum due to ERCP and then Dialysis. Bilateral LE swollen and red. + PPP. No valuables in preop holding

## 2020-11-12 NOTE — PROGRESS NOTES
1645: Called PACU for update on (now urgent start) HD patient at request of . Was informed patient was awaiting a room to be vacated and then cleaned in PCU 9799. It is unknown when the room will be ready. PACU nurse states she will call Fabiana Ashraf scheduling to inform of when patient is being moved to her room. Informed her that it may rollover to the on call nurse. Fabiana Ashraf  notified of patient new room assignment and that it is currently unknown what time patient will be available for HD.

## 2020-11-12 NOTE — ANESTHESIA POSTPROCEDURE EVALUATION
Procedure(s): ENDOSCOPIC RETROGRADE CHOLANGIOPANCREATOGRAPHY (ERCP) with biliary sphincterotomy, biliary stone removal, biliary stent placement. general 
 
Anesthesia Post Evaluation Patient location during evaluation: PACU Note status: Adequate. Level of consciousness: responsive to verbal stimuli and sleepy but conscious Pain management: satisfactory to patient Airway patency: patent Anesthetic complications: no 
Cardiovascular status: acceptable Respiratory status: acceptable Hydration status: acceptable Comments: +Post-Anesthesia Evaluation and Assessment Patient: Ej Xiao MRN: [id number]  SSN: [id number] YOB: 1935  Age: 82 y.o. Sex: female Cardiovascular Function/Vital Signs BP (!) 128/40   Pulse 83   Temp 36.7 °C (98 °F)   Resp 23   Ht 5' 9\" (1.753 m)   Wt 81.6 kg (180 lb)   SpO2 100%   BMI 26.58 kg/m² Patient is status post Procedure(s): ENDOSCOPIC RETROGRADE CHOLANGIOPANCREATOGRAPHY (ERCP) with biliary sphincterotomy, biliary stone removal, biliary stent placement. Nausea/Vomiting: Controlled. Postoperative hydration reviewed and adequate. Pain: 
Pain Scale 1: Numeric (0 - 10) (11/12/20 1815) Pain Intensity 1: 0 (11/12/20 1815) Managed. Neurological Status:  
Neuro (WDL): Exceptions to WDL (11/12/20 1512) At baseline. Mental Status and Level of Consciousness: Arousable. Pulmonary Status:  
O2 Device: Nasal cannula (11/12/20 1815) Adequate oxygenation and airway patent. Complications related to anesthesia: None Post-anesthesia assessment completed. No concerns. Pt received Bumex per nephrology and to receive HD tonight for fluid overload. Signed By: Lucia Avila MD  
 11/12/2020 Post anesthesia nausea and vomiting:  controlled INITIAL Post-op Vital signs:  
Vitals Value Taken Time /42 11/12/2020  6:30 PM  
Temp 36.7 °C (98 °F) 11/12/2020  3:14 PM  
Pulse 83 11/12/2020  6:33 PM  
 Resp 17 11/12/2020  6:33 PM  
SpO2 99 % 11/12/2020  6:33 PM  
Vitals shown include unvalidated device data.

## 2020-11-12 NOTE — PROGRESS NOTES
Problem: Mobility Impaired (Adult and Pediatric) Goal: *Acute Goals and Plan of Care (Insert Text) Description: FUNCTIONAL STATUS PRIOR TO ADMISSION: Pt reports ambulating with use of rollator walker, stating she began using assistive device after discharge home from [address] [place] [address] 1 month ago (did not receive HHPT, per report). Denies history of falls. Still driving during the day. States she is able to prepare meals for herself. Independent w/ ADLs. HOME SUPPORT PRIOR TO ADMISSION: The patient lives alone, states her daughter checks on her 3-4x/wk however daughter works full-time. Physical Therapy Goals Initiated 11/10/2020 1. Patient will move from supine to sit and sit to supine , scoot up and down, and roll side to side in bed with supervision/set-up within 7 day(s). 2.  Patient will transfer from bed to chair and chair to bed with supervision/set-up using the least restrictive device within 7 day(s). 3.  Patient will perform sit to stand with supervision/set-up within 7 day(s). 4.  Patient will ambulate with supervision/set-up for 150 feet with the least restrictive device within 7 day(s). Outcome: Progressing Towards Goal 
 PHYSICAL THERAPY TREATMENT Patient: Janet Buenrostro (88 y.o. female) Date: 11/12/2020 Diagnosis: Cholangitis [K83.09]  
<principal problem not specified> Procedure(s) (LRB): ENDOSCOPIC RETROGRADE CHOLANGIOPANCREATOGRAPHY (ERCP) (URGENT) (N/A) Day of Surgery Precautions:   
Chart, physical therapy assessment, plan of care and goals were reviewed. ASSESSMENT Patient continues with skilled PT services and is not progressing towards goals, remaining limited by increased weakness, decreased activity tolerance, unsteady gait, impaired balance, intermittent confusion, and dizziness during mobility. Pt c/o dizziness however vital signs remained stable.  Pt fatigued quickly this date, requiring chair to be pulled up behind her after ~15ft of ambulation w/ RW and minAx2. Gait slow and shuffled with increased trunk flexion. MAX verbal cueing to maintain upright standing posture. Continue to recommend SNF at discharge. If patient and family wish to return home, pt will need 24hr physical assistance in addition to [address] Current Level of Function Impacting Discharge (mobility/balance): modA for bed mobility, minAx2 for sit<>stand transfer, minAx2 for ambulation w/ RW support Other factors to consider for discharge: FALLS risk PLAN : 
Patient continues to benefit from skilled intervention to address the above impairments. Continue treatment per established plan of care. to address goals. Recommendation for discharge: (in order for the patient to meet his/her long term goals) Therapy up to 5 days/week in SNF setting This discharge recommendation: 
Has been made in collaboration with the attending provider and/or case management IF patient discharges home will need the following DME: to be determined (TBD) SUBJECTIVE:  
Patient stated My mouth is dry.  OBJECTIVE DATA SUMMARY:  
Critical Behavior: 
Neurologic State: Alert, Confused Orientation Level: Disoriented to situation Cognition: Follows commands Safety/Judgement: Decreased awareness of need for safety Functional Mobility Training: 
Bed Mobility: 
  
Supine to Sit: Moderate assistance; Additional time Scooting: Moderate assistance Transfers: 
Sit to Stand: Minimum assistance;Assist x2; Additional time Stand to Sit: Moderate assistance;Assist x2 Balance: 
Sitting: Intact Standing: Impaired Standing - Static: Fair;Constant support Standing - Dynamic : Poor;Constant support Ambulation/Gait Training: 
Distance (ft): 15 Feet (ft) Assistive Device: Walker, rolling;Gait belt Ambulation - Level of Assistance: Minimal assistance;Assist x2 
  
  
 Gait Abnormalities: Decreased step clearance;Shuffling gait(increased trunk flexion) Speed/Melany: Slow;Shuffled Step Length: Right shortened;Left shortened Pain Rating: 
Denied complaints of pain Activity Tolerance:  
Poor - VSS however pt fatigued quickly, requiring chair to be pulled up behind her After treatment patient left in no apparent distress:  
Sitting in chair and Call bell within reach COMMUNICATION/COLLABORATION:  
The patients plan of care was discussed with: Occupational therapist and Registered nurse. Randy Landers, PT, DPT Time Calculation: 25 mins

## 2020-11-12 NOTE — PROCEDURES
NAME:  Francisco Martinez :   1935 MRN:   [id number] Date/Time:  2020 2:57 PM 
 
Procedure Type:   ERCPwith biliary sphincterotomy, biliary stone removal, biliary stent placement Indications:   jaundice, abnormal CT/MRCP Pre-operative Diagnosis: See indication above Post-operative Diagnosis:  See findings below : Armida Gtz MD 
 
Referring Provider: Erika Mayen MD 
 
Sedation:  General Anesthesia Procedure Details:  After informed consent was obtained with all risks and benefits of procedure explained, the patient was taken to the OR and placed in the prone position. Upon giving sedation as per above, the Olympus duodenoscope [id number]   was inserted via the mouthpeice and carefully advanced to the second portion of the duodenum. The quality of visualization was good. The duodenoscope was withdrawn into a short position. Findings:  
 
Endoscopic: -normal esophagus, stomach, and duodenum Ampulla: possible previous sphinctertomy Cholangiogram: -Mildly dilated CBD. Distal CBD did not fill well. Old clips at [facility] site. No extravasation of contrast is noted. Pancreatogram: not performed Procedure in detail: 
 
Using the Σκα[address] 233 IG24 Dreamtome preloaded with a 0.035 inch Dreamwire, the common bile duct was easily successfully cannulated. Proper positioning was confirmed with bile aspiration. Contrast was injected and revealed non-filling of the distal common bile duct. Next, I extended the sphincterotomy. There are signs of a previous possible remote sphincterotomy. Using a [address] 233 fully inflated 9 mm -12 mm biliary extraction balloon, the common bile duct was swept. Moderate amount of dark yellow debris and sludge was removed Multiple additional sweeps were performed confirming that there was no retained debris. The contrast did not drain completely despite the balloon coming out fully inflated. I then lavaged the CBD with normal saline. Drainage was better but I decided to place a 10 Fr x 7 cm plastic BS duodenal bend stent in the CBD for drainage and very high total bilirubin. Finally, an occlusion cholangiogram was performed and there were no additional filling defects appreciated. There was spontaneous drainage of bile and contrast.  
 
The pancreatic duct was neither entered nor injected during this procedure. I performed all immediate radiologic interpretation during this procedure Complications: None. Recommendations: 
 
-Follow LFTs. If LFTs do not improve work up other/non-obstructive  causes of hepatopathy.  
-Diet as tolerated. -Will need repeat ERCP( or EGD) with stent removal in 6-8 weeks' time. Mahin Lozano MD

## 2020-11-13 NOTE — CARDIO/PULMONARY
Cardiac Rehab Note: chart review Pt is an 81 yo admitted with severe sepsis secondary to intra-abdominal infection. PMHx of chronic diastolic heart failure. CHF BUNDLE   
 
EF 55%  on 3/24/20 per RUDDY Smoking history assessed. Patient is a former smoker. Smoking Cessation Program link has not been added to the AVS. Current inpatient diet: DIET NUTRITIONAL SUPPLEMENTS 
DIET GI LITE (POST SURGICAL) Pt was a prior participant in the out-patient Cardiac Rehab program and has been given written heart healthy materials and an individualized dietician appt. Patient was not available/able to meet at this time. Pt currently off unit. CP Rehab will attempt to follow up.

## 2020-11-13 NOTE — PROGRESS NOTES
I reviewed pertinent labs and imaging, and discussed /agreed on the plan of care with Dr. Anel Cohen. Hospitalist Progress Note NAME: Tom Dyer :  1935 MRN:  [id number] Assessment / Plan: 
Severe Sepsis secondary to UTI WBC 12.9, HR 11 on admission Abnormal LFTs with Elevated Total Bilirubin Total bili 8.8, , , Lipase 505 Jaundice Pneumobilia on US 
· MRI Abd MRCP  results - Pneumobilia within the mildly dilated common bile duct. No visualized choledocholithiasis. Peripancreatic edema suggests acute pancreatitis. Splenomegaly. Small right kidney with small right renal cysts. Small right pleural effusion. · US of Abd  results - Echogenic debris vs. Pneumobilia in the common bile duct. No evidence of intrahepatic biliary dilatation. Status post cholecystectomy. Unchanged 1 cm echogenic hepatic mass, compatible with hemangioma. Small, echogenic right kidney, compatible with chronic medical renal disease. · Follow blood cultures - NO GROWTH in 4 days  
· Follow urine cultures POSITIVE for E COLI - Final  
· Continue IV ceftriaxone - Day 4 
· Appreciate GI input - worsening liver function may be related chronic liver disease in setting of  UTI/Sepsis/Renal Failure/Volume Overload · Total bili worsening - total bili 12.2 · AOJ 580, KYQ 221 VVR lipase 208  
· Check Amnionia level - patient continues to feel confused · Long conversation with DIL and patient at bedside and then long conversation with children Joellyn Curie and [facility] via phone - patient would not want anymore invasive testing which would include a potential liver biopsy. Family is in agreement with patient's wishes. Acute Kidney Injury in Setting of CKD stage 4 Baseline Cr 1.7 Fluid Volume Overload r/t Renal Failure · Cr 4.31 today · Uremic symptoms with worsening Cr - remains \"fuzzy\" · Now requiring O2 NC · Appreciate Nephrology input · Received HD today and scheduled for tomorrow · Follow BMP   
· Hold bumex and allopurinol · Avoid nephrotoxic medications  
  
Weakness and Debility · Consult PT/OT - plan for [facility] · Family is pursuing [facility] vs Hospice - have spoken to [facility] · Family very realistic that patient has had gradual decline over past few months - they are not ready for hospice yet but feel they may been soon.   
  
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 Severe concentric hypertrophy. Mitral valve thickening and is prosthetic. · Atrial Fibrillation is rate controlled · Continue amiodarone · Hold bumex · Resume plavix as patient would not want anymore invasive testing  
  
Insulin Dependant Type I Diabetes · Continue NPH  
  
Gout Hold allopurinol with JULIANA Hypothyroidism Continue levothyroxine GERD Continue protonix  
Dementia  
  
25.0 - 29.9 Overweight / Body mass index is 26.58 kg/m². 
  
Code status: Full Prophylaxis: SCD's Recommended Disposition:  PT, OT, RN Subjective: Chief Complaint / Reason for Physician Visit \"I still feel fuzy\". Met with patient and SHELLEY Cobb at beside this AM prior to HD treatment. Patient still reports she feels fuzzy and overall does not feel well. She has no other complaints. Answered DIL questions at that time. Rounded on patient again post HD. Patient remains \"fuzzy\". GI suggested patient may need liver biopsy at some point. Discussed at length with DIL and patient. Patient stated she would not want any further invasive testing given her age. DIL supportive of patient wishes. Long conversation with Raquel (daughter) and Fabiana Ellis Christian (son) via phone.  Updated on patient condition. Hopeful to get patient through this illness but they are both very practical regarding prognosis. They are very supportive of their mother's wishes and agree that the next few days will tell if they need to pursue hospice. They have already reached out on their own to [facility]. Will speak with [facility] and patient tomorrow at the bedside to discuss code status. Discussed with RN events overnight. Review of Systems: 
Symptom Y/N Comments  Symptom Y/N Comments Fever/Chills n   Chest Pain n   
Poor Appetite y   Edema y Cough n   Abdominal Pain n   
Sputum n   Joint Pain n   
SOB/LEIJA y   Pruritis/Rash n   
Nausea/vomit n   Tolerating PT/OT Diarrhea n   Tolerating Diet y Constipation n   Other Could NOT obtain due to:   
 
Objective: VITALS:  
Last 24hrs VS reviewed since prior progress note. Most recent are: 
Patient Vitals for the past 24 hrs: 
 Temp Pulse Resp BP SpO2  
11/13/20 1314  75 16 (!) 108/48   
11/13/20 1300  76 16 (!) 114/46   
11/13/20 1245  78 16 (!) 114/48   
11/13/20 1230  78 16 (!) 110/51   
11/13/20 1215  78 16 (!) 118/50   
11/13/20 1200  79 16 (!) 113/53   
11/13/20 1145  78 16 (!) 121/55   
11/13/20 1130  76 16 (!) 118/48   
11/13/20 1115  (!) 45 16 (!) 108/45   
11/13/20 1100  72 16 (!) 113/48   
11/13/20 1042 97.4 °F (36.3 °C) 78 16 (!) 114/55   
11/13/20 0750 97.7 °F (36.5 °C) 72 16 (!) 112/44 100 % 11/13/20 0710  73 17 (!) 112/44 100 % 11/13/20 0400  69     
11/13/20 0329 98.1 °F (36.7 °C) 79 16 (!) 127/51 99 % 11/12/20 2347  77     
11/12/20 2310 98 °F (36.7 °C) 79 17 (!) 115/45 99 % 11/12/20 2258  81 18 (!) 115/42 99 % 11/12/20 2243  75 18 (!) 116/42 100 % 11/12/20 2227  76 18 (!) 114/41 99 % 11/12/20 2212  76 16 (!) 111/39 99 % 11/12/20 2158  80 16 (!) 108/40 99 % 11/12/20 2147  81 19 (!) 111/38 100 % 11/12/20 2126  81 17 (!) 99/32 99 % 11/12/20 2112  81 17 (!) 121/34 98 % 11/12/20 2057 97.5 °F (36.4 °C) 84 16 (!) 118/42 98 % 11/12/20 2015  80 16 (!) 116/41 98 % 11/12/20 2000  80 14 (!) 110/42 99 % 11/12/20 1945  81 15 (!) 114/37 99 % 11/12/20 1930  81 15 (!) 116/41 97 % 11/12/20 1915  82 17 (!) 116/42 97 % 11/12/20 1900  83 23 (!) 127/48 98 % 11/12/20 1845  82 17 (!) 120/44 98 % 11/12/20 1830  83 17 (!) 123/42 96 % 11/12/20 1815  83 23 (!) 128/40 100 % 11/12/20 1810  83 16 (!) 123/42 100 % 11/12/20 1805  83 17 (!) 124/44 100 % 11/12/20 1800  83 19 (!) 123/41 99 % 11/12/20 1755  83 15 (!) 123/43 100 % 11/12/20 1750  83 18 (!) 126/41 100 % 11/12/20 1745  83 20 (!) 126/43 100 % 11/12/20 1740  83 20 (!) 129/41 98 % 11/12/20 1735  84 20 (!) 122/40 99 % Intake/Output Summary (Last 24 hours) at 11/13/2020 1732 Last data filed at 11/13/2020 1314 Gross per 24 hour Intake  Output 1650 ml Net -1650 ml PHYSICAL EXAM: 
General: WD, WN. Alert, cooperative, ill appearing and jaundiced elderly  female   
EENT:  EOMI. icteric sclerae. MMM Resp:  LS dim at bases with faint crakles. No accessory muscle use CV:  Controlled afib,  +1-2 pitting edema to all extremities GI:  Soft, obese, Non tender.  +Bowel sounds Neurologic:  Alert and oriented X 2- 3, normal speech, Psych:   Good insight. Not anxious nor agitated Skin:  No rashes. + Jaundice Reviewed most current lab test results and cultures  YES Reviewed most current radiology test results   YES Review and summation of old records today    NO Reviewed patient's current orders and MAR    YES 
PMH/ reviewed - no change compared to H&P 
________________________________________________________________________ Care Plan discussed with: 
  Comments Patient x Family  x   
RN x Care Manager x Consultant  x                   Multidiciplinary team rounds were held today with case manager, nursing, pharmacist and clinical coordinator. Patient's plan of care was discussed; medications were reviewed and discharge planning was addressed. ________________________________________________________________________ Total NON critical care TIME:  25   Minutes Total CRITICAL CARE TIME Spent:   Minutes non procedure based Comments >50% of visit spent in counseling and coordination of care x   
________________________________________________________________________ Delma Leigh NP Procedures: see electronic medical records for all procedures/Xrays and details which were not copied into this note but were reviewed prior to creation of Plan. LABS: 
I reviewed today's most current labs and imaging studies. Pertinent labs include: 
Recent Labs 11/13/20 
3799 11/12/20 
0259 11/11/20 
0239 WBC 14.6* 13.8* 12.1* HGB 7.1* 7.0* 7.2* HCT 22.6* 22.1* 23.3*  
 181 190 Recent Labs 11/13/20 
2781 11/12/20 
0259 11/11/20 
1747 11/11/20 
0239  135*  --  136  
K 5.0 4.7  --  4.9  104  --  105 CO2 25 21  --  24 * 146*  --  131* BUN 69* 86*  --  86* CREA 4.31* 5.59*  --  5.08* CA 8.6 8.4*  --  8.8 ALB 2.1* 2.2*  --  2.4* TBILI 12.2* 10.6*  --  10.0* * 165*  --  186* INR  --   --  1.5*  --   
 
 
Signed: Delma Leigh NP

## 2020-11-13 NOTE — PROGRESS NOTES
Gastroenterology Progress Note Brooks Aragon, PA 
 for Dr. Kai Rothman 11/13/2020 Admit Date: 11/9/2020 Subjective: Follow up for:  1) Elevated LFTs 2) Sepsis  
`  3) UTI Patient was seen during morning rounds, resting comfortably. Patient is on GI lite diet. No N/V. Afebrile. No abdominal pain, just leg soreness. WBC increased to 14.6 today from 13.8, hgb 7.1 Marginal improvement in AST and ALT however bili increased as well as alk phos: Results for Sanchez Ferrara (MRN [id number]) as of 11/13/2020 08:13 Ref. Range 11/10/2020 03:13 11/11/2020 02:39 11/12/2020 02:59 11/13/2020 03:26 ALT Latest Ref Range: 12 - 78 U/L 192 (H) 186 (H) 165 (H) 154 (H) AST Latest Ref Range: 15 - 37 U/L 395 (H) 393 (H) 323 (H) 275 (H) Alk. phosphatase Latest Ref Range: 45 - 117 U/L 275 (H) 299 (H) 318 (H) 328 (H) Lipase Latest Ref Range: 73 - 393 U/L 354 326  208 Results for Sanchez Ferrara (MRN [id number]) as of 11/13/2020 08:13 Ref. Range 11/10/2020 03:13 11/11/2020 02:39 11/12/2020 02:59 11/13/2020 03:26 Bilirubin, total Latest Ref Range: 0.2 - 1.0 MG/DL 8.6 (H) 10.0 (H) 10.6 (H) 12.2 (H) ERCP on 11/12 by Dr. Florence Neff with sphincterotomy (evidance of remote sphincterotomy) and sludge extraction and stent placement Current Facility-Administered Medications Medication Dose Route Frequency  oxyCODONE-acetaminophen (PERCOCET) 5-325 mg per tablet 1 Tab  1 Tab Oral Q4H PRN  
 sodium chloride (NS) flush 5-40 mL  5-40 mL IntraVENous Q8H  
 sodium chloride (NS) flush 5-40 mL  5-40 mL IntraVENous PRN  
 simethicone (MYLICON) 77CX/0.9TW oral drops 80 mg  1.2 mL Oral Multiple  atropine injection 0.5 mg  0.5 mg IntraVENous ONCE PRN  
 EPINEPHrine (ADRENALIN) 0.1 mg/mL syringe 1 mg  1 mg Endoscopically ONCE PRN  
 sodium chloride (NS) flush 5-40 mL  5-40 mL IntraVENous Q8H  
 sodium chloride (NS) flush 5-40 mL  5-40 mL IntraVENous PRN  
  0.9% sodium chloride infusion 25 mL  25 mL IntraVENous PRN  
 epoetin hali-epbx (RETACRIT) injection 10,000 Units  10,000 Units SubCUTAneous EVERY WED & SAT  cefTRIAXone (ROCEPHIN) 1 g in 0.9% sodium chloride (MBP/ADV) 50 mL MBP  1 g IntraVENous Q24H  
 amiodarone (CORDARONE) tablet 100 mg  100 mg Oral DAILY  insulin NPH (NOVOLIN N, HUMULIN N) injection 3 Units  3 Units SubCUTAneous QPM  
 insulin NPH (NOVOLIN N, HUMULIN N) injection 5 Units  5 Units SubCUTAneous QAM  
 levothyroxine (SYNTHROID) tablet 125 mcg  125 mcg Oral 6am  
 pantoprazole (PROTONIX) 40 mg in 0.9% sodium chloride 10 mL injection  40 mg IntraVENous DAILY  sodium chloride (NS) flush 5-40 mL  5-40 mL IntraVENous Q8H  
 sodium chloride (NS) flush 5-40 mL  5-40 mL IntraVENous PRN  
 0.9% sodium chloride infusion  75 mL/hr IntraVENous CONTINUOUS  
 ondansetron (ZOFRAN) injection 4 mg  4 mg IntraVENous Q6H PRN  
 insulin lispro (HUMALOG) injection   SubCUTAneous Q6H  
 glucose chewable tablet 16 g  4 Tab Oral PRN  
 dextrose (D50W) injection syrg 12.5-25 g  12.5-25 g IntraVENous PRN  
 glucagon (GLUCAGEN) injection 1 mg  1 mg IntraMUSCular PRN  
 morphine injection 1 mg  1 mg IntraVENous Q6H PRN Objective:  
 
Blood pressure (!) 112/44, pulse 72, temperature 97.7 °F (36.5 °C), resp. rate 16, height 5' 9\" (1.753 m), weight 92.3 kg (203 lb 6.4 oz), SpO2 100 %. No intake/output data recorded. 11/11 1901 - 11/13 0700 In: 1226.3 [I.V.:1226.3] Out: 650 [Urine:650] EXAM: 
GEN: Elderly pleasent WF, jaundice, NAD HEENT: +Scleral icterus Heart: RRR Lungs: CTA Abdomen: soft, non tender, no guarding or rebound, normal BS Ext:bilateral lower extremity pitting edema Data Review Recent Results (from the past 24 hour(s)) GLUCOSE, POC Collection Time: 11/12/20 12:01 PM  
Result Value Ref Range Glucose (POC) 158 (H) 65 - 100 mg/dL Performed by Dandre HOWARD, POC  
 Collection Time: 11/12/20  3:17 PM  
Result Value Ref Range Glucose (POC) 142 (H) 65 - 100 mg/dL Performed by Clement Garcia* GLUCOSE, POC Collection Time: 11/12/20  8:12 PM  
Result Value Ref Range Glucose (POC) 151 (H) 65 - 100 mg/dL Performed by Roselia Saucedo GLUCOSE, POC Collection Time: 11/12/20  9:06 PM  
Result Value Ref Range Glucose (POC) 160 (H) 65 - 100 mg/dL Performed by Summer Jung RN   
HEP B SURFACE AG Collection Time: 11/12/20  9:37 PM  
Result Value Ref Range Hepatitis B surface Ag <0.10 Index Hep B surface Ag Interp. Negative NEG    
GLUCOSE, POC Collection Time: 11/12/20 11:44 PM  
Result Value Ref Range Glucose (POC) 140 (H) 65 - 100 mg/dL Performed by Summer Jung RN   
CBC WITH AUTOMATED DIFF Collection Time: 11/13/20  3:26 AM  
Result Value Ref Range WBC 14.6 (H) 3.6 - 11.0 K/uL  
 RBC 3.10 (L) 3.80 - 5.20 M/uL HGB 7.1 (L) 11.5 - 16.0 g/dL HCT 22.6 (L) 35.0 - 47.0 % MCV 72.9 (L) 80.0 - 99.0 FL  
 MCH 22.9 (L) 26.0 - 34.0 PG  
 MCHC 31.4 30.0 - 36.5 g/dL RDW 20.7 (H) 11.5 - 14.5 % PLATELET 392 908 - 659 K/uL MPV ABNORMAL 8.9 - 12.9 FL  
 NRBC 0.0 0  WBC ABSOLUTE NRBC 0.00 0.00 - 0.01 K/uL NEUTROPHILS 93 (H) 32 - 75 % LYMPHOCYTES 3 (L) 12 - 49 % MONOCYTES 3 (L) 5 - 13 % EOSINOPHILS 0 0 - 7 % BASOPHILS 0 0 - 1 % IMMATURE GRANULOCYTES 1 (H) 0.0 - 0.5 % ABS. NEUTROPHILS 13.7 (H) 1.8 - 8.0 K/UL  
 ABS. LYMPHOCYTES 0.4 (L) 0.8 - 3.5 K/UL  
 ABS. MONOCYTES 0.4 0.0 - 1.0 K/UL  
 ABS. EOSINOPHILS 0.0 0.0 - 0.4 K/UL  
 ABS. BASOPHILS 0.0 0.0 - 0.1 K/UL  
 ABS. IMM. GRANS. 0.1 (H) 0.00 - 0.04 K/UL  
 DF SMEAR SCANNED    
 RBC COMMENTS ANISOCYTOSIS 2+ 
    
 RBC COMMENTS HYPOCHROMIA 1+ 
    
 RBC COMMENTS SCHISTOCYTES 1+ RBC COMMENTS OVALOCYTES 1+ METABOLIC PANEL, COMPREHENSIVE Collection Time: 11/13/20  3:26 AM  
Result Value Ref Range  Sodium 138 136 - 145 mmol/L  
 Potassium 5.0 3.5 - 5.1 mmol/L Chloride 104 97 - 108 mmol/L  
 CO2 25 21 - 32 mmol/L Anion gap 9 5 - 15 mmol/L Glucose 142 (H) 65 - 100 mg/dL BUN 69 (H) 6 - 20 MG/DL Creatinine 4.31 (H) 0.55 - 1.02 MG/DL  
 BUN/Creatinine ratio 16 12 - 20 GFR est AA 12 (L) >60 ml/min/1.73m2 GFR est non-AA 10 (L) >60 ml/min/1.73m2 Calcium 8.6 8.5 - 10.1 MG/DL Bilirubin, total 12.2 (H) 0.2 - 1.0 MG/DL  
 ALT (SGPT) 154 (H) 12 - 78 U/L  
 AST (SGOT) 275 (H) 15 - 37 U/L Alk. phosphatase 328 (H) 45 - 117 U/L Protein, total 5.3 (L) 6.4 - 8.2 g/dL Albumin 2.1 (L) 3.5 - 5.0 g/dL Globulin 3.2 2.0 - 4.0 g/dL A-G Ratio 0.7 (L) 1.1 - 2.2 LIPASE Collection Time: 11/13/20  3:26 AM  
Result Value Ref Range Lipase 208 73 - 393 U/L  
GLUCOSE, POC Collection Time: 11/13/20  6:24 AM  
Result Value Ref Range Glucose (POC) 149 (H) 65 - 100 mg/dL Performed by Justin Roth RN   
GLUCOSE, POC Collection Time: 11/13/20  7:34 AM  
Result Value Ref Range Glucose (POC) 149 (H) 65 - 100 mg/dL Performed by Teresa Ojeda (CON) Recent Labs 11/13/20 
0326 11/12/20 
0259 WBC 14.6* 13.8* HGB 7.1* 7.0*  
HCT 22.6* 22.1*  
 181 Recent Labs 11/13/20 
8546 11/12/20 
0259 11/11/20 
0239  135* 136  
K 5.0 4.7 4.9  104 105 CO2 25 21 24 BUN 69* 86* 86* CREA 4.31* 5.59* 5.08* * 146* 131* CA 8.6 8.4* 8.8 Recent Labs 11/13/20 
8140 11/12/20 
0259 11/11/20 
0239 * 165* 186* * 318* 299* TBILI 12.2* 10.6* 10.0* TP 5.3* 5.4* 5.6* ALB 2.1* 2.2* 2.4*  
GLOB 3.2 3.2 3.2 LPSE 208  --  326 Recent Labs 11/11/20 
1747 INR 1.5* PTP 15.2* Recent Labs 11/12/20 
0259 TIBC 270 PSAT 8*  
FERR 201 Lab Results Component Value Date/Time Folate 72.3 (H) 01/22/2017 03:13 AM  
  
No results for input(s): PH, PCO2, PO2 in the last 72 hours. No results for input(s): CPK, CKNDX, TROIQ in the last 72 hours. No lab exists for component: CPKMB Lab Results Component Value Date/Time Cholesterol, total 256 (H) 07/13/2020 08:02 AM  
 HDL Cholesterol 58 07/13/2020 08:02 AM  
 LDL, calculated 169 (H) 07/13/2020 08:02 AM  
 Triglyceride 143 07/13/2020 08:02 AM  
 CHOL/HDL Ratio 3.1 09/22/2010 08:12 AM  
 
Lab Results Component Value Date/Time Glucose (POC) 149 (H) 11/13/2020 07:34 AM  
 Glucose (POC) 149 (H) 11/13/2020 06:24 AM  
 Glucose (POC) 140 (H) 11/12/2020 11:44 PM  
 Glucose (POC) 160 (H) 11/12/2020 09:06 PM  
 Glucose (POC) 151 (H) 11/12/2020 08:12 PM  
 
Lab Results Component Value Date/Time Color ORANGE 11/09/2020 02:22 PM  
 Appearance TURBID (A) 11/09/2020 02:22 PM  
 Specific gravity 1.016 11/09/2020 02:22 PM  
 Specific gravity 1.020 07/06/2010 01:10 PM  
 pH (UA) 5.5 11/09/2020 02:22 PM  
 Protein 100 (A) 11/09/2020 02:22 PM  
 Glucose Negative 11/09/2020 02:22 PM  
 Ketone TRACE (A) 11/09/2020 02:22 PM  
 Bilirubin Negative 10/10/2020 10:51 PM  
 Urobilinogen 1.0 11/09/2020 02:22 PM  
 Nitrites Positive (A) 11/09/2020 02:22 PM  
 Leukocyte Esterase LARGE (A) 11/09/2020 02:22 PM  
 Epithelial cells FEW 11/09/2020 02:22 PM  
 Bacteria 3+ (A) 11/09/2020 02:22 PM  
 WBC >100 (H) 11/09/2020 02:22 PM  
 RBC  11/09/2020 02:22 PM  
 
MRI Results (most recent): 
Results from Hospital Encounter encounter on 11/09/20 MRI ABD W MRCP WO CONT Narrative INDICATION: Jaundice. Abnormal ultrasound. COMPARISON: Right upper quadrant abdominal ultrasound from November 9, 2020. Abdomen CT from September 10, 2019. TECHNIQUE: Routine axial and coronal MR sequences were obtained through the 
abdomen. MRCP sequences were included but are nondiagnostic secondary to motion 
artifact. FINDINGS:  
LIVER: Normal size and signal. No mass. GALLBLADDER: Surgically absent. COMMON BILE DUCT: 9 mm in diameter.  Apparent pneumobilia within the nondependent 
portions. No visualized choledocholithiasis. PANCREATIC DUCT: Not visualized secondary to motion artifact. PANCREAS: No mass or fluid collection. Mild peripancreatic edema. SPLEEN: Enlarged, measuring 17.6 cm in length. No mass. ADRENALS: No mass. KIDNEYS: Small right kidney. No hydronephrosis. 1.5 cm and 0.7 cm right renal 
cysts. RETROPERITONEUM: No mass or lymphadenopathy. No aortic aneurysm. BOWEL: No focal abnormality. OTHER: Small right pleural effusion Impression IMPRESSION: Pneumobilia within the mildly dilated common bile duct. No 
visualized choledocholithiasis. Peripancreatic edema suggests acute 
pancreatitis. Splenomegaly. Small right kidney with small right renal cysts. Small right pleural effusion. Assessment:  
 
Active Problems: 
  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   
     Gastric, biopsy:  
    Mild capillary ectasia and congestion, compatible with gastric antral  
    vascular ectasia (GAVE), negative for background gastritis. One fragment suggestive of hyperplastic polyp Elevated LFTs (11/9/2020) Common bile duct dilation (11/9/2020) Cholangitis (11/9/2020) Plan:  
Bili continues to rise despite ERCP with with sphincterotomy, sludge extraction and stent placement. May need more time but will also proceed with further serological w/u to look for other causes of liver diseases. Liver is normal on US, INR elevated on 11/11 at 1.5 plt normal, will repeat today and monitor daily. May need liver biopsy if LFTs fail to improve. Lipase normal, ammonia previously normal but will repeat today. Continue with treatment of UTI per primary care team. We will continue to follow closely. NEVAEH Harmon 
 
11/13/20 
8:19 AM 
 
[id number], [address] [id number] Loc: [phone]

## 2020-11-13 NOTE — PROGRESS NOTES
Physical Therapy Attempting to see patient for PT this am. Initiated bed mobility and transport arrived to take patient to dialysis. Therapy session aborted. Will continue to follow. Thank you, Yasemin Stone, PT

## 2020-11-13 NOTE — PROGRESS NOTES
[facility] Progress Note NAME: Lurdes Cantu :  1935 MRN:  [id number] Date/Time: 2020 Risk of deterioration: medium Assessment:    Plan: JULIANA onCKD stage 4 Pneumobilia in CBD/Transaminitis HTN Ecoli uti on antibiotics Bradycardia Anemia S/p bioprosthetic MV Afib s/p recent ablation S/p watchman H/o cad/stent D.w daughter yesterday ? ? can't find my note from yesterday. I know I wrote it. Initiated HD thru a [facility] pt thru this. Family, has been considering hospice, given her multiple medical problems Subjective: Chief Complaint:  I'm getting fuzzy. Review of Systems: (+) N/V (-) SOB/CP/F/C (+) confusion Objective: VITALS:  
Last 24hrs VS reviewed since prior progress note. Most recent are: 
Visit Vitals BP (!) 108/48 Pulse 75 Temp 97.4 °F (36.3 °C) (Oral) Resp 16 Ht 5' 9\" (1.753 m) Wt 92.3 kg (203 lb 6.4 oz) SpO2 100% BMI 30.04 kg/m² SpO2 Readings from Last 6 Encounters:  
20 100% 20 98% 20 98% 20 98% 10/30/20 98% 10/28/20 95% O2 Flow Rate (L/min): 4 l/min Intake/Output Summary (Last 24 hours) at 2020 1542 Last data filed at 2020 1314 Gross per 24 hour Intake  Output 1650 ml Net -1650 ml Telemetry Reviewed PHYSICAL EXAM: 
 
General   well developed, well nourished, appears stated age, in no acute distress EENT  Normocephalic, Atraumatic,  EOMI Respiratory   Clear To Auscultation bilaterally Cardiology  Regular Rate and Rythmn  Sternal scar Abdominal  Soft, non-tender, non-distended, positive bowel sounds, no hepatosplenomegaly Extremities  No clubbing, cyanosis, (+) chronic edema Pulses intact. Lab Data Reviewed: (see below) Medications Reviewed: (see below) PMH/SH reviewed - no change compared to H&P 
___________________________________________________ 
 
___________________________________________________ Attending Physician: Pradeep Malhotra MD  
 
____________________________________________________ MEDICATIONS: 
Current Facility-Administered Medications Medication Dose Route Frequency  acetylcysteine (MUCOMYST) 200 mg/mL (20 %) solution 12,922 mg  140 mg/kg Oral ONCE Followed by  
Tom Vasquez acetylcysteine (MUCOMYST) 200 mg/mL (20 %) solution 6,462 mg  70 mg/kg Oral Q4H  
 epoetin hali-epbx (RETACRIT) injection 10,000 Units  10,000 Units SubCUTAneous Q MON, WED & FRI  sodium chloride (NS) flush 5-40 mL  5-40 mL IntraVENous Q8H  
 sodium chloride (NS) flush 5-40 mL  5-40 mL IntraVENous PRN  
 simethicone (MYLICON) 93YA/1.0VJ oral drops 80 mg  1.2 mL Oral Multiple  sodium chloride (NS) flush 5-40 mL  5-40 mL IntraVENous Q8H  
 sodium chloride (NS) flush 5-40 mL  5-40 mL IntraVENous PRN  
 0.9% sodium chloride infusion 25 mL  25 mL IntraVENous PRN  
 cefTRIAXone (ROCEPHIN) 1 g in 0.9% sodium chloride (MBP/ADV) 50 mL MBP  1 g IntraVENous Q24H  
 amiodarone (CORDARONE) tablet 100 mg  100 mg Oral DAILY  insulin NPH (NOVOLIN N, HUMULIN N) injection 3 Units  3 Units SubCUTAneous QPM  
 insulin NPH (NOVOLIN N, HUMULIN N) injection 5 Units  5 Units SubCUTAneous QAM  
 levothyroxine (SYNTHROID) tablet 125 mcg  125 mcg Oral 6am  
 pantoprazole (PROTONIX) 40 mg in 0.9% sodium chloride 10 mL injection  40 mg IntraVENous DAILY  sodium chloride (NS) flush 5-40 mL  5-40 mL IntraVENous Q8H  
 sodium chloride (NS) flush 5-40 mL  5-40 mL IntraVENous PRN  
 0.9% sodium chloride infusion  75 mL/hr IntraVENous CONTINUOUS  
 ondansetron (ZOFRAN) injection 4 mg  4 mg IntraVENous Q6H PRN  
 insulin lispro (HUMALOG) injection   SubCUTAneous Q6H  
 glucose chewable tablet 16 g  4 Tab Oral PRN  
 dextrose (D50W) injection syrg 12.5-25 g  12.5-25 g IntraVENous PRN  
 glucagon (GLUCAGEN) injection 1 mg  1 mg IntraMUSCular PRN  
 morphine injection 1 mg  1 mg IntraVENous Q6H PRN  
  
 
LABS: 
Recent Labs 11/13/20 
0326 11/12/20 0259 WBC 14.6* 13.8* HGB 7.1* 7.0*  
HCT 22.6* 22.1*  
 181 Recent Labs 11/13/20 
0754 11/12/20 0259 11/11/20 0239  135* 136  
K 5.0 4.7 4.9  104 105 CO2 25 21 24 BUN 69* 86* 86* CREA 4.31* 5.59* 5.08* * 146* 131* CA 8.6 8.4* 8.8 Recent Labs 11/13/20 
3988 11/12/20 0259 11/11/20 0239 * 165* 186* * 318* 299* TBILI 12.2* 10.6* 10.0* TP 5.3* 5.4* 5.6* ALB 2.1* 2.2* 2.4*  
GLOB 3.2 3.2 3.2 LPSE 208  --  326 Recent Labs 11/11/20 
1747 INR 1.5* PTP 15.2* Recent Labs 11/12/20 0259 TIBC 270 PSAT 8*  
FERR 201 No results for input(s): PH, PCO2, PO2 in the last 72 hours. No results for input(s): CPK, CKNDX, TROIQ in the last 72 hours. No lab exists for component: CPKMB Lab Results Component Value Date/Time  Glucose (POC) 149 (H) 11/13/2020 07:34 AM  
 Glucose (POC) 149 (H) 11/13/2020 06:24 AM  
 Glucose (POC) 140 (H) 11/12/2020 11:44 PM  
 Glucose (POC) 160 (H) 11/12/2020 09:06 PM  
 Glucose (POC) 151 (H) 11/12/2020 08:12 PM

## 2020-11-13 NOTE — PROGRESS NOTES
Occupational Therapy Initiated OT treatment and started moving toward the edge of the bed, but transport arrived to take patient for dialysis. Treatment session aborted. Will continue to follow.

## 2020-11-13 NOTE — PROCEDURES
[facility] Dialysis Team [place]  [phone] Vitals   Pre   Post   Assessment   Pre   Post    
Temp  Temp: 97.5 °F (36.4 °C) (11/12/20 2057)  98 LOC  Lethargic/ oriented x 2 No change HR   Pulse (Heart Rate): 84 (11/12/20 2057) 77 Lungs   Clear/ 3.5 NC   no change B/P   BP: (!) 118/42 (11/12/20 2057) 115/45 Cardiac   Monitor/ NSR  no change Resp   Resp Rate: 16 (11/12/20 2057) 20 Skin   Warm/dry  no change Pain level  Pain Intensity 1: 0 (11/12/20 2000) 0 Edema  Generalized No change Orders: Duration:   Start:   2057 End:   2258 Total:   2hrs Dialyzer:   Dialyzer/Set Up Inspection: Oxana Hand (11/12/20 2057) K Bath:   Dialysate K (mEq/L): 4 (11/12/20 2057) Ca Bath:   Dialysate CA (mEq/L): 2.5 (11/12/20 2057) Na/Bicarb: Target Fluid Removal:   Goal/Amount of Fluid to Remove (mL): 500 mL (11/12/20 2057) Access Type & Location:   RIJ CVC, +asp/flush, patent at 200ml/min pump speed. Labs Obtained/Reviewed Critical Results Called   Date when labs were drawn- 
Hgb-   
HGB Date Value Ref Range Status 11/12/2020 7.0 (L) 11.5 - 16.0 g/dL Final  
 
K-   
Potassium Date Value Ref Range Status 11/12/2020 4.7 3.5 - 5.1 mmol/L Final  
 
Ca-  
Calcium Date Value Ref Range Status 11/12/2020 8.4 (L) 8.5 - 10.1 MG/DL Final  
 
Bun-  
BUN Date Value Ref Range Status 11/12/2020 86 (H) 6 - 20 MG/DL Final  
 
Creat-  
Creatinine Date Value Ref Range Status 11/12/2020 5.59 (H) 0.55 - 1.02 MG/DL Final  
 
  
Medications/ Blood Products Given Name   Dose   Route and Time    
none Blood Volume Processed (BVP):    23.5 Net Fluid Removed:  0 Comments Time Out Done: 2045 Primary Nurse Rpt Pre:  
Primary Nurse Rpt Post: 
Pt Education: Hemodialysis prodedure Care Plan: Continue tx as ordered Tx Summary:Patient tolerated tx fairly. No UF pulled due low BP's. All possible blood returned with NS rinse back.  CVC ports locked with NS and capped. Admiting Diagnosis: JULIANA Consent signed - Obtained Hepatitis Status-  Drawn Machine #- Machine Number: [id number] (11/12/20 2057) Telemetry status- Monitored

## 2020-11-13 NOTE — PROCEDURES
[facility] Dialysis Team [place]  [phone] Vitals   Pre   Post   Assessment   Pre   Post    
Temp  Temp: 97.4 °F (36.3 °C) (11/13/20 1042)  97.8 LOC  A&Ox4; some confusion A&Ox4; some confusion HR   Pulse (Heart Rate): 78 (11/13/20 1042) 75 Lungs   Coarse / dim Coarse / dim B/P   BP: (!) 114/55 (11/13/20 1042) 108/48 Cardiac   RRR RRR Resp   Resp Rate: 16 (11/13/20 1042) 16 Skin   CDI CDI Pain level  Pain Intensity 1: 0 (11/13/20 0750) 0 Edema  generalized 
 generalized Orders: Duration:   Start:   1042 End:   1314 Total:   2.5 hours Dialyzer:   Dialyzer/Set Up Inspection: Victor Manuel Neal (11/13/20 1042) K Bath:   Dialysate K (mEq/L): 3 (11/13/20 1042) Ca Bath:   Dialysate CA (mEq/L): 2.5 (11/13/20 1042) Na/Bicarb:   Dialysate NA (mEq/L): 138 (11/13/20 1042) Target Fluid Removal:   Goal/Amount of Fluid to Remove (mL): 1500 mL (11/13/20 1042) Access Type & Location:     
Labs Obtained/Reviewed Critical Results Called   Date when labs were drawn- 
Hgb-   
HGB Date Value Ref Range Status 11/13/2020 7.1 (L) 11.5 - 16.0 g/dL Final  
 
K-   
Potassium Date Value Ref Range Status 11/13/2020 5.0 3.5 - 5.1 mmol/L Final  
 
Ca-  
Calcium Date Value Ref Range Status 11/13/2020 8.6 8.5 - 10.1 MG/DL Final  
 
Bun-  
BUN Date Value Ref Range Status 11/13/2020 69 (H) 6 - 20 MG/DL Final  
 
Creat-  
Creatinine Date Value Ref Range Status 11/13/2020 4.31 (H) 0.55 - 1.02 MG/DL Final  
 
  
Medications/ Blood Products Given Name   Dose   Route and Time None ordered Blood Volume Processed (BVP):    47.3 Net Fluid Removed:  1500mL Comments All dialysis related medications have been reviewed. Assessment performed by RN. Procedure and documentation observed and reviewed by Adele Velazquez RN Time Out Done:  3701 Primary Nurse Rpt Pre:  Zoraida Amador RN 
Primary Nurse Rpt Post:  Zoraida Amador RN 
Pt Education:  procedural 
 Care Plan:  On going Tx Summary: 
5060:  RIJ dressing CDI; no s/s of infection noted. Each catheter limb disinfected per p&p, caps removed, hubs disinfected per p&p. Each limb aspirated well and flushed with 10cc ns per p&p.  VSS, tx initiated. 1100:  Pt resting 1115:  Pt resting 1130:  Pt resting 1145:  Pt resting 
1200:  Pt resting 1215:  Pt resting 1230:  Pt resting 1245:  Pt resting 1300:  Pt resting 1314:  Each dialysis catheter limb disinfected per p&p, blood returned per p&p, each dialysis hub disinfected per p&p, post dialysis catheter dwell instilled per order, and caps applied. Admiting Diagnosis:  Cholangitis Pt's previous clinic-  n/a Consent signed - Informed Consent Verified: Yes (11/13/20 1042) Elva Consent - signed and on file Hepatitis Status- neg/susc 11/12/20 Machine #- Machine Number: [id number] (11/13/20 1042) Telemetry status- 
Pre-dialysis wt. -

## 2020-11-13 NOTE — PROGRESS NOTES
[facility] Progress Note NAME: Tom Dyer :  1935 MRN:  [id number] Date/Time: 2020 Risk of deterioration: medium Assessment:    Plan: JULIANA onCKD stage 4 Pneumobilia in CBD/Transaminitis-S/P ERCP/spincterotomy HTN Ecoli uti on antibiotics Bradycardia Anemia S/p bioprosthetic MV Afib s/p recent ablation S/p watchman H/o cad/stent Tolerated HD #1 yesterday following ERCP Seen on HD this am, bp stable HD #3 tomorrow Note-daughter had been thinking about hospice given her overall decline OVerall has declined over the last few months. Retacrit/transfuse prn Subjective: Chief Complaint:  Asleep on hd earlier today Review of Systems: 
 
Objective: VITALS:  
Last 24hrs VS reviewed since prior progress note. Most recent are: 
Visit Vitals BP (!) 108/48 Pulse 75 Temp 97.4 °F (36.3 °C) (Oral) Resp 16 Ht 5' 9\" (1.753 m) Wt 92.3 kg (203 lb 6.4 oz) SpO2 100% BMI 30.04 kg/m² SpO2 Readings from Last 6 Encounters:  
20 100% 20 98% 20 98% 20 98% 10/30/20 98% 10/28/20 95% O2 Flow Rate (L/min): 4 l/min Intake/Output Summary (Last 24 hours) at 2020 1543 Last data filed at 2020 1314 Gross per 24 hour Intake  Output 1650 ml Net -1650 ml Telemetry Reviewed PHYSICAL EXAM: 
 
General   well developed, icteric, ill appearing EENT  Normocephalic, Atraumatic Respiratory   Clear To Auscultation bilaterally Cardiology  Regular Rate and Rythmn  Sternal scar Abdominal  Soft, non-tender, non-distended, positive bowel sounds, no hepatosplenomegaly Extremities  No clubbing, cyanosis, (+) chronic edema Pulses intact. Lab Data Reviewed: (see below) Medications Reviewed: (see below) PMH/SH reviewed - no change compared to H&P 
___________________________________________________ 
 
___________________________________________________ Attending Physician: Shania Elena MD  
 
____________________________________________________ MEDICATIONS: 
Current Facility-Administered Medications Medication Dose Route Frequency  acetylcysteine (MUCOMYST) 200 mg/mL (20 %) solution 12,922 mg  140 mg/kg Oral ONCE Followed by  
Baez acetylcysteine (MUCOMYST) 200 mg/mL (20 %) solution 6,462 mg  70 mg/kg Oral Q4H  
 epoetin hali-epbx (RETACRIT) injection 10,000 Units  10,000 Units SubCUTAneous Q MON, WED & FRI  sodium chloride (NS) flush 5-40 mL  5-40 mL IntraVENous Q8H  
 sodium chloride (NS) flush 5-40 mL  5-40 mL IntraVENous PRN  
 simethicone (MYLICON) 21LN/8.1QN oral drops 80 mg  1.2 mL Oral Multiple  sodium chloride (NS) flush 5-40 mL  5-40 mL IntraVENous Q8H  
 sodium chloride (NS) flush 5-40 mL  5-40 mL IntraVENous PRN  
 0.9% sodium chloride infusion 25 mL  25 mL IntraVENous PRN  
 cefTRIAXone (ROCEPHIN) 1 g in 0.9% sodium chloride (MBP/ADV) 50 mL MBP  1 g IntraVENous Q24H  
 amiodarone (CORDARONE) tablet 100 mg  100 mg Oral DAILY  insulin NPH (NOVOLIN N, HUMULIN N) injection 3 Units  3 Units SubCUTAneous QPM  
 insulin NPH (NOVOLIN N, HUMULIN N) injection 5 Units  5 Units SubCUTAneous QAM  
 levothyroxine (SYNTHROID) tablet 125 mcg  125 mcg Oral 6am  
 pantoprazole (PROTONIX) 40 mg in 0.9% sodium chloride 10 mL injection  40 mg IntraVENous DAILY  sodium chloride (NS) flush 5-40 mL  5-40 mL IntraVENous Q8H  
 sodium chloride (NS) flush 5-40 mL  5-40 mL IntraVENous PRN  
 0.9% sodium chloride infusion  75 mL/hr IntraVENous CONTINUOUS  
 ondansetron (ZOFRAN) injection 4 mg  4 mg IntraVENous Q6H PRN  
 insulin lispro (HUMALOG) injection   SubCUTAneous Q6H  
 glucose chewable tablet 16 g  4 Tab Oral PRN  
 dextrose (D50W) injection syrg 12.5-25 g  12.5-25 g IntraVENous PRN  
 glucagon (GLUCAGEN) injection 1 mg  1 mg IntraMUSCular PRN  
 morphine injection 1 mg  1 mg IntraVENous Q6H PRN  
  
 
LABS: 
Recent Labs 11/13/20 
0326 11/12/20 0259 WBC 14.6* 13.8* HGB 7.1* 7.0*  
HCT 22.6* 22.1*  
 181 Recent Labs 11/13/20 
6615 11/12/20 0259 11/11/20 0239  135* 136  
K 5.0 4.7 4.9  104 105 CO2 25 21 24 BUN 69* 86* 86* CREA 4.31* 5.59* 5.08* * 146* 131* CA 8.6 8.4* 8.8 Recent Labs 11/13/20 
9019 11/12/20 0259 11/11/20 0239 * 165* 186* * 318* 299* TBILI 12.2* 10.6* 10.0* TP 5.3* 5.4* 5.6* ALB 2.1* 2.2* 2.4*  
GLOB 3.2 3.2 3.2 LPSE 208  --  326 Recent Labs 11/11/20 
1747 INR 1.5* PTP 15.2* Recent Labs 11/12/20 0259 TIBC 270 PSAT 8*  
FERR 201 No results for input(s): PH, PCO2, PO2 in the last 72 hours. No results for input(s): CPK, CKNDX, TROIQ in the last 72 hours. No lab exists for component: CPKMB Lab Results Component Value Date/Time  Glucose (POC) 149 (H) 11/13/2020 07:34 AM  
 Glucose (POC) 149 (H) 11/13/2020 06:24 AM  
 Glucose (POC) 140 (H) 11/12/2020 11:44 PM  
 Glucose (POC) 160 (H) 11/12/2020 09:06 PM  
 Glucose (POC) 151 (H) 11/12/2020 08:12 PM

## 2020-11-13 NOTE — PROGRESS NOTES
Received notification from bedside RN about patient with regards to: inadequate pain relief with Morphine, just got 1 mg 2 hours ago VS: /45, HR 77, RR 17, O2 99%, temp 98. Intervention given: Percocet 5/325 mg prn q 4 hours

## 2020-11-13 NOTE — PROGRESS NOTES
Problem: Falls - Risk of 
Goal: *Absence of Falls Description: Document Carlie Suzanne Fall Risk and appropriate interventions in the flowsheet. Outcome: Progressing Towards Goal 
Note: Fall Risk Interventions: 
Mobility Interventions: Bed/chair exit alarm, Communicate number of staff needed for ambulation/transfer Mentation Interventions: Adequate sleep, hydration, pain control Medication Interventions: Bed/chair exit alarm, Patient to call before getting OOB Elimination Interventions: Call light in reach, Bed/chair exit alarm History of Falls Interventions: Bed/chair exit alarm Problem: Pressure Injury - Risk of 
Goal: *Prevention of pressure injury Description: Document Shane Scale and appropriate interventions in the flowsheet. Outcome: Progressing Towards Goal 
Note: Pressure Injury Interventions: 
Sensory Interventions: Assess changes in LOC, Keep linens dry and wrinkle-free Moisture Interventions: Absorbent underpads, Apply protective barrier, creams and emollients Activity Interventions: Assess need for specialty bed, Increase time out of bed Mobility Interventions: HOB 30 degrees or less Nutrition Interventions: Document food/fluid/supplement intake Friction and Shear Interventions: Feet elevated on foot rest, HOB 30 degrees or less Summary of Care Report The Summary of Care report has been created to help improve care coordination. Users with access to [employer] or [address] (Web-based application) may access additional patient information including the Discharge Summary. If you are not currently a [address] user and need more information, please call the number listed below in the [place] [address] section and ask to be connected with Medical Records. Facility Information Name Address Phone [address] [facility] 7 [phone] [phone] Patient Information Patient Name Sex COSME Friend ([id number]) Female 1935 Referral Details Referred By Referred To Javid Crum MD  
 [facility] 7 [address] Phone:  [phone] Fax:  [phone] Order:  Referral To Physical Therapy Reason:  Specialty Services Required Discharge Information Admitting Provider Service Area Unit Javid Crum MD / [address] [facility] 6s [facility] / [phone] Discharge Provider Discharge Date/Time Discharge Disposition Destination (none) 2017 (Pending) AHR (none) Patient Language Language ENGLISH [13] Hospital Problems as of 2017  Reviewed: 2017  7:53 AM by Javid Crum MD  
  
  
  
 Class Noted - Resolved Last Modified POA Active Problems Seizure ([address] (Chronic)  3/28/2017 - Present 2017 by Cruzito Adams MD Yes Entered by Roberta Garcia MD  
  * (Principal)Acute cystitis  2017 - Present 2017 by Cruzito Adams MD Yes Entered by Cruzito Adams MD  
  
Non-Hospital Problems as of 2017  Reviewed: 2017  7:53 AM by Javid Crum MD  
  
  
  
 Class Noted - Resolved Last Modified Active Problems COPD (chronic obstructive pulmonary disease) ([facility] (Chronic)  3/28/2017 - Present 3/29/2017 by Flash Reeder MD  
  Entered by Valerie Sullivan MD  
  Acquired hypothyroidism (Chronic)  3/28/2017 - Present 3/29/2017 by Flash Reeder MD  
  Entered by Valerie Sullivan MD  
  Dementia (Chronic)  3/29/2017 - Present 3/29/2017 by Flash Reeder MD  
  Entered by Obinna Garcia NP  
  UTI (urinary tract infection)  3/29/2017 - Present 3/29/2017 by Flash Reeder MD  
  Entered by Flash Reeder MD  
  
You are allergic to the following Allergen Reactions Aricept (Donepezil) Unknown (comments) Benadryl (Diphenhydramine Hcl) Unknown (comments) Ciprofloxacin Unknown (comments) Codeine Unknown (comments) Macrobid (Nitrofurantoin Monohyd/M-Cryst) Unknown (comments) Pcn (Penicillins) Other (comments) Sulfa (Sulfonamide Antibiotics) Other (comments) Current Discharge Medication List  
  
START taking these medications Dose & Instructions Dispensing Information Comments  
 l.acidoph-B.lactis-B.longum 460 mg (7.5-6- 1.5 bill. cell) Cap cap Commonly known as:  DNTDEGMH9 Dose:  1 Cap Take 1 Cap by mouth Daily (before breakfast). Quantity:  7 Cap Refills:  0 CONTINUE these medications which have CHANGED Dose & Instructions Dispensing Information Comments * cefUROXime 250 mg tablet Commonly known as:  CEFTIN What changed:  Another medication with the same name was added. Make sure you understand how and when to take each. Dose:  250 mg Take 1 Tab by mouth two (2) times a day for 7 days. Quantity:  14 Tab Refills:  0  
   
 * cefUROXime 250 mg tablet Commonly known as:  CEFTIN What changed: You were already taking a medication with the same name, and this prescription was added. Make sure you understand how and when to take each. Dose:  250 mg Take 1 Tab by mouth two (2) times a day. Quantity:  10 Tab Refills:  0  
   
 * Notice: This list has 2 medication(s) that are the same as other medications prescribed for you. Read the directions carefully, and ask your doctor or other care provider to review them with you. CONTINUE these medications which have NOT CHANGED Dose & Instructions Dispensing Information Comments  
 albuterol 2.5 mg /3 mL (0.083 %) nebulizer solution Commonly known as:  PROVENTIL VENTOLIN Dose:  2.5 mg  
2.5 mg by Nebulization route two (2) times daily as needed. Refills:  0 AZO CRANBERRY PLUS VIT C 250-60 mg Cap Generic drug:  cranberry extract-vitamin C Dose:  2 Tab Take 2 Tabs by mouth daily. Refills:  0  
   
 budesonide 0.5 mg/2 mL Nbsp Commonly known as:  PULMICORT Dose:  500 mcg 500 mcg by Nebulization route two (2) times a day. Refills:  0  
   
 carBAMazepine 200 mg tablet Commonly known as:  TEGretol Dose:  400 mg Take 2 Tabs by mouth two (2) times a day. Quantity:  60 Tab Refills:  0 Mapap 500 mg Cap Generic drug:  acetaminophen Dose:  1 Tab Take 1 Tab by mouth daily. Refills:  0  
   
 SYNTHROID 100 mcg tablet Generic drug:  levothyroxine Dose:  100 mcg Take 100 mcg by mouth Daily (before breakfast). Refills:  0  
   
 traZODone 50 mg tablet Commonly known as:  Venecia John Dose:  25 mg Take 25 mg by mouth nightly as needed for Sleep. May repeat another 25 mg if no sleep in an hour Refills:  0 ZyrTEC 10 mg tablet Generic drug:  cetirizine Dose:  10 mg Take 10 mg by mouth daily. Refills:  0 Follow-up Information Follow up With Details Comments Contact Info Your regular doctor Schedule an appointment as soon as possible for a visit in 3 days [facility] EMERGENCY DEP  If symptoms worsen [address] [place] [address] 
[phone] Marykay Siemens, MD In 2 weeks neurology follow up [phone] Discharge Instructions Seizure: Care Instructions Your Care Instructions Seizures are caused by abnormal patterns of electrical signals in the brain. They are different for each person. Seizures can affect movement, speech, vision, or awareness. Some people have only slight shaking of a hand and do not pass out. Other people may pass out and have violent shaking of the whole body. Some people appear to stare into space. They are awake, but they can't respond normally. Later, they may not remember what happened. You may need tests to identify the type and cause of the seizures. A seizure may occur only once, or you may have them more than one time. Taking medicines as directed and following up with your doctor may help keep you from having more seizures. The doctor has checked you carefully, but problems can develop later. If you notice any problems or new symptoms, get medical treatment right away. Follow-up care is a key part of your treatment and safety. Be sure to make and go to all appointments, and call your doctor if you are having problems. It's also a good idea to know your test results and keep a list of the medicines you take. How can you care for yourself at home? · Be safe with medicines. Take your medicines exactly as prescribed. Call your doctor if you think you are having a problem with your medicine. · Do not do any activity that could be dangerous to you or others until your doctor says it is safe to do so. For example, do not drive a car, operate machinery, swim, or climb ladders. · Be sure that anyone treating you for any health problem knows that you have had a seizure and what medicines you are taking for it. · Identify and avoid things that may make you more likely to have a seizure. These may include lack of sleep, alcohol or drug use, stress, or not eating. · Make sure you go to your follow-up appointment. When should you call for help? Call 911 anytime you think you may need emergency care. For example, call if: 
· You have another seizure. · You have more than one seizure in 24 hours. · You have new symptoms, such as trouble walking, speaking, or thinking clearly. Call your doctor now or seek immediate medical care if: 
· You are not acting normally. Watch closely for changes in your health, and be sure to contact your doctor if you have any problems. Where can you learn more? Go to http://papa-juwan.info/. Enter F459 in the search box to learn more about \"Seizure: Care Instructions. \" Current as of: October 14, 2016 Content Version: 11.3 © 4523-1181 [employer]. Care instructions adapted under license by [employer] (which disclaims liability or warranty for this information). If you have questions about a medical condition or this instruction, always ask your healthcare professional. [place] [address] any warranty or liability for your use of this information. Urinary Tract Infection in Women: Care Instructions Your Care Instructions A urinary tract infection, or UTI, is a general term for an infection anywhere between the kidneys and the urethra (where urine comes out). Most UTIs are bladder infections. They often cause pain or burning when you urinate. UTIs are caused by bacteria and can be cured with antibiotics. Be sure to complete your treatment so that the infection goes away. Follow-up care is a key part of your treatment and safety. Be sure to make and go to all appointments, and call your doctor if you are having problems. It's also a good idea to know your test results and keep a list of the medicines you take. How can you care for yourself at home? · Take your antibiotics as directed. Do not stop taking them just because you feel better. You need to take the full course of antibiotics. · Drink extra water and other fluids for the next day or two. This may help wash out the bacteria that are causing the infection. (If you have kidney, heart, or liver disease and have to limit fluids, talk with your doctor before you increase your fluid intake.) · Avoid drinks that are carbonated or have caffeine. They can irritate the bladder. · Urinate often. Try to empty your bladder each time. · To relieve pain, take a hot bath or lay a heating pad set on low over your lower belly or genital area. Never go to sleep with a heating pad in place. To prevent UTIs · Drink plenty of water each day. This helps you urinate often, which clears bacteria from your system. (If you have kidney, heart, or liver disease and have to limit fluids, talk with your doctor before you increase your fluid intake.) · Urinate when you need to. · Urinate right after you have sex. · Change sanitary pads often. · Avoid douches, bubble baths, feminine hygiene sprays, and other feminine hygiene products that have deodorants. · After going to the bathroom, wipe from front to back. When should you call for help? Call your doctor now or seek immediate medical care if: · Symptoms such as fever, chills, nausea, or vomiting get worse or appear for the first time. · You have new pain in your back just below your rib cage. This is called flank pain. · There is new blood or pus in your urine. · You have any problems with your antibiotic medicine. Watch closely for changes in your health, and be sure to contact your doctor if: 
· You are not getting better after taking an antibiotic for 2 days. · Your symptoms go away but then come back. Where can you learn more? Go to http://papa-juwan.info/. Enter S198 in the search box to learn more about \"Urinary Tract Infection in Women: Care Instructions. \" Current as of: November 28, 2016 Content Version: 11.3 © 2398-8017 Healthwise, [employer]. Care instructions adapted under license by [employer] (which disclaims liability or warranty for this information). If you have questions about a medical condition or this instruction, always ask your healthcare professional. [address] any warranty or liability for your use of this information. Discharge Instructions PATIENT ID: Robert Lopez MRN: [id number] YOB: 1935 DATE OF ADMISSION: 8/28/2017  9:11 AM   
DATE OF DISCHARGE: 8/30/2017 PRIMARY CARE PROVIDER: Shayne Ryan MD  
 
ATTENDING PHYSICIAN: Ana Sutherland MD 
DISCHARGING PROVIDER: Ana Sutherland MD   
To contact this individual call [phone] and ask the  to page. If unavailable ask to be transferred the Adult Hospitalist Department. DISCHARGE DIAGNOSES Breakthrough seizure UTI 
 
CONSULTATIONS: ED CONSULT TO SENIOR SERVICES PHYSICAL THERAPY 
ED CONSULT TO SENIOR SERVICES CASE MANAGEMENT 
IP CONSULT TO NEUROLOGY 
IP CONSULT TO HOSPITALIST 
 
PROCEDURES/SURGERIES: * No surgery found * PENDING TEST RESULTS:  
At the time of discharge the following test results are still pending: none FOLLOW UP APPOINTMENTS:  
Follow-up Information Follow up With Details Comments Contact Info Your regular doctor Schedule an appointment as soon as possible for a visit in 3 days [facility] EMERGENCY DEP  If symptoms worsen [address] [address] [phone] Shayne Ryan MD In 1 week  Patient can only remember the practice name and not the physician ADDITIONAL CARE RECOMMENDATIONS:  
Follow up with PMD in 1 week DIET: Cardiac Diet ACTIVITY: Activity as tolerated DISCHARGE MEDICATIONS: 
 See Medication Reconciliation Form · It is important that you take the medication exactly as they are prescribed.   
· Keep your medication in the bottles provided by the pharmacist and keep a list of the medication names, dosages, and times to be taken in your wallet. · Do not take other medications without consulting your doctor. NOTIFY YOUR PHYSICIAN FOR ANY OF THE FOLLOWING:  
Fever over 101 degrees for 24 hours. Chest pain, shortness of breath, fever, chills, nausea, vomiting, diarrhea, change in mentation, falling, weakness, bleeding. Severe pain or pain not relieved by medications. Or, any other signs or symptoms that you may have questions about. DISPOSITION: 
  Home With: 
 OT  PT  HH  RN  
  
x SNF/Inpatient Rehab/LTAC Independent/assisted living Hospice Other: CDMP Checked:  
Yes x PROBLEM LIST Updated: 
Yes x Signed:  
Giovany Squires MD 
8/30/2017 
2:22 PM 
 
 
Chart Review Routing History No Routing History on File

## 2020-11-13 NOTE — ROUTINE PROCESS
2007: 
TRANSFER - IN REPORT: 
 
Verbal report received from Elgin, RN (name) on Olga Loss  being received from PACU (unit) for routine progression of care Report consisted of patients Situation, Background, Assessment and  
Recommendations(SBAR). Information from the following report(s) SBAR, Kardex, Procedure Summary, Intake/Output, MAR, Recent Results, Med Rec Status, Alarm Parameters  and Quality Measures was reviewed with the receiving nurse. Opportunity for questions and clarification was provided. Assessment completed upon patients arrival to unit and care assumed. 2043 - 2100 Patient arrived on PCU unit floor with stretcher, transferred ioto unit bed, and no sign of distress. Primary Nurse Yariel Vogt and RODGER Carbone performed a dual skin assessment on this patient Impairment noted- see wound doc flow sheet Shane score is 18 Admission assessment completed and patient undergoing dialysis. Patient blood was cleaned but unable to remove fluid due to patient BP according to dialysis nurse, Gloria Bentley RN. No sign of patient distress and continue to monitor. 0016: 
PRN morphine given for pain. Bedside and verbal shift change report given to Cecille Collins RN (oncoming nurse) by Nitin West RN (offgoing nurse). Report included the following information SBAR, Kardex, Intake/Output, MAR, Recent Results, Med Rec Status, Alarm Parameters  and Quality Measures.

## 2020-11-13 NOTE — PROGRESS NOTES
Bedside and Verbal shift change report given to [address] (oncoming nurse) by Taylor Damon (offgoing nurse). Report included the following information SBAR, Kardex, Recent Results and Quality Measures.

## 2020-11-13 NOTE — PROGRESS NOTES
I spoke to Dr Erick Shields He suspects chronic liver disaese (?nafld) with decompensation due to sepsis, uti, volume and possibly right heart failure Will do labs as ordered Cancel n acetyl cystein after one day if acetaminophen level is ok Rec:  Volume support 
rx infection Follow labs Iv vitamin K 
echocardiogram 
Sima Ward MD 
4:05 PM 
11/13/2020

## 2020-11-14 NOTE — PROGRESS NOTES
GI Progress Note ([place] for Marbin Patel) NAME:Lea Rust :1935 [place] ATTG: Dr. Marquis Espitia PCP: Jordan Henry MD 
Date/Time:  2020 2:16 PM  
 
Primary GI: Dr. Marbin Patel Reason for following: elevated LFTs Assessment:  
· Elevated LFTs, most concerning for hyperbilirubinemia. Concern for underlying chronic liver disease. Work up pending. · ERCP with biliary sphincterotomy and CBC sludge removal , as pneumobilia on initial imaging. · Leukocytosis/sepsis  E.coli UTI, bioprosthetic MV, afib s/p ablation, s/p watchman, CAD s/p stent, CKD IV 
· Hypoalbuminemia (2.2) Plan: · Await pending serologies, most still not resulted · Continue IV vit K 
· Plan for TTE next week likely · Okay to stop NAC Subjective:  
Discussed with RN events overnight. Daughter at bedside. Ms. Miladys Echevarria is very weak after HD, cant' stay awake. Remains jaundiced, Tbili similarly high at 13.9 Family meeting about goals of care tomorrow. Acetaminophen level undetectable. Hep A,B, C negative (acute hep panel) MARIA DOLORES, ASMA, A1T, AMA, SPEP, ceruloplasmin, alk phos isoenzymes pending, Review of Systems: 
Symptom Y/N Comments  Symptom Y/N Comments Fever/Chills n   Chest Pain n   
Cough n   Headaches n Sputum n   Joint Pain n   
SOB/LEIJA n   Pruritis/Rash n Tolerating Diet y Only a few bites  Other Could NOT obtain due to:   
 
Objective: VITALS:  
Last 24hrs VS reviewed since prior progress note. Most recent are: 
Visit Vitals BP (!) 106/32 Pulse 78 Temp 97.4 °F (36.3 °C) Resp 19 Ht 5' 9\" (1.753 m) Wt 89.7 kg (197 lb 11.2 oz) SpO2 98% BMI 29.20 kg/m² Intake/Output Summary (Last 24 hours) at 2020 1416 Last data filed at 2020 1130 Gross per 24 hour Intake  Output 2350 ml Net -2350 ml PHYSICAL EXAM: 
General: WD, WN. Alert, cooperative, bu very sleepy, no acute distress   
HEENT: NC, Atraumatic. Scleral icterus. Lungs:  CTA Bilaterally. No Wheezing/Rhonchi/Rales. Heart:  Regular  rhythm,  No murmur (), No Rubs, No Gallops Abdomen: Softly distended, Non tender.  +Bowel sounds, no HSM Extremities: LE edema, 3+, anasarca Neurologic:  Alert and oriented X 3. No acute neurological distress Psych:   Fair insight. Not anxious nor agitated. Lab and Radiology Data Reviewed: (see below) Medications Reviewed: (see below) PMH/SH reviewed - no change compared to H&P 
________________________________________________________________________ Total time spent with patient: 15 minutes ________________________________________________________________________ Care Plan discussed with: 
Patient x Family  x  
RN x Consultant:    
 
Glenn Astorga MD  
 
Procedures: see electronic medical records for all procedures/Xrays and details which were not copied into this note but were reviewed prior to creation of Plan. LABS: 
Recent Labs 11/14/20 0153 11/13/20 
6739 WBC 13.2* 14.6* HGB 7.2* 7.1*  
HCT 23.2* 22.6*  
 195 Recent Labs 11/14/20 0153 11/13/20 0326 11/12/20 0259  138 135* K 4.4 5.0 4.7  104 104 CO2 31 25 21 BUN 51* 69* 86* CREA 3.62* 4.31* 5.59* * 142* 146* CA 9.3 8.6 8.4* MG 2.3  --   --   
PHOS 3.6  --   --   
 
Recent Labs 11/14/20 0153 11/13/20 0326 11/12/20 
0259 * 328* 318* TP 5.5* 5.3* 5.4* ALB 2.2* 2.1* 2.2*  
GLOB 3.3 3.2 3.2 LPSE 158 208  --   
 
Recent Labs 11/14/20 
0153 11/11/20 
1747 INR 1.5* 1.5* PTP 15.6* 15.2* Recent Labs 11/12/20 
0259 TIBC 270 PSAT 8*  
FERR 201 Lab Results Component Value Date/Time Folate 72.3 (H) 01/22/2017 03:13 AM  
 
No results for input(s): PH, PCO2, PO2 in the last 72 hours. No results for input(s): CPK, CKMB in the last 72 hours. No lab exists for component: TROPONINI Lab Results Component Value Date/Time  Color ORANGE 11/09/2020 02:22 PM  
 Appearance TURBID (A) 11/09/2020 02:22 PM  
 Specific gravity 1.016 11/09/2020 02:22 PM  
 Specific gravity 1.020 07/06/2010 01:10 PM  
 pH (UA) 5.5 11/09/2020 02:22 PM  
 Protein 100 (A) 11/09/2020 02:22 PM  
 Glucose Negative 11/09/2020 02:22 PM  
 Ketone TRACE (A) 11/09/2020 02:22 PM  
 Bilirubin Negative 10/10/2020 10:51 PM  
 Urobilinogen 1.0 11/09/2020 02:22 PM  
 Nitrites Positive (A) 11/09/2020 02:22 PM  
 Leukocyte Esterase LARGE (A) 11/09/2020 02:22 PM  
 Epithelial cells FEW 11/09/2020 02:22 PM  
 Bacteria 3+ (A) 11/09/2020 02:22 PM  
 WBC >100 (H) 11/09/2020 02:22 PM  
 RBC  11/09/2020 02:22 PM  
 
 
MEDICATIONS: 
Current Facility-Administered Medications Medication Dose Route Frequency  acetylcysteine (MUCOMYST) 200 mg/mL (20 %) solution 6,462 mg  70 mg/kg Oral Q4H  prochlorperazine (COMPAZINE) with saline injection 5 mg  5 mg IntraVENous Q6H PRN  
 epoetin hali-epbx (RETACRIT) injection 10,000 Units  10,000 Units SubCUTAneous Q MON, WED & FRI  phytonadione (vitamin K1) (AQUA-MEPHYTON) 10 mg in 0.9% sodium chloride 50 mL IVPB  10 mg IntraVENous DAILY  clopidogreL (PLAVIX) tablet 75 mg  75 mg Oral DAILY  insulin lispro (HUMALOG) injection   SubCUTAneous AC&HS  cefTRIAXone (ROCEPHIN) 1 g in 0.9% sodium chloride (MBP/ADV) 50 mL MBP  1 g IntraVENous Q24H  
 sodium chloride (NS) flush 5-40 mL  5-40 mL IntraVENous Q8H  
 sodium chloride (NS) flush 5-40 mL  5-40 mL IntraVENous PRN  
 simethicone (MYLICON) 26KD/1.7VM oral drops 80 mg  1.2 mL Oral Multiple  sodium chloride (NS) flush 5-40 mL  5-40 mL IntraVENous Q8H  
 sodium chloride (NS) flush 5-40 mL  5-40 mL IntraVENous PRN  
 0.9% sodium chloride infusion 25 mL  25 mL IntraVENous PRN  
 amiodarone (CORDARONE) tablet 100 mg  100 mg Oral DAILY  insulin NPH (NOVOLIN N, HUMULIN N) injection 3 Units  3 Units SubCUTAneous QPM  
 insulin NPH (NOVOLIN N, HUMULIN N) injection 5 Units  5 Units SubCUTAneous QAM  
  levothyroxine (SYNTHROID) tablet 125 mcg  125 mcg Oral 6am  
 pantoprazole (PROTONIX) 40 mg in 0.9% sodium chloride 10 mL injection  40 mg IntraVENous DAILY  sodium chloride (NS) flush 5-40 mL  5-40 mL IntraVENous Q8H  
 sodium chloride (NS) flush 5-40 mL  5-40 mL IntraVENous PRN  
 ondansetron (ZOFRAN) injection 4 mg  4 mg IntraVENous Q6H PRN  
 glucose chewable tablet 16 g  4 Tab Oral PRN  
 dextrose (D50W) injection syrg 12.5-25 g  12.5-25 g IntraVENous PRN  
 glucagon (GLUCAGEN) injection 1 mg  1 mg IntraMUSCular PRN  
 morphine injection 1 mg  1 mg IntraVENous Q6H PRN

## 2020-11-14 NOTE — PROGRESS NOTES
[facility] Progress Note NAME: Ulisses Ramirez :  1935 MRN:  [id number] Date/Time: 2020 Risk of deterioration: medium Assessment:    Plan: JULIANA on CKD stage 4 Pneumobilia in CBD/Transaminitis-S/P ERCP/spincterotomy HTN Ecoli uti on antibiotics Bradycardia Anemia S/p bioprosthetic MV Afib s/p recent ablation S/p watchman H/o cad/stent Initiated HD on , HD #3 today Note-daughter had been thinking about hospice given her overall decline Retacrit/transfuse prn Will see again on Monday, but please call with questions or changes in the meantime. Subjective: Chief Complaint:  Resting. The patient was seen on dialysis at 9:21 AM .  BP is stable. Catheter is functioning well. Review of Systems: 
 
Objective: VITALS:  
Last 24hrs VS reviewed since prior progress note. Most recent are: 
Visit Vitals BP (!) 113/41 Pulse 74 Temp 97.4 °F (36.3 °C) Resp 18 Ht 5' 9\" (1.753 m) Wt 89.7 kg (197 lb 11.2 oz) SpO2 95% BMI 29.20 kg/m² SpO2 Readings from Last 6 Encounters:  
20 95% 20 98% 20 98% 20 98% 10/30/20 98% 10/28/20 95% O2 Flow Rate (L/min): 1.5 l/min Intake/Output Summary (Last 24 hours) at 2020 3182 Last data filed at 2020 4394 Gross per 24 hour Intake  Output 1850 ml Net -1850 ml Telemetry Reviewed PHYSICAL EXAM: 
 
General   well developed, icteric, ill appearing EENT  Normocephalic, Atraumatic Respiratory   Clear To Auscultation bilaterally Cardiology  Regular Rate and Rythmn  Sternal scar Abdominal  Soft, non-tender, non-distended, positive bowel sounds, no hepatosplenomegaly Extremities  No clubbing, cyanosis, (+) chronic edema Pulses intact. Lab Data Reviewed: (see below) Medications Reviewed: (see below) PMH/SH reviewed - no change compared to H&P 
___________________________________________________ ___________________________________________________ Attending Physician: Gato Elizalde MD  
 
____________________________________________________ MEDICATIONS: 
Current Facility-Administered Medications Medication Dose Route Frequency  acetylcysteine (MUCOMYST) 200 mg/mL (20 %) solution 6,462 mg  70 mg/kg Oral Q4H  
 epoetin hali-epbx (RETACRIT) injection 10,000 Units  10,000 Units SubCUTAneous Q MON, WED & FRI  phytonadione (vitamin K1) (AQUA-MEPHYTON) 10 mg in 0.9% sodium chloride 50 mL IVPB  10 mg IntraVENous DAILY  clopidogreL (PLAVIX) tablet 75 mg  75 mg Oral DAILY  insulin lispro (HUMALOG) injection   SubCUTAneous AC&HS  cefTRIAXone (ROCEPHIN) 1 g in 0.9% sodium chloride (MBP/ADV) 50 mL MBP  1 g IntraVENous Q24H  
 sodium chloride (NS) flush 5-40 mL  5-40 mL IntraVENous Q8H  
 sodium chloride (NS) flush 5-40 mL  5-40 mL IntraVENous PRN  
 simethicone (MYLICON) 10WF/1.8XY oral drops 80 mg  1.2 mL Oral Multiple  sodium chloride (NS) flush 5-40 mL  5-40 mL IntraVENous Q8H  
 sodium chloride (NS) flush 5-40 mL  5-40 mL IntraVENous PRN  
 0.9% sodium chloride infusion 25 mL  25 mL IntraVENous PRN  
 amiodarone (CORDARONE) tablet 100 mg  100 mg Oral DAILY  insulin NPH (NOVOLIN N, HUMULIN N) injection 3 Units  3 Units SubCUTAneous QPM  
 insulin NPH (NOVOLIN N, HUMULIN N) injection 5 Units  5 Units SubCUTAneous QAM  
 levothyroxine (SYNTHROID) tablet 125 mcg  125 mcg Oral 6am  
 pantoprazole (PROTONIX) 40 mg in 0.9% sodium chloride 10 mL injection  40 mg IntraVENous DAILY  sodium chloride (NS) flush 5-40 mL  5-40 mL IntraVENous Q8H  
 sodium chloride (NS) flush 5-40 mL  5-40 mL IntraVENous PRN  
 0.9% sodium chloride infusion  75 mL/hr IntraVENous CONTINUOUS  
 ondansetron (ZOFRAN) injection 4 mg  4 mg IntraVENous Q6H PRN  
 glucose chewable tablet 16 g  4 Tab Oral PRN  
 dextrose (D50W) injection syrg 12.5-25 g  12.5-25 g IntraVENous PRN  
  glucagon (GLUCAGEN) injection 1 mg  1 mg IntraMUSCular PRN  
 morphine injection 1 mg  1 mg IntraVENous Q6H PRN  
  
 
LABS: 
Recent Labs 11/14/20 0153 11/13/20 
2506 WBC 13.2* 14.6* HGB 7.2* 7.1*  
HCT 23.2* 22.6*  
 195 Recent Labs 11/14/20 0153 11/13/20 0326 11/12/20 0259  138 135* K 4.4 5.0 4.7  104 104 CO2 31 25 21 BUN 51* 69* 86* CREA 3.62* 4.31* 5.59* * 142* 146* CA 9.3 8.6 8.4* MG 2.3  --   --   
PHOS 3.6  --   --   
 
Recent Labs 11/14/20 0153 11/13/20 0326 11/12/20 0259 * 154* 165* * 328* 318* TBILI 13.9* 12.2* 10.6* TP 5.5* 5.3* 5.4* ALB 2.2* 2.1* 2.2*  
GLOB 3.3 3.2 3.2 LPSE 158 208  --   
 
Recent Labs 11/14/20 0153 11/11/20 
1747 INR 1.5* 1.5* PTP 15.6* 15.2* Recent Labs 11/12/20 0259 TIBC 270 PSAT 8*  
FERR 201 No results for input(s): PH, PCO2, PO2 in the last 72 hours. No results for input(s): CPK, CKNDX, TROIQ in the last 72 hours. No lab exists for component: CPKMB Lab Results Component Value Date/Time  Glucose (POC) 125 (H) 11/13/2020 09:16 PM  
 Glucose (POC) 148 (H) 11/13/2020 05:09 PM  
 Glucose (POC) 149 (H) 11/13/2020 07:34 AM  
 Glucose (POC) 149 (H) 11/13/2020 06:24 AM  
 Glucose (POC) 140 (H) 11/12/2020 11:44 PM

## 2020-11-14 NOTE — PROGRESS NOTES
0730 .Bedside and Verbal shift change report given to Oscar (oncoming nurse) by Bang Buenrostro (offgoing nurse). Report included the following information SBAR, Kardex and MAR. 
 
2409 Patient leaving floor for dialysis. 1900. Bedside and Verbal shift change report given to Bang Buenrostro (oncoming nurse) by Oscar (offgoing nurse). Report included the following information SBAR, Kardex and MAR.

## 2020-11-14 NOTE — PROGRESS NOTES
I reviewed pertinent labs and imaging, and discussed /agreed on the plan of care with Dr. Mohit Perez. Hospitalist Progress Note NAME: Anil Velasco :  1935 MRN:  [id number] Assessment / Plan: 
Severe Sepsis secondary to UTI WBC 12.9, HR 11 on admission Abnormal LFTs with Elevated Total Bilirubin Total bili 8.8, , , Lipase 505 Jaundice Pneumobilia on US 
· MRI Abd MRCP  results - Pneumobilia within the mildly dilated common bile duct. No visualized choledocholithiasis. Peripancreatic edema suggests acute pancreatitis. Splenomegaly. Small right kidney with small right renal cysts. Small right pleural effusion. · US of Abd  results - Echogenic debris vs. Pneumobilia in the common bile duct. No evidence of intrahepatic biliary dilatation. Status post cholecystectomy. Unchanged 1 cm echogenic hepatic mass, compatible with hemangioma. Small, echogenic right kidney, compatible with chronic medical renal disease. · Follow blood cultures - NO GROWTH in 5 days  
· Follow urine cultures POSITIVE for E COLI - Final  
· Continue IV ceftriaxone - Day 5 · Appreciate GI input - worsening liver function may be related chronic liver disease in setting of  UTI/Sepsis/Renal Failure/Volume Overload · Patient has said she would not want a liver biopsy · Total bili continues to worsen - total bili 13.9 · ALT 150, AST 225, alk phos 362 and lipase 150  
· Ammonia level <10  
· So far Hepatitis studies NEGATIVE  
  
Acute Kidney Injury in Setting of CKD stage 4 Baseline Cr 1.7 Fluid Volume Overload r/t Renal Failure · Cr 3.62 today · Uremic symptoms with worsening Cr - remains \"fuzzy\" and now with Nausea · Continue zofran, add phenergan · Now on RA · Appreciate Nephrology input · Received HD today · Follow BMP   
· Hold bumex and allopurinol · Avoid nephrotoxic medications  
  
Weakness and Debility Failure to Thrive · Consult PT/OT - plan for [facility]  
 · Family is pursuing [facility] vs Hospice - have spoken to [employer] [facility] · Family very realistic that patient has had gradual decline over past few months - they are not ready for hospice yet but feel they may been soon.   
· Plan for family meeting Sunday at 11 am 
  
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 Severe concentric hypertrophy. Mitral valve thickening and is prosthetic. · Atrial Fibrillation is rate controlled · Continue amiodarone · Hold bumex  
· Resume plavix as patient would not want anymore invasive testing  
  
Insulin Dependant Type I Diabetes · Continue NPH  
  
Gout Hold allopurinol with JULIANA Hypothyroidism Continue levothyroxine GERD Continue protonix  
Dementia  
  
25.0 - 29.9 Overweight / Body mass index is 26.58 kg/m². 
  
Code status: Full Prophylaxis: SCD's Recommended Disposition:  PT, OT, RN Subjective: Chief Complaint / Reason for Physician Visit Discussed with RN events overnight. [address] conversation held with daughter Renae Hoffman at the bedside. Ortega Kingston (son) joined via phone [facility]DC through conversation. Patient currently in HD. Continuation of conversation held last night (20 mins) regarding prognosis and discussing lab results. Both children with to support their mother - awaiting to make decisions regarding next step in care. Aggressive treatment vs Hospice/comfort. Both Ortega Kingston and Renae Hoffman are leaning towards focus on comfort if that is what their mother wishes. 1300 - Saw patient at bedside with daughter present. Patient is lethargic and c/o nausea. Her jaundice continues to progress.  Son will arrive in town later [address] [facility] on family meeting tomorrow at 6 am. Will address goals of care and code status with all present. Review of Systems: 
Symptom Y/N Comments  Symptom Y/N Comments Fever/Chills n   Chest Pain b Poor Appetite n   Edema n   
Cough n   Abdominal Pain n   
Sputum n   Joint Pain n   
SOB/LEIJA n   Pruritis/Rash n   
Nausea/vomit y   Tolerating PT/OT Diarrhea n   Tolerating Diet y Constipation n   Other Could NOT obtain due to:   
 
Objective: VITALS:  
Last 24hrs VS reviewed since prior progress note. Most recent are: 
Patient Vitals for the past 24 hrs: 
 Temp Pulse Resp BP SpO2  
11/14/20 1015  79 18 (!) 105/43   
11/14/20 1000  79 18 (!) 102/45   
11/14/20 0945  79 16 (!) 98/41   
11/14/20 0930  78 16 (!) 104/41   
11/14/20 0915  75 16 (!) 103/43   
11/14/20 0900  77 16 (!) 101/44   
11/14/20 0845  77 16 (!) 107/43   
11/14/20 0830  76 16 (!) 111/46   
11/14/20 0823 97.6 °F (36.4 °C) 78 18 (!) 115/47   
11/14/20 0735 97.6 °F (36.4 °C) 77 20 (!) 113/40 91 % 11/14/20 0600  74 18 (!) 113/41 95 % 11/14/20 0234 97.4 °F (36.3 °C) (!) 54 23 (!) 102/39 98 % 11/13/20 2234 97.8 °F (36.6 °C) 72 20 (!) 111/40 98 % 11/13/20 1900 98.2 °F (36.8 °C) 77 19 (!) 116/41 100 % 11/13/20 1314  75 16 (!) 108/48   
11/13/20 1300  76 16 (!) 114/46   
11/13/20 1245  78 16 (!) 114/48   
11/13/20 1230  78 16 (!) 110/51   
11/13/20 1215  78 16 (!) 118/50   
11/13/20 1200  79 16 (!) 113/53   
11/13/20 1145  78 16 (!) 121/55   
11/13/20 1130  76 16 (!) 118/48   
11/13/20 1115  (!) 45 16 (!) 108/45   
11/13/20 1100  72 16 (!) 113/48   
11/13/20 1042 97.4 °F (36.3 °C) 78 16 (!) 114/55  Intake/Output Summary (Last 24 hours) at 11/14/2020 1035 Last data filed at 11/14/2020 9829 Gross per 24 hour Intake  Output 1850 ml Net -1850 ml PHYSICAL EXAM: 
General: WD, WN. Lethargic and ill appearing elderly female, very jaundiced EENT:  EOMI. icteric sclerae. MMM Resp: CTA bilaterally, no wheezing or rales. No accessory muscle use, now on RA 
CV:  Controlled a fib,  +1 BUE pitting edema, trace BLE edema GI:  Soft, Obese, Non tender.  +Bowel sounds, nauseous Neurologic:  Alert and oriented X 3, normal speech, Psych:   Good insight. lethargic Skin:  No rashes. Jaundice ++ Reviewed most current lab test results and cultures  YES Reviewed most current radiology test results   YES Review and summation of old records today    NO Reviewed patient's current orders and MAR    YES 
PMH/SH reviewed - no change compared to H&P 
________________________________________________________________________ Care Plan discussed with: 
  Comments Patient x Family  x   
RN x Care Manager Consultant Multidiciplinary team rounds were held today with , nursing, pharmacist and clinical coordinator. Patient's plan of care was discussed; medications were reviewed and discharge planning was addressed. ________________________________________________________________________ Total NON critical care TIME:  25   Minutes Total CRITICAL CARE TIME Spent:   Minutes non procedure based Comments >50% of visit spent in counseling and coordination of care x   
________________________________________________________________________ Leonard Pearce NP Procedures: see electronic medical records for all procedures/Xrays and details which were not copied into this note but were reviewed prior to creation of Plan. LABS: 
I reviewed today's most current labs and imaging studies. Pertinent labs include: 
Recent Labs 11/14/20 
0153 11/13/20 
0326 11/12/20 
0259 WBC 13.2* 14.6* 13.8* HGB 7.2* 7.1* 7.0*  
HCT 23.2* 22.6* 22.1*  
 195 181 Recent Labs 11/14/20 
0153 11/13/20 
0326 11/12/20 
0259 11/11/20 
1747  138 135*  --   
K 4.4 5.0 4.7  --   
 104 104  --   
CO2 31 25 21  --   
 * 142* 146*  --   
BUN 51* 69* 86*  --   
CREA 3.62* 4.31* 5.59*  --   
CA 9.3 8.6 8.4*  --   
MG 2.3  --   --   --   
PHOS 3.6  --   --   --   
ALB 2.2* 2.1* 2.2*  --   
TBILI 13.9* 12.2* 10.6*  --   
* 154* 165*  --   
INR 1.5*  --   --  1.5* Signed: Fernanda Saeed, NP

## 2020-11-14 NOTE — PROGRESS NOTES
Bedside shift change report given to Dorian Sheffield (oncoming nurse) by Lavon Ignacio (offgoing nurse). Report included the following information SBAR, Kardex, MAR, Recent Results and Cardiac Rhythm NSR 1Degree AVB.

## 2020-11-14 NOTE — PROCEDURES
[facility] Dialysis Team [facility]  [phone] Vitals   Pre   Post   Assessment   Pre   Post    
Temp  Temp: 97.6 °F (36.4 °C) (11/14/20 0823)  97.6 LOC  AxOx2-3 AxOx2-3 HR   Pulse (Heart Rate): 78 (11/14/20 0823) 77 Lungs   diminished diminished B/P   BP: (!) 115/47 (11/14/20 0823) 110/45 Cardiac   RRR RRR Resp   Resp Rate: 18 (11/14/20 0823) 18 Skin   Intact/jaundiced Intact/jaundiced Pain level  Pain Intensity 1: 0 (11/14/20 0735) 0 Edema  generalized 
 generalized Orders: Duration:   Start:   0823 End:   1125 Total:   3 hrs Dialyzer:   Dialyzer/Set Up Inspection: Jess Cordero (11/14/20 5049) K Bath:   Dialysate K (mEq/L): 3 (11/14/20 0823) Ca Bath:   Dialysate CA (mEq/L): 2.5 (11/14/20 0823) Na/Bicarb:   Dialysate NA (mEq/L): 138 (11/14/20 0823) Target Fluid Removal:   Goal/Amount of Fluid to Remove (mL): 2000 mL (11/14/20 0823) Access Type & Location:   Right non-tunneled CVC dated 11/14/20. No redness, drainage, or warmth noted. Each catheter limb disinfected for 60 seconds per limb with alcohol swabs. Caps removed, dialysis CVC hub scrubbed with Prevantics for 15 seconds, followed by a 5 second dry time per Hospital P&P.  
+aspirated/+flushed Labs Obtained/Reviewed Critical Results Called   Date when labs were drawn- 
Hgb-   
HGB Date Value Ref Range Status 11/14/2020 7.2 (L) 11.5 - 16.0 g/dL Final  
 
K-   
Potassium Date Value Ref Range Status 11/14/2020 4.4 3.5 - 5.1 mmol/L Final  
 
Ca-  
Calcium Date Value Ref Range Status 11/14/2020 9.3 8.5 - 10.1 MG/DL Final  
 
Bun-  
BUN Date Value Ref Range Status 11/14/2020 51 (H) 6 - 20 MG/DL Final  
 
Creat-  
Creatinine Date Value Ref Range Status 11/14/2020 3.62 (H) 0.55 - 1.02 MG/DL Final  
 
  
Medications/ Blood Products Given Name   Dose   Route and Time    
none Blood Volume Processed (BVP):    51.0 L Net Fluid Removed:  2000 mL Comments Time Out Done: 1112 Primary Nurse Rpt Pre: Anabelle Toscano RN 
Primary Nurse Rpt Post: Jorge Francois RN 
Pt Education: infection control / procedural 
Care Plan: continue current HD plan of care Tx Summary: 
[id number] HD treatment initiated per physician order. 0930 Dr Addie Valencia at bedside. 1125 HD treatment completed per physician order. All possible blood returned to patient. Each catheter limb disinfected for 60 seconds per limb with alcohol swabs. Dialysis CVC hub scrubbed with Prevantics for 5 seconds, followed by a 5 second dry time per Hospital P&P.  
+flushed/+heplock/+capped. Admiting Diagnosis: Cholangitis Pt's previous clinic- TBD Consent signed - Informed Consent Verified: Yes (11/14/20 6149) Elva Consent - verified Hepatitis Status- 11/12/20 neg/susc Machine #- Machine Number: [id number] (11/14/20 8631) Telemetry status- remote Pre-dialysis wt. -

## 2020-11-15 PROBLEM — K72.90 LIVER FAILURE (HCC): Status: ACTIVE | Noted: 2020-01-01

## 2020-11-15 PROBLEM — N18.9 CHRONIC KIDNEY DISEASE: Status: ACTIVE | Noted: 2020-01-01

## 2020-11-15 PROBLEM — N19 RENAL FAILURE: Status: ACTIVE | Noted: 2020-01-01

## 2020-11-15 NOTE — PROGRESS NOTES
Bedside shift change report given to [address] [place] [address] (oncoming nurse) by Negrito Robert RN (offgoing nurse). Report included the following information SBAR, Kardex, MAR, Recent Results and Cardiac Rhythm NSR,1degree AVB.

## 2020-11-15 NOTE — PROGRESS NOTES
I reviewed pertinent labs and imaging, and discussed /agreed on the plan of care with Dr. Gwen Will. Hospitalist Progress Note NAME: Karma Byers :  1935 MRN:  [id number] Assessment / Plan: 
Severe Sepsis secondary to UTI WBC 12.9, HR 11 on admission Abnormal LFTs with Elevated Total Bilirubin Total bili 8.8, , , Lipase 505 Jaundice Pneumobilia on US 
· MRI Abd MRCP  results - Pneumobilia within the mildly dilated common bile duct. No visualized choledocholithiasis. Peripancreatic edema suggests acute pancreatitis. Splenomegaly. Small right kidney with small right renal cysts. Small right pleural effusion. · US of Abd  results - Echogenic debris vs. Pneumobilia in the common bile duct. No evidence of intrahepatic biliary dilatation. Status post cholecystectomy. Unchanged 1 cm echogenic hepatic mass, compatible with hemangioma. Small, echogenic right kidney, compatible with chronic medical renal disease. · Follow blood cultures - NO GROWTH in 5 days  
· Follow urine cultures POSITIVE for E COLI - Final  
· Continue IV ceftriaxone - Day 7 - to be completed after today's dose · Appreciate GI input · Patient has said she would not want a liver biopsy · Total bili continues to worsen - total bili 15.6 · SXZ 594, WMQ 662, alk phos 402 and lipase 170  
· Ammonia level <10  
· So far Hepatitis studies NEGATIVE · Awaiting further liver studies - pending ·   
  Acute Kidney Injury in Setting of CKD stage 4 Baseline Cr 1.7 Fluid Volume Overload r/t Renal Failure Anasarca · Cr 3.26 today · C/o nausea · Continue zofran, add phenergan · Appreciate Nephrology input · Received HD yesterday - none today · Albumin 2.2, now with anasarca · Give dose IV albumin · Follow BMP   
· Hold bumex and allopurinol · Avoid nephrotoxic medications  
  
Weakness and Debility Failure to Thrive · Family is pursuing [facility] vs Hospice - have spoken to [employer] independently · Family meeting today with Steve Tan and Yana Morrison at the bedside. Discussed code status and hospice. Patient decided to become a DNR, undecided about hospice currently and will talk with each other. Yana Morrison and Sai supportive of hospice. · Add nutritional supplements · C/o difficulty swallowing - consult speech 
  
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 Severe concentric hypertrophy. Mitral valve thickening and is prosthetic. · Atrial Fibrillation is rate controlled · Continue amiodarone · Hold bumex  
· Resume plavix as patient would not want anymore invasive testing  
  
Insulin Dependant Type I Diabetes · Continue NPH  
  
Gout Hold allopurinol with JULIANA Hypothyroidism Continue levothyroxine GERD Continue protonix  
Dementia  
  
25.0 - 29.9 Overweight / Body mass index is 26.58 kg/m². 
  
Code status: Full Prophylaxis: SCD's Recommended Disposition: HH vs Hospice Subjective: Chief Complaint / Reason for Physician Visit \"I feel bad\". Patient stated she does not feel well, c/o nausea and overall weakness. Patient continues with jaundice. Lying in bed, with children present at bedside. Long discussion at bedside regarding code status and goals of care. Family and patient are leaning towards hospice but continue to discuss amongst themselves. 1257 - Update - Second discussion had with daughter and son (over the phone), outside of patient room. Clarified additional questions r/t previous discussion. They are both in agreement for hospice, patient is still processing information and has not yet made a decision. Discussed with RN events overnight. Review of Systems: Symptom Y/N Comments  Symptom Y/N Comments Fever/Chills n   Chest Pain n   
Poor Appetite y   Edema n   
Cough n   Abdominal Pain n   
Sputum n   Joint Pain n   
SOB/LEIJA y   Pruritis/Rash n   
Nausea/vomit y   Tolerating PT/OT Diarrhea n   Tolerating Diet y Constipation n   Other Could NOT obtain due to:   
 
Objective: VITALS:  
Last 24hrs VS reviewed since prior progress note. Most recent are: 
Patient Vitals for the past 24 hrs: 
 Temp Pulse Resp BP SpO2  
11/15/20 0728 97 °F (36.1 °C) 76 13 (!) 105/34 95 % 11/15/20 0631  80 16  92 % 11/15/20 0630  79 18  (!) 88 % 11/15/20 0627  79 20  (!) 87 % 11/15/20 0420  78 18  93 % 11/15/20 0400 97.6 °F (36.4 °C) 77 22 (!) 117/37   
11/15/20 0239  77 15  97 % 11/15/20 0214  79 20  97 % 11/14/20 2300  83 17 93/75 96 % 11/14/20 2236 97.7 °F (36.5 °C) 83 17 117/78 97 % 11/14/20 1919 97.8 °F (36.6 °C) 78 16 (!) 107/35 98 % 11/14/20 1600 97.4 °F (36.3 °C) 84 18 (!) 110/37 94 % 11/14/20 1153 97.4 °F (36.3 °C) 78 19 (!) 106/32 98 % 11/14/20 1130 97.6 °F (36.4 °C) 77 18 (!) 110/45  Intake/Output Summary (Last 24 hours) at 11/15/2020 1126 Last data filed at 11/15/2020 7334 Gross per 24 hour Intake 230 ml Output 2080 ml Net -1850 ml PHYSICAL EXAM: 
General: WD, WN. Alert, jaundiced and ill appearing elderly  female   
EENT:  EOMI. icteric sclerae. MMM Resp:  CTA bilaterally, LS dim at bases. No accessory muscle use CV:  Regular  rhythm,  BUE +2 pitting edema GI:  Soft, obese, Non tender.  +Bowel sounds Neurologic:  Alert and oriented X 3, normal speech, Psych:   Good insight. Not anxious nor agitated Skin:  No rashes. Jaundiced Reviewed most current lab test results and cultures  YES Reviewed most current radiology test results   YES Review and summation of old records today    NO Reviewed patient's current orders and MAR    YES 
PMH/SH reviewed - no change compared to H&P 
 ________________________________________________________________________ Care Plan discussed with: 
  Comments Patient x Family  x   
RN x Care Manager Consultant  x Multidiciplinary team rounds were held today with , nursing, pharmacist and clinical coordinator. Patient's plan of care was discussed; medications were reviewed and discharge planning was addressed. ________________________________________________________________________ Total NON critical care TIME:  25   Minutes Total CRITICAL CARE TIME Spent:   Minutes non procedure based Comments >50% of visit spent in counseling and coordination of care x   
________________________________________________________________________ Livan Mccoy NP Procedures: see electronic medical records for all procedures/Xrays and details which were not copied into this note but were reviewed prior to creation of Plan. LABS: 
I reviewed today's most current labs and imaging studies. Pertinent labs include: 
Recent Labs 11/15/20 
7308 11/14/20 
0153 11/13/20 
0326 WBC 14.2* 13.2* 14.6* HGB 7.5* 7.2* 7.1*  
HCT 23.7* 23.2* 22.6*  
 184 195 Recent Labs 11/15/20 
9622 11/14/20 
0153 11/13/20 
0326 * 138 138  
K 4.9 4.4 5.0  
CL 99 100 104 CO2 29 31 25 * 120* 142* BUN 41* 51* 69* CREA 3.26* 3.62* 4.31* CA 9.2 9.3 8.6 MG  --  2.3  --   
PHOS  --  3.6  --   
ALB 2.2* 2.2* 2.1* TBILI 15.6* 13.9* 12.2* * 150* 154* INR  --  1.5*  --   
 
 
Signed: Livan Mccoy, NP

## 2020-11-15 NOTE — ACP (ADVANCE CARE PLANNING)
Advance Care Planning Note NAME: Governor Luis :  1935 MRN:  [id number] Date/Time:  11/15/2020 11:39 AM 
 
Active Diagnoses: 
Hospital Problems  Date Reviewed: 2020 Codes Class Noted POA Renal failure ICD-10-CM: N19 
ICD-9-CM: 477  11/15/2020 Unknown [facility] failure ([facility] ICD-10-CM: K72.90 ICD-9-CM: 572.8  11/15/2020 Unknown Elevated LFTs ICD-10-CM: R79.89 ICD-9-CM: 790.6  2020 Unknown Common bile duct dilation ICD-10-CM: K83.8 ICD-9-CM: 576.8  2020 Unknown Cholangitis ICD-10-CM: K83.09 
ICD-9-CM: 576.1  2020 Unknown Chronic kidney disease ICD-10-CM: N18.9 ICD-9-CM: 585.9  10/10/2020 Unknown GAVE (gastric antral vascular ectasia) ICD-10-CM: T32.686 ICD-9-CM: 537.82  2019 Yes Overview Signed 2019  3:31 PM by Gera Burns MD  
  bx [phone]:   
 Gastric, biopsy:  
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 One fragment suggestive of hyperplastic polyp S/P MVR (mitral valve replacement) ICD-10-CM: I95.3 ICD-9-CM: V43.3  2019 Yes Overview Signed 2019  4:21 PM by NEVAEH Thomas  
  MITRAL VALVE REPLACEMENT/WITH 25MM BIOPROSTHESIS Heart failure ([facility] ICD-10-CM: I50.9 ICD-9-CM: 428.9  2017 Yes Chronic atrial fibrillation (HCC) ICD-10-CM: I48.20 ICD-9-CM: 427.31  2015 Yes Type 2 diabetes mellitus with diabetic chronic kidney disease ([facility] ICD-10-CM: E11.22 
ICD-9-CM: 250.40, 585.9  2015 Yes Essential hypertension, benign ICD-10-CM: I10 
ICD-9-CM: 401.1  2010 Yes These active diagnoses are of sufficient risk that focused discussion on advance care planning is indicated in order to allow the patient to thoughtfully consider personal goals of care, and if situations arise that prevent the ability to personally give input, to ensure appropriate representation of their personal desires for different levels and aggressiveness of care. Discussion:  
Code status addressed and wants to be a DNR / DNI. Patient does not want any lifesaving or invasive interventions. Patient  would like to assign [facility] and [facility]  as the surrogate decision makers. Persons present and participating in discussion: Maria Del Carmen Alcantara NP, [facility] and [facility] Discussed patient condition and prognosis, family is discussing hospice. Time Spent:  
Total time spent face-to-face in education and discussion:   50  minutes. Maria Del Carmen Kelley NP Hospitalist

## 2020-11-15 NOTE — PROGRESS NOTES
GI Progress Note ([facility] for [facility]) NAME:Lea Del Toro :1935 GNS:[id number] ATTG: Dr. Joao Saini PCP: Homer Neil MD 
Date/Time:  11/15/2020 2:16 PM  
 
Primary GI: Dr. Ovalles Reason for following: elevated LFTs Assessment:  
· Elevated LFTs, most concerning for hyperbilirubinemia. Suspicion is for underlying chronic liver disease with acute exacerbation/uncovering by sepsis. Work up pending. · ERCP with biliary sphincterotomy and CBC sludge removal , as pneumobilia on initial imaging. · Leukocytosis/sepsis  E.coli UTI, bioprosthetic MV, afib s/p ablation, s/p watchman, CAD s/p stent, CKD IV 
· Hypoalbuminemia (2.2) Plan: · Await pending serologies, most still not resulted · Continue IV vit K 
· TTE being done this afternoon · Agree with plans underway for goals of care discussion, she is not yet improving and her prognosis is not good. We should keep following LFTs for now and see if liver can turn around any, though Subjective:  
Discussed with RN events overnight. Bili rising more, 15.6 today, 12 on Friday. Family meeting about goals of care and family is contemplating hospice care with her. Decision not made yet though. Acetaminophen level undetectable. Hep A,B, C negative (acute hep panel) MARIA DOLORES, ASMA, A1T, AMA, SPEP, ceruloplasmin, alk phos isoenzymes pending, Review of Systems: 
Symptom Y/N Comments  Symptom Y/N Comments Fever/Chills n   Chest Pain n   
Cough n   Headaches n Sputum n   Joint Pain n   
SOB/LEIJA n   Pruritis/Rash n Tolerating Diet y Only a few bites  Other Could NOT obtain due to:   
 
Objective: VITALS:  
Last 24hrs VS reviewed since prior progress note. Most recent are: 
Visit Vitals BP (!) 108/36 (BP 1 Location: Right arm, BP Patient Position: At rest) Pulse 76 Temp 97.3 °F (36.3 °C) Resp 20 Ht 5' 9\" (1.753 m) Wt 87.1 kg (192 lb 1.6 oz) SpO2 96% BMI 28.37 kg/m² Intake/Output Summary (Last 24 hours) at 11/15/2020 1523 Last data filed at 11/15/2020 1014 Gross per 24 hour Intake 230 ml Output 80 ml Net 150 ml PHYSICAL EXAM: 
General: WD, WN. Jaundiced. Alert, cooperative but tired. HEENT: NC, Atraumatic. Scleral icterus. Lungs:  No Wheezing/Rhonchi/Rales. Heart:  S1S2 Abdomen: Softly distended, Non tender.  +Bowel sounds, no HSM Extremities: LE edema, 3+, anasarca Neurologic:  Alert and oriented X 3. No acute neurological distress Psych:   Fair insight. Not anxious nor agitated. Lab and Radiology Data Reviewed: (see below) Medications Reviewed: (see below) PMH/SH reviewed - no change compared to H&P 
________________________________________________________________________ Total time spent with patient: 15 minutes ________________________________________________________________________ Care Plan discussed with: 
Patient x Family  x  
RN x Consultant:    
 
Alice Garcia MD  
 
Procedures: see electronic medical records for all procedures/Xrays and details which were not copied into this note but were reviewed prior to creation of Plan. LABS: 
Recent Labs 11/15/20 
448 [id number] 11/14/20 
0153 WBC 14.2* 13.2* HGB 7.5* 7.2* HCT 23.7* 23.2*  
 184 Recent Labs 11/15/20 
1258 11/14/20 
0153 11/13/20 
0326 * 138 138  
K 4.9 4.4 5.0  
CL 99 100 104 CO2 29 31 25 BUN 41* 51* 69* CREA 3.26* 3.62* 4.31* * 120* 142* CA 9.2 9.3 8.6 MG  --  2.3  --   
PHOS  --  3.6  --   
 
Recent Labs 11/15/20 
1753 11/14/20 
0153 11/13/20 
0326 * 362* 328* TP 5.8* 5.5* 5.3* ALB 2.2* 2.2* 2.1*  
GLOB 3.6 3.3 3.2 LPSE 170 158 208 Recent Labs 11/14/20 
0153 INR 1.5* PTP 15.6* No results for input(s): FE, TIBC, PSAT, FERR in the last 72 hours. Lab Results Component Value Date/Time  Folate 72.3 (H) 01/22/2017 03:13 AM  
 
 No results for input(s): PH, PCO2, PO2 in the last 72 hours. No results for input(s): CPK, CKMB in the last 72 hours. No lab exists for component: TROPONINI Lab Results Component Value Date/Time Color ORANGE 11/09/2020 02:22 PM  
 Appearance TURBID (A) 11/09/2020 02:22 PM  
 Specific gravity 1.016 11/09/2020 02:22 PM  
 Specific gravity 1.020 07/06/2010 01:10 PM  
 pH (UA) 5.5 11/09/2020 02:22 PM  
 Protein 100 (A) 11/09/2020 02:22 PM  
 Glucose Negative 11/09/2020 02:22 PM  
 Ketone TRACE (A) 11/09/2020 02:22 PM  
 Bilirubin Negative 10/10/2020 10:51 PM  
 Urobilinogen 1.0 11/09/2020 02:22 PM  
 Nitrites Positive (A) 11/09/2020 02:22 PM  
 Leukocyte Esterase LARGE (A) 11/09/2020 02:22 PM  
 Epithelial cells FEW 11/09/2020 02:22 PM  
 Bacteria 3+ (A) 11/09/2020 02:22 PM  
 WBC >100 (H) 11/09/2020 02:22 PM  
 RBC  11/09/2020 02:22 PM  
 
 
MEDICATIONS: 
Current Facility-Administered Medications Medication Dose Route Frequency  albumin human 25% (BUMINATE) solution 12.5 g  12.5 g IntraVENous ONCE  prochlorperazine (COMPAZINE) with saline injection 5 mg  5 mg IntraVENous Q6H PRN  
 epoetin hali-epbx (RETACRIT) injection 10,000 Units  10,000 Units SubCUTAneous Q MON, WED & FRI  phytonadione (vitamin K1) (AQUA-MEPHYTON) 10 mg in 0.9% sodium chloride 50 mL IVPB  10 mg IntraVENous DAILY  clopidogreL (PLAVIX) tablet 75 mg  75 mg Oral DAILY  insulin lispro (HUMALOG) injection   SubCUTAneous AC&HS  sodium chloride (NS) flush 5-40 mL  5-40 mL IntraVENous Q8H  
 sodium chloride (NS) flush 5-40 mL  5-40 mL IntraVENous PRN  
 simethicone (MYLICON) 41WC/0.5LM oral drops 80 mg  1.2 mL Oral Multiple  sodium chloride (NS) flush 5-40 mL  5-40 mL IntraVENous Q8H  
 sodium chloride (NS) flush 5-40 mL  5-40 mL IntraVENous PRN  
 0.9% sodium chloride infusion 25 mL  25 mL IntraVENous PRN  
 amiodarone (CORDARONE) tablet 100 mg  100 mg Oral DAILY  insulin NPH (NOVOLIN N, HUMULIN N) injection 3 Units  3 Units SubCUTAneous QPM  
 insulin NPH (NOVOLIN N, HUMULIN N) injection 5 Units  5 Units SubCUTAneous QAM  
 levothyroxine (SYNTHROID) tablet 125 mcg  125 mcg Oral 6am  
 pantoprazole (PROTONIX) 40 mg in 0.9% sodium chloride 10 mL injection  40 mg IntraVENous DAILY  sodium chloride (NS) flush 5-40 mL  5-40 mL IntraVENous Q8H  
 sodium chloride (NS) flush 5-40 mL  5-40 mL IntraVENous PRN  
 ondansetron (ZOFRAN) injection 4 mg  4 mg IntraVENous Q6H PRN  
 glucose chewable tablet 16 g  4 Tab Oral PRN  
 dextrose (D50W) injection syrg 12.5-25 g  12.5-25 g IntraVENous PRN  
 glucagon (GLUCAGEN) injection 1 mg  1 mg IntraMUSCular PRN  
 morphine injection 1 mg  1 mg IntraVENous Q6H PRN

## 2020-11-16 NOTE — PROGRESS NOTES
Bedside and Verbal shift change report given to wellington (oncoming nurse) by Lakeshia Knox (offgoing nurse). Report included the following information SBAR, Kardex, Intake/Output, MAR, Recent Results and Cardiac Rhythm nsr. Dual skin performed 1915: vs done. Mews 1 
 
2303: vs done. Mews 1 
 
 
0305: vs done. Mews 1 
 
0700: Bedside and Verbal shift change report given to Lakeshia Knox (oncoming nurse) by Jagjit Monge (offgoing nurse). Report included the following information SBAR, Kardex, Intake/Output, MAR, Recent Results and Cardiac Rhythm nsr. Dual skin performed

## 2020-11-16 NOTE — PROGRESS NOTES
Physical Therapy Patient is currently off floor for dialysis. Spoke with  and patient will be discharging home tomorrow on Hospice. Will sign off. Thank you, Fantasma Alan, PT

## 2020-11-16 NOTE — PROGRESS NOTES
[facility] Progress Note NAME: Camryn Salinas :  1935 MRN:  [id number] Date/Time: 2020 Risk of deterioration: medium Assessment:    Plan: JULIANA on CKD stage 4 Pneumobilia in CBD/Transaminitis-S/P ERCP/spincterotomy HTN Hyperkalemia Hyponatremia Ecoli uti on antibiotics Bradycardia Anemia S/p bioprosthetic MV Afib s/p recent ablation S/p watchman H/o cad/stent Initiated HD on , For HD today. Pt/family going hospice but want HD today. No further HD after today Retacrit/transfuse prn D/W pt, family and Debbie [place] Subjective: Chief Complaint:  Janee Staley. Son at bedside. No acute c/o Leaning towards hospice Review of Systems: 
 
Objective: VITALS:  
Last 24hrs VS reviewed since prior progress note. Most recent are: 
Visit Vitals BP (!) 117/35 (BP 1 Location: Right arm, BP Patient Position: At rest) Pulse 75 Temp 98 °F (36.7 °C) Resp 18 Ht 5' 9\" (1.753 m) Wt 92.3 kg (203 lb 7.8 oz) SpO2 98% BMI 30.05 kg/m² SpO2 Readings from Last 6 Encounters:  
20 98% 20 98% 20 98% 20 98% 10/30/20 98% 10/28/20 95% O2 Flow Rate (L/min): 1 l/min Intake/Output Summary (Last 24 hours) at 2020 1104 Last data filed at 2020 0299 Gross per 24 hour Intake 150 ml Output 50 ml Net 100 ml Telemetry Reviewed PHYSICAL EXAM: 
 
General   chronic ill appearing, in NAD 
+scleral icterus EENT  Normocephalic, Atraumatic Respiratory   Clear To Auscultation bilaterally Cardiology  Regular Rate and Rythmn  Sternal scar Extremities  (+) chronic edema Pulses intact. Skin- jaundice+ Lab Data Reviewed: (see below) Medications Reviewed: (see below) PMH/SH reviewed - no change compared to H&P 
___________________________________________________ 
 
___________________________________________________ Attending Physician: Guera Cortez MD  
 
 ____________________________________________________ MEDICATIONS: 
Current Facility-Administered Medications Medication Dose Route Frequency  prochlorperazine (COMPAZINE) with saline injection 5 mg  5 mg IntraVENous Q6H PRN  
 epoetin hali-epbx (RETACRIT) injection 10,000 Units  10,000 Units SubCUTAneous Q MON, WED & FRI  phytonadione (vitamin K1) (AQUA-MEPHYTON) 10 mg in 0.9% sodium chloride 50 mL IVPB  10 mg IntraVENous DAILY  clopidogreL (PLAVIX) tablet 75 mg  75 mg Oral DAILY  insulin lispro (HUMALOG) injection   SubCUTAneous AC&HS  sodium chloride (NS) flush 5-40 mL  5-40 mL IntraVENous Q8H  
 sodium chloride (NS) flush 5-40 mL  5-40 mL IntraVENous PRN  
 simethicone (MYLICON) 01IA/1.4IQ oral drops 80 mg  1.2 mL Oral Multiple  sodium chloride (NS) flush 5-40 mL  5-40 mL IntraVENous Q8H  
 sodium chloride (NS) flush 5-40 mL  5-40 mL IntraVENous PRN  
 0.9% sodium chloride infusion 25 mL  25 mL IntraVENous PRN  
 amiodarone (CORDARONE) tablet 100 mg  100 mg Oral DAILY  insulin NPH (NOVOLIN N, HUMULIN N) injection 3 Units  3 Units SubCUTAneous QPM  
 insulin NPH (NOVOLIN N, HUMULIN N) injection 5 Units  5 Units SubCUTAneous QAM  
 levothyroxine (SYNTHROID) tablet 125 mcg  125 mcg Oral 6am  
 pantoprazole (PROTONIX) 40 mg in 0.9% sodium chloride 10 mL injection  40 mg IntraVENous DAILY  sodium chloride (NS) flush 5-40 mL  5-40 mL IntraVENous Q8H  
 sodium chloride (NS) flush 5-40 mL  5-40 mL IntraVENous PRN  
 ondansetron (ZOFRAN) injection 4 mg  4 mg IntraVENous Q6H PRN  
 glucose chewable tablet 16 g  4 Tab Oral PRN  
 dextrose (D50W) injection syrg 12.5-25 g  12.5-25 g IntraVENous PRN  
 glucagon (GLUCAGEN) injection 1 mg  1 mg IntraMUSCular PRN  
 morphine injection 1 mg  1 mg IntraVENous Q6H PRN  
  
 
LABS: 
Recent Labs 11/16/20 
0428 11/15/20 
9425 WBC 15.4* 14.2* HGB 7.6* 7.5* HCT 24.0* 23.7*  
 196 Recent Labs 11/16/20 0428 11/15/20 6053 11/14/20 0153 * 135* 138  
K 5.3* 4.9 4.4  
 99 100 CO2 20* 29 31 BUN 62* 41* 51* CREA 4.21* 3.26* 3.62* * 117* 120* CA 9.3 9.2 9.3 MG  --   --  2.3 PHOS  --   --  3.6 Recent Labs 11/16/20 
0428 11/15/20 
0137 11/14/20 0153 * 147* 150* * 402* 362* TBILI 14.6* 15.6* 13.9* TP 5.3* 5.8* 5.5* ALB 1.5* 2.2* 2.2*  
GLOB 3.8 3.6 3.3 LPSE 201 170 158 Recent Labs 11/16/20 0428 11/14/20 0153 INR 1.4* 1.5* PTP 14.5* 15.6*

## 2020-11-16 NOTE — PROGRESS NOTES
Comprehensive Nutrition Assessment Type and Reason for Visit: Power Randhawa Nutrition Recommendations/Plan:  
Continue current diet and supplements Nutrition Assessment:    
Chart reviewed; patient and family have decided to pursue comfort measures and discharge home with hospice services. Continue mechanical soft diet per patient preference. Estimated Daily Nutrient Needs: 
Energy (kcal): 1871 kcal (BMR 1439 x 1. 3AF); Weight Used for Energy Requirements: Current Protein (g): 74-92g (0.8-1.0 g/kg bw); Weight Used for Protein Requirements: Current Fluid (ml/day): 1850 mL; Method Used for Fluid Requirements: 1 ml/kcal 
 
Nutrition Related Findings:  
Na 130, K+ 5.3, -[phone], phos 3.6 2+ edema all extremities Medications: Plavix, Humalog, NPH, Synthroid, Protonix Wounds:   
None Current Nutrition Therapies: DIET NUTRITIONAL SUPPLEMENTS Dinner; Ensure Clear DIET NUTRITIONAL SUPPLEMENTS Breakfast, Lunch, Dinner; Room n House DIET NUTRITIONAL SUPPLEMENTS AM Snack, PM Snack, HS Snack; Ensure Pudding DIET NUTRITIONAL SUPPLEMENTS Lunch, Dinner; [employer] DIET MECHANICAL SOFT Anthropometric Measures: 
· Height:  5' 9\" (175.3 cm) · Current Body Wt:  92.3 kg (203 lb 7.8 oz) · BMI Category:  Obese class 1 (BMI 30.0-34. 9) Nutrition Diagnosis:  
· Inadequate protein-energy intake related to (intra-abdominal infection vs sepsis) as evidenced by intake 26-50% Nutrition Interventions:  
Food and/or Nutrient Delivery: Continue oral nutrition supplement, Continue current diet Nutrition Education and Counseling: No recommendations at this time Coordination of Nutrition Care: No recommendation at this time Goals: 
PO intake as desired for comfort/hospice next 5-7 days Nutrition Monitoring and Evaluation:  
Behavioral-Environmental Outcomes: None identified Food/Nutrient Intake Outcomes: Food and nutrient intake Physical Signs/Symptoms Outcomes: Biochemical data Discharge Planning:   
Continue current diet Electronically signed by Lex Montes RD on 11/16/2020 at 12:37 PM 
 
Contact: ext [phone]

## 2020-11-16 NOTE — PROGRESS NOTES
Bedside shift change report given to golden (oncoming nurse) by alexia (offgoing nurse). Report included the following information SBAR, Kardex, ED Summary, Intake/Output, MAR and Recent Results. Patient admitted mid shift. Wound care completed. Shortly after being transferred patient went to dialysis. Family at bedside.

## 2020-11-16 NOTE — PROGRESS NOTES
Problem: Falls - Risk of 
Goal: *Absence of Falls Description: Document Hannah Rubalcava Fall Risk and appropriate interventions in the flowsheet. Outcome: Progressing Towards Goal 
Note: Fall Risk Interventions: 
Mobility Interventions: Bed/chair exit alarm Mentation Interventions: Adequate sleep, hydration, pain control Medication Interventions: Bed/chair exit alarm Elimination Interventions: Call light in reach History of Falls Interventions: Bed/chair exit alarm

## 2020-11-16 NOTE — PROGRESS NOTES
Met with son Valeria Tinoco and patient at bedside, discussion reagarding about goals of care. Patient does not feel well and has had a gradual decline over the past few months. Decision was made to towards comfort and focus on quality of life. Consult made to hospice, patient and family would like to care for patient at home. She does want to continue with HD today but does not wish for any further HD. Full note to follow. Time spent at bedside 15 minutes.

## 2020-11-16 NOTE — PROCEDURES
[facility] Dialysis Team [place]  [phone] Vitals   Pre   Post   Assessment   Pre   Post    
Temp  Temp: 98 °F (36.7 °C) (11/16/20 1513)  98.0 LOC  A&Ox2 A&O2 HR   Pulse (Heart Rate): 71 (11/16/20 1513) 74 Lungs   clear clear B/P   BP: (!) 105/40 (11/16/20 1513) 111/41 Cardiac   RRR  RRR Resp   Resp Rate: 18 (11/16/20 1513) 18 Skin   Arms wrapped, dressing CDI; jaundice Arms wrapped, dressing CDI; jaundice Pain level  Pain Intensity 1: 0 (11/16/20 0800) 0 Edema  generalized 
 generalized Orders: Duration:   Start:   1513 End:   1815 Total:   3 hours Dialyzer:   Dialyzer/Set Up Inspection: Hemalatha Huitron (11/16/20 1513) K Bath:   Dialysate K (mEq/L): 2 (11/16/20 1513) Ca Bath:   Dialysate CA (mEq/L): 2.5 (11/16/20 1513) Na/Bicarb:   Dialysate NA (mEq/L): 138 (11/16/20 1513) Target Fluid Removal:   Goal/Amount of Fluid to Remove (mL): 1000 mL (11/16/20 1513) Access Type & Location:   RCVC dressing CDI; no s/s of infection noted. Each catheter limb disinfected per p&p, caps removed, hubs disinfected per p&p. Each catheter limb aspirated well and flushed with 10cc ns per p&p.  VSS, tx initiated. Labs Obtained/Reviewed Critical Results Called   Date when labs were drawn- 
Hgb-   
HGB Date Value Ref Range Status 11/16/2020 7.6 (L) 11.5 - 16.0 g/dL Final  
 
K-   
Potassium Date Value Ref Range Status 11/16/2020 5.3 (H) 3.5 - 5.1 mmol/L Final  
  Comment:  
  Sample is hemolyzed (hemoglobin is 
likely >50 mg/dL) and thus the 
potassium, AST, ammonia, and 
phosphorus results may be adversely 
affected. If the clinical situation 
warrants, recommend recollection of 
a new specimen with attention to 
preventing hemolysis. Ca-  
Calcium Date Value Ref Range Status 11/16/2020 9.3 8.5 - 10.1 MG/DL Final  
 
Bun-  
BUN Date Value Ref Range Status 11/16/2020 62 (H) 6 - 20 MG/DL Final  
  Comment:  
  INVESTIGATED PER DELTA CHECK PROTOCOL Creat-  
 Creatinine Date Value Ref Range Status 11/16/2020 4.21 (H) 0.55 - 1.02 MG/DL Final  
  Comment:  
  INVESTIGATED PER DELTA CHECK PROTOCOL Medications/ Blood Products Given Name   Dose   Route and Time None ordered Blood Volume Processed (BVP):    56.8 Net Fluid Removed:  1000mL Comments All dialysis related medications have been reviewed. Assessment performed by RN. Procedure and documentation observed and reviewed by  Michaela Marie RN Time Out Done:  9809 Primary Nurse Rpt Pre:  Gene Espino RN 
Primary Nurse Rpt Post:  Zelalem Preston RN 
Pt Education:  procedural 
Care Plan:  On going Tx Summary: 
4261:  RCVC dressing CDI; no s/s of infection noted. Each catheter limb disinfected per p&p, caps removed, hubs disinfected per p&p. Each catheter limb aspirated well and flushed with 10cc ns per p&p.  VSS, tx initiated. 1530:  Pt resting 1545:  Pt resting 
1600:  Pt resting 1615:  Pt resting 1630:  Pt resting 1645:  Pt resting 
1700:  Pt resting 1715:  Pt resting 1730:  Pt resting 1745:  Pt resting 1800:  Pt resting 1815:  Each dialysis catheter limb disinfected per p&p, blood returned per p&p, each dialysis hub disinfected per p&p, post dialysis catheter dwell instilled per order, and caps applied. Admiting Diagnosis:  Cholangitis Pt's previous clinic- n/a Consent signed - Informed Consent Verified: Yes (11/16/20 1513) Elva Consent - signed and on file Hepatitis Status- neg/susc 11/14/20 Machine #- Machine Number: [id number] (11/16/20 1513) Telemetry status- 
Pre-dialysis wt. -

## 2020-11-16 NOTE — PROGRESS NOTES
0710 .Bedside and Verbal shift change report given to Marilyn Khoury (oncoming nurse) by Stephany Maldonado (offgoing nurse). Report included the following information SBAR, Kardex and MAR. 
 
0238 Called to give report to the nurse receiving patient going to oncology room 3581. 2787. TRANSFER - OUT REPORT: 
 
Verbal report given to  Marta (name) on Marline Cam  being transferred to Oncology(unit) for routine progression of care Report consisted of patients Situation, Background, Assessment and  
Recommendations(SBAR). Information from the following report(s) SBAR, Kardex and MAR was reviewed with the receiving nurse. Lines:  
Peripheral IV 11/15/20 Left Forearm (Active) Site Assessment Clean, dry, & intact 11/16/20 1341 Phlebitis Assessment 0 11/16/20 1341 Infiltration Assessment 0 11/16/20 1341 Dressing Status Clean, dry, & intact 11/16/20 1341 Dressing Type Transparent 11/16/20 1341 Hub Color/Line Status Patent 11/16/20 1341 Action Taken Open ports on tubing capped 11/16/20 0305 Alcohol Cap Used No 11/16/20 1341 Opportunity for questions and clarification was provided. Patient transported with: 
2  Registered Nurse 1 L 02

## 2020-11-16 NOTE — PROGRESS NOTES
I reviewed pertinent labs and imaging, and discussed /agreed on the plan of care with Dr. Lokesh Nichols. Hospitalist Progress Note NAME: Tez Beyer :  1935 MRN:  [id number] Assessment / Plan: 
Summary of Care - Patient was admitted on  with Sepsis r/t UTI, acute kidney injury and elevated liver function. Family reported that they had seen a slow decline of functional status and quality of live over the past 6 months. Family independently reached out hospice for an information session. At first it was felt that patient would go home with [facility] and enroll in hospice at some point in the future. Renal function declined during admission and HD was initiated. It was felt that this would be a short term need. Patient underwent ERCP on Friday with emergent HD afterward with fluid volume overload and respiratory distress. Her liver and renal function have continued to worsen. As it became clear that the patient's condition continued to decline and her overall quality of life would be poor, she elected not to pursue anymore invasive treatment and wishes to focus on comfort. With full support of her family she will be discharged home with hospice. Severe Sepsis secondary to UTI WBC 12.9, HR 11 on admission Abnormal LFTs with Elevated Total Bilirubin Total bili 8.8, , , Lipase 505 Jaundice Pneumobilia on US 
· MRI Abd MRCP  results - Pneumobilia within the mildly dilated common bile duct. No visualized choledocholithiasis. Peripancreatic edema suggests acute pancreatitis. Splenomegaly. Small right kidney with small right renal cysts. Small right pleural effusion. · US of Abd  results - Echogenic debris vs. Pneumobilia in the common bile duct. No evidence of intrahepatic biliary dilatation. Status post cholecystectomy. Unchanged 1 cm echogenic hepatic mass, compatible with hemangioma. Small, echogenic right kidney, compatible with chronic medical renal disease. · Follow blood cultures - NO GROWTH in 5 days  
· Follow urine cultures POSITIVE for E COLI - Final  
· Continue IV ceftriaxone - completed 7 days · Appreciate GI input · Patient has said she would not want a liver biopsy  
· Total bili essentially the same - total bili 14.6 · ALT 113, AST 166, alk phos 323 and lipase 201  
· Ammonia level <10  
· So far Hepatitis studies NEGATIVE · Awaiting further liver studies - pending Renal Failure in Setting of CKD stage 4 Baseline Cr 1.7 Fluid Volume Overload r/t Renal Failure Anasarca · Cr 4.21 today - for HD today · C/o nausea · Continue zofran, add phenergan · Appreciate Nephrology input · Will receive HD today - no more HD moving forward · Albumin 1.5, now with anasarca · Give another dose IV albumin · Follow BMP   
· Hold bumex and allopurinol · Avoid nephrotoxic medications  
  
Weakness and Debility  
Failure to Thrive  
· Another discussion held with patient and son Fabiana Huangrecida 411 at bedside this AM. Patient has elected to pursue hospice. Family in agreement and wish to take patient home. · Continue nutritional supplements · Appreciate Speech Therapy input - Mechanical soft diet  
  
Chronic Diastolic CHF - not in exacerbation History of Severe Mitral Stenosis S/p [employer] Fregoso Bioprosthetic MV 4/2019 Chronic Atrial Fibrillation S/p Watchman Procedure 2/2020 History of Atrial Flutter S/p Ablation Sinus Bradycardia History of Coronary Artery Disease Hypertension · ECHO 11/15 results - EF >70%. Mild concentric hypertrophy. Dilated LA, Mildly dilated right ventricle. Reduced systolic function. Dilated RA. MV is prosthetic. Mild-Moderate tricuspid valve regurgitation. Moderate pulmonary hypertension. · Atrial Fibrillation is rate controlled · Continue amiodarone and plavix · Hold bumex  
 
Insulin Dependant Type I Diabetes · Continue NPH  
  
Gout Hold allopurinol renal failure Hypothyroidism Continue levothyroxine GERD Continue protonix  
Dementia  
  
Plan to d/c Tuesday with Home [facility]osp[facility], transport is scheduled at 10 am.  
 
25.0 - 29.9 Overweight / Body mass index is 26.58 kg/m². 
  
Code status: Full Prophylaxis: SCD's Recommended Disposition: HH vs Hospice Subjective: Chief Complaint / Reason for Physician Visit \"I don't feel good\". Patient continues to complain she feels poorly with no improvement during this hospital stay. Her son is at the bedside. Revisited conversation held yesterday, at this time patient elected to pursue hospice. Son in agreement and very supportive of decision. Discussed with RN events overnight. Review of Systems: 
Symptom Y/N Comments  Symptom Y/N Comments Fever/Chills n   Chest Pain n   
Poor Appetite y   Edema y Anasarca Cough n   Abdominal Pain n   
Sputum n   Joint Pain n   
SOB/LEIJA y   Pruritis/Rash n   
Nausea/vomit y   Tolerating PT/OT Diarrhea n   Tolerating Diet n Poor appetite and difficulty swallowing Constipation n   Other Could NOT obtain due to:   
 
Objective: VITALS:  
Last 24hrs VS reviewed since prior progress note. Most recent are: 
Patient Vitals for the past 24 hrs: 
 Temp Pulse Resp BP SpO2  
11/16/20 0800 98 °F (36.7 °C) 75 18 (!) 117/35 98 % 11/16/20 0305 97.6 °F (36.4 °C) 75 19 (!) 125/40 98 % 11/16/20 0000  74 14 (!) 111/37 98 % 11/15/20 2303 98.1 °F (36.7 °C) 76 20 (!) 119/41 97 % 11/15/20 1915 97.6 °F (36.4 °C) 74 18 (!) 114/36 97 % 11/15/20 1800  76 13 (!) 113/36 98 % 11/15/20 1755  77 15 (!) 111/38 97 % 11/15/20 1632    (!) 105/34   
11/15/20 1625 97.2 °F (36.2 °C) 76 20 (!) 118/37 99 % Intake/Output Summary (Last 24 hours) at 11/16/2020 1244 Last data filed at 11/16/2020 7557 Gross per 24 hour Intake 50 ml Output 50 ml Net 0 ml PHYSICAL EXAM: 
General: WD, WN. Somewhat lethargic very ill appearing and jaundiced  female with anasarca   
EENT:  EOMI. icteric sclerae. MMM Resp:  Lung sounds diminished at bases. Requiring O2 for SOB  
CV:  Regular  rhythm,  anasarca GI:  Soft, obese, Non tender.  +Bowel sounds Neurologic:  Alert and oriented X 3, normal speech, Psych:   Good insight. Not anxious nor agitated Skin:  No rashes. + Jaundice Reviewed most current lab test results and cultures  YES Reviewed most current radiology test results   YES Review and summation of old records today    NO Reviewed patient's current orders and MAR    YES 
PMH/SH reviewed - no change compared to H&P 
________________________________________________________________________ Care Plan discussed with: 
  Comments Patient x Family  x   
RN x Care Manager x Consultant  x Multidiciplinary team rounds were held today with , nursing, pharmacist and clinical coordinator. Patient's plan of care was discussed; medications were reviewed and discharge planning was addressed. ________________________________________________________________________ Total NON critical care TIME:  25x   Minutes Total CRITICAL CARE TIME Spent:   Minutes non procedure based Comments >50% of visit spent in counseling and coordination of care x   
________________________________________________________________________ Edwin Meza NP Procedures: see electronic medical records for all procedures/Xrays and details which were not copied into this note but were reviewed prior to creation of Plan. LABS: 
I reviewed today's most current labs and imaging studies. Pertinent labs include: 
Recent Labs 11/16/20 
0428 11/15/20 
0137 11/14/20 
0153 WBC 15.4* 14.2* 13.2* HGB 7.6* 7.5* 7.2* HCT 24.0* 23.7* 23.2*  
 196 184 Recent Labs 11/16/20 
0428 11/15/20 
0137 11/14/20 
0153 * 135* 138  
K 5.3* 4.9 4.4  
 99 100 CO2 20* 29 31 * 117* 120* BUN 62* 41* 51* CREA 4.21* 3.26* 3.62* CA 9.3 9.2 9.3 MG  --   --  2.3 PHOS  --   --  3.6 ALB 1.5* 2.2* 2.2* TBILI 14.6* 15.6* 13.9* * 147* 150* INR 1.4*  --  1.5* Signed: Angélica Carlos, NP

## 2020-11-16 NOTE — PROGRESS NOTES
SPEECH PATHOLOGY BEDSIDE SWALLOW EVALUATION/DISCHARGE Patient: Denice Jackson (80 y.o. female) Date: 11/16/2020 Primary Diagnosis: Cholangitis [K83.09] Procedure(s) (LRB): ENDOSCOPIC RETROGRADE CHOLANGIOPANCREATOGRAPHY (ERCP) with biliary sphincterotomy, biliary stone removal, biliary stent placement (N/A) 4 Days Post-Op Precautions: aspiration ASSESSMENT : 
Based on the objective data described below, the patient presents with overall functional oropharyngeal swallow. Prolonged mastication of diced fruit with reports of it being too hard but complete clearance. Pharyngeal swallow WFL. No s/s aspiration with successive straw sips of thin, puree or diced fruit. Patient gravitating toward softer foods. Discussed mechanical soft option which patient and family in agreement with. Placed preferences for softer items. Skilled acute therapy provided by a speech-language pathologist is not indicated at this time. PLAN : 
Recommendations: 
Diet as tolerated given goal is comfort/Hospice. Changing to mechanical soft diet/ thin liquids per patient preference Discharge Recommendations: None SUBJECTIVE:  
Patient stated i'll take some ice cream. Son at bedside reporting improved swallowing compared to yesterday OBJECTIVE:  
 
Past Medical History:  
Diagnosis Date  Arthritis KNEES  Atrial fibrillation ([address] 10/29/2009  Bladder cancer ([address]  CAD (coronary artery disease) STENT PER PATIENT  Chronic pain KNEES  
 Coagulation disorder ([address] Chronic prophylactic anticoagulation med  Colon polyps  Diabetes ([address] IDDM  
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 One fragment suggestive of hyperplastic polyp  GERD (gastroesophageal reflux disease)  Gout  Heart valve problem leaking heart valve  Hypercholesterolemia  Hypertension  Hypothyroidism  Hypothyroidism 4/23/2009  Hypothyroidism, acquired, autoimmune 11/23/2015  Overweight and obesity  PUD (peptic ulcer disease) 1990'S  S/P ablation of atrial flutter[V45.89HM] 2009  
 @ [facility] >> atrial fibrillation  SOB (shortness of breath) on exertion 04/2019  
 T. I.A. 4/23/2009  TIA (transient ischemic attack)  Ulcer of right lower extremity, limited to breakdown of skin ([address] 7/5/2018 Past Surgical History:  
Procedure Laterality Date  CARDIAC SURG PROCEDURE UNLIST    
 ablation  CARDIAC SURG PROCEDURE UNLIST  01/2020 Watchman device implant  COLONOSCOPY N/A 2/20/2019 COLONOSCOPY performed by Alberto Otto MD at [facility] ENDOSCOPY  COLONOSCOPY N/A 6/17/2019 COLONOSCOPY performed by Alberto Otto MD at [facility] ENDOSCOPY  COLONOSCOPY N/A 6/15/2019 COLONOSCOPY performed by Alberto Otto MD at [facility] MAIN OR  
 COLONOSCOPY N/A 9/11/2019 COLONOSCOPY performed by Alberto Otto MD at [facility] ENDOSCOPY  COLONOSCOPY N/A 10/14/2020 COLONOSCOPY performed by Jayda Hoover MD at [facility] ENDOSCOPY  COLONOSCOPY,DIAGNOSTIC  2/20/2019  COLONOSCOPY,FLEX,W/CONTROL, BLEEDING  9/11/2019  COLONOSCOPY,REMV LESN,SNARE  6/17/2019  GI TRACT CAPSULE ENDOSCOPY  6/28/2019  GI TRACT CAPSULE ENDOSCOPY  9/26/2019  HX APPENDECTOMY  HX CHOLECYSTECTOMY  HX HEART VALVE SURGERY  04/2019  HX HYSTERECTOMY  HX KNEE ARTHROSCOPY  [id number]  
 right knee  HX ORTHOPAEDIC    
 HX UROLOGICAL RENAL STENT, tur-b  
 IR INSERT NON TUNL CVC OVER 5 YRS  11/12/2020  SD ESOPHAGOGASTRODUODENOSCOPY TRANSORAL DIAGNOSTIC  8/22/2019  
    
 SIGMOIDOSCOPY,DIAGNOSTIC  6/15/2019  UPPER GI ENDOSCOPY,BALL DIL,30MM  6/15/2019  UPPER GI ENDOSCOPY,TUMOR ABLATN  6/19/2019  VASCULAR SURGERY PROCEDURE UNLIST  11/4  
 removed vein in right leg Prior Level of Function/Home Situation:  
Home Situation Home Environment: Private residence # Steps to Enter: 0 One/Two Story Residence: One story Living Alone: Yes Support Systems: Child(veronica) Patient Expects to be Discharged to[de-identified] Private residence Current DME Used/Available at Home: Shower chair, Walker, rollator, [address], [place], straight, Wheelchair, Grab bars Tub or Shower Type: Shower Diet prior to admission: reg/thin Current Diet:  Reg/thin  
Cognitive and Communication Status: 
Neurologic State: Alert Orientation Level: Disoriented to situation, Oriented to place, Oriented to person, Disoriented to time Cognition: Follows commands Perception: Appears intact Perseveration: No perseveration noted Safety/Judgement: Decreased awareness of need for safety P.O. Trials: 
Patient Position: (up in bed) Vocal quality prior to P.O.: No impairment Consistency Presented: Thin liquid; Solid Bolus Acceptance: No impairment Bolus Formation/Control: Impaired Type of Impairment: Delayed;Mastication Propulsion: Delayed (# of seconds) Oral Residue: None Initiation of Swallow: No impairment Laryngeal Elevation: Functional 
Aspiration Signs/Symptoms: None Pharyngeal Phase Characteristics: No impairment, issues, or problems Effective Modifications: None Oral Phase Severity: Mild Pharyngeal Phase Severity : No impairment NOMS:  
The NOMS functional outcome measure was used to quantify this patient's level of swallowing impairment. Based on the NOMS, the patient was determined to be at level 5 for swallow function NOMS Swallowing Levels: 
Level 1 (CN): NPO Level 2 (CM): NPO but takes consistency in therapy Level 3 (CL): Takes less than 50% of nutrition p.o. and continues with nonoral feedings; and/or safe with mod cues; and/or max diet restriction Level 4 (CK): Safe swallow but needs mod cues; and/or mod diet restriction; and/or still requires some nonoral feeding/supplements Level 5 (CJ): Safe swallow with min diet restriction; and/or needs min cues Level 6 (CI): Independent with p.o.; rare cues; usually self cues; may need to avoid some foods or needs extra time Level 7 (CH): Independent for all p.o. OSWALD. (2003). National Outcomes Measurement System (NOMS): Adult Speech-Language Pathology User's Guide. Pain: 
Pain Scale 1: Numeric (0 - 10) Pain Intensity 1: 0 Pain Location 1: Leg;Buttocks After treatment:  
Patient left in no apparent distress in bed, Call bell within reach, Nursing notified and Caregiver / family present COMMUNICATION/EDUCATION:  
 
The patient's plan of care including recommendations, planned interventions, and recommended diet changes were discussed with: Registered nurse. Thank you for this referral. 
JYOTI Allen Time Calculation: 15 mins

## 2020-11-16 NOTE — PROGRESS NOTES
Gastroenterology Progress Note NEVAEH Leon 
 for Dr. Raffi Mahoney 11/16/2020 Admit Date: 11/9/2020 Subjective: Follow up for:  1) Elevated LFTs 2) Sepsis  
`  3) UTI Patient was seen during morning rounds, resting comfortably. Patient is on mechanical soft diet. No N/V. No abdominal pain, just burning pain in left leg. Family meeting took place with hospitalist and they have decided to go with hospice. LFT's and kideny function have failed to improve. Results for Bentley Rand (MRN [id number]) as of 11/16/2020 12:49 Ref. Range 11/14/2020 01:53 11/15/2020 01:37 11/16/2020 04:28 Bilirubin, total Latest Ref Range: 0.2 - 1.0 MG/DL 13.9 (H) 15.6 (H) 14.6 (H) Bilirubin, direct Latest Ref Range: 0.0 - 0.2 MG/DL   10.3 (H) Protein, total Latest Ref Range: 6.4 - 8.2 g/dL 5.5 (L) 5.8 (L) 5.3 (L) Albumin Latest Ref Range: 3.5 - 5.0 g/dL 2.2 (L) 2.2 (L) 1.5 (L) Globulin Latest Ref Range: 2.0 - 4.0 g/dL 3.3 3.6 3.8 A-G Ratio Latest Ref Range: 1.1 - 2.2   0.7 (L) 0.6 (L) 0.4 (L) ALT Latest Ref Range: 12 - 78 U/L 150 (H) 147 (H) 113 (H) AST Latest Ref Range: 15 - 37 U/L 250 (H) 252 (H) 166 (H) Alk. phosphatase Latest Ref Range: 45 - 117 U/L 362 (H) 402 (H) 323 (H) Lipase Latest Ref Range: 73 - 393 U/L 158 170 201 ERCP on 11/12 by Dr. Tom Lozano with sphincterotomy (evidance of remote sphincterotomy) and sludge extraction and stent placement Current Facility-Administered Medications Medication Dose Route Frequency  prochlorperazine (COMPAZINE) with saline injection 5 mg  5 mg IntraVENous Q6H PRN  
 epoetin hali-epbx (RETACRIT) injection 10,000 Units  10,000 Units SubCUTAneous Q MON, WED & FRI  phytonadione (vitamin K1) (AQUA-MEPHYTON) 10 mg in 0.9% sodium chloride 50 mL IVPB  10 mg IntraVENous DAILY  clopidogreL (PLAVIX) tablet 75 mg  75 mg Oral DAILY  insulin lispro (HUMALOG) injection   SubCUTAneous AC&HS  
  sodium chloride (NS) flush 5-40 mL  5-40 mL IntraVENous Q8H  
 sodium chloride (NS) flush 5-40 mL  5-40 mL IntraVENous PRN  
 simethicone (MYLICON) 87YP/6.1FY oral drops 80 mg  1.2 mL Oral Multiple  sodium chloride (NS) flush 5-40 mL  5-40 mL IntraVENous Q8H  
 sodium chloride (NS) flush 5-40 mL  5-40 mL IntraVENous PRN  
 0.9% sodium chloride infusion 25 mL  25 mL IntraVENous PRN  
 amiodarone (CORDARONE) tablet 100 mg  100 mg Oral DAILY  insulin NPH (NOVOLIN N, HUMULIN N) injection 3 Units  3 Units SubCUTAneous QPM  
 insulin NPH (NOVOLIN N, HUMULIN N) injection 5 Units  5 Units SubCUTAneous QAM  
 levothyroxine (SYNTHROID) tablet 125 mcg  125 mcg Oral 6am  
 pantoprazole (PROTONIX) 40 mg in 0.9% sodium chloride 10 mL injection  40 mg IntraVENous DAILY  sodium chloride (NS) flush 5-40 mL  5-40 mL IntraVENous Q8H  
 sodium chloride (NS) flush 5-40 mL  5-40 mL IntraVENous PRN  
 ondansetron (ZOFRAN) injection 4 mg  4 mg IntraVENous Q6H PRN  
 glucose chewable tablet 16 g  4 Tab Oral PRN  
 dextrose (D50W) injection syrg 12.5-25 g  12.5-25 g IntraVENous PRN  
 glucagon (GLUCAGEN) injection 1 mg  1 mg IntraMUSCular PRN  
 morphine injection 1 mg  1 mg IntraVENous Q6H PRN Objective:  
 
Blood pressure (!) 117/35, pulse 75, temperature 98 °F (36.7 °C), resp. rate 18, height 5' 9\" (1.753 m), weight 92.3 kg (203 lb 7.8 oz), SpO2 98 %. No intake/output data recorded. 11/14 1901 - 11/16 0700 In: 330 [P.O.:180; I.V.:150] Out: 80 [Urine:80] EXAM: 
GEN: Elderly pleasent WF, jaundice, NAD Data Review Recent Results (from the past 24 hour(s)) GLUCOSE, POC Collection Time: 11/15/20  4:11 PM  
Result Value Ref Range Glucose (POC) 155 (H) 65 - 100 mg/dL Performed by Jessica Anton ECHO ADULT COMPLETE Collection Time: 11/15/20  4:32 PM  
Result Value Ref Range IVSd 2.04 (A) 0.6 - 0.9 cm IVSs 2.66 cm  
 LVIDd 3.08 (A) 3.9 - 5.3 cm LVIDs 1.67 cm LVOT d 2.07 cm  
 LVPWd 1.70 (A) 0.6 - 0.9 cm  
 LVPWs 1.88 cm  
 LVOT Cardiac Output 7.78 liter/minute LVOT Peak Gradient 11.34 mmHg LVOT Peak Gradient 11.28 mmHg Left Ventricular Outflow Tract Mean Gradient 4.56 mmHg LVOT .1 mL  
 LVOT Peak Velocity 167.96 cm/s LVOT Peak Velocity 168.41 cm/s LVOT VTI 30.57 cm RVIDd 3.42 cm RVSP 66.53 mmHg Left Atrium Major Axis 4.28 cm  
 LA Volume 83.96 22 - 52 mL  
 LA Area 4C 26.76 cm2 LA Vol 2C 71.26 (A) 22 - 52 mL  
 LA Vol 4C 84.13 (A) 22 - 52 mL Est. RA Pressure 10.00 mmHg Aortic Valve Area by Continuity of Peak Velocity 2.48 cm2 Aortic Valve Area by Continuity of Peak Velocity 2.49 cm2 Aortic Valve Area by Continuity of Peak Velocity 2.51 cm2 Aortic Valve Area by Continuity of Peak Velocity 2.50 cm2 Aortic Valve Area by Continuity of VTI 2.32 cm2 AoV PG 20.49 mmHg AoV PG 20.79 mmHg Aortic Valve Systolic Mean Gradient 50.06 mmHg Aortic Valve Systolic Peak Velocity 973.22 cm/s Aortic Valve Systolic Peak Velocity 797.40 cm/s AoV VTI 44.44 cm  
 LV E' Lateral Velocity 5.76 cm/s LV E' Septal Velocity 4.91 cm/s Mitral Valve Pressure Half-time 119.50 ms  
 MVA (PHT) 1.84 cm2 Pulmonic Valve Systolic Peak Instantaneous Gradient 2.62 mmHg Pulmonic Valve Max Velocity 80.90 cm/s Triscuspid Valve Regurgitation Peak Gradient 56.53 mmHg Triscuspid Valve Regurgitation Peak Gradient 61.88 mmHg TV MG 38.85 mmHg  
 TR Max Velocity 375.92 cm/s  
 TR Max Velocity 393.32 cm/s Tricuspid Valve Regurgitation Velocity Time Interval 125.98 cm Ao Root D 2.69 cm  
 LV Mass .2 67 - 162 g  
 LV Mass AL Index 115.4 43 - 95 g/m2 Left Atrium Minor Axis 2.06 cm  
 LA Vol Index 40.35 16 - 28 ml/m2 LA Vol Index 34.25 16 - 28 ml/m2 LA Vol Index 40.43 16 - 28 ml/m2 DEANGELO/BSA VTI 1.1 cm2/m2 GLUCOSE, POC Collection Time: 11/15/20  8:52 PM  
Result Value Ref Range Glucose (POC) 199 (H) 65 - 100 mg/dL Performed by [facility] RODGER   
LIPASE Collection Time: 11/16/20  4:28 AM  
Result Value Ref Range Lipase 201 73 - 393 U/L  
CBC WITH AUTOMATED DIFF Collection Time: 11/16/20  4:28 AM  
Result Value Ref Range WBC 15.4 (H) 3.6 - 11.0 K/uL  
 RBC 3.22 (L) 3.80 - 5.20 M/uL HGB 7.6 (L) 11.5 - 16.0 g/dL HCT 24.0 (L) 35.0 - 47.0 % MCV 74.5 (L) 80.0 - 99.0 FL  
 MCH 23.6 (L) 26.0 - 34.0 PG  
 MCHC 31.7 30.0 - 36.5 g/dL RDW 22.5 (H) 11.5 - 14.5 % PLATELET 835 797 - 173 K/uL MPV ABNORMAL 8.9 - 12.9 FL  
 NRBC 0.0 0  WBC ABSOLUTE NRBC 0.00 0.00 - 0.01 K/uL NEUTROPHILS 89 (H) 32 - 75 % LYMPHOCYTES 5 (L) 12 - 49 % MONOCYTES 5 5 - 13 % EOSINOPHILS 0 0 - 7 % BASOPHILS 0 0 - 1 % IMMATURE GRANULOCYTES 1 (H) 0.0 - 0.5 % ABS. NEUTROPHILS 13.6 (H) 1.8 - 8.0 K/UL  
 ABS. LYMPHOCYTES 0.8 0.8 - 3.5 K/UL  
 ABS. MONOCYTES 0.8 0.0 - 1.0 K/UL  
 ABS. EOSINOPHILS 0.0 0.0 - 0.4 K/UL  
 ABS. BASOPHILS 0.0 0.0 - 0.1 K/UL  
 ABS. IMM. GRANS. 0.2 (H) 0.00 - 0.04 K/UL  
 DF AUTOMATED    
 RBC COMMENTS ANISOCYTOSIS 3+ 
    
 RBC COMMENTS TARGET CELLS 
PRESENT 
    
 RBC COMMENTS OVALOCYTES PRESENT 
    
 RBC COMMENTS HYPOCHROMIA 2+ METABOLIC PANEL, COMPREHENSIVE Collection Time: 11/16/20  4:28 AM  
Result Value Ref Range Sodium 130 (L) 136 - 145 mmol/L Potassium 5.3 (H) 3.5 - 5.1 mmol/L Chloride 100 97 - 108 mmol/L  
 CO2 20 (L) 21 - 32 mmol/L Anion gap 10 5 - 15 mmol/L Glucose 139 (H) 65 - 100 mg/dL BUN 62 (H) 6 - 20 MG/DL Creatinine 4.21 (H) 0.55 - 1.02 MG/DL  
 BUN/Creatinine ratio 15 12 - 20 GFR est AA 12 (L) >60 ml/min/1.73m2 GFR est non-AA 10 (L) >60 ml/min/1.73m2 Calcium 9.3 8.5 - 10.1 MG/DL Bilirubin, total 14.6 (H) 0.2 - 1.0 MG/DL  
 ALT (SGPT) 113 (H) 12 - 78 U/L  
 AST (SGOT) 166 (H) 15 - 37 U/L Alk. phosphatase 323 (H) 45 - 117 U/L Protein, total 5.3 (L) 6.4 - 8.2 g/dL Albumin 1.5 (L) 3.5 - 5.0 g/dL Globulin 3.8 2.0 - 4.0 g/dL A-G Ratio 0.4 (L) 1.1 - 2.2 BILIRUBIN, DIRECT Collection Time: 11/16/20  4:28 AM  
Result Value Ref Range Bilirubin, direct 10.3 (H) 0.0 - 0.2 MG/DL PROTHROMBIN TIME + INR Collection Time: 11/16/20  4:28 AM  
Result Value Ref Range INR 1.4 (H) 0.9 - 1.1 Prothrombin time 14.5 (H) 9.0 - 11.1 sec GLUCOSE, POC Collection Time: 11/16/20  8:15 AM  
Result Value Ref Range Glucose (POC) 152 (H) 65 - 100 mg/dL Performed by Veronika Wagner Recent Labs 11/16/20 
0428 11/15/20 
6195 WBC 15.4* 14.2* HGB 7.6* 7.5* HCT 24.0* 23.7*  
 196 Recent Labs 11/16/20 
0428 11/15/20 
0137 11/14/20 
0153 * 135* 138  
K 5.3* 4.9 4.4  
 99 100 CO2 20* 29 31 BUN 62* 41* 51* CREA 4.21* 3.26* 3.62* * 117* 120* CA 9.3 9.2 9.3 MG  --   --  2.3 PHOS  --   --  3.6 Recent Labs 11/16/20 
0428 11/15/20 
0137 11/14/20 
0153 * 147* 150* * 402* 362* TBILI 14.6* 15.6* 13.9* TP 5.3* 5.8* 5.5* ALB 1.5* 2.2* 2.2*  
GLOB 3.8 3.6 3.3 LPSE 201 170 158 Recent Labs 11/16/20 0428 11/14/20 0153 INR 1.4* 1.5* PTP 14.5* 15.6* No results for input(s): FE, TIBC, PSAT, FERR in the last 72 hours. Lab Results Component Value Date/Time Folate 72.3 (H) 01/22/2017 03:13 AM  
  
No results for input(s): PH, PCO2, PO2 in the last 72 hours. No results for input(s): CPK, CKNDX, TROIQ in the last 72 hours. No lab exists for component: CPKMB Lab Results Component Value Date/Time Cholesterol, total 256 (H) 07/13/2020 08:02 AM  
 HDL Cholesterol 58 07/13/2020 08:02 AM  
 LDL, calculated 169 (H) 07/13/2020 08:02 AM  
 Triglyceride 143 07/13/2020 08:02 AM  
 CHOL/HDL Ratio 3.1 09/22/2010 08:12 AM  
 
Lab Results Component Value Date/Time  Glucose (POC) 152 (H) 11/16/2020 08:15 AM  
 Glucose (POC) 199 (H) 11/15/2020 08:52 PM  
 Glucose (POC) 155 (H) 11/15/2020 04:11 PM  
 Glucose (POC) 136 (H) 11/15/2020 12:38 PM  
 Glucose (POC) 136 (H) 11/15/2020 07:49 AM  
 
Lab Results Component Value Date/Time Color ORANGE 11/09/2020 02:22 PM  
 Appearance TURBID (A) 11/09/2020 02:22 PM  
 Specific gravity 1.016 11/09/2020 02:22 PM  
 Specific gravity 1.020 07/06/2010 01:10 PM  
 pH (UA) 5.5 11/09/2020 02:22 PM  
 Protein 100 (A) 11/09/2020 02:22 PM  
 Glucose Negative 11/09/2020 02:22 PM  
 Ketone TRACE (A) 11/09/2020 02:22 PM  
 Bilirubin Negative 10/10/2020 10:51 PM  
 Urobilinogen 1.0 11/09/2020 02:22 PM  
 Nitrites Positive (A) 11/09/2020 02:22 PM  
 Leukocyte Esterase LARGE (A) 11/09/2020 02:22 PM  
 Epithelial cells FEW 11/09/2020 02:22 PM  
 Bacteria 3+ (A) 11/09/2020 02:22 PM  
 WBC >100 (H) 11/09/2020 02:22 PM  
 RBC  11/09/2020 02:22 PM  
 
MRI Results (most recent): 
Results from Hospital Encounter encounter on 11/09/20 MRI ABD W MRCP WO CONT Narrative INDICATION: Jaundice. Abnormal ultrasound. COMPARISON: Right upper quadrant abdominal ultrasound from November 9, 2020. Abdomen CT from September 10, 2019. TECHNIQUE: Routine axial and coronal MR sequences were obtained through the 
abdomen. MRCP sequences were included but are nondiagnostic secondary to motion 
artifact. FINDINGS:  
LIVER: Normal size and signal. No mass. GALLBLADDER: Surgically absent. COMMON BILE DUCT: 9 mm in diameter. Apparent pneumobilia within the nondependent 
portions. No visualized choledocholithiasis. PANCREATIC DUCT: Not visualized secondary to motion artifact. PANCREAS: No mass or fluid collection. Mild peripancreatic edema. SPLEEN: Enlarged, measuring 17.6 cm in length. No mass. ADRENALS: No mass. KIDNEYS: Small right kidney. No hydronephrosis. 1.5 cm and 0.7 cm right renal 
cysts. RETROPERITONEUM: No mass or lymphadenopathy. No aortic aneurysm. BOWEL: No focal abnormality. OTHER: Small right pleural effusion Impression IMPRESSION: Pneumobilia within the mildly dilated common bile duct. No 
visualized choledocholithiasis. Peripancreatic edema suggests acute 
pancreatitis. Splenomegaly. Small right kidney with small right renal cysts. Small right pleural effusion. Assessment:  
· Elevated LFTs, most concerning for hyperbilirubinemia. Suspicion is for underlying chronic liver disease with acute exacerbation/uncovering by sepsis. Work up pending. · ERCP with biliary sphincterotomy and CBC sludge removal 11/12 · Leukocytosis/sepsis 2/2 E.coli UTI, bioprosthetic MV, afib s/p ablation, s/p watchman, CAD s/p stent, CKD IV 
· Hypoalbuminemia (2.2)  
  
   
Plan:   
I had a discussion with patient and her son at bedside. Dr. Dotty Giron discussed the case with Dr. Alfonso Peoples on Friday and he suspects chronic liver disaese (?nafld) with decompensation due to sepsis, uti, volume and possibly right heart failure. Patient/family not interested in liver bx. Hospice seems reasonable. We will sign off. Call with GI questions or concerns. NEVAEH Lawrence 
11/16/20 
12:51 PM  
 
[address], [address] [phone] Loc: [phone]

## 2020-11-17 PROBLEM — N39.0 SEPSIS DUE TO GRAM-NEGATIVE UTI (HCC): Status: ACTIVE | Noted: 2020-01-01

## 2020-11-17 PROBLEM — A41.50 SEPSIS DUE TO GRAM-NEGATIVE UTI (HCC): Status: ACTIVE | Noted: 2020-01-01

## 2020-11-17 NOTE — PROGRESS NOTES
OSMEL Plan: 
Disposition- Home to [address], [address] [facility] to open for service 11/17 [facility] scheduled for 9:40AM  2nd IM received 11/16 
  
CM aware of discharge order. CM contacted [facility] and confirmed that they will open pt to services today. SW to meet at pt's home address at 10AM and nurse will arrive shortly after. CM contacted [facility] dispatch and confirmed that they will arrive at 9:40AM today for pt transportation home. PCS form, H&P, and facesheet in chart. DDNR form completed by NP and copy provided to family at bedside. Original copy placed into chart. Pt's son and daughter at bedside and agreeable to d/c plan. No questions/concerns voiced at this time. Pt is ready for d/c from a CM standpoint. Assigned RN informed. Care Management Interventions PCP Verified by CM: Yes Mode of Transport at Discharge: [facility] Hospital Transport Time of Discharge: 8001 Transition of Care Consult (CM Consult): Discharge Planning, Home Hospice [facility]: No 
Reason Outside [place]: (Pt/family choice ) Discharge Durable Medical Equipment: No 
Physical Therapy Consult: Yes Occupational Therapy Consult: Yes Speech Therapy Consult: Yes Current Support Network: Own Home, Family Lives [place], Lives Alone Confirm Follow Up Transport: Family The Plan for Transition of Care is Related to the Following Treatment Goals : Home with [facility] The Patient and/or Patient Representative was Provided with a Choice of Provider and Agrees with the Discharge Plan?: Yes Name of the Patient Representative Who was Provided with a Choice of Provider and Agrees with the Discharge Plan: Patient/ Mr. Winnie Mendoza [place] of Choice List was Provided with Basic Dialogue that Supports the Patient's Individualized Plan of Care/Goals, Treatment Preferences and Shares the Quality Data Associated with the Providers?: Yes 
 [employer] Information Provided?: No 
Discharge Location Discharge Placement: Home with hospice([facility] ) Marlan Bernheim, MSW Care Manager, [address] [facility] 
[phone]

## 2020-11-17 NOTE — PROGRESS NOTES
Oncology End of Shift Note Bedside shift change report given to [address] Morris Tsang RN (incoming nurse) by [facility]esteros [facility] (outgoing [facility] Report included the following information SBAR, Kardex and MAR. Shift Summary: PT has been stable through shift, meds given according to mar, prn zofran was given four nauseasness, prn pain med given for pain, wound care done on sacrum, pt was turned ev 4 hrs, dressing on arms rebandaged. [place] care done Vonda Ballesteros, RN

## 2020-11-17 NOTE — DISCHARGE SUMMARY
I reviewed pertinent labs and imaging, and discussed /agreed on the plan of care with Dr. Juan Carlos Boyer. Hospitalist Discharge Summary Patient ID: Governor Luis [id number] 
11 y.o. 
1935 11/9/2020 PCP on record: Iliana Wolf MD 
 
Admit date: 11/9/2020 Discharge date and time: 11/17/2020 DISCHARGE DIAGNOSIS: 
 
Active Hospital Problems Diagnosis Date Noted  Sepsis due to gram-negative UTI ([facility] 11/17/2020  Renal failure 11/15/2020  Elevated LFTs 11/09/2020  Common bile duct dilation 11/09/2020  Cholangitis 11/09/2020  Chronic kidney disease 10/10/2020  Presence of Watchman left atrial appendage closure device 02/19/2020  Paroxysmal A-fib ([facility] 09/09/2019  GAVE (gastric antral vascular ectasia) 06/19/2019  
  bx 6.2019:   
 Gastric, biopsy:  
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 One fragment suggestive of hyperplastic polyp  S/P MVR (mitral valve replacement) 04/22/2019 MITRAL VALVE REPLACEMENT/WITH 25MM BIOPROSTHESIS  
  
 Heart failure ([facility] 01/20/2017  S/P MVR (mitral valve repair) 06/10/2016 TMVR  Hypothyroidism, acquired, autoimmune 11/23/2015  Chronic atrial fibrillation ([facility] 08/20/2015  Type 2 diabetes mellitus with diabetic chronic kidney disease ([facility] 05/26/2015  Gout 04/26/2011  Essential hypertension, benign 05/18/2010 CONSULTATIONS: 
IP CONSULT TO GASTROENTEROLOGY 
IP CONSULT TO NEPHROLOGY Excerpted HPI from H&P of Shelbie Andrews MD: 
CHIEF COMPLAINT: Abdominal pain, nausea vomiting and jaundice 
  
HISTORY OF PRESENT ILLNESS:    
Les Blanchard is a 82 y.o.    female past medical history of CHF, a flutter status post ablation, hypertension, CKD stage IV, diabetes mellitus who presents with nausea vomiting of nonbloody emesis associated with epigastric pain since yesterday and was found to be jaundiced. Patient was recently discharged from the hospital after being treated for CHF exacerbation. In the ED, her blood pressure was soft, she was tachycardic but afebrile Her labs revealed elevated white blood cell count, BMP showed hyponatremia, abnormal LFTs elevated T bili, elevated lipase. BMP showed acute onchronic kidney injury . her UA was concerning for UTI Liver ultrasound showed: 
Echogenic debris versus pneumobilia in the common bile duct. No evidence of 
intrahepatic biliary dilatation. Status post cholecystectomy. Unchanged 1 cm 
echogenic hepatic mass, compatible with hemangioma. Small, echogenic right 
kidney, compatible with chronic medical renal disease 
 
______________________________________________________________________ DISCHARGE SUMMARY/HOSPITAL COURSE:  for full details see H&P, daily progress notes, labs, consult notes. aAron ramachandran. o. Female  was admitted to [facility] on 11/9/2020 and treated for the following medical complaints:  
 
Severe Sepsis secondary to UTI WBC 12.9, HR 11 on admission Abnormal LFTs with Elevated Total Bilirubin Total bili 8.8, , , Lipase 505 Jaundice Pneumobilia on US 
· MRI Abd MRCP 11/9 results - Pneumobilia within the mildly dilated common bile duct. No visualized choledocholithiasis. Peripancreatic edema suggests acute pancreatitis. Splenomegaly. Small right kidney with small right renal cysts. Small right pleural effusion. · US of Abd 11/9 results - Echogenic debris vs. Pneumobilia in the common bile duct. No evidence of intrahepatic biliary dilatation. Status post cholecystectomy. Unchanged 1 cm echogenic hepatic mass, compatible with hemangioma. Small, echogenic right kidney, compatible with chronic medical renal disease.  
· Follow blood cultures - NO GROWTH in 5 days  
· Follow urine cultures POSITIVE for E COLI - Final  
 · Continue IV ceftriaxone - completed 7 days   
· Appreciate GI input · Patient has said she would not want a liver biopsy  
· Total bili essentially the same - total bili 14.6 · ALT 113, AST 166, alk phos 323 and lipase 201  
· Ammonia level <10  
· So far Hepatitis studies NEGATIVE · Awaiting further liver studies - pending 
  
Renal Failure in Setting of CKD stage 4 Baseline Cr 1.7 Fluid Volume Overload r/t Renal Failure Anasarca  
· Cr 4.21 today - for HD today · C/o nausea  
· Continue zofran, add phenergan · Appreciate Nephrology input ·  no more HD moving forward · Albumin 1.5, now with anasarca · Give another dose IV albumin  · Avoid nephrotoxic medications  
  
Weakness and Debility - stable Failure to Thrive  
· Another discussion held with patient and son Mckinley Ball at bedside this AM. Patient has elected to pursue hospice. Family in agreement and wish to take patient home. · Continue nutritional supplements · Appreciate Speech Therapy input - Mechanical soft diet  
  
Chronic Diastolic CHF - not in exacerbation History of Severe Mitral Stenosis S/p Medtronic Fregoso Bioprosthetic MV 4/2019 Chronic Atrial Fibrillation S/p Watchman Procedure 2/2020 History of Atrial Flutter S/p Ablation Sinus Bradycardia History of Coronary Artery Disease Hypertension · ECHO 11/15 results - EF >70%. Mild concentric hypertrophy. Dilated LA, Mildly dilated right ventricle. Reduced systolic function. Dilated RA. MV is prosthetic. Mild-Moderate tricuspid valve regurgitation. Moderate pulmonary hypertension. · Atrial Fibrillation is rate controlled · Continue amiodarone and plavix · Hold bumex  
  
Insulin Dependant Type I Diabetes · Continue NPH  
  
Gout Hold allopurinol renal failure Hypothyroidism Continue levothyroxine GERD Continue protonix  
Annalee Magnolia 
 
 
 
_______________________________________________________________________ Patient seen and examined by me on discharge day. Pertinent Findings: 
Gen:    Not in distress Chest: Clear lungs CVS:   Regular rhythm. No edema Abd:  Soft, not distended, not tender Neuro:  Alert, Oriented x2 _______________________________________________________________________ DISCHARGE MEDICATIONS:  
Current Discharge Medication List  
  
CONTINUE these medications which have CHANGED Details  
!! insulin NPH (HumuLIN N NPH Insulin KwikPen) 100 unit/mL (3 mL) inpn 3 Units by SubCUTAneous route every evening for 30 days. Qty: 0.9 mL, Refills: 0  
  
!! insulin NPH (HumuLIN N NPH Insulin KwikPen) 100 unit/mL (3 mL) inpn 5 Units by SubCUTAneous route every morning. Qty: 15 Adjustable Dose Pre-filled Pen Syringe, Refills: 3  
  
 !! - Potential duplicate medications found. Please discuss with provider. CONTINUE these medications which have NOT CHANGED Details  
amiodarone (PACERONE) 100 mg tablet Take 1 Tab by mouth daily for 60 days. Qty: 30 Tab, Refills: 1  
  
atorvastatin (LIPITOR) 80 mg tablet TAKE ONE TABLET BY MOUTH EVERY EVENING Qty: 90 Tab, Refills: 3 Associated Diagnoses: Hypercholesteremia  
  
levothyroxine (SYNTHROID) 125 mcg tablet TAKE ONE TABLET BY MOUTH DAILY BEFORE BREAKFAST Qty: 90 Tab, Refills: 0  
  
sucralfate (CARAFATE) 1 gram tablet Take 1 g by mouth four (4) times daily. acetaminophen (TYLENOL) 325 mg tablet Take 325 mg by mouth every four (4) hours as needed for Pain. ondansetron (ZOFRAN ODT) 4 mg disintegrating tablet Take 1 Tab by mouth every eight (8) hours as needed for Nausea or Vomiting. Qty: 30 Tab, Refills: 1  
  
loratadine (CLARITIN) 10 mg tablet Take 10 mg by mouth daily as needed for Allergies. neomycin sulf/bacitracin/poly (NEOSPORIN EX) by Apply Externally route daily as needed (wound care). potassium chloride SR (KLOR-CON 10) 10 mEq tablet Take 1 Tab by mouth daily. Qty: 90 Tab, Refills: 3  
  
pantoprazole (PROTONIX) 40 mg tablet TAKE 1 TABLET BY MOUTH DAILY Qty: 90 Tab, Refills: 3 STOP taking these medications  
  
 bumetanide (BUMEX) 2 mg tablet Comments:  
Reason for Stopping:   
   
 allopurinoL (ZYLOPRIM) 100 mg tablet Comments:  
Reason for Stopping:   
   
 clopidogreL (PLAVIX) 75 mg tab Comments:  
Reason for Stopping:   
   
  
 
 
 
Patient Follow Up Instructions: Activity: Activity as tolerated Diet: Comfort feeding Wound Care: None needed Follow-up with Hospice  in 1 day. Follow-up tests/labs [facility] to avoid frequent ED visits. [facility] can treat; pains, sprains, cuts, wounds, high fevers, upper respiratory infections and much more. There medical team is equipped with all the tools necessary to provide advanced medical care in the comfort of you home, workplace, or location of need. The medical team consists of doctors, nurse practitioners, and EMTs. [facility] is available 7 days per week 9 am to 9 pm.   Request care by calling [phone] or by going online at Last 2 Left unar Follow-up Information Follow up With Specialties Details Why Contact Info [address] [facility]   [address] [place] [address] [address] [facility] [address] 
[phone] Jackelyn Marshall MD Internal Medicine   [address] [facility] [address] [address] [phone] 
  
  
 
________________________________________________________________ Risk of deterioration: Moderate Condition at Discharge:  Stable 
__________________________________________________________________ Disposition Home with hospice services 
 
____________________________________________________________________ Code Status: DNR/DNI 
___________________________________________________________________ Total time in minutes spent coordinating this discharge (includes going over instructions, follow-up, prescriptions, and preparing report for sign off to her PCP) :  >30 minutes Signed: 
Tammi Saab, NP

## 2020-11-17 NOTE — PROGRESS NOTES
Patient discharged home with Hospice [facility]). [facility] transported with daughter at bedside. NP Rhea stated patient to discharge with tamayo and Upper right chest dialysis access. IV access removed and documented. Katlin Wang

## 2020-11-17 NOTE — PROGRESS NOTES
F/U for elevated liver tests S: Ms. Galen Parker was seen by me today during rounds. At this time, she is resting. She appears moribund. She cannot give me a history. The patient has no new complaints today. Please see admission consult for details of ROS; there are no other  changes today. I spoke with son O: Blood pressure (!) 124/33, pulse 73, temperature 98.1 °F (36.7 °C), resp. rate 17, height 5' 9\" (1.753 m), weight 92.3 kg (203 lb 7.8 oz), SpO2 97 %. Gen: Patient is in no acute distress. There is ++ jaundice. Lungs: Clear to auscultation bilaterally . Heart:+RRR. Abd: Soft, non tender, non-distended, bowel sounds present. Extremities: Warm. Cross sectional imaging: none new Lab Results Component Value Date/Time WBC 15.4 (H) 11/16/2020 04:28 AM  
 Hemoglobin (POC) 10.6 09/25/2019 03:51 PM  
 Hemoglobin (POC) 10.5 (L) 07/06/2010 10:11 PM  
 HGB 7.6 (L) 11/16/2020 04:28 AM  
 Hematocrit (POC) 31 (L) 07/06/2010 10:11 PM  
 HCT 24.0 (L) 11/16/2020 04:28 AM  
 PLATELET 899 50/15/6078 04:28 AM  
 MCV 74.5 (L) 11/16/2020 04:28 AM  
 
Lab Results Component Value Date/Time Sodium 130 (L) 11/16/2020 04:28 AM  
 Potassium 5.3 (H) 11/16/2020 04:28 AM  
 Chloride 100 11/16/2020 04:28 AM  
 CO2 20 (L) 11/16/2020 04:28 AM  
 Anion gap 10 11/16/2020 04:28 AM  
 Glucose 139 (H) 11/16/2020 04:28 AM  
 BUN 62 (H) 11/16/2020 04:28 AM  
 Creatinine 4.21 (H) 11/16/2020 04:28 AM  
 BUN/Creatinine ratio 15 11/16/2020 04:28 AM  
 GFR est AA 12 (L) 11/16/2020 04:28 AM  
 GFR est non-AA 10 (L) 11/16/2020 04:28 AM  
 Calcium 9.3 11/16/2020 04:28 AM  
 Bilirubin, total 14.6 (H) 11/16/2020 04:28 AM  
 Alk.  phosphatase 323 (H) 11/16/2020 04:28 AM  
 Protein, total 5.3 (L) 11/16/2020 04:28 AM  
 Albumin 1.5 (L) 11/16/2020 04:28 AM  
 Globulin 3.8 11/16/2020 04:28 AM  
 A-G Ratio 0.4 (L) 11/16/2020 04:28 AM  
 ALT (SGPT) 113 (H) 11/16/2020 04:28 AM  
 AST (SGOT) 166 (H) 11/16/2020 04:28 AM  
 
 
 % sat 8 A: Principal Problem: 
  Sepsis due to gram-negative UTI ([facility] (11/17/2020) Active Problems: 
  Essential hypertension, benign (5/18/2010) Gout (4/26/2011) Type 2 diabetes mellitus with diabetic chronic kidney disease ([address] (5/26/2015) Chronic atrial fibrillation ([address] (8/20/2015) Hypothyroidism, acquired, autoimmune (11/23/2015) S/P MVR (mitral valve repair) (6/10/2016) Overview: TMVR Heart failure ([address] (1/20/2017) S/P MVR (mitral valve replacement) (4/22/2019) Overview: MITRAL VALVE REPLACEMENT/WITH 25MM BIOPROSTHESIS  
 
  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   
     Gastric, biopsy:  
    Mild capillary ectasia and congestion, compatible with gastric antral  
    vascular ectasia (GAVE), negative for background gastritis. One fragment suggestive of hyperplastic polyp Paroxysmal A-fib ([facility] (9/9/2019) Presence of Watchman left atrial appendage closure device (2/19/2020) Chronic kidney disease (10/10/2020) Elevated LFTs (11/9/2020) Common bile duct dilation (11/9/2020) Cholangitis (11/9/2020) Renal failure (11/15/2020) Comment:  I reviewed the liver situation with the son P:  I agree with plans Bibi Pagan MD 
2:14 PM 
11/17/2020

## 2020-11-18 NOTE — PROGRESS NOTES
11-18-20: Care Transitions Nurse met with Remigio Chiang, with [address], by phone and confirmed that patient was admitted to their hospice services yesterday, 11-17-20. CTN will continue to monitor until end of Bundle period which is 1-21-21. Goals  Attends follow-up appointments as directed. 10-22-20: Patient has OSMEL appointment scheduled with Dr. Ai Robertson. Pablo/PCP on 10-23-20 and nephrology follow-up appointment scheduled with Dr. Emma Reed on 11-13-20. Daughter states she will discuss with Dr. Jimbo Desai when patient should have next cardio follow-up appointment, daughter cancelled cardio follow-up that was scheduled with Yaquelin Meeks NP today, 10-22-20. Family providing transportation. [facility]  
 
11-18-20: Patient was [facility] 11-9-20 to 11-17-20 and admitted to [facility], for hospice services, on 11-17-20. Kimberlee Trujillo Understands red flags post discharge. 10-22-20: Red flags of CHF reviewed with daughter and daughter verbalizes an understanding. Daughter reports patient has \"Open sores on both legs\" and daughter states the skin is weeping from bilateral knees down to bilateral ankles. Daughter states patient has wound on right hand and on left arm. Daughter reports patient becomes short of breath with exertion and patient has swelling in bilateral lower extremities, daughter states patient is utilizing elevation with success. Patient did not weigh today; however daughter states she will assist patient in weighing daily and recording home daily weights starting tomorrow, 10-23-20. Care Transitions Nurse reviewed the rules of weight gain with daughter: if patient should gain three pounds in twenty-four hours and/or five pounds in one week, patient's PCP and/or cardiologist should be contacted immediately. Daughter is able to provide teach back of weight rules and daughter verbalized an understanding.  Care Transitions Nurse will review red flags again on next phone conversation with daughter. Coretta De La Rosa  
 
11-18-20: Patient was readmitted to [facility] 11-9-20 to 11-17-20 and was [facility], for hospice services, on 11-17-20.  Coretta De La Rosa

## 2020-11-18 NOTE — Clinical Note
[facility] was admitted to [facility], for hospice services, on 11-17-20. Please see patient outreach dated 11-18-20.

## 2020-11-20 PROCEDURE — 3331090001 HH PPS REVENUE CREDIT

## 2020-11-20 PROCEDURE — 3331090002 HH PPS REVENUE DEBIT

## 2020-11-21 PROCEDURE — 3331090001 HH PPS REVENUE CREDIT

## 2020-11-21 PROCEDURE — 3331090002 HH PPS REVENUE DEBIT

## 2020-11-22 PROCEDURE — 3331090002 HH PPS REVENUE DEBIT

## 2020-11-22 PROCEDURE — 3331090001 HH PPS REVENUE CREDIT

## 2020-11-23 VITALS
OXYGEN SATURATION: 95 % | DIASTOLIC BLOOD PRESSURE: 60 MMHG | WEIGHT: 190 LBS | HEART RATE: 72 BPM | TEMPERATURE: 98 F | BODY MASS INDEX: 28.06 KG/M2 | SYSTOLIC BLOOD PRESSURE: 140 MMHG | RESPIRATION RATE: 20 BRPM

## 2020-11-23 PROCEDURE — 3331090002 HH PPS REVENUE DEBIT

## 2020-11-23 PROCEDURE — 3331090001 HH PPS REVENUE CREDIT

## 2020-11-24 PROCEDURE — 3331090002 HH PPS REVENUE DEBIT

## 2020-11-24 PROCEDURE — 3331090001 HH PPS REVENUE CREDIT

## 2020-11-25 ENCOUNTER — TELEPHONE (OUTPATIENT)
Dept: INTERNAL MEDICINE CLINIC | Age: 85
End: 2020-11-25

## 2020-11-25 PROCEDURE — 3331090001 HH PPS REVENUE CREDIT

## 2020-11-25 PROCEDURE — 3331090002 HH PPS REVENUE DEBIT

## 2020-11-26 PROCEDURE — 3331090002 HH PPS REVENUE DEBIT

## 2020-11-26 PROCEDURE — 3331090001 HH PPS REVENUE CREDIT

## 2020-11-27 PROCEDURE — 3331090001 HH PPS REVENUE CREDIT

## 2020-11-27 PROCEDURE — 3331090002 HH PPS REVENUE DEBIT

## 2020-11-28 PROCEDURE — 3331090002 HH PPS REVENUE DEBIT

## 2020-11-28 PROCEDURE — 3331090001 HH PPS REVENUE CREDIT

## 2020-11-29 PROCEDURE — 3331090001 HH PPS REVENUE CREDIT

## 2020-11-29 PROCEDURE — 3331090002 HH PPS REVENUE DEBIT

## 2020-12-21 ENCOUNTER — PATIENT OUTREACH (OUTPATIENT)
Dept: CASE MANAGEMENT | Age: 85
End: 2020-12-21

## 2021-04-08 NOTE — PROGRESS NOTES
HYPOGLYCEMIC EPISODE DOCUMENTATION Patient with hypoglycemic episode(s) at 1133 (time) on 10/16/20 (date). BG value(s) pre-treatment 65 Was patient symptomatic? [] yes, [x] no Patient was treated with the following rescue medications/treatments: [] D50 [] Glucose tablets 
              [] Glucagon 
              [x] 8oz juice 
              [] 6oz reg soda 
              [] 8oz low fat milk BG value post-treatment: 62 Steps were repeated with 16g of glucose tablets, value post treatment 87 Once BG treated and value greater than 80mg/dl, pt was provided with the following: 
[x] snack [x] meal 
Name of MD notified:Dr. Sherri Prasad The following orders were received: hold NPH insulin normal...

## 2021-04-12 NOTE — PROGRESS NOTES
[facility] Progress Note    1050  Pt arrived ambulatory and in no distress for Injectafer day 1    Assessment unremarkable. 24G PIV established in right AC with positive blood return noted. Patient Vitals for the past 12 hrs:   Temp Pulse Resp BP SpO2   10/10/19 1227 -- (!) 58 -- 146/68 98 %   10/10/19 1053 97.9 °F (36.6 °C) 65 16 145/64 96 %      The following medications administered:  Medications Administered     ferric carboxymaltose (INJECTAFER) 750 mg in 0.9% sodium chloride 250 mL IVPB     Admin Date  10/10/2019 Action  Given Dose  750 mg Rate  750 mL/hr Route  IntraVENous Administered By  Caro Callahan RN          saline peripheral flush soln 10 mL     Admin Date  10/10/2019 Action  Given Dose  10 mL Route  InterCATHeter Administered By  Caro Callahan RN           Admin Date  10/10/2019 Action  Given Dose  10 mL Route  InterCATHeter Administered By  Caro Callahan RN              Pt monitored x 30 mins post infusion. Pt tolerated treatment well. IV flushed per policy and removed, 2x2 and coban placed. Pt provided with education on possible side effects of medication along with discharge instructions. Pt verbalized understanding. 1230  Pt discharged ambulatory in no acute distress.  Next appointment:    Future Appointments   Date Time Provider Dafne Jenkins   10/11/2019  1:00 PM MRM CARDIOPULM EXERCISE [facility]   10/16/2019  1:00 PM MRM CARDIOPULM EXERCISE [facility]   10/16/2019  2:00 PM MRM [employer] [place] [address]   10/17/2019 11:00 AM LOUISE  CHAIR 2 [facility]   10/21/2019  1:00 PM MRM CARDIOPULM EXERCISE [facility]   10/23/2019  1:00 PM MRM CARDIOPULM EXERCISE [facility]   10/23/2019  2:00 PM \[facility]\"" [employer] [place] [address] [facility] [facility]   10/25/2019  1:00 PM MRM CARDIOPULM EXERCISE [facility]   10/28/2019  1:00 PM MRM CARDIOPULM EXERCISE [facility]   10/30/2019  2:00 PM MRM Osmar Thapa [facility]  HISTORY AND PHYSICAL    Name:  Gillian Oliveira  MR#:  [id number]  :  1985  ACCOUNT #:  [de-identified]  ADMIT DATE:  2021    HISTORY OF PRESENT ILLNESS:  The patient is a 21-year-old woman who is referred at the [address] [place] [address] regarding nonhealing wound at a  site. The patient had  in 2021. She developed a wound infection at the  site and then was found to have an associated intraabdominal infection. She had the intraabdominal abscess drained percutaneously. She was treated with IV antibiotics. She had a wound VAC in place at the  site for a time. Presently, the patient reports that she is doing her own wound care at home which consist of Dakin's solution on gauze as a wet-to-dry dressing change daily. 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 The patient reports a small quantity of drainage from the wound. She has no pain associated with wound. The patient had gestational diabetes but did not have history of diabetes before or after the pregnancy. She had hypertension during the pregnancy. The patient has no history of heart disease. She has no cardiac symptoms and no history of MI or coronary intervention. She has no dyspnea. She is active and ambulatory. PAST MEDICAL HISTORY:  Remarkable for a seizure disorder (no seizure since ), hypertension, liver disease, polycystic ovarian disease. The patient has history of gastric bypass for obesity. Reported weight 198 pounds, height 5 feet 7 inches. PHYSICAL EXAMINATION:  VITAL SIGNS:  Blood pressure 125/85, pulse 90, respirations 16, temperature 97.5. HEAD AND NECK:  Examination showed no jaundice. LUNGS:  Clear bilaterally without rales, rhonchi, or wheezes. HEART:  Regular without murmur, gallop, or rub. NEUROLOGIC:  The patient is alert and oriented. She moves all extremities equally. [facility]   11/6/2019  2:00 PM MRM NUTRITION [facility]   3/20/2020 11:15 AM Myron Su MD [facility] Facial movement is symmetrical.  Speech is normal.  ABDOMEN:  Examination of the patient's abdomen reveal in the low abdominal skinfold an open wound at prior  site which is 0.7 x 5 x 0.3 cm in dimension. The entire surface is clean granulation. There is no deep tracking. Granulation is somewhat exuberant. Exuberant granulation was treated with silver nitrate. Dressing ordered:  Aquacel AG to be applied over the wound, covered by dry gauze and tape. To be changed daily. It is okay for the patient to shower. The patient will follow up in [address] [place] [address] in 2 weeks. FINAL DIAGNOSIS:  Nonhealing wound at  site.       Martha Martin MD      [id number]/  D:  2021 15:13  T:  2021 17:15  JOB #:  [id number]

## 2021-07-01 NOTE — PROGRESS NOTES
PCP OSMEL appt scheduled with Dr. Gucci Elizondo on 2/26/2019 at 11:30am. Appt added to AVS. Chevy Vega CM Specialist 
 Ivermectin Counseling:  Patient instructed to take medication on an empty stomach with a full glass of water.  Patient informed of potential adverse effects including but not limited to nausea, diarrhea, dizziness, itching, and swelling of the extremities or lymph nodes.  The patient verbalized understanding of the proper use and possible adverse effects of ivermectin.  All of the patient's questions and concerns were addressed.

## 2021-07-05 NOTE — PROGRESS NOTES
Goals  Prevent complications post hospitalization. 2/21/2020 Patient reports she is waiting for call back for follow up appointment with PCP. She will call [facility] and schedule 2 week follow up. Denies chest pain, fever, drainage. Endorses some bruising and SOB which is expected. Patient will report appointments and monitor S&S of infection to report at next outreach. MATTI 
 
3/4/2020 Patient denies chest pain, fever, rash, odor and redness. Jean Weiss with [facility] reports patient attended appointment on 3/2/2020 and scheduled for RUDDY on 3/24/2020. Patient will monitor bruising and S&S of ifectio and report at next outreach.  [facility] 
  
  
 
 

No

## 2021-08-02 NOTE — PROGRESS NOTES
Patient Thomas Torres  MRN [id number]    Laceration Repair    Date/Time: 8/2/2021 11:36 AM  Performed by: Leo Castillo MD  Authorized by: Leo Castillo MD     Consent:     Consent obtained:  Verbal    Consent given by:  Patient  Anesthesia (see MAR for exact dosages):     Anesthesia method:  Local infiltration    Local anesthetic:  Lidocaine 1% w/o epi  Laceration details:     Location:  Face    Face location:  R cheek    Length (cm):  3  Repair type:     Repair type:  Simple  Pre-procedure details:     Preparation:  Patient was prepped and draped in usual sterile fashion  Treatment:     Area cleansed with:  Saline    Amount of cleaning:  Standard    Irrigation solution:  Sterile water    Irrigation method:  Syringe    Visualized foreign bodies/material removed: no    Skin repair:     Repair method:  Sutures    Suture size:  6-0    Suture material:  Prolene    Suture technique:  Simple interrupted    Number of sutures:  6  Approximation:     Approximation:  Close  Post-procedure details:     Dressing:  Open (no dressing)    Patient tolerance of procedure:  Tolerated well, no immediate complications  Comments:      Suture Repair performed sterilely. Supervised by attending physician Dr. Presley.  Laceration Repair    Date/Time: 8/2/2021 11:38 AM  Performed by: Leo Castillo MD  Authorized by: Leo Castillo MD     Consent:     Consent obtained:  Verbal    Consent given by:  Patient  Laceration details:     Location:  Scalp    Scalp location:  Occipital    Length (cm):  5  Repair type:     Repair type:  Simple  Treatment:     Area cleansed with:  Saline    Amount of cleaning:  Standard    Irrigation method:  Syringe  Skin repair:     Repair method:  Staples    Number of staples:  9  Approximation:     Approximation:  Close  Post-procedure details:     Dressing:  Open (no dressing)  Comments:      Supervised by Dr. Sakina Castillo MD  [facility] PGY-2  Trauma Surgery         Leo Castillo,  Verified patient identity with two identifiers. Spoke with patient by phone. Antibiotic course completed today (Bactrim)  INR 1.4 today via home monitor. Patient instructions Ervin Huitron:     Resume previous dose of Coumadin 6mg daily except 3mg Sat & Deborah Liliam  Retest Thursday 7/12/18    A full discussion of the nature of anticoagulants has been carried out. A benefit risk analysis has been presented to the patient, so that they understand the justification for choosing anticoagulation at this time. The need for frequent and regular monitoring, precise dosage adjustment and compliance is stressed. Side effects of potential bleeding are discussed. The patient should avoid any OTC items containing aspirin or ibuprofen, and should avoid great swings in general diet. Avoid alcohol consumption. Call if any signs of abnormal bleeding. Patient verbalized understanding. MD  Resident  08/02/21 1133       Leo Castillo MD  Resident  08/02/21 [id number]

## 2023-04-22 NOTE — Clinical Note
Diagnostic wire inserted. Pt presents with c/o "abdominal pain", localized to suprapubic region x 2 days, denies any N/V/D, urinary s/s or vaginal complaints. As secondary complaint in triage, pt then c/o "chest pain" (x 2 days) to mid-sternal region with radiation to L ACW and breast. Denies any SOB, reports hx of "murmur" and is followed by cards.

## 2023-12-04 NOTE — WOUND CARE
Fely Quiroz accompanied their mother to the emergency department on 12/4/2023. They may return to work on 12/05/2023.      If you have any questions or concerns, please don't hesitate to call.       RN Patient discharged, wound healed. Patient own stocking on. Patient was discharged with Self to home and was ambulatory. In stable condition with c/o pain:_0_/10_

## 2025-08-01 NOTE — PROGRESS NOTES
Received home health order for PT from physician. Patient already open to [facility] [place] for wound care. Sent them a referral including the order for PT and they responded that they would provide PT. Referral sent to [facility] with patient's consent. Patient is discharged home today. Care Management Interventions  PCP Verified by CM: Yes (Dr. Adebayo Espinal)  Last Visit to PCP: 06/16/16  Mode of Transport at Discharge:  Other (see comment) (friend)  Hospital Transport Time of Discharge: 1630  Transition of Care Consult (CM Consult): [address] (Open to [facility] for wound care, card added PT)  [address] No  Reason Outside [place]: Patient already serviced by other home care/hospice agency  MyChart Signup: No  Discharge Durable Medical Equipment: No  Physical Therapy Consult: Yes  Occupational Therapy Consult: Yes  Speech Therapy Consult: No  Current Support Network: Lives Alone  Confirm Follow Up Transport: Family (Patients daughter)  Plan discussed with Pt/Family/Caregiver: Yes (Patient)  Discharge Location  Discharge Placement: Home with home health Spoke today with patient regarding another matter and she has home health nurse at the house with no issues

## (undated) DEVICE — CATHETER ANGIO JL3.5 AD 5 FRX100 CM PERFORMA

## (undated) DEVICE — Device

## (undated) DEVICE — SET ADMIN 16ML TBNG L100IN 2 Y INJ SITE IV PIGGY BK DISP

## (undated) DEVICE — BLOCK BITE ENDOSCP AD 21 MM W/ DIL BLU LF DISP

## (undated) DEVICE — TOWEL,OR,DSP,ST,BLUE,STD,2/PK,40PK/CS: Brand: MEDLINE

## (undated) DEVICE — ACCESS SHEATH WITH DILATOR: Brand: WATCHMAN® ACCESS SYSTEM

## (undated) DEVICE — CLIP INT L235CM WRK CHAN DIA2.8MM OPN 11MM LCK MECHANISM MR

## (undated) DEVICE — NEEDLE HYPO 18GA L1.5IN PNK S STL HUB POLYPR SHLD REG BVL

## (undated) DEVICE — 72" ARTERIAL PRESSURE TUBING: Brand: ICU MEDICAL

## (undated) DEVICE — TOWEL 4 PLY TISS 19X30 SUE WHT

## (undated) DEVICE — SUTURE PERMA-HAND 0 L18IN NONABSORBABLE BLK CT-1 L36MM 1/2 C021D

## (undated) DEVICE — BASIN EMSIS 16OZ GRAPHITE PLAS KID SHP MOLD GRAD FOR ORAL

## (undated) DEVICE — STERILE POLYISOPRENE POWDER-FREE SURGICAL GLOVES WITH EMOLLIENT COATING: Brand: PROTEXIS

## (undated) DEVICE — SYR 10ML LUER LOK 1/5ML GRAD --

## (undated) DEVICE — CATH IV AUTOGRD BC PNK 20GA 25 -- INSYTE

## (undated) DEVICE — 1200 GUARD II KIT W/5MM TUBE W/O VAC TUBE: Brand: GUARDIAN

## (undated) DEVICE — Z DISCONTINUED PER MEDLINE LINE GAS SAMPLING O2/CO2 LNG AD 13 FT NSL W/ TBNG FILTERLINE

## (undated) DEVICE — SPONGE GZ W4XL4IN COT 12 PLY TYP VII WVN C FLD DSGN

## (undated) DEVICE — STRAP,POSITIONING,KNEE/BODY,FOAM,4X60": Brand: MEDLINE

## (undated) DEVICE — NEONATAL-ADULT SPO2 SENSOR: Brand: NELLCOR

## (undated) DEVICE — TRAY CATHETER 16FR F INCLUDE LUB URIN M TEMP SENS 2 STATLOK STBL

## (undated) DEVICE — REM POLYHESIVE ADULT PATIENT RETURN ELECTRODE: Brand: VALLEYLAB

## (undated) DEVICE — SUTURE VCRL SZ 0 L18IN ABSRB VLT L40MM CT 1/2 CIR J752D

## (undated) DEVICE — ESOPHAGEAL BALLOON DILATATION CATHETER: Brand: CRE FIXED WIRE

## (undated) DEVICE — AGENT HEMSTAT W4XL4IN OXIDIZED REGENERATED CELOS ABSRB SFT

## (undated) DEVICE — 3M™ TEGADERM™ TRANSPARENT FILM DRESSING FRAME STYLE, 1626W, 4 IN X 4-3/4 IN (10 CM X 12 CM), 50/CT 4CT/CASE: Brand: 3M™ TEGADERM™

## (undated) DEVICE — DEVON™ KNEE AND BODY STRAP 60" X 3" (1.5 M X 7.6 CM): Brand: DEVON

## (undated) DEVICE — CATHETER ANGIO JR4 AD 5 FRX100 CM 25 CM PERFORMA

## (undated) DEVICE — SOLUTION IRRIG 1000ML H2O STRL BLT

## (undated) DEVICE — Device: Brand: ENDO SMARTCAP

## (undated) DEVICE — TRAP SUC MUCOUS 70ML -- MEDICHOICE MEDLINE

## (undated) DEVICE — NEEDLE TRANSSEPTAL AD 18GA L71CM 30DEG BVL BRK-1 XS CRV S

## (undated) DEVICE — CANNULA PERF 15FR L12.5IN RG STPCOCK WIREWOUND BODY

## (undated) DEVICE — SET EXTN PRIMING 0.59ML 8.5IN 1.55LB PRSS RATE MINIBOR PUR

## (undated) DEVICE — (D)PREP SKN CHLRAPRP APPL 26ML -- CONVERT TO ITEM 371833

## (undated) DEVICE — SUMP PERICARD AD 20FR WT TIP VERSATILE DSGN MULT PRT VENT

## (undated) DEVICE — DRESSING HEMOSTATIC SFT INTVENT W/O SLT DBL WRP QUIKCLOT LF

## (undated) DEVICE — THIN-FLEX SINGLE STAGE VENOUS DRAINAGE CANNULA: Brand: THIN-FLEX SINGLE STAGE VENOUS DRAINAGE CANNULA

## (undated) DEVICE — TR BAND RADIAL ARTERY COMPRESSION DEVICE: Brand: TR BAND

## (undated) DEVICE — CONNECTOR DRNGE W3/8-0.5XH3/16XL3/16IN 2:1 SIL CARD STR

## (undated) DEVICE — SYR 3ML LL TIP 1/10ML GRAD --

## (undated) DEVICE — AIRLIFE™  ADULT CUSHION NASAL CANNULA WITH 7 FOOT (2.1 M) CRUSH-RESISTANT OXYGEN TUBING, AND U/CONNECT-IT ADAPTER: Brand: AIRLIFE™

## (undated) DEVICE — BITE BLK ENDOSCP AD 54FR GRN POLYETH ENDOSCP W STRP SLD

## (undated) DEVICE — CATHETER ETER ANGIO L110CM OD5FR ID046IN L75CM 038IN 145DEG CARD

## (undated) DEVICE — STRAINER URIN CALC RNL MSH -- CONVERT TO ITEM 357634

## (undated) DEVICE — SOLIDIFIER MEDC 1200ML -- CONVERT TO 356117

## (undated) DEVICE — FIAPC® PROBE W/ FILTER 2200 A OD 2.3MM/6.9FR; L 2.2M/7.2FT: Brand: ERBE

## (undated) DEVICE — CATH IV AUTOGRD BC GRY 16GA 30 -- INSYTE

## (undated) DEVICE — SYR POWER 150ML 8IN FILL TUBE --

## (undated) DEVICE — INFECTION CONTROL KIT SYS

## (undated) DEVICE — KENDALL RADIOLUCENT FOAM MONITORING ELECTRODE RECTANGULAR SHAPE: Brand: KENDALL

## (undated) DEVICE — SOLUTION IV 500ML 0.9% SOD CHL FLX CONT

## (undated) DEVICE — SYR 50ML LR LCK 1ML GRAD NSAF --

## (undated) DEVICE — SPHINCTEROTOME: Brand: DREAMTOME™ RX 44

## (undated) DEVICE — LEAD PCMKR MYOCARDL BPLR TEMP. --

## (undated) DEVICE — 40418 TRENDELENBURG ONE-STEP ARM PROTECTORS LARGE (1 PAIR): Brand: 40418 TRENDELENBURG ONE-STEP ARM PROTECTORS LARGE (1 PAIR)

## (undated) DEVICE — SUT PROL 4-0 36IN RB1 DA BLU --

## (undated) DEVICE — SUT PROL 3-0 30IN SH1 DA BLU --

## (undated) DEVICE — VENT CATHETER: Brand: EDWARDS LIFESCIENCES VENT CATHETER

## (undated) DEVICE — GLIDESHEATH SLENDER ACCESS KIT: Brand: GLIDESHEATH SLENDER

## (undated) DEVICE — GARMENT,MEDLINE,DVT,INT,CALF,MED, GEN2: Brand: MEDLINE

## (undated) DEVICE — FALCON® SAMPLE CONTAINER, WITH LID, 4.5OZ (110ML), INDIVIDUALLY WRAPPED, STERILE, 100/CASE: Brand: FALCON A CORNING BRAND

## (undated) DEVICE — LIGHT HANDLE: Brand: DEVON

## (undated) DEVICE — NON-REM POLYHESIVE PATIENT RETURN ELECTRODE: Brand: VALLEYLAB

## (undated) DEVICE — RADIFOCUS GLIDEWIRE: Brand: GLIDEWIRE

## (undated) DEVICE — PRESSURE MONITORING SET: Brand: TRUWAVE

## (undated) DEVICE — SUTURE PROL SZ 5-0 L30IN NONABSORBABLE BLU L13MM RB-2 1/2 8710H

## (undated) DEVICE — ELECTRODE,RADIOTRANSLUCENT,FOAM,5PK: Brand: MEDLINE

## (undated) DEVICE — CONTAINER SPEC 20 ML LID NEUT BUFF FORMALIN 10 % POLYPR STS

## (undated) DEVICE — KIT,1200CC CANISTER,3/16"X6' TUBING: Brand: MEDLINE INDUSTRIES, INC.

## (undated) DEVICE — ENDOSCOPY PUMP TUBING/ CAP SET: Brand: ERBE

## (undated) DEVICE — FORCEPS BX L160CM DIA8MM GRSP DISECT CUP TIP NONLOCKING ROT

## (undated) DEVICE — PLEDGET SURG W3/16XL0.25IN THK1.65MM PTFE OVL FELT FOR THE

## (undated) DEVICE — KIT ACCS INTRO 4FR L10CM NDL 21GA L7CM GWIRE L40CM

## (undated) DEVICE — SUPPORT MAMM SURG 48-50 IN 2XL BRA

## (undated) DEVICE — COR-KNOT® QUICK LOAD® SINGLES: Brand: COR-KNOT® QUICK LOAD®

## (undated) DEVICE — LUB SURG MEDC STRL 2OZ TUBE MC -- MEDICHOICE

## (undated) DEVICE — NDL PRT INJ NSAF BLNT 18GX1.5 --

## (undated) DEVICE — YANKAUER,TAPERED BULBOUS TIP,W/O VENT: Brand: MEDLINE

## (undated) DEVICE — SYRINGE ANGIO 12ML COR CTRL ROT ADPT SLD PLUNG CLR BRL M

## (undated) DEVICE — DRAPE PRB US TRNSDCR 6X96IN --

## (undated) DEVICE — SUTURE ETHIB EXCL X BR GRN V-7 DA 2-0 30 PX977 PX977H

## (undated) DEVICE — PLEDGET SUT SFT OVL 3 8X5 16IN

## (undated) DEVICE — SYR ASSEMB INFL BLLN 60ML --

## (undated) DEVICE — SINGLE USE AIR/WATER, SUCTION AND BIOPSY VALVE SET WITH WATER JET CONNECTOR: Brand: ORCAPOD™

## (undated) DEVICE — RETRIEVAL BALLOON CATHETER: Brand: EXTRACTOR™ PRO XL

## (undated) DEVICE — SUTURE TICRON DBL ARMED 2 0 CV 305 42IN BLU N ABSRB BRAID 8886303551

## (undated) DEVICE — SUTURE MCRYL SZ 3-0 L27IN ABSRB UD L24MM PS-1 3/8 CIR PRIM Y936H

## (undated) DEVICE — BAG PRESSURE INFUSION 1000ML -- CONVERT TO ITEM 360122

## (undated) DEVICE — DRAIN SURG 24FR L5/16IN DIA8MM SIL RND HUBLESS FULL FLUT

## (undated) DEVICE — SOLUTION IV 1000ML 0.9% SOD CHL

## (undated) DEVICE — STERILE POLYISOPRENE POWDER-FREE SURGICAL GLOVES: Brand: PROTEXIS

## (undated) DEVICE — INTRODUCER SHTH 8FR L63CM DIL 8FR L67CM 0.032IN TRANSSEPTAL

## (undated) DEVICE — TIDISHIELD TRANSPORT, CONTAINMENT COVER FOR BACK TABLE 4'6" (1.37M) TO 8' (2.43M) IN LENGTH: Brand: TIDISHIELD

## (undated) DEVICE — SENSOR TEMP SKIN DISP

## (undated) DEVICE — CATHETER IV 14GA L2IN POLYUR STR ORNG HUB SFTY RADPQ DISP

## (undated) DEVICE — WRAP SURG W1.31XL1.34M CARD FOR PT 165-172CM THERMOWRP

## (undated) DEVICE — TRANSFER PK BLD PROD 300ML --

## (undated) DEVICE — SUTURE N ABSRB MONOFILAMENT 4-0 SH1 36 IN BLU PROLENE D3986

## (undated) DEVICE — PACK PROCEDURE SURG HRT CATH

## (undated) DEVICE — CATH IV AUTOGRD BC BLU 22GA 25 -- INSYTE

## (undated) DEVICE — SYRINGE 50ML E/T

## (undated) DEVICE — ABDOMINAL PAD: Brand: DERMACEA

## (undated) DEVICE — 6 FOOT DISPOSABLE EXTENSION CABLE WITH SAFE CONNECT / SCREW-DOWN

## (undated) DEVICE — BAG SPEC BIOHZRD 10 X 10 IN --

## (undated) DEVICE — SOLIDIFIER FLD 2OZ 1500CC N DISINF IN BTL DISP SAFESORB

## (undated) DEVICE — CANNULA AORT ROOT INTRO STD TIP 5FR OVERALL LEN 55IN DLP

## (undated) DEVICE — MEDI-VAC YANK SUCT HNDL W/TPRD BULBOUS TIP: Brand: CARDINAL HEALTH

## (undated) DEVICE — CAPSULE ENDOSCP L26.2MM DIA11.4MM BIOCOMPATIBLE PLAS SB 3

## (undated) DEVICE — ANGIOGRAPHY KIT CUST [K0910930B] [MERIT MEDICAL SYSTEMS INC]

## (undated) DEVICE — SYRINGE ANGIO 10 CC BRL STD PRNT POLYCARB LT BLU MEDALLION

## (undated) DEVICE — SOLUTION IRRIG 1000ML 0.9% SOD CHL CONT

## (undated) DEVICE — KIT ANGIOGRAPHY CUST MRMC

## (undated) DEVICE — NDL BRCKNBRGH AD 18GX71CM --

## (undated) DEVICE — COR-KNOT MINI® COMBO KITBASE PACKAGE TYPE - KITEACH STERILE PACKAGE KIT CONTAINS (2) SINGLE PATIENT USE COR-KNOT MINI® DEVICES AND (12) COR-KNOT® QUICK LOADS®.: Brand: COR-KNOT MINI®

## (undated) DEVICE — SNARE ENDOSCP M L240CM W27MM SHTH DIA2.4MM CHN 2.8MM OVL

## (undated) DEVICE — CHECK-FLO INTRODUCER SET: Brand: PERFORMER

## (undated) DEVICE — TUBING PRSS MON L6IN PVC M FEM CONN

## (undated) DEVICE — AMPLATZ EXTRA STIFF WIRE GUIDE: Brand: AMPLATZ

## (undated) DEVICE — GUIDEWIRE VASC L260CM 0.035IN J TIP L3MM PTFE FIX COR NAMIC

## (undated) DEVICE — MEDI-TRACE CADENCE ADULT, DEFIBRILLATION ELECTRODE -RTS  (10 PR/PK) - PHILIPS: Brand: MEDI-TRACE CADENCE

## (undated) DEVICE — DRAPE FLD WRM W44XL66IN C6L FOR INTRATEMP SYS THERMABASIN

## (undated) DEVICE — DRESSING AQUACEL ADVANTAGE ALG W4XL5IN RECT GREATER ABSRB HYDRFBR TECHNOLOGY

## (undated) DEVICE — VALVE INSRT TOOL ADPT MTL 9FR -- ACCESS

## (undated) DEVICE — BAG RED 3PLY 2MIL 30X40 IN

## (undated) DEVICE — FORCEPS BX L240CM JAW DIA2.8MM L CAP W/ NDL MIC MESH TOOTH

## (undated) DEVICE — SWAN-GANZ TRUE SIZE THERMODULTION CATHETER, 5F: Brand: SWAN-GANZ TRUE SIZE

## (undated) DEVICE — SOLUTION IV 1000ML PH 7.4 INJ NRMSOL R

## (undated) DEVICE — CATHETER URETH 18FR RED RUB INTMIT ALL PURP

## (undated) DEVICE — INTENDED TO STANDARDIZE OR CAMERAS TO ALLOW FOR THE USE OF THE OR CAMERA COVER: Brand: ASPEN® O.R. CAMERA COVER

## (undated) DEVICE — PINNACLE INTRODUCER SHEATH: Brand: PINNACLE

## (undated) DEVICE — MEDI-VAC NON-CONDUCTIVE SUCTION TUBING: Brand: CARDINAL HEALTH

## (undated) DEVICE — RUNTHROUGH NS EXTRA FLOPPY PTCA GUIDEWIRE: Brand: RUNTHROUGH

## (undated) DEVICE — SUT PROL 3-0 36IN SH DA BLU --

## (undated) DEVICE — HI-TORQUE VERSACORE FLOPPY GUIDE WIRE SYSTEM 145 CM: Brand: HI-TORQUE VERSACORE

## (undated) DEVICE — SPLINT WR POS F/ARTERIAL ACC -- BX/10

## (undated) DEVICE — ROSEN CURVED WIRE GUIDE: Brand: ROSEN

## (undated) DEVICE — TEMP PACING WIRE: Brand: MYO/WIRE